# Patient Record
Sex: FEMALE | Race: WHITE | NOT HISPANIC OR LATINO | Employment: OTHER | URBAN - METROPOLITAN AREA
[De-identification: names, ages, dates, MRNs, and addresses within clinical notes are randomized per-mention and may not be internally consistent; named-entity substitution may affect disease eponyms.]

---

## 2017-06-22 ENCOUNTER — OFFICE VISIT (OUTPATIENT)
Dept: FAMILY MEDICINE | Facility: CLINIC | Age: 67
End: 2017-06-22
Payer: MEDICARE

## 2017-06-22 VITALS
OXYGEN SATURATION: 97 % | WEIGHT: 231.13 LBS | HEART RATE: 90 BPM | HEIGHT: 62 IN | BODY MASS INDEX: 42.53 KG/M2 | SYSTOLIC BLOOD PRESSURE: 124 MMHG | DIASTOLIC BLOOD PRESSURE: 60 MMHG | TEMPERATURE: 98 F

## 2017-06-22 DIAGNOSIS — F33.42 RECURRENT MAJOR DEPRESSIVE DISORDER, IN FULL REMISSION: ICD-10-CM

## 2017-06-22 DIAGNOSIS — G47.00 INSOMNIA, UNSPECIFIED TYPE: ICD-10-CM

## 2017-06-22 DIAGNOSIS — E66.01 MORBID OBESITY WITH BMI OF 40.0-44.9, ADULT: ICD-10-CM

## 2017-06-22 DIAGNOSIS — G47.31 PRIMARY CENTRAL SLEEP APNEA: ICD-10-CM

## 2017-06-22 DIAGNOSIS — I48.0 PAROXYSMAL A-FIB: ICD-10-CM

## 2017-06-22 DIAGNOSIS — Z79.4 TYPE 2 DIABETES MELLITUS WITHOUT COMPLICATION, WITH LONG-TERM CURRENT USE OF INSULIN: ICD-10-CM

## 2017-06-22 DIAGNOSIS — K21.9 GASTROESOPHAGEAL REFLUX DISEASE WITHOUT ESOPHAGITIS: ICD-10-CM

## 2017-06-22 DIAGNOSIS — E11.9 TYPE 2 DIABETES MELLITUS WITHOUT COMPLICATION, WITH LONG-TERM CURRENT USE OF INSULIN: ICD-10-CM

## 2017-06-22 DIAGNOSIS — M1A.0720 CHRONIC GOUT OF LEFT ANKLE, UNSPECIFIED CAUSE: Primary | ICD-10-CM

## 2017-06-22 PROBLEM — F33.9 RECURRENT MAJOR DEPRESSIVE DISORDER: Status: ACTIVE | Noted: 2017-06-22

## 2017-06-22 PROCEDURE — 4010F ACE/ARB THERAPY RXD/TAKEN: CPT | Mod: S$GLB,,, | Performed by: FAMILY MEDICINE

## 2017-06-22 PROCEDURE — 99499 UNLISTED E&M SERVICE: CPT | Mod: S$GLB,,, | Performed by: FAMILY MEDICINE

## 2017-06-22 PROCEDURE — 99999 PR PBB SHADOW E&M-EST. PATIENT-LVL IV: CPT | Mod: PBBFAC,,, | Performed by: FAMILY MEDICINE

## 2017-06-22 PROCEDURE — 99204 OFFICE O/P NEW MOD 45 MIN: CPT | Mod: S$GLB,,, | Performed by: FAMILY MEDICINE

## 2017-06-22 RX ORDER — OMEPRAZOLE 20 MG/1
20 CAPSULE, DELAYED RELEASE ORAL DAILY
COMMUNITY
End: 2018-01-25 | Stop reason: SDUPTHER

## 2017-06-22 RX ORDER — RAMELTEON 8 MG/1
8 TABLET ORAL NIGHTLY
COMMUNITY
End: 2017-07-12

## 2017-06-22 RX ORDER — GLUCOSAMINE/CHONDRO SU A 500-400 MG
1 TABLET ORAL DAILY
COMMUNITY
End: 2019-08-20

## 2017-06-22 RX ORDER — LANOLIN ALCOHOL/MO/W.PET/CERES
1 CREAM (GRAM) TOPICAL DAILY
COMMUNITY
End: 2023-09-28

## 2017-06-22 RX ORDER — GABAPENTIN 100 MG/1
100 CAPSULE ORAL 3 TIMES DAILY
Qty: 90 CAPSULE | Refills: 11 | Status: SHIPPED | OUTPATIENT
Start: 2017-06-22 | End: 2017-09-11 | Stop reason: SDUPTHER

## 2017-06-22 RX ORDER — TEMAZEPAM 30 MG/1
30 CAPSULE ORAL NIGHTLY PRN
Qty: 30 CAPSULE | Refills: 2 | Status: SHIPPED | OUTPATIENT
Start: 2017-06-22 | End: 2017-07-12

## 2017-06-22 RX ORDER — HYDRALAZINE HYDROCHLORIDE 100 MG/1
100 TABLET, FILM COATED ORAL 2 TIMES DAILY
COMMUNITY
End: 2017-09-18 | Stop reason: SDUPTHER

## 2017-06-22 RX ORDER — LORAZEPAM 0.5 MG/1
0.5 TABLET ORAL
COMMUNITY
End: 2018-09-13

## 2017-06-22 RX ORDER — CARVEDILOL 6.25 MG/1
6.25 TABLET ORAL 2 TIMES DAILY WITH MEALS
COMMUNITY
End: 2018-10-01 | Stop reason: SDUPTHER

## 2017-06-22 RX ORDER — INSULIN GLARGINE 100 [IU]/ML
58 INJECTION, SOLUTION SUBCUTANEOUS NIGHTLY
COMMUNITY
End: 2017-09-20

## 2017-06-22 RX ORDER — CYCLOSPORINE 0.5 MG/ML
1 EMULSION OPHTHALMIC 2 TIMES DAILY
COMMUNITY
End: 2017-06-27 | Stop reason: SDUPTHER

## 2017-06-22 RX ORDER — FLUTICASONE PROPIONATE 50 MCG
1 SPRAY, SUSPENSION (ML) NASAL DAILY
COMMUNITY
End: 2017-07-31 | Stop reason: SDUPTHER

## 2017-06-22 RX ORDER — ATORVASTATIN CALCIUM 40 MG/1
40 TABLET, FILM COATED ORAL DAILY
COMMUNITY
End: 2017-09-11 | Stop reason: SDUPTHER

## 2017-06-22 RX ORDER — DULOXETIN HYDROCHLORIDE 60 MG/1
60 CAPSULE, DELAYED RELEASE ORAL DAILY
COMMUNITY
End: 2017-09-11 | Stop reason: SDUPTHER

## 2017-06-22 RX ORDER — BENAZEPRIL HYDROCHLORIDE 40 MG/1
40 TABLET ORAL DAILY
COMMUNITY
End: 2018-08-11 | Stop reason: SDUPTHER

## 2017-06-22 NOTE — PROGRESS NOTES
Subjective:       Patient ID: Christine Jo is a 67 y.o. female.    Chief Complaint: Establish Care    67 y old female with Type 2 DM ,Insomnia , HTN , MO, Sleep Apnea, A fib , Depression, GERD here to get established . Not  On aspirin : allergic , + hep B vaccine : she might have series when she used to do  HH , exercises: not much , Opthalmology : sees Dr Osman , due for poretr next m  ,had 2 pneumonia vaccines (pneumovax 3 y ago)  , +TDAP 3 y ago . She also had   Zoster vaccine  .  Fastin bs are  Between 120 189 .Sees Dr Reynaga  For a fib and HTN. She was started on Eliquis  Also however  She has not fill it . Could  not rozina accustomed   to CPAP  . Depression is  fine   :  Still Overeats and deals with Some anhedonia also . She was also on celexa  Which did not help ,  She also  tried Wellbutrin which she could not tolerate either .  This  started once she found out son had Cancer .  She was getting group therapy  At [facility]  And will like to resume psychotherapy . Not sleeping well with Rozerem , 4 hrs at he most . Started on this med 1-2 m ago since Temazepam was not covred. Previously doing well on it and had no SE such as : hypersomnolence, dizziness , falls etc       Review of Systems   Constitutional: Negative.    HENT: Negative.    Respiratory: Negative.    Cardiovascular: Negative.    Gastrointestinal: Negative.    Genitourinary: Negative.    Musculoskeletal: Positive for arthralgias.   Neurological: Negative.    Psychiatric/Behavioral: Positive for sleep disturbance.       Objective:      Physical Exam   Constitutional: She is oriented to person, place, and time. She appears well-developed and well-nourished. No distress.   HENT:   Head: Normocephalic and atraumatic.   Right Ear: External ear normal.   Left Ear: External ear normal.   Mouth/Throat: No oropharyngeal exudate.   Eyes: Conjunctivae and EOM are normal. Pupils are equal, round, and reactive to light. Right eye exhibits no discharge. Left eye  exhibits no discharge. No scleral icterus.   Neck: Normal range of motion. Neck supple. No JVD present. No tracheal deviation present. No thyromegaly present.   Cardiovascular: Normal rate, regular rhythm and normal heart sounds.  Exam reveals no gallop and no friction rub.    No murmur heard.  Pulses:       Dorsalis pedis pulses are 2+ on the right side, and 2+ on the left side.        Posterior tibial pulses are 2+ on the right side, and 2+ on the left side.   Pulmonary/Chest: Effort normal and breath sounds normal. No stridor. No respiratory distress. She has no wheezes. She has no rales. She exhibits no tenderness.   Abdominal: Soft. Bowel sounds are normal. She exhibits no distension. There is no tenderness. There is no rebound and no guarding.   Musculoskeletal: Normal range of motion.        Right foot: There is normal range of motion and no deformity.        Left foot: There is normal range of motion and no deformity.   Feet:   Right Foot:   Protective Sensation: 4 sites tested. 4 sites sensed.   Skin Integrity: Negative for ulcer, blister, skin breakdown, erythema, warmth, callus or dry skin.   Left Foot:   Protective Sensation: 5 sites tested. 5 sites sensed.   Skin Integrity: Negative for ulcer, blister, skin breakdown, warmth, callus or dry skin.   Lymphadenopathy:     She has no cervical adenopathy.   Neurological: She is alert and oriented to person, place, and time.   Skin: Skin is warm and dry. She is not diaphoretic.   Psychiatric: She has a normal mood and affect. Her behavior is normal. Judgment and thought content normal.       Assessment:       1. Type 2 diabetes mellitus without complication, with long-term current use of insulin    2. Morbid obesity with BMI of 40.0-44.9, adult    3. Paroxysmal a-fib    4. Insomnia, unspecified type    5. Primary central sleep apnea    6. Gastroesophageal reflux disease without esophagitis    7. Recurrent major depressive disorder, in full remission         Plan:     Christine was seen today for establish care.    Diagnoses and all orders for this visit:    Type 2 diabetes mellitus without complication, with long-term current use of insulin    Morbid obesity with BMI of 40.0-44.9, adult    Paroxysmal a-fib    Insomnia, unspecified type    Primary central sleep apnea    Gastroesophageal reflux disease without esophagitis    Recurrent major depressive disorder, in full remission     . Pt. encouraged to follow a strict diabetic type diet.   Encouraged daily physical activity/exercise for at least 30mins if tolerated.   Weight control recommenced as always.   Discussed how lifestyle changes make biggest impact on diabetes control.   Continue home glucose monitoring once daily and keep log.   Counseling provided today about hypoglycemia as well as about how diabetes increases risk of cardiovascular, cerebrovascular ,peripheral vascular disease and other serious health complications. He has been instructed to call if developing any new symptoms or hypoglycemia related symptoms.   See encounter for associated orders/medications.   - Lipid panel; Future  The patient is asked to make an attempt to improve diet and exercise patterns to aid in medical management of this problem.  Get eliquis. F.u with Dr Reynaga   Will start Temazepam  And will look into PA if needed   Be compliant with CPAP   Will refer for psychotherapy

## 2017-06-23 ENCOUNTER — TELEPHONE (OUTPATIENT)
Dept: FAMILY MEDICINE | Facility: CLINIC | Age: 67
End: 2017-06-23

## 2017-06-23 NOTE — TELEPHONE ENCOUNTER
Received paper stating PA needs to be done on betamethasone cream. Called insurance. Insurance sending over forms to be filled out for PA

## 2017-06-26 ENCOUNTER — LAB VISIT (OUTPATIENT)
Dept: LAB | Facility: HOSPITAL | Age: 67
End: 2017-06-26
Attending: FAMILY MEDICINE
Payer: MEDICARE

## 2017-06-26 ENCOUNTER — TELEPHONE (OUTPATIENT)
Dept: FAMILY MEDICINE | Facility: CLINIC | Age: 67
End: 2017-06-26

## 2017-06-26 DIAGNOSIS — Z79.4 TYPE 2 DIABETES MELLITUS WITHOUT COMPLICATION, WITH LONG-TERM CURRENT USE OF INSULIN: ICD-10-CM

## 2017-06-26 DIAGNOSIS — M1A.0720 CHRONIC GOUT OF LEFT ANKLE, UNSPECIFIED CAUSE: ICD-10-CM

## 2017-06-26 DIAGNOSIS — E11.9 TYPE 2 DIABETES MELLITUS WITHOUT COMPLICATION, WITH LONG-TERM CURRENT USE OF INSULIN: ICD-10-CM

## 2017-06-26 LAB
ALBUMIN SERPL BCP-MCNC: 3.8 G/DL
ALP SERPL-CCNC: 62 U/L
ALT SERPL W/O P-5'-P-CCNC: 22 U/L
ANION GAP SERPL CALC-SCNC: 10 MMOL/L
AST SERPL-CCNC: 17 U/L
BILIRUB SERPL-MCNC: 0.9 MG/DL
BUN SERPL-MCNC: 16 MG/DL
CALCIUM SERPL-MCNC: 9.6 MG/DL
CHLORIDE SERPL-SCNC: 106 MMOL/L
CHOLEST/HDLC SERPL: 3.1 {RATIO}
CO2 SERPL-SCNC: 27 MMOL/L
CREAT SERPL-MCNC: 0.8 MG/DL
EST. GFR  (AFRICAN AMERICAN): >60 ML/MIN/1.73 M^2
EST. GFR  (NON AFRICAN AMERICAN): >60 ML/MIN/1.73 M^2
GLUCOSE SERPL-MCNC: 151 MG/DL
HDL/CHOLESTEROL RATIO: 32.2 %
HDLC SERPL-MCNC: 149 MG/DL
HDLC SERPL-MCNC: 48 MG/DL
LDLC SERPL CALC-MCNC: 70 MG/DL
NONHDLC SERPL-MCNC: 101 MG/DL
POTASSIUM SERPL-SCNC: 4.4 MMOL/L
PROT SERPL-MCNC: 7.1 G/DL
SODIUM SERPL-SCNC: 143 MMOL/L
TRIGL SERPL-MCNC: 155 MG/DL
URATE SERPL-MCNC: 5.8 MG/DL

## 2017-06-26 PROCEDURE — 80053 COMPREHEN METABOLIC PANEL: CPT

## 2017-06-26 PROCEDURE — 85025 COMPLETE CBC W/AUTO DIFF WBC: CPT

## 2017-06-26 PROCEDURE — 80061 LIPID PANEL: CPT

## 2017-06-26 PROCEDURE — 84550 ASSAY OF BLOOD/URIC ACID: CPT

## 2017-06-26 PROCEDURE — 36415 COLL VENOUS BLD VENIPUNCTURE: CPT | Mod: PO

## 2017-06-26 PROCEDURE — 83036 HEMOGLOBIN GLYCOSYLATED A1C: CPT

## 2017-06-26 PROCEDURE — 86706 HEP B SURFACE ANTIBODY: CPT

## 2017-06-26 RX ORDER — RAMELTEON 8 MG/1
8 TABLET ORAL NIGHTLY
Qty: 30 TABLET | Refills: 0 | Status: SHIPPED | OUTPATIENT
Start: 2017-06-26 | End: 2017-07-12 | Stop reason: SDUPTHER

## 2017-06-26 NOTE — TELEPHONE ENCOUNTER
----- Message from Sunshine Malloy sent at 6/26/2017  9:19 AM CDT -----  Contact: pt   Pt states that she need a pre authorization on her med.   ..[phone] (home)

## 2017-06-26 NOTE — TELEPHONE ENCOUNTER
Returned patients call. Patient states that the temazepam needs a PA so if the doctor doesn't mind can she just call in the old one she use to take because she is completely out and doesn't want to have to wait on them to make a decision on a PA. Informed would forward information to the doctor and get back with patient when response Is received. Patient verbalized understanding.

## 2017-06-27 LAB
BASOPHILS # BLD AUTO: 0.03 K/UL
BASOPHILS NFR BLD: 0.4 %
DIFFERENTIAL METHOD: ABNORMAL
EOSINOPHIL # BLD AUTO: 0.2 K/UL
EOSINOPHIL NFR BLD: 2.2 %
ERYTHROCYTE [DISTWIDTH] IN BLOOD BY AUTOMATED COUNT: 13.3 %
ESTIMATED AVG GLUCOSE: 157 MG/DL
HBA1C MFR BLD HPLC: 7.1 %
HBV SURFACE AB SER-ACNC: NEGATIVE M[IU]/ML
HCT VFR BLD AUTO: 39.7 %
HGB BLD-MCNC: 12.6 G/DL
LYMPHOCYTES # BLD AUTO: 1.6 K/UL
LYMPHOCYTES NFR BLD: 23.6 %
MCH RBC QN AUTO: 29.4 PG
MCHC RBC AUTO-ENTMCNC: 31.7 %
MCV RBC AUTO: 93 FL
MONOCYTES # BLD AUTO: 0.5 K/UL
MONOCYTES NFR BLD: 7.9 %
NEUTROPHILS # BLD AUTO: 4.4 K/UL
NEUTROPHILS NFR BLD: 65.9 %
PLATELET # BLD AUTO: 211 K/UL
PMV BLD AUTO: 13.1 FL
RBC # BLD AUTO: 4.29 M/UL
WBC # BLD AUTO: 6.74 K/UL

## 2017-06-28 ENCOUNTER — OFFICE VISIT (OUTPATIENT)
Dept: FAMILY MEDICINE | Facility: CLINIC | Age: 67
End: 2017-06-28
Payer: MEDICARE

## 2017-06-28 VITALS
DIASTOLIC BLOOD PRESSURE: 60 MMHG | BODY MASS INDEX: 42.82 KG/M2 | SYSTOLIC BLOOD PRESSURE: 104 MMHG | TEMPERATURE: 99 F | OXYGEN SATURATION: 96 % | HEART RATE: 97 BPM | WEIGHT: 232.69 LBS | HEIGHT: 62 IN

## 2017-06-28 DIAGNOSIS — Z01.419 ROUTINE GYNECOLOGICAL EXAMINATION: Primary | ICD-10-CM

## 2017-06-28 DIAGNOSIS — E11.69 DM TYPE 2 WITH DIABETIC DYSLIPIDEMIA: ICD-10-CM

## 2017-06-28 DIAGNOSIS — E78.5 DM TYPE 2 WITH DIABETIC DYSLIPIDEMIA: ICD-10-CM

## 2017-06-28 LAB
CREAT UR-MCNC: 65 MG/DL
MICROALBUMIN UR DL<=1MG/L-MCNC: 35 UG/ML
MICROALBUMIN/CREATININE RATIO: 53.8 UG/MG

## 2017-06-28 PROCEDURE — 3045F PR MOST RECENT HEMOGLOBIN A1C LEVEL 7.0-9.0%: CPT | Mod: S$GLB,,, | Performed by: FAMILY MEDICINE

## 2017-06-28 PROCEDURE — 99999 PR PBB SHADOW E&M-EST. PATIENT-LVL III: CPT | Mod: PBBFAC,,, | Performed by: FAMILY MEDICINE

## 2017-06-28 PROCEDURE — 90746 HEPB VACCINE 3 DOSE ADULT IM: CPT | Mod: S$GLB,,, | Performed by: FAMILY MEDICINE

## 2017-06-28 PROCEDURE — 99213 OFFICE O/P EST LOW 20 MIN: CPT | Mod: S$GLB,,, | Performed by: FAMILY MEDICINE

## 2017-06-28 PROCEDURE — 82570 ASSAY OF URINE CREATININE: CPT

## 2017-06-28 PROCEDURE — G0010 ADMIN HEPATITIS B VACCINE: HCPCS | Mod: S$GLB,,, | Performed by: FAMILY MEDICINE

## 2017-06-28 PROCEDURE — 99499 UNLISTED E&M SERVICE: CPT | Mod: S$GLB,,, | Performed by: FAMILY MEDICINE

## 2017-06-28 PROCEDURE — 4010F ACE/ARB THERAPY RXD/TAKEN: CPT | Mod: S$GLB,,, | Performed by: FAMILY MEDICINE

## 2017-06-28 RX ORDER — CYCLOSPORINE 0.5 MG/ML
EMULSION OPHTHALMIC
Qty: 360 VIAL | Refills: 0 | Status: SHIPPED | OUTPATIENT
Start: 2017-06-28 | End: 2017-09-20

## 2017-06-28 NOTE — PROGRESS NOTES
Subjective:       Patient ID: Christine Jo is a 67 y.o. female.    Chief Complaint: Medicare AWV    67 y old female with uncontrolled diabetes here to discuss recommendations .uses anywhere form 55-58 UNits of  Lantus aguayo based on  Fastin BS  readings . Not on any diet . Not exercising           Review of Systems   Constitutional: Negative.    HENT: Negative.    Respiratory: Negative.    Cardiovascular: Negative.    Gastrointestinal: Negative.    Genitourinary: Negative.        Objective:      Physical Exam   Constitutional: She is oriented to person, place, and time. She appears well-developed and well-nourished. No distress.   HENT:   Head: Normocephalic and atraumatic.   Right Ear: External ear normal.   Left Ear: External ear normal.   Mouth/Throat: No oropharyngeal exudate.   Eyes: Conjunctivae and EOM are normal. Pupils are equal, round, and reactive to light. Right eye exhibits no discharge. Left eye exhibits no discharge. No scleral icterus.   Neck: Normal range of motion. Neck supple. No JVD present. No tracheal deviation present. No thyromegaly present.   Cardiovascular: Normal rate, regular rhythm and normal heart sounds.  Exam reveals no gallop and no friction rub.    No murmur heard.  Pulmonary/Chest: Effort normal and breath sounds normal. No stridor. No respiratory distress. She has no wheezes. She has no rales. She exhibits no tenderness.   Abdominal: Soft. Bowel sounds are normal. She exhibits no distension. There is no tenderness. There is no rebound and no guarding.   Musculoskeletal: Normal range of motion.   Lymphadenopathy:     She has no cervical adenopathy.   Neurological: She is alert and oriented to person, place, and time.   Skin: Skin is warm and dry. She is not diaphoretic.   Psychiatric: She has a normal mood and affect. Her behavior is normal. Judgment and thought content normal.       Assessment:      Christine was seen today for medicare awv.    Diagnoses and all orders for this  visit:    Routine gynecological examination  -     Ambulatory consult to Gynecology    DM type 2 with diabetic dyslipidemia    Other orders  -     Hepatitis B Vaccine (Adult) (IM)      Plan:   Uncontrolled   Lab Results   Component Value Date    HGBA1C 7.1 (H) 06/26/2017     Be consistent with Lantus 58 u qhs  . Diet and ex. F.u in 3 m

## 2017-06-28 NOTE — TELEPHONE ENCOUNTER
----- Message from [place] sent at 6/28/2017 10:00 AM CDT -----  Pt at [phone]//states she is returning your call//please call again//thanks//delano

## 2017-06-29 ENCOUNTER — PATIENT OUTREACH (OUTPATIENT)
Dept: ADMINISTRATIVE | Facility: HOSPITAL | Age: 67
End: 2017-06-29

## 2017-06-29 NOTE — LETTER
June 29, 2017    Dr Terry Osman             [facility]  [address]  [facility] [address]  Phone: [phone] June 29, 2017     Patient: Christine Jo    YOB: 1950   Date of Visit: 6/29/2017     To whom it may concern     We are seeing Christine Jo, HILARIA.O.B is 1950, at [facility]. HANANE KHAN MD is their primary care physician. To help with our dahiana maintenance records could you please send the following:     Most recent copy of Diabetic Eye Exam that states   Positive or Negative Retinopathy.      Please send fax to [phone], Attention Caryn CAMPBELL LPN Care Coordination.    Thank-you in advance for your assistance. If you have any questions or concerns, please don't hesitate to contact me at [phone].     Caryn MURDOCK LPN  Care Coordination Department  [facility]

## 2017-06-30 ENCOUNTER — TELEPHONE (OUTPATIENT)
Dept: FAMILY MEDICINE | Facility: CLINIC | Age: 67
End: 2017-06-30

## 2017-06-30 NOTE — PROGRESS NOTES
DM Eye Exam report received per Dr Terry Osman's office last exam was 4/27/17. Report sent to media.

## 2017-06-30 NOTE — TELEPHONE ENCOUNTER
----- Message from Cheryle Mccarthy sent at 6/30/2017  2:52 PM CDT -----  Contact: Elena with [facility]  Re Prior Auth for Temazepam - they are faxing over paperwork they need completed and returned ASAP

## 2017-07-03 ENCOUNTER — TELEPHONE (OUTPATIENT)
Dept: FAMILY MEDICINE | Facility: CLINIC | Age: 67
End: 2017-07-03

## 2017-07-03 NOTE — TELEPHONE ENCOUNTER
----- Message from Jaki Segura sent at 7/3/2017 10:46 AM CDT -----  Contact: [facility]  Check on status of the PA for the Temazepam.  Please call pharmacy at [phone].

## 2017-07-03 NOTE — TELEPHONE ENCOUNTER
Returned call. Spoke with pharm and informed PA for prescription denied. Pharm verbalized understanding.     Called patient to inform PA for temazepam denied, patient verbalized understanding and stated that she would talk to doctor about it at her appt.

## 2017-07-07 ENCOUNTER — HOSPITAL ENCOUNTER (OUTPATIENT)
Dept: RADIOLOGY | Facility: HOSPITAL | Age: 67
Discharge: HOME | End: 2017-07-07
Attending: NURSE PRACTITIONER
Payer: MEDICARE

## 2017-07-07 ENCOUNTER — OFFICE VISIT (OUTPATIENT)
Dept: OBSTETRICS AND GYNECOLOGY | Facility: CLINIC | Age: 67
End: 2017-07-07
Payer: MEDICARE

## 2017-07-07 VITALS
WEIGHT: 237 LBS | HEIGHT: 62 IN | SYSTOLIC BLOOD PRESSURE: 128 MMHG | BODY MASS INDEX: 43.61 KG/M2 | DIASTOLIC BLOOD PRESSURE: 66 MMHG

## 2017-07-07 DIAGNOSIS — Z00.00 PREVENTATIVE HEALTH CARE: Primary | ICD-10-CM

## 2017-07-07 DIAGNOSIS — B37.31 CANDIDAL VAGINITIS: ICD-10-CM

## 2017-07-07 DIAGNOSIS — Z00.00 PREVENTATIVE HEALTH CARE: ICD-10-CM

## 2017-07-07 DIAGNOSIS — Z01.419 ENCOUNTER FOR GYNECOLOGICAL EXAMINATION WITHOUT ABNORMAL FINDING: ICD-10-CM

## 2017-07-07 PROCEDURE — 99999 PR PBB SHADOW E&M-EST. PATIENT-LVL IV: CPT | Mod: PBBFAC,,, | Performed by: NURSE PRACTITIONER

## 2017-07-07 PROCEDURE — 99203 OFFICE O/P NEW LOW 30 MIN: CPT | Mod: S$GLB,,, | Performed by: NURSE PRACTITIONER

## 2017-07-07 RX ORDER — NYSTATIN AND TRIAMCINOLONE ACETONIDE 100000; 1 [USP'U]/G; MG/G
CREAM TOPICAL
Qty: 30 G | Refills: 1 | Status: SHIPPED | OUTPATIENT
Start: 2017-07-07 | End: 2018-03-23 | Stop reason: SDUPTHER

## 2017-07-07 NOTE — PROGRESS NOTES
"CC: Well woman exam    Christine Jo is a 67 y.o. female  presents for well woman exam.  No LMP recorded. Patient is postmenopausal..  No issues, problems, or complaints.No AUB. Not sexually active.Patient reports DM ( Hga1c 7.23 % ) and states that she has recurrent yeast infections.  Last pap exam and mammogram was normal, .       Past Medical History:   Diagnosis Date    Diabetes mellitus     Insomnia     Vaginal yeast infection      Past Surgical History:   Procedure Laterality Date    abdominal laparoscopy       EYE SURGERY      TONSILLECTOMY      TUBAL LIGATION       Social History     Social History    Marital status:      Spouse name: N/A    Number of children: N/A    Years of education: N/A     Occupational History    Not on file.     Social History Main Topics    Smoking status: Former Smoker     Packs/day: 1.00     Years: 35.00     Types: Cigarettes     Quit date: 2003    Smokeless tobacco: Never Used    Alcohol use No    Drug use: No    Sexual activity: No     Other Topics Concern    Not on file     Social History Narrative    No narrative on file     Family History   Problem Relation Age of Onset    Heart disease Mother      CAD     Cancer Son      testicular     Cancer Maternal Aunt      Lung ca    Heart disease Maternal Grandfather      Pacemaker     Diabetes Sister     Heart disease Sister      CAD    Diabetes Sister     Diabetes Sister      OB History      Para Term  AB Living    3 3       3    SAB TAB Ectopic Multiple Live Births            3          /66 (BP Location: Right arm, Patient Position: Sitting, BP Method: Manual)   Ht 5' 2" (1.575 m)   Wt 107.5 kg (236 lb 15.9 oz)   BMI 43.35 kg/m²       ROS:  GENERAL: Denies weight gain or weight loss. Feeling well overall.   SKIN: Denies rash or lesions.   HEAD: Denies head injury or headache.   NODES: Denies enlarged lymph nodes.   CHEST: Denies chest pain or shortness of breath. "   CARDIOVASCULAR: Denies palpitations or left sided chest pain.   ABDOMEN: No abdominal pain, constipation, diarrhea, nausea, vomiting or rectal bleeding.   URINARY: No frequency, dysuria, hematuria, or burning on urination.  REPRODUCTIVE: See HPI.   BREASTS: The patient performs breast self-examination and denies pain, lumps, or nipple discharge.   HEMATOLOGIC: No easy bruisability or excessive bleeding.   MUSCULOSKELETAL: Denies joint pain or swelling.   NEUROLOGIC: Denies syncope or weakness.   PSYCHIATRIC: Denies depression, anxiety or mood swings.    PHYSICAL EXAM:  APPEARANCE: Obese Female,in no acute distress.  AFFECT: WNL, alert and oriented x 3  SKIN: No acne or hirsutism  NECK: Neck symmetric without masses or thyromegaly  NODES: No inguinal, cervical, axillary, or femoral lymph node enlargement  CHEST: Good respiratory effect  ABDOMEN: Soft.  No tenderness or masses.  No hepatosplenomegaly.  No hernias.  BREASTS: Symmetrical, no skin changes or visible lesions.  No palpable masses, nipple discharge bilaterally.  PELVIC: External genitalia, erythema consistent with yeast.  Normal hair distribution.  Adequate perineal body, normal urethral meatus.  Vagina atrophy. Cervix pink, without lesions, discharge or tenderness.  No significant cystocele or rectocele.  Bimanual exam shows uterus to be normal size, regular, mobile and nontender.  Adnexa without masses or tenderness.    EXTREMITIES: No edema.    1. Preventative health care  Mammo Digital Screening Bilat with CAD   2. Encounter for gynecological examination without abnormal finding  Mammo Digital Screening Bilat with CAD   3. Candidal vaginitis  nystatin-triamcinolone (MYCOLOG II) cream    PLAN:  Mycolog cream rx  Mammogram       Patient was counseled today on A.C.S. Pap guidelines and recommendations for yearly pelvic exams, mammograms and monthly self breast exams; to see her PCP for other health maintenance.

## 2017-07-07 NOTE — LETTER
July 7, 2017      Karen Nielsen MD  [address] [address] [address]           [facility]  [address] [facility] [address]  Phone: [phone]  Fax: [phone]          Patient: Christine Jo   MR Number: [id number]   YOB: 1950   Date of Visit: 7/7/2017       Dear Dr. Karen Nielsen:    Thank you for referring Christine Jo to me for evaluation. Attached you will find relevant portions of my assessment and plan of care.    If you have questions, please do not hesitate to call me. I look forward to following Christine Jo along with you.    Sincerely,    Zonia Bejarano NP    Enclosure  CC:  No Recipients    If you would like to receive this communication electronically, please contact [email or web address] or [phone] to request more information on [employer] Link access.    For providers and/or their staff who would like to refer a patient to [facility], please contact us through our one-stop-shop provider referral line, [facility] , at [phone].    If you feel you have received this communication in error or would no longer like to receive these types of communications, please e-mail [email or web address]

## 2017-07-07 NOTE — PATIENT INSTRUCTIONS
Vaginal Infection: Yeast (Candidiasis)  Yeast infection occurs when yeast in the vagina increase and start attacking the vaginal tissues. Yeast is a type of fungus. These infections are often caused by a type of yeast called Candida albicans. Other species of yeast can also cause infections. Factors that may make infection more likely include recent antibiotic use, douching, or increased sex. Yeast infections are more common in women who have diabetes, or are obese or pregnant, or have a weak immune system.  Symptoms of yeast infection  · Clumpy or thin, white discharge, which may look like cottage cheese  · No odor or minimal odor  · Severe vaginal itching or burning  · Burning with urination  · Swelling, redness of vulva  · Pain during sex  Treating yeast infection  Yeast infection is treated with a vaginal antifungal cream. In some cases, antifungal pills are prescribed instead. During treatment:  · Finish all of your medicine, even if your symptoms go away.  · Apply the cream before going to bed. Lie flat after applying so that it doesn't drip out.  · Do not douche or use tampons.  · Don't rely on a diaphragm or condoms, since the cream may weaken them.  · Avoid intercourse if advised by your healthcare provider.     Should I treat a yeast infection myself?  Discuss with your healthcare provider whether you should use over-the-counter medicines to treat a yeast infection. Self-treatment may depend on whether:  · You've had a yeast infection in the past.  · You're at risk for STDs.  Call your healthcare provider if symptoms do not go away or come back after treatment.   Date Last Reviewed: 5/12/2015  © 9915-6445 [employer]. [address] [place] [address], [place] [address]. All rights reserved. This information is not intended as a substitute for professional medical care. Always follow your healthcare professional's instructions.

## 2017-07-10 RX ORDER — INSULIN GLARGINE 100 [IU]/ML
INJECTION, SOLUTION SUBCUTANEOUS
Qty: 15 ML | Refills: 0 | Status: SHIPPED | OUTPATIENT
Start: 2017-07-10 | End: 2017-08-30 | Stop reason: SDUPTHER

## 2017-07-12 ENCOUNTER — LAB VISIT (OUTPATIENT)
Dept: LAB | Facility: HOSPITAL | Age: 67
End: 2017-07-12
Attending: FAMILY MEDICINE
Payer: MEDICARE

## 2017-07-12 ENCOUNTER — OFFICE VISIT (OUTPATIENT)
Dept: FAMILY MEDICINE | Facility: CLINIC | Age: 67
End: 2017-07-12
Payer: MEDICARE

## 2017-07-12 VITALS
BODY MASS INDEX: 45.6 KG/M2 | OXYGEN SATURATION: 98 % | DIASTOLIC BLOOD PRESSURE: 84 MMHG | HEART RATE: 96 BPM | HEIGHT: 60 IN | WEIGHT: 232.25 LBS | TEMPERATURE: 98 F | SYSTOLIC BLOOD PRESSURE: 144 MMHG

## 2017-07-12 DIAGNOSIS — Z13.820 OSTEOPOROSIS SCREENING: ICD-10-CM

## 2017-07-12 DIAGNOSIS — Z12.31 ENCOUNTER FOR SCREENING MAMMOGRAM FOR MALIGNANT NEOPLASM OF BREAST: Primary | ICD-10-CM

## 2017-07-12 DIAGNOSIS — Z11.59 NEED FOR HEPATITIS C SCREENING TEST: ICD-10-CM

## 2017-07-12 PROCEDURE — 99397 PER PM REEVAL EST PAT 65+ YR: CPT | Mod: S$GLB,,, | Performed by: FAMILY MEDICINE

## 2017-07-12 PROCEDURE — 86803 HEPATITIS C AB TEST: CPT

## 2017-07-12 PROCEDURE — 36415 COLL VENOUS BLD VENIPUNCTURE: CPT | Mod: PO

## 2017-07-12 PROCEDURE — 99999 PR PBB SHADOW E&M-EST. PATIENT-LVL IV: CPT | Mod: PBBFAC,,, | Performed by: FAMILY MEDICINE

## 2017-07-12 PROCEDURE — 99499 UNLISTED E&M SERVICE: CPT | Mod: S$GLB,,, | Performed by: FAMILY MEDICINE

## 2017-07-12 RX ORDER — RAMELTEON 8 MG/1
8 TABLET ORAL NIGHTLY
Qty: 30 TABLET | Refills: 0 | Status: SHIPPED | OUTPATIENT
Start: 2017-07-12 | End: 2017-09-20

## 2017-07-12 NOTE — PROGRESS NOTES
Subjective:       Patient ID: Christine Jo is a 67 y.o. female.    Chief Complaint: wellness exam    67 y old female with Type 2 dm , a  a fib , MO  And insomnia here for f.wellness exam . Lab Results       Component                Value               Date                       HGBA1C                   7.1 (H)             06/26/2017   , sees Dr Reynaga for a fib , On eliquis , No sob , CP , palpitations , Sleeps well with Rozerem .      She had colo on 4/17 . Must rep in 5 y ( On Bahena Rodad?) .Immunization are UTD with the exception for Pneumovax booster .  Due for  DEXA(smoker) . No  Depression.Last Mammogram: has one schedule . On  Vit D and Calcium, exercise : will be joining the [employer] . Occupation : Bank  Teller( retired ).will like to be Hep C tested([id number]),  Advances directives : MAVERICK Trascher  III(POA , Son)       Review of Systems   Constitutional: Negative.    HENT: Negative.    Respiratory: Negative.    Cardiovascular: Negative.    Gastrointestinal: Negative.    Genitourinary: Negative.    Musculoskeletal: Negative.        Objective:      Physical Exam   Constitutional: She is oriented to person, place, and time. She appears well-developed and well-nourished. No distress.   HENT:   Head: Normocephalic and atraumatic.   Right Ear: External ear normal.   Left Ear: External ear normal.   Mouth/Throat: No oropharyngeal exudate.   Eyes: Conjunctivae and EOM are normal. Pupils are equal, round, and reactive to light. Right eye exhibits no discharge. Left eye exhibits no discharge. No scleral icterus.   Neck: Normal range of motion. Neck supple. No JVD present. No tracheal deviation present. No thyromegaly present.   Cardiovascular: Normal rate, regular rhythm and normal heart sounds.  Exam reveals no gallop and no friction rub.    No murmur heard.  Pulmonary/Chest: Effort normal and breath sounds normal. No stridor. No respiratory distress. She has no wheezes. She has no rales. She exhibits no tenderness.    Abdominal: Soft. Bowel sounds are normal. She exhibits no distension. There is no tenderness. There is no rebound and no guarding.   Musculoskeletal: Normal range of motion.   Lymphadenopathy:     She has no cervical adenopathy.   Neurological: She is alert and oriented to person, place, and time.   Skin: Skin is warm and dry. She is not diaphoretic.   Psychiatric: She has a normal mood and affect. Her behavior is normal. Judgment and thought content normal.       Assessment:     Christine was seen today for wellness exam.    Diagnoses and all orders for this visit:    Encounter for screening mammogram for malignant neoplasm of breast    Need for hepatitis C screening test  -     Hepatitis C antibody; Future    Osteoporosis screening  -     DXA Bone Density Spine And Hip; Future    Other orders  -     Cancel: Mammo Digital Screening Bilateral With CAD; Future  -     Cancel: Hepatitis C antibody; Future  -     ramelteon (ROZEREM) 8 mg tablet; Take 1 tablet (8 mg total) by mouth every evening.      1. Encounter for screening mammogram for malignant neoplasm of breast    2. Need for hepatitis C screening test        Plan:     Christine was seen today for wellness exam.    Diagnoses and all orders for this visit:    Encounter for screening mammogram for malignant neoplasm of breast    Need for hepatitis C screening test    Other orders  -     Cancel: Mammo Digital Screening Bilateral With CAD; Future  -     Cancel: Hepatitis C antibody; Future  -     ramelteon (ROZEREM) 8 mg tablet; Take 1 tablet (8 mg total) by mouth every evening.       Discussed and recommended healthy eating habits and lifestyle changes including daily exercise of 30 mins, low salt diet, low fat diet and weight loss/maintenance to a normal body mass index 25 or below. I recommend routine use of seatbelts. Discouraged illicit drug use including tobacco use. I recommend no alcohol use but if you decide to drink, limit to 1 drink per day for females and 2  drinks per day for males. Keep up with your yearly physical visit with age appropriate screenings, vaccinations, and labs.

## 2017-07-13 ENCOUNTER — TELEPHONE (OUTPATIENT)
Dept: FAMILY MEDICINE | Facility: CLINIC | Age: 67
End: 2017-07-13

## 2017-07-13 DIAGNOSIS — Z87.891 FORMER HEAVY TOBACCO SMOKER: Primary | ICD-10-CM

## 2017-07-13 LAB — HCV AB SERPL QL IA: NEGATIVE

## 2017-07-13 NOTE — TELEPHONE ENCOUNTER
Spoke with patient and she stated that she contact her insurance an they will pay for the CT SCan of her lung. Please advise.

## 2017-07-21 ENCOUNTER — TELEPHONE (OUTPATIENT)
Dept: FAMILY MEDICINE | Facility: CLINIC | Age: 67
End: 2017-07-21

## 2017-07-21 NOTE — TELEPHONE ENCOUNTER
----- Message from Kacie Aguilera sent at 7/20/2017 10:17 AM CDT -----  Contact: Pt  Pt called and stated she needed to speak to the nurse. She stated that she missed her appt for her bone density test and needs an order to schedule it again. She can be reached at [phone].    Thanks,  TF

## 2017-07-21 NOTE — TELEPHONE ENCOUNTER
----- Message from Justine Ley sent at 7/21/2017 11:09 AM CDT -----  Contact: Pt   Pt is returning a missed call.  Please advise [phone] (home)

## 2017-07-25 ENCOUNTER — TELEPHONE (OUTPATIENT)
Dept: FAMILY MEDICINE | Facility: CLINIC | Age: 67
End: 2017-07-25

## 2017-07-25 NOTE — TELEPHONE ENCOUNTER
----- Message from Lorraine Peace sent at 7/25/2017  1:10 PM CDT -----  Contact: [facility]  needs callback  [employer]..[phone]

## 2017-07-25 NOTE — TELEPHONE ENCOUNTER
Returned call. Spoke with patient. Ct appt scheduled and patient wants to let doctor know that right now blood sugars are staying between 184 - 205 and wants to know if there is anything she needs to do. Informed would forward info to doctor and get back with patient when response is received.

## 2017-07-25 NOTE — TELEPHONE ENCOUNTER
Spoke with Marzena and provided the answers top the questions asked about did Dr Nielsen discuss the risk of the medicine and how often is the patient seen in clinic

## 2017-07-25 NOTE — TELEPHONE ENCOUNTER
----- Message from Warren Green sent at 7/25/2017  7:46 AM CDT -----  Contact: pt  She's calling in regards to a appt for her CT scan, pt states her blood sugar is running high, please advise, [phone] (home)

## 2017-07-31 RX ORDER — FLUTICASONE PROPIONATE 50 MCG
1 SPRAY, SUSPENSION (ML) NASAL DAILY
Qty: 16 G | Refills: 2 | Status: SHIPPED | OUTPATIENT
Start: 2017-07-31 | End: 2017-09-11 | Stop reason: SDUPTHER

## 2017-07-31 NOTE — TELEPHONE ENCOUNTER
----- Message from Kacie Aguilera sent at 7/31/2017  9:14 AM CDT -----  Contact: Pt  Pt called and stated she needed to speak to the nurse. She stated she needs a new prescription:    1. What is the name of the medication you are requesting? Flonase Nasal Spray  2. What is the dose? Pt stated she does not know   3. How do you take the medication? Orally, topically, etc?  4. How often do you take this medication?   5. Do you need a 30 day or 90 day supply? 30 day   6. How many refills are you requesting?   7. What is your preferred pharmacy and location of the pharmacy?   [facility] - [address] [address]  Phone: [phone] Fax: [phone]    Pt also stated that her temazepam was approved by her insurance. She can be reached at [phone].    Thanks,  TF

## 2017-08-15 ENCOUNTER — HOSPITAL ENCOUNTER (OUTPATIENT)
Dept: RADIOLOGY | Facility: HOSPITAL | Age: 67
Discharge: HOME | End: 2017-08-15
Attending: NURSE PRACTITIONER
Payer: MEDICARE

## 2017-08-15 DIAGNOSIS — Z01.419 ENCOUNTER FOR GYNECOLOGICAL EXAMINATION WITHOUT ABNORMAL FINDING: ICD-10-CM

## 2017-08-15 DIAGNOSIS — Z00.00 PREVENTATIVE HEALTH CARE: ICD-10-CM

## 2017-08-15 PROCEDURE — 77067 SCR MAMMO BI INCL CAD: CPT | Mod: TC

## 2017-08-15 PROCEDURE — 77067 SCR MAMMO BI INCL CAD: CPT | Mod: 26,,, | Performed by: RADIOLOGY

## 2017-08-15 PROCEDURE — 77063 BREAST TOMOSYNTHESIS BI: CPT | Mod: 26,,, | Performed by: RADIOLOGY

## 2017-08-16 ENCOUNTER — APPOINTMENT (OUTPATIENT)
Dept: RADIOLOGY | Facility: CLINIC | Age: 67
End: 2017-08-16
Attending: FAMILY MEDICINE
Payer: MEDICARE

## 2017-08-16 DIAGNOSIS — Z13.820 OSTEOPOROSIS SCREENING: ICD-10-CM

## 2017-08-16 PROCEDURE — 77080 DXA BONE DENSITY AXIAL: CPT | Mod: 26,,, | Performed by: INTERNAL MEDICINE

## 2017-08-16 PROCEDURE — 77080 DXA BONE DENSITY AXIAL: CPT | Mod: TC

## 2017-08-17 ENCOUNTER — TELEPHONE (OUTPATIENT)
Dept: OBSTETRICS AND GYNECOLOGY | Facility: CLINIC | Age: 67
End: 2017-08-17

## 2017-08-17 NOTE — TELEPHONE ENCOUNTER
----- Message from Keren Goel sent at 8/17/2017  9:33 AM CDT -----  Patient states that she was returning your call.  Call her at [phone].                                     ag

## 2017-08-17 NOTE — TELEPHONE ENCOUNTER
----- Message from Zonia Bejarano NP sent at 8/17/2017  8:24 AM CDT -----  Please call patient and inform  Her mammogram was normal.

## 2017-08-21 ENCOUNTER — TELEPHONE (OUTPATIENT)
Dept: RADIOLOGY | Facility: HOSPITAL | Age: 67
End: 2017-08-21

## 2017-08-28 ENCOUNTER — TELEPHONE (OUTPATIENT)
Dept: FAMILY MEDICINE | Facility: CLINIC | Age: 67
End: 2017-08-28

## 2017-08-28 NOTE — TELEPHONE ENCOUNTER
----- Message from [place] sent at 8/28/2017  3:04 PM CDT -----  Contact: Pt  Pt was returning the nurse call. Please give pt a call at ..[phone] (home)

## 2017-08-30 ENCOUNTER — TELEPHONE (OUTPATIENT)
Dept: FAMILY MEDICINE | Facility: CLINIC | Age: 67
End: 2017-08-30

## 2017-08-30 RX ORDER — INSULIN GLARGINE 100 [IU]/ML
INJECTION, SOLUTION SUBCUTANEOUS
Qty: 15 ML | Refills: 0 | Status: SHIPPED | OUTPATIENT
Start: 2017-08-30 | End: 2017-09-26 | Stop reason: SDUPTHER

## 2017-08-30 RX ORDER — INSULIN GLARGINE 100 [IU]/ML
INJECTION, SOLUTION SUBCUTANEOUS
Qty: 15 ML | Refills: 0 | Status: CANCELLED | OUTPATIENT
Start: 2017-08-30

## 2017-08-30 NOTE — TELEPHONE ENCOUNTER
----- Message from Pamela Singh sent at 8/30/2017  9:27 AM CDT -----  Contact: pt   Pt said pharmacy asked to get  A 3 month prescription for her lantis,,,,pt only has one pin left ,,, [facility] pharmacy,,please call pt back at [phone]

## 2017-08-30 NOTE — TELEPHONE ENCOUNTER
----- Message from Pamela Singh sent at 8/30/2017  9:27 AM CDT -----  Contact: pt   Pt said pharmacy asked to get  A 3 month prescription for her lantis,,,,pt only has one pin left ,,, [facility] pharmacy,,please call pt back at [phone]

## 2017-09-11 RX ORDER — FLUTICASONE PROPIONATE 50 MCG
1 SPRAY, SUSPENSION (ML) NASAL DAILY
Qty: 16 G | Refills: 2 | Status: SHIPPED | OUTPATIENT
Start: 2017-09-11 | End: 2017-11-20 | Stop reason: SDUPTHER

## 2017-09-11 RX ORDER — DULOXETIN HYDROCHLORIDE 60 MG/1
60 CAPSULE, DELAYED RELEASE ORAL DAILY
Qty: 90 CAPSULE | Refills: 3 | Status: SHIPPED | OUTPATIENT
Start: 2017-09-11 | End: 2018-10-01 | Stop reason: SDUPTHER

## 2017-09-11 RX ORDER — ATORVASTATIN CALCIUM 40 MG/1
40 TABLET, FILM COATED ORAL DAILY
Qty: 90 TABLET | Refills: 3 | Status: SHIPPED | OUTPATIENT
Start: 2017-09-11 | End: 2018-10-01 | Stop reason: SDUPTHER

## 2017-09-11 RX ORDER — GABAPENTIN 100 MG/1
100 CAPSULE ORAL 3 TIMES DAILY
Qty: 270 CAPSULE | Refills: 3 | Status: SHIPPED | OUTPATIENT
Start: 2017-09-11 | End: 2018-10-01 | Stop reason: SDUPTHER

## 2017-09-11 NOTE — TELEPHONE ENCOUNTER
----- Message from Roxane Gupta sent at 9/11/2017  8:36 AM CDT -----  Contact: PT  States her insurance states she has some prescription that can be 90 days juneuva, gabapentin capsules, fluticasone, atorvastatin and duloxetine. States she needs the atorvastatin and duloxetine today. Pt uses     [facility] - [address] [address]  Phone: [phone] Fax: [phone]    Please call pt at [phone]. Thank you

## 2017-09-18 RX ORDER — HYDRALAZINE HYDROCHLORIDE 100 MG/1
TABLET, FILM COATED ORAL
Qty: 60 TABLET | Refills: 0 | Status: SHIPPED | OUTPATIENT
Start: 2017-09-18 | End: 2017-10-26 | Stop reason: SDUPTHER

## 2017-09-20 ENCOUNTER — LAB VISIT (OUTPATIENT)
Dept: LAB | Facility: HOSPITAL | Age: 67
End: 2017-09-20
Attending: FAMILY MEDICINE
Payer: MEDICARE

## 2017-09-20 ENCOUNTER — OFFICE VISIT (OUTPATIENT)
Dept: FAMILY MEDICINE | Facility: CLINIC | Age: 67
End: 2017-09-20
Payer: MEDICARE

## 2017-09-20 VITALS
HEART RATE: 89 BPM | HEIGHT: 60 IN | WEIGHT: 235.56 LBS | SYSTOLIC BLOOD PRESSURE: 114 MMHG | OXYGEN SATURATION: 98 % | TEMPERATURE: 98 F | BODY MASS INDEX: 46.25 KG/M2 | DIASTOLIC BLOOD PRESSURE: 62 MMHG

## 2017-09-20 DIAGNOSIS — E78.5 DM TYPE 2 WITH DIABETIC DYSLIPIDEMIA: ICD-10-CM

## 2017-09-20 DIAGNOSIS — E11.69 DM TYPE 2 WITH DIABETIC DYSLIPIDEMIA: ICD-10-CM

## 2017-09-20 DIAGNOSIS — M81.0 OSTEOPOROSIS, UNSPECIFIED OSTEOPOROSIS TYPE, UNSPECIFIED PATHOLOGICAL FRACTURE PRESENCE: ICD-10-CM

## 2017-09-20 DIAGNOSIS — M81.0 OSTEOPOROSIS, UNSPECIFIED OSTEOPOROSIS TYPE, UNSPECIFIED PATHOLOGICAL FRACTURE PRESENCE: Primary | ICD-10-CM

## 2017-09-20 DIAGNOSIS — I10 ESSENTIAL HYPERTENSION: ICD-10-CM

## 2017-09-20 LAB
25(OH)D3+25(OH)D2 SERPL-MCNC: 42 NG/ML
ALBUMIN SERPL BCP-MCNC: 4 G/DL
ALP SERPL-CCNC: 66 U/L
ALT SERPL W/O P-5'-P-CCNC: 24 U/L
ANION GAP SERPL CALC-SCNC: 8 MMOL/L
AST SERPL-CCNC: 19 U/L
BASOPHILS # BLD AUTO: 0.04 K/UL
BASOPHILS NFR BLD: 0.5 %
BILIRUB SERPL-MCNC: 1 MG/DL
BUN SERPL-MCNC: 25 MG/DL
CALCIUM SERPL-MCNC: 10.1 MG/DL
CHLORIDE SERPL-SCNC: 103 MMOL/L
CO2 SERPL-SCNC: 27 MMOL/L
CREAT SERPL-MCNC: 0.8 MG/DL
CREAT UR-MCNC: 39 MG/DL
DIFFERENTIAL METHOD: NORMAL
EOSINOPHIL # BLD AUTO: 0.1 K/UL
EOSINOPHIL NFR BLD: 1.6 %
ERYTHROCYTE [DISTWIDTH] IN BLOOD BY AUTOMATED COUNT: 13.3 %
EST. GFR  (AFRICAN AMERICAN): >60 ML/MIN/1.73 M^2
EST. GFR  (NON AFRICAN AMERICAN): >60 ML/MIN/1.73 M^2
GLUCOSE SERPL-MCNC: 161 MG/DL
HCT VFR BLD AUTO: 39.4 %
HGB BLD-MCNC: 12.9 G/DL
LYMPHOCYTES # BLD AUTO: 1.8 K/UL
LYMPHOCYTES NFR BLD: 21.7 %
MCH RBC QN AUTO: 29.5 PG
MCHC RBC AUTO-ENTMCNC: 32.7 G/DL
MCV RBC AUTO: 90 FL
MICROALBUMIN UR DL<=1MG/L-MCNC: 16 UG/ML
MICROALBUMIN/CREATININE RATIO: 41 UG/MG
MONOCYTES # BLD AUTO: 0.8 K/UL
MONOCYTES NFR BLD: 9.7 %
NEUTROPHILS # BLD AUTO: 5.4 K/UL
NEUTROPHILS NFR BLD: 66.4 %
PLATELET # BLD AUTO: 256 K/UL
PMV BLD AUTO: 12.8 FL
POTASSIUM SERPL-SCNC: 4.5 MMOL/L
PROT SERPL-MCNC: 7.3 G/DL
RBC # BLD AUTO: 4.37 M/UL
SODIUM SERPL-SCNC: 138 MMOL/L
WBC # BLD AUTO: 8.12 K/UL

## 2017-09-20 PROCEDURE — G0008 ADMIN INFLUENZA VIRUS VAC: HCPCS | Mod: S$GLB,,, | Performed by: FAMILY MEDICINE

## 2017-09-20 PROCEDURE — 99999 PR PBB SHADOW E&M-EST. PATIENT-LVL III: CPT | Mod: PBBFAC,,, | Performed by: FAMILY MEDICINE

## 2017-09-20 PROCEDURE — 3078F DIAST BP <80 MM HG: CPT | Mod: S$GLB,,, | Performed by: FAMILY MEDICINE

## 2017-09-20 PROCEDURE — 3045F PR MOST RECENT HEMOGLOBIN A1C LEVEL 7.0-9.0%: CPT | Mod: S$GLB,,, | Performed by: FAMILY MEDICINE

## 2017-09-20 PROCEDURE — 90744 HEPB VACC 3 DOSE PED/ADOL IM: CPT | Mod: S$GLB,,, | Performed by: FAMILY MEDICINE

## 2017-09-20 PROCEDURE — 99214 OFFICE O/P EST MOD 30 MIN: CPT | Mod: 25,S$GLB,, | Performed by: FAMILY MEDICINE

## 2017-09-20 PROCEDURE — 82570 ASSAY OF URINE CREATININE: CPT

## 2017-09-20 PROCEDURE — 3074F SYST BP LT 130 MM HG: CPT | Mod: S$GLB,,, | Performed by: FAMILY MEDICINE

## 2017-09-20 PROCEDURE — 85025 COMPLETE CBC W/AUTO DIFF WBC: CPT

## 2017-09-20 PROCEDURE — 80053 COMPREHEN METABOLIC PANEL: CPT

## 2017-09-20 PROCEDURE — 4010F ACE/ARB THERAPY RXD/TAKEN: CPT | Mod: S$GLB,,, | Performed by: FAMILY MEDICINE

## 2017-09-20 PROCEDURE — 83036 HEMOGLOBIN GLYCOSYLATED A1C: CPT

## 2017-09-20 PROCEDURE — 82306 VITAMIN D 25 HYDROXY: CPT

## 2017-09-20 PROCEDURE — 36415 COLL VENOUS BLD VENIPUNCTURE: CPT | Mod: PO

## 2017-09-20 PROCEDURE — 90662 IIV NO PRSV INCREASED AG IM: CPT | Mod: S$GLB,,, | Performed by: FAMILY MEDICINE

## 2017-09-20 PROCEDURE — G0010 ADMIN HEPATITIS B VACCINE: HCPCS | Mod: S$GLB,,, | Performed by: FAMILY MEDICINE

## 2017-09-20 PROCEDURE — 3008F BODY MASS INDEX DOCD: CPT | Mod: S$GLB,,, | Performed by: FAMILY MEDICINE

## 2017-09-20 PROCEDURE — 99499 UNLISTED E&M SERVICE: CPT | Mod: S$GLB,,, | Performed by: FAMILY MEDICINE

## 2017-09-20 RX ORDER — TEMAZEPAM 30 MG/1
30 CAPSULE ORAL NIGHTLY PRN
COMMUNITY
End: 2017-10-03 | Stop reason: SDUPTHER

## 2017-09-20 NOTE — PROGRESS NOTES
Subjective:       Patient ID: Christine Jo is a 67 y.o. female.    Chief Complaint: Follow-up (bmd) and Dizziness    67 y old here for f/u on DM, HTN and DLP . On eliquis(a fib)  .Needs 2nd hep b  vacc and influenza  , exercises: Walks  3 times daily  For 20 min .  Opthalmology  : sees girish Patiño for porter . Diet is not good , snacks in th middle of the night  , Fastin bs are 160-170 . Would like to get off invokana due to recurrent yeats infections and malaise. She also had DEXA done last m whci showed o porosis .She was started on bisphosphonate earlier  this y due to evidence of o penia seen on X ray . It cause GI SE. No falls . Not on vit d       Review of Systems   Constitutional: Negative.    HENT: Negative.    Respiratory: Negative.    Cardiovascular: Negative.    Gastrointestinal: Negative.    Genitourinary: Negative.    Neurological: Positive for dizziness.       Objective:      Physical Exam   Constitutional: She is oriented to person, place, and time. She appears well-developed and well-nourished. No distress.   HENT:   Head: Normocephalic and atraumatic.   Right Ear: External ear normal.   Left Ear: External ear normal.   Mouth/Throat: No oropharyngeal exudate.   Eyes: Conjunctivae and EOM are normal. Pupils are equal, round, and reactive to light. Right eye exhibits no discharge. Left eye exhibits no discharge. No scleral icterus.   Neck: Normal range of motion. Neck supple. No JVD present. No tracheal deviation present. No thyromegaly present.   Cardiovascular: Normal rate, regular rhythm and normal heart sounds.  Exam reveals no gallop and no friction rub.    No murmur heard.  Pulmonary/Chest: Effort normal and breath sounds normal. No stridor. No respiratory distress. She has no wheezes. She has no rales. She exhibits no tenderness.   Abdominal: Soft. Bowel sounds are normal. She exhibits no distension. There is no tenderness. There is no rebound and no guarding.   Musculoskeletal: Normal  range of motion.   Lymphadenopathy:     She has no cervical adenopathy.   Neurological: She is alert and oriented to person, place, and time.   Skin: Skin is warm and dry. She is not diaphoretic.   Psychiatric: She has a normal mood and affect. Her behavior is normal. Judgment and thought content normal.       Assessment:      Christine was seen today for follow-up and dizziness.    Diagnoses and all orders for this visit:    Osteoporosis, unspecified osteoporosis type, unspecified pathological fracture presence  -     Vitamin D; Future  -     Ambulatory consult to Rheumatology    DM type 2 with diabetic dyslipidemia  -     CBC auto differential; Future  -     Comprehensive metabolic panel; Future  -     Hemoglobin A1c; Future  -     Microalbumin/creatinine urine ratio    Essential hypertension    Other orders  -     apixaban 5 mg Tab; Take 1 tablet (5 mg total) by mouth 2 (two) times daily.  -     (In Office Administered) Hepatitis B Vaccine (Pediatric/Adolescent) (3-Dose) (IM)  -     (In Office Administered) Hepatitis B Vaccine (Pediatric/Adolescent) (3-Dose) (IM)      Plan:     See RHEUM . Start VIT D     Pt. encouraged to follow a strict diabetic type diet.   Encouraged daily physical activity/exercise for at least 30mins if tolerated.   Weight control recommenced as always.   Discussed how lifestyle changes make biggest impact on diabetes control.   Continue home glucose monitoring once daily and keep log.   Counseling provided today about hypoglycemia as well as about how diabetes increases risk of cardiovascular, cerebrovascular ,peripheral vascular disease and other serious health complications. He has been instructed to call if developing any new symptoms or hypoglycemia related symptoms.   See encounter for associated orders/medications.      controlled . Cont med

## 2017-09-21 LAB
ESTIMATED AVG GLUCOSE: 177 MG/DL
HBA1C MFR BLD HPLC: 7.8 %

## 2017-09-26 ENCOUNTER — OFFICE VISIT (OUTPATIENT)
Dept: FAMILY MEDICINE | Facility: CLINIC | Age: 67
End: 2017-09-26
Payer: MEDICARE

## 2017-09-26 VITALS
OXYGEN SATURATION: 98 % | HEIGHT: 60 IN | TEMPERATURE: 98 F | DIASTOLIC BLOOD PRESSURE: 64 MMHG | BODY MASS INDEX: 46.03 KG/M2 | HEART RATE: 94 BPM | WEIGHT: 234.44 LBS | SYSTOLIC BLOOD PRESSURE: 118 MMHG

## 2017-09-26 DIAGNOSIS — I10 HYPERTENSION, UNSPECIFIED TYPE: ICD-10-CM

## 2017-09-26 DIAGNOSIS — E66.01 MORBID OBESITY WITH BMI OF 45.0-49.9, ADULT: ICD-10-CM

## 2017-09-26 DIAGNOSIS — Z79.4 TYPE 2 DIABETES MELLITUS WITH HYPERGLYCEMIA, WITH LONG-TERM CURRENT USE OF INSULIN: Primary | ICD-10-CM

## 2017-09-26 DIAGNOSIS — E11.65 TYPE 2 DIABETES MELLITUS WITH HYPERGLYCEMIA, WITH LONG-TERM CURRENT USE OF INSULIN: Primary | ICD-10-CM

## 2017-09-26 PROCEDURE — 99499 UNLISTED E&M SERVICE: CPT | Mod: S$GLB,,, | Performed by: FAMILY MEDICINE

## 2017-09-26 PROCEDURE — 99213 OFFICE O/P EST LOW 20 MIN: CPT | Mod: S$GLB,,, | Performed by: FAMILY MEDICINE

## 2017-09-26 PROCEDURE — 3008F BODY MASS INDEX DOCD: CPT | Mod: S$GLB,,, | Performed by: FAMILY MEDICINE

## 2017-09-26 PROCEDURE — 3074F SYST BP LT 130 MM HG: CPT | Mod: S$GLB,,, | Performed by: FAMILY MEDICINE

## 2017-09-26 PROCEDURE — 99999 PR PBB SHADOW E&M-EST. PATIENT-LVL III: CPT | Mod: PBBFAC,,, | Performed by: FAMILY MEDICINE

## 2017-09-26 PROCEDURE — 3078F DIAST BP <80 MM HG: CPT | Mod: S$GLB,,, | Performed by: FAMILY MEDICINE

## 2017-09-26 RX ORDER — HYDROCHLOROTHIAZIDE 25 MG/1
TABLET ORAL
COMMUNITY
Start: 2017-07-28 | End: 2017-12-05

## 2017-09-26 RX ORDER — BEPOTASTINE BESILATE 15 MG/ML
SOLUTION/ DROPS OPHTHALMIC
COMMUNITY
Start: 2017-09-18 | End: 2018-03-23 | Stop reason: SDUPTHER

## 2017-09-26 RX ORDER — INSULIN GLARGINE 100 [IU]/ML
INJECTION, SOLUTION SUBCUTANEOUS
Qty: 15 ML | Refills: 0
Start: 2017-09-26 | End: 2017-10-03 | Stop reason: SDUPTHER

## 2017-09-26 RX ORDER — LIFITEGRAST 50 MG/ML
SOLUTION/ DROPS OPHTHALMIC
COMMUNITY
Start: 2017-08-28 | End: 2018-01-23

## 2017-09-26 NOTE — PROGRESS NOTES
Subjective:       Patient ID: Christine Jo is a 67 y.o. female.    Chief Complaint: discuss results    67 y old female with uncontrolled  Type 2 dm, HTN and MO  here to go over her most recent hba1c. Lab Results       Component                Value               Date                       HGBA1C                   7.8 (H)             09/20/2017          . She is a Stress eater, she ha been having  issue with middle daughter , she  snacks on grapes and cookies. FBS: over  200 .  Would like to stop Invokana given frequent  yeats  infections and UTI . On 60 units of lantus  daily , compliant with Januvia       Review of Systems   Constitutional: Negative.    HENT: Negative.    Respiratory: Negative.    Cardiovascular: Negative.    Musculoskeletal: Negative.    Psychiatric/Behavioral: Positive for agitation.       Objective:      Physical Exam   Constitutional: She is oriented to person, place, and time. She appears well-developed and well-nourished. No distress.   HENT:   Head: Normocephalic and atraumatic.   Right Ear: External ear normal.   Left Ear: External ear normal.   Mouth/Throat: No oropharyngeal exudate.   Eyes: Conjunctivae and EOM are normal. Pupils are equal, round, and reactive to light. Right eye exhibits no discharge. Left eye exhibits no discharge. No scleral icterus.   Neck: Normal range of motion. Neck supple. No JVD present. No tracheal deviation present. No thyromegaly present.   Cardiovascular: Normal rate, regular rhythm and normal heart sounds.  Exam reveals no gallop and no friction rub.    No murmur heard.  Pulmonary/Chest: Effort normal and breath sounds normal. No stridor. No respiratory distress. She has no wheezes. She has no rales. She exhibits no tenderness.   Abdominal: Soft. Bowel sounds are normal. She exhibits no distension. There is no tenderness. There is no rebound and no guarding.   Musculoskeletal: Normal range of motion.   Lymphadenopathy:     She has no cervical adenopathy.    Neurological: She is alert and oriented to person, place, and time.   Skin: Skin is warm and dry. She is not diaphoretic.   Psychiatric: She has a normal mood and affect. Her behavior is normal. Judgment and thought content normal.       Assessment:     Christine was seen today for discuss results.    Diagnoses and all orders for this visit:    Type 2 diabetes mellitus with hyperglycemia, with long-term current use of insulin    Hypertension, unspecified type    Morbid obesity with BMI of 45.0-49.9, adult    Other orders  -     liraglutide 0.6 mg/0.1 mL, 18 mg/3 mL, subq PNIJ 0.6 mg/0.1 mL (18 mg/3 mL) PnIj; Inject 0.6 mg into the skin once daily.  -     Discontinue: insulin glargine (LANTUS SOLOSTAR) 100 unit/mL (3 mL) InPn pen; INJECT 64  UNITS UNDER THE SKIN DAILY.      Plan:   Start Victoza,increase lantus  SE discussed  Keep BSL . F.u in 10   Resume walking   Cut back on carbs   Controled bp . Cont med   .see No 1   Type 2 diabetes mellitus with hyperglycemia, with long-term current use of insulin    Other orders  -     liraglutide 0.6 mg/0.1 mL, 18 mg/3 mL, subq PNIJ 0.6 mg/0.1 mL (18 mg/3 mL) PnIj; Inject 0.6 mg into the skin once daily.  Dispense: 3 mL; Refill: 11  -     insulin glargine (LANTUS SOLOSTAR) 100 unit/mL (3 mL) InPn pen; INJECT 64  UNITS UNDER THE SKIN DAILY.  Dispense: 15 mL; Refill: 0

## 2017-10-02 ENCOUNTER — TELEPHONE (OUTPATIENT)
Dept: FAMILY MEDICINE | Facility: CLINIC | Age: 67
End: 2017-10-02

## 2017-10-02 ENCOUNTER — PATIENT MESSAGE (OUTPATIENT)
Dept: FAMILY MEDICINE | Facility: CLINIC | Age: 67
End: 2017-10-02

## 2017-10-02 NOTE — TELEPHONE ENCOUNTER
----- Message from Shelly Ley sent at 10/2/2017  4:36 PM CDT -----  Contact: pt  The pt states she needs a refill on insulin , pt can be reached at [phone]///thxMW

## 2017-10-03 ENCOUNTER — TELEPHONE (OUTPATIENT)
Dept: FAMILY MEDICINE | Facility: CLINIC | Age: 67
End: 2017-10-03

## 2017-10-03 RX ORDER — INSULIN GLARGINE 100 [IU]/ML
INJECTION, SOLUTION SUBCUTANEOUS
Qty: 15 ML | Refills: 0
Start: 2017-10-03 | End: 2017-10-03 | Stop reason: SDUPTHER

## 2017-10-03 RX ORDER — TEMAZEPAM 30 MG/1
30 CAPSULE ORAL NIGHTLY PRN
Qty: 30 CAPSULE | Refills: 2 | Status: SHIPPED | OUTPATIENT
Start: 2017-10-03 | End: 2017-10-03 | Stop reason: SDUPTHER

## 2017-10-03 RX ORDER — TEMAZEPAM 30 MG/1
30 CAPSULE ORAL NIGHTLY PRN
Qty: 30 CAPSULE | Refills: 2 | Status: SHIPPED | OUTPATIENT
Start: 2017-10-03 | End: 2018-01-08 | Stop reason: SDUPTHER

## 2017-10-03 RX ORDER — INSULIN GLARGINE 100 [IU]/ML
INJECTION, SOLUTION SUBCUTANEOUS
Qty: 15 ML | Refills: 2
Start: 2017-10-03 | End: 2017-12-06 | Stop reason: SDUPTHER

## 2017-10-03 NOTE — TELEPHONE ENCOUNTER
----- Message from Kenzie Carrasco sent at 10/3/2017 11:30 AM CDT -----  Call pt at [phone]//regarding renew rx for,lantus and temazepam 30mg call to [facility] [address]/pt is out on insulin//thks ht

## 2017-10-03 NOTE — TELEPHONE ENCOUNTER
Returned call thony rubio with patient and informed med sent to pharm for insulin. Patient asked about temazepam. Informed would forward request to the doctor and get back with her when response is received.

## 2017-10-03 NOTE — TELEPHONE ENCOUNTER
----- Message from Yanet Hidalgo sent at 10/3/2017  7:36 AM CDT -----  Contact: self [phone]  Pt is requesting a call back regarding prescription refill for Lantus. Please call back at [phone]. Paulie Beltran      Pt uses.  [facility] - [place]  [address] AT SEC of [facility] [place] [address]  [place] [address]  Phone: [phone] Fax: [phone]

## 2017-10-04 ENCOUNTER — PATIENT MESSAGE (OUTPATIENT)
Dept: OBSTETRICS AND GYNECOLOGY | Facility: CLINIC | Age: 67
End: 2017-10-04

## 2017-10-04 ENCOUNTER — TELEPHONE (OUTPATIENT)
Dept: FAMILY MEDICINE | Facility: CLINIC | Age: 67
End: 2017-10-04

## 2017-10-04 NOTE — TELEPHONE ENCOUNTER
----- Message from Justine Ley sent at 10/4/2017  8:14 AM CDT -----  Contact: P  Pt is requesting a refill on her temazepam and Lantus. Pt can be reached at [phone]    [facility] [address] [place] [address] [place], [place] - [address] AT [address] [facility]  [facility] [address]  [place] [address]  Phone: [phone] Fax: [phone]

## 2017-10-04 NOTE — TELEPHONE ENCOUNTER
Returned call and spoke with pharmacist. Gave med instructions and ok to refill on both lantus and restoril because escribe was not received. Pharmacist verbalized understanding.

## 2017-10-04 NOTE — TELEPHONE ENCOUNTER
----- Message from Doris Restrepo sent at 10/4/2017 10:53 AM CDT -----  Contact: klever  Would like to speak to nurse about rx for pt. Please call back at [phone]. thanks

## 2017-10-05 ENCOUNTER — OFFICE VISIT (OUTPATIENT)
Dept: FAMILY MEDICINE | Facility: CLINIC | Age: 67
End: 2017-10-05
Payer: MEDICARE

## 2017-10-05 VITALS
DIASTOLIC BLOOD PRESSURE: 70 MMHG | OXYGEN SATURATION: 98 % | WEIGHT: 237.31 LBS | HEIGHT: 60 IN | BODY MASS INDEX: 46.59 KG/M2 | HEART RATE: 96 BPM | SYSTOLIC BLOOD PRESSURE: 114 MMHG | TEMPERATURE: 98 F

## 2017-10-05 DIAGNOSIS — E78.5 DM TYPE 2 WITH DIABETIC DYSLIPIDEMIA: Primary | ICD-10-CM

## 2017-10-05 DIAGNOSIS — E66.01 MORBID OBESITY WITH BMI OF 45.0-49.9, ADULT: ICD-10-CM

## 2017-10-05 DIAGNOSIS — E11.69 DM TYPE 2 WITH DIABETIC DYSLIPIDEMIA: Primary | ICD-10-CM

## 2017-10-05 DIAGNOSIS — G57.93 NEUROPATHIC PAIN OF BOTH FEET: ICD-10-CM

## 2017-10-05 PROBLEM — M81.0 AGE-RELATED OSTEOPOROSIS WITHOUT CURRENT PATHOLOGICAL FRACTURE: Status: ACTIVE | Noted: 2017-10-05

## 2017-10-05 PROBLEM — I10 ESSENTIAL HYPERTENSION: Status: ACTIVE | Noted: 2017-10-05

## 2017-10-05 PROCEDURE — 99999 PR PBB SHADOW E&M-EST. PATIENT-LVL III: CPT | Mod: PBBFAC,,, | Performed by: FAMILY MEDICINE

## 2017-10-05 PROCEDURE — 99499 UNLISTED E&M SERVICE: CPT | Mod: S$GLB,,, | Performed by: FAMILY MEDICINE

## 2017-10-05 PROCEDURE — 99214 OFFICE O/P EST MOD 30 MIN: CPT | Mod: S$GLB,,, | Performed by: FAMILY MEDICINE

## 2017-10-09 NOTE — TELEPHONE ENCOUNTER
Called patient and left message asking which medication she needs refills on. There was no name of med in message and pt is on more than one thing for DM     conducted a detailed discussion... I had a detailed discussion with the patient and/or guardian regarding the historical points, exam findings, and any diagnostic results supporting the discharge/admit diagnosis.

## 2017-10-10 ENCOUNTER — TELEPHONE (OUTPATIENT)
Dept: RHEUMATOLOGY | Facility: CLINIC | Age: 67
End: 2017-10-10

## 2017-10-11 ENCOUNTER — TELEPHONE (OUTPATIENT)
Dept: FAMILY MEDICINE | Facility: CLINIC | Age: 67
End: 2017-10-11

## 2017-10-11 NOTE — TELEPHONE ENCOUNTER
----- Message from Doris Ley sent at 10/11/2017  1:03 PM CDT -----  Patient returning nurse call. Please adv/call [phone].//thanks. cw

## 2017-10-12 ENCOUNTER — TELEPHONE (OUTPATIENT)
Dept: RHEUMATOLOGY | Facility: CLINIC | Age: 67
End: 2017-10-12

## 2017-10-12 NOTE — TELEPHONE ENCOUNTER
Spoke with pt and she scheduled for Naina Crisostomo PA-C for 10.13.17 at 3 pm. Pt verbalized understanding.

## 2017-10-12 NOTE — TELEPHONE ENCOUNTER
----- Message from Lashae Olivia LPN sent at 10/11/2017  3:56 PM CDT -----  Patient returning nurse call. Please adv/call [phone].//thanks.

## 2017-10-13 ENCOUNTER — OFFICE VISIT (OUTPATIENT)
Dept: RHEUMATOLOGY | Facility: CLINIC | Age: 67
End: 2017-10-13
Payer: MEDICARE

## 2017-10-13 ENCOUNTER — LAB VISIT (OUTPATIENT)
Dept: LAB | Facility: HOSPITAL | Age: 67
End: 2017-10-13
Attending: PHYSICIAN ASSISTANT
Payer: MEDICARE

## 2017-10-13 VITALS
SYSTOLIC BLOOD PRESSURE: 130 MMHG | DIASTOLIC BLOOD PRESSURE: 66 MMHG | BODY MASS INDEX: 46.03 KG/M2 | HEART RATE: 84 BPM | WEIGHT: 235.69 LBS

## 2017-10-13 DIAGNOSIS — M15.9 PRIMARY OSTEOARTHRITIS INVOLVING MULTIPLE JOINTS: ICD-10-CM

## 2017-10-13 DIAGNOSIS — M81.0 AGE-RELATED OSTEOPOROSIS WITHOUT CURRENT PATHOLOGICAL FRACTURE: ICD-10-CM

## 2017-10-13 DIAGNOSIS — M81.0 AGE-RELATED OSTEOPOROSIS WITHOUT CURRENT PATHOLOGICAL FRACTURE: Primary | ICD-10-CM

## 2017-10-13 DIAGNOSIS — K21.9 GASTROESOPHAGEAL REFLUX DISEASE WITHOUT ESOPHAGITIS: ICD-10-CM

## 2017-10-13 PROBLEM — M15.0 PRIMARY OSTEOARTHRITIS INVOLVING MULTIPLE JOINTS: Status: ACTIVE | Noted: 2017-10-13

## 2017-10-13 LAB
ANION GAP SERPL CALC-SCNC: 7 MMOL/L
BUN SERPL-MCNC: 17 MG/DL
CALCIUM SERPL-MCNC: 9.9 MG/DL
CHLORIDE SERPL-SCNC: 103 MMOL/L
CO2 SERPL-SCNC: 29 MMOL/L
CREAT SERPL-MCNC: 0.8 MG/DL
EST. GFR  (AFRICAN AMERICAN): >60 ML/MIN/1.73 M^2
EST. GFR  (NON AFRICAN AMERICAN): >60 ML/MIN/1.73 M^2
GLUCOSE SERPL-MCNC: 143 MG/DL
MAGNESIUM SERPL-MCNC: 1.7 MG/DL
PHOSPHATE SERPL-MCNC: 3.1 MG/DL
POTASSIUM SERPL-SCNC: 3.9 MMOL/L
PTH-INTACT SERPL-MCNC: 43 PG/ML
SODIUM SERPL-SCNC: 139 MMOL/L

## 2017-10-13 PROCEDURE — 99999 PR PBB SHADOW E&M-EST. PATIENT-LVL V: CPT | Mod: PBBFAC,,, | Performed by: PHYSICIAN ASSISTANT

## 2017-10-13 PROCEDURE — 99204 OFFICE O/P NEW MOD 45 MIN: CPT | Mod: S$GLB,,, | Performed by: PHYSICIAN ASSISTANT

## 2017-10-13 PROCEDURE — 83735 ASSAY OF MAGNESIUM: CPT | Mod: PO

## 2017-10-13 PROCEDURE — 36415 COLL VENOUS BLD VENIPUNCTURE: CPT | Mod: PO

## 2017-10-13 PROCEDURE — 99499 UNLISTED E&M SERVICE: CPT | Mod: S$GLB,,, | Performed by: PHYSICIAN ASSISTANT

## 2017-10-13 PROCEDURE — 83970 ASSAY OF PARATHORMONE: CPT

## 2017-10-13 PROCEDURE — 84100 ASSAY OF PHOSPHORUS: CPT | Mod: PO

## 2017-10-13 PROCEDURE — 80048 BASIC METABOLIC PNL TOTAL CA: CPT | Mod: PO

## 2017-10-13 RX ORDER — SODIUM CHLORIDE 0.9 % (FLUSH) 0.9 %
10 SYRINGE (ML) INJECTION
Status: CANCELLED | OUTPATIENT
Start: 2017-10-13

## 2017-10-13 RX ORDER — IBANDRONATE SODIUM 3 MG/3 ML
3 SYRINGE (ML) INTRAVENOUS
Status: CANCELLED | OUTPATIENT
Start: 2017-10-13

## 2017-10-13 NOTE — LETTER
October 13, 2017      Karen Nielsen MD  [address] [facility] [place] [address]           [facility] - Rheumatology  [address] [facility]  [place] [address]  Phone: [phone]  Fax: [phone]          Patient: Christine Jo   MR Number: [id number]   YOB: 1950   Date of Visit: 10/13/2017       Dear Dr. Karen Nielsen:    Thank you for referring Christine Jo to me for evaluation. Attached you will find relevant portions of my assessment and plan of care.    If you have questions, please do not hesitate to call me. I look forward to following Christine Jo along with you.    Sincerely,    DAYTON Sampsonosure  CC:  No Recipients    If you would like to receive this communication electronically, please contact [email or web address] or [phone] to request more information on [employer] Link access.    For providers and/or their staff who would like to refer a patient to [facility], please contact us through our one-stop-shop provider referral line, [facility], at [phone].    If you feel you have received this communication in error or would no longer like to receive these types of communications, please e-mail [email or web address]

## 2017-10-13 NOTE — PROGRESS NOTES
Subjective:       Patient ID: Christine Jo is a 67 y.o. female.    Chief Complaint: Osteoporosis    Christine is a 68 y/o female referred by Karen Nielsen MD for evaluation of osteoporosis seen on recent dexa. She was started on fosamax but had severe GI irritation due to the drug, she has severe gerd on prilosec.  H/o smoking but quit 14 years ago. Has never had any fractures.  Denies falls.  She takes a multivitamin with vit D in it daily. She was told in the past not to take any hormones, she has a history of breast cancer in her family, but none personally. She does have a wisdom tooth she thinks needs to be pulled but this isn't planned any time soon. She has chronic neck pain and occasional joint pains from OA.  Avoids nsaids due to Gi upset.       Past Medical History:   Diagnosis Date    Diabetes mellitus     Insomnia     Vaginal yeast infection      Past Surgical History:   Procedure Laterality Date    abdominal laparoscopy       BREAST BIOPSY      EYE SURGERY      TONSILLECTOMY      TUBAL LIGATION       Family History   Problem Relation Age of Onset    Heart disease Mother      CAD     Cancer Son      testicular     Cancer Maternal Aunt      Lung ca    Heart disease Maternal Grandfather      Pacemaker     Diabetes Sister     Heart disease Sister      CAD    Diabetes Sister     Diabetes Sister      Social History     Social History    Marital status:      Spouse name: N/A    Number of children: N/A    Years of education: N/A     Occupational History    Not on file.     Social History Main Topics    Smoking status: Former Smoker     Packs/day: 1.00     Years: 35.00     Types: Cigarettes     Quit date: 6/22/2003    Smokeless tobacco: Never Used    Alcohol use No    Drug use: No    Sexual activity: No     Other Topics Concern    Not on file     Social History Narrative    No narrative on file     Review of patient's allergies indicates:   Allergen Reactions     Aspirin Palpitations       Review of Systems   Constitutional: Negative for chills, fatigue and fever.   HENT: Negative for mouth sores, rhinorrhea and sore throat.    Eyes: Negative for pain and redness.   Respiratory: Negative for cough and shortness of breath.    Cardiovascular: Negative for chest pain.   Gastrointestinal: Negative for abdominal pain, constipation, diarrhea, nausea and vomiting.   Genitourinary: Negative for dysuria and hematuria.   Musculoskeletal: Positive for arthralgias, back pain and neck pain. Negative for joint swelling and myalgias.   Skin: Negative for rash.   Neurological: Negative for weakness, numbness and headaches.   Psychiatric/Behavioral: The patient is not nervous/anxious.          Objective:   /66   Pulse 84   Wt 106.9 kg (235 lb 10.8 oz)   BMI 46.03 kg/m²      Physical Exam   Constitutional: She is oriented to person, place, and time and well-developed, well-nourished, and in no distress.   HENT:   Head: Normocephalic and atraumatic.   Eyes: Pupils are equal, round, and reactive to light. Right eye exhibits no discharge.   Neck: Normal range of motion.   Cardiovascular: Normal rate, regular rhythm and normal heart sounds.  Exam reveals no friction rub.    Pulmonary/Chest: Effort normal and breath sounds normal. No respiratory distress.   Abdominal: Soft. She exhibits no distension. There is no tenderness.   Lymphadenopathy:     She has no cervical adenopathy.   Neurological: She is alert and oriented to person, place, and time.   Skin: No rash noted. No erythema.     Psychiatric: Mood normal.   Musculoskeletal: Normal range of motion. She exhibits no edema or deformity.   holley wrists, mcps, pips no synvoitis, no tenderness, no warmth, good rom  holley elbows shoulders no swelling or tenderness,full rom  holley knees no effusion, no warmth, no tenderness, full rom    Strength 5/5 upper and lower ext  Gait steady         Results for JUSTIN GOLD (MRN [id number]) as of 10/13/2017  07:59   Ref. Range 9/20/2017 10:10   Sodium Latest Ref Range: 136 - 145 mmol/L 138   Potassium Latest Ref Range: 3.5 - 5.1 mmol/L 4.5   Chloride Latest Ref Range: 95 - 110 mmol/L 103   CO2 Latest Ref Range: 23 - 29 mmol/L 27   Anion Gap Latest Ref Range: 8 - 16 mmol/L 8   BUN, Bld Latest Ref Range: 8 - 23 mg/dL 25 (H)   Creatinine Latest Ref Range: 0.5 - 1.4 mg/dL 0.8   eGFR if non African American Latest Ref Range: >60 mL/min/1.73 m^2 >60.0   eGFR if African American Latest Ref Range: >60 mL/min/1.73 m^2 >60.0   Glucose Latest Ref Range: 70 - 110 mg/dL 161 (H)   Calcium Latest Ref Range: 8.7 - 10.5 mg/dL 10.1   Alkaline Phosphatase Latest Ref Range: 55 - 135 U/L 66   Total Protein Latest Ref Range: 6.0 - 8.4 g/dL 7.3   Albumin Latest Ref Range: 3.5 - 5.2 g/dL 4.0   Total Bilirubin Latest Ref Range: 0.1 - 1.0 mg/dL 1.0   AST Latest Ref Range: 10 - 40 U/L 19   ALT Latest Ref Range: 10 - 44 U/L 24   Vit D, 25-Hydroxy Latest Ref Range: 30 - 96 ng/mL 42     Dexa 8.16.17: NM -1.4, TM -1.0, spine -2.5  Assessment:       1. Age-related osteoporosis without current pathological fracture    2. Gastroesophageal reflux disease without esophagitis    3. Primary osteoarthritis involving multiple joints        Impression:  Osteoporosis: Dexa 8/2017 with spine -2.5, cannot tolerate oral bisphosphonate due to severe gerd (fosamax), no falls/fxs, taking vit d daily in MV  GERD: on prilosec, unable to tolerate oral bisphosphonates  Generalized OA: avoids nsaids due to GI upset, prev celebrex worked well    Plan:       Told to avoid hormone therapy of any kind, will not be a candidate for evista  Unable to avoid oral bisph due to gerd so IV would be best option, discussed her need to get her wisdom tooth out in the future and the risk of ONJ with these medications. She decided to move forward with IV boniva every 3 months.  No plans now to pull her tooth but will let us know in the future, can hold her infusion at that time.    Send  therapy plan to infusion to get approval then start q 3 mos  Continue vit D daily in [facility]  Lab today, pth, mag, phos, bmp  All questions were answered, drug info printed and given to patient    Infusion q 3 months and provider visit q 6 months with bmp q 3 months    Thank you for the consult!

## 2017-10-13 NOTE — PATIENT INSTRUCTIONS
Start Boniva infusions every 3 months for now  Let us know about any dental work that needs to be done  Daily vitamin D is good   Bone density in 2019    Our infusion nurses will call you to set up first infusion    Tumeric 1000mg/day for inflammation      Ibandronate injection  What is this medicine?  IBANDRONATE (i BAN toshia hali) slows calcium loss from bones. It is used to treat osteoporosis in women past the age of menopause.  How should I use this medicine?  This medicine is for injection into a vein. It is given by a health care professional in a hospital or clinic setting.  Talk to your pediatrician regarding the use of this medicine in children. Special care may be needed.  What side effects may I notice from receiving this medicine?  Side effects that you should report to your doctor or health care professional as soon as possible:  · allergic reactions such as skin rash or itching, hives, swelling of the face, lips, throat, or tongue  · changes in vision  · chest pain  · fever, flu-like symptoms  · heartburn or stomach pain  · jaw pain, especially after dental work  Side effects that usually do not require medical attention (report to your doctor or health care professional if they continue or are bothersome):  · bone, muscle or joint pain  · diarrhea or constipation  · eye pain or itching  · headache  · irritation at site where injected  · nausea  What may interact with this medicine?  · teriparatide  What if I miss a dose?  It is important not to miss your dose. Call your doctor or health care professional if you are unable to keep an appointment.  Where should I keep my medicine?  This drug is given in a hospital or clinic and will not be stored at home.  What should I tell my health care provider before I take this medicine?  They need to know if you have any of these conditions:  · dental disease  · kidney disease  · low levels of calcium in the blood  · low levels of vitamin D in the blood  · an  unusual or allergic reaction to ibandronate, other medicines, foods, dyes, or preservatives  · pregnant or trying to get pregnant  · breast-feeding  What should I watch for while using this medicine?  Visit your doctor or health care professional for regular check ups. It may be some time before you see the benefit from this medicine. Do not stop taking your medicine except on your doctor's advice. Your doctor or health care professional may order blood tests and other tests to see how you are doing.  You should make sure you get enough calcium and vitamin D while you are taking this medicine, unless your doctor tells you not to. Discuss the foods you eat and the vitamins you take with your health care professional.  Some people who take this medicine have severe bone, joint, and/or muscle pain. This medicine may also increase your risk for a broken thigh bone. Tell your doctor right away if you have pain in your upper leg or groin. Tell your doctor if you have any pain that does not go away or that gets worse.  NOTE:This sheet is a summary. It may not cover all possible information. If you have questions about this medicine, talk to your doctor, pharmacist, or health care provider. Copyright© 2017 [employer]

## 2017-10-16 ENCOUNTER — TELEPHONE (OUTPATIENT)
Dept: FAMILY MEDICINE | Facility: CLINIC | Age: 67
End: 2017-10-16

## 2017-10-16 ENCOUNTER — TELEPHONE (OUTPATIENT)
Dept: URGENT CARE | Facility: CLINIC | Age: 67
End: 2017-10-16

## 2017-10-16 ENCOUNTER — TELEPHONE (OUTPATIENT)
Dept: RHEUMATOLOGY | Facility: HOSPITAL | Age: 67
End: 2017-10-16

## 2017-10-18 ENCOUNTER — TELEPHONE (OUTPATIENT)
Dept: RHEUMATOLOGY | Facility: HOSPITAL | Age: 67
End: 2017-10-18

## 2017-10-18 NOTE — TELEPHONE ENCOUNTER
Follow-up call placed. Spoke with pt to schedule Boniva. States that she does want to start the Boniva but she has to have wisdom tooth extracted and wants to wait until after that is done. Patient asked about time frame for after wisdom tooth surgery on when she should start Boniva. Instructed pt to have surgeon give her an operative report on procedure to give to us and have surgeon clear her and give recommended time frame on when she would be able to start Boniva. Pt verbalizes understanding.

## 2017-10-26 ENCOUNTER — PATIENT MESSAGE (OUTPATIENT)
Dept: FAMILY MEDICINE | Facility: CLINIC | Age: 67
End: 2017-10-26

## 2017-10-26 ENCOUNTER — TELEPHONE (OUTPATIENT)
Dept: FAMILY MEDICINE | Facility: CLINIC | Age: 67
End: 2017-10-26

## 2017-10-26 RX ORDER — HYDRALAZINE HYDROCHLORIDE 100 MG/1
TABLET, FILM COATED ORAL
Qty: 60 TABLET | Refills: 0 | Status: SHIPPED | OUTPATIENT
Start: 2017-10-26 | End: 2017-11-20 | Stop reason: SDUPTHER

## 2017-10-26 NOTE — TELEPHONE ENCOUNTER
----- Message from Sunshine Gama sent at 10/26/2017  8:40 AM CDT -----  Contact: pt   1. What is the name of the medication you are requesting? High blood pressure   2. What is the dose?   3. How do you take the medication? Orally, topically, etc?orally   4. How often do you take this medication? Daily   5. Do you need a 30 day or 90 day supply? 30  6. How many refills are you requesting? 1  7. What is your preferred pharmacy and location of the pharmacy?   [facility] [employer] [place] [address] AT [facility] & [address] [place] [facility] [address]  Phone: [phone] Fax: [phone]  8. Who can we contact with further questions?the patient       1. What is the name of the medication you are requesting? Needles    2. What is the dose?   3. How do you take the medication? Orally, topically, etc?   4. How often do you take this medication?   5. Do you need a 30 day or 90 day supply? 30  6. How many refills are you requesting? 1  7. What is your preferred pharmacy and location of the pharmacy?   [facility] [employer] [place] [address] AT [facility] & [place] [address]  [facility] [facility] [address]  Phone: [phone] Fax: [phone]  8. Who can we contact with further questions? the patient    Pt states that she is out of her blood pressure med and need to take it this morning.

## 2017-10-26 NOTE — TELEPHONE ENCOUNTER
----- Message from Sunshine Gama sent at 10/26/2017  8:40 AM CDT -----  Contact: pt   1. What is the name of the medication you are requesting? High blood pressure   2. What is the dose?   3. How do you take the medication? Orally, topically, etc?orally   4. How often do you take this medication? Daily   5. Do you need a 30 day or 90 day supply? 30  6. How many refills are you requesting? 1  7. What is your preferred pharmacy and location of the pharmacy?   [facility] [place] [address] AT [facility] & [address] [place] [facility] [address]  Phone: [phone] Fax: [phone]  8. Who can we contact with further questions?the patient       1. What is the name of the medication you are requesting? Needles    2. What is the dose?   3. How do you take the medication? Orally, topically, etc?   4. How often do you take this medication?   5. Do you need a 30 day or 90 day supply? 30  6. How many refills are you requesting? 1  7. What is your preferred pharmacy and location of the pharmacy?   [facility] [place] [address] AT [facility] & [place] [address]  [facility] [address]  Phone: [phone] Fax: [phone]  8. Who can we contact with further questions? the patient    Pt states that she is out of her blood pressure med and need to take it this morning.

## 2017-10-26 NOTE — TELEPHONE ENCOUNTER
Christine Nielsen MD 1 hour ago (8:49 AM)         Good morning, I am in need of refill of Hydralazine and the pen needles for lantus and victoza sent ASAP to [facility] [address]. I am out of Hydralazine needed for my morning medication. Please help. Christine Reyes.             Last office visit 10/5/17

## 2017-10-27 ENCOUNTER — TELEPHONE (OUTPATIENT)
Dept: FAMILY MEDICINE | Facility: CLINIC | Age: 67
End: 2017-10-27

## 2017-10-27 NOTE — TELEPHONE ENCOUNTER
Spoke with pharmacist approving refill of patients pen needles for her Lantus and Victozia. Refill of both medicine was sent in on 10/26/17 and without the pen needles.

## 2017-10-27 NOTE — TELEPHONE ENCOUNTER
----- Message from Doris Ley sent at 10/27/2017  8:14 AM CDT -----  Patient requesting a Rx for pin needles.    Pt use..    [id number] [place]  [address] AT [facility] [place] [facility] [place]  [address]  [place] [address]  Phone: [phone] Fax: [phone]    Please adv/call [phone].//thanks. cw

## 2017-10-30 ENCOUNTER — PATIENT MESSAGE (OUTPATIENT)
Dept: FAMILY MEDICINE | Facility: CLINIC | Age: 67
End: 2017-10-30

## 2017-10-31 ENCOUNTER — TELEPHONE (OUTPATIENT)
Dept: FAMILY MEDICINE | Facility: CLINIC | Age: 67
End: 2017-10-31

## 2017-10-31 RX ORDER — PEN NEEDLE, DIABETIC 31 GX5/16"
NEEDLE, DISPOSABLE MISCELLANEOUS
Refills: 2 | COMMUNITY
Start: 2017-10-27 | End: 2018-11-12 | Stop reason: SDUPTHER

## 2017-10-31 NOTE — TELEPHONE ENCOUNTER
----- Message from Ines Fitzgerald sent at 10/31/2017 10:58 AM CDT -----  Contact: PT   Pt needs her new script sent to pharmacy, she states she already requested script, but it's not at the pharmacy. Pt is at pharmacy now.   [facility] [address] [place] [address] [address] AT SEC of [facility] & [place]  [address]  [place] [address]  Phone: [phone] Fax: [phone]

## 2017-10-31 NOTE — TELEPHONE ENCOUNTER
Called and spoke with pharmacist. Verified prescription and refills. Pharm verbalized understanding.

## 2017-10-31 NOTE — TELEPHONE ENCOUNTER
Christine Nielsen MD 14 hours ago (7:46 PM)         Hi Anthony Yoon needs a new prescription for the Victoza injections, since you increased the dosage to 1.2 , I have enough for tomorrow (tues) but please send asap to [facility] [address]. Thank you, Christine Jo

## 2017-10-31 NOTE — TELEPHONE ENCOUNTER
----- Message from Kacie Aguilera sent at 10/31/2017 12:11 PM CDT -----  Contact: Pt  Pt called and stated she was returning a call to the nurse. She also stated to call her pharmacy.   [facility] [address] AT [facility] [place]  [address]  [place] [address]  Phone: [phone] Fax: [phone]    She can be reached at [phone].    Thanks,  Tf

## 2017-11-01 ENCOUNTER — TELEPHONE (OUTPATIENT)
Dept: FAMILY MEDICINE | Facility: CLINIC | Age: 67
End: 2017-11-01

## 2017-11-01 NOTE — TELEPHONE ENCOUNTER
----- Message from Laura Caba sent at 11/1/2017  9:57 AM CDT -----  Contact: Patient  Patient states he got the Victoza but the pharmacy needs a PA for Temazepam at pharmacy. Please call patient back if needed at [phone]. Thank you

## 2017-11-01 NOTE — TELEPHONE ENCOUNTER
Informed pt that PA for temazepam has been received, but it can take up to 4 days to be approved by the insurance. Pt verbalized understanding.

## 2017-11-06 ENCOUNTER — PATIENT MESSAGE (OUTPATIENT)
Dept: FAMILY MEDICINE | Facility: CLINIC | Age: 67
End: 2017-11-06

## 2017-11-06 RX ORDER — TEMAZEPAM 30 MG/1
30 CAPSULE ORAL NIGHTLY PRN
Qty: 30 CAPSULE | Refills: 2 | Status: CANCELLED | OUTPATIENT
Start: 2017-11-06

## 2017-11-06 NOTE — TELEPHONE ENCOUNTER
Patient requesting refills of januvia and restoril via my [facility] request to be sent to [facility]    Last office visit 10/5/17    Last refill of januvia 9/11/17 with 3 refills will have patient call and transfer scripts over manually due to existing refills.      Restoril printed on 10/3/17 30 day supply with 2 refills. Will inform patient that she needs to transfer script for restoril since it is a printed prescription.

## 2017-11-07 ENCOUNTER — PATIENT MESSAGE (OUTPATIENT)
Dept: FAMILY MEDICINE | Facility: CLINIC | Age: 67
End: 2017-11-07

## 2017-11-07 NOTE — TELEPHONE ENCOUNTER
Patient needs prescriptions resent to [facility] pharmacy for 90 day supplies please. And waiting on PA results for patients temazepam also

## 2017-11-08 ENCOUNTER — TELEPHONE (OUTPATIENT)
Dept: FAMILY MEDICINE | Facility: CLINIC | Age: 67
End: 2017-11-08

## 2017-11-08 NOTE — TELEPHONE ENCOUNTER
Spoke with the patient and informed waiting of PA authorization from insurance. Patient verbalized understanding.

## 2017-11-08 NOTE — TELEPHONE ENCOUNTER
----- Message from Yenny Painter sent at 11/8/2017  9:11 AM CST -----  Contact: self [phone]  States that a PA needs to be sent for the rx for temazepam 30mg. Please call back at [phone]//thank you acc

## 2017-11-09 ENCOUNTER — TELEPHONE (OUTPATIENT)
Dept: FAMILY MEDICINE | Facility: CLINIC | Age: 67
End: 2017-11-09

## 2017-11-09 NOTE — TELEPHONE ENCOUNTER
----- Message from Ludivina Harley sent at 11/9/2017  9:49 AM CST -----  Contact: [employer]/Efrain  Please call Efrain @ [phone], Ext [phone] regarding prior authorization for script/Temazepam, please fax to [phone], states decision will be made on med tomorrow at 11am.

## 2017-11-09 NOTE — TELEPHONE ENCOUNTER
[facility] and answered all questions for patients PA still waiting on response.

## 2017-11-09 NOTE — TELEPHONE ENCOUNTER
----- Message from Ludivina Harley sent at 11/9/2017  2:12 PM CST -----  Contact: [facility]/Doris  Please call Doris @ [phone] regarding script Temazepam.

## 2017-11-09 NOTE — TELEPHONE ENCOUNTER
----- Message from Shelly Ley sent at 11/9/2017 11:23 AM CST -----  Contact: [facility] additional info is needed on Temazepam, can be reached at [phone]///thxMW

## 2017-11-09 NOTE — TELEPHONE ENCOUNTER
Office notes faxed over with other PA information requested by insurance, still waiting on response of PA

## 2017-11-19 ENCOUNTER — PATIENT MESSAGE (OUTPATIENT)
Dept: FAMILY MEDICINE | Facility: CLINIC | Age: 67
End: 2017-11-19

## 2017-11-20 RX ORDER — HYDRALAZINE HYDROCHLORIDE 100 MG/1
100 TABLET, FILM COATED ORAL 2 TIMES DAILY
Qty: 60 TABLET | Refills: 0 | Status: SHIPPED | OUTPATIENT
Start: 2017-11-20 | End: 2018-01-11 | Stop reason: SDUPTHER

## 2017-11-20 RX ORDER — HYDRALAZINE HYDROCHLORIDE 100 MG/1
TABLET, FILM COATED ORAL
Qty: 60 TABLET | Refills: 0 | Status: SHIPPED | OUTPATIENT
Start: 2017-11-20 | End: 2017-11-20 | Stop reason: SDUPTHER

## 2017-11-20 RX ORDER — FLUTICASONE PROPIONATE 50 MCG
1 SPRAY, SUSPENSION (ML) NASAL DAILY
Qty: 16 G | Refills: 2 | Status: SHIPPED | OUTPATIENT
Start: 2017-11-20 | End: 2017-11-27 | Stop reason: SDUPTHER

## 2017-11-20 NOTE — TELEPHONE ENCOUNTER
----- Message from Roxane Gupta sent at 11/20/2017  8:40 AM CST -----  Contact: pt  States she is out of her hydralazine 100 mg, twice a day. States she needs it before Thanksgiving. Pt uses     [email or web address] - [place] - [address] AT [facility] [place]  [facility] [address]  [place] [address]  Phone: [phone] Fax: [phone]    Please call pt at [phone]. Thank you

## 2017-11-20 NOTE — TELEPHONE ENCOUNTER
Patient requesting refill of flonase to be sent to [facility] pharm.    Last office visit 10/5/17

## 2017-11-27 RX ORDER — FLUTICASONE PROPIONATE 50 MCG
1 SPRAY, SUSPENSION (ML) NASAL DAILY
Qty: 16 G | Refills: 2 | Status: SHIPPED | OUTPATIENT
Start: 2017-11-27 | End: 2018-12-26 | Stop reason: SDUPTHER

## 2017-12-05 ENCOUNTER — LAB VISIT (OUTPATIENT)
Dept: LAB | Facility: HOSPITAL | Age: 67
End: 2017-12-05
Attending: FAMILY MEDICINE
Payer: MEDICARE

## 2017-12-05 ENCOUNTER — OFFICE VISIT (OUTPATIENT)
Dept: FAMILY MEDICINE | Facility: CLINIC | Age: 67
End: 2017-12-05
Payer: MEDICARE

## 2017-12-05 VITALS
BODY MASS INDEX: 46.05 KG/M2 | DIASTOLIC BLOOD PRESSURE: 62 MMHG | WEIGHT: 234.56 LBS | SYSTOLIC BLOOD PRESSURE: 124 MMHG | TEMPERATURE: 99 F | HEIGHT: 60 IN | HEART RATE: 88 BPM | OXYGEN SATURATION: 95 %

## 2017-12-05 DIAGNOSIS — I10 HYPERTENSION, UNSPECIFIED TYPE: ICD-10-CM

## 2017-12-05 DIAGNOSIS — E11.69 DM TYPE 2 WITH DIABETIC DYSLIPIDEMIA: Primary | ICD-10-CM

## 2017-12-05 DIAGNOSIS — E78.5 DM TYPE 2 WITH DIABETIC DYSLIPIDEMIA: ICD-10-CM

## 2017-12-05 DIAGNOSIS — E11.69 DM TYPE 2 WITH DIABETIC DYSLIPIDEMIA: ICD-10-CM

## 2017-12-05 DIAGNOSIS — E78.5 DM TYPE 2 WITH DIABETIC DYSLIPIDEMIA: Primary | ICD-10-CM

## 2017-12-05 DIAGNOSIS — E66.01 MORBID OBESITY WITH BMI OF 45.0-49.9, ADULT: ICD-10-CM

## 2017-12-05 LAB
ALBUMIN SERPL BCP-MCNC: 3.7 G/DL
ALP SERPL-CCNC: 60 U/L
ALT SERPL W/O P-5'-P-CCNC: 20 U/L
ANION GAP SERPL CALC-SCNC: 10 MMOL/L
AST SERPL-CCNC: 19 U/L
BASOPHILS # BLD AUTO: 0.06 K/UL
BASOPHILS NFR BLD: 0.7 %
BILIRUB SERPL-MCNC: 0.8 MG/DL
BUN SERPL-MCNC: 11 MG/DL
CALCIUM SERPL-MCNC: 9.3 MG/DL
CHLORIDE SERPL-SCNC: 105 MMOL/L
CO2 SERPL-SCNC: 26 MMOL/L
CREAT SERPL-MCNC: 0.7 MG/DL
CREAT UR-MCNC: 68 MG/DL
DIFFERENTIAL METHOD: ABNORMAL
EOSINOPHIL # BLD AUTO: 0.2 K/UL
EOSINOPHIL NFR BLD: 1.9 %
ERYTHROCYTE [DISTWIDTH] IN BLOOD BY AUTOMATED COUNT: 13.3 %
EST. GFR  (AFRICAN AMERICAN): >60 ML/MIN/1.73 M^2
EST. GFR  (NON AFRICAN AMERICAN): >60 ML/MIN/1.73 M^2
GLUCOSE SERPL-MCNC: 108 MG/DL
HCT VFR BLD AUTO: 34.4 %
HGB BLD-MCNC: 11.1 G/DL
IMM GRANULOCYTES # BLD AUTO: 0.02 K/UL
IMM GRANULOCYTES NFR BLD AUTO: 0.2 %
LYMPHOCYTES # BLD AUTO: 2 K/UL
LYMPHOCYTES NFR BLD: 24 %
MCH RBC QN AUTO: 29.4 PG
MCHC RBC AUTO-ENTMCNC: 32.3 G/DL
MCV RBC AUTO: 91 FL
MICROALBUMIN UR DL<=1MG/L-MCNC: 50 UG/ML
MICROALBUMIN/CREATININE RATIO: 73.5 UG/MG
MONOCYTES # BLD AUTO: 0.7 K/UL
MONOCYTES NFR BLD: 7.9 %
NEUTROPHILS # BLD AUTO: 5.5 K/UL
NEUTROPHILS NFR BLD: 65.3 %
NRBC BLD-RTO: 0 /100 WBC
PLATELET # BLD AUTO: 248 K/UL
PMV BLD AUTO: 12.8 FL
POTASSIUM SERPL-SCNC: 3.9 MMOL/L
PROT SERPL-MCNC: 7.3 G/DL
RBC # BLD AUTO: 3.77 M/UL
SODIUM SERPL-SCNC: 141 MMOL/L
WBC # BLD AUTO: 8.43 K/UL

## 2017-12-05 PROCEDURE — 80053 COMPREHEN METABOLIC PANEL: CPT

## 2017-12-05 PROCEDURE — 82570 ASSAY OF URINE CREATININE: CPT

## 2017-12-05 PROCEDURE — 83036 HEMOGLOBIN GLYCOSYLATED A1C: CPT

## 2017-12-05 PROCEDURE — 99214 OFFICE O/P EST MOD 30 MIN: CPT | Mod: S$GLB,,, | Performed by: FAMILY MEDICINE

## 2017-12-05 PROCEDURE — 85025 COMPLETE CBC W/AUTO DIFF WBC: CPT

## 2017-12-05 PROCEDURE — 36415 COLL VENOUS BLD VENIPUNCTURE: CPT | Mod: PO

## 2017-12-05 PROCEDURE — 99999 PR PBB SHADOW E&M-EST. PATIENT-LVL III: CPT | Mod: PBBFAC,,, | Performed by: FAMILY MEDICINE

## 2017-12-05 PROCEDURE — 99499 UNLISTED E&M SERVICE: CPT | Mod: S$GLB,,, | Performed by: FAMILY MEDICINE

## 2017-12-05 NOTE — PROGRESS NOTES
Subjective:       Patient ID: Christine Jo is a 67 y.o. female.    Chief Complaint: Follow-up and Diabetes    67 y old female   here for f/u on DM, MO , htn and dlp .  on eliquis , sees Dr Reynaga for a fib ,  Will Need 1 hep B vacc  in 2 m , exercises: walking   3 times weekly .  Opthalmology : seen At [facility] last spring , will like to switch to O   , Fastin bs are : 122. 130       Diabetes   She has type 2 diabetes mellitus. No MedicAlert identification noted. The initial diagnosis of diabetes was made 5 years ago. Hypoglycemia symptoms include hunger and nervousness/anxiousness. Pertinent negatives for hypoglycemia include no confusion, dizziness, headaches, mood changes, pallor, seizures, sleepiness, speech difficulty, sweats or tremors. Associated symptoms include blurred vision, foot paresthesias, polyphagia and weight loss. Pertinent negatives for diabetes include no chest pain, no foot ulcerations, no polyuria and no weakness. Pertinent negatives for hypoglycemia complications include no blackouts, no hospitalization, no nocturnal hypoglycemia, no required assistance and no required glucagon injection. Symptoms are stable. Diabetic complications include autonomic neuropathy, heart disease, peripheral neuropathy and PVD. Pertinent negatives for diabetic complications include no CVA, impotence, nephropathy or retinopathy. Risk factors for coronary artery disease include dyslipidemia, family history, hypertension, post-menopausal, stress and diabetes mellitus. Current diabetic treatment includes diet, insulin injections and oral agent (monotherapy). She is compliant with treatment most of the time. She is currently taking insulin pre-breakfast and at bedtime. Insulin injections are given by patient. Rotation sites for injection include the abdominal wall. Her weight is stable. She is following a generally healthy diet. Meal planning includes avoidance of concentrated sweets. She has not had a  previous visit with a dietitian. She participates in exercise three times a week. She monitors blood glucose at home 1-2 x per day. She monitors urine at home <1 x per month. Blood glucose monitoring compliance is fair. Her home blood glucose trend is decreasing steadily. She does not see a podiatrist.Eye exam is current.     Review of Systems   Constitutional: Positive for weight loss.   HENT: Negative.    Eyes: Positive for blurred vision.   Respiratory: Negative.    Cardiovascular: Negative.  Negative for chest pain.   Gastrointestinal: Negative.    Endocrine: Positive for polyphagia. Negative for polyuria.   Genitourinary: Negative for impotence.   Musculoskeletal: Negative.    Skin: Negative for pallor.   Neurological: Negative for dizziness, tremors, seizures, speech difficulty, weakness and headaches.   Psychiatric/Behavioral: Negative for confusion. The patient is nervous/anxious.        Objective:      Physical Exam   Constitutional: She is oriented to person, place, and time. No distress.   HENT:   Head: Normocephalic and atraumatic.   Right Ear: External ear normal.   Left Ear: External ear normal.   Mouth/Throat: No oropharyngeal exudate.   Eyes: Conjunctivae and EOM are normal. Pupils are equal, round, and reactive to light. Right eye exhibits no discharge. Left eye exhibits no discharge. No scleral icterus.   Neck: Normal range of motion. Neck supple. No JVD present. No tracheal deviation present. No thyromegaly present.   Cardiovascular: Normal rate, regular rhythm and normal heart sounds.  Exam reveals no gallop and no friction rub.    No murmur heard.  Pulmonary/Chest: Effort normal and breath sounds normal. No stridor. No respiratory distress. She has no wheezes. She has no rales. She exhibits no tenderness.   Abdominal: Soft. Bowel sounds are normal. She exhibits no distension. There is no tenderness. There is no rebound and no guarding.   Musculoskeletal: Normal range of motion.    Lymphadenopathy:     She has no cervical adenopathy.   Neurological: She is alert and oriented to person, place, and time.   Skin: Skin is warm and dry. She is not diaphoretic.   Psychiatric: She has a normal mood and affect. Her behavior is normal. Judgment and thought content normal.       Assessment:     Christine was seen today for follow-up and diabetes.    Diagnoses and all orders for this visit:    DM type 2 with diabetic dyslipidemia  -     CBC auto differential; Future  -     Comprehensive metabolic panel; Future  -     Hemoglobin A1c; Future  -     Microalbumin/creatinine urine ratio    Hypertension, unspecified type    Morbid obesity with BMI of 45.0-49.9, adult      Plan:   . Type II diabetes mellitus  Stable and well controlled currently. Continue Current medications as prescribed. No medication changes made today.   Pt. encouraged to follow a strict diabetic type diet.   Encouraged daily physical activity/exercise for at least 30mins if tolerated.   Weight control recommenced as always.   Discussed how lifestyle changes make biggest impact on diabetes control.   Continue home glucose monitoring once daily and keep log.   Counseling provided today about hypoglycemia as well as about how diabetes increases risk of cardiovascular, cerebrovascular ,peripheral vascular disease and other serious health complications. He has been instructed to call if developing any new symptoms or hypoglycemia related symptoms.   See encounter for associated orders/medications.   - Lipid panel; Future  The patient is asked to make an attempt to improve diet and exercise patterns to aid in medical management of this problem.    The patient is asked to make an attempt to improve diet and exercise patterns to aid in medical management of this problem.

## 2017-12-06 ENCOUNTER — PATIENT MESSAGE (OUTPATIENT)
Dept: FAMILY MEDICINE | Facility: CLINIC | Age: 67
End: 2017-12-06

## 2017-12-06 LAB
ESTIMATED AVG GLUCOSE: 134 MG/DL
HBA1C MFR BLD HPLC: 6.3 %

## 2017-12-06 RX ORDER — INSULIN GLARGINE 100 [IU]/ML
INJECTION, SOLUTION SUBCUTANEOUS
Qty: 15 ML | Refills: 2 | Status: SHIPPED | OUTPATIENT
Start: 2017-12-06 | End: 2018-01-19

## 2017-12-07 ENCOUNTER — TELEPHONE (OUTPATIENT)
Dept: FAMILY MEDICINE | Facility: CLINIC | Age: 67
End: 2017-12-07

## 2017-12-07 DIAGNOSIS — D64.9 ANEMIA, UNSPECIFIED TYPE: Primary | ICD-10-CM

## 2017-12-07 NOTE — TELEPHONE ENCOUNTER
----- Message from Shelly Ley sent at 12/7/2017  3:42 PM CST -----  Contact: pt  The pt request a call concerning a up coming appt, pt can be reached at [phone]///thxMW

## 2017-12-14 ENCOUNTER — PATIENT MESSAGE (OUTPATIENT)
Dept: OBSTETRICS AND GYNECOLOGY | Facility: CLINIC | Age: 67
End: 2017-12-14

## 2017-12-17 ENCOUNTER — PATIENT MESSAGE (OUTPATIENT)
Dept: FAMILY MEDICINE | Facility: CLINIC | Age: 67
End: 2017-12-17

## 2017-12-19 ENCOUNTER — LAB VISIT (OUTPATIENT)
Dept: LAB | Facility: HOSPITAL | Age: 67
End: 2017-12-19
Attending: INTERNAL MEDICINE
Payer: MEDICARE

## 2017-12-19 ENCOUNTER — OFFICE VISIT (OUTPATIENT)
Dept: HEMATOLOGY/ONCOLOGY | Facility: CLINIC | Age: 67
End: 2017-12-19
Payer: MEDICARE

## 2017-12-19 VITALS
WEIGHT: 235.44 LBS | DIASTOLIC BLOOD PRESSURE: 72 MMHG | HEIGHT: 60 IN | BODY MASS INDEX: 46.22 KG/M2 | TEMPERATURE: 98 F | SYSTOLIC BLOOD PRESSURE: 149 MMHG | HEART RATE: 84 BPM | OXYGEN SATURATION: 97 %

## 2017-12-19 DIAGNOSIS — D50.0 IRON DEFICIENCY ANEMIA DUE TO CHRONIC BLOOD LOSS: ICD-10-CM

## 2017-12-19 DIAGNOSIS — E66.01 MORBID OBESITY WITH BMI OF 40.0-44.9, ADULT: Primary | ICD-10-CM

## 2017-12-19 LAB
BASOPHILS # BLD AUTO: 0.04 K/UL
BASOPHILS NFR BLD: 0.5 %
DIFFERENTIAL METHOD: ABNORMAL
EOSINOPHIL # BLD AUTO: 0.1 K/UL
EOSINOPHIL NFR BLD: 1.5 %
ERYTHROCYTE [DISTWIDTH] IN BLOOD BY AUTOMATED COUNT: 13.8 %
FERRITIN SERPL-MCNC: 50 NG/ML
HCT VFR BLD AUTO: 34.9 %
HGB BLD-MCNC: 11.4 G/DL
IRON SERPL-MCNC: 61 UG/DL
LDH SERPL L TO P-CCNC: 162 U/L
LYMPHOCYTES # BLD AUTO: 2 K/UL
LYMPHOCYTES NFR BLD: 25.4 %
MCH RBC QN AUTO: 30 PG
MCHC RBC AUTO-ENTMCNC: 32.7 G/DL
MCV RBC AUTO: 92 FL
MONOCYTES # BLD AUTO: 0.5 K/UL
MONOCYTES NFR BLD: 6.5 %
NEUTROPHILS # BLD AUTO: 5.2 K/UL
NEUTROPHILS NFR BLD: 66.1 %
PLATELET # BLD AUTO: 214 K/UL
PMV BLD AUTO: 12 FL
RBC # BLD AUTO: 3.8 M/UL
RETICS/RBC NFR AUTO: 1.6 %
SATURATED IRON: 18 %
TOTAL IRON BINDING CAPACITY: 342 UG/DL
TRANSFERRIN SERPL-MCNC: 231 MG/DL
TSH SERPL DL<=0.005 MIU/L-ACNC: 0.95 UIU/ML
VIT B12 SERPL-MCNC: 421 PG/ML
WBC # BLD AUTO: 7.88 K/UL

## 2017-12-19 PROCEDURE — 84443 ASSAY THYROID STIM HORMONE: CPT

## 2017-12-19 PROCEDURE — 99999 PR PBB SHADOW E&M-EST. PATIENT-LVL III: CPT | Mod: PBBFAC,,, | Performed by: INTERNAL MEDICINE

## 2017-12-19 PROCEDURE — 84165 PROTEIN E-PHORESIS SERUM: CPT

## 2017-12-19 PROCEDURE — 85025 COMPLETE CBC W/AUTO DIFF WBC: CPT

## 2017-12-19 PROCEDURE — 99499 UNLISTED E&M SERVICE: CPT | Mod: S$GLB,,, | Performed by: INTERNAL MEDICINE

## 2017-12-19 PROCEDURE — 83520 IMMUNOASSAY QUANT NOS NONAB: CPT

## 2017-12-19 PROCEDURE — 82728 ASSAY OF FERRITIN: CPT

## 2017-12-19 PROCEDURE — 85045 AUTOMATED RETICULOCYTE COUNT: CPT

## 2017-12-19 PROCEDURE — 99205 OFFICE O/P NEW HI 60 MIN: CPT | Mod: S$GLB,,, | Performed by: INTERNAL MEDICINE

## 2017-12-19 PROCEDURE — 83540 ASSAY OF IRON: CPT

## 2017-12-19 PROCEDURE — 84165 PROTEIN E-PHORESIS SERUM: CPT | Mod: 26,,, | Performed by: PATHOLOGY

## 2017-12-19 PROCEDURE — 36415 COLL VENOUS BLD VENIPUNCTURE: CPT

## 2017-12-19 PROCEDURE — 82607 VITAMIN B-12: CPT

## 2017-12-19 PROCEDURE — 83615 LACTATE (LD) (LDH) ENZYME: CPT

## 2017-12-19 NOTE — LETTER
December 19, 2017      Karen Nielsen MD  [address] [facility] [address]           [facility]  [address] [place] [address]  Phone: [phone]  Fax: [phone]          Patient: Christine Jo   MR Number: [id number]   YOB: 1950   Date of Visit: 12/19/2017       Dear Dr. Karen Nielsen:    Thank you for referring Christine Jo to me for evaluation. Attached you will find relevant portions of my assessment and plan of care.    If you have questions, please do not hesitate to call me. I look forward to following Christine Jo along with you.    Sincerely,    Bowen Matt MD    Enclosure  CC:  No Recipients    If you would like to receive this communication electronically, please contact [email or web address] or [phone] to request more information on [facility] access.    For providers and/or their staff who would like to refer a patient to [facility], please contact us through our one-stop-shop provider referral line, [facility] , at [phone].    If you feel you have received this communication in error or would no longer like to receive these types of communications, please e-mail [email or web address]

## 2017-12-19 NOTE — PROGRESS NOTES
Subjective:       Patient ID: Christine Jo is a 67 y.o. female.    Chief Complaint: Anemia and Results    HPI 67-year-old female referred for evaluation of new onset anemia patient reports increasing fatigue and weakness    Past Medical History:   Diagnosis Date    Diabetes mellitus     Insomnia     Vaginal yeast infection      Family History   Problem Relation Age of Onset    Heart disease Mother      CAD     Cancer Son      testicular     Cancer Maternal Aunt      Lung ca    Heart disease Maternal Grandfather      Pacemaker     Diabetes Sister     Heart disease Sister      CAD    Diabetes Sister     Diabetes Sister      Social History     Social History    Marital status:      Spouse name: N/A    Number of children: N/A    Years of education: N/A     Occupational History    Not on file.     Social History Main Topics    Smoking status: Former Smoker     Packs/day: 1.00     Years: 35.00     Types: Cigarettes     Quit date: 6/22/2003    Smokeless tobacco: Never Used    Alcohol use No    Drug use: No    Sexual activity: No     Other Topics Concern    Not on file     Social History Narrative    No narrative on file     Past Surgical History:   Procedure Laterality Date    abdominal laparoscopy       BREAST BIOPSY      EYE SURGERY      TONSILLECTOMY      TUBAL LIGATION         Labs:  Lab Results   Component Value Date    WBC 7.88 12/19/2017    HGB 11.4 (L) 12/19/2017    HCT 34.9 (L) 12/19/2017    MCV 92 12/19/2017     12/19/2017     BMP  Lab Results   Component Value Date     12/05/2017    K 3.9 12/05/2017     12/05/2017    CO2 26 12/05/2017    BUN 11 12/05/2017    CREATININE 0.7 12/05/2017    CALCIUM 9.3 12/05/2017    ANIONGAP 10 12/05/2017    ESTGFRAFRICA >60.0 12/05/2017    EGFRNONAA >60.0 12/05/2017     Lab Results   Component Value Date    ALT 20 12/05/2017    AST 19 12/05/2017    ALKPHOS 60 12/05/2017    BILITOT 0.8 12/05/2017       Lab Results   Component  Value Date    IRON 67 10/08/2015    TIBC 332 10/08/2015     Lab Results   Component Value Date    UXUHLXTP36 467 10/08/2015     Lab Results   Component Value Date    FOLATE 35.9 (H) 10/08/2015     No results found for: TSH      Review of Systems   Constitutional: Positive for fatigue. Negative for activity change, appetite change, chills, diaphoresis, fever and unexpected weight change.   HENT: Negative for congestion, dental problem, drooling, ear discharge, ear pain, facial swelling, hearing loss, mouth sores, nosebleeds, postnasal drip, rhinorrhea, sinus pressure, sneezing, sore throat, tinnitus, trouble swallowing and voice change.    Eyes: Negative for photophobia, pain, discharge, redness, itching and visual disturbance.   Respiratory: Negative for cough, choking, chest tightness, shortness of breath, wheezing and stridor.    Cardiovascular: Negative for chest pain, palpitations and leg swelling.   Gastrointestinal: Negative for abdominal distention, abdominal pain, anal bleeding, blood in stool, constipation, diarrhea, nausea, rectal pain and vomiting.   Endocrine: Negative for cold intolerance, heat intolerance, polydipsia, polyphagia and polyuria.   Genitourinary: Negative for decreased urine volume, difficulty urinating, dyspareunia, dysuria, enuresis, flank pain, frequency, genital sores, hematuria, menstrual problem, pelvic pain, urgency, vaginal bleeding, vaginal discharge and vaginal pain.   Musculoskeletal: Negative for arthralgias, back pain, gait problem, joint swelling, myalgias, neck pain and neck stiffness.   Skin: Negative for color change, pallor and rash.   Allergic/Immunologic: Negative for environmental allergies, food allergies and immunocompromised state.   Neurological: Positive for weakness. Negative for dizziness, tremors, seizures, syncope, facial asymmetry, speech difficulty, light-headedness, numbness and headaches.   Hematological: Negative for adenopathy. Does not bruise/bleed  easily.   Psychiatric/Behavioral: Negative for agitation, behavioral problems, confusion, decreased concentration, dysphoric mood, hallucinations, self-injury, sleep disturbance and suicidal ideas. The patient is not nervous/anxious and is not hyperactive.        Objective:      Physical Exam   Constitutional: She is oriented to person, place, and time. She appears well-developed and well-nourished. No distress.   HENT:   Head: Normocephalic and atraumatic.   Right Ear: Tympanic membrane and external ear normal.   Left Ear: Tympanic membrane and external ear normal.   Nose: Nose normal. Right sinus exhibits no maxillary sinus tenderness and no frontal sinus tenderness. Left sinus exhibits no maxillary sinus tenderness and no frontal sinus tenderness.   Mouth/Throat: Oropharynx is clear and moist. No oropharyngeal exudate.   Eyes: Conjunctivae, EOM and lids are normal. Pupils are equal, round, and reactive to light. Right eye exhibits no discharge. Left eye exhibits no discharge. Right conjunctiva is not injected. Right conjunctiva has no hemorrhage. Left conjunctiva is not injected. Left conjunctiva has no hemorrhage. No scleral icterus.   Neck: Normal range of motion. Neck supple. No JVD present. No tracheal deviation present. No thyromegaly present.   Cardiovascular: Normal rate, regular rhythm, normal heart sounds and intact distal pulses.    Pulmonary/Chest: Effort normal and breath sounds normal. No stridor. No respiratory distress. She exhibits no tenderness.   Abdominal: Soft. Bowel sounds are normal. She exhibits no distension and no mass. There is no splenomegaly or hepatomegaly. There is no tenderness. There is no rebound.   Musculoskeletal: Normal range of motion. She exhibits no edema or tenderness.   Lymphadenopathy:     She has no cervical adenopathy.     She has no axillary adenopathy.        Right: No supraclavicular adenopathy present.        Left: No supraclavicular adenopathy present.    Neurological: She is alert and oriented to person, place, and time. No cranial nerve deficit. Coordination normal.   Skin: Skin is dry. No rash noted. She is not diaphoretic. No erythema.   Psychiatric: She has a normal mood and affect. Her behavior is normal. Judgment and thought content normal.   Vitals reviewed.          Assessment:      1. Morbid obesity with BMI of 40.0-44.9, adult    2. Iron deficiency anemia due to chronic blood loss           Plan:   Review of laboratory studies of the last several years demonstrates persistent low-grade anemia etiology undetermined review laboratory studies previously drawn no specific etiology will proceed with laboratory evaluation see back in the next 7-10 business days for review communicate results through patient portal

## 2017-12-20 ENCOUNTER — OFFICE VISIT (OUTPATIENT)
Dept: FAMILY MEDICINE | Facility: CLINIC | Age: 67
End: 2017-12-20
Payer: MEDICARE

## 2017-12-20 VITALS
OXYGEN SATURATION: 98 % | HEART RATE: 99 BPM | HEIGHT: 60 IN | WEIGHT: 235.56 LBS | BODY MASS INDEX: 46.25 KG/M2 | TEMPERATURE: 98 F | DIASTOLIC BLOOD PRESSURE: 62 MMHG | SYSTOLIC BLOOD PRESSURE: 120 MMHG

## 2017-12-20 DIAGNOSIS — B37.2 CANDIDAL INTERTRIGO: Primary | ICD-10-CM

## 2017-12-20 LAB
ALBUMIN SERPL ELPH-MCNC: 4.06 G/DL
ALPHA1 GLOB SERPL ELPH-MCNC: 0.33 G/DL
ALPHA2 GLOB SERPL ELPH-MCNC: 0.88 G/DL
B-GLOBULIN SERPL ELPH-MCNC: 0.86 G/DL
GAMMA GLOB SERPL ELPH-MCNC: 0.77 G/DL
KAPPA LC SER QL IA: 2.06 MG/DL
KAPPA LC/LAMBDA SER IA: 1.13
LAMBDA LC SER QL IA: 1.82 MG/DL
PATHOLOGIST INTERPRETATION SPE: NORMAL
PROT SERPL-MCNC: 6.9 G/DL

## 2017-12-20 PROCEDURE — 99999 PR PBB SHADOW E&M-EST. PATIENT-LVL III: CPT | Mod: PBBFAC,,, | Performed by: FAMILY MEDICINE

## 2017-12-20 PROCEDURE — 99213 OFFICE O/P EST LOW 20 MIN: CPT | Mod: S$GLB,,, | Performed by: FAMILY MEDICINE

## 2017-12-20 RX ORDER — TRIAMCINOLONE ACETONIDE 1 MG/G
CREAM TOPICAL 2 TIMES DAILY
Qty: 80 G | Refills: 0 | Status: SHIPPED | OUTPATIENT
Start: 2017-12-20 | End: 2018-03-29

## 2017-12-20 RX ORDER — FLUCONAZOLE 150 MG/1
TABLET ORAL
Qty: 4 TABLET | Refills: 0 | Status: SHIPPED | OUTPATIENT
Start: 2017-12-20 | End: 2018-06-07

## 2017-12-20 NOTE — PROGRESS NOTES
Subjective:       Patient ID: Christine Jo is a 67 y.o. female.    Chief Complaint: Rash    67 y old female with beefy  ,painful rash under breasts  and inguinal  Area  for 2 w. Using topical antifungal creams with no relief . She was using drying powder  On a regular  basis up to 2 m ago      Rash   This is a recurrent problem. The current episode started 1 to 4 weeks ago. The problem has been gradually worsening since onset. The affected locations include thechest, torso and groin. The rash is characterized by burning, redness and itchiness. It is unknown if there was an exposure to a precipitant. Associated symptoms include coughing. Past treatments include anti-itch cream. Her past medical history is significant for allergies.     Review of Systems   Constitutional: Negative.    HENT: Negative.    Respiratory: Positive for cough.    Cardiovascular: Negative.    Gastrointestinal: Negative.    Musculoskeletal: Negative.    Skin: Positive for rash.       Objective:      Physical Exam   Constitutional: She is oriented to person, place, and time. She appears well-developed and well-nourished. No distress.   HENT:   Head: Normocephalic and atraumatic.   Right Ear: External ear normal.   Left Ear: External ear normal.   Mouth/Throat: No oropharyngeal exudate.   Eyes: Conjunctivae and EOM are normal. Pupils are equal, round, and reactive to light. Right eye exhibits no discharge. Left eye exhibits no discharge. No scleral icterus.   Neck: Normal range of motion. Neck supple. No JVD present. No tracheal deviation present. No thyromegaly present.   Cardiovascular: Normal rate, regular rhythm and normal heart sounds.  Exam reveals no gallop and no friction rub.    No murmur heard.  Pulmonary/Chest: Effort normal and breath sounds normal. No stridor. No respiratory distress. She has no wheezes. She has no rales. She exhibits no tenderness.   Abdominal: Soft. Bowel sounds are normal. She exhibits no distension. There is no  tenderness. There is no rebound and no guarding.   Musculoskeletal: Normal range of motion.   Lymphadenopathy:     She has no cervical adenopathy.   Neurological: She is alert and oriented to person, place, and time.   Skin: Skin is warm and dry. She is not diaphoretic.        beefy erythematous plaques bilaterally    Psychiatric: She has a normal mood and affect. Her behavior is normal. Judgment and thought content normal.       Assessment:       1. Candidal intertrigo        Plan:     Christine was seen today for rash.    Diagnoses and all orders for this visit:    Candidal intertrigo    Other orders  -     fluconazole (DIFLUCAN) 150 MG Tab; 1 tab po weekly for 4 weeks  -     triamcinolone acetonide 0.1% (KENALOG) 0.1 % cream; Apply topically 2 (two) times daily.     Resume use of  drying powder .   Call or return to clinic prn if these symptoms worsen or fail to improve as anticipated.

## 2017-12-29 ENCOUNTER — OFFICE VISIT (OUTPATIENT)
Dept: HEMATOLOGY/ONCOLOGY | Facility: CLINIC | Age: 67
End: 2017-12-29
Payer: MEDICARE

## 2017-12-29 VITALS
TEMPERATURE: 98 F | DIASTOLIC BLOOD PRESSURE: 74 MMHG | HEART RATE: 89 BPM | SYSTOLIC BLOOD PRESSURE: 134 MMHG | BODY MASS INDEX: 45.71 KG/M2 | HEIGHT: 60 IN | OXYGEN SATURATION: 97 % | WEIGHT: 232.81 LBS

## 2017-12-29 DIAGNOSIS — D50.0 IRON DEFICIENCY ANEMIA DUE TO CHRONIC BLOOD LOSS: Primary | ICD-10-CM

## 2017-12-29 PROCEDURE — 99999 PR PBB SHADOW E&M-EST. PATIENT-LVL III: CPT | Mod: PBBFAC,,, | Performed by: INTERNAL MEDICINE

## 2017-12-29 PROCEDURE — 99214 OFFICE O/P EST MOD 30 MIN: CPT | Mod: S$GLB,,, | Performed by: INTERNAL MEDICINE

## 2017-12-29 RX ORDER — SODIUM CHLORIDE 0.9 % (FLUSH) 0.9 %
10 SYRINGE (ML) INJECTION
Status: CANCELLED | OUTPATIENT
Start: 2018-01-09

## 2017-12-29 RX ORDER — SODIUM CHLORIDE 0.9 % (FLUSH) 0.9 %
10 SYRINGE (ML) INJECTION
Status: CANCELLED | OUTPATIENT
Start: 2017-12-29

## 2017-12-29 RX ORDER — HEPARIN 100 UNIT/ML
500 SYRINGE INTRAVENOUS
Status: CANCELLED | OUTPATIENT
Start: 2018-01-09

## 2017-12-29 RX ORDER — HEPARIN 100 UNIT/ML
500 SYRINGE INTRAVENOUS
Status: CANCELLED | OUTPATIENT
Start: 2017-12-29

## 2017-12-29 NOTE — PROGRESS NOTES
Subjective:       Patient ID: Christine Jo is a 67 y.o. female.    Chief Complaint: Results and Anemia    HPI 67-year-old female with recently diagnosed iron deficiency anemia patient is intolerant oral iron patient reportedly with inlet she had upper lower endoscopies [facility] we'll obtain results    Past Medical History:   Diagnosis Date    Diabetes mellitus     Insomnia     Vaginal yeast infection      Family History   Problem Relation Age of Onset    Heart disease Mother      CAD     Cancer Son      testicular     Cancer Maternal Aunt      Lung ca    Heart disease Maternal Grandfather      Pacemaker     Diabetes Sister     Heart disease Sister      CAD    Diabetes Sister     Diabetes Sister      Social History     Social History    Marital status:      Spouse name: N/A    Number of children: N/A    Years of education: N/A     Occupational History    Not on file.     Social History Main Topics    Smoking status: Former Smoker     Packs/day: 1.00     Years: 35.00     Types: Cigarettes     Quit date: 6/22/2003    Smokeless tobacco: Never Used    Alcohol use No    Drug use: No    Sexual activity: No     Other Topics Concern    Not on file     Social History Narrative    No narrative on file     Past Surgical History:   Procedure Laterality Date    abdominal laparoscopy       BREAST BIOPSY      EYE SURGERY      TONSILLECTOMY      TUBAL LIGATION         Labs:  Lab Results   Component Value Date    WBC 7.88 12/19/2017    HGB 11.4 (L) 12/19/2017    HCT 34.9 (L) 12/19/2017    MCV 92 12/19/2017     12/19/2017     BMP  Lab Results   Component Value Date     12/05/2017    K 3.9 12/05/2017     12/05/2017    CO2 26 12/05/2017    BUN 11 12/05/2017    CREATININE 0.7 12/05/2017    CALCIUM 9.3 12/05/2017    ANIONGAP 10 12/05/2017    ESTGFRAFRICA >60.0 12/05/2017    EGFRNONAA >60.0 12/05/2017     Lab Results   Component Value Date    ALT 20 12/05/2017    AST  19 12/05/2017    ALKPHOS 60 12/05/2017    BILITOT 0.8 12/05/2017       Lab Results   Component Value Date    IRON 61 12/19/2017    TIBC 342 12/19/2017    FERRITIN 50 12/19/2017     Lab Results   Component Value Date    FBBDNRNO48 421 12/19/2017     Lab Results   Component Value Date    FOLATE 35.9 (H) 10/08/2015     Lab Results   Component Value Date    TSH 0.948 12/19/2017         Review of Systems   Constitutional: Positive for fatigue. Negative for activity change, appetite change, chills, diaphoresis, fever and unexpected weight change.   HENT: Negative for congestion, dental problem, drooling, ear discharge, ear pain, facial swelling, hearing loss, mouth sores, nosebleeds, postnasal drip, rhinorrhea, sinus pressure, sneezing, sore throat, tinnitus, trouble swallowing and voice change.    Eyes: Negative for photophobia, pain, discharge, redness, itching and visual disturbance.   Respiratory: Negative for cough, choking, chest tightness, shortness of breath, wheezing and stridor.    Cardiovascular: Negative for chest pain, palpitations and leg swelling.   Gastrointestinal: Negative for abdominal distention, abdominal pain, anal bleeding, blood in stool, constipation, diarrhea, nausea, rectal pain and vomiting.   Endocrine: Negative for cold intolerance, heat intolerance, polydipsia, polyphagia and polyuria.   Genitourinary: Negative for decreased urine volume, difficulty urinating, dyspareunia, dysuria, enuresis, flank pain, frequency, genital sores, hematuria, menstrual problem, pelvic pain, urgency, vaginal bleeding, vaginal discharge and vaginal pain.   Musculoskeletal: Negative for arthralgias, back pain, gait problem, joint swelling, myalgias, neck pain and neck stiffness.   Skin: Negative for color change, pallor and rash.   Allergic/Immunologic: Negative for environmental allergies, food allergies and immunocompromised state.   Neurological: Positive for weakness. Negative for dizziness, tremors, seizures,  syncope, facial asymmetry, speech difficulty, light-headedness, numbness and headaches.   Hematological: Negative for adenopathy. Does not bruise/bleed easily.   Psychiatric/Behavioral: Positive for dysphoric mood. Negative for agitation, behavioral problems, confusion, decreased concentration, hallucinations, self-injury, sleep disturbance and suicidal ideas. The patient is nervous/anxious. The patient is not hyperactive.        Objective:      Physical Exam   Constitutional: She is oriented to person, place, and time. She appears well-developed and well-nourished. She appears distressed.   HENT:   Head: Normocephalic and atraumatic.   Right Ear: External ear normal.   Left Ear: External ear normal.   Nose: Nose normal. Right sinus exhibits no maxillary sinus tenderness and no frontal sinus tenderness. Left sinus exhibits no maxillary sinus tenderness and no frontal sinus tenderness.   Mouth/Throat: Oropharynx is clear and moist. No oropharyngeal exudate.   Eyes: Conjunctivae, EOM and lids are normal. Pupils are equal, round, and reactive to light. Right eye exhibits no discharge. Left eye exhibits no discharge. Right conjunctiva is not injected. Right conjunctiva has no hemorrhage. Left conjunctiva is not injected. Left conjunctiva has no hemorrhage. No scleral icterus.   Neck: Normal range of motion. Neck supple. No JVD present. No tracheal deviation present. No thyromegaly present.   Cardiovascular: Normal rate and regular rhythm.    Pulmonary/Chest: Effort normal. No stridor. No respiratory distress. She exhibits no tenderness.   Abdominal: Soft. She exhibits no distension and no mass. There is no splenomegaly or hepatomegaly. There is no tenderness. There is no rebound.   Musculoskeletal: Normal range of motion. She exhibits no edema or tenderness.   Lymphadenopathy:     She has no cervical adenopathy.     She has no axillary adenopathy.        Right: No supraclavicular adenopathy present.        Left: No  supraclavicular adenopathy present.   Neurological: She is alert and oriented to person, place, and time. No cranial nerve deficit. Coordination normal.   Skin: Skin is dry. No rash noted. She is not diaphoretic. No erythema.   Psychiatric: She has a normal mood and affect. Her behavior is normal. Judgment and thought content normal.   Vitals reviewed.          Assessment:      1. Iron deficiency anemia due to chronic blood loss           Plan:   Document iron deficiency anemia patient intolerant oral iron at this point will proceed with intravenous iron [facility] to make sure of her lower endoscopies were done return in 3 months with repeat CBC iron status prior

## 2018-01-08 ENCOUNTER — INFUSION (OUTPATIENT)
Dept: INFUSION THERAPY | Facility: HOSPITAL | Age: 68
End: 2018-01-08
Attending: INTERNAL MEDICINE
Payer: MEDICARE

## 2018-01-08 ENCOUNTER — PATIENT MESSAGE (OUTPATIENT)
Dept: FAMILY MEDICINE | Facility: CLINIC | Age: 68
End: 2018-01-08

## 2018-01-08 VITALS — SYSTOLIC BLOOD PRESSURE: 108 MMHG | HEART RATE: 72 BPM | DIASTOLIC BLOOD PRESSURE: 62 MMHG

## 2018-01-08 DIAGNOSIS — D50.0 IRON DEFICIENCY ANEMIA DUE TO CHRONIC BLOOD LOSS: Primary | ICD-10-CM

## 2018-01-08 PROCEDURE — 96365 THER/PROPH/DIAG IV INF INIT: CPT

## 2018-01-08 PROCEDURE — 63600175 PHARM REV CODE 636 W HCPCS: Mod: JG | Performed by: INTERNAL MEDICINE

## 2018-01-08 PROCEDURE — 25000003 PHARM REV CODE 250: Performed by: INTERNAL MEDICINE

## 2018-01-08 RX ORDER — TEMAZEPAM 30 MG/1
30 CAPSULE ORAL NIGHTLY PRN
Qty: 30 CAPSULE | Refills: 2 | Status: SHIPPED | OUTPATIENT
Start: 2018-01-08 | End: 2018-04-09 | Stop reason: SDUPTHER

## 2018-01-08 RX ORDER — TEMAZEPAM 30 MG/1
30 CAPSULE ORAL NIGHTLY PRN
Qty: 30 CAPSULE | Refills: 2 | OUTPATIENT
Start: 2018-01-08

## 2018-01-08 RX ADMIN — FERUMOXYTOL 510 MG: 510 INJECTION INTRAVENOUS at 10:01

## 2018-01-08 NOTE — TELEPHONE ENCOUNTER
Christine Nielsen MD 2 hours ago (8:55 AM)         My prescription Temazepam will be up for renewal in a few days and has no more refills. Thank you for your help dr Hima Clark. Christine mejia         Last office visit 12/20/17    Last labs 12/5/17

## 2018-01-08 NOTE — PLAN OF CARE
Problem: Patient Care Overview  Goal: Plan of Care Review  Outcome: Ongoing (interventions implemented as appropriate)  Pt stated, I have been very tired

## 2018-01-08 NOTE — PATIENT INSTRUCTIONS
[facility]  [address]  [address] [address]  [phone] phone     [phone] fax  Hours of Operation: Monday- Friday 8:00am- 5:00pm  After hours phone  [phone]  Hematology / Oncology Physicians on call      Dr. Shaka Manuel                        Please call with any concerns regarding your appointment today.      Ferumoxytol injection  What is this medicine?  FERUMOXYTOL is an iron complex. Iron is used to make healthy red blood cells, which carry oxygen and nutrients throughout the body. This medicine is used to treat iron deficiency anemia in people with chronic kidney disease.  How should I use this medicine?  This medicine is for injection into a vein. It is given by a health care professional in a hospital or clinic setting.  Talk to your pediatrician regarding the use of this medicine in children. Special care may be needed.  What side effects may I notice from receiving this medicine?  Side effects that you should report to your doctor or health care professional as soon as possible:  · allergic reactions like skin rash, itching or hives, swelling of the face, lips, or tongue  · breathing problems  · changes in blood pressure  · feeling faint or lightheaded, falls  · fever or chills  · flushing, sweating, or hot feelings  · swelling of the ankles or feet  Side effects that usually do not require medical attention (Report these to your doctor or health care professional if they continue or are bothersome.):  · diarrhea  · headache  · nausea, vomiting  · stomach pain  What may interact with this medicine?  This medicine may interact with the following medications:  · other iron products  What if I miss a dose?  It is important not to miss your dose. Call your doctor or health care professional if you are unable to keep an appointment.  Where should I keep my medicine?  This drug is given in a hospital or clinic and will not  be stored at home.  What should I tell my health care provider before I take this medicine?  They need to know if you have any of these conditions:  · anemia not caused by low iron levels  · high levels of iron in the blood  · magnetic resonance imaging (MRI) test scheduled  · an unusual or allergic reaction to iron, other medicines, foods, dyes, or preservatives  · pregnant or trying to get pregnant  · breast-feeding  What should I watch for while using this medicine?  Visit your doctor or healthcare professional regularly. Tell your doctor or healthcare professional if your symptoms do not start to get better or if they get worse. You may need blood work done while you are taking this medicine.  You may need to follow a special diet. Talk to your doctor. Foods that contain iron include: whole grains/cereals, dried fruits, beans, or peas, leafy green vegetables, and organ meats (liver, kidney).  NOTE:This sheet is a summary. It may not cover all possible information. If you have questions about this medicine, talk to your doctor, pharmacist, or health care provider. Copyright© 2017 [employer]

## 2018-01-09 ENCOUNTER — TELEPHONE (OUTPATIENT)
Dept: FAMILY MEDICINE | Facility: CLINIC | Age: 68
End: 2018-01-09

## 2018-01-09 DIAGNOSIS — F32.1 CURRENT MODERATE EPISODE OF MAJOR DEPRESSIVE DISORDER WITHOUT PRIOR EPISODE: Primary | ICD-10-CM

## 2018-01-09 NOTE — TELEPHONE ENCOUNTER
----- Message from Sunshine Malloy sent at 1/9/2018 10:59 AM CST -----  Contact: pt   Pt need a referral to see a Psychiatric.   [phone] (home) [phone] (work)

## 2018-01-09 NOTE — TELEPHONE ENCOUNTER
----- Message from Pamela Singh sent at 1/9/2018  2:00 PM CST -----  Contact: pt   Pt would like for nurse to call her back ,,, please call pt back at [phone] (X)

## 2018-01-09 NOTE — TELEPHONE ENCOUNTER
Received progress note from [facility], patient had 3 teeth extraction.  MD approves IV bisphosphonates as soon as 1/18/18.
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
## 2018-01-10 ENCOUNTER — TELEPHONE (OUTPATIENT)
Dept: FAMILY MEDICINE | Facility: CLINIC | Age: 68
End: 2018-01-10

## 2018-01-10 ENCOUNTER — TELEPHONE (OUTPATIENT)
Dept: PSYCHIATRY | Facility: CLINIC | Age: 68
End: 2018-01-10

## 2018-01-10 NOTE — TELEPHONE ENCOUNTER
----- Message from Krysta Carrasco sent at 1/9/2018  4:43 PM CST -----  Contact: pt  She's returning a missed call, please advise [phone]

## 2018-01-11 RX ORDER — HYDRALAZINE HYDROCHLORIDE 100 MG/1
TABLET, FILM COATED ORAL
Qty: 60 TABLET | Refills: 0 | Status: SHIPPED | OUTPATIENT
Start: 2018-01-11 | End: 2018-02-12 | Stop reason: SDUPTHER

## 2018-01-15 ENCOUNTER — INFUSION (OUTPATIENT)
Dept: INFUSION THERAPY | Facility: HOSPITAL | Age: 68
End: 2018-01-15
Attending: INTERNAL MEDICINE
Payer: MEDICARE

## 2018-01-15 ENCOUNTER — OFFICE VISIT (OUTPATIENT)
Dept: OPHTHALMOLOGY | Facility: CLINIC | Age: 68
End: 2018-01-15
Payer: MEDICARE

## 2018-01-15 ENCOUNTER — SOCIAL WORK (OUTPATIENT)
Dept: HEMATOLOGY/ONCOLOGY | Facility: CLINIC | Age: 68
End: 2018-01-15

## 2018-01-15 VITALS
BODY MASS INDEX: 45.71 KG/M2 | DIASTOLIC BLOOD PRESSURE: 62 MMHG | HEART RATE: 87 BPM | HEIGHT: 60 IN | OXYGEN SATURATION: 97 % | TEMPERATURE: 97 F | WEIGHT: 232.81 LBS | RESPIRATION RATE: 16 BRPM | SYSTOLIC BLOOD PRESSURE: 113 MMHG

## 2018-01-15 DIAGNOSIS — H04.123 DRY EYES, BILATERAL: ICD-10-CM

## 2018-01-15 DIAGNOSIS — F33.42 RECURRENT MAJOR DEPRESSIVE DISORDER, IN FULL REMISSION: Primary | ICD-10-CM

## 2018-01-15 DIAGNOSIS — H10.13 ALLERGIC CONJUNCTIVITIS, BILATERAL: ICD-10-CM

## 2018-01-15 DIAGNOSIS — D50.0 IRON DEFICIENCY ANEMIA DUE TO CHRONIC BLOOD LOSS: Primary | ICD-10-CM

## 2018-01-15 DIAGNOSIS — E11.9 DIABETES MELLITUS TYPE 2 WITHOUT RETINOPATHY: Primary | ICD-10-CM

## 2018-01-15 DIAGNOSIS — Z96.1 PSEUDOPHAKIA OF BOTH EYES: ICD-10-CM

## 2018-01-15 DIAGNOSIS — H52.4 BILATERAL PRESBYOPIA: ICD-10-CM

## 2018-01-15 PROCEDURE — 99999 PR PBB SHADOW E&M-EST. PATIENT-LVL I: CPT | Mod: PBBFAC,,, | Performed by: OPTOMETRIST

## 2018-01-15 PROCEDURE — 25000003 PHARM REV CODE 250: Performed by: INTERNAL MEDICINE

## 2018-01-15 PROCEDURE — 63600175 PHARM REV CODE 636 W HCPCS: Mod: JG | Performed by: INTERNAL MEDICINE

## 2018-01-15 PROCEDURE — 96365 THER/PROPH/DIAG IV INF INIT: CPT

## 2018-01-15 PROCEDURE — 92004 COMPRE OPH EXAM NEW PT 1/>: CPT | Mod: S$GLB,,, | Performed by: OPTOMETRIST

## 2018-01-15 PROCEDURE — 92015 DETERMINE REFRACTIVE STATE: CPT | Mod: S$GLB,,, | Performed by: OPTOMETRIST

## 2018-01-15 RX ADMIN — FERUMOXYTOL 510 MG: 510 INJECTION INTRAVENOUS at 01:01

## 2018-01-15 NOTE — PATIENT INSTRUCTIONS
[facility]  [address]  [address]  [phone] phone     [phone] fax  Hours of Operation: Monday- Friday 8:00am- 5:00pm  After hours phone  [phone]  Hematology / Oncology Physicians on call      Dr. Shaka Manuel                        Please call with any concerns regarding your appointment today.      Iron-Deficiency Anemia (Adult)  Red blood cells carry oxygen to the tissues of your body. Anemia is a condition in which you have too few red blood cells. You need iron to make red cells. Anemia makes you feel tired and run down. When anemia becomes severe, your skin becomes pale. You may feel short of breath after physical activity. Other symptoms include:  · Headaches  · Dizziness  · Leg cramps with physical activity  · Drowsiness  · Restless legs  Your anemia is caused by not having enough iron in your body. This may be because of:  · Loss of blood. This can be caused by heavy menstrual periods. It can also be caused by bleeding from the stomach or intestines.  · Poor diet. You may not be eating enough foods that contain iron.  · Inability to absorb iron from the foods you eat  · Pregnancy  If your blood count is low enough, your healthcare provider may prescribe an iron supplement. It usually takes about 2 to 3 months of treatment with iron supplements to correct anemia. Severe cases of anemia need a blood transfusion to quickly ease symptoms and deliver more oxygen to the cells.  Home care  Follow these guidelines when caring for yourself at home:  · Eat foods high in iron. This will boost the amount of iron stored in your body. It is a natural way to build up the number of blood cells. Good sources of iron include beef, liver, spinach and other dark green leafy vegetables, whole grains, beans, and nuts.  · Do not overexert yourself.  · Talk with your healthcare provider before traveling by air or traveling to high  altitudes.  Follow-up care  Follow up with your healthcare provider in 2 months, or as advised. This is to have another red blood cell count to be sure your anemia has been fixed.  When to seek medical advice  Call your healthcare provider right away if any of these occur:  · Shortness of breath or chest pain  · Dizziness or fainting  · Vomiting blood or passing red or black-colored stool   Date Last Reviewed: 2/25/2016  © 4191-0500 [employer]. [address] [place] [address], [place] [address]. All rights reserved. This information is not intended as a substitute for professional medical care. Always follow your healthcare professional's instructions.

## 2018-01-15 NOTE — PLAN OF CARE
Problem: Patient Care Overview  Goal: Plan of Care Review  Outcome: Ongoing (interventions implemented as appropriate)  Pt states that she continues to have fatigue due to the iron deficiency.

## 2018-01-15 NOTE — PROGRESS NOTES
HPI     Diabetic Eye Exam    Additional comments: B/S 109 this a.m           Comments   IDDM exam. Pt states She has Visual Changes since Last Exam Last year at   [facility] [place]. She has night Blindness. Since Her Cataract Surgery   she cant see anything. She Does Have dry Eye Syndrome. Gtts she uses 2x a   day Xidra and Bepreve. New patient to [facility].        Last edited by Elida Ley on 1/15/2018  3:18 PM. (History)            Assessment /Plan     For exam results, see Encounter Report.    Diabetes mellitus type 2 without retinopathy    Pseudophakia of both eyes    Dry eyes, bilateral    Allergic conjunctivitis, bilateral    Bilateral presbyopia      No Background Diabetic Retinopathy    Stable IOL OU    Minimal macular changes OU    Continue Bepreve for allergy symptoms.    Continue Xidra for dry eye symptoms.    Dispense Final Rx for glasses. Or OTC  RTC 1 year

## 2018-01-15 NOTE — PROGRESS NOTES
Pt referred to SW by infusion nurse. Reports depression, some difficult things going on with her children.     Listened, provided support. Normalized and validated feelings. Pt is open to counseling. Unfortunately Dr. Landry will not be available for telepsych until probably/possibly April, and first available with psychiatry social workers is March. Pt would prefer a female and asked if she could meet with this SW. Discussed that unfortunately I am unable to provide long-term counseling, and provided her with a list of in-network providers based on obtaining her insurance information. Discussed that if she cannot find another provider, SW is happy to set up an appointment with her, until we can set up better arrangements. Provided her with SW contact info. Will f/u further as needed.

## 2018-01-16 ENCOUNTER — PATIENT MESSAGE (OUTPATIENT)
Dept: FAMILY MEDICINE | Facility: CLINIC | Age: 68
End: 2018-01-16

## 2018-01-19 ENCOUNTER — TELEPHONE (OUTPATIENT)
Dept: FAMILY MEDICINE | Facility: CLINIC | Age: 68
End: 2018-01-19

## 2018-01-19 NOTE — TELEPHONE ENCOUNTER
Called patient to inform levemir sent to pharm and how to take blood sugars and appt in 2 weeks, no answer. Left message to return call.

## 2018-01-19 NOTE — TELEPHONE ENCOUNTER
Reg my o msg : levemir sent .monitor  bs as follow  : fastin , pre lunch and 2 hr after dinner  . Anup in 2 w

## 2018-01-22 ENCOUNTER — PATIENT MESSAGE (OUTPATIENT)
Dept: OPHTHALMOLOGY | Facility: CLINIC | Age: 68
End: 2018-01-22

## 2018-01-22 ENCOUNTER — TELEPHONE (OUTPATIENT)
Dept: FAMILY MEDICINE | Facility: CLINIC | Age: 68
End: 2018-01-22

## 2018-01-22 ENCOUNTER — PATIENT MESSAGE (OUTPATIENT)
Dept: FAMILY MEDICINE | Facility: CLINIC | Age: 68
End: 2018-01-22

## 2018-01-23 DIAGNOSIS — M35.01 KERATITIS SICCA, BILATERAL: Primary | ICD-10-CM

## 2018-01-23 RX ORDER — CYCLOSPORINE 0.5 MG/ML
1 EMULSION OPHTHALMIC 2 TIMES DAILY
Qty: 60 VIAL | Refills: 12 | Status: SHIPPED | OUTPATIENT
Start: 2018-01-23 | End: 2018-12-17 | Stop reason: SDUPTHER

## 2018-01-25 RX ORDER — OMEPRAZOLE 20 MG/1
20 CAPSULE, DELAYED RELEASE ORAL DAILY
Qty: 90 CAPSULE | Refills: 1 | Status: SHIPPED | OUTPATIENT
Start: 2018-01-25 | End: 2018-07-24 | Stop reason: SDUPTHER

## 2018-01-29 ENCOUNTER — PATIENT MESSAGE (OUTPATIENT)
Dept: RHEUMATOLOGY | Facility: CLINIC | Age: 68
End: 2018-01-29

## 2018-01-30 ENCOUNTER — TELEPHONE (OUTPATIENT)
Dept: RHEUMATOLOGY | Facility: HOSPITAL | Age: 68
End: 2018-01-30

## 2018-02-05 ENCOUNTER — TELEPHONE (OUTPATIENT)
Dept: FAMILY MEDICINE | Facility: CLINIC | Age: 68
End: 2018-02-05

## 2018-02-05 NOTE — TELEPHONE ENCOUNTER
Spoke with patient and she states taking 64 unit of levemir and she has a terrible headache and nausea when she wakes up in the morning. Pt wants to know can she decrease the units. Her b/s has been running in the 120's fasting. She also is starting to get a rash in her groin and underneath her abdomen. Please advise

## 2018-02-05 NOTE — TELEPHONE ENCOUNTER
Spoke with patient and informed her that if she is getting 120's fasting with 64 units, she is having good DM Control. Scheduled appt to discuss headaches, and also rash in groin and abdomen it is likely yeast which is common in diabetics, schedule appt to evaluate. Pt verbalized understanding.

## 2018-02-05 NOTE — TELEPHONE ENCOUNTER
----- Message from Laura Velasquez sent at 2/5/2018 11:13 AM CST -----  Contact: Patient  Patient is returning a call, please call them back at [phone]. Thank you

## 2018-02-05 NOTE — TELEPHONE ENCOUNTER
----- Message from Kacie Aguilera sent at 2/5/2018  8:08 AM CST -----  Contact: Pt  Pt called and stated she needed to speak to the nurse. She stated that the insulin medication is giving her a headache and nausea in the mornings. She can be reached at [phone].    Thanks,  TF

## 2018-02-05 NOTE — TELEPHONE ENCOUNTER
If she is 120's fasting with 64 units, she is having good DM control.  Schedule appt to discuss headache. Also if she has a rash in groin and abd it is likely yeast which is common in diabetics, schedule appt to evaluate.

## 2018-02-06 ENCOUNTER — OFFICE VISIT (OUTPATIENT)
Dept: FAMILY MEDICINE | Facility: CLINIC | Age: 68
End: 2018-02-06
Payer: MEDICARE

## 2018-02-06 ENCOUNTER — HOSPITAL ENCOUNTER (OUTPATIENT)
Dept: RADIOLOGY | Facility: HOSPITAL | Age: 68
Discharge: HOME | End: 2018-02-06
Attending: FAMILY MEDICINE
Payer: MEDICARE

## 2018-02-06 VITALS
WEIGHT: 235.31 LBS | HEIGHT: 60 IN | BODY MASS INDEX: 46.2 KG/M2 | SYSTOLIC BLOOD PRESSURE: 138 MMHG | HEART RATE: 88 BPM | OXYGEN SATURATION: 97 % | DIASTOLIC BLOOD PRESSURE: 64 MMHG | TEMPERATURE: 98 F

## 2018-02-06 DIAGNOSIS — E66.01 MORBID OBESITY WITH BODY MASS INDEX OF 45.0-49.9 IN ADULT: ICD-10-CM

## 2018-02-06 DIAGNOSIS — R06.02 SOB (SHORTNESS OF BREATH): ICD-10-CM

## 2018-02-06 DIAGNOSIS — R06.02 SOB (SHORTNESS OF BREATH): Primary | ICD-10-CM

## 2018-02-06 DIAGNOSIS — I48.0 PAROXYSMAL ATRIAL FIBRILLATION: ICD-10-CM

## 2018-02-06 DIAGNOSIS — F33.41 RECURRENT MAJOR DEPRESSIVE DISORDER, IN PARTIAL REMISSION: ICD-10-CM

## 2018-02-06 PROCEDURE — 99999 PR PBB SHADOW E&M-EST. PATIENT-LVL III: CPT | Mod: PBBFAC,,, | Performed by: FAMILY MEDICINE

## 2018-02-06 PROCEDURE — 3008F BODY MASS INDEX DOCD: CPT | Mod: S$GLB,,, | Performed by: FAMILY MEDICINE

## 2018-02-06 PROCEDURE — 99214 OFFICE O/P EST MOD 30 MIN: CPT | Mod: S$GLB,,, | Performed by: FAMILY MEDICINE

## 2018-02-06 PROCEDURE — 71046 X-RAY EXAM CHEST 2 VIEWS: CPT | Mod: TC,PO

## 2018-02-06 PROCEDURE — 71046 X-RAY EXAM CHEST 2 VIEWS: CPT | Mod: 26,,, | Performed by: RADIOLOGY

## 2018-02-06 PROCEDURE — 93010 ELECTROCARDIOGRAM REPORT: CPT | Mod: S$GLB,,, | Performed by: INTERNAL MEDICINE

## 2018-02-06 PROCEDURE — 93005 ELECTROCARDIOGRAM TRACING: CPT | Mod: S$GLB,,, | Performed by: FAMILY MEDICINE

## 2018-02-06 RX ORDER — MUPIROCIN 20 MG/G
OINTMENT TOPICAL 2 TIMES DAILY
Qty: 22 G | Refills: 0 | Status: SHIPPED | OUTPATIENT
Start: 2018-02-06 | End: 2018-03-14 | Stop reason: SDUPTHER

## 2018-02-06 NOTE — PROGRESS NOTES
Subjective:       Patient ID: Christine Jo is a 67 y.o. female.    Chief Complaint: Follow-up (dm) and Rash    67 y old female with A fib( under Dr Carrizales), MO , depression  now with sob since she was switched to Levemir from  Lantus . FBS : 120 , 2 hr pp 160 . Stressed  out due to daughters mental disability . + cough , no dizziness, CP . She feels that it might be stress or new insulin . She had nuclear stress test 2 y ago which was normal . Last few seconds       Rash   Associated symptoms include coughing and shortness of breath.     Review of Systems   Constitutional: Negative.    HENT: Negative.    Respiratory: Positive for cough and shortness of breath.    Cardiovascular: Negative.    Gastrointestinal: Negative.    Genitourinary: Negative.    Musculoskeletal: Negative.    Skin: Positive for rash.   Neurological: Negative.    Hematological: Negative.    Psychiatric/Behavioral: Negative.        Objective:      Physical Exam   Constitutional: She is oriented to person, place, and time. She appears well-developed. No distress.   HENT:   Head: Normocephalic and atraumatic.   Right Ear: External ear normal.   Left Ear: External ear normal.   Mouth/Throat: No oropharyngeal exudate.   Eyes: Conjunctivae and EOM are normal. Pupils are equal, round, and reactive to light. Right eye exhibits no discharge. Left eye exhibits no discharge. No scleral icterus.   Neck: Normal range of motion. Neck supple. No JVD present. No tracheal deviation present. No thyromegaly present.   Cardiovascular: Normal rate, regular rhythm and normal heart sounds.  Exam reveals no gallop and no friction rub.    No murmur heard.  Pulmonary/Chest: Effort normal and breath sounds normal. No stridor. No respiratory distress. She has no wheezes. She has no rales. She exhibits no tenderness.   Abdominal: Soft. Bowel sounds are normal. She exhibits no distension. There is no tenderness. There is no rebound and no guarding.   Musculoskeletal: Normal  range of motion.   Lymphadenopathy:     She has no cervical adenopathy.   Neurological: She is alert and oriented to person, place, and time.   Skin: Skin is warm and dry. She is not diaphoretic.   Psychiatric: She has a normal mood and affect. Her behavior is normal. Judgment and thought content normal.       Assessment:     Christine was seen today for follow-up and rash.    Diagnoses and all orders for this visit:    SOB (shortness of breath)  -     X-Ray Chest PA And Lateral; Future  -     IN OFFICE EKG 12-LEAD (to Muse)    Paroxysmal atrial fibrillation    Recurrent major depressive disorder, in partial remission    Morbid obesity with body mass index of 45.0-49.9 in adult    Other orders  -     mupirocin (BACTROBAN) 2 % ointment; Apply topically 2 (two) times daily.          Plan:     Christine was seen today for follow-up and rash.    Diagnoses and all orders for this visit:    SOB (shortness of breath)  -     X-Ray Chest PA And Lateral; Future  -     IN OFFICE EKG 12-LEAD (to Muse)    Paroxysmal atrial fibrillation    Recurrent major depressive disorder, in partial remission    Morbid obesity with body mass index of 45.0-49.9 in adult    Other orders  -     mupirocin (BACTROBAN) 2 % ointment; Apply topically 2 (two) times daily.     WS discussed  Will obtain Card rec

## 2018-02-07 RX ORDER — SITAGLIPTIN 100 MG/1
TABLET, FILM COATED ORAL
Qty: 90 TABLET | Refills: 0 | Status: SHIPPED | OUTPATIENT
Start: 2018-02-07 | End: 2018-05-09 | Stop reason: SDUPTHER

## 2018-02-08 ENCOUNTER — PATIENT MESSAGE (OUTPATIENT)
Dept: FAMILY MEDICINE | Facility: CLINIC | Age: 68
End: 2018-02-08

## 2018-02-08 ENCOUNTER — TELEPHONE (OUTPATIENT)
Dept: FAMILY MEDICINE | Facility: CLINIC | Age: 68
End: 2018-02-08

## 2018-02-08 NOTE — TELEPHONE ENCOUNTER
----- Message from Ludivina Harley sent at 2/8/2018  8:39 AM CST -----  Contact: pt  Patient is calling for Test results. Please call patient at [phone]. Thanks!

## 2018-02-09 ENCOUNTER — PATIENT MESSAGE (OUTPATIENT)
Dept: FAMILY MEDICINE | Facility: CLINIC | Age: 68
End: 2018-02-09

## 2018-02-12 ENCOUNTER — INFUSION (OUTPATIENT)
Dept: RHEUMATOLOGY | Facility: HOSPITAL | Age: 68
End: 2018-02-12
Attending: PHYSICIAN ASSISTANT
Payer: MEDICARE

## 2018-02-12 VITALS
TEMPERATURE: 97 F | BODY MASS INDEX: 46.07 KG/M2 | WEIGHT: 235.88 LBS | DIASTOLIC BLOOD PRESSURE: 75 MMHG | SYSTOLIC BLOOD PRESSURE: 152 MMHG | HEART RATE: 77 BPM | RESPIRATION RATE: 18 BRPM

## 2018-02-12 DIAGNOSIS — M81.0 AGE-RELATED OSTEOPOROSIS WITHOUT CURRENT PATHOLOGICAL FRACTURE: Primary | ICD-10-CM

## 2018-02-12 PROCEDURE — 25000003 PHARM REV CODE 250: Mod: PO | Performed by: PHYSICIAN ASSISTANT

## 2018-02-12 PROCEDURE — 96374 THER/PROPH/DIAG INJ IV PUSH: CPT | Mod: PO

## 2018-02-12 PROCEDURE — A4216 STERILE WATER/SALINE, 10 ML: HCPCS | Mod: PO | Performed by: PHYSICIAN ASSISTANT

## 2018-02-12 PROCEDURE — 63600175 PHARM REV CODE 636 W HCPCS: Mod: JG,PO | Performed by: PHYSICIAN ASSISTANT

## 2018-02-12 RX ORDER — LIFITEGRAST 50 MG/ML
SOLUTION/ DROPS OPHTHALMIC
COMMUNITY
Start: 2018-02-08 | End: 2018-03-13

## 2018-02-12 RX ORDER — SODIUM CHLORIDE 0.9 % (FLUSH) 0.9 %
10 SYRINGE (ML) INJECTION
Status: DISCONTINUED | OUTPATIENT
Start: 2018-02-12 | End: 2018-02-12 | Stop reason: HOSPADM

## 2018-02-12 RX ORDER — IBANDRONATE SODIUM 3 MG/3 ML
3 SYRINGE (ML) INTRAVENOUS
Status: CANCELLED | OUTPATIENT
Start: 2018-02-12

## 2018-02-12 RX ORDER — HYDRALAZINE HYDROCHLORIDE 100 MG/1
TABLET, FILM COATED ORAL
Qty: 60 TABLET | Refills: 6 | Status: SHIPPED | OUTPATIENT
Start: 2018-02-12 | End: 2019-05-07 | Stop reason: SDUPTHER

## 2018-02-12 RX ORDER — SODIUM CHLORIDE 0.9 % (FLUSH) 0.9 %
10 SYRINGE (ML) INJECTION
Status: CANCELLED | OUTPATIENT
Start: 2018-02-12

## 2018-02-12 RX ORDER — IBANDRONATE SODIUM 3 MG/3 ML
3 SYRINGE (ML) INTRAVENOUS
Status: COMPLETED | OUTPATIENT
Start: 2018-02-12 | End: 2018-02-12

## 2018-02-12 RX ADMIN — IBANDRONATE SODIUM 3 MG: 3 INJECTION, SOLUTION INTRAVENOUS at 11:02

## 2018-02-12 RX ADMIN — SODIUM CHLORIDE, PRESERVATIVE FREE 10 ML: 5 INJECTION INTRAVENOUS at 11:02

## 2018-02-12 NOTE — PROGRESS NOTES
Infusion # 1  Recent Labs? yes  Recent Invasive or planned dental procedures? denied  Boniva 3 mg administered slow IVP over 2 min via 24 gauge adm. Set to left a.c.   Pt tolerated well without adverse events. Pressure dressing applied.  See Vitals and MAR for further details.  Pt discharged ambulatory from clinic.

## 2018-02-12 NOTE — PATIENT INSTRUCTIONS
Ibandronate injection  What is this medicine?  IBANDRONATE (i BAN toshia lal) slows calcium loss from bones. It is used to treat osteoporosis in women past the age of menopause.  How should I use this medicine?  This medicine is for injection into a vein. It is given by a health care professional in a hospital or clinic setting.  Talk to your pediatrician regarding the use of this medicine in children. Special care may be needed.  What side effects may I notice from receiving this medicine?  Side effects that you should report to your doctor or health care professional as soon as possible:  · allergic reactions such as skin rash or itching, hives, swelling of the face, lips, throat, or tongue  · changes in vision  · chest pain  · fever, flu-like symptoms  · heartburn or stomach pain  · jaw pain, especially after dental work  Side effects that usually do not require medical attention (report to your doctor or health care professional if they continue or are bothersome):  · bone, muscle or joint pain  · diarrhea or constipation  · eye pain or itching  · headache  · irritation at site where injected  · nausea  What may interact with this medicine?  · teriparatide  What if I miss a dose?  It is important not to miss your dose. Call your doctor or health care professional if you are unable to keep an appointment.  Where should I keep my medicine?  This drug is given in a hospital or clinic and will not be stored at home.  What should I tell my health care provider before I take this medicine?  They need to know if you have any of these conditions:  · dental disease  · kidney disease  · low levels of calcium in the blood  · low levels of vitamin D in the blood  · an unusual or allergic reaction to ibandronate, other medicines, foods, dyes, or preservatives  · pregnant or trying to get pregnant  · breast-feeding  What should I watch for while using this medicine?  Visit your doctor or health care professional for regular  check ups. It may be some time before you see the benefit from this medicine. Do not stop taking your medicine except on your doctor's advice. Your doctor or health care professional may order blood tests and other tests to see how you are doing.  You should make sure you get enough calcium and vitamin D while you are taking this medicine, unless your doctor tells you not to. Discuss the foods you eat and the vitamins you take with your health care professional.  Some people who take this medicine have severe bone, joint, and/or muscle pain. This medicine may also increase your risk for a broken thigh bone. Tell your doctor right away if you have pain in your upper leg or groin. Tell your doctor if you have any pain that does not go away or that gets worse.  NOTE:This sheet is a summary. It may not cover all possible information. If you have questions about this medicine, talk to your doctor, pharmacist, or health care provider. Copyright© 2017 [employer]

## 2018-02-19 NOTE — TELEPHONE ENCOUNTER
Patient requesting refill of lariglutide per my [facility] message.     Last office visit 2/6/17 (10/31/17 w/ 3 refills)    Last labs 12/19/17 (Dr. Seymour)

## 2018-02-20 ENCOUNTER — PATIENT MESSAGE (OUTPATIENT)
Dept: FAMILY MEDICINE | Facility: CLINIC | Age: 68
End: 2018-02-20

## 2018-02-21 RX ORDER — LIRAGLUTIDE 6 MG/ML
INJECTION SUBCUTANEOUS
Qty: 6 ML | Refills: 0 | Status: SHIPPED | OUTPATIENT
Start: 2018-02-21 | End: 2018-03-23 | Stop reason: SDUPTHER

## 2018-02-21 NOTE — TELEPHONE ENCOUNTER
----- Message from Yenny Painter sent at 2/21/2018  3:23 PM CST -----  Contact: self [phone]  1. What is the name of the medication you are requesting? victoca  2. What is the dose? 12.5mg  3. How do you take the medication? Orally, topically, etc? orally  4. How often do you take this medication? daily  5. Do you need a 30 day or 90 day supply? 90 day  6. How many refills are you requesting? 3  7. What is your preferred pharmacy and location of the pharmacy?     [facility] [address] [place] [address] [address] AT SEC of [facility] [place]  [address]  [facility] [address]  Phone: [phone] Fax: [phone]    8. Who can we contact with further questions? Pt [phone]

## 2018-03-06 NOTE — TELEPHONE ENCOUNTER
Patient requesting refill of diabetic pen needles.    Last office visit 02/06/18    Last rx dated 10/27/17 100 needles, 2 refills      patient would like rx sent to [facility].

## 2018-03-07 ENCOUNTER — OFFICE VISIT (OUTPATIENT)
Dept: PSYCHIATRY | Facility: CLINIC | Age: 68
End: 2018-03-07
Payer: MEDICARE

## 2018-03-07 DIAGNOSIS — F41.9 CHRONIC ANXIETY: ICD-10-CM

## 2018-03-07 DIAGNOSIS — F43.21 UNRESOLVED GRIEF: ICD-10-CM

## 2018-03-07 DIAGNOSIS — F33.0 MAJOR DEPRESSIVE DISORDER, RECURRENT EPISODE, MILD: Primary | ICD-10-CM

## 2018-03-07 PROCEDURE — 90791 PSYCH DIAGNOSTIC EVALUATION: CPT | Mod: S$GLB,,, | Performed by: SOCIAL WORKER

## 2018-03-07 PROCEDURE — 99499 UNLISTED E&M SERVICE: CPT | Mod: S$GLB,,, | Performed by: SOCIAL WORKER

## 2018-03-13 ENCOUNTER — LAB VISIT (OUTPATIENT)
Dept: LAB | Facility: HOSPITAL | Age: 68
End: 2018-03-13
Attending: INTERNAL MEDICINE
Payer: MEDICARE

## 2018-03-13 ENCOUNTER — OFFICE VISIT (OUTPATIENT)
Dept: HEMATOLOGY/ONCOLOGY | Facility: CLINIC | Age: 68
End: 2018-03-13
Payer: MEDICARE

## 2018-03-13 VITALS
HEIGHT: 61 IN | DIASTOLIC BLOOD PRESSURE: 84 MMHG | TEMPERATURE: 98 F | SYSTOLIC BLOOD PRESSURE: 160 MMHG | WEIGHT: 236.31 LBS | OXYGEN SATURATION: 97 % | BODY MASS INDEX: 44.62 KG/M2 | HEART RATE: 78 BPM

## 2018-03-13 DIAGNOSIS — D50.0 IRON DEFICIENCY ANEMIA DUE TO CHRONIC BLOOD LOSS: Primary | ICD-10-CM

## 2018-03-13 DIAGNOSIS — E66.01 MORBID OBESITY WITH BMI OF 40.0-44.9, ADULT: ICD-10-CM

## 2018-03-13 DIAGNOSIS — D50.0 IRON DEFICIENCY ANEMIA DUE TO CHRONIC BLOOD LOSS: ICD-10-CM

## 2018-03-13 LAB
BASOPHILS # BLD AUTO: 0.04 K/UL
BASOPHILS NFR BLD: 0.6 %
DIFFERENTIAL METHOD: ABNORMAL
EOSINOPHIL # BLD AUTO: 0.1 K/UL
EOSINOPHIL NFR BLD: 2 %
ERYTHROCYTE [DISTWIDTH] IN BLOOD BY AUTOMATED COUNT: 13.3 %
FERRITIN SERPL-MCNC: 197 NG/ML
HCT VFR BLD AUTO: 38 %
HGB BLD-MCNC: 12.7 G/DL
IRON SERPL-MCNC: 88 UG/DL
LYMPHOCYTES # BLD AUTO: 1.5 K/UL
LYMPHOCYTES NFR BLD: 21.6 %
MCH RBC QN AUTO: 31.7 PG
MCHC RBC AUTO-ENTMCNC: 33.4 G/DL
MCV RBC AUTO: 95 FL
MONOCYTES # BLD AUTO: 0.5 K/UL
MONOCYTES NFR BLD: 7.1 %
NEUTROPHILS # BLD AUTO: 4.8 K/UL
NEUTROPHILS NFR BLD: 68.7 %
PLATELET # BLD AUTO: 201 K/UL
PMV BLD AUTO: 11 FL
RBC # BLD AUTO: 4.01 M/UL
SATURATED IRON: 30 %
TOTAL IRON BINDING CAPACITY: 293 UG/DL
TRANSFERRIN SERPL-MCNC: 198 MG/DL
WBC # BLD AUTO: 6.94 K/UL

## 2018-03-13 PROCEDURE — 36415 COLL VENOUS BLD VENIPUNCTURE: CPT

## 2018-03-13 PROCEDURE — 85025 COMPLETE CBC W/AUTO DIFF WBC: CPT

## 2018-03-13 PROCEDURE — 82728 ASSAY OF FERRITIN: CPT

## 2018-03-13 PROCEDURE — 99213 OFFICE O/P EST LOW 20 MIN: CPT | Mod: S$GLB,,, | Performed by: INTERNAL MEDICINE

## 2018-03-13 PROCEDURE — 3079F DIAST BP 80-89 MM HG: CPT | Mod: CPTII,S$GLB,, | Performed by: INTERNAL MEDICINE

## 2018-03-13 PROCEDURE — 99999 PR PBB SHADOW E&M-EST. PATIENT-LVL III: CPT | Mod: PBBFAC,,, | Performed by: INTERNAL MEDICINE

## 2018-03-13 PROCEDURE — 83540 ASSAY OF IRON: CPT

## 2018-03-13 PROCEDURE — 3077F SYST BP >= 140 MM HG: CPT | Mod: CPTII,S$GLB,, | Performed by: INTERNAL MEDICINE

## 2018-03-13 NOTE — PROGRESS NOTES
Subjective:       Patient ID: Christine Jo is a 67 y.o. female.    Chief Complaint: Anemia and Results    HPI 67-year-old history of recurrent iron deficiency anemia recently treated with intravenous iron feels much better results of evaluation from [facility]    Past Medical History:   Diagnosis Date    Arthritis     Cataract     Diabetes mellitus 2008     am 01/15/2018 Insulin x 1 year    Glaucoma     Hypertension     Insomnia     Macular degeneration     Vaginal yeast infection      Family History   Problem Relation Age of Onset    Heart disease Mother      CAD     Cataracts Mother     Macular degeneration Mother     Glaucoma Mother     Cancer Son      testicular     Cancer Maternal Aunt      Lung ca    Heart disease Maternal Grandfather      Pacemaker     Diabetes Sister     Heart disease Sister      CAD    Cataracts Sister     Diabetes Sister     Diabetes Sister      Social History     Social History    Marital status:      Spouse name: N/A    Number of children: N/A    Years of education: N/A     Occupational History    Not on file.     Social History Main Topics    Smoking status: Former Smoker     Packs/day: 1.00     Years: 35.00     Types: Cigarettes     Quit date: 6/22/2003    Smokeless tobacco: Never Used    Alcohol use No    Drug use: No    Sexual activity: No     Other Topics Concern    Not on file     Social History Narrative    No narrative on file     Past Surgical History:   Procedure Laterality Date    abdominal laparoscopy       BREAST BIOPSY      CATARACT EXTRACTION Bilateral [id number]    [facility] in [place]    EYE SURGERY      TONSILLECTOMY      TUBAL LIGATION         Labs:  Lab Results   Component Value Date    WBC 6.94 03/13/2018    HGB 12.7 03/13/2018    HCT 38.0 03/13/2018    MCV 95 03/13/2018     03/13/2018     BMP  Lab Results   Component Value Date     02/12/2018    K 3.9 02/12/2018     02/12/2018    CO2  27 02/12/2018    BUN 12 02/12/2018    CREATININE 0.8 02/12/2018    CALCIUM 9.7 02/12/2018    ANIONGAP 9 02/12/2018    ESTGFRAFRICA >60 02/12/2018    EGFRNONAA >60 02/12/2018     Lab Results   Component Value Date    ALT 20 12/05/2017    AST 19 12/05/2017    ALKPHOS 60 12/05/2017    BILITOT 0.8 12/05/2017       Lab Results   Component Value Date    IRON 61 12/19/2017    TIBC 342 12/19/2017    FERRITIN 50 12/19/2017     Lab Results   Component Value Date    ICMPUMEM34 421 12/19/2017     Lab Results   Component Value Date    FOLATE 35.9 (H) 10/08/2015     Lab Results   Component Value Date    TSH 0.948 12/19/2017         Review of Systems   Constitutional: Negative for activity change, appetite change, chills, diaphoresis, fatigue, fever and unexpected weight change.   HENT: Negative for congestion, dental problem, drooling, ear discharge, ear pain, facial swelling, hearing loss, mouth sores, nosebleeds, postnasal drip, rhinorrhea, sinus pressure, sneezing, sore throat, tinnitus, trouble swallowing and voice change.    Eyes: Negative for photophobia, pain, discharge, redness, itching and visual disturbance.   Respiratory: Negative for cough, choking, chest tightness, shortness of breath, wheezing and stridor.    Cardiovascular: Negative for chest pain, palpitations and leg swelling.   Gastrointestinal: Negative for abdominal distention, abdominal pain, anal bleeding, blood in stool, constipation, diarrhea, nausea, rectal pain and vomiting.   Endocrine: Negative for cold intolerance, heat intolerance, polydipsia, polyphagia and polyuria.   Genitourinary: Negative for decreased urine volume, difficulty urinating, dyspareunia, dysuria, enuresis, flank pain, frequency, genital sores, hematuria, menstrual problem, pelvic pain, urgency, vaginal bleeding, vaginal discharge and vaginal pain.   Musculoskeletal: Negative for arthralgias, back pain, gait problem, joint swelling, myalgias, neck pain and neck stiffness.   Skin:  Negative for color change, pallor and rash.   Allergic/Immunologic: Negative for environmental allergies, food allergies and immunocompromised state.   Neurological: Negative for dizziness, tremors, seizures, syncope, facial asymmetry, speech difficulty, weakness, light-headedness, numbness and headaches.   Hematological: Negative for adenopathy. Does not bruise/bleed easily.   Psychiatric/Behavioral: Negative for agitation, behavioral problems, confusion, decreased concentration, dysphoric mood, hallucinations, self-injury, sleep disturbance and suicidal ideas. The patient is not nervous/anxious and is not hyperactive.        Objective:      Physical Exam   Constitutional: She is oriented to person, place, and time. She appears well-developed and well-nourished. No distress.   HENT:   Head: Normocephalic and atraumatic.   Right Ear: External ear normal.   Left Ear: External ear normal.   Nose: Nose normal. Right sinus exhibits no maxillary sinus tenderness and no frontal sinus tenderness. Left sinus exhibits no maxillary sinus tenderness and no frontal sinus tenderness.   Mouth/Throat: Oropharynx is clear and moist. No oropharyngeal exudate.   Eyes: Conjunctivae, EOM and lids are normal. Pupils are equal, round, and reactive to light. Right eye exhibits no discharge. Left eye exhibits no discharge. Right conjunctiva is not injected. Right conjunctiva has no hemorrhage. Left conjunctiva is not injected. Left conjunctiva has no hemorrhage. No scleral icterus.   Neck: Normal range of motion. Neck supple. No JVD present. No tracheal deviation present. No thyromegaly present.   Cardiovascular: Normal rate and regular rhythm.    Pulmonary/Chest: Effort normal. No stridor. No respiratory distress. She exhibits no tenderness.   Abdominal: Soft. She exhibits no distension and no mass. There is no splenomegaly or hepatomegaly. There is no tenderness. There is no rebound.   Musculoskeletal: Normal range of motion. She  exhibits no edema or tenderness.   Lymphadenopathy:     She has no cervical adenopathy.     She has no axillary adenopathy.        Right: No supraclavicular adenopathy present.        Left: No supraclavicular adenopathy present.   Neurological: She is alert and oriented to person, place, and time. No cranial nerve deficit. Coordination normal.   Skin: Skin is dry. No rash noted. She is not diaphoretic. No erythema.   Psychiatric: She has a normal mood and affect. Her behavior is normal. Judgment and thought content normal.   Vitals reviewed.          Assessment:      1. Iron deficiency anemia due to chronic blood loss    2. Morbid obesity with BMI of 40.0-44.9, adult           Plan:   Marked improvement iron status pending return in 3-4 months try to obtain records from [facility] for review

## 2018-03-14 RX ORDER — MUPIROCIN 20 MG/G
OINTMENT TOPICAL 2 TIMES DAILY
Qty: 22 G | Refills: 0 | Status: SHIPPED | OUTPATIENT
Start: 2018-03-14 | End: 2018-05-21 | Stop reason: SDUPTHER

## 2018-03-15 ENCOUNTER — PATIENT MESSAGE (OUTPATIENT)
Dept: FAMILY MEDICINE | Facility: CLINIC | Age: 68
End: 2018-03-15

## 2018-03-22 ENCOUNTER — PATIENT MESSAGE (OUTPATIENT)
Dept: OPHTHALMOLOGY | Facility: CLINIC | Age: 68
End: 2018-03-22

## 2018-03-22 ENCOUNTER — PATIENT MESSAGE (OUTPATIENT)
Dept: OBSTETRICS AND GYNECOLOGY | Facility: CLINIC | Age: 68
End: 2018-03-22

## 2018-03-22 NOTE — PROGRESS NOTES
Psychiatry Initial Visit (PhD/LCSW)  Diagnostic Interview - CPT 34482    Date: 3/7/2018    Site: [place]    Referral source:  Primary care physician Karen Conde MD    Clinical status of patient: Outpatient    Christine Jo, a 67 y.o. female, for initial evaluation visit.  Met with patient.    Chief complaint/reason for encounter: depression, anxiety, somatic and grief and loss issues    History of present illness:   67 year old female patient presented for initial evaluation, referred by Dr. Bailey.  Patient with chief complaint of 5 years history of major depression cycles, insomnia, occasional panic episodes, and some unresolved grief.  Patient said she experienced her first panic attacks in 2000, when her then-nineteen year old son was diagnosed with testicular cancer.  She was  in 2001, her second marriage.  She has experienced progression of health problems to where she moved out of her own house last fall and in with her son, due to fear of being alone with her diabetes complications.  She said she feels stressed, however, by the sense that she is intruding on her child's life.  She reported further, she has lost two very close friends to death, one in 2015 and the other last fall, 2017.  Patient reporting symptoms include pervasive sadness, loss of energy, loss of motivation at times, poor focus the past two years, poor sleep the past 10 years, frequent tearfulness the past 3 months, and reported she was inpatient at [facility] in 2015 secondary to a suicidal ideation episode.  Denied any current suicidal ideation, but endorsed some hopelessness at times.  Patient noted significant health issues that are a major stressor for her.  The include insulin-dependent diabetes, diabetic pain in both feet, anemia, osteo-perosis, and obstructive sleep apnea the past 3 years.   Also reporting stress of her daughter's mental disability and of feeling she is a burden to her son by  residing with him, as she feels unsafe to life by herself at present.  Patient deneid any substance abuse, saying she drinks no alcohol, uses no recreational or illicit drugs, and does not smoke.  Patient denied si/hi, psychosis, cognitive deficit, anger management problems, or mood swings.  Identified therapeutic goals include processing her grief and depression and improving coping skills for life transition challenges.      Pain: constant, moderate to moderate-severe foot pain; see above.  Not quantified numerically.     Symptoms:   · Mood: depressed mood, diminished interest, insomnia, fatigue, worthlessness/guilt, poor concentration, tearfulness and social isolation  · Anxiety: excessive anxiety/worry  · Substance abuse: denied  · Cognitive functioning: denied  · Health behaviors: noncontributory    Psychiatric history:  Prior psychiatric hospitalization; psychotropic medication management by primary care physician    Medical history: significant, related primarily to diabetes but also others; see history above.     Family history of psychiatric illness:   2 maternal aunts and a half-sister with depression; middle child with schizophrenia    Social history (marriage, employment, etc.):  Patient born in [place], raised in [place], [place] for 5 early years, then back to [place].  Oldest of 5 siblings; 3 half-sisters and 1 half-brother.  Raised by mother and step-father; education--some college, unfinished.  Mother of 3;  twice; ,  from second  when he .  Children are now 43, 40, and 36, the youngest a son.  She has 5 grandchildren, ranging from age 22 to 4.  Each of her children has at least one child, the oldest having 3.      Substance use:   Alcohol: none   Drugs: none   Tobacco: none   Caffeine: modest amount, reporting one to one and a half cups coffee most days.    Current medications and drug reactions (include OTC, herbal): see medication list      Strengths and  liabilities: Strength: Patient accepts guidance/feedback, Strength: Patient is motivated for change., Strength: Patient has positive support network., Liability: Patient is dependent., Liability: Patient has poor health.    Current Evaluation:     Mental Status Exam:  General Appearance:  unremarkable, age appropriate, casually dressed   Speech: normal tone, normal rate, normal pitch, normal volume      Level of Cooperation: cooperative      Thought Processes: goal-directed   Mood: anxious, depressed      Thought Content: normal, no suicidality, no homicidality, delusions, or paranoia   Affect: congruent and appropriate   Orientation: Oriented x3   Memory: recent and remote memory intact   Attention Span & Concentration: intact   Fund of General Knowledge: intact and appropriate to age and level of education   Abstract Reasoning: not formally assessed   Judgment & Insight: fair     Language  intact     Diagnostic Impression - Plan:       ICD-10-CM ICD-9-CM   1. Major depressive disorder, recurrent episode, mild F33.0 296.31   2. Unresolved grief F43.21 309.1   3. Chronic anxiety F41.9 300.00       Plan:individual psychotherapy and medication management by physician    Return to Clinic: as scheduled, 4/9/18    Length of Service (minutes): 45

## 2018-03-23 DIAGNOSIS — B37.31 CANDIDAL VAGINITIS: ICD-10-CM

## 2018-03-23 RX ORDER — BEPOTASTINE BESILATE 15 MG/ML
1 SOLUTION/ DROPS OPHTHALMIC 2 TIMES DAILY
Qty: 10 ML | Refills: 12 | Status: SHIPPED | OUTPATIENT
Start: 2018-03-23 | End: 2019-06-10 | Stop reason: SDUPTHER

## 2018-03-23 RX ORDER — NYSTATIN AND TRIAMCINOLONE ACETONIDE 100000; 1 [USP'U]/G; MG/G
CREAM TOPICAL
Qty: 30 G | Refills: 0 | Status: SHIPPED | OUTPATIENT
Start: 2018-03-23 | End: 2018-06-25

## 2018-03-23 NOTE — TELEPHONE ENCOUNTER
Pt requesting refill of levemir    Last office visit 2/6/18    Last a1c 12/5/17    Last rx dated 1/19/18 with 0 refills.

## 2018-03-29 ENCOUNTER — PATIENT MESSAGE (OUTPATIENT)
Dept: HEMATOLOGY/ONCOLOGY | Facility: CLINIC | Age: 68
End: 2018-03-29

## 2018-03-29 RX ORDER — TRIAMCINOLONE ACETONIDE 1 MG/G
CREAM TOPICAL 2 TIMES DAILY
Qty: 15 G | Refills: 0 | Status: SHIPPED | OUTPATIENT
Start: 2018-03-29 | End: 2018-06-25 | Stop reason: SDUPTHER

## 2018-04-09 ENCOUNTER — TELEPHONE (OUTPATIENT)
Dept: HEMATOLOGY/ONCOLOGY | Facility: CLINIC | Age: 68
End: 2018-04-09

## 2018-04-09 ENCOUNTER — PATIENT MESSAGE (OUTPATIENT)
Dept: HEMATOLOGY/ONCOLOGY | Facility: CLINIC | Age: 68
End: 2018-04-09

## 2018-04-09 ENCOUNTER — OFFICE VISIT (OUTPATIENT)
Dept: PSYCHIATRY | Facility: CLINIC | Age: 68
End: 2018-04-09
Payer: COMMERCIAL

## 2018-04-09 DIAGNOSIS — F33.0 MAJOR DEPRESSIVE DISORDER, RECURRENT EPISODE, MILD: Primary | ICD-10-CM

## 2018-04-09 DIAGNOSIS — G47.00 INSOMNIA, UNSPECIFIED TYPE: ICD-10-CM

## 2018-04-09 PROCEDURE — 90834 PSYTX W PT 45 MINUTES: CPT | Mod: S$GLB,,, | Performed by: SOCIAL WORKER

## 2018-04-09 PROCEDURE — 99499 UNLISTED E&M SERVICE: CPT | Mod: S$GLB,,, | Performed by: SOCIAL WORKER

## 2018-04-09 RX ORDER — TEMAZEPAM 30 MG/1
30 CAPSULE ORAL NIGHTLY PRN
Qty: 30 CAPSULE | Refills: 2 | Status: SHIPPED | OUTPATIENT
Start: 2018-04-09 | End: 2018-04-12 | Stop reason: SDUPTHER

## 2018-04-09 NOTE — PROGRESS NOTES
"Individual Psychotherapy (PhD/LCSW)    4/9/2018    Site:  [facility]         Therapeutic Intervention: Met with patient.  Outpatient - Insight oriented psychotherapy 45 min - CPT code 85677 and Outpatient - Supportive psychotherapy 45 min - CPT Code 07998    Chief complaint/reason for encounter: depression, sleep and low self-esteem     Interval history and content of current session:   68 year old female patient returned for follow psychotherapy to address depression, insomnia, and poor self-esteem/poor confidence.  Patient presented alert and oriented, very polite, sweet disposition but frequently apologizing for speaking.  She reported physically feeling fairly well recently and currently.  Said sleep, however, remains elusive, and that last night she only slept 1 hour, and that this short length has been typical for her recently.  Sometimes able to nap briefly during the day, but not always.  Described her experience of insomnia not as ruminating anxious thoughts; rather, as more hormonal patterns that just don't let her drift off to sleep.  Reported her 84 year old mother was recently in town, and that the patient took her turn looking after her mother, who is no longer fully independent; said mother takes turns staying with various of the patient's siblings.  She talked about what has been on her mind and bothering her; apologized again for it and questioned whether it was important enough to talk about, as she she said her daughter did not want to hear it.   Patient was the eldest of 5 siblings, the other 4 all 7 years and more younger than herself and all of them the biological children of her step-father.  Patient described a childhood that felt like she was regarded as "hired help," was not nurtured and affirmed by her mother and step-father, as there were always younger siblings getting the positive attention.  She recalled many critical, negative comments by her step-father about her struggles with " being overweight.  She described, as an adult, finding some solace in motherhood to her three children, but now as they are independent, with families of their own, feeling again somewhat unneeded and unloved.  Described her children thus:  One daughter--superficially pleasant and happy; refuses to entertain any discussions about difficulty feelings or sadness that patient may be feeling.  Other daughter--expresses some supportiveness at times but struggles with her own instability of schizoaffective disorder.  Son--supportive, thoughtful, caring, but busy with his own small daughter he is raising.  Patient feels she is intruding on his life, and he lets her know sometimes boundaries to his time and availability.  Discussed with patient her experience of growing up without parental affirmation, her craving for approval and love, and sense of not being good enough, and her challenge now of empty nest syndrome of feeling unneeded.  Discussed adult self-nurturance; discussed ideas of hobbies and volunteering options, about which patient expressed some interest.  Said she is considering volunteering for a NICU unit.  Patient denied any si/hi, psychosis, or mood swings or substance abuse.  Supportive therapy provided.  Plan is to follow up in clinic as scheduled.      Treatment plan:  · Target symptoms: depression, anxiety , adjustment, low self-esteem.   · Why chosen therapy is appropriate versus another modality: relevant to diagnosis  · Outcome monitoring methods: self-report, observation  · Therapeutic intervention type: insight oriented psychotherapy, supportive psychotherapy    Risk parameters:  Patient reports no suicidal ideation  Patient reports no homicidal ideation  Patient reports no self-injurious behavior  Patient reports no violent behavior    Verbal deficits: None    Patient's response to intervention:  The patient's response to intervention is accepting.    Progress toward goals and other mental status  changes:  The patient's progress toward goals is fair .    Diagnosis:     ICD-10-CM ICD-9-CM   1. Major depressive disorder, recurrent episode, mild F33.0 296.31   2.      Insomnia             G47.00      Plan:  individual psychotherapy and medication management by physician    Return to clinic: as scheduled    Length of Service (minutes): 45

## 2018-04-09 NOTE — TELEPHONE ENCOUNTER
----- Message from Marianna Singh sent at 4/9/2018  8:47 AM CDT -----  Contact: pt  Calling about appointment schedule [phone] (home) [phone] (work)

## 2018-04-12 RX ORDER — TEMAZEPAM 30 MG/1
CAPSULE ORAL
Qty: 30 CAPSULE | Refills: 0 | Status: SHIPPED | OUTPATIENT
Start: 2018-04-12 | End: 2018-05-28

## 2018-04-17 ENCOUNTER — PATIENT MESSAGE (OUTPATIENT)
Dept: PSYCHIATRY | Facility: CLINIC | Age: 68
End: 2018-04-17

## 2018-04-23 ENCOUNTER — PATIENT OUTREACH (OUTPATIENT)
Dept: ADMINISTRATIVE | Facility: HOSPITAL | Age: 68
End: 2018-04-23

## 2018-04-23 RX ORDER — LIRAGLUTIDE 6 MG/ML
INJECTION SUBCUTANEOUS
Qty: 6 ML | Refills: 0 | Status: SHIPPED | OUTPATIENT
Start: 2018-04-23 | End: 2018-05-18 | Stop reason: SDUPTHER

## 2018-05-09 RX ORDER — SITAGLIPTIN 100 MG/1
TABLET, FILM COATED ORAL
Qty: 90 TABLET | Refills: 0 | Status: SHIPPED | OUTPATIENT
Start: 2018-05-09 | End: 2018-08-11 | Stop reason: SDUPTHER

## 2018-05-18 RX ORDER — LIRAGLUTIDE 6 MG/ML
INJECTION SUBCUTANEOUS
Qty: 6 ML | Refills: 0 | Status: SHIPPED | OUTPATIENT
Start: 2018-05-18 | End: 2018-05-21 | Stop reason: SDUPTHER

## 2018-05-21 ENCOUNTER — LAB VISIT (OUTPATIENT)
Dept: LAB | Facility: HOSPITAL | Age: 68
End: 2018-05-21
Attending: PHYSICIAN ASSISTANT
Payer: MEDICARE

## 2018-05-21 DIAGNOSIS — M81.0 AGE-RELATED OSTEOPOROSIS WITHOUT CURRENT PATHOLOGICAL FRACTURE: ICD-10-CM

## 2018-05-21 LAB
ANION GAP SERPL CALC-SCNC: 10 MMOL/L
BUN SERPL-MCNC: 17 MG/DL
CALCIUM SERPL-MCNC: 9.9 MG/DL
CHLORIDE SERPL-SCNC: 103 MMOL/L
CO2 SERPL-SCNC: 26 MMOL/L
CREAT SERPL-MCNC: 0.8 MG/DL
EST. GFR  (AFRICAN AMERICAN): >60 ML/MIN/1.73 M^2
EST. GFR  (NON AFRICAN AMERICAN): >60 ML/MIN/1.73 M^2
GLUCOSE SERPL-MCNC: 212 MG/DL
POTASSIUM SERPL-SCNC: 4.3 MMOL/L
SODIUM SERPL-SCNC: 139 MMOL/L

## 2018-05-21 PROCEDURE — 80048 BASIC METABOLIC PNL TOTAL CA: CPT | Mod: PO

## 2018-05-21 PROCEDURE — 36415 COLL VENOUS BLD VENIPUNCTURE: CPT | Mod: PO

## 2018-05-21 RX ORDER — MUPIROCIN 20 MG/G
OINTMENT TOPICAL 2 TIMES DAILY
Qty: 22 G | Refills: 0 | Status: SHIPPED | OUTPATIENT
Start: 2018-05-21 | End: 2018-12-04 | Stop reason: SDUPTHER

## 2018-05-21 RX ORDER — MUPIROCIN 20 MG/G
OINTMENT TOPICAL
Qty: 22 G | Refills: 0 | Status: SHIPPED | OUTPATIENT
Start: 2018-05-21 | End: 2018-05-21 | Stop reason: SDUPTHER

## 2018-05-21 NOTE — PROGRESS NOTES
"Subjective:       Patient ID: Christine Jo is a 68 y.o. female.    Chief Complaint: Osteoporosis    Christine is a very pleasant 67 y/o female who established care with us in oct 2017 for osteoporosis. She was started on fosamax but had severe GI irritation due to the drug, she has severe gerd on prilosec. We started boniva infusions which she has tolerated her first infusion well, started in 2/2018.   Has never had any fractures.   She takes a multivitamin with vit D in it daily. She was told in the past not to take any hormones, she has a history of breast cancer in her family, but none personally.She has chronic neck pain and occasional joint pains from OA.  Avoids nsaids due to Gi upset.  She reports two falls since last seen, she tripped on both occasions.  No broken bones.  Her last fall was a few weeks ago and she has some residual neck pain on the right side papo at night.           Review of Systems   Constitutional: Negative for chills, fatigue and fever.   HENT: Negative for mouth sores, rhinorrhea and sore throat.    Eyes: Negative for pain and redness.   Respiratory: Negative for cough and shortness of breath.    Cardiovascular: Negative for chest pain.   Gastrointestinal: Negative for abdominal pain, constipation, diarrhea, nausea and vomiting.   Genitourinary: Negative for dysuria and hematuria.   Musculoskeletal: Positive for arthralgias, back pain and neck pain. Negative for joint swelling and myalgias.   Skin: Negative for rash.   Neurological: Negative for weakness, numbness and headaches.   Psychiatric/Behavioral: The patient is not nervous/anxious.          Objective:   BP (!) 146/65   Pulse 78   Ht 5' 1" (1.549 m)   Wt 106.8 kg (235 lb 7.2 oz)   BMI 44.49 kg/m²      Physical Exam   Constitutional: She is oriented to person, place, and time and well-developed, well-nourished, and in no distress.   HENT:   Head: Normocephalic and atraumatic.   Eyes: Pupils are equal, round, and reactive to " light. Right eye exhibits no discharge.   Neck: Normal range of motion.   Cardiovascular: Normal rate, regular rhythm and normal heart sounds.  Exam reveals no friction rub.    Pulmonary/Chest: Effort normal and breath sounds normal. No respiratory distress.   Abdominal: Soft. She exhibits no distension. There is no tenderness.   Lymphadenopathy:     She has no cervical adenopathy.   Neurological: She is alert and oriented to person, place, and time.   Skin: No rash noted. No erythema.     Psychiatric: Mood normal.   Musculoskeletal: Normal range of motion. She exhibits no edema or deformity.   holley wrists, mcps, pips no synvoitis, no tenderness, no warmth, good rom  holley elbows shoulders no swelling or tenderness,full rom  holley knees no effusion, no warmth, no tenderness, full rom    Right cervical paraspinal tenderness    Strength 5/5 upper and lower ext  Gait steady           Recent Results (from the past 504 hour(s))   Basic metabolic panel    Collection Time: 05/21/18  9:38 AM   Result Value Ref Range    Sodium 139 136 - 145 mmol/L    Potassium 4.3 3.5 - 5.1 mmol/L    Chloride 103 95 - 110 mmol/L    CO2 26 23 - 29 mmol/L    Glucose 212 (H) 70 - 110 mg/dL    BUN, Bld 17 8 - 23 mg/dL    Creatinine 0.8 0.5 - 1.4 mg/dL    Calcium 9.9 8.7 - 10.5 mg/dL    Anion Gap 10 8 - 16 mmol/L    eGFR if African American >60 >60 mL/min/1.73 m^2    eGFR if non African American >60 >60 mL/min/1.73 m^2       Dexa 8.16.17: NM -1.4, TM -1.0, spine -2.5  Assessment:       1. Age-related osteoporosis without current pathological fracture    2. Primary osteoarthritis involving multiple joints    3. Medication monitoring encounter        Impression:  Osteoporosis: Dexa 8/2017 with spine -2.5, cannot tolerate oral bisphosphonate due to severe gerd (fosamax), no falls/fxs, taking vit d daily in [facility]- started iv boniva q 3 mos x 1    GERD: on prilosec, unable to tolerate oral bisphosphonate or nsaid    Generalized OA: papo c spine, avoids nsaids  due to GI upset, prev celebrex worked well    Muscle spasm: right cervical paraspianl    Medication monitoring: gfr normal, ok for boniva    Plan:       Ok for boniva infusion today and cont q 6 mos  Continue vit D daily in MV  Infusion q 3 months and provider visit q 6 months with bmp q 3 months  Repeat dexa 8/2019  rec biofreeze for muscle spasm, moist heat, can send in muscle relaxer if needed, she wants to wait    Call for questions/concerns

## 2018-05-22 ENCOUNTER — OFFICE VISIT (OUTPATIENT)
Dept: RHEUMATOLOGY | Facility: CLINIC | Age: 68
End: 2018-05-22
Payer: MEDICARE

## 2018-05-22 ENCOUNTER — INFUSION (OUTPATIENT)
Dept: RHEUMATOLOGY | Facility: HOSPITAL | Age: 68
End: 2018-05-22
Attending: PHYSICIAN ASSISTANT
Payer: MEDICARE

## 2018-05-22 VITALS
HEART RATE: 78 BPM | HEIGHT: 61 IN | WEIGHT: 235.44 LBS | BODY MASS INDEX: 44.45 KG/M2 | DIASTOLIC BLOOD PRESSURE: 65 MMHG | SYSTOLIC BLOOD PRESSURE: 146 MMHG | WEIGHT: 235.44 LBS | BODY MASS INDEX: 44.49 KG/M2

## 2018-05-22 DIAGNOSIS — M81.0 AGE-RELATED OSTEOPOROSIS WITHOUT CURRENT PATHOLOGICAL FRACTURE: Primary | ICD-10-CM

## 2018-05-22 DIAGNOSIS — M62.838 MUSCLE SPASM: ICD-10-CM

## 2018-05-22 DIAGNOSIS — M15.9 PRIMARY OSTEOARTHRITIS INVOLVING MULTIPLE JOINTS: ICD-10-CM

## 2018-05-22 DIAGNOSIS — Z51.81 MEDICATION MONITORING ENCOUNTER: ICD-10-CM

## 2018-05-22 PROCEDURE — 63600175 PHARM REV CODE 636 W HCPCS: Mod: JG,PO | Performed by: PHYSICIAN ASSISTANT

## 2018-05-22 PROCEDURE — 25000003 PHARM REV CODE 250: Mod: PO | Performed by: PHYSICIAN ASSISTANT

## 2018-05-22 PROCEDURE — 3077F SYST BP >= 140 MM HG: CPT | Mod: CPTII,S$GLB,, | Performed by: PHYSICIAN ASSISTANT

## 2018-05-22 PROCEDURE — A4216 STERILE WATER/SALINE, 10 ML: HCPCS | Mod: PO | Performed by: PHYSICIAN ASSISTANT

## 2018-05-22 PROCEDURE — 99214 OFFICE O/P EST MOD 30 MIN: CPT | Mod: S$GLB,,, | Performed by: PHYSICIAN ASSISTANT

## 2018-05-22 PROCEDURE — 3078F DIAST BP <80 MM HG: CPT | Mod: CPTII,S$GLB,, | Performed by: PHYSICIAN ASSISTANT

## 2018-05-22 PROCEDURE — 99999 PR PBB SHADOW E&M-EST. PATIENT-LVL III: CPT | Mod: PBBFAC,,, | Performed by: PHYSICIAN ASSISTANT

## 2018-05-22 PROCEDURE — 96374 THER/PROPH/DIAG INJ IV PUSH: CPT | Mod: PO

## 2018-05-22 RX ORDER — IBANDRONATE SODIUM 3 MG/3 ML
3 SYRINGE (ML) INTRAVENOUS
Status: CANCELLED | OUTPATIENT
Start: 2018-05-22

## 2018-05-22 RX ORDER — SODIUM CHLORIDE 0.9 % (FLUSH) 0.9 %
10 SYRINGE (ML) INJECTION
Status: CANCELLED | OUTPATIENT
Start: 2018-05-22

## 2018-05-22 RX ORDER — IBANDRONATE SODIUM 3 MG/3 ML
3 SYRINGE (ML) INTRAVENOUS
Status: COMPLETED | OUTPATIENT
Start: 2018-05-22 | End: 2018-05-22

## 2018-05-22 RX ORDER — SODIUM CHLORIDE 0.9 % (FLUSH) 0.9 %
10 SYRINGE (ML) INJECTION
Status: DISCONTINUED | OUTPATIENT
Start: 2018-05-22 | End: 2018-05-22 | Stop reason: HOSPADM

## 2018-05-22 RX ORDER — INSULIN GLARGINE 100 [IU]/ML
INJECTION, SOLUTION SUBCUTANEOUS
Refills: 0 | COMMUNITY
Start: 2018-05-04 | End: 2018-05-28 | Stop reason: SDUPTHER

## 2018-05-22 RX ADMIN — SODIUM CHLORIDE, PRESERVATIVE FREE 10 ML: 5 INJECTION INTRAVENOUS at 09:05

## 2018-05-22 RX ADMIN — IBANDRONATE SODIUM 3 MG: 3 INJECTION INTRAVENOUS at 09:05

## 2018-05-22 NOTE — PATIENT INSTRUCTIONS
Continue boniva infusions   Will repeat bone density scan in 8/2019  Continue daily vitamin D    Recommend biofreeze or aspercream topical for your neck/muscle   Can do muscle relaxers if you want

## 2018-05-22 NOTE — PROGRESS NOTES
Infusion # 2 - Boniva 3 mg q 3 month    Any invasive dental procedures in past 3 months or upcoming 3 months: denies    Recent labs? 5/21/18  Last Rheumatology provider visit- Seen by DAVIS Chew on 5/22/18     Premeds? none     Boniva 3 mg administered IVP at a 3 minute rate per orders; see MAR and vitals for more  details. Tolerated well without adverse events, discharged and ambulatory out of clinic.

## 2018-05-28 ENCOUNTER — OFFICE VISIT (OUTPATIENT)
Dept: INTERNAL MEDICINE | Facility: CLINIC | Age: 68
End: 2018-05-28
Payer: MEDICARE

## 2018-05-28 VITALS
SYSTOLIC BLOOD PRESSURE: 111 MMHG | HEIGHT: 61 IN | BODY MASS INDEX: 44.8 KG/M2 | DIASTOLIC BLOOD PRESSURE: 52 MMHG | WEIGHT: 237.31 LBS | HEART RATE: 77 BPM | OXYGEN SATURATION: 97 % | TEMPERATURE: 96 F

## 2018-05-28 DIAGNOSIS — I48.0 PAROXYSMAL A-FIB: ICD-10-CM

## 2018-05-28 DIAGNOSIS — Z79.4 CONTROLLED TYPE 2 DIABETES MELLITUS WITH MICROALBUMINURIA, WITH LONG-TERM CURRENT USE OF INSULIN: Primary | ICD-10-CM

## 2018-05-28 DIAGNOSIS — E66.01 MORBID OBESITY WITH BMI OF 40.0-44.9, ADULT: ICD-10-CM

## 2018-05-28 DIAGNOSIS — G47.30 SLEEP APNEA, UNSPECIFIED TYPE: ICD-10-CM

## 2018-05-28 DIAGNOSIS — E11.42 DIABETIC POLYNEUROPATHY ASSOCIATED WITH TYPE 2 DIABETES MELLITUS: ICD-10-CM

## 2018-05-28 DIAGNOSIS — R80.9 CONTROLLED TYPE 2 DIABETES MELLITUS WITH MICROALBUMINURIA, WITH LONG-TERM CURRENT USE OF INSULIN: Primary | ICD-10-CM

## 2018-05-28 DIAGNOSIS — D50.0 IRON DEFICIENCY ANEMIA DUE TO CHRONIC BLOOD LOSS: ICD-10-CM

## 2018-05-28 DIAGNOSIS — G47.00 INSOMNIA, UNSPECIFIED TYPE: ICD-10-CM

## 2018-05-28 DIAGNOSIS — M15.9 PRIMARY OSTEOARTHRITIS INVOLVING MULTIPLE JOINTS: ICD-10-CM

## 2018-05-28 DIAGNOSIS — F33.42 RECURRENT MAJOR DEPRESSIVE DISORDER, IN FULL REMISSION: ICD-10-CM

## 2018-05-28 DIAGNOSIS — I10 ESSENTIAL HYPERTENSION: ICD-10-CM

## 2018-05-28 DIAGNOSIS — E11.29 CONTROLLED TYPE 2 DIABETES MELLITUS WITH MICROALBUMINURIA, WITH LONG-TERM CURRENT USE OF INSULIN: Primary | ICD-10-CM

## 2018-05-28 DIAGNOSIS — K21.9 GASTROESOPHAGEAL REFLUX DISEASE WITHOUT ESOPHAGITIS: ICD-10-CM

## 2018-05-28 DIAGNOSIS — R14.0 ABDOMINAL BLOATING: ICD-10-CM

## 2018-05-28 PROCEDURE — 3074F SYST BP LT 130 MM HG: CPT | Mod: CPTII,S$GLB,, | Performed by: FAMILY MEDICINE

## 2018-05-28 PROCEDURE — 3044F HG A1C LEVEL LT 7.0%: CPT | Mod: CPTII,S$GLB,, | Performed by: FAMILY MEDICINE

## 2018-05-28 PROCEDURE — 99215 OFFICE O/P EST HI 40 MIN: CPT | Mod: S$GLB,,, | Performed by: FAMILY MEDICINE

## 2018-05-28 PROCEDURE — 99499 UNLISTED E&M SERVICE: CPT | Mod: S$GLB,,, | Performed by: FAMILY MEDICINE

## 2018-05-28 PROCEDURE — 99999 PR PBB SHADOW E&M-EST. PATIENT-LVL III: CPT | Mod: PBBFAC,,, | Performed by: FAMILY MEDICINE

## 2018-05-28 PROCEDURE — 3078F DIAST BP <80 MM HG: CPT | Mod: CPTII,S$GLB,, | Performed by: FAMILY MEDICINE

## 2018-05-28 RX ORDER — INSULIN GLARGINE 100 [IU]/ML
INJECTION, SOLUTION SUBCUTANEOUS
Qty: 15 ML | Refills: 5 | Status: SHIPPED | OUTPATIENT
Start: 2018-05-28 | End: 2018-07-16 | Stop reason: SDUPTHER

## 2018-05-28 RX ORDER — TRAZODONE HYDROCHLORIDE 50 MG/1
50 TABLET ORAL NIGHTLY
Qty: 30 TABLET | Refills: 2 | Status: SHIPPED | OUTPATIENT
Start: 2018-05-28 | End: 2018-07-16

## 2018-05-28 NOTE — PROGRESS NOTES
Subjective:       Patient ID: Christine Jo is a 68 y.o. female.    Chief Complaint: Establish Care    Establish care.  Medical history includes diabetes with peripheral neuropathy causing numbness of her toes, paroxysmal atrial fibrillation, sleep apnea, major depression, osteoporosis, chronic blood loss with iron deficiency anemia, elevated BMI, insomnia, hypertension, osteoarthritis, esophageal reflux.  She is also followed by rheumatology, psychiatry, hematology.  She reports increased acid reflux and abdominal bloating.  Medications include Victoza.  She is receiving infusions for osteoporosis by rheumatology.  Cardiology is following paroxysmal atrial fibrillation.  Medications include blood thinners.  She reports depression is improved with medication.  Restoril.  Longer help with insomnia.  She has sleep apnea but unable to use CPAP.              Review of Systems   Constitutional: Negative for activity change, appetite change, fatigue and unexpected weight change.   HENT: Negative for congestion, dental problem, ear pain, hearing loss, sneezing, sore throat, tinnitus and trouble swallowing.    Eyes: Negative for pain, redness, itching and visual disturbance.   Respiratory: Positive for apnea and shortness of breath. Negative for cough, chest tightness and wheezing.         Sleep apnea occasional shortness of breath at rest   Cardiovascular: Negative for chest pain, palpitations and leg swelling.        Denies lightheadedness or syncope   Gastrointestinal: Positive for abdominal distention. Negative for abdominal pain, blood in stool, constipation, diarrhea, nausea and vomiting.        Several months of increased acid reflux and bloating   Genitourinary: Negative for difficulty urinating, dysuria, frequency, hematuria and urgency.   Musculoskeletal: Positive for arthralgias. Negative for back pain, joint swelling, neck pain and neck stiffness.   Skin: Negative for rash and wound.   Neurological: Negative  for dizziness, tremors, syncope, weakness, light-headedness, numbness and headaches.   Hematological: Negative for adenopathy. Does not bruise/bleed easily.   Psychiatric/Behavioral: Positive for dysphoric mood and sleep disturbance. Negative for agitation. The patient is not nervous/anxious.         Improved with medication       Objective:      Physical Exam   Constitutional: She is oriented to person, place, and time. She appears well-developed and well-nourished. No distress.   HENT:   Right Ear: External ear normal.   Left Ear: External ear normal.   Nose: Nose normal.   Mouth/Throat: Oropharynx is clear and moist.   Eyes: Conjunctivae and EOM are normal. Pupils are equal, round, and reactive to light. Right eye exhibits no discharge. Left eye exhibits no discharge.   Neck: Neck supple. No JVD present. No thyromegaly present.   Cardiovascular: Normal rate, regular rhythm and normal heart sounds.    No murmur heard.  Pulses:       Dorsalis pedis pulses are 2+ on the right side, and 2+ on the left side.   Regular sinus rhythm   Pulmonary/Chest: Effort normal and breath sounds normal. No respiratory distress. She has no wheezes.   Abdominal: Soft. Bowel sounds are normal. She exhibits no mass. There is no tenderness.   Musculoskeletal: Normal range of motion.        Left foot: There is no deformity.   Feet:   Right Foot:   Protective Sensation: 10 sites tested. 10 sites sensed.   Skin Integrity: Negative for ulcer, blister, skin breakdown, erythema, warmth, callus or dry skin.   Left Foot:   Protective Sensation: 10 sites tested. 10 sites sensed.   Skin Integrity: Negative for ulcer, blister, skin breakdown, erythema, warmth, callus or dry skin.   Lymphadenopathy:     She has no cervical adenopathy.   Neurological: She is alert and oriented to person, place, and time. No cranial nerve deficit. She exhibits normal muscle tone.   Skin: Skin is warm and dry. She is not diaphoretic.   Psychiatric: She has a normal  mood and affect. Her behavior is normal. Judgment and thought content normal.       Lab Visit on 05/21/2018   Component Date Value Ref Range Status    Sodium 05/21/2018 139  136 - 145 mmol/L Final    Potassium 05/21/2018 4.3  3.5 - 5.1 mmol/L Final    Chloride 05/21/2018 103  95 - 110 mmol/L Final    CO2 05/21/2018 26  23 - 29 mmol/L Final    Glucose 05/21/2018 212* 70 - 110 mg/dL Final    BUN, Bld 05/21/2018 17  8 - 23 mg/dL Final    Creatinine 05/21/2018 0.8  0.5 - 1.4 mg/dL Final    Calcium 05/21/2018 9.9  8.7 - 10.5 mg/dL Final    Anion Gap 05/21/2018 10  8 - 16 mmol/L Final    eGFR if African American 05/21/2018 >60  >60 mL/min/1.73 m^2 Final    eGFR if non African American 05/21/2018 >60  >60 mL/min/1.73 m^2 Final     Assessment:       1. Controlled type 2 diabetes mellitus with microalbuminuria, with long-term current use of insulin    2. Insomnia, unspecified type        Plan:   1. Controlled type 2 diabetes mellitus with microalbuminuria, with long-term current use of insulin  Medications reviewed.  Continue basagar 64 units in Januvia 100 mg a day.  Lab was ordered.  - Comprehensive metabolic panel; Future  - Lipid panel; Future  - Hemoglobin A1c; Future  - Microalbumin/creatinine urine ratio; Future    2. Insomnia, unspecified type  ..  Temazepam and try trazodone.  - traZODone (DESYREL) 50 MG tablet; Take 1 tablet (50 mg total) by mouth every evening.  Dispense: 30 tablet; Refill: 2    3. Recurrent major depressive disorder, in full remission  Follow by psychiatry and improved with medication    4. Essential hypertension  Blood pressure controlled 111/52 continue current medications    5. Paroxysmal A-fib  Follow-up by cardiology.  Medications reviewed.  Exercise with him today    6. Iron deficiency anemia due to chronic blood loss  Follow by hematology.  She reports all evaluation tested negative    7. Morbid obesity with BMI of 40.0-44.9, adult  Discussed diet exercise.    8.  Gastroesophageal reflux disease without esophagitis  We'll discontinue Victoza for one month    9. Primary osteoarthritis involving multiple joints  Follow by rheumatology    10. Diabetic polyneuropathy associated with type 2 diabetes mellitus  She is using gabapentin with some relief    11. Abdominal bloating  .  Victoza for one month.        Controlled type 2 diabetes mellitus with microalbuminuria, with long-term current use of insulin  -     Comprehensive metabolic panel; Future; Expected date: 05/28/2018  -     Lipid panel; Future; Expected date: 05/28/2018  -     Hemoglobin A1c; Future; Expected date: 05/28/2018  -     Microalbumin/creatinine urine ratio; Future; Expected date: 05/28/2018    Insomnia, unspecified type  -     traZODone (DESYREL) 50 MG tablet; Take 1 tablet (50 mg total) by mouth every evening.  Dispense: 30 tablet; Refill: 2    Other orders  -     BASAGLAR KWIKPEN U-100 INSULIN 100 unit/mL (3 mL) InPn pen; ADM 64 UNI SC QD  Dispense: 15 mL; Refill: 5

## 2018-05-29 ENCOUNTER — CLINICAL SUPPORT (OUTPATIENT)
Dept: INTERNAL MEDICINE | Facility: CLINIC | Age: 68
End: 2018-05-29
Payer: MEDICARE

## 2018-05-29 DIAGNOSIS — R80.9 CONTROLLED TYPE 2 DIABETES MELLITUS WITH MICROALBUMINURIA, WITH LONG-TERM CURRENT USE OF INSULIN: ICD-10-CM

## 2018-05-29 DIAGNOSIS — Z79.4 CONTROLLED TYPE 2 DIABETES MELLITUS WITH MICROALBUMINURIA, WITH LONG-TERM CURRENT USE OF INSULIN: ICD-10-CM

## 2018-05-29 DIAGNOSIS — E11.29 CONTROLLED TYPE 2 DIABETES MELLITUS WITH MICROALBUMINURIA, WITH LONG-TERM CURRENT USE OF INSULIN: ICD-10-CM

## 2018-05-29 LAB
ALBUMIN SERPL BCP-MCNC: 4.1 G/DL
ALP SERPL-CCNC: 57 U/L
ALT SERPL W/O P-5'-P-CCNC: 24 U/L
ANION GAP SERPL CALC-SCNC: 7 MMOL/L
AST SERPL-CCNC: 18 U/L
BILIRUB SERPL-MCNC: 0.9 MG/DL
BUN SERPL-MCNC: 17 MG/DL
CALCIUM SERPL-MCNC: 9.8 MG/DL
CHLORIDE SERPL-SCNC: 103 MMOL/L
CHOLEST SERPL-MCNC: 167 MG/DL
CHOLEST/HDLC SERPL: 3.3 {RATIO}
CO2 SERPL-SCNC: 29 MMOL/L
CREAT SERPL-MCNC: 0.8 MG/DL
CREAT UR-MCNC: 104 MG/DL
EST. GFR  (AFRICAN AMERICAN): >60 ML/MIN/1.73 M^2
EST. GFR  (NON AFRICAN AMERICAN): >60 ML/MIN/1.73 M^2
ESTIMATED AVG GLUCOSE: 146 MG/DL
GLUCOSE SERPL-MCNC: 151 MG/DL
HBA1C MFR BLD HPLC: 6.7 %
HDLC SERPL-MCNC: 50 MG/DL
HDLC SERPL: 29.9 %
LDLC SERPL CALC-MCNC: 75.2 MG/DL
MICROALBUMIN UR DL<=1MG/L-MCNC: 71 UG/ML
MICROALBUMIN/CREATININE RATIO: 68.3 UG/MG
NONHDLC SERPL-MCNC: 117 MG/DL
POTASSIUM SERPL-SCNC: 4.7 MMOL/L
PROT SERPL-MCNC: 7.3 G/DL
SODIUM SERPL-SCNC: 139 MMOL/L
TRIGL SERPL-MCNC: 209 MG/DL

## 2018-05-29 PROCEDURE — 83036 HEMOGLOBIN GLYCOSYLATED A1C: CPT

## 2018-05-29 PROCEDURE — 80061 LIPID PANEL: CPT

## 2018-05-29 PROCEDURE — 80053 COMPREHEN METABOLIC PANEL: CPT

## 2018-05-29 PROCEDURE — 99999 PR PBB SHADOW E&M-EST. PATIENT-LVL I: CPT | Mod: PBBFAC,,,

## 2018-05-29 PROCEDURE — 82043 UR ALBUMIN QUANTITATIVE: CPT

## 2018-05-30 ENCOUNTER — TELEPHONE (OUTPATIENT)
Dept: INTERNAL MEDICINE | Facility: CLINIC | Age: 68
End: 2018-05-30

## 2018-05-30 NOTE — TELEPHONE ENCOUNTER
----- Message from Lashawn Galo sent at 5/30/2018 12:55 PM CDT -----  Contact: pt  Pt stated she was returning a call and can be reached at [phone] thanks

## 2018-06-01 ENCOUNTER — TELEPHONE (OUTPATIENT)
Dept: INTERNAL MEDICINE | Facility: CLINIC | Age: 68
End: 2018-06-01

## 2018-06-01 NOTE — TELEPHONE ENCOUNTER
----- Message from Ines Fitzgerald sent at 6/1/2018  8:39 AM CDT -----  Contact: PT   Pt request a call back to discuss her stopping her medication. States since stopping her medication her blood sugar has increase little by little.     The medication to help her sleep is not working, request options. ..[phone] (home)

## 2018-06-01 NOTE — TELEPHONE ENCOUNTER
Spoke with pt, states she was taking off of Victoza because of pains, states it has been running high and raising a little each day. Also states that the trazodone is not working either, wants to know if she should get back on Temazepam. Informed pt to try them for the weekend and see if it gets higher or if the Trazodone is not working to call back Monday. Verbalized understanding.

## 2018-06-04 ENCOUNTER — LAB VISIT (OUTPATIENT)
Dept: LAB | Facility: HOSPITAL | Age: 68
End: 2018-06-04
Attending: INTERNAL MEDICINE
Payer: MEDICARE

## 2018-06-04 ENCOUNTER — OFFICE VISIT (OUTPATIENT)
Dept: HEMATOLOGY/ONCOLOGY | Facility: CLINIC | Age: 68
End: 2018-06-04
Payer: MEDICARE

## 2018-06-04 VITALS
DIASTOLIC BLOOD PRESSURE: 59 MMHG | HEART RATE: 82 BPM | SYSTOLIC BLOOD PRESSURE: 117 MMHG | OXYGEN SATURATION: 99 % | WEIGHT: 240.75 LBS | TEMPERATURE: 99 F | BODY MASS INDEX: 45.49 KG/M2

## 2018-06-04 DIAGNOSIS — E66.01 MORBID OBESITY WITH BMI OF 40.0-44.9, ADULT: ICD-10-CM

## 2018-06-04 DIAGNOSIS — D50.0 IRON DEFICIENCY ANEMIA DUE TO CHRONIC BLOOD LOSS: ICD-10-CM

## 2018-06-04 DIAGNOSIS — D50.0 IRON DEFICIENCY ANEMIA DUE TO CHRONIC BLOOD LOSS: Primary | ICD-10-CM

## 2018-06-04 LAB
BASOPHILS # BLD AUTO: 0.03 K/UL
BASOPHILS NFR BLD: 0.5 %
DIFFERENTIAL METHOD: ABNORMAL
EOSINOPHIL # BLD AUTO: 0.1 K/UL
EOSINOPHIL NFR BLD: 1.9 %
ERYTHROCYTE [DISTWIDTH] IN BLOOD BY AUTOMATED COUNT: 12.6 %
HCT VFR BLD AUTO: 34.9 %
HGB BLD-MCNC: 11.7 G/DL
LYMPHOCYTES # BLD AUTO: 1.4 K/UL
LYMPHOCYTES NFR BLD: 21.8 %
MCH RBC QN AUTO: 31.8 PG
MCHC RBC AUTO-ENTMCNC: 33.5 G/DL
MCV RBC AUTO: 95 FL
MONOCYTES # BLD AUTO: 0.4 K/UL
MONOCYTES NFR BLD: 5.9 %
NEUTROPHILS # BLD AUTO: 4.5 K/UL
NEUTROPHILS NFR BLD: 69.9 %
PLATELET # BLD AUTO: 209 K/UL
PMV BLD AUTO: 11.4 FL
RBC # BLD AUTO: 3.68 M/UL
WBC # BLD AUTO: 6.41 K/UL

## 2018-06-04 PROCEDURE — 99999 PR PBB SHADOW E&M-EST. PATIENT-LVL III: CPT | Mod: PBBFAC,,, | Performed by: INTERNAL MEDICINE

## 2018-06-04 PROCEDURE — 83540 ASSAY OF IRON: CPT

## 2018-06-04 PROCEDURE — 36415 COLL VENOUS BLD VENIPUNCTURE: CPT

## 2018-06-04 PROCEDURE — 3074F SYST BP LT 130 MM HG: CPT | Mod: CPTII,S$GLB,, | Performed by: INTERNAL MEDICINE

## 2018-06-04 PROCEDURE — 99213 OFFICE O/P EST LOW 20 MIN: CPT | Mod: S$GLB,,, | Performed by: INTERNAL MEDICINE

## 2018-06-04 PROCEDURE — 3078F DIAST BP <80 MM HG: CPT | Mod: CPTII,S$GLB,, | Performed by: INTERNAL MEDICINE

## 2018-06-04 PROCEDURE — 85025 COMPLETE CBC W/AUTO DIFF WBC: CPT

## 2018-06-04 PROCEDURE — 82728 ASSAY OF FERRITIN: CPT

## 2018-06-04 RX ORDER — HEPARIN 100 UNIT/ML
500 SYRINGE INTRAVENOUS
OUTPATIENT
Start: 2018-06-11

## 2018-06-04 RX ORDER — SODIUM CHLORIDE 0.9 % (FLUSH) 0.9 %
10 SYRINGE (ML) INJECTION
OUTPATIENT
Start: 2018-06-11

## 2018-06-04 RX ORDER — HEPARIN 100 UNIT/ML
500 SYRINGE INTRAVENOUS
OUTPATIENT
Start: 2018-06-04

## 2018-06-04 RX ORDER — SODIUM CHLORIDE 0.9 % (FLUSH) 0.9 %
10 SYRINGE (ML) INJECTION
OUTPATIENT
Start: 2018-06-04

## 2018-06-04 NOTE — PROGRESS NOTES
Subjective:       Patient ID: Christine Jo is a 68 y.o. female.    Chief Complaint: Anemia    HPI 68-year-old female returns history of recurring iron deficiency anemia GI workup in 2017 upper and [facility]ies [facility]    Past Medical History:   Diagnosis Date    Arthritis     Cataract     Diabetes mellitus 2008     am 01/15/2018 Insulin x 1 year    Glaucoma     Hypertension     Insomnia     Macular degeneration     Vaginal yeast infection      Family History   Problem Relation Age of Onset    Heart disease Mother         CAD     Cataracts Mother     Macular degeneration Mother     Glaucoma Mother     Cancer Son         testicular     Cancer Maternal Aunt         Lung ca    Heart disease Maternal Grandfather         Pacemaker     Diabetes Sister     Heart disease Sister         CAD    Cataracts Sister     Diabetes Sister     Diabetes Sister      Social History     Social History    Marital status:      Spouse name: N/A    Number of children: N/A    Years of education: N/A     Occupational History    Not on file.     Social History Main Topics    Smoking status: Former Smoker     Packs/day: 1.00     Years: 35.00     Types: Cigarettes     Quit date: 6/22/2003    Smokeless tobacco: Never Used    Alcohol use No    Drug use: No    Sexual activity: No     Other Topics Concern    Not on file     Social History Narrative    No narrative on file     Past Surgical History:   Procedure Laterality Date    abdominal laparoscopy       BREAST BIOPSY      CATARACT EXTRACTION Bilateral [id number]    [facility] in [place]    EYE SURGERY      TONSILLECTOMY      TUBAL LIGATION         Labs:  Lab Results   Component Value Date    WBC 6.41 06/04/2018    HGB 11.7 (L) 06/04/2018    HCT 34.9 (L) 06/04/2018    MCV 95 06/04/2018     06/04/2018     BMP  Lab Results   Component Value Date     05/29/2018    K 4.7 05/29/2018     05/29/2018    CO2 29 05/29/2018     BUN 17 05/29/2018    CREATININE 0.8 05/29/2018    CALCIUM 9.8 05/29/2018    ANIONGAP 7 (L) 05/29/2018    ESTGFRAFRICA >60.0 05/29/2018    EGFRNONAA >60.0 05/29/2018     Lab Results   Component Value Date    ALT 24 05/29/2018    AST 18 05/29/2018    ALKPHOS 57 05/29/2018    BILITOT 0.9 05/29/2018       Lab Results   Component Value Date    IRON 88 03/13/2018    TIBC 293 03/13/2018    FERRITIN 197 03/13/2018     Lab Results   Component Value Date    SOFOMYFH16 421 12/19/2017     Lab Results   Component Value Date    FOLATE 35.9 (H) 10/08/2015     Lab Results   Component Value Date    TSH 0.948 12/19/2017         Review of Systems   Constitutional: Positive for activity change and fatigue. Negative for appetite change, chills, diaphoresis, fever and unexpected weight change.   HENT: Negative for congestion, dental problem, drooling, ear discharge, ear pain, facial swelling, hearing loss, mouth sores, nosebleeds, postnasal drip, rhinorrhea, sinus pressure, sneezing, sore throat, tinnitus, trouble swallowing and voice change.    Eyes: Negative for photophobia, pain, discharge, redness, itching and visual disturbance.   Respiratory: Negative for cough, choking, chest tightness, shortness of breath, wheezing and stridor.    Cardiovascular: Negative for chest pain, palpitations and leg swelling.   Gastrointestinal: Negative for abdominal distention, abdominal pain, anal bleeding, blood in stool, constipation, diarrhea, nausea, rectal pain and vomiting.   Endocrine: Negative for cold intolerance, heat intolerance, polydipsia, polyphagia and polyuria.   Genitourinary: Negative for decreased urine volume, difficulty urinating, dyspareunia, dysuria, enuresis, flank pain, frequency, genital sores, hematuria, menstrual problem, pelvic pain, urgency, vaginal bleeding, vaginal discharge and vaginal pain.   Musculoskeletal: Negative for arthralgias, back pain, gait problem, joint swelling, myalgias, neck pain and neck stiffness.    Skin: Negative for color change, pallor and rash.   Allergic/Immunologic: Negative for environmental allergies, food allergies and immunocompromised state.   Neurological: Positive for weakness. Negative for dizziness, tremors, seizures, syncope, facial asymmetry, speech difficulty, light-headedness, numbness and headaches.   Hematological: Negative for adenopathy. Does not bruise/bleed easily.   Psychiatric/Behavioral: Negative for agitation, behavioral problems, confusion, decreased concentration, dysphoric mood, hallucinations, self-injury, sleep disturbance and suicidal ideas. The patient is not nervous/anxious and is not hyperactive.        Objective:      Physical Exam   Constitutional: She is oriented to person, place, and time. She appears well-developed and well-nourished. She appears distressed.   HENT:   Head: Normocephalic and atraumatic.   Right Ear: External ear normal.   Left Ear: External ear normal.   Nose: Nose normal. Right sinus exhibits no maxillary sinus tenderness and no frontal sinus tenderness. Left sinus exhibits no maxillary sinus tenderness and no frontal sinus tenderness.   Mouth/Throat: Oropharynx is clear and moist. No oropharyngeal exudate.   Eyes: Conjunctivae, EOM and lids are normal. Pupils are equal, round, and reactive to light. Right eye exhibits no discharge. Left eye exhibits no discharge. Right conjunctiva is not injected. Right conjunctiva has no hemorrhage. Left conjunctiva is not injected. Left conjunctiva has no hemorrhage. No scleral icterus.   Neck: Normal range of motion. Neck supple. No JVD present. No tracheal deviation present. No thyromegaly present.   Cardiovascular: Normal rate and regular rhythm.    Pulmonary/Chest: Effort normal. No stridor. No respiratory distress. She exhibits no tenderness.   Abdominal: Soft. She exhibits no distension and no mass. There is no splenomegaly or hepatomegaly. There is no tenderness. There is no rebound.   Musculoskeletal:  Normal range of motion. She exhibits no edema or tenderness.   Lymphadenopathy:     She has no cervical adenopathy.     She has no axillary adenopathy.        Right: No supraclavicular adenopathy present.        Left: No supraclavicular adenopathy present.   Neurological: She is alert and oriented to person, place, and time. No cranial nerve deficit. Coordination normal.   Skin: Skin is dry. No rash noted. She is not diaphoretic. No erythema.   Psychiatric: She has a normal mood and affect. Her behavior is normal. Judgment and thought content normal.   Vitals reviewed.          Assessment:      1. Iron deficiency anemia due to chronic blood loss           Plan:   Slight fall in hemoglobin 11 7 hold Feraheme infusion will recheck in 2-3 months patient currently on Eliquis most likely cause

## 2018-06-05 ENCOUNTER — PATIENT MESSAGE (OUTPATIENT)
Dept: HEMATOLOGY/ONCOLOGY | Facility: CLINIC | Age: 68
End: 2018-06-05

## 2018-06-05 LAB
FERRITIN SERPL-MCNC: 202 NG/ML
IRON SERPL-MCNC: 106 UG/DL
SATURATED IRON: 37 %
TOTAL IRON BINDING CAPACITY: 290 UG/DL
TRANSFERRIN SERPL-MCNC: 196 MG/DL

## 2018-06-06 ENCOUNTER — TELEPHONE (OUTPATIENT)
Dept: INTERNAL MEDICINE | Facility: CLINIC | Age: 68
End: 2018-06-06

## 2018-06-06 DIAGNOSIS — R30.0 DYSURIA: Primary | ICD-10-CM

## 2018-06-06 RX ORDER — DOXYCYCLINE 100 MG/1
100 CAPSULE ORAL EVERY 12 HOURS
Qty: 20 CAPSULE | Refills: 0 | Status: SHIPPED | OUTPATIENT
Start: 2018-06-06 | End: 2018-10-10 | Stop reason: ALTCHOICE

## 2018-06-06 NOTE — TELEPHONE ENCOUNTER
Called patient to advise.  Patient verbally understood.  Patient then stated that she has been having some burning when she urinates and wants to know if something could be called in to the pharmacy for her.  Please advise.

## 2018-06-06 NOTE — TELEPHONE ENCOUNTER
Patient called to give blood sugars for last week and to discuss medication.  Please advise.    5/   6/1-203   6/2-189   6/3-162   6/4-188 6/5-187 6/6- 188

## 2018-06-06 NOTE — TELEPHONE ENCOUNTER
----- Message from Krysta Carrasco sent at 6/6/2018 10:15 AM CDT -----  Contact: pt  She's calling to give blood sugar levels for the last week; 5/, 6/1-203, 6/2-189, 6/3-162, 6/4-188, 6/5-187 and today 188. She wants to discuss medication, [phone] (home)

## 2018-06-07 ENCOUNTER — TELEPHONE (OUTPATIENT)
Dept: INTERNAL MEDICINE | Facility: CLINIC | Age: 68
End: 2018-06-07

## 2018-06-07 DIAGNOSIS — B37.31 YEAST VAGINITIS: Primary | ICD-10-CM

## 2018-06-07 DIAGNOSIS — R30.0 DYSURIA: Primary | ICD-10-CM

## 2018-06-07 RX ORDER — FLUCONAZOLE 100 MG/1
100 TABLET ORAL DAILY
Qty: 5 TABLET | Refills: 0 | Status: SHIPPED | OUTPATIENT
Start: 2018-06-07 | End: 2018-07-07

## 2018-06-07 NOTE — TELEPHONE ENCOUNTER
Called and talked to the patient, she stated that she take one during the night and it made her sick to her stomach

## 2018-06-07 NOTE — TELEPHONE ENCOUNTER
Talked a little more to the patient, she is having an irration or rash in the genital area.  She has a history of taking diflucan  ( irration is around to the back side )

## 2018-06-07 NOTE — TELEPHONE ENCOUNTER
----- Message from Ondina Ty sent at 6/7/2018  8:52 AM CDT -----  Contact: Patient  Patient called to speak with the nurse; she was given an antibiotic yesterday and she has taken 2 doses and it's making her sick. She wonders if it's too strong for her. She would like something else.    [facility] [address] [place] [address] [address] AT [facility] [place] [address]  [place] [facility] [place] [address]  Phone: [phone] Fax: [phone]    She can be contacted at [phone].    Thanks,  Ondina

## 2018-06-11 ENCOUNTER — TELEPHONE (OUTPATIENT)
Dept: INTERNAL MEDICINE | Facility: CLINIC | Age: 68
End: 2018-06-11

## 2018-06-11 NOTE — TELEPHONE ENCOUNTER
Patient called stating that Rhea was suppose to call her on Friday to give her the lab results.  Advised patient that Dr. Szymanski has not looked at the labs, but will call her when he does.  Patient verbally understood.  Please result labs..

## 2018-06-11 NOTE — TELEPHONE ENCOUNTER
----- Message from Yenny Painter sent at 6/11/2018  8:04 AM CDT -----  Contact: self [phone]  States that she is calling for lab results. Please call back at [phone]//thank you acc

## 2018-06-11 NOTE — TELEPHONE ENCOUNTER
Called patient to advise.  Patient was unaware that a rx was sent to the pharmacy on 06/07/2018.  Patient then stated that she will go and pick rx up and start taking.

## 2018-06-18 ENCOUNTER — PATIENT MESSAGE (OUTPATIENT)
Dept: INTERNAL MEDICINE | Facility: CLINIC | Age: 68
End: 2018-06-18

## 2018-06-22 ENCOUNTER — PATIENT MESSAGE (OUTPATIENT)
Dept: OBSTETRICS AND GYNECOLOGY | Facility: CLINIC | Age: 68
End: 2018-06-22

## 2018-06-25 RX ORDER — TRIAMCINOLONE ACETONIDE 1 MG/G
CREAM TOPICAL 2 TIMES DAILY
Qty: 30 G | Refills: 0 | Status: SHIPPED | OUTPATIENT
Start: 2018-06-25 | End: 2019-10-11 | Stop reason: SDUPTHER

## 2018-06-25 RX ORDER — TRIAMCINOLONE ACETONIDE 1 MG/G
CREAM TOPICAL 2 TIMES DAILY
Qty: 15 G | Refills: 0 | Status: SHIPPED | OUTPATIENT
Start: 2018-06-25 | End: 2018-06-26 | Stop reason: SDUPTHER

## 2018-06-25 RX ORDER — NYSTATIN AND TRIAMCINOLONE ACETONIDE 100000; 1 [USP'U]/G; MG/G
CREAM TOPICAL
Qty: 30 G | Refills: 1 | Status: SHIPPED | OUTPATIENT
Start: 2018-06-25 | End: 2019-01-02 | Stop reason: SDUPTHER

## 2018-06-26 RX ORDER — TRIAMCINOLONE ACETONIDE 1 MG/G
CREAM TOPICAL 2 TIMES DAILY
Qty: 45 G | Refills: 0 | Status: SHIPPED | OUTPATIENT
Start: 2018-06-26 | End: 2018-07-10

## 2018-06-26 RX ORDER — NYSTATIN 100000 U/G
CREAM TOPICAL 2 TIMES DAILY
Qty: 30 G | Refills: 0 | Status: SHIPPED | OUTPATIENT
Start: 2018-06-26 | End: 2020-05-11 | Stop reason: SDUPTHER

## 2018-06-26 RX ORDER — TRIAMCINOLONE ACETONIDE 1 MG/G
CREAM TOPICAL 2 TIMES DAILY
Qty: 15 G | Refills: 0 | Status: SHIPPED | OUTPATIENT
Start: 2018-06-26 | End: 2018-07-10

## 2018-07-02 ENCOUNTER — PATIENT OUTREACH (OUTPATIENT)
Dept: ADMINISTRATIVE | Facility: HOSPITAL | Age: 68
End: 2018-07-02

## 2018-07-16 ENCOUNTER — OFFICE VISIT (OUTPATIENT)
Dept: INTERNAL MEDICINE | Facility: CLINIC | Age: 68
End: 2018-07-16
Payer: MEDICARE

## 2018-07-16 VITALS
OXYGEN SATURATION: 98 % | SYSTOLIC BLOOD PRESSURE: 142 MMHG | TEMPERATURE: 97 F | BODY MASS INDEX: 43.86 KG/M2 | DIASTOLIC BLOOD PRESSURE: 70 MMHG | HEART RATE: 79 BPM | WEIGHT: 232.13 LBS

## 2018-07-16 DIAGNOSIS — R42 LIGHTHEADEDNESS: ICD-10-CM

## 2018-07-16 DIAGNOSIS — I10 ESSENTIAL HYPERTENSION: ICD-10-CM

## 2018-07-16 DIAGNOSIS — Z79.4 CONTROLLED TYPE 2 DIABETES MELLITUS WITH MICROALBUMINURIA, WITH LONG-TERM CURRENT USE OF INSULIN: Primary | ICD-10-CM

## 2018-07-16 DIAGNOSIS — E11.29 CONTROLLED TYPE 2 DIABETES MELLITUS WITH MICROALBUMINURIA, WITH LONG-TERM CURRENT USE OF INSULIN: Primary | ICD-10-CM

## 2018-07-16 DIAGNOSIS — R80.9 CONTROLLED TYPE 2 DIABETES MELLITUS WITH MICROALBUMINURIA, WITH LONG-TERM CURRENT USE OF INSULIN: Primary | ICD-10-CM

## 2018-07-16 LAB — GLUCOSE SERPL-MCNC: 199 MG/DL (ref 70–110)

## 2018-07-16 PROCEDURE — 3078F DIAST BP <80 MM HG: CPT | Mod: CPTII,S$GLB,, | Performed by: FAMILY MEDICINE

## 2018-07-16 PROCEDURE — 3077F SYST BP >= 140 MM HG: CPT | Mod: CPTII,S$GLB,, | Performed by: FAMILY MEDICINE

## 2018-07-16 PROCEDURE — 99213 OFFICE O/P EST LOW 20 MIN: CPT | Mod: S$GLB,,, | Performed by: FAMILY MEDICINE

## 2018-07-16 PROCEDURE — 83036 HEMOGLOBIN GLYCOSYLATED A1C: CPT

## 2018-07-16 PROCEDURE — 82948 REAGENT STRIP/BLOOD GLUCOSE: CPT | Mod: S$GLB,,, | Performed by: FAMILY MEDICINE

## 2018-07-16 PROCEDURE — 3044F HG A1C LEVEL LT 7.0%: CPT | Mod: CPTII,S$GLB,, | Performed by: FAMILY MEDICINE

## 2018-07-16 PROCEDURE — 99999 PR PBB SHADOW E&M-EST. PATIENT-LVL V: CPT | Mod: PBBFAC,,, | Performed by: FAMILY MEDICINE

## 2018-07-16 RX ORDER — TEMAZEPAM 30 MG/1
CAPSULE ORAL
Refills: 2 | COMMUNITY
Start: 2018-06-20 | End: 2018-07-19 | Stop reason: SDUPTHER

## 2018-07-16 RX ORDER — INSULIN GLARGINE 100 [IU]/ML
INJECTION, SOLUTION SUBCUTANEOUS
Qty: 15 ML | Refills: 5
Start: 2018-07-16 | End: 2018-09-10

## 2018-07-16 NOTE — PROGRESS NOTES
Subjective:       Patient ID: Christine Jo is a 68 y.o. female.    Chief Complaint: 3mth f/u (diabetes)    Follow-up diabetes and hypertension.  She had a recent cardiology evaluation.  Carotid ultrasound is being repeated due to some degree of carotid artery stenosis.  She has also had episode of vertigo and has been referred to Ear Nose Throat evaluation.  Also she is reporting repeated episodes of lightheadedness sweaty feeling like his sugar might be low.  She denies any palpitations or syncope      Review of Systems   Constitutional: Positive for diaphoresis. Negative for appetite change, fatigue and unexpected weight change.   Respiratory: Negative for cough, chest tightness, shortness of breath and wheezing.    Cardiovascular: Negative for chest pain, palpitations and leg swelling.        Denies lightheadedness or syncope   Gastrointestinal: Negative for abdominal pain.   Endocrine: Negative for polydipsia and polyuria.   Genitourinary: Negative for difficulty urinating.   Neurological: Positive for light-headedness. Negative for seizures, syncope, facial asymmetry, speech difficulty and headaches.       Objective:      Physical Exam   Constitutional: She is oriented to person, place, and time. She appears well-developed and well-nourished. No distress.   Neck: Neck supple. No thyromegaly present.   Cardiovascular: Normal rate, regular rhythm and normal heart sounds.    No murmur heard.  Pulmonary/Chest: Effort normal and breath sounds normal. No respiratory distress. She has no wheezes.   Abdominal: Soft. Bowel sounds are normal. She exhibits no mass. There is no tenderness.   Lymphadenopathy:     She has no cervical adenopathy.   Neurological: She is alert and oriented to person, place, and time. No cranial nerve deficit. She exhibits normal muscle tone. Coordination normal.   Skin: Skin is warm and dry. She is not diaphoretic.   Psychiatric: She has a normal mood and affect. Her behavior is normal.  Judgment and thought content normal.       Appointment on 06/07/2018   Component Date Value Ref Range Status    Color, UA 06/07/2018 yellow   Final    Spec Grav UA 06/07/2018 1.010   Final    pH, UA 06/07/2018 5   Final    WBC, UA 06/07/2018 neg   Final    Nitrite, UA 06/07/2018 neg   Final    Protein 06/07/2018 neg   Final    Glucose, UA 06/07/2018 50   Final    Ketones, UA 06/07/2018 neg   Final    Urobilinogen, UA 06/07/2018 neg   Final    Bilirubin 06/07/2018 neg   Final    Blood, UA 06/07/2018 neg   Final     Assessment:       1. Controlled type 2 diabetes mellitus with microalbuminuria, with long-term current use of insulin        Plan:    glucose  199 after eating chips  Home glucose 100 or so recently fasting  Will reduce basaglar 64 to 62 units   check glucose fasting each morning and check glucose when she feels lightheaded.  Follow up in 1 week.  Blood pressures noted continue current medications and recheck in 1 week  Controlled type 2 diabetes mellitus with microalbuminuria, with long-term current use of insulin  -     POCT Glucose    Other orders  -     Cancel: Mammo Digital Screening Bilateral With CAD; Future; Expected date: 07/02/2018

## 2018-07-17 ENCOUNTER — TELEPHONE (OUTPATIENT)
Dept: INTERNAL MEDICINE | Facility: CLINIC | Age: 68
End: 2018-07-17

## 2018-07-17 LAB
ESTIMATED AVG GLUCOSE: 154 MG/DL
HBA1C MFR BLD HPLC: 7 %

## 2018-07-17 NOTE — TELEPHONE ENCOUNTER
----- Message from Peter Humphrey sent at 7/17/2018  9:37 AM CDT -----  Contact: Pt.   Pt is calling regarding script refill on Victoza Pt stated that the Insurance company needs authorization  From Dr. Szymanski. Pt stated that she is completely out of this medication. .[phone] (home) [phone] (work)      .  [facility] [address] [place] [address] [address] AT [facility] & [address] [place]  [facility] [address]  Phone: [phone] Fax: [phone]

## 2018-07-17 NOTE — TELEPHONE ENCOUNTER
"----- Message from Laura Velasquez sent at 7/17/2018  7:44 AM CDT -----  Contact: Patient  Patient is having difficulty getting into her " My Chart", and would ishan to talke to someone regarding her test results, please call her back at [phone]. Thank you  "

## 2018-07-23 RX ORDER — TEMAZEPAM 30 MG/1
CAPSULE ORAL
Qty: 30 CAPSULE | Refills: 2 | Status: SHIPPED | OUTPATIENT
Start: 2018-07-23 | End: 2018-10-29 | Stop reason: SDUPTHER

## 2018-07-24 RX ORDER — OMEPRAZOLE 20 MG/1
CAPSULE, DELAYED RELEASE ORAL
Qty: 90 CAPSULE | Refills: 0 | Status: SHIPPED | OUTPATIENT
Start: 2018-07-24 | End: 2018-10-23 | Stop reason: SDUPTHER

## 2018-07-24 RX ORDER — OMEPRAZOLE 20 MG/1
CAPSULE, DELAYED RELEASE ORAL
Qty: 90 CAPSULE | Refills: 0 | OUTPATIENT
Start: 2018-07-24

## 2018-07-26 ENCOUNTER — OFFICE VISIT (OUTPATIENT)
Dept: INTERNAL MEDICINE | Facility: CLINIC | Age: 68
End: 2018-07-26
Payer: MEDICARE

## 2018-07-26 VITALS
TEMPERATURE: 98 F | OXYGEN SATURATION: 97 % | SYSTOLIC BLOOD PRESSURE: 152 MMHG | HEIGHT: 61 IN | BODY MASS INDEX: 44.8 KG/M2 | WEIGHT: 237.31 LBS | DIASTOLIC BLOOD PRESSURE: 82 MMHG | HEART RATE: 78 BPM

## 2018-07-26 DIAGNOSIS — R42 LIGHTHEADEDNESS: ICD-10-CM

## 2018-07-26 DIAGNOSIS — B37.31 YEAST VAGINITIS: ICD-10-CM

## 2018-07-26 PROCEDURE — 99999 PR PBB SHADOW E&M-EST. PATIENT-LVL III: CPT | Mod: PBBFAC,,, | Performed by: FAMILY MEDICINE

## 2018-07-26 PROCEDURE — 99213 OFFICE O/P EST LOW 20 MIN: CPT | Mod: S$GLB,,, | Performed by: FAMILY MEDICINE

## 2018-07-26 PROCEDURE — 3077F SYST BP >= 140 MM HG: CPT | Mod: CPTII,S$GLB,, | Performed by: FAMILY MEDICINE

## 2018-07-26 PROCEDURE — 3079F DIAST BP 80-89 MM HG: CPT | Mod: CPTII,S$GLB,, | Performed by: FAMILY MEDICINE

## 2018-07-26 PROCEDURE — 3045F PR MOST RECENT HEMOGLOBIN A1C LEVEL 7.0-9.0%: CPT | Mod: CPTII,S$GLB,, | Performed by: FAMILY MEDICINE

## 2018-07-26 RX ORDER — FLUCONAZOLE 150 MG/1
150 TABLET ORAL DAILY
Qty: 1 TABLET | Refills: 0 | Status: SHIPPED | OUTPATIENT
Start: 2018-07-26 | End: 2018-07-27

## 2018-07-26 NOTE — PROGRESS NOTES
Subjective:       Patient ID: Christine Jo is a 68 y.o. female.    Chief Complaint: Follow-up and Vaginitis    Follow-up uncontrolled diabetes hypertension.  She reports she feels lightheaded when she skips meals.  Fasting home glucoses have been 149 98 118 147 and 136.  Reason A1c was 7.0.  Medications include Victoza Januvia basaglar  64 units.  She denies headache chest pain palpitations shortness of breath or edema she denies polyuria polydipsia.  She reports not falling  her diabetic diet      Review of Systems   Constitutional: Negative for activity change, appetite change, diaphoresis, fatigue and unexpected weight change.   Respiratory: Negative for cough and shortness of breath.    Cardiovascular: Negative for chest pain, palpitations and leg swelling.   Endocrine: Negative for polydipsia and polyuria.   Neurological: Positive for light-headedness. Negative for headaches.       Objective:      Physical Exam   Constitutional: She appears well-developed and well-nourished. No distress.   Cardiovascular: Normal rate and regular rhythm.    No murmur heard.  Pulmonary/Chest: Effort normal and breath sounds normal.       Office Visit on 07/16/2018   Component Date Value Ref Range Status    POC Glucose 07/16/2018 199* 70 - 110 MG/DL Final    Hemoglobin A1C 07/16/2018 7.0* 4.0 - 5.6 % Final    Estimated Avg Glucose 07/16/2018 154* 68 - 131 mg/dL Final     Assessment:       1. Yeast vaginitis        Plan:   Blood pressure mildly elevated today but has been normal in the past.  Will continue current medications for now.  Discussed continuing current diabetic medications and improve in diet increase walking and increased water intake.  She reports a mild yeast vaginitis requests Diflucan.  Follow-up in 6 months repeat lab on return.      Yeast vaginitis  -     fluconazole (DIFLUCAN) 150 MG Tab; Take 1 tablet (150 mg total) by mouth once daily. for 1 day  Dispense: 1 tablet; Refill: 0

## 2018-08-12 RX ORDER — BENAZEPRIL HYDROCHLORIDE 40 MG/1
TABLET ORAL
Qty: 90 TABLET | Refills: 0 | Status: SHIPPED | OUTPATIENT
Start: 2018-08-12 | End: 2018-11-05 | Stop reason: SDUPTHER

## 2018-08-12 RX ORDER — SITAGLIPTIN 100 MG/1
TABLET, FILM COATED ORAL
Qty: 90 TABLET | Refills: 0 | Status: SHIPPED | OUTPATIENT
Start: 2018-08-12 | End: 2018-11-05 | Stop reason: SDUPTHER

## 2018-08-20 ENCOUNTER — LAB VISIT (OUTPATIENT)
Dept: LAB | Facility: HOSPITAL | Age: 68
End: 2018-08-20
Attending: INTERNAL MEDICINE
Payer: MEDICARE

## 2018-08-20 DIAGNOSIS — D50.0 IRON DEFICIENCY ANEMIA DUE TO CHRONIC BLOOD LOSS: ICD-10-CM

## 2018-08-20 LAB
BASOPHILS # BLD AUTO: 0.04 K/UL
BASOPHILS NFR BLD: 0.5 %
DIFFERENTIAL METHOD: ABNORMAL
EOSINOPHIL # BLD AUTO: 0.1 K/UL
EOSINOPHIL NFR BLD: 1.6 %
ERYTHROCYTE [DISTWIDTH] IN BLOOD BY AUTOMATED COUNT: 12.5 %
FERRITIN SERPL-MCNC: 159 NG/ML
HCT VFR BLD AUTO: 36.4 %
HGB BLD-MCNC: 11.5 G/DL
IRON SERPL-MCNC: 87 UG/DL
LYMPHOCYTES # BLD AUTO: 1.6 K/UL
LYMPHOCYTES NFR BLD: 20.7 %
MCH RBC QN AUTO: 30.7 PG
MCHC RBC AUTO-ENTMCNC: 31.6 G/DL
MCV RBC AUTO: 97 FL
MONOCYTES # BLD AUTO: 0.5 K/UL
MONOCYTES NFR BLD: 7.2 %
NEUTROPHILS # BLD AUTO: 5.2 K/UL
NEUTROPHILS NFR BLD: 70 %
PLATELET # BLD AUTO: 223 K/UL
PMV BLD AUTO: 11.3 FL
RBC # BLD AUTO: 3.74 M/UL
SATURATED IRON: 29 %
TOTAL IRON BINDING CAPACITY: 305 UG/DL
TRANSFERRIN SERPL-MCNC: 206 MG/DL
WBC # BLD AUTO: 7.49 K/UL

## 2018-08-20 PROCEDURE — 83540 ASSAY OF IRON: CPT

## 2018-08-20 PROCEDURE — 36415 COLL VENOUS BLD VENIPUNCTURE: CPT

## 2018-08-20 PROCEDURE — 85025 COMPLETE CBC W/AUTO DIFF WBC: CPT

## 2018-08-20 PROCEDURE — 82728 ASSAY OF FERRITIN: CPT

## 2018-08-22 ENCOUNTER — OFFICE VISIT (OUTPATIENT)
Dept: HEMATOLOGY/ONCOLOGY | Facility: CLINIC | Age: 68
End: 2018-08-22
Payer: MEDICARE

## 2018-08-22 VITALS
DIASTOLIC BLOOD PRESSURE: 63 MMHG | HEART RATE: 84 BPM | TEMPERATURE: 98 F | WEIGHT: 234.38 LBS | BODY MASS INDEX: 44.25 KG/M2 | SYSTOLIC BLOOD PRESSURE: 133 MMHG | HEIGHT: 61 IN | OXYGEN SATURATION: 94 %

## 2018-08-22 DIAGNOSIS — D50.0 IRON DEFICIENCY ANEMIA DUE TO CHRONIC BLOOD LOSS: Primary | ICD-10-CM

## 2018-08-22 PROCEDURE — 3078F DIAST BP <80 MM HG: CPT | Mod: CPTII,S$GLB,, | Performed by: INTERNAL MEDICINE

## 2018-08-22 PROCEDURE — 99999 PR PBB SHADOW E&M-EST. PATIENT-LVL III: CPT | Mod: PBBFAC,,, | Performed by: INTERNAL MEDICINE

## 2018-08-22 PROCEDURE — 99213 OFFICE O/P EST LOW 20 MIN: CPT | Mod: S$GLB,,, | Performed by: INTERNAL MEDICINE

## 2018-08-22 PROCEDURE — 3075F SYST BP GE 130 - 139MM HG: CPT | Mod: CPTII,S$GLB,, | Performed by: INTERNAL MEDICINE

## 2018-08-22 NOTE — PROGRESS NOTES
Subjective:       Patient ID: Christine Jo is a 68 y.o. female.    Chief Complaint: Anemia and Results    HPI 68-year-old with iron deficiency patient has been repleted returns [facility] in [facility]    Past Medical History:   Diagnosis Date    Arthritis     Cataract     Diabetes mellitus 2008     am 01/15/2018 Insulin x 1 year    Glaucoma     Hypertension     Insomnia     Macular degeneration     Vaginal yeast infection      Family History   Problem Relation Age of Onset    Heart disease Mother         CAD     Cataracts Mother     Macular degeneration Mother     Glaucoma Mother     Cancer Son         testicular     Cancer Maternal Aunt         Lung ca    Heart disease Maternal Grandfather         Pacemaker     Diabetes Sister     Heart disease Sister         CAD    Cataracts Sister     Diabetes Sister     Diabetes Sister      Social History     Socioeconomic History    Marital status:      Spouse name: Not on file    Number of children: Not on file    Years of education: Not on file    Highest education level: Not on file   Social Needs    Financial resource strain: Not on file    Food insecurity - worry: Not on file    Food insecurity - inability: Not on file    Transportation needs - medical: Not on file    Transportation needs - non-medical: Not on file   Occupational History    Not on file   Tobacco Use    Smoking status: Former Smoker     Packs/day: 1.00     Years: 35.00     Pack years: 35.00     Types: Cigarettes     Last attempt to quit: 6/22/2003     Years since quitting: 15.1    Smokeless tobacco: Never Used   Substance and Sexual Activity    Alcohol use: No    Drug use: No    Sexual activity: No   Other Topics Concern    Not on file   Social History Narrative    Not on file     Past Surgical History:   Procedure Laterality Date    abdominal laparoscopy       BREAST BIOPSY      CATARACT EXTRACTION Bilateral [id number]     [place] in [place]    EYE SURGERY      TONSILLECTOMY      TUBAL LIGATION         Labs:  Lab Results   Component Value Date    WBC 7.49 08/20/2018    HGB 11.5 (L) 08/20/2018    HCT 36.4 (L) 08/20/2018    MCV 97 08/20/2018     08/20/2018     BMP  Lab Results   Component Value Date     05/29/2018    K 4.7 05/29/2018     05/29/2018    CO2 29 05/29/2018    BUN 17 05/29/2018    CREATININE 0.8 05/29/2018    CALCIUM 9.8 05/29/2018    ANIONGAP 7 (L) 05/29/2018    ESTGFRAFRICA >60.0 05/29/2018    EGFRNONAA >60.0 05/29/2018     Lab Results   Component Value Date    ALT 24 05/29/2018    AST 18 05/29/2018    ALKPHOS 57 05/29/2018    BILITOT 0.9 05/29/2018       Lab Results   Component Value Date    IRON 87 08/20/2018    TIBC 305 08/20/2018    FERRITIN 159 08/20/2018     Lab Results   Component Value Date    JXNOPKNJ76 421 12/19/2017     Lab Results   Component Value Date    FOLATE 35.9 (H) 10/08/2015     Lab Results   Component Value Date    TSH 0.948 12/19/2017         Review of Systems   Constitutional: Negative for activity change, appetite change, chills, diaphoresis, fatigue, fever and unexpected weight change.   HENT: Negative for congestion, dental problem, drooling, ear discharge, ear pain, facial swelling, hearing loss, mouth sores, nosebleeds, postnasal drip, rhinorrhea, sinus pressure, sneezing, sore throat, tinnitus, trouble swallowing and voice change.    Eyes: Negative for photophobia, pain, discharge, redness, itching and visual disturbance.   Respiratory: Negative for cough, choking, chest tightness, shortness of breath, wheezing and stridor.    Cardiovascular: Negative for chest pain, palpitations and leg swelling.   Gastrointestinal: Negative for abdominal distention, abdominal pain, anal bleeding, blood in stool, constipation, diarrhea, nausea, rectal pain and vomiting.   Endocrine: Negative for cold intolerance, heat intolerance, polydipsia, polyphagia and polyuria.    Genitourinary: Negative for decreased urine volume, difficulty urinating, dyspareunia, dysuria, enuresis, flank pain, frequency, genital sores, hematuria, menstrual problem, pelvic pain, urgency, vaginal bleeding, vaginal discharge and vaginal pain.   Musculoskeletal: Negative for arthralgias, back pain, gait problem, joint swelling, myalgias, neck pain and neck stiffness.   Skin: Negative for color change, pallor and rash.   Allergic/Immunologic: Negative for environmental allergies, food allergies and immunocompromised state.   Neurological: Negative for dizziness, tremors, seizures, syncope, facial asymmetry, speech difficulty, weakness, light-headedness, numbness and headaches.   Hematological: Negative for adenopathy. Does not bruise/bleed easily.   Psychiatric/Behavioral: Negative for agitation, behavioral problems, confusion, decreased concentration, dysphoric mood, hallucinations, self-injury, sleep disturbance and suicidal ideas. The patient is not nervous/anxious and is not hyperactive.        Objective:      Physical Exam   Constitutional: She is oriented to person, place, and time. She appears well-developed and well-nourished. No distress.   HENT:   Head: Normocephalic and atraumatic.   Right Ear: External ear normal.   Left Ear: External ear normal.   Nose: Nose normal. Right sinus exhibits no maxillary sinus tenderness and no frontal sinus tenderness. Left sinus exhibits no maxillary sinus tenderness and no frontal sinus tenderness.   Mouth/Throat: Oropharynx is clear and moist. No oropharyngeal exudate.   Eyes: Conjunctivae, EOM and lids are normal. Pupils are equal, round, and reactive to light. Right eye exhibits no discharge. Left eye exhibits no discharge. Right conjunctiva is not injected. Right conjunctiva has no hemorrhage. Left conjunctiva is not injected. Left conjunctiva has no hemorrhage. No scleral icterus.   Neck: Normal range of motion. Neck supple. No JVD present. No tracheal  deviation present. No thyromegaly present.   Cardiovascular: Normal rate and regular rhythm.   Pulmonary/Chest: Effort normal. No stridor. No respiratory distress. She exhibits no tenderness.   Abdominal: Soft. She exhibits no distension and no mass. There is no splenomegaly or hepatomegaly. There is no tenderness. There is no rebound.   Musculoskeletal: Normal range of motion. She exhibits no edema or tenderness.   Lymphadenopathy:     She has no cervical adenopathy.     She has no axillary adenopathy.        Right: No supraclavicular adenopathy present.        Left: No supraclavicular adenopathy present.   Neurological: She is alert and oriented to person, place, and time. No cranial nerve deficit. Coordination normal.   Skin: Skin is dry. No rash noted. She is not diaphoretic. No erythema.   Psychiatric: She has a normal mood and affect. Her behavior is normal. Judgment and thought content normal.   Vitals reviewed.          Assessment:      1. Iron deficiency anemia due to chronic blood loss           Plan:     Iron repleted at present return in 6 months with nurse practitioner CBC iron status GI evaluation through [facility] in media section        Bowen Matt Jr, MD FACP

## 2018-08-27 ENCOUNTER — PATIENT OUTREACH (OUTPATIENT)
Dept: ADMINISTRATIVE | Facility: HOSPITAL | Age: 68
End: 2018-08-27

## 2018-08-28 ENCOUNTER — INFUSION (OUTPATIENT)
Dept: RHEUMATOLOGY | Facility: HOSPITAL | Age: 68
End: 2018-08-28
Attending: PHYSICIAN ASSISTANT
Payer: MEDICARE

## 2018-08-28 ENCOUNTER — LAB VISIT (OUTPATIENT)
Dept: LAB | Facility: HOSPITAL | Age: 68
End: 2018-08-28
Attending: PHYSICIAN ASSISTANT
Payer: MEDICARE

## 2018-08-28 VITALS
RESPIRATION RATE: 20 BRPM | DIASTOLIC BLOOD PRESSURE: 60 MMHG | SYSTOLIC BLOOD PRESSURE: 118 MMHG | WEIGHT: 236.13 LBS | BODY MASS INDEX: 44.61 KG/M2 | HEART RATE: 79 BPM

## 2018-08-28 DIAGNOSIS — M81.0 AGE-RELATED OSTEOPOROSIS WITHOUT CURRENT PATHOLOGICAL FRACTURE: Primary | ICD-10-CM

## 2018-08-28 DIAGNOSIS — M81.0 AGE-RELATED OSTEOPOROSIS WITHOUT CURRENT PATHOLOGICAL FRACTURE: ICD-10-CM

## 2018-08-28 LAB
ANION GAP SERPL CALC-SCNC: 9 MMOL/L
BUN SERPL-MCNC: 18 MG/DL
CALCIUM SERPL-MCNC: 9.4 MG/DL
CHLORIDE SERPL-SCNC: 104 MMOL/L
CO2 SERPL-SCNC: 27 MMOL/L
CREAT SERPL-MCNC: 0.8 MG/DL
EST. GFR  (AFRICAN AMERICAN): >60 ML/MIN/1.73 M^2
EST. GFR  (NON AFRICAN AMERICAN): >60 ML/MIN/1.73 M^2
GLUCOSE SERPL-MCNC: 218 MG/DL
POTASSIUM SERPL-SCNC: 4.2 MMOL/L
SODIUM SERPL-SCNC: 140 MMOL/L

## 2018-08-28 PROCEDURE — 25000003 PHARM REV CODE 250: Mod: PO | Performed by: PHYSICIAN ASSISTANT

## 2018-08-28 PROCEDURE — 80048 BASIC METABOLIC PNL TOTAL CA: CPT | Mod: PO

## 2018-08-28 PROCEDURE — 36415 COLL VENOUS BLD VENIPUNCTURE: CPT | Mod: PO

## 2018-08-28 PROCEDURE — 63600175 PHARM REV CODE 636 W HCPCS: Mod: JG,PO | Performed by: PHYSICIAN ASSISTANT

## 2018-08-28 PROCEDURE — A4216 STERILE WATER/SALINE, 10 ML: HCPCS | Mod: PO | Performed by: PHYSICIAN ASSISTANT

## 2018-08-28 PROCEDURE — 96374 THER/PROPH/DIAG INJ IV PUSH: CPT | Mod: PO

## 2018-08-28 RX ORDER — IBANDRONATE SODIUM 3 MG/3 ML
3 SYRINGE (ML) INTRAVENOUS
Status: COMPLETED | OUTPATIENT
Start: 2018-08-28 | End: 2018-08-28

## 2018-08-28 RX ORDER — SODIUM CHLORIDE 0.9 % (FLUSH) 0.9 %
10 SYRINGE (ML) INJECTION
Status: DISCONTINUED | OUTPATIENT
Start: 2018-08-28 | End: 2018-08-28 | Stop reason: HOSPADM

## 2018-08-28 RX ORDER — IBANDRONATE SODIUM 3 MG/3 ML
3 SYRINGE (ML) INTRAVENOUS
Status: CANCELLED | OUTPATIENT
Start: 2018-08-28

## 2018-08-28 RX ORDER — SODIUM CHLORIDE 0.9 % (FLUSH) 0.9 %
10 SYRINGE (ML) INJECTION
Status: CANCELLED | OUTPATIENT
Start: 2018-08-28

## 2018-08-28 RX ORDER — TOPIRAMATE 25 MG/1
25 TABLET ORAL
COMMUNITY
End: 2019-02-08

## 2018-08-28 RX ORDER — TRAZODONE HYDROCHLORIDE 100 MG/1
100 TABLET ORAL
COMMUNITY
End: 2018-09-10

## 2018-08-28 RX ADMIN — SODIUM CHLORIDE, PRESERVATIVE FREE 10 ML: 5 INJECTION INTRAVENOUS at 10:08

## 2018-08-28 RX ADMIN — IBANDRONATE SODIUM 3 MG: 3 INJECTION INTRAVENOUS at 10:08

## 2018-08-28 NOTE — PROGRESS NOTES
Recent Labs? yes  Recent Invasive or planned dental procedures? denied  Boniva 3 mg administered slow IVP over 2 min via 24 gauge adm. Set to left a.c.   Pt tolerated well without adverse events. Pressure dressing applied.  See Vitals and MAR for further details.  Pt discharged ambulatory from clinic.

## 2018-09-04 ENCOUNTER — PATIENT MESSAGE (OUTPATIENT)
Dept: HEMATOLOGY/ONCOLOGY | Facility: CLINIC | Age: 68
End: 2018-09-04

## 2018-09-10 ENCOUNTER — OFFICE VISIT (OUTPATIENT)
Dept: INTERNAL MEDICINE | Facility: CLINIC | Age: 68
End: 2018-09-10
Payer: MEDICARE

## 2018-09-10 VITALS
SYSTOLIC BLOOD PRESSURE: 136 MMHG | WEIGHT: 233.38 LBS | BODY MASS INDEX: 44.06 KG/M2 | OXYGEN SATURATION: 98 % | HEIGHT: 61 IN | HEART RATE: 87 BPM | DIASTOLIC BLOOD PRESSURE: 67 MMHG | TEMPERATURE: 98 F

## 2018-09-10 DIAGNOSIS — M54.2 NECK PAIN: ICD-10-CM

## 2018-09-10 DIAGNOSIS — I48.0 PAROXYSMAL A-FIB: ICD-10-CM

## 2018-09-10 DIAGNOSIS — G54.2 CERVICAL NEUROPATHY: ICD-10-CM

## 2018-09-10 DIAGNOSIS — F33.42 RECURRENT MAJOR DEPRESSIVE DISORDER, IN FULL REMISSION: ICD-10-CM

## 2018-09-10 DIAGNOSIS — Z12.39 BREAST SCREENING: ICD-10-CM

## 2018-09-10 DIAGNOSIS — E11.42 DIABETIC POLYNEUROPATHY ASSOCIATED WITH TYPE 2 DIABETES MELLITUS: Primary | ICD-10-CM

## 2018-09-10 DIAGNOSIS — I10 ESSENTIAL HYPERTENSION: ICD-10-CM

## 2018-09-10 PROBLEM — R14.0 ABDOMINAL BLOATING: Status: RESOLVED | Noted: 2018-05-28 | Resolved: 2018-09-10

## 2018-09-10 PROBLEM — R42 LIGHTHEADEDNESS: Status: RESOLVED | Noted: 2018-07-16 | Resolved: 2018-09-10

## 2018-09-10 PROBLEM — M15.9 PRIMARY OSTEOARTHRITIS INVOLVING MULTIPLE JOINTS: Status: RESOLVED | Noted: 2017-10-13 | Resolved: 2018-09-10

## 2018-09-10 PROBLEM — B37.31 YEAST VAGINITIS: Status: RESOLVED | Noted: 2018-07-26 | Resolved: 2018-09-10

## 2018-09-10 PROBLEM — G47.31 PRIMARY CENTRAL SLEEP APNEA: Status: RESOLVED | Noted: 2017-06-22 | Resolved: 2018-09-10

## 2018-09-10 PROBLEM — M15.0 PRIMARY OSTEOARTHRITIS INVOLVING MULTIPLE JOINTS: Status: RESOLVED | Noted: 2017-10-13 | Resolved: 2018-09-10

## 2018-09-10 PROCEDURE — 99214 OFFICE O/P EST MOD 30 MIN: CPT | Mod: S$PBB,,, | Performed by: FAMILY MEDICINE

## 2018-09-10 PROCEDURE — 99213 OFFICE O/P EST LOW 20 MIN: CPT | Mod: PBBFAC,PO | Performed by: FAMILY MEDICINE

## 2018-09-10 PROCEDURE — 3045F PR MOST RECENT HEMOGLOBIN A1C LEVEL 7.0-9.0%: CPT | Mod: CPTII,,, | Performed by: FAMILY MEDICINE

## 2018-09-10 PROCEDURE — 3078F DIAST BP <80 MM HG: CPT | Mod: CPTII,,, | Performed by: FAMILY MEDICINE

## 2018-09-10 PROCEDURE — 3075F SYST BP GE 130 - 139MM HG: CPT | Mod: CPTII,,, | Performed by: FAMILY MEDICINE

## 2018-09-10 PROCEDURE — 99999 PR PBB SHADOW E&M-EST. PATIENT-LVL III: CPT | Mod: PBBFAC,,, | Performed by: FAMILY MEDICINE

## 2018-09-10 PROCEDURE — 82043 UR ALBUMIN QUANTITATIVE: CPT

## 2018-09-10 PROCEDURE — 1101F PT FALLS ASSESS-DOCD LE1/YR: CPT | Mod: CPTII,,, | Performed by: FAMILY MEDICINE

## 2018-09-10 PROCEDURE — 83036 HEMOGLOBIN GLYCOSYLATED A1C: CPT

## 2018-09-10 RX ORDER — INSULIN GLARGINE 100 [IU]/ML
INJECTION, SOLUTION SUBCUTANEOUS
Qty: 15 ML | Refills: 5
Start: 2018-09-10 | End: 2018-12-24 | Stop reason: SDUPTHER

## 2018-09-10 RX ORDER — DULOXETIN HYDROCHLORIDE 30 MG/1
30 CAPSULE, DELAYED RELEASE ORAL DAILY
Qty: 30 CAPSULE | Refills: 11 | Status: SHIPPED | OUTPATIENT
Start: 2018-09-10 | End: 2019-09-04 | Stop reason: SDUPTHER

## 2018-09-10 NOTE — PROGRESS NOTES
Subjective:       Patient ID: Christine Jo is a 68 y.o. female.    Chief Complaint: Follow-up; Knee Pain; and Shoulder Pain    She reports increased depression since her mother  recently.  She currently is on Cymbalta 60 mg a day.  She is unable take Wellbutrin due to side effects.  She also has chronic insomnia treated with Restoril.  trazodone did not help insomnia.  She has diabetes mellitus with home fasting blood sugar recently 127.  She denies polyuria polydipsia hypoglycemic symptoms.  She has paroxysmal atrial fibrillation with occasional palpitations.  She has hypertension with no chest pain shortness of breath or edema. Anemia is followed by Hematology.  She is 5 months duration of right neck pain with radiation to her right thumb.  She reports not having had an MRI scan in the past.  She was seen by Neurology years ago.      Diabetes   She has type 2 diabetes mellitus. No MedicAlert identification noted. The initial diagnosis of diabetes was made 10 years ago. Hypoglycemia symptoms include hunger, mood changes, nervousness/anxiousness and sweats. Pertinent negatives for hypoglycemia include no confusion or headaches. Associated symptoms include foot paresthesias. Pertinent negatives for diabetes include no chest pain, no fatigue, no polydipsia and no polyuria. Pertinent negatives for hypoglycemia complications include no blackouts, no hospitalization, no required assistance and no required glucagon injection. Symptoms are stable. Diabetic complications include autonomic neuropathy and heart disease. Pertinent negatives for diabetic complications include no CVA, nephropathy, peripheral neuropathy or retinopathy. Risk factors for coronary artery disease include dyslipidemia, family history, hypertension, obesity, post-menopausal, sedentary lifestyle and stress. Current diabetic treatment includes insulin injections and oral agent (monotherapy). She is compliant with treatment most of the time. She is  currently taking insulin pre-breakfast and at bedtime. Insulin injections are given by patient. Rotation sites for injection include the abdominal wall. Her weight is stable. She is following a generally unhealthy diet. Meal planning includes avoidance of concentrated sweets. She has not had a previous visit with a dietitian. She participates in exercise three times a week. She monitors blood glucose at home 1-2 x per day. She monitors urine at home <1 x per month. Blood glucose monitoring compliance is fair. Her home blood glucose trend is fluctuating minimally. She does not see a podiatrist.Eye exam is current.     Review of Systems   Constitutional: Negative for appetite change, diaphoresis, fatigue and unexpected weight change.   Respiratory: Negative for cough, chest tightness, shortness of breath and wheezing.    Cardiovascular: Positive for palpitations. Negative for chest pain and leg swelling.        Occasional palpitation with atrial intermittent fibrillation.  This is stable.  She denies lightheadedness syncope   Gastrointestinal: Negative for abdominal pain.   Endocrine: Negative for polydipsia and polyuria.   Genitourinary: Negative for difficulty urinating.   Musculoskeletal: Positive for neck pain and neck stiffness.        She reports neck pain with radiation to the right thumb.  She denies any recent neck trauma.   Neurological: Negative for headaches.   Psychiatric/Behavioral: Positive for dysphoric mood and sleep disturbance. Negative for confusion and suicidal ideas. The patient is nervous/anxious.        Objective:      Physical Exam   Constitutional: She is oriented to person, place, and time. She appears well-developed and well-nourished. No distress.   Neck: No JVD present. No tracheal deviation present. No thyromegaly present.   Cardiovascular: Normal rate, regular rhythm and normal heart sounds.   No murmur heard.  Pulmonary/Chest: Effort normal and breath sounds normal. No respiratory  distress. She has no wheezes.   Abdominal: Soft. Bowel sounds are normal. She exhibits no mass. There is no tenderness.   Musculoskeletal:   Tenderness of the right lateral neck   Lymphadenopathy:     She has no cervical adenopathy.   Neurological: She is alert and oriented to person, place, and time.   Skin: She is diaphoretic.       Lab Visit on 08/28/2018   Component Date Value Ref Range Status    Sodium 08/28/2018 140  136 - 145 mmol/L Final    Potassium 08/28/2018 4.2  3.5 - 5.1 mmol/L Final    Chloride 08/28/2018 104  95 - 110 mmol/L Final    CO2 08/28/2018 27  23 - 29 mmol/L Final    Glucose 08/28/2018 218* 70 - 110 mg/dL Final    BUN, Bld 08/28/2018 18  8 - 23 mg/dL Final    Creatinine 08/28/2018 0.8  0.5 - 1.4 mg/dL Final    Calcium 08/28/2018 9.4  8.7 - 10.5 mg/dL Final    Anion Gap 08/28/2018 9  8 - 16 mmol/L Final    eGFR if  08/28/2018 >60  >60 mL/min/1.73 m^2 Final    eGFR if non African American 08/28/2018 >60  >60 mL/min/1.73 m^2 Final     Assessment:       1. Breast screening    2. Neck pain    3. Diabetic polyneuropathy associated with type 2 diabetes mellitus        Plan:         Breast screening  -     Mammo Digital Screening Bilateral With CAD; Future; Expected date: 09/10/2018    Neck pain  -     MRI Cervical Spine Without Contrast; Future; Expected date: 09/10/2018    Diabetic polyneuropathy associated with type 2 diabetes mellitus  -     Hemoglobin A1c  -     Microalbumin/creatinine urine ratio    Other orders  -     DULoxetine (CYMBALTA) 30 MG capsule; Take 1 capsule (30 mg total) by mouth once daily.  Dispense: 30 capsule; Refill: 11  -     BASAGLAR KWIKPEN U-100 INSULIN 100 unit/mL (3 mL) InPn pen; ADM 64 UNI SC QD  Dispense: 15 mL; Refill: 5

## 2018-09-10 NOTE — PROGRESS NOTES
Subjective:       Patient ID: Christine Jo is a 68 y.o. female.    Chief Complaint: Follow-up; Knee Pain; and Shoulder Pain    Diabetes   She has type 2 diabetes mellitus. No MedicAlert identification noted. The initial diagnosis of diabetes was made 10 years ago. Hypoglycemia symptoms include confusion, dizziness, hunger, mood changes, speech difficulty and sweats. Pertinent negatives for hypoglycemia include no pallor or seizures. Associated symptoms include foot paresthesias. Pertinent negatives for hypoglycemia complications include no blackouts, no hospitalization, no required assistance and no required glucagon injection. Symptoms are stable. Diabetic complications include autonomic neuropathy and heart disease. Pertinent negatives for diabetic complications include no CVA, nephropathy, peripheral neuropathy or retinopathy. Risk factors for coronary artery disease include dyslipidemia, family history, hypertension, obesity, post-menopausal, sedentary lifestyle and stress. Current diabetic treatment includes insulin injections and oral agent (monotherapy). She is compliant with treatment most of the time. She is currently taking insulin pre-breakfast and at bedtime. Insulin injections are given by patient. Rotation sites for injection include the abdominal wall. Her weight is stable. She is following a generally unhealthy diet. Meal planning includes avoidance of concentrated sweets. She has not had a previous visit with a dietitian. She participates in exercise three times a week. She monitors blood glucose at home 1-2 x per day. She monitors urine at home <1 x per month. Blood glucose monitoring compliance is fair. Her home blood glucose trend is fluctuating minimally. She does not see a podiatrist.Eye exam is current.     Review of Systems   Skin: Negative for pallor.   Neurological: Positive for dizziness and speech difficulty. Negative for seizures.   Psychiatric/Behavioral: Positive for confusion.        Objective:      Physical Exam    Lab Visit on 08/28/2018   Component Date Value Ref Range Status    Sodium 08/28/2018 140  136 - 145 mmol/L Final    Potassium 08/28/2018 4.2  3.5 - 5.1 mmol/L Final    Chloride 08/28/2018 104  95 - 110 mmol/L Final    CO2 08/28/2018 27  23 - 29 mmol/L Final    Glucose 08/28/2018 218* 70 - 110 mg/dL Final    BUN, Bld 08/28/2018 18  8 - 23 mg/dL Final    Creatinine 08/28/2018 0.8  0.5 - 1.4 mg/dL Final    Calcium 08/28/2018 9.4  8.7 - 10.5 mg/dL Final    Anion Gap 08/28/2018 9  8 - 16 mmol/L Final    eGFR if  08/28/2018 >60  >60 mL/min/1.73 m^2 Final    eGFR if non African American 08/28/2018 >60  >60 mL/min/1.73 m^2 Final     Assessment:       1. Breast screening    2. Neck pain    3. Diabetic polyneuropathy associated with type 2 diabetes mellitus        Plan:     Health maintenance reviewed.  Routine mammogram was ordered.  Blood pressure is controlled 136/67 continue current medication.  Paroxysmal atrial fibrillation stable.  She has chronic neck pain with cervical radiculopathy symptoms.  MRI of the C-spine was ordered.  Insomnia is improved with Restoril.  Home glucose is 127.  A1c microalbumin creatinine ratio been ordered.  Increased depression with mild was recent death.  Increase Cymbalta 60 mg and start 90 mg a day.  Follow-up in 1 month.    Breast screening  -     Mammo Digital Screening Bilateral With CAD; Future; Expected date: 09/10/2018    Neck pain  -     MRI Cervical Spine Without Contrast; Future; Expected date: 09/10/2018    Diabetic polyneuropathy associated with type 2 diabetes mellitus  -     Hemoglobin A1c  -     Microalbumin/creatinine urine ratio    Other orders  -     DULoxetine (CYMBALTA) 30 MG capsule; Take 1 capsule (30 mg total) by mouth once daily.  Dispense: 30 capsule; Refill: 11  -     BASAGLAR KWIKPEN U-100 INSULIN 100 unit/mL (3 mL) InPn pen; ADM 64 UNI SC QD  Dispense: 15 mL; Refill: 5

## 2018-09-11 LAB
ALBUMIN/CREAT UR: 148.9 UG/MG
CREAT UR-MCNC: 45 MG/DL
ESTIMATED AVG GLUCOSE: 143 MG/DL
HBA1C MFR BLD HPLC: 6.6 %
MICROALBUMIN UR DL<=1MG/L-MCNC: 67 UG/ML

## 2018-09-13 ENCOUNTER — TELEPHONE (OUTPATIENT)
Dept: INTERNAL MEDICINE | Facility: CLINIC | Age: 68
End: 2018-09-13

## 2018-09-13 RX ORDER — LORAZEPAM 1 MG/1
1 TABLET ORAL EVERY 6 HOURS PRN
Qty: 10 TABLET | Refills: 0 | Status: SHIPPED | OUTPATIENT
Start: 2018-09-13 | End: 2018-12-31 | Stop reason: SDUPTHER

## 2018-09-13 NOTE — TELEPHONE ENCOUNTER
----- Message from Lorraine Peace sent at 9/13/2018  1:35 PM CDT -----  needs meds to help her relax for mri, having 9/24...[phone]      [id number] [place] - [address] OF [facility] [address]  [address]  [place] [address]  Phone: [phone] Fax: [phone]

## 2018-09-21 ENCOUNTER — TELEPHONE (OUTPATIENT)
Dept: RADIOLOGY | Facility: HOSPITAL | Age: 68
End: 2018-09-21

## 2018-09-26 ENCOUNTER — PATIENT OUTREACH (OUTPATIENT)
Dept: ADMINISTRATIVE | Facility: HOSPITAL | Age: 68
End: 2018-09-26

## 2018-09-26 ENCOUNTER — PATIENT MESSAGE (OUTPATIENT)
Dept: INTERNAL MEDICINE | Facility: CLINIC | Age: 68
End: 2018-09-26

## 2018-09-27 ENCOUNTER — PATIENT MESSAGE (OUTPATIENT)
Dept: ADMINISTRATIVE | Facility: HOSPITAL | Age: 68
End: 2018-09-27

## 2018-09-27 ENCOUNTER — OFFICE VISIT (OUTPATIENT)
Dept: INTERNAL MEDICINE | Facility: CLINIC | Age: 68
End: 2018-09-27
Payer: MEDICARE

## 2018-09-27 VITALS
WEIGHT: 234.81 LBS | HEIGHT: 61 IN | BODY MASS INDEX: 44.33 KG/M2 | HEART RATE: 89 BPM | DIASTOLIC BLOOD PRESSURE: 63 MMHG | TEMPERATURE: 98 F | SYSTOLIC BLOOD PRESSURE: 134 MMHG

## 2018-09-27 DIAGNOSIS — E11.29 CONTROLLED TYPE 2 DIABETES MELLITUS WITH MICROALBUMINURIA, WITH LONG-TERM CURRENT USE OF INSULIN: ICD-10-CM

## 2018-09-27 DIAGNOSIS — R80.9 CONTROLLED TYPE 2 DIABETES MELLITUS WITH MICROALBUMINURIA, WITH LONG-TERM CURRENT USE OF INSULIN: ICD-10-CM

## 2018-09-27 DIAGNOSIS — I10 ESSENTIAL HYPERTENSION: ICD-10-CM

## 2018-09-27 DIAGNOSIS — Z79.4 CONTROLLED TYPE 2 DIABETES MELLITUS WITH MICROALBUMINURIA, WITH LONG-TERM CURRENT USE OF INSULIN: ICD-10-CM

## 2018-09-27 DIAGNOSIS — R42 LIGHTHEADEDNESS: Primary | ICD-10-CM

## 2018-09-27 PROCEDURE — G0009 ADMIN PNEUMOCOCCAL VACCINE: HCPCS | Mod: PBBFAC,PO

## 2018-09-27 PROCEDURE — 99499 UNLISTED E&M SERVICE: CPT | Mod: S$GLB,,, | Performed by: FAMILY MEDICINE

## 2018-09-27 PROCEDURE — 3078F DIAST BP <80 MM HG: CPT | Mod: CPTII,,, | Performed by: FAMILY MEDICINE

## 2018-09-27 PROCEDURE — 3075F SYST BP GE 130 - 139MM HG: CPT | Mod: CPTII,,, | Performed by: FAMILY MEDICINE

## 2018-09-27 PROCEDURE — 99214 OFFICE O/P EST MOD 30 MIN: CPT | Mod: PBBFAC,PO,25 | Performed by: FAMILY MEDICINE

## 2018-09-27 PROCEDURE — 99213 OFFICE O/P EST LOW 20 MIN: CPT | Mod: S$PBB,,, | Performed by: FAMILY MEDICINE

## 2018-09-27 PROCEDURE — 1101F PT FALLS ASSESS-DOCD LE1/YR: CPT | Mod: CPTII,,, | Performed by: FAMILY MEDICINE

## 2018-09-27 PROCEDURE — 99999 PR PBB SHADOW E&M-EST. PATIENT-LVL IV: CPT | Mod: PBBFAC,,, | Performed by: FAMILY MEDICINE

## 2018-09-27 PROCEDURE — 3044F HG A1C LEVEL LT 7.0%: CPT | Mod: CPTII,,, | Performed by: FAMILY MEDICINE

## 2018-09-27 PROCEDURE — 90662 IIV NO PRSV INCREASED AG IM: CPT | Mod: PBBFAC,PO

## 2018-09-27 NOTE — PROGRESS NOTES
Subjective:       Patient ID: Christine Jo is a 68 y.o. female.    Chief Complaint: Follow-up    Since her mother  wants month ago she has have episodes of PI lightheaded bitemporal headaches and chest pain. She is on Victoza and basagar at the same dosages for the past year more.  She reports she is eating a bit less since her mother .  Her home fasting glucoses have been in the 150-140 range.  Recent A1c 6.6.  Her episodes usually occur about 10 o'clock the morning.  She also recent increase Cymbalta for depression now taking 90 mg a day.      Review of Systems   Constitutional: Negative for appetite change, fatigue and unexpected weight change.   Respiratory: Negative for cough, shortness of breath and wheezing.    Cardiovascular: Positive for chest pain. Negative for palpitations and leg swelling.   Gastrointestinal: Negative for abdominal pain.   Genitourinary: Negative for difficulty urinating.   Neurological: Positive for light-headedness and headaches.   Psychiatric/Behavioral: Positive for dysphoric mood.       Objective:      Physical Exam   Constitutional: She is oriented to person, place, and time. She appears well-developed and well-nourished. No distress.   Neck: Neck supple. No thyromegaly present.   Cardiovascular: Normal rate, regular rhythm and normal heart sounds.   No murmur heard.  Pulmonary/Chest: Effort normal and breath sounds normal. No respiratory distress. She has no wheezes.   Abdominal: Soft. Bowel sounds are normal. She exhibits no mass. There is no tenderness.   Lymphadenopathy:     She has no cervical adenopathy.   Neurological: She is alert and oriented to person, place, and time.       Office Visit on 09/10/2018   Component Date Value Ref Range Status    Hemoglobin A1C 09/10/2018 6.6* 4.0 - 5.6 % Final    Estimated Avg Glucose 09/10/2018 143* 68 - 131 mg/dL Final    Microalbum.,U,Random 09/10/2018 67.0  ug/mL Final    Creatinine, Random Ur 09/10/2018 45.0  15.0 - 325.0  mg/dL Final    Microalb Creat Ratio 09/10/2018 148.9* 0.0 - 30.0 ug/mg Final     Assessment:       No diagnosis found.    Plan:   Continue diabetic medications as is.  Check glucose at the time that she feels lightheaded.  Will change Cymbalta and start taking at supper time instead of in the morning.  Follow-up in 2 weeks is planned.  Symptoms less likely hypoglycemic less likely Cymbalta side effect more likely stress related.  Blood pressure remains controlled at 134/63 health maintenance reviewed.  Flu fax pneumococcal 23 given today      There are no diagnoses linked to this encounter.

## 2018-10-01 ENCOUNTER — PATIENT MESSAGE (OUTPATIENT)
Dept: INTERNAL MEDICINE | Facility: CLINIC | Age: 68
End: 2018-10-01

## 2018-10-01 RX ORDER — ATORVASTATIN CALCIUM 40 MG/1
40 TABLET, FILM COATED ORAL DAILY
Qty: 90 TABLET | Refills: 3 | Status: SHIPPED | OUTPATIENT
Start: 2018-10-01 | End: 2019-08-10 | Stop reason: SDUPTHER

## 2018-10-01 RX ORDER — DULOXETIN HYDROCHLORIDE 30 MG/1
30 CAPSULE, DELAYED RELEASE ORAL DAILY
Qty: 30 CAPSULE | Refills: 11 | Status: CANCELLED | OUTPATIENT
Start: 2018-10-01 | End: 2019-10-01

## 2018-10-01 RX ORDER — DULOXETIN HYDROCHLORIDE 60 MG/1
60 CAPSULE, DELAYED RELEASE ORAL DAILY
Qty: 90 CAPSULE | Refills: 3 | Status: SHIPPED | OUTPATIENT
Start: 2018-10-01 | End: 2019-10-05 | Stop reason: SDUPTHER

## 2018-10-01 RX ORDER — GABAPENTIN 100 MG/1
100 CAPSULE ORAL 3 TIMES DAILY
Qty: 270 CAPSULE | Refills: 3 | Status: SHIPPED | OUTPATIENT
Start: 2018-10-01 | End: 2019-10-23 | Stop reason: SDUPTHER

## 2018-10-01 RX ORDER — CARVEDILOL 6.25 MG/1
6.25 TABLET ORAL 2 TIMES DAILY WITH MEALS
Qty: 180 TABLET | Refills: 3 | Status: SHIPPED | OUTPATIENT
Start: 2018-10-01 | End: 2019-10-05 | Stop reason: SDUPTHER

## 2018-10-01 NOTE — TELEPHONE ENCOUNTER
Spoke to patient and advised that medication refills has been sent to the pharmacy.  Patient verbally understood.

## 2018-10-02 ENCOUNTER — TELEPHONE (OUTPATIENT)
Dept: RADIOLOGY | Facility: HOSPITAL | Age: 68
End: 2018-10-02

## 2018-10-03 ENCOUNTER — HOSPITAL ENCOUNTER (OUTPATIENT)
Dept: RADIOLOGY | Facility: HOSPITAL | Age: 68
Discharge: HOME | End: 2018-10-03
Attending: FAMILY MEDICINE
Payer: MEDICARE

## 2018-10-03 DIAGNOSIS — M54.2 NECK PAIN: ICD-10-CM

## 2018-10-03 PROCEDURE — 72141 MRI NECK SPINE W/O DYE: CPT | Mod: 26,,, | Performed by: RADIOLOGY

## 2018-10-03 PROCEDURE — 72141 MRI NECK SPINE W/O DYE: CPT | Mod: TC,PO

## 2018-10-04 ENCOUNTER — PATIENT MESSAGE (OUTPATIENT)
Dept: INTERNAL MEDICINE | Facility: CLINIC | Age: 68
End: 2018-10-04

## 2018-10-10 ENCOUNTER — OFFICE VISIT (OUTPATIENT)
Dept: INTERNAL MEDICINE | Facility: CLINIC | Age: 68
End: 2018-10-10
Payer: MEDICARE

## 2018-10-10 ENCOUNTER — TELEPHONE (OUTPATIENT)
Dept: HEMATOLOGY/ONCOLOGY | Facility: CLINIC | Age: 68
End: 2018-10-10

## 2018-10-10 VITALS
WEIGHT: 235.25 LBS | TEMPERATURE: 98 F | SYSTOLIC BLOOD PRESSURE: 120 MMHG | OXYGEN SATURATION: 97 % | HEIGHT: 61 IN | HEART RATE: 77 BPM | BODY MASS INDEX: 44.42 KG/M2 | DIASTOLIC BLOOD PRESSURE: 60 MMHG

## 2018-10-10 DIAGNOSIS — E11.42 CONTROLLED TYPE 2 DIABETES MELLITUS WITH DIABETIC POLYNEUROPATHY, WITH LONG-TERM CURRENT USE OF INSULIN: ICD-10-CM

## 2018-10-10 DIAGNOSIS — I10 ESSENTIAL HYPERTENSION: ICD-10-CM

## 2018-10-10 DIAGNOSIS — M54.41 CHRONIC BILATERAL LOW BACK PAIN WITH BILATERAL SCIATICA: Primary | ICD-10-CM

## 2018-10-10 DIAGNOSIS — G89.29 CHRONIC BILATERAL LOW BACK PAIN WITH BILATERAL SCIATICA: Primary | ICD-10-CM

## 2018-10-10 DIAGNOSIS — M54.42 CHRONIC BILATERAL LOW BACK PAIN WITH BILATERAL SCIATICA: Primary | ICD-10-CM

## 2018-10-10 DIAGNOSIS — Z79.4 CONTROLLED TYPE 2 DIABETES MELLITUS WITH DIABETIC POLYNEUROPATHY, WITH LONG-TERM CURRENT USE OF INSULIN: ICD-10-CM

## 2018-10-10 DIAGNOSIS — F33.42 RECURRENT MAJOR DEPRESSIVE DISORDER, IN FULL REMISSION: ICD-10-CM

## 2018-10-10 PROCEDURE — 99999 PR PBB SHADOW E&M-EST. PATIENT-LVL IV: CPT | Mod: PBBFAC,,, | Performed by: FAMILY MEDICINE

## 2018-10-10 PROCEDURE — 99214 OFFICE O/P EST MOD 30 MIN: CPT | Mod: PBBFAC,PO | Performed by: FAMILY MEDICINE

## 2018-10-10 PROCEDURE — 99214 OFFICE O/P EST MOD 30 MIN: CPT | Mod: S$PBB,,, | Performed by: FAMILY MEDICINE

## 2018-10-10 PROCEDURE — 3074F SYST BP LT 130 MM HG: CPT | Mod: CPTII,,, | Performed by: FAMILY MEDICINE

## 2018-10-10 PROCEDURE — 3078F DIAST BP <80 MM HG: CPT | Mod: CPTII,,, | Performed by: FAMILY MEDICINE

## 2018-10-10 PROCEDURE — 1101F PT FALLS ASSESS-DOCD LE1/YR: CPT | Mod: CPTII,,, | Performed by: FAMILY MEDICINE

## 2018-10-10 PROCEDURE — 3044F HG A1C LEVEL LT 7.0%: CPT | Mod: CPTII,,, | Performed by: FAMILY MEDICINE

## 2018-10-10 NOTE — PROGRESS NOTES
Subjective:       Patient ID: Christine Jo is a 68 y.o. female.    Chief Complaint: Follow-up    Follow-up back pain.  MRI was reviewed.  She is requesting pain management referral.  She is also interested in bariatric medicine evaluation.  BMI of 44 with diabetes and polyneuropathy.  Recent A1c was in the 6 range.  Cymbalta is helping depression.      Review of Systems   Constitutional: Negative for appetite change, diaphoresis, fatigue and unexpected weight change.   Respiratory: Negative for cough, chest tightness, shortness of breath and wheezing.    Cardiovascular: Negative for chest pain, palpitations and leg swelling.        Denies lightheadedness syncope   Gastrointestinal: Negative for abdominal pain.   Endocrine: Negative for polydipsia and polyuria.   Genitourinary: Negative for difficulty urinating.   Musculoskeletal: Positive for back pain.   Neurological: Positive for numbness. Negative for dizziness, syncope, speech difficulty, weakness, light-headedness and headaches.   Psychiatric/Behavioral: Positive for dysphoric mood.       Objective:      Physical Exam   Constitutional: She is oriented to person, place, and time. She appears well-developed and well-nourished. No distress.   Neck: Neck supple. No thyromegaly present.   Cardiovascular: Normal rate, regular rhythm and normal heart sounds.   No murmur heard.  Pulmonary/Chest: Effort normal and breath sounds normal. No respiratory distress. She has no wheezes.   Abdominal: Soft. Bowel sounds are normal. She exhibits no mass. There is no tenderness.   Lymphadenopathy:     She has no cervical adenopathy.   Neurological: She is alert and oriented to person, place, and time.   Skin: She is not diaphoretic.       Office Visit on 09/10/2018   Component Date Value Ref Range Status    Hemoglobin A1C 09/10/2018 6.6* 4.0 - 5.6 % Final    Estimated Avg Glucose 09/10/2018 143* 68 - 131 mg/dL Final    Microalbum.,U,Random 09/10/2018 67.0  ug/mL Final     Creatinine, Random Ur 09/10/2018 45.0  15.0 - 325.0 mg/dL Final    Microalb Creat Ratio 09/10/2018 148.9* 0.0 - 30.0 ug/mg Final     Assessment:       1. Chronic bilateral low back pain with bilateral sciatica    2. BMI 45.0-49.9, adult    3. Controlled type 2 diabetes mellitus with diabetic polyneuropathy, with long-term current use of insulin        Plan:     Blood pressure diabetes a control.  Pain management referral and.  Gastric medicine referral.  Follow-up in 3 months she is scheduled for mammogram this Friday  Chronic bilateral low back pain with bilateral sciatica  -     Ambulatory referral to Pain Clinic    BMI 45.0-49.9, adult  -     Ambulatory consult to Bariatric Medicine    Controlled type 2 diabetes mellitus with diabetic polyneuropathy, with long-term current use of insulin  -     Ambulatory consult to Bariatric Medicine

## 2018-10-10 NOTE — TELEPHONE ENCOUNTER
SW returned pt's call today. She explained she had met with SW earlier this year and she was hoping we could help her find a female counselor in her network. Pt explained she has contacted [facility] and they are sending her a list in the mail of in-network providers. SW explained this is pt's best option, but SW encouraged her to call us if she cannot find a female provider on the list sent to her. Pt verbalized understanding. SW will f/u as requested.

## 2018-10-12 ENCOUNTER — HOSPITAL ENCOUNTER (OUTPATIENT)
Dept: RADIOLOGY | Facility: HOSPITAL | Age: 68
Discharge: HOME | End: 2018-10-12
Attending: FAMILY MEDICINE
Payer: MEDICARE

## 2018-10-12 VITALS — BODY MASS INDEX: 44.37 KG/M2 | WEIGHT: 235 LBS | HEIGHT: 61 IN

## 2018-10-12 DIAGNOSIS — Z12.39 BREAST SCREENING: ICD-10-CM

## 2018-10-12 PROCEDURE — 77067 SCR MAMMO BI INCL CAD: CPT | Mod: TC,PO

## 2018-10-12 PROCEDURE — 77063 BREAST TOMOSYNTHESIS BI: CPT | Mod: 26,,, | Performed by: RADIOLOGY

## 2018-10-12 PROCEDURE — 77063 BREAST TOMOSYNTHESIS BI: CPT | Mod: TC,PO

## 2018-10-12 PROCEDURE — 77067 SCR MAMMO BI INCL CAD: CPT | Mod: 26,,, | Performed by: RADIOLOGY

## 2018-10-15 ENCOUNTER — PATIENT MESSAGE (OUTPATIENT)
Dept: INTERNAL MEDICINE | Facility: CLINIC | Age: 68
End: 2018-10-15

## 2018-10-18 ENCOUNTER — OFFICE VISIT (OUTPATIENT)
Dept: PAIN MEDICINE | Facility: CLINIC | Age: 68
End: 2018-10-18
Payer: MEDICARE

## 2018-10-18 ENCOUNTER — HOSPITAL ENCOUNTER (OUTPATIENT)
Dept: RADIOLOGY | Facility: HOSPITAL | Age: 68
Discharge: HOME | End: 2018-10-18
Attending: PAIN MEDICINE
Payer: MEDICARE

## 2018-10-18 VITALS
SYSTOLIC BLOOD PRESSURE: 126 MMHG | TEMPERATURE: 98 F | BODY MASS INDEX: 44.4 KG/M2 | DIASTOLIC BLOOD PRESSURE: 69 MMHG | HEART RATE: 82 BPM | OXYGEN SATURATION: 97 % | WEIGHT: 235 LBS

## 2018-10-18 DIAGNOSIS — M43.12 SPONDYLOLISTHESIS OF CERVICAL REGION: ICD-10-CM

## 2018-10-18 DIAGNOSIS — M54.12 CERVICAL RADICULOPATHY: ICD-10-CM

## 2018-10-18 DIAGNOSIS — M47.812 SPONDYLOSIS OF CERVICAL REGION WITHOUT MYELOPATHY OR RADICULOPATHY: Primary | ICD-10-CM

## 2018-10-18 PROCEDURE — 1101F PT FALLS ASSESS-DOCD LE1/YR: CPT | Mod: CPTII,,, | Performed by: PAIN MEDICINE

## 2018-10-18 PROCEDURE — 99214 OFFICE O/P EST MOD 30 MIN: CPT | Mod: PBBFAC,25 | Performed by: PAIN MEDICINE

## 2018-10-18 PROCEDURE — 3074F SYST BP LT 130 MM HG: CPT | Mod: CPTII,,, | Performed by: PAIN MEDICINE

## 2018-10-18 PROCEDURE — 99999 PR PBB SHADOW E&M-EST. PATIENT-LVL IV: CPT | Mod: PBBFAC,,, | Performed by: PAIN MEDICINE

## 2018-10-18 PROCEDURE — 72052 X-RAY EXAM NECK SPINE 6/>VWS: CPT | Mod: TC

## 2018-10-18 PROCEDURE — 3078F DIAST BP <80 MM HG: CPT | Mod: CPTII,,, | Performed by: PAIN MEDICINE

## 2018-10-18 PROCEDURE — 99204 OFFICE O/P NEW MOD 45 MIN: CPT | Mod: S$PBB,,, | Performed by: PAIN MEDICINE

## 2018-10-18 PROCEDURE — 72052 X-RAY EXAM NECK SPINE 6/>VWS: CPT | Mod: 26,,, | Performed by: RADIOLOGY

## 2018-10-18 NOTE — PATIENT INSTRUCTIONS
- recommend alpha lipoic acid 300mg twice daily  - provide a referral for physical therapy  -will obtain cervical spine flexion-extension x-ray today  -will schedule for right C4, C5, C6 medial branch blocks  -follow up in clinic in 6 weeks

## 2018-10-18 NOTE — LETTER
October 18, 2018      Jose Szymanski MD  [address] [place] [address] Dr Michelle [address] [id number]           [facility]  [id number] [place] [address]  [place] [address]  Phone: [phone]  Fax: [phone]          Patient: Christine Jo   MR Number: [id number]   YOB: 1950   Date of Visit: 10/18/2018       Dear Dr. Jose Szymanski:    Thank you for referring Christine Jo to me for evaluation. Attached you will find relevant portions of my assessment and plan of care.    If you have questions, please do not hesitate to call me. I look forward to following Christine Jo along with you.    Sincerely,    Phuc Christiansen MD    Enclosure  CC:  No Recipients    If you would like to receive this communication electronically, please contact [email or web address] or [phone] to request more information on [facility] access.    For providers and/or their staff who would like to refer a patient to [facility], please contact us through our one-stop-shop provider referral line, [facility], at [phone].    If you feel you have received this communication in error or would no longer like to receive these types of communications, please e-mail [email or web address]

## 2018-10-18 NOTE — PROGRESS NOTES
Chief Pain Complaint:  Neck Pain      History of Present Illness:   This patient is a 68 y.o. female who presents today complaining of the above noted pain/s. The patient describes the pain as follows.  Ms. Jo as a history of major depressive disorder, AFib, hypertension, diabetes type 2, GERD presents to clinic with complaints of cervical pain with radicular symptoms in the right arm.  She has been having symptoms for several years which has progressively gotten worse.  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  Her symptoms are worse on the right but occasionally she does have symptoms on the left side and she finds it to be worse when she rotates her head to the right such as when she is driving.  No increase in symptoms when she turns to the left or flexes or extends.  She has found that hot water in the shower helps with her symptoms.  She does have headaches occasionally proximally to this week.  She describes the pain as 50% cervical spine a 50% radiating down the arm.  She tries to avoid NSAIDs that she has had stomach irritation previously with chronic use.  In the past she has taking gabapentin, Tylenol, Cymbalta.  She denies numbness and weakness in her left upper extremity while she endorses having numbness and weakness in the right upper extremity specifically in the C6 dermatome.  She denies having bowel bladder difficulty. She has not participated in physical therapy.    Previous Therapy:  Medications:  Gabapentin, Tylenol, Cymbalta  Injections:  None  Surgeries:  None  Physical Therapy:  None    Past Surgical History:   Procedure Laterality Date    abdominal laparoscopy       BREAST BIOPSY      CATARACT EXTRACTION Bilateral [id number]    [facility] in [place]    EYE SURGERY      TONSILLECTOMY      TUBAL LIGATION           Imaging / Labs / Studies (reviewed on 10/18/2018):  Results for orders placed during  the hospital encounter of 10/03/18   MRI Cervical Spine Without Contrast    Narrative EXAMINATION:  MRI CERVICAL SPINE WITHOUT CONTRAST    CLINICAL HISTORY:  Neck pain, first study; Cervicalgia    TECHNIQUE:  Multiplanar, multisequence MR images of the cervical spine were performed without the administration of contrast.    COMPARISON:  None.    FINDINGS:  Vertebral body heights are maintained.  There is straightening and mild reversal of cervical lordosis with mild retrolisthesis of C6 on C7.  Mild anterior spondylolisthesis of C4 on C5.  Multilevel disc desiccation present with height loss most prevalent at C5-6 and C6-7.  Mild Modic 1 endplate changes noted at C5-6 and C6-7.    Posterior fossa is unremarkable.  No concerning spinal cord signal abnormality.  Neck soft tissues are unremarkable.    C2-C3: No spinal canal stenosis or neural foraminal narrowing.    C3-C4: No significant posterior disc bulge or spinal canal stenosis.  Right greater than left facet and uncovertebral hypertrophy present with moderate right neural foraminal narrowing.  Minimal right-sided facet edema present.    C4-C5: No significant posterior disc bulge or spinal canal stenosis.  Right greater than left facet and uncovertebral hypertrophy present with mild neural foraminal narrowing.  Right-sided facet/perifacet mild edema changes.    C5-C6: Posterior disc osteophyte complex present effacing the anterior thecal sac causing mild spinal canal stenosis.  Facet and uncovertebral hypertrophy noted with mild to moderate bilateral neural foraminal narrowing.    C6-C7: Posterior disc osteophyte complex and retrolisthesis cause moderate spinal canal stenosis.  Facet and uncovertebral hypertrophy present causing mild-to-moderate right greater than left neural foraminal narrowing.    C7-T1: No spinal canal stenosis or neural foraminal narrowing.      Impression Degenerative changes as above most prevalent at C5-6 and C6-7 with spinal canal stenosis  and neural foraminal narrowing.     Review of Systems:  CONSTITUTIONAL: patient denies any fever, chills, or weight loss  SKIN: patient denies any rash or itching  RESPIRATORY: patient denies having any shortness of breath  GASTROINTESTINAL: patient denies having any diarrhea, constipation, or bowel incontinence  GENITOURINARY: patient denies having any abnormal bladder function    MUSCULOSKELETAL:  - patient complains of the above noted pain/s (see chief pain complaint)    NEUROLOGICAL:   - pain as above  - strength in upper extremities is intact, BILATERALLY  - sensation in Upper extremities is intact, BILATERALLY  - patient denies any loss of bowel or bladder control      PSYCHIATRIC: patient denies any change in mood    Other:  All other systems reviewed and are negative      Physical Exam:  /69 (BP Location: Right arm, Patient Position: Sitting)   Pulse 82   Temp 98.1 °F (36.7 °C)   Wt 106.6 kg (235 lb 0.2 oz)   SpO2 97%   BMI 44.40 kg/m²  (reviewed on 10/18/2018)\  General:  Alert and oriented, No acute distress.    HEENT:       EOMI.  Normocephalic, atraumatic.   Cardiovascular:  Regular rate.    Gastrointestinal:  Soft.    Respiratory:  Respirations are non-labored.    Cervical Spine:  No masses or atrophy,    Range of motion - full Flexion, Extension,  Right Rotation, Left Rotation, Right Lat Bending,  Left Lat Bending         Increased pain with right lateral rotation         Palpation - tender to palpation over right cervical facets        Spurling's - negative   Thoracic Spine:  Palpation normal.    Motor Exam:    Strength:  Rate on 1-5 scale Right Left   C5-Elbow flexion, Deltoid 5 5   C6-Wrist extension 5 5   C7- Elbow / finger extension 5 5   C8- Finger flexion  5 5   T1- Intrinsics hand 5 5     Sensory Exam:  Full and equal sensation to light touch throughout.    Reflexes   Left DTR's   Biceps.   2  Triceps.   2   Brachioradialis 2.     Right DTR's   Biceps.   2  Triceps.    2  Brachioradialis. 2     Neurologic:  Cranial Nerves II-XII are grossly intact.    Psychiatric:  Cooperative.    Gait: Normal    Assessment  Cervical Spondylosis  Cervical Radiculopathy    1. 68 y.o. year old patient with PMH of   Past Medical History:   Diagnosis Date    Arthritis     Cataract     Diabetes mellitus 2008     am 01/15/2018 Insulin x 1 year    Glaucoma     Hypertension     Insomnia     Macular degeneration     Vaginal yeast infection       presenting with pain located cervical spine, right upper extremity  2. Pain Generators / Etiology :  Cervical spondylosis, cervical radiculopathy  3. Failed Meds (E- Effective, NE- Not Effective):  Tylenol-minimally effective, Gabapentin-minimally effective, Cymbalta-not effective  4. Physical Therapy - none  5. Psychological comorbidities -  none  6. Anticoagulants / Antiplatelets:  Apixaban 5 mg     PLAN:  1. Medications :  Recommend alpha lipoic acid 300 mg twice daily  2. PT - provider referral for physical therapy  3. Psychological - none  4. Labs - obtain  none; reviewed labs from 08/28/2018, creatinine 0.8  5. Imaging - obtain cervical flexion-extension x-ray today; reviewed cervical MRI with the patient today in clinic  6. Interventions - schedule right C4, C5, C6 cervical medial branch blocks; consider right C6 transforaminal epidural steroid injection in the future  7. Referrals - none  8. Records - none  9. Follow up visit - follow up in clinic in 6 weeks  10. Patient Questions - answered all patient's questions regarding diagnosis, therapy, treatment    OLIVIA Christiansen MD  Interventional Pain  [facility] [place]

## 2018-10-19 ENCOUNTER — TELEPHONE (OUTPATIENT)
Dept: PAIN MEDICINE | Facility: CLINIC | Age: 68
End: 2018-10-19

## 2018-10-19 NOTE — TELEPHONE ENCOUNTER
Contacted patient; patient stated that she was returning call. Unsure who reached out to patient. Informed patient that I will find out if Dr. Christiansen contacted her and if so have him reach out to her next week. Pt verbalized understanding.     ----- Message from Sean Cedeño sent at 10/19/2018  2:53 PM CDT -----  Contact: self [phone]  Returning call, please call back at [phone].  Md Paulie

## 2018-10-23 ENCOUNTER — TELEPHONE (OUTPATIENT)
Dept: PAIN MEDICINE | Facility: CLINIC | Age: 68
End: 2018-10-23

## 2018-10-23 RX ORDER — OMEPRAZOLE 20 MG/1
CAPSULE, DELAYED RELEASE ORAL
Qty: 90 CAPSULE | Refills: 0 | Status: SHIPPED | OUTPATIENT
Start: 2018-10-23 | End: 2019-01-17 | Stop reason: SDUPTHER

## 2018-10-23 NOTE — TELEPHONE ENCOUNTER
Returned Ms. Jo's phone call today and left a voicemail regarding her C spine xray is grossly unchanged from previous MRI.    OLIVIA Christiansen MD  Interventional Pain Medicine  [facility] [place]

## 2018-10-25 ENCOUNTER — PATIENT MESSAGE (OUTPATIENT)
Dept: PAIN MEDICINE | Facility: CLINIC | Age: 68
End: 2018-10-25

## 2018-10-26 ENCOUNTER — TELEPHONE (OUTPATIENT)
Dept: PAIN MEDICINE | Facility: CLINIC | Age: 68
End: 2018-10-26

## 2018-10-29 ENCOUNTER — PATIENT MESSAGE (OUTPATIENT)
Dept: INTERNAL MEDICINE | Facility: CLINIC | Age: 68
End: 2018-10-29

## 2018-10-29 RX ORDER — FLUCONAZOLE 150 MG/1
150 TABLET ORAL DAILY
Qty: 1 TABLET | Refills: 0 | Status: SHIPPED | OUTPATIENT
Start: 2018-10-29 | End: 2018-10-30

## 2018-10-29 RX ORDER — TEMAZEPAM 30 MG/1
CAPSULE ORAL
Qty: 30 CAPSULE | Refills: 2 | Status: SHIPPED | OUTPATIENT
Start: 2018-10-29 | End: 2019-02-08 | Stop reason: SDUPTHER

## 2018-10-30 ENCOUNTER — PATIENT MESSAGE (OUTPATIENT)
Dept: INTERNAL MEDICINE | Facility: CLINIC | Age: 68
End: 2018-10-30

## 2018-10-30 ENCOUNTER — TELEPHONE (OUTPATIENT)
Dept: PSYCHIATRY | Facility: CLINIC | Age: 68
End: 2018-10-30

## 2018-10-30 NOTE — TELEPHONE ENCOUNTER
----- Message from Lorraine Peace sent at 10/30/2018 10:39 AM CDT -----  needs to [facility]..[phone] (fslm)

## 2018-10-30 NOTE — TELEPHONE ENCOUNTER
Spoke with patient and she expressed interestin seeing the new female psychiatrist  Was previously seeing MARIAH Oliveros    Scheduled pt with Dr. Hammond 11/8/2018 @ [facility]

## 2018-11-01 ENCOUNTER — TELEPHONE (OUTPATIENT)
Dept: PAIN MEDICINE | Facility: CLINIC | Age: 68
End: 2018-11-01

## 2018-11-01 NOTE — TELEPHONE ENCOUNTER
No answer. Left message to return call.     ----- Message from Rere Singh sent at 11/1/2018  9:12 AM CDT -----  Contact: Patient  Patient needs to schedule an appointment for her procedure.  Call Back#[phone]  Thanks

## 2018-11-02 ENCOUNTER — TELEPHONE (OUTPATIENT)
Dept: PAIN MEDICINE | Facility: CLINIC | Age: 68
End: 2018-11-02

## 2018-11-02 NOTE — TELEPHONE ENCOUNTER
Contacted patient; injection and follow up appointment scheduled. Instructions given verbally and will also mail a copy to listed address. Pt verbalized understanding.

## 2018-11-05 RX ORDER — BENAZEPRIL HYDROCHLORIDE 40 MG/1
TABLET ORAL
Qty: 90 TABLET | Refills: 0 | Status: SHIPPED | OUTPATIENT
Start: 2018-11-05 | End: 2019-01-27 | Stop reason: SDUPTHER

## 2018-11-05 RX ORDER — SITAGLIPTIN 100 MG/1
TABLET, FILM COATED ORAL
Qty: 90 TABLET | Refills: 0 | Status: SHIPPED | OUTPATIENT
Start: 2018-11-05 | End: 2019-01-27 | Stop reason: SDUPTHER

## 2018-11-12 RX ORDER — PEN NEEDLE, DIABETIC 31 GX5/16"
NEEDLE, DISPOSABLE MISCELLANEOUS
Qty: 100 EACH | Refills: 0 | Status: SHIPPED | OUTPATIENT
Start: 2018-11-12 | End: 2018-12-31 | Stop reason: SDUPTHER

## 2018-11-16 ENCOUNTER — PATIENT MESSAGE (OUTPATIENT)
Dept: CARDIOLOGY | Facility: HOSPITAL | Age: 68
End: 2018-11-16

## 2018-11-23 ENCOUNTER — HOSPITAL ENCOUNTER (OUTPATIENT)
Facility: HOSPITAL | Age: 68
Discharge: HOME | End: 2018-11-23
Attending: PAIN MEDICINE | Admitting: PAIN MEDICINE
Payer: MEDICARE

## 2018-11-23 VITALS
OXYGEN SATURATION: 100 % | HEART RATE: 74 BPM | RESPIRATION RATE: 16 BRPM | TEMPERATURE: 98 F | SYSTOLIC BLOOD PRESSURE: 140 MMHG | DIASTOLIC BLOOD PRESSURE: 70 MMHG

## 2018-11-23 DIAGNOSIS — M47.812 SPONDYLOSIS OF CERVICAL REGION WITHOUT MYELOPATHY OR RADICULOPATHY: ICD-10-CM

## 2018-11-23 PROCEDURE — 25000003 PHARM REV CODE 250: Performed by: PAIN MEDICINE

## 2018-11-23 PROCEDURE — 25000003 PHARM REV CODE 250

## 2018-11-23 PROCEDURE — S0020 INJECTION, BUPIVICAINE HYDRO: HCPCS

## 2018-11-23 PROCEDURE — 63600175 PHARM REV CODE 636 W HCPCS

## 2018-11-23 PROCEDURE — 63600175 PHARM REV CODE 636 W HCPCS: Performed by: PAIN MEDICINE

## 2018-11-23 PROCEDURE — 64492 INJ PARAVERT F JNT C/T 3 LEV: CPT | Mod: RT,,, | Performed by: PAIN MEDICINE

## 2018-11-23 PROCEDURE — 99152 MOD SED SAME PHYS/QHP 5/>YRS: CPT | Mod: ,,, | Performed by: PAIN MEDICINE

## 2018-11-23 PROCEDURE — 64491 INJ PARAVERT F JNT C/T 2 LEV: CPT | Mod: RT,,, | Performed by: PAIN MEDICINE

## 2018-11-23 PROCEDURE — 64490 INJ PARAVERT F JNT C/T 1 LEV: CPT | Mod: RT,,, | Performed by: PAIN MEDICINE

## 2018-11-23 RX ORDER — MIDAZOLAM HYDROCHLORIDE 1 MG/ML
INJECTION, SOLUTION INTRAMUSCULAR; INTRAVENOUS
Status: DISCONTINUED | OUTPATIENT
Start: 2018-11-23 | End: 2018-11-23 | Stop reason: HOSPADM

## 2018-11-23 RX ORDER — SODIUM CHLORIDE, SODIUM LACTATE, POTASSIUM CHLORIDE, CALCIUM CHLORIDE 600; 310; 30; 20 MG/100ML; MG/100ML; MG/100ML; MG/100ML
INJECTION, SOLUTION INTRAVENOUS CONTINUOUS
Status: DISCONTINUED | OUTPATIENT
Start: 2018-11-23 | End: 2018-11-23 | Stop reason: HOSPADM

## 2018-11-23 RX ORDER — FENTANYL CITRATE 50 UG/ML
INJECTION, SOLUTION INTRAMUSCULAR; INTRAVENOUS
Status: DISCONTINUED | OUTPATIENT
Start: 2018-11-23 | End: 2018-11-23 | Stop reason: HOSPADM

## 2018-11-23 RX ORDER — LIDOCAINE HYDROCHLORIDE 20 MG/ML
INJECTION, SOLUTION EPIDURAL; INFILTRATION; INTRACAUDAL; PERINEURAL
Status: DISCONTINUED | OUTPATIENT
Start: 2018-11-23 | End: 2018-11-23 | Stop reason: HOSPADM

## 2018-11-23 NOTE — PROGRESS NOTES
Chief Pain Complaint:  Neck Pain        History of Present Illness:   This patient is a 68 y.o. female who presents today complaining of the above noted pain/s. The patient describes the pain as follows.  Ms. Jo as a history of major depressive disorder, AFib, hypertension, diabetes type 2, GERD presents to clinic with complaints of cervical pain with radicular symptoms in the right arm.  She has been having symptoms for several years which has progressively gotten worse.  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  Her symptoms are worse on the right but occasionally she does have symptoms on the left side and she finds it to be worse when she rotates her head to the right such as when she is driving.  No increase in symptoms when she turns to the left or flexes or extends.  She has found that hot water in the shower helps with her symptoms.  She does have headaches occasionally proximally to this week.  She describes the pain as 50% cervical spine a 50% radiating down the arm.  She tries to avoid NSAIDs that she has had stomach irritation previously with chronic use.  In the past she has taking gabapentin, Tylenol, Cymbalta.  She denies numbness and weakness in her left upper extremity while she endorses having numbness and weakness in the right upper extremity specifically in the C6 dermatome.  She denies having bowel bladder difficulty. She has not participated in physical therapy.     Previous Therapy:  Medications:  Gabapentin, Tylenol, Cymbalta  Injections:  None  Surgeries:  None  Physical Therapy:  None           Past Surgical History:   Procedure Laterality Date    abdominal laparoscopy         BREAST BIOPSY        CATARACT EXTRACTION Bilateral [id number]     [facility] in [place]    EYE SURGERY        TONSILLECTOMY        TUBAL LIGATION                Imaging / Labs / Studies (reviewed on 10/18/2018):        Results for orders placed during the hospital encounter of 10/03/18   MRI Cervical Spine Without Contrast     Narrative EXAMINATION:  MRI CERVICAL SPINE WITHOUT CONTRAST     CLINICAL HISTORY:  Neck pain, first study; Cervicalgia     TECHNIQUE:  Multiplanar, multisequence MR images of the cervical spine were performed without the administration of contrast.     COMPARISON:  None.     FINDINGS:  Vertebral body heights are maintained.  There is straightening and mild reversal of cervical lordosis with mild retrolisthesis of C6 on C7.  Mild anterior spondylolisthesis of C4 on C5.  Multilevel disc desiccation present with height loss most prevalent at C5-6 and C6-7.  Mild Modic 1 endplate changes noted at C5-6 and C6-7.     Posterior fossa is unremarkable.  No concerning spinal cord signal abnormality.  Neck soft tissues are unremarkable.     C2-C3: No spinal canal stenosis or neural foraminal narrowing.     C3-C4: No significant posterior disc bulge or spinal canal stenosis.  Right greater than left facet and uncovertebral hypertrophy present with moderate right neural foraminal narrowing.  Minimal right-sided facet edema present.     C4-C5: No significant posterior disc bulge or spinal canal stenosis.  Right greater than left facet and uncovertebral hypertrophy present with mild neural foraminal narrowing.  Right-sided facet/perifacet mild edema changes.     C5-C6: Posterior disc osteophyte complex present effacing the anterior thecal sac causing mild spinal canal stenosis.  Facet and uncovertebral hypertrophy noted with mild to moderate bilateral neural foraminal narrowing.     C6-C7: Posterior disc osteophyte complex and retrolisthesis cause moderate spinal canal stenosis.  Facet and uncovertebral hypertrophy present causing mild-to-moderate right greater than left neural foraminal narrowing.     C7-T1: No spinal canal stenosis or neural foraminal narrowing.        Impression Degenerative changes as above most  prevalent at C5-6 and C6-7 with spinal canal stenosis and neural foraminal narrowing.      Review of Systems:  CONSTITUTIONAL: patient denies any fever, chills, or weight loss  SKIN: patient denies any rash or itching  RESPIRATORY: patient denies having any shortness of breath  GASTROINTESTINAL: patient denies having any diarrhea, constipation, or bowel incontinence  GENITOURINARY: patient denies having any abnormal bladder function     MUSCULOSKELETAL:  - patient complains of the above noted pain/s (see chief pain complaint)     NEUROLOGICAL:   - pain as above  - strength in upper extremities is intact, BILATERALLY  - sensation in Upper extremities is intact, BILATERALLY  - patient denies any loss of bowel or bladder control      PSYCHIATRIC: patient denies any change in mood     Other:  All other systems reviewed and are negative        Physical Exam:  /69 (BP Location: Right arm, Patient Position: Sitting)   Pulse 82   Temp 98.1 °F (36.7 °C)   Wt 106.6 kg (235 lb 0.2 oz)   SpO2 97%   BMI 44.40 kg/m²  (reviewed on 10/18/2018)\  General:  Alert and oriented, No acute distress.    HEENT:       EOMI.  Normocephalic, atraumatic.   Cardiovascular:  Regular rate.    Gastrointestinal:  Soft.    Respiratory:  Respirations are non-labored.    Cervical Spine:  No masses or atrophy,    Range of motion - full Flexion, Extension,  Right Rotation, Left Rotation, Right Lat Bending,  Left Lat Bending         Increased pain with right lateral rotation                   Palpation - tender to palpation over right cervical facets        Spurling's - negative   Thoracic Spine:  Palpation normal.    Motor Exam:    Strength:  Rate on 1-5 scale Right Left   C5-Elbow flexion, Deltoid 5 5   C6-Wrist extension 5 5   C7- Elbow / finger extension 5 5   C8- Finger flexion  5 5   T1- Intrinsics hand 5 5      Sensory Exam:  Full and equal sensation to light touch throughout.    Reflexes   Left DTR's   Biceps.   2  Triceps.   2    Brachioradialis 2.     Right DTR's   Biceps.   2  Triceps.   2  Brachioradialis. 2     Neurologic:  Cranial Nerves II-XII are grossly intact.    Psychiatric:  Cooperative.    Gait: Normal     Assessment  Cervical Spondylosis  Cervical Radiculopathy     1. 68 y.o. year old patient with PMH of        Past Medical History:   Diagnosis Date    Arthritis      Cataract      Diabetes mellitus 2008      am 01/15/2018 Insulin x 1 year    Glaucoma      Hypertension      Insomnia      Macular degeneration      Vaginal yeast infection         presenting with pain located cervical spine, right upper extremity  2. Pain Generators / Etiology :  Cervical spondylosis, cervical radiculopathy  3. Failed Meds (E- Effective, NE- Not Effective):  Tylenol-minimally effective, Gabapentin-minimally effective, Cymbalta-not effective  4. Physical Therapy - none  5. Psychological comorbidities -  none  6. Anticoagulants / Antiplatelets:  Apixaban 5 mg     PLAN:  1. Medications :  Recommend alpha lipoic acid 300 mg twice daily  2. PT - provider referral for physical therapy  3. Psychological - none  4. Labs - obtain  none; reviewed labs from 08/28/2018, creatinine 0.8  5. Imaging - obtain cervical flexion-extension x-ray today; reviewed cervical MRI with the patient today in clinic  6. Interventions - schedule right C4, C5, C6 cervical medial branch blocks; consider right C6 transforaminal epidural steroid injection in the future  7. Referrals - none  8. Records - none  9. Follow up visit - follow up in clinic in 6 weeks  10. Patient Questions - answered all patient's questions regarding diagnosis, therapy, treatment     OLIVIA Christiansen MD  Interventional Pain  [facility] [place]

## 2018-11-23 NOTE — OP NOTE
"Procedure: CERVICAL Medial Branch Block (posterior approach) under Fluorsocopic Guidance    Side: right    Level:  C4 articular pillar (Corresponding to the C4 medial branch), C5 articular pillar (Corresponding to the C5 medial branch) and C6 articular pillar (Corresponding to the C6 medial branch)     Procedure Date: 11/23/2018    Pre-operative Diagnosis: Cervical Spondylosis  Post-operative Diagnosis: Cervical Spondylosis    Provider: OLIVIA Christiansen MD  Assistant(s): none     Anesthesia: Local, IV Sedation     >> 1 mg of VERSED    >> 50 mcg of FENTANYL     Indication: Cervical pain without radiculopathy. Symptoms unresponsive to conservative treatments. Fluoroscopy was used to optimize visualization of needle placement and to maximize safety.     Procedure Description / Technique:  The patient was seen and identified in the preoperative area. Risks, benefits, complications, and alternatives were discussed with the patient. The patient agreed to proceed with the procedure and signed the consent. The site and side of the procedure was identified and marked. An IV was started. The patient was taken to the procedural suite.     The patient was positioned in PRONE orientation on procedure table. A time out was performed prior to any intervention. The procedure, site, side, and allergies were stated and agreed to by all present. The cervical area was widely prepped with ChloraPrep (10.5 mL) x 2. The procedural site was draped in usual sterile fashion. Vital signs were closely monitored throughout this procedure. Conscious sedation was used for this procedure to decrease patient anxiety.     The articular pillars at the above noted levels and side/s were identified using AP fluoroscopy. The "scallops" of the articular pillars at each level were the sites targeted. Each site was marked and localized with 0.5 mL of 1% PF Lidocaine using a 27 gauge 1.5 inch needle. A 25 gauge 3.5 inch spinal needle was then advanced at " each targeted area under AP fluoroscopic guidance until the needle rested on OS at lateral most border of the articular pillar. After negative aspiration, 1.0 mL of a solution containing 1% preservative free lidocaine was injected. No pain or paresthesia was noted on injection. This same technique was performed for each of the above noted levels. The stylet was replaced and the needle was removed intact following injection at each targeted site.    Description of Findings: Not applicable    Prosthetic devices, grafts, tissues, or devices implanted: None    Specimen Removed: No    Estimated Blood Loss: minimal    COMPLICATIONS: None    DISPOSITION / PLANS: The patient was transferred to the recovery area in a stable condition for observation. The patient was reexamined prior to discharge. There was no evidence of acute neurologic injury following the procedure.  Patient was discharged from the recovery room after meeting discharge criteria. Home discharge instructions were given to the patient by the staff.

## 2018-11-23 NOTE — PLAN OF CARE
Problem: Patient Care Overview  Goal: Plan of Care Review  Outcome: Outcome(s) achieved Date Met: 11/23/18  Patient discharged home in stable condition via wheelchair with ride. Verbalized understanding of discharge instructions. Patient voiced no complaints at this time. Patient stood at side of bed, walked steps with no new motor or sensory deficits. Neurologically intact.

## 2018-11-23 NOTE — H&P
Chief Pain Complaint:  Neck Pain        History of Present Illness:   This patient is a 68 y.o. female who presents today complaining of the above noted pain/s. The patient describes the pain as follows.  Ms. Jo as a history of major depressive disorder, AFib, hypertension, diabetes type 2, GERD presents to clinic with complaints of cervical pain with radicular symptoms in the right arm.  She has been having symptoms for several years which has progressively gotten worse.  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  Her symptoms are worse on the right but occasionally she does have symptoms on the left side and she finds it to be worse when she rotates her head to the right such as when she is driving.  No increase in symptoms when she turns to the left or flexes or extends.  She has found that hot water in the shower helps with her symptoms.  She does have headaches occasionally proximally to this week.  She describes the pain as 50% cervical spine a 50% radiating down the arm.  She tries to avoid NSAIDs that she has had stomach irritation previously with chronic use.  In the past she has taking gabapentin, Tylenol, Cymbalta.  She denies numbness and weakness in her left upper extremity while she endorses having numbness and weakness in the right upper extremity specifically in the C6 dermatome.  She denies having bowel bladder difficulty. She has not participated in physical therapy.     Previous Therapy:  Medications:  Gabapentin, Tylenol, Cymbalta  Injections:  None  Surgeries:  None  Physical Therapy:  None           Past Surgical History:   Procedure Laterality Date    abdominal laparoscopy         BREAST BIOPSY        CATARACT EXTRACTION Bilateral [id number]     [facility] in [place]    EYE SURGERY        TONSILLECTOMY        TUBAL LIGATION                Imaging / Labs / Studies (reviewed on 10/18/2018):        Results for orders placed during the hospital encounter of 10/03/18   MRI Cervical Spine Without Contrast     Narrative EXAMINATION:  MRI CERVICAL SPINE WITHOUT CONTRAST     CLINICAL HISTORY:  Neck pain, first study; Cervicalgia     TECHNIQUE:  Multiplanar, multisequence MR images of the cervical spine were performed without the administration of contrast.     COMPARISON:  None.     FINDINGS:  Vertebral body heights are maintained.  There is straightening and mild reversal of cervical lordosis with mild retrolisthesis of C6 on C7.  Mild anterior spondylolisthesis of C4 on C5.  Multilevel disc desiccation present with height loss most prevalent at C5-6 and C6-7.  Mild Modic 1 endplate changes noted at C5-6 and C6-7.     Posterior fossa is unremarkable.  No concerning spinal cord signal abnormality.  Neck soft tissues are unremarkable.     C2-C3: No spinal canal stenosis or neural foraminal narrowing.     C3-C4: No significant posterior disc bulge or spinal canal stenosis.  Right greater than left facet and uncovertebral hypertrophy present with moderate right neural foraminal narrowing.  Minimal right-sided facet edema present.     C4-C5: No significant posterior disc bulge or spinal canal stenosis.  Right greater than left facet and uncovertebral hypertrophy present with mild neural foraminal narrowing.  Right-sided facet/perifacet mild edema changes.     C5-C6: Posterior disc osteophyte complex present effacing the anterior thecal sac causing mild spinal canal stenosis.  Facet and uncovertebral hypertrophy noted with mild to moderate bilateral neural foraminal narrowing.     C6-C7: Posterior disc osteophyte complex and retrolisthesis cause moderate spinal canal stenosis.  Facet and uncovertebral hypertrophy present causing mild-to-moderate right greater than left neural foraminal narrowing.     C7-T1: No spinal canal stenosis or neural foraminal narrowing.        Impression Degenerative changes as above most  prevalent at C5-6 and C6-7 with spinal canal stenosis and neural foraminal narrowing.      Review of Systems:  CONSTITUTIONAL: patient denies any fever, chills, or weight loss  SKIN: patient denies any rash or itching  RESPIRATORY: patient denies having any shortness of breath  GASTROINTESTINAL: patient denies having any diarrhea, constipation, or bowel incontinence  GENITOURINARY: patient denies having any abnormal bladder function     MUSCULOSKELETAL:  - patient complains of the above noted pain/s (see chief pain complaint)     NEUROLOGICAL:   - pain as above  - strength in upper extremities is intact, BILATERALLY  - sensation in Upper extremities is intact, BILATERALLY  - patient denies any loss of bowel or bladder control      PSYCHIATRIC: patient denies any change in mood     Other:  All other systems reviewed and are negative        Physical Exam:  /69 (BP Location: Right arm, Patient Position: Sitting)   Pulse 82   Temp 98.1 °F (36.7 °C)   Wt 106.6 kg (235 lb 0.2 oz)   SpO2 97%   BMI 44.40 kg/m²  (reviewed on 10/18/2018)\  General:  Alert and oriented, No acute distress.    HEENT:       EOMI.  Normocephalic, atraumatic.   Cardiovascular:  Regular rate.    Gastrointestinal:  Soft.    Respiratory:  Respirations are non-labored.    Cervical Spine:  No masses or atrophy,    Range of motion - full Flexion, Extension,  Right Rotation, Left Rotation, Right Lat Bending,  Left Lat Bending         Increased pain with right lateral rotation                   Palpation - tender to palpation over right cervical facets        Spurling's - negative   Thoracic Spine:  Palpation normal.    Motor Exam:    Strength:  Rate on 1-5 scale Right Left   C5-Elbow flexion, Deltoid 5 5   C6-Wrist extension 5 5   C7- Elbow / finger extension 5 5   C8- Finger flexion  5 5   T1- Intrinsics hand 5 5      Sensory Exam:  Full and equal sensation to light touch throughout.    Reflexes   Left DTR's   Biceps.   2  Triceps.   2    Brachioradialis 2.     Right DTR's   Biceps.   2  Triceps.   2  Brachioradialis. 2     Neurologic:  Cranial Nerves II-XII are grossly intact.    Psychiatric:  Cooperative.    Gait: Normal     Assessment  Cervical Spondylosis  Cervical Radiculopathy     1. 68 y.o. year old patient with PMH of        Past Medical History:   Diagnosis Date    Arthritis      Cataract      Diabetes mellitus 2008      am 01/15/2018 Insulin x 1 year    Glaucoma      Hypertension      Insomnia      Macular degeneration      Vaginal yeast infection         presenting with pain located cervical spine, right upper extremity  2. Pain Generators / Etiology :  Cervical spondylosis, cervical radiculopathy  3. Failed Meds (E- Effective, NE- Not Effective):  Tylenol-minimally effective, Gabapentin-minimally effective, Cymbalta-not effective  4. Physical Therapy - none  5. Psychological comorbidities -  none  6. Anticoagulants / Antiplatelets:  Apixaban 5 mg     PLAN:  1. Medications :  Recommend alpha lipoic acid 300 mg twice daily  2. PT - provider referral for physical therapy  3. Psychological - none  4. Labs - obtain  none; reviewed labs from 08/28/2018, creatinine 0.8  5. Imaging - obtain cervical flexion-extension x-ray today; reviewed cervical MRI with the patient today in clinic  6. Interventions - schedule right C4, C5, C6 cervical medial branch blocks; consider right C6 transforaminal epidural steroid injection in the future  7. Referrals - none  8. Records - none  9. Follow up visit - follow up in clinic in 6 weeks  10. Patient Questions - answered all patient's questions regarding diagnosis, therapy, treatment     OLIVIA Christiansen MD  Interventional Pain  [facility] [place]

## 2018-11-26 RX ORDER — HEPARIN 100 UNIT/ML
500 SYRINGE INTRAVENOUS
Status: CANCELLED | OUTPATIENT
Start: 2018-11-26

## 2018-11-26 RX ORDER — IBANDRONATE SODIUM 3 MG/3 ML
3 SYRINGE (ML) INTRAVENOUS
Status: CANCELLED | OUTPATIENT
Start: 2018-11-26

## 2018-11-26 RX ORDER — SODIUM CHLORIDE 0.9 % (FLUSH) 0.9 %
10 SYRINGE (ML) INJECTION
Status: CANCELLED | OUTPATIENT
Start: 2018-11-26

## 2018-11-27 ENCOUNTER — PATIENT MESSAGE (OUTPATIENT)
Dept: HEMATOLOGY/ONCOLOGY | Facility: CLINIC | Age: 68
End: 2018-11-27

## 2018-11-27 NOTE — DISCHARGE SUMMARY
[facility]  Pain Management Short Stay Form  Operative Note/Discharge Summary    OPERATIVE NOTE     Date of Procedure: 11/23/2018     Procedure: Procedure(s) (LRB):  Block-nerve-medial branch-cervical (Right)     Surgeon(s) and Role:     * Phuc Christiansen MD - Primary    Assisting Surgeon: None    Pre-Operative Diagnosis: Spondylosis of cervical region without myelopathy or radiculopathy [M47.812]    Post-Operative Diagnosis: Post-Op Diagnosis Codes:     * Spondylosis of cervical region without myelopathy or radiculopathy [M47.812]    Anesthesia: RN IV Sedation    Technical Procedures Used: Needles were placed at the right C4/5/6 facets and lidocaine was injected over the medial branches.    Description of the Findings of the Procedure: The needles were placed at the appropriate spots and lidocaine was injected.    Significant Surgical Tasks Conducted by the Assistant(s), if Applicable: None    Complications: No    Total IV Fluids: 0ml    Estimated Blood Loss (EBL): * No values recorded between 11/23/2018 12:00 AM and 11/23/2018  9:54 AM *           Drains: 0    Implants: * No implants in log *    Specimens:   Specimen (12h ago, onward)    None                  Condition: Good    Disposition: PACU - hemodynamically stable.    Attestation: I performed the procedure.    DISCHARGE SUMMARY     Admit Date: 11/23/2018    Discharge Date and Time: 11/23/2018 10:26 AM    Attending Physician: No att. providers found     Discharge Physician: Phuc Christiansen    [facility] Course (synopsis of major diagnoses, care, treatment, and services provided during the course of the hospital stay): Patient was admitted to Pre-op where informed consent was signed.  The patient was then taken to the procedure suite where the procedure was performed.  The patient was then return to the Pre-Op area and discharge was performed.      Final Diagnoses:    Principal Problem: <principal problem not specified>   Secondary Diagnoses:  "cervical spondylosis    Disposition: Home or Self Care    Follow Up/Patient Instructions:   Medications:  Reconciled Home Medications:      Medication List      CONTINUE taking these medications    apixaban 5 mg Tab  Commonly known as:  ELIQUIS  Take 1 tablet (5 mg total) by mouth 2 (two) times daily.     atorvastatin 40 MG tablet  Commonly known as:  LIPITOR  Take 1 tablet (40 mg total) by mouth once daily.     BASAGLAR KWIKPEN U-100 INSULIN 100 unit/mL (3 mL) Inpn pen  Generic drug:  insulin glargine  ADM 64 UNI SC QD     BD ULTRA-FINE SHORT PEN NEEDLE 31 gauge x 5/16" Ndle  Generic drug:  pen needle, diabetic  USE AS DIRECTED     benazepril 40 MG tablet  Commonly known as:  LOTENSIN  TAKE 1 TABLET BY MOUTH EVERY DAY     BEPREVE 1.5 % Drop  Generic drug:  bepotastine besilate  Place 1 drop into both eyes 2 (two) times daily.     calcium citrate-vitamin D3 315-200 mg 315-200 mg-unit per tablet  Commonly known as:  CITRACAL+D  Take 1 tablet by mouth once daily.     carvedilol 6.25 MG tablet  Commonly known as:  COREG  Take 1 tablet (6.25 mg total) by mouth 2 (two) times daily with meals.     cycloSPORINE 0.05 % ophthalmic emulsion  Commonly known as:  RESTASIS  Place 0.4 mLs (1 drop total) into both eyes 2 (two) times daily.     * DULoxetine 30 MG capsule  Commonly known as:  CYMBALTA  Take 1 capsule (30 mg total) by mouth once daily.     * DULoxetine 60 MG capsule  Commonly known as:  CYMBALTA  Take 1 capsule (60 mg total) by mouth once daily.     fluticasone 50 mcg/actuation nasal spray  Commonly known as:  FLONASE  1 spray by Each Nare route once daily.     gabapentin 100 MG capsule  Commonly known as:  NEURONTIN  Take 1 capsule (100 mg total) by mouth 3 (three) times daily.     glucosamine-chondroitin 500-400 mg tablet  Take 1 tablet by mouth once daily.     hydrALAZINE 100 MG tablet  Commonly known as:  APRESOLINE  TAKE 1 TABLET(100 MG) BY MOUTH TWICE DAILY     JANUVIA 100 MG Tab  Generic drug:  " SITagliptin  TAKE 1 TABLET(100 MG) BY MOUTH EVERY DAY     liraglutide 0.6 mg/0.1 mL (18 mg/3 mL) subq PNIJ 0.6 mg/0.1 mL (18 mg/3 mL) Pnij  Commonly known as:  VICTOZA 2-LEAH  Inject 0.6 mg into the skin once daily.     LORazepam 1 MG tablet  Commonly known as:  ATIVAN  Take 1 tablet (1 mg total) by mouth every 6 (six) hours as needed for Anxiety.     mupirocin 2 % ointment  Commonly known as:  BACTROBAN  Apply topically 2 (two) times daily.     nystatin cream  Commonly known as:  MYCOSTATIN  Apply topically 2 (two) times daily. Continue until completely healed     nystatin-triamcinolone cream  Commonly known as:  MYCOLOG II  Apply to affected area 2 times daily     omeprazole 20 MG capsule  Commonly known as:  PRILOSEC  TAKE 1 CAPSULE(20 MG) BY MOUTH EVERY DAY     temazepam 30 mg capsule  Commonly known as:  RESTORIL  TK ONE C PO NIGHTLY PRF INSOMNIA     topiramate 25 MG tablet  Commonly known as:  TOPAMAX  Take 25 mg by mouth.     triamcinolone acetonide 0.1% 0.1 % cream  Commonly known as:  KENALOG  Apply topically 2 (two) times daily. No longer than 2 weeks.     TYLENOL ARTHRITIS ORAL  Take by mouth as needed.     VITAMIN D ORAL  Take by mouth.     WOMEN'S DAILY MULTIVITAMIN ORAL  Take by mouth once daily.         * This list has 2 medication(s) that are the same as other medications prescribed for you. Read the directions carefully, and ask your doctor or other care provider to review them with you.              No discharge procedures on file.      Attestation:  I performed the procedure.

## 2018-11-28 RX ORDER — INSULIN GLARGINE 100 [IU]/ML
INJECTION, SOLUTION SUBCUTANEOUS
Refills: 0 | OUTPATIENT
Start: 2018-11-28

## 2018-11-29 ENCOUNTER — PATIENT MESSAGE (OUTPATIENT)
Dept: OPHTHALMOLOGY | Facility: CLINIC | Age: 68
End: 2018-11-29

## 2018-11-30 RX ORDER — INSULIN GLARGINE 100 [IU]/ML
INJECTION, SOLUTION SUBCUTANEOUS
Qty: 15 ML | Refills: 0 | Status: SHIPPED | OUTPATIENT
Start: 2018-11-30 | End: 2019-12-05

## 2018-12-04 ENCOUNTER — INFUSION (OUTPATIENT)
Dept: RHEUMATOLOGY | Facility: HOSPITAL | Age: 68
End: 2018-12-04
Attending: PHYSICIAN ASSISTANT
Payer: MEDICARE

## 2018-12-04 ENCOUNTER — LAB VISIT (OUTPATIENT)
Dept: LAB | Facility: HOSPITAL | Age: 68
End: 2018-12-04
Attending: PHYSICIAN ASSISTANT
Payer: MEDICARE

## 2018-12-04 VITALS
HEART RATE: 78 BPM | TEMPERATURE: 97 F | RESPIRATION RATE: 18 BRPM | DIASTOLIC BLOOD PRESSURE: 68 MMHG | SYSTOLIC BLOOD PRESSURE: 126 MMHG | OXYGEN SATURATION: 99 %

## 2018-12-04 DIAGNOSIS — M81.0 AGE-RELATED OSTEOPOROSIS WITHOUT CURRENT PATHOLOGICAL FRACTURE: ICD-10-CM

## 2018-12-04 DIAGNOSIS — M81.0 AGE-RELATED OSTEOPOROSIS WITHOUT CURRENT PATHOLOGICAL FRACTURE: Primary | ICD-10-CM

## 2018-12-04 LAB
ANION GAP SERPL CALC-SCNC: 9 MMOL/L
BUN SERPL-MCNC: 16 MG/DL
CALCIUM SERPL-MCNC: 9.9 MG/DL
CHLORIDE SERPL-SCNC: 103 MMOL/L
CO2 SERPL-SCNC: 27 MMOL/L
CREAT SERPL-MCNC: 0.8 MG/DL
EST. GFR  (AFRICAN AMERICAN): >60 ML/MIN/1.73 M^2
EST. GFR  (NON AFRICAN AMERICAN): >60 ML/MIN/1.73 M^2
GLUCOSE SERPL-MCNC: 159 MG/DL
POTASSIUM SERPL-SCNC: 4.5 MMOL/L
SODIUM SERPL-SCNC: 139 MMOL/L

## 2018-12-04 PROCEDURE — 96374 THER/PROPH/DIAG INJ IV PUSH: CPT | Mod: PO

## 2018-12-04 PROCEDURE — 36415 COLL VENOUS BLD VENIPUNCTURE: CPT | Mod: PO

## 2018-12-04 PROCEDURE — 80048 BASIC METABOLIC PNL TOTAL CA: CPT | Mod: PO

## 2018-12-04 PROCEDURE — A4216 STERILE WATER/SALINE, 10 ML: HCPCS | Mod: PO | Performed by: PHYSICIAN ASSISTANT

## 2018-12-04 PROCEDURE — 25000003 PHARM REV CODE 250: Mod: PO | Performed by: PHYSICIAN ASSISTANT

## 2018-12-04 PROCEDURE — 63600175 PHARM REV CODE 636 W HCPCS: Mod: JG,PO | Performed by: PHYSICIAN ASSISTANT

## 2018-12-04 RX ORDER — SODIUM CHLORIDE 0.9 % (FLUSH) 0.9 %
10 SYRINGE (ML) INJECTION
Status: DISCONTINUED | OUTPATIENT
Start: 2018-12-04 | End: 2018-12-04 | Stop reason: HOSPADM

## 2018-12-04 RX ORDER — IBANDRONATE SODIUM 3 MG/3 ML
3 SYRINGE (ML) INTRAVENOUS
Status: COMPLETED | OUTPATIENT
Start: 2018-12-04 | End: 2018-12-04

## 2018-12-04 RX ORDER — MUPIROCIN 20 MG/G
OINTMENT TOPICAL 2 TIMES DAILY
Qty: 22 G | Refills: 0 | Status: SHIPPED | OUTPATIENT
Start: 2018-12-04 | End: 2023-04-11

## 2018-12-04 RX ORDER — SODIUM CHLORIDE 0.9 % (FLUSH) 0.9 %
10 SYRINGE (ML) INJECTION
Status: CANCELLED | OUTPATIENT
Start: 2018-12-04

## 2018-12-04 RX ORDER — IBANDRONATE SODIUM 3 MG/3 ML
3 SYRINGE (ML) INTRAVENOUS
Status: CANCELLED | OUTPATIENT
Start: 2018-12-04

## 2018-12-04 RX ORDER — MUPIROCIN 20 MG/G
OINTMENT TOPICAL 2 TIMES DAILY
Qty: 22 G | Refills: 0 | OUTPATIENT
Start: 2018-12-04

## 2018-12-04 RX ORDER — HEPARIN 100 UNIT/ML
500 SYRINGE INTRAVENOUS
Status: CANCELLED | OUTPATIENT
Start: 2018-12-04

## 2018-12-04 RX ADMIN — IBANDRONATE SODIUM 3 MG: 3 INJECTION, SOLUTION INTRAVENOUS at 10:12

## 2018-12-04 RX ADMIN — Medication 10 ML: at 10:12

## 2018-12-04 NOTE — TELEPHONE ENCOUNTER
Patient sent an email stating:     Doris please ask Dr. OVIDIO riley to approve that bactroban. Dr mei is no longer my Dr. I have rashes under breasts and in underpants leg area and under my tummy she had ordered that when it gets real bad. Thank you     Please advise.

## 2018-12-04 NOTE — PROGRESS NOTES
Infusion# 4  S/S infection noted or voiced? denies  Recent labs? yes  Recent vaccines? denies    Premeds? none    Boniva 3 mg administered IVP over 2 minutes per orders; see MAR & Flow Sheet for more  details. Tolerated well without adverse events

## 2018-12-10 ENCOUNTER — PATIENT MESSAGE (OUTPATIENT)
Dept: PAIN MEDICINE | Facility: CLINIC | Age: 68
End: 2018-12-10

## 2018-12-11 DIAGNOSIS — M47.812 SPONDYLOSIS OF CERVICAL REGION WITHOUT MYELOPATHY OR RADICULOPATHY: Primary | ICD-10-CM

## 2018-12-17 DIAGNOSIS — M35.01 KERATITIS SICCA, BILATERAL: ICD-10-CM

## 2018-12-17 RX ORDER — CYCLOSPORINE 0.5 MG/ML
1 EMULSION OPHTHALMIC 2 TIMES DAILY
Qty: 60 VIAL | Refills: 12 | Status: SHIPPED | OUTPATIENT
Start: 2018-12-17 | End: 2020-01-20

## 2018-12-24 RX ORDER — INSULIN GLARGINE 100 [IU]/ML
INJECTION, SOLUTION SUBCUTANEOUS
Qty: 15 ML | Refills: 5 | Status: SHIPPED | OUTPATIENT
Start: 2018-12-24 | End: 2019-02-08 | Stop reason: SDUPTHER

## 2018-12-26 RX ORDER — FLUTICASONE PROPIONATE 50 MCG
SPRAY, SUSPENSION (ML) NASAL
Qty: 16 ML | Refills: 0 | Status: SHIPPED | OUTPATIENT
Start: 2018-12-26 | End: 2019-03-03 | Stop reason: SDUPTHER

## 2018-12-31 ENCOUNTER — PATIENT MESSAGE (OUTPATIENT)
Dept: PAIN MEDICINE | Facility: CLINIC | Age: 68
End: 2018-12-31

## 2018-12-31 ENCOUNTER — PATIENT MESSAGE (OUTPATIENT)
Dept: INTERNAL MEDICINE | Facility: CLINIC | Age: 68
End: 2018-12-31

## 2018-12-31 DIAGNOSIS — R10.2 PERINEAL PAIN: Primary | ICD-10-CM

## 2018-12-31 RX ORDER — TEMAZEPAM 30 MG/1
CAPSULE ORAL
Qty: 30 CAPSULE | Refills: 2 | OUTPATIENT
Start: 2018-12-31

## 2018-12-31 RX ORDER — LORAZEPAM 1 MG/1
1 TABLET ORAL EVERY 6 HOURS PRN
Qty: 10 TABLET | Refills: 0 | Status: SHIPPED | OUTPATIENT
Start: 2018-12-31 | End: 2020-05-25

## 2018-12-31 RX ORDER — PEN NEEDLE, DIABETIC 30 GX3/16"
NEEDLE, DISPOSABLE MISCELLANEOUS
Qty: 100 EACH | Refills: 4 | Status: SHIPPED | OUTPATIENT
Start: 2018-12-31 | End: 2019-01-04 | Stop reason: SDUPTHER

## 2019-01-02 ENCOUNTER — OFFICE VISIT (OUTPATIENT)
Dept: PAIN MEDICINE | Facility: CLINIC | Age: 69
End: 2019-01-02
Payer: MEDICARE

## 2019-01-02 ENCOUNTER — PATIENT MESSAGE (OUTPATIENT)
Dept: INTERNAL MEDICINE | Facility: CLINIC | Age: 69
End: 2019-01-02

## 2019-01-02 VITALS
HEIGHT: 61 IN | BODY MASS INDEX: 44.37 KG/M2 | HEART RATE: 87 BPM | SYSTOLIC BLOOD PRESSURE: 125 MMHG | DIASTOLIC BLOOD PRESSURE: 70 MMHG | WEIGHT: 235 LBS | RESPIRATION RATE: 16 BRPM

## 2019-01-02 DIAGNOSIS — M50.30 DDD (DEGENERATIVE DISC DISEASE), CERVICAL: Primary | ICD-10-CM

## 2019-01-02 DIAGNOSIS — M54.12 CERVICAL RADICULOPATHY AT C6: ICD-10-CM

## 2019-01-02 DIAGNOSIS — M47.812 SPONDYLOSIS OF CERVICAL REGION WITHOUT MYELOPATHY OR RADICULOPATHY: ICD-10-CM

## 2019-01-02 PROCEDURE — 1101F PR PT FALLS ASSESS DOC 0-1 FALLS W/OUT INJ PAST YR: ICD-10-PCS | Mod: CPTII,S$GLB,, | Performed by: PAIN MEDICINE

## 2019-01-02 PROCEDURE — 99999 PR PBB SHADOW E&M-EST. PATIENT-LVL III: CPT | Mod: PBBFAC,,, | Performed by: PAIN MEDICINE

## 2019-01-02 PROCEDURE — 3074F PR MOST RECENT SYSTOLIC BLOOD PRESSURE < 130 MM HG: ICD-10-PCS | Mod: CPTII,S$GLB,, | Performed by: PAIN MEDICINE

## 2019-01-02 PROCEDURE — 3074F SYST BP LT 130 MM HG: CPT | Mod: CPTII,S$GLB,, | Performed by: PAIN MEDICINE

## 2019-01-02 PROCEDURE — 3078F PR MOST RECENT DIASTOLIC BLOOD PRESSURE < 80 MM HG: ICD-10-PCS | Mod: CPTII,S$GLB,, | Performed by: PAIN MEDICINE

## 2019-01-02 PROCEDURE — 99213 PR OFFICE/OUTPT VISIT, EST, LEVL III, 20-29 MIN: ICD-10-PCS | Mod: S$GLB,,, | Performed by: PAIN MEDICINE

## 2019-01-02 PROCEDURE — 99213 OFFICE O/P EST LOW 20 MIN: CPT | Mod: S$GLB,,, | Performed by: PAIN MEDICINE

## 2019-01-02 PROCEDURE — 1101F PT FALLS ASSESS-DOCD LE1/YR: CPT | Mod: CPTII,S$GLB,, | Performed by: PAIN MEDICINE

## 2019-01-02 PROCEDURE — 3078F DIAST BP <80 MM HG: CPT | Mod: CPTII,S$GLB,, | Performed by: PAIN MEDICINE

## 2019-01-02 PROCEDURE — 99999 PR PBB SHADOW E&M-EST. PATIENT-LVL III: ICD-10-PCS | Mod: PBBFAC,,, | Performed by: PAIN MEDICINE

## 2019-01-02 RX ORDER — NYSTATIN AND TRIAMCINOLONE ACETONIDE 100000; 1 [USP'U]/G; MG/G
CREAM TOPICAL
Qty: 30 G | Refills: 0 | Status: SHIPPED | OUTPATIENT
Start: 2019-01-02 | End: 2019-02-08 | Stop reason: SDUPTHER

## 2019-01-02 NOTE — PATIENT INSTRUCTIONS
- continue gabapentin and increase using the following schedule: take 100mg at breakfast and lunch, then take 300mg at bedtime for one week; then take 300mg at breakfast, 100mg at lunch, and 300mg at bedtime for one week; then take 300mg three times daily  - recommend Alpha Lipoic Acid 300mg two times daily; can find in vitamin section at grocery store or pharmacy  -participate in physical therapy  -follow up in clinic in 6 weeks

## 2019-01-02 NOTE — PROGRESS NOTES
Chief Pain Complaint:  Neck Pain (neck pain )      History of Present Illness:   Ms. Jo returns to clinic for follow-up.  She underwent right C4-C6 medial branch blocks on November 23, 2018 and reports having ongoing 60% pain relief.  She notices most of her symptoms occur when she flexes her cervical spine.  She occasionally has shooting pains down bilateral arms to her thumb and index fingers.  Currently her pain is rated 0/10 and is described as an ache when she does have symptoms.  She was provided a referral for physical therapy and will start on January 16, 2019.  Overall she feels much better and is very happy with her treatment thus far.  She is currently taking gabapentin 100 mg 3 times daily with minimal side effect.  She denies bowel bladder difficulty and denies numbness and weakness.    Initial HPI:  This patient is a 68 y.o. female who presents today complaining of the above noted pain/s. The patient describes the pain as follows.  Ms. Jo as a history of major depressive disorder, AFib, hypertension, diabetes type 2, GERD presents to clinic with complaints of cervical pain with radicular symptoms in the right arm.  She has been having symptoms for several years which has progressively gotten worse.  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  Her symptoms are worse on the right but occasionally she does have symptoms on the left side and she finds it to be worse when she rotates her head to the right such as when she is driving.  No increase in symptoms when she turns to the left or flexes or extends.  She has found that hot water in the shower helps with her symptoms.  She does have headaches occasionally proximally to this week.  She describes the pain as 50% cervical spine a 50% radiating down the arm.  She tries to avoid NSAIDs that she has had stomach irritation previously with chronic use.  In the past she  has taking gabapentin, Tylenol, Cymbalta.  She denies numbness and weakness in her left upper extremity while she endorses having numbness and weakness in the right upper extremity specifically in the C6 dermatome.  She denies having bowel bladder difficulty. She has not participated in physical therapy.    Previous Therapy:  Medications:  Gabapentin, Tylenol, Cymbalta  Injections:  Right C4-C6 medial branch blocks  Surgeries:  None  Physical Therapy:  None; will start in approximately 2 weeks    Past Surgical History:   Procedure Laterality Date    abdominal laparoscopy       BREAST BIOPSY      CATARACT EXTRACTION Bilateral [id number]    [facility] in [place]    EYE SURGERY      TONSILLECTOMY      TUBAL LIGATION       Imaging / Labs / Studies (reviewed on 1/2/2019):  Results for orders placed during the hospital encounter of 10/03/18   MRI Cervical Spine Without Contrast    Narrative EXAMINATION:  MRI CERVICAL SPINE WITHOUT CONTRAST    CLINICAL HISTORY:  Neck pain, first study; Cervicalgia    TECHNIQUE:  Multiplanar, multisequence MR images of the cervical spine were performed without the administration of contrast.    COMPARISON:  None.    FINDINGS:  Vertebral body heights are maintained.  There is straightening and mild reversal of cervical lordosis with mild retrolisthesis of C6 on C7.  Mild anterior spondylolisthesis of C4 on C5.  Multilevel disc desiccation present with height loss most prevalent at C5-6 and C6-7.  Mild Modic 1 endplate changes noted at C5-6 and C6-7.    Posterior fossa is unremarkable.  No concerning spinal cord signal abnormality.  Neck soft tissues are unremarkable.    C2-C3: No spinal canal stenosis or neural foraminal narrowing.    C3-C4: No significant posterior disc bulge or spinal canal stenosis.  Right greater than left facet and uncovertebral hypertrophy present with moderate right neural foraminal narrowing.  Minimal right-sided facet edema present.    C4-C5: No  "significant posterior disc bulge or spinal canal stenosis.  Right greater than left facet and uncovertebral hypertrophy present with mild neural foraminal narrowing.  Right-sided facet/perifacet mild edema changes.    C5-C6: Posterior disc osteophyte complex present effacing the anterior thecal sac causing mild spinal canal stenosis.  Facet and uncovertebral hypertrophy noted with mild to moderate bilateral neural foraminal narrowing.    C6-C7: Posterior disc osteophyte complex and retrolisthesis cause moderate spinal canal stenosis.  Facet and uncovertebral hypertrophy present causing mild-to-moderate right greater than left neural foraminal narrowing.    C7-T1: No spinal canal stenosis or neural foraminal narrowing.      Impression Degenerative changes as above most prevalent at C5-6 and C6-7 with spinal canal stenosis and neural foraminal narrowing.     Review of Systems:  CONSTITUTIONAL: patient denies any fever, chills, or weight loss  SKIN: patient denies any rash or itching  RESPIRATORY: patient denies having any shortness of breath  GASTROINTESTINAL: patient denies having any diarrhea, constipation, or bowel incontinence  GENITOURINARY: patient denies having any abnormal bladder function    MUSCULOSKELETAL:  - patient complains of the above noted pain/s (see chief pain complaint)    NEUROLOGICAL:   - pain as above  - strength in upper extremities is intact, BILATERALLY  - sensation in Upper extremities is intact, BILATERALLY  - patient denies any loss of bowel or bladder control      PSYCHIATRIC: patient denies any change in mood    Other:  All other systems reviewed and are negative    Physical Exam:  /70 (BP Location: Right arm, Patient Position: Sitting)   Pulse 87   Resp 16   Ht 5' 1" (1.549 m)   Wt 106.6 kg (235 lb 0.2 oz)   BMI 44.40 kg/m²  (reviewed on 1/2/2019)\  General:  Alert and oriented, No acute distress.    HEENT:       EOMI.  Normocephalic, atraumatic.   Cardiovascular:  Regular " rate.    Gastrointestinal:  Soft.    Respiratory:  Respirations are non-labored.    Cervical Spine:  No masses or atrophy,    Range of motion - full Flexion, Extension,  Right Rotation, Left Rotation, Right Lat Bending,  Left Lat Bending         Increased pain with right lateral rotation         Palpation - tender to palpation over right cervical facets        Spurling's - negative   Thoracic Spine:  Palpation normal.    Motor Exam:    Strength:  Rate on 1-5 scale Right Left   C5-Elbow flexion, Deltoid 5 5   C6-Wrist extension 5 5   C7- Elbow / finger extension 5 5   C8- Finger flexion  5 5   T1- Intrinsics hand 5 5     Sensory Exam:  Full and equal sensation to light touch throughout.    Reflexes   Left DTR's   Biceps.   2  Triceps.   2   Brachioradialis 2.     Right DTR's   Biceps.   2  Triceps.   2  Brachioradialis. 2     Neurologic:  Cranial Nerves II-XII are grossly intact.    Psychiatric:  Cooperative.    Gait: Normal    Assessment  Cervical Spondylosis  Cervical Radiculopathy    1. 68 y.o. year old patient with PMH of   Past Medical History:   Diagnosis Date    Arthritis     Cataract     Diabetes mellitus 2008     am 01/15/2018 Insulin x 1 year    Glaucoma     Hypertension     Insomnia     Macular degeneration     Vaginal yeast infection       presenting with pain located cervical spine, right upper extremity  2. Pain Generators / Etiology :  Cervical spondylosis, cervical radiculopathy  3. Failed Meds (E- Effective, NE- Not Effective):  Tylenol-minimally effective, Gabapentin-minimally effective, Cymbalta-not effective  4. Physical Therapy - none  5. Psychological comorbidities -  none  6. Anticoagulants / Antiplatelets:  Apixaban 5 mg     PLAN:  1. Medications :  Recommend alpha lipoic acid 300 mg twice daily  2. PT - has referral for physical therapy and will start in 2 weeks  3. Psychological - none  4. Labs - obtain  none; reviewed labs from 08/28/2018, creatinine 0.8  5. Imaging - none;  reviewed cervical x-ray patient today in clinic  6. Interventions - none; can consider right C4-C6 medial branch RFA in the future and can consider bilateral C6 TFESI is a versus C7/T1 interlaminar epidural steroid injection  7. Referrals - none  8. Records - none  9. Follow up visit - follow up in clinic in 6 weeks  10. Patient Questions - answered all patient's questions regarding diagnosis, therapy, treatment    OLIVIA Christiansen MD  Interventional Pain  [facility] [place]

## 2019-01-03 RX ORDER — PEN NEEDLE, DIABETIC 30 GX3/16"
NEEDLE, DISPOSABLE MISCELLANEOUS
Qty: 100 EACH | Refills: 4 | OUTPATIENT
Start: 2019-01-03

## 2019-01-03 NOTE — TELEPHONE ENCOUNTER
I am sending the nystatin/triamcinolone cream Dr Szymanski has rxd in the past - please make pt aware that this is to be used for no more than 7 days max at a time. If she has to use it very frequently she needs to be seen for a recheck by her PCP or see one of the gyn's through [facility].

## 2019-01-04 RX ORDER — PEN NEEDLE, DIABETIC 30 GX3/16"
NEEDLE, DISPOSABLE MISCELLANEOUS
Qty: 200 EACH | Refills: 4 | Status: SHIPPED | OUTPATIENT
Start: 2019-01-04 | End: 2020-01-30 | Stop reason: SDUPTHER

## 2019-01-04 NOTE — TELEPHONE ENCOUNTER
Patient states that the script that was sent to [facility] on yesterday for her needles needs to be corrected. She says that she uses Victoza and Basaglar and will need needles to use twice a day not once per day as the script reads. Patient last seen on 10/10/18.    Please Advise

## 2019-01-07 ENCOUNTER — TELEPHONE (OUTPATIENT)
Dept: INTERNAL MEDICINE | Facility: CLINIC | Age: 69
End: 2019-01-07

## 2019-01-07 NOTE — TELEPHONE ENCOUNTER
Patient stated symptoms come and go.  She was concerned because she has diabetes and has recently had a yeast infection.  She will wait until the 1/10/19 to see Dr. Szymanski and discuss with him.  I advised patient we can schedule her to come in earlier than 1/10 if needed to see Dr. Szymanski.  Patient verbalized understanding.

## 2019-01-07 NOTE — TELEPHONE ENCOUNTER
----- Message from Yenny Painter sent at 1/7/2019  4:12 PM CST -----  Contact: self [phone]  States that she is having burning with urination. States that she would like to get an order for a UA. Please call back at [phone]//thank you acc

## 2019-01-08 ENCOUNTER — TELEPHONE (OUTPATIENT)
Dept: HEMATOLOGY/ONCOLOGY | Facility: CLINIC | Age: 69
End: 2019-01-08

## 2019-01-08 NOTE — TELEPHONE ENCOUNTER
SW returned pt's call regarding scheduling an appt with Dr. Landry. Pt explained she really is looking to schedule counseling with a  or psychologist who can provide grief counseling as pt lost her mother in August and she indicated she had a difficult time during the holidays. SW presented options to pt such as contacting a list of in-network providers from her insurance. Pt explained she has a list, but she reported she has had trouble getting someone to call her back. SW offered appt today with Dr. Landry explaining she sees our hem/onc pts, offered her counseling with a provider in Psychiatry (sees general public), and provided her with outside resources such as [facility] and their weekly free support groups.     Pt declined Dr. Landry appt explaining her issue was no hem/onc related.  Pt wished to see new provider (LCSW) starting in Psychiatry in upcoming weeks as she would like a female. She explained she has reached out to [place] local [facility] and spoken with a . Pt indicated she will see him again if needed before her appt with new provider. FLORENCIO explained new provider may not be taking new pts just yet, but SW will contact psychiatry dept to have her added to wait list. SW provided her contact as well as psychiatry dept contact info. SW encouraged pt to reach back out if she has not received an appt letter in a few weeks.

## 2019-01-10 ENCOUNTER — OFFICE VISIT (OUTPATIENT)
Dept: INTERNAL MEDICINE | Facility: CLINIC | Age: 69
End: 2019-01-10
Payer: MEDICARE

## 2019-01-10 VITALS
HEIGHT: 61 IN | BODY MASS INDEX: 45.54 KG/M2 | OXYGEN SATURATION: 98 % | WEIGHT: 241.19 LBS | TEMPERATURE: 99 F | DIASTOLIC BLOOD PRESSURE: 70 MMHG | SYSTOLIC BLOOD PRESSURE: 124 MMHG | HEART RATE: 88 BPM

## 2019-01-10 DIAGNOSIS — F33.42 RECURRENT MAJOR DEPRESSIVE DISORDER, IN FULL REMISSION: ICD-10-CM

## 2019-01-10 DIAGNOSIS — I48.0 PAROXYSMAL A-FIB: ICD-10-CM

## 2019-01-10 DIAGNOSIS — I10 ESSENTIAL HYPERTENSION: Primary | ICD-10-CM

## 2019-01-10 DIAGNOSIS — E11.29 CONTROLLED TYPE 2 DIABETES MELLITUS WITH MICROALBUMINURIA, WITH LONG-TERM CURRENT USE OF INSULIN: ICD-10-CM

## 2019-01-10 DIAGNOSIS — R80.9 CONTROLLED TYPE 2 DIABETES MELLITUS WITH MICROALBUMINURIA, WITH LONG-TERM CURRENT USE OF INSULIN: ICD-10-CM

## 2019-01-10 DIAGNOSIS — Z79.4 CONTROLLED TYPE 2 DIABETES MELLITUS WITH MICROALBUMINURIA, WITH LONG-TERM CURRENT USE OF INSULIN: ICD-10-CM

## 2019-01-10 LAB
ALBUMIN/CREAT UR: 197 UG/MG
CREAT UR-MCNC: 99 MG/DL
MICROALBUMIN UR DL<=1MG/L-MCNC: 195 UG/ML

## 2019-01-10 PROCEDURE — 1101F PR PT FALLS ASSESS DOC 0-1 FALLS W/OUT INJ PAST YR: ICD-10-PCS | Mod: CPTII,S$GLB,, | Performed by: FAMILY MEDICINE

## 2019-01-10 PROCEDURE — 99499 UNLISTED E&M SERVICE: CPT | Mod: S$GLB,,, | Performed by: FAMILY MEDICINE

## 2019-01-10 PROCEDURE — 82043 UR ALBUMIN QUANTITATIVE: CPT

## 2019-01-10 PROCEDURE — 3078F DIAST BP <80 MM HG: CPT | Mod: CPTII,S$GLB,, | Performed by: FAMILY MEDICINE

## 2019-01-10 PROCEDURE — 3074F PR MOST RECENT SYSTOLIC BLOOD PRESSURE < 130 MM HG: ICD-10-PCS | Mod: CPTII,S$GLB,, | Performed by: FAMILY MEDICINE

## 2019-01-10 PROCEDURE — 99999 PR PBB SHADOW E&M-EST. PATIENT-LVL III: ICD-10-PCS | Mod: PBBFAC,,, | Performed by: FAMILY MEDICINE

## 2019-01-10 PROCEDURE — 83036 HEMOGLOBIN GLYCOSYLATED A1C: CPT

## 2019-01-10 PROCEDURE — 3074F SYST BP LT 130 MM HG: CPT | Mod: CPTII,S$GLB,, | Performed by: FAMILY MEDICINE

## 2019-01-10 PROCEDURE — 3078F PR MOST RECENT DIASTOLIC BLOOD PRESSURE < 80 MM HG: ICD-10-PCS | Mod: CPTII,S$GLB,, | Performed by: FAMILY MEDICINE

## 2019-01-10 PROCEDURE — 3044F PR MOST RECENT HEMOGLOBIN A1C LEVEL <7.0%: ICD-10-PCS | Mod: CPTII,S$GLB,, | Performed by: FAMILY MEDICINE

## 2019-01-10 PROCEDURE — 99499 RISK ADDL DX/OHS AUDIT: ICD-10-PCS | Mod: S$GLB,,, | Performed by: FAMILY MEDICINE

## 2019-01-10 PROCEDURE — 99999 PR PBB SHADOW E&M-EST. PATIENT-LVL III: CPT | Mod: PBBFAC,,, | Performed by: FAMILY MEDICINE

## 2019-01-10 PROCEDURE — 99214 PR OFFICE/OUTPT VISIT, EST, LEVL IV, 30-39 MIN: ICD-10-PCS | Mod: S$GLB,,, | Performed by: FAMILY MEDICINE

## 2019-01-10 PROCEDURE — 3044F HG A1C LEVEL LT 7.0%: CPT | Mod: CPTII,S$GLB,, | Performed by: FAMILY MEDICINE

## 2019-01-10 PROCEDURE — 99214 OFFICE O/P EST MOD 30 MIN: CPT | Mod: S$GLB,,, | Performed by: FAMILY MEDICINE

## 2019-01-10 PROCEDURE — 1101F PT FALLS ASSESS-DOCD LE1/YR: CPT | Mod: CPTII,S$GLB,, | Performed by: FAMILY MEDICINE

## 2019-01-10 NOTE — PROGRESS NOTES
Subjective:       Patient ID: Christine Jo is a 68 y.o. female.    Chief Complaint: Follow-up (3 month )    Follow-up diabetes hypertension micro albuminuria paroxysmal atrial fibrillation elevated BMI depression.  She denies polyuria polydipsia hypoglycemic symptoms.  She reports some weight gain over the holidays.  She is also followed by Cardiology and is being monitored on Xarelto.  Depression has increased since her mother  this past August.  She is on Cymbalta 90 mg a day and will be scheduling to see a therapist as well.  Her depression seems to be less intense than it was over the holidays.      Review of Systems   Constitutional: Positive for unexpected weight change. Negative for appetite change, diaphoresis and fatigue.   Respiratory: Negative for cough, chest tightness, shortness of breath and wheezing.    Cardiovascular: Negative for chest pain, palpitations and leg swelling.        Denies lightheadedness   Gastrointestinal: Negative for abdominal pain.   Genitourinary: Negative for difficulty urinating.   Neurological: Negative for dizziness, weakness, light-headedness and headaches.   Psychiatric/Behavioral: Positive for dysphoric mood. Negative for agitation and self-injury. The patient is not nervous/anxious.        Objective:      Physical Exam   Constitutional: She is oriented to person, place, and time. She appears well-developed and well-nourished. No distress.   Neck: Neck supple. No thyromegaly present.   Cardiovascular: Normal rate, regular rhythm and normal heart sounds.   No murmur heard.  Pulmonary/Chest: Effort normal and breath sounds normal. No respiratory distress. She has no wheezes.   Abdominal: Soft. Bowel sounds are normal. She exhibits no mass. There is no tenderness.   Lymphadenopathy:     She has no cervical adenopathy.   Neurological: She is alert and oriented to person, place, and time.   Skin: She is not diaphoretic.       Lab Visit on 2018   Component Date  Value Ref Range Status    Sodium 12/04/2018 139  136 - 145 mmol/L Final    Potassium 12/04/2018 4.5  3.5 - 5.1 mmol/L Final    Chloride 12/04/2018 103  95 - 110 mmol/L Final    CO2 12/04/2018 27  23 - 29 mmol/L Final    Glucose 12/04/2018 159* 70 - 110 mg/dL Final    BUN, Bld 12/04/2018 16  8 - 23 mg/dL Final    Creatinine 12/04/2018 0.8  0.5 - 1.4 mg/dL Final    Calcium 12/04/2018 9.9  8.7 - 10.5 mg/dL Final    Anion Gap 12/04/2018 9  8 - 16 mmol/L Final    eGFR if African American 12/04/2018 >60  >60 mL/min/1.73 m^2 Final    eGFR if non African American 12/04/2018 >60  >60 mL/min/1.73 m^2 Final     Assessment:       1. Essential hypertension    2. Controlled type 2 diabetes mellitus with microalbuminuria, with long-term current use of insulin        Plan:     Blood pressure is controlled 124/70.  Medications reviewed.  Recheck A1c microalbumin level.  Diabetic eye exam is scheduled next week.  Discussed diet and exercise regards weight gain.  Continue Cymbalta and follow up with therapist as planned    Essential hypertension    Controlled type 2 diabetes mellitus with microalbuminuria, with long-term current use of insulin  -     Hemoglobin A1c  -     Microalbumin/creatinine urine ratio

## 2019-01-11 ENCOUNTER — CLINICAL SUPPORT (OUTPATIENT)
Dept: REHABILITATION | Facility: HOSPITAL | Age: 69
End: 2019-01-11
Attending: PAIN MEDICINE
Payer: MEDICARE

## 2019-01-11 ENCOUNTER — PATIENT MESSAGE (OUTPATIENT)
Dept: INTERNAL MEDICINE | Facility: CLINIC | Age: 69
End: 2019-01-11

## 2019-01-11 DIAGNOSIS — G54.2 CERVICAL NEUROPATHY: ICD-10-CM

## 2019-01-11 DIAGNOSIS — M47.812 SPONDYLOSIS OF CERVICAL REGION WITHOUT MYELOPATHY OR RADICULOPATHY: Primary | ICD-10-CM

## 2019-01-11 LAB
ESTIMATED AVG GLUCOSE: 151 MG/DL
HBA1C MFR BLD HPLC: 6.9 %

## 2019-01-11 PROCEDURE — G8982 BODY POS GOAL STATUS: HCPCS | Mod: CI | Performed by: PHYSICAL THERAPIST

## 2019-01-11 PROCEDURE — 97535 SELF CARE MNGMENT TRAINING: CPT | Performed by: PHYSICAL THERAPIST

## 2019-01-11 PROCEDURE — 97161 PT EVAL LOW COMPLEX 20 MIN: CPT | Performed by: PHYSICAL THERAPIST

## 2019-01-11 PROCEDURE — G8981 BODY POS CURRENT STATUS: HCPCS | Mod: CK | Performed by: PHYSICAL THERAPIST

## 2019-01-11 PROCEDURE — 97140 MANUAL THERAPY 1/> REGIONS: CPT | Performed by: PHYSICAL THERAPIST

## 2019-01-11 NOTE — PROGRESS NOTES
PHYSICAL THERAPY INITIAL OUTPATIENT EVALUATION    Referring Provider:  Dr. Phuc Christiansen    Diagnosis:       ICD-10-CM ICD-9-CM    1. Spondylosis of cervical region without myelopathy or radiculopathy M47.812 721.0    2. Cervical neuropathy G54.2 353.2             Orders:  Evaluate and Treat    Date of Initial Evaluation: 1/11/19      Visit # 1 of    SUBJECTIVE Pt reports long history of neck pain that began 20 years ago after a trauma to her neck. She reports that a box fell on her neck. X rays revealed no fractures but she continued to have pain that she has managed over 20 years with pain meds, heat and massage.  She reports this year, her pain progressively worsened and began radiating into her RUE. Recently in December, she had an injection in her facets on the right sided of her neck- she reports elimination of RUE pain and decreased neck pain, however, pain has continue in neck and patient has moderate pain in the morning, when looking down while reading, and turning neck to the right and left when driving. Pt also complains of daily headaches and wakes with headaches    Worst pain 5/10  Best pain 2/10  Current pain 3/10      Occupation: retired      Goal for PT: reduce pain and return to turning neck and looking down while reading and driving    OBJECTIVE      Posture: moderate forward head posture, rounded shoulders        C/sp AROM   % Pain ? Comments- deviations   FB 90     RSB 50 pain    LSB 25     RR 50 Pain    LR 25 pulling    BB 75       Palpation( condition, position, mobility): moderate to severe mm tenderness to R upper trap, scalenes and suboccipitals    Strength: NT  Strength/Myotome     C2 neck Flexor      /5   C3 Lateral flex      /5   C4 Upper trap      /5   C5 bicep      /5   C6 Wrist flex      /5   C7 Wrist ext       UE Strength:   Neuro/Sensation:   Reflexes intact. Sensation intact    Function: Patient reports 42% disability based on score  on the neck disability index.    Special test:  "positive c/sp compression and distraction, ULTT, deep neck flexors    NECK PAIN DISABILITY INDEX QUESTIONNAIRE - The following scores are based on patient reported assessment, with "0" being least limited and "5" being most limited.    Section 1- Pain Intensity  1/5     Section 2 Personal Care  2/5  Section 3 Lifting  2/5     Section 4 Reading  3/5  Section 5 Headaches   3/5     Section 6 Concentration 1/5  Section 7 Work   2/5     Section 8 Driving  2/5  Section 9 Sleeping  3/5     Section 10 Recreation  2/5      Functional Limitations and Goal:  This patient's primary Physical Therapy goal is to return to their prior level of function (Self Care G-8987) without limitations.  The patient's current level of impairment is 40-60  percent impaired (CK) based on their score of 42 percent impaired on the NECK PAIN DISABILITY INDEX QUESTIONNAIRE.  The patient is expected to achieve a score of 1 to 19 percent impaired ( G-8988  CI ) within 10 treatment days.      ASSESSMENT:  The patient is a 68 y.o. year old female who presents to physical therapy with complaints of neck pain.  Patient's impairments include decreased c/sp ROM, tenderness to c/sp mm, decreased deep neck flexors, moderate pain nad headaches.  These impairments are limiting patient's ability to perform driving, lifting, reading, sleeping without pain and disturbances.  Patient's prognosis is fair to good.  Patient will benefit from skilled physical therapy intervention to improve mobility, pain nad function. Co-morbidities which may impact the plan of care and potentially impede the patient's progress in therapy include: OA, DM    Patients CLINICAL PRESENTATION is STABLE.     Short Term Goals:  1-4 weeks  1.I with HEP  2. Pt demo improve c/sp ROM 25-50%  3. Pt report 25-50% reduction in symptoms and headaches intensity and frequency  Long Term Goals: 5-8  weeks  1. Pt demo full c/sp ROM without pain to return to driving, and reading  2. PT demo good neck " strength to reduce strain nad stress on neck for reduction in symptoms with ADL's and IADL's  3. Pt deny neck pain and headaches 5/7 days per week    TREATMENT PROVIDED:  -Manual Therapy:  STM to R c/sp parapsinals and suboccipitals 8 min f/c dry needling to B upper traps, suboccipitals- NC for needling  -Therapeutic Exercise:  Chin tucks  -Evaluation  -Education on posture, HEP and prognosis 8 min    PLAN:  Patient will benefit from physical therapy (2) x/week for (6-8) weeks including manual therapy, therapeutic exercise, functional activities, modalities, and patient education.    Thank you for this referral.    These services are reasonable and necessary for the conditions set forth above while under my care.

## 2019-01-14 ENCOUNTER — PATIENT MESSAGE (OUTPATIENT)
Dept: PAIN MEDICINE | Facility: CLINIC | Age: 69
End: 2019-01-14

## 2019-01-14 RX ORDER — FLUCONAZOLE 150 MG/1
150 TABLET ORAL DAILY
Qty: 1 TABLET | Refills: 0 | Status: SHIPPED | OUTPATIENT
Start: 2019-01-14 | End: 2019-01-23 | Stop reason: SDUPTHER

## 2019-01-16 DIAGNOSIS — R30.0 DYSURIA: Primary | ICD-10-CM

## 2019-01-17 ENCOUNTER — CLINICAL SUPPORT (OUTPATIENT)
Dept: INTERNAL MEDICINE | Facility: CLINIC | Age: 69
End: 2019-01-17
Payer: MEDICARE

## 2019-01-17 DIAGNOSIS — R30.0 DYSURIA: ICD-10-CM

## 2019-01-17 LAB
BILIRUB UR QL STRIP: NEGATIVE
CLARITY UR REFRACT.AUTO: CLEAR
COLOR UR AUTO: YELLOW
GLUCOSE UR QL STRIP: NEGATIVE
HGB UR QL STRIP: NEGATIVE
KETONES UR QL STRIP: NEGATIVE
LEUKOCYTE ESTERASE UR QL STRIP: NEGATIVE
NITRITE UR QL STRIP: NEGATIVE
PH UR STRIP: 6 [PH] (ref 5–8)
PROT UR QL STRIP: NEGATIVE
SP GR UR STRIP: 1.01 (ref 1–1.03)
URN SPEC COLLECT METH UR: NORMAL

## 2019-01-17 PROCEDURE — 81003 URINALYSIS AUTO W/O SCOPE: CPT

## 2019-01-17 RX ORDER — OMEPRAZOLE 20 MG/1
CAPSULE, DELAYED RELEASE ORAL
Qty: 90 CAPSULE | Refills: 0 | Status: SHIPPED | OUTPATIENT
Start: 2019-01-17 | End: 2019-04-10

## 2019-01-21 ENCOUNTER — TELEPHONE (OUTPATIENT)
Dept: PHYSICAL MEDICINE AND REHAB | Facility: CLINIC | Age: 69
End: 2019-01-21

## 2019-01-21 ENCOUNTER — PATIENT MESSAGE (OUTPATIENT)
Dept: INTERNAL MEDICINE | Facility: CLINIC | Age: 69
End: 2019-01-21

## 2019-01-21 ENCOUNTER — TELEPHONE (OUTPATIENT)
Dept: OBSTETRICS AND GYNECOLOGY | Facility: CLINIC | Age: 69
End: 2019-01-21

## 2019-01-21 ENCOUNTER — TELEPHONE (OUTPATIENT)
Dept: PSYCHIATRY | Facility: CLINIC | Age: 69
End: 2019-01-21

## 2019-01-21 ENCOUNTER — TELEPHONE (OUTPATIENT)
Dept: REHABILITATION | Facility: HOSPITAL | Age: 69
End: 2019-01-21

## 2019-01-21 NOTE — TELEPHONE ENCOUNTER
----- Message from Kacie Caldwellite sent at 1/21/2019  4:42 PM CST -----  Contact: Pt   Pt called and stated she is having problems in the vaginal area.   Pt did not give further info. She can be reached at [phone].    Thanks,  TF

## 2019-01-21 NOTE — TELEPHONE ENCOUNTER
----- Message from Kaylee Fisher sent at 1/21/2019  3:17 PM CST -----  Contact: self   Requesting a call back regarding a referral for physical therapy. She was told she could not be seen because they did not have the referral anymore.Please call back at [phone].      Thanks,  Kaylee Fisher

## 2019-01-21 NOTE — TELEPHONE ENCOUNTER
----- Message from Kaylee Fisher sent at 1/21/2019  3:19 PM CST -----  Contact: self   Requesting a call back regarding being on the list for the new .This is the second message being sent.please reach back out to her. Please call back at [phone].      Thanks,  Kaylee Fisher

## 2019-01-21 NOTE — TELEPHONE ENCOUNTER
Spoke to patient and she was referred by Dr. Jose Szymanski to see a OBGYN physician. Scheduled patient for 1/22/19 at 10:45am to see Dr. MIRIAN Orantes at the [facility] location. Patient verbalized understanding.

## 2019-01-22 DIAGNOSIS — M50.30 DDD (DEGENERATIVE DISC DISEASE), CERVICAL: Primary | ICD-10-CM

## 2019-01-22 DIAGNOSIS — M54.12 CERVICAL RADICULOPATHY: ICD-10-CM

## 2019-01-22 DIAGNOSIS — M47.812 CERVICAL SPONDYLOSIS WITHOUT MYELOPATHY: ICD-10-CM

## 2019-01-22 NOTE — TELEPHONE ENCOUNTER
Contacted patient; patient is requesting another internal referral for PT. Informed patient that I will have orders sent. Pt verbalized understanding.

## 2019-01-23 RX ORDER — FLUCONAZOLE 150 MG/1
TABLET ORAL
Qty: 1 TABLET | Refills: 0 | Status: SHIPPED | OUTPATIENT
Start: 2019-01-23 | End: 2019-02-25 | Stop reason: SDUPTHER

## 2019-01-24 RX ORDER — FLUCONAZOLE 150 MG/1
TABLET ORAL
Qty: 1 TABLET | Refills: 0 | OUTPATIENT
Start: 2019-01-24

## 2019-01-28 ENCOUNTER — PATIENT MESSAGE (OUTPATIENT)
Dept: INTERNAL MEDICINE | Facility: CLINIC | Age: 69
End: 2019-01-28

## 2019-01-28 DIAGNOSIS — F32.A DEPRESSION, UNSPECIFIED DEPRESSION TYPE: Primary | ICD-10-CM

## 2019-01-28 RX ORDER — SITAGLIPTIN 100 MG/1
TABLET, FILM COATED ORAL
Qty: 90 TABLET | Refills: 0 | Status: SHIPPED | OUTPATIENT
Start: 2019-01-28 | End: 2019-01-30 | Stop reason: SDUPTHER

## 2019-01-28 RX ORDER — BENAZEPRIL HYDROCHLORIDE 40 MG/1
TABLET ORAL
Qty: 90 TABLET | Refills: 0 | Status: SHIPPED | OUTPATIENT
Start: 2019-01-28 | End: 2020-06-16 | Stop reason: SDUPTHER

## 2019-02-05 ENCOUNTER — LAB VISIT (OUTPATIENT)
Dept: LAB | Facility: HOSPITAL | Age: 69
End: 2019-02-05
Attending: INTERNAL MEDICINE
Payer: MEDICARE

## 2019-02-05 DIAGNOSIS — D50.0 IRON DEFICIENCY ANEMIA DUE TO CHRONIC BLOOD LOSS: ICD-10-CM

## 2019-02-05 LAB
ANION GAP SERPL CALC-SCNC: 10 MMOL/L
BASOPHILS # BLD AUTO: 0.04 K/UL
BASOPHILS NFR BLD: 0.5 %
BUN SERPL-MCNC: 17 MG/DL
CALCIUM SERPL-MCNC: 9.4 MG/DL
CHLORIDE SERPL-SCNC: 103 MMOL/L
CO2 SERPL-SCNC: 25 MMOL/L
CREAT SERPL-MCNC: 0.8 MG/DL
DIFFERENTIAL METHOD: ABNORMAL
EOSINOPHIL # BLD AUTO: 0.2 K/UL
EOSINOPHIL NFR BLD: 2 %
ERYTHROCYTE [DISTWIDTH] IN BLOOD BY AUTOMATED COUNT: 13.1 %
EST. GFR  (AFRICAN AMERICAN): >60 ML/MIN/1.73 M^2
EST. GFR  (NON AFRICAN AMERICAN): >60 ML/MIN/1.73 M^2
FERRITIN SERPL-MCNC: 140 NG/ML
GLUCOSE SERPL-MCNC: 200 MG/DL
HCT VFR BLD AUTO: 36.4 %
HGB BLD-MCNC: 12 G/DL
IRON SERPL-MCNC: 90 UG/DL
LYMPHOCYTES # BLD AUTO: 1.9 K/UL
LYMPHOCYTES NFR BLD: 24.2 %
MCH RBC QN AUTO: 31.3 PG
MCHC RBC AUTO-ENTMCNC: 33 G/DL
MCV RBC AUTO: 95 FL
MONOCYTES # BLD AUTO: 0.7 K/UL
MONOCYTES NFR BLD: 8.4 %
NEUTROPHILS # BLD AUTO: 5.1 K/UL
NEUTROPHILS NFR BLD: 64.9 %
PLATELET # BLD AUTO: 247 K/UL
PMV BLD AUTO: 11.6 FL
POTASSIUM SERPL-SCNC: 3.9 MMOL/L
RBC # BLD AUTO: 3.83 M/UL
SATURATED IRON: 30 %
SODIUM SERPL-SCNC: 138 MMOL/L
TOTAL IRON BINDING CAPACITY: 303 UG/DL
TRANSFERRIN SERPL-MCNC: 205 MG/DL
WBC # BLD AUTO: 7.89 K/UL

## 2019-02-05 PROCEDURE — 83540 ASSAY OF IRON: CPT

## 2019-02-05 PROCEDURE — 82728 ASSAY OF FERRITIN: CPT

## 2019-02-05 PROCEDURE — 85025 COMPLETE CBC W/AUTO DIFF WBC: CPT

## 2019-02-05 PROCEDURE — 36415 COLL VENOUS BLD VENIPUNCTURE: CPT

## 2019-02-05 PROCEDURE — 80048 BASIC METABOLIC PNL TOTAL CA: CPT

## 2019-02-07 RX ORDER — TEMAZEPAM 30 MG/1
CAPSULE ORAL
Qty: 30 CAPSULE | Refills: 2 | Status: CANCELLED | OUTPATIENT
Start: 2019-02-07

## 2019-02-07 RX ORDER — TEMAZEPAM 30 MG/1
CAPSULE ORAL
Qty: 30 CAPSULE | Refills: 0 | Status: CANCELLED | OUTPATIENT
Start: 2019-02-07

## 2019-02-08 ENCOUNTER — PATIENT MESSAGE (OUTPATIENT)
Dept: INTERNAL MEDICINE | Facility: CLINIC | Age: 69
End: 2019-02-08

## 2019-02-08 ENCOUNTER — LAB VISIT (OUTPATIENT)
Dept: LAB | Facility: HOSPITAL | Age: 69
End: 2019-02-08
Attending: NURSE PRACTITIONER
Payer: MEDICARE

## 2019-02-08 ENCOUNTER — OFFICE VISIT (OUTPATIENT)
Dept: OPHTHALMOLOGY | Facility: CLINIC | Age: 69
End: 2019-02-08
Payer: MEDICARE

## 2019-02-08 ENCOUNTER — OFFICE VISIT (OUTPATIENT)
Dept: HEMATOLOGY/ONCOLOGY | Facility: CLINIC | Age: 69
End: 2019-02-08
Payer: MEDICARE

## 2019-02-08 VITALS
OXYGEN SATURATION: 95 % | BODY MASS INDEX: 45.87 KG/M2 | SYSTOLIC BLOOD PRESSURE: 148 MMHG | TEMPERATURE: 98 F | DIASTOLIC BLOOD PRESSURE: 77 MMHG | WEIGHT: 242.94 LBS | HEART RATE: 85 BPM | HEIGHT: 61 IN

## 2019-02-08 DIAGNOSIS — R80.9 CONTROLLED TYPE 2 DIABETES MELLITUS WITH MICROALBUMINURIA, WITH LONG-TERM CURRENT USE OF INSULIN: ICD-10-CM

## 2019-02-08 DIAGNOSIS — I48.0 PAROXYSMAL A-FIB: ICD-10-CM

## 2019-02-08 DIAGNOSIS — H52.4 BILATERAL PRESBYOPIA: ICD-10-CM

## 2019-02-08 DIAGNOSIS — D50.0 IRON DEFICIENCY ANEMIA DUE TO CHRONIC BLOOD LOSS: Primary | ICD-10-CM

## 2019-02-08 DIAGNOSIS — R53.82 CHRONIC FATIGUE: ICD-10-CM

## 2019-02-08 DIAGNOSIS — Z79.4 CONTROLLED TYPE 2 DIABETES MELLITUS WITH MICROALBUMINURIA, WITH LONG-TERM CURRENT USE OF INSULIN: ICD-10-CM

## 2019-02-08 DIAGNOSIS — E11.29 CONTROLLED TYPE 2 DIABETES MELLITUS WITH MICROALBUMINURIA, WITH LONG-TERM CURRENT USE OF INSULIN: ICD-10-CM

## 2019-02-08 DIAGNOSIS — E11.9 DIABETES MELLITUS TYPE 2 WITHOUT RETINOPATHY: Primary | ICD-10-CM

## 2019-02-08 LAB — TSH SERPL DL<=0.005 MIU/L-ACNC: 0.74 UIU/ML

## 2019-02-08 PROCEDURE — 99999 PR PBB SHADOW E&M-EST. PATIENT-LVL III: CPT | Mod: PBBFAC,,, | Performed by: NURSE PRACTITIONER

## 2019-02-08 PROCEDURE — 92015 PR REFRACTION: ICD-10-PCS | Mod: S$GLB,,, | Performed by: OPTOMETRIST

## 2019-02-08 PROCEDURE — 92014 PR EYE EXAM, EST PATIENT,COMPREHESV: ICD-10-PCS | Mod: S$GLB,,, | Performed by: OPTOMETRIST

## 2019-02-08 PROCEDURE — 1101F PT FALLS ASSESS-DOCD LE1/YR: CPT | Mod: CPTII,S$GLB,, | Performed by: NURSE PRACTITIONER

## 2019-02-08 PROCEDURE — 92014 COMPRE OPH EXAM EST PT 1/>: CPT | Mod: S$GLB,,, | Performed by: OPTOMETRIST

## 2019-02-08 PROCEDURE — 99999 PR PBB SHADOW E&M-EST. PATIENT-LVL III: ICD-10-PCS | Mod: PBBFAC,,, | Performed by: NURSE PRACTITIONER

## 2019-02-08 PROCEDURE — 99999 PR PBB SHADOW E&M-EST. PATIENT-LVL I: ICD-10-PCS | Mod: PBBFAC,,, | Performed by: OPTOMETRIST

## 2019-02-08 PROCEDURE — 1101F PR PT FALLS ASSESS DOC 0-1 FALLS W/OUT INJ PAST YR: ICD-10-PCS | Mod: CPTII,S$GLB,, | Performed by: NURSE PRACTITIONER

## 2019-02-08 PROCEDURE — 99214 PR OFFICE/OUTPT VISIT, EST, LEVL IV, 30-39 MIN: ICD-10-PCS | Mod: S$GLB,,, | Performed by: NURSE PRACTITIONER

## 2019-02-08 PROCEDURE — 99999 PR PBB SHADOW E&M-EST. PATIENT-LVL I: CPT | Mod: PBBFAC,,, | Performed by: OPTOMETRIST

## 2019-02-08 PROCEDURE — 99214 OFFICE O/P EST MOD 30 MIN: CPT | Mod: S$GLB,,, | Performed by: NURSE PRACTITIONER

## 2019-02-08 PROCEDURE — 92015 DETERMINE REFRACTIVE STATE: CPT | Mod: S$GLB,,, | Performed by: OPTOMETRIST

## 2019-02-08 PROCEDURE — 3078F DIAST BP <80 MM HG: CPT | Mod: CPTII,S$GLB,, | Performed by: NURSE PRACTITIONER

## 2019-02-08 PROCEDURE — 3077F PR MOST RECENT SYSTOLIC BLOOD PRESSURE >= 140 MM HG: ICD-10-PCS | Mod: CPTII,S$GLB,, | Performed by: NURSE PRACTITIONER

## 2019-02-08 PROCEDURE — 3077F SYST BP >= 140 MM HG: CPT | Mod: CPTII,S$GLB,, | Performed by: NURSE PRACTITIONER

## 2019-02-08 PROCEDURE — 84443 ASSAY THYROID STIM HORMONE: CPT

## 2019-02-08 PROCEDURE — 3078F PR MOST RECENT DIASTOLIC BLOOD PRESSURE < 80 MM HG: ICD-10-PCS | Mod: CPTII,S$GLB,, | Performed by: NURSE PRACTITIONER

## 2019-02-08 PROCEDURE — 3044F HG A1C LEVEL LT 7.0%: CPT | Mod: CPTII,S$GLB,, | Performed by: NURSE PRACTITIONER

## 2019-02-08 PROCEDURE — 3044F PR MOST RECENT HEMOGLOBIN A1C LEVEL <7.0%: ICD-10-PCS | Mod: CPTII,S$GLB,, | Performed by: NURSE PRACTITIONER

## 2019-02-08 PROCEDURE — 36415 COLL VENOUS BLD VENIPUNCTURE: CPT

## 2019-02-08 NOTE — PROGRESS NOTES
Subjective:      Patient ID: Christine Jo is a 68 y.o. female.    Chief Complaint:  Lab Discussion    HPI:  Patient is a 68 year old  female who present today for follow up for her diagnosed iron deficiency anemia.  She was given IV iron on 1/3/19 and 1/8/19.  Currently she is not anemic with a hemoglobin of 12.0 and iron is repleated.  She [facility] which showed = EGD- gastritis and colonoscopy - negative for contributory causes.  Results are in media section.  She is being follow by Dr. Arthur Reynaga at [facility] for A. Fib and state that she sees him every 6 months.  She has DMII with an elevated a1c that is followed by primary care.  She is also insulin dependent.      She complains of fatigue that is improved since iron infusions and think that her depression is a contributory factor to her fatigue.  Will assess her TSH.       Social History     Socioeconomic History    Marital status:      Spouse name: Not on file    Number of children: Not on file    Years of education: Not on file    Highest education level: Not on file   Social Needs    Financial resource strain: Not on file    Food insecurity - worry: Not on file    Food insecurity - inability: Not on file    Transportation needs - medical: Not on file    Transportation needs - non-medical: Not on file   Occupational History    Not on file   Tobacco Use    Smoking status: Former Smoker     Packs/day: 1.00     Years: 35.00     Pack years: 35.00     Types: Cigarettes     Last attempt to quit: 6/22/2003     Years since quitting: 15.6    Smokeless tobacco: Never Used   Substance and Sexual Activity    Alcohol use: No    Drug use: No    Sexual activity: No   Other Topics Concern    Not on file   Social History Narrative    Not on file       Family History   Problem Relation Age of Onset    Heart disease Mother         CAD     Cataracts  Mother     Macular degeneration Mother     Glaucoma Mother     Cancer Son         testicular     Cancer Maternal Aunt         Lung ca    Heart disease Maternal Grandfather         Pacemaker     Diabetes Sister     Heart disease Sister         CAD    Cataracts Sister     Diabetes Sister     Diabetes Sister        Past Surgical History:   Procedure Laterality Date    abdominal laparoscopy       BREAST BIOPSY      CATARACT EXTRACTION Bilateral [id number]    Jacqui in [place]    EYE SURGERY      TONSILLECTOMY      TUBAL LIGATION      yag  Bilateral        Past Medical History:   Diagnosis Date    Arthritis     Cataract     Diabetes mellitus 2008     am 01/15/2018 Insulin x 1 year    Glaucoma     Hypertension     Insomnia     Macular degeneration     Vaginal yeast infection        Review of Systems   Constitutional: Positive for fatigue (improved). Negative for activity change, appetite change, chills, diaphoresis, fever and unexpected weight change.   HENT: Negative for congestion, dental problem, drooling, ear discharge, ear pain, facial swelling, hearing loss, mouth sores, nosebleeds, postnasal drip, rhinorrhea, sinus pressure, sneezing, sore throat, tinnitus, trouble swallowing and voice change.    Eyes: Negative for photophobia, pain, discharge, redness, itching and visual disturbance.   Respiratory: Negative for cough, choking, chest tightness, shortness of breath, wheezing and stridor.    Cardiovascular: Negative for chest pain, palpitations and leg swelling.   Gastrointestinal: Positive for constipation. Negative for abdominal distention, abdominal pain, anal bleeding, blood in stool, diarrhea, nausea, rectal pain and vomiting.   Endocrine: Negative for cold intolerance, heat intolerance, polydipsia, polyphagia and polyuria.   Genitourinary: Negative for decreased urine volume, difficulty urinating, dyspareunia, dysuria, enuresis, flank pain, frequency, genital sores,  hematuria, menstrual problem, pelvic pain, urgency, vaginal bleeding, vaginal discharge and vaginal pain.   Musculoskeletal: Negative for arthralgias, back pain, gait problem, joint swelling, myalgias, neck pain and neck stiffness.   Skin: Negative for color change, pallor and rash.   Allergic/Immunologic: Negative for environmental allergies, food allergies and immunocompromised state.   Neurological: Negative for dizziness, tremors, seizures, syncope, facial asymmetry, speech difficulty, weakness, light-headedness, numbness and headaches.   Hematological: Negative for adenopathy. Does not bruise/bleed easily.   Psychiatric/Behavioral: Positive for dysphoric mood. Negative for agitation, behavioral problems, confusion, decreased concentration, hallucinations, self-injury, sleep disturbance and suicidal ideas. The patient is not nervous/anxious and is not hyperactive.           Medication List with Changes/Refills   Current Medications    ACETAMINOPHEN (TYLENOL ARTHRITIS ORAL)    Take by mouth as needed.    APIXABAN 5 MG TAB    Take 1 tablet (5 mg total) by mouth 2 (two) times daily.    ATORVASTATIN (LIPITOR) 40 MG TABLET    Take 1 tablet (40 mg total) by mouth once daily.    BASAGLAR KWIKPEN U-100 INSULIN 100 UNIT/ML (3 ML) INPN PEN    ADMINISTER 64 UNITS UNDER THE SKIN EVERY DAY    BENAZEPRIL (LOTENSIN) 40 MG TABLET    TAKE 1 TABLET BY MOUTH EVERY DAY    BEPREVE 1.5 % DROP    Place 1 drop into both eyes 2 (two) times daily.    CALCIUM CITRATE-VITAMIN D3 315-200 MG (CITRACAL+D) 315-200 MG-UNIT PER TABLET    Take 1 tablet by mouth once daily.    CARVEDILOL (COREG) 6.25 MG TABLET    Take 1 tablet (6.25 mg total) by mouth 2 (two) times daily with meals.    CRANBERRY FRUIT EXTRACT (CRANBERRY ORAL)    Take by mouth.    CYCLOSPORINE (RESTASIS) 0.05 % OPHTHALMIC EMULSION    Place 0.4 mLs (1 drop total) into both eyes 2 (two) times daily.    DULOXETINE (CYMBALTA) 30 MG CAPSULE    Take 1 capsule (30 mg total) by mouth once  "daily.    DULOXETINE (CYMBALTA) 60 MG CAPSULE    Take 1 capsule (60 mg total) by mouth once daily.    ERGOCALCIFEROL, VITAMIN D2, (VITAMIN D ORAL)    Take by mouth.    FLUCONAZOLE (DIFLUCAN) 150 MG TAB    TAKE 1 TABLET BY MOUTH(150MG) FOR 1 DAY    FLUTICASONE (FLONASE) 50 MCG/ACTUATION NASAL SPRAY    SHAKE LIQUID AND USE 1 SPRAY IN EACH NOSTRIL EVERY DAY    GABAPENTIN (NEURONTIN) 100 MG CAPSULE    Take 1 capsule (100 mg total) by mouth 3 (three) times daily.    GLUCOSAMINE-CHONDROITIN 500-400 MG TABLET    Take 1 tablet by mouth once daily.     HYDRALAZINE (APRESOLINE) 100 MG TABLET    TAKE 1 TABLET(100 MG) BY MOUTH TWICE DAILY    LIRAGLUTIDE 0.6 MG/0.1 ML, 18 MG/3 ML, SUBQ PNIJ (VICTOZA 2-LEAH) 0.6 MG/0.1 ML (18 MG/3 ML) PNIJ    Inject 0.6 mg into the skin once daily.    LORAZEPAM (ATIVAN) 1 MG TABLET    Take 1 tablet (1 mg total) by mouth every 6 (six) hours as needed for Anxiety.    MULTIVIT WITH CALCIUM,IRON,MIN (WOMEN'S DAILY MULTIVITAMIN ORAL)    Take by mouth once daily.     MUPIROCIN (BACTROBAN) 2 % OINTMENT    Apply topically 2 (two) times daily.    NYSTATIN (MYCOSTATIN) CREAM    Apply topically 2 (two) times daily. Continue until completely healed    OMEPRAZOLE (PRILOSEC) 20 MG CAPSULE    TAKE 1 CAPSULE(20 MG) BY MOUTH EVERY DAY    PEN NEEDLE, DIABETIC (BD ULTRA-FINE SHORT PEN NEEDLE) 31 GAUGE X 5/16" NDLE    Use two daily as directd    SITAGLIPTIN (JANUVIA) 100 MG TAB    Take 1 tablet (100 mg total) by mouth once daily.    TEMAZEPAM (RESTORIL) 30 MG CAPSULE    TK ONE C PO NIGHTLY PRF INSOMNIA    TRIAMCINOLONE ACETONIDE 0.1% (KENALOG) 0.1 % CREAM    Apply topically 2 (two) times daily. No longer than 2 weeks.        Objective:     Vitals:    02/08/19 1125   BP: (!) 148/77   Pulse: 85   Temp: 98.4 °F (36.9 °C)       Physical Exam   Constitutional: She is oriented to person, place, and time. She appears well-developed and well-nourished. No distress.   HENT:   Head: Normocephalic and atraumatic.   Right Ear: " External ear normal.   Left Ear: External ear normal.   Nose: Nose normal.   Eyes: Conjunctivae and EOM are normal.   Neck: Normal range of motion.   Cardiovascular: Normal rate and regular rhythm. Exam reveals no friction rub.   No murmur heard.  Pulmonary/Chest: Effort normal. No respiratory distress.   Abdominal: Soft. She exhibits no distension.   Musculoskeletal: Normal range of motion.   Neurological: She is alert and oriented to person, place, and time.   Skin: Skin is warm and dry. Capillary refill takes less than 2 seconds. No rash noted. She is not diaphoretic. No pallor.   Psychiatric: She has a normal mood and affect. Her behavior is normal. Judgment and thought content normal.   Vitals reviewed.      Assessment:     Problem List Items Addressed This Visit        Oncology    Iron deficiency anemia due to chronic blood loss - Primary    Relevant Orders    CBC auto differential    Comprehensive metabolic panel    Ferritin    Iron and TIBC      Other Visit Diagnoses     Chronic fatigue        Relevant Orders    TSH          Plan:   Iron deficiency anemia due to chronic blood loss  -     CBC auto differential; Future; Expected date: 02/08/2019  -     Comprehensive metabolic panel; Future; Expected date: 02/08/2019  -     Ferritin; Future; Expected date: 02/08/2019  -     Iron and TIBC; Future; Expected date: 02/08/2019    Chronic fatigue  -     TSH; Future; Expected date: 02/08/2019    Will check TSH today.  She will be provided with a list of iron rich foods.  She states that she has issues with constipation so oral iron will be avoided.  She will follow up in 3 months with CBC, CMP, ferritin, TIBC and iron.        I will review assessment/plan with collaborating physician.    Thank You,  GORDO Bethea

## 2019-02-08 NOTE — PROGRESS NOTES
HPI     Yearly Diabetic Eye exam  Floaters OU x several months  OU itch, tear and burn Restasis relieves momentarily  Wants glasses for distance and near    TF patient  DM 2004    Last edited by Lucy Sun on 2/8/2019 10:29 AM. (History)            Assessment /Plan     For exam results, see Encounter Report.    Diabetes mellitus type 2 without retinopathy    Bilateral presbyopia      No Background Diabetic Retinopathy    Stable IOL OU.    Dispense Final Rx for glasses or may use OTC glasses.  RTC 1 year  Discussed above and answered questions.

## 2019-02-11 ENCOUNTER — PATIENT MESSAGE (OUTPATIENT)
Dept: INTERNAL MEDICINE | Facility: CLINIC | Age: 69
End: 2019-02-11

## 2019-02-11 RX ORDER — TEMAZEPAM 30 MG/1
CAPSULE ORAL
Qty: 30 CAPSULE | Refills: 2 | Status: SHIPPED | OUTPATIENT
Start: 2019-02-11 | End: 2019-05-10 | Stop reason: SDUPTHER

## 2019-02-13 ENCOUNTER — OFFICE VISIT (OUTPATIENT)
Dept: PAIN MEDICINE | Facility: CLINIC | Age: 69
End: 2019-02-13
Payer: MEDICARE

## 2019-02-13 VITALS
DIASTOLIC BLOOD PRESSURE: 69 MMHG | HEIGHT: 61 IN | BODY MASS INDEX: 45.69 KG/M2 | WEIGHT: 242 LBS | SYSTOLIC BLOOD PRESSURE: 146 MMHG | RESPIRATION RATE: 14 BRPM | HEART RATE: 80 BPM

## 2019-02-13 DIAGNOSIS — M47.812 SPONDYLOSIS OF CERVICAL REGION WITHOUT MYELOPATHY OR RADICULOPATHY: Primary | ICD-10-CM

## 2019-02-13 DIAGNOSIS — M54.12 CERVICAL RADICULOPATHY: ICD-10-CM

## 2019-02-13 PROCEDURE — 3078F PR MOST RECENT DIASTOLIC BLOOD PRESSURE < 80 MM HG: ICD-10-PCS | Mod: CPTII,S$GLB,, | Performed by: PAIN MEDICINE

## 2019-02-13 PROCEDURE — 99999 PR PBB SHADOW E&M-EST. PATIENT-LVL III: CPT | Mod: PBBFAC,,, | Performed by: PAIN MEDICINE

## 2019-02-13 PROCEDURE — 3078F DIAST BP <80 MM HG: CPT | Mod: CPTII,S$GLB,, | Performed by: PAIN MEDICINE

## 2019-02-13 PROCEDURE — 3077F SYST BP >= 140 MM HG: CPT | Mod: CPTII,S$GLB,, | Performed by: PAIN MEDICINE

## 2019-02-13 PROCEDURE — 99213 OFFICE O/P EST LOW 20 MIN: CPT | Mod: S$GLB,,, | Performed by: PAIN MEDICINE

## 2019-02-13 PROCEDURE — 99999 PR PBB SHADOW E&M-EST. PATIENT-LVL III: ICD-10-PCS | Mod: PBBFAC,,, | Performed by: PAIN MEDICINE

## 2019-02-13 PROCEDURE — 1101F PT FALLS ASSESS-DOCD LE1/YR: CPT | Mod: CPTII,S$GLB,, | Performed by: PAIN MEDICINE

## 2019-02-13 PROCEDURE — 99213 PR OFFICE/OUTPT VISIT, EST, LEVL III, 20-29 MIN: ICD-10-PCS | Mod: S$GLB,,, | Performed by: PAIN MEDICINE

## 2019-02-13 PROCEDURE — 3077F PR MOST RECENT SYSTOLIC BLOOD PRESSURE >= 140 MM HG: ICD-10-PCS | Mod: CPTII,S$GLB,, | Performed by: PAIN MEDICINE

## 2019-02-13 PROCEDURE — 1101F PR PT FALLS ASSESS DOC 0-1 FALLS W/OUT INJ PAST YR: ICD-10-PCS | Mod: CPTII,S$GLB,, | Performed by: PAIN MEDICINE

## 2019-02-13 RX ORDER — TRAZODONE HYDROCHLORIDE 50 MG/1
50 TABLET ORAL NIGHTLY
Qty: 30 TABLET | Refills: 2 | Status: SHIPPED | OUTPATIENT
Start: 2019-02-13 | End: 2019-03-21

## 2019-02-13 RX ORDER — GABAPENTIN 300 MG/1
300 CAPSULE ORAL 3 TIMES DAILY
Qty: 90 CAPSULE | Refills: 3 | Status: SHIPPED | OUTPATIENT
Start: 2019-02-13 | End: 2019-03-15

## 2019-02-13 NOTE — PATIENT INSTRUCTIONS
-increase gabapentin to 300 mg at breakfast, 300 at lunch, 600 at bedtime  -continue to work with physical therapy  -follow up in clinic in 6 weeks

## 2019-02-13 NOTE — PROGRESS NOTES
Chief Pain Complaint:  Chief Complaint   Patient presents with    Neck Pain     to the arms     History of Present Illness:   Ms. Jo returns to clinic for follow-up.  She has pain located in her cervical spine which radiates into her upper extremities bilateral with left being worse than right.  Her symptoms radiate to her elbow but usually not below.  Currently she rates her pain as a 3/10 and describes a shooting pain occasionally to the elbow.  She finds her symptoms are worse when she rotates her head and looks up and down an are improved with rest.  She participated in 1 session physical therapy however they use dry needling on her cervical spine and she fell very uncomfortable with this and has not returned physical therapy.  She is taking gabapentin 300 mg 3 times daily which does provide some benefit.  She denies having numbness and weakness in her upper extremities and denies having bowel bladder difficulty.    Initial HPI:  This patient is a 68 y.o. female who presents today complaining of the above noted pain/s. The patient describes the pain as follows.  Ms. Jo as a history of major depressive disorder, AFib, hypertension, diabetes type 2, GERD presents to clinic with complaints of cervical pain with radicular symptoms in the right arm.  She has been having symptoms for several years which has progressively gotten worse.  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  Her symptoms are worse on the right but occasionally she does have symptoms on the left side and she finds it to be worse when she rotates her head to the right such as when she is driving.  No increase in symptoms when she turns to the left or flexes or extends.  She has found that hot water in the shower helps with her symptoms.  She does have headaches occasionally proximally to this week.  She describes the pain as 50% cervical spine a 50% radiating  down the arm.  She tries to avoid NSAIDs that she has had stomach irritation previously with chronic use.  In the past she has taking gabapentin, Tylenol, Cymbalta.  She denies numbness and weakness in her left upper extremity while she endorses having numbness and weakness in the right upper extremity specifically in the C6 dermatome.  She denies having bowel bladder difficulty. She has not participated in physical therapy.    Previous Therapy:  Medications:  Gabapentin, Tylenol, Cymbalta  Injections:  Right C4-C6 medial branch blocks  Surgeries:  None  Physical Therapy:  Has been to 1 session    Past Surgical History:   Procedure Laterality Date    abdominal laparoscopy       BREAST BIOPSY      CATARACT EXTRACTION Bilateral [id number]    [facility] in [place]    EYE SURGERY      TONSILLECTOMY      TUBAL LIGATION      yag  Bilateral      Imaging / Labs / Studies (reviewed on 2/13/2019):  Results for orders placed during the hospital encounter of 10/03/18   MRI Cervical Spine Without Contrast    Narrative EXAMINATION:  MRI CERVICAL SPINE WITHOUT CONTRAST    CLINICAL HISTORY:  Neck pain, first study; Cervicalgia    TECHNIQUE:  Multiplanar, multisequence MR images of the cervical spine were performed without the administration of contrast.    COMPARISON:  None.    FINDINGS:  Vertebral body heights are maintained.  There is straightening and mild reversal of cervical lordosis with mild retrolisthesis of C6 on C7.  Mild anterior spondylolisthesis of C4 on C5.  Multilevel disc desiccation present with height loss most prevalent at C5-6 and C6-7.  Mild Modic 1 endplate changes noted at C5-6 and C6-7.    Posterior fossa is unremarkable.  No concerning spinal cord signal abnormality.  Neck soft tissues are unremarkable.    C2-C3: No spinal canal stenosis or neural foraminal narrowing.    C3-C4: No significant posterior disc bulge or spinal canal stenosis.  Right greater than left facet and uncovertebral  hypertrophy present with moderate right neural foraminal narrowing.  Minimal right-sided facet edema present.    C4-C5: No significant posterior disc bulge or spinal canal stenosis.  Right greater than left facet and uncovertebral hypertrophy present with mild neural foraminal narrowing.  Right-sided facet/perifacet mild edema changes.    C5-C6: Posterior disc osteophyte complex present effacing the anterior thecal sac causing mild spinal canal stenosis.  Facet and uncovertebral hypertrophy noted with mild to moderate bilateral neural foraminal narrowing.    C6-C7: Posterior disc osteophyte complex and retrolisthesis cause moderate spinal canal stenosis.  Facet and uncovertebral hypertrophy present causing mild-to-moderate right greater than left neural foraminal narrowing.    C7-T1: No spinal canal stenosis or neural foraminal narrowing.      Impression Degenerative changes as above most prevalent at C5-6 and C6-7 with spinal canal stenosis and neural foraminal narrowing.     Review of Systems:  CONSTITUTIONAL: patient denies any fever, chills, or weight loss  SKIN: patient denies any rash or itching  RESPIRATORY: patient denies having any shortness of breath  GASTROINTESTINAL: patient denies having any diarrhea, constipation, or bowel incontinence  GENITOURINARY: patient denies having any abnormal bladder function    MUSCULOSKELETAL:  - patient complains of the above noted pain/s (see chief pain complaint)    NEUROLOGICAL:   - pain as above  - strength in upper extremities is intact, BILATERALLY  - sensation in Upper extremities is intact, BILATERALLY  - patient denies any loss of bowel or bladder control      PSYCHIATRIC: patient denies any change in mood    Other:  All other systems reviewed and are negative    Physical Exam:  There were no vitals taken for this visit. (reviewed on 2/13/2019)\  General:  Alert and oriented, No acute distress.    HEENT:       EOMI.  Normocephalic, atraumatic.   Cardiovascular:   Regular rate.    Gastrointestinal:  Soft.    Respiratory:  Respirations are non-labored.    Cervical Spine:  No masses or atrophy,    Range of motion - full Flexion, Extension,  Right Rotation, Left Rotation, Right Lat Bending,  Left Lat Bending         Increased pain with right lateral rotation         Palpation - tender to palpation over right cervical facets        Spurling's - negative   Thoracic Spine:  Palpation normal.    Motor Exam:    Strength:  Rate on 1-5 scale Right Left   C5-Elbow flexion, Deltoid 5 5   C6-Wrist extension 5 5   C7- Elbow / finger extension 5 5   C8- Finger flexion  5 5   T1- [employer] hand 5 5     Sensory Exam:  Full and equal sensation to light touch throughout.    Reflexes   Left DTR's   Biceps.   2  Triceps.   2   Brachioradialis 2.     Right DTR's   Biceps.   2  Triceps.   2  Brachioradialis. 2     Neurologic:  Cranial Nerves II-XII are grossly intact.    Psychiatric:  Cooperative.    Gait: Normal    Assessment  Cervical Spondylosis  Cervical Radiculopathy    1. 68 y.o. year old patient with PMH of   Past Medical History:   Diagnosis Date    Arthritis     Cataract     Diabetes mellitus 2008     am 01/15/2018 Insulin x 1 year    Glaucoma     Hypertension     Insomnia     Macular degeneration     Vaginal yeast infection       presenting with pain located cervical spine, right and left upper extremity  2. Pain Generators / Etiology :  Cervical spondylosis, cervical radiculopathy  3. Failed Meds (E- Effective, NE- Not Effective):  Tylenol-minimally effective, Gabapentin-minimally effective, Cymbalta-not effective  4. Physical Therapy - participated in 1 session  5. Psychological comorbidities -  none  6. Anticoagulants / Antiplatelets:  Apixaban 5 mg     PLAN:  1. Medications :  Recommend alpha lipoic acid 300 mg twice daily; will increase bedtime gabapentin dosing so she will take 300 mg at breakfast, and lunch and 600 mg at bedtime  2. PT - instructed patient to return  physical therapy  3. Psychological - none  4. Labs - obtain  none; reviewed labs from 08/28/2018, creatinine 0.8  5. Imaging - none; reviewed cervical x-ray patient today in clinic and cervical MRI  6. Interventions - none; consider C7/T1 epidural steroid injection in the future  7. Referrals - none  8. Records - none  9. Follow up visit - follow up in clinic in 6 weeks  10. Patient Questions - answered all patient's questions regarding diagnosis, therapy, treatment    OLIVIA Christiansen MD  Interventional Pain  [facility] [place]

## 2019-02-14 ENCOUNTER — PATIENT MESSAGE (OUTPATIENT)
Dept: PAIN MEDICINE | Facility: CLINIC | Age: 69
End: 2019-02-14

## 2019-02-25 RX ORDER — FLUCONAZOLE 150 MG/1
150 TABLET ORAL ONCE
Qty: 1 TABLET | Refills: 0 | Status: SHIPPED | OUTPATIENT
Start: 2019-02-25 | End: 2019-05-27 | Stop reason: SDUPTHER

## 2019-03-04 RX ORDER — FLUTICASONE PROPIONATE 50 MCG
SPRAY, SUSPENSION (ML) NASAL
Qty: 16 ML | Refills: 0 | Status: SHIPPED | OUTPATIENT
Start: 2019-03-04 | End: 2019-07-12 | Stop reason: SDUPTHER

## 2019-03-05 ENCOUNTER — PATIENT MESSAGE (OUTPATIENT)
Dept: PAIN MEDICINE | Facility: CLINIC | Age: 69
End: 2019-03-05

## 2019-03-06 ENCOUNTER — TELEPHONE (OUTPATIENT)
Dept: PAIN MEDICINE | Facility: CLINIC | Age: 69
End: 2019-03-06

## 2019-03-06 ENCOUNTER — PATIENT MESSAGE (OUTPATIENT)
Dept: RHEUMATOLOGY | Facility: CLINIC | Age: 69
End: 2019-03-06

## 2019-03-06 NOTE — TELEPHONE ENCOUNTER
Contacted patient; patient is stating that the pain in her neck and shoulders has intensified since recent move into apartment. Patient states that she has tried tylenol and also heat with little to no relief. Will notify Dr. Christiansen and reach back out to patient after I receive a response. Pt verbalized understanding.     ----- Message from Stephanie Drew sent at 3/6/2019  7:15 AM CST -----  ..Type:  Same Day Appointment Request    Caller is requesting a same day appointment.  Caller declined first available appointment listed below.    Name of Caller: Pt   When is the first available appointment? March 7  Symptoms:Pt states she has pain in her neck and shoulder.  Best Call Back Number: [phone]  Additional Information: Pt states she would like to know what she is able to do go to ER or come in for an appt.

## 2019-03-07 DIAGNOSIS — M47.812 SPONDYLOSIS OF CERVICAL REGION WITHOUT MYELOPATHY OR RADICULOPATHY: Primary | ICD-10-CM

## 2019-03-08 ENCOUNTER — PATIENT MESSAGE (OUTPATIENT)
Dept: INTERNAL MEDICINE | Facility: CLINIC | Age: 69
End: 2019-03-08

## 2019-03-18 ENCOUNTER — PATIENT MESSAGE (OUTPATIENT)
Dept: INTERNAL MEDICINE | Facility: CLINIC | Age: 69
End: 2019-03-18

## 2019-03-19 ENCOUNTER — TELEPHONE (OUTPATIENT)
Dept: INTERNAL MEDICINE | Facility: CLINIC | Age: 69
End: 2019-03-19

## 2019-03-19 NOTE — TELEPHONE ENCOUNTER
Called and informed pt that her mobility impairment form was ready for  at the . She would have to take it to the [employer]. Verbalized understanding.

## 2019-03-21 ENCOUNTER — OFFICE VISIT (OUTPATIENT)
Dept: PAIN MEDICINE | Facility: CLINIC | Age: 69
End: 2019-03-21
Payer: MEDICARE

## 2019-03-21 ENCOUNTER — OFFICE VISIT (OUTPATIENT)
Dept: INTERNAL MEDICINE | Facility: CLINIC | Age: 69
End: 2019-03-21
Payer: MEDICARE

## 2019-03-21 ENCOUNTER — LAB VISIT (OUTPATIENT)
Dept: LAB | Facility: HOSPITAL | Age: 69
End: 2019-03-21
Attending: PHYSICIAN ASSISTANT
Payer: MEDICARE

## 2019-03-21 VITALS
HEIGHT: 61 IN | WEIGHT: 233.56 LBS | HEART RATE: 75 BPM | DIASTOLIC BLOOD PRESSURE: 69 MMHG | OXYGEN SATURATION: 98 % | TEMPERATURE: 99 F | BODY MASS INDEX: 44.1 KG/M2 | SYSTOLIC BLOOD PRESSURE: 122 MMHG

## 2019-03-21 VITALS
BODY MASS INDEX: 45.7 KG/M2 | SYSTOLIC BLOOD PRESSURE: 117 MMHG | RESPIRATION RATE: 16 BRPM | DIASTOLIC BLOOD PRESSURE: 70 MMHG | WEIGHT: 242.06 LBS | HEIGHT: 61 IN | HEART RATE: 80 BPM

## 2019-03-21 DIAGNOSIS — M47.22 OSTEOARTHRITIS OF SPINE WITH RADICULOPATHY, CERVICAL REGION: Primary | ICD-10-CM

## 2019-03-21 DIAGNOSIS — R80.9 CONTROLLED TYPE 2 DIABETES MELLITUS WITH MICROALBUMINURIA, WITH LONG-TERM CURRENT USE OF INSULIN: ICD-10-CM

## 2019-03-21 DIAGNOSIS — I10 ESSENTIAL HYPERTENSION: ICD-10-CM

## 2019-03-21 DIAGNOSIS — I48.0 PAROXYSMAL A-FIB: ICD-10-CM

## 2019-03-21 DIAGNOSIS — J06.9 UPPER RESPIRATORY INFECTION WITH COUGH AND CONGESTION: Primary | ICD-10-CM

## 2019-03-21 DIAGNOSIS — M81.0 AGE-RELATED OSTEOPOROSIS WITHOUT CURRENT PATHOLOGICAL FRACTURE: ICD-10-CM

## 2019-03-21 DIAGNOSIS — K21.9 GASTROESOPHAGEAL REFLUX DISEASE WITHOUT ESOPHAGITIS: ICD-10-CM

## 2019-03-21 DIAGNOSIS — E11.29 CONTROLLED TYPE 2 DIABETES MELLITUS WITH MICROALBUMINURIA, WITH LONG-TERM CURRENT USE OF INSULIN: ICD-10-CM

## 2019-03-21 DIAGNOSIS — M54.2 NECK PAIN: ICD-10-CM

## 2019-03-21 DIAGNOSIS — Z79.4 CONTROLLED TYPE 2 DIABETES MELLITUS WITH MICROALBUMINURIA, WITH LONG-TERM CURRENT USE OF INSULIN: ICD-10-CM

## 2019-03-21 LAB
ANION GAP SERPL CALC-SCNC: 11 MMOL/L
BUN SERPL-MCNC: 20 MG/DL
CALCIUM SERPL-MCNC: 9.6 MG/DL
CHLORIDE SERPL-SCNC: 102 MMOL/L
CO2 SERPL-SCNC: 25 MMOL/L
CREAT SERPL-MCNC: 0.8 MG/DL
EST. GFR  (AFRICAN AMERICAN): >60 ML/MIN/1.73 M^2
EST. GFR  (NON AFRICAN AMERICAN): >60 ML/MIN/1.73 M^2
GLUCOSE SERPL-MCNC: 230 MG/DL
POTASSIUM SERPL-SCNC: 4.9 MMOL/L
SODIUM SERPL-SCNC: 138 MMOL/L

## 2019-03-21 PROCEDURE — 1101F PR PT FALLS ASSESS DOC 0-1 FALLS W/OUT INJ PAST YR: ICD-10-PCS | Mod: CPTII,S$GLB,, | Performed by: PAIN MEDICINE

## 2019-03-21 PROCEDURE — 3044F PR MOST RECENT HEMOGLOBIN A1C LEVEL <7.0%: ICD-10-PCS | Mod: CPTII,S$GLB,, | Performed by: FAMILY MEDICINE

## 2019-03-21 PROCEDURE — 3078F PR MOST RECENT DIASTOLIC BLOOD PRESSURE < 80 MM HG: ICD-10-PCS | Mod: CPTII,S$GLB,, | Performed by: FAMILY MEDICINE

## 2019-03-21 PROCEDURE — 99999 PR PBB SHADOW E&M-EST. PATIENT-LVL III: ICD-10-PCS | Mod: PBBFAC,,, | Performed by: FAMILY MEDICINE

## 2019-03-21 PROCEDURE — 3078F DIAST BP <80 MM HG: CPT | Mod: CPTII,S$GLB,, | Performed by: FAMILY MEDICINE

## 2019-03-21 PROCEDURE — 99999 PR PBB SHADOW E&M-EST. PATIENT-LVL III: CPT | Mod: PBBFAC,,, | Performed by: PAIN MEDICINE

## 2019-03-21 PROCEDURE — 3078F DIAST BP <80 MM HG: CPT | Mod: CPTII,S$GLB,, | Performed by: PAIN MEDICINE

## 2019-03-21 PROCEDURE — 99213 PR OFFICE/OUTPT VISIT, EST, LEVL III, 20-29 MIN: ICD-10-PCS | Mod: S$GLB,,, | Performed by: FAMILY MEDICINE

## 2019-03-21 PROCEDURE — 3044F HG A1C LEVEL LT 7.0%: CPT | Mod: CPTII,S$GLB,, | Performed by: FAMILY MEDICINE

## 2019-03-21 PROCEDURE — 99214 PR OFFICE/OUTPT VISIT, EST, LEVL IV, 30-39 MIN: ICD-10-PCS | Mod: S$GLB,,, | Performed by: PAIN MEDICINE

## 2019-03-21 PROCEDURE — 99999 PR PBB SHADOW E&M-EST. PATIENT-LVL III: ICD-10-PCS | Mod: PBBFAC,,, | Performed by: PAIN MEDICINE

## 2019-03-21 PROCEDURE — 3078F PR MOST RECENT DIASTOLIC BLOOD PRESSURE < 80 MM HG: ICD-10-PCS | Mod: CPTII,S$GLB,, | Performed by: PAIN MEDICINE

## 2019-03-21 PROCEDURE — 1101F PT FALLS ASSESS-DOCD LE1/YR: CPT | Mod: CPTII,S$GLB,, | Performed by: FAMILY MEDICINE

## 2019-03-21 PROCEDURE — 80048 BASIC METABOLIC PNL TOTAL CA: CPT

## 2019-03-21 PROCEDURE — 99213 OFFICE O/P EST LOW 20 MIN: CPT | Mod: S$GLB,,, | Performed by: FAMILY MEDICINE

## 2019-03-21 PROCEDURE — 3074F PR MOST RECENT SYSTOLIC BLOOD PRESSURE < 130 MM HG: ICD-10-PCS | Mod: CPTII,S$GLB,, | Performed by: FAMILY MEDICINE

## 2019-03-21 PROCEDURE — 3074F SYST BP LT 130 MM HG: CPT | Mod: CPTII,S$GLB,, | Performed by: PAIN MEDICINE

## 2019-03-21 PROCEDURE — 3074F PR MOST RECENT SYSTOLIC BLOOD PRESSURE < 130 MM HG: ICD-10-PCS | Mod: CPTII,S$GLB,, | Performed by: PAIN MEDICINE

## 2019-03-21 PROCEDURE — 1101F PT FALLS ASSESS-DOCD LE1/YR: CPT | Mod: CPTII,S$GLB,, | Performed by: PAIN MEDICINE

## 2019-03-21 PROCEDURE — 1101F PR PT FALLS ASSESS DOC 0-1 FALLS W/OUT INJ PAST YR: ICD-10-PCS | Mod: CPTII,S$GLB,, | Performed by: FAMILY MEDICINE

## 2019-03-21 PROCEDURE — 3074F SYST BP LT 130 MM HG: CPT | Mod: CPTII,S$GLB,, | Performed by: FAMILY MEDICINE

## 2019-03-21 PROCEDURE — 36415 COLL VENOUS BLD VENIPUNCTURE: CPT

## 2019-03-21 PROCEDURE — 99999 PR PBB SHADOW E&M-EST. PATIENT-LVL III: CPT | Mod: PBBFAC,,, | Performed by: FAMILY MEDICINE

## 2019-03-21 PROCEDURE — 99214 OFFICE O/P EST MOD 30 MIN: CPT | Mod: S$GLB,,, | Performed by: PAIN MEDICINE

## 2019-03-21 RX ORDER — CYCLOBENZAPRINE HCL 10 MG
TABLET ORAL
Refills: 0 | COMMUNITY
Start: 2019-03-06 | End: 2019-12-05

## 2019-03-21 RX ORDER — ALPHA LIPOIC ACID 300 MG
CAPSULE ORAL DAILY
COMMUNITY
End: 2019-06-20 | Stop reason: SDUPTHER

## 2019-03-21 RX ORDER — HYDROCODONE BITARTRATE AND ACETAMINOPHEN 5; 325 MG/1; MG/1
TABLET ORAL
Refills: 0 | COMMUNITY
Start: 2019-03-06 | End: 2019-08-20

## 2019-03-21 NOTE — PATIENT INSTRUCTIONS
-take gabapentin 100 mg breakfast, 100 mg launch, 600 mg at bedtime  -take Flexeril as needed  -continue physical therapy exercises at home as tolerated  -follow up in clinic in 3 months

## 2019-03-21 NOTE — PROGRESS NOTES
Chief Pain Complaint:  Chief Complaint   Patient presents with    Neck Pain     f/u neck pain      History of Present Illness:   Ms. Jo returns to clinic for follow-up.  She had an acute exacerbation of her increased cervical spine pain and right trapezius pain approximately 2 3 weeks ago when she was moving into a new condo.  She reports that she was lifting bags to carry into the condo when her symptoms started.  She was seen in a urgent care facility where she received a Toradol injection, Flexeril, and Norco which she reports she took the Flexeril for approximately 1 week and she has taken 1 tablet of the Norco.  We had also started her on alpha lipoic acid 300 mg twice daily which she has been taking for 10 days and reports that this is almost resolved her symptoms.  Today she rates her pain as a 2/10 which is located in the lower cervical spine and the right trapezius muscle.  Occasionally she had radiating symptoms down into the biceps and the forearm however these symptoms have resolved.  She denies having numbness and weakness in her upper extremities.  She reports that activity has causes symptoms worsen while rest has allowed her to improve.  She is also taking gabapentin 100 mg at breakfast, 300 mg at lunch and 300 mg at bedtime however she reports having some dizziness in the late afternoon.    Initial HPI:  This patient is a 68 y.o. female who presents today complaining of the above noted pain/s. The patient describes the pain as follows.  Ms. Jo as a history of major depressive disorder, AFib, hypertension, diabetes type 2, GERD presents to clinic with complaints of cervical pain with radicular symptoms in the right arm.  She has been having symptoms for several years which has progressively gotten worse.  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  Her symptoms are worse on the right but occasionally she does  have symptoms on the left side and she finds it to be worse when she rotates her head to the right such as when she is driving.  No increase in symptoms when she turns to the left or flexes or extends.  She has found that hot water in the shower helps with her symptoms.  She does have headaches occasionally proximally to this week.  She describes the pain as 50% cervical spine a 50% radiating down the arm.  She tries to avoid NSAIDs that she has had stomach irritation previously with chronic use.  In the past she has taking gabapentin, Tylenol, Cymbalta.  She denies numbness and weakness in her left upper extremity while she endorses having numbness and weakness in the right upper extremity specifically in the C6 dermatome.  She denies having bowel bladder difficulty. She has not participated in physical therapy.    Previous Therapy:  Medications:  Gabapentin, Tylenol, Cymbalta, Flexeril, alpha lipoic acid, Norco   Injections:  Right C4-C6 medial branch blocks  Surgeries:  None  Physical Therapy:  Has completed physical therapy in the past    Past Surgical History:   Procedure Laterality Date    abdominal laparoscopy       BREAST BIOPSY      CATARACT EXTRACTION Bilateral [id number]    [facility] in [place]    EYE SURGERY      TONSILLECTOMY      TUBAL LIGATION      yag  Bilateral      Imaging / Labs / Studies (reviewed on 3/21/2019):  Results for orders placed during the hospital encounter of 10/03/18   MRI Cervical Spine Without Contrast    Narrative EXAMINATION:  MRI CERVICAL SPINE WITHOUT CONTRAST    CLINICAL HISTORY:  Neck pain, first study; Cervicalgia    TECHNIQUE:  Multiplanar, multisequence MR images of the cervical spine were performed without the administration of contrast.    COMPARISON:  None.    FINDINGS:  Vertebral body heights are maintained.  There is straightening and mild reversal of cervical lordosis with mild retrolisthesis of C6 on C7.  Mild anterior spondylolisthesis of C4 on C5.   Multilevel disc desiccation present with height loss most prevalent at C5-6 and C6-7.  Mild Modic 1 endplate changes noted at C5-6 and C6-7.    Posterior fossa is unremarkable.  No concerning spinal cord signal abnormality.  Neck soft tissues are unremarkable.    C2-C3: No spinal canal stenosis or neural foraminal narrowing.    C3-C4: No significant posterior disc bulge or spinal canal stenosis.  Right greater than left facet and uncovertebral hypertrophy present with moderate right neural foraminal narrowing.  Minimal right-sided facet edema present.    C4-C5: No significant posterior disc bulge or spinal canal stenosis.  Right greater than left facet and uncovertebral hypertrophy present with mild neural foraminal narrowing.  Right-sided facet/perifacet mild edema changes.    C5-C6: Posterior disc osteophyte complex present effacing the anterior thecal sac causing mild spinal canal stenosis.  Facet and uncovertebral hypertrophy noted with mild to moderate bilateral neural foraminal narrowing.    C6-C7: Posterior disc osteophyte complex and retrolisthesis cause moderate spinal canal stenosis.  Facet and uncovertebral hypertrophy present causing mild-to-moderate right greater than left neural foraminal narrowing.    C7-T1: No spinal canal stenosis or neural foraminal narrowing.      Impression Degenerative changes as above most prevalent at C5-6 and C6-7 with spinal canal stenosis and neural foraminal narrowing.     Review of Systems:  CONSTITUTIONAL: patient denies any fever, chills, or weight loss  SKIN: patient denies any rash or itching  RESPIRATORY: patient denies having any shortness of breath  GASTROINTESTINAL: patient denies having any diarrhea, constipation, or bowel incontinence  GENITOURINARY: patient denies having any abnormal bladder function    MUSCULOSKELETAL:  - patient complains of the above noted pain/s (see chief pain complaint)    NEUROLOGICAL:   - pain as above  - strength in upper extremities  "is intact, BILATERALLY  - sensation in Upper extremities is intact, BILATERALLY  - patient denies any loss of bowel or bladder control      PSYCHIATRIC: patient denies any change in mood    Other:  All other systems reviewed and are negative    Physical Exam:  /70 (BP Location: Left arm, Patient Position: Sitting)   Pulse 80   Resp 16   Ht 5' 1" (1.549 m)   Wt 109.8 kg (242 lb 1 oz)   BMI 45.74 kg/m²  (reviewed on 3/21/2019)\  General:  Alert and oriented, No acute distress.    HEENT:       EOMI.  Normocephalic, atraumatic.   Cardiovascular:  Regular rate.    Gastrointestinal:  Soft.    Respiratory:  Respirations are non-labored.    Cervical Spine:  No masses or atrophy,    Range of motion - full Flexion, Extension,  Right Rotation, Left Rotation, Right Lat Bending,  Left Lat Bending         Increased pain with right lateral rotation         Palpation - tender to palpation over right cervical facets        Spurling's - negative   Thoracic Spine:  Palpation normal.    Motor Exam:    Strength:  Rate on 1-5 scale Right Left   C5-Elbow flexion, Deltoid 5 5   C6-Wrist extension 5 5   C7- Elbow / finger extension 5 5   C8- Finger flexion  5 5   T1- [employer] hand 5 5     Sensory Exam:  Full and equal sensation to light touch throughout.    Reflexes   Left DTR's   Biceps.   2  Triceps.   2   Brachioradialis 2.     Right DTR's   Biceps.   2  Triceps.   2  Brachioradialis. 2     Neurologic:  Cranial Nerves II-XII are grossly intact.    Psychiatric:  Cooperative.    Gait: Normal    Assessment  Cervical Spondylosis  Cervical Radiculopathy    1. 68 y.o. year old patient with PMH of   Past Medical History:   Diagnosis Date    Arthritis     Cataract     Diabetes mellitus 2008     am 01/15/2018 Insulin x 1 year    Glaucoma     Hypertension     Insomnia     Macular degeneration     Vaginal yeast infection       presenting with pain located cervical spine, right and left upper extremity  2. Pain Generators " / Etiology :  Cervical spondylosis, cervical radiculopathy  3. Failed Meds (E- Effective, NE- Not Effective):  Tylenol-minimally effective, Gabapentin-minimally effective, Cymbalta-not effective  4. Physical Therapy - participated in 1 session  5. Psychological comorbidities -  none  6. Anticoagulants / Antiplatelets:  Apixaban 5 mg     PLAN:  1. Medications :  Continue alpha lipoic acid 300 mg 2 times daily; will change the gabapentin dosing to 100 mg at breakfast, 100 mg at lunch, and 600 mg at bedtime  2. PT - continue physical therapy exercises at home  3. Psychological - none  4. Labs - obtain  none; reviewed labs from 08/28/2018, creatinine 0.8  5. Imaging - none; reviewed cervical x-ray patient today in clinic  6. Interventions - none; consider C7/T1 epidural steroid injection in the future  7. Referrals - none  8. Records - none  9. Follow up visit - follow up in clinic in 3 months  10. Patient Questions - answered all patient's questions regarding diagnosis, therapy, treatment    OLIVIA Christiansen MD  Interventional Pain  [facility] [place]

## 2019-03-21 NOTE — ASSESSMENT & PLAN NOTE
Initial encounter.  Reports compliance to meds.  No adverse effects to meds.  Continue meds and monitor.

## 2019-03-21 NOTE — PROGRESS NOTES
"Subjective:       Patient ID: Christine Jo is a 68 y.o. female.    Chief Complaint: Cough    HPI  Sx as above  Onset 3 weeks  Subjective fever  Tmax 100.3  OTC meds tried coricedin  Sx relieved cough  Also having diarrheal sx, yesterday    Denies sick contacts but recently moved to [facility]  Did receive influenza vaccination  Decreased PO intake but drinking sprite zero    Reports getting out of car and noted to have acid reflux sx in chest    Sister recently hospitalized with similar sx    Went to urgent care about 3 weeks ago for neck pain and received toradol, muscle relaxer and pain meds. Unable to tolerate pain meds. Muscle relaxer helped  Review of Systems   Constitutional: Positive for activity change, appetite change and fever. Negative for unexpected weight change.   HENT: Positive for congestion.    Respiratory: Positive for cough and shortness of breath.    Gastrointestinal: Positive for diarrhea. Negative for nausea and vomiting.   Musculoskeletal: Positive for neck pain.        Objective:   /69 (BP Location: Left arm, Patient Position: Sitting, BP Method: Large (Automatic))   Pulse 75   Temp 98.9 °F (37.2 °C) (Tympanic)   Ht 5' 1" (1.549 m)   Wt 106 kg (233 lb 9.2 oz)   SpO2 98%   BMI 44.13 kg/m²     Physical Exam   Constitutional: She appears well-developed and well-nourished.  Non-toxic appearance. She does not have a sickly appearance. She does not appear ill. No distress.   HENT:   Head: Normocephalic and atraumatic.   Right Ear: A middle ear effusion is present.   Left Ear: Tympanic membrane normal.   Nose: Mucosal edema and rhinorrhea present.   Mouth/Throat: No oropharyngeal exudate, posterior oropharyngeal edema or posterior oropharyngeal erythema.   Eyes: Conjunctivae and EOM are normal.   Neck: Normal range of motion. Neck supple.   Cardiovascular: Normal rate. A regularly irregular rhythm present.   Pulmonary/Chest: Effort normal and breath sounds normal. She has no decreased " breath sounds. She has no wheezes. She has no rales.   No egophony    Abdominal: Soft. Bowel sounds are normal.   Vitals reviewed.    Assessment:     1. Upper respiratory infection with cough and congestion    2. Controlled type 2 diabetes mellitus with microalbuminuria, with long-term current use of insulin    3. Neck pain    4. Gastroesophageal reflux disease without esophagitis    5. Essential hypertension    6. Paroxysmal A-fib      Plan:     Problem List Items Addressed This Visit        Cardiac/Vascular    Paroxysmal A-fib    Current Assessment & Plan     On apixiban and carvedilol. Pulse normal. Noted irregular rhythm on exam         Essential hypertension    Current Assessment & Plan     Initial encounter.  Reports compliance to meds.  No adverse effects to meds.  Continue meds and monitor.                Endocrine    Controlled type 2 diabetes mellitus with microalbuminuria, with long-term current use of insulin    Current Assessment & Plan     Reviewed hba1c <7. Initial encounter.  Reports compliance to meds.  No adverse effects to meds.  Continue meds and monitor.              GI    Gastroesophageal reflux disease without esophagitis    Current Assessment & Plan     On PPI. May need medication management for control of sx if persists            Orthopedic    Neck pain    Current Assessment & Plan     Went to urgent care and received muscle relaxer, norco, and toradol injection. States not taking norco           Other Visit Diagnoses     Upper respiratory infection with cough and congestion    -  Primary        DDx Viral vs. Seasonal URI symptoms. Unlikely bacterial based on H&P, no fever, duration. No antibiotics indicated at this time. Symptomatic treatment advised with fluids, throat lozenges, nasal spray, tylenol/NSAIDs for analgesia if not contraindicated. Advised of proper technique for nasal spray. RTC/ED/Urgent care if symptoms worsen or fever develops.   Follow-up if symptoms worsen or fail to  improve.

## 2019-03-21 NOTE — ASSESSMENT & PLAN NOTE
Went to urgent care and received muscle relaxer, norco, and toradol injection. States not taking norco

## 2019-03-21 NOTE — ASSESSMENT & PLAN NOTE
Reviewed hba1c <7. Initial encounter.  Reports compliance to meds.  No adverse effects to meds.  Continue meds and monitor.

## 2019-03-21 NOTE — PATIENT INSTRUCTIONS
Technique for using nasal spray:  1. Blow nose  2. Lean forward  3. Angle spray outward, using opposite hand to opposite nostril  4. Hold breath and spray  5. Massage nasal fold to retain medicine in nose  6. Avoid blowing nose after administration of medicine    Wrong techniques include:  1. Sitting upright  2. Inhaling quickly when administering medicine  3. Blowing nose after administering medicine  4. Feeling medicine trickle down throat    Fluticasone nasal spray  What is this medicine?  FLUTICASONE (floo TIK a sone) is a corticosteroid. This medicine is used to treat the symptoms of allergies like sneezing, itchy red eyes, and itchy, runny, or stuffy nose.  How should I use this medicine?  This medicine is for use in the nose. Follow the directions on your product or prescription label. This medicine works best if used at regular intervals. Do not use more often than directed. Make sure that you are using your nasal spray correctly. After 6 months of daily use without a prescription, talk to your doctor or health care professional before using it for a longer time. Ask your doctor or health care professional if you have any questions.  Talk to your pediatrician regarding the use of this medicine in children. Special care may be needed. This medicine has been used in children as young as 2 years. After two months of daily use without a prescription in a child, talk to your pediatrician before using it for a longer time.  What side effects may I notice from receiving this medicine?  Side effects that you should report to your doctor or health care professional as soon as possible:  · allergic reactions like skin rash, itching or hives, swelling of the face, lips, or tongue  · changes in vision  · flu-like symptoms  · white patches or sores in the mouth or nose  Side effects that usually do not require medical attention (report to your doctor or health care professional if they continue or are  bothersome):  · burning or irritation inside the nose or throat  · cough  · headache  · nosebleed  · unusual taste or smell  What may interact with this medicine?  · ketoconazole  · metyrapone  · some medicines for HIV  · vaccines  What if I miss a dose?  If you miss a dose, use it as soon as you remember. If it is almost time for your next dose, use only that dose and continue with your regular schedule. Do not use double or extra doses.  Where should I keep my medicine?  Keep out of the reach of children.  Store at room temperature between 15 and 30 degrees C (59 and 86 degrees F). Throw away any unused medicine after the expiration date.  What should I tell my health care provider before I take this medicine?  They need to know if you have any of these conditions:  · infection, like tuberculosis, herpes, or fungal infection  · recent surgery on nose or sinuses  · taking corticosteroid by mouth  · an unusual or allergic reaction to fluticasone, steroids, other medicines, foods, dyes, or preservatives  · pregnant or trying to get pregnant  · breast-feeding  What should I watch for while using this medicine?  Visit your doctor or health care professional for regular checks on your progress. Some symptoms may improve within 12 hours after starting use. Check with your doctor or health care professional if there is no improvement in your condition after 3 weeks of use.  Do not come in contact with people who have chickenpox or the measles while you are taking this medicine. If you do, call your doctor right away.  NOTE:This sheet is a summary. It may not cover all possible information. If you have questions about this medicine, talk to your doctor, pharmacist, or health care provider. Copyright© 2017 [employer]

## 2019-03-25 NOTE — PROGRESS NOTES
"Subjective:       Patient ID: Christine Jo is a 68 y.o. female.    Chief Complaint: Osteoporosis    Christine is a very pleasant 69 y/o female who established care with us in oct 2017 for osteoporosis. She was started on fosamax but had severe GI irritation due to the drug, she has severe gerd on prilosec. We started boniva infusions which she tolerates well, started in 2/2018.   Has never had any fractures.   She takes a multivitamin with vit D in it daily. She has chronic neck and back pain and occasional joint pains from OA.  Avoids nsaids due to Gi upset but does take seldomly.  Sees dr. Wells now for her neck/back pain.  No falls since last visit.  No actue issues, doing well, due for infusion today         Review of Systems   Constitutional: Negative for chills, fatigue and fever.   HENT: Negative for mouth sores, rhinorrhea and sore throat.    Eyes: Negative for pain and redness.   Respiratory: Negative for cough and shortness of breath.    Cardiovascular: Negative for chest pain.   Gastrointestinal: Negative for abdominal pain, constipation, diarrhea, nausea and vomiting.   Genitourinary: Negative for dysuria and hematuria.   Musculoskeletal: Positive for arthralgias, back pain and neck pain. Negative for joint swelling and myalgias.   Skin: Negative for rash.   Neurological: Negative for weakness, numbness and headaches.   Psychiatric/Behavioral: The patient is not nervous/anxious.          Objective:   /60   Pulse 84   Ht 5' 1" (1.549 m)   Wt 107.5 kg (236 lb 15.9 oz)   BMI 44.78 kg/m²      Physical Exam   Constitutional: She is oriented to person, place, and time and well-developed, well-nourished, and in no distress.   HENT:   Head: Normocephalic and atraumatic.   Eyes: Pupils are equal, round, and reactive to light. Right eye exhibits no discharge.   Neck: Normal range of motion.   Cardiovascular: Normal rate, regular rhythm and normal heart sounds.  Exam reveals no friction rub.  "   Pulmonary/Chest: Effort normal and breath sounds normal. No respiratory distress.   Abdominal: Soft. She exhibits no distension. There is no tenderness.   Lymphadenopathy:     She has no cervical adenopathy.   Neurological: She is alert and oriented to person, place, and time.   Skin: No rash noted. No erythema.     Psychiatric: Mood normal.   Musculoskeletal: Normal range of motion. She exhibits no edema or deformity.   holley wrists, mcps, pips no synvoitis, no tenderness, no warmth, good rom  holley elbows shoulders no swelling or tenderness,full rom  holley knees no effusion, no warmth, no tenderness, full rom    Right cervical paraspinal tenderness    Strength 5/5 upper and lower ext  Gait steady           Recent Results (from the past 504 hour(s))   Basic metabolic panel    Collection Time: 03/21/19  9:20 AM   Result Value Ref Range    Sodium 138 136 - 145 mmol/L    Potassium 4.9 3.5 - 5.1 mmol/L    Chloride 102 95 - 110 mmol/L    CO2 25 23 - 29 mmol/L    Glucose 230 (H) 70 - 110 mg/dL    BUN, Bld 20 8 - 23 mg/dL    Creatinine 0.8 0.5 - 1.4 mg/dL    Calcium 9.6 8.7 - 10.5 mg/dL    Anion Gap 11 8 - 16 mmol/L    eGFR if African American >60 >60 mL/min/1.73 m^2    eGFR if non African American >60 >60 mL/min/1.73 m^2       Dexa 8.16.17: NM -1.4, TM -1.0, spine -2.5  Assessment:       1. Age-related osteoporosis without current pathological fracture    2. Primary osteoarthritis involving multiple joints    3. Medication monitoring encounter    4. Gastroesophageal reflux disease without esophagitis        Impression:  Osteoporosis: Dexa 8/2017 with spine -2.5, failed oral bisphosphonate due to severe gerd (fosamax), no falls/fxs, taking vit d daily in MV- now on boniva q 3 mos started 2/2018    GERD: on prilosec, unable to tolerate oral bisphosphonate or nsaid    Generalized OA: papo c spine, avoids nsaids due to GI upset,     Medication monitoring: gfr normal, ok for boniva    Plan:       Ok for boniva infusion today and  cont q 3 mos  Continue vit D daily in MV  Repeat dexa 8/2019  rec tumeric 1000mg/day for OA  Call for questions/concerns    rtc q 3 mos for infusions with cmp and 6 mos with dexa, me, vimal, boniva

## 2019-03-26 ENCOUNTER — INFUSION (OUTPATIENT)
Dept: RHEUMATOLOGY | Facility: HOSPITAL | Age: 69
End: 2019-03-26
Attending: PHYSICIAN ASSISTANT
Payer: MEDICARE

## 2019-03-26 ENCOUNTER — OFFICE VISIT (OUTPATIENT)
Dept: RHEUMATOLOGY | Facility: CLINIC | Age: 69
End: 2019-03-26
Payer: MEDICARE

## 2019-03-26 VITALS
DIASTOLIC BLOOD PRESSURE: 60 MMHG | HEART RATE: 84 BPM | WEIGHT: 237 LBS | BODY MASS INDEX: 44.75 KG/M2 | SYSTOLIC BLOOD PRESSURE: 136 MMHG | HEIGHT: 61 IN

## 2019-03-26 DIAGNOSIS — K21.9 GASTROESOPHAGEAL REFLUX DISEASE WITHOUT ESOPHAGITIS: ICD-10-CM

## 2019-03-26 DIAGNOSIS — M15.9 PRIMARY OSTEOARTHRITIS INVOLVING MULTIPLE JOINTS: ICD-10-CM

## 2019-03-26 DIAGNOSIS — M81.0 AGE-RELATED OSTEOPOROSIS WITHOUT CURRENT PATHOLOGICAL FRACTURE: Primary | ICD-10-CM

## 2019-03-26 DIAGNOSIS — Z51.81 MEDICATION MONITORING ENCOUNTER: ICD-10-CM

## 2019-03-26 PROCEDURE — 3078F DIAST BP <80 MM HG: CPT | Mod: CPTII,S$GLB,, | Performed by: PHYSICIAN ASSISTANT

## 2019-03-26 PROCEDURE — 3078F PR MOST RECENT DIASTOLIC BLOOD PRESSURE < 80 MM HG: ICD-10-PCS | Mod: CPTII,S$GLB,, | Performed by: PHYSICIAN ASSISTANT

## 2019-03-26 PROCEDURE — 63600175 PHARM REV CODE 636 W HCPCS: Performed by: PHYSICIAN ASSISTANT

## 2019-03-26 PROCEDURE — 99999 PR PBB SHADOW E&M-EST. PATIENT-LVL V: CPT | Mod: PBBFAC,,, | Performed by: PHYSICIAN ASSISTANT

## 2019-03-26 PROCEDURE — 99499 UNLISTED E&M SERVICE: CPT | Mod: S$GLB,,, | Performed by: PHYSICIAN ASSISTANT

## 2019-03-26 PROCEDURE — 1101F PR PT FALLS ASSESS DOC 0-1 FALLS W/OUT INJ PAST YR: ICD-10-PCS | Mod: CPTII,S$GLB,, | Performed by: PHYSICIAN ASSISTANT

## 2019-03-26 PROCEDURE — 99999 PR PBB SHADOW E&M-EST. PATIENT-LVL V: ICD-10-PCS | Mod: PBBFAC,,, | Performed by: PHYSICIAN ASSISTANT

## 2019-03-26 PROCEDURE — 3075F SYST BP GE 130 - 139MM HG: CPT | Mod: CPTII,S$GLB,, | Performed by: PHYSICIAN ASSISTANT

## 2019-03-26 PROCEDURE — 99499 RISK ADDL DX/OHS AUDIT: ICD-10-PCS | Mod: S$GLB,,, | Performed by: PHYSICIAN ASSISTANT

## 2019-03-26 PROCEDURE — 99214 PR OFFICE/OUTPT VISIT, EST, LEVL IV, 30-39 MIN: ICD-10-PCS | Mod: S$GLB,,, | Performed by: PHYSICIAN ASSISTANT

## 2019-03-26 PROCEDURE — 1101F PT FALLS ASSESS-DOCD LE1/YR: CPT | Mod: CPTII,S$GLB,, | Performed by: PHYSICIAN ASSISTANT

## 2019-03-26 PROCEDURE — 96372 THER/PROPH/DIAG INJ SC/IM: CPT

## 2019-03-26 PROCEDURE — 3075F PR MOST RECENT SYSTOLIC BLOOD PRESS GE 130-139MM HG: ICD-10-PCS | Mod: CPTII,S$GLB,, | Performed by: PHYSICIAN ASSISTANT

## 2019-03-26 PROCEDURE — 99214 OFFICE O/P EST MOD 30 MIN: CPT | Mod: S$GLB,,, | Performed by: PHYSICIAN ASSISTANT

## 2019-03-26 RX ORDER — IBANDRONATE SODIUM 3 MG/3 ML
3 SYRINGE (ML) INTRAVENOUS
Status: COMPLETED | OUTPATIENT
Start: 2019-03-26 | End: 2019-03-26

## 2019-03-26 RX ORDER — SODIUM CHLORIDE 0.9 % (FLUSH) 0.9 %
10 SYRINGE (ML) INJECTION
Status: CANCELLED | OUTPATIENT
Start: 2019-03-26

## 2019-03-26 RX ORDER — IBANDRONATE SODIUM 3 MG/3 ML
3 SYRINGE (ML) INTRAVENOUS
Status: CANCELLED | OUTPATIENT
Start: 2019-03-26

## 2019-03-26 RX ORDER — HEPARIN 100 UNIT/ML
500 SYRINGE INTRAVENOUS
Status: CANCELLED | OUTPATIENT
Start: 2019-03-26

## 2019-03-26 RX ADMIN — IBANDRONATE SODIUM 3 MG: 3 INJECTION, SOLUTION INTRAVENOUS at 11:03

## 2019-03-26 NOTE — NURSING
Infusion# 5  Taking Ca/Vitamin D: yes  Recent labs? yes       Premeds? none     Boniva 3 mg administered IVP over 2 minutes per orders to L AC without difficulty; see MAR & Flow Sheet for more  details. Tolerated well without adverse events

## 2019-03-28 ENCOUNTER — PATIENT MESSAGE (OUTPATIENT)
Dept: INTERNAL MEDICINE | Facility: CLINIC | Age: 69
End: 2019-03-28

## 2019-04-10 ENCOUNTER — OFFICE VISIT (OUTPATIENT)
Dept: INTERNAL MEDICINE | Facility: CLINIC | Age: 69
End: 2019-04-10
Payer: MEDICARE

## 2019-04-10 VITALS
WEIGHT: 236.69 LBS | SYSTOLIC BLOOD PRESSURE: 120 MMHG | HEIGHT: 61 IN | TEMPERATURE: 98 F | DIASTOLIC BLOOD PRESSURE: 50 MMHG | HEART RATE: 73 BPM | OXYGEN SATURATION: 95 % | BODY MASS INDEX: 44.69 KG/M2

## 2019-04-10 DIAGNOSIS — Z79.4 CONTROLLED TYPE 2 DIABETES MELLITUS WITH DIABETIC POLYNEUROPATHY, WITH LONG-TERM CURRENT USE OF INSULIN: Primary | ICD-10-CM

## 2019-04-10 DIAGNOSIS — F33.42 RECURRENT MAJOR DEPRESSIVE DISORDER, IN FULL REMISSION: ICD-10-CM

## 2019-04-10 DIAGNOSIS — E11.42 CONTROLLED TYPE 2 DIABETES MELLITUS WITH DIABETIC POLYNEUROPATHY, WITH LONG-TERM CURRENT USE OF INSULIN: Primary | ICD-10-CM

## 2019-04-10 DIAGNOSIS — K21.9 GASTROESOPHAGEAL REFLUX DISEASE WITHOUT ESOPHAGITIS: ICD-10-CM

## 2019-04-10 DIAGNOSIS — I10 ESSENTIAL HYPERTENSION: ICD-10-CM

## 2019-04-10 DIAGNOSIS — I48.0 PAROXYSMAL A-FIB: ICD-10-CM

## 2019-04-10 DIAGNOSIS — E78.1 HYPERTRIGLYCERIDEMIA: ICD-10-CM

## 2019-04-10 LAB
ALBUMIN/CREAT UR: 202.7 UG/MG (ref 0–30)
CHOLEST SERPL-MCNC: 136 MG/DL (ref 120–199)
CHOLEST/HDLC SERPL: 3 {RATIO} (ref 2–5)
CREAT UR-MCNC: 37 MG/DL (ref 15–325)
ESTIMATED AVG GLUCOSE: 146 MG/DL (ref 68–131)
GLUCOSE SERPL-MCNC: 121 MG/DL (ref 70–110)
HBA1C MFR BLD HPLC: 6.7 % (ref 4–5.6)
HDLC SERPL-MCNC: 46 MG/DL (ref 40–75)
HDLC SERPL: 33.8 % (ref 20–50)
LDLC SERPL CALC-MCNC: 63.2 MG/DL (ref 63–159)
MICROALBUMIN UR DL<=1MG/L-MCNC: 75 UG/ML
NONHDLC SERPL-MCNC: 90 MG/DL
TRIGL SERPL-MCNC: 134 MG/DL (ref 30–150)

## 2019-04-10 PROCEDURE — 3074F PR MOST RECENT SYSTOLIC BLOOD PRESSURE < 130 MM HG: ICD-10-PCS | Mod: CPTII,S$GLB,, | Performed by: FAMILY MEDICINE

## 2019-04-10 PROCEDURE — 99499 RISK ADDL DX/OHS AUDIT: ICD-10-PCS | Mod: S$GLB,,, | Performed by: FAMILY MEDICINE

## 2019-04-10 PROCEDURE — 36415 PR COLLECTION VENOUS BLOOD,VENIPUNCTURE: ICD-10-PCS | Mod: S$GLB,,, | Performed by: FAMILY MEDICINE

## 2019-04-10 PROCEDURE — 82043 UR ALBUMIN QUANTITATIVE: CPT

## 2019-04-10 PROCEDURE — 3044F PR MOST RECENT HEMOGLOBIN A1C LEVEL <7.0%: ICD-10-PCS | Mod: CPTII,S$GLB,, | Performed by: FAMILY MEDICINE

## 2019-04-10 PROCEDURE — 82947 ASSAY GLUCOSE BLOOD QUANT: CPT

## 2019-04-10 PROCEDURE — 1101F PT FALLS ASSESS-DOCD LE1/YR: CPT | Mod: CPTII,S$GLB,, | Performed by: FAMILY MEDICINE

## 2019-04-10 PROCEDURE — 36415 COLL VENOUS BLD VENIPUNCTURE: CPT | Mod: S$GLB,,, | Performed by: FAMILY MEDICINE

## 2019-04-10 PROCEDURE — 83036 HEMOGLOBIN GLYCOSYLATED A1C: CPT

## 2019-04-10 PROCEDURE — 99999 PR PBB SHADOW E&M-EST. PATIENT-LVL III: CPT | Mod: PBBFAC,,, | Performed by: FAMILY MEDICINE

## 2019-04-10 PROCEDURE — 99214 PR OFFICE/OUTPT VISIT, EST, LEVL IV, 30-39 MIN: ICD-10-PCS | Mod: S$GLB,,, | Performed by: FAMILY MEDICINE

## 2019-04-10 PROCEDURE — 1101F PR PT FALLS ASSESS DOC 0-1 FALLS W/OUT INJ PAST YR: ICD-10-PCS | Mod: CPTII,S$GLB,, | Performed by: FAMILY MEDICINE

## 2019-04-10 PROCEDURE — 99499 UNLISTED E&M SERVICE: CPT | Mod: S$GLB,,, | Performed by: FAMILY MEDICINE

## 2019-04-10 PROCEDURE — 80061 LIPID PANEL: CPT

## 2019-04-10 PROCEDURE — 3074F SYST BP LT 130 MM HG: CPT | Mod: CPTII,S$GLB,, | Performed by: FAMILY MEDICINE

## 2019-04-10 PROCEDURE — 3078F DIAST BP <80 MM HG: CPT | Mod: CPTII,S$GLB,, | Performed by: FAMILY MEDICINE

## 2019-04-10 PROCEDURE — 99214 OFFICE O/P EST MOD 30 MIN: CPT | Mod: S$GLB,,, | Performed by: FAMILY MEDICINE

## 2019-04-10 PROCEDURE — 3044F HG A1C LEVEL LT 7.0%: CPT | Mod: CPTII,S$GLB,, | Performed by: FAMILY MEDICINE

## 2019-04-10 PROCEDURE — 3078F PR MOST RECENT DIASTOLIC BLOOD PRESSURE < 80 MM HG: ICD-10-PCS | Mod: CPTII,S$GLB,, | Performed by: FAMILY MEDICINE

## 2019-04-10 PROCEDURE — 99999 PR PBB SHADOW E&M-EST. PATIENT-LVL III: ICD-10-PCS | Mod: PBBFAC,,, | Performed by: FAMILY MEDICINE

## 2019-04-10 RX ORDER — PANTOPRAZOLE SODIUM 40 MG/1
40 TABLET, DELAYED RELEASE ORAL DAILY
Qty: 90 TABLET | Refills: 3 | Status: SHIPPED | OUTPATIENT
Start: 2019-04-10 | End: 2019-12-05

## 2019-04-10 NOTE — PROGRESS NOTES
Subjective:       Patient ID: Christine Jo is a 69 y.o. female.    Chief Complaint: Follow-up    Follow-up diabetes hypertension hyperlipidemia esophageal reflux micro albuminuria.  She denies headache chest pain palpitations shortness of breath or edema. She is on Prilosec 20 mg a day and have an occasional reflux and occasional dysphagia.  She denies polyuria polydipsia hypoglycemic symptoms.  She is currently seeing a therapist regards depression.  She would like podiatry referral for possible diabetic shoes.    Review of Systems   Constitutional: Negative for activity change, appetite change, diaphoresis, fatigue and unexpected weight change.   Respiratory: Negative for cough, chest tightness, shortness of breath and wheezing.    Cardiovascular: Negative for chest pain, palpitations and leg swelling.        Denies lightheadedness   Gastrointestinal: Negative for abdominal distention, abdominal pain, nausea and vomiting.        Occasional dysphagia with reflux   Endocrine: Negative for polydipsia and polyuria.   Genitourinary: Negative for difficulty urinating.   Neurological: Positive for numbness. Negative for dizziness, light-headedness and headaches.   Psychiatric/Behavioral: Positive for dysphoric mood.       Objective:      Physical Exam   Constitutional: She is oriented to person, place, and time. She appears well-developed and well-nourished. No distress.   Neck: Neck supple. No JVD present. No thyromegaly present.   Cardiovascular: Normal rate, regular rhythm and normal heart sounds.   No murmur heard.  Pulmonary/Chest: Effort normal and breath sounds normal. No respiratory distress. She has no wheezes.   Abdominal: Soft. Bowel sounds are normal. She exhibits no mass. There is no tenderness.   Lymphadenopathy:     She has no cervical adenopathy.   Neurological: She is alert and oriented to person, place, and time.   Skin: She is not diaphoretic.   Psychiatric: She has a normal mood and affect. Her  behavior is normal.       Lab Visit on 03/21/2019   Component Date Value Ref Range Status    Sodium 03/21/2019 138  136 - 145 mmol/L Final    Potassium 03/21/2019 4.9  3.5 - 5.1 mmol/L Final    Chloride 03/21/2019 102  95 - 110 mmol/L Final    CO2 03/21/2019 25  23 - 29 mmol/L Final    Glucose 03/21/2019 230* 70 - 110 mg/dL Final    BUN, Bld 03/21/2019 20  8 - 23 mg/dL Final    Creatinine 03/21/2019 0.8  0.5 - 1.4 mg/dL Final    Calcium 03/21/2019 9.6  8.7 - 10.5 mg/dL Final    Anion Gap 03/21/2019 11  8 - 16 mmol/L Final    eGFR if African American 03/21/2019 >60  >60 mL/min/1.73 m^2 Final    eGFR if non African American 03/21/2019 >60  >60 mL/min/1.73 m^2 Final     Assessment:       1. Controlled type 2 diabetes mellitus with diabetic polyneuropathy, with long-term current use of insulin        Plan:   Blood pressure is controlled 120/50.  Lab was ordered.  Podiatry referral for diabetic foot exam and consideration diabetic shoes.  Medications reviewed.  Discontinue Prilosec and start Protonix 40 mg each morning.  Follow-up in 3 months.      Controlled type 2 diabetes mellitus with diabetic polyneuropathy, with long-term current use of insulin  -     Glucose, fasting  -     Hemoglobin A1c  -     Lipid panel  -     Microalbumin/creatinine urine ratio  -     Ambulatory referral to Podiatry    Other orders  -     pantoprazole (PROTONIX) 40 MG tablet; Take 1 tablet (40 mg total) by mouth once daily.  Dispense: 90 tablet; Refill: 3

## 2019-04-29 ENCOUNTER — OFFICE VISIT (OUTPATIENT)
Dept: PODIATRY | Facility: CLINIC | Age: 69
End: 2019-04-29
Payer: MEDICARE

## 2019-04-29 VITALS
DIASTOLIC BLOOD PRESSURE: 78 MMHG | WEIGHT: 235.88 LBS | HEIGHT: 61 IN | SYSTOLIC BLOOD PRESSURE: 134 MMHG | BODY MASS INDEX: 44.53 KG/M2 | HEART RATE: 78 BPM

## 2019-04-29 DIAGNOSIS — L85.3 XEROSIS CUTIS: ICD-10-CM

## 2019-04-29 DIAGNOSIS — M20.42 HAMMER TOES OF BOTH FEET: ICD-10-CM

## 2019-04-29 DIAGNOSIS — M77.41 METATARSALGIA, RIGHT FOOT: ICD-10-CM

## 2019-04-29 DIAGNOSIS — E11.42 CONTROLLED TYPE 2 DIABETES MELLITUS WITH DIABETIC POLYNEUROPATHY, WITH LONG-TERM CURRENT USE OF INSULIN: Primary | ICD-10-CM

## 2019-04-29 DIAGNOSIS — M24.571 CONTRACTURE, RIGHT ANKLE: ICD-10-CM

## 2019-04-29 DIAGNOSIS — M20.41 HAMMER TOES OF BOTH FEET: ICD-10-CM

## 2019-04-29 DIAGNOSIS — Z79.4 CONTROLLED TYPE 2 DIABETES MELLITUS WITH DIABETIC POLYNEUROPATHY, WITH LONG-TERM CURRENT USE OF INSULIN: Primary | ICD-10-CM

## 2019-04-29 DIAGNOSIS — M24.572 CONTRACTURE, LEFT ANKLE: ICD-10-CM

## 2019-04-29 DIAGNOSIS — M77.42 METATARSALGIA OF LEFT FOOT: ICD-10-CM

## 2019-04-29 DIAGNOSIS — E66.01 MORBID OBESITY: ICD-10-CM

## 2019-04-29 PROCEDURE — 3078F DIAST BP <80 MM HG: CPT | Mod: CPTII,S$GLB,, | Performed by: PODIATRIST

## 2019-04-29 PROCEDURE — 3075F PR MOST RECENT SYSTOLIC BLOOD PRESS GE 130-139MM HG: ICD-10-PCS | Mod: CPTII,S$GLB,, | Performed by: PODIATRIST

## 2019-04-29 PROCEDURE — 99999 PR PBB SHADOW E&M-EST. PATIENT-LVL III: ICD-10-PCS | Mod: PBBFAC,,, | Performed by: PODIATRIST

## 2019-04-29 PROCEDURE — 99999 PR PBB SHADOW E&M-EST. PATIENT-LVL III: CPT | Mod: PBBFAC,,, | Performed by: PODIATRIST

## 2019-04-29 PROCEDURE — 99499 RISK ADDL DX/OHS AUDIT: ICD-10-PCS | Mod: S$GLB,,, | Performed by: PODIATRIST

## 2019-04-29 PROCEDURE — 99499 UNLISTED E&M SERVICE: CPT | Mod: S$GLB,,, | Performed by: PODIATRIST

## 2019-04-29 PROCEDURE — 1101F PT FALLS ASSESS-DOCD LE1/YR: CPT | Mod: CPTII,S$GLB,, | Performed by: PODIATRIST

## 2019-04-29 PROCEDURE — 99203 PR OFFICE/OUTPT VISIT, NEW, LEVL III, 30-44 MIN: ICD-10-PCS | Mod: S$GLB,,, | Performed by: PODIATRIST

## 2019-04-29 PROCEDURE — 99203 OFFICE O/P NEW LOW 30 MIN: CPT | Mod: S$GLB,,, | Performed by: PODIATRIST

## 2019-04-29 PROCEDURE — 3078F PR MOST RECENT DIASTOLIC BLOOD PRESSURE < 80 MM HG: ICD-10-PCS | Mod: CPTII,S$GLB,, | Performed by: PODIATRIST

## 2019-04-29 PROCEDURE — 1101F PR PT FALLS ASSESS DOC 0-1 FALLS W/OUT INJ PAST YR: ICD-10-PCS | Mod: CPTII,S$GLB,, | Performed by: PODIATRIST

## 2019-04-29 PROCEDURE — 3044F PR MOST RECENT HEMOGLOBIN A1C LEVEL <7.0%: ICD-10-PCS | Mod: CPTII,S$GLB,, | Performed by: PODIATRIST

## 2019-04-29 PROCEDURE — 3075F SYST BP GE 130 - 139MM HG: CPT | Mod: CPTII,S$GLB,, | Performed by: PODIATRIST

## 2019-04-29 PROCEDURE — 3044F HG A1C LEVEL LT 7.0%: CPT | Mod: CPTII,S$GLB,, | Performed by: PODIATRIST

## 2019-04-29 NOTE — PROGRESS NOTES
Subjective:       Patient ID: Christine Jo is a 69 y.o. female.    Chief Complaint: Diabetes Mellitus (PCP: Dr. Szymanski last seen on 4/10/19; pt reports bilateral foot pain at 6/10.)      HPI: Christine Jo presents to the office today, under referral by their Primary Care Provider, Jose Szymanski MD, for her annual diabetic foot assessment and risk evalution Patient is a DMII. Patient states neuropathy. This patient last saw his/her primary care provider on 4/10/19.     Hemoglobin A1C   Date Value Ref Range Status   04/10/2019 6.7 (H) 4.0 - 5.6 % Final     Comment:     ADA Screening Guidelines:  5.7-6.4%  Consistent with prediabetes  >or=6.5%  Consistent with diabetes  High levels of fetal hemoglobin interfere with the HbA1C  assay. Heterozygous hemoglobin variants (HbS, HgC, etc)do  not significantly interfere with this assay.   However, presence of multiple variants may affect accuracy.     01/10/2019 6.9 (H) 4.0 - 5.6 % Final     Comment:     ADA Screening Guidelines:  5.7-6.4%  Consistent with prediabetes  >or=6.5%  Consistent with diabetes  High levels of fetal hemoglobin interfere with the HbA1C  assay. Heterozygous hemoglobin variants (HbS, HgC, etc)do  not significantly interfere with this assay.   However, presence of multiple variants may affect accuracy.     09/10/2018 6.6 (H) 4.0 - 5.6 % Final     Comment:     ADA Screening Guidelines:  5.7-6.4%  Consistent with prediabetes  >or=6.5%  Consistent with diabetes  High levels of fetal hemoglobin interfere with the HbA1C  assay. Heterozygous hemoglobin variants (HbS, HgC, etc)do  not significantly interfere with this assay.   However, presence of multiple variants may affect accuracy.     .    Review of patient's allergies indicates:   Allergen Reactions    Aspirin Palpitations       Past Medical History:   Diagnosis Date    Arthritis     Cataract     Diabetes mellitus 2008     am 01/15/2018 Insulin x 1 year    Glaucoma     Hypertension      Insomnia     Macular degeneration     Vaginal yeast infection        Family History   Problem Relation Age of Onset    Heart disease Mother         CAD     Cataracts Mother     Macular degeneration Mother     Glaucoma Mother     Cancer Son         testicular     Cancer Maternal Aunt         Lung ca    Heart disease Maternal Grandfather         Pacemaker     Diabetes Sister     Heart disease Sister         CAD    Cataracts Sister     Diabetes Sister     Diabetes Sister        Social History     Socioeconomic History    Marital status:      Spouse name: Not on file    Number of children: Not on file    Years of education: Not on file    Highest education level: Not on file   Occupational History    Not on file   Social Needs    Financial resource strain: Not on file    Food insecurity:     Worry: Not on file     Inability: Not on file    Transportation needs:     Medical: Not on file     Non-medical: Not on file   Tobacco Use    Smoking status: Former Smoker     Packs/day: 1.00     Years: 35.00     Pack years: 35.00     Types: Cigarettes     Last attempt to quit: 6/22/2003     Years since quitting: 15.8    Smokeless tobacco: Never Used   Substance and Sexual Activity    Alcohol use: No    Drug use: No    Sexual activity: Never   Lifestyle    Physical activity:     Days per week: Not on file     Minutes per session: Not on file    Stress: Not on file   Relationships    Social connections:     Talks on phone: Not on file     Gets together: Not on file     Attends Taoism service: Not on file     Active member of club or organization: Not on file     Attends meetings of clubs or organizations: Not on file     Relationship status: Not on file   Other Topics Concern    Not on file   Social History Narrative    Not on file       Past Surgical History:   Procedure Laterality Date    abdominal laparoscopy       BREAST BIOPSY      CATARACT EXTRACTION Bilateral [id number]     "Jacqui in [place]    EYE SURGERY      TONSILLECTOMY      TUBAL LIGATION      yag  Bilateral        Review of Systems   Constitutional: Negative for chills, fatigue and fever.   HENT: Negative for hearing loss.    Eyes: Negative for photophobia and visual disturbance.   Respiratory: Negative for cough, chest tightness, shortness of breath and wheezing.    Cardiovascular: Negative for chest pain and palpitations.   Gastrointestinal: Negative for constipation, diarrhea, nausea and vomiting.   Endocrine: Negative for cold intolerance and heat intolerance.   Genitourinary: Negative for flank pain.   Musculoskeletal: Negative for gait problem, neck pain and neck stiffness.   Skin: Negative for wound.   Neurological: Positive for numbness. Negative for light-headedness and headaches.   Psychiatric/Behavioral: Negative for sleep disturbance.         Objective:   /78 (BP Location: Right arm, Patient Position: Sitting)   Pulse 78   Ht 5' 1" (1.549 m)   Wt 107 kg (235 lb 14.3 oz)   BMI 44.57 kg/m²     LOWER EXTREMITY PHYSICAL EXAMINATION  ORTHOPEDIC: Manual Muscle Testing is 5/5 in all planes on the left and right, without pains, with and without resistance. No pains to palpation of the medial or lateral ankle ligaments. No discomfort to palpation of the posterior tibial tendon, peroneal tendon, Achilles tendon or the anterior ankle tendons.  Rectus foot type is noted. No pain upon range of motion of the midfoot or hindfoot joints. No crepitus upon range of motion and midfoot or hindfoot joints. No ankle pathology is noted. Equinus contractures noted.  Semi rigid hammertoe contractures are noted. Slight metatarsalgia is noted. Gait pattern is non-antalgic.    VASCULAR: Hair growth is sparse on the left and right dorsal foot and at the digits. The left dorsalis pedis pulse is 1/4 and on the right is 1/4, and the left posterior tibial pulse is 1/4 and is 1/4 on the right. Varicosities are noted. Spider " veins are noted to the bilateral lower extremity. Warm to warm, proximal to distal. Capillary refill time is within normal limits and less  than 3 seconds.     NEUROLOGY: Sensation to light touch is intact. Proprioception is intact, bilateral. Sensation to pin prick is reduced to absent. Vibratory sensation is diminished to the left and right lower extremity. Examination with 5.07 [place] Jessika monofilament reveals that protective sensation is not intact to the left and right plantar surfaces of the foot and digits, as the patient has no sensation/detection at greater than 4 distinct points of contact.     DERMATOLOGY: Skin is supple, moist, dry, intact and xerotic.       Assessment:     1. Controlled type 2 diabetes mellitus with diabetic polyneuropathy, with long-term current use of insulin    2. Contracture, left ankle    3. Contracture, right ankle    4. Hammer toes of both feet    5. Metatarsalgia of left foot    6. Metatarsalgia, right foot    7. Xerosis cutis    8. Morbid obesity        Plan:     Controlled type 2 diabetes mellitus with diabetic polyneuropathy, with long-term current use of insulin  -     DIABETIC SHOES FOR HOME USE    I counseled the patient on his/her Diabetic Mellitus regarding today's clinical examination and objection findings. We did also discuss recent medication changes, pertinent labs and imaging evaluations and other medical consultation notes and progress notes. Greater than 50% of this visit was spent on counseling and coordination of care. Greater than 20 minutes of this appt. was spent on education about the diabetic foot, in relation to PVD and/or neuropathy, and the prevention of limb loss.     Shoe gear is inspected and wear and proper fit/type. Patient is reminded of the importance of good nutrition and blood sugar control to help prevent podiatric complications of diabetes. Patient instructed on proper foot hygeine. We discussed wearing proper shoe gear, daily foot  inspections, never walking without protective shoe gear, never putting sharp instruments to feet.  Patient  will continue to monitor the areas daily, inspect feet, wear protective shoe gear when ambulatory, moisturizer to maintain skin integrity.     Patient's DMI/DMII is managed by Primary Care Provider and/or Endocrinology Advanced Practice Provider. As per the most recent glycohemoglobin level, this patient is at goal.    (Diabetic) Foot counseling and education is provided at this visit. Patient is advised to wear socks and shoes at all times.  Do not walk barefoot, or with just socks, even when indoors.  Be sure to check and inspect the inside of the shoe before putting them on her feet.  Protect your feet at all times.  Walking shoes and/or athletic shoes are the best types of shoe gear. Do not wear vinyl or plastic type shoe gear, as they do not stretch and/or breathe.  Protect your feet from hot and/or cold. Elevate the extremities when sitting.  Do not wear excessively tight socks and/or shoe gear. Wiggle your toes for a few minutes throughout the day. Move your ankles up and down, in and out, to help blood flow in your lower extremity.     Contracture, left ankle  Contracture, right ankle  Hammer toes of both feet  Metatarsalgia of left foot  Metatarsalgia, right foot  -     DIABETIC SHOES FOR HOME USE    This patient does have hammertoe (digital) contractures. I did advise the patient to ambulate with shoe gear that is high in the tox box to allow for extra room and depth in the sagittal plane, in order to alleviate and lessen the potential for dorsal digital break down at the IPJs. I do also recommended shoe gear that is soft and supple in the foot bed as to lessen the potential for plantar distal digital break down at the contracted digits. If the patient does not feel the aforementioned is necessary, he or she may also purchase OTC padding devices to be worn across the MTPJ, at the distal aspects of  the digits, and/or at the dorsal aspects of the IPJs. The patient does acknowledge understanding and is said to be amenable to compliance.     Xerosis cutis  Patient will purchase OTC Urea 40% and use BID or TID.    Morbid obesity  Patient is counseled and reminded concerning the importance of good nutrition and healthy eating habits, which may include blood sugar control, to prevent and/or help podiatric foot and ankle complications.       Protective Sensation (w/ 10 gram monofilament):  Right: Absent  Left: Absent    Visual Inspection:  Dry Skin -  Bilateral    Pedal Pulses:   Right: Present  Left: Present    Posterior tibialis:   Right:Present  Left: Present            Future Appointments   Date Time Provider Department Center   5/9/2019 10:20 AM LABORATORY, [facility]   5/15/2019  1:00 PM Tiffany Castillo NP [facility] HEM ONC [facility]   6/20/2019  9:30 AM LABORATORY, [facility]   6/20/2019 10:00 AM Phuc Christiansen MD [facility] IN PN  [facility]   6/20/2019 11:00 AM CHAIR 14 [facility] R[facility]UM INFUSION [facility] INF [facility]   8/15/2019 10:00 AM Jose Szymanski MD [facility] PRIMARY [facility]   9/24/2019 10:00 AM [facility] BMD1 [facility] DEXABMD  [facility]   9/24/2019 10:30 AM LABORATORY, [facility]   2/14/2020 10:15 AM Klaus Shelton OD [facility] OPHTHAL  Medical [facility]

## 2019-04-29 NOTE — LETTER
April 29, 2019      Jose Szymanski MD  [address] [place] [address] Dr [place] [address]           [facility]  [place] [address]  Phone: [phone]  Fax: [phone]          Patient: Christine Jo   MR Number: [id number]   YOB: 1950   Date of Visit: 4/29/2019       Dear Dr. Jose Szymanski:    Thank you for referring Christine Jo to me for evaluation. Attached you will find relevant portions of my assessment and plan of care.    If you have questions, please do not hesitate to call me. I look forward to following Christine Jo along with you.    Sincerely,    Otto Shepherd, MARICRUZ    Enclosure  CC:  No Recipients    If you would like to receive this communication electronically, please contact [email or web address] or [phone] to request more information on [employer] Link access.    For providers and/or their staff who would like to refer a patient to [facility], please contact us through our one-stop-shop provider referral line, [facility], at [phone].    If you feel you have received this communication in error or would no longer like to receive these types of communications, please e-mail [email or web address]

## 2019-05-07 RX ORDER — HYDRALAZINE HYDROCHLORIDE 100 MG/1
TABLET, FILM COATED ORAL
Qty: 180 TABLET | Refills: 0 | Status: SHIPPED | OUTPATIENT
Start: 2019-05-07 | End: 2019-05-09 | Stop reason: SDUPTHER

## 2019-05-09 RX ORDER — HYDRALAZINE HYDROCHLORIDE 100 MG/1
TABLET, FILM COATED ORAL
Qty: 180 TABLET | Refills: 0 | Status: SHIPPED | OUTPATIENT
Start: 2019-05-09 | End: 2020-03-23 | Stop reason: SDUPTHER

## 2019-05-10 ENCOUNTER — LAB VISIT (OUTPATIENT)
Dept: LAB | Facility: HOSPITAL | Age: 69
End: 2019-05-10
Payer: MEDICARE

## 2019-05-10 DIAGNOSIS — D50.0 IRON DEFICIENCY ANEMIA DUE TO CHRONIC BLOOD LOSS: ICD-10-CM

## 2019-05-10 LAB
ALBUMIN SERPL BCP-MCNC: 3.8 G/DL (ref 3.5–5.2)
ALP SERPL-CCNC: 50 U/L (ref 55–135)
ALT SERPL W/O P-5'-P-CCNC: 19 U/L (ref 10–44)
ANION GAP SERPL CALC-SCNC: 10 MMOL/L (ref 8–16)
AST SERPL-CCNC: 17 U/L (ref 10–40)
BASOPHILS # BLD AUTO: 0.03 K/UL (ref 0–0.2)
BASOPHILS NFR BLD: 0.4 % (ref 0–1.9)
BILIRUB SERPL-MCNC: 0.8 MG/DL (ref 0.1–1)
BUN SERPL-MCNC: 15 MG/DL (ref 8–23)
CALCIUM SERPL-MCNC: 9.3 MG/DL (ref 8.7–10.5)
CHLORIDE SERPL-SCNC: 104 MMOL/L (ref 95–110)
CO2 SERPL-SCNC: 25 MMOL/L (ref 23–29)
CREAT SERPL-MCNC: 0.8 MG/DL (ref 0.5–1.4)
DIFFERENTIAL METHOD: ABNORMAL
EOSINOPHIL # BLD AUTO: 0.1 K/UL (ref 0–0.5)
EOSINOPHIL NFR BLD: 2 % (ref 0–8)
ERYTHROCYTE [DISTWIDTH] IN BLOOD BY AUTOMATED COUNT: 13.1 % (ref 11.5–14.5)
EST. GFR  (AFRICAN AMERICAN): >60 ML/MIN/1.73 M^2
EST. GFR  (NON AFRICAN AMERICAN): >60 ML/MIN/1.73 M^2
FERRITIN SERPL-MCNC: 118 NG/ML (ref 20–300)
GLUCOSE SERPL-MCNC: 260 MG/DL (ref 70–110)
HCT VFR BLD AUTO: 37.5 % (ref 37–48.5)
HGB BLD-MCNC: 11.4 G/DL (ref 12–16)
IRON SERPL-MCNC: 102 UG/DL (ref 30–160)
LYMPHOCYTES # BLD AUTO: 1.7 K/UL (ref 1–4.8)
LYMPHOCYTES NFR BLD: 24.7 % (ref 18–48)
MCH RBC QN AUTO: 29.2 PG (ref 27–31)
MCHC RBC AUTO-ENTMCNC: 30.4 G/DL (ref 32–36)
MCV RBC AUTO: 96 FL (ref 82–98)
MONOCYTES # BLD AUTO: 0.6 K/UL (ref 0.3–1)
MONOCYTES NFR BLD: 9.1 % (ref 4–15)
NEUTROPHILS # BLD AUTO: 4.5 K/UL (ref 1.8–7.7)
NEUTROPHILS NFR BLD: 63.8 % (ref 38–73)
PLATELET # BLD AUTO: 245 K/UL (ref 150–350)
PMV BLD AUTO: 11.3 FL (ref 9.2–12.9)
POTASSIUM SERPL-SCNC: 4.5 MMOL/L (ref 3.5–5.1)
PROT SERPL-MCNC: 6.9 G/DL (ref 6–8.4)
RBC # BLD AUTO: 3.91 M/UL (ref 4–5.4)
SATURATED IRON: 33 % (ref 20–50)
SODIUM SERPL-SCNC: 139 MMOL/L (ref 136–145)
TOTAL IRON BINDING CAPACITY: 312 UG/DL (ref 250–450)
TRANSFERRIN SERPL-MCNC: 211 MG/DL (ref 200–375)
WBC # BLD AUTO: 7.05 K/UL (ref 3.9–12.7)

## 2019-05-10 PROCEDURE — 36415 COLL VENOUS BLD VENIPUNCTURE: CPT

## 2019-05-10 PROCEDURE — 82728 ASSAY OF FERRITIN: CPT

## 2019-05-10 PROCEDURE — 85025 COMPLETE CBC W/AUTO DIFF WBC: CPT

## 2019-05-10 PROCEDURE — 80053 COMPREHEN METABOLIC PANEL: CPT

## 2019-05-10 PROCEDURE — 83540 ASSAY OF IRON: CPT

## 2019-05-10 RX ORDER — TEMAZEPAM 30 MG/1
CAPSULE ORAL
Qty: 30 CAPSULE | Refills: 0 | Status: SHIPPED | OUTPATIENT
Start: 2019-05-10 | End: 2019-06-10 | Stop reason: SDUPTHER

## 2019-05-10 NOTE — TELEPHONE ENCOUNTER
----- Message from Guanakito Gordon sent at 5/10/2019 10:05 AM CDT -----  Contact: pt   Type:  Needs Medical Advice    Who Called: JUSTIN GOLD   Symptoms (please be specific):   How long has patient had these symptoms:   Pharmacy name and phone #:       [facility] [place] [place] - [address] [facility]  [address]  [facility] [address]  Phone: [phone] Fax: [phone]    Would the patient rather a call back or a response via [facility]? Call   Best Call Back Number: [phone]  Additional Information: pt is requesting a call back from the nurse in regards to the pt pharmacy needing a PA for the med temazepam

## 2019-05-10 NOTE — TELEPHONE ENCOUNTER
PA will need to wait until Monday.  I am sending a 30 day prescription as it appears the patient will be out this weekend.

## 2019-05-14 ENCOUNTER — TELEPHONE (OUTPATIENT)
Dept: PODIATRY | Facility: CLINIC | Age: 69
End: 2019-05-14

## 2019-05-14 NOTE — TELEPHONE ENCOUNTER
Spoke with pt about diabetic shoe prescription.  Informed pt if [employer] doesn't have the prescription to call back and I would fax it over to them for her.  Pt understood and call ended pleasantly.

## 2019-05-15 ENCOUNTER — LAB VISIT (OUTPATIENT)
Dept: LAB | Facility: HOSPITAL | Age: 69
End: 2019-05-15
Attending: NURSE PRACTITIONER
Payer: MEDICARE

## 2019-05-15 ENCOUNTER — OFFICE VISIT (OUTPATIENT)
Dept: HEMATOLOGY/ONCOLOGY | Facility: CLINIC | Age: 69
End: 2019-05-15
Payer: MEDICARE

## 2019-05-15 VITALS
HEART RATE: 88 BPM | HEIGHT: 61 IN | OXYGEN SATURATION: 96 % | RESPIRATION RATE: 20 BRPM | BODY MASS INDEX: 45.08 KG/M2 | WEIGHT: 238.75 LBS | SYSTOLIC BLOOD PRESSURE: 142 MMHG | TEMPERATURE: 98 F | DIASTOLIC BLOOD PRESSURE: 70 MMHG

## 2019-05-15 DIAGNOSIS — I48.0 PAROXYSMAL A-FIB: ICD-10-CM

## 2019-05-15 DIAGNOSIS — D64.9 NORMOCYTIC ANEMIA: ICD-10-CM

## 2019-05-15 DIAGNOSIS — M25.50 ARTHRALGIA, UNSPECIFIED JOINT: ICD-10-CM

## 2019-05-15 DIAGNOSIS — R53.83 FATIGUE, UNSPECIFIED TYPE: ICD-10-CM

## 2019-05-15 DIAGNOSIS — E11.42 CONTROLLED TYPE 2 DIABETES MELLITUS WITH DIABETIC POLYNEUROPATHY, WITH LONG-TERM CURRENT USE OF INSULIN: ICD-10-CM

## 2019-05-15 DIAGNOSIS — M54.2 NECK PAIN: ICD-10-CM

## 2019-05-15 DIAGNOSIS — M47.812 SPONDYLOSIS OF CERVICAL REGION WITHOUT MYELOPATHY OR RADICULOPATHY: ICD-10-CM

## 2019-05-15 DIAGNOSIS — D50.0 IRON DEFICIENCY ANEMIA DUE TO CHRONIC BLOOD LOSS: Primary | ICD-10-CM

## 2019-05-15 DIAGNOSIS — Z79.4 CONTROLLED TYPE 2 DIABETES MELLITUS WITH DIABETIC POLYNEUROPATHY, WITH LONG-TERM CURRENT USE OF INSULIN: ICD-10-CM

## 2019-05-15 LAB — CRP SERPL-MCNC: 1.3 MG/L (ref 0–8.2)

## 2019-05-15 PROCEDURE — 99214 PR OFFICE/OUTPT VISIT, EST, LEVL IV, 30-39 MIN: ICD-10-PCS | Mod: S$GLB,,, | Performed by: NURSE PRACTITIONER

## 2019-05-15 PROCEDURE — 3077F SYST BP >= 140 MM HG: CPT | Mod: CPTII,S$GLB,, | Performed by: NURSE PRACTITIONER

## 2019-05-15 PROCEDURE — 3044F PR MOST RECENT HEMOGLOBIN A1C LEVEL <7.0%: ICD-10-PCS | Mod: CPTII,S$GLB,, | Performed by: NURSE PRACTITIONER

## 2019-05-15 PROCEDURE — 3078F PR MOST RECENT DIASTOLIC BLOOD PRESSURE < 80 MM HG: ICD-10-PCS | Mod: CPTII,S$GLB,, | Performed by: NURSE PRACTITIONER

## 2019-05-15 PROCEDURE — 99214 OFFICE O/P EST MOD 30 MIN: CPT | Mod: S$GLB,,, | Performed by: NURSE PRACTITIONER

## 2019-05-15 PROCEDURE — 83520 IMMUNOASSAY QUANT NOS NONAB: CPT | Mod: 59

## 2019-05-15 PROCEDURE — 1101F PR PT FALLS ASSESS DOC 0-1 FALLS W/OUT INJ PAST YR: ICD-10-PCS | Mod: CPTII,S$GLB,, | Performed by: NURSE PRACTITIONER

## 2019-05-15 PROCEDURE — 86038 ANTINUCLEAR ANTIBODIES: CPT

## 2019-05-15 PROCEDURE — 36415 COLL VENOUS BLD VENIPUNCTURE: CPT

## 2019-05-15 PROCEDURE — 1101F PT FALLS ASSESS-DOCD LE1/YR: CPT | Mod: CPTII,S$GLB,, | Performed by: NURSE PRACTITIONER

## 2019-05-15 PROCEDURE — 99499 UNLISTED E&M SERVICE: CPT | Mod: S$GLB,,, | Performed by: NURSE PRACTITIONER

## 2019-05-15 PROCEDURE — 99499 RISK ADDL DX/OHS AUDIT: ICD-10-PCS | Mod: S$GLB,,, | Performed by: NURSE PRACTITIONER

## 2019-05-15 PROCEDURE — 3077F PR MOST RECENT SYSTOLIC BLOOD PRESSURE >= 140 MM HG: ICD-10-PCS | Mod: CPTII,S$GLB,, | Performed by: NURSE PRACTITIONER

## 2019-05-15 PROCEDURE — 3044F HG A1C LEVEL LT 7.0%: CPT | Mod: CPTII,S$GLB,, | Performed by: NURSE PRACTITIONER

## 2019-05-15 PROCEDURE — 3078F DIAST BP <80 MM HG: CPT | Mod: CPTII,S$GLB,, | Performed by: NURSE PRACTITIONER

## 2019-05-15 PROCEDURE — 99999 PR PBB SHADOW E&M-EST. PATIENT-LVL III: CPT | Mod: PBBFAC,,, | Performed by: NURSE PRACTITIONER

## 2019-05-15 PROCEDURE — 99999 PR PBB SHADOW E&M-EST. PATIENT-LVL III: ICD-10-PCS | Mod: PBBFAC,,, | Performed by: NURSE PRACTITIONER

## 2019-05-15 PROCEDURE — 86140 C-REACTIVE PROTEIN: CPT

## 2019-05-15 RX ORDER — INSULIN GLARGINE 100 [IU]/ML
INJECTION, SOLUTION SUBCUTANEOUS
Qty: 1 BOX | Refills: 2 | Status: SHIPPED | OUTPATIENT
Start: 2019-05-15 | End: 2019-07-24 | Stop reason: SDUPTHER

## 2019-05-16 LAB
ANA SER QL IF: NORMAL
KAPPA LC SER QL IA: 2.2 MG/DL (ref 0.33–1.94)
KAPPA LC/LAMBDA SER IA: 1.08 (ref 0.26–1.65)
LAMBDA LC SER QL IA: 2.03 MG/DL (ref 0.57–2.63)

## 2019-05-26 ENCOUNTER — PATIENT MESSAGE (OUTPATIENT)
Dept: INTERNAL MEDICINE | Facility: CLINIC | Age: 69
End: 2019-05-26

## 2019-05-27 ENCOUNTER — TELEPHONE (OUTPATIENT)
Dept: INTERNAL MEDICINE | Facility: CLINIC | Age: 69
End: 2019-05-27

## 2019-05-27 RX ORDER — FLUCONAZOLE 150 MG/1
150 TABLET ORAL ONCE
Qty: 1 TABLET | Refills: 0 | Status: SHIPPED | OUTPATIENT
Start: 2019-05-27 | End: 2019-05-27

## 2019-05-27 NOTE — TELEPHONE ENCOUNTER
Patient requesting a refill on her fluconazole. Patient states that she has chronic yeast infections due to her diabetes. Patient last seen on 04/10/19.    Please Advise

## 2019-05-27 NOTE — TELEPHONE ENCOUNTER
Patient informed of her Rx being sent to the pharmacy and verbally understood the information given.

## 2019-06-03 ENCOUNTER — TELEPHONE (OUTPATIENT)
Dept: PODIATRY | Facility: CLINIC | Age: 69
End: 2019-06-03

## 2019-06-03 NOTE — TELEPHONE ENCOUNTER
Pt requests insert prescription be resent to [employer]. I informed the pt we would resend the prescription. Pt understood and call ended pleasantly.      ----- Message from Wendy Vasquez sent at 6/3/2019 11:38 AM CDT -----  Contact: pt  Pt would like to be called back regarding her [employer] - need rx for inserts    Pt can be reached at [phone]

## 2019-06-10 RX ORDER — TEMAZEPAM 30 MG/1
CAPSULE ORAL
Qty: 30 CAPSULE | Refills: 0 | Status: SHIPPED | OUTPATIENT
Start: 2019-06-10 | End: 2019-07-15 | Stop reason: SDUPTHER

## 2019-06-10 RX ORDER — TEMAZEPAM 30 MG/1
CAPSULE ORAL
Qty: 30 CAPSULE | Refills: 0 | OUTPATIENT
Start: 2019-06-10

## 2019-06-10 RX ORDER — BEPOTASTINE BESILATE 15 MG/ML
1 SOLUTION/ DROPS OPHTHALMIC 2 TIMES DAILY
Qty: 10 ML | Refills: 12 | Status: SHIPPED | OUTPATIENT
Start: 2019-06-10 | End: 2020-09-08 | Stop reason: SDUPTHER

## 2019-06-11 RX ORDER — BEPOTASTINE BESILATE 15 MG/ML
1 SOLUTION/ DROPS OPHTHALMIC 2 TIMES DAILY
Qty: 10 ML | Refills: 12 | Status: SHIPPED | OUTPATIENT
Start: 2019-06-11 | End: 2020-05-25 | Stop reason: SDUPTHER

## 2019-06-17 RX ORDER — LIRAGLUTIDE 6 MG/ML
INJECTION SUBCUTANEOUS
Qty: 3 ML | Refills: 0 | OUTPATIENT
Start: 2019-06-17

## 2019-06-17 RX ORDER — LIRAGLUTIDE 6 MG/ML
INJECTION SUBCUTANEOUS
Qty: 6 ML | Refills: 0 | OUTPATIENT
Start: 2019-06-17

## 2019-06-20 ENCOUNTER — INFUSION (OUTPATIENT)
Dept: RHEUMATOLOGY | Facility: HOSPITAL | Age: 69
End: 2019-06-20
Attending: PHYSICIAN ASSISTANT
Payer: MEDICARE

## 2019-06-20 ENCOUNTER — OFFICE VISIT (OUTPATIENT)
Dept: PAIN MEDICINE | Facility: CLINIC | Age: 69
End: 2019-06-20
Payer: MEDICARE

## 2019-06-20 VITALS
SYSTOLIC BLOOD PRESSURE: 116 MMHG | OXYGEN SATURATION: 98 % | DIASTOLIC BLOOD PRESSURE: 57 MMHG | TEMPERATURE: 97 F | RESPIRATION RATE: 18 BRPM | HEART RATE: 77 BPM

## 2019-06-20 VITALS
BODY MASS INDEX: 45.11 KG/M2 | HEART RATE: 81 BPM | HEIGHT: 61 IN | DIASTOLIC BLOOD PRESSURE: 63 MMHG | SYSTOLIC BLOOD PRESSURE: 115 MMHG

## 2019-06-20 DIAGNOSIS — M81.0 AGE-RELATED OSTEOPOROSIS WITHOUT CURRENT PATHOLOGICAL FRACTURE: Primary | ICD-10-CM

## 2019-06-20 DIAGNOSIS — M47.22 OSTEOARTHRITIS OF SPINE WITH RADICULOPATHY, CERVICAL REGION: Primary | ICD-10-CM

## 2019-06-20 PROCEDURE — 3078F DIAST BP <80 MM HG: CPT | Mod: CPTII,S$GLB,, | Performed by: PAIN MEDICINE

## 2019-06-20 PROCEDURE — 3078F PR MOST RECENT DIASTOLIC BLOOD PRESSURE < 80 MM HG: ICD-10-PCS | Mod: CPTII,S$GLB,, | Performed by: PAIN MEDICINE

## 2019-06-20 PROCEDURE — 96374 THER/PROPH/DIAG INJ IV PUSH: CPT

## 2019-06-20 PROCEDURE — 1101F PR PT FALLS ASSESS DOC 0-1 FALLS W/OUT INJ PAST YR: ICD-10-PCS | Mod: CPTII,S$GLB,, | Performed by: PAIN MEDICINE

## 2019-06-20 PROCEDURE — 3074F SYST BP LT 130 MM HG: CPT | Mod: CPTII,S$GLB,, | Performed by: PAIN MEDICINE

## 2019-06-20 PROCEDURE — 99999 PR PBB SHADOW E&M-EST. PATIENT-LVL III: CPT | Mod: PBBFAC,,, | Performed by: PAIN MEDICINE

## 2019-06-20 PROCEDURE — 99214 OFFICE O/P EST MOD 30 MIN: CPT | Mod: S$GLB,,, | Performed by: PAIN MEDICINE

## 2019-06-20 PROCEDURE — 99214 PR OFFICE/OUTPT VISIT, EST, LEVL IV, 30-39 MIN: ICD-10-PCS | Mod: S$GLB,,, | Performed by: PAIN MEDICINE

## 2019-06-20 PROCEDURE — 3074F PR MOST RECENT SYSTOLIC BLOOD PRESSURE < 130 MM HG: ICD-10-PCS | Mod: CPTII,S$GLB,, | Performed by: PAIN MEDICINE

## 2019-06-20 PROCEDURE — 63600175 PHARM REV CODE 636 W HCPCS: Performed by: PHYSICIAN ASSISTANT

## 2019-06-20 PROCEDURE — 99999 PR PBB SHADOW E&M-EST. PATIENT-LVL III: ICD-10-PCS | Mod: PBBFAC,,, | Performed by: PAIN MEDICINE

## 2019-06-20 PROCEDURE — 1101F PT FALLS ASSESS-DOCD LE1/YR: CPT | Mod: CPTII,S$GLB,, | Performed by: PAIN MEDICINE

## 2019-06-20 RX ORDER — ALPHA LIPOIC ACID 300 MG
300 CAPSULE ORAL 2 TIMES DAILY
Qty: 60 EACH | Refills: 5 | Status: SHIPPED | OUTPATIENT
Start: 2019-06-20 | End: 2020-05-28

## 2019-06-20 RX ORDER — IBANDRONATE SODIUM 3 MG/3 ML
3 SYRINGE (ML) INTRAVENOUS
Status: COMPLETED | OUTPATIENT
Start: 2019-06-20 | End: 2019-06-20

## 2019-06-20 RX ORDER — HEPARIN 100 UNIT/ML
500 SYRINGE INTRAVENOUS
Status: CANCELLED | OUTPATIENT
Start: 2019-07-01

## 2019-06-20 RX ORDER — SODIUM CHLORIDE 0.9 % (FLUSH) 0.9 %
10 SYRINGE (ML) INJECTION
Status: CANCELLED | OUTPATIENT
Start: 2019-07-01

## 2019-06-20 RX ORDER — IBANDRONATE SODIUM 3 MG/3 ML
3 SYRINGE (ML) INTRAVENOUS
Status: CANCELLED | OUTPATIENT
Start: 2019-07-01

## 2019-06-20 RX ADMIN — IBANDRONATE SODIUM 3 MG: 3 INJECTION, SOLUTION INTRAVENOUS at 11:06

## 2019-06-20 NOTE — PATIENT INSTRUCTIONS
-provide a referral for physical therapy  -continue alpha lipoic acid 300 mg twice daily  -continue gabapentin and Cymbalta as prescribed  -follow up in clinic in 8 weeks

## 2019-06-20 NOTE — PROGRESS NOTES
Chief Pain Complaint:  Chief Complaint   Patient presents with    Follow-up     3 month follow up      History of Present Illness:   Ms. Jo returns to clinic for follow-up.  She was last seen March of 2019 when she had an acute flare of her cervical pain secondary to moving boxes.  Today she has complaints of some symptoms in her right upper extremity which radiates down into her arm where she occasionally has weakness in hand in the wrist.  She has been physical therapy approximately 1 time however the use dry needling and having the needles placed in skin scared her so she did not return to physical therapy.  Today she rates her pain as a 0/10 and describes wore an uncomfortable sensation which starts in the right cervical spine radiates into the right shoulder and down into the right upper extremity to her fingers distally.  She continues the alpha lipoic acid 300 mg twice daily which she has found to be very helpful in addition to the gabapentin and Cymbalta.  She denies having bowel bladder difficulties.  Her symptoms are somewhat improved with rest and worse with activity.    Initial HPI:  This patient is a 69 y.o. female who presents today complaining of the above noted pain/s. The patient describes the pain as follows.  Ms. Jo as a history of major depressive disorder, AFib, hypertension, diabetes type 2, GERD presents to clinic with complaints of cervical pain with radicular symptoms in the right arm.  She has been having symptoms for several years which has progressively gotten worse.  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  Her symptoms are worse on the right but occasionally she does have symptoms on the left side and she finds it to be worse when she rotates her head to the right such as when she is driving.  No increase in symptoms when she turns to the left or flexes or extends.  She has found that hot water  in the shower helps with her symptoms.  She does have headaches occasionally proximally to this week.  She describes the pain as 50% cervical spine a 50% radiating down the arm.  She tries to avoid NSAIDs that she has had stomach irritation previously with chronic use.  In the past she has taking gabapentin, Tylenol, Cymbalta.  She denies numbness and weakness in her left upper extremity while she endorses having numbness and weakness in the right upper extremity specifically in the C6 dermatome.  She denies having bowel bladder difficulty. She has not participated in physical therapy.    Previous Therapy:  Medications:  Gabapentin, Tylenol, Cymbalta, Flexeril, alpha lipoic acid, Norco   Injections:  Right C4-C6 medial branch blocks  Surgeries:  None  Physical Therapy:  Has completed physical therapy in the past    Past Surgical History:   Procedure Laterality Date    abdominal laparoscopy       BREAST BIOPSY      CATARACT EXTRACTION Bilateral [id number]    [place] in [place]    EYE SURGERY      TONSILLECTOMY      TUBAL LIGATION      yag  Bilateral      Imaging / Labs / Studies (reviewed on 6/20/2019):  Results for orders placed during the hospital encounter of 10/03/18   MRI Cervical Spine Without Contrast    Narrative EXAMINATION:  MRI CERVICAL SPINE WITHOUT CONTRAST    CLINICAL HISTORY:  Neck pain, first study; Cervicalgia    TECHNIQUE:  Multiplanar, multisequence MR images of the cervical spine were performed without the administration of contrast.    COMPARISON:  None.    FINDINGS:  Vertebral body heights are maintained.  There is straightening and mild reversal of cervical lordosis with mild retrolisthesis of C6 on C7.  Mild anterior spondylolisthesis of C4 on C5.  Multilevel disc desiccation present with height loss most prevalent at C5-6 and C6-7.  Mild Modic 1 endplate changes noted at C5-6 and C6-7.    Posterior fossa is unremarkable.  No concerning spinal cord signal abnormality.  Neck  soft tissues are unremarkable.    C2-C3: No spinal canal stenosis or neural foraminal narrowing.    C3-C4: No significant posterior disc bulge or spinal canal stenosis.  Right greater than left facet and uncovertebral hypertrophy present with moderate right neural foraminal narrowing.  Minimal right-sided facet edema present.    C4-C5: No significant posterior disc bulge or spinal canal stenosis.  Right greater than left facet and uncovertebral hypertrophy present with mild neural foraminal narrowing.  Right-sided facet/perifacet mild edema changes.    C5-C6: Posterior disc osteophyte complex present effacing the anterior thecal sac causing mild spinal canal stenosis.  Facet and uncovertebral hypertrophy noted with mild to moderate bilateral neural foraminal narrowing.    C6-C7: Posterior disc osteophyte complex and retrolisthesis cause moderate spinal canal stenosis.  Facet and uncovertebral hypertrophy present causing mild-to-moderate right greater than left neural foraminal narrowing.    C7-T1: No spinal canal stenosis or neural foraminal narrowing.      Impression Degenerative changes as above most prevalent at C5-6 and C6-7 with spinal canal stenosis and neural foraminal narrowing.     Review of Systems:  CONSTITUTIONAL: patient denies any fever, chills, or weight loss  SKIN: patient denies any rash or itching  RESPIRATORY: patient denies having any shortness of breath  GASTROINTESTINAL: patient denies having any diarrhea, constipation, or bowel incontinence  GENITOURINARY: patient denies having any abnormal bladder function    MUSCULOSKELETAL:  - patient complains of the above noted pain/s (see chief pain complaint)    NEUROLOGICAL:   - pain as above  - strength in upper extremities is intact, BILATERALLY  - sensation in Upper extremities is intact, BILATERALLY  - patient denies any loss of bowel or bladder control      PSYCHIATRIC: patient denies any change in mood    Other:  All other systems reviewed and  "are negative    Physical Exam:  /63 (BP Location: Left arm, Patient Position: Sitting, BP Method: Large (Automatic))   Pulse 81   Ht 5' 1" (1.549 m)   BMI 45.11 kg/m²  (reviewed on 6/20/2019)\  General:  Alert and oriented, No acute distress.    HEENT:       EOMI.  Normocephalic, atraumatic.   Cardiovascular:  Regular rate.    Gastrointestinal:  Soft.    Respiratory:  Respirations are non-labored.    Cervical Spine:  No masses or atrophy,    Range of motion - full Flexion, Extension,  Right Rotation, Left Rotation, Right Lat Bending,  Left Lat Bending         Increased pain with right lateral rotation         Palpation - tender to palpation over right cervical facets        Spurling's - negative   Thoracic Spine:  Palpation normal.    Motor Exam:    Strength:  Rate on 1-5 scale Right Left   C5-Elbow flexion, Deltoid 5 5   C6-Wrist extension 5 5   C7- Elbow / finger extension 5 5   C8- Finger flexion  5 5   T1- Intrinsics hand 5 5     Sensory Exam:  Full and equal sensation to light touch throughout.    Reflexes   Left DTR's   Biceps.   2  Triceps.   2   Brachioradialis 2.     Right DTR's   Biceps.   2  Triceps.   2  Brachioradialis. 2     Neurologic:  Cranial Nerves II-XII are grossly intact.    Psychiatric:  Cooperative.    Gait: Normal    Assessment  Cervical Spondylosis  Cervical Radiculopathy    1. 69 y.o. year old patient with PMH of   Past Medical History:   Diagnosis Date    Arthritis     Cataract     Diabetes mellitus 2008     am 01/15/2018 Insulin x 1 year    Glaucoma     Hypertension     Insomnia     Macular degeneration     Vaginal yeast infection       presenting with pain located cervical spine, right and left upper extremity  2. Pain Generators / Etiology :  Cervical spondylosis, cervical radiculopathy  3. Failed Meds (E- Effective, NE- Not Effective):  Tylenol-minimally effective, Gabapentin-minimally effective, Cymbalta-not effective  4. Physical Therapy - participated in 1 " session  5. Psychological comorbidities -  none  6. Anticoagulants / Antiplatelets:  Apixaban 5 mg     PLAN:  1. Medications :  Continue alpha lipoic acid 300 mg 2 times daily; continue gabapentin and Cymbalta as prescribed  2. PT - provided referral for physical therapy  3. Psychological - none  4. Labs - obtain  none; reviewed labs from 08/28/2018, creatinine 0.8  5. Imaging - none; reviewed cervical x-ray patient today in clinic  6. Interventions - none; consider C7/T1 epidural steroid injection in the future  7. Referrals - none  8. Records - none  9. Follow up visit - follow up in clinic in 3 months  10. Patient Questions - answered all patient's questions regarding diagnosis, therapy, treatment    OLIVIA Christiansen MD  Interventional Pain  [facility] [place]

## 2019-06-20 NOTE — NURSING
Boniva 3 mg IVP over 2 minutes; pt tolerated well; ambulatory out of clinic.    Ca and Cr WNL    No recent or upcoming dental work.

## 2019-07-12 RX ORDER — FLUTICASONE PROPIONATE 50 MCG
SPRAY, SUSPENSION (ML) NASAL
Qty: 16 ML | Refills: 0 | Status: SHIPPED | OUTPATIENT
Start: 2019-07-12 | End: 2019-07-12 | Stop reason: SDUPTHER

## 2019-07-12 RX ORDER — FLUTICASONE PROPIONATE 50 MCG
SPRAY, SUSPENSION (ML) NASAL
Qty: 48 ML | Refills: 0 | Status: SHIPPED | OUTPATIENT
Start: 2019-07-12 | End: 2019-07-15 | Stop reason: SDUPTHER

## 2019-07-15 RX ORDER — TEMAZEPAM 30 MG/1
CAPSULE ORAL
Qty: 30 CAPSULE | Refills: 0 | OUTPATIENT
Start: 2019-07-15

## 2019-07-15 RX ORDER — FLUTICASONE PROPIONATE 50 MCG
2 SPRAY, SUSPENSION (ML) NASAL DAILY
Qty: 48 ML | Refills: 0 | Status: SHIPPED | OUTPATIENT
Start: 2019-07-15 | End: 2020-03-23 | Stop reason: SDUPTHER

## 2019-07-15 RX ORDER — TEMAZEPAM 30 MG/1
CAPSULE ORAL
Qty: 30 CAPSULE | Refills: 0 | Status: SHIPPED | OUTPATIENT
Start: 2019-07-15 | End: 2019-08-10 | Stop reason: SDUPTHER

## 2019-07-15 NOTE — TELEPHONE ENCOUNTER
----- Message from Roxane Gupta sent at 7/15/2019 12:46 PM CDT -----  Contact: pt  Type:  RX Refill Request    Who Called: pt  Refill or New Rx:refill  RX Name and Strength:temaxpam 30 mg  How is the patient currently taking it? (ex. 1XDay):1X at night  Is this a 30 day or 90 day RX:30  Preferred Pharmacy with phone number:.  [facility] [address] [place] [address] [address] AT [facility] & [address] ANDREA LIGHT  [facility] [address]  Phone: [phone] Fax: [phone]  Local or Mail Order:local  Ordering Provider:Dr Szymanski  Would the patient rather a call back or a response via [facility]? Call back  Best Call Back Number:[phone]  Additional Information: she is at the pharmacy now.    Thank you

## 2019-07-15 NOTE — TELEPHONE ENCOUNTER
Spoke with pt and informed that refill has been received and forwarded to physician for approval. Pt verbalized understanding of information.

## 2019-07-15 NOTE — TELEPHONE ENCOUNTER
----- Message from Alicia Francis sent at 7/15/2019  2:03 PM CDT -----  Contact: Self  Patient is requesting a call back regarding prescription for Restoril. She is currently at the pharmacy. Please call patient back at [phone]

## 2019-07-15 NOTE — TELEPHONE ENCOUNTER
----- Message from Lashawn Chicago sent at 7/15/2019 10:29 AM CDT -----  Contact: pt   Type:  RX Refill Request    Who Called:  Pt   Refill or New Rx:refill  RX Name and Strength:temazepam (RESTORIL) 30 mg capsule  How is the patient currently taking it? (ex. 1XDay): 1 a day  Is this a 30 day or 90 day RX: 30  Preferred Pharmacy with phone number: listed below   Local or Mail Order: local   Ordering Provider:Dr Szymanski  Would the patient rather a call back or a response via [facility]? Call back  Best Call Back Number:[phone]   Additional Information: pt stated she see the doctor every three months      [facility] [address] [place] [address] [address] AT [facility]  [address]  [place] [facility] [place] [address]  Phone: [phone] Fax: [phone]

## 2019-07-15 NOTE — TELEPHONE ENCOUNTER
----- Message from Alicia Francis sent at 7/15/2019  2:03 PM CDT -----  Contact: Self  Patient is requesting a call back regarding prescription for Restoril. She is currently at the pharmacy. Please call patient back at [phone]

## 2019-07-26 ENCOUNTER — CLINICAL SUPPORT (OUTPATIENT)
Dept: REHABILITATION | Facility: HOSPITAL | Age: 69
End: 2019-07-26
Attending: PAIN MEDICINE
Payer: MEDICARE

## 2019-07-26 DIAGNOSIS — M47.22 OSTEOARTHRITIS OF SPINE WITH RADICULOPATHY, CERVICAL REGION: Primary | ICD-10-CM

## 2019-07-26 PROCEDURE — 97161 PT EVAL LOW COMPLEX 20 MIN: CPT

## 2019-07-26 PROCEDURE — 97014 ELECTRIC STIMULATION THERAPY: CPT

## 2019-07-26 PROCEDURE — 97110 THERAPEUTIC EXERCISES: CPT

## 2019-07-26 NOTE — PLAN OF CARE
[facility]  Physical Therapy Initial Evaluation    Name: Christine Jo  Clinic Number: [id number]    Therapy Diagnosis:   Encounter Diagnosis   Name Primary?    Osteoarthritis of spine with radiculopathy, cervical region Yes     Physician: Phuc Christiansen MD    Physician Orders: PT Eval and Treat   Medical Diagnosis from Referral: Osteoarthritis of spine with radiculopathy, cervical region  Evaluation Date: 7/26/2019  Authorization Period Expiration: 9/15/2019  Plan of Care Expiration: 8/26/2019  Visit # / Visits authorized: 1/12    Time In: 11:10  Time Out: 12:10  Total Billable Time: 60 minutes    Precautions: Standard    Subjective   Date of onset: 6/20/2019  History of current condition - Christine reports: neck pain for a year and without relief and increased pain.  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    Medications:   Christine has a current medication list which includes the following prescription(s): acetaminophen, alpha lipoic acid, apixaban, atorvastatin, basaglar kwikpen u-100 insulin, basaglar kwikpen u-100 insulin, benazepril, bepreve, bepreve, calcium citrate-vitamin d3 315-200 mg, carvedilol, cranberry fruit extract, cyclobenzaprine, cyclosporine, duloxetine, duloxetine, ergocalciferol (vitamin d2), fluticasone propionate, gabapentin, glucosamine-chondroitin, hydralazine, hydrocodone-acetaminophen,  liraglutide 0.6 mg/0.1 ml (18 mg/3 ml) subq pnij, lorazepam, multivit with calcium,iron,min, mupirocin, nystatin, pantoprazole, pen needle, diabetic, sitagliptin, temazepam, and triamcinolone acetonide 0.1%.    Allergies:   Review of patient's allergies indicates:   Allergen Reactions    Aspirin Palpitations        Imaging: Straightening of normal cervical lordosis.  Multilevel discogenic degenerative changes of the cervical spine are again seen with findings most pronounced at C5-C6 and C6-C7 and unchanged since the recent MRI.  Bones are demineralized.  No fracture or listhesis.  Prevertebral soft tissues are maintained.  At C6-C7, there is bilateral neural foraminal encroachment seen.  Similar findings although to a lesser extent are seen at C5-C6.  Odontoid is intact.  Lateral masses are symmetric.  Prior Therapy: Jan 2019 (stopped after evaluation)  Social History:  lives alone  Occupation: Retired  Prior Level of Function: independent  Current Level of Function: Difficulty with pain during ADLs and head turning movements    Pain:  Current 4/10, worst 9/10, best 3/10   Location: right neck   Description: Burning, Sharp and Shooting  Aggravating Factors: Walking, Morning and Flexing  Easing Factors: relaxation and pain medication    Pts goals: to reduce pain    Objective     Sensation:  Sensation is intact to light touch    ROM   %    Cervical Flexion 90    Cervical Extension 90     Right % Left%   Cervical Sidebending 60 60   Cervical Rotation 50 50    Right (degrees) Left (degrees)   Shoulder Flexion  145 145   Shoulder Abduction 135 130   Shoulder Extension 20 20   Shoulder Internal Rotation 80 80   Shoulder External Rotation 45 80       Strength   Right    Left   Shoulder Flexion 4/5 4/5   Shoulder Abduction 4/5 4/5   Shoulder Extension 4+/5 4+/5   Shoulder Internal Rotation 4/5 4/5   Shoulder External Rotation 4/5 4/5                             Strength   Right    Left   Shoulder Flexion 4/5 5/5    Shoulder Abduction 4/5 5/5   Shoulder Extension  4+/5 5/5   Elbow Flexion 4+/5 5/5   Elbow Extension  4+/5 5/5       Other: Pt presents with postural abnormalities which include: forward head and rounded shoulders          TREATMENT   Treatment Time In: 11:45  Treatment Time Out: 12:15  Total Treatment time separate from Evaluation: 15 minutes    Christine received therapeutic exercises to develop strength, ROM and flexibility for 10 minutes including:  Levator stretch  UT stretch  Chin tucks  Shrugs  Scapular retraction    Christine received the following supervised modalities after being cleared for contradictions: TENS:  Christine received TENS electrical stimulation for pain to the right trap and neck. Pt received modulated mode for 15 minutes with moist heat. Christine tolerated treatment well without any adverse effects.     Home Exercises and Patient Education Provided    Education provided:   - Home exercise program    Written Home Exercises Provided: Patient instructed to cont prior HEP.  Exercises were reviewed and Christine was able to demonstrate them prior to the end of the session.  Christine demonstrated good  understanding of the education provided.     See EMR under Patient Instructions for exercises provided 7/26/2019.    Assessment   Christine is a 69 y.o. female referred to outpatient Physical Therapy with a medical diagnosis of Osteoarthritis of spine with radiculopathy, cervical region. The patient presents with impairments which include decreased ROM, decreased strength and decreased joint mobility.  These impairments are limiting patient's ability to function with ADLs and has pain with driving and activities with the shoulders. Pt prognosis is Excellent due to personal factors and co-morbidities listed below. Pt will benefit from skilled outpatient Physical Therapy to address the deficits stated above and in the chart below, provide pt/family education, and to maximize pt's level of independence.     Plan of  care discussed with patient: Yes  Pt's spiritual, cultural and educational needs considered and patient is agreeable to the plan of care and goals as stated below:     Anticipated Barriers for therapy: none    Medical Necessity is demonstrated by the following  History  Co-morbidities and personal factors that may impact the plan of care Co-morbidities:   depression and diabetes    Personal Factors:   no deficits     low   Examination  Body Structures and Functions, activity limitations and participation restrictions that may impact the plan of care Body Regions:   neck  upper extremities    Body Systems:    ROM  strength    Participation Restrictions:   none    Activity limitations:   Learning and applying knowledge  no deficits    General Tasks and Commands  no deficits    Communication  no deficits    Mobility  lifting and carrying objects  walking    Self care  washing oneself (bathing, drying, washing hands)  dressing    Domestic Life  shopping  cooking  doing house work (cleaning house, washing dishes, laundry)    Interactions/Relationships  no deficits    Life Areas  no deficits    Community and Social Life  community life  recreation and leisure         low   Clinical Presentation stable and uncomplicated low   Decision Making/ Complexity Score: low     Goals:  Short Term Goals: In 3 weeks   1.I with HEP  2.Patient to have pain less than 3/10 at all times.    Long Term Goals: In 6 weeks  1. Patient to demo increase in UE strength to 4+/5 right UE  2. Patient to have decreased pain to 1/10 at all times.  3. Patient to perform daily activities including ADLs and community activity without limitation.      Plan   Plan of care Certification: 7/26/2019 to 8/26/2019.    Outpatient Physical Therapy 2 times weekly for 6 weeks to include the following interventions: Electrical Stimulation IFC/TENS, Manual Therapy, Moist Heat/ Ice, Patient Education, Therapeutic Activites and Therapeutic Exercise.     Malik Lee,  PT

## 2019-07-29 ENCOUNTER — CLINICAL SUPPORT (OUTPATIENT)
Dept: REHABILITATION | Facility: HOSPITAL | Age: 69
End: 2019-07-29
Attending: PAIN MEDICINE
Payer: MEDICARE

## 2019-07-29 DIAGNOSIS — M47.812 SPONDYLOSIS OF CERVICAL REGION WITHOUT MYELOPATHY OR RADICULOPATHY: ICD-10-CM

## 2019-07-29 DIAGNOSIS — M47.22 OSTEOARTHRITIS OF SPINE WITH RADICULOPATHY, CERVICAL REGION: Primary | ICD-10-CM

## 2019-07-29 PROCEDURE — 97110 THERAPEUTIC EXERCISES: CPT

## 2019-07-29 PROCEDURE — 97014 ELECTRIC STIMULATION THERAPY: CPT

## 2019-07-29 RX ORDER — INSULIN GLARGINE 100 [IU]/ML
INJECTION, SOLUTION SUBCUTANEOUS
Qty: 1 BOX | Refills: 2 | Status: SHIPPED | OUTPATIENT
Start: 2019-07-29 | End: 2019-08-20 | Stop reason: SDUPTHER

## 2019-07-29 NOTE — PROGRESS NOTES
Physical Therapy Daily Treatment Note     Name: Christine Jo  Clinic Number: [id number]    Therapy Diagnosis:   Encounter Diagnoses   Name Primary?    Osteoarthritis of spine with radiculopathy, cervical region Yes    Spondylosis of cervical region without myelopathy or radiculopathy      Physician: Phuc Christiansen MD    Visit Date: 7/29/2019    Physician Orders: PT Eval and Treat   Medical Diagnosis from Referral: Osteoarthritis of spine with radiculopathy, cervical region  Evaluation Date: 7/26/2019  Authorization Period Expiration: 9/15/2019  Plan of Care Expiration: 8/26/2019  Visit # / Visits authorized: 2/12    Time In: 10:00  Time Out: 10:55  Total Billable Time: 55 minutes    Precautions: Standard    Subjective     Pt reports: She did a few of the exercises.  She was compliant with home exercise program.  Response to previous treatment: N/A  Functional change: N/A    Pain: 4/10  Location: right neck      Objective     Christine received therapeutic exercises to develop strength, ROM and flexibility for 40 minutes including:  UBE 6  For scapular motion and endurance  Seated Rows 20# 2x10  Seated flyes 2# 2x10  Shrugs 2x10  Levator stretches  UT stretches  IR iso with ball  ER iso with ball  Punches into ball iso      Christine received the following supervised modalities after being cleared for contradictions: TENS:  Christine received TENS electrical stimulation for pain to the bilateral neck. Pt received modulated mode for 15 minutes. Christine tolerated treatment well without any adverse effects.       Home Exercises Provided and Patient Education Provided     Education provided:   - Home exs    Written Home Exercises Provided: Patient instructed to cont prior HEP.  Exercises were reviewed and Christine was able to demonstrate them prior to the end of the session.  Christine demonstrated good  understanding of the education provided.     See EMR under Patient Instructions for exercises provided prior  visit.    Assessment     Christine is a 69 y.o. female referred to outpatient Physical Therapy with a medical diagnosis of Osteoarthritis of spine with radiculopathy, cervical region. The patient presents with impairments which include decreased ROM, decreased strength and decreased joint mobility.  These impairments are limiting patient's ability to function with ADLs and has pain with driving and activities with the shoulders. Pt prognosis is Excellent due to personal factors and co-morbidities listed below. Pt will benefit from skilled outpatient Physical Therapy to address the deficits stated above and in the chart below, provide pt/family education, and to maximize pt's level of independence.  Christine is progressing well towards her goals.   Pt prognosis is Excellent.     Pt will continue to benefit from skilled outpatient physical therapy to address the deficits listed in the problem list box on initial evaluation, provide pt/family education and to maximize pt's level of independence in the home and community environment.     Pt's spiritual, cultural and educational needs considered and pt agreeable to plan of care and goals.     Anticipated barriers to physical therapy: none    Goals:     Short Term Goals: In 3 weeks   1.I with HEP  2.Patient to have pain less than 3/10 at all times.     Long Term Goals: In 6 weeks  1. Patient to demo increase in UE strength to 4+/5 right UE  2. Patient to have decreased pain to 1/10 at all times.  3. Patient to perform daily activities including ADLs and community activity without limitation.  Plan     Plan of care Certification: 7/26/2019 to 8/26/2019.     Outpatient Physical Therapy 2 times weekly for 6 weeks to include the following interventions: Electrical Stimulation IFC/TENS, Manual Therapy, Moist Heat/ Ice, Patient Education, Therapeutic Activites and Therapeutic Exercise.     Malik Lee, PT

## 2019-08-03 RX ORDER — SITAGLIPTIN 100 MG/1
TABLET, FILM COATED ORAL
Qty: 90 TABLET | Refills: 0 | Status: SHIPPED | OUTPATIENT
Start: 2019-08-03 | End: 2020-01-30

## 2019-08-11 RX ORDER — ATORVASTATIN CALCIUM 40 MG/1
TABLET, FILM COATED ORAL
Qty: 90 TABLET | Refills: 0 | Status: SHIPPED | OUTPATIENT
Start: 2019-08-11 | End: 2019-12-13 | Stop reason: SDUPTHER

## 2019-08-13 ENCOUNTER — TELEPHONE (OUTPATIENT)
Dept: INTERNAL MEDICINE | Facility: CLINIC | Age: 69
End: 2019-08-13

## 2019-08-13 RX ORDER — TEMAZEPAM 30 MG/1
CAPSULE ORAL
Qty: 30 CAPSULE | Refills: 0 | Status: SHIPPED | OUTPATIENT
Start: 2019-08-13 | End: 2019-09-10 | Stop reason: SDUPTHER

## 2019-08-13 NOTE — TELEPHONE ENCOUNTER
----- Message from [place] sent at 8/13/2019  2:48 PM CDT -----  Type:  RX Refill Request    Who Called:  Pt  Christine  Refill or New Rx:    Refill   RX Name and Strength:  Temazepam 30mg  How is the patient currently taking it? (ex. 1XDay):  Takes one at night  Is this a 30 day or 90 day RX:  30 day  Preferred Pharmacy with phone number:   [facility]  Local or Mail Order:  Local  Ordering Provider:  Dr Szymanski  Would the patient rather a call back or a response via [facility]?   Call back  Best Call Back Number:  [phone]  Additional Information: Please call when sent in//shine/delano

## 2019-08-13 NOTE — TELEPHONE ENCOUNTER
Spoke to patient and advised that prescription was written and faxed over today.  Patient verbally understood and stated ok.

## 2019-08-14 ENCOUNTER — TELEPHONE (OUTPATIENT)
Dept: INTERNAL MEDICINE | Facility: CLINIC | Age: 69
End: 2019-08-14

## 2019-08-14 ENCOUNTER — PATIENT MESSAGE (OUTPATIENT)
Dept: INTERNAL MEDICINE | Facility: CLINIC | Age: 69
End: 2019-08-14

## 2019-08-14 ENCOUNTER — CLINICAL SUPPORT (OUTPATIENT)
Dept: REHABILITATION | Facility: HOSPITAL | Age: 69
End: 2019-08-14
Attending: PAIN MEDICINE
Payer: MEDICARE

## 2019-08-14 DIAGNOSIS — M47.812 SPONDYLOSIS OF CERVICAL REGION WITHOUT MYELOPATHY OR RADICULOPATHY: ICD-10-CM

## 2019-08-14 DIAGNOSIS — M47.22 OSTEOARTHRITIS OF SPINE WITH RADICULOPATHY, CERVICAL REGION: Primary | ICD-10-CM

## 2019-08-14 PROCEDURE — 97014 ELECTRIC STIMULATION THERAPY: CPT

## 2019-08-14 PROCEDURE — 97110 THERAPEUTIC EXERCISES: CPT

## 2019-08-14 NOTE — TELEPHONE ENCOUNTER
----- Message from Stephanie Drew sent at 8/14/2019  1:27 PM CDT -----  ..Type:  Patient Returning Call    Who Called:pt  Who Left Message for Patient:  Does the patient know what this is regarding?:meds  Would the patient rather a call back or a response via [facility]? Call back   Best Call Back Number: [phone]  Additional Information: Pt is requesting a call from nurse to f/u on where medication was sent.  .  [id number] - [place] - [address] [facility]  [address]  [place] [address]  Phone: [phone] Fax: [phone]

## 2019-08-15 ENCOUNTER — PATIENT MESSAGE (OUTPATIENT)
Dept: INTERNAL MEDICINE | Facility: CLINIC | Age: 69
End: 2019-08-15

## 2019-08-15 ENCOUNTER — TELEPHONE (OUTPATIENT)
Dept: INTERNAL MEDICINE | Facility: CLINIC | Age: 69
End: 2019-08-15

## 2019-08-15 NOTE — TELEPHONE ENCOUNTER
----- Message from Ludivina Harley sent at 8/15/2019  7:15 AM CDT -----  Contact: pt  Pt canceling appt today, pt reschedule for Tuesday 8/20, pt have to go online to cancel.

## 2019-08-15 NOTE — TELEPHONE ENCOUNTER
Spoke with pt, states she isn't feeling well. She is going to rescheduled for next week. Apt canceled.

## 2019-08-16 ENCOUNTER — CLINICAL SUPPORT (OUTPATIENT)
Dept: REHABILITATION | Facility: HOSPITAL | Age: 69
End: 2019-08-16
Attending: PAIN MEDICINE
Payer: MEDICARE

## 2019-08-16 DIAGNOSIS — M47.812 SPONDYLOSIS OF CERVICAL REGION WITHOUT MYELOPATHY OR RADICULOPATHY: ICD-10-CM

## 2019-08-16 DIAGNOSIS — M47.22 OSTEOARTHRITIS OF SPINE WITH RADICULOPATHY, CERVICAL REGION: Primary | ICD-10-CM

## 2019-08-16 PROCEDURE — 97110 THERAPEUTIC EXERCISES: CPT

## 2019-08-16 PROCEDURE — 97014 ELECTRIC STIMULATION THERAPY: CPT

## 2019-08-16 NOTE — PROGRESS NOTES
Physical Therapy Daily Treatment Note     Name: Christine Jo  Clinic Number: [id number]    Therapy Diagnosis:   No diagnosis found.  Physician: Phuc Christiansen MD    Visit Date: 8/14/2019    Physician Orders: PT Eval and Treat   Medical Diagnosis from Referral: Osteoarthritis of spine with radiculopathy, cervical region  Evaluation Date: 7/26/2019  Authorization Period Expiration: 9/15/2019  Plan of Care Expiration: 8/26/2019  Visit # / Visits authorized: 2/12    Time In: 10:00  Time Out: 11:00  Total Billable Time: 60 minutes    Precautions: Standard    Subjective     Pt reports: She is doing better  She was compliant with home exercise program.  Response to previous treatment: N/A  Functional change: N/A    Pain: 3/10  Location: right neck      Objective     Christine received therapeutic exercises to develop strength, ROM and flexibility for 45 minutes including:  UBE 6  For scapular motion and endurance  Seated Rows 20# 2x10  Seated flyes 2# 2x10  Shrugs 2x10  Levator stretches  UT stretches  IR iso with ball  ER iso with ball  Punches into ball iso      Christine received the following supervised modalities after being cleared for contradictions: TENS:  Christine received TENS electrical stimulation for pain to the bilateral neck. Pt received modulated mode for 15 minutes. Christine tolerated treatment well without any adverse effects.       Home Exercises Provided and Patient Education Provided     Education provided:   - Home exs    Written Home Exercises Provided: Patient instructed to cont prior HEP.  Exercises were reviewed and Christine was able to demonstrate them prior to the end of the session.  Christine demonstrated good  understanding of the education provided.     See EMR under Patient Instructions for exercises provided prior visit.    Assessment     Christine is a 69 y.o. female referred to outpatient Physical Therapy with a medical diagnosis of Osteoarthritis of spine with radiculopathy, cervical region. The  patient presents with impairments which include decreased ROM, decreased strength and decreased joint mobility.  These impairments are limiting patient's ability to function with ADLs and has pain with driving and activities with the shoulders. Pt prognosis is Excellent due to personal factors and co-morbidities listed below. Pt will benefit from skilled outpatient Physical Therapy to address the deficits stated above and in the chart below, provide pt/family education, and to maximize pt's level of independence.  Christine is progressing well towards her goals.   Pt prognosis is Excellent.     Pt will continue to benefit from skilled outpatient physical therapy to address the deficits listed in the problem list box on initial evaluation, provide pt/family education and to maximize pt's level of independence in the home and community environment.     Pt's spiritual, cultural and educational needs considered and pt agreeable to plan of care and goals.     Anticipated barriers to physical therapy: none    Goals:     Short Term Goals: In 3 weeks   1.I with HEP  2.Patient to have pain less than 3/10 at all times.     Long Term Goals: In 6 weeks  1. Patient to demo increase in UE strength to 4+/5 right UE  2. Patient to have decreased pain to 1/10 at all times.  3. Patient to perform daily activities including ADLs and community activity without limitation.  Plan     Plan of care Certification: 7/26/2019 to 8/26/2019.     Outpatient Physical Therapy 2 times weekly for 6 weeks to include the following interventions: Electrical Stimulation IFC/TENS, Manual Therapy, Moist Heat/ Ice, Patient Education, Therapeutic Activites and Therapeutic Exercise.     Malik Lee, PT

## 2019-08-18 NOTE — PROGRESS NOTES
Physical Therapy Daily Treatment Note     Name: Christine Jo  Clinic Number: [id number]    Therapy Diagnosis:   No diagnosis found.  Physician: Phuc Christiansen MD    Visit Date: 8/16/2019    Physician Orders: PT Eval and Treat   Medical Diagnosis from Referral: Osteoarthritis of spine with radiculopathy, cervical region  Evaluation Date: 7/26/2019  Authorization Period Expiration: 9/15/2019  Plan of Care Expiration: 8/26/2019  Visit # / Visits authorized: 2/12    Time In: 10:00  Time Out: 11:00  Total Billable Time: 60 minutes    Precautions: Standard    Subjective     Pt reports: She is good  She was compliant with home exercise program.  Response to previous treatment: N/A  Functional change: N/A    Pain: 2/10  Location: right neck      Objective     Christine received therapeutic exercises to develop strength, ROM and flexibility for 45 minutes including:  UBE 6  For scapular motion and endurance  Seated Rows 20# 2x10  Seated flyes 2# 2x10  Shrugs 2x10  Levator stretches  UT stretches  IR iso with ball  ER iso with ball  Punches into ball iso      Christine received the following supervised modalities after being cleared for contradictions: TENS:  Christine received TENS electrical stimulation for pain to the bilateral neck. Pt received modulated mode for 15 minutes. Christine tolerated treatment well without any adverse effects.       Home Exercises Provided and Patient Education Provided     Education provided:   - Home exs    Written Home Exercises Provided: Patient instructed to cont prior HEP.  Exercises were reviewed and Christine was able to demonstrate them prior to the end of the session.  Christine demonstrated good  understanding of the education provided.     See EMR under Patient Instructions for exercises provided prior visit.    Assessment     Christine is a 69 y.o. female referred to outpatient Physical Therapy with a medical diagnosis of Osteoarthritis of spine with radiculopathy, cervical region. The patient  presents with impairments which include decreased ROM, decreased strength and decreased joint mobility.  These impairments are limiting patient's ability to function with ADLs and has pain with driving and activities with the shoulders. Pt prognosis is Excellent due to personal factors and co-morbidities listed below. Pt will benefit from skilled outpatient Physical Therapy to address the deficits stated above and in the chart below, provide pt/family education, and to maximize pt's level of independence.  Christine is progressing well towards her goals.   Pt prognosis is Excellent.     Pt will continue to benefit from skilled outpatient physical therapy to address the deficits listed in the problem list box on initial evaluation, provide pt/family education and to maximize pt's level of independence in the home and community environment.     Pt's spiritual, cultural and educational needs considered and pt agreeable to plan of care and goals.     Anticipated barriers to physical therapy: none    Goals:     Short Term Goals: In 3 weeks   1.I with HEP  2.Patient to have pain less than 3/10 at all times.     Long Term Goals: In 6 weeks  1. Patient to demo increase in UE strength to 4+/5 right UE  2. Patient to have decreased pain to 1/10 at all times.  3. Patient to perform daily activities including ADLs and community activity without limitation.  Plan     Plan of care Certification: 7/26/2019 to 8/26/2019.     Outpatient Physical Therapy 2 times weekly for 6 weeks to include the following interventions: Electrical Stimulation IFC/TENS, Manual Therapy, Moist Heat/ Ice, Patient Education, Therapeutic Activites and Therapeutic Exercise.     Malik Lee, PT

## 2019-08-20 ENCOUNTER — OFFICE VISIT (OUTPATIENT)
Dept: INTERNAL MEDICINE | Facility: CLINIC | Age: 69
End: 2019-08-20
Payer: MEDICARE

## 2019-08-20 VITALS
SYSTOLIC BLOOD PRESSURE: 130 MMHG | TEMPERATURE: 98 F | DIASTOLIC BLOOD PRESSURE: 70 MMHG | HEIGHT: 61 IN | WEIGHT: 240.44 LBS | OXYGEN SATURATION: 96 % | BODY MASS INDEX: 45.39 KG/M2 | HEART RATE: 79 BPM

## 2019-08-20 DIAGNOSIS — R13.10 DYSPHAGIA, UNSPECIFIED TYPE: ICD-10-CM

## 2019-08-20 DIAGNOSIS — Z79.4 CONTROLLED TYPE 2 DIABETES MELLITUS WITH DIABETIC POLYNEUROPATHY, WITH LONG-TERM CURRENT USE OF INSULIN: Primary | ICD-10-CM

## 2019-08-20 DIAGNOSIS — E11.42 CONTROLLED TYPE 2 DIABETES MELLITUS WITH DIABETIC POLYNEUROPATHY, WITH LONG-TERM CURRENT USE OF INSULIN: Primary | ICD-10-CM

## 2019-08-20 DIAGNOSIS — I48.0 PAROXYSMAL A-FIB: ICD-10-CM

## 2019-08-20 DIAGNOSIS — I10 ESSENTIAL HYPERTENSION: ICD-10-CM

## 2019-08-20 DIAGNOSIS — K21.9 GASTROESOPHAGEAL REFLUX DISEASE WITHOUT ESOPHAGITIS: ICD-10-CM

## 2019-08-20 PROCEDURE — 99214 PR OFFICE/OUTPT VISIT, EST, LEVL IV, 30-39 MIN: ICD-10-PCS | Mod: S$GLB,,, | Performed by: FAMILY MEDICINE

## 2019-08-20 PROCEDURE — 1101F PR PT FALLS ASSESS DOC 0-1 FALLS W/OUT INJ PAST YR: ICD-10-PCS | Mod: CPTII,S$GLB,, | Performed by: FAMILY MEDICINE

## 2019-08-20 PROCEDURE — 3075F SYST BP GE 130 - 139MM HG: CPT | Mod: CPTII,S$GLB,, | Performed by: FAMILY MEDICINE

## 2019-08-20 PROCEDURE — 83036 HEMOGLOBIN GLYCOSYLATED A1C: CPT

## 2019-08-20 PROCEDURE — 99999 PR PBB SHADOW E&M-EST. PATIENT-LVL III: ICD-10-PCS | Mod: PBBFAC,,, | Performed by: FAMILY MEDICINE

## 2019-08-20 PROCEDURE — 99214 OFFICE O/P EST MOD 30 MIN: CPT | Mod: S$GLB,,, | Performed by: FAMILY MEDICINE

## 2019-08-20 PROCEDURE — 3075F PR MOST RECENT SYSTOLIC BLOOD PRESS GE 130-139MM HG: ICD-10-PCS | Mod: CPTII,S$GLB,, | Performed by: FAMILY MEDICINE

## 2019-08-20 PROCEDURE — 3044F HG A1C LEVEL LT 7.0%: CPT | Mod: CPTII,S$GLB,, | Performed by: FAMILY MEDICINE

## 2019-08-20 PROCEDURE — 3078F PR MOST RECENT DIASTOLIC BLOOD PRESSURE < 80 MM HG: ICD-10-PCS | Mod: CPTII,S$GLB,, | Performed by: FAMILY MEDICINE

## 2019-08-20 PROCEDURE — 3078F DIAST BP <80 MM HG: CPT | Mod: CPTII,S$GLB,, | Performed by: FAMILY MEDICINE

## 2019-08-20 PROCEDURE — 1101F PT FALLS ASSESS-DOCD LE1/YR: CPT | Mod: CPTII,S$GLB,, | Performed by: FAMILY MEDICINE

## 2019-08-20 PROCEDURE — 99999 PR PBB SHADOW E&M-EST. PATIENT-LVL III: CPT | Mod: PBBFAC,,, | Performed by: FAMILY MEDICINE

## 2019-08-20 PROCEDURE — 3044F PR MOST RECENT HEMOGLOBIN A1C LEVEL <7.0%: ICD-10-PCS | Mod: CPTII,S$GLB,, | Performed by: FAMILY MEDICINE

## 2019-08-20 NOTE — PROGRESS NOTES
Subjective:       Patient ID: Christine Jo is a 69 y.o. female.    Chief Complaint: Follow-up    Follow-up diabetes elevated BMI paroxysmal atrial fibrillation esophageal reflux dysphagia hypertension.  She had a recent cardiology evaluation was told her test were all negative.  She denies polyuria polydipsia hypoglycemic symptoms.  She denies chest pain palpitations or edema. She has ongoing dysphagia midesophageal region.  She reports recent lab done with Cardiology and results are pending    Review of Systems   Constitutional: Negative for activity change, appetite change, fatigue and unexpected weight change.   Respiratory: Negative for cough, chest tightness, shortness of breath and wheezing.    Cardiovascular: Negative for chest pain, palpitations and leg swelling.        Denies lightheadedness   Gastrointestinal: Negative for abdominal distention, abdominal pain, nausea and vomiting.        Min esophageal dysphagia   Endocrine: Negative for polydipsia and polyuria.   Genitourinary: Negative for difficulty urinating.   Neurological: Negative for dizziness, weakness, light-headedness and headaches.       Objective:      Physical Exam   Constitutional: She is oriented to person, place, and time. She appears well-developed and well-nourished. No distress.   Neck: Neck supple. No JVD present. No thyromegaly present.   Cardiovascular: Normal rate, regular rhythm and normal heart sounds.   No murmur heard.  Pulmonary/Chest: Effort normal and breath sounds normal. No respiratory distress. She has no wheezes.   Abdominal: Soft. Bowel sounds are normal. She exhibits no mass. There is no tenderness.   Lymphadenopathy:     She has no cervical adenopathy.   Neurological: She is alert and oriented to person, place, and time.   Skin: She is not diaphoretic.       Lab Visit on 06/20/2019   Component Date Value Ref Range Status    Sodium 06/20/2019 136  136 - 145 mmol/L Final    Potassium 06/20/2019 4.4  3.5 - 5.1  mmol/L Final    Chloride 06/20/2019 101  95 - 110 mmol/L Final    CO2 06/20/2019 25  23 - 29 mmol/L Final    Glucose 06/20/2019 186* 70 - 110 mg/dL Final    BUN, Bld 06/20/2019 18  8 - 23 mg/dL Final    Creatinine 06/20/2019 0.8  0.5 - 1.4 mg/dL Final    Calcium 06/20/2019 9.6  8.7 - 10.5 mg/dL Final    Total Protein 06/20/2019 7.1  6.0 - 8.4 g/dL Final    Albumin 06/20/2019 3.9  3.5 - 5.2 g/dL Final    Total Bilirubin 06/20/2019 0.8  0.1 - 1.0 mg/dL Final    Alkaline Phosphatase 06/20/2019 46* 55 - 135 U/L Final    AST 06/20/2019 19  10 - 40 U/L Final    ALT 06/20/2019 22  10 - 44 U/L Final    Anion Gap 06/20/2019 10  8 - 16 mmol/L Final    eGFR if African American 06/20/2019 >60  >60 mL/min/1.73 m^2 Final    eGFR if non African American 06/20/2019 >60  >60 mL/min/1.73 m^2 Final     Assessment:       1. Dysphagia, unspecified type    2. Controlled type 2 diabetes mellitus with diabetic polyneuropathy, with long-term current use of insulin    3. BMI 40.0-44.9, adult        Plan:     Blood pressure is controlled 01/30/2070.  A1c ordered.  GI referral for dysphagia.  Bariatric surgery referral for diabetes hypertension and elevated BMI.  Follow-up in 4 months.    Dysphagia, unspecified type  -     Ambulatory referral to Gastroenterology    Controlled type 2 diabetes mellitus with diabetic polyneuropathy, with long-term current use of insulin  -     Hemoglobin A1c  -     Ambulatory consult to Bariatric Surgery    BMI 40.0-44.9, adult  -     Ambulatory consult to Bariatric Surgery

## 2019-08-21 ENCOUNTER — CLINICAL SUPPORT (OUTPATIENT)
Dept: REHABILITATION | Facility: HOSPITAL | Age: 69
End: 2019-08-21
Attending: PAIN MEDICINE
Payer: MEDICARE

## 2019-08-21 DIAGNOSIS — M47.22 OSTEOARTHRITIS OF SPINE WITH RADICULOPATHY, CERVICAL REGION: Primary | ICD-10-CM

## 2019-08-21 DIAGNOSIS — M47.812 SPONDYLOSIS OF CERVICAL REGION WITHOUT MYELOPATHY OR RADICULOPATHY: ICD-10-CM

## 2019-08-21 LAB
ESTIMATED AVG GLUCOSE: 151 MG/DL (ref 68–131)
HBA1C MFR BLD HPLC: 6.9 % (ref 4–5.6)

## 2019-08-21 PROCEDURE — 97110 THERAPEUTIC EXERCISES: CPT

## 2019-08-21 PROCEDURE — 97014 ELECTRIC STIMULATION THERAPY: CPT

## 2019-08-23 ENCOUNTER — PATIENT MESSAGE (OUTPATIENT)
Dept: INTERNAL MEDICINE | Facility: CLINIC | Age: 69
End: 2019-08-23

## 2019-08-25 NOTE — PROGRESS NOTES
Physical Therapy Daily Treatment Note     Name: Christine Jo  Clinic Number: [id number]    Therapy Diagnosis:   No diagnosis found.  Physician: Phuc Christiansen MD    Visit Date: 8/21/2019    Physician Orders: PT Eval and Treat   Medical Diagnosis from Referral: Osteoarthritis of spine with radiculopathy, cervical region  Evaluation Date: 7/26/2019  Authorization Period Expiration: 9/15/2019  Plan of Care Expiration: 8/26/2019  Visit # / Visits authorized: 5/12    Time In: 10:05  Time Out: 11:10  Total Billable Time: 65 minutes    Precautions: Standard    Subjective     Pt reports: She is good  She was compliant with home exercise program.  Response to previous treatment: N/A  Functional change: N/A    Pain: 2/10  Location: right neck      Objective     Christine received therapeutic exercises to develop strength, ROM and flexibility for 45 minutes including:  UBE 6  For scapular motion and endurance  Seated Rows 20# 2x10  Seated flyes 2# 2x10  Shrugs 2x10  Levator stretches  UT stretches  Cervical AROM supine  Cervical isometric extension    Manual cervical traction 10 min      Christine received the following supervised modalities after being cleared for contradictions: TENS:  Christine received TENS electrical stimulation for pain to the bilateral neck. Pt received modulated mode for 15 minutes. Christine tolerated treatment well without any adverse effects.       Home Exercises Provided and Patient Education Provided     Education provided:   - Home exs    Written Home Exercises Provided: Patient instructed to cont prior HEP.  Exercises were reviewed and Christine was able to demonstrate them prior to the end of the session.  Christine demonstrated good  understanding of the education provided.     See EMR under Patient Instructions for exercises provided prior visit.    Assessment     Christine is a 69 y.o. female referred to outpatient Physical Therapy with a medical diagnosis of Osteoarthritis of spine with radiculopathy,  cervical region. The patient presents with impairments which include decreased ROM, decreased strength and decreased joint mobility.  These impairments are limiting patient's ability to function with ADLs and has pain with driving and activities with the shoulders. Pt prognosis is Excellent due to personal factors and co-morbidities listed below. Pt will benefit from skilled outpatient Physical Therapy to address the deficits stated above and in the chart below, provide pt/family education, and to maximize pt's level of independence.  Christine is progressing well towards her goals.   Pt prognosis is Excellent.     Pt will continue to benefit from skilled outpatient physical therapy to address the deficits listed in the problem list box on initial evaluation, provide pt/family education and to maximize pt's level of independence in the home and community environment.     Pt's spiritual, cultural and educational needs considered and pt agreeable to plan of care and goals.     Anticipated barriers to physical therapy: none    Goals:     Short Term Goals: In 3 weeks   1.I with HEP  (goal met)  2.Patient to have pain less than 3/10 at all times.  (goal met)     Long Term Goals: In 6 weeks  1. Patient to demo increase in UE strength to 4+/5 right UE  2. Patient to have decreased pain to 1/10 at all times.  3. Patient to perform daily activities including ADLs and community activity without limitation.  Plan     Plan of care Certification: 7/26/2019 to 8/26/2019.     Outpatient Physical Therapy 2 times weekly for 6 weeks to include the following interventions: Electrical Stimulation IFC/TENS, Manual Therapy, Moist Heat/ Ice, Patient Education, Therapeutic Activites and Therapeutic Exercise.     Malik Lee, PT

## 2019-08-27 ENCOUNTER — TELEPHONE (OUTPATIENT)
Dept: GASTROENTEROLOGY | Facility: CLINIC | Age: 69
End: 2019-08-27

## 2019-08-27 NOTE — TELEPHONE ENCOUNTER
Called to explain to patient that she does not need to fast prior to upcoming office appt and she will be able to drive no answer, left message to contact office    ----- Message from Tamar Garcia sent at 8/27/2019  9:19 AM CDT -----  Contact: PT   Type:  Needs Medical Advice    Who Called: PT   Symptoms (please be specific): n/a   How long has patient had these symptoms:  n/a  Pharmacy name and phone #:  n/a  Would the patient rather a call back or a response via [facility]? Call back   Best Call Back Number: [phone]  Additional Information: The patient is calling in regards to whether or not she should fast prior to her upcoming appointment and if she will be able to drive home after the appointment,please advise.

## 2019-08-28 ENCOUNTER — CLINICAL SUPPORT (OUTPATIENT)
Dept: REHABILITATION | Facility: HOSPITAL | Age: 69
End: 2019-08-28
Attending: PAIN MEDICINE
Payer: MEDICARE

## 2019-08-28 DIAGNOSIS — M47.22 OSTEOARTHRITIS OF SPINE WITH RADICULOPATHY, CERVICAL REGION: Primary | ICD-10-CM

## 2019-08-28 PROCEDURE — 97110 THERAPEUTIC EXERCISES: CPT

## 2019-08-28 PROCEDURE — 97140 MANUAL THERAPY 1/> REGIONS: CPT

## 2019-08-28 PROCEDURE — 97014 ELECTRIC STIMULATION THERAPY: CPT

## 2019-08-30 ENCOUNTER — PATIENT MESSAGE (OUTPATIENT)
Dept: GASTROENTEROLOGY | Facility: CLINIC | Age: 69
End: 2019-08-30

## 2019-08-30 ENCOUNTER — TELEPHONE (OUTPATIENT)
Dept: GASTROENTEROLOGY | Facility: CLINIC | Age: 69
End: 2019-08-30

## 2019-08-30 NOTE — PROGRESS NOTES
Physical Therapy Daily Treatment Note     Name: Christine Jo  Clinic Number: [id number]    Therapy Diagnosis:   Encounter Diagnosis   Name Primary?    Osteoarthritis of spine with radiculopathy, cervical region Yes     Physician: Phuc Christiansen MD    Visit Date: 8/28/2019    Physician Orders: PT Eval and Treat   Medical Diagnosis from Referral: Osteoarthritis of spine with radiculopathy, cervical region  Evaluation Date: 7/26/2019  Authorization Period Expiration: 9/15/2019  Plan of Care Expiration: 8/26/2019  Visit # / Visits authorized: 5/12    Time In: 10:00  Time Out: 11:00  Total Billable Time: 45 minutes    Precautions: Standard    Subjective     Pt reports: She is good  She was compliant with home exercise program.  Response to previous treatment: N/A  Functional change: N/A    Pain: 1/10  Location: right neck      Objective     Christine received therapeutic exercises to develop strength, ROM and flexibility for 40 minutes including:  UBE 6  For scapular motion and endurance  Seated Rows 20# 2x10  Seated flyes 2# 2x10  Shrugs 2x10  Levator stretches  UT stretches  Cervical AROM supine  Cervical isometric extension    Manual cervical traction and glides 10 min      Christine received the following supervised modalities after being cleared for contradictions: TENS:  Christine received TENS electrical stimulation for pain to the bilateral neck. Pt received modulated mode for 15 minutes. Christine tolerated treatment well without any adverse effects.       Home Exercises Provided and Patient Education Provided     Education provided:   - Home exs    Written Home Exercises Provided: Patient instructed to cont prior HEP.  Exercises were reviewed and Christine was able to demonstrate them prior to the end of the session.  Christine demonstrated good  understanding of the education provided.     See EMR under Patient Instructions for exercises provided prior visit.    Assessment     Christine is a 69 y.o. female referred to  outpatient Physical Therapy with a medical diagnosis of Osteoarthritis of spine with radiculopathy, cervical region. The patient has been working with PT to improve pain free ROM and to decrease overall pain  Christine is progressing well towards her goals.   Pt prognosis is Excellent.     Pt will continue to benefit from skilled outpatient physical therapy to address the deficits listed in the problem list box on initial evaluation, provide pt/family education and to maximize pt's level of independence in the home and community environment.     Pt's spiritual, cultural and educational needs considered and pt agreeable to plan of care and goals.     Anticipated barriers to physical therapy: none    Goals:     Short Term Goals: In 3 weeks   1.I with HEP  (goal met)  2.Patient to have pain less than 3/10 at all times.  (goal met)     Long Term Goals: In 6 weeks  1. Patient to demo increase in UE strength to 4+/5 right UE  (goal met)  2. Patient to have decreased pain to 1/10 at all times.  3. Patient to perform daily activities including ADLs and community activity without limitation.  Plan     Plan of care Certification: 7/26/2019 to 8/26/2019.     Outpatient Physical Therapy 2 times weekly for 6 weeks to include the following interventions: Electrical Stimulation IFC/TENS, Manual Therapy, Moist Heat/ Ice, Patient Education, Therapeutic Activites and Therapeutic Exercise.     Malik Lee, PT

## 2019-08-30 NOTE — PLAN OF CARE
Outpatient Therapy Updated Plan of Care     Visit Date: 8/28/2019  Name: Christine Jo  Clinic Number: [id number]    Therapy Diagnosis:   Encounter Diagnosis   Name Primary?    Osteoarthritis of spine with radiculopathy, cervical region Yes     Physician: Phuc Christiansen MD    Physician Orders: PT Eval and Treat   Medical Diagnosis from Referral: Osteoarthritis of spine with radiculopathy, cervical region  Evaluation Date: 7/26/2019    Total Visits Received:6  Cancelled Visits: 2  No Show Visits: 0    Current Certification Period:  7/26/2019 to 8/26/2019.  Precautions: Standard  Visits from Evaluation Date:  6  Functional Level Prior to Evaluation: Difficulty with pain during ADLs and head turning movements    Subjective   The patient reports reduced pain with activities at home    Objective     Sensation:  Sensation is intact to light touch     ROM    % ----------    Cervical Flexion 90 ----------    Cervical Extension 90  ----------     Right % Left%   Cervical Sidebending 60 60   Cervical Rotation 50 50     Right (degrees) Left (degrees)   Shoulder Flexion  150 150   Shoulder Abduction 140 130   Shoulder Extension 20 20   Shoulder Internal Rotation 80 80   Shoulder External Rotation 60 80         Strength    Right     Left   Shoulder Flexion 4/5 4/5   Shoulder Abduction 4/5 4/5   Shoulder Extension 4+/5 4+/5   Shoulder Internal Rotation 4/5 4/5   Shoulder External Rotation 4/5 4/5    Elbow Flexion 4+/5  5/5   Elbow Extension   4+/5 5/5          Other: Pt presents with postural abnormalities which include: forward head and rounded shoulders                                             Assessment     The patient initially presented with pain in neck and shoulders with decreased AROM and strength.  She has forward head posture with rounded shoulders.  Her program addresses her posture and strength with exercises and manual therapy for soft tissue and joint mobilization with modalities.  She has responded with  increased strength and reduced pain with exercises and motion    Short Term Goals: In 3 weeks   1.I with HEP  (goal met)  2.Patient to have pain less than 3/10 at all times.  (goal met)     Long Term Goals: In 6 weeks  1. Patient to demo increase in UE strength to 4+/5 right UE  2. Patient to have decreased pain to 1/10 at all times.    3. Patient to perform daily activities including ADLs and community activity without limitation.    Previous Short Term Goals Status:  2 of 2 met  New Short Term Goals Status:  none  Long Term Goal Status:   continue per initial plan of care.  Reasons for Recertification of Therapy:   The patient although apprehensive of physical therapy has progressed well with reducing pain and increasing motion.  She has not reached full potential and will continue to improve    Plan     Updated Certification Period: 8/28/2019 to 9/27/2019  Recommended Treatment Plan: 2 times per week for 6 weeks: Electrical Stimulation IFC/TENS, Manual Therapy, Moist Heat/ Ice, Patient Education, Therapeutic Activites and Therapeutic Exercise  Other Recommendations: none      Malik Lee, PT  8/28/2019      I CERTIFY THE NEED FOR THESE SERVICES FURNISHED UNDER THIS PLAN OF TREATMENT AND WHILE UNDER MY CARE    Physician's comments:        Physician's Signature: ___________________________________________________

## 2019-09-03 ENCOUNTER — PATIENT MESSAGE (OUTPATIENT)
Dept: GASTROENTEROLOGY | Facility: CLINIC | Age: 69
End: 2019-09-03

## 2019-09-03 ENCOUNTER — TELEPHONE (OUTPATIENT)
Dept: INTERNAL MEDICINE | Facility: CLINIC | Age: 69
End: 2019-09-03

## 2019-09-03 RX ORDER — FLUCONAZOLE 150 MG/1
150 TABLET ORAL DAILY
Qty: 1 TABLET | Refills: 0 | Status: SHIPPED | OUTPATIENT
Start: 2019-09-03 | End: 2019-09-04

## 2019-09-03 NOTE — TELEPHONE ENCOUNTER
----- Message from Ifeoma Humphrey sent at 9/3/2019  9:01 AM CDT -----  Contact: Pt   Pt is calling .Type:  Needs Medical Advice    Who Called: Pt   Symptoms (please be specific):  Pt states that she has a yeast infection and it requesting medication   How long has patient had these symptoms:  Over the weekend   Pharmacy name and phone #:   ..  [id number] - [place] - [address] [facility] [place] [facility]  [address]  [place] [address]  Phone: [phone] Fax: [phone]  Would the patient rather a call back or a response via [facility]? Call Back   Best Call Back Number:  [phone]         .Thank You  Ifeoma Humphrey

## 2019-09-04 ENCOUNTER — CLINICAL SUPPORT (OUTPATIENT)
Dept: REHABILITATION | Facility: HOSPITAL | Age: 69
End: 2019-09-04
Attending: PAIN MEDICINE
Payer: MEDICARE

## 2019-09-04 DIAGNOSIS — M47.22 OSTEOARTHRITIS OF SPINE WITH RADICULOPATHY, CERVICAL REGION: Primary | ICD-10-CM

## 2019-09-04 PROCEDURE — 97140 MANUAL THERAPY 1/> REGIONS: CPT

## 2019-09-04 PROCEDURE — 97014 ELECTRIC STIMULATION THERAPY: CPT

## 2019-09-04 PROCEDURE — 97110 THERAPEUTIC EXERCISES: CPT

## 2019-09-04 RX ORDER — DULOXETIN HYDROCHLORIDE 30 MG/1
CAPSULE, DELAYED RELEASE ORAL
Qty: 30 CAPSULE | Refills: 5 | Status: SHIPPED | OUTPATIENT
Start: 2019-09-04 | End: 2019-12-05

## 2019-09-08 ENCOUNTER — PATIENT MESSAGE (OUTPATIENT)
Dept: INTERNAL MEDICINE | Facility: CLINIC | Age: 69
End: 2019-09-08

## 2019-09-08 NOTE — PROGRESS NOTES
Physical Therapy Daily Treatment Note     Name: Christine Jo  Clinic Number: [id number]    Therapy Diagnosis:   No diagnosis found.  Physician: Phuc Christiansen MD    Visit Date: 9/4/2019    Physician Orders: PT Eval and Treat   Medical Diagnosis from Referral: Osteoarthritis of spine with radiculopathy, cervical region  Evaluation Date: 7/26/2019  Authorization Period Expiration: 9/15/2019  Plan of Care Expiration: 8/26/2019  Visit # / Visits authorized: 7/12    Time In: 10:00  Time Out: 10:55  Total Billable Time: 45 minutes    Precautions: Standard    Subjective     Pt reports: she has less pain and is doing good  She was compliant with home exercise program.  Response to previous treatment: N/A  Functional change: N/A    Pain: 1/10  Location: right neck      Objective     Christine received therapeutic exercises to develop strength, ROM and flexibility for 35 minutes including:  UBE 6  For scapular motion and endurance  Seated Rows 20# 2x10  Seated flyes 2# 2x10  Shrugs 2x10  Levator stretches  Cervical AROM seated against wall with ball  Cervical isometric extension    Manual cervical traction and glides 10 min      Christine received the following supervised modalities after being cleared for contradictions: TENS:  Christine received TENS electrical stimulation for pain to the bilateral neck. Pt received modulated mode for 15 minutes. Christine tolerated treatment well without any adverse effects.       Home Exercises Provided and Patient Education Provided     Education provided:   - Home exs    Written Home Exercises Provided: Patient instructed to cont prior HEP.  Exercises were reviewed and Christine was able to demonstrate them prior to the end of the session.  Christine demonstrated good  understanding of the education provided.     See EMR under Patient Instructions for exercises provided prior visit.    Assessment     Christine is a 69 y.o. female referred to outpatient Physical Therapy with a medical diagnosis of  Osteoarthritis of spine with radiculopathy, cervical region. She was initially cautious about therapy and has improved with reduced pain and increased cervical painfree motion.  Christine is progressing well towards her goals.   Pt prognosis is Excellent.     Pt will continue to benefit from skilled outpatient physical therapy to address the deficits listed in the problem list box on initial evaluation, provide pt/family education and to maximize pt's level of independence in the home and community environment.     Pt's spiritual, cultural and educational needs considered and pt agreeable to plan of care and goals.     Anticipated barriers to physical therapy: none    Goals:     Short Term Goals: In 3 weeks   1.I with HEP  (goal met)  2.Patient to have pain less than 3/10 at all times.  (goal met)     Long Term Goals: In 6 weeks  1. Patient to demo increase in UE strength to 4+/5 right UE  (goal met)  2. Patient to have decreased pain to 1/10 at all times.  3. Patient to perform daily activities including ADLs and community activity without limitation.  Plan     Plan of care Certification: 7/26/2019 to 8/26/2019.     Outpatient Physical Therapy 2 times weekly for 6 weeks to include the following interventions: Electrical Stimulation IFC/TENS, Manual Therapy, Moist Heat/ Ice, Patient Education, Therapeutic Activites and Therapeutic Exercise.     Malik Lee, PT

## 2019-09-09 ENCOUNTER — CLINICAL SUPPORT (OUTPATIENT)
Dept: REHABILITATION | Facility: HOSPITAL | Age: 69
End: 2019-09-09
Attending: PAIN MEDICINE
Payer: MEDICARE

## 2019-09-09 DIAGNOSIS — M47.22 OSTEOARTHRITIS OF SPINE WITH RADICULOPATHY, CERVICAL REGION: Primary | ICD-10-CM

## 2019-09-09 DIAGNOSIS — M47.812 SPONDYLOSIS OF CERVICAL REGION WITHOUT MYELOPATHY OR RADICULOPATHY: ICD-10-CM

## 2019-09-09 DIAGNOSIS — Z12.39 BREAST SCREENING: Primary | ICD-10-CM

## 2019-09-09 PROCEDURE — 97014 ELECTRIC STIMULATION THERAPY: CPT

## 2019-09-09 PROCEDURE — 97110 THERAPEUTIC EXERCISES: CPT

## 2019-09-09 NOTE — PROGRESS NOTES
Physical Therapy Daily Treatment Note     Name: Christine Jo  Clinic Number: [id number]    Therapy Diagnosis:   Encounter Diagnoses   Name Primary?    Osteoarthritis of spine with radiculopathy, cervical region Yes    Spondylosis of cervical region without myelopathy or radiculopathy      Physician: Phuc Christiansen MD    Visit Date: 9/9/2019    Physician Orders: PT Eval and Treat   Medical Diagnosis from Referral: Osteoarthritis of spine with radiculopathy, cervical region  Evaluation Date: 7/26/2019  Authorization Period Expiration: 9/15/2019  Plan of Care Expiration: 8/26/2019  Visit # / Visits authorized: 7/12    Time In: 9:33  Time Out: 10:36  Total Billable Time: 63 minutes    Precautions: Standard    Subjective     Pt reports: discomfort with rotational exercises  She was compliant with home exercise program.  Response to previous treatment: N/A  Functional change: N/A    Pain: 1/10  Location: right neck      Objective     Christine received therapeutic exercises to develop strength, ROM and flexibility for 44 minutes including:  UBE 6  For scapular motion and endurance  Seated Rows 20# 2x10  Seated flyes 2# 2x10  Shrugs 2x10  Levator stretches  Cervical AROM seated against wall with ball  Cervical isometric extension    Manual cervical traction and glides 10 min      Christine received the following supervised modalities after being cleared for contradictions: TENS:  Christine received TENS electrical stimulation for pain to the bilateral neck. Pt received modulated mode for 15 minutes. Christine tolerated treatment well without any adverse effects.       Home Exercises Provided and Patient Education Provided     Education provided:   - Home exs    Written Home Exercises Provided: Patient instructed to cont prior HEP.  Exercises were reviewed and Christine was able to demonstrate them prior to the end of the session.  Christine demonstrated good  understanding of the education provided.     See EMR under Patient  Instructions for exercises provided prior visit.    Assessment     Christine is a 69 y.o. female referred to outpatient Physical Therapy with a medical diagnosis of Osteoarthritis of spine with radiculopathy, cervical region. She was initially cautious about therapy and has improved with reduced pain and increased cervical painfree motion.  Christine is progressing well towards her goals.   Pt prognosis is Excellent.     Pt will continue to benefit from skilled outpatient physical therapy to address the deficits listed in the problem list box on initial evaluation, provide pt/family education and to maximize pt's level of independence in the home and community environment.     Pt's spiritual, cultural and educational needs considered and pt agreeable to plan of care and goals.     Anticipated barriers to physical therapy: none    Goals:     Short Term Goals: In 3 weeks   1.I with HEP  (goal met)  2.Patient to have pain less than 3/10 at all times.  (goal met)     Long Term Goals: In 6 weeks  1. Patient to demo increase in UE strength to 4+/5 right UE  (goal met)  2. Patient to have decreased pain to 1/10 at all times.  3. Patient to perform daily activities including ADLs and community activity without limitation.  Plan     Plan of care Certification: 7/26/2019 to 8/26/2019.     Outpatient Physical Therapy 2 times weekly for 6 weeks to include the following interventions: Electrical Stimulation IFC/TENS, Manual Therapy, Moist Heat/ Ice, Patient Education, Therapeutic Activites and Therapeutic Exercise.     Malik Lee, PT

## 2019-09-10 RX ORDER — TEMAZEPAM 30 MG/1
CAPSULE ORAL
Qty: 30 CAPSULE | Refills: 0 | Status: SHIPPED | OUTPATIENT
Start: 2019-09-10 | End: 2019-10-11 | Stop reason: SDUPTHER

## 2019-09-11 ENCOUNTER — PATIENT MESSAGE (OUTPATIENT)
Dept: OPHTHALMOLOGY | Facility: CLINIC | Age: 69
End: 2019-09-11

## 2019-09-12 ENCOUNTER — INFUSION (OUTPATIENT)
Dept: RHEUMATOLOGY | Facility: HOSPITAL | Age: 69
End: 2019-09-12
Attending: PHYSICIAN ASSISTANT
Payer: MEDICARE

## 2019-09-12 ENCOUNTER — APPOINTMENT (OUTPATIENT)
Dept: RADIOLOGY | Facility: HOSPITAL | Age: 69
End: 2019-09-12
Attending: PHYSICIAN ASSISTANT
Payer: MEDICARE

## 2019-09-12 ENCOUNTER — OFFICE VISIT (OUTPATIENT)
Dept: PAIN MEDICINE | Facility: CLINIC | Age: 69
End: 2019-09-12
Payer: MEDICARE

## 2019-09-12 VITALS
RESPIRATION RATE: 18 BRPM | OXYGEN SATURATION: 95 % | TEMPERATURE: 98 F | DIASTOLIC BLOOD PRESSURE: 59 MMHG | SYSTOLIC BLOOD PRESSURE: 134 MMHG | HEART RATE: 84 BPM

## 2019-09-12 VITALS
WEIGHT: 240.5 LBS | RESPIRATION RATE: 17 BRPM | BODY MASS INDEX: 45.41 KG/M2 | SYSTOLIC BLOOD PRESSURE: 119 MMHG | HEIGHT: 61 IN | HEART RATE: 89 BPM | DIASTOLIC BLOOD PRESSURE: 69 MMHG

## 2019-09-12 DIAGNOSIS — M47.812 SPONDYLOSIS OF CERVICAL REGION WITHOUT MYELOPATHY OR RADICULOPATHY: Primary | ICD-10-CM

## 2019-09-12 DIAGNOSIS — M81.0 AGE-RELATED OSTEOPOROSIS WITHOUT CURRENT PATHOLOGICAL FRACTURE: Primary | ICD-10-CM

## 2019-09-12 DIAGNOSIS — M81.0 AGE-RELATED OSTEOPOROSIS WITHOUT CURRENT PATHOLOGICAL FRACTURE: ICD-10-CM

## 2019-09-12 PROCEDURE — 3078F DIAST BP <80 MM HG: CPT | Mod: CPTII,S$GLB,, | Performed by: PAIN MEDICINE

## 2019-09-12 PROCEDURE — 77080 DXA BONE DENSITY AXIAL: CPT | Mod: 26,,, | Performed by: RADIOLOGY

## 2019-09-12 PROCEDURE — 3074F SYST BP LT 130 MM HG: CPT | Mod: CPTII,S$GLB,, | Performed by: PAIN MEDICINE

## 2019-09-12 PROCEDURE — 63600175 PHARM REV CODE 636 W HCPCS: Performed by: PHYSICIAN ASSISTANT

## 2019-09-12 PROCEDURE — 1101F PT FALLS ASSESS-DOCD LE1/YR: CPT | Mod: CPTII,S$GLB,, | Performed by: PAIN MEDICINE

## 2019-09-12 PROCEDURE — 3074F PR MOST RECENT SYSTOLIC BLOOD PRESSURE < 130 MM HG: ICD-10-PCS | Mod: CPTII,S$GLB,, | Performed by: PAIN MEDICINE

## 2019-09-12 PROCEDURE — 77080 DXA BONE DENSITY AXIAL: CPT | Mod: TC

## 2019-09-12 PROCEDURE — 99999 PR PBB SHADOW E&M-EST. PATIENT-LVL III: CPT | Mod: PBBFAC,,, | Performed by: PAIN MEDICINE

## 2019-09-12 PROCEDURE — 25000003 PHARM REV CODE 250: Performed by: PHYSICIAN ASSISTANT

## 2019-09-12 PROCEDURE — 96374 THER/PROPH/DIAG INJ IV PUSH: CPT

## 2019-09-12 PROCEDURE — 99213 OFFICE O/P EST LOW 20 MIN: CPT | Mod: S$GLB,,, | Performed by: PAIN MEDICINE

## 2019-09-12 PROCEDURE — 1101F PR PT FALLS ASSESS DOC 0-1 FALLS W/OUT INJ PAST YR: ICD-10-PCS | Mod: CPTII,S$GLB,, | Performed by: PAIN MEDICINE

## 2019-09-12 PROCEDURE — 99213 PR OFFICE/OUTPT VISIT, EST, LEVL III, 20-29 MIN: ICD-10-PCS | Mod: S$GLB,,, | Performed by: PAIN MEDICINE

## 2019-09-12 PROCEDURE — A4216 STERILE WATER/SALINE, 10 ML: HCPCS | Performed by: PHYSICIAN ASSISTANT

## 2019-09-12 PROCEDURE — 99999 PR PBB SHADOW E&M-EST. PATIENT-LVL III: ICD-10-PCS | Mod: PBBFAC,,, | Performed by: PAIN MEDICINE

## 2019-09-12 PROCEDURE — 77080 DEXA BONE DENSITY SPINE HIP: ICD-10-PCS | Mod: 26,,, | Performed by: RADIOLOGY

## 2019-09-12 PROCEDURE — 3078F PR MOST RECENT DIASTOLIC BLOOD PRESSURE < 80 MM HG: ICD-10-PCS | Mod: CPTII,S$GLB,, | Performed by: PAIN MEDICINE

## 2019-09-12 RX ORDER — SODIUM CHLORIDE 0.9 % (FLUSH) 0.9 %
10 SYRINGE (ML) INJECTION
Status: DISCONTINUED | OUTPATIENT
Start: 2019-09-12 | End: 2019-09-12 | Stop reason: HOSPADM

## 2019-09-12 RX ORDER — HEPARIN 100 UNIT/ML
500 SYRINGE INTRAVENOUS
Status: CANCELLED | OUTPATIENT
Start: 2019-10-01

## 2019-09-12 RX ORDER — SODIUM CHLORIDE 0.9 % (FLUSH) 0.9 %
10 SYRINGE (ML) INJECTION
Status: CANCELLED | OUTPATIENT
Start: 2019-10-01

## 2019-09-12 RX ORDER — IBANDRONATE SODIUM 3 MG/3 ML
3 SYRINGE (ML) INTRAVENOUS
Status: COMPLETED | OUTPATIENT
Start: 2019-09-12 | End: 2019-09-12

## 2019-09-12 RX ORDER — IBANDRONATE SODIUM 3 MG/3 ML
3 SYRINGE (ML) INTRAVENOUS
Status: CANCELLED | OUTPATIENT
Start: 2019-10-01

## 2019-09-12 RX ADMIN — Medication 10 ML: at 10:09

## 2019-09-12 RX ADMIN — IBANDRONATE SODIUM 3 MG: 3 INJECTION INTRAVENOUS at 10:09

## 2019-09-12 NOTE — PATIENT INSTRUCTIONS
-continue all physical therapy exercises at home as tolerated  -provide a prescription for TENS unit  -follow up in clinic as needed

## 2019-09-12 NOTE — NURSING
Recent Labs? 9/12/19  Recent Invasive or planned dental procedures? Denied  Boniva 3 mg administered slow IVP via 24 gauge adm.   Pt tolerated well without adverse events. Pressure dressing applied.  See Vitals and MAR for further details.  Pt discharged ambulatory.

## 2019-09-12 NOTE — PROGRESS NOTES
Chief Pain Complaint:  Chief Complaint   Patient presents with    Follow-up     f/u neck pain      History of Present Illness:   Ms. Jo returns to clinic for follow-up.  She was last seen on June 20, 2019 which time we continued medications and provide a referral for physical therapy.  Since then she reports that her pain is greatly improved and she is currently rating her pain as a 2/10.  She is very realistic and understands that she will never be 100% pain free and finds that her symptoms are much more tolerable at this point.  She has been working physical therapy several times over the last couple of weeks and reports that her symptoms are mostly located in her right trapezius muscle.  She does that she has some functional limitations secondary to the pain however she denies having symptoms that radiate down the arm.  She continues to take alpha lipoic acid and Cymbalta which did provide significant relief.  She denies having any numbness in the arms with does find some time she occasionally has little bit of weakness when she tries to raise her arm above her shoulder.    Initial HPI:  This patient is a 69 y.o. female who presents today complaining of the above noted pain/s. The patient describes the pain as follows.  Ms. Jo as a history of major depressive disorder, AFib, hypertension, diabetes type 2, GERD presents to clinic with complaints of cervical pain with radicular symptoms in the right arm.  She has been having symptoms for several years which has progressively gotten worse.  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  Her symptoms are worse on the right but occasionally she does have symptoms on the left side and she finds it to be worse when she rotates her head to the right such as when she is driving.  No increase in symptoms when she turns to the left or flexes or extends.  She has found that hot water in  the shower helps with her symptoms.  She does have headaches occasionally proximally to this week.  She describes the pain as 50% cervical spine a 50% radiating down the arm.  She tries to avoid NSAIDs that she has had stomach irritation previously with chronic use.  In the past she has taking gabapentin, Tylenol, Cymbalta.  She denies numbness and weakness in her left upper extremity while she endorses having numbness and weakness in the right upper extremity specifically in the C6 dermatome.  She denies having bowel bladder difficulty. She has not participated in physical therapy.    Previous Therapy:  Medications:  Gabapentin, Tylenol, Cymbalta, Flexeril, alpha lipoic acid, Norco   Injections:  Right C4-C6 medial branch blocks  Surgeries:  None  Physical Therapy:  Has completed physical therapy in the past, currently participating    Past Surgical History:   Procedure Laterality Date    abdominal laparoscopy       BREAST BIOPSY      CATARACT EXTRACTION Bilateral [id number]    Jacqui in [place]    EYE SURGERY      TONSILLECTOMY      TUBAL LIGATION      yag  Bilateral      Imaging / Labs / Studies (reviewed on 9/12/2019):  Results for orders placed during the hospital encounter of 10/03/18   MRI Cervical Spine Without Contrast    Narrative EXAMINATION:  MRI CERVICAL SPINE WITHOUT CONTRAST    CLINICAL HISTORY:  Neck pain, first study; Cervicalgia    TECHNIQUE:  Multiplanar, multisequence MR images of the cervical spine were performed without the administration of contrast.    COMPARISON:  None.    FINDINGS:  Vertebral body heights are maintained.  There is straightening and mild reversal of cervical lordosis with mild retrolisthesis of C6 on C7.  Mild anterior spondylolisthesis of C4 on C5.  Multilevel disc desiccation present with height loss most prevalent at C5-6 and C6-7.  Mild Modic 1 endplate changes noted at C5-6 and C6-7.    Posterior fossa is unremarkable.  No concerning spinal cord signal  abnormality.  Neck soft tissues are unremarkable.    C2-C3: No spinal canal stenosis or neural foraminal narrowing.    C3-C4: No significant posterior disc bulge or spinal canal stenosis.  Right greater than left facet and uncovertebral hypertrophy present with moderate right neural foraminal narrowing.  Minimal right-sided facet edema present.    C4-C5: No significant posterior disc bulge or spinal canal stenosis.  Right greater than left facet and uncovertebral hypertrophy present with mild neural foraminal narrowing.  Right-sided facet/perifacet mild edema changes.    C5-C6: Posterior disc osteophyte complex present effacing the anterior thecal sac causing mild spinal canal stenosis.  Facet and uncovertebral hypertrophy noted with mild to moderate bilateral neural foraminal narrowing.    C6-C7: Posterior disc osteophyte complex and retrolisthesis cause moderate spinal canal stenosis.  Facet and uncovertebral hypertrophy present causing mild-to-moderate right greater than left neural foraminal narrowing.    C7-T1: No spinal canal stenosis or neural foraminal narrowing.      Impression Degenerative changes as above most prevalent at C5-6 and C6-7 with spinal canal stenosis and neural foraminal narrowing.     Review of Systems:  CONSTITUTIONAL: patient denies any fever, chills, or weight loss  SKIN: patient denies any rash or itching  RESPIRATORY: patient denies having any shortness of breath  GASTROINTESTINAL: patient denies having any diarrhea, constipation, or bowel incontinence  GENITOURINARY: patient denies having any abnormal bladder function    MUSCULOSKELETAL:  - patient complains of the above noted pain/s (see chief pain complaint)    NEUROLOGICAL:   - pain as above  - strength in upper extremities is intact, BILATERALLY  - sensation in Upper extremities is intact, BILATERALLY  - patient denies any loss of bowel or bladder control      PSYCHIATRIC: patient denies any change in mood    Other:  All other  "systems reviewed and are negative    Physical Exam:  /69 (BP Location: Left arm, Patient Position: Sitting)   Pulse 89   Resp 17   Ht 5' 1" (1.549 m)   Wt 109.1 kg (240 lb 8.4 oz)   BMI 45.45 kg/m²  (reviewed on 9/12/2019)\  General:  Alert and oriented, No acute distress.    HEENT:       EOMI.  Normocephalic, atraumatic.   Cardiovascular:  Regular rate.    Gastrointestinal:  Soft.    Respiratory:  Respirations are non-labored.    Cervical Spine:  No masses or atrophy,    Neurologic:  Cranial Nerves II-XII are grossly intact.    Psychiatric:  Cooperative.    Gait: Normal    Assessment  Cervical Spondylosis  Cervical Radiculopathy    1. 69 y.o. year old patient with PMH of   Past Medical History:   Diagnosis Date    Arthritis     Cataract     Diabetes mellitus 2008     am 01/15/2018 Insulin x 1 year    Glaucoma     Hypertension     Insomnia     Macular degeneration     Vaginal yeast infection       presenting with pain located cervical spine, right and left upper extremity  2. Pain Generators / Etiology :  Cervical spondylosis, cervical radiculopathy  3. Failed Meds (E- Effective, NE- Not Effective):  Tylenol-minimally effective, Gabapentin-minimally effective, Cymbalta-not effective  4. Physical Therapy - participated in 1 session  5. Psychological comorbidities -  none  6. Anticoagulants / Antiplatelets:  Apixaban 5 mg     PLAN:  1. Medications :  Continue all medications as prescribed, prescribed a TENS unit  2. PT - continue working physical therapy and performing physical therapy exercises at home  3. Psychological - none  4. Labs - obtain  none; reviewed labs from 08/28/2018, creatinine 0.8  5. Imaging - none; reviewed cervical x-ray patient today in clinic  6. Interventions - none; consider C7/T1 epidural steroid injection in the future  7. Referrals - none  8. Records - none  9. Follow up visit - follow up in clinic as needed  10. Patient Questions - answered all patient's questions " regarding diagnosis, therapy, treatment    OLIVIA Christiansen MD  Interventional Pain  [facility] [place]

## 2019-09-13 ENCOUNTER — IMMUNIZATION (OUTPATIENT)
Dept: INTERNAL MEDICINE | Facility: CLINIC | Age: 69
End: 2019-09-13
Payer: MEDICARE

## 2019-09-13 PROCEDURE — G0008 FLU VACCINE - HIGH DOSE (65+) PRESERVATIVE FREE IM: ICD-10-PCS | Mod: S$GLB,,, | Performed by: FAMILY MEDICINE

## 2019-09-13 PROCEDURE — 90662 IIV NO PRSV INCREASED AG IM: CPT | Mod: S$GLB,,, | Performed by: FAMILY MEDICINE

## 2019-09-13 PROCEDURE — 90662 FLU VACCINE - HIGH DOSE (65+) PRESERVATIVE FREE IM: ICD-10-PCS | Mod: S$GLB,,, | Performed by: FAMILY MEDICINE

## 2019-09-13 PROCEDURE — G0008 ADMIN INFLUENZA VIRUS VAC: HCPCS | Mod: S$GLB,,, | Performed by: FAMILY MEDICINE

## 2019-09-13 PROCEDURE — 99999 PR PBB SHADOW E&M-EST. PATIENT-LVL III: ICD-10-PCS | Mod: PBBFAC,,,

## 2019-09-13 PROCEDURE — 99999 PR PBB SHADOW E&M-EST. PATIENT-LVL III: CPT | Mod: PBBFAC,,,

## 2019-09-16 ENCOUNTER — OFFICE VISIT (OUTPATIENT)
Dept: GASTROENTEROLOGY | Facility: CLINIC | Age: 69
End: 2019-09-16
Payer: MEDICARE

## 2019-09-16 VITALS
HEIGHT: 61 IN | WEIGHT: 240.31 LBS | BODY MASS INDEX: 45.37 KG/M2 | DIASTOLIC BLOOD PRESSURE: 60 MMHG | HEART RATE: 82 BPM | SYSTOLIC BLOOD PRESSURE: 128 MMHG

## 2019-09-16 DIAGNOSIS — K59.04 CHRONIC IDIOPATHIC CONSTIPATION: ICD-10-CM

## 2019-09-16 DIAGNOSIS — K21.9 GASTROESOPHAGEAL REFLUX DISEASE, ESOPHAGITIS PRESENCE NOT SPECIFIED: ICD-10-CM

## 2019-09-16 DIAGNOSIS — R13.19 ESOPHAGEAL DYSPHAGIA: Primary | ICD-10-CM

## 2019-09-16 PROCEDURE — 99204 OFFICE O/P NEW MOD 45 MIN: CPT | Mod: S$GLB,,, | Performed by: NURSE PRACTITIONER

## 2019-09-16 PROCEDURE — 3074F PR MOST RECENT SYSTOLIC BLOOD PRESSURE < 130 MM HG: ICD-10-PCS | Mod: CPTII,S$GLB,, | Performed by: NURSE PRACTITIONER

## 2019-09-16 PROCEDURE — 99499 RISK ADDL DX/OHS AUDIT: ICD-10-PCS | Mod: S$GLB,,, | Performed by: NURSE PRACTITIONER

## 2019-09-16 PROCEDURE — 99499 UNLISTED E&M SERVICE: CPT | Mod: S$GLB,,, | Performed by: NURSE PRACTITIONER

## 2019-09-16 PROCEDURE — 3074F SYST BP LT 130 MM HG: CPT | Mod: CPTII,S$GLB,, | Performed by: NURSE PRACTITIONER

## 2019-09-16 PROCEDURE — 3078F DIAST BP <80 MM HG: CPT | Mod: CPTII,S$GLB,, | Performed by: NURSE PRACTITIONER

## 2019-09-16 PROCEDURE — 99999 PR PBB SHADOW E&M-EST. PATIENT-LVL III: CPT | Mod: PBBFAC,,, | Performed by: NURSE PRACTITIONER

## 2019-09-16 PROCEDURE — 3078F PR MOST RECENT DIASTOLIC BLOOD PRESSURE < 80 MM HG: ICD-10-PCS | Mod: CPTII,S$GLB,, | Performed by: NURSE PRACTITIONER

## 2019-09-16 PROCEDURE — 1101F PR PT FALLS ASSESS DOC 0-1 FALLS W/OUT INJ PAST YR: ICD-10-PCS | Mod: CPTII,S$GLB,, | Performed by: NURSE PRACTITIONER

## 2019-09-16 PROCEDURE — 1101F PT FALLS ASSESS-DOCD LE1/YR: CPT | Mod: CPTII,S$GLB,, | Performed by: NURSE PRACTITIONER

## 2019-09-16 PROCEDURE — 99999 PR PBB SHADOW E&M-EST. PATIENT-LVL III: ICD-10-PCS | Mod: PBBFAC,,, | Performed by: NURSE PRACTITIONER

## 2019-09-16 PROCEDURE — 99204 PR OFFICE/OUTPT VISIT, NEW, LEVL IV, 45-59 MIN: ICD-10-PCS | Mod: S$GLB,,, | Performed by: NURSE PRACTITIONER

## 2019-09-16 NOTE — LETTER
September 16, 2019      Jose Szymanski MD  [address] [place] [address] Dr [place] [id number]           [facility]  [place] [address]  Phone: [phone]  Fax: [phone]          Patient: Christine Jo   MR Number: [id number]   YOB: 1950   Date of Visit: 9/16/2019       Dear Dr. Jose Szymanski:    Thank you for referring Christine Jo to me for evaluation. Attached you will find relevant portions of my assessment and plan of care.    If you have questions, please do not hesitate to call me. I look forward to following Christine Jo along with you.    Sincerely,    Marli Miller, NP    Enclosure  CC:  No Recipients    If you would like to receive this communication electronically, please contact [email or web address] or [phone] to request more information on [facility] [employer] access.    For providers and/or their staff who would like to refer a patient to [facility], please contact us through our one-stop-shop provider referral line, [facility] , at [phone].    If you feel you have received this communication in error or would no longer like to receive these types of communications, please e-mail [email or web address]

## 2019-09-16 NOTE — PROGRESS NOTES
Clinic Consult:  [facility] Consultation Note    Reason for Consult:  The primary encounter diagnosis was Esophageal dysphagia. Diagnoses of Gastroesophageal reflux disease, esophagitis presence not specified and Chronic idiopathic constipation were also pertinent to this visit.    PCP: Jose ISIDRO DR / [place] [id number]    HPI:  This is a 69 y.o. female here for evaluation of the above. She presents to clinic with complaints of dysphagia. Onset of symptoms began 6 months ago. She reports the feeling that solids getting stuck in upper chest. She denies any issues with liquids. She has chronic GERD and was previously taking omeprazole. She was switched to Protonix per PCP. She continues with increased symptoms of dyspepsia. She had an EGD done with Dr. Zeng in 2017 but only the pathology report is available to review. I am unable to review procedure report. She also has chronic constipation. No hematochezia or melena. She takes daily probiotics. She takes a dulcolax laxative if she goes 3 days without a BM.     Review of Systems   Constitutional: Negative for fever, malaise/fatigue and weight loss.   HENT: Negative for sore throat.    Respiratory: Negative for cough and wheezing.    Cardiovascular: Negative for chest pain and palpitations.   Gastrointestinal: Positive for heartburn. Negative for abdominal pain, blood in stool, constipation, diarrhea, melena, nausea and vomiting.        Dysphagia    Genitourinary: Negative for dysuria and frequency.   Musculoskeletal: Negative for back pain, joint pain, myalgias and neck pain.   Skin: Negative for itching and rash.   Neurological: Negative for dizziness, speech change, seizures, loss of consciousness and headaches.   Psychiatric/Behavioral: Negative for depression and substance abuse. The patient is not nervous/anxious.        Medical History:  has a past medical history of Arthritis, Cataract, Diabetes mellitus (2008), Glaucoma,  Hypertension, Insomnia, Macular degeneration, and Vaginal yeast infection.    Surgical History:  has a past surgical history that includes Eye surgery; Tubal ligation; abdominal laparoscopy ; Tonsillectomy; Breast biopsy; Cataract extraction (Bilateral, 3273-0196); and yag  (Bilateral).    Family History: family history includes Cancer in her maternal aunt and son; Cataracts in her mother and sister; Diabetes in her sister, sister, and sister; Glaucoma in her mother; Heart disease in her maternal grandfather, mother, and sister; Macular degeneration in her mother..     Social History:  reports that she quit smoking about 16 years ago. Her smoking use included cigarettes. She has a 35.00 pack-year smoking history. She has never used smokeless tobacco. She reports that she does not drink alcohol or use drugs.    Allergies: Reviewed    Home Medications:   Current Outpatient Medications on File Prior to Visit   Medication Sig Dispense Refill    ACETAMINOPHEN (TYLENOL ARTHRITIS ORAL) Take by mouth as needed.      alpha lipoic acid 300 mg Cap Take 300 mg by mouth 2 (two) times daily. 60 each 5    apixaban (ELIQUIS) 5 mg Tab Take 1 tablet (5 mg total) by mouth 2 (two) times daily. 60 tablet 2    atorvastatin (LIPITOR) 40 MG tablet TAKE 1 TABLET(40 MG) BY MOUTH EVERY DAY (Patient taking differently: TAKE 1 TABLET(40 MG) BY MOUTH EVERY OTHER DAY) 90 tablet 0    BASAGLAR KWIKPEN U-100 INSULIN 100 unit/mL (3 mL) InPn pen ADMINISTER 64 UNITS UNDER THE SKIN EVERY DAY 15 mL 0    benazepril (LOTENSIN) 40 MG tablet TAKE 1 TABLET BY MOUTH EVERY DAY (Patient taking differently: TAKE 1 TABLET BY MOUTH twice a day EVERY DAY) 90 tablet 0    BEPREVE 1.5 % Drop Place 1 drop into both eyes 2 (two) times daily. 10 mL 12    calcium citrate-vitamin D3 315-200 mg (CITRACAL+D) 315-200 mg-unit per tablet Take 1 tablet by mouth once daily.      carvedilol (COREG) 6.25 MG tablet Take 1 tablet (6.25 mg total) by mouth 2 (two) times daily  "with meals. 180 tablet 3    cycloSPORINE (RESTASIS) 0.05 % ophthalmic emulsion Place 0.4 mLs (1 drop total) into both eyes 2 (two) times daily. 60 vial 12    DULoxetine (CYMBALTA) 30 MG capsule TAKE 1 CAPSULE(30 MG) BY MOUTH EVERY DAY 30 capsule 5    DULoxetine (CYMBALTA) 60 MG capsule Take 1 capsule (60 mg total) by mouth once daily. 90 capsule 3    ERGOCALCIFEROL, VITAMIN D2, (VITAMIN D ORAL) Take by mouth once daily.       fluticasone propionate (FLONASE) 50 mcg/actuation nasal spray 2 sprays (100 mcg total) by Each Nare route once daily. 48 mL 0    gabapentin (NEURONTIN) 100 MG capsule Take 1 capsule (100 mg total) by mouth 3 (three) times daily. (Patient taking differently: Take 300 mg by mouth 2 (two) times daily. ) 270 capsule 3    hydrALAZINE (APRESOLINE) 100 MG tablet TAKE 1 TABLET(100 MG) BY MOUTH TWICE DAILY 180 tablet 0    JANUVIA 100 mg Tab TAKE 1 TABLET(100 MG) BY MOUTH EVERY DAY 90 tablet 0    liraglutide 0.6 mg/0.1 mL, 18 mg/3 mL, subq PNIJ (VICTOZA 2-LEAH) 0.6 mg/0.1 mL (18 mg/3 mL) PnIj Inject 0.6 mg into the skin once daily. 3 mL 11    MULTIVIT WITH CALCIUM,IRON,MIN (WOMEN'S DAILY MULTIVITAMIN ORAL) Take by mouth once daily.       mupirocin (BACTROBAN) 2 % ointment Apply topically 2 (two) times daily. 22 g 0    nystatin (MYCOSTATIN) cream Apply topically 2 (two) times daily. Continue until completely healed 30 g 0    pantoprazole (PROTONIX) 40 MG tablet Take 1 tablet (40 mg total) by mouth once daily. 90 tablet 3    pen needle, diabetic (BD ULTRA-FINE SHORT PEN NEEDLE) 31 gauge x 5/16" Ndle Use two daily as directd 200 each 4    temazepam (RESTORIL) 30 mg capsule TAKE 1 CAPSULE BY MOUTH AT BEDTIME AS NEEDED FOR INSOMNIA 30 capsule 0    triamcinolone acetonide 0.1% (KENALOG) 0.1 % cream Apply topically 2 (two) times daily. No longer than 2 weeks. 30 g 0    BEPREVE 1.5 % Drop Place 1 drop into both eyes 2 (two) times daily. 10 mL 12    cyclobenzaprine (FLEXERIL) 10 MG tablet TK 1/2 " "TO 1 T PO TID PRF MUSCLE SPASM  0    LORazepam (ATIVAN) 1 MG tablet Take 1 tablet (1 mg total) by mouth every 6 (six) hours as needed for Anxiety. 10 tablet 0    [DISCONTINUED] cranberry fruit extract (CRANBERRY ORAL) Take by mouth once daily.        No current facility-administered medications on file prior to visit.        Physical Exam:  /60   Pulse 82   Ht 5' 1" (1.549 m)   Wt 109 kg (240 lb 4.8 oz)   BMI 45.40 kg/m²   Body mass index is 45.4 kg/m².  Physical Exam   Constitutional: She is oriented to person, place, and time and well-developed, well-nourished, and in no distress. No distress.   HENT:   Head: Normocephalic.   Eyes: Pupils are equal, round, and reactive to light. Conjunctivae are normal.   Cardiovascular: Normal rate, regular rhythm and normal heart sounds.   Pulmonary/Chest: Effort normal and breath sounds normal. No respiratory distress.   Abdominal: Soft. Bowel sounds are normal. She exhibits no distension. There is no tenderness.   Neurological: She is alert and oriented to person, place, and time. No cranial nerve deficit.   Skin: Skin is warm and dry. No rash noted.   Psychiatric: Mood and affect normal.       Labs: Pertinent labs reviewed.    CRC Screenin years ago. Up to date    Assessment:  1. Esophageal dysphagia    2. Gastroesophageal reflux disease, esophagitis presence not specified    3. Chronic idiopathic constipation         Recommendations:  - plan for  EGD for further evaluation   - continue daily PPI  - on Eliquis. Needs clearance from outside Cardiologist, Dr. Reynaga.   - Miralax daily for constipation   -     Case request GI: ESOPHAGOGASTRODUODENOSCOPY (EGD)    Follow up to be determined after procedure.    Thank you so much for allowing me to participate in the care of GORDO Gutierrez  "

## 2019-09-23 ENCOUNTER — TELEPHONE (OUTPATIENT)
Dept: SURGERY | Facility: CLINIC | Age: 69
End: 2019-09-23

## 2019-09-23 NOTE — TELEPHONE ENCOUNTER
----- Message from Sandrita Lopez sent at 9/23/2019  2:53 PM CDT -----  Contact: pt   She's calling in regards to schedule an porter pls call pt back at       [phone]

## 2019-09-23 NOTE — TELEPHONE ENCOUNTER
Returned call during conversation appt scheduled for 10/4/19 with Dr Cotton/JULIETA per patient's request. Pt voiced an understanding

## 2019-09-30 ENCOUNTER — TELEPHONE (OUTPATIENT)
Dept: INTERNAL MEDICINE | Facility: CLINIC | Age: 69
End: 2019-09-30

## 2019-09-30 NOTE — TELEPHONE ENCOUNTER
----- Message from Lorraine Peace sent at 9/30/2019 12:01 PM CDT -----  Contact: self  needs call back to discuss going into independent living..[phone]

## 2019-09-30 NOTE — TELEPHONE ENCOUNTER
Spoke with pt, will be moving out of son's apartment soon and wanted to look into an assisted living home. Informed her to look into her insurance on who they are covered with. Informed her to see what she can afford after insurance pays. When she knows and gets the information from that facility to let us know and we can write out an order if it is needed. Verbalized understanding.

## 2019-10-04 RX ORDER — INSULIN GLARGINE 100 [IU]/ML
INJECTION, SOLUTION SUBCUTANEOUS
Qty: 15 ML | Refills: 0 | OUTPATIENT
Start: 2019-10-04

## 2019-10-04 NOTE — PROGRESS NOTES
"Subjective:       Patient ID: Christine Jo is a 69 y.o. female.    Chief Complaint: Disease Management and Pain    Christine is a very pleasant 68y/o female who established care with us in oct 2017 for osteoporosis. She was started on fosamax but had severe GI irritation due to the drug, she has severe gerd on prilosec. We started boniva infusions which she tolerates well, started in 2/2018.   Has never had any fractures.   She takes a multivitamin with vit D in it daily. She has chronic neck and back pain and occasional joint pains from OA.  Avoids nsaids due to Gi upset but does take seldomly.  Sees dr. Wells now for her neck/back pain.  No falls since last visit.  dexa done shows sig improvement of her bmd, scores now osteopenia.      SHe does c/o increased holley knee pain but worse on the left, and pain in her left thump IP joint, cracks at times and painful         Review of Systems   Constitutional: Negative for chills, fatigue and fever.   HENT: Negative for mouth sores, rhinorrhea and sore throat.    Eyes: Negative for pain and redness.   Respiratory: Negative for cough and shortness of breath.    Cardiovascular: Negative for chest pain.   Gastrointestinal: Negative for abdominal pain, constipation, diarrhea, nausea and vomiting.   Genitourinary: Negative for dysuria and hematuria.   Musculoskeletal: Positive for arthralgias, back pain and neck pain. Negative for joint swelling and myalgias.   Skin: Negative for rash.   Neurological: Negative for weakness, numbness and headaches.   Psychiatric/Behavioral: The patient is not nervous/anxious.          Objective:   /60   Pulse 76   Ht 5' 1" (1.549 m)   Wt 109.8 kg (242 lb 1 oz)   BMI 45.74 kg/m²      Physical Exam   Constitutional: She is oriented to person, place, and time and well-developed, well-nourished, and in no distress.   HENT:   Head: Normocephalic and atraumatic.   Eyes: Pupils are equal, round, and reactive to light. Right eye exhibits no " discharge.   Neck: Normal range of motion.   Cardiovascular: Normal rate, regular rhythm and normal heart sounds.  Exam reveals no friction rub.    Pulmonary/Chest: Effort normal and breath sounds normal. No respiratory distress.   Abdominal: Soft. She exhibits no distension. There is no tenderness.   Lymphadenopathy:     She has no cervical adenopathy.   Neurological: She is alert and oriented to person, place, and time.   Skin: No rash noted. No erythema.     Psychiatric: Mood normal.   Musculoskeletal: Normal range of motion. She exhibits no edema or deformity.   holley wrists, mcps, pips no synvoitis, no tenderness, no warmth, good rom  holley elbows shoulders no swelling or tenderness,full rom  holley knees no effusion, no warmth, no tenderness, full rom +crepitus    Right cervical paraspinal tenderness    Strength 5/5 upper and lower ext  Gait steady           No results found for this or any previous visit (from the past 504 hour(s)).    Dexa 8.16.17: NM -1.4, TM -1.0, spine -2.5  Dexa 9.12.19: LN-1.5, spine -1.8, improved frax major 7.8, hip 0.8%  Assessment:       1. Age-related osteoporosis without current pathological fracture    2. Medication monitoring encounter    3. Primary osteoarthritis involving multiple joints    4. Gastroesophageal reflux disease without esophagitis        Impression:  Osteoporosis: Dexa improved at spine sig;  failed oral bisphosphonate due to severe gerd (fosamax), no falls/fxs, taking vit d daily in MV- now on boniva q 3 mos started 2/2018- due again 12.17.2019    GERD: on prilosec, unable to tolerate oral bisphosphonate or nsaid    Generalized OA: papo c spine, holley knees (left more than right), hands avoids nsaids due to GI upset,     Medication monitoring: gfr normal, ok for boniva    holley knee pain: left more than right     Plan:       Inject left knee with steroid, consider viscous injections synvsic one vs euflexxa   Given her sig improvement in her bmd I would rec continuing boniva  for 2 more years then give holiday   Ok for boniva infusion 12.17.19 and cont q 3 mos  Continue vit D daily in MV  Repeat dexa 9/2021  rec tumeric 1000mg/day for OA  Try voltaren gel for thumb/hand pain  Call for questions/concerns  Xray knees today     rtc in dec for infusion then with me in march for infusion, cmp  She will let me know about Viscous injections   See back 3.17.20    Procedure note: After verbal consent was obtained.  The left knee was prepared with sterile prep.  The skin was anesthetized with 1% ethyl chloride.  The knee joint was injected with 3 cc of 1% lidocaine to anesthetize the knee.  The joint was then injected with 40mg kenalog and 1ml of 1% lidocaine.  Hemostasis was obtained.  The patient tolerated procedure well with no complications.  discharge and icing instructions given to patient

## 2019-10-06 RX ORDER — INSULIN GLARGINE 100 [IU]/ML
INJECTION, SOLUTION SUBCUTANEOUS
Qty: 15 ML | Refills: 1 | Status: SHIPPED | OUTPATIENT
Start: 2019-10-06 | End: 2019-11-20 | Stop reason: SDUPTHER

## 2019-10-06 RX ORDER — DULOXETIN HYDROCHLORIDE 60 MG/1
CAPSULE, DELAYED RELEASE ORAL
Qty: 90 CAPSULE | Refills: 0 | Status: SHIPPED | OUTPATIENT
Start: 2019-10-06 | End: 2020-01-06 | Stop reason: SDUPTHER

## 2019-10-06 RX ORDER — CARVEDILOL 6.25 MG/1
TABLET ORAL
Qty: 180 TABLET | Refills: 0 | Status: SHIPPED | OUTPATIENT
Start: 2019-10-06 | End: 2019-12-13 | Stop reason: SDUPTHER

## 2019-10-08 ENCOUNTER — OFFICE VISIT (OUTPATIENT)
Dept: RHEUMATOLOGY | Facility: CLINIC | Age: 69
End: 2019-10-08
Payer: MEDICARE

## 2019-10-08 ENCOUNTER — HOSPITAL ENCOUNTER (OUTPATIENT)
Dept: RADIOLOGY | Facility: HOSPITAL | Age: 69
Discharge: HOME | End: 2019-10-08
Attending: PHYSICIAN ASSISTANT
Payer: MEDICARE

## 2019-10-08 VITALS
SYSTOLIC BLOOD PRESSURE: 130 MMHG | HEART RATE: 76 BPM | BODY MASS INDEX: 45.7 KG/M2 | DIASTOLIC BLOOD PRESSURE: 60 MMHG | HEIGHT: 61 IN | WEIGHT: 242.06 LBS

## 2019-10-08 DIAGNOSIS — M25.562 CHRONIC PAIN OF BOTH KNEES: ICD-10-CM

## 2019-10-08 DIAGNOSIS — M15.9 PRIMARY OSTEOARTHRITIS INVOLVING MULTIPLE JOINTS: ICD-10-CM

## 2019-10-08 DIAGNOSIS — G89.29 CHRONIC PAIN OF BOTH KNEES: ICD-10-CM

## 2019-10-08 DIAGNOSIS — M25.561 CHRONIC PAIN OF BOTH KNEES: ICD-10-CM

## 2019-10-08 DIAGNOSIS — K21.9 GASTROESOPHAGEAL REFLUX DISEASE WITHOUT ESOPHAGITIS: ICD-10-CM

## 2019-10-08 DIAGNOSIS — M81.0 AGE-RELATED OSTEOPOROSIS WITHOUT CURRENT PATHOLOGICAL FRACTURE: Primary | ICD-10-CM

## 2019-10-08 DIAGNOSIS — Z51.81 MEDICATION MONITORING ENCOUNTER: ICD-10-CM

## 2019-10-08 PROCEDURE — 99999 PR PBB SHADOW E&M-EST. PATIENT-LVL III: CPT | Mod: PBBFAC,,, | Performed by: PHYSICIAN ASSISTANT

## 2019-10-08 PROCEDURE — 3075F PR MOST RECENT SYSTOLIC BLOOD PRESS GE 130-139MM HG: ICD-10-PCS | Mod: CPTII,S$GLB,, | Performed by: PHYSICIAN ASSISTANT

## 2019-10-08 PROCEDURE — 20610 PR DRAIN/INJECT LARGE JOINT/BURSA: ICD-10-PCS | Mod: LT,S$GLB,, | Performed by: PHYSICIAN ASSISTANT

## 2019-10-08 PROCEDURE — 99214 PR OFFICE/OUTPT VISIT, EST, LEVL IV, 30-39 MIN: ICD-10-PCS | Mod: 25,S$GLB,, | Performed by: PHYSICIAN ASSISTANT

## 2019-10-08 PROCEDURE — 73562 X-RAY EXAM OF KNEE 3: CPT | Mod: 26,50,, | Performed by: RADIOLOGY

## 2019-10-08 PROCEDURE — 1101F PR PT FALLS ASSESS DOC 0-1 FALLS W/OUT INJ PAST YR: ICD-10-PCS | Mod: CPTII,S$GLB,, | Performed by: PHYSICIAN ASSISTANT

## 2019-10-08 PROCEDURE — 1101F PT FALLS ASSESS-DOCD LE1/YR: CPT | Mod: CPTII,S$GLB,, | Performed by: PHYSICIAN ASSISTANT

## 2019-10-08 PROCEDURE — 99499 RISK ADDL DX/OHS AUDIT: ICD-10-PCS | Mod: S$GLB,,, | Performed by: PHYSICIAN ASSISTANT

## 2019-10-08 PROCEDURE — 73562 X-RAY EXAM OF KNEE 3: CPT | Mod: TC,50

## 2019-10-08 PROCEDURE — 3075F SYST BP GE 130 - 139MM HG: CPT | Mod: CPTII,S$GLB,, | Performed by: PHYSICIAN ASSISTANT

## 2019-10-08 PROCEDURE — 99214 OFFICE O/P EST MOD 30 MIN: CPT | Mod: 25,S$GLB,, | Performed by: PHYSICIAN ASSISTANT

## 2019-10-08 PROCEDURE — 99499 UNLISTED E&M SERVICE: CPT | Mod: S$GLB,,, | Performed by: PHYSICIAN ASSISTANT

## 2019-10-08 PROCEDURE — 99999 PR PBB SHADOW E&M-EST. PATIENT-LVL III: ICD-10-PCS | Mod: PBBFAC,,, | Performed by: PHYSICIAN ASSISTANT

## 2019-10-08 PROCEDURE — 20610 DRAIN/INJ JOINT/BURSA W/O US: CPT | Mod: LT,S$GLB,, | Performed by: PHYSICIAN ASSISTANT

## 2019-10-08 PROCEDURE — 3078F PR MOST RECENT DIASTOLIC BLOOD PRESSURE < 80 MM HG: ICD-10-PCS | Mod: CPTII,S$GLB,, | Performed by: PHYSICIAN ASSISTANT

## 2019-10-08 PROCEDURE — 73562 XR KNEE ORTHO BILAT: ICD-10-PCS | Mod: 26,50,, | Performed by: RADIOLOGY

## 2019-10-08 PROCEDURE — 3078F DIAST BP <80 MM HG: CPT | Mod: CPTII,S$GLB,, | Performed by: PHYSICIAN ASSISTANT

## 2019-10-08 RX ORDER — TRIAMCINOLONE ACETONIDE 40 MG/ML
40 INJECTION, SUSPENSION INTRA-ARTICULAR; INTRAMUSCULAR
Status: COMPLETED | OUTPATIENT
Start: 2019-10-08 | End: 2019-10-08

## 2019-10-08 RX ORDER — DICLOFENAC SODIUM 10 MG/G
2 GEL TOPICAL 4 TIMES DAILY
Qty: 100 G | Refills: 3 | Status: SHIPPED | OUTPATIENT
Start: 2019-10-08 | End: 2020-05-11 | Stop reason: SDUPTHER

## 2019-10-08 RX ADMIN — TRIAMCINOLONE ACETONIDE 40 MG: 40 INJECTION, SUSPENSION INTRA-ARTICULAR; INTRAMUSCULAR at 09:10

## 2019-10-08 NOTE — PATIENT INSTRUCTIONS
Inject knee today with steroid, ice 15mins x 3 times   euflexxa gel injection series of 3 or synvisc one (1 time injection) Let me know if you want to start that     Cont boniva infusions, bones much better   Cont vit D daily     rec Tumeric otc 1000mg/day     Voltaren/diclofenac gel, topical nsaid

## 2019-10-09 ENCOUNTER — PATIENT MESSAGE (OUTPATIENT)
Dept: INTERNAL MEDICINE | Facility: CLINIC | Age: 69
End: 2019-10-09

## 2019-10-09 ENCOUNTER — PATIENT MESSAGE (OUTPATIENT)
Dept: RHEUMATOLOGY | Facility: CLINIC | Age: 69
End: 2019-10-09

## 2019-10-09 ENCOUNTER — TELEPHONE (OUTPATIENT)
Dept: INTERNAL MEDICINE | Facility: CLINIC | Age: 69
End: 2019-10-09

## 2019-10-09 DIAGNOSIS — M17.12 PRIMARY OSTEOARTHRITIS OF LEFT KNEE: Primary | ICD-10-CM

## 2019-10-09 NOTE — TELEPHONE ENCOUNTER
----- Message from Lynn Rodriguez sent at 10/9/2019  8:02 AM CDT -----  Contact: self  Type:  Needs Medical Advice    Who Called: pt  Symptoms (please be specific): high sugar level   How long has patient had these symptoms: 24 hours  Pharmacy name and phone #:  n/a  Would the patient rather a call back or a response via [facility]? Call back  Best Call Back Number: [phone]  Additional Information: pt states sugar was 346 this morning and she had a steroid shot on yesterday and is not sure if that's where it came from.     Thanks,  Lynn Rodriguez

## 2019-10-09 NOTE — TELEPHONE ENCOUNTER
Spoke with the patient stated her sugar go up after having a steroid shot and continue taking her regular medication. Advised patient to keep taking her regular med and give us a call back if sugar level keep elevated.

## 2019-10-11 ENCOUNTER — PATIENT MESSAGE (OUTPATIENT)
Dept: OBSTETRICS AND GYNECOLOGY | Facility: CLINIC | Age: 69
End: 2019-10-11

## 2019-10-12 RX ORDER — TRIAMCINOLONE ACETONIDE 1 MG/G
CREAM TOPICAL 2 TIMES DAILY
Qty: 30 G | Refills: 0 | Status: SHIPPED | OUTPATIENT
Start: 2019-10-12 | End: 2020-03-16 | Stop reason: SDUPTHER

## 2019-10-14 ENCOUNTER — TELEPHONE (OUTPATIENT)
Dept: ENDOSCOPY | Facility: HOSPITAL | Age: 69
End: 2019-10-14

## 2019-10-14 ENCOUNTER — TELEPHONE (OUTPATIENT)
Dept: INTERNAL MEDICINE | Facility: CLINIC | Age: 69
End: 2019-10-14

## 2019-10-14 ENCOUNTER — DOCUMENTATION ONLY (OUTPATIENT)
Dept: RHEUMATOLOGY | Facility: CLINIC | Age: 69
End: 2019-10-14

## 2019-10-14 RX ORDER — TEMAZEPAM 30 MG/1
CAPSULE ORAL
Qty: 30 CAPSULE | Refills: 0 | Status: SHIPPED | OUTPATIENT
Start: 2019-10-14 | End: 2019-11-07 | Stop reason: SDUPTHER

## 2019-10-14 NOTE — TELEPHONE ENCOUNTER
Spoke with patient regarding rescheduling procedure.  Rescheduled procedure from 11- to 11- with dr hawthorne.  Dr willis prescribed eliquis per patient and new instructions sent via mail/portal and patient verbalized understanding.

## 2019-10-14 NOTE — TELEPHONE ENCOUNTER
Notified pt her medication was refilled. Pt requested for her appt on Thursday to be sooner. Assisted pt with scheduling appt. Pt verbalized understanding.

## 2019-10-14 NOTE — TELEPHONE ENCOUNTER
Spoke with pt, was informed that the scheduling staff was not supposed to send this message to us. It needed to go to Endo department, she states that she will call back and get them to send a message to the correct department.

## 2019-10-14 NOTE — TELEPHONE ENCOUNTER
----- Message from Shelly Ley sent at 10/14/2019  8:42 AM CDT -----  Contact: pt  The pt request a call to schedule a Endo appt, no additional info given and can be reached at [phone]///[email or web address]

## 2019-10-14 NOTE — TELEPHONE ENCOUNTER
Pt left message on [facility]. Retrieved message and returned pts call. She requested to reschedule her appt. I have rescheduled her. New instructions sent through pt portal. Pt verbalized an understanding.

## 2019-10-15 ENCOUNTER — CLINICAL SUPPORT (OUTPATIENT)
Dept: INTERNAL MEDICINE | Facility: CLINIC | Age: 69
End: 2019-10-15
Payer: MEDICARE

## 2019-10-15 ENCOUNTER — PATIENT MESSAGE (OUTPATIENT)
Dept: INTERNAL MEDICINE | Facility: CLINIC | Age: 69
End: 2019-10-15

## 2019-10-15 DIAGNOSIS — R30.0 DYSURIA: Primary | ICD-10-CM

## 2019-10-15 DIAGNOSIS — R30.0 DYSURIA: ICD-10-CM

## 2019-10-15 LAB
BACTERIA #/AREA URNS AUTO: ABNORMAL /HPF
BILIRUB UR QL STRIP: NEGATIVE
CLARITY UR REFRACT.AUTO: ABNORMAL
COLOR UR AUTO: ABNORMAL
GLUCOSE UR QL STRIP: NEGATIVE
HGB UR QL STRIP: NEGATIVE
HYALINE CASTS UR QL AUTO: 26 /LPF
KETONES UR QL STRIP: NEGATIVE
LEUKOCYTE ESTERASE UR QL STRIP: ABNORMAL
MICROSCOPIC COMMENT: ABNORMAL
NITRITE UR QL STRIP: NEGATIVE
PH UR STRIP: 5 [PH] (ref 5–8)
PROT UR QL STRIP: ABNORMAL
RBC #/AREA URNS AUTO: 15 /HPF (ref 0–4)
SP GR UR STRIP: 1.02 (ref 1–1.03)
SQUAMOUS #/AREA URNS AUTO: 14 /HPF
URN SPEC COLLECT METH UR: ABNORMAL
WBC #/AREA URNS AUTO: 23 /HPF (ref 0–5)

## 2019-10-15 PROCEDURE — 81001 URINALYSIS AUTO W/SCOPE: CPT

## 2019-10-15 NOTE — TELEPHONE ENCOUNTER
Patient sent a message through the portal stating that she thinks that she may have a uti.  Patient stated that it burns when she urinates.  Patient also stated that since the steriod injection on last week her blood sugars has been running very high.  Please advise.

## 2019-10-16 ENCOUNTER — TELEPHONE (OUTPATIENT)
Dept: RHEUMATOLOGY | Facility: CLINIC | Age: 69
End: 2019-10-16

## 2019-10-16 ENCOUNTER — DOCUMENTATION ONLY (OUTPATIENT)
Dept: RHEUMATOLOGY | Facility: CLINIC | Age: 69
End: 2019-10-16

## 2019-10-16 RX ORDER — DOXYCYCLINE HYCLATE 100 MG
100 TABLET ORAL 2 TIMES DAILY
Qty: 20 TABLET | Refills: 0 | Status: SHIPPED | OUTPATIENT
Start: 2019-10-16 | End: 2019-12-05

## 2019-10-16 NOTE — TELEPHONE ENCOUNTER
----- Message from Lorraine Peace sent at 10/16/2019 11:32 AM CDT -----  Contact: luz maria  states that diclofenac gel is interacting with eloquist (increase bleeding risk)...[phone]

## 2019-10-16 NOTE — TELEPHONE ENCOUNTER
Spoke with Evgeny the Pharmacist at [facility] informed him that per Naina Crisostomo PA-C systemic absorption is minimal, she can proceed with Diclofenac

## 2019-10-23 RX ORDER — GABAPENTIN 100 MG/1
100 CAPSULE ORAL 2 TIMES DAILY
Qty: 60 CAPSULE | Refills: 0 | Status: SHIPPED | OUTPATIENT
Start: 2019-10-23 | End: 2019-11-08 | Stop reason: CLARIF

## 2019-10-25 ENCOUNTER — LAB VISIT (OUTPATIENT)
Dept: LAB | Facility: HOSPITAL | Age: 69
End: 2019-10-25
Payer: MEDICARE

## 2019-10-25 DIAGNOSIS — D64.9 NORMOCYTIC ANEMIA: ICD-10-CM

## 2019-10-25 DIAGNOSIS — D50.0 IRON DEFICIENCY ANEMIA DUE TO CHRONIC BLOOD LOSS: ICD-10-CM

## 2019-10-25 LAB
ALBUMIN SERPL BCP-MCNC: 3.8 G/DL (ref 3.5–5.2)
ALP SERPL-CCNC: 48 U/L (ref 55–135)
ALT SERPL W/O P-5'-P-CCNC: 20 U/L (ref 10–44)
ANION GAP SERPL CALC-SCNC: 14 MMOL/L (ref 8–16)
AST SERPL-CCNC: 16 U/L (ref 10–40)
BASOPHILS # BLD AUTO: 0.05 K/UL (ref 0–0.2)
BASOPHILS NFR BLD: 0.6 % (ref 0–1.9)
BILIRUB SERPL-MCNC: 0.6 MG/DL (ref 0.1–1)
BUN SERPL-MCNC: 16 MG/DL (ref 8–23)
CALCIUM SERPL-MCNC: 10.2 MG/DL (ref 8.7–10.5)
CHLORIDE SERPL-SCNC: 102 MMOL/L (ref 95–110)
CO2 SERPL-SCNC: 24 MMOL/L (ref 23–29)
CREAT SERPL-MCNC: 0.8 MG/DL (ref 0.5–1.4)
DIFFERENTIAL METHOD: ABNORMAL
EOSINOPHIL # BLD AUTO: 0.2 K/UL (ref 0–0.5)
EOSINOPHIL NFR BLD: 2.1 % (ref 0–8)
ERYTHROCYTE [DISTWIDTH] IN BLOOD BY AUTOMATED COUNT: 12.3 % (ref 11.5–14.5)
EST. GFR  (AFRICAN AMERICAN): >60 ML/MIN/1.73 M^2
EST. GFR  (NON AFRICAN AMERICAN): >60 ML/MIN/1.73 M^2
FERRITIN SERPL-MCNC: 109 NG/ML (ref 20–300)
GLUCOSE SERPL-MCNC: 151 MG/DL (ref 70–110)
HCT VFR BLD AUTO: 35.4 % (ref 37–48.5)
HGB BLD-MCNC: 11.4 G/DL (ref 12–16)
IMM GRANULOCYTES # BLD AUTO: 0.03 K/UL (ref 0–0.04)
IMM GRANULOCYTES NFR BLD AUTO: 0.3 % (ref 0–0.5)
IRON SERPL-MCNC: 58 UG/DL (ref 30–160)
LYMPHOCYTES # BLD AUTO: 1.9 K/UL (ref 1–4.8)
LYMPHOCYTES NFR BLD: 21.6 % (ref 18–48)
MCH RBC QN AUTO: 30.7 PG (ref 27–31)
MCHC RBC AUTO-ENTMCNC: 32.2 G/DL (ref 32–36)
MCV RBC AUTO: 95 FL (ref 82–98)
MONOCYTES # BLD AUTO: 0.8 K/UL (ref 0.3–1)
MONOCYTES NFR BLD: 9.4 % (ref 4–15)
NEUTROPHILS # BLD AUTO: 5.9 K/UL (ref 1.8–7.7)
NEUTROPHILS NFR BLD: 66 % (ref 38–73)
NRBC BLD-RTO: 0 /100 WBC
PLATELET # BLD AUTO: 219 K/UL (ref 150–350)
PMV BLD AUTO: 11.2 FL (ref 9.2–12.9)
POTASSIUM SERPL-SCNC: 4.3 MMOL/L (ref 3.5–5.1)
PROT SERPL-MCNC: 6.8 G/DL (ref 6–8.4)
RBC # BLD AUTO: 3.71 M/UL (ref 4–5.4)
SATURATED IRON: 19 % (ref 20–50)
SODIUM SERPL-SCNC: 140 MMOL/L (ref 136–145)
TOTAL IRON BINDING CAPACITY: 303 UG/DL (ref 250–450)
TRANSFERRIN SERPL-MCNC: 205 MG/DL (ref 200–375)
WBC # BLD AUTO: 8.89 K/UL (ref 3.9–12.7)

## 2019-10-25 PROCEDURE — 85025 COMPLETE CBC W/AUTO DIFF WBC: CPT

## 2019-10-25 PROCEDURE — 82728 ASSAY OF FERRITIN: CPT

## 2019-10-25 PROCEDURE — 36415 COLL VENOUS BLD VENIPUNCTURE: CPT

## 2019-10-25 PROCEDURE — 83540 ASSAY OF IRON: CPT

## 2019-10-25 PROCEDURE — 80053 COMPREHEN METABOLIC PANEL: CPT

## 2019-10-28 ENCOUNTER — PATIENT MESSAGE (OUTPATIENT)
Dept: HEMATOLOGY/ONCOLOGY | Facility: CLINIC | Age: 69
End: 2019-10-28

## 2019-10-28 ENCOUNTER — OFFICE VISIT (OUTPATIENT)
Dept: HEMATOLOGY/ONCOLOGY | Facility: CLINIC | Age: 69
End: 2019-10-28
Payer: MEDICARE

## 2019-10-28 VITALS
BODY MASS INDEX: 45.37 KG/M2 | HEART RATE: 81 BPM | TEMPERATURE: 97 F | OXYGEN SATURATION: 98 % | DIASTOLIC BLOOD PRESSURE: 78 MMHG | WEIGHT: 240.31 LBS | RESPIRATION RATE: 18 BRPM | HEIGHT: 61 IN | SYSTOLIC BLOOD PRESSURE: 142 MMHG

## 2019-10-28 DIAGNOSIS — D64.9 ANEMIA, UNSPECIFIED TYPE: ICD-10-CM

## 2019-10-28 DIAGNOSIS — D50.9 IRON DEFICIENCY ANEMIA, UNSPECIFIED IRON DEFICIENCY ANEMIA TYPE: Primary | ICD-10-CM

## 2019-10-28 DIAGNOSIS — R63.4 UNINTENTIONAL WEIGHT LOSS: ICD-10-CM

## 2019-10-28 PROCEDURE — 3078F DIAST BP <80 MM HG: CPT | Mod: CPTII,S$GLB,, | Performed by: NURSE PRACTITIONER

## 2019-10-28 PROCEDURE — 99214 OFFICE O/P EST MOD 30 MIN: CPT | Mod: S$GLB,,, | Performed by: NURSE PRACTITIONER

## 2019-10-28 PROCEDURE — 3077F PR MOST RECENT SYSTOLIC BLOOD PRESSURE >= 140 MM HG: ICD-10-PCS | Mod: CPTII,S$GLB,, | Performed by: NURSE PRACTITIONER

## 2019-10-28 PROCEDURE — 3077F SYST BP >= 140 MM HG: CPT | Mod: CPTII,S$GLB,, | Performed by: NURSE PRACTITIONER

## 2019-10-28 PROCEDURE — 99214 PR OFFICE/OUTPT VISIT, EST, LEVL IV, 30-39 MIN: ICD-10-PCS | Mod: S$GLB,,, | Performed by: NURSE PRACTITIONER

## 2019-10-28 PROCEDURE — 3078F PR MOST RECENT DIASTOLIC BLOOD PRESSURE < 80 MM HG: ICD-10-PCS | Mod: CPTII,S$GLB,, | Performed by: NURSE PRACTITIONER

## 2019-10-28 PROCEDURE — 99999 PR PBB SHADOW E&M-EST. PATIENT-LVL III: ICD-10-PCS | Mod: PBBFAC,,, | Performed by: NURSE PRACTITIONER

## 2019-10-28 PROCEDURE — 99999 PR PBB SHADOW E&M-EST. PATIENT-LVL III: CPT | Mod: PBBFAC,,, | Performed by: NURSE PRACTITIONER

## 2019-10-28 PROCEDURE — 1101F PR PT FALLS ASSESS DOC 0-1 FALLS W/OUT INJ PAST YR: ICD-10-PCS | Mod: CPTII,S$GLB,, | Performed by: NURSE PRACTITIONER

## 2019-10-28 PROCEDURE — 1101F PT FALLS ASSESS-DOCD LE1/YR: CPT | Mod: CPTII,S$GLB,, | Performed by: NURSE PRACTITIONER

## 2019-10-28 NOTE — PROGRESS NOTES
Subjective:      Patient ID: Christine Jo is a 69 y.o. female.    Chief Complaint: Lab Discussion; fatigue    HPI:  Patient is a 69 year old  female who present today for follow up for her diagnosed iron deficiency anemia.  She was given IV iron on 1/3/19 and 1/8/19.  Currently she is slightly anemic with a hemoglobin of 11.4  and iron is repleated.  She had [facility] with [facility]. Patel which was - negative for contributory causes.  She had EGD many years ago which showed gastritis.  Could not find results of this media section.    She states recent switched from prilosec to protonix due to complaints of gastric reflux which is being followed by PCP Dr. Szymanski.  She reports discussion potential EGD if symptoms persists.  She is scheduled for EGD after Thanksgiving due to continued reflux with CORAL.    She is being follow by Dr. Arthur Reynaga at [facility] [place] [facility] the [facility] for [facility] and state that she sees him every 6 months.  She has DMII with controlled a1c that is followed by primary care.  She is also insulin dependent.       She complains of fatigue and chronic joint pain to knees and back/neck.  She verbalized continued family issues and episodes of depression due to high stress family life.  Her TSH was normal.       She denies hematuria, melena, hematochezia, epistaxis, bleeding gums, or unusual bruising.  She denies fever, chills, and night sweats.  She denies unintentional weight loss.  She continues to  complains of early satiety and now states within the last month has continued lower abdominal discomfort and occasional left upper quadrant discomfort that has been ongoing for some time.           Social History     Socioeconomic History    Marital status:      Spouse name: Not on file    Number of children: Not on file    Years of education: Not on file    Highest education level: Not on file   Occupational History    Not on file    Social Needs    Financial resource strain: Not on file    Food insecurity:     Worry: Not on file     Inability: Not on file    Transportation needs:     Medical: Not on file     Non-medical: Not on file   Tobacco Use    Smoking status: Former Smoker     Packs/day: 1.00     Years: 35.00     Pack years: 35.00     Types: Cigarettes     Last attempt to quit: 2003     Years since quittin.3    Smokeless tobacco: Never Used   Substance and Sexual Activity    Alcohol use: No    Drug use: No    Sexual activity: Never   Lifestyle    Physical activity:     Days per week: Not on file     Minutes per session: Not on file    Stress: Not on file   Relationships    Social connections:     Talks on phone: Not on file     Gets together: Not on file     Attends Gnosticism service: Not on file     Active member of club or organization: Not on file     Attends meetings of clubs or organizations: Not on file     Relationship status: Not on file   Other Topics Concern    Not on file   Social History Narrative    Not on file       Family History   Problem Relation Age of Onset    Heart disease Mother         CAD     Cataracts Mother     Macular degeneration Mother     Glaucoma Mother     Cancer Son         testicular     Cancer Maternal Aunt         Lung ca    Heart disease Maternal Grandfather         Pacemaker     Diabetes Sister     Heart disease Sister         CAD    Cataracts Sister     Diabetes Sister     Diabetes Sister        Past Surgical History:   Procedure Laterality Date    abdominal laparoscopy       BREAST BIOPSY      CATARACT EXTRACTION Bilateral [id number]    [facility] in [place]    EYE SURGERY      TONSILLECTOMY      TUBAL LIGATION      yag  Bilateral        Past Medical History:   Diagnosis Date    Arthritis     Cataract     Diabetes mellitus      am 01/15/2018 Insulin x 1 year    Glaucoma     Hypertension     Insomnia     Macular degeneration      Vaginal yeast infection        Review of Systems   Constitutional: Positive for appetite change (early satiety) and fatigue. Negative for activity change, chills, diaphoresis, fever and unexpected weight change.   HENT: Negative for congestion, dental problem, drooling, ear discharge, ear pain, facial swelling, hearing loss, mouth sores, nosebleeds, postnasal drip, rhinorrhea, sinus pressure, sneezing, sore throat, tinnitus, trouble swallowing and voice change.    Eyes: Negative for photophobia, pain, discharge, redness, itching and visual disturbance.   Respiratory: Positive for shortness of breath (occasional). Negative for cough, choking, chest tightness, wheezing and stridor.    Cardiovascular: Positive for palpitations (occasional). Negative for chest pain and leg swelling.   Gastrointestinal: Positive for abdominal pain (lower abdominal discomfort x 1 month) and constipation. Negative for abdominal distention, anal bleeding, blood in stool, diarrhea, nausea, rectal pain and vomiting.   Endocrine: Negative for cold intolerance, heat intolerance, polydipsia, polyphagia and polyuria.   Genitourinary: Negative for decreased urine volume, difficulty urinating, dyspareunia, dysuria, enuresis, flank pain, frequency, genital sores, hematuria, menstrual problem, pelvic pain, urgency, vaginal bleeding, vaginal discharge and vaginal pain.   Musculoskeletal: Positive for arthralgias and back pain. Negative for gait problem, joint swelling, myalgias, neck pain and neck stiffness.   Skin: Negative for color change, pallor and rash.   Allergic/Immunologic: Negative for environmental allergies, food allergies and immunocompromised state.   Neurological: Negative for dizziness, tremors, seizures, syncope, facial asymmetry, speech difficulty, weakness, light-headedness, numbness and headaches.   Hematological: Negative for adenopathy. Does not bruise/bleed easily.   Psychiatric/Behavioral: Positive for dysphoric mood. Negative  for agitation, behavioral problems, confusion, decreased concentration, hallucinations, self-injury, sleep disturbance and suicidal ideas. The patient is nervous/anxious. The patient is not hyperactive.           Medication List with Changes/Refills   Current Medications    ACETAMINOPHEN (TYLENOL ARTHRITIS ORAL)    Take by mouth as needed.    ALPHA LIPOIC ACID 300 MG CAP    Take 300 mg by mouth 2 (two) times daily.    APIXABAN (ELIQUIS) 5 MG TAB    Take 1 tablet (5 mg total) by mouth 2 (two) times daily.    ATORVASTATIN (LIPITOR) 40 MG TABLET    TAKE 1 TABLET(40 MG) BY MOUTH EVERY DAY    BASAGLAR KWIKPEN U-100 INSULIN 100 UNIT/ML (3 ML) INPN PEN    ADMINISTER 64 UNITS UNDER THE SKIN EVERY DAY    BASAGLAR KWIKPEN U-100 INSULIN GLARGINE 100 UNITS/ML (3ML) SUBQ PEN    INJECT 64 UNITS UNDER THE SKIN EVERY DAY    BENAZEPRIL (LOTENSIN) 40 MG TABLET    TAKE 1 TABLET BY MOUTH EVERY DAY    BEPREVE 1.5 % DROP    Place 1 drop into both eyes 2 (two) times daily.    BEPREVE 1.5 % DROP    Place 1 drop into both eyes 2 (two) times daily.    CALCIUM CITRATE-VITAMIN D3 315-200 MG (CITRACAL+D) 315-200 MG-UNIT PER TABLET    Take 1 tablet by mouth once daily.    CARVEDILOL (COREG) 6.25 MG TABLET    TAKE 1 TABLET(6.25 MG) BY MOUTH TWICE DAILY WITH MEALS    CYCLOBENZAPRINE (FLEXERIL) 10 MG TABLET    TK 1/2 TO 1 T PO TID PRF MUSCLE SPASM    CYCLOSPORINE (RESTASIS) 0.05 % OPHTHALMIC EMULSION    Place 0.4 mLs (1 drop total) into both eyes 2 (two) times daily.    DICLOFENAC SODIUM (VOLTAREN) 1 % GEL    Apply 2 g topically 4 (four) times daily. To affected joints as needed for pain    DOXYCYCLINE (VIBRA-TABS) 100 MG TABLET    Take 1 tablet (100 mg total) by mouth 2 (two) times daily.    DULOXETINE (CYMBALTA) 30 MG CAPSULE    TAKE 1 CAPSULE(30 MG) BY MOUTH EVERY DAY    DULOXETINE (CYMBALTA) 60 MG CAPSULE    TAKE 1 CAPSULE(60 MG) BY MOUTH EVERY DAY    ERGOCALCIFEROL, VITAMIN D2, (VITAMIN D ORAL)    Take by mouth once daily.     FLUTICASONE  "PROPIONATE (FLONASE) 50 MCG/ACTUATION NASAL SPRAY    2 sprays (100 mcg total) by Each Nare route once daily.    GABAPENTIN (NEURONTIN) 100 MG CAPSULE    Take 1 capsule (100 mg total) by mouth 2 (two) times daily.    HYDRALAZINE (APRESOLINE) 100 MG TABLET    TAKE 1 TABLET(100 MG) BY MOUTH TWICE DAILY    JANUVIA 100 MG TAB    TAKE 1 TABLET(100 MG) BY MOUTH EVERY DAY    LIRAGLUTIDE 0.6 MG/0.1 ML, 18 MG/3 ML, SUBQ PNIJ (VICTOZA 2-LEAH) 0.6 MG/0.1 ML (18 MG/3 ML) PNIJ    Inject 0.6 mg into the skin once daily.    LORAZEPAM (ATIVAN) 1 MG TABLET    Take 1 tablet (1 mg total) by mouth every 6 (six) hours as needed for Anxiety.    MULTIVIT WITH CALCIUM,IRON,MIN (WOMEN'S DAILY MULTIVITAMIN ORAL)    Take by mouth once daily.     MUPIROCIN (BACTROBAN) 2 % OINTMENT    Apply topically 2 (two) times daily.    NYSTATIN (MYCOSTATIN) CREAM    Apply topically 2 (two) times daily. Continue until completely healed    PANTOPRAZOLE (PROTONIX) 40 MG TABLET    Take 1 tablet (40 mg total) by mouth once daily.    PEN NEEDLE, DIABETIC (BD ULTRA-FINE SHORT PEN NEEDLE) 31 GAUGE X 5/16" NDLE    Use two daily as directd    TEMAZEPAM (RESTORIL) 30 MG CAPSULE    TAKE 1 CAPSULE BY MOUTH AT BEDTIME AS NEEDED FOR INSOMNIA    TRIAMCINOLONE ACETONIDE 0.1% (KENALOG) 0.1 % CREAM    Apply topically 2 (two) times daily. No longer than 2 weeks.        Objective:     Vitals:    10/28/19 1531   BP: (!) 142/78   Pulse: 81   Resp: 18   Temp: 97.2 °F (36.2 °C)       Physical Exam   Constitutional: She is oriented to person, place, and time. She appears well-developed and well-nourished. No distress.   HENT:   Head: Normocephalic and atraumatic.   Right Ear: External ear normal.   Left Ear: External ear normal.   Nose: Nose normal.   Eyes: Conjunctivae and EOM are normal.   Neck: Normal range of motion.   Cardiovascular: Normal rate and regular rhythm. Exam reveals no friction rub.   No murmur heard.  Pulmonary/Chest: Effort normal. No stridor. No respiratory " distress. She has no wheezes. She has no rales.   Abdominal: Soft. She exhibits no distension.   Musculoskeletal: Normal range of motion.   Neurological: She is alert and oriented to person, place, and time.   Skin: Skin is warm and dry. Capillary refill takes less than 2 seconds. No rash noted. She is not diaphoretic. No pallor.   Psychiatric: Her speech is normal and behavior is normal. Judgment and thought content normal. Her mood appears anxious. She is not actively hallucinating. She exhibits a depressed mood. She is attentive.   Vitals reviewed.      Assessment:     Problem List Items Addressed This Visit     None      Visit Diagnoses     Iron deficiency anemia, unspecified iron deficiency anemia type    -  Primary    Relevant Orders    CBC auto differential    Comprehensive metabolic panel    Ferritin    Iron and TIBC    Anemia, unspecified type        Relevant Orders    CT Abdomen Pelvis W Wo Contrast    CBC auto differential    Comprehensive metabolic panel    Ferritin    Iron and TIBC    Unintentional weight loss        Relevant Orders    CT Abdomen Pelvis W Wo Contrast        Lab Results   Component Value Date    WBC 8.89 10/25/2019    HGB 11.4 (L) 10/25/2019    HCT 35.4 (L) 10/25/2019    MCV 95 10/25/2019     10/25/2019       BMP  Lab Results   Component Value Date     10/25/2019    K 4.3 10/25/2019     10/25/2019    CO2 24 10/25/2019    BUN 16 10/25/2019    CREATININE 0.8 10/25/2019    CALCIUM 10.2 10/25/2019    ANIONGAP 14 10/25/2019    ESTGFRAFRICA >60 10/25/2019    EGFRNONAA >60 10/25/2019     Lab Results   Component Value Date    ALT 20 10/25/2019    AST 16 10/25/2019    ALKPHOS 48 (L) 10/25/2019    BILITOT 0.6 10/25/2019     Lab Results   Component Value Date    IRON 58 10/25/2019    TIBC 303 10/25/2019    FERRITIN 109 10/25/2019       Plan:   Iron deficiency anemia, unspecified iron deficiency anemia type  -     CBC auto differential; Future; Expected date: 10/28/2019  -      Comprehensive metabolic panel; Future; Expected date: 10/28/2019  -     Ferritin; Future; Expected date: 10/28/2019  -     Iron and TIBC; Future; Expected date: 10/28/2019    Anemia, unspecified type  -     CT Abdomen Pelvis W Wo Contrast; Future; Expected date: 10/28/2019  -     CBC auto differential; Future; Expected date: 10/28/2019  -     Comprehensive metabolic panel; Future; Expected date: 10/28/2019  -     Ferritin; Future; Expected date: 10/28/2019  -     Iron and TIBC; Future; Expected date: 10/28/2019    Unintentional weight loss  -     CT Abdomen Pelvis W Wo Contrast; Future; Expected date: 10/28/2019    Due to complaints of generalized lower abdominal discomfort x 1 months and early satiety x 3-4 months with assess with CT of her abdomen with contrast.  She also states random pain to left upper quadrant, but is unable to provide a timeline for these pain.  She continues to have slight anemia with a slightly low saturated iron of 19%.  She is scheduled for EGD after Thanksgiving to evaluate dyspepsia and continued anemia with slight iron deficiency.  She will follow up in 3 months with labs prior.  Will communicate results of CT by phone or through portal and have her follow up sooner if needed based on results.         I will review assessment/plan with collaborating physician, Dr. Lesia Galeas.    Thank You,  GORDO Bethea

## 2019-11-05 ENCOUNTER — PROCEDURE VISIT (OUTPATIENT)
Dept: RHEUMATOLOGY | Facility: CLINIC | Age: 69
End: 2019-11-05
Payer: MEDICARE

## 2019-11-05 DIAGNOSIS — M17.12 PRIMARY OSTEOARTHRITIS OF LEFT KNEE: Primary | ICD-10-CM

## 2019-11-05 PROCEDURE — 20610 LARGE JOINT ASPIRATION/INJECTION: L KNEE: ICD-10-PCS | Mod: LT,S$GLB,, | Performed by: PHYSICIAN ASSISTANT

## 2019-11-05 PROCEDURE — 20610 DRAIN/INJ JOINT/BURSA W/O US: CPT | Mod: LT,S$GLB,, | Performed by: PHYSICIAN ASSISTANT

## 2019-11-05 NOTE — PROCEDURES
Large Joint Aspiration/Injection: L knee  Date/Time: 11/5/2019 8:00 AM  Performed by: Naina Crisostomo PA-C  Authorized by: Naina Crisostomo PA-C     Consent Done?:  Yes (Verbal)  Procedure site marked: Yes    Timeout: Prior to procedure the correct patient, procedure, and site was verified    Anesthesia    Anesthesia: local infiltration  Anesthetic: lidocaine 1% without epinephrine    Location:  Knee  Site:  L knee  Medications:  48 mg hylan g-f 20 48 mg/6 mL  Patient tolerance:  Patient tolerated the procedure well with no immediate complications    Additional Comments: Procedure note: After verbal consent was obtained.  The left  knee was prepared with sterile prep.  The skin was anesthetized with 1% ethyl chloride.  The knee joint was injected with 2.5 cc of 1% lidocaine to anesthetize the knee.  The joint was then injected with 6 mL of Synvisc one in a prefilled syringe.  Hemostasis was obtained.  The patient tolerated procedure well with no complications.  discharge and  icing instructions given to bridget

## 2019-11-06 ENCOUNTER — TELEPHONE (OUTPATIENT)
Dept: ENDOSCOPY | Facility: HOSPITAL | Age: 69
End: 2019-11-06

## 2019-11-06 NOTE — TELEPHONE ENCOUNTER
Spoke with patient regarding blood thinner response per dr fernando willis.  Patient verbalized understanding to hold eliquis 2 days before scheduled procedure on 11-.

## 2019-11-07 ENCOUNTER — PATIENT MESSAGE (OUTPATIENT)
Dept: PAIN MEDICINE | Facility: CLINIC | Age: 69
End: 2019-11-07

## 2019-11-07 ENCOUNTER — PATIENT MESSAGE (OUTPATIENT)
Dept: INTERNAL MEDICINE | Facility: CLINIC | Age: 69
End: 2019-11-07

## 2019-11-08 RX ORDER — GABAPENTIN 300 MG/1
300 CAPSULE ORAL 3 TIMES DAILY
Qty: 90 CAPSULE | Refills: 1 | Status: SHIPPED | OUTPATIENT
Start: 2019-11-08 | End: 2020-01-13

## 2019-11-11 RX ORDER — TEMAZEPAM 30 MG/1
CAPSULE ORAL
Qty: 30 CAPSULE | Refills: 0 | Status: SHIPPED | OUTPATIENT
Start: 2019-11-11 | End: 2019-11-11 | Stop reason: SDUPTHER

## 2019-11-11 RX ORDER — SITAGLIPTIN 100 MG/1
TABLET, FILM COATED ORAL
Qty: 90 TABLET | Refills: 0 | Status: SHIPPED | OUTPATIENT
Start: 2019-11-11 | End: 2019-12-05

## 2019-11-12 RX ORDER — TEMAZEPAM 30 MG/1
CAPSULE ORAL
Qty: 30 CAPSULE | Refills: 0 | Status: SHIPPED | OUTPATIENT
Start: 2019-11-12 | End: 2019-12-09 | Stop reason: SDUPTHER

## 2019-11-20 ENCOUNTER — PATIENT MESSAGE (OUTPATIENT)
Dept: INTERNAL MEDICINE | Facility: CLINIC | Age: 69
End: 2019-11-20

## 2019-11-20 RX ORDER — INSULIN GLARGINE 100 [IU]/ML
INJECTION, SOLUTION SUBCUTANEOUS
Qty: 15 ML | Refills: 0 | Status: CANCELLED | OUTPATIENT
Start: 2019-11-20

## 2019-11-20 RX ORDER — INSULIN GLARGINE 100 [IU]/ML
INJECTION, SOLUTION SUBCUTANEOUS
Qty: 3 BOX | Refills: 0 | Status: SHIPPED | OUTPATIENT
Start: 2019-11-20 | End: 2019-12-05

## 2019-11-21 RX ORDER — FLUCONAZOLE 150 MG/1
150 TABLET ORAL DAILY
Qty: 1 TABLET | Refills: 0 | Status: SHIPPED | OUTPATIENT
Start: 2019-11-21 | End: 2019-11-22

## 2019-11-29 ENCOUNTER — HOSPITAL ENCOUNTER (OUTPATIENT)
Facility: HOSPITAL | Age: 69
Discharge: HOME | End: 2019-11-29
Attending: INTERNAL MEDICINE | Admitting: INTERNAL MEDICINE
Payer: MEDICARE

## 2019-11-29 ENCOUNTER — ANESTHESIA EVENT (OUTPATIENT)
Dept: ENDOSCOPY | Facility: HOSPITAL | Age: 69
End: 2019-11-29
Payer: MEDICARE

## 2019-11-29 ENCOUNTER — ANESTHESIA (OUTPATIENT)
Dept: ENDOSCOPY | Facility: HOSPITAL | Age: 69
End: 2019-11-29
Payer: MEDICARE

## 2019-11-29 VITALS
HEART RATE: 70 BPM | TEMPERATURE: 98 F | DIASTOLIC BLOOD PRESSURE: 80 MMHG | OXYGEN SATURATION: 98 % | BODY MASS INDEX: 45.7 KG/M2 | SYSTOLIC BLOOD PRESSURE: 112 MMHG | HEIGHT: 61 IN | WEIGHT: 242.06 LBS | RESPIRATION RATE: 18 BRPM

## 2019-11-29 DIAGNOSIS — K21.9 GASTROESOPHAGEAL REFLUX DISEASE WITHOUT ESOPHAGITIS: Primary | ICD-10-CM

## 2019-11-29 DIAGNOSIS — R13.19 ESOPHAGEAL DYSPHAGIA: ICD-10-CM

## 2019-11-29 DIAGNOSIS — R13.10 DYSPHAGIA: ICD-10-CM

## 2019-11-29 DIAGNOSIS — K29.30 SUPERFICIAL GASTRITIS WITHOUT HEMORRHAGE, UNSPECIFIED CHRONICITY: ICD-10-CM

## 2019-11-29 LAB — POCT GLUCOSE: 190 MG/DL (ref 70–110)

## 2019-11-29 PROCEDURE — 88305 TISSUE EXAM BY PATHOLOGIST: CPT | Performed by: PATHOLOGY

## 2019-11-29 PROCEDURE — 88305 TISSUE EXAM BY PATHOLOGIST: CPT | Mod: 26,,, | Performed by: PATHOLOGY

## 2019-11-29 PROCEDURE — 37000008 HC ANESTHESIA 1ST 15 MINUTES: Performed by: INTERNAL MEDICINE

## 2019-11-29 PROCEDURE — 43239 EGD BIOPSY SINGLE/MULTIPLE: CPT | Performed by: INTERNAL MEDICINE

## 2019-11-29 PROCEDURE — 82962 GLUCOSE BLOOD TEST: CPT | Performed by: INTERNAL MEDICINE

## 2019-11-29 PROCEDURE — 63600175 PHARM REV CODE 636 W HCPCS: Performed by: NURSE ANESTHETIST, CERTIFIED REGISTERED

## 2019-11-29 PROCEDURE — 25000003 PHARM REV CODE 250: Performed by: NURSE ANESTHETIST, CERTIFIED REGISTERED

## 2019-11-29 PROCEDURE — 37000009 HC ANESTHESIA EA ADD 15 MINS: Performed by: INTERNAL MEDICINE

## 2019-11-29 PROCEDURE — 88305 TISSUE EXAM BY PATHOLOGIST: ICD-10-PCS | Mod: 26,,, | Performed by: PATHOLOGY

## 2019-11-29 PROCEDURE — 63600175 PHARM REV CODE 636 W HCPCS: Performed by: INTERNAL MEDICINE

## 2019-11-29 PROCEDURE — 27201012 HC FORCEPS, HOT/COLD, DISP: Performed by: INTERNAL MEDICINE

## 2019-11-29 PROCEDURE — 43239 PR EGD, FLEX, W/BIOPSY, SGL/MULTI: ICD-10-PCS | Mod: ,,, | Performed by: INTERNAL MEDICINE

## 2019-11-29 PROCEDURE — 43239 EGD BIOPSY SINGLE/MULTIPLE: CPT | Mod: ,,, | Performed by: INTERNAL MEDICINE

## 2019-11-29 RX ORDER — LIDOCAINE HYDROCHLORIDE 10 MG/ML
INJECTION, SOLUTION EPIDURAL; INFILTRATION; INTRACAUDAL; PERINEURAL
Status: DISCONTINUED | OUTPATIENT
Start: 2019-11-29 | End: 2019-11-29

## 2019-11-29 RX ORDER — PROPOFOL 10 MG/ML
VIAL (ML) INTRAVENOUS
Status: DISCONTINUED | OUTPATIENT
Start: 2019-11-29 | End: 2019-11-29

## 2019-11-29 RX ORDER — EZETIMIBE 10 MG/1
10 TABLET ORAL DAILY
COMMUNITY
End: 2021-03-26

## 2019-11-29 RX ORDER — SODIUM CHLORIDE, SODIUM LACTATE, POTASSIUM CHLORIDE, CALCIUM CHLORIDE 600; 310; 30; 20 MG/100ML; MG/100ML; MG/100ML; MG/100ML
INJECTION, SOLUTION INTRAVENOUS CONTINUOUS
Status: DISCONTINUED | OUTPATIENT
Start: 2019-11-29 | End: 2019-11-29 | Stop reason: HOSPADM

## 2019-11-29 RX ORDER — SODIUM CHLORIDE 0.9 % (FLUSH) 0.9 %
10 SYRINGE (ML) INJECTION
Status: DISCONTINUED | OUTPATIENT
Start: 2019-11-29 | End: 2019-11-29 | Stop reason: HOSPADM

## 2019-11-29 RX ADMIN — LIDOCAINE HYDROCHLORIDE 50 MG: 10 INJECTION, SOLUTION EPIDURAL; INFILTRATION; INTRACAUDAL; PERINEURAL at 09:11

## 2019-11-29 RX ADMIN — PROPOFOL 30 MG: 10 INJECTION, EMULSION INTRAVENOUS at 09:11

## 2019-11-29 RX ADMIN — SODIUM CHLORIDE, SODIUM LACTATE, POTASSIUM CHLORIDE, AND CALCIUM CHLORIDE: 600; 310; 30; 20 INJECTION, SOLUTION INTRAVENOUS at 09:11

## 2019-11-29 RX ADMIN — PROPOFOL 80 MG: 10 INJECTION, EMULSION INTRAVENOUS at 09:11

## 2019-11-29 NOTE — ANESTHESIA PREPROCEDURE EVALUATION
11/29/2019  Christine Jo is a 69 y.o., female.    Anesthesia Evaluation    I have reviewed the Patient Summary Reports.     I have reviewed the Medications.     Review of Systems  Anesthesia Hx:  No problems with previous Anesthesia  History of prior surgery of interest to airway management or planning: Denies Family Hx of Anesthesia complications.   Denies Personal Hx of Anesthesia complications.   Social:  Former Smoker    Hematology/Oncology:     Oncology Normal    -- Anemia:   Cardiovascular:   Hypertension Dysrhythmias atrial fibrillation hyperlipidemia    Pulmonary:   Sleep Apnea    Renal/:  Renal/ Normal     Hepatic/GI:   GERD Dysphagia   Musculoskeletal:   Arthritis   Spine Disorders: cervical Chronic Pain    Neurological:   Neuromuscular Disease,    Endocrine:   Diabetes    Psych:   Psychiatric History depression          Physical Exam  General:  Morbid Obesity    Airway/Jaw/Neck:  Airway Findings: Mouth Opening: Normal Tongue: Normal  General Airway Assessment: Adult  Mallampati: III  TM Distance: Normal, at least 6 cm       Chest/Lungs:  Chest/Lungs Findings: Normal Respiratory Rate     Heart/Vascular:  Heart Findings: Rate: Normal             Anesthesia Plan  Type of Anesthesia, risks & benefits discussed:  Anesthesia Type:  MAC  Patient's Preference:   Intra-op Monitoring Plan: standard ASA monitors  Intra-op Monitoring Plan Comments:   Post Op Pain Control Plan:   Post Op Pain Control Plan Comments:   Induction:   IV  Beta Blocker:  Patient is on a Beta-Blocker and has received one dose within the past 24 hours (No further documentation required).       Informed Consent: Patient understands risks and agrees with Anesthesia plan.  Questions answered. Anesthesia consent signed with patient.  ASA Score: 3     Day of Surgery Review of History & Physical:    H&P update referred to the  surgeon.         Ready For Surgery From Anesthesia Perspective.

## 2019-11-29 NOTE — ANESTHESIA POSTPROCEDURE EVALUATION
Anesthesia Post Evaluation    Patient: Christine Jo    Procedure(s) Performed: Procedure(s) (LRB):  ESOPHAGOGASTRODUODENOSCOPY (EGD) (N/A)    Final Anesthesia Type: MAC    Patient location during evaluation: GI PACU  Patient participation: Yes- Able to Participate  Level of consciousness: awake and alert  Post-procedure vital signs: reviewed and stable  Pain management: adequate  Airway patency: patent    PONV status at discharge: No PONV  Anesthetic complications: no      Cardiovascular status: blood pressure returned to baseline  Respiratory status: unassisted  Hydration status: euvolemic  Follow-up not needed.          Vitals Value Taken Time   /66 11/29/2019  9:05 AM   Temp 36.7 °C (98.1 °F) 11/29/2019  9:05 AM   Pulse 75 11/29/2019  9:05 AM   Resp 20 11/29/2019  9:05 AM   SpO2 99 11/29/2019 10:18 AM         No case tracking events are documented in the log.      Pain/Jose Armando Score: Jose Armando Score: 9 (11/29/2019 10:08 AM)

## 2019-11-29 NOTE — H&P
Short Stay Endoscopy History and Physical    PCP - Jose Szymanski MD    Procedure - EGD  ASA - 3  Mallampati - per anesthesia  History of Anesthesia problems - no  Family history Anesthesia problems -  no     HPI:  This is a 69 y.o.female here for evaluation of : GERD; Dysphagia    Reflux - yes  Dysphagia - yes  Abdominal pain - no  Diarrhea - no  Anemia - no  GI bleeding - no  Nausea and vomiting-no  Early satiety-no  Aversion to sight or smell of food-no  Constipation-yes    ROS:  Constitutional: No fevers, chills, No weight loss  ENT: No allergies  CV: No chest pain  Pulm: No cough, No shortness of breath  Ophtho: No vision changes  GI: see HPI  Derm: No rash  Heme: No lymphadenopathy, No bruising  MSK: No arthritis  : No dysuria, No hematuria  Endo: No hot or cold intolerance  Neuro: No syncope, No seizure  Psych: No anxiety, No depression    Medical History:  Past Medical History:   Diagnosis Date    Arthritis     Cataract     Diabetes mellitus 2008     am 01/15/2018 Insulin x 1 year    Glaucoma     Hypertension     Insomnia     Macular degeneration     Vaginal yeast infection        Surgical History:  Past Surgical History:   Procedure Laterality Date    abdominal laparoscopy       BREAST BIOPSY      CATARACT EXTRACTION Bilateral [id number]    [place] in [place]    EYE SURGERY      TONSILLECTOMY      TUBAL LIGATION      yag  Bilateral        Family History:  Family History   Problem Relation Age of Onset    Heart disease Mother         CAD     Cataracts Mother     Macular degeneration Mother     Glaucoma Mother     Cancer Son         testicular     Cancer Maternal Aunt         Lung ca    Heart disease Maternal Grandfather         Pacemaker     Diabetes Sister     Heart disease Sister         CAD    Cataracts Sister     Diabetes Sister     Diabetes Sister        Social History:  Social History     Socioeconomic History    Marital status:      Spouse name:  Not on file    Number of children: Not on file    Years of education: Not on file    Highest education level: Not on file   Occupational History    Not on file   Social Needs    Financial resource strain: Not on file    Food insecurity:     Worry: Not on file     Inability: Not on file    Transportation needs:     Medical: Not on file     Non-medical: Not on file   Tobacco Use    Smoking status: Former Smoker     Packs/day: 1.00     Years: 35.00     Pack years: 35.00     Types: Cigarettes     Last attempt to quit: 2003     Years since quittin.4    Smokeless tobacco: Never Used   Substance and Sexual Activity    Alcohol use: No    Drug use: No    Sexual activity: Never   Lifestyle    Physical activity:     Days per week: Not on file     Minutes per session: Not on file    Stress: Not on file   Relationships    Social connections:     Talks on phone: Not on file     Gets together: Not on file     Attends Confucianism service: Not on file     Active member of club or organization: Not on file     Attends meetings of clubs or organizations: Not on file     Relationship status: Not on file   Other Topics Concern    Not on file   Social History Narrative    Not on file       Allergies:   Review of patient's allergies indicates:   Allergen Reactions    Aspirin Palpitations       Medications:   No current facility-administered medications on file prior to encounter.      Current Outpatient Medications on File Prior to Encounter   Medication Sig Dispense Refill    ACETAMINOPHEN (TYLENOL ARTHRITIS ORAL) Take by mouth as needed.      alpha lipoic acid 300 mg Cap Take 300 mg by mouth 2 (two) times daily. 60 each 5    apixaban (ELIQUIS) 5 mg Tab Take 1 tablet (5 mg total) by mouth 2 (two) times daily. 60 tablet 2    atorvastatin (LIPITOR) 40 MG tablet TAKE 1 TABLET(40 MG) BY MOUTH EVERY DAY (Patient taking differently: TAKE 1 TABLET(40 MG) BY MOUTH EVERY OTHER DAY) 90 tablet 0    BASAGLAR KWIKPEN  U-100 INSULIN 100 unit/mL (3 mL) InPn pen ADMINISTER 64 UNITS UNDER THE SKIN EVERY DAY 15 mL 0    benazepril (LOTENSIN) 40 MG tablet TAKE 1 TABLET BY MOUTH EVERY DAY (Patient taking differently: TAKE 1 TABLET BY MOUTH twice a day EVERY DAY) 90 tablet 0    BEPREVE 1.5 % Drop Place 1 drop into both eyes 2 (two) times daily. 10 mL 12    BEPREVE 1.5 % Drop Place 1 drop into both eyes 2 (two) times daily. 10 mL 12    calcium citrate-vitamin D3 315-200 mg (CITRACAL+D) 315-200 mg-unit per tablet Take 1 tablet by mouth once daily.      cyclobenzaprine (FLEXERIL) 10 MG tablet TK 1/2 TO 1 T PO TID PRF MUSCLE SPASM  0    cycloSPORINE (RESTASIS) 0.05 % ophthalmic emulsion Place 0.4 mLs (1 drop total) into both eyes 2 (two) times daily. 60 vial 12    DULoxetine (CYMBALTA) 30 MG capsule TAKE 1 CAPSULE(30 MG) BY MOUTH EVERY DAY (Patient not taking: Reported on 10/28/2019) 30 capsule 5    ERGOCALCIFEROL, VITAMIN D2, (VITAMIN D ORAL) Take by mouth once daily.       fluticasone propionate (FLONASE) 50 mcg/actuation nasal spray 2 sprays (100 mcg total) by Each Nare route once daily. 48 mL 0    hydrALAZINE (APRESOLINE) 100 MG tablet TAKE 1 TABLET(100 MG) BY MOUTH TWICE DAILY 180 tablet 0    JANUVIA 100 mg Tab TAKE 1 TABLET(100 MG) BY MOUTH EVERY DAY 90 tablet 0    liraglutide 0.6 mg/0.1 mL, 18 mg/3 mL, subq PNIJ (VICTOZA 2-LEAH) 0.6 mg/0.1 mL (18 mg/3 mL) PnIj Inject 0.6 mg into the skin once daily. 3 mL 11    LORazepam (ATIVAN) 1 MG tablet Take 1 tablet (1 mg total) by mouth every 6 (six) hours as needed for Anxiety. 10 tablet 0    MULTIVIT WITH CALCIUM,IRON,MIN (WOMEN'S DAILY MULTIVITAMIN ORAL) Take by mouth once daily.       mupirocin (BACTROBAN) 2 % ointment Apply topically 2 (two) times daily. 22 g 0    nystatin (MYCOSTATIN) cream Apply topically 2 (two) times daily. Continue until completely healed 30 g 0    pantoprazole (PROTONIX) 40 MG tablet Take 1 tablet (40 mg total) by mouth once daily. 90 tablet 3    pen  "needle, diabetic (BD ULTRA-FINE SHORT PEN NEEDLE) 31 gauge x 5/16" Ndle Use two daily as directd 200 each 4       Objective Findings:    Vital Signs:There were no vitals filed for this visit.        Physical Exam:  General Appearance: Well appearing in no acute distress  Eyes:    No scleral icterus  ENT: Neck supple, Lips, mucosa, and tongue normal; teeth and gums normal  Lungs: CTA bilaterally in anterior and posterior fields, no wheezes, no crackles.  Heart:  Regular rate, S1, S2 normal, no murmurs heard.  Abdomen: Soft, non tender, non distended with normal bowel sounds. No hepatosplenomegaly, ascites, or mass.  Extremities: No clubbing, cyanosis or edema  Skin: No rash    Labs:  Reviewed    Plan: EGD  I have explained the risks and benefits of endoscopy procedures to the patient including but not limited to bleeding, perforation, infection, and death. The patient wishes to proceed.           "

## 2019-11-29 NOTE — DISCHARGE SUMMARY
[facility]  Brief Operative Note     SUMMARY     Surgery Date: 11/29/2019     Surgeon(s) and Role:     * Shawn Rogers III, MD - Primary    Assisting Surgeon: None    Pre-op Diagnosis:  Esophageal dysphagia [R13.10]  Gastroesophageal reflux disease, esophagitis presence not specified [K21.9]    Post-op Diagnosis:  Post-Op Diagnosis Codes:     * Esophageal dysphagia [R13.10]     * Gastroesophageal reflux disease, esophagitis presence not specified [K21.9]      - Gastritis  Procedure(s) (LRB):  ESOPHAGOGASTRODUODENOSCOPY (EGD) (N/A)    Anesthesia: Monitor Anesthesia Care    Description of the findings of the procedure: Procedures completed. See Procedure note for full details.    Findings/Key Components: Procedures completed. See Procedure note for full details.    Prosthetics/Devices: None    Estimated Blood Loss: * No values recorded between 11/29/2019 12:00 AM and 11/29/2019 10:03 AM *         Specimens:   Specimen (12h ago, onward)    None          Discharge Note    SUMMARY     Admit Date: 11/29/2019    Discharge Date and Time: 11/29/2019    Hospital Course (synopsis of major diagnoses, care, treatment, and services provided during the course of the hospital stay):  Procedures completed. See Procedure note for full details. Discharge patient when discharge criteria met.    Final Diagnosis: Post-Op Diagnosis Codes:     * Esophageal dysphagia [R13.10]     * Gastroesophageal reflux disease, esophagitis presence not specified [K21.9]      -Gastritis  Disposition: Discharge patient when discharge criteria met.    Follow Up/Patient Instructions:       Medications:  Reconciled Home Medications:   Current Discharge Medication List      CONTINUE these medications which have NOT CHANGED    Details   ACETAMINOPHEN (TYLENOL ARTHRITIS ORAL) Take by mouth as needed.      alpha lipoic acid 300 mg Cap Take 300 mg by mouth 2 (two) times daily.  Qty: 60 each, Refills: 5      atorvastatin (LIPITOR) 40 MG tablet  TAKE 1 TABLET(40 MG) BY MOUTH EVERY DAY  Qty: 90 tablet, Refills: 0      !! BASAGLAR KWIKPEN U-100 INSULIN 100 unit/mL (3 mL) InPn pen ADMINISTER 64 UNITS UNDER THE SKIN EVERY DAY  Qty: 15 mL, Refills: 0      !! BASAGLAR KWIKPEN U-100 INSULIN glargine 100 units/mL (3mL) SubQ pen INJECT 64 UNITS UNDER SKIN EVERY DAY  Qty: 3 Box, Refills: 0      benazepril (LOTENSIN) 40 MG tablet TAKE 1 TABLET BY MOUTH EVERY DAY  Qty: 90 tablet, Refills: 0      !! BEPREVE 1.5 % Drop Place 1 drop into both eyes 2 (two) times daily.  Qty: 10 mL, Refills: 12      !! BEPREVE 1.5 % Drop Place 1 drop into both eyes 2 (two) times daily.  Qty: 10 mL, Refills: 12      calcium citrate-vitamin D3 315-200 mg (CITRACAL+D) 315-200 mg-unit per tablet Take 1 tablet by mouth once daily.      carvedilol (COREG) 6.25 MG tablet TAKE 1 TABLET(6.25 MG) BY MOUTH TWICE DAILY WITH MEALS  Qty: 180 tablet, Refills: 0      cycloSPORINE (RESTASIS) 0.05 % ophthalmic emulsion Place 0.4 mLs (1 drop total) into both eyes 2 (two) times daily.  Qty: 60 vial, Refills: 12    Associated Diagnoses: Keratitis sicca, bilateral      diclofenac sodium (VOLTAREN) 1 % Gel Apply 2 g topically 4 (four) times daily. To affected joints as needed for pain  Qty: 100 g, Refills: 3      !! DULoxetine (CYMBALTA) 30 MG capsule TAKE 1 CAPSULE(30 MG) BY MOUTH EVERY DAY  Qty: 30 capsule, Refills: 5      !! DULoxetine (CYMBALTA) 60 MG capsule TAKE 1 CAPSULE(60 MG) BY MOUTH EVERY DAY  Qty: 90 capsule, Refills: 0      ERGOCALCIFEROL, VITAMIN D2, (VITAMIN D ORAL) Take by mouth once daily.       ezetimibe (ZETIA) 10 mg tablet Take 10 mg by mouth once daily.      fluticasone propionate (FLONASE) 50 mcg/actuation nasal spray 2 sprays (100 mcg total) by Each Nare route once daily.  Qty: 48 mL, Refills: 0    Comments: **Patient requests 90 days supply**      gabapentin (NEURONTIN) 300 MG capsule Take 1 capsule (300 mg total) by mouth 3 (three) times daily.  Qty: 90 capsule, Refills: 1     "  hydrALAZINE (APRESOLINE) 100 MG tablet TAKE 1 TABLET(100 MG) BY MOUTH TWICE DAILY  Qty: 180 tablet, Refills: 0      !! JANUVIA 100 mg Tab TAKE 1 TABLET(100 MG) BY MOUTH EVERY DAY  Qty: 90 tablet, Refills: 0      liraglutide 0.6 mg/0.1 mL, 18 mg/3 mL, subq PNIJ (VICTOZA 2-LEAH) 0.6 mg/0.1 mL (18 mg/3 mL) PnIj Inject 0.6 mg into the skin once daily.  Qty: 3 mL, Refills: 11      MULTIVIT WITH CALCIUM,IRON,MIN (WOMEN'S DAILY MULTIVITAMIN ORAL) Take by mouth once daily.       pantoprazole (PROTONIX) 40 MG tablet Take 1 tablet (40 mg total) by mouth once daily.  Qty: 90 tablet, Refills: 3      pen needle, diabetic (BD ULTRA-FINE SHORT PEN NEEDLE) 31 gauge x 5/16" Ndle Use two daily as directd  Qty: 200 each, Refills: 4      temazepam (RESTORIL) 30 mg capsule TAKE 1 CAPSULE BY MOUTH EVERY DAY AT BEDTIME AS NEEDED FOR INSOMNIA  Qty: 30 capsule, Refills: 0      apixaban (ELIQUIS) 5 mg Tab Take 1 tablet (5 mg total) by mouth 2 (two) times daily.  Qty: 60 tablet, Refills: 2      cyclobenzaprine (FLEXERIL) 10 MG tablet TK 1/2 TO 1 T PO TID PRF MUSCLE SPASM  Refills: 0      doxycycline (VIBRA-TABS) 100 MG tablet Take 1 tablet (100 mg total) by mouth 2 (two) times daily.  Qty: 20 tablet, Refills: 0      !! JANUVIA 100 mg Tab TAKE 1 TABLET(100 MG) BY MOUTH EVERY DAY  Qty: 90 tablet, Refills: 0      LORazepam (ATIVAN) 1 MG tablet Take 1 tablet (1 mg total) by mouth every 6 (six) hours as needed for Anxiety.  Qty: 10 tablet, Refills: 0      mupirocin (BACTROBAN) 2 % ointment Apply topically 2 (two) times daily.  Qty: 22 g, Refills: 0      nystatin (MYCOSTATIN) cream Apply topically 2 (two) times daily. Continue until completely healed  Qty: 30 g, Refills: 0      triamcinolone acetonide 0.1% (KENALOG) 0.1 % cream Apply topically 2 (two) times daily. No longer than 2 weeks.  Qty: 30 g, Refills: 0       !! - Potential duplicate medications found. Please discuss with provider.         Discharge Procedure Orders   Diet general "     Activity as tolerated

## 2019-11-29 NOTE — PLAN OF CARE
Dr Rogers came to bedside and discussed findings. NO N/V,  no abdominal pain, no GI bleeding, and vitals stable.  Pt discharged from unit.

## 2019-11-29 NOTE — TRANSFER OF CARE
"Anesthesia Transfer of Care Note    Patient: Christine Jo    Procedure(s) Performed: Procedure(s) (LRB):  ESOPHAGOGASTRODUODENOSCOPY (EGD) (N/A)    Patient location: GI    Anesthesia Type: MAC    Transport from OR: Transported from OR on room air with adequate spontaneous ventilation    Post pain: adequate analgesia    Post assessment: no apparent anesthetic complications    Post vital signs: stable    Level of consciousness: awake    Nausea/Vomiting: no nausea/vomiting    Complications: none    Transfer of care protocol was followed      Last vitals:   Visit Vitals  BP (!) 153/66   Pulse 75   Temp 36.7 °C (98.1 °F) (Skin)   Resp 20   Ht 5' 1" (1.549 m)   Wt 109.8 kg (242 lb 1 oz)   Breastfeeding? No   BMI 45.74 kg/m²     "

## 2019-11-29 NOTE — [employer] PATIENT INSTRUCTIONS
Discharge Summary/Instructions after an Endoscopic Procedure  Patient Name: Christine Jo  Patient MRN: [id number]  Patient YOB: 1950  Friday, November 29, 2019 Shawn Rogers III, MD  RESTRICTIONS:  During your procedure today, you received medications for sedation.  These   medications may affect your judgment, balance and coordination.  Therefore,   for 24 hours, you have the following restrictions:   - DO NOT drive a car, operate machinery, make legal/financial decisions,   sign important papers or drink alcohol.    ACTIVITY:  Today: no heavy lifting, straining or running due to procedural   sedation/anesthesia.  The following day: return to full activity including work.  DIET:  Eat and drink normally unless instructed otherwise.     TREATMENT FOR COMMON SIDE EFFECTS:  - Mild abdominal pain, nausea, belching, bloating or excessive gas:  rest,   eat lightly and use a heating pad.  - Sore Throat: treat with throat lozenges and/or gargle with warm salt   water.  - Because air was used during the procedure, expelling large amounts of air   from your rectum or belching is normal.  - If a bowel prep was taken, you may not have a bowel movement for 1-3 days.    This is normal.  SYMPTOMS TO WATCH FOR AND REPORT TO YOUR PHYSICIAN:  1. Abdominal pain or bloating, other than gas cramps.  2. Chest pain.  3. Back pain.  4. Signs of infection such as: chills or fever occurring within 24 hours   after the procedure.  5. Rectal bleeding, which would show as bright red, maroon, or black stools.   (A tablespoon of blood from the rectum is not serious, especially if   hemorrhoids are present.)  6. Vomiting.  7. Weakness or dizziness.  GO DIRECTLY TO THE NEAREST EMERGENCY ROOM IF YOU HAVE ANY OF THE FOLLOWING:      Difficulty breathing              Chills and/or fever over 101 F   Persistent vomiting and/or vomiting blood   Severe abdominal pain   Severe chest pain   Black, tarry stools   Bleeding- more than one  tablespoon   Any other symptom or condition that you feel may need urgent attention  Your doctor recommends these additional instructions:  If any biopsies were taken, your doctors clinic will contact you in 1 to 2   weeks with any results.  - Discharge patient to home (via wheelchair).   - Resume previous diet.   - Continue present medications.   - Await pathology results.   - Return to referring physician as previously scheduled.   - Return to GI clinic as previously scheduled.  For questions, problems or results please call your physician Shawn Rogers III, MD at Work:  [phone]  If you have any questions about the above instructions, call the GI   department at [phone] or call the endoscopy unit at [phone]   from 7am until 3 pm.  [facility], EMERGENCY ROOM PHONE NUMBER:   [phone]  IF A COMPLICATION OR EMERGENCY SITUATION ARISES AND YOU ARE UNABLE TO REACH   YOUR PHYSICIAN - GO DIRECTLY TO THE EMERGENCY ROOM.  I have read or have had read to me these discharge instructions for my   procedure and have received a written copy.  I understand these   instructions and will follow-up with my physician if I have any questions.     __________________________________       _____________________________________  Nurse Signature                                          Patient/Designated   Responsible Party Signature  Shawn Rogers III, MD  11/29/2019 10:17:20 AM  This report has been verified and signed electronically.  [employer]

## 2019-11-29 NOTE — ANESTHESIA RELEASE NOTE
"Anesthesia Release from PACU Note    Patient: Christine Jo    Procedure(s) Performed: Procedure(s) (LRB):  ESOPHAGOGASTRODUODENOSCOPY (EGD) (N/A)    Anesthesia type: MAC    Post pain: Adequate analgesia    Post assessment: no apparent anesthetic complications    Last Vitals:   Visit Vitals  BP (!) 153/66   Pulse 75   Temp 36.7 °C (98.1 °F) (Skin)   Resp 20   Ht 5' 1" (1.549 m)   Wt 109.8 kg (242 lb 1 oz)   Breastfeeding? No   BMI 45.74 kg/m²       Post vital signs: stable    Level of consciousness: awake    Nausea/Vomiting: no nausea/no vomiting    Complications: none    Airway Patency: patent    Respiratory: unassisted    Cardiovascular: stable and blood pressure at baseline    Hydration: euvolemic  "

## 2019-11-29 NOTE — INTERVAL H&P NOTE
The patient has been examined and the H&P has been reviewed:I have reviewed this note and I agree with this assessment. The patient remains stable for endoscopy at the time of this present evaluation.         Anesthesia/Surgery risks, benefits and alternative options discussed and understood by patient/family.          Active Hospital Problems    Diagnosis  POA    Dysphagia [R13.10]  Yes      Resolved Hospital Problems   No resolved problems to display.

## 2019-12-05 ENCOUNTER — OFFICE VISIT (OUTPATIENT)
Dept: INTERNAL MEDICINE | Facility: CLINIC | Age: 69
End: 2019-12-05
Payer: MEDICARE

## 2019-12-05 VITALS
SYSTOLIC BLOOD PRESSURE: 136 MMHG | HEIGHT: 61 IN | TEMPERATURE: 98 F | DIASTOLIC BLOOD PRESSURE: 78 MMHG | BODY MASS INDEX: 45.37 KG/M2 | HEART RATE: 81 BPM | OXYGEN SATURATION: 98 % | WEIGHT: 240.31 LBS

## 2019-12-05 DIAGNOSIS — I10 ESSENTIAL HYPERTENSION: ICD-10-CM

## 2019-12-05 DIAGNOSIS — Z28.39 IMMUNIZATION DEFICIENCY: ICD-10-CM

## 2019-12-05 DIAGNOSIS — D50.0 IRON DEFICIENCY ANEMIA DUE TO CHRONIC BLOOD LOSS: ICD-10-CM

## 2019-12-05 DIAGNOSIS — R13.10 DYSPHAGIA, UNSPECIFIED TYPE: ICD-10-CM

## 2019-12-05 DIAGNOSIS — E11.9 CONTROLLED TYPE 2 DIABETES MELLITUS WITHOUT COMPLICATION, WITH LONG-TERM CURRENT USE OF INSULIN: Primary | ICD-10-CM

## 2019-12-05 DIAGNOSIS — I48.0 PAROXYSMAL A-FIB: ICD-10-CM

## 2019-12-05 DIAGNOSIS — K21.9 GASTROESOPHAGEAL REFLUX DISEASE WITHOUT ESOPHAGITIS: ICD-10-CM

## 2019-12-05 DIAGNOSIS — Z79.4 CONTROLLED TYPE 2 DIABETES MELLITUS WITHOUT COMPLICATION, WITH LONG-TERM CURRENT USE OF INSULIN: Primary | ICD-10-CM

## 2019-12-05 PROCEDURE — 3078F PR MOST RECENT DIASTOLIC BLOOD PRESSURE < 80 MM HG: ICD-10-PCS | Mod: CPTII,S$GLB,, | Performed by: FAMILY MEDICINE

## 2019-12-05 PROCEDURE — 99214 PR OFFICE/OUTPT VISIT, EST, LEVL IV, 30-39 MIN: ICD-10-PCS | Mod: S$GLB,,, | Performed by: FAMILY MEDICINE

## 2019-12-05 PROCEDURE — 99214 OFFICE O/P EST MOD 30 MIN: CPT | Mod: S$GLB,,, | Performed by: FAMILY MEDICINE

## 2019-12-05 PROCEDURE — 1126F PR PAIN SEVERITY QUANTIFIED, NO PAIN PRESENT: ICD-10-PCS | Mod: S$GLB,,, | Performed by: FAMILY MEDICINE

## 2019-12-05 PROCEDURE — 99999 PR PBB SHADOW E&M-EST. PATIENT-LVL IV: ICD-10-PCS | Mod: PBBFAC,,, | Performed by: FAMILY MEDICINE

## 2019-12-05 PROCEDURE — 1159F PR MEDICATION LIST DOCUMENTED IN MEDICAL RECORD: ICD-10-PCS | Mod: S$GLB,,, | Performed by: FAMILY MEDICINE

## 2019-12-05 PROCEDURE — 83036 HEMOGLOBIN GLYCOSYLATED A1C: CPT

## 2019-12-05 PROCEDURE — 3075F SYST BP GE 130 - 139MM HG: CPT | Mod: CPTII,S$GLB,, | Performed by: FAMILY MEDICINE

## 2019-12-05 PROCEDURE — 3044F HG A1C LEVEL LT 7.0%: CPT | Mod: CPTII,S$GLB,, | Performed by: FAMILY MEDICINE

## 2019-12-05 PROCEDURE — 1126F AMNT PAIN NOTED NONE PRSNT: CPT | Mod: S$GLB,,, | Performed by: FAMILY MEDICINE

## 2019-12-05 PROCEDURE — 99999 PR PBB SHADOW E&M-EST. PATIENT-LVL IV: CPT | Mod: PBBFAC,,, | Performed by: FAMILY MEDICINE

## 2019-12-05 PROCEDURE — 3078F DIAST BP <80 MM HG: CPT | Mod: CPTII,S$GLB,, | Performed by: FAMILY MEDICINE

## 2019-12-05 PROCEDURE — 1101F PR PT FALLS ASSESS DOC 0-1 FALLS W/OUT INJ PAST YR: ICD-10-PCS | Mod: CPTII,S$GLB,, | Performed by: FAMILY MEDICINE

## 2019-12-05 PROCEDURE — 3044F PR MOST RECENT HEMOGLOBIN A1C LEVEL <7.0%: ICD-10-PCS | Mod: CPTII,S$GLB,, | Performed by: FAMILY MEDICINE

## 2019-12-05 PROCEDURE — 1159F MED LIST DOCD IN RCRD: CPT | Mod: S$GLB,,, | Performed by: FAMILY MEDICINE

## 2019-12-05 PROCEDURE — 3075F PR MOST RECENT SYSTOLIC BLOOD PRESS GE 130-139MM HG: ICD-10-PCS | Mod: CPTII,S$GLB,, | Performed by: FAMILY MEDICINE

## 2019-12-05 PROCEDURE — 1101F PT FALLS ASSESS-DOCD LE1/YR: CPT | Mod: CPTII,S$GLB,, | Performed by: FAMILY MEDICINE

## 2019-12-05 RX ORDER — PANTOPRAZOLE SODIUM 40 MG/1
40 TABLET, DELAYED RELEASE ORAL 2 TIMES DAILY
Qty: 180 TABLET | Refills: 3
Start: 2019-12-05 | End: 2020-06-16 | Stop reason: SDUPTHER

## 2019-12-05 RX ORDER — NITROGLYCERIN 0.4 MG/1
TABLET SUBLINGUAL
Refills: 0 | COMMUNITY
Start: 2019-11-26 | End: 2022-04-27

## 2019-12-05 RX ORDER — INSULIN GLARGINE 100 [IU]/ML
64 INJECTION, SOLUTION SUBCUTANEOUS NIGHTLY
Qty: 3 BOX | Refills: 3 | Status: SHIPPED | OUTPATIENT
Start: 2019-12-05 | End: 2020-06-16 | Stop reason: SDUPTHER

## 2019-12-05 NOTE — PROGRESS NOTES
Subjective:       Patient ID: Christine Jo is a 69 y.o. female.    Chief Complaint: Follow-up    Follow-up diabetes hypertension esophageal reflux paroxysmal atrial fibrillation depression.  She denies polyuria polydipsia hypoglycemic symptoms.  A1c is being repeated today.  Last A1c was in a good range.  She denies headache chest pain palpitations shortness of breath or edema.  EGD was done and reviewed.  She still has some esophageal symptoms.  Increased Protonix start 40 mg twice a day.  She reports depression has improved and she reduce Cymbalta 90 mg and now taking 60 mg a day insurance required her to stop basaglar and start Lantus    Review of Systems   Constitutional: Negative for activity change, appetite change, diaphoresis and unexpected weight change.   Respiratory: Negative for cough, chest tightness, shortness of breath and wheezing.    Cardiovascular: Negative for chest pain, palpitations and leg swelling.        Denies lightheadedness   Gastrointestinal: Negative for abdominal distention, abdominal pain, constipation, diarrhea, nausea and vomiting.        Dysphagia   Endocrine: Negative for polydipsia and polyuria.   Genitourinary: Negative for difficulty urinating.   Neurological: Negative for dizziness, weakness, light-headedness and headaches.       Objective:      Physical Exam   Constitutional: She is oriented to person, place, and time. She appears well-developed and well-nourished. No distress.   Neck: Neck supple. No JVD present. No tracheal deviation present. No thyromegaly present.   Cardiovascular: Normal rate, regular rhythm and normal heart sounds. Exam reveals no gallop.   No murmur heard.  Pulmonary/Chest: Effort normal and breath sounds normal. No respiratory distress. She has no wheezes. She has no rales.   Abdominal: Soft. Bowel sounds are normal. She exhibits no distension and no mass. There is no tenderness. There is no guarding.   Lymphadenopathy:     She has no cervical  adenopathy.   Neurological: She is alert and oriented to person, place, and time.   Skin: Skin is warm and dry. She is not diaphoretic.       Admission on 11/29/2019, Discharged on 11/29/2019   Component Date Value Ref Range Status    POCT Glucose 11/29/2019 190* 70 - 110 mg/dL Final     Assessment:       1. Controlled type 2 diabetes mellitus without complication, with long-term current use of insulin        Plan:     Blood pressure controlled 136/78.  A1c ordered.  Medications reviewed.  Changed to Lantus 64 units a day.  Will increase Protonix 40 mg twice a day.  Esophageal biopsy not yet reported.  Follow-up in 4 months.  Discussed need for shingles immunization at the pharmacy.  Hematology is following iron deficiency anemia    Controlled type 2 diabetes mellitus without complication, with long-term current use of insulin  -     Hemoglobin A1c    Other orders  -     pantoprazole (PROTONIX) 40 MG tablet; Take 1 tablet (40 mg total) by mouth 2 (two) times daily.  Dispense: 180 tablet; Refill: 3  -     insulin (LANTUS SOLOSTAR U-100 INSULIN) glargine 100 units/mL (3mL) SubQ pen; Inject 64 Units into the skin every evening.  Dispense: 3 Box; Refill: 3

## 2019-12-06 LAB
ESTIMATED AVG GLUCOSE: 169 MG/DL (ref 68–131)
HBA1C MFR BLD HPLC: 7.5 % (ref 4–5.6)

## 2019-12-07 ENCOUNTER — PATIENT MESSAGE (OUTPATIENT)
Dept: INTERNAL MEDICINE | Facility: CLINIC | Age: 69
End: 2019-12-07

## 2019-12-09 LAB
FINAL PATHOLOGIC DIAGNOSIS: NORMAL
GROSS: NORMAL

## 2019-12-09 RX ORDER — TEMAZEPAM 30 MG/1
CAPSULE ORAL
Qty: 30 CAPSULE | Refills: 0 | Status: SHIPPED | OUTPATIENT
Start: 2019-12-09 | End: 2020-01-06 | Stop reason: SDUPTHER

## 2019-12-09 RX ORDER — TEMAZEPAM 30 MG/1
CAPSULE ORAL
Qty: 30 CAPSULE | Refills: 0 | OUTPATIENT
Start: 2019-12-09

## 2019-12-10 ENCOUNTER — TELEPHONE (OUTPATIENT)
Dept: GASTROENTEROLOGY | Facility: CLINIC | Age: 69
End: 2019-12-10

## 2019-12-10 NOTE — TELEPHONE ENCOUNTER
Spoke with patient and gave results of pathology report from 11/29/2019. Patient verbalized understanding to all with no questions or concerns.

## 2019-12-10 NOTE — TELEPHONE ENCOUNTER
----- Message from Guanakito Gordon sent at 12/10/2019 10:54 AM CST -----  Type:  Test Results    Who Called: JUSTIN GOLD   Name of Test (Lab/Mammo/Etc): Endoscopy  Date of Test:  11/29/2019  Ordering Provider:  Dr hawthorne  Where the test was performed:  Hospital   Would the patient rather a call back or a response via [facility]?  Call   Best Call Back Number: [phone] ([place])    Additional Information:

## 2019-12-15 RX ORDER — CARVEDILOL 6.25 MG/1
TABLET ORAL
Qty: 180 TABLET | Refills: 0 | Status: SHIPPED | OUTPATIENT
Start: 2019-12-15 | End: 2019-12-23

## 2019-12-15 RX ORDER — TEMAZEPAM 30 MG/1
CAPSULE ORAL
Qty: 30 CAPSULE | Refills: 0 | OUTPATIENT
Start: 2019-12-15

## 2019-12-15 RX ORDER — ATORVASTATIN CALCIUM 40 MG/1
TABLET, FILM COATED ORAL
Qty: 90 TABLET | Refills: 0 | Status: SHIPPED | OUTPATIENT
Start: 2019-12-15 | End: 2020-03-03 | Stop reason: SDUPTHER

## 2019-12-16 ENCOUNTER — PATIENT MESSAGE (OUTPATIENT)
Dept: GASTROENTEROLOGY | Facility: CLINIC | Age: 69
End: 2019-12-16

## 2019-12-17 ENCOUNTER — INFUSION (OUTPATIENT)
Dept: INFUSION THERAPY | Facility: HOSPITAL | Age: 69
End: 2019-12-17
Attending: INTERNAL MEDICINE
Payer: MEDICARE

## 2019-12-17 VITALS
OXYGEN SATURATION: 98 % | RESPIRATION RATE: 18 BRPM | DIASTOLIC BLOOD PRESSURE: 67 MMHG | TEMPERATURE: 98 F | HEART RATE: 76 BPM | SYSTOLIC BLOOD PRESSURE: 138 MMHG

## 2019-12-17 DIAGNOSIS — M81.0 AGE-RELATED OSTEOPOROSIS WITHOUT CURRENT PATHOLOGICAL FRACTURE: Primary | ICD-10-CM

## 2019-12-17 PROCEDURE — 63600175 PHARM REV CODE 636 W HCPCS: Mod: JG | Performed by: PHYSICIAN ASSISTANT

## 2019-12-17 PROCEDURE — 96374 THER/PROPH/DIAG INJ IV PUSH: CPT

## 2019-12-17 RX ORDER — HEPARIN 100 UNIT/ML
500 SYRINGE INTRAVENOUS
Status: CANCELLED | OUTPATIENT
Start: 2020-01-01

## 2019-12-17 RX ORDER — SODIUM CHLORIDE 0.9 % (FLUSH) 0.9 %
10 SYRINGE (ML) INJECTION
Status: CANCELLED | OUTPATIENT
Start: 2020-01-01

## 2019-12-17 RX ORDER — IBANDRONATE SODIUM 3 MG/3 ML
3 SYRINGE (ML) INTRAVENOUS
Status: CANCELLED | OUTPATIENT
Start: 2020-01-01

## 2019-12-17 RX ORDER — EZETIMIBE 10 MG/1
10 TABLET ORAL DAILY
COMMUNITY
End: 2020-05-25 | Stop reason: SDUPTHER

## 2019-12-17 RX ORDER — IBANDRONATE SODIUM 3 MG/3 ML
3 SYRINGE (ML) INTRAVENOUS
Status: COMPLETED | OUTPATIENT
Start: 2019-12-17 | End: 2019-12-17

## 2019-12-17 RX ADMIN — IBANDRONATE SODIUM 3 MG: 3 INJECTION INTRAVENOUS at 02:12

## 2019-12-17 NOTE — NURSING
Recent Labs? 12/17/19  Recent Invasive or planned dental procedures? Denied  Boniva 3 mg administered slow IVP via butterfly to RAC  Pt tolerated well without adverse events. Pressure dressing applied.  See Vitals and MAR for further details.  Pt discharged ambulatory.

## 2019-12-17 NOTE — DISCHARGE INSTRUCTIONS
[facility]  [address] [address]  [address]  [phone] phone     [phone] fax  Hours of Operation: Monday- Friday 8:00am- 5:00pm  After hours phone  [phone]  Hematology / Oncology Physicians on call      EDG[address] Dr[address] Dr., Dr., Dr., NP Sydney Prescott, NP Tyesha Taylor, NP    Please call with any concerns regarding your appointment today.

## 2019-12-18 ENCOUNTER — PATIENT MESSAGE (OUTPATIENT)
Dept: RHEUMATOLOGY | Facility: CLINIC | Age: 69
End: 2019-12-18

## 2019-12-19 ENCOUNTER — TELEPHONE (OUTPATIENT)
Dept: SPORTS MEDICINE | Facility: CLINIC | Age: 69
End: 2019-12-19

## 2019-12-19 DIAGNOSIS — M25.562 CHRONIC PAIN OF BOTH KNEES: ICD-10-CM

## 2019-12-19 DIAGNOSIS — M25.561 CHRONIC PAIN OF BOTH KNEES: ICD-10-CM

## 2019-12-19 DIAGNOSIS — G89.29 CHRONIC PAIN OF BOTH KNEES: ICD-10-CM

## 2019-12-19 DIAGNOSIS — M17.12 PRIMARY OSTEOARTHRITIS OF LEFT KNEE: Primary | ICD-10-CM

## 2019-12-19 NOTE — TELEPHONE ENCOUNTER
Called pt in regards to ortho referral and scheduled apt. Pt was informed to arrive 30 min early for x-ray. Pt requested an apt after the holidays and at the [place] location.

## 2019-12-23 RX ORDER — CARVEDILOL 6.25 MG/1
TABLET ORAL
Qty: 180 TABLET | Refills: 0 | Status: SHIPPED | OUTPATIENT
Start: 2019-12-23 | End: 2020-06-16 | Stop reason: SDUPTHER

## 2019-12-30 ENCOUNTER — LAB VISIT (OUTPATIENT)
Dept: LAB | Facility: HOSPITAL | Age: 69
End: 2019-12-30
Payer: MEDICARE

## 2019-12-30 DIAGNOSIS — D64.9 ANEMIA, UNSPECIFIED TYPE: ICD-10-CM

## 2019-12-30 DIAGNOSIS — D50.9 IRON DEFICIENCY ANEMIA, UNSPECIFIED IRON DEFICIENCY ANEMIA TYPE: ICD-10-CM

## 2019-12-30 LAB
ALBUMIN SERPL BCP-MCNC: 4 G/DL (ref 3.5–5.2)
ALP SERPL-CCNC: 48 U/L (ref 55–135)
ALT SERPL W/O P-5'-P-CCNC: 21 U/L (ref 10–44)
ANION GAP SERPL CALC-SCNC: 14 MMOL/L (ref 8–16)
AST SERPL-CCNC: 20 U/L (ref 10–40)
BASOPHILS # BLD AUTO: 0.06 K/UL (ref 0–0.2)
BASOPHILS NFR BLD: 0.6 % (ref 0–1.9)
BILIRUB SERPL-MCNC: 0.7 MG/DL (ref 0.1–1)
BUN SERPL-MCNC: 18 MG/DL (ref 8–23)
CALCIUM SERPL-MCNC: 9.9 MG/DL (ref 8.7–10.5)
CHLORIDE SERPL-SCNC: 100 MMOL/L (ref 95–110)
CO2 SERPL-SCNC: 23 MMOL/L (ref 23–29)
CREAT SERPL-MCNC: 1 MG/DL (ref 0.5–1.4)
DIFFERENTIAL METHOD: ABNORMAL
EOSINOPHIL # BLD AUTO: 0.1 K/UL (ref 0–0.5)
EOSINOPHIL NFR BLD: 1.4 % (ref 0–8)
ERYTHROCYTE [DISTWIDTH] IN BLOOD BY AUTOMATED COUNT: 12 % (ref 11.5–14.5)
EST. GFR  (AFRICAN AMERICAN): >60 ML/MIN/1.73 M^2
EST. GFR  (NON AFRICAN AMERICAN): 58 ML/MIN/1.73 M^2
FERRITIN SERPL-MCNC: 125 NG/ML (ref 20–300)
GLUCOSE SERPL-MCNC: 292 MG/DL (ref 70–110)
HCT VFR BLD AUTO: 36.9 % (ref 37–48.5)
HGB BLD-MCNC: 12 G/DL (ref 12–16)
IMM GRANULOCYTES # BLD AUTO: 0.03 K/UL (ref 0–0.04)
IMM GRANULOCYTES NFR BLD AUTO: 0.3 % (ref 0–0.5)
IRON SERPL-MCNC: 71 UG/DL (ref 30–160)
LYMPHOCYTES # BLD AUTO: 1.5 K/UL (ref 1–4.8)
LYMPHOCYTES NFR BLD: 15.5 % (ref 18–48)
MCH RBC QN AUTO: 30.5 PG (ref 27–31)
MCHC RBC AUTO-ENTMCNC: 32.5 G/DL (ref 32–36)
MCV RBC AUTO: 94 FL (ref 82–98)
MONOCYTES # BLD AUTO: 0.7 K/UL (ref 0.3–1)
MONOCYTES NFR BLD: 7.3 % (ref 4–15)
NEUTROPHILS # BLD AUTO: 7.4 K/UL (ref 1.8–7.7)
NEUTROPHILS NFR BLD: 74.9 % (ref 38–73)
NRBC BLD-RTO: 0 /100 WBC
PLATELET # BLD AUTO: 261 K/UL (ref 150–350)
PMV BLD AUTO: 11 FL (ref 9.2–12.9)
POTASSIUM SERPL-SCNC: 4.6 MMOL/L (ref 3.5–5.1)
PROT SERPL-MCNC: 7.3 G/DL (ref 6–8.4)
RBC # BLD AUTO: 3.93 M/UL (ref 4–5.4)
SATURATED IRON: 21 % (ref 20–50)
SODIUM SERPL-SCNC: 137 MMOL/L (ref 136–145)
TOTAL IRON BINDING CAPACITY: 342 UG/DL (ref 250–450)
TRANSFERRIN SERPL-MCNC: 231 MG/DL (ref 200–375)
WBC # BLD AUTO: 9.89 K/UL (ref 3.9–12.7)

## 2019-12-30 PROCEDURE — 82728 ASSAY OF FERRITIN: CPT

## 2019-12-30 PROCEDURE — 85025 COMPLETE CBC W/AUTO DIFF WBC: CPT

## 2019-12-30 PROCEDURE — 80053 COMPREHEN METABOLIC PANEL: CPT

## 2019-12-30 PROCEDURE — 36415 COLL VENOUS BLD VENIPUNCTURE: CPT

## 2019-12-30 PROCEDURE — 83540 ASSAY OF IRON: CPT

## 2020-01-03 ENCOUNTER — PATIENT MESSAGE (OUTPATIENT)
Dept: PAIN MEDICINE | Facility: CLINIC | Age: 70
End: 2020-01-03

## 2020-01-03 ENCOUNTER — OFFICE VISIT (OUTPATIENT)
Dept: HEMATOLOGY/ONCOLOGY | Facility: CLINIC | Age: 70
End: 2020-01-03
Payer: MEDICARE

## 2020-01-03 ENCOUNTER — HOSPITAL ENCOUNTER (OUTPATIENT)
Dept: RADIOLOGY | Facility: HOSPITAL | Age: 70
Discharge: HOME | End: 2020-01-03
Attending: FAMILY MEDICINE
Payer: MEDICARE

## 2020-01-03 VITALS
WEIGHT: 239.63 LBS | BODY MASS INDEX: 45.24 KG/M2 | SYSTOLIC BLOOD PRESSURE: 135 MMHG | HEART RATE: 81 BPM | TEMPERATURE: 97 F | RESPIRATION RATE: 17 BRPM | OXYGEN SATURATION: 98 % | DIASTOLIC BLOOD PRESSURE: 69 MMHG | HEIGHT: 61 IN

## 2020-01-03 DIAGNOSIS — Z12.39 BREAST SCREENING: ICD-10-CM

## 2020-01-03 DIAGNOSIS — D64.9 ANEMIA, UNSPECIFIED TYPE: ICD-10-CM

## 2020-01-03 DIAGNOSIS — D52.0 DIETARY FOLATE DEFICIENCY ANEMIA: ICD-10-CM

## 2020-01-03 DIAGNOSIS — J06.9 UPPER RESPIRATORY TRACT INFECTION, UNSPECIFIED TYPE: Primary | ICD-10-CM

## 2020-01-03 DIAGNOSIS — Z86.2 HISTORY OF IRON DEFICIENCY ANEMIA: ICD-10-CM

## 2020-01-03 DIAGNOSIS — E66.01 MORBID OBESITY: ICD-10-CM

## 2020-01-03 DIAGNOSIS — Z12.31 VISIT FOR SCREENING MAMMOGRAM: ICD-10-CM

## 2020-01-03 PROCEDURE — 77063 BREAST TOMOSYNTHESIS BI: CPT | Mod: 26,,, | Performed by: RADIOLOGY

## 2020-01-03 PROCEDURE — 3075F SYST BP GE 130 - 139MM HG: CPT | Mod: CPTII,S$GLB,, | Performed by: NURSE PRACTITIONER

## 2020-01-03 PROCEDURE — 99999 PR PBB SHADOW E&M-EST. PATIENT-LVL III: ICD-10-PCS | Mod: PBBFAC,,, | Performed by: NURSE PRACTITIONER

## 2020-01-03 PROCEDURE — 77067 SCR MAMMO BI INCL CAD: CPT | Mod: 26,,, | Performed by: RADIOLOGY

## 2020-01-03 PROCEDURE — 77067 MAMMO DIGITAL SCREENING BILAT WITH TOMOSYNTHESIS_CAD: ICD-10-PCS | Mod: 26,,, | Performed by: RADIOLOGY

## 2020-01-03 PROCEDURE — 77067 SCR MAMMO BI INCL CAD: CPT | Mod: TC

## 2020-01-03 PROCEDURE — 99214 OFFICE O/P EST MOD 30 MIN: CPT | Mod: S$GLB,,, | Performed by: NURSE PRACTITIONER

## 2020-01-03 PROCEDURE — 1159F MED LIST DOCD IN RCRD: CPT | Mod: S$GLB,,, | Performed by: NURSE PRACTITIONER

## 2020-01-03 PROCEDURE — 99499 UNLISTED E&M SERVICE: CPT | Mod: S$GLB,,, | Performed by: NURSE PRACTITIONER

## 2020-01-03 PROCEDURE — 1126F AMNT PAIN NOTED NONE PRSNT: CPT | Mod: S$GLB,,, | Performed by: NURSE PRACTITIONER

## 2020-01-03 PROCEDURE — 1159F PR MEDICATION LIST DOCUMENTED IN MEDICAL RECORD: ICD-10-PCS | Mod: S$GLB,,, | Performed by: NURSE PRACTITIONER

## 2020-01-03 PROCEDURE — 1101F PT FALLS ASSESS-DOCD LE1/YR: CPT | Mod: CPTII,S$GLB,, | Performed by: NURSE PRACTITIONER

## 2020-01-03 PROCEDURE — 99999 PR PBB SHADOW E&M-EST. PATIENT-LVL III: CPT | Mod: PBBFAC,,, | Performed by: NURSE PRACTITIONER

## 2020-01-03 PROCEDURE — 77063 MAMMO DIGITAL SCREENING BILAT WITH TOMOSYNTHESIS_CAD: ICD-10-PCS | Mod: 26,,, | Performed by: RADIOLOGY

## 2020-01-03 PROCEDURE — 3075F PR MOST RECENT SYSTOLIC BLOOD PRESS GE 130-139MM HG: ICD-10-PCS | Mod: CPTII,S$GLB,, | Performed by: NURSE PRACTITIONER

## 2020-01-03 PROCEDURE — 3078F DIAST BP <80 MM HG: CPT | Mod: CPTII,S$GLB,, | Performed by: NURSE PRACTITIONER

## 2020-01-03 PROCEDURE — 1101F PR PT FALLS ASSESS DOC 0-1 FALLS W/OUT INJ PAST YR: ICD-10-PCS | Mod: CPTII,S$GLB,, | Performed by: NURSE PRACTITIONER

## 2020-01-03 PROCEDURE — 3078F PR MOST RECENT DIASTOLIC BLOOD PRESSURE < 80 MM HG: ICD-10-PCS | Mod: CPTII,S$GLB,, | Performed by: NURSE PRACTITIONER

## 2020-01-03 PROCEDURE — 99499 RISK ADDL DX/OHS AUDIT: ICD-10-PCS | Mod: S$GLB,,, | Performed by: NURSE PRACTITIONER

## 2020-01-03 PROCEDURE — 99214 PR OFFICE/OUTPT VISIT, EST, LEVL IV, 30-39 MIN: ICD-10-PCS | Mod: S$GLB,,, | Performed by: NURSE PRACTITIONER

## 2020-01-03 PROCEDURE — 1126F PR PAIN SEVERITY QUANTIFIED, NO PAIN PRESENT: ICD-10-PCS | Mod: S$GLB,,, | Performed by: NURSE PRACTITIONER

## 2020-01-03 RX ORDER — GUAIFENESIN 600 MG/1
1200 TABLET, EXTENDED RELEASE ORAL 2 TIMES DAILY
Qty: 40 TABLET | Refills: 0 | Status: SHIPPED | OUTPATIENT
Start: 2020-01-03 | End: 2020-12-14

## 2020-01-03 RX ORDER — GABAPENTIN 100 MG/1
CAPSULE ORAL
Qty: 60 CAPSULE | Refills: 0 | Status: SHIPPED | OUTPATIENT
Start: 2020-01-03 | End: 2020-01-13

## 2020-01-03 RX ORDER — AMOXICILLIN 875 MG/1
875 TABLET, FILM COATED ORAL 2 TIMES DAILY
Qty: 20 TABLET | Refills: 0 | Status: SHIPPED | OUTPATIENT
Start: 2020-01-03 | End: 2020-01-13

## 2020-01-03 RX ORDER — GABAPENTIN 100 MG/1
200 CAPSULE ORAL EVERY MORNING
Qty: 60 CAPSULE | Refills: 4 | Status: SHIPPED | OUTPATIENT
Start: 2020-01-03 | End: 2020-02-02

## 2020-01-03 NOTE — PROGRESS NOTES
Subjective:      Patient ID: Christine Jo is a 69 y.o. female.    Chief Complaint: Lab Discussion; fatigue    HPI:  Patient is a 69 year old  female who present today for follow up for her diagnosed iron deficiency anemia.  She was given IV iron on 1/3/19 and 1/8/19.  Currently not anemic with hemoglobin 12 and iron is repleated.  She [facility] Patel which was - negative for contributory causes.  She had EGD many years ago which showed gastritis.  Could not find results of this media section.    She states recent switched from prilosec to protonix due to complaints of gastric reflux which is being followed by PCP Dr. Szymanski.  Had EGD 11/29/19 showed reactive gastritis.      She is being follow by Dr. Arthur Reynaga at [facility] [place] [facility] for A. Fib and state that she sees him every 6 months.  She has DMII with currently uncontrolled with elevated A1c that is followed by primary care.  She is also insulin dependent.       She complains of fatigue and chronic joint pain to knees and back/neck.  She verbalized that he depression is better.  Her TSH was normal.       She denies hematuria, melena, hematochezia, epistaxis, bleeding gums, or unusual bruising.  She denies fever, chills, and night sweats.  She denies unintentional weight loss.  She complains of cough, congestion, green phlegm, subjective fevers.  She states symptoms started 2 weeks ago.             Social History     Socioeconomic History    Marital status:      Spouse name: Not on file    Number of children: Not on file    Years of education: Not on file    Highest education level: Not on file   Occupational History    Not on file   Social Needs    Financial resource strain: Not on file    Food insecurity:     Worry: Not on file     Inability: Not on file    Transportation needs:     Medical: Not on file     Non-medical: Not on file   Tobacco Use     Smoking status: Former Smoker     Packs/day: 1.00     Years: 35.00     Pack years: 35.00     Types: Cigarettes     Last attempt to quit: 2003     Years since quittin.5    Smokeless tobacco: Never Used   Substance and Sexual Activity    Alcohol use: No    Drug use: No    Sexual activity: Never   Lifestyle    Physical activity:     Days per week: Not on file     Minutes per session: Not on file    Stress: Not on file   Relationships    Social connections:     Talks on phone: Not on file     Gets together: Not on file     Attends Jain service: Not on file     Active member of club or organization: Not on file     Attends meetings of clubs or organizations: Not on file     Relationship status: Not on file   Other Topics Concern    Not on file   Social History Narrative    Not on file       Family History   Problem Relation Age of Onset    Heart disease Mother         CAD     Cataracts Mother     Macular degeneration Mother     Glaucoma Mother     Cancer Son         testicular     Cancer Maternal Aunt         Lung ca    Heart disease Maternal Grandfather         Pacemaker     Diabetes Sister     Heart disease Sister         CAD    Cataracts Sister     Diabetes Sister     Diabetes Sister        Past Surgical History:   Procedure Laterality Date    abdominal laparoscopy       BREAST BIOPSY      CATARACT EXTRACTION Bilateral [id number]    [facility] [place]    ESOPHAGOGASTRODUODENOSCOPY N/A 2019    Procedure: ESOPHAGOGASTRODUODENOSCOPY (EGD);  Surgeon: Shawn Rogers III, MD;  [facility];  Service: Endoscopy;  Laterality: N/A;    EYE SURGERY      TONSILLECTOMY      TUBAL LIGATION      yag  Bilateral        Past Medical History:   Diagnosis Date    Arthritis     Cataract     Diabetes mellitus      am 01/15/2018 Insulin x 1 year    Glaucoma     Hypertension     Insomnia     Macular degeneration     Vaginal yeast infection        Review of  Systems   Constitutional: Positive for fatigue. Negative for activity change, appetite change, chills, diaphoresis, fever and unexpected weight change.   HENT: Positive for congestion. Negative for dental problem, drooling, ear discharge, ear pain, facial swelling, hearing loss, mouth sores, nosebleeds, postnasal drip, rhinorrhea, sinus pressure, sneezing, sore throat, tinnitus, trouble swallowing and voice change.    Eyes: Negative for photophobia, pain, discharge, redness, itching and visual disturbance.   Respiratory: Positive for cough and shortness of breath (occasional). Negative for choking, chest tightness, wheezing and stridor.    Cardiovascular: Negative for chest pain, palpitations and leg swelling.   Gastrointestinal: Positive for constipation. Negative for abdominal distention, abdominal pain, anal bleeding, blood in stool, diarrhea, nausea, rectal pain and vomiting.   Endocrine: Negative for cold intolerance, heat intolerance, polydipsia, polyphagia and polyuria.   Genitourinary: Negative for decreased urine volume, difficulty urinating, dyspareunia, dysuria, enuresis, flank pain, frequency, genital sores, hematuria, menstrual problem, pelvic pain, urgency, vaginal bleeding, vaginal discharge and vaginal pain.   Musculoskeletal: Positive for arthralgias. Negative for back pain, gait problem, joint swelling, myalgias, neck pain and neck stiffness.   Skin: Negative for color change, pallor and rash.   Allergic/Immunologic: Negative for environmental allergies, food allergies and immunocompromised state.   Neurological: Negative for dizziness, tremors, seizures, syncope, facial asymmetry, speech difficulty, weakness, light-headedness, numbness and headaches.   Hematological: Negative for adenopathy. Does not bruise/bleed easily.   Psychiatric/Behavioral: Positive for dysphoric mood. Negative for agitation, behavioral problems, confusion, decreased concentration, hallucinations, self-injury, sleep  disturbance and suicidal ideas. The patient is nervous/anxious. The patient is not hyperactive.           Medication List with Changes/Refills   New Medications    AMOXICILLIN (AMOXIL) 875 MG TABLET    Take 1 tablet (875 mg total) by mouth 2 (two) times daily. for 10 days    GUAIFENESIN (MUCINEX) 600 MG 12 HR TABLET    Take 2 tablets (1,200 mg total) by mouth 2 (two) times daily.   Current Medications    ACETAMINOPHEN (TYLENOL ARTHRITIS ORAL)    Take by mouth as needed.    ALPHA LIPOIC ACID 300 MG CAP    Take 300 mg by mouth 2 (two) times daily.    APIXABAN (ELIQUIS) 5 MG TAB    Take 1 tablet (5 mg total) by mouth 2 (two) times daily.    ATORVASTATIN (LIPITOR) 40 MG TABLET    TAKE 1 TABLET(40 MG) BY MOUTH EVERY DAY    BENAZEPRIL (LOTENSIN) 40 MG TABLET    TAKE 1 TABLET BY MOUTH EVERY DAY    BEPREVE 1.5 % DROP    Place 1 drop into both eyes 2 (two) times daily.    BEPREVE 1.5 % DROP    Place 1 drop into both eyes 2 (two) times daily.    CALCIUM CITRATE-VITAMIN D3 315-200 MG (CITRACAL+D) 315-200 MG-UNIT PER TABLET    Take 1 tablet by mouth once daily.    CARVEDILOL (COREG) 6.25 MG TABLET    TAKE 1 TABLET(6.25 MG) BY MOUTH TWICE DAILY WITH MEALS    CYCLOSPORINE (RESTASIS) 0.05 % OPHTHALMIC EMULSION    Place 0.4 mLs (1 drop total) into both eyes 2 (two) times daily.    DICLOFENAC SODIUM (VOLTAREN) 1 % GEL    Apply 2 g topically 4 (four) times daily. To affected joints as needed for pain    DULOXETINE (CYMBALTA) 60 MG CAPSULE    TAKE 1 CAPSULE(60 MG) BY MOUTH EVERY DAY    ERGOCALCIFEROL, VITAMIN D2, (VITAMIN D ORAL)    Take by mouth once daily.     EZETIMIBE (ZETIA) 10 MG TABLET    Take 10 mg by mouth once daily.    EZETIMIBE (ZETIA) 10 MG TABLET    Take 10 mg by mouth once daily.    FLUTICASONE PROPIONATE (FLONASE) 50 MCG/ACTUATION NASAL SPRAY    2 sprays (100 mcg total) by Each Nare route once daily.    GABAPENTIN (NEURONTIN) 300 MG CAPSULE    Take 1 capsule (300 mg total) by mouth 3 (three) times daily.    HYDRALAZINE  "(APRESOLINE) 100 MG TABLET    TAKE 1 TABLET(100 MG) BY MOUTH TWICE DAILY    INSULIN (LANTUS SOLOSTAR U-100 INSULIN) GLARGINE 100 UNITS/ML (3ML) SUBQ PEN    Inject 64 Units into the skin every evening.    JANUVIA 100 MG TAB    TAKE 1 TABLET(100 MG) BY MOUTH EVERY DAY    LIRAGLUTIDE 0.6 MG/0.1 ML, 18 MG/3 ML, SUBQ PNIJ (VICTOZA 2-LEAH) 0.6 MG/0.1 ML (18 MG/3 ML) PNIJ    Inject 0.6 mg into the skin once daily.    LORAZEPAM (ATIVAN) 1 MG TABLET    Take 1 tablet (1 mg total) by mouth every 6 (six) hours as needed for Anxiety.    MULTIVIT WITH CALCIUM,IRON,MIN (WOMEN'S DAILY MULTIVITAMIN ORAL)    Take by mouth once daily.     MUPIROCIN (BACTROBAN) 2 % OINTMENT    Apply topically 2 (two) times daily.    NITROGLYCERIN (NITROSTAT) 0.4 MG SL TABLET    SOHA    NYSTATIN (MYCOSTATIN) CREAM    Apply topically 2 (two) times daily. Continue until completely healed    PANTOPRAZOLE (PROTONIX) 40 MG TABLET    Take 1 tablet (40 mg total) by mouth 2 (two) times daily.    PEN NEEDLE, DIABETIC (BD ULTRA-FINE SHORT PEN NEEDLE) 31 GAUGE X 5/16" NDLE    Use two daily as directd    TEMAZEPAM (RESTORIL) 30 MG CAPSULE    Take 1 at night as needed    TRIAMCINOLONE ACETONIDE 0.1% (KENALOG) 0.1 % CREAM    Apply topically 2 (two) times daily. No longer than 2 weeks.        Objective:     Vitals:    01/03/20 0925   BP: 135/69   Pulse: 81   Resp: 17   Temp: 97.3 °F (36.3 °C)       Physical Exam   Constitutional: She is oriented to person, place, and time. Vital signs are normal. She appears well-developed and well-nourished. She appears ill. No distress.   HENT:   Head: Normocephalic and atraumatic.   Right Ear: External ear normal.   Left Ear: External ear normal.   Nose: Nose normal.   Mouth/Throat: Oropharynx is clear and moist.   Eyes: Conjunctivae and EOM are normal.   Neck: Normal range of motion.   Cardiovascular: Normal rate and regular rhythm. Exam reveals no friction rub.   No murmur heard.  Pulmonary/Chest: Effort normal. No accessory muscle " usage or stridor. No apnea, no tachypnea and no bradypnea. No respiratory distress. She has no wheezes. She has no rales.   Abdominal: Soft. Normal appearance. She exhibits no distension.   Musculoskeletal: Normal range of motion.   Neurological: She is alert and oriented to person, place, and time.   Skin: Skin is warm and dry. Capillary refill takes less than 2 seconds. No rash noted. She is not diaphoretic. No pallor.   Psychiatric: Her speech is normal and behavior is normal. Judgment and thought content normal. Her mood appears anxious. She is not actively hallucinating. She does not exhibit a depressed mood. She is attentive.   Vitals reviewed.      Assessment:     Problem List Items Addressed This Visit        Endocrine    Morbid obesity      Other Visit Diagnoses     Upper respiratory tract infection, unspecified type    -  Primary    Relevant Medications    amoxicillin (AMOXIL) 875 MG tablet    guaiFENesin (MUCINEX) 600 mg 12 hr tablet    History of iron deficiency anemia        Relevant Orders    CBC auto differential    Comprehensive metabolic panel    Ferritin    Iron and TIBC    Uncontrolled diabetes mellitus due to underlying condition with diabetic neuropathic arthropathy, with long-term current use of insulin        Relevant Orders    CBC auto differential    Comprehensive metabolic panel    Anemia, unspecified type        Relevant Orders    Vitamin B12    Dietary folate deficiency anemia         Relevant Orders    Vitamin B12        Lab Results   Component Value Date    WBC 9.89 12/30/2019    HGB 12.0 12/30/2019    HCT 36.9 (L) 12/30/2019    MCV 94 12/30/2019     12/30/2019       BMP  Lab Results   Component Value Date     12/30/2019    K 4.6 12/30/2019     12/30/2019    CO2 23 12/30/2019    BUN 18 12/30/2019    CREATININE 1.0 12/30/2019    CALCIUM 9.9 12/30/2019    ANIONGAP 14 12/30/2019    ESTGFRAFRICA >60 12/30/2019    EGFRNONAA 58 (A) 12/30/2019     Lab Results   Component  Value Date    ALT 21 12/30/2019    AST 20 12/30/2019    ALKPHOS 48 (L) 12/30/2019    BILITOT 0.7 12/30/2019     Lab Results   Component Value Date    IRON 71 12/30/2019    TIBC 342 12/30/2019    FERRITIN 125 12/30/2019     Lab Results   Component Value Date    OVYDLUIB09 421 12/19/2017       Plan:   Upper respiratory tract infection, unspecified type  -     amoxicillin (AMOXIL) 875 MG tablet; Take 1 tablet (875 mg total) by mouth 2 (two) times daily. for 10 days  Dispense: 20 tablet; Refill: 0  -     guaiFENesin (MUCINEX) 600 mg 12 hr tablet; Take 2 tablets (1,200 mg total) by mouth 2 (two) times daily.  Dispense: 40 tablet; Refill: 0    History of iron deficiency anemia  -     CBC auto differential; Future; Expected date: 01/03/2020  -     Comprehensive metabolic panel; Future; Expected date: 01/03/2020  -     Ferritin; Future; Expected date: 01/03/2020  -     Iron and TIBC; Future; Expected date: 01/03/2020    Morbid obesity    Uncontrolled diabetes mellitus due to underlying condition with diabetic neuropathic arthropathy, with long-term current use of insulin  -     CBC auto differential; Future; Expected date: 01/03/2020  -     Comprehensive metabolic panel; Future; Expected date: 01/03/2020    Anemia, unspecified type  -     Vitamin B12; Future; Expected date: 01/03/2020    Dietary folate deficiency anemia   -     Vitamin B12; Future; Expected date: 01/03/2020    She will start Amoxicillin 875 mg PO BID and mucinex 1200 mg PO BID.  She will follow up in 4 months with labs for continued monitoring.  Will recheck iron studies and B12 levels.  Suspect decreased absorption due to chronic gastropathy.      Thank You,  GORDO Bethea

## 2020-01-06 ENCOUNTER — PATIENT MESSAGE (OUTPATIENT)
Dept: INTERNAL MEDICINE | Facility: CLINIC | Age: 70
End: 2020-01-06

## 2020-01-06 RX ORDER — DULOXETIN HYDROCHLORIDE 60 MG/1
CAPSULE, DELAYED RELEASE ORAL
Qty: 90 CAPSULE | Refills: 0 | Status: SHIPPED | OUTPATIENT
Start: 2020-01-06 | End: 2020-05-11 | Stop reason: SDUPTHER

## 2020-01-06 RX ORDER — TEMAZEPAM 30 MG/1
CAPSULE ORAL
Qty: 30 CAPSULE | Refills: 0 | Status: SHIPPED | OUTPATIENT
Start: 2020-01-06 | End: 2020-02-06 | Stop reason: SDUPTHER

## 2020-01-13 RX ORDER — GABAPENTIN 100 MG/1
CAPSULE ORAL
Qty: 60 CAPSULE | Refills: 0 | Status: SHIPPED | OUTPATIENT
Start: 2020-01-13 | End: 2020-03-03 | Stop reason: SDUPTHER

## 2020-01-13 RX ORDER — GABAPENTIN 300 MG/1
CAPSULE ORAL
Qty: 90 CAPSULE | Refills: 1 | Status: SHIPPED | OUTPATIENT
Start: 2020-01-13 | End: 2020-05-25 | Stop reason: SDUPTHER

## 2020-01-14 ENCOUNTER — TELEPHONE (OUTPATIENT)
Dept: ORTHOPEDICS | Facility: CLINIC | Age: 70
End: 2020-01-14

## 2020-01-14 NOTE — TELEPHONE ENCOUNTER
Spoke with patient and informed them that Cheri Tatum PA-C has moved to our [facility] location and their appointment location has been changed from [facility] to [facility]. Patient verbalized understanding.

## 2020-01-17 DIAGNOSIS — M35.01 KERATITIS SICCA, BILATERAL: ICD-10-CM

## 2020-01-18 ENCOUNTER — PATIENT MESSAGE (OUTPATIENT)
Dept: OPHTHALMOLOGY | Facility: CLINIC | Age: 70
End: 2020-01-18

## 2020-01-20 ENCOUNTER — OFFICE VISIT (OUTPATIENT)
Dept: ORTHOPEDICS | Facility: CLINIC | Age: 70
End: 2020-01-20
Payer: MEDICARE

## 2020-01-20 VITALS
DIASTOLIC BLOOD PRESSURE: 67 MMHG | SYSTOLIC BLOOD PRESSURE: 135 MMHG | BODY MASS INDEX: 45.24 KG/M2 | HEART RATE: 74 BPM | HEIGHT: 61 IN | WEIGHT: 239.63 LBS

## 2020-01-20 DIAGNOSIS — Z79.4 CONTROLLED TYPE 2 DIABETES MELLITUS WITHOUT COMPLICATION, WITH LONG-TERM CURRENT USE OF INSULIN: Primary | ICD-10-CM

## 2020-01-20 DIAGNOSIS — M25.561 CHRONIC PAIN OF BOTH KNEES: ICD-10-CM

## 2020-01-20 DIAGNOSIS — Z79.01 CURRENT USE OF LONG TERM ANTICOAGULATION: ICD-10-CM

## 2020-01-20 DIAGNOSIS — M17.11 PRIMARY OSTEOARTHRITIS OF RIGHT KNEE: ICD-10-CM

## 2020-01-20 DIAGNOSIS — M70.51 PES ANSERINUS BURSITIS OF BOTH KNEES: ICD-10-CM

## 2020-01-20 DIAGNOSIS — G89.29 CHRONIC PAIN OF BOTH KNEES: ICD-10-CM

## 2020-01-20 DIAGNOSIS — E11.9 CONTROLLED TYPE 2 DIABETES MELLITUS WITHOUT COMPLICATION, WITH LONG-TERM CURRENT USE OF INSULIN: Primary | ICD-10-CM

## 2020-01-20 DIAGNOSIS — M25.562 CHRONIC PAIN OF BOTH KNEES: ICD-10-CM

## 2020-01-20 DIAGNOSIS — I48.0 PAROXYSMAL A-FIB: ICD-10-CM

## 2020-01-20 DIAGNOSIS — M70.52 PES ANSERINUS BURSITIS OF BOTH KNEES: ICD-10-CM

## 2020-01-20 DIAGNOSIS — M17.12 PRIMARY OSTEOARTHRITIS OF LEFT KNEE: ICD-10-CM

## 2020-01-20 PROCEDURE — 1101F PT FALLS ASSESS-DOCD LE1/YR: CPT | Mod: CPTII,S$GLB,, | Performed by: PHYSICIAN ASSISTANT

## 2020-01-20 PROCEDURE — 1159F PR MEDICATION LIST DOCUMENTED IN MEDICAL RECORD: ICD-10-PCS | Mod: S$GLB,,, | Performed by: PHYSICIAN ASSISTANT

## 2020-01-20 PROCEDURE — 99999 PR PBB SHADOW E&M-EST. PATIENT-LVL V: CPT | Mod: PBBFAC,,, | Performed by: PHYSICIAN ASSISTANT

## 2020-01-20 PROCEDURE — 99999 PR PBB SHADOW E&M-EST. PATIENT-LVL V: ICD-10-PCS | Mod: PBBFAC,,, | Performed by: PHYSICIAN ASSISTANT

## 2020-01-20 PROCEDURE — 1159F MED LIST DOCD IN RCRD: CPT | Mod: S$GLB,,, | Performed by: PHYSICIAN ASSISTANT

## 2020-01-20 PROCEDURE — 1125F PR PAIN SEVERITY QUANTIFIED, PAIN PRESENT: ICD-10-PCS | Mod: S$GLB,,, | Performed by: PHYSICIAN ASSISTANT

## 2020-01-20 PROCEDURE — 1101F PR PT FALLS ASSESS DOC 0-1 FALLS W/OUT INJ PAST YR: ICD-10-PCS | Mod: CPTII,S$GLB,, | Performed by: PHYSICIAN ASSISTANT

## 2020-01-20 PROCEDURE — 99203 PR OFFICE/OUTPT VISIT, NEW, LEVL III, 30-44 MIN: ICD-10-PCS | Mod: 25,S$GLB,, | Performed by: PHYSICIAN ASSISTANT

## 2020-01-20 PROCEDURE — 3078F DIAST BP <80 MM HG: CPT | Mod: CPTII,S$GLB,, | Performed by: PHYSICIAN ASSISTANT

## 2020-01-20 PROCEDURE — 3078F PR MOST RECENT DIASTOLIC BLOOD PRESSURE < 80 MM HG: ICD-10-PCS | Mod: CPTII,S$GLB,, | Performed by: PHYSICIAN ASSISTANT

## 2020-01-20 PROCEDURE — 3075F PR MOST RECENT SYSTOLIC BLOOD PRESS GE 130-139MM HG: ICD-10-PCS | Mod: CPTII,S$GLB,, | Performed by: PHYSICIAN ASSISTANT

## 2020-01-20 PROCEDURE — 3075F SYST BP GE 130 - 139MM HG: CPT | Mod: CPTII,S$GLB,, | Performed by: PHYSICIAN ASSISTANT

## 2020-01-20 PROCEDURE — 99203 OFFICE O/P NEW LOW 30 MIN: CPT | Mod: 25,S$GLB,, | Performed by: PHYSICIAN ASSISTANT

## 2020-01-20 PROCEDURE — 1125F AMNT PAIN NOTED PAIN PRSNT: CPT | Mod: S$GLB,,, | Performed by: PHYSICIAN ASSISTANT

## 2020-01-20 RX ORDER — CYCLOSPORINE 0.5 MG/ML
EMULSION OPHTHALMIC
Qty: 60 VIAL | Refills: 12 | Status: SHIPPED | OUTPATIENT
Start: 2020-01-20 | End: 2021-05-03

## 2020-01-20 NOTE — PROGRESS NOTES
Subjective:      Patient ID: Christine Jo is a 69 y.o. female.    Chief Complaint: Pain of the Left Knee and Pain of the Right Knee      HPI: Christine Jo  is a 69 y.o. female who c/o Pain of the Left Knee and Pain of the Right Knee   for duration of years.  She has been a patient in the Rheumatology Department.  She underwent Synvisc-One injection on 11/05/2019.  Approximately 1 month prior to that she underwent corticosteroid injection for the left knee.  The steroi injection did little to relieve her symptoms.  Synvisc-One injection started working approximately 6-7 weeks post injection.  Pain is actually now improved than it was at [place] time. She rates her pain at 5/10 in severity.  Quality is sharp and intermittent.  She also has aching and dull pain.  Worsened after getting up from prolonged inactivity as well as with prolonged weight-bearing.  Improved with heat, Tylenol Arthritis, Synvisc-One.  The corticosteroid injection shot her blood sugars up into the 300s.  She is not a candidate for oral NSAIDs due to GI side effects, diverticulitis, and long-term anticoagulation on Eliquis.    Past Medical History:   Diagnosis Date    Arthritis     Cataract     Diabetes mellitus 2008     am 01/15/2018 Insulin x 1 year    Glaucoma     Hypertension     Insomnia     Macular degeneration     Vaginal yeast infection      Past Surgical History:   Procedure Laterality Date    abdominal laparoscopy       BREAST BIOPSY      CATARACT EXTRACTION Bilateral [id number]    [facility] [place]    ESOPHAGOGASTRODUODENOSCOPY N/A 11/29/2019    Procedure: ESOPHAGOGASTRODUODENOSCOPY (EGD);  Surgeon: Shawn Rogers III, MD;  Location: [facility];  Service: Endoscopy;  Laterality: N/A;    EYE SURGERY      TONSILLECTOMY      TUBAL LIGATION      yag  Bilateral      Family History   Problem Relation Age of Onset    Heart disease Mother         CAD     Cataracts Mother     Macular degeneration  Mother     Glaucoma Mother     Cancer Son         testicular     Cancer Maternal Aunt         Lung ca    Heart disease Maternal Grandfather         Pacemaker     Diabetes Sister     Heart disease Sister         CAD    Cataracts Sister     Diabetes Sister     Diabetes Sister      Social History     Socioeconomic History    Marital status:      Spouse name: Not on file    Number of children: Not on file    Years of education: Not on file    Highest education level: Not on file   Occupational History    Not on file   Social Needs    Financial resource strain: Not on file    Food insecurity:     Worry: Not on file     Inability: Not on file    Transportation needs:     Medical: Not on file     Non-medical: Not on file   Tobacco Use    Smoking status: Former Smoker     Packs/day: 1.00     Years: 35.00     Pack years: 35.00     Types: Cigarettes     Last attempt to quit: 2003     Years since quittin.5    Smokeless tobacco: Never Used   Substance and Sexual Activity    Alcohol use: No    Drug use: No    Sexual activity: Never   Lifestyle    Physical activity:     Days per week: Not on file     Minutes per session: Not on file    Stress: Not on file   Relationships    Social connections:     Talks on phone: Not on file     Gets together: Not on file     Attends Protestant service: Not on file     Active member of club or organization: Not on file     Attends meetings of clubs or organizations: Not on file     Relationship status: Not on file   Other Topics Concern    Not on file   Social History Narrative    Not on file     Medication List with Changes/Refills   Current Medications    ACETAMINOPHEN (TYLENOL ARTHRITIS ORAL)    Take by mouth as needed.    ALPHA LIPOIC ACID 300 MG CAP    Take 300 mg by mouth 2 (two) times daily.    APIXABAN (ELIQUIS) 5 MG TAB    Take 1 tablet (5 mg total) by mouth 2 (two) times daily.    ATORVASTATIN (LIPITOR) 40 MG TABLET    TAKE 1 TABLET(40 MG) BY  MOUTH EVERY DAY    BENAZEPRIL (LOTENSIN) 40 MG TABLET    TAKE 1 TABLET BY MOUTH EVERY DAY    BEPREVE 1.5 % DROP    Place 1 drop into both eyes 2 (two) times daily.    BEPREVE 1.5 % DROP    Place 1 drop into both eyes 2 (two) times daily.    CALCIUM CITRATE-VITAMIN D3 315-200 MG (CITRACAL+D) 315-200 MG-UNIT PER TABLET    Take 1 tablet by mouth once daily.    CARVEDILOL (COREG) 6.25 MG TABLET    TAKE 1 TABLET(6.25 MG) BY MOUTH TWICE DAILY WITH MEALS    DICLOFENAC SODIUM (VOLTAREN) 1 % GEL    Apply 2 g topically 4 (four) times daily. To affected joints as needed for pain    DULOXETINE (CYMBALTA) 60 MG CAPSULE    TAKE 1 CAPSULE(60 MG) BY MOUTH EVERY DAY    ERGOCALCIFEROL, VITAMIN D2, (VITAMIN D ORAL)    Take by mouth once daily.     EZETIMIBE (ZETIA) 10 MG TABLET    Take 10 mg by mouth once daily.    EZETIMIBE (ZETIA) 10 MG TABLET    Take 10 mg by mouth once daily.    FLUTICASONE PROPIONATE (FLONASE) 50 MCG/ACTUATION NASAL SPRAY    2 sprays (100 mcg total) by Each Nare route once daily.    GABAPENTIN (NEURONTIN) 100 MG CAPSULE    Take 2 capsules (200 mg total) by mouth every morning. Take 45 min to 1 hour before bed as may cause drowsiness    GABAPENTIN (NEURONTIN) 100 MG CAPSULE    TAKE 1 CAPSULE(100 MG) BY MOUTH TWICE DAILY    GABAPENTIN (NEURONTIN) 300 MG CAPSULE    TAKE 1 CAPSULE(300 MG) BY MOUTH THREE TIMES DAILY    GUAIFENESIN (MUCINEX) 600 MG 12 HR TABLET    Take 2 tablets (1,200 mg total) by mouth 2 (two) times daily.    HYDRALAZINE (APRESOLINE) 100 MG TABLET    TAKE 1 TABLET(100 MG) BY MOUTH TWICE DAILY    INSULIN (LANTUS SOLOSTAR U-100 INSULIN) GLARGINE 100 UNITS/ML (3ML) SUBQ PEN    Inject 64 Units into the skin every evening.    JANUVIA 100 MG TAB    TAKE 1 TABLET(100 MG) BY MOUTH EVERY DAY    LIRAGLUTIDE 0.6 MG/0.1 ML, 18 MG/3 ML, SUBQ PNIJ (VICTOZA 2-LEAH) 0.6 MG/0.1 ML (18 MG/3 ML) PNIJ    Inject 0.6 mg into the skin once daily.    LORAZEPAM (ATIVAN) 1 MG TABLET    Take 1 tablet (1 mg total) by mouth every  "6 (six) hours as needed for Anxiety.    MULTIVIT WITH CALCIUM,IRON,MIN (WOMEN'S DAILY MULTIVITAMIN ORAL)    Take by mouth once daily.     MUPIROCIN (BACTROBAN) 2 % OINTMENT    Apply topically 2 (two) times daily.    NITROGLYCERIN (NITROSTAT) 0.4 MG SL TABLET    SOHA    NYSTATIN (MYCOSTATIN) CREAM    Apply topically 2 (two) times daily. Continue until completely healed    PANTOPRAZOLE (PROTONIX) 40 MG TABLET    Take 1 tablet (40 mg total) by mouth 2 (two) times daily.    PEN NEEDLE, DIABETIC (BD ULTRA-FINE SHORT PEN NEEDLE) 31 GAUGE X 5/16" NDLE    Use two daily as directd    RESTASIS 0.05 % OPHTHALMIC EMULSION    PLACE 1 DROP INTO BOTH EYES TWICE DAILY    TEMAZEPAM (RESTORIL) 30 MG CAPSULE    Take 1 at night as needed    TRIAMCINOLONE ACETONIDE 0.1% (KENALOG) 0.1 % CREAM    Apply topically 2 (two) times daily. No longer than 2 weeks.     Review of patient's allergies indicates:   Allergen Reactions    Aspirin Palpitations       Review of Systems   Constitution: Negative for fever.   Cardiovascular: Negative for chest pain.   Respiratory: Negative for cough and shortness of breath.    Skin: Negative for rash.   Musculoskeletal: Positive for joint pain, joint swelling and stiffness.   Gastrointestinal: Negative for heartburn.   Neurological: Negative for headaches and numbness.         Objective:        General    Nursing note and vitals reviewed.  Constitutional: She is oriented to person, place, and time. She appears well-developed and well-nourished.   HENT:   Head: Normocephalic and atraumatic.   Eyes: EOM are normal.   Cardiovascular: Normal rate and regular rhythm.    Pulmonary/Chest: Effort normal.   Abdominal: Soft.   Neurological: She is alert and oriented to person, place, and time.   Psychiatric: She has a normal mood and affect. Her behavior is normal.           Right Knee Exam     Inspection   Erythema: absent  Swelling: absent  Effusion: absent  Deformity: present (varus)  Bruising: absent    Tenderness "   The patient is tender to palpation of the medial joint line, condyle and pes anserinus.    Crepitus   The patient has crepitus of the patella.    Range of Motion   Extension: normal   Flexion:  110 abnormal     Tests   Meniscus   Jordi:  Medial - negative Lateral - negative  Ligament Examination Lachman: normal (-1 to 2mm) PCL-Posterior Drawer: normal (0 to 2mm)     MCL - Valgus: abnormal - grade I  LCL - Varus: normal  Patella   Patellar apprehension: negative  Passive Patellar Tilt: neutral  Patellar Grind: negative    Other   Meniscal Cyst: absent  Popliteal (Baker's) Cyst: absent  Sensation: normal    Left Knee Exam     Inspection   Erythema: absent  Swelling: absent  Effusion: absent  Deformity: present (varus)  Bruising: absent    Tenderness   The patient tender to palpation of the medial joint line, condyle and pes anserinus.    Crepitus   The patient has crepitus of the patella.    Range of Motion   Extension: normal   Flexion:  110 abnormal     Tests   Meniscus   Jordi:  Medial - negative Lateral - negative  Stability Lachman: normal (-1 to 2mm) PCL-Posterior Drawer: normal (0 to 2mm)  MCL - Valgus: abnormal - grade I  LCL - Varus: normal (0 to 2mm)  Patella   Patellar apprehension: negative  Passive Patellar Tilt: neutral  Patellar Grind: negative    Other   Meniscal Cyst: absent  Popliteal (Baker's) Cyst: absent  Sensation: normal    Right Hip Exam     Tests   Shekhar: negative  Left Hip Exam     Tests   Shekhar: negative          Muscle Strength   Right Lower Extremity   Quadriceps:  4/5   Hamstrin/5   Left Lower Extremity   Quadriceps:  4/5   Hamstrin/5     Vascular Exam       Edema  Right Lower Leg: absent  Left Lower Leg: absent              Xray images and report were reviewed today.  I agree with the radiologist's interpretation.    X-ray Knee Ortho Bilateral   Order: [id number]   Status:  Final result   Visible to patient:  Yes (Patient Portal)   Next appt:  2020 at 10:15 AM in  Ophthalmology (Klaus Shelton, OD)   Dx:  Chronic pain of both knees   Details     Reading Physician Reading Date Result Priority   Acosta Terrell MD 10/8/2019 Routine      Narrative     EXAMINATION:  XR KNEE ORTHO BILAT    CLINICAL HISTORY:  Pain in right knee    TECHNIQUE:  AP standing, lateral and Merchant views of both knees were performed.    COMPARISON:  None    FINDINGS:  Tricompartmental degenerative changes present bilaterally most advanced within the medial compartments with near complete joint space loss.  No large joint effusions.  No acute osseous abnormality.      Impression       As above      Electronically signed by: Acosta Terrell MD  Date: 10/08/2019  Time: 10:42                  Assessment:       Encounter Diagnoses   Name Primary?    Primary osteoarthritis of left knee     Chronic pain of both knees     Controlled type 2 diabetes mellitus without complication, with long-term current use of insulin Yes    Paroxysmal A-fib     Current use of long term anticoagulation           Plan:       Christine was seen today for pain and pain.    Diagnoses and all orders for this visit:    Controlled type 2 diabetes mellitus without complication, with long-term current use of insulin    Primary osteoarthritis of left knee  -     Ambulatory referral/consult to Orthopedics    Chronic pain of both knees  -     Ambulatory referral/consult to Orthopedics    Paroxysmal A-fib    Current use of long term anticoagulation        Christine ADRIAN Mcmullenkobi is a new pt who comes in today for the above problems.  We had a long discussion today regarding degenerative arthritis in the knees. The patient understands that arthritis is chronic and will worsen over time.  The patient also understands that arthritis may cause episodic flare-ups in pain. Management or if arthritis is achieved through a multi-modal approach including weight loss in obese individuals, activity modification, NSAIDs (topical vs oral) where  appropriate, periodic intra-articular steroid injections, viscosupplementation, physical therapy, knee bracing, ambulatory aids, as well as geniculate nerve blocks.      After discussion of risks and benefits of all of the above were discussed, the decision was made to move forward with physical therapy for soft tissue mobilization dry needling for the pes bursa.  They will also work on gentle range of motion strengthening for bilateral knees, avoiding exercises that exacerbate arthritic knee pain.  House plan to see her back in the office in 3 months to re-evaluate her progress.  She has problems before then, be happy to see her sooner.  She may continue using Voltaren gel as prescribed previously.  She verbalizes understanding and agrees.      Follow up in about 3 months (around 4/20/2020) for no xray needed til 10/2020 unless there is a new injury.          The patient understands, chooses and consents to this plan and accepts all   the risks which include but are not limited to the risks mentioned above.     Disclaimer: This note was prepared using a voice recognition system and is likely to have sound alike errors within the text.

## 2020-01-20 NOTE — LETTER
January 20, 2020      Naina Crisostomo PA-C  [address]  [place] [address]           [facility]  [address] [place] [address]  Phone: [phone]  Fax: [phone]          Patient: Christine Jo   MR Number: [id number]   YOB: 1950   Date of Visit: 1/20/2020       Dear Naina Crisostomo:    Thank you for referring Christine Jo to me for evaluation. Attached you will find relevant portions of my assessment and plan of care.    If you have questions, please do not hesitate to call me. I look forward to following Christine Jo along with you.    Sincerely,    Cheri Tatum PA-C    Enclosure  CC:  No Recipients    If you would like to receive this communication electronically, please contact [email or web address] or [phone] to request more information on [employer] access.    For providers and/or their staff who would like to refer a patient to [facility], please contact us through our one-stop-shop provider referral line, [facility] [place], at [phone].    If you feel you have received this communication in error or would no longer like to receive these types of communications, please e-mail [email or web address]

## 2020-01-30 RX ORDER — PEN NEEDLE, DIABETIC 30 GX3/16"
NEEDLE, DISPOSABLE MISCELLANEOUS
Qty: 200 EACH | Refills: 4 | Status: SHIPPED | OUTPATIENT
Start: 2020-01-30 | End: 2020-03-09 | Stop reason: SDUPTHER

## 2020-01-30 RX ORDER — SITAGLIPTIN 100 MG/1
TABLET, FILM COATED ORAL
Qty: 90 TABLET | Refills: 0 | Status: SHIPPED | OUTPATIENT
Start: 2020-01-30 | End: 2020-05-07

## 2020-02-06 ENCOUNTER — PATIENT MESSAGE (OUTPATIENT)
Dept: INTERNAL MEDICINE | Facility: CLINIC | Age: 70
End: 2020-02-06

## 2020-02-06 RX ORDER — TEMAZEPAM 30 MG/1
CAPSULE ORAL
Qty: 30 CAPSULE | Refills: 0 | Status: SHIPPED | OUTPATIENT
Start: 2020-02-06 | End: 2020-03-09 | Stop reason: SDUPTHER

## 2020-02-10 ENCOUNTER — PATIENT MESSAGE (OUTPATIENT)
Dept: INTERNAL MEDICINE | Facility: CLINIC | Age: 70
End: 2020-02-10

## 2020-02-11 ENCOUNTER — TELEPHONE (OUTPATIENT)
Dept: INTERNAL MEDICINE | Facility: CLINIC | Age: 70
End: 2020-02-11

## 2020-02-11 DIAGNOSIS — N39.0 URINARY TRACT INFECTION WITHOUT HEMATURIA, SITE UNSPECIFIED: Primary | ICD-10-CM

## 2020-02-11 NOTE — TELEPHONE ENCOUNTER
----- Message from Ludivina Harley sent at 2/11/2020  8:54 AM CST -----  Contact: pt  Type:  Needs Medical Advice    Who Called: Patient  Symptoms (please be specific): UTI and lump on shoulder   How long has patient had these symptoms:  1wk  Pharmacy name and phone #:  [facility]  Would the patient rather a call back or a response via [facility]? Call back  Best Call Back Number: [phone]  Additional Information: na

## 2020-02-11 NOTE — TELEPHONE ENCOUNTER
----- Message from Dilma Malloy sent at 2/11/2020  3:31 PM CST -----  Contact: Patient   Type:  Patient Returning Call    Who Called: Patient   Who Left Message for Patient: Airam  Does the patient know what this is regarding?: Returning patient call  Would the patient rather a call back or a response via [facility]? Call back   Best Call Back Number: Please call her at [phone]  Additional Information: n/a

## 2020-02-12 ENCOUNTER — LAB VISIT (OUTPATIENT)
Dept: LAB | Facility: HOSPITAL | Age: 70
End: 2020-02-12
Payer: MEDICARE

## 2020-02-12 ENCOUNTER — PATIENT MESSAGE (OUTPATIENT)
Dept: INTERNAL MEDICINE | Facility: CLINIC | Age: 70
End: 2020-02-12

## 2020-02-12 DIAGNOSIS — N39.0 URINARY TRACT INFECTION WITHOUT HEMATURIA, SITE UNSPECIFIED: ICD-10-CM

## 2020-02-12 PROCEDURE — 81001 URINALYSIS AUTO W/SCOPE: CPT

## 2020-02-13 ENCOUNTER — TELEPHONE (OUTPATIENT)
Dept: INTERNAL MEDICINE | Facility: CLINIC | Age: 70
End: 2020-02-13

## 2020-02-13 LAB
BACTERIA #/AREA URNS AUTO: ABNORMAL /HPF
BILIRUB UR QL STRIP: NEGATIVE
CLARITY UR REFRACT.AUTO: ABNORMAL
COLOR UR AUTO: YELLOW
GLUCOSE UR QL STRIP: NEGATIVE
HGB UR QL STRIP: NEGATIVE
KETONES UR QL STRIP: NEGATIVE
LEUKOCYTE ESTERASE UR QL STRIP: ABNORMAL
MICROSCOPIC COMMENT: ABNORMAL
NITRITE UR QL STRIP: NEGATIVE
PH UR STRIP: 5 [PH] (ref 5–8)
PROT UR QL STRIP: NEGATIVE
RBC #/AREA URNS AUTO: 1 /HPF (ref 0–4)
SP GR UR STRIP: 1.02 (ref 1–1.03)
SQUAMOUS #/AREA URNS AUTO: 1 /HPF
URN SPEC COLLECT METH UR: ABNORMAL
WBC #/AREA URNS AUTO: 13 /HPF (ref 0–5)

## 2020-02-13 NOTE — TELEPHONE ENCOUNTER
----- Message from Jose Szymanski MD sent at 2/13/2020  7:13 AM CST -----  Urine with few white blood cells but no definite infection.  Increase water intake.  Office follow-up if symptoms not resolved

## 2020-02-14 ENCOUNTER — OFFICE VISIT (OUTPATIENT)
Dept: OPHTHALMOLOGY | Facility: CLINIC | Age: 70
End: 2020-02-14
Payer: MEDICARE

## 2020-02-14 DIAGNOSIS — H52.4 BILATERAL PRESBYOPIA: ICD-10-CM

## 2020-02-14 DIAGNOSIS — E11.9 DIABETES MELLITUS TYPE 2 WITHOUT RETINOPATHY: Primary | ICD-10-CM

## 2020-02-14 PROCEDURE — 92014 PR EYE EXAM, EST PATIENT,COMPREHESV: ICD-10-PCS | Mod: S$GLB,,, | Performed by: OPTOMETRIST

## 2020-02-14 PROCEDURE — 92014 COMPRE OPH EXAM EST PT 1/>: CPT | Mod: S$GLB,,, | Performed by: OPTOMETRIST

## 2020-02-14 PROCEDURE — 92015 PR REFRACTION: ICD-10-PCS | Mod: S$GLB,,, | Performed by: OPTOMETRIST

## 2020-02-14 PROCEDURE — 99999 PR PBB SHADOW E&M-EST. PATIENT-LVL I: ICD-10-PCS | Mod: PBBFAC,,, | Performed by: OPTOMETRIST

## 2020-02-14 PROCEDURE — 92015 DETERMINE REFRACTIVE STATE: CPT | Mod: S$GLB,,, | Performed by: OPTOMETRIST

## 2020-02-14 PROCEDURE — 99999 PR PBB SHADOW E&M-EST. PATIENT-LVL I: CPT | Mod: PBBFAC,,, | Performed by: OPTOMETRIST

## 2020-02-14 NOTE — PROGRESS NOTES
HPI     NIDDM exam.  Patient seeing flashes of light and floaters in left eye x 3 months.  Decreased distance visual acuity hard to television screen.  Last eye exam 02/08/2019 TRF.  Patient wears OTC readers.  Update glasses RX.  Lab Results       Component                Value               Date                       HGBA1C                   7.5 (H)             12/05/2019              Last edited by Klaus Shelton, OD on 2/14/2020 11:02 AM. (History)            Assessment /Plan     For exam results, see Encounter Report.    Diabetes mellitus type 2 without retinopathy    Bilateral presbyopia      No Background Diabetic Retinopathy    Dispense Final Rx for glasses or may use OTC glasses.  RTC 1 year  Discussed above and answered questions.

## 2020-02-17 ENCOUNTER — PATIENT MESSAGE (OUTPATIENT)
Dept: PODIATRY | Facility: CLINIC | Age: 70
End: 2020-02-17

## 2020-03-04 RX ORDER — GABAPENTIN 100 MG/1
100 CAPSULE ORAL 2 TIMES DAILY
Qty: 180 CAPSULE | Refills: 0 | Status: SHIPPED | OUTPATIENT
Start: 2020-03-04 | End: 2020-06-16 | Stop reason: SDUPTHER

## 2020-03-04 RX ORDER — ATORVASTATIN CALCIUM 40 MG/1
40 TABLET, FILM COATED ORAL DAILY
Qty: 90 TABLET | Refills: 0 | Status: SHIPPED | OUTPATIENT
Start: 2020-03-04 | End: 2020-06-12

## 2020-03-09 RX ORDER — TEMAZEPAM 30 MG/1
CAPSULE ORAL
Qty: 30 CAPSULE | Refills: 0 | Status: SHIPPED | OUTPATIENT
Start: 2020-03-09 | End: 2020-04-06 | Stop reason: SDUPTHER

## 2020-03-09 RX ORDER — PEN NEEDLE, DIABETIC 30 GX3/16"
NEEDLE, DISPOSABLE MISCELLANEOUS
Qty: 200 EACH | Refills: 4 | Status: SHIPPED | OUTPATIENT
Start: 2020-03-09 | End: 2020-06-16 | Stop reason: SDUPTHER

## 2020-03-11 ENCOUNTER — PATIENT MESSAGE (OUTPATIENT)
Dept: INTERNAL MEDICINE | Facility: CLINIC | Age: 70
End: 2020-03-11

## 2020-03-12 RX ORDER — TRIAMCINOLONE ACETONIDE 1 MG/G
CREAM TOPICAL 2 TIMES DAILY
Qty: 30 G | Refills: 0 | OUTPATIENT
Start: 2020-03-12

## 2020-03-15 ENCOUNTER — PATIENT MESSAGE (OUTPATIENT)
Dept: INTERNAL MEDICINE | Facility: CLINIC | Age: 70
End: 2020-03-15

## 2020-03-15 DIAGNOSIS — Z76.89 REFERRAL OF PATIENT: Primary | ICD-10-CM

## 2020-03-16 RX ORDER — TRIAMCINOLONE ACETONIDE 1 MG/G
CREAM TOPICAL 2 TIMES DAILY
Qty: 30 G | Refills: 0 | Status: SHIPPED | OUTPATIENT
Start: 2020-03-16 | End: 2020-11-03 | Stop reason: SDUPTHER

## 2020-03-17 ENCOUNTER — PATIENT MESSAGE (OUTPATIENT)
Dept: RHEUMATOLOGY | Facility: CLINIC | Age: 70
End: 2020-03-17

## 2020-03-20 ENCOUNTER — SSC ENCOUNTER (OUTPATIENT)
Dept: ADMINISTRATIVE | Facility: OTHER | Age: 70
End: 2020-03-20

## 2020-03-20 ENCOUNTER — TELEPHONE (OUTPATIENT)
Dept: INTERNAL MEDICINE | Facility: CLINIC | Age: 70
End: 2020-03-20

## 2020-03-20 NOTE — PROGRESS NOTES
Please note the following patient's information was forwarded to [facility] (N) for case management and/or  on 3/20/2020.    Please contact Ext. [phone] with any questions.    Thank you,    Flora Koroma, [facility]  [place] [facility]nt  [phone]

## 2020-03-20 NOTE — TELEPHONE ENCOUNTER
Spoke with pt, states that she has a cough and a low grade fever.  States that she was told some of the symptoms for the virus.  Informed her that she does not  Have all of them and she would not qualify for testing. Verbalized understanding.

## 2020-03-20 NOTE — TELEPHONE ENCOUNTER
----- Message from Skyler Carrasco sent at 3/20/2020  8:37 AM CDT -----  Contact: Pt  Pt called and would like a nurse to call her back    Pt has a fever and small cough with production    Pt can be reached at [phone]

## 2020-03-23 RX ORDER — FLUTICASONE PROPIONATE 50 MCG
2 SPRAY, SUSPENSION (ML) NASAL DAILY
Qty: 48 ML | Refills: 0 | Status: SHIPPED | OUTPATIENT
Start: 2020-03-23 | End: 2020-11-21 | Stop reason: SDUPTHER

## 2020-03-23 RX ORDER — HYDRALAZINE HYDROCHLORIDE 100 MG/1
100 TABLET, FILM COATED ORAL 2 TIMES DAILY
Qty: 180 TABLET | Refills: 0 | Status: SHIPPED | OUTPATIENT
Start: 2020-03-23 | End: 2020-06-16 | Stop reason: SDUPTHER

## 2020-03-24 ENCOUNTER — DOCUMENTATION ONLY (OUTPATIENT)
Dept: REHABILITATION | Facility: HOSPITAL | Age: 70
End: 2020-03-24

## 2020-03-24 NOTE — PROGRESS NOTES
Outpatient Therapy Discharge Summary     Name: Christine Jo  [facility] Number: [id number]    Therapy Diagnosis:        Encounter Diagnosis   Name Primary?    Osteoarthritis of spine with radiculopathy, cervical region Yes      Physician: Phuc Christiansen MD     Physician Orders: PT Eval and Treat   Medical Diagnosis from Referral: Osteoarthritis of spine with radiculopathy, cervical region  Evaluation Date: 7/26/2019    Date of Last visit: 09/09/2019  Total Visits Received: 8  Cancelled Visits: 0  No Show Visits: 0    Assessment    Goals: Short Term Goals: In 3 weeks   1.I with HEP  (goal met)  2.Patient to have pain less than 3/10 at all times.  (goal met)     Long Term Goals: In 6 weeks  1. Patient to demo increase in UE strength to 4+/5 right UE  (goal met)  2. Patient to have decreased pain to 1/10 at all times.  3. Patient to perform daily activities including ADLs and community activity without limitation.    Discharge reason: Patient has not attended therapy since 09/09/2019    Plan   This patient is discharged from Physical Therapy

## 2020-03-31 ENCOUNTER — TELEPHONE (OUTPATIENT)
Dept: INTERNAL MEDICINE | Facility: CLINIC | Age: 70
End: 2020-03-31

## 2020-03-31 NOTE — TELEPHONE ENCOUNTER
Left message for patient that we scheduled an appointment for her on 04/13 with gynecologist and that staff would call her with any questions or to let her know if she would be able to keep her appointment.

## 2020-03-31 NOTE — TELEPHONE ENCOUNTER
----- Message from Tamar Phillips sent at 3/31/2020  9:56 AM CDT -----  Contact: PT   Type:  Patient Requesting Referral    Who Called: PT   Does the patient already have the specialty appointment scheduled?: no   If yes, what is the date of that appointment?: no   Referral to What Specialty: OB/GYN  Reason for Referral: strong urine odor & bladder pressure/ pelvic pain/low grade fever   Does the patient want the referral with a specific physician?: does not want to see Zonia Bejarano   [facility] the [facility]   Patient Requesting a Response?: Yes   Would the patient rather a call back or a response via [facility]? Call back   Best Call Back Number: [phone] ([place]) or [phone]  Additional Information: The patient is calling in regards to getting a new OB/GYN due to not caring for the bed manner for Zonia Bejarano, please advise as soon as possible.

## 2020-04-01 ENCOUNTER — TELEPHONE (OUTPATIENT)
Dept: ORTHOPEDICS | Facility: CLINIC | Age: 70
End: 2020-04-01

## 2020-04-01 NOTE — TELEPHONE ENCOUNTER
Called patient to see if she would like to do a telehealth virtual visit. Patient asked what the virtual visit is. I explained to her that the virtual visits are like a facetime visit with the provider that goes through her patient portal and as long as she has a phone or tablet or something that has video capabilities then she would be able to do the virtual visit. Patient stated that she would prefer to hold off her appointment until this calm down and she can come in for a face to face appointment. I let the patient know that I was adding her to my list of patients that will be called once things calm down to get rescheduled or if she would like to do a virtual visit to let us know and we can get her set up with one. Patient stated understanding and was thankful for call and to be added to list.

## 2020-04-06 RX ORDER — TEMAZEPAM 30 MG/1
CAPSULE ORAL
Qty: 30 CAPSULE | Refills: 0 | Status: SHIPPED | OUTPATIENT
Start: 2020-04-06 | End: 2020-05-07

## 2020-04-11 ENCOUNTER — PATIENT MESSAGE (OUTPATIENT)
Dept: OBSTETRICS AND GYNECOLOGY | Facility: CLINIC | Age: 70
End: 2020-04-11

## 2020-04-13 ENCOUNTER — PATIENT MESSAGE (OUTPATIENT)
Dept: OBSTETRICS AND GYNECOLOGY | Facility: CLINIC | Age: 70
End: 2020-04-13

## 2020-04-14 ENCOUNTER — PATIENT MESSAGE (OUTPATIENT)
Dept: OBSTETRICS AND GYNECOLOGY | Facility: CLINIC | Age: 70
End: 2020-04-14

## 2020-04-14 ENCOUNTER — TELEPHONE (OUTPATIENT)
Dept: OBSTETRICS AND GYNECOLOGY | Facility: CLINIC | Age: 70
End: 2020-04-14

## 2020-04-14 NOTE — TELEPHONE ENCOUNTER
----- Message from Yamini Mcclure sent at 4/14/2020  4:06 PM CDT -----  Contact: melvi bishop/ Dr. Armstrong office  Caller is calling in regards to getting a release form faxed over to fax [phone].

## 2020-04-14 NOTE — TELEPHONE ENCOUNTER
Attempted to contact patient in regards to release form, no answer. Number provided is to a fax machine not to Dr. Thomas's office so unable to know exactly what they need.

## 2020-04-15 ENCOUNTER — PATIENT MESSAGE (OUTPATIENT)
Dept: INTERNAL MEDICINE | Facility: CLINIC | Age: 70
End: 2020-04-15

## 2020-04-16 RX ORDER — FLUCONAZOLE 150 MG/1
150 TABLET ORAL DAILY
Qty: 1 TABLET | Refills: 0 | Status: SHIPPED | OUTPATIENT
Start: 2020-04-16 | End: 2020-05-07

## 2020-04-19 ENCOUNTER — PATIENT MESSAGE (OUTPATIENT)
Dept: ORTHOPEDICS | Facility: CLINIC | Age: 70
End: 2020-04-19

## 2020-04-20 ENCOUNTER — PATIENT MESSAGE (OUTPATIENT)
Dept: ORTHOPEDICS | Facility: CLINIC | Age: 70
End: 2020-04-20

## 2020-04-20 ENCOUNTER — TELEPHONE (OUTPATIENT)
Dept: ORTHOPEDICS | Facility: CLINIC | Age: 70
End: 2020-04-20

## 2020-04-20 NOTE — TELEPHONE ENCOUNTER
----- Message from Cheri Tatum PA-C sent at 4/20/2020 12:21 PM CDT -----  Please add her to the call list to schedule an appt when we resume clinic.  New problem right shoulder and recheck knee.  She will need a right shoulder xray for that appt.

## 2020-05-07 ENCOUNTER — PATIENT MESSAGE (OUTPATIENT)
Dept: INTERNAL MEDICINE | Facility: CLINIC | Age: 70
End: 2020-05-07

## 2020-05-07 RX ORDER — SITAGLIPTIN 100 MG/1
TABLET, FILM COATED ORAL
Qty: 90 TABLET | Refills: 0 | Status: SHIPPED | OUTPATIENT
Start: 2020-05-07 | End: 2020-08-02 | Stop reason: SDUPTHER

## 2020-05-07 RX ORDER — FLUCONAZOLE 150 MG/1
TABLET ORAL
Qty: 1 TABLET | Refills: 0 | Status: SHIPPED | OUTPATIENT
Start: 2020-05-07 | End: 2020-05-25

## 2020-05-07 RX ORDER — DULOXETIN HYDROCHLORIDE 30 MG/1
CAPSULE, DELAYED RELEASE ORAL
Qty: 30 CAPSULE | Refills: 5 | Status: SHIPPED | OUTPATIENT
Start: 2020-05-07 | End: 2020-05-25

## 2020-05-07 RX ORDER — TEMAZEPAM 30 MG/1
CAPSULE ORAL
Qty: 30 CAPSULE | Refills: 0 | Status: SHIPPED | OUTPATIENT
Start: 2020-05-07 | End: 2020-06-04 | Stop reason: SDUPTHER

## 2020-05-11 ENCOUNTER — PATIENT MESSAGE (OUTPATIENT)
Dept: OBSTETRICS AND GYNECOLOGY | Facility: CLINIC | Age: 70
End: 2020-05-11

## 2020-05-11 RX ORDER — DULOXETIN HYDROCHLORIDE 60 MG/1
CAPSULE, DELAYED RELEASE ORAL
Qty: 90 CAPSULE | Refills: 0 | Status: SHIPPED | OUTPATIENT
Start: 2020-05-11 | End: 2020-11-11 | Stop reason: SDUPTHER

## 2020-05-11 RX ORDER — DULOXETIN HYDROCHLORIDE 60 MG/1
CAPSULE, DELAYED RELEASE ORAL
Qty: 90 CAPSULE | Refills: 0 | Status: SHIPPED | OUTPATIENT
Start: 2020-05-11 | End: 2020-05-25 | Stop reason: SDUPTHER

## 2020-05-19 ENCOUNTER — TELEPHONE (OUTPATIENT)
Dept: INTERNAL MEDICINE | Facility: CLINIC | Age: 70
End: 2020-05-19

## 2020-05-19 ENCOUNTER — PATIENT MESSAGE (OUTPATIENT)
Dept: INTERNAL MEDICINE | Facility: CLINIC | Age: 70
End: 2020-05-19

## 2020-05-19 ENCOUNTER — PATIENT MESSAGE (OUTPATIENT)
Dept: RHEUMATOLOGY | Facility: CLINIC | Age: 70
End: 2020-05-19

## 2020-05-19 ENCOUNTER — TELEPHONE (OUTPATIENT)
Dept: RHEUMATOLOGY | Facility: CLINIC | Age: 70
End: 2020-05-19

## 2020-05-19 NOTE — TELEPHONE ENCOUNTER
Naina Crisostomo PA-C  You 2 hours ago (1:59 PM)      Regarding the euflexxa vs synvisc.  I usually prefer euflexxa injections because it's less gel at one time BUT it is three injections a week apart.  If that is not feasible then synvisc one is a fine option, it's just one time injection BUT its more gel so a little more uncomfortable.  We need time to get approved so she can let me know which one and we can try to link to her upcoming visit but doubt it will be approved that quickly.     Routing comment

## 2020-05-19 NOTE — TELEPHONE ENCOUNTER
Spoke with patient and informed her that I sent a message to Dr. Szymanski and when I hear back from him as far as an appt, I will let her know

## 2020-05-19 NOTE — TELEPHONE ENCOUNTER
----- Message from Ary Bergman sent at 5/19/2020  8:24 AM CDT -----  Contact: pt  Pt hasn't been in since December and is diabetic. Pt requesting labwork and a xray for a bump on her shoulder.

## 2020-05-19 NOTE — TELEPHONE ENCOUNTER
3-24-20 cancelled appts rescheduled at [facility] as follows; early lab 5-22-20, appointment with Naina 5-25-20 at 11am, pt would like to know if synvisc or euflexxa is better for her, stated last injection of kenalog on 10-8-19 increased her blood sugars.    Pt would also like to reschedule [facility] for 5-25-20 after Naina. Please schedule at [facility].

## 2020-05-19 NOTE — TELEPHONE ENCOUNTER
Left message to call clinic back regarding info below, need to move injections to 2 weeks out; auth will need to be put in.    Also need to move appt with massiel, labs, & boniva to same day as injections.

## 2020-05-20 ENCOUNTER — PATIENT MESSAGE (OUTPATIENT)
Dept: OBSTETRICS AND GYNECOLOGY | Facility: CLINIC | Age: 70
End: 2020-05-20

## 2020-05-20 ENCOUNTER — PATIENT MESSAGE (OUTPATIENT)
Dept: RHEUMATOLOGY | Facility: CLINIC | Age: 70
End: 2020-05-20

## 2020-05-20 NOTE — TELEPHONE ENCOUNTER
DAY[facility] Sampson Staff   Caller: Unspecified (Yesterday,  1:14 PM)             Patient can keep her labs and boniva infusion on the 25th if she wants to do that.  I think we are pushing her visit out with me so we can get a gel injection approved.  I need to know if she wants euflexxa or synvisc one so I can put the auth in.  If synvisc one I will do the order and we need to schedule for [facility] in 2 weeks and link auth     If euflexxa need to schedule all 3 visits (mondays) at [facility] at least 2 weeks away I will put auth in for that.

## 2020-05-21 ENCOUNTER — PATIENT MESSAGE (OUTPATIENT)
Dept: RHEUMATOLOGY | Facility: CLINIC | Age: 70
End: 2020-05-21

## 2020-05-21 ENCOUNTER — TELEPHONE (OUTPATIENT)
Dept: RHEUMATOLOGY | Facility: CLINIC | Age: 70
End: 2020-05-21

## 2020-05-21 DIAGNOSIS — M17.0 PRIMARY OSTEOARTHRITIS OF BOTH KNEES: Primary | ICD-10-CM

## 2020-05-21 NOTE — TELEPHONE ENCOUNTER
Called pt to schedule her sooner than current appt per Dr. Schultz. Pt stated that she would prefer to wait and see Dr. Schultz and keep scheduled appt.

## 2020-05-21 NOTE — TELEPHONE ENCOUNTER
----- Message from Naina Crisostomo PA-C sent at 5/21/2020  7:28 AM CDT -----  Regarding: injections  Move her EP visit two weeks out,monday at [facility], add bilateral synvisc one to the appt note and add auth.

## 2020-05-22 ENCOUNTER — LAB VISIT (OUTPATIENT)
Dept: LAB | Facility: HOSPITAL | Age: 70
End: 2020-05-22
Attending: FAMILY MEDICINE
Payer: MEDICARE

## 2020-05-22 ENCOUNTER — TELEPHONE (OUTPATIENT)
Dept: PHYSICAL MEDICINE AND REHAB | Facility: CLINIC | Age: 70
End: 2020-05-22

## 2020-05-22 DIAGNOSIS — Z51.81 MEDICATION MONITORING ENCOUNTER: ICD-10-CM

## 2020-05-22 DIAGNOSIS — M81.0 AGE-RELATED OSTEOPOROSIS WITHOUT CURRENT PATHOLOGICAL FRACTURE: ICD-10-CM

## 2020-05-22 LAB
ALBUMIN SERPL BCP-MCNC: 3.9 G/DL (ref 3.5–5.2)
ALP SERPL-CCNC: 45 U/L (ref 55–135)
ALT SERPL W/O P-5'-P-CCNC: 21 U/L (ref 10–44)
ANION GAP SERPL CALC-SCNC: 8 MMOL/L (ref 8–16)
AST SERPL-CCNC: 18 U/L (ref 10–40)
BILIRUB SERPL-MCNC: 0.8 MG/DL (ref 0.1–1)
BUN SERPL-MCNC: 15 MG/DL (ref 8–23)
CALCIUM SERPL-MCNC: 9.5 MG/DL (ref 8.7–10.5)
CHLORIDE SERPL-SCNC: 104 MMOL/L (ref 95–110)
CO2 SERPL-SCNC: 27 MMOL/L (ref 23–29)
CREAT SERPL-MCNC: 0.8 MG/DL (ref 0.5–1.4)
EST. GFR  (AFRICAN AMERICAN): >60 ML/MIN/1.73 M^2
EST. GFR  (NON AFRICAN AMERICAN): >60 ML/MIN/1.73 M^2
GLUCOSE SERPL-MCNC: 198 MG/DL (ref 70–110)
POTASSIUM SERPL-SCNC: 4.4 MMOL/L (ref 3.5–5.1)
PROT SERPL-MCNC: 6.8 G/DL (ref 6–8.4)
SODIUM SERPL-SCNC: 139 MMOL/L (ref 136–145)

## 2020-05-22 PROCEDURE — 36415 COLL VENOUS BLD VENIPUNCTURE: CPT

## 2020-05-22 PROCEDURE — 80053 COMPREHEN METABOLIC PANEL: CPT

## 2020-05-22 NOTE — TELEPHONE ENCOUNTER
----- Message from Joy Mann PT sent at 5/22/2020 12:54 PM CDT -----  Contact: self       ----- Message -----  From: Kaylee Fisher  Sent: 1/15/2019   8:49 AM CDT  To: [facility] Clinical Staff    Requesting to know if she can be put on the list to see new psychiatrist coming in february. Please call back at [phone].      Thanks,  Kaylee Fisher

## 2020-05-25 ENCOUNTER — TELEPHONE (OUTPATIENT)
Dept: OBSTETRICS AND GYNECOLOGY | Facility: CLINIC | Age: 70
End: 2020-05-25

## 2020-05-25 ENCOUNTER — HOSPITAL ENCOUNTER (OUTPATIENT)
Dept: RADIOLOGY | Facility: HOSPITAL | Age: 70
Discharge: HOME | End: 2020-05-25
Attending: FAMILY MEDICINE
Payer: MEDICARE

## 2020-05-25 ENCOUNTER — OFFICE VISIT (OUTPATIENT)
Dept: INTERNAL MEDICINE | Facility: CLINIC | Age: 70
End: 2020-05-25
Payer: MEDICARE

## 2020-05-25 ENCOUNTER — INFUSION (OUTPATIENT)
Dept: INFUSION THERAPY | Facility: HOSPITAL | Age: 70
End: 2020-05-25
Attending: PHYSICIAN ASSISTANT
Payer: MEDICARE

## 2020-05-25 ENCOUNTER — PATIENT MESSAGE (OUTPATIENT)
Dept: INTERNAL MEDICINE | Facility: CLINIC | Age: 70
End: 2020-05-25

## 2020-05-25 VITALS
TEMPERATURE: 97 F | RESPIRATION RATE: 18 BRPM | BODY MASS INDEX: 45.49 KG/M2 | WEIGHT: 240.94 LBS | HEART RATE: 88 BPM | OXYGEN SATURATION: 97 % | DIASTOLIC BLOOD PRESSURE: 64 MMHG | SYSTOLIC BLOOD PRESSURE: 132 MMHG | HEIGHT: 61 IN

## 2020-05-25 VITALS
SYSTOLIC BLOOD PRESSURE: 98 MMHG | RESPIRATION RATE: 18 BRPM | HEART RATE: 81 BPM | OXYGEN SATURATION: 98 % | DIASTOLIC BLOOD PRESSURE: 68 MMHG | TEMPERATURE: 97 F

## 2020-05-25 DIAGNOSIS — N39.0 URINARY TRACT INFECTION WITHOUT HEMATURIA, SITE UNSPECIFIED: ICD-10-CM

## 2020-05-25 DIAGNOSIS — E11.65 UNCONTROLLED TYPE 2 DIABETES MELLITUS WITH HYPERGLYCEMIA: ICD-10-CM

## 2020-05-25 DIAGNOSIS — M81.0 AGE-RELATED OSTEOPOROSIS WITHOUT CURRENT PATHOLOGICAL FRACTURE: Primary | ICD-10-CM

## 2020-05-25 DIAGNOSIS — M85.619: ICD-10-CM

## 2020-05-25 DIAGNOSIS — I48.0 PAROXYSMAL A-FIB: ICD-10-CM

## 2020-05-25 DIAGNOSIS — I10 ESSENTIAL HYPERTENSION: ICD-10-CM

## 2020-05-25 DIAGNOSIS — M85.619: Primary | ICD-10-CM

## 2020-05-25 PROBLEM — R42 LIGHTHEADEDNESS: Status: RESOLVED | Noted: 2018-07-16 | Resolved: 2020-05-25

## 2020-05-25 PROCEDURE — 1101F PR PT FALLS ASSESS DOC 0-1 FALLS W/OUT INJ PAST YR: ICD-10-PCS | Mod: CPTII,S$GLB,, | Performed by: FAMILY MEDICINE

## 2020-05-25 PROCEDURE — 99999 PR PBB SHADOW E&M-EST. PATIENT-LVL V: ICD-10-PCS | Mod: PBBFAC,,, | Performed by: FAMILY MEDICINE

## 2020-05-25 PROCEDURE — 99999 PR PBB SHADOW E&M-EST. PATIENT-LVL V: CPT | Mod: PBBFAC,,, | Performed by: FAMILY MEDICINE

## 2020-05-25 PROCEDURE — 3078F DIAST BP <80 MM HG: CPT | Mod: CPTII,S$GLB,, | Performed by: FAMILY MEDICINE

## 2020-05-25 PROCEDURE — 1125F AMNT PAIN NOTED PAIN PRSNT: CPT | Mod: S$GLB,,, | Performed by: FAMILY MEDICINE

## 2020-05-25 PROCEDURE — 1125F PR PAIN SEVERITY QUANTIFIED, PAIN PRESENT: ICD-10-PCS | Mod: S$GLB,,, | Performed by: FAMILY MEDICINE

## 2020-05-25 PROCEDURE — 3078F PR MOST RECENT DIASTOLIC BLOOD PRESSURE < 80 MM HG: ICD-10-PCS | Mod: CPTII,S$GLB,, | Performed by: FAMILY MEDICINE

## 2020-05-25 PROCEDURE — 99499 RISK ADDL DX/OHS AUDIT: ICD-10-PCS | Mod: S$GLB,,, | Performed by: FAMILY MEDICINE

## 2020-05-25 PROCEDURE — 3075F PR MOST RECENT SYSTOLIC BLOOD PRESS GE 130-139MM HG: ICD-10-PCS | Mod: CPTII,S$GLB,, | Performed by: FAMILY MEDICINE

## 2020-05-25 PROCEDURE — 99214 PR OFFICE/OUTPT VISIT, EST, LEVL IV, 30-39 MIN: ICD-10-PCS | Mod: S$GLB,,, | Performed by: FAMILY MEDICINE

## 2020-05-25 PROCEDURE — 63600175 PHARM REV CODE 636 W HCPCS: Mod: JG | Performed by: PHYSICIAN ASSISTANT

## 2020-05-25 PROCEDURE — 1159F MED LIST DOCD IN RCRD: CPT | Mod: S$GLB,,, | Performed by: FAMILY MEDICINE

## 2020-05-25 PROCEDURE — 1159F PR MEDICATION LIST DOCUMENTED IN MEDICAL RECORD: ICD-10-PCS | Mod: S$GLB,,, | Performed by: FAMILY MEDICINE

## 2020-05-25 PROCEDURE — 96374 THER/PROPH/DIAG INJ IV PUSH: CPT

## 2020-05-25 PROCEDURE — 3051F PR MOST RECENT HEMOGLOBIN A1C LEVEL 7.0 - < 8.0%: ICD-10-PCS | Mod: CPTII,S$GLB,, | Performed by: FAMILY MEDICINE

## 2020-05-25 PROCEDURE — 73000 X-RAY EXAM OF COLLAR BONE: CPT | Mod: TC,RT

## 2020-05-25 PROCEDURE — 3051F HG A1C>EQUAL 7.0%<8.0%: CPT | Mod: CPTII,S$GLB,, | Performed by: FAMILY MEDICINE

## 2020-05-25 PROCEDURE — 99214 OFFICE O/P EST MOD 30 MIN: CPT | Mod: S$GLB,,, | Performed by: FAMILY MEDICINE

## 2020-05-25 PROCEDURE — 99499 UNLISTED E&M SERVICE: CPT | Mod: S$GLB,,, | Performed by: FAMILY MEDICINE

## 2020-05-25 PROCEDURE — 1101F PT FALLS ASSESS-DOCD LE1/YR: CPT | Mod: CPTII,S$GLB,, | Performed by: FAMILY MEDICINE

## 2020-05-25 PROCEDURE — 3075F SYST BP GE 130 - 139MM HG: CPT | Mod: CPTII,S$GLB,, | Performed by: FAMILY MEDICINE

## 2020-05-25 RX ORDER — SODIUM CHLORIDE 0.9 % (FLUSH) 0.9 %
10 SYRINGE (ML) INJECTION
Status: CANCELLED | OUTPATIENT
Start: 2020-07-01

## 2020-05-25 RX ORDER — SODIUM CHLORIDE 0.9 % (FLUSH) 0.9 %
10 SYRINGE (ML) INJECTION
Status: CANCELLED | OUTPATIENT
Start: 2020-05-25

## 2020-05-25 RX ORDER — IBANDRONATE SODIUM 3 MG/3 ML
3 SYRINGE (ML) INTRAVENOUS
Status: CANCELLED | OUTPATIENT
Start: 2020-07-01

## 2020-05-25 RX ORDER — SODIUM CHLORIDE 0.9 % (FLUSH) 0.9 %
10 SYRINGE (ML) INJECTION
OUTPATIENT
Start: 2020-05-25

## 2020-05-25 RX ORDER — IBANDRONATE SODIUM 3 MG/3 ML
3 SYRINGE (ML) INTRAVENOUS
Status: COMPLETED | OUTPATIENT
Start: 2020-05-25 | End: 2020-05-25

## 2020-05-25 RX ORDER — HEPARIN 100 UNIT/ML
500 SYRINGE INTRAVENOUS
Status: CANCELLED | OUTPATIENT
Start: 2020-07-01

## 2020-05-25 RX ORDER — HEPARIN 100 UNIT/ML
500 SYRINGE INTRAVENOUS
Status: CANCELLED | OUTPATIENT
Start: 2020-05-25

## 2020-05-25 RX ORDER — HEPARIN 100 UNIT/ML
500 SYRINGE INTRAVENOUS
OUTPATIENT
Start: 2020-05-25

## 2020-05-25 RX ORDER — IBANDRONATE SODIUM 3 MG/3 ML
3 SYRINGE (ML) INTRAVENOUS
Status: CANCELLED | OUTPATIENT
Start: 2020-05-25

## 2020-05-25 RX ADMIN — IBANDRONATE SODIUM 3 MG: 3 INJECTION, SOLUTION INTRAVENOUS at 11:05

## 2020-05-25 NOTE — TELEPHONE ENCOUNTER
----- Message from Tamar Phillips sent at 5/25/2020  8:12 AM CDT -----  Contact: PT   Type:  Sooner Appointment Request    Caller is requesting a sooner appointment.  Caller declined first available appointment listed below.  Caller will not accept being placed on the waitlist and is requesting a message be sent to doctor.  Name of Caller: Pt   When is the first available appointment? 07/06/2020 with Dr. Schultz   Symptoms: frequent UTI Infections   Would the patient rather a call back or a response via [facility]? Call back   Best Call Back Number: .[phone] (home)   Additional Information: n/a

## 2020-05-25 NOTE — TELEPHONE ENCOUNTER
Spoke with patient  States she would like to be see  Sooner than 7/6 with Antoine d/t UTI. Scheduled pt on 5/28/20 10:15 AM ONLC with Dyana. 7/6 appt canceled per pt request. Patient verbalized understanding

## 2020-05-25 NOTE — DISCHARGE INSTRUCTIONS
[facility]  [address] [address]  [address] [address]  [phone] phone     [phone] fax  Hours of Operation: Monday- Friday 8:00am- 5:00pm  After hours phone  [phone]  Hematology / Oncology Physicians on call      EDGAR [address] Dr[address] Dr., Dr., Dr., NP Sydney Prescott, NP Tyesha Taylor, NP    Please call with any concerns regarding your appointment today.

## 2020-05-25 NOTE — PROGRESS NOTES
Subjective:       Patient ID: Christine Jo is a 70 y.o. female.    Chief Complaint: Shoulder Pain (bump on R shoulder, pt states she first realized it in Jan., area has grown since then)    Mass right clavicle hypertension uncontrolled diabetes paroxysmal atrial fibrillation.  The right clavicle masses has been present for several months and increased in size.  She reports she is having some discomfort when sleeping.  She denies chest pain palpitations shortness of breath or edema.  She denies polyuria polydipsia hypoglycemic symptoms she is currently using Lantus at nighttime and Victoza 0.6 daily.    Review of Systems   Constitutional: Negative for appetite change, chills, diaphoresis, fatigue, fever and unexpected weight change.   Respiratory: Negative for cough, chest tightness, shortness of breath and wheezing.    Cardiovascular: Negative for chest pain, palpitations and leg swelling.        Denies lightheadedness   Gastrointestinal: Negative for abdominal pain, diarrhea, nausea and vomiting.   Endocrine: Negative for polydipsia and polyuria.   Genitourinary: Positive for dysuria. Negative for difficulty urinating, hematuria and urgency.   Neurological: Negative for dizziness, weakness, light-headedness and headaches.       Objective:      Physical Exam   Constitutional: She is oriented to person, place, and time. She appears well-developed and well-nourished.   Eyes: Conjunctivae are normal.   Neck: Neck supple. No JVD present. No tracheal deviation present. No thyromegaly present.   Cardiovascular: Normal rate, regular rhythm and normal heart sounds. Exam reveals no gallop.   No murmur heard.  Pulmonary/Chest: Effort normal and breath sounds normal. No respiratory distress. She has no wheezes. She has no rales.   Abdominal: Soft. Bowel sounds are normal. She exhibits no distension and no mass. There is no tenderness.   Lymphadenopathy:     She has no cervical adenopathy.   Neurological: She is alert and  oriented to person, place, and time.   Skin: Skin is warm and dry. She is not diaphoretic.   About 4 cm round firm movable mass about the right clavicle       Lab Visit on 05/22/2020   Component Date Value Ref Range Status    Sodium 05/22/2020 139  136 - 145 mmol/L Final    Potassium 05/22/2020 4.4  3.5 - 5.1 mmol/L Final    Chloride 05/22/2020 104  95 - 110 mmol/L Final    CO2 05/22/2020 27  23 - 29 mmol/L Final    Glucose 05/22/2020 198* 70 - 110 mg/dL Final    BUN, Bld 05/22/2020 15  8 - 23 mg/dL Final    Creatinine 05/22/2020 0.8  0.5 - 1.4 mg/dL Final    Calcium 05/22/2020 9.5  8.7 - 10.5 mg/dL Final    Total Protein 05/22/2020 6.8  6.0 - 8.4 g/dL Final    Albumin 05/22/2020 3.9  3.5 - 5.2 g/dL Final    Total Bilirubin 05/22/2020 0.8  0.1 - 1.0 mg/dL Final    Alkaline Phosphatase 05/22/2020 45* 55 - 135 U/L Final    AST 05/22/2020 18  10 - 40 U/L Final    ALT 05/22/2020 21  10 - 44 U/L Final    Anion Gap 05/22/2020 8  8 - 16 mmol/L Final    eGFR if African American 05/22/2020 >60  >60 mL/min/1.73 m^2 Final    eGFR if non African American 05/22/2020 >60  >60 mL/min/1.73 m^2 Final     Assessment:       1. Clavicular cyst, unspecified laterality    2. Uncontrolled type 2 diabetes mellitus with hyperglycemia        Plan:     Blood pressure controlled 132/64.  X-ray clavicle.  Refer to surgery for probable cyst removal.  A1c urinalysis ordered.  Consider increasing Victoza pending results.  Follow-up in 1 month.    Clavicular cyst, unspecified laterality  -     X-Ray Clavicle Right; Future; Expected date: 05/25/2020  -     Ambulatory referral/consult to General Surgery; Future; Expected date: 06/01/2020    Uncontrolled type 2 diabetes mellitus with hyperglycemia  -     Hemoglobin A1C; Future; Expected date: 05/25/2020  -     Urinalysis; Future; Expected date: 05/25/2020

## 2020-05-25 NOTE — NURSING
Recent labs? 5/22  Taking Ca/Vit D? Yes  Recent or upcoming dental procedures? no        boniva 3 mg administered IVP over 2 min  ; see MAR & Flow Sheet for more  details. Tolerated well without adverse events

## 2020-05-26 ENCOUNTER — TELEPHONE (OUTPATIENT)
Dept: INTERNAL MEDICINE | Facility: CLINIC | Age: 70
End: 2020-05-26

## 2020-05-26 NOTE — TELEPHONE ENCOUNTER
Spoke with pt informed pt A1c has improved,clavicle x-ray essentially normal, to continue current medications and proceed with surgery evaluation. Pt gave a verbal understanding.

## 2020-05-27 ENCOUNTER — PATIENT MESSAGE (OUTPATIENT)
Dept: INTERNAL MEDICINE | Facility: CLINIC | Age: 70
End: 2020-05-27

## 2020-05-27 ENCOUNTER — TELEPHONE (OUTPATIENT)
Dept: ORTHOPEDICS | Facility: CLINIC | Age: 70
End: 2020-05-27

## 2020-05-27 ENCOUNTER — SSC ENCOUNTER (OUTPATIENT)
Dept: ADMINISTRATIVE | Facility: OTHER | Age: 70
End: 2020-05-27

## 2020-05-27 ENCOUNTER — TELEPHONE (OUTPATIENT)
Dept: INTERNAL MEDICINE | Facility: CLINIC | Age: 70
End: 2020-05-27

## 2020-05-27 DIAGNOSIS — Z59.9 FINANCIAL DIFFICULTIES: Primary | ICD-10-CM

## 2020-05-27 SDOH — SOCIAL DETERMINANTS OF HEALTH (SDOH): PROBLEM RELATED TO HOUSING AND ECONOMIC CIRCUMSTANCES, UNSPECIFIED: Z59.9

## 2020-05-27 NOTE — TELEPHONE ENCOUNTER
----- Message from Miranda Amado sent at 5/27/2020  2:12 PM CDT -----  Contact: pt  Pt request call back , she stated a  Counselor or  called her  Pt stated she previously spoke to Doris in office .. Call back:  [phone]

## 2020-05-27 NOTE — PROGRESS NOTES
Please note the following patient's information was forwarded to [facility] (N) for case management and/or  on 5/27/2020.    Please contact Ext. [phone] with any questions.    Thank you,    Flora Koroma, [facility]  Outpatient Case Mgmnt  [phone]

## 2020-05-28 ENCOUNTER — OFFICE VISIT (OUTPATIENT)
Dept: SURGERY | Facility: CLINIC | Age: 70
End: 2020-05-28
Payer: MEDICARE

## 2020-05-28 ENCOUNTER — LAB VISIT (OUTPATIENT)
Dept: LAB | Facility: HOSPITAL | Age: 70
End: 2020-05-28
Attending: PHYSICIAN ASSISTANT
Payer: MEDICARE

## 2020-05-28 ENCOUNTER — OFFICE VISIT (OUTPATIENT)
Dept: OBSTETRICS AND GYNECOLOGY | Facility: CLINIC | Age: 70
End: 2020-05-28
Payer: MEDICARE

## 2020-05-28 ENCOUNTER — PATIENT MESSAGE (OUTPATIENT)
Dept: OBSTETRICS AND GYNECOLOGY | Facility: CLINIC | Age: 70
End: 2020-05-28

## 2020-05-28 VITALS
TEMPERATURE: 97 F | HEART RATE: 84 BPM | WEIGHT: 240.06 LBS | DIASTOLIC BLOOD PRESSURE: 64 MMHG | SYSTOLIC BLOOD PRESSURE: 141 MMHG | BODY MASS INDEX: 45.36 KG/M2

## 2020-05-28 DIAGNOSIS — I48.91 ATRIAL FIBRILLATION, UNSPECIFIED TYPE: ICD-10-CM

## 2020-05-28 DIAGNOSIS — E08.00 DIABETES MELLITUS DUE TO UNDERLYING CONDITION WITH HYPEROSMOLARITY WITHOUT COMA, WITH LONG-TERM CURRENT USE OF INSULIN: ICD-10-CM

## 2020-05-28 DIAGNOSIS — D64.9 ANEMIA, UNSPECIFIED TYPE: ICD-10-CM

## 2020-05-28 DIAGNOSIS — R22.31 MASS OF SKIN OF SHOULDER, RIGHT: Primary | ICD-10-CM

## 2020-05-28 DIAGNOSIS — Z86.2 HISTORY OF IRON DEFICIENCY ANEMIA: ICD-10-CM

## 2020-05-28 DIAGNOSIS — Z79.4 DIABETES MELLITUS DUE TO UNDERLYING CONDITION WITH HYPEROSMOLARITY WITHOUT COMA, WITH LONG-TERM CURRENT USE OF INSULIN: ICD-10-CM

## 2020-05-28 DIAGNOSIS — D52.0 DIETARY FOLATE DEFICIENCY ANEMIA: ICD-10-CM

## 2020-05-28 DIAGNOSIS — N30.00 ACUTE CYSTITIS WITHOUT HEMATURIA: Primary | ICD-10-CM

## 2020-05-28 LAB
ALBUMIN SERPL BCP-MCNC: 4.1 G/DL (ref 3.5–5.2)
ALP SERPL-CCNC: 44 U/L (ref 55–135)
ALT SERPL W/O P-5'-P-CCNC: 22 U/L (ref 10–44)
ANION GAP SERPL CALC-SCNC: 12 MMOL/L (ref 8–16)
AST SERPL-CCNC: 18 U/L (ref 10–40)
BASOPHILS # BLD AUTO: 0.05 K/UL (ref 0–0.2)
BASOPHILS NFR BLD: 0.5 % (ref 0–1.9)
BILIRUB SERPL-MCNC: 0.6 MG/DL (ref 0.1–1)
BUN SERPL-MCNC: 16 MG/DL (ref 8–23)
CALCIUM SERPL-MCNC: 9.3 MG/DL (ref 8.7–10.5)
CHLORIDE SERPL-SCNC: 104 MMOL/L (ref 95–110)
CO2 SERPL-SCNC: 25 MMOL/L (ref 23–29)
CREAT SERPL-MCNC: 0.8 MG/DL (ref 0.5–1.4)
DIFFERENTIAL METHOD: ABNORMAL
EOSINOPHIL # BLD AUTO: 0.2 K/UL (ref 0–0.5)
EOSINOPHIL NFR BLD: 1.7 % (ref 0–8)
ERYTHROCYTE [DISTWIDTH] IN BLOOD BY AUTOMATED COUNT: 12.6 % (ref 11.5–14.5)
EST. GFR  (AFRICAN AMERICAN): >60 ML/MIN/1.73 M^2
EST. GFR  (NON AFRICAN AMERICAN): >60 ML/MIN/1.73 M^2
FERRITIN SERPL-MCNC: 102 NG/ML (ref 20–300)
GLUCOSE SERPL-MCNC: 158 MG/DL (ref 70–110)
HCT VFR BLD AUTO: 34.7 % (ref 37–48.5)
HGB BLD-MCNC: 11.5 G/DL (ref 12–16)
IMM GRANULOCYTES # BLD AUTO: 0.03 K/UL (ref 0–0.04)
IMM GRANULOCYTES NFR BLD AUTO: 0.3 % (ref 0–0.5)
IRON SERPL-MCNC: 71 UG/DL (ref 30–160)
LYMPHOCYTES # BLD AUTO: 1.7 K/UL (ref 1–4.8)
LYMPHOCYTES NFR BLD: 18.6 % (ref 18–48)
MCH RBC QN AUTO: 30.8 PG (ref 27–31)
MCHC RBC AUTO-ENTMCNC: 33.1 G/DL (ref 32–36)
MCV RBC AUTO: 93 FL (ref 82–98)
MONOCYTES # BLD AUTO: 0.9 K/UL (ref 0.3–1)
MONOCYTES NFR BLD: 9.2 % (ref 4–15)
NEUTROPHILS # BLD AUTO: 6.5 K/UL (ref 1.8–7.7)
NEUTROPHILS NFR BLD: 69.7 % (ref 38–73)
NRBC BLD-RTO: 0 /100 WBC
PLATELET # BLD AUTO: 248 K/UL (ref 150–350)
PMV BLD AUTO: 11.2 FL (ref 9.2–12.9)
POTASSIUM SERPL-SCNC: 3.9 MMOL/L (ref 3.5–5.1)
PROT SERPL-MCNC: 7.1 G/DL (ref 6–8.4)
RBC # BLD AUTO: 3.73 M/UL (ref 4–5.4)
SATURATED IRON: 21 % (ref 20–50)
SODIUM SERPL-SCNC: 141 MMOL/L (ref 136–145)
TOTAL IRON BINDING CAPACITY: 337 UG/DL (ref 250–450)
TRANSFERRIN SERPL-MCNC: 228 MG/DL (ref 200–375)
VIT B12 SERPL-MCNC: 350 PG/ML (ref 210–950)
WBC # BLD AUTO: 9.35 K/UL (ref 3.9–12.7)

## 2020-05-28 PROCEDURE — 82607 VITAMIN B-12: CPT

## 2020-05-28 PROCEDURE — 99204 OFFICE O/P NEW MOD 45 MIN: CPT | Mod: S$GLB,,, | Performed by: SURGERY

## 2020-05-28 PROCEDURE — 80053 COMPREHEN METABOLIC PANEL: CPT

## 2020-05-28 PROCEDURE — 99499 RISK ADDL DX/OHS AUDIT: ICD-10-PCS | Mod: S$GLB,,, | Performed by: SURGERY

## 2020-05-28 PROCEDURE — 3074F PR MOST RECENT SYSTOLIC BLOOD PRESSURE < 130 MM HG: ICD-10-PCS | Mod: CPTII,S$GLB,, | Performed by: NURSE PRACTITIONER

## 2020-05-28 PROCEDURE — 1126F AMNT PAIN NOTED NONE PRSNT: CPT | Mod: S$GLB,,, | Performed by: SURGERY

## 2020-05-28 PROCEDURE — 83540 ASSAY OF IRON: CPT

## 2020-05-28 PROCEDURE — 3078F DIAST BP <80 MM HG: CPT | Mod: CPTII,S$GLB,, | Performed by: SURGERY

## 2020-05-28 PROCEDURE — 87086 URINE CULTURE/COLONY COUNT: CPT

## 2020-05-28 PROCEDURE — 36415 COLL VENOUS BLD VENIPUNCTURE: CPT

## 2020-05-28 PROCEDURE — 1159F PR MEDICATION LIST DOCUMENTED IN MEDICAL RECORD: ICD-10-PCS | Mod: S$GLB,,, | Performed by: NURSE PRACTITIONER

## 2020-05-28 PROCEDURE — 3074F SYST BP LT 130 MM HG: CPT | Mod: CPTII,S$GLB,, | Performed by: NURSE PRACTITIONER

## 2020-05-28 PROCEDURE — 99499 UNLISTED E&M SERVICE: CPT | Mod: S$GLB,,, | Performed by: SURGERY

## 2020-05-28 PROCEDURE — 3078F PR MOST RECENT DIASTOLIC BLOOD PRESSURE < 80 MM HG: ICD-10-PCS | Mod: CPTII,S$GLB,, | Performed by: SURGERY

## 2020-05-28 PROCEDURE — 85025 COMPLETE CBC W/AUTO DIFF WBC: CPT

## 2020-05-28 PROCEDURE — 3078F DIAST BP <80 MM HG: CPT | Mod: CPTII,S$GLB,, | Performed by: NURSE PRACTITIONER

## 2020-05-28 PROCEDURE — 99214 PR OFFICE/OUTPT VISIT, EST, LEVL IV, 30-39 MIN: ICD-10-PCS | Mod: S$GLB,,, | Performed by: NURSE PRACTITIONER

## 2020-05-28 PROCEDURE — 99214 OFFICE O/P EST MOD 30 MIN: CPT | Mod: S$GLB,,, | Performed by: NURSE PRACTITIONER

## 2020-05-28 PROCEDURE — 1101F PR PT FALLS ASSESS DOC 0-1 FALLS W/OUT INJ PAST YR: ICD-10-PCS | Mod: CPTII,S$GLB,, | Performed by: NURSE PRACTITIONER

## 2020-05-28 PROCEDURE — 99999 PR PBB SHADOW E&M-EST. PATIENT-LVL V: ICD-10-PCS | Mod: PBBFAC,,, | Performed by: SURGERY

## 2020-05-28 PROCEDURE — 87077 CULTURE AEROBIC IDENTIFY: CPT

## 2020-05-28 PROCEDURE — 1126F PR PAIN SEVERITY QUANTIFIED, NO PAIN PRESENT: ICD-10-PCS | Mod: S$GLB,,, | Performed by: SURGERY

## 2020-05-28 PROCEDURE — 3077F SYST BP >= 140 MM HG: CPT | Mod: CPTII,S$GLB,, | Performed by: SURGERY

## 2020-05-28 PROCEDURE — 99204 PR OFFICE/OUTPT VISIT, NEW, LEVL IV, 45-59 MIN: ICD-10-PCS | Mod: S$GLB,,, | Performed by: SURGERY

## 2020-05-28 PROCEDURE — 3077F PR MOST RECENT SYSTOLIC BLOOD PRESSURE >= 140 MM HG: ICD-10-PCS | Mod: CPTII,S$GLB,, | Performed by: SURGERY

## 2020-05-28 PROCEDURE — 99999 PR PBB SHADOW E&M-EST. PATIENT-LVL II: ICD-10-PCS | Mod: PBBFAC,,, | Performed by: NURSE PRACTITIONER

## 2020-05-28 PROCEDURE — 1101F PT FALLS ASSESS-DOCD LE1/YR: CPT | Mod: CPTII,S$GLB,, | Performed by: NURSE PRACTITIONER

## 2020-05-28 PROCEDURE — 87088 URINE BACTERIA CULTURE: CPT

## 2020-05-28 PROCEDURE — 82728 ASSAY OF FERRITIN: CPT

## 2020-05-28 PROCEDURE — 99999 PR PBB SHADOW E&M-EST. PATIENT-LVL II: CPT | Mod: PBBFAC,,, | Performed by: NURSE PRACTITIONER

## 2020-05-28 PROCEDURE — 87186 SC STD MICRODIL/AGAR DIL: CPT

## 2020-05-28 PROCEDURE — 1101F PT FALLS ASSESS-DOCD LE1/YR: CPT | Mod: CPTII,S$GLB,, | Performed by: SURGERY

## 2020-05-28 PROCEDURE — 1159F MED LIST DOCD IN RCRD: CPT | Mod: S$GLB,,, | Performed by: SURGERY

## 2020-05-28 PROCEDURE — 1159F MED LIST DOCD IN RCRD: CPT | Mod: S$GLB,,, | Performed by: NURSE PRACTITIONER

## 2020-05-28 PROCEDURE — 3078F PR MOST RECENT DIASTOLIC BLOOD PRESSURE < 80 MM HG: ICD-10-PCS | Mod: CPTII,S$GLB,, | Performed by: NURSE PRACTITIONER

## 2020-05-28 PROCEDURE — 99999 PR PBB SHADOW E&M-EST. PATIENT-LVL V: CPT | Mod: PBBFAC,,, | Performed by: SURGERY

## 2020-05-28 PROCEDURE — 1159F PR MEDICATION LIST DOCUMENTED IN MEDICAL RECORD: ICD-10-PCS | Mod: S$GLB,,, | Performed by: SURGERY

## 2020-05-28 PROCEDURE — 1101F PR PT FALLS ASSESS DOC 0-1 FALLS W/OUT INJ PAST YR: ICD-10-PCS | Mod: CPTII,S$GLB,, | Performed by: SURGERY

## 2020-05-28 RX ORDER — FLUCONAZOLE 150 MG/1
150 TABLET ORAL DAILY
Qty: 2 TABLET | Refills: 0 | Status: SHIPPED | OUTPATIENT
Start: 2020-05-28 | End: 2020-05-30

## 2020-05-28 RX ORDER — NITROFURANTOIN 25; 75 MG/1; MG/1
100 CAPSULE ORAL 2 TIMES DAILY
Qty: 14 CAPSULE | Refills: 0 | Status: SHIPPED | OUTPATIENT
Start: 2020-05-28 | End: 2020-06-04

## 2020-05-28 NOTE — LETTER
May 28, 2020      Jose Szymanski MD  [address] [address] [address]            [facility] - [facility]  [address] [address] [address]  Phone: [phone]  Fax: [phone]          Patient: Christine Jo   MR Number: [id number]   YOB: 1950   Date of Visit: 5/28/2020       Dear Dr. Jose Szymanski:    Thank you for referring Christine Jo to me for evaluation. Attached you will find relevant portions of my assessment and plan of care.    If you have questions, please do not hesitate to call me. I look forward to following Christine Jo along with you.    Sincerely,    Megan Carrillo MD    Enclosure  CC:  No Recipients    If you would like to receive this communication electronically, please contact [email or web address] or [phone] to request more information on [employer] Link access.    For providers and/or their staff who would like to refer a patient to [facility], please contact us through our one-stop-shop provider referral line, [facility], at [phone].    If you feel you have received this communication in error or would no longer like to receive these types of communications, please e-mail [email or web address]

## 2020-05-28 NOTE — PROGRESS NOTES
History & Physical  General Surgery      SUBJECTIVE:     Chief Complaint/Reason for Admission: Cyst (right shoulder)      History of Present Illness:  Patient is a 70 y.o. female presents with Cyst (right shoulder)    70-year-old female referred by Dr. Szymanski for evaluation of right shoulder mass.  She states that the mass occurred this January and has gradually increased size.  She states that she had a causes occasional discomfort.  She denies any restricted range of motion her right upper extremity.  She is currently anticoagulated on Eliquis for AFib.  An x-ray revealed that this is not a primary bony abnormality.  She was referred to surgery for evaluation.      Current Outpatient Medications:     ACETAMINOPHEN (TYLENOL ARTHRITIS ORAL), Take by mouth as needed., Disp: , Rfl:     alpha lipoic acid 300 mg Cap, Take 300 mg by mouth 2 (two) times daily., Disp: 60 each, Rfl: 5    apixaban (ELIQUIS) 5 mg Tab, Take 1 tablet (5 mg total) by mouth 2 (two) times daily., Disp: 60 tablet, Rfl: 2    atorvastatin (LIPITOR) 40 MG tablet, Take 1 tablet (40 mg total) by mouth once daily., Disp: 90 tablet, Rfl: 0    benazepril (LOTENSIN) 40 MG tablet, TAKE 1 TABLET BY MOUTH EVERY DAY (Patient taking differently: TAKE 1 TABLET BY MOUTH twice a day EVERY DAY), Disp: 90 tablet, Rfl: 0    BEPREVE 1.5 % Drop, Place 1 drop into both eyes 2 (two) times daily., Disp: 10 mL, Rfl: 12    calcium citrate-vitamin D3 315-200 mg (CITRACAL+D) 315-200 mg-unit per tablet, Take 1 tablet by mouth once daily., Disp: , Rfl:     carvedilol (COREG) 6.25 MG tablet, TAKE 1 TABLET(6.25 MG) BY MOUTH TWICE DAILY WITH MEALS, Disp: 180 tablet, Rfl: 0    diclofenac sodium (VOLTAREN) 1 % Gel, Apply 2 g topically 4 (four) times daily. To affected joints as needed for pain, Disp: 100 g, Rfl: 3    DULoxetine (CYMBALTA) 60 MG capsule, TAKE 1 CAPSULE(60 MG) BY MOUTH EVERY DAY, Disp: 90 capsule, Rfl: 0    ezetimibe (ZETIA) 10 mg tablet, Take 10 mg by mouth  "once daily., Disp: , Rfl:     fluticasone propionate (FLONASE) 50 mcg/actuation nasal spray, 2 sprays (100 mcg total) by Each Nostril route once daily., Disp: 48 mL, Rfl: 0    gabapentin (NEURONTIN) 100 MG capsule, Take 1 capsule (100 mg total) by mouth 2 (two) times daily., Disp: 180 capsule, Rfl: 0    guaiFENesin (MUCINEX) 600 mg 12 hr tablet, Take 2 tablets (1,200 mg total) by mouth 2 (two) times daily., Disp: 40 tablet, Rfl: 0    hydrALAZINE (APRESOLINE) 100 MG tablet, Take 1 tablet (100 mg total) by mouth 2 (two) times daily., Disp: 180 tablet, Rfl: 0    insulin (LANTUS SOLOSTAR U-100 INSULIN) glargine 100 units/mL (3mL) SubQ pen, Inject 64 Units into the skin every evening., Disp: 3 Box, Rfl: 3    JANUVIA 100 mg Tab, TAKE 1 TABLET(100 MG) BY MOUTH EVERY DAY, Disp: 90 tablet, Rfl: 0    liraglutide 0.6 mg/0.1 mL, 18 mg/3 mL, subq PNIJ (VICTOZA 2-LEAH) 0.6 mg/0.1 mL (18 mg/3 mL) PnIj, Inject 0.6 mg into the skin once daily., Disp: 3 mL, Rfl: 11    MULTIVIT WITH CALCIUM,IRON,MIN (WOMEN'S DAILY MULTIVITAMIN ORAL), Take by mouth once daily. , Disp: , Rfl:     mupirocin (BACTROBAN) 2 % ointment, Apply topically 2 (two) times daily., Disp: 22 g, Rfl: 0    nitroGLYCERIN (NITROSTAT) 0.4 MG SL tablet, SOHA, Disp: , Rfl: 0    nystatin (MYCOSTATIN) cream, Apply topically 2 (two) times daily. Continue until completely healed, Disp: 30 g, Rfl: 0    pantoprazole (PROTONIX) 40 MG tablet, Take 1 tablet (40 mg total) by mouth 2 (two) times daily., Disp: 180 tablet, Rfl: 3    pen needle, diabetic (BD ULTRA-FINE SHORT PEN NEEDLE) 31 gauge x 5/16" Ndle, Use two daily as directd, Disp: 200 each, Rfl: 4    RESTASIS 0.05 % ophthalmic emulsion, PLACE 1 DROP INTO BOTH EYES TWICE DAILY, Disp: 60 vial, Rfl: 12    temazepam (RESTORIL) 30 mg capsule, TAKE 1 CAPSULE BY MOUTH AT NIGHT AS NEEDED, Disp: 30 capsule, Rfl: 0    triamcinolone acetonide 0.1% (KENALOG) 0.1 % cream, Apply topically 2 (two) times daily. No longer than 2 " weeks., Disp: 30 g, Rfl: 0    Review of patient's allergies indicates:   Allergen Reactions    Aspirin Palpitations       Past Medical History:   Diagnosis Date    Arthritis     Cataract     Diabetes mellitus      am 01/15/2018 Insulin x 1 year    DM (diabetes mellitus)      am 2020 Insulin x 4 years    Glaucoma     Hypertension     Insomnia     Macular degeneration     Vaginal yeast infection      Past Surgical History:   Procedure Laterality Date    abdominal laparoscopy       BREAST BIOPSY      CATARACT EXTRACTION Bilateral [id number]    [facility] in [place]    ESOPHAGOGASTRODUODENOSCOPY N/A 2019    Procedure: ESOPHAGOGASTRODUODENOSCOPY (EGD);  Surgeon: Shawn Rogers III, MD;  [facility];  Service: Endoscopy;  Laterality: N/A;    EYE SURGERY      TONSILLECTOMY      TUBAL LIGATION      yag  Bilateral      Family History   Problem Relation Age of Onset    Heart disease Mother         CAD     Cataracts Mother     Macular degeneration Mother     Glaucoma Mother     Cancer Son         testicular     Cancer Maternal Aunt         Lung ca    Heart disease Maternal Grandfather         Pacemaker     Diabetes Sister     Heart disease Sister         CAD    Cataracts Sister     Diabetes Sister     Diabetes Sister      Social History     Tobacco Use    Smoking status: Former Smoker     Packs/day: 1.00     Years: 35.00     Pack years: 35.00     Types: Cigarettes     Last attempt to quit: 2003     Years since quittin.9    Smokeless tobacco: Never Used   Substance Use Topics    Alcohol use: No    Drug use: No        Review of Systems:  Review of Systems   Constitutional: Negative for unexpected weight change.   HENT: Negative for congestion.    Eyes: Negative for visual disturbance.   Respiratory: Negative for shortness of breath.    Cardiovascular: Negative for chest pain.   Gastrointestinal: Negative for abdominal pain.    Genitourinary: Negative for difficulty urinating.   Musculoskeletal: Positive for arthralgias. Negative for joint swelling.   Skin: Negative for rash.   Allergic/Immunologic: Negative for immunocompromised state.   Neurological: Negative for weakness.   Psychiatric/Behavioral: The patient is not nervous/anxious.        OBJECTIVE:     Vital Signs (Most Recent)  BP (!) 141/64   Pulse 84   Temp 97.1 °F (36.2 °C) (Oral)   Wt 108.9 kg (240 lb 1.3 oz)   BMI 45.36 kg/m²     Physical Exam:   Physical Exam   Constitutional: She is oriented to person, place, and time. She appears well-developed and well-nourished. No distress.   HENT:   Head: Normocephalic and atraumatic.   Eyes: EOM are normal.   Neck: Neck supple.   Cardiovascular: Normal rate and regular rhythm.   Pulmonary/Chest: Effort normal.   Abdominal: Soft. She exhibits no distension. There is no tenderness.   Musculoskeletal: Normal range of motion.   Neurological: She is alert and oriented to person, place, and time.   Skin: Skin is warm.        Vitals reviewed.    X-ray personally reviewed    ASSESSMENT/PLAN:     Mass of skin of shoulder, right  -     US Soft Tissue Upper Extremity; Future; Expected date: 05/28/2020    Atrial fibrillation, unspecified type  -     Ambulatory referral/consult to Cardiology; Future; Expected date: 06/04/2020      Will proceed with a further evaluation with a ultrasound of the area.  My concern is that there is involvement in the joint space.  If there is no evidence of this, will proceed with Cardiology evaluation for holding anticoagulation and surgical excision of the growth.  If the joint space does appear to be involved on ultrasound, will refer to orthopedics for further evaluation.    The patient has been a long-time patient of Dr. Meneses, however she is looking to transition all of her care to [facility]. Ambulatory referral for Cardiology placed.    Megan Carrillo

## 2020-05-28 NOTE — PROGRESS NOTES
"CC: Dysuria    Christine Jo is a 70 y.o. female  presents for c/o dysuria. Patient reports pain with voiding for several days. No fever, flank pain, hematuria or renal stone disease. Patient has DM and reports " that she is not following her ADA diet and this has increased vaginal yeast". Urine in clinic indicated Nitrates.Patient repots an increase in stress incontinence over the past 5-6 months.     Past Medical History:   Diagnosis Date    Arthritis     Cataract     Diabetes mellitus      am 01/15/2018 Insulin x 1 year    DM (diabetes mellitus)      am 2020 Insulin x 4 years    Glaucoma     Hypertension     Insomnia     Macular degeneration     Vaginal yeast infection      Past Surgical History:   Procedure Laterality Date    abdominal laparoscopy       BREAST BIOPSY      CATARACT EXTRACTION Bilateral [id number]    [facility] in [place]    ESOPHAGOGASTRODUODENOSCOPY N/A 2019    Procedure: ESOPHAGOGASTRODUODENOSCOPY (EGD);  Surgeon: Shawn Rogers III, MD;  [facility];  Service: Endoscopy;  Laterality: N/A;    EYE SURGERY      TONSILLECTOMY      TUBAL LIGATION      yag  Bilateral      Social History     Socioeconomic History    Marital status:      Spouse name: Not on file    Number of children: Not on file    Years of education: Not on file    Highest education level: Not on file   Occupational History    Not on file   Social Needs    Financial resource strain: Not on file    Food insecurity:     Worry: Not on file     Inability: Not on file    Transportation needs:     Medical: Not on file     Non-medical: Not on file   Tobacco Use    Smoking status: Former Smoker     Packs/day: 1.00     Years: 35.00     Pack years: 35.00     Types: Cigarettes     Last attempt to quit: 2003     Years since quittin.9    Smokeless tobacco: Never Used   Substance and Sexual Activity    Alcohol use: No    Drug use: No    Sexual " activity: Never   Lifestyle    Physical activity:     Days per week: Not on file     Minutes per session: Not on file    Stress: Not on file   Relationships    Social connections:     Talks on phone: Not on file     Gets together: Not on file     Attends Muslim service: Not on file     Active member of club or organization: Not on file     Attends meetings of clubs or organizations: Not on file     Relationship status: Not on file   Other Topics Concern    Not on file   Social History Narrative    Not on file     Family History   Problem Relation Age of Onset    Heart disease Mother         CAD     Cataracts Mother     Macular degeneration Mother     Glaucoma Mother     Cancer Son         testicular     Cancer Maternal Aunt         Lung ca    Heart disease Maternal Grandfather         Pacemaker     Diabetes Sister     Heart disease Sister         CAD    Cataracts Sister     Diabetes Sister     Diabetes Sister      OB History        6    Para   6    Term   3            AB        Living   3       SAB        TAB        Ectopic        Multiple        Live Births   3                 240# 138/82 5'1 Pulse 78    ROS:  GENERAL:  Feeling well overall.   SKIN: Denies rash or lesions.   CARDIOVASCULAR: Denies palpitations or left sided chest pain.   ABDOMEN: No abdominal pain, diarrhea, nausea, vomiting or rectal bleeding. + constipation.  URINARY: HPI  REPRODUCTIVE: See HPI.       PHYSICAL EXAM:  APPEARANCE: Obese female, in no acute distress.  AFFECT: WNL, alert and oriented x 3  ABDOMEN: Soft.  No tenderness or masses.  No hepatosplenomegaly.  No hernias.No CVA tenderness.  PELVIC: Normal external genitalia without lesions.  Vagina  without lesions or discharge.  No significant cystocele or rectocele.     1. Acute cystitis without hematuria  Urine culture   2. Diabetes mellitus due to underlying condition with hyperosmolarity without coma, with long-term current use of insulin  Ambulatory  referral/consult to Endocrinology   PLAN:  Urine cx  Macrobid rx  Referral for Endocrinology

## 2020-05-29 ENCOUNTER — PATIENT MESSAGE (OUTPATIENT)
Dept: INTERNAL MEDICINE | Facility: CLINIC | Age: 70
End: 2020-05-29

## 2020-05-29 DIAGNOSIS — N30.00 ACUTE CYSTITIS WITHOUT HEMATURIA: Primary | ICD-10-CM

## 2020-05-30 LAB — BACTERIA UR CULT: ABNORMAL

## 2020-06-01 ENCOUNTER — OFFICE VISIT (OUTPATIENT)
Dept: HEMATOLOGY/ONCOLOGY | Facility: CLINIC | Age: 70
End: 2020-06-01
Payer: MEDICARE

## 2020-06-01 ENCOUNTER — TELEPHONE (OUTPATIENT)
Dept: ENDOCRINOLOGY | Facility: CLINIC | Age: 70
End: 2020-06-01

## 2020-06-01 VITALS
HEIGHT: 61 IN | TEMPERATURE: 97 F | HEART RATE: 86 BPM | OXYGEN SATURATION: 96 % | DIASTOLIC BLOOD PRESSURE: 77 MMHG | SYSTOLIC BLOOD PRESSURE: 160 MMHG | BODY MASS INDEX: 45.57 KG/M2 | WEIGHT: 241.38 LBS

## 2020-06-01 DIAGNOSIS — D64.9 NORMOCYTIC ANEMIA: Primary | ICD-10-CM

## 2020-06-01 DIAGNOSIS — D52.0 DIETARY FOLATE DEFICIENCY ANEMIA: ICD-10-CM

## 2020-06-01 DIAGNOSIS — Z86.2 HISTORY OF IRON DEFICIENCY ANEMIA: ICD-10-CM

## 2020-06-01 PROCEDURE — 1126F AMNT PAIN NOTED NONE PRSNT: CPT | Mod: S$GLB,,, | Performed by: NURSE PRACTITIONER

## 2020-06-01 PROCEDURE — 3077F PR MOST RECENT SYSTOLIC BLOOD PRESSURE >= 140 MM HG: ICD-10-PCS | Mod: CPTII,S$GLB,, | Performed by: NURSE PRACTITIONER

## 2020-06-01 PROCEDURE — 3077F SYST BP >= 140 MM HG: CPT | Mod: CPTII,S$GLB,, | Performed by: NURSE PRACTITIONER

## 2020-06-01 PROCEDURE — 1101F PT FALLS ASSESS-DOCD LE1/YR: CPT | Mod: CPTII,S$GLB,, | Performed by: NURSE PRACTITIONER

## 2020-06-01 PROCEDURE — 1126F PR PAIN SEVERITY QUANTIFIED, NO PAIN PRESENT: ICD-10-PCS | Mod: S$GLB,,, | Performed by: NURSE PRACTITIONER

## 2020-06-01 PROCEDURE — 3078F PR MOST RECENT DIASTOLIC BLOOD PRESSURE < 80 MM HG: ICD-10-PCS | Mod: CPTII,S$GLB,, | Performed by: NURSE PRACTITIONER

## 2020-06-01 PROCEDURE — 3078F DIAST BP <80 MM HG: CPT | Mod: CPTII,S$GLB,, | Performed by: NURSE PRACTITIONER

## 2020-06-01 PROCEDURE — 1159F PR MEDICATION LIST DOCUMENTED IN MEDICAL RECORD: ICD-10-PCS | Mod: S$GLB,,, | Performed by: NURSE PRACTITIONER

## 2020-06-01 PROCEDURE — 1159F MED LIST DOCD IN RCRD: CPT | Mod: S$GLB,,, | Performed by: NURSE PRACTITIONER

## 2020-06-01 PROCEDURE — 1101F PR PT FALLS ASSESS DOC 0-1 FALLS W/OUT INJ PAST YR: ICD-10-PCS | Mod: CPTII,S$GLB,, | Performed by: NURSE PRACTITIONER

## 2020-06-01 PROCEDURE — 99999 PR PBB SHADOW E&M-EST. PATIENT-LVL III: CPT | Mod: PBBFAC,,, | Performed by: NURSE PRACTITIONER

## 2020-06-01 PROCEDURE — 99214 PR OFFICE/OUTPT VISIT, EST, LEVL IV, 30-39 MIN: ICD-10-PCS | Mod: S$GLB,,, | Performed by: NURSE PRACTITIONER

## 2020-06-01 PROCEDURE — 99999 PR PBB SHADOW E&M-EST. PATIENT-LVL III: ICD-10-PCS | Mod: PBBFAC,,, | Performed by: NURSE PRACTITIONER

## 2020-06-01 PROCEDURE — 99214 OFFICE O/P EST MOD 30 MIN: CPT | Mod: S$GLB,,, | Performed by: NURSE PRACTITIONER

## 2020-06-01 NOTE — TELEPHONE ENCOUNTER
calling to let pt know that referral was approved for visit       ----- Message from Danielle Fitzgerald sent at 6/1/2020  9:25 AM CDT -----  Contact: Pt  Patient is scheduled in July and needs to know if [facility] is covering the insurance. Please call back at [phone]

## 2020-06-01 NOTE — PROGRESS NOTES
Subjective:      Patient ID: Christine Jo is a 70 y.o. female.    Chief Complaint: Lab Discussion; fatigue    HPI:  Patient is a 70 year old  female who present today for follow up for her diagnosed iron deficiency anemia.  She was given IV iron on 1/3/19 and 1/8/19.   She [facility]. Patel which was - negative for contributory causes.  She had EGD many years ago which showed gastritis.  Could not find results of this media section.    She states recent switched from prilosec to protonix due to complaints of gastric reflux which is being followed by PCP Dr. Szymanski.  Had EGD 11/29/19 showed reactive gastritis.      She is being follow by Dr. Arthur Reynaga at [facility] [place] [facility] for A. Fib and state that she sees him every 6 months.  She has DMII with currently uncontrolled with elevated A1c that is followed by primary care.  She is also insulin dependent.       She complains of fatigue and chronic joint pain to knees and back/neck.  She verbalized that he depression is better.  Her TSH was normal.       She denies hematuria, melena, hematochezia, epistaxis, bleeding gums, or unusual bruising.  She denies fever, chills, and night sweats.  She denies unintentional weight loss.  She states recently diagnoses with E. Coli UTI by gyn and is currently taking Macrobid.  States that she will seeing endocrinology for her continued elevated blood sugars.  Currently slightly anemic with hemoglobin 11.5. Iron is repleated.         Social History     Socioeconomic History    Marital status:      Spouse name: Not on file    Number of children: Not on file    Years of education: Not on file    Highest education level: Not on file   Occupational History    Not on file   Social Needs    Financial resource strain: Not on file    Food insecurity:     Worry: Not on file     Inability: Not on file    Transportation needs:     Medical:  Not on file     Non-medical: Not on file   Tobacco Use    Smoking status: Former Smoker     Packs/day: 1.00     Years: 35.00     Pack years: 35.00     Types: Cigarettes     Last attempt to quit: 2003     Years since quittin.9    Smokeless tobacco: Never Used   Substance and Sexual Activity    Alcohol use: No    Drug use: No    Sexual activity: Never   Lifestyle    Physical activity:     Days per week: Not on file     Minutes per session: Not on file    Stress: Not on file   Relationships    Social connections:     Talks on phone: Not on file     Gets together: Not on file     Attends Jainism service: Not on file     Active member of club or organization: Not on file     Attends meetings of clubs or organizations: Not on file     Relationship status: Not on file   Other Topics Concern    Not on file   Social History Narrative    Not on file       Family History   Problem Relation Age of Onset    Heart disease Mother         CAD     Cataracts Mother     Macular degeneration Mother     Glaucoma Mother     Cancer Son         testicular     Cancer Maternal Aunt         Lung ca    Heart disease Maternal Grandfather         Pacemaker     Diabetes Sister     Heart disease Sister         CAD    Cataracts Sister     Diabetes Sister     Diabetes Sister        Past Surgical History:   Procedure Laterality Date    abdominal laparoscopy       BREAST BIOPSY      CATARACT EXTRACTION Bilateral [id number]    [facility] [place]    ESOPHAGOGASTRODUODENOSCOPY N/A 2019    Procedure: ESOPHAGOGASTRODUODENOSCOPY (EGD);  Surgeon: Shawn Rogers III, MD;  Location: [facility];  Service: Endoscopy;  Laterality: N/A;    EYE SURGERY      TONSILLECTOMY      TUBAL LIGATION      yag  Bilateral        Past Medical History:   Diagnosis Date    Arthritis     Cataract     Diabetes mellitus      am 01/15/2018 Insulin x 1 year    DM (diabetes mellitus)      am 2020  Insulin x 4 years    Glaucoma     Hypertension     Insomnia     Macular degeneration     Vaginal yeast infection        Review of Systems   Constitutional: Positive for fatigue. Negative for activity change, appetite change, chills, diaphoresis, fever and unexpected weight change.   HENT: Negative for congestion, dental problem, drooling, ear discharge, ear pain, facial swelling, hearing loss, mouth sores, nosebleeds, postnasal drip, rhinorrhea, sinus pressure, sneezing, sore throat, tinnitus, trouble swallowing and voice change.    Eyes: Negative for photophobia, pain, discharge, redness, itching and visual disturbance.   Respiratory: Positive for shortness of breath (occasional). Negative for cough, choking, chest tightness, wheezing and stridor.    Cardiovascular: Positive for leg swelling. Negative for chest pain and palpitations.   Gastrointestinal: Positive for constipation. Negative for abdominal distention, abdominal pain, anal bleeding, blood in stool, diarrhea, nausea, rectal pain and vomiting.   Endocrine: Negative for cold intolerance, heat intolerance, polydipsia, polyphagia and polyuria.   Genitourinary: Positive for dysuria. Negative for decreased urine volume, difficulty urinating, dyspareunia, enuresis, flank pain, frequency, genital sores, hematuria, menstrual problem, pelvic pain, urgency, vaginal bleeding, vaginal discharge and vaginal pain.   Musculoskeletal: Positive for arthralgias. Negative for back pain, gait problem, joint swelling, myalgias, neck pain and neck stiffness.   Skin: Negative for color change, pallor and rash.   Allergic/Immunologic: Negative for environmental allergies, food allergies and immunocompromised state.   Neurological: Negative for dizziness, tremors, seizures, syncope, facial asymmetry, speech difficulty, weakness, light-headedness, numbness and headaches.   Hematological: Negative for adenopathy. Does not bruise/bleed easily.   Psychiatric/Behavioral: Negative  for agitation, behavioral problems, confusion, decreased concentration, dysphoric mood, hallucinations, self-injury, sleep disturbance and suicidal ideas. The patient is not nervous/anxious and is not hyperactive.           Medication List with Changes/Refills   Current Medications    ACETAMINOPHEN (TYLENOL ARTHRITIS ORAL)    Take by mouth as needed.    APIXABAN (ELIQUIS) 5 MG TAB    Take 1 tablet (5 mg total) by mouth 2 (two) times daily.    ATORVASTATIN (LIPITOR) 40 MG TABLET    Take 1 tablet (40 mg total) by mouth once daily.    BENAZEPRIL (LOTENSIN) 40 MG TABLET    TAKE 1 TABLET BY MOUTH EVERY DAY    BEPREVE 1.5 % DROP    Place 1 drop into both eyes 2 (two) times daily.    CALCIUM CITRATE-VITAMIN D3 315-200 MG (CITRACAL+D) 315-200 MG-UNIT PER TABLET    Take 1 tablet by mouth once daily.    CARVEDILOL (COREG) 6.25 MG TABLET    TAKE 1 TABLET(6.25 MG) BY MOUTH TWICE DAILY WITH MEALS    DICLOFENAC SODIUM (VOLTAREN) 1 % GEL    Apply 2 g topically 4 (four) times daily. To affected joints as needed for pain    DULOXETINE (CYMBALTA) 60 MG CAPSULE    TAKE 1 CAPSULE(60 MG) BY MOUTH EVERY DAY    EZETIMIBE (ZETIA) 10 MG TABLET    Take 10 mg by mouth once daily.    FLUTICASONE PROPIONATE (FLONASE) 50 MCG/ACTUATION NASAL SPRAY    2 sprays (100 mcg total) by Each Nostril route once daily.    GABAPENTIN (NEURONTIN) 100 MG CAPSULE    Take 1 capsule (100 mg total) by mouth 2 (two) times daily.    GUAIFENESIN (MUCINEX) 600 MG 12 HR TABLET    Take 2 tablets (1,200 mg total) by mouth 2 (two) times daily.    HYDRALAZINE (APRESOLINE) 100 MG TABLET    Take 1 tablet (100 mg total) by mouth 2 (two) times daily.    INSULIN (LANTUS SOLOSTAR U-100 INSULIN) GLARGINE 100 UNITS/ML (3ML) SUBQ PEN    Inject 64 Units into the skin every evening.    JANUVIA 100 MG TAB    TAKE 1 TABLET(100 MG) BY MOUTH EVERY DAY    LIRAGLUTIDE 0.6 MG/0.1 ML, 18 MG/3 ML, SUBQ PNIJ (VICTOZA 2-LEAH) 0.6 MG/0.1 ML (18 MG/3 ML) PNIJ    Inject 0.6 mg into the skin once  "daily.    MULTIVIT WITH CALCIUM,IRON,MIN (WOMEN'S DAILY MULTIVITAMIN ORAL)    Take by mouth once daily.     MUPIROCIN (BACTROBAN) 2 % OINTMENT    Apply topically 2 (two) times daily.    NITROFURANTOIN, MACROCRYSTAL-MONOHYDRATE, (MACROBID) 100 MG CAPSULE    Take 1 capsule (100 mg total) by mouth 2 (two) times daily. for 7 days    NITROGLYCERIN (NITROSTAT) 0.4 MG SL TABLET    SOHA    NYSTATIN (MYCOSTATIN) CREAM    Apply topically 2 (two) times daily. Continue until completely healed    PANTOPRAZOLE (PROTONIX) 40 MG TABLET    Take 1 tablet (40 mg total) by mouth 2 (two) times daily.    PEN NEEDLE, DIABETIC (BD ULTRA-FINE SHORT PEN NEEDLE) 31 GAUGE X 5/16" NDLE    Use two daily as directd    RESTASIS 0.05 % OPHTHALMIC EMULSION    PLACE 1 DROP INTO BOTH EYES TWICE DAILY    TEMAZEPAM (RESTORIL) 30 MG CAPSULE    TAKE 1 CAPSULE BY MOUTH AT NIGHT AS NEEDED    TRIAMCINOLONE ACETONIDE 0.1% (KENALOG) 0.1 % CREAM    Apply topically 2 (two) times daily. No longer than 2 weeks.        Objective:     Vitals:    06/01/20 1030   BP: (!) 160/77   Pulse: 86   Temp: 97.3 °F (36.3 °C)       Physical Exam   Constitutional: She is oriented to person, place, and time. She appears well-developed and well-nourished. She does not appear ill. No distress.   HENT:   Head: Normocephalic and atraumatic.   Right Ear: External ear normal.   Left Ear: External ear normal.   Nose: Nose normal.   Eyes: Conjunctivae and EOM are normal.   Neck: Normal range of motion.   Cardiovascular: Normal rate and regular rhythm.   Pulmonary/Chest: Effort normal. No accessory muscle usage. No apnea, no tachypnea and no bradypnea. No respiratory distress.   Abdominal: Soft. Normal appearance. She exhibits no distension.   Musculoskeletal: Normal range of motion.   Neurological: She is alert and oriented to person, place, and time.   Skin: Skin is warm and dry. No rash noted. She is not diaphoretic. No pallor.   Psychiatric: She has a normal mood and affect. Her speech " is normal and behavior is normal. Judgment and thought content normal. She is not actively hallucinating. She is attentive.   Vitals reviewed.      Assessment:     Problem List Items Addressed This Visit     None      Visit Diagnoses     Normocytic anemia    -  Primary    Relevant Orders    CBC auto differential    Ferritin    Iron and TIBC    Vitamin B12    VITAMIN B1    History of iron deficiency anemia        Relevant Orders    CBC auto differential    Comprehensive metabolic panel    Ferritin    Iron and TIBC    Dietary folate deficiency anemia         Relevant Orders    Vitamin B12    Uncontrolled diabetes mellitus due to underlying condition with diabetic neuropathic arthropathy, with long-term current use of insulin            Lab Results   Component Value Date    WBC 9.35 05/28/2020    HGB 11.5 (L) 05/28/2020    HCT 34.7 (L) 05/28/2020    MCV 93 05/28/2020     05/28/2020         Lab Results   Component Value Date     05/28/2020    K 3.9 05/28/2020     05/28/2020    CO2 25 05/28/2020    BUN 16 05/28/2020    CREATININE 0.8 05/28/2020    CALCIUM 9.3 05/28/2020    ANIONGAP 12 05/28/2020    ESTGFRAFRICA >60 05/28/2020    EGFRNONAA >60 05/28/2020     Lab Results   Component Value Date    ALT 22 05/28/2020    AST 18 05/28/2020    ALKPHOS 44 (L) 05/28/2020    BILITOT 0.6 05/28/2020     Lab Results   Component Value Date    IRON 71 05/28/2020    TIBC 337 05/28/2020    FERRITIN 102 05/28/2020     Lab Results   Component Value Date    LMFLYRSD58 350 05/28/2020       Plan:   Normocytic anemia  -     CBC auto differential; Future; Expected date: 06/01/2020  -     Ferritin; Future; Expected date: 06/01/2020  -     Iron and TIBC; Future; Expected date: 06/01/2020  -     Vitamin B12; Future; Expected date: 06/01/2020  -     VITAMIN B1; Future; Expected date: 06/01/2020    History of iron deficiency anemia  -     CBC auto differential; Future; Expected date: 06/01/2020  -     Comprehensive metabolic panel;  Future; Expected date: 06/01/2020  -     Ferritin; Future; Expected date: 06/01/2020  -     Iron and TIBC; Future; Expected date: 06/01/2020    Dietary folate deficiency anemia   -     Vitamin B12; Future; Expected date: 06/01/2020    Uncontrolled diabetes mellitus due to underlying condition with diabetic neuropathic arthropathy, with long-term current use of insulin    Labs are stable with slight anemia.  Will follow up in 6 months with repeat labs as above prior.  She knows to follow up sooner if develops symptoms associated with worsening anemia.      Thank You,  Viviane Castillo, LUCILLE-C

## 2020-06-02 ENCOUNTER — TELEPHONE (OUTPATIENT)
Dept: SURGERY | Facility: CLINIC | Age: 70
End: 2020-06-02

## 2020-06-02 ENCOUNTER — TELEPHONE (OUTPATIENT)
Dept: ORTHOPEDICS | Facility: CLINIC | Age: 70
End: 2020-06-02

## 2020-06-02 ENCOUNTER — HOSPITAL ENCOUNTER (OUTPATIENT)
Dept: RADIOLOGY | Facility: HOSPITAL | Age: 70
Discharge: HOME | End: 2020-06-02
Attending: SURGERY
Payer: MEDICARE

## 2020-06-02 DIAGNOSIS — M71.319 SYNOVIAL CYST OF SHOULDER: Primary | ICD-10-CM

## 2020-06-02 DIAGNOSIS — R22.31 MASS OF SKIN OF SHOULDER, RIGHT: ICD-10-CM

## 2020-06-02 PROCEDURE — 76882 US LMTD JT/FCL EVL NVASC XTR: CPT | Mod: TC

## 2020-06-02 NOTE — TELEPHONE ENCOUNTER
Spoke to patient she is aware of her test results, and also aware of her ortho appointment date and time, patient voiced understanding.

## 2020-06-03 ENCOUNTER — PATIENT MESSAGE (OUTPATIENT)
Dept: INTERNAL MEDICINE | Facility: CLINIC | Age: 70
End: 2020-06-03

## 2020-06-04 RX ORDER — TEMAZEPAM 30 MG/1
CAPSULE ORAL
Qty: 30 CAPSULE | Refills: 0 | Status: SHIPPED | OUTPATIENT
Start: 2020-06-04 | End: 2020-07-06 | Stop reason: SDUPTHER

## 2020-06-05 ENCOUNTER — TELEPHONE (OUTPATIENT)
Dept: RHEUMATOLOGY | Facility: CLINIC | Age: 70
End: 2020-06-05

## 2020-06-05 NOTE — TELEPHONE ENCOUNTER
R/s 6-8-20 appt to 6-15-20 with massiel maddox for bilat synvisc 1 per pt requst. Pt Verbalized understanding.

## 2020-06-08 ENCOUNTER — TELEPHONE (OUTPATIENT)
Dept: RHEUMATOLOGY | Facility: CLINIC | Age: 70
End: 2020-06-08

## 2020-06-08 NOTE — TELEPHONE ENCOUNTER
Returned pt call she will keep her appt that is scheduled for 6/15/2020.  Pt verbalized understanding for upcoming appt as well as any concerns she will reach out to the clinic again.

## 2020-06-08 NOTE — TELEPHONE ENCOUNTER
Etta Doll MA  You 33 minutes ago (11:22 AM)      Just fyi    Routing comment       Etta Doll MA 33 minutes ago (11:22 AM)         Returned pt call she will keep her appt that is scheduled for 6/15/2020.  Pt verbalized understanding for upcoming appt as well as any concerns she will reach out to the clinic again.         Documentation

## 2020-06-08 NOTE — TELEPHONE ENCOUNTER
----- Message from [place] sent at 6/8/2020 10:29 AM CDT -----  Type:  Patient Returning Call    Who Called: Bradley King  Who Left Message for Patient:  Ms [place] office  Does the patient know what this is regarding?:  Her appt  Would the patient rather a call back or a response via [facility]?   Call back  Best Call Back Number:  [phone]  Additional Information:  Pt states she is returning your call//please call again//thanks/delano

## 2020-06-08 NOTE — TELEPHONE ENCOUNTER
Advised pt clinic is open today, offered appt for 2pm for bilat synvisc, pt declined, stated she will keep 6-15-20 appt, Verbalized understanding.

## 2020-06-09 DIAGNOSIS — M25.511 RIGHT SHOULDER PAIN, UNSPECIFIED CHRONICITY: Primary | ICD-10-CM

## 2020-06-12 ENCOUNTER — OFFICE VISIT (OUTPATIENT)
Dept: ORTHOPEDICS | Facility: CLINIC | Age: 70
End: 2020-06-12
Payer: MEDICARE

## 2020-06-12 ENCOUNTER — HOSPITAL ENCOUNTER (OUTPATIENT)
Dept: RADIOLOGY | Facility: HOSPITAL | Age: 70
Discharge: HOME | End: 2020-06-12
Attending: PHYSICIAN ASSISTANT
Payer: MEDICARE

## 2020-06-12 ENCOUNTER — TELEPHONE (OUTPATIENT)
Dept: RHEUMATOLOGY | Facility: CLINIC | Age: 70
End: 2020-06-12

## 2020-06-12 VITALS
WEIGHT: 241 LBS | HEART RATE: 88 BPM | DIASTOLIC BLOOD PRESSURE: 70 MMHG | SYSTOLIC BLOOD PRESSURE: 137 MMHG | BODY MASS INDEX: 45.5 KG/M2 | HEIGHT: 61 IN

## 2020-06-12 DIAGNOSIS — M67.40 GANGLION CYST: Primary | ICD-10-CM

## 2020-06-12 DIAGNOSIS — M25.511 RIGHT SHOULDER PAIN, UNSPECIFIED CHRONICITY: ICD-10-CM

## 2020-06-12 DIAGNOSIS — M19.012 DJD OF LEFT AC (ACROMIOCLAVICULAR) JOINT: ICD-10-CM

## 2020-06-12 PROCEDURE — 3075F SYST BP GE 130 - 139MM HG: CPT | Mod: CPTII,S$GLB,, | Performed by: PHYSICIAN ASSISTANT

## 2020-06-12 PROCEDURE — 3078F PR MOST RECENT DIASTOLIC BLOOD PRESSURE < 80 MM HG: ICD-10-PCS | Mod: CPTII,S$GLB,, | Performed by: PHYSICIAN ASSISTANT

## 2020-06-12 PROCEDURE — 99214 OFFICE O/P EST MOD 30 MIN: CPT | Mod: 25,S$GLB,, | Performed by: PHYSICIAN ASSISTANT

## 2020-06-12 PROCEDURE — 73030 X-RAY EXAM OF SHOULDER: CPT | Mod: TC,RT

## 2020-06-12 PROCEDURE — 20612: ICD-10-PCS | Mod: S$GLB,,, | Performed by: PHYSICIAN ASSISTANT

## 2020-06-12 PROCEDURE — 99999 PR PBB SHADOW E&M-EST. PATIENT-LVL V: ICD-10-PCS | Mod: PBBFAC,,, | Performed by: PHYSICIAN ASSISTANT

## 2020-06-12 PROCEDURE — 3075F PR MOST RECENT SYSTOLIC BLOOD PRESS GE 130-139MM HG: ICD-10-PCS | Mod: CPTII,S$GLB,, | Performed by: PHYSICIAN ASSISTANT

## 2020-06-12 PROCEDURE — 1159F PR MEDICATION LIST DOCUMENTED IN MEDICAL RECORD: ICD-10-PCS | Mod: S$GLB,,, | Performed by: PHYSICIAN ASSISTANT

## 2020-06-12 PROCEDURE — 1159F MED LIST DOCD IN RCRD: CPT | Mod: S$GLB,,, | Performed by: PHYSICIAN ASSISTANT

## 2020-06-12 PROCEDURE — 1125F AMNT PAIN NOTED PAIN PRSNT: CPT | Mod: S$GLB,,, | Performed by: PHYSICIAN ASSISTANT

## 2020-06-12 PROCEDURE — 99999 PR PBB SHADOW E&M-EST. PATIENT-LVL V: CPT | Mod: PBBFAC,,, | Performed by: PHYSICIAN ASSISTANT

## 2020-06-12 PROCEDURE — 1101F PR PT FALLS ASSESS DOC 0-1 FALLS W/OUT INJ PAST YR: ICD-10-PCS | Mod: CPTII,S$GLB,, | Performed by: PHYSICIAN ASSISTANT

## 2020-06-12 PROCEDURE — 73030 X-RAY EXAM OF SHOULDER: CPT | Mod: 26,RT,, | Performed by: RADIOLOGY

## 2020-06-12 PROCEDURE — 73030 XR SHOULDER COMPLETE 2 OR MORE VIEWS RIGHT: ICD-10-PCS | Mod: 26,RT,, | Performed by: RADIOLOGY

## 2020-06-12 PROCEDURE — 20612 ASPIRATE/INJ GANGLION CYST: CPT | Mod: S$GLB,,, | Performed by: PHYSICIAN ASSISTANT

## 2020-06-12 PROCEDURE — 99214 PR OFFICE/OUTPT VISIT, EST, LEVL IV, 30-39 MIN: ICD-10-PCS | Mod: 25,S$GLB,, | Performed by: PHYSICIAN ASSISTANT

## 2020-06-12 PROCEDURE — 1101F PT FALLS ASSESS-DOCD LE1/YR: CPT | Mod: CPTII,S$GLB,, | Performed by: PHYSICIAN ASSISTANT

## 2020-06-12 PROCEDURE — 3078F DIAST BP <80 MM HG: CPT | Mod: CPTII,S$GLB,, | Performed by: PHYSICIAN ASSISTANT

## 2020-06-12 PROCEDURE — 1125F PR PAIN SEVERITY QUANTIFIED, PAIN PRESENT: ICD-10-PCS | Mod: S$GLB,,, | Performed by: PHYSICIAN ASSISTANT

## 2020-06-12 NOTE — LETTER
June 12, 2020      Megan Carrillo MD  [address]  [place] [address]           '[facility]  [address] [place] [id number]  Phone: [phone]  Fax: [phone]          Patient: Christine Jo   MR Number: [id number]   YOB: 1950   Date of Visit: 6/12/2020       Dear Dr. Megan Carrillo:    Thank you for referring Christine Jo to me for evaluation. Attached you will find relevant portions of my assessment and plan of care.    If you have questions, please do not hesitate to call me. I look forward to following Christine Jo along with you.    Sincerely,    DAYTON Alansiosure  CC:  No Recipients    If you would like to receive this communication electronically, please contact [email or web address] or [phone] to request more information on [facility] access.    For providers and/or their staff who would like to refer a patient to [facility], please contact us through our one-stop-shop provider referral line, [facility], at [phone].    If you feel you have received this communication in error or would no longer like to receive these types of communications, please e-mail [email or web address]

## 2020-06-12 NOTE — PROCEDURES
Ganglion Cyst  Date/Time: 6/12/2020 10:20 AM  Performed by: Cheri Tatum PA-C  Authorized by: Cheri Tatum PA-C

## 2020-06-12 NOTE — PROGRESS NOTES
Patient ID: Christine Jo is a 70 y.o. female.    Chief Complaint: Pain of the Right Shoulder      HPI: Christine Jo  is a 70 y.o. female who c/o Pain of the Right Shoulder   for duration of 5 months.  She has noticed a mass on the right shoulder that keeps getting larger.  Pain level 4/10.  Alleviating factors include nothing.  Quality is intermittent aching pain.  She feels it pulling when she does range of motion over shoulder level.  Aggravating factors include direct contact as well as sleeping on it at night.    Past Medical History:   Diagnosis Date    Arthritis     Cataract     Diabetes mellitus 2008     am 01/15/2018 Insulin x 1 year    DM (diabetes mellitus) 2008     am 02/14/2020 Insulin x 4 years    Glaucoma     Hypertension     Insomnia     Macular degeneration     Vaginal yeast infection      Past Surgical History:   Procedure Laterality Date    abdominal laparoscopy       BREAST BIOPSY      CATARACT EXTRACTION Bilateral [id number]    [facility] [place]    ESOPHAGOGASTRODUODENOSCOPY N/A 11/29/2019    Procedure: ESOPHAGOGASTRODUODENOSCOPY (EGD);  Surgeon: Shawn Rogers III, MD;  Location[facility];  Service: Endoscopy;  Laterality: N/A;    EYE SURGERY      TONSILLECTOMY      TUBAL LIGATION      yag  Bilateral      Family History   Problem Relation Age of Onset    Heart disease Mother         CAD     Cataracts Mother     Macular degeneration Mother     Glaucoma Mother     Cancer Son         testicular     Cancer Maternal Aunt         Lung ca    Heart disease Maternal Grandfather         Pacemaker     Diabetes Sister     Heart disease Sister         CAD    Cataracts Sister     Diabetes Sister     Diabetes Sister      Social History     Socioeconomic History    Marital status:      Spouse name: Not on file    Number of children: Not on file    Years of education: Not on file    Highest education level: Not on file   Occupational  History    Not on file   Social Needs    Financial resource strain: Not on file    Food insecurity:     Worry: Not on file     Inability: Not on file    Transportation needs:     Medical: Not on file     Non-medical: Not on file   Tobacco Use    Smoking status: Former Smoker     Packs/day: 1.00     Years: 35.00     Pack years: 35.00     Types: Cigarettes     Last attempt to quit: 2003     Years since quittin.9    Smokeless tobacco: Never Used   Substance and Sexual Activity    Alcohol use: No    Drug use: No    Sexual activity: Never   Lifestyle    Physical activity:     Days per week: Not on file     Minutes per session: Not on file    Stress: Not on file   Relationships    Social connections:     Talks on phone: Not on file     Gets together: Not on file     Attends [employer] service: Not on file     Active member of club or organization: Not on file     Attends meetings of clubs or organizations: Not on file     Relationship status: Not on file   Other Topics Concern    Not on file   Social History Narrative    Not on file     Medication List with Changes/Refills   Current Medications    ACETAMINOPHEN (TYLENOL ARTHRITIS ORAL)    Take by mouth as needed.    APIXABAN (ELIQUIS) 5 MG TAB    Take 1 tablet (5 mg total) by mouth 2 (two) times daily.    APIXABAN (ELIQUIS) 5 MG TAB    Take 1 tablet (5 mg total) by mouth 2 (two) times daily.    ATORVASTATIN (LIPITOR) 40 MG TABLET    Take 1 tablet (40 mg total) by mouth once daily.    BENAZEPRIL (LOTENSIN) 40 MG TABLET    TAKE 1 TABLET BY MOUTH EVERY DAY    BEPREVE 1.5 % DROP    Place 1 drop into both eyes 2 (two) times daily.    CALCIUM CITRATE-VITAMIN D3 315-200 MG (CITRACAL+D) 315-200 MG-UNIT PER TABLET    Take 1 tablet by mouth once daily.    CARVEDILOL (COREG) 6.25 MG TABLET    TAKE 1 TABLET(6.25 MG) BY MOUTH TWICE DAILY WITH MEALS    DICLOFENAC SODIUM (VOLTAREN) 1 % GEL    Apply 2 g topically 4 (four) times daily. To affected joints as needed  "for pain    DULOXETINE (CYMBALTA) 60 MG CAPSULE    TAKE 1 CAPSULE(60 MG) BY MOUTH EVERY DAY    EZETIMIBE (ZETIA) 10 MG TABLET    Take 10 mg by mouth once daily.    FLUTICASONE PROPIONATE (FLONASE) 50 MCG/ACTUATION NASAL SPRAY    2 sprays (100 mcg total) by Each Nostril route once daily.    GABAPENTIN (NEURONTIN) 100 MG CAPSULE    Take 1 capsule (100 mg total) by mouth 2 (two) times daily.    GUAIFENESIN (MUCINEX) 600 MG 12 HR TABLET    Take 2 tablets (1,200 mg total) by mouth 2 (two) times daily.    HYDRALAZINE (APRESOLINE) 100 MG TABLET    Take 1 tablet (100 mg total) by mouth 2 (two) times daily.    INSULIN (LANTUS SOLOSTAR U-100 INSULIN) GLARGINE 100 UNITS/ML (3ML) SUBQ PEN    Inject 64 Units into the skin every evening.    JANUVIA 100 MG TAB    TAKE 1 TABLET(100 MG) BY MOUTH EVERY DAY    LIRAGLUTIDE 0.6 MG/0.1 ML, 18 MG/3 ML, SUBQ PNIJ (VICTOZA 2-LEAH) 0.6 MG/0.1 ML (18 MG/3 ML) PNIJ    Inject 0.6 mg into the skin once daily.    MULTIVIT WITH CALCIUM,IRON,MIN (WOMEN'S DAILY MULTIVITAMIN ORAL)    Take by mouth once daily.     MUPIROCIN (BACTROBAN) 2 % OINTMENT    Apply topically 2 (two) times daily.    NITROGLYCERIN (NITROSTAT) 0.4 MG SL TABLET    SOHA    NYSTATIN (MYCOSTATIN) CREAM    Apply topically 2 (two) times daily. Continue until completely healed    PANTOPRAZOLE (PROTONIX) 40 MG TABLET    Take 1 tablet (40 mg total) by mouth 2 (two) times daily.    PEN NEEDLE, DIABETIC (BD ULTRA-FINE SHORT PEN NEEDLE) 31 GAUGE X 5/16" NDLE    Use two daily as directd    RESTASIS 0.05 % OPHTHALMIC EMULSION    PLACE 1 DROP INTO BOTH EYES TWICE DAILY    TEMAZEPAM (RESTORIL) 30 MG CAPSULE    TAKE 1 CAPSULE BY MOUTH AT NIGHT AS NEEDED    TRIAMCINOLONE ACETONIDE 0.1% (KENALOG) 0.1 % CREAM    Apply topically 2 (two) times daily. No longer than 2 weeks.     Review of patient's allergies indicates:   Allergen Reactions    Aspirin Palpitations       Objective:        General    Nursing note and vitals reviewed.  Constitutional: She " is oriented to person, place, and time. She appears well-developed and well-nourished.   HENT:   Head: Normocephalic and atraumatic.   Eyes: EOM are normal.   Cardiovascular: Normal rate and regular rhythm.    Pulmonary/Chest: Effort normal.   Abdominal: Soft.   Neurological: She is alert and oriented to person, place, and time.   Psychiatric: She has a normal mood and affect. Her behavior is normal.         Right Shoulder Exam     Inspection/Observation   Swelling: present (mass adjacent to ac joint - No SOI)  Bruising: absent  Scars: absent  Deformity: absent  Scapular Dyskinesia: negative    Other   Sensation: normal    Comments:  Good ROM  Mild weakness RC  Fluid filled mass adjacent to ac jt  3 cm x 1.5 cm - no erythema, no SOI  Non mobile  Non pulsatile      Vascular Exam     Right Pulses      Radial:                    2+          Xray Images and report were reviewed today.  I agree with the radiologist's interpretation.    X-ray Shoulder 2 or More Views Right  Narrative: EXAMINATION:  XR SHOULDER COMPLETE 2 OR MORE VIEWS RIGHT    CLINICAL HISTORY:  Pain in right shoulder    TECHNIQUE:  Two or three views of the right shoulder were performed.    COMPARISON:  None    FINDINGS:  AC joint arthrosis with minimal degenerative changes at the glenohumeral joint with preservation of the joint space.  No fracture or dislocation.  Soft tissues appear normal.  Visualized right lung is clear.  Impression: As above    Electronically signed by: Evan Holloway MD  Date:    06/12/2020  Time:    10:13    Ultrasound 6/2/20  US Soft Tissue Upper Extremity   Order: [id number]   Status:  Final result   Visible to patient:  Yes (Patient Portal)   Next appt:  06/15/2020 at 08:20 AM in Lab ([id number])   Dx:  Mass of skin of shoulder, right   Details     Reading Physician Reading Date Result Priority   Levi Llanos MD 6/2/2020 Routine      Narrative     EXAMINATION:  US SOFT TISSUE, UPPER EXTREMITY    CLINICAL  HISTORY:  right shoulder mass;  Localized swelling, mass and lump, right upper limb    TECHNIQUE:  Limited ultrasound right shoulder    COMPARISON:  05/25/2020    FINDINGS:  Ultrasound demonstrates 2 x 1 x 2.6 cm cyst with small internal septation which is closely approximated to the right AC joint.  Findings can be seen in the setting of chronic rotator cuff or acromioclavicular ligament injury.  Consider follow-up is MRI as clinically warranted.      Impression       Six see above.      Electronically signed by: Levi Llanos MD  Date: 06/02/2020  Time: 10:50               Assessment:       Encounter Diagnoses   Name Primary?    Ganglion cyst Yes    DJD of left AC (acromioclavicular) joint           Plan:       Christine was seen today for pain.    Diagnoses and all orders for this visit:    Ganglion cyst  -     Ganglion Cyst Right AC joint    DJD of left AC (acromioclavicular) joint    Other orders  -     Cancel: Small Joint Aspiration/Injection        Christine Jo is an established pt who comes in today for new problem as above.  She has a ganglion cyst at the right AC joint.  We have discussed risks and benefits of aspirating the cyst.  She wishes to proceed.  We have discussed potential corticosteroid injection into the area.  She has declined that today stating last steroid injection she did shot her blood sugars into the 300s.  She understands cyst may recur.  If it does or gets larger, we may consider further diagnostic workup.  She verbalizes understanding and agrees.    Follow up if symptoms worsen or fail to improve.    The patient understands, chooses and consents to this plan and accepts all   the risks which include but are not limited to the risks mentioned above.     Disclaimer: This note was prepared using a voice recognition system and is likely to have sound alike errors within the text.

## 2020-06-12 NOTE — TELEPHONE ENCOUNTER
Left message to call clinic back, need to confirm appt on 6.15.20 with massiel maddox at [facility]

## 2020-06-12 NOTE — PROCEDURES
Ganglion Cyst Right AC joint  Date/Time: 6/12/2020 10:20 AM  Performed by: Cheri Tatum PA-C  Authorized by: Cheri Tatum PA-C     Local anesthesia used?: Yes    Local anesthetic:  Lidocaine 1% without epinephrine  Anesthetic total (ml):  0.5    Location: Right AC jt.  Ultrasonic Guidance for Needle Placement?: No    Needle size:  18 G  Approach:  Superior  Aspirate amount (ml):  3  Aspirate:  Clear (gelatinous - consistent with ganglion cyst))  Patient tolerance:  Patient tolerated the procedure well with no immediate complications

## 2020-06-15 ENCOUNTER — PROCEDURE VISIT (OUTPATIENT)
Dept: RHEUMATOLOGY | Facility: CLINIC | Age: 70
End: 2020-06-15
Payer: MEDICARE

## 2020-06-15 DIAGNOSIS — M17.0 PRIMARY OSTEOARTHRITIS OF BOTH KNEES: Primary | ICD-10-CM

## 2020-06-15 PROCEDURE — 20610 LARGE JOINT ASPIRATION/INJECTION: BILATERAL KNEE: ICD-10-PCS | Mod: 50,S$GLB,, | Performed by: PHYSICIAN ASSISTANT

## 2020-06-15 PROCEDURE — 20610 DRAIN/INJ JOINT/BURSA W/O US: CPT | Mod: 50,S$GLB,, | Performed by: PHYSICIAN ASSISTANT

## 2020-06-15 NOTE — PROCEDURES
Large Joint Aspiration/Injection: bilateral knee    Date/Time: 6/15/2020 11:30 AM  Performed by: Naina Crisostomo PA-C  Authorized by: Naina Crisostomo PA-C     Site marked: the procedure site was marked    Timeout: prior to procedure the correct patient, procedure, and site was verified    Anesthesia:  Local infiltration  Local anesthetic:  Lidocaine 1% without epinephrine  Location:  Knee  Laterality:  Bilateral  Site:  Bilateral knee  Medications (Right):  48 mg hylan g-f 20 48 mg/6 mL  Medications (Left):  48 mg hylan g-f 20 48 mg/6 mL  Patient tolerance:  Patient tolerated the procedure well with no immediate complications     Procedure note: After verbal consent was obtained.  The right knee was prepared with sterile prep.  The skin was anesthetized with 1% ethyl chloride.  The knee joint was injected with 2.5 cc of 1% lidocaine to anesthetize the knee.  The joint was then injected with 6 mL of Synvisc one in a prefilled syringe.  Hemostasis was obtained.  The patient tolerated procedure well with no complications.  discharge and  icing instructions given to [facility]    Procedure note: After verbal consent was obtained.  The left knee was prepared with sterile prep.  The skin was anesthetized with 1% ethyl chloride.  The knee joint was injected with 2.5 cc of 1% lidocaine to anesthetize the knee.  The joint was then injected with 6 mL of Synvisc one in a prefilled syringe.  Hemostasis was obtained.  The patient tolerated procedure well with no complications.  discharge and  icing instructions given to [facility]

## 2020-06-16 RX ORDER — INSULIN GLARGINE 100 [IU]/ML
64 INJECTION, SOLUTION SUBCUTANEOUS NIGHTLY
Qty: 3 BOX | Refills: 3 | Status: SHIPPED | OUTPATIENT
Start: 2020-06-16 | End: 2020-11-03 | Stop reason: SDUPTHER

## 2020-06-16 RX ORDER — ATORVASTATIN CALCIUM 40 MG/1
40 TABLET, FILM COATED ORAL DAILY
Qty: 90 TABLET | Refills: 0 | Status: SHIPPED | OUTPATIENT
Start: 2020-06-16 | End: 2020-09-23 | Stop reason: SDUPTHER

## 2020-06-16 RX ORDER — HYDRALAZINE HYDROCHLORIDE 100 MG/1
100 TABLET, FILM COATED ORAL 2 TIMES DAILY
Qty: 180 TABLET | Refills: 0 | Status: SHIPPED | OUTPATIENT
Start: 2020-06-16 | End: 2022-02-01 | Stop reason: SDUPTHER

## 2020-06-16 RX ORDER — GABAPENTIN 100 MG/1
100 CAPSULE ORAL 2 TIMES DAILY
Qty: 180 CAPSULE | Refills: 0 | Status: SHIPPED | OUTPATIENT
Start: 2020-06-16 | End: 2020-12-17 | Stop reason: SDUPTHER

## 2020-06-16 RX ORDER — CARVEDILOL 6.25 MG/1
6.25 TABLET ORAL 2 TIMES DAILY WITH MEALS
Qty: 180 TABLET | Refills: 0 | Status: SHIPPED | OUTPATIENT
Start: 2020-06-16 | End: 2020-10-13 | Stop reason: DRUGHIGH

## 2020-06-16 RX ORDER — PANTOPRAZOLE SODIUM 40 MG/1
40 TABLET, DELAYED RELEASE ORAL 2 TIMES DAILY
Qty: 180 TABLET | Refills: 3
Start: 2020-06-16 | End: 2020-06-22 | Stop reason: SDUPTHER

## 2020-06-16 RX ORDER — BENAZEPRIL HYDROCHLORIDE 40 MG/1
40 TABLET ORAL DAILY
Qty: 90 TABLET | Refills: 0 | Status: SHIPPED | OUTPATIENT
Start: 2020-06-16 | End: 2020-08-02 | Stop reason: SDUPTHER

## 2020-06-16 RX ORDER — PEN NEEDLE, DIABETIC 30 GX3/16"
NEEDLE, DISPOSABLE MISCELLANEOUS
Qty: 200 EACH | Refills: 4 | Status: SHIPPED | OUTPATIENT
Start: 2020-06-16 | End: 2021-06-01

## 2020-06-17 ENCOUNTER — TELEPHONE (OUTPATIENT)
Dept: OBSTETRICS AND GYNECOLOGY | Facility: CLINIC | Age: 70
End: 2020-06-17

## 2020-06-17 RX ORDER — GABAPENTIN 300 MG/1
300 CAPSULE ORAL 3 TIMES DAILY
Qty: 90 CAPSULE | Refills: 1 | Status: SHIPPED | OUTPATIENT
Start: 2020-06-17 | End: 2020-12-07 | Stop reason: SDUPTHER

## 2020-06-17 NOTE — TELEPHONE ENCOUNTER
Spoke to the pt and advised her that her test results were Negative urine cx. Pt acknowledged understanding. rabia Parmar

## 2020-06-22 RX ORDER — PANTOPRAZOLE SODIUM 40 MG/1
40 TABLET, DELAYED RELEASE ORAL 2 TIMES DAILY
Qty: 180 TABLET | Refills: 3 | Status: SHIPPED | OUTPATIENT
Start: 2020-06-22 | End: 2021-03-17 | Stop reason: DRUGHIGH

## 2020-07-03 ENCOUNTER — NURSE TRIAGE (OUTPATIENT)
Dept: ADMINISTRATIVE | Facility: CLINIC | Age: 70
End: 2020-07-03

## 2020-07-04 NOTE — TELEPHONE ENCOUNTER
Reason for Disposition   [1] COVID-19 EXPOSURE (Close Contact) within last 14 days AND [2] NO cough, fever, or breathing difficulty    Additional Information   Negative: Severe difficulty breathing (e.g., struggling for each breath, speaks in single words)   Negative: Bluish (or gray) lips or face now   Negative: Sounds like a life-threatening emergency to the triager   Negative: [1] Adult has symptoms of COVID-19 (fever, cough, or SOB) AND [2] lab test positive   Negative: [1] Adult has symptoms of COVID-19 (fever, cough or SOB) AND [2] major community spread where patient lives AND [3] testing not being done for mild symptoms   Negative: [1] Difficulty breathing (shortness of breath) occurs AND [2] onset > 14 days after COVID-19 EXPOSURE (Close Contact) AND [3] no major community spread   Negative: [1] Dry cough occurs AND [2] onset > 14 days after COVID-19 EXPOSURE AND [3] no major community spread   Negative: [1] Wet cough (i.e., white-yellow, yellow, green, or avinash colored sputum) AND [2] onset > 14 days after COVID-19 EXPOSURE AND [3] no major community spread   Negative: [1] Common cold symptoms AND [2] onset > 14 days after COVID-19 EXPOSURE AND [3] no major community spread   Negative: [1] Difficulty breathing occurs AND [2] within 14 days of COVID-19 EXPOSURE (Close Contact)   Negative: Patient sounds very sick or weak to the triager   Negative: [1] Fever (or feeling feverish) OR cough AND [2] within 14 Days of COVID-19 EXPOSURE (Close Contact)   Negative: [1] Fever (or feeling feverish) OR cough occurs AND [2] within 14 days of travel from another country (international travel)   Negative: [1] Fever (or feeling feverish) OR cough occurs AND [2] within 14 days of travel from a city or area with major community spread   Negative: [1] Fever (or feeling feverish) OR cough occurs AND [2] living in area with major community spread AND [3] testing being done in the community for symptoms    Negative: [1] Mild body aches, chills, diarrhea, headache, runny nose, or sore throat AND [2] within 14 days of COVID-19 EXPOSURE   Negative: [1] COVID-19 EXPOSURE within last 14 days AND [2] NO cough, fever, or breathing difficulty AND [3] exposed person is a healthcare worker who was NOT using all recommended personal protective equipment (i.e., a respirator-N95 mask, eye protection, gloves, and gown)    Protocols used: CORONAVIRUS (COVID-19) - EXPOSURE-A-  Pt called re 5 yo granddaughter over for a couple night.grand daughter's friend came over to play for a short while. Caller notified today that the mother of girlfriend is positive for covid 19. Caller has DM/HBP. Protocol advice given. ED warnings given. Call back with questions

## 2020-07-07 DIAGNOSIS — E11.65 UNCONTROLLED TYPE 2 DIABETES MELLITUS WITH HYPERGLYCEMIA: Primary | ICD-10-CM

## 2020-07-07 RX ORDER — TEMAZEPAM 30 MG/1
CAPSULE ORAL
Qty: 30 CAPSULE | Refills: 0 | Status: CANCELLED | OUTPATIENT
Start: 2020-07-07

## 2020-07-07 NOTE — TELEPHONE ENCOUNTER
Called pt and notified her of Dr. Szymanski recommendations below:        Call jennifer mejia  I refilled sleep medication.  If she develops any additional symptoms beyond cough such as chest tightness shortness breath fever chills diarrhea loss of smell loss of taste or weakness we will need to do a COVID-19 test to call back.  Otherwise 14 days of quarantine    Pt verbalized understanding

## 2020-07-15 ENCOUNTER — TELEPHONE (OUTPATIENT)
Dept: INTERNAL MEDICINE | Facility: CLINIC | Age: 70
End: 2020-07-15

## 2020-07-15 ENCOUNTER — OFFICE VISIT (OUTPATIENT)
Dept: INTERNAL MEDICINE | Facility: CLINIC | Age: 70
End: 2020-07-15
Payer: MEDICARE

## 2020-07-15 VITALS — DIASTOLIC BLOOD PRESSURE: 70 MMHG | SYSTOLIC BLOOD PRESSURE: 137 MMHG

## 2020-07-15 DIAGNOSIS — J30.9 CHRONIC ALLERGIC RHINITIS: Primary | ICD-10-CM

## 2020-07-15 DIAGNOSIS — I10 ESSENTIAL HYPERTENSION: ICD-10-CM

## 2020-07-15 DIAGNOSIS — R05.9 COUGH: ICD-10-CM

## 2020-07-15 DIAGNOSIS — R49.0 HOARSENESS: ICD-10-CM

## 2020-07-15 PROCEDURE — 3075F PR MOST RECENT SYSTOLIC BLOOD PRESS GE 130-139MM HG: ICD-10-PCS | Mod: CPTII,95,, | Performed by: FAMILY MEDICINE

## 2020-07-15 PROCEDURE — 1101F PT FALLS ASSESS-DOCD LE1/YR: CPT | Mod: CPTII,95,, | Performed by: FAMILY MEDICINE

## 2020-07-15 PROCEDURE — 99442 PR PHYSICIAN TELEPHONE EVALUATION 11-20 MIN: CPT | Mod: 95,,, | Performed by: FAMILY MEDICINE

## 2020-07-15 PROCEDURE — 1159F MED LIST DOCD IN RCRD: CPT | Mod: 95,,, | Performed by: FAMILY MEDICINE

## 2020-07-15 PROCEDURE — 3078F DIAST BP <80 MM HG: CPT | Mod: CPTII,95,, | Performed by: FAMILY MEDICINE

## 2020-07-15 PROCEDURE — 99442 PR PHYSICIAN TELEPHONE EVALUATION 11-20 MIN: ICD-10-PCS | Mod: 95,,, | Performed by: FAMILY MEDICINE

## 2020-07-15 PROCEDURE — 3078F PR MOST RECENT DIASTOLIC BLOOD PRESSURE < 80 MM HG: ICD-10-PCS | Mod: CPTII,95,, | Performed by: FAMILY MEDICINE

## 2020-07-15 PROCEDURE — 1159F PR MEDICATION LIST DOCUMENTED IN MEDICAL RECORD: ICD-10-PCS | Mod: 95,,, | Performed by: FAMILY MEDICINE

## 2020-07-15 PROCEDURE — 3075F SYST BP GE 130 - 139MM HG: CPT | Mod: CPTII,95,, | Performed by: FAMILY MEDICINE

## 2020-07-15 PROCEDURE — 1101F PR PT FALLS ASSESS DOC 0-1 FALLS W/OUT INJ PAST YR: ICD-10-PCS | Mod: CPTII,95,, | Performed by: FAMILY MEDICINE

## 2020-07-15 RX ORDER — AZELASTINE 1 MG/ML
2 SPRAY, METERED NASAL 2 TIMES DAILY
Qty: 30 ML | Refills: 1 | Status: SHIPPED | OUTPATIENT
Start: 2020-07-15 | End: 2022-04-10 | Stop reason: SDUPTHER

## 2020-07-15 NOTE — PROGRESS NOTES
Subjective:       Patient ID: Christine Jo is a 70 y.o. female.    Chief Complaint: No chief complaint on file.    HPI  Review of Systems   Constitutional: Negative for chills and fever.   HENT: Positive for congestion, postnasal drip, sinus pressure, sneezing, sore throat and voice change.    Eyes: Negative for itching.   Respiratory: Positive for cough. Negative for chest tightness, shortness of breath and wheezing.    Cardiovascular: Negative for chest pain and palpitations.   Gastrointestinal: Negative for diarrhea and nausea.   Musculoskeletal: Negative for myalgias.   Neurological: Negative for dizziness, weakness, light-headedness and headaches.       Objective:      Physical Exam  HENT:      Nose:      Comments: Nasal congestion     Mouth/Throat:      Comments: Hoarse voice  Pulmonary:      Comments: No cough during telephone conference  Neurological:      Mental Status: She is alert.   Psychiatric:         Mood and Affect: Mood normal.         Behavior: Behavior normal.         Thought Content: Thought content normal.         Judgment: Judgment normal.         Lab Visit on 06/15/2020   Component Date Value Ref Range Status    Urine Culture, Routine 06/15/2020 Multiple organisms isolated. None in predominance.  Repeat if   Final    Urine Culture, Routine 06/15/2020 clinically necessary.   Final     Assessment:       No diagnosis found.    Plan:         There are no diagnoses linked to this encounter.    Established Patient - Audio Only Telehealth Visit     The patient location is:  Home  The chief complaint leading to consultation is:  Nasal congestion hoarseness  Visit type: Virtual visit with audio only (telephone)  Total time spent with patient:  12 min       The reason for the audio only service rather than synchronous audio and video virtual visit was related to technical difficulties or patient preference/necessity.     Each patient to whom I provide medical services by telemedicine is:  (1)  informed of the relationship between the physician and patient and the respective role of any other health care provider with respect to management of the patient; and (2) notified that they may decline to receive medical services by telemedicine and may withdraw from such care at any time. Patient verbally consented to receive this service via voice-only telephone call.       HPI:  She has had 1 week duration of nasal congestion sneezing ear fullness sinus pressure with no fever chills.  She has had occasional cough.  Last several days she has also had some hoarseness.  She denies loss of smell loss of taste chest tightness shortness of breath diarrhea myalgia.     Assessment and plan:  She is concerned about COVID-19 will order test.  Continue Flonase and start Astelin nasal spray.  Fluids rest and call back if needed                        This service was not originating from a related E/M service provided within the previous 7 days nor will  to an E/M service or procedure within the next 24 hours or my soonest available appointment.  Prevailing standard of care was able to be met in this audio-only visit.

## 2020-07-15 NOTE — TELEPHONE ENCOUNTER
----- Message from Ary Bergman sent at 7/15/2020  9:21 AM CDT -----  Regarding: future appt  Contact: pt  Pt woke with a scratchy throat. She believe it's probably sinuses.  Should she come to her appt on Friday? [phone]

## 2020-07-16 ENCOUNTER — LAB VISIT (OUTPATIENT)
Dept: INTERNAL MEDICINE | Facility: CLINIC | Age: 70
End: 2020-07-16
Payer: MEDICARE

## 2020-07-16 DIAGNOSIS — Z20.822 EXPOSURE TO COVID-19 VIRUS: ICD-10-CM

## 2020-07-16 PROCEDURE — U0003 INFECTIOUS AGENT DETECTION BY NUCLEIC ACID (DNA OR RNA); SEVERE ACUTE RESPIRATORY SYNDROME CORONAVIRUS 2 (SARS-COV-2) (CORONAVIRUS DISEASE [COVID-19]), AMPLIFIED PROBE TECHNIQUE, MAKING USE OF HIGH THROUGHPUT TECHNOLOGIES AS DESCRIBED BY CMS-2020-01-R: HCPCS

## 2020-07-19 LAB — SARS-COV-2 RNA RESP QL NAA+PROBE: NOT DETECTED

## 2020-07-22 DIAGNOSIS — N39.0 URINARY TRACT INFECTION WITHOUT HEMATURIA, SITE UNSPECIFIED: Primary | ICD-10-CM

## 2020-07-22 RX ORDER — FLUCONAZOLE 150 MG/1
150 TABLET ORAL DAILY
Qty: 1 TABLET | Refills: 0 | Status: SHIPPED | OUTPATIENT
Start: 2020-07-22 | End: 2020-07-23

## 2020-07-23 ENCOUNTER — LAB VISIT (OUTPATIENT)
Dept: LAB | Facility: HOSPITAL | Age: 70
End: 2020-07-23
Attending: FAMILY MEDICINE
Payer: MEDICARE

## 2020-07-23 DIAGNOSIS — N39.0 URINARY TRACT INFECTION WITHOUT HEMATURIA, SITE UNSPECIFIED: ICD-10-CM

## 2020-07-23 LAB
BACTERIA #/AREA URNS HPF: ABNORMAL /HPF
BILIRUB UR QL STRIP: NEGATIVE
CLARITY UR: ABNORMAL
COLOR UR: YELLOW
GLUCOSE UR QL STRIP: NEGATIVE
HGB UR QL STRIP: NEGATIVE
HYALINE CASTS #/AREA URNS LPF: 2 /LPF
KETONES UR QL STRIP: NEGATIVE
LEUKOCYTE ESTERASE UR QL STRIP: NEGATIVE
MICROSCOPIC COMMENT: ABNORMAL
NITRITE UR QL STRIP: NEGATIVE
NON-SQ EPI CELLS #/AREA URNS HPF: 1 /HPF
PH UR STRIP: 6 [PH] (ref 5–8)
PROT UR QL STRIP: ABNORMAL
RBC #/AREA URNS HPF: 0 /HPF (ref 0–4)
SP GR UR STRIP: 1.02 (ref 1–1.03)
SQUAMOUS #/AREA URNS HPF: 8 /HPF
URN SPEC COLLECT METH UR: ABNORMAL
WBC #/AREA URNS HPF: 1 /HPF (ref 0–5)

## 2020-07-23 PROCEDURE — 81000 URINALYSIS NONAUTO W/SCOPE: CPT

## 2020-08-02 RX ORDER — TEMAZEPAM 30 MG/1
CAPSULE ORAL
Qty: 30 CAPSULE | Refills: 0 | Status: CANCELLED | OUTPATIENT
Start: 2020-08-02

## 2020-08-18 ENCOUNTER — LAB VISIT (OUTPATIENT)
Dept: LAB | Facility: HOSPITAL | Age: 70
End: 2020-08-18
Attending: NURSE PRACTITIONER
Payer: MEDICARE

## 2020-08-18 DIAGNOSIS — D64.9 NORMOCYTIC ANEMIA: ICD-10-CM

## 2020-08-18 DIAGNOSIS — Z86.2 HISTORY OF IRON DEFICIENCY ANEMIA: ICD-10-CM

## 2020-08-18 DIAGNOSIS — D52.0 DIETARY FOLATE DEFICIENCY ANEMIA: ICD-10-CM

## 2020-08-18 LAB
ALBUMIN SERPL BCP-MCNC: 3.9 G/DL (ref 3.5–5.2)
ALP SERPL-CCNC: 44 U/L (ref 55–135)
ALT SERPL W/O P-5'-P-CCNC: 17 U/L (ref 10–44)
ANION GAP SERPL CALC-SCNC: 10 MMOL/L (ref 8–16)
AST SERPL-CCNC: 18 U/L (ref 10–40)
BASOPHILS # BLD AUTO: 0.07 K/UL (ref 0–0.2)
BASOPHILS NFR BLD: 1.1 % (ref 0–1.9)
BILIRUB SERPL-MCNC: 0.8 MG/DL (ref 0.1–1)
BUN SERPL-MCNC: 17 MG/DL (ref 8–23)
CALCIUM SERPL-MCNC: 9.4 MG/DL (ref 8.7–10.5)
CHLORIDE SERPL-SCNC: 104 MMOL/L (ref 95–110)
CO2 SERPL-SCNC: 25 MMOL/L (ref 23–29)
CREAT SERPL-MCNC: 1 MG/DL (ref 0.5–1.4)
DIFFERENTIAL METHOD: ABNORMAL
EOSINOPHIL # BLD AUTO: 0.1 K/UL (ref 0–0.5)
EOSINOPHIL NFR BLD: 1.8 % (ref 0–8)
ERYTHROCYTE [DISTWIDTH] IN BLOOD BY AUTOMATED COUNT: 12.4 % (ref 11.5–14.5)
EST. GFR  (AFRICAN AMERICAN): >60 ML/MIN/1.73 M^2
EST. GFR  (NON AFRICAN AMERICAN): 58 ML/MIN/1.73 M^2
GLUCOSE SERPL-MCNC: 195 MG/DL (ref 70–110)
HCT VFR BLD AUTO: 35.8 % (ref 37–48.5)
HGB BLD-MCNC: 11.3 G/DL (ref 12–16)
IMM GRANULOCYTES # BLD AUTO: 0.02 K/UL (ref 0–0.04)
IMM GRANULOCYTES NFR BLD AUTO: 0.3 % (ref 0–0.5)
LYMPHOCYTES # BLD AUTO: 1.6 K/UL (ref 1–4.8)
LYMPHOCYTES NFR BLD: 24.9 % (ref 18–48)
MCH RBC QN AUTO: 30.5 PG (ref 27–31)
MCHC RBC AUTO-ENTMCNC: 31.6 G/DL (ref 32–36)
MCV RBC AUTO: 97 FL (ref 82–98)
MONOCYTES # BLD AUTO: 0.5 K/UL (ref 0.3–1)
MONOCYTES NFR BLD: 7.5 % (ref 4–15)
NEUTROPHILS # BLD AUTO: 4.2 K/UL (ref 1.8–7.7)
NEUTROPHILS NFR BLD: 64.4 % (ref 38–73)
NRBC BLD-RTO: 0 /100 WBC
PLATELET # BLD AUTO: 256 K/UL (ref 150–350)
PMV BLD AUTO: 11.4 FL (ref 9.2–12.9)
POTASSIUM SERPL-SCNC: 4.4 MMOL/L (ref 3.5–5.1)
PROT SERPL-MCNC: 7 G/DL (ref 6–8.4)
RBC # BLD AUTO: 3.71 M/UL (ref 4–5.4)
SODIUM SERPL-SCNC: 139 MMOL/L (ref 136–145)
WBC # BLD AUTO: 6.5 K/UL (ref 3.9–12.7)

## 2020-08-18 PROCEDURE — 85025 COMPLETE CBC W/AUTO DIFF WBC: CPT

## 2020-08-18 PROCEDURE — 80053 COMPREHEN METABOLIC PANEL: CPT

## 2020-08-18 PROCEDURE — 82607 VITAMIN B-12: CPT

## 2020-08-18 PROCEDURE — 82728 ASSAY OF FERRITIN: CPT

## 2020-08-18 PROCEDURE — 83540 ASSAY OF IRON: CPT

## 2020-08-18 PROCEDURE — 36415 COLL VENOUS BLD VENIPUNCTURE: CPT

## 2020-08-19 LAB
FERRITIN SERPL-MCNC: 99 NG/ML (ref 20–300)
IRON SERPL-MCNC: 91 UG/DL (ref 30–160)
SATURATED IRON: 28 % (ref 20–50)
TOTAL IRON BINDING CAPACITY: 330 UG/DL (ref 250–450)
TRANSFERRIN SERPL-MCNC: 223 MG/DL (ref 200–375)
VIT B12 SERPL-MCNC: 340 PG/ML (ref 210–950)

## 2020-08-19 RX ORDER — CLOTRIMAZOLE AND BETAMETHASONE DIPROPIONATE 10; .5 MG/ML; MG/ML
LOTION TOPICAL 2 TIMES DAILY
Qty: 30 ML | Refills: 2 | Status: SHIPPED | OUTPATIENT
Start: 2020-08-19 | End: 2022-04-25 | Stop reason: SDUPTHER

## 2020-08-20 ENCOUNTER — OFFICE VISIT (OUTPATIENT)
Dept: HEMATOLOGY/ONCOLOGY | Facility: CLINIC | Age: 70
End: 2020-08-20
Payer: MEDICARE

## 2020-08-20 VITALS
BODY MASS INDEX: 45.7 KG/M2 | HEIGHT: 61 IN | DIASTOLIC BLOOD PRESSURE: 63 MMHG | HEART RATE: 82 BPM | TEMPERATURE: 97 F | WEIGHT: 242.06 LBS | OXYGEN SATURATION: 97 % | SYSTOLIC BLOOD PRESSURE: 116 MMHG

## 2020-08-20 DIAGNOSIS — B37.89 CANDIDIASIS OF BREAST: Primary | ICD-10-CM

## 2020-08-20 DIAGNOSIS — D63.8 ANEMIA OF CHRONIC DISEASE: ICD-10-CM

## 2020-08-20 DIAGNOSIS — R73.09 ELEVATED HEMOGLOBIN A1C: ICD-10-CM

## 2020-08-20 DIAGNOSIS — D64.9 NORMOCYTIC ANEMIA: ICD-10-CM

## 2020-08-20 PROCEDURE — 1101F PR PT FALLS ASSESS DOC 0-1 FALLS W/OUT INJ PAST YR: ICD-10-PCS | Mod: CPTII,S$GLB,, | Performed by: NURSE PRACTITIONER

## 2020-08-20 PROCEDURE — 99999 PR PBB SHADOW E&M-EST. PATIENT-LVL IV: CPT | Mod: PBBFAC,,, | Performed by: NURSE PRACTITIONER

## 2020-08-20 PROCEDURE — 3074F SYST BP LT 130 MM HG: CPT | Mod: CPTII,S$GLB,, | Performed by: NURSE PRACTITIONER

## 2020-08-20 PROCEDURE — 1159F MED LIST DOCD IN RCRD: CPT | Mod: S$GLB,,, | Performed by: NURSE PRACTITIONER

## 2020-08-20 PROCEDURE — 1159F PR MEDICATION LIST DOCUMENTED IN MEDICAL RECORD: ICD-10-PCS | Mod: S$GLB,,, | Performed by: NURSE PRACTITIONER

## 2020-08-20 PROCEDURE — 3008F PR BODY MASS INDEX (BMI) DOCUMENTED: ICD-10-PCS | Mod: CPTII,S$GLB,, | Performed by: NURSE PRACTITIONER

## 2020-08-20 PROCEDURE — 99214 OFFICE O/P EST MOD 30 MIN: CPT | Mod: S$GLB,,, | Performed by: NURSE PRACTITIONER

## 2020-08-20 PROCEDURE — 3008F BODY MASS INDEX DOCD: CPT | Mod: CPTII,S$GLB,, | Performed by: NURSE PRACTITIONER

## 2020-08-20 PROCEDURE — 1101F PT FALLS ASSESS-DOCD LE1/YR: CPT | Mod: CPTII,S$GLB,, | Performed by: NURSE PRACTITIONER

## 2020-08-20 PROCEDURE — 99214 PR OFFICE/OUTPT VISIT, EST, LEVL IV, 30-39 MIN: ICD-10-PCS | Mod: S$GLB,,, | Performed by: NURSE PRACTITIONER

## 2020-08-20 PROCEDURE — 3078F DIAST BP <80 MM HG: CPT | Mod: CPTII,S$GLB,, | Performed by: NURSE PRACTITIONER

## 2020-08-20 PROCEDURE — 3074F PR MOST RECENT SYSTOLIC BLOOD PRESSURE < 130 MM HG: ICD-10-PCS | Mod: CPTII,S$GLB,, | Performed by: NURSE PRACTITIONER

## 2020-08-20 PROCEDURE — 99999 PR PBB SHADOW E&M-EST. PATIENT-LVL IV: ICD-10-PCS | Mod: PBBFAC,,, | Performed by: NURSE PRACTITIONER

## 2020-08-20 PROCEDURE — 3078F PR MOST RECENT DIASTOLIC BLOOD PRESSURE < 80 MM HG: ICD-10-PCS | Mod: CPTII,S$GLB,, | Performed by: NURSE PRACTITIONER

## 2020-08-20 RX ORDER — FLUCONAZOLE 150 MG/1
150 TABLET ORAL ONCE
Qty: 1 TABLET | Refills: 0 | Status: SHIPPED | OUTPATIENT
Start: 2020-08-20 | End: 2020-08-20

## 2020-08-20 NOTE — PROGRESS NOTES
Subjective:      Patient ID: Christine Jo is a 70 y.o. female.    Chief Complaint: Lab Discussion; fatigue    HPI:  Patient is a 70 year old  female who present today for follow up for her diagnosed iron deficiency anemia.  She was given IV iron on 1/3/18 and 1/8/18.   She [facility] with [facility]. Patel which was - negative for contributory causes.  She had EGD many years ago which showed gastritis.  Could not find results of this media section.    She states recent switched from prilosec to protonix due to complaints of gastric reflux which is being followed by PCP Dr. Szymanski.  Had EGD 11/29/19 showed reactive gastritis.      She is being follow by Dr. Arthur Reynaga at [facility] [place] [facility] for A. Fib and state that she sees him every 6 months.  She has DMII with currently uncontrolled with elevated A1c that is followed by primary care.  She is also insulin dependent with history of recurrent UTI and yeast infections.  States that she will seeing endocrinology for her continued elevated blood sugars.     She complains of fatigue and chronic joint pain to knees and back/neck.  She verbalized that he depression is better.  Her TSH was normal.       She denies hematuria, melena, hematochezia, epistaxis, bleeding gums, or unusual bruising.  She denies fever, chills, and night sweats.  She denies unintentional weight loss. Currently slightly anemic with hemoglobin 11.3 g/dL. Iron is repleated.         Social History     Socioeconomic History    Marital status:      Spouse name: Not on file    Number of children: Not on file    Years of education: Not on file    Highest education level: Not on file   Occupational History    Not on file   Social Needs    Financial resource strain: Not on file    Food insecurity     Worry: Not on file     Inability: Not on file    Transportation needs     Medical: Not on file     Non-medical: Not on  file   Tobacco Use    Smoking status: Former Smoker     Packs/day: 1.00     Years: 35.00     Pack years: 35.00     Types: Cigarettes     Quit date: 2003     Years since quittin.1    Smokeless tobacco: Never Used   Substance and Sexual Activity    Alcohol use: No    Drug use: No    Sexual activity: Never   Lifestyle    Physical activity     Days per week: Not on file     Minutes per session: Not on file    Stress: Not on file   Relationships    Social connections     Talks on phone: Not on file     Gets together: Not on file     Attends [employer] service: Not on file     Active member of club or organization: Not on file     Attends meetings of clubs or organizations: Not on file     Relationship status: Not on file   Other Topics Concern    Not on file   Social History Narrative    Not on file       Family History   Problem Relation Age of Onset    Heart disease Mother         CAD     Cataracts Mother     Macular degeneration Mother     Glaucoma Mother     Cancer Son         testicular     Cancer Maternal Aunt         Lung ca    Heart disease Maternal Grandfather         Pacemaker     Diabetes Sister     Heart disease Sister         CAD    Cataracts Sister     Diabetes Sister     Diabetes Sister        Past Surgical History:   Procedure Laterality Date    abdominal laparoscopy       BREAST BIOPSY      CATARACT EXTRACTION Bilateral [id number]    [facility] [place]    ESOPHAGOGASTRODUODENOSCOPY N/A 2019    Procedure: ESOPHAGOGASTRODUODENOSCOPY (EGD);  Surgeon: Shawn Rogers III, MD;  Location: [facility];  Service: Endoscopy;  Laterality: N/A;    EYE SURGERY      TONSILLECTOMY      TUBAL LIGATION      yag  Bilateral        Past Medical History:   Diagnosis Date    Arthritis     Cataract     Diabetes mellitus      am 01/15/2018 Insulin x 1 year    DM (diabetes mellitus)      am 2020 Insulin x 4 years    Glaucoma     Hypertension      Insomnia     Macular degeneration     Vaginal yeast infection        Review of Systems   Constitutional: Negative.    HENT: Negative.    Eyes: Negative.    Respiratory: Negative.    Cardiovascular: Negative.    Gastrointestinal: Negative.    Endocrine: Negative.    Genitourinary:        Tenderness to urethral area   Musculoskeletal: Negative.    Skin: Positive for rash (to skin folds under each breast). Negative for color change.   Allergic/Immunologic: Negative.    Neurological: Negative.    Hematological: Negative.    Psychiatric/Behavioral: Negative.           Medication List with Changes/Refills   New Medications    FLUCONAZOLE (DIFLUCAN) 150 MG TAB    Take 1 tablet (150 mg total) by mouth once. for 1 dose   Current Medications    ACETAMINOPHEN (TYLENOL ARTHRITIS ORAL)    Take by mouth as needed.    APIXABAN (ELIQUIS) 5 MG TAB    Take 1 tablet (5 mg total) by mouth 2 (two) times daily.    APIXABAN (ELIQUIS) 5 MG TAB    Take 1 tablet (5 mg total) by mouth 2 (two) times daily.    ATORVASTATIN (LIPITOR) 40 MG TABLET    Take 1 tablet (40 mg total) by mouth once daily.    AZELASTINE (ASTELIN) 137 MCG (0.1 %) NASAL SPRAY    2 sprays (274 mcg total) by Nasal route 2 (two) times daily.    BENAZEPRIL (LOTENSIN) 40 MG TABLET    Take 1 tablet (40 mg total) by mouth once daily.    BEPREVE 1.5 % DROP    Place 1 drop into both eyes 2 (two) times daily.    CALCIUM CITRATE-VITAMIN D3 315-200 MG (CITRACAL+D) 315-200 MG-UNIT PER TABLET    Take 1 tablet by mouth once daily.    CARVEDILOL (COREG) 6.25 MG TABLET    Take 1 tablet (6.25 mg total) by mouth 2 (two) times daily with meals.    CLOTRIMAZOLE-BETAMETHASONE (LOTRISONE) LOTION    Apply topically 2 (two) times daily.    DICLOFENAC SODIUM (VOLTAREN) 1 % GEL    Apply 2 g topically 4 (four) times daily. To affected joints as needed for pain    DULOXETINE (CYMBALTA) 60 MG CAPSULE    TAKE 1 CAPSULE(60 MG) BY MOUTH EVERY DAY    EZETIMIBE (ZETIA) 10 MG TABLET    Take 10 mg by  "mouth once daily.    FLUTICASONE PROPIONATE (FLONASE) 50 MCG/ACTUATION NASAL SPRAY    2 sprays (100 mcg total) by Each Nostril route once daily.    GABAPENTIN (NEURONTIN) 100 MG CAPSULE    Take 1 capsule (100 mg total) by mouth 2 (two) times daily.    GABAPENTIN (NEURONTIN) 300 MG CAPSULE    Take 1 capsule (300 mg total) by mouth 3 (three) times daily.    GUAIFENESIN (MUCINEX) 600 MG 12 HR TABLET    Take 2 tablets (1,200 mg total) by mouth 2 (two) times daily.    HYDRALAZINE (APRESOLINE) 100 MG TABLET    Take 1 tablet (100 mg total) by mouth 2 (two) times daily.    INSULIN (LANTUS SOLOSTAR U-100 INSULIN) GLARGINE 100 UNITS/ML (3ML) SUBQ PEN    Inject 64 Units into the skin every evening.    MULTIVIT WITH CALCIUM,IRON,MIN (WOMEN'S DAILY MULTIVITAMIN ORAL)    Take by mouth once daily.     MUPIROCIN (BACTROBAN) 2 % OINTMENT    Apply topically 2 (two) times daily.    NITROGLYCERIN (NITROSTAT) 0.4 MG SL TABLET    SOHA    NYSTATIN (MYCOSTATIN) CREAM    Apply topically 2 (two) times daily. Continue until completely healed    PANTOPRAZOLE (PROTONIX) 40 MG TABLET    Take 1 tablet (40 mg total) by mouth 2 (two) times daily.    PEN NEEDLE, DIABETIC (BD ULTRA-FINE SHORT PEN NEEDLE) 31 GAUGE X 5/16" NDLE    Use two daily as directd    RESTASIS 0.05 % OPHTHALMIC EMULSION    PLACE 1 DROP INTO BOTH EYES TWICE DAILY    SITAGLIPTIN (JANUVIA) 100 MG TAB    Take 1 tablet (100 mg total) by mouth once daily.    TEMAZEPAM (RESTORIL) 30 MG CAPSULE    TAKE 1 CAPSULE BY MOUTH AT NIGHT AS NEEDED    TRIAMCINOLONE ACETONIDE 0.1% (KENALOG) 0.1 % CREAM    Apply topically 2 (two) times daily. No longer than 2 weeks.    VICTOZA 2-LEAH 0.6 MG/0.1 ML (18 MG/3 ML) PNIJ PEN    ADMINISTER 0.6 MG UNDER THE SKIN EVERY DAY        Objective:     Vitals:    08/20/20 1127   BP: 116/63   Pulse: 82   Temp: 97 °F (36.1 °C)       Physical Exam  Vitals signs reviewed.   Constitutional:       General: She is not in acute distress.     Appearance: Normal appearance. " She is well-developed. She is not ill-appearing or diaphoretic.   HENT:      Head: Normocephalic and atraumatic.      Right Ear: External ear normal.      Left Ear: External ear normal.      Nose: Nose normal.   Eyes:      Conjunctiva/sclera: Conjunctivae normal.   Neck:      Musculoskeletal: Normal range of motion.   Cardiovascular:      Rate and Rhythm: Normal rate and regular rhythm.   Pulmonary:      Effort: Pulmonary effort is normal. No tachypnea, bradypnea, accessory muscle usage or respiratory distress.   Abdominal:      General: There is no distension.      Palpations: Abdomen is soft.   Musculoskeletal: Normal range of motion.   Skin:     General: Skin is warm and dry.      Coloration: Skin is not pale.      Findings: Rash present.      Comments: Papular rash noted to bilateral breast skin folds   Neurological:      Mental Status: She is alert and oriented to person, place, and time.   Psychiatric:         Attention and Perception: Attention and perception normal.         Mood and Affect: Mood normal.         Speech: Speech normal.         Behavior: Behavior normal.         Thought Content: Thought content normal.         Cognition and Memory: Cognition and memory normal.         Judgment: Judgment normal.         Assessment:     Problem List Items Addressed This Visit     None      Visit Diagnoses     Candidiasis of breast    -  Primary    Relevant Medications    fluconazole (DIFLUCAN) 150 MG Tab    Other Relevant Orders    CBC auto differential    Comprehensive metabolic panel    Normocytic anemia        Relevant Orders    CBC auto differential    Comprehensive metabolic panel    Ferritin    Iron and TIBC    TSH    Elevated hemoglobin A1c        Relevant Orders    CBC auto differential    Comprehensive metabolic panel    Anemia of chronic disease            Lab Results   Component Value Date    WBC 9.35 05/28/2020    HGB 11.5 (L) 05/28/2020    HCT 34.7 (L) 05/28/2020    MCV 93 05/28/2020      05/28/2020         Lab Results   Component Value Date     08/18/2020    K 4.4 08/18/2020     08/18/2020    CO2 25 08/18/2020    BUN 17 08/18/2020    CREATININE 1.0 08/18/2020    CALCIUM 9.4 08/18/2020    ANIONGAP 10 08/18/2020    ESTGFRAFRICA >60 08/18/2020    EGFRNONAA 58 (A) 08/18/2020     Lab Results   Component Value Date    ALT 17 08/18/2020    AST 18 08/18/2020    ALKPHOS 44 (L) 08/18/2020    BILITOT 0.8 08/18/2020     Lab Results   Component Value Date    IRON 91 08/18/2020    TIBC 330 08/18/2020    FERRITIN 99 08/18/2020     Lab Results   Component Value Date    APBWTTFF43 340 08/18/2020       Plan:   Candidiasis of breast  -     fluconazole (DIFLUCAN) 150 MG Tab; Take 1 tablet (150 mg total) by mouth once. for 1 dose  Dispense: 1 tablet; Refill: 0  -     CBC auto differential; Future; Expected date: 08/20/2020  -     Comprehensive metabolic panel; Future; Expected date: 08/20/2020    Normocytic anemia  -     CBC auto differential; Future; Expected date: 08/20/2020  -     Comprehensive metabolic panel; Future; Expected date: 08/20/2020  -     Ferritin; Future; Expected date: 08/20/2020  -     Iron and TIBC; Future; Expected date: 08/20/2020  -     TSH; Future; Expected date: 08/20/2020    Elevated hemoglobin A1c  -     CBC auto differential; Future; Expected date: 08/20/2020  -     Comprehensive metabolic panel; Future; Expected date: 08/20/2020    Anemia of chronic disease    Labs are stable with slight normocytic anemia - anemia of chronic disease.  Will start Zeasorb AF to bilateral breast skin folds 3 times daily; then 2 x's daily for maintenance once irritation controlled.  Will also take diflucan 150 mg PO once.  She will avoid refined sugars and foods high in carbohydrates.  Will follow up in 5 months with repeat labs as above prior.  She knows to follow up sooner if develops symptoms associated with worsening anemia.      Collaborating Provider:  Dr. Pete Flowers    Thank You,  Tiffany' S.  Jonathan, FNP-C

## 2020-08-21 DIAGNOSIS — E11.9 TYPE 2 DIABETES MELLITUS WITHOUT COMPLICATION: ICD-10-CM

## 2020-08-25 ENCOUNTER — OFFICE VISIT (OUTPATIENT)
Dept: PODIATRY | Facility: CLINIC | Age: 70
End: 2020-08-25
Payer: MEDICARE

## 2020-08-25 ENCOUNTER — INFUSION (OUTPATIENT)
Dept: INFUSION THERAPY | Facility: HOSPITAL | Age: 70
End: 2020-08-25
Attending: FAMILY MEDICINE
Payer: MEDICARE

## 2020-08-25 VITALS
BODY MASS INDEX: 45.59 KG/M2 | WEIGHT: 241.31 LBS | HEART RATE: 83 BPM | DIASTOLIC BLOOD PRESSURE: 77 MMHG | SYSTOLIC BLOOD PRESSURE: 186 MMHG

## 2020-08-25 VITALS
TEMPERATURE: 98 F | DIASTOLIC BLOOD PRESSURE: 73 MMHG | HEART RATE: 78 BPM | OXYGEN SATURATION: 98 % | SYSTOLIC BLOOD PRESSURE: 142 MMHG | RESPIRATION RATE: 16 BRPM

## 2020-08-25 DIAGNOSIS — M81.0 AGE-RELATED OSTEOPOROSIS WITHOUT CURRENT PATHOLOGICAL FRACTURE: Primary | ICD-10-CM

## 2020-08-25 DIAGNOSIS — Z79.4 CONTROLLED TYPE 2 DIABETES MELLITUS WITH DIABETIC POLYNEUROPATHY, WITH LONG-TERM CURRENT USE OF INSULIN: ICD-10-CM

## 2020-08-25 DIAGNOSIS — E11.42 CONTROLLED TYPE 2 DIABETES MELLITUS WITH DIABETIC POLYNEUROPATHY, WITH LONG-TERM CURRENT USE OF INSULIN: ICD-10-CM

## 2020-08-25 DIAGNOSIS — Z79.01 ON CONTINUOUS ORAL ANTICOAGULATION: ICD-10-CM

## 2020-08-25 DIAGNOSIS — E11.65 UNCONTROLLED TYPE 2 DIABETES MELLITUS WITH HYPERGLYCEMIA: ICD-10-CM

## 2020-08-25 DIAGNOSIS — E11.9 COMPREHENSIVE DIABETIC FOOT EXAMINATION, TYPE 2 DM, ENCOUNTER FOR: Primary | ICD-10-CM

## 2020-08-25 DIAGNOSIS — E66.01 MORBID OBESITY: ICD-10-CM

## 2020-08-25 DIAGNOSIS — I48.0 PAROXYSMAL A-FIB: ICD-10-CM

## 2020-08-25 PROCEDURE — 99213 OFFICE O/P EST LOW 20 MIN: CPT | Mod: S$GLB,,, | Performed by: PODIATRIST

## 2020-08-25 PROCEDURE — 63600175 PHARM REV CODE 636 W HCPCS: Mod: JG | Performed by: PHYSICIAN ASSISTANT

## 2020-08-25 PROCEDURE — 3077F PR MOST RECENT SYSTOLIC BLOOD PRESSURE >= 140 MM HG: ICD-10-PCS | Mod: CPTII,S$GLB,, | Performed by: PODIATRIST

## 2020-08-25 PROCEDURE — 1100F PR PT FALLS ASSESS DOC 2+ FALLS/FALL W/INJURY/YR: ICD-10-PCS | Mod: CPTII,S$GLB,, | Performed by: PODIATRIST

## 2020-08-25 PROCEDURE — 3078F PR MOST RECENT DIASTOLIC BLOOD PRESSURE < 80 MM HG: ICD-10-PCS | Mod: CPTII,S$GLB,, | Performed by: PODIATRIST

## 2020-08-25 PROCEDURE — 3051F HG A1C>EQUAL 7.0%<8.0%: CPT | Mod: CPTII,S$GLB,, | Performed by: PODIATRIST

## 2020-08-25 PROCEDURE — 1126F PR PAIN SEVERITY QUANTIFIED, NO PAIN PRESENT: ICD-10-PCS | Mod: S$GLB,,, | Performed by: PODIATRIST

## 2020-08-25 PROCEDURE — 1126F AMNT PAIN NOTED NONE PRSNT: CPT | Mod: S$GLB,,, | Performed by: PODIATRIST

## 2020-08-25 PROCEDURE — 3288F PR FALLS RISK ASSESSMENT DOCUMENTED: ICD-10-PCS | Mod: CPTII,S$GLB,, | Performed by: PODIATRIST

## 2020-08-25 PROCEDURE — 3077F SYST BP >= 140 MM HG: CPT | Mod: CPTII,S$GLB,, | Performed by: PODIATRIST

## 2020-08-25 PROCEDURE — 1159F PR MEDICATION LIST DOCUMENTED IN MEDICAL RECORD: ICD-10-PCS | Mod: S$GLB,,, | Performed by: PODIATRIST

## 2020-08-25 PROCEDURE — 3008F PR BODY MASS INDEX (BMI) DOCUMENTED: ICD-10-PCS | Mod: CPTII,S$GLB,, | Performed by: PODIATRIST

## 2020-08-25 PROCEDURE — 1159F MED LIST DOCD IN RCRD: CPT | Mod: S$GLB,,, | Performed by: PODIATRIST

## 2020-08-25 PROCEDURE — 3008F BODY MASS INDEX DOCD: CPT | Mod: CPTII,S$GLB,, | Performed by: PODIATRIST

## 2020-08-25 PROCEDURE — 99999 PR PBB SHADOW E&M-EST. PATIENT-LVL V: ICD-10-PCS | Mod: PBBFAC,,, | Performed by: PODIATRIST

## 2020-08-25 PROCEDURE — 3051F PR MOST RECENT HEMOGLOBIN A1C LEVEL 7.0 - < 8.0%: ICD-10-PCS | Mod: CPTII,S$GLB,, | Performed by: PODIATRIST

## 2020-08-25 PROCEDURE — 1100F PTFALLS ASSESS-DOCD GE2>/YR: CPT | Mod: CPTII,S$GLB,, | Performed by: PODIATRIST

## 2020-08-25 PROCEDURE — 96374 THER/PROPH/DIAG INJ IV PUSH: CPT

## 2020-08-25 PROCEDURE — 3078F DIAST BP <80 MM HG: CPT | Mod: CPTII,S$GLB,, | Performed by: PODIATRIST

## 2020-08-25 PROCEDURE — 99213 PR OFFICE/OUTPT VISIT, EST, LEVL III, 20-29 MIN: ICD-10-PCS | Mod: S$GLB,,, | Performed by: PODIATRIST

## 2020-08-25 PROCEDURE — 3288F FALL RISK ASSESSMENT DOCD: CPT | Mod: CPTII,S$GLB,, | Performed by: PODIATRIST

## 2020-08-25 PROCEDURE — 99999 PR PBB SHADOW E&M-EST. PATIENT-LVL V: CPT | Mod: PBBFAC,,, | Performed by: PODIATRIST

## 2020-08-25 RX ORDER — SODIUM CHLORIDE 0.9 % (FLUSH) 0.9 %
10 SYRINGE (ML) INJECTION
Status: CANCELLED | OUTPATIENT
Start: 2020-10-01

## 2020-08-25 RX ORDER — HEPARIN 100 UNIT/ML
500 SYRINGE INTRAVENOUS
Status: CANCELLED | OUTPATIENT
Start: 2020-10-01

## 2020-08-25 RX ORDER — IBANDRONATE SODIUM 3 MG/3 ML
3 SYRINGE (ML) INTRAVENOUS
Status: COMPLETED | OUTPATIENT
Start: 2020-08-25 | End: 2020-08-25

## 2020-08-25 RX ORDER — IBANDRONATE SODIUM 3 MG/3 ML
3 SYRINGE (ML) INTRAVENOUS
Status: CANCELLED | OUTPATIENT
Start: 2020-10-01

## 2020-08-25 RX ADMIN — Medication 3 MG: at 11:08

## 2020-08-25 NOTE — DISCHARGE INSTRUCTIONS
[facility]  [address] [facility]  [address]  [phone] phone     [phone] fax  Hours of Operation: Monday- Friday 8:00am- 5:00pm  After hours phone  [phone]  Hematology / Oncology Physicians on call      ED[address] Dr., Dr., Dr., NP Sydney Prescott, NP Tyesha Taylor, NP    Please call with any concerns regarding your appointment today.

## 2020-08-25 NOTE — PROGRESS NOTES
Subjective:       Patient ID: Christine Jo is a 70 y.o. female.    Chief Complaint: Feet examine (both, diabeticm pcp , pain 0)      HPI: Christine Jo presents to the office today, under referral by their Primary Care Provider, Jose Szymanski MD, for her annual diabetic foot assessment and risk evalution Patient is a DMII. Patient states neuropathy. This patient last saw his/her primary care provider on 7/15.     Hemoglobin A1C   Date Value Ref Range Status   05/25/2020 7.0 (H) 4.0 - 5.6 % Final     Comment:     ADA Screening Guidelines:  5.7-6.4%  Consistent with prediabetes  >or=6.5%  Consistent with diabetes  High levels of fetal hemoglobin interfere with the HbA1C  assay. Heterozygous hemoglobin variants (HbS, HgC, etc)do  not significantly interfere with this assay.   However, presence of multiple variants may affect accuracy.     12/05/2019 7.5 (H) 4.0 - 5.6 % Final     Comment:     ADA Screening Guidelines:  5.7-6.4%  Consistent with prediabetes  >or=6.5%  Consistent with diabetes  High levels of fetal hemoglobin interfere with the HbA1C  assay. Heterozygous hemoglobin variants (HbS, HgC, etc)do  not significantly interfere with this assay.   However, presence of multiple variants may affect accuracy.     08/20/2019 6.9 (H) 4.0 - 5.6 % Final     Comment:     ADA Screening Guidelines:  5.7-6.4%  Consistent with prediabetes  >or=6.5%  Consistent with diabetes  High levels of fetal hemoglobin interfere with the HbA1C  assay. Heterozygous hemoglobin variants (HbS, HgC, etc)do  not significantly interfere with this assay.   However, presence of multiple variants may affect accuracy.     .    Review of patient's allergies indicates:   Allergen Reactions    Aspirin Palpitations       Past Medical History:   Diagnosis Date    Arthritis     Cataract     Diabetes mellitus 2008     am 01/15/2018 Insulin x 1 year    DM (diabetes mellitus) 2008     am 02/14/2020 Insulin x 4 years     Glaucoma     Hypertension     Insomnia     Macular degeneration     Vaginal yeast infection        Family History   Problem Relation Age of Onset    Heart disease Mother         CAD     Cataracts Mother     Macular degeneration Mother     Glaucoma Mother     Cancer Son         testicular     Cancer Maternal Aunt         Lung ca    Heart disease Maternal Grandfather         Pacemaker     Diabetes Sister     Heart disease Sister         CAD    Cataracts Sister     Diabetes Sister     Diabetes Sister        Social History     Socioeconomic History    Marital status:      Spouse name: Not on file    Number of children: Not on file    Years of education: Not on file    Highest education level: Not on file   Occupational History    Not on file   Social Needs    Financial resource strain: Not on file    Food insecurity     Worry: Not on file     Inability: Not on file    Transportation needs     Medical: Not on file     Non-medical: Not on file   Tobacco Use    Smoking status: Former Smoker     Packs/day: 1.00     Years: 35.00     Pack years: 35.00     Types: Cigarettes     Quit date: 2003     Years since quittin.1    Smokeless tobacco: Never Used   Substance and Sexual Activity    Alcohol use: No    Drug use: No    Sexual activity: Never   Lifestyle    Physical activity     Days per week: Not on file     Minutes per session: Not on file    Stress: Not on file   Relationships    Social connections     Talks on phone: Not on file     Gets together: Not on file     Attends Yazdanism service: Not on file     Active member of club or organization: Not on file     Attends meetings of clubs or organizations: Not on file     Relationship status: Not on file   Other Topics Concern    Not on file   Social History Narrative    Not on file       Past Surgical History:   Procedure Laterality Date    abdominal laparoscopy       BREAST BIOPSY      CATARACT EXTRACTION Bilateral  [id number]    [place] in [place]    ESOPHAGOGASTRODUODENOSCOPY N/A 11/29/2019    Procedure: ESOPHAGOGASTRODUODENOSCOPY (EGD);  Surgeon: Shawn Rogers III, MD;  Location[facility];  Service: Endoscopy;  Laterality: N/A;    EYE SURGERY      TONSILLECTOMY      TUBAL LIGATION      yag  Bilateral        Review of Systems   Constitutional: Negative for chills, fatigue and fever.   HENT: Negative for hearing loss.    Eyes: Negative for photophobia and visual disturbance.   Respiratory: Negative for cough, chest tightness, shortness of breath and wheezing.    Cardiovascular: Positive for leg swelling. Negative for chest pain and palpitations.   Gastrointestinal: Negative for constipation, diarrhea, nausea and vomiting.   Endocrine: Negative for cold intolerance and heat intolerance.   Genitourinary: Negative for flank pain.   Musculoskeletal: Negative for neck pain and neck stiffness.   Skin: Negative for wound.   Neurological: Positive for numbness. Negative for light-headedness and headaches.   Psychiatric/Behavioral: Negative for sleep disturbance.         Objective:   BP (!) 186/77   Pulse 83   Wt 109.5 kg (241 lb 4.7 oz)   BMI 45.59 kg/m²     Physical Exam  LOWER EXTREMITY PHYSICAL EXAMINATION    ORTHOPEDIC: Manual Muscle Testing is 5/5 in all planes on the left and right, without pains, with and without resistance. No pains to palpation of the medial or lateral ankle ligaments. No discomfort to palpation of the posterior tibial tendon, peroneal tendon, Achilles tendon or the anterior ankle tendons.  Rectus foot type is noted. No pain upon range of motion of the midfoot or hindfoot joints. No crepitus upon range of motion and midfoot or hindfoot joints. No ankle pathology is noted. Gait pattern is non-antalgic.    VASCULAR: Warm to warm, proximal to distal. Capillary refill time is within normal limits and less  than 3 seconds. Hair growth is sparse on the left and right dorsal foot and at the  digits. The left dorsalis pedis pulse is 2/4 and on the right is 2/4, and the left posterior tibial pulse is 1/4 and is 1/4 on the right.  Edema is noted, bilateral lower extremity, moderate.    NEUROLOGY: Sensation to light touch is intact. Proprioception is intact, bilateral. Sensation to pin prick is reduced to absent. Vibratory sensation is diminished to the left and right lower extremity. Examination with 5.07 [place] Jessika monofilament reveals that protective sensation is not intact to the left and right plantar surfaces of the foot and digits, as the patient has no sensation/detection at greater than 4 distinct points of contact.     DERMATOLOGY: Skin is supple, moist, dry and intact.     Assessment:     1. Comprehensive diabetic foot examination, type 2 DM, encounter for    2. Controlled type 2 diabetes mellitus with diabetic polyneuropathy, with long-term current use of insulin    3. Uncontrolled type 2 diabetes mellitus with hyperglycemia    4. Morbid obesity    5. On continuous oral anticoagulation    6. Paroxysmal A-fib        Plan:     Comprehensive diabetic foot examination, type 2 DM, encounter for  Controlled type 2 diabetes mellitus with diabetic polyneuropathy, with long-term current use of insulin  I counseled the patient on his/her Diabetic Mellitus regarding today's clinical examination and objection findings. We did also discuss recent medication changes, pertinent labs and imaging evaluations and other medical consultation notes and progress notes. Greater than 50% of this visit was spent on counseling and coordination of care. Greater than 20 minutes of this appt. was spent on education about the diabetic foot, in relation to PVD and/or neuropathy, and the prevention of limb loss.     Shoe gear is inspected and wear and proper fit/type. Patient is reminded of the importance of good nutrition and blood sugar control to help prevent podiatric complications of diabetes. Patient instructed on  proper foot hygeine. We discussed wearing proper shoe gear, daily foot inspections, never walking without protective shoe gear, never putting sharp instruments to feet.  Patient  will continue to monitor the areas daily, inspect feet, wear protective shoe gear when ambulatory, moisturizer to maintain skin integrity.     Patient's DMI/DMII is managed by Primary Care Provider and/or Endocrinology Advanced Practice Provider. As per the most recent glycohemoglobin level, this patient is at goal.    Morbid obesity  Patient is counseled and reminded concerning the importance of good nutrition and healthy eating habits, which may include blood sugar control, to prevent and/or help podiatric foot and ankle complications.    On continuous oral anticoagulation  Paroxysmal A-fib  Patient advised to follow up with Primary Care Provider and/or Cardiologist for management of comorbid cardiac states.          Protective Sensation (w/ 10 gram monofilament):  Right: Absent  Left: Absent    Visual Inspection:  Normal -  Bilateral    Pedal Pulses:   Right: Present  Left: Present    Posterior tibialis:   Right:Diminshed  Left: Diminshed            Future Appointments   Date Time Provider Department Center   2/19/2021  1:30 PM Klaus Shelton OD [facility]

## 2020-09-01 RX ORDER — TRAZODONE HYDROCHLORIDE 50 MG/1
50 TABLET ORAL NIGHTLY
Qty: 30 TABLET | Refills: 2 | Status: SHIPPED | OUTPATIENT
Start: 2020-09-01 | End: 2020-09-08

## 2020-09-08 RX ORDER — BEPOTASTINE BESILATE 15 MG/ML
1 SOLUTION/ DROPS OPHTHALMIC 2 TIMES DAILY
Qty: 10 ML | Refills: 12 | Status: SHIPPED | OUTPATIENT
Start: 2020-09-08 | End: 2021-03-24

## 2020-09-09 RX ORDER — TEMAZEPAM 30 MG/1
CAPSULE ORAL
Qty: 30 CAPSULE | Refills: 0 | Status: SHIPPED | OUTPATIENT
Start: 2020-09-09 | End: 2020-10-02 | Stop reason: SDUPTHER

## 2020-09-09 RX ORDER — HYDROCHLOROTHIAZIDE 25 MG/1
25 TABLET ORAL DAILY
Qty: 30 TABLET | Refills: 11 | Status: SHIPPED | OUTPATIENT
Start: 2020-09-09 | End: 2020-10-13 | Stop reason: DRUGHIGH

## 2020-09-23 RX ORDER — ATORVASTATIN CALCIUM 40 MG/1
40 TABLET, FILM COATED ORAL DAILY
Qty: 90 TABLET | Refills: 0 | Status: SHIPPED | OUTPATIENT
Start: 2020-09-23 | End: 2021-02-08 | Stop reason: SDUPTHER

## 2020-09-24 ENCOUNTER — PATIENT MESSAGE (OUTPATIENT)
Dept: INTERNAL MEDICINE | Facility: CLINIC | Age: 70
End: 2020-09-24

## 2020-09-28 ENCOUNTER — PATIENT MESSAGE (OUTPATIENT)
Dept: INTERNAL MEDICINE | Facility: CLINIC | Age: 70
End: 2020-09-28

## 2020-09-28 RX ORDER — BUPROPION HYDROCHLORIDE 75 MG/1
75 TABLET ORAL 2 TIMES DAILY
Qty: 60 TABLET | Refills: 2 | Status: SHIPPED | OUTPATIENT
Start: 2020-09-28 | End: 2020-10-13 | Stop reason: DRUGHIGH

## 2020-09-30 ENCOUNTER — PATIENT MESSAGE (OUTPATIENT)
Dept: INTERNAL MEDICINE | Facility: CLINIC | Age: 70
End: 2020-09-30

## 2020-10-03 ENCOUNTER — IMMUNIZATION (OUTPATIENT)
Dept: INTERNAL MEDICINE | Facility: CLINIC | Age: 70
End: 2020-10-03
Payer: MEDICARE

## 2020-10-03 PROCEDURE — 90694 FLU VACCINE - QUADRIVALENT - ADJUVANTED: ICD-10-PCS | Mod: S$GLB,,, | Performed by: FAMILY MEDICINE

## 2020-10-03 PROCEDURE — G0008 ADMIN INFLUENZA VIRUS VAC: HCPCS | Mod: S$GLB,,, | Performed by: FAMILY MEDICINE

## 2020-10-03 PROCEDURE — 90694 VACC AIIV4 NO PRSRV 0.5ML IM: CPT | Mod: S$GLB,,, | Performed by: FAMILY MEDICINE

## 2020-10-03 PROCEDURE — G0008 FLU VACCINE - QUADRIVALENT - ADJUVANTED: ICD-10-PCS | Mod: S$GLB,,, | Performed by: FAMILY MEDICINE

## 2020-10-04 RX ORDER — TEMAZEPAM 30 MG/1
CAPSULE ORAL
Qty: 30 CAPSULE | Refills: 0 | Status: SHIPPED | OUTPATIENT
Start: 2020-10-04 | End: 2020-10-05 | Stop reason: SDUPTHER

## 2020-10-06 ENCOUNTER — PATIENT MESSAGE (OUTPATIENT)
Dept: ADMINISTRATIVE | Facility: HOSPITAL | Age: 70
End: 2020-10-06

## 2020-10-13 ENCOUNTER — OFFICE VISIT (OUTPATIENT)
Dept: INTERNAL MEDICINE | Facility: CLINIC | Age: 70
End: 2020-10-13
Payer: MEDICARE

## 2020-10-13 VITALS
BODY MASS INDEX: 45.62 KG/M2 | TEMPERATURE: 100 F | WEIGHT: 241.63 LBS | HEIGHT: 61 IN | SYSTOLIC BLOOD PRESSURE: 120 MMHG | HEART RATE: 86 BPM | OXYGEN SATURATION: 97 % | DIASTOLIC BLOOD PRESSURE: 60 MMHG

## 2020-10-13 DIAGNOSIS — E11.65 UNCONTROLLED TYPE 2 DIABETES MELLITUS WITH HYPERGLYCEMIA: Primary | ICD-10-CM

## 2020-10-13 DIAGNOSIS — H91.90 HEARING LOSS, UNSPECIFIED HEARING LOSS TYPE, UNSPECIFIED LATERALITY: ICD-10-CM

## 2020-10-13 PROCEDURE — 99999 PR PBB SHADOW E&M-EST. PATIENT-LVL V: CPT | Mod: PBBFAC,,, | Performed by: FAMILY MEDICINE

## 2020-10-13 PROCEDURE — 1126F PR PAIN SEVERITY QUANTIFIED, NO PAIN PRESENT: ICD-10-PCS | Mod: S$GLB,,, | Performed by: FAMILY MEDICINE

## 2020-10-13 PROCEDURE — 3078F DIAST BP <80 MM HG: CPT | Mod: CPTII,S$GLB,, | Performed by: FAMILY MEDICINE

## 2020-10-13 PROCEDURE — 3074F PR MOST RECENT SYSTOLIC BLOOD PRESSURE < 130 MM HG: ICD-10-PCS | Mod: CPTII,S$GLB,, | Performed by: FAMILY MEDICINE

## 2020-10-13 PROCEDURE — 3074F SYST BP LT 130 MM HG: CPT | Mod: CPTII,S$GLB,, | Performed by: FAMILY MEDICINE

## 2020-10-13 PROCEDURE — 3051F HG A1C>EQUAL 7.0%<8.0%: CPT | Mod: CPTII,S$GLB,, | Performed by: FAMILY MEDICINE

## 2020-10-13 PROCEDURE — 99213 PR OFFICE/OUTPT VISIT, EST, LEVL III, 20-29 MIN: ICD-10-PCS | Mod: S$GLB,,, | Performed by: FAMILY MEDICINE

## 2020-10-13 PROCEDURE — 1101F PR PT FALLS ASSESS DOC 0-1 FALLS W/OUT INJ PAST YR: ICD-10-PCS | Mod: CPTII,S$GLB,, | Performed by: FAMILY MEDICINE

## 2020-10-13 PROCEDURE — 1159F PR MEDICATION LIST DOCUMENTED IN MEDICAL RECORD: ICD-10-PCS | Mod: S$GLB,,, | Performed by: FAMILY MEDICINE

## 2020-10-13 PROCEDURE — 3008F PR BODY MASS INDEX (BMI) DOCUMENTED: ICD-10-PCS | Mod: CPTII,S$GLB,, | Performed by: FAMILY MEDICINE

## 2020-10-13 PROCEDURE — 3078F PR MOST RECENT DIASTOLIC BLOOD PRESSURE < 80 MM HG: ICD-10-PCS | Mod: CPTII,S$GLB,, | Performed by: FAMILY MEDICINE

## 2020-10-13 PROCEDURE — 99213 OFFICE O/P EST LOW 20 MIN: CPT | Mod: S$GLB,,, | Performed by: FAMILY MEDICINE

## 2020-10-13 PROCEDURE — 3008F BODY MASS INDEX DOCD: CPT | Mod: CPTII,S$GLB,, | Performed by: FAMILY MEDICINE

## 2020-10-13 PROCEDURE — 1126F AMNT PAIN NOTED NONE PRSNT: CPT | Mod: S$GLB,,, | Performed by: FAMILY MEDICINE

## 2020-10-13 PROCEDURE — 1101F PT FALLS ASSESS-DOCD LE1/YR: CPT | Mod: CPTII,S$GLB,, | Performed by: FAMILY MEDICINE

## 2020-10-13 PROCEDURE — 99999 PR PBB SHADOW E&M-EST. PATIENT-LVL V: ICD-10-PCS | Mod: PBBFAC,,, | Performed by: FAMILY MEDICINE

## 2020-10-13 PROCEDURE — 1159F MED LIST DOCD IN RCRD: CPT | Mod: S$GLB,,, | Performed by: FAMILY MEDICINE

## 2020-10-13 PROCEDURE — 3051F PR MOST RECENT HEMOGLOBIN A1C LEVEL 7.0 - < 8.0%: ICD-10-PCS | Mod: CPTII,S$GLB,, | Performed by: FAMILY MEDICINE

## 2020-10-13 RX ORDER — FUROSEMIDE 40 MG/1
40 TABLET ORAL DAILY PRN
COMMUNITY
Start: 2020-09-09 | End: 2022-05-10

## 2020-10-13 RX ORDER — CARVEDILOL 12.5 MG/1
TABLET ORAL
COMMUNITY
Start: 2020-08-25 | End: 2021-01-14 | Stop reason: SDUPTHER

## 2020-10-13 NOTE — PROGRESS NOTES
Subjective:       Patient ID: Christine Jo is a 70 y.o. female.    Chief Complaint: Diabetes and Hearing Problem    Follow-up uncontrolled diabetes.  She currently is on Victoza 0.6 daily Lantus 64 units at bedtime Januvia 100 mg daily.  She is also followed by Cardiology.  Blood pressure medicines were recently increased.  She reports she does not follow her diet as well as she she needs to.  She has been limited in walking recently.    Review of Systems   Constitutional: Negative for activity change, appetite change, fatigue and unexpected weight change.        Her weight is stable at 241   HENT: Positive for hearing loss.    Respiratory: Negative for cough, chest tightness and wheezing.    Cardiovascular: Negative for chest pain, palpitations and leg swelling.   Gastrointestinal: Negative for constipation and nausea.   Neurological: Negative for dizziness, weakness, light-headedness and headaches.       Objective:      Physical Exam  Constitutional:       General: She is not in acute distress.     Appearance: She is not diaphoretic.   HENT:      Right Ear: Tympanic membrane and ear canal normal.      Left Ear: Tympanic membrane and ear canal normal.   Cardiovascular:      Rate and Rhythm: Normal rate and regular rhythm.      Heart sounds: No murmur. No gallop.    Pulmonary:      Effort: No respiratory distress.      Breath sounds: No wheezing, rhonchi or rales.   Skin:     General: Skin is warm and dry.      Coloration: Skin is not pale.      Findings: No erythema.   Neurological:      Mental Status: She is alert.   Psychiatric:         Thought Content: Thought content normal.         Lab Visit on 08/25/2020   Component Date Value Ref Range Status    Sodium 08/25/2020 138  136 - 145 mmol/L Final    Potassium 08/25/2020 5.1  3.5 - 5.1 mmol/L Final    Chloride 08/25/2020 101  95 - 110 mmol/L Final    CO2 08/25/2020 24  23 - 29 mmol/L Final    Glucose 08/25/2020 209* 70 - 110 mg/dL Final    BUN, Bld  08/25/2020 16  8 - 23 mg/dL Final    Creatinine 08/25/2020 0.9  0.5 - 1.4 mg/dL Final    Calcium 08/25/2020 9.9  8.7 - 10.5 mg/dL Final    Total Protein 08/25/2020 7.2  6.0 - 8.4 g/dL Final    Albumin 08/25/2020 4.0  3.5 - 5.2 g/dL Final    Total Bilirubin 08/25/2020 0.8  0.1 - 1.0 mg/dL Final    Alkaline Phosphatase 08/25/2020 45* 55 - 135 U/L Final    AST 08/25/2020 22  10 - 40 U/L Final    ALT 08/25/2020 23  10 - 44 U/L Final    Anion Gap 08/25/2020 13  8 - 16 mmol/L Final    eGFR if African American 08/25/2020 >60  >60 mL/min/1.73 m^2 Final    eGFR if non African American 08/25/2020 >60  >60 mL/min/1.73 m^2 Final     Assessment:       1. Uncontrolled type 2 diabetes mellitus with hyperglycemia        Plan:     CMP A1c microalbumin creatinine ratio ordered.  Diet was discussed.  Increased walking increase water intake.  Follow-up 1 month.  Ear nose throat referral for hearing loss    Uncontrolled type 2 diabetes mellitus with hyperglycemia  -     Comprehensive Metabolic Panel; Future; Expected date: 10/13/2020  -     Hemoglobin A1C; Future; Expected date: 10/13/2020  -     Microalbumin/Creatinine Ratio, Urine; Future; Expected date: 10/13/2020

## 2020-10-15 ENCOUNTER — PATIENT MESSAGE (OUTPATIENT)
Dept: INTERNAL MEDICINE | Facility: CLINIC | Age: 70
End: 2020-10-15

## 2020-10-15 DIAGNOSIS — N39.0 URINARY TRACT INFECTION WITHOUT HEMATURIA, SITE UNSPECIFIED: Primary | ICD-10-CM

## 2020-10-19 ENCOUNTER — PATIENT MESSAGE (OUTPATIENT)
Dept: INTERNAL MEDICINE | Facility: CLINIC | Age: 70
End: 2020-10-19

## 2020-10-20 ENCOUNTER — OFFICE VISIT (OUTPATIENT)
Dept: OTOLARYNGOLOGY | Facility: CLINIC | Age: 70
End: 2020-10-20
Payer: MEDICARE

## 2020-10-20 ENCOUNTER — CLINICAL SUPPORT (OUTPATIENT)
Dept: AUDIOLOGY | Facility: CLINIC | Age: 70
End: 2020-10-20
Payer: MEDICARE

## 2020-10-20 VITALS
TEMPERATURE: 98 F | WEIGHT: 241.63 LBS | DIASTOLIC BLOOD PRESSURE: 80 MMHG | HEART RATE: 76 BPM | SYSTOLIC BLOOD PRESSURE: 184 MMHG | BODY MASS INDEX: 45.65 KG/M2

## 2020-10-20 DIAGNOSIS — H91.90 HEARING LOSS, UNSPECIFIED HEARING LOSS TYPE, UNSPECIFIED LATERALITY: ICD-10-CM

## 2020-10-20 DIAGNOSIS — H90.A22 SENSORINEURAL HEARING LOSS (SNHL) OF LEFT EAR WITH RESTRICTED HEARING OF RIGHT EAR: ICD-10-CM

## 2020-10-20 DIAGNOSIS — H93.12 TINNITUS, LEFT: ICD-10-CM

## 2020-10-20 PROCEDURE — 3079F DIAST BP 80-89 MM HG: CPT | Mod: CPTII,S$GLB,, | Performed by: OTOLARYNGOLOGY

## 2020-10-20 PROCEDURE — 1101F PR PT FALLS ASSESS DOC 0-1 FALLS W/OUT INJ PAST YR: ICD-10-PCS | Mod: CPTII,S$GLB,, | Performed by: OTOLARYNGOLOGY

## 2020-10-20 PROCEDURE — 3079F PR MOST RECENT DIASTOLIC BLOOD PRESSURE 80-89 MM HG: ICD-10-PCS | Mod: CPTII,S$GLB,, | Performed by: OTOLARYNGOLOGY

## 2020-10-20 PROCEDURE — 92557 PR COMPREHENSIVE HEARING TEST: ICD-10-PCS | Mod: S$GLB,,, | Performed by: AUDIOLOGIST-HEARING AID FITTER

## 2020-10-20 PROCEDURE — 1101F PT FALLS ASSESS-DOCD LE1/YR: CPT | Mod: CPTII,S$GLB,, | Performed by: OTOLARYNGOLOGY

## 2020-10-20 PROCEDURE — 3077F SYST BP >= 140 MM HG: CPT | Mod: CPTII,S$GLB,, | Performed by: OTOLARYNGOLOGY

## 2020-10-20 PROCEDURE — 99999 PR PBB SHADOW E&M-EST. PATIENT-LVL V: CPT | Mod: PBBFAC,,, | Performed by: OTOLARYNGOLOGY

## 2020-10-20 PROCEDURE — 99203 OFFICE O/P NEW LOW 30 MIN: CPT | Mod: S$GLB,,, | Performed by: OTOLARYNGOLOGY

## 2020-10-20 PROCEDURE — 99999 PR PBB SHADOW E&M-EST. PATIENT-LVL V: ICD-10-PCS | Mod: PBBFAC,,, | Performed by: OTOLARYNGOLOGY

## 2020-10-20 PROCEDURE — 92557 COMPREHENSIVE HEARING TEST: CPT | Mod: S$GLB,,, | Performed by: AUDIOLOGIST-HEARING AID FITTER

## 2020-10-20 PROCEDURE — 1159F PR MEDICATION LIST DOCUMENTED IN MEDICAL RECORD: ICD-10-PCS | Mod: S$GLB,,, | Performed by: OTOLARYNGOLOGY

## 2020-10-20 PROCEDURE — 99999 PR PBB SHADOW E&M-EST. PATIENT-LVL II: CPT | Mod: PBBFAC,,, | Performed by: AUDIOLOGIST-HEARING AID FITTER

## 2020-10-20 PROCEDURE — 92567 PR TYMPA2METRY: ICD-10-PCS | Mod: S$GLB,,, | Performed by: AUDIOLOGIST-HEARING AID FITTER

## 2020-10-20 PROCEDURE — 99203 PR OFFICE/OUTPT VISIT, NEW, LEVL III, 30-44 MIN: ICD-10-PCS | Mod: S$GLB,,, | Performed by: OTOLARYNGOLOGY

## 2020-10-20 PROCEDURE — 3008F BODY MASS INDEX DOCD: CPT | Mod: CPTII,S$GLB,, | Performed by: OTOLARYNGOLOGY

## 2020-10-20 PROCEDURE — 1159F MED LIST DOCD IN RCRD: CPT | Mod: S$GLB,,, | Performed by: OTOLARYNGOLOGY

## 2020-10-20 PROCEDURE — 3077F PR MOST RECENT SYSTOLIC BLOOD PRESSURE >= 140 MM HG: ICD-10-PCS | Mod: CPTII,S$GLB,, | Performed by: OTOLARYNGOLOGY

## 2020-10-20 PROCEDURE — 99999 PR PBB SHADOW E&M-EST. PATIENT-LVL II: ICD-10-PCS | Mod: PBBFAC,,, | Performed by: AUDIOLOGIST-HEARING AID FITTER

## 2020-10-20 PROCEDURE — 3008F PR BODY MASS INDEX (BMI) DOCUMENTED: ICD-10-PCS | Mod: CPTII,S$GLB,, | Performed by: OTOLARYNGOLOGY

## 2020-10-20 PROCEDURE — 92567 TYMPANOMETRY: CPT | Mod: S$GLB,,, | Performed by: AUDIOLOGIST-HEARING AID FITTER

## 2020-10-20 NOTE — PROGRESS NOTES
Referring Provider:    Jose Szymanski Md  [address] [place] [address]  Subjective:   Patient: Christine Jo [id number], :1950   Visit date:10/20/2020 9:29 AM    Chief Complaint:  Patient in with complaint of buzzing in her left ear (states it last for at least 45 minutes and it's muffled to where she can't hear.) and Patient states it feels as if something is in both of her ea (states she has a family history of hearing loss)    HPI:  Christine is a 70 y.o. female who I was asked to see in consultation for evaluation of the following issue(s):     Progressive hearing loss bilaterally worse in the left ear.  History of occupational noise exposure working in a [employer].  No history of head trauma.  She does report some discomfort in the left ear.  No significant vertigo.  She does not note any fluctuation in the hearing or change with heavy salt intake although she tries to watch her salt intake.    Review of Systems:  -     Allergic/Immunologic: is allergic to aspirin..  -     Constitutional: Current temp: 97.7 °F (36.5 °C)    Pertinent PMH:  Bell's palsy    Objective:     Physical Exam:  Vitals:  BP (!) 184/80 (BP Location: Right arm)   Pulse 76   Temp 97.7 °F (36.5 °C)   Wt 109.6 kg (241 lb 10 oz)   BMI 45.65 kg/m²   General appearance:  Well developed, well nourished    Eyes:  Extraocular motions intact, PERRL    Communication:  no hoarseness, no dysphonia    Ears:  Otoscopy of external auditory canals and tympanic membranes was normal, clinical speech reception thresholds grossly intact, no mass/lesion of auricle.  Nose:  No masses/lesions of external nose, nasal mucosa, septum, and turbinates were within normal limits.  Mouth:  No mass/lesion of lips, teeth, gums, hard/soft palate, tongue, tonsils, or oropharynx.    Cardiovascular:  No pedal edema; Radial Pulses +2     Neck & Lymphatics:  No cervical lymphadenopathy, no neck mass/crepitus/ asymmetry, trachea is midline, no thyroid  enlargement/tenderness/mass.    Psych: Oriented x3,  Alert with normal mood and affect.     Respiration/Chest:  Symmetric expansion during respiration, normal respiratory effort.    Skin:  Warm and intact. No ulcerations of face, scalp, neck.      Assessment & Plan:   Asymmetrical hearing loss, left    Sensorineural hearing loss (SNHL) of left ear with restricted hearing of right ear  -     MRI IAC/Temporal Bones W W/O Contrast; Future; Expected date: 10/20/2020     Bilateral sensorineural hearing loss with significant asymmetry on the left side.  Recommend MRI with gadolinium to evaluate for any retrocochlear pathology.  Will call with results.    We discussed her medical conditions, treatments and plan.  Christine should return to clinic if any issues arise (symptoms worsen or persist), otherwise we will see her back in the clinic only as needed.      Thank you for allowing me to participate in the care of Christine.       Malik Maharaj MD, FACS  [facility]   [facility]  [address]  [place] [id number]  P: [phone]  F: [phone]

## 2020-10-20 NOTE — PROGRESS NOTES
Referring provider: Dr. Nicko Jo was seen 10/20/2020 for an audiological evaluation.  Patient complains of bilateral progressive hearing loss, left poorer than right, for over forty years.  She complains of buzzing in her left ear that started about two months ago.  No history of head trauma. She has dizziness on occasion, reporting sensation that she is being pulled forward. She does not note any fluctuation in the hearing or change with heavy salt intake although she tries to watch her salt intake. Patient has history of occupational noise exposure working in a [employer] for three years. She says a hearing screen at that time indicated left poorer than right hearing. She reports history of left [place] Palsy during her twenties.     Results reveal a mild-to-moderate sensorineural hearing loss 250-8000 Hz for the right ear, and a moderate rising to mild sloping to moderate sensorineural hearing loss 250-8000 Hz for the left ear.   Speech Reception Thresholds were 30 dBHL for the right ear and 45 dBHL for the left ear.   Word recognition scores were excellent for the right ear and good for the left ear.   Tympanograms were Type A for the right ear and Type A for the left ear.    Patient was counseled on the above findings.    Recommendations:  1. ENT  2. Hearing aids, pending medical clearance. Patient was provided a copy of her audiogram.

## 2020-10-21 ENCOUNTER — PATIENT MESSAGE (OUTPATIENT)
Dept: INTERNAL MEDICINE | Facility: CLINIC | Age: 70
End: 2020-10-21

## 2020-10-29 ENCOUNTER — TELEPHONE (OUTPATIENT)
Dept: PODIATRY | Facility: CLINIC | Age: 70
End: 2020-10-29

## 2020-10-29 ENCOUNTER — PATIENT MESSAGE (OUTPATIENT)
Dept: PODIATRY | Facility: CLINIC | Age: 70
End: 2020-10-29

## 2020-10-29 ENCOUNTER — HOSPITAL ENCOUNTER (OUTPATIENT)
Dept: RADIOLOGY | Facility: HOSPITAL | Age: 70
Discharge: HOME | End: 2020-10-29
Attending: OTOLARYNGOLOGY
Payer: MEDICARE

## 2020-10-29 ENCOUNTER — OFFICE VISIT (OUTPATIENT)
Dept: PODIATRY | Facility: CLINIC | Age: 70
End: 2020-10-29
Payer: MEDICARE

## 2020-10-29 VITALS
BODY MASS INDEX: 45.51 KG/M2 | SYSTOLIC BLOOD PRESSURE: 131 MMHG | WEIGHT: 241.06 LBS | HEIGHT: 61 IN | HEART RATE: 87 BPM | DIASTOLIC BLOOD PRESSURE: 79 MMHG

## 2020-10-29 DIAGNOSIS — M79.674 PAIN OF RIGHT GREAT TOE: ICD-10-CM

## 2020-10-29 DIAGNOSIS — M20.11 HALLUX VALGUS (ACQUIRED), RIGHT FOOT: Primary | ICD-10-CM

## 2020-10-29 DIAGNOSIS — H90.A22 SENSORINEURAL HEARING LOSS (SNHL) OF LEFT EAR WITH RESTRICTED HEARING OF RIGHT EAR: ICD-10-CM

## 2020-10-29 DIAGNOSIS — E11.8 TYPE 2 DIABETES MELLITUS WITH COMPLICATION: ICD-10-CM

## 2020-10-29 PROCEDURE — 1125F PR PAIN SEVERITY QUANTIFIED, PAIN PRESENT: ICD-10-PCS | Mod: S$GLB,,, | Performed by: PODIATRIST

## 2020-10-29 PROCEDURE — 3008F BODY MASS INDEX DOCD: CPT | Mod: CPTII,S$GLB,, | Performed by: PODIATRIST

## 2020-10-29 PROCEDURE — 99214 PR OFFICE/OUTPT VISIT, EST, LEVL IV, 30-39 MIN: ICD-10-PCS | Mod: S$GLB,,, | Performed by: PODIATRIST

## 2020-10-29 PROCEDURE — 1159F PR MEDICATION LIST DOCUMENTED IN MEDICAL RECORD: ICD-10-PCS | Mod: S$GLB,,, | Performed by: PODIATRIST

## 2020-10-29 PROCEDURE — 1125F AMNT PAIN NOTED PAIN PRSNT: CPT | Mod: S$GLB,,, | Performed by: PODIATRIST

## 2020-10-29 PROCEDURE — 70553 MRI BRAIN STEM W/O & W/DYE: CPT | Mod: TC

## 2020-10-29 PROCEDURE — 25500020 PHARM REV CODE 255: Performed by: OTOLARYNGOLOGY

## 2020-10-29 PROCEDURE — A9585 GADOBUTROL INJECTION: HCPCS | Performed by: OTOLARYNGOLOGY

## 2020-10-29 PROCEDURE — 3051F HG A1C>EQUAL 7.0%<8.0%: CPT | Mod: CPTII,S$GLB,, | Performed by: PODIATRIST

## 2020-10-29 PROCEDURE — 99999 PR PBB SHADOW E&M-EST. PATIENT-LVL IV: ICD-10-PCS | Mod: PBBFAC,,, | Performed by: PODIATRIST

## 2020-10-29 PROCEDURE — 1101F PT FALLS ASSESS-DOCD LE1/YR: CPT | Mod: CPTII,S$GLB,, | Performed by: PODIATRIST

## 2020-10-29 PROCEDURE — 3051F PR MOST RECENT HEMOGLOBIN A1C LEVEL 7.0 - < 8.0%: ICD-10-PCS | Mod: CPTII,S$GLB,, | Performed by: PODIATRIST

## 2020-10-29 PROCEDURE — 1101F PR PT FALLS ASSESS DOC 0-1 FALLS W/OUT INJ PAST YR: ICD-10-PCS | Mod: CPTII,S$GLB,, | Performed by: PODIATRIST

## 2020-10-29 PROCEDURE — 99214 OFFICE O/P EST MOD 30 MIN: CPT | Mod: S$GLB,,, | Performed by: PODIATRIST

## 2020-10-29 PROCEDURE — 3008F PR BODY MASS INDEX (BMI) DOCUMENTED: ICD-10-PCS | Mod: CPTII,S$GLB,, | Performed by: PODIATRIST

## 2020-10-29 PROCEDURE — 3075F SYST BP GE 130 - 139MM HG: CPT | Mod: CPTII,S$GLB,, | Performed by: PODIATRIST

## 2020-10-29 PROCEDURE — 99999 PR PBB SHADOW E&M-EST. PATIENT-LVL IV: CPT | Mod: PBBFAC,,, | Performed by: PODIATRIST

## 2020-10-29 PROCEDURE — 1159F MED LIST DOCD IN RCRD: CPT | Mod: S$GLB,,, | Performed by: PODIATRIST

## 2020-10-29 PROCEDURE — 3075F PR MOST RECENT SYSTOLIC BLOOD PRESS GE 130-139MM HG: ICD-10-PCS | Mod: CPTII,S$GLB,, | Performed by: PODIATRIST

## 2020-10-29 PROCEDURE — 3078F DIAST BP <80 MM HG: CPT | Mod: CPTII,S$GLB,, | Performed by: PODIATRIST

## 2020-10-29 PROCEDURE — 3078F PR MOST RECENT DIASTOLIC BLOOD PRESSURE < 80 MM HG: ICD-10-PCS | Mod: CPTII,S$GLB,, | Performed by: PODIATRIST

## 2020-10-29 RX ORDER — GADOBUTROL 604.72 MG/ML
10 INJECTION INTRAVENOUS
Status: COMPLETED | OUTPATIENT
Start: 2020-10-29 | End: 2020-10-29

## 2020-10-29 RX ADMIN — GADOBUTROL 10 ML: 604.72 INJECTION INTRAVENOUS at 02:10

## 2020-10-29 NOTE — TELEPHONE ENCOUNTER
Spoke with the pt she wanted to inform the nurse that she spoke with the Dr. And he told her to come in today for 3:30 pm . I informed the pt that was fine and we will see her at 3:30pm today.            Lissy Sanders MA  Podiatry Surgical Department  [facility]                  ----- Message from Sean Cedeño sent at 10/29/2020 11:26 AM CDT -----  .Type:  Patient Returning Call    Who Called:Christine Jo  Who Left Message for Patient:Lissy  Does the patient know what this is regarding?:no  Would the patient rather a call back or a response via [facility]? Call back  Best Call Back Number:[phone]  Additional Information:       :

## 2020-10-29 NOTE — PROGRESS NOTES
Subjective:       Patient ID: Christine Jo is a 70 y.o. female.    Chief Complaint: Toe Pain (r. hallux 4/10 slip on diabetic pt pcp Dr. Szymanski.)    HPI: Christine Jo presents to the office today with complaints of Mild to moderate pains to the right foot at the great toe due to arthritis and/or bunion deformity. Patient states pains are recalcitrant to oral NSAID therapy and wider width shoe gear and high toe box shoe gear. Patient has had no injection therapy to the 1st MTPJ on the affected limb prior. Patient has had discomfort to the great toe for the past several months. Patient's Primary Care Provider is Jose Szymanski MD. She is a DM with adequate control.    Hemoglobin A1C   Date Value Ref Range Status   10/13/2020 7.5 (H) 4.0 - 5.6 % Final     Comment:     ADA Screening Guidelines:  5.7-6.4%  Consistent with prediabetes  >or=6.5%  Consistent with diabetes  High levels of fetal hemoglobin interfere with the HbA1C  assay. Heterozygous hemoglobin variants (HbS, HgC, etc)do  not significantly interfere with this assay.   However, presence of multiple variants may affect accuracy.     05/25/2020 7.0 (H) 4.0 - 5.6 % Final     Comment:     ADA Screening Guidelines:  5.7-6.4%  Consistent with prediabetes  >or=6.5%  Consistent with diabetes  High levels of fetal hemoglobin interfere with the HbA1C  assay. Heterozygous hemoglobin variants (HbS, HgC, etc)do  not significantly interfere with this assay.   However, presence of multiple variants may affect accuracy.     12/05/2019 7.5 (H) 4.0 - 5.6 % Final     Comment:     ADA Screening Guidelines:  5.7-6.4%  Consistent with prediabetes  >or=6.5%  Consistent with diabetes  High levels of fetal hemoglobin interfere with the HbA1C  assay. Heterozygous hemoglobin variants (HbS, HgC, etc)do  not significantly interfere with this assay.   However, presence of multiple variants may affect accuracy.         Review of patient's allergies indicates:   Allergen Reactions     Aspirin Palpitations       Past Medical History:   Diagnosis Date    Arthritis     Cataract     Diabetes mellitus      am 01/15/2018 Insulin x 1 year    DM (diabetes mellitus)      am 2020 Insulin x 4 years    Glaucoma     Hypertension     Insomnia     Macular degeneration     Vaginal yeast infection        Family History   Problem Relation Age of Onset    Heart disease Mother         CAD     Cataracts Mother     Macular degeneration Mother     Glaucoma Mother     Cancer Son         testicular     Cancer Maternal Aunt         Lung ca    Heart disease Maternal Grandfather         Pacemaker     Diabetes Sister     Heart disease Sister         CAD    Cataracts Sister     Diabetes Sister     Diabetes Sister        Social History     Socioeconomic History    Marital status:      Spouse name: Not on file    Number of children: Not on file    Years of education: Not on file    Highest education level: Not on file   Occupational History    Not on file   Social Needs    Financial resource strain: Not on file    Food insecurity     Worry: Not on file     Inability: Not on file    Transportation needs     Medical: Not on file     Non-medical: Not on file   Tobacco Use    Smoking status: Former Smoker     Packs/day: 1.00     Years: 35.00     Pack years: 35.00     Types: Cigarettes     Quit date: 2003     Years since quittin.3    Smokeless tobacco: Never Used   Substance and Sexual Activity    Alcohol use: No    Drug use: No    Sexual activity: Never   Lifestyle    Physical activity     Days per week: Not on file     Minutes per session: Not on file    Stress: Not on file   Relationships    Social connections     Talks on phone: Not on file     Gets together: Not on file     Attends Jehovah's witness service: Not on file     Active member of club or organization: Not on file     Attends meetings of clubs or organizations: Not on file     Relationship  "status: Not on file   Other Topics Concern    Not on file   Social History Narrative    Not on file       Past Surgical History:   Procedure Laterality Date    abdominal laparoscopy       BREAST BIOPSY      CATARACT EXTRACTION Bilateral [id number]    [facility] [place]    ESOPHAGOGASTRODUODENOSCOPY N/A 11/29/2019    Procedure: ESOPHAGOGASTRODUODENOSCOPY (EGD);  Surgeon: Shawn Rogers III, MD;  Location[facility];  Service: Endoscopy;  Laterality: N/A;    EYE SURGERY      TONSILLECTOMY      TUBAL LIGATION      yag  Bilateral        Review of Systems   Constitutional: Negative for chills, fatigue and fever.   HENT: Negative for hearing loss.    Eyes: Negative for photophobia and visual disturbance.   Respiratory: Negative for cough, chest tightness, shortness of breath and wheezing.    Cardiovascular: Negative for chest pain and palpitations.   Gastrointestinal: Negative for constipation, diarrhea, nausea and vomiting.   Endocrine: Negative for cold intolerance and heat intolerance.   Genitourinary: Negative for flank pain.   Musculoskeletal: Positive for arthralgias and gait problem. Negative for neck pain and neck stiffness.   Skin: Negative for wound.   Neurological: Negative for light-headedness and headaches.   Psychiatric/Behavioral: Negative for sleep disturbance.         Objective:   /79   Pulse 87   Ht 5' 1" (1.549 m)   Wt 109.4 kg (241 lb 1.2 oz)   BMI 45.55 kg/m²         Physical Exam  LOWER EXTREMITY PHYSICAL EXAMINATION    NEUROLOGY: Sensation to light touch is intact. Proprioception is intact. Sensation to pin prick is intact.     VASCULAR: On the right foot, the dorsalis pedis pulse is 2/4 and the posterior tibial pulse is 1/4. Capillary refill time is less than 3 seconds. Hair growth is sparse on the dorsum of the foot and at the digits. Proximal to distal temperature is warm to warm    ORTHOPEDIC: Mild to moderate bunion deformity is noted to the right foot. " Degenerative arthropathy; hallux limitus is noted. Upon ROM in the sagittal plan, there is mild pains to the end ROM, dorsally. There are slight pains to the plantar aspect of the 1st MTPJ as well. No pains to palpation of the tibial or the fibular sesamoid. There is a small medial eminence appreciated. Mild to moderate dorsal spurring noted. Palpation of the dorsal-lateral aspect of the 1st MTPJ is moderate to severely painful. There is joint crepitus noted. The joint is full and somewhat edematous.      DERMATOLOGY: Skin is supple, dry and intact. No hypertrophic skin formation is noted. No overt breaks in the skin is noted.       Assessment:     1. Hallux valgus (acquired), right foot    2. Pain of right great toe    3. Type 2 diabetes mellitus with complication    4. BMI 40.0-44.9, adult          Plan:     Hallux valgus (acquired), right foot  Pain of right great toe  Thorough discussion is had with the patient today, concerning the diagnosis, its etiology, and the treatment algorithm at present.     No xrays are taken this afternoon.  I do recommend intra-articular cortisone injection, but patient denies due to diabetes mellitus. Patient cannot take NSAIDs due to anticoagulation.    Did discuss proper and supportive shoe gear in detail and at length with the patient.  These are shoes with firm and robust arch support; medial counter.  Shoes which only bend at the metatarsophalangeal joint and which are rigid in the midfoot and hindfoot. Patient urged to purchase running type or cross training type shoes gear which are designed for pronation control.    Rec. shoe gear with soft and accommodative foot bed and with a high toe box for alleviation of symptoms. Possible EE or EEE in width as well for alleviation of symptoms.     Type 2 diabetes mellitus with complication  Patient advised to follow up with Primary Care Physician for management of comorbid states.    BMI 40.0-44.9, adult  Patient is counseled and  reminded concerning the importance of good nutrition and healthy eating habits, which may include blood sugar control, to prevent and/or help podiatric foot and ankle complications.            Future Appointments   Date Time Provider Department Center   11/12/2020  9:30 AM Jose Szymanski MD [facility]   11/24/2020 10:00 AM ANNA HAQ [facility]   11/24/2020 10:45 AM CHAIR 09 [facility]   2/19/2021  1:30 PM Klaus Shelton OD [facility]

## 2020-10-30 ENCOUNTER — PATIENT MESSAGE (OUTPATIENT)
Dept: OTOLARYNGOLOGY | Facility: CLINIC | Age: 70
End: 2020-10-30

## 2020-11-04 RX ORDER — TRIAMCINOLONE ACETONIDE 1 MG/G
CREAM TOPICAL 2 TIMES DAILY
Qty: 30 G | Refills: 0 | Status: SHIPPED | OUTPATIENT
Start: 2020-11-04 | End: 2021-06-18 | Stop reason: SDUPTHER

## 2020-11-04 RX ORDER — TEMAZEPAM 30 MG/1
CAPSULE ORAL
Qty: 30 CAPSULE | Refills: 0 | Status: SHIPPED | OUTPATIENT
Start: 2020-11-04 | End: 2020-12-04 | Stop reason: SDUPTHER

## 2020-11-04 RX ORDER — INSULIN GLARGINE 100 [IU]/ML
64 INJECTION, SOLUTION SUBCUTANEOUS NIGHTLY
Qty: 3 BOX | Refills: 3 | Status: SHIPPED | OUTPATIENT
Start: 2020-11-04 | End: 2021-07-27 | Stop reason: SDUPTHER

## 2020-11-09 ENCOUNTER — PATIENT MESSAGE (OUTPATIENT)
Dept: OTOLARYNGOLOGY | Facility: CLINIC | Age: 70
End: 2020-11-09

## 2020-11-11 ENCOUNTER — OFFICE VISIT (OUTPATIENT)
Dept: INTERNAL MEDICINE | Facility: CLINIC | Age: 70
End: 2020-11-11
Payer: MEDICARE

## 2020-11-11 VITALS
OXYGEN SATURATION: 98 % | WEIGHT: 239.63 LBS | BODY MASS INDEX: 45.24 KG/M2 | DIASTOLIC BLOOD PRESSURE: 70 MMHG | HEART RATE: 88 BPM | TEMPERATURE: 99 F | HEIGHT: 61 IN | SYSTOLIC BLOOD PRESSURE: 160 MMHG

## 2020-11-11 DIAGNOSIS — I10 ESSENTIAL HYPERTENSION: ICD-10-CM

## 2020-11-11 DIAGNOSIS — I48.0 PAROXYSMAL A-FIB: ICD-10-CM

## 2020-11-11 DIAGNOSIS — F32.A DEPRESSION, UNSPECIFIED DEPRESSION TYPE: ICD-10-CM

## 2020-11-11 DIAGNOSIS — E11.65 UNCONTROLLED TYPE 2 DIABETES MELLITUS WITH HYPERGLYCEMIA: Primary | ICD-10-CM

## 2020-11-11 PROCEDURE — 1101F PT FALLS ASSESS-DOCD LE1/YR: CPT | Mod: CPTII,S$GLB,, | Performed by: FAMILY MEDICINE

## 2020-11-11 PROCEDURE — 3051F HG A1C>EQUAL 7.0%<8.0%: CPT | Mod: CPTII,S$GLB,, | Performed by: FAMILY MEDICINE

## 2020-11-11 PROCEDURE — 1159F PR MEDICATION LIST DOCUMENTED IN MEDICAL RECORD: ICD-10-PCS | Mod: S$GLB,,, | Performed by: FAMILY MEDICINE

## 2020-11-11 PROCEDURE — 3008F BODY MASS INDEX DOCD: CPT | Mod: CPTII,S$GLB,, | Performed by: FAMILY MEDICINE

## 2020-11-11 PROCEDURE — 99214 OFFICE O/P EST MOD 30 MIN: CPT | Mod: S$GLB,,, | Performed by: FAMILY MEDICINE

## 2020-11-11 PROCEDURE — 99214 PR OFFICE/OUTPT VISIT, EST, LEVL IV, 30-39 MIN: ICD-10-PCS | Mod: S$GLB,,, | Performed by: FAMILY MEDICINE

## 2020-11-11 PROCEDURE — 3008F PR BODY MASS INDEX (BMI) DOCUMENTED: ICD-10-PCS | Mod: CPTII,S$GLB,, | Performed by: FAMILY MEDICINE

## 2020-11-11 PROCEDURE — 3051F PR MOST RECENT HEMOGLOBIN A1C LEVEL 7.0 - < 8.0%: ICD-10-PCS | Mod: CPTII,S$GLB,, | Performed by: FAMILY MEDICINE

## 2020-11-11 PROCEDURE — 3077F PR MOST RECENT SYSTOLIC BLOOD PRESSURE >= 140 MM HG: ICD-10-PCS | Mod: CPTII,S$GLB,, | Performed by: FAMILY MEDICINE

## 2020-11-11 PROCEDURE — 3078F PR MOST RECENT DIASTOLIC BLOOD PRESSURE < 80 MM HG: ICD-10-PCS | Mod: CPTII,S$GLB,, | Performed by: FAMILY MEDICINE

## 2020-11-11 PROCEDURE — 3077F SYST BP >= 140 MM HG: CPT | Mod: CPTII,S$GLB,, | Performed by: FAMILY MEDICINE

## 2020-11-11 PROCEDURE — 1159F MED LIST DOCD IN RCRD: CPT | Mod: S$GLB,,, | Performed by: FAMILY MEDICINE

## 2020-11-11 PROCEDURE — 3078F DIAST BP <80 MM HG: CPT | Mod: CPTII,S$GLB,, | Performed by: FAMILY MEDICINE

## 2020-11-11 PROCEDURE — 1101F PR PT FALLS ASSESS DOC 0-1 FALLS W/OUT INJ PAST YR: ICD-10-PCS | Mod: CPTII,S$GLB,, | Performed by: FAMILY MEDICINE

## 2020-11-11 PROCEDURE — 99999 PR PBB SHADOW E&M-EST. PATIENT-LVL III: CPT | Mod: PBBFAC,,, | Performed by: FAMILY MEDICINE

## 2020-11-11 PROCEDURE — 1126F PR PAIN SEVERITY QUANTIFIED, NO PAIN PRESENT: ICD-10-PCS | Mod: S$GLB,,, | Performed by: FAMILY MEDICINE

## 2020-11-11 PROCEDURE — 1126F AMNT PAIN NOTED NONE PRSNT: CPT | Mod: S$GLB,,, | Performed by: FAMILY MEDICINE

## 2020-11-11 PROCEDURE — 99999 PR PBB SHADOW E&M-EST. PATIENT-LVL III: ICD-10-PCS | Mod: PBBFAC,,, | Performed by: FAMILY MEDICINE

## 2020-11-11 RX ORDER — BUPROPION HYDROCHLORIDE 75 MG/1
75 TABLET ORAL 2 TIMES DAILY
Qty: 60 TABLET | Refills: 11 | Status: SHIPPED | OUTPATIENT
Start: 2020-11-11 | End: 2021-01-27

## 2020-11-11 RX ORDER — DULOXETIN HYDROCHLORIDE 30 MG/1
CAPSULE, DELAYED RELEASE ORAL
Qty: 30 CAPSULE | Refills: 5 | Status: SHIPPED | OUTPATIENT
Start: 2020-11-11 | End: 2021-04-01 | Stop reason: SDUPTHER

## 2020-11-11 NOTE — PROGRESS NOTES
Subjective:       Patient ID: Christine Jo is a 70 y.o. female.    Chief Complaint: Follow-up    Follow-up paroxysmal atrial fibrillation hypertension hyperlipidemia depression.  She is followed by cardiology.  Her blood pressure medications are being adjusted.  Atorvastatin was recently stopped due to leg pain.  Cardiology is monitoring this.  She is mostly concerned that she feels tired depressed no allergy no motivation.  She is currently on Cymbalta 60 mg a day.  Wellbutrin in the past cause nausea so she is not currently taking that.  She denies headache chest pain palpitations shortness of breath or edema.  She is on Lantus 68 units a day and Victoza 0.6 daily for diabetic control.  She reports follow diet most of the time.    Review of Systems   Constitutional: Positive for fatigue. Negative for activity change, appetite change, chills and fever.   Respiratory: Negative for cough, chest tightness, shortness of breath and wheezing.    Cardiovascular: Negative for chest pain, palpitations and leg swelling.        Denies lightheadedness   Gastrointestinal: Negative for abdominal distention, abdominal pain, diarrhea and nausea.   Endocrine: Negative for polydipsia and polyuria.   Genitourinary: Negative for difficulty urinating, dysuria, frequency, hematuria and urgency.   Neurological: Positive for dizziness. Negative for syncope, weakness and light-headedness.   Psychiatric/Behavioral: Positive for dysphoric mood.       Objective:      Physical Exam  Constitutional:       General: She is not in acute distress.     Appearance: Normal appearance. She is not ill-appearing or diaphoretic.   Cardiovascular:      Rate and Rhythm: Normal rate and regular rhythm.      Heart sounds: No murmur. No gallop.    Pulmonary:      Effort: Pulmonary effort is normal.      Breath sounds: Normal breath sounds. No stridor. No wheezing, rhonchi or rales.   Abdominal:      General: Abdomen is flat. There is no distension.       Palpations: There is no mass.      Tenderness: There is no abdominal tenderness.   Skin:     General: Skin is warm and dry.      Coloration: Skin is not pale.      Findings: No erythema.   Neurological:      Mental Status: She is alert.   Psychiatric:         Mood and Affect: Mood normal.         Behavior: Behavior normal.         Thought Content: Thought content normal.         Judgment: Judgment normal.         Lab Visit on 10/20/2020   Component Date Value Ref Range Status    Specimen UA 10/20/2020 Urine, Clean Catch   Final    Color, UA 10/20/2020 Yellow  Yellow, Straw,  Final    Appearance, UA 10/20/2020 Clear  Clear Final    pH, UA 10/20/2020 7.0  5.0 - 8.0 Final    Specific [place], UA 10/20/2020 1.010  1.005 - 1.030 Final    Protein, UA 10/20/2020 Negative  Negative Final    Glucose, UA 10/20/2020 Negative  Negative Final    Ketones, UA 10/20/2020 Negative  Negative Final    Bilirubin (UA) 10/20/2020 Negative  Negative Final    Occult Blood UA 10/20/2020 Negative  Negative Final    Nitrite, UA 10/20/2020 Negative  Negative Final    Leukocytes, UA 10/20/2020 Negative  Negative Final     Assessment:       1. Uncontrolled type 2 diabetes mellitus with hyperglycemia    2. Essential hypertension    3. Paroxysmal A-fib    4. Depression, unspecified depression type        Plan:     Blood pressure elevated but being followed by cardiology.  Lab was ordered.  Will increase Cymbalta 60 mg start 90 mg a day.  Follow-up in 1 month.    Uncontrolled type 2 diabetes mellitus with hyperglycemia  -     Hemoglobin A1C; Future; Expected date: 11/11/2020    Essential hypertension  -     CBC Auto Differential; Future; Expected date: 11/11/2020  -     Comprehensive Metabolic Panel; Future; Expected date: 11/11/2020  -     Lipid Panel; Future; Expected date: 11/11/2020  -     Urinalysis; Future; Expected date: 11/11/2020    Paroxysmal A-fib    Depression, unspecified depression type    Other orders  -      buPROPion (WELLBUTRIN) 75 MG tablet; Take 1 tablet (75 mg total) by mouth 2 (two) times daily.  Dispense: 60 tablet; Refill: 11

## 2020-11-12 ENCOUNTER — PATIENT MESSAGE (OUTPATIENT)
Dept: INTERNAL MEDICINE | Facility: CLINIC | Age: 70
End: 2020-11-12

## 2020-11-23 RX ORDER — FLUTICASONE PROPIONATE 50 MCG
2 SPRAY, SUSPENSION (ML) NASAL DAILY
Qty: 48 ML | Refills: 0 | Status: SHIPPED | OUTPATIENT
Start: 2020-11-23 | End: 2021-03-21 | Stop reason: SDUPTHER

## 2020-11-24 ENCOUNTER — TELEPHONE (OUTPATIENT)
Dept: INFUSION THERAPY | Facility: HOSPITAL | Age: 70
End: 2020-11-24

## 2020-11-24 NOTE — TELEPHONE ENCOUNTER
----- Message from Kelly Hair LPN sent at 11/24/2020  8:29 AM CST -----  Roberto Harding this pt canceled her Bon[facility] and labs for CC because she thought she had a balance to pay. Apparently she does not. Will you please call the pt and reschedule.     Thanks so much,  Kelly

## 2020-12-04 ENCOUNTER — LAB VISIT (OUTPATIENT)
Dept: PRIMARY CARE CLINIC | Facility: OTHER | Age: 70
End: 2020-12-04
Attending: INTERNAL MEDICINE
Payer: MEDICARE

## 2020-12-04 DIAGNOSIS — Z03.818 ENCOUNTER FOR OBSERVATION FOR SUSPECTED EXPOSURE TO OTHER BIOLOGICAL AGENTS RULED OUT: ICD-10-CM

## 2020-12-04 PROCEDURE — U0003 INFECTIOUS AGENT DETECTION BY NUCLEIC ACID (DNA OR RNA); SEVERE ACUTE RESPIRATORY SYNDROME CORONAVIRUS 2 (SARS-COV-2) (CORONAVIRUS DISEASE [COVID-19]), AMPLIFIED PROBE TECHNIQUE, MAKING USE OF HIGH THROUGHPUT TECHNOLOGIES AS DESCRIBED BY CMS-2020-01-R: HCPCS

## 2020-12-05 ENCOUNTER — PATIENT MESSAGE (OUTPATIENT)
Dept: ADMINISTRATIVE | Facility: OTHER | Age: 70
End: 2020-12-05

## 2020-12-07 LAB — SARS-COV-2 RNA RESP QL NAA+PROBE: NOT DETECTED

## 2020-12-07 RX ORDER — GABAPENTIN 300 MG/1
300 CAPSULE ORAL 3 TIMES DAILY
Qty: 90 CAPSULE | Refills: 1 | Status: CANCELLED | OUTPATIENT
Start: 2020-12-07 | End: 2021-12-07

## 2020-12-07 RX ORDER — TEMAZEPAM 30 MG/1
CAPSULE ORAL
Qty: 30 CAPSULE | Refills: 0 | Status: SHIPPED | OUTPATIENT
Start: 2020-12-07 | End: 2021-01-05

## 2020-12-07 RX ORDER — GABAPENTIN 300 MG/1
300 CAPSULE ORAL 3 TIMES DAILY
Qty: 90 CAPSULE | Refills: 1 | Status: SHIPPED | OUTPATIENT
Start: 2020-12-07 | End: 2021-06-10

## 2020-12-11 ENCOUNTER — PATIENT MESSAGE (OUTPATIENT)
Dept: GASTROENTEROLOGY | Facility: CLINIC | Age: 70
End: 2020-12-11

## 2020-12-14 ENCOUNTER — OFFICE VISIT (OUTPATIENT)
Dept: INTERNAL MEDICINE | Facility: CLINIC | Age: 70
End: 2020-12-14
Payer: MEDICARE

## 2020-12-14 ENCOUNTER — LAB VISIT (OUTPATIENT)
Dept: LAB | Facility: HOSPITAL | Age: 70
End: 2020-12-14
Attending: FAMILY MEDICINE
Payer: MEDICARE

## 2020-12-14 VITALS
OXYGEN SATURATION: 98 % | SYSTOLIC BLOOD PRESSURE: 160 MMHG | TEMPERATURE: 98 F | HEIGHT: 61 IN | BODY MASS INDEX: 45.83 KG/M2 | WEIGHT: 242.75 LBS | HEART RATE: 80 BPM | DIASTOLIC BLOOD PRESSURE: 76 MMHG

## 2020-12-14 DIAGNOSIS — F32.A DEPRESSION, UNSPECIFIED DEPRESSION TYPE: ICD-10-CM

## 2020-12-14 DIAGNOSIS — R42 VERTIGO: Primary | ICD-10-CM

## 2020-12-14 DIAGNOSIS — Z12.31 ENCOUNTER FOR SCREENING MAMMOGRAM FOR MALIGNANT NEOPLASM OF BREAST: ICD-10-CM

## 2020-12-14 DIAGNOSIS — H91.90 HEARING LOSS, UNSPECIFIED HEARING LOSS TYPE, UNSPECIFIED LATERALITY: ICD-10-CM

## 2020-12-14 DIAGNOSIS — Z12.39 BREAST SCREENING: ICD-10-CM

## 2020-12-14 DIAGNOSIS — E11.65 UNCONTROLLED TYPE 2 DIABETES MELLITUS WITH HYPERGLYCEMIA: ICD-10-CM

## 2020-12-14 DIAGNOSIS — D64.9 ANEMIA, UNSPECIFIED TYPE: ICD-10-CM

## 2020-12-14 DIAGNOSIS — I48.0 PAROXYSMAL A-FIB: ICD-10-CM

## 2020-12-14 LAB
BASOPHILS # BLD AUTO: 0.07 K/UL (ref 0–0.2)
BASOPHILS NFR BLD: 1 % (ref 0–1.9)
DIFFERENTIAL METHOD: ABNORMAL
EOSINOPHIL # BLD AUTO: 0.1 K/UL (ref 0–0.5)
EOSINOPHIL NFR BLD: 1.8 % (ref 0–8)
ERYTHROCYTE [DISTWIDTH] IN BLOOD BY AUTOMATED COUNT: 12.7 % (ref 11.5–14.5)
ESTIMATED AVG GLUCOSE: 163 MG/DL (ref 68–131)
HBA1C MFR BLD HPLC: 7.3 % (ref 4–5.6)
HCT VFR BLD AUTO: 37.6 % (ref 37–48.5)
HGB BLD-MCNC: 11.5 G/DL (ref 12–16)
IMM GRANULOCYTES # BLD AUTO: 0.03 K/UL (ref 0–0.04)
IMM GRANULOCYTES NFR BLD AUTO: 0.4 % (ref 0–0.5)
LYMPHOCYTES # BLD AUTO: 1.6 K/UL (ref 1–4.8)
LYMPHOCYTES NFR BLD: 24.2 % (ref 18–48)
MCH RBC QN AUTO: 30.1 PG (ref 27–31)
MCHC RBC AUTO-ENTMCNC: 30.6 G/DL (ref 32–36)
MCV RBC AUTO: 98 FL (ref 82–98)
MONOCYTES # BLD AUTO: 0.5 K/UL (ref 0.3–1)
MONOCYTES NFR BLD: 8 % (ref 4–15)
NEUTROPHILS # BLD AUTO: 4.4 K/UL (ref 1.8–7.7)
NEUTROPHILS NFR BLD: 64.6 % (ref 38–73)
NRBC BLD-RTO: 0 /100 WBC
PLATELET # BLD AUTO: 224 K/UL (ref 150–350)
PMV BLD AUTO: 12.6 FL (ref 9.2–12.9)
RBC # BLD AUTO: 3.82 M/UL (ref 4–5.4)
WBC # BLD AUTO: 6.78 K/UL (ref 3.9–12.7)

## 2020-12-14 PROCEDURE — 3008F PR BODY MASS INDEX (BMI) DOCUMENTED: ICD-10-PCS | Mod: CPTII,S$GLB,, | Performed by: FAMILY MEDICINE

## 2020-12-14 PROCEDURE — 1159F PR MEDICATION LIST DOCUMENTED IN MEDICAL RECORD: ICD-10-PCS | Mod: S$GLB,,, | Performed by: FAMILY MEDICINE

## 2020-12-14 PROCEDURE — 99999 PR PBB SHADOW E&M-EST. PATIENT-LVL V: CPT | Mod: PBBFAC,,, | Performed by: FAMILY MEDICINE

## 2020-12-14 PROCEDURE — 36415 COLL VENOUS BLD VENIPUNCTURE: CPT

## 2020-12-14 PROCEDURE — 3288F FALL RISK ASSESSMENT DOCD: CPT | Mod: CPTII,S$GLB,, | Performed by: FAMILY MEDICINE

## 2020-12-14 PROCEDURE — 3072F LOW RISK FOR RETINOPATHY: CPT | Mod: S$GLB,,, | Performed by: FAMILY MEDICINE

## 2020-12-14 PROCEDURE — 1101F PR PT FALLS ASSESS DOC 0-1 FALLS W/OUT INJ PAST YR: ICD-10-PCS | Mod: CPTII,S$GLB,, | Performed by: FAMILY MEDICINE

## 2020-12-14 PROCEDURE — 99214 PR OFFICE/OUTPT VISIT, EST, LEVL IV, 30-39 MIN: ICD-10-PCS | Mod: S$GLB,,, | Performed by: FAMILY MEDICINE

## 2020-12-14 PROCEDURE — 3051F PR MOST RECENT HEMOGLOBIN A1C LEVEL 7.0 - < 8.0%: ICD-10-PCS | Mod: CPTII,S$GLB,, | Performed by: FAMILY MEDICINE

## 2020-12-14 PROCEDURE — 1126F PR PAIN SEVERITY QUANTIFIED, NO PAIN PRESENT: ICD-10-PCS | Mod: S$GLB,,, | Performed by: FAMILY MEDICINE

## 2020-12-14 PROCEDURE — 83036 HEMOGLOBIN GLYCOSYLATED A1C: CPT

## 2020-12-14 PROCEDURE — 3078F PR MOST RECENT DIASTOLIC BLOOD PRESSURE < 80 MM HG: ICD-10-PCS | Mod: CPTII,S$GLB,, | Performed by: FAMILY MEDICINE

## 2020-12-14 PROCEDURE — 3077F PR MOST RECENT SYSTOLIC BLOOD PRESSURE >= 140 MM HG: ICD-10-PCS | Mod: CPTII,S$GLB,, | Performed by: FAMILY MEDICINE

## 2020-12-14 PROCEDURE — 3078F DIAST BP <80 MM HG: CPT | Mod: CPTII,S$GLB,, | Performed by: FAMILY MEDICINE

## 2020-12-14 PROCEDURE — 3077F SYST BP >= 140 MM HG: CPT | Mod: CPTII,S$GLB,, | Performed by: FAMILY MEDICINE

## 2020-12-14 PROCEDURE — 3072F PR LOW RISK FOR RETINOPATHY: ICD-10-PCS | Mod: S$GLB,,, | Performed by: FAMILY MEDICINE

## 2020-12-14 PROCEDURE — 99999 PR PBB SHADOW E&M-EST. PATIENT-LVL V: ICD-10-PCS | Mod: PBBFAC,,, | Performed by: FAMILY MEDICINE

## 2020-12-14 PROCEDURE — 3051F HG A1C>EQUAL 7.0%<8.0%: CPT | Mod: CPTII,S$GLB,, | Performed by: FAMILY MEDICINE

## 2020-12-14 PROCEDURE — 3288F PR FALLS RISK ASSESSMENT DOCUMENTED: ICD-10-PCS | Mod: CPTII,S$GLB,, | Performed by: FAMILY MEDICINE

## 2020-12-14 PROCEDURE — 85025 COMPLETE CBC W/AUTO DIFF WBC: CPT

## 2020-12-14 PROCEDURE — 1101F PT FALLS ASSESS-DOCD LE1/YR: CPT | Mod: CPTII,S$GLB,, | Performed by: FAMILY MEDICINE

## 2020-12-14 PROCEDURE — 99214 OFFICE O/P EST MOD 30 MIN: CPT | Mod: S$GLB,,, | Performed by: FAMILY MEDICINE

## 2020-12-14 PROCEDURE — 1159F MED LIST DOCD IN RCRD: CPT | Mod: S$GLB,,, | Performed by: FAMILY MEDICINE

## 2020-12-14 PROCEDURE — 1126F AMNT PAIN NOTED NONE PRSNT: CPT | Mod: S$GLB,,, | Performed by: FAMILY MEDICINE

## 2020-12-14 PROCEDURE — 3008F BODY MASS INDEX DOCD: CPT | Mod: CPTII,S$GLB,, | Performed by: FAMILY MEDICINE

## 2020-12-14 RX ORDER — MECLIZINE HYDROCHLORIDE 25 MG/1
25 TABLET ORAL 3 TIMES DAILY PRN
Qty: 30 TABLET | Refills: 0 | Status: SHIPPED | OUTPATIENT
Start: 2020-12-14 | End: 2021-10-13

## 2020-12-14 NOTE — PROGRESS NOTES
Subjective:       Patient ID: Christine Jo is a 70 y.o. female.    Chief Complaint: Follow-up (1 month)    Follow-up depression.  She reports her symptoms have improved with medication adjustment.  She is also requesting Psychiatry referral.  Follow-up diabetes.  She has not follow her diet well she has had.  Glucose this morning was 140.  Last A1c was 7.2.  Follow-up atrial fibrillation paroxysmal.  She denies chest pain palpitations or edema.  She is also followed by Cardiology.  Cardiology is monitoring blood pressure lipids.  She has had to 3 weeks of ringing in her ears.  She had onset of vertigo yesterday that is low bit better today.  She has some nasal congestion and ear popping.  ENT evaluation done 2 months ago hearing loss.  She is referred for hearing aid evaluation.  She currently is on Flonase.    Review of Systems   Constitutional: Positive for appetite change and fatigue. Negative for chills and fever.   HENT: Positive for congestion, hearing loss, postnasal drip and sinus pressure.    Respiratory: Negative for cough, chest tightness, shortness of breath and wheezing.    Cardiovascular: Negative for chest pain, palpitations and leg swelling.        Denies syncope   Endocrine: Negative for polydipsia and polyuria.   Neurological: Positive for dizziness. Negative for weakness, light-headedness and headaches.   Psychiatric/Behavioral: Positive for dysphoric mood.       Objective:      Physical Exam  Constitutional:       General: She is not in acute distress.     Appearance: She is not ill-appearing or diaphoretic.   HENT:      Right Ear: Tympanic membrane and ear canal normal.      Left Ear: Tympanic membrane and ear canal normal.      Nose: Nose normal.   Eyes:      General:         Right eye: No discharge.         Left eye: No discharge.      Conjunctiva/sclera: Conjunctivae normal.   Neck:      Musculoskeletal: No muscular tenderness.      Comments: Normal thyroid 2+ carotids  Cardiovascular:       Rate and Rhythm: Normal rate.      Heart sounds: No murmur. No gallop.       Comments: Irregular rhythm pulse rate of 80  Pulmonary:      Effort: Pulmonary effort is normal. No respiratory distress.      Breath sounds: No wheezing, rhonchi or rales.   Lymphadenopathy:      Cervical: No cervical adenopathy.   Skin:     General: Skin is warm and dry.      Coloration: Skin is not pale.      Findings: No erythema.   Neurological:      Mental Status: She is alert.   Psychiatric:         Mood and Affect: Mood normal.         Behavior: Behavior normal.         Thought Content: Thought content normal.         Judgment: Judgment normal.         Lab Visit on 12/04/2020   Component Date Value Ref Range Status    SARS-CoV2 (COVID-19) Qualitative P* 12/04/2020 Not Detected  Not Detected Final     Assessment:       1. Vertigo    2. Anemia, unspecified type    3. Uncontrolled type 2 diabetes mellitus with hyperglycemia    4. Depression, unspecified depression type    5. Breast screening    6. Encounter for screening mammogram for malignant neoplasm of breast         Plan:     Antivert vertigo.  Discussed diabetic diet.  A1c was ordered.  Psych referral requested.  She has soon mammogram and is being ordered.  Follow-up in 1 month.    Vertigo    Anemia, unspecified type  -     CBC Auto Differential; Future; Expected date: 12/14/2020    Uncontrolled type 2 diabetes mellitus with hyperglycemia  -     Hemoglobin A1C; Future; Expected date: 12/14/2020    Depression, unspecified depression type  -     Ambulatory referral/consult to Psychiatry; Future; Expected date: 12/21/2020    Breast screening  -     Mammo Digital Screening Bilat w/ Clifford; Future; Expected date: 12/14/2020    Encounter for screening mammogram for malignant neoplasm of breast   -     Mammo Digital Screening Bilat w/ Clifford; Future; Expected date: 12/14/2020    Other orders  -     meclizine (ANTIVERT) 25 mg tablet; Take 1 tablet (25 mg total) by mouth 3 (three) times  daily as needed.  Dispense: 30 tablet; Refill: 0

## 2020-12-15 ENCOUNTER — PATIENT MESSAGE (OUTPATIENT)
Dept: INTERNAL MEDICINE | Facility: CLINIC | Age: 70
End: 2020-12-15

## 2020-12-17 RX ORDER — DULOXETIN HYDROCHLORIDE 30 MG/1
CAPSULE, DELAYED RELEASE ORAL
Qty: 30 CAPSULE | Refills: 5 | Status: CANCELLED | OUTPATIENT
Start: 2020-12-17

## 2020-12-18 ENCOUNTER — LAB VISIT (OUTPATIENT)
Dept: LAB | Facility: HOSPITAL | Age: 70
End: 2020-12-18
Attending: INTERNAL MEDICINE
Payer: MEDICARE

## 2020-12-18 ENCOUNTER — TELEPHONE (OUTPATIENT)
Dept: RHEUMATOLOGY | Facility: CLINIC | Age: 70
End: 2020-12-18

## 2020-12-18 DIAGNOSIS — Z51.81 MEDICATION MONITORING ENCOUNTER: ICD-10-CM

## 2020-12-18 DIAGNOSIS — M81.0 AGE-RELATED OSTEOPOROSIS WITHOUT CURRENT PATHOLOGICAL FRACTURE: ICD-10-CM

## 2020-12-18 LAB
ALBUMIN SERPL BCP-MCNC: 4.1 G/DL (ref 3.5–5.2)
ALP SERPL-CCNC: 43 U/L (ref 55–135)
ALT SERPL W/O P-5'-P-CCNC: 19 U/L (ref 10–44)
ANION GAP SERPL CALC-SCNC: 6 MMOL/L (ref 8–16)
AST SERPL-CCNC: 16 U/L (ref 10–40)
BILIRUB SERPL-MCNC: 0.9 MG/DL (ref 0.1–1)
BUN SERPL-MCNC: 16 MG/DL (ref 8–23)
CALCIUM SERPL-MCNC: 9.6 MG/DL (ref 8.7–10.5)
CHLORIDE SERPL-SCNC: 103 MMOL/L (ref 95–110)
CO2 SERPL-SCNC: 29 MMOL/L (ref 23–29)
CREAT SERPL-MCNC: 0.8 MG/DL (ref 0.5–1.4)
EST. GFR  (AFRICAN AMERICAN): >60 ML/MIN/1.73 M^2
EST. GFR  (NON AFRICAN AMERICAN): >60 ML/MIN/1.73 M^2
GLUCOSE SERPL-MCNC: 159 MG/DL (ref 70–110)
POTASSIUM SERPL-SCNC: 4.8 MMOL/L (ref 3.5–5.1)
PROT SERPL-MCNC: 6.8 G/DL (ref 6–8.4)
SODIUM SERPL-SCNC: 138 MMOL/L (ref 136–145)

## 2020-12-18 PROCEDURE — 80053 COMPREHEN METABOLIC PANEL: CPT

## 2020-12-18 PROCEDURE — 36415 COLL VENOUS BLD VENIPUNCTURE: CPT

## 2020-12-21 ENCOUNTER — PATIENT MESSAGE (OUTPATIENT)
Dept: INTERNAL MEDICINE | Facility: CLINIC | Age: 70
End: 2020-12-21

## 2020-12-21 ENCOUNTER — INFUSION (OUTPATIENT)
Dept: INFUSION THERAPY | Facility: HOSPITAL | Age: 70
End: 2020-12-21
Attending: PHYSICIAN ASSISTANT
Payer: MEDICARE

## 2020-12-21 ENCOUNTER — OFFICE VISIT (OUTPATIENT)
Dept: RHEUMATOLOGY | Facility: CLINIC | Age: 70
End: 2020-12-21
Payer: MEDICARE

## 2020-12-21 VITALS
BODY MASS INDEX: 45.93 KG/M2 | RESPIRATION RATE: 18 BRPM | HEART RATE: 77 BPM | DIASTOLIC BLOOD PRESSURE: 67 MMHG | TEMPERATURE: 98 F | WEIGHT: 243.25 LBS | OXYGEN SATURATION: 96 % | HEIGHT: 61 IN | SYSTOLIC BLOOD PRESSURE: 120 MMHG

## 2020-12-21 VITALS
WEIGHT: 243.25 LBS | DIASTOLIC BLOOD PRESSURE: 71 MMHG | HEIGHT: 61 IN | HEART RATE: 84 BPM | BODY MASS INDEX: 45.93 KG/M2 | SYSTOLIC BLOOD PRESSURE: 123 MMHG

## 2020-12-21 DIAGNOSIS — M15.9 PRIMARY OSTEOARTHRITIS INVOLVING MULTIPLE JOINTS: ICD-10-CM

## 2020-12-21 DIAGNOSIS — M25.561 CHRONIC PAIN OF BOTH KNEES: ICD-10-CM

## 2020-12-21 DIAGNOSIS — M81.0 AGE-RELATED OSTEOPOROSIS WITHOUT CURRENT PATHOLOGICAL FRACTURE: Primary | ICD-10-CM

## 2020-12-21 DIAGNOSIS — G89.29 CHRONIC PAIN OF BOTH KNEES: ICD-10-CM

## 2020-12-21 DIAGNOSIS — M25.562 CHRONIC PAIN OF BOTH KNEES: ICD-10-CM

## 2020-12-21 DIAGNOSIS — K21.9 GASTROESOPHAGEAL REFLUX DISEASE WITHOUT ESOPHAGITIS: ICD-10-CM

## 2020-12-21 DIAGNOSIS — M17.0 PRIMARY OSTEOARTHRITIS OF BOTH KNEES: ICD-10-CM

## 2020-12-21 DIAGNOSIS — Z51.81 MEDICATION MONITORING ENCOUNTER: ICD-10-CM

## 2020-12-21 DIAGNOSIS — M17.12 PRIMARY OSTEOARTHRITIS OF LEFT KNEE: ICD-10-CM

## 2020-12-21 PROCEDURE — 3072F PR LOW RISK FOR RETINOPATHY: ICD-10-PCS | Mod: S$GLB,,, | Performed by: PHYSICIAN ASSISTANT

## 2020-12-21 PROCEDURE — 3008F BODY MASS INDEX DOCD: CPT | Mod: CPTII,S$GLB,, | Performed by: PHYSICIAN ASSISTANT

## 2020-12-21 PROCEDURE — 1125F PR PAIN SEVERITY QUANTIFIED, PAIN PRESENT: ICD-10-PCS | Mod: S$GLB,,, | Performed by: PHYSICIAN ASSISTANT

## 2020-12-21 PROCEDURE — 3288F FALL RISK ASSESSMENT DOCD: CPT | Mod: CPTII,S$GLB,, | Performed by: PHYSICIAN ASSISTANT

## 2020-12-21 PROCEDURE — 3074F SYST BP LT 130 MM HG: CPT | Mod: CPTII,S$GLB,, | Performed by: PHYSICIAN ASSISTANT

## 2020-12-21 PROCEDURE — 3072F LOW RISK FOR RETINOPATHY: CPT | Mod: S$GLB,,, | Performed by: PHYSICIAN ASSISTANT

## 2020-12-21 PROCEDURE — 99214 PR OFFICE/OUTPT VISIT, EST, LEVL IV, 30-39 MIN: ICD-10-PCS | Mod: S$GLB,,, | Performed by: PHYSICIAN ASSISTANT

## 2020-12-21 PROCEDURE — 3078F DIAST BP <80 MM HG: CPT | Mod: CPTII,S$GLB,, | Performed by: PHYSICIAN ASSISTANT

## 2020-12-21 PROCEDURE — 96374 THER/PROPH/DIAG INJ IV PUSH: CPT

## 2020-12-21 PROCEDURE — 99999 PR PBB SHADOW E&M-EST. PATIENT-LVL V: ICD-10-PCS | Mod: PBBFAC,,, | Performed by: PHYSICIAN ASSISTANT

## 2020-12-21 PROCEDURE — 1125F AMNT PAIN NOTED PAIN PRSNT: CPT | Mod: S$GLB,,, | Performed by: PHYSICIAN ASSISTANT

## 2020-12-21 PROCEDURE — 99999 PR PBB SHADOW E&M-EST. PATIENT-LVL V: CPT | Mod: PBBFAC,,, | Performed by: PHYSICIAN ASSISTANT

## 2020-12-21 PROCEDURE — 3008F PR BODY MASS INDEX (BMI) DOCUMENTED: ICD-10-PCS | Mod: CPTII,S$GLB,, | Performed by: PHYSICIAN ASSISTANT

## 2020-12-21 PROCEDURE — 3078F PR MOST RECENT DIASTOLIC BLOOD PRESSURE < 80 MM HG: ICD-10-PCS | Mod: CPTII,S$GLB,, | Performed by: PHYSICIAN ASSISTANT

## 2020-12-21 PROCEDURE — 1101F PT FALLS ASSESS-DOCD LE1/YR: CPT | Mod: CPTII,S$GLB,, | Performed by: PHYSICIAN ASSISTANT

## 2020-12-21 PROCEDURE — 3074F PR MOST RECENT SYSTOLIC BLOOD PRESSURE < 130 MM HG: ICD-10-PCS | Mod: CPTII,S$GLB,, | Performed by: PHYSICIAN ASSISTANT

## 2020-12-21 PROCEDURE — 99214 OFFICE O/P EST MOD 30 MIN: CPT | Mod: S$GLB,,, | Performed by: PHYSICIAN ASSISTANT

## 2020-12-21 PROCEDURE — 1159F PR MEDICATION LIST DOCUMENTED IN MEDICAL RECORD: ICD-10-PCS | Mod: S$GLB,,, | Performed by: PHYSICIAN ASSISTANT

## 2020-12-21 PROCEDURE — 1101F PR PT FALLS ASSESS DOC 0-1 FALLS W/OUT INJ PAST YR: ICD-10-PCS | Mod: CPTII,S$GLB,, | Performed by: PHYSICIAN ASSISTANT

## 2020-12-21 PROCEDURE — 1159F MED LIST DOCD IN RCRD: CPT | Mod: S$GLB,,, | Performed by: PHYSICIAN ASSISTANT

## 2020-12-21 PROCEDURE — 63600175 PHARM REV CODE 636 W HCPCS: Mod: JG | Performed by: PHYSICIAN ASSISTANT

## 2020-12-21 PROCEDURE — 3288F PR FALLS RISK ASSESSMENT DOCUMENTED: ICD-10-PCS | Mod: CPTII,S$GLB,, | Performed by: PHYSICIAN ASSISTANT

## 2020-12-21 RX ORDER — HEPARIN 100 UNIT/ML
500 SYRINGE INTRAVENOUS
Status: CANCELLED | OUTPATIENT
Start: 2021-01-01

## 2020-12-21 RX ORDER — GABAPENTIN 100 MG/1
100 CAPSULE ORAL 2 TIMES DAILY
Qty: 180 CAPSULE | Refills: 0 | Status: SHIPPED | OUTPATIENT
Start: 2020-12-21 | End: 2021-08-09

## 2020-12-21 RX ORDER — SODIUM CHLORIDE 0.9 % (FLUSH) 0.9 %
10 SYRINGE (ML) INJECTION
Status: CANCELLED | OUTPATIENT
Start: 2021-01-01

## 2020-12-21 RX ORDER — IBANDRONATE SODIUM 3 MG/3 ML
3 SYRINGE (ML) INTRAVENOUS
Status: COMPLETED | OUTPATIENT
Start: 2020-12-21 | End: 2020-12-21

## 2020-12-21 RX ORDER — SODIUM CHLORIDE 0.9 % (FLUSH) 0.9 %
10 SYRINGE (ML) INJECTION
Status: DISCONTINUED | OUTPATIENT
Start: 2020-12-21 | End: 2020-12-21 | Stop reason: HOSPADM

## 2020-12-21 RX ORDER — IBANDRONATE SODIUM 3 MG/3 ML
3 SYRINGE (ML) INTRAVENOUS
Status: CANCELLED | OUTPATIENT
Start: 2021-01-01

## 2020-12-21 RX ADMIN — IBANDRONATE SODIUM 3 MG: 3 INJECTION, SOLUTION INTRAVENOUS at 11:12

## 2020-12-21 NOTE — PATIENT INSTRUCTIONS
Will bring back for synvisc one both knees once approved     Ok for boniva today   Labs look great     Repeat dexa scan in September and likely stop boniva

## 2020-12-21 NOTE — DISCHARGE INSTRUCTIONS
[facility]  [address] [facility]  [address]  [phone] phone     [phone] fax  Hours of Operation: Monday- Friday 8:00am- 5:00pm  After hours phone  [phone]  Hematology / Oncology Physicians on call      Dr. Shaka Castillo, EDGAR Palomo NP Tyesha Taylor, NP    Please call with any concerns regarding your appointment today.      FALL PREVENTION   Falls often occur due to slipping, tripping or losing your balance. Here are ways to reduce your risk of falling again.    Was there anything that caused your fall that can be fixed, removed or replaced?    Make your home safe by keeping walkways clear of objects you may trip over.    Use non-slip pads under rugs.    Do not walk in poorly lit areas.    Do not stand on chairs or wobbly ladders.    Use caution when reaching overhead or looking upward. This position can cause a loss of balance.    Be sure your shoes fit properly, have non-slip bottoms and are in good condition.    Be cautious when going up and down stairs, curbs, and when walking on uneven sidewalks.    If your balance is poor, consider using a cane or walker.    If your fall was related to alcohol use, stop or limit alcohol intake.    If your fall was related to use of sleeping medicines, talk to your doctor about this. You may need to reduce your dosage at bedtime if you awaken during the night to go to the bathroom.    To reduce the need for nighttime bathroom trips:    Avoid drinking fluids for several hours before going to bed    Empty your bladder before going to bed    Men can keep a urinal at the bedside   © 2522-9829 [employer] [facility], [address] [place] [address], [place] [address]. All rights reserved. This information is not intended as a substitute for professional medical care. Always follow your healthcare professional's instructions.

## 2020-12-21 NOTE — PROGRESS NOTES
"Subjective:       Patient ID: Christine Jo is a 70 y.o. female.    Chief Complaint: Follow-up    Christine is a very pleasant 69y/o female who established care with us in oct 2017 for osteoporosis. She was started on fosamax but had severe GI irritation due to the drug, she has severe gerd on prilosec. We started boniva infusions which she tolerates well, started in 2/2018.   Has never had any fractures.   She takes a multivitamin with vit D in it daily. She has chronic neck and back pain and occasional joint pains from OA.  Avoids nsaids due to Gi upset but does take seldomly.  Sees dr. Wells now for her neck/back pain.  No falls since last visit.  dexa shows sig improvement of her bmd    She has L and R knee djd, we have done steroid and viscous injections, synvisc one last done June and did help.  Her R knee pain is increasing lately, left is better but starting to hurt as well.  She does have voltaren gel  Uses sometimes.              Review of Systems   Constitutional: Negative for chills, fatigue and fever.   HENT: Negative for mouth sores, rhinorrhea and sore throat.    Eyes: Negative for pain and redness.   Respiratory: Negative for cough and shortness of breath.    Cardiovascular: Negative for chest pain.        A fib   Gastrointestinal: Negative for abdominal pain, constipation, diarrhea, nausea and vomiting.   Genitourinary: Negative for dysuria and hematuria.   Musculoskeletal: Positive for arthralgias, back pain and neck pain. Negative for joint swelling and myalgias.   Skin: Negative for rash.   Neurological: Positive for dizziness. Negative for weakness, numbness and headaches.   Psychiatric/Behavioral: The patient is not nervous/anxious.          Objective:   /71   Pulse 84   Ht 5' 1" (1.549 m)   Wt 110.3 kg (243 lb 4.4 oz)   BMI 45.97 kg/m²      Physical Exam   Constitutional: She is oriented to person, place, and time and well-developed, well-nourished, and in no distress.   HENT: "   Head: Normocephalic and atraumatic.   Eyes: Pupils are equal, round, and reactive to light. Right eye exhibits no discharge.   Neck: Normal range of motion.   Cardiovascular: Normal rate, regular rhythm and normal heart sounds.  Exam reveals no friction rub.    Pulmonary/Chest: Effort normal and breath sounds normal. No respiratory distress.   Abdominal: Soft. She exhibits no distension. There is no abdominal tenderness.   Lymphadenopathy:     She has no cervical adenopathy.   Neurological: She is alert and oriented to person, place, and time.   Skin: No rash noted. No erythema.     Psychiatric: Mood normal.   Musculoskeletal: Normal range of motion. No deformity or edema.      Comments: holley wrists, mcps, pips no synvoitis, no tenderness, no warmth, good rom  holley elbows shoulders no swelling or tenderness,full rom  holley knees no effusion, no warmth, no tenderness, full rom +crepitus    Right cervical paraspinal tenderness    Strength 5/5 upper and lower ext  Gait steady           Recent Results (from the past 504 hour(s))   COVID-19 Routine Screening Employer Testing    Collection Time: 12/04/20  9:33 AM   Result Value Ref Range    SARS-CoV2 (COVID-19) Qualitative PCR Not Detected Not Detected   CBC Auto Differential    Collection Time: 12/14/20  9:40 AM   Result Value Ref Range    WBC 6.78 3.90 - 12.70 K/uL    RBC 3.82 (L) 4.00 - 5.40 M/uL    Hemoglobin 11.5 (L) 12.0 - 16.0 g/dL    Hematocrit 37.6 37.0 - 48.5 %    MCV 98 82 - 98 fL    MCH 30.1 27.0 - 31.0 pg    MCHC 30.6 (L) 32.0 - 36.0 g/dL    RDW 12.7 11.5 - 14.5 %    Platelets 224 150 - 350 K/uL    MPV 12.6 9.2 - 12.9 fL    Immature Granulocytes 0.4 0.0 - 0.5 %    Gran # (ANC) 4.4 1.8 - 7.7 K/uL    Immature Grans (Abs) 0.03 0.00 - 0.04 K/uL    Lymph # 1.6 1.0 - 4.8 K/uL    Mono # 0.5 0.3 - 1.0 K/uL    Eos # 0.1 0.0 - 0.5 K/uL    Baso # 0.07 0.00 - 0.20 K/uL    nRBC 0 0 /100 WBC    Gran % 64.6 38.0 - 73.0 %    Lymph % 24.2 18.0 - 48.0 %    Mono % 8.0 4.0 - 15.0  %    Eosinophil % 1.8 0.0 - 8.0 %    Basophil % 1.0 0.0 - 1.9 %    Differential Method Automated    Hemoglobin A1C    Collection Time: 12/14/20  9:40 AM   Result Value Ref Range    Hemoglobin A1C 7.3 (H) 4.0 - 5.6 %    Estimated Avg Glucose 163 (H) 68 - 131 mg/dL   Comprehensive metabolic panel    Collection Time: 12/18/20  8:02 AM   Result Value Ref Range    Sodium 138 136 - 145 mmol/L    Potassium 4.8 3.5 - 5.1 mmol/L    Chloride 103 95 - 110 mmol/L    CO2 29 23 - 29 mmol/L    Glucose 159 (H) 70 - 110 mg/dL    BUN 16 8 - 23 mg/dL    Creatinine 0.8 0.5 - 1.4 mg/dL    Calcium 9.6 8.7 - 10.5 mg/dL    Total Protein 6.8 6.0 - 8.4 g/dL    Albumin 4.1 3.5 - 5.2 g/dL    Total Bilirubin 0.9 0.1 - 1.0 mg/dL    Alkaline Phosphatase 43 (L) 55 - 135 U/L    AST 16 10 - 40 U/L    ALT 19 10 - 44 U/L    Anion Gap 6 (L) 8 - 16 mmol/L    eGFR if African American >60 >60 mL/min/1.73 m^2    eGFR if non African American >60 >60 mL/min/1.73 m^2       Dexa 8.16.17: NM -1.4, TM -1.0, spine -2.5  Dexa 9.12.19: LN-1.5, spine -1.8, improved frax major 7.8, hip 0.8%  Assessment:       1. Age-related osteoporosis without current pathological fracture    2. Gastroesophageal reflux disease without esophagitis    3. Primary osteoarthritis of left knee    4. Chronic pain of both knees    5. Primary osteoarthritis involving multiple joints    6. Medication monitoring encounter        Impression:  Osteoporosis: Dexa improved at spine sig;  failed oral bisphosphonate due to severe gerd (fosamax), no falls/fxs, taking vit d daily in MV- now on boniva q 3 mos started 2/2018- last dose 8.2020    GERD: on prilosec, unable to tolerate oral bisphosphonate or nsaid    Generalized OA: papo c spine, holely knees (left more than right), hands avoids nsaids due to GI upset, uses topical voltaren     Medication monitoring: gfr normal, ok for boniva today, labs reviewed  Ca normal gfr normal    holley knee djd:  synvisc one last done 6.15.20, Kellgren Elton score 3,  increasing pain worse on right knee today       Plan:       Ok for boniva today and cont q 3 mos  Given her sig improvement in her bmd I would rec continuing boniva until next dexa then holiday   Continue vit D daily take 2K daily   Repeat dexa 9/2021  Set up for holley knee synvisc one when approved    tumeric 1000mg/day for OA  Call for questions/concerns    See back for OV in sept with vit d, cmp and dexa, likely drug holiday if stable

## 2020-12-22 RX ORDER — DULOXETIN HYDROCHLORIDE 60 MG/1
60 CAPSULE, DELAYED RELEASE ORAL DAILY
Qty: 30 CAPSULE | Refills: 5 | Status: SHIPPED | OUTPATIENT
Start: 2020-12-22 | End: 2021-04-01 | Stop reason: SDUPTHER

## 2021-01-03 ENCOUNTER — PATIENT MESSAGE (OUTPATIENT)
Dept: INTERNAL MEDICINE | Facility: CLINIC | Age: 71
End: 2021-01-03

## 2021-01-08 ENCOUNTER — TELEPHONE (OUTPATIENT)
Dept: RHEUMATOLOGY | Facility: CLINIC | Age: 71
End: 2021-01-08

## 2021-01-11 ENCOUNTER — PROCEDURE VISIT (OUTPATIENT)
Dept: RHEUMATOLOGY | Facility: CLINIC | Age: 71
End: 2021-01-11
Payer: MEDICARE

## 2021-01-11 DIAGNOSIS — M81.0 AGE-RELATED OSTEOPOROSIS WITHOUT CURRENT PATHOLOGICAL FRACTURE: Primary | ICD-10-CM

## 2021-01-11 PROCEDURE — 20610 LARGE JOINT ASPIRATION/INJECTION: BILATERAL KNEE: ICD-10-PCS | Mod: 50,S$GLB,, | Performed by: PHYSICIAN ASSISTANT

## 2021-01-11 PROCEDURE — 20610 DRAIN/INJ JOINT/BURSA W/O US: CPT | Mod: 50,S$GLB,, | Performed by: PHYSICIAN ASSISTANT

## 2021-01-14 ENCOUNTER — LAB VISIT (OUTPATIENT)
Dept: LAB | Facility: HOSPITAL | Age: 71
End: 2021-01-14
Attending: FAMILY MEDICINE
Payer: MEDICARE

## 2021-01-14 ENCOUNTER — OFFICE VISIT (OUTPATIENT)
Dept: INTERNAL MEDICINE | Facility: CLINIC | Age: 71
End: 2021-01-14
Payer: MEDICARE

## 2021-01-14 VITALS
DIASTOLIC BLOOD PRESSURE: 74 MMHG | TEMPERATURE: 98 F | OXYGEN SATURATION: 98 % | SYSTOLIC BLOOD PRESSURE: 156 MMHG | HEART RATE: 78 BPM | WEIGHT: 244.25 LBS | HEIGHT: 61 IN | BODY MASS INDEX: 46.11 KG/M2

## 2021-01-14 DIAGNOSIS — E11.65 UNCONTROLLED TYPE 2 DIABETES MELLITUS WITH HYPERGLYCEMIA: ICD-10-CM

## 2021-01-14 DIAGNOSIS — R03.0 ELEVATED BLOOD PRESSURE READING: ICD-10-CM

## 2021-01-14 DIAGNOSIS — F32.A DEPRESSION, UNSPECIFIED DEPRESSION TYPE: ICD-10-CM

## 2021-01-14 DIAGNOSIS — I48.0 PAROXYSMAL A-FIB: ICD-10-CM

## 2021-01-14 DIAGNOSIS — E11.65 UNCONTROLLED TYPE 2 DIABETES MELLITUS WITH HYPERGLYCEMIA: Primary | ICD-10-CM

## 2021-01-14 DIAGNOSIS — I10 ESSENTIAL HYPERTENSION: ICD-10-CM

## 2021-01-14 DIAGNOSIS — H91.90 HEARING LOSS, UNSPECIFIED HEARING LOSS TYPE, UNSPECIFIED LATERALITY: ICD-10-CM

## 2021-01-14 PROCEDURE — 3077F PR MOST RECENT SYSTOLIC BLOOD PRESSURE >= 140 MM HG: ICD-10-PCS | Mod: CPTII,S$GLB,, | Performed by: FAMILY MEDICINE

## 2021-01-14 PROCEDURE — 1101F PR PT FALLS ASSESS DOC 0-1 FALLS W/OUT INJ PAST YR: ICD-10-PCS | Mod: CPTII,S$GLB,, | Performed by: FAMILY MEDICINE

## 2021-01-14 PROCEDURE — 36415 COLL VENOUS BLD VENIPUNCTURE: CPT

## 2021-01-14 PROCEDURE — 3288F FALL RISK ASSESSMENT DOCD: CPT | Mod: CPTII,S$GLB,, | Performed by: FAMILY MEDICINE

## 2021-01-14 PROCEDURE — 3051F HG A1C>EQUAL 7.0%<8.0%: CPT | Mod: CPTII,S$GLB,, | Performed by: FAMILY MEDICINE

## 2021-01-14 PROCEDURE — 3078F PR MOST RECENT DIASTOLIC BLOOD PRESSURE < 80 MM HG: ICD-10-PCS | Mod: CPTII,S$GLB,, | Performed by: FAMILY MEDICINE

## 2021-01-14 PROCEDURE — 99214 OFFICE O/P EST MOD 30 MIN: CPT | Mod: S$GLB,,, | Performed by: FAMILY MEDICINE

## 2021-01-14 PROCEDURE — 83036 HEMOGLOBIN GLYCOSYLATED A1C: CPT

## 2021-01-14 PROCEDURE — 99999 PR PBB SHADOW E&M-EST. PATIENT-LVL V: CPT | Mod: PBBFAC,,, | Performed by: FAMILY MEDICINE

## 2021-01-14 PROCEDURE — 1126F PR PAIN SEVERITY QUANTIFIED, NO PAIN PRESENT: ICD-10-PCS | Mod: S$GLB,,, | Performed by: FAMILY MEDICINE

## 2021-01-14 PROCEDURE — 3051F PR MOST RECENT HEMOGLOBIN A1C LEVEL 7.0 - < 8.0%: ICD-10-PCS | Mod: CPTII,S$GLB,, | Performed by: FAMILY MEDICINE

## 2021-01-14 PROCEDURE — 3008F PR BODY MASS INDEX (BMI) DOCUMENTED: ICD-10-PCS | Mod: CPTII,S$GLB,, | Performed by: FAMILY MEDICINE

## 2021-01-14 PROCEDURE — 1159F MED LIST DOCD IN RCRD: CPT | Mod: S$GLB,,, | Performed by: FAMILY MEDICINE

## 2021-01-14 PROCEDURE — 1126F AMNT PAIN NOTED NONE PRSNT: CPT | Mod: S$GLB,,, | Performed by: FAMILY MEDICINE

## 2021-01-14 PROCEDURE — 3078F DIAST BP <80 MM HG: CPT | Mod: CPTII,S$GLB,, | Performed by: FAMILY MEDICINE

## 2021-01-14 PROCEDURE — 3077F SYST BP >= 140 MM HG: CPT | Mod: CPTII,S$GLB,, | Performed by: FAMILY MEDICINE

## 2021-01-14 PROCEDURE — 1101F PT FALLS ASSESS-DOCD LE1/YR: CPT | Mod: CPTII,S$GLB,, | Performed by: FAMILY MEDICINE

## 2021-01-14 PROCEDURE — 3008F BODY MASS INDEX DOCD: CPT | Mod: CPTII,S$GLB,, | Performed by: FAMILY MEDICINE

## 2021-01-14 PROCEDURE — 99214 PR OFFICE/OUTPT VISIT, EST, LEVL IV, 30-39 MIN: ICD-10-PCS | Mod: S$GLB,,, | Performed by: FAMILY MEDICINE

## 2021-01-14 PROCEDURE — 3288F PR FALLS RISK ASSESSMENT DOCUMENTED: ICD-10-PCS | Mod: CPTII,S$GLB,, | Performed by: FAMILY MEDICINE

## 2021-01-14 PROCEDURE — 99999 PR PBB SHADOW E&M-EST. PATIENT-LVL V: ICD-10-PCS | Mod: PBBFAC,,, | Performed by: FAMILY MEDICINE

## 2021-01-14 PROCEDURE — 1159F PR MEDICATION LIST DOCUMENTED IN MEDICAL RECORD: ICD-10-PCS | Mod: S$GLB,,, | Performed by: FAMILY MEDICINE

## 2021-01-14 RX ORDER — CARVEDILOL 25 MG/1
25 TABLET ORAL 2 TIMES DAILY
Qty: 180 TABLET | Refills: 3 | Status: SHIPPED | OUTPATIENT
Start: 2021-01-14 | End: 2021-12-14 | Stop reason: SDUPTHER

## 2021-01-15 LAB
ESTIMATED AVG GLUCOSE: 148 MG/DL (ref 68–131)
HBA1C MFR BLD HPLC: 6.8 % (ref 4–5.6)

## 2021-01-21 ENCOUNTER — PATIENT OUTREACH (OUTPATIENT)
Dept: ADMINISTRATIVE | Facility: HOSPITAL | Age: 71
End: 2021-01-21

## 2021-01-27 ENCOUNTER — OFFICE VISIT (OUTPATIENT)
Dept: PSYCHIATRY | Facility: CLINIC | Age: 71
End: 2021-01-27
Payer: COMMERCIAL

## 2021-01-27 VITALS — DIASTOLIC BLOOD PRESSURE: 70 MMHG | SYSTOLIC BLOOD PRESSURE: 147 MMHG

## 2021-01-27 DIAGNOSIS — F41.9 ANXIETY DISORDER, UNSPECIFIED TYPE: ICD-10-CM

## 2021-01-27 DIAGNOSIS — R42 VERTIGO: ICD-10-CM

## 2021-01-27 DIAGNOSIS — H91.92 HEARING LOSS OF LEFT EAR, UNSPECIFIED HEARING LOSS TYPE: ICD-10-CM

## 2021-01-27 DIAGNOSIS — Z79.4 CONTROLLED TYPE 2 DIABETES MELLITUS WITH DIABETIC POLYNEUROPATHY, WITH LONG-TERM CURRENT USE OF INSULIN: ICD-10-CM

## 2021-01-27 DIAGNOSIS — G54.2 CERVICAL NEUROPATHY: ICD-10-CM

## 2021-01-27 DIAGNOSIS — F33.1 DEPRESSION, MAJOR, RECURRENT, MODERATE: Primary | ICD-10-CM

## 2021-01-27 DIAGNOSIS — E11.42 CONTROLLED TYPE 2 DIABETES MELLITUS WITH DIABETIC POLYNEUROPATHY, WITH LONG-TERM CURRENT USE OF INSULIN: ICD-10-CM

## 2021-01-27 DIAGNOSIS — G47.00 INSOMNIA, UNSPECIFIED TYPE: ICD-10-CM

## 2021-01-27 PROCEDURE — 99999 PR PBB SHADOW E&M-EST. PATIENT-LVL IV: ICD-10-PCS | Mod: PBBFAC,,, | Performed by: PSYCHIATRY & NEUROLOGY

## 2021-01-27 PROCEDURE — 99499 RISK ADDL DX/OHS AUDIT: ICD-10-PCS | Mod: S$GLB,,, | Performed by: PSYCHIATRY & NEUROLOGY

## 2021-01-27 PROCEDURE — 99999 PR PBB SHADOW E&M-EST. PATIENT-LVL IV: CPT | Mod: PBBFAC,,, | Performed by: PSYCHIATRY & NEUROLOGY

## 2021-01-27 PROCEDURE — 90792 PR PSYCHIATRIC DIAGNOSTIC EVALUATION W/MEDICAL SERVICES: ICD-10-PCS | Mod: S$GLB,,, | Performed by: PSYCHIATRY & NEUROLOGY

## 2021-01-27 PROCEDURE — 90792 PSYCH DIAG EVAL W/MED SRVCS: CPT | Mod: S$GLB,,, | Performed by: PSYCHIATRY & NEUROLOGY

## 2021-01-27 PROCEDURE — 99499 UNLISTED E&M SERVICE: CPT | Mod: S$GLB,,, | Performed by: PSYCHIATRY & NEUROLOGY

## 2021-01-27 RX ORDER — LAMOTRIGINE 100 MG/1
TABLET ORAL
Qty: 30 TABLET | Refills: 1 | Status: SHIPPED | OUTPATIENT
Start: 2021-01-27 | End: 2021-02-23

## 2021-01-27 RX ORDER — LAMOTRIGINE 25 MG/1
TABLET ORAL
Qty: 49 TABLET | Refills: 0 | Status: SHIPPED | OUTPATIENT
Start: 2021-01-27 | End: 2021-02-23

## 2021-01-27 RX ORDER — TEMAZEPAM 30 MG/1
30 CAPSULE ORAL NIGHTLY PRN
Qty: 30 CAPSULE | Refills: 2 | Status: SHIPPED | OUTPATIENT
Start: 2021-01-27 | End: 2021-04-01 | Stop reason: SDUPTHER

## 2021-01-28 PROBLEM — F33.1 DEPRESSION, MAJOR, RECURRENT, MODERATE: Status: ACTIVE | Noted: 2021-01-28

## 2021-01-28 PROBLEM — F41.9 ANXIETY DISORDER: Status: ACTIVE | Noted: 2021-01-28

## 2021-02-03 ENCOUNTER — PATIENT MESSAGE (OUTPATIENT)
Dept: INTERNAL MEDICINE | Facility: CLINIC | Age: 71
End: 2021-02-03

## 2021-02-04 ENCOUNTER — TELEPHONE (OUTPATIENT)
Dept: PSYCHIATRY | Facility: CLINIC | Age: 71
End: 2021-02-04

## 2021-02-04 ENCOUNTER — PATIENT MESSAGE (OUTPATIENT)
Dept: OTOLARYNGOLOGY | Facility: CLINIC | Age: 71
End: 2021-02-04

## 2021-02-05 RX ORDER — ATORVASTATIN CALCIUM 40 MG/1
40 TABLET, FILM COATED ORAL DAILY
Qty: 90 TABLET | Refills: 0 | Status: CANCELLED | OUTPATIENT
Start: 2021-02-05

## 2021-02-08 RX ORDER — ATORVASTATIN CALCIUM 40 MG/1
40 TABLET, FILM COATED ORAL DAILY
Qty: 90 TABLET | Refills: 0 | Status: SHIPPED | OUTPATIENT
Start: 2021-02-08 | End: 2021-03-26

## 2021-02-10 ENCOUNTER — PATIENT OUTREACH (OUTPATIENT)
Dept: ADMINISTRATIVE | Facility: OTHER | Age: 71
End: 2021-02-10

## 2021-02-11 ENCOUNTER — TELEPHONE (OUTPATIENT)
Dept: OTOLARYNGOLOGY | Facility: CLINIC | Age: 71
End: 2021-02-11

## 2021-02-15 ENCOUNTER — PATIENT MESSAGE (OUTPATIENT)
Dept: ADMINISTRATIVE | Facility: CLINIC | Age: 71
End: 2021-02-15

## 2021-02-17 ENCOUNTER — IMMUNIZATION (OUTPATIENT)
Dept: INTERNAL MEDICINE | Facility: CLINIC | Age: 71
End: 2021-02-17
Payer: MEDICARE

## 2021-02-17 DIAGNOSIS — Z23 NEED FOR VACCINATION: Primary | ICD-10-CM

## 2021-02-17 PROCEDURE — 91300 COVID-19, MRNA, LNP-S, PF, 30 MCG/0.3 ML DOSE VACCINE: CPT | Mod: PBBFAC | Performed by: INTERNAL MEDICINE

## 2021-02-18 ENCOUNTER — CLINICAL SUPPORT (OUTPATIENT)
Dept: AUDIOLOGY | Facility: CLINIC | Age: 71
End: 2021-02-18
Payer: MEDICARE

## 2021-02-18 ENCOUNTER — OFFICE VISIT (OUTPATIENT)
Dept: OTOLARYNGOLOGY | Facility: CLINIC | Age: 71
End: 2021-02-18
Payer: MEDICARE

## 2021-02-18 VITALS
WEIGHT: 243.38 LBS | TEMPERATURE: 98 F | SYSTOLIC BLOOD PRESSURE: 140 MMHG | DIASTOLIC BLOOD PRESSURE: 68 MMHG | BODY MASS INDEX: 45.99 KG/M2

## 2021-02-18 DIAGNOSIS — H93.12 TINNITUS OF LEFT EAR: ICD-10-CM

## 2021-02-18 DIAGNOSIS — H93.12 TINNITUS, LEFT: Primary | ICD-10-CM

## 2021-02-18 DIAGNOSIS — R42 DIZZINESS: ICD-10-CM

## 2021-02-18 PROCEDURE — 99999 PR PBB SHADOW E&M-EST. PATIENT-LVL IV: CPT | Mod: PBBFAC,,, | Performed by: PHYSICIAN ASSISTANT

## 2021-02-18 PROCEDURE — 92553 AUDIOMETRY AIR & BONE: CPT | Mod: S$GLB,,, | Performed by: AUDIOLOGIST-HEARING AID FITTER

## 2021-02-18 PROCEDURE — 99999 PR PBB SHADOW E&M-EST. PATIENT-LVL IV: ICD-10-PCS | Mod: PBBFAC,,, | Performed by: PHYSICIAN ASSISTANT

## 2021-02-18 PROCEDURE — 1159F PR MEDICATION LIST DOCUMENTED IN MEDICAL RECORD: ICD-10-PCS | Mod: S$GLB,,, | Performed by: PHYSICIAN ASSISTANT

## 2021-02-18 PROCEDURE — 99213 PR OFFICE/OUTPT VISIT, EST, LEVL III, 20-29 MIN: ICD-10-PCS | Mod: S$GLB,,, | Performed by: PHYSICIAN ASSISTANT

## 2021-02-18 PROCEDURE — 3008F PR BODY MASS INDEX (BMI) DOCUMENTED: ICD-10-PCS | Mod: CPTII,S$GLB,, | Performed by: PHYSICIAN ASSISTANT

## 2021-02-18 PROCEDURE — 3078F PR MOST RECENT DIASTOLIC BLOOD PRESSURE < 80 MM HG: ICD-10-PCS | Mod: CPTII,S$GLB,, | Performed by: PHYSICIAN ASSISTANT

## 2021-02-18 PROCEDURE — 99213 OFFICE O/P EST LOW 20 MIN: CPT | Mod: S$GLB,,, | Performed by: PHYSICIAN ASSISTANT

## 2021-02-18 PROCEDURE — 3077F PR MOST RECENT SYSTOLIC BLOOD PRESSURE >= 140 MM HG: ICD-10-PCS | Mod: CPTII,S$GLB,, | Performed by: PHYSICIAN ASSISTANT

## 2021-02-18 PROCEDURE — 92553 PR AUDIOMETRY, AIR & BONE: ICD-10-PCS | Mod: S$GLB,,, | Performed by: AUDIOLOGIST-HEARING AID FITTER

## 2021-02-18 PROCEDURE — 92567 PR TYMPA2METRY: ICD-10-PCS | Mod: S$GLB,,, | Performed by: AUDIOLOGIST-HEARING AID FITTER

## 2021-02-18 PROCEDURE — 3077F SYST BP >= 140 MM HG: CPT | Mod: CPTII,S$GLB,, | Performed by: PHYSICIAN ASSISTANT

## 2021-02-18 PROCEDURE — 1126F PR PAIN SEVERITY QUANTIFIED, NO PAIN PRESENT: ICD-10-PCS | Mod: S$GLB,,, | Performed by: PHYSICIAN ASSISTANT

## 2021-02-18 PROCEDURE — 92567 TYMPANOMETRY: CPT | Mod: S$GLB,,, | Performed by: AUDIOLOGIST-HEARING AID FITTER

## 2021-02-18 PROCEDURE — 3078F DIAST BP <80 MM HG: CPT | Mod: CPTII,S$GLB,, | Performed by: PHYSICIAN ASSISTANT

## 2021-02-18 PROCEDURE — 1126F AMNT PAIN NOTED NONE PRSNT: CPT | Mod: S$GLB,,, | Performed by: PHYSICIAN ASSISTANT

## 2021-02-18 PROCEDURE — 3008F BODY MASS INDEX DOCD: CPT | Mod: CPTII,S$GLB,, | Performed by: PHYSICIAN ASSISTANT

## 2021-02-18 PROCEDURE — 1159F MED LIST DOCD IN RCRD: CPT | Mod: S$GLB,,, | Performed by: PHYSICIAN ASSISTANT

## 2021-02-19 ENCOUNTER — PATIENT MESSAGE (OUTPATIENT)
Dept: PSYCHIATRY | Facility: CLINIC | Age: 71
End: 2021-02-19

## 2021-02-19 ENCOUNTER — OFFICE VISIT (OUTPATIENT)
Dept: OPHTHALMOLOGY | Facility: CLINIC | Age: 71
End: 2021-02-19
Payer: MEDICARE

## 2021-02-19 ENCOUNTER — HOSPITAL ENCOUNTER (OUTPATIENT)
Dept: RADIOLOGY | Facility: HOSPITAL | Age: 71
Discharge: HOME | End: 2021-02-19
Attending: FAMILY MEDICINE
Payer: MEDICARE

## 2021-02-19 DIAGNOSIS — Z96.1 PSEUDOPHAKIA OF BOTH EYES: ICD-10-CM

## 2021-02-19 DIAGNOSIS — Z12.31 ENCOUNTER FOR SCREENING MAMMOGRAM FOR MALIGNANT NEOPLASM OF BREAST: ICD-10-CM

## 2021-02-19 DIAGNOSIS — E11.9 DIABETES MELLITUS TYPE 2 WITHOUT RETINOPATHY: Primary | ICD-10-CM

## 2021-02-19 DIAGNOSIS — H52.7 REFRACTIVE ERRORS: ICD-10-CM

## 2021-02-19 DIAGNOSIS — I10 ESSENTIAL HYPERTENSION: ICD-10-CM

## 2021-02-19 DIAGNOSIS — Z12.39 BREAST SCREENING: ICD-10-CM

## 2021-02-19 PROCEDURE — 99999 PR PBB SHADOW E&M-EST. PATIENT-LVL III: ICD-10-PCS | Mod: PBBFAC,,, | Performed by: OPTOMETRIST

## 2021-02-19 PROCEDURE — 99999 PR PBB SHADOW E&M-EST. PATIENT-LVL III: CPT | Mod: PBBFAC,,, | Performed by: OPTOMETRIST

## 2021-02-19 PROCEDURE — 92014 COMPRE OPH EXAM EST PT 1/>: CPT | Mod: S$GLB,,, | Performed by: OPTOMETRIST

## 2021-02-19 PROCEDURE — 2023F PR DILATED RETINAL EXAM W/O EVID OF RETINOPATHY: ICD-10-PCS | Mod: S$GLB,,, | Performed by: OPTOMETRIST

## 2021-02-19 PROCEDURE — 2023F DILAT RTA XM W/O RTNOPTHY: CPT | Mod: S$GLB,,, | Performed by: OPTOMETRIST

## 2021-02-19 PROCEDURE — 77067 SCR MAMMO BI INCL CAD: CPT | Mod: TC

## 2021-02-19 PROCEDURE — 92015 PR REFRACTION: ICD-10-PCS | Mod: S$GLB,,, | Performed by: OPTOMETRIST

## 2021-02-19 PROCEDURE — 77063 MAMMO DIGITAL SCREENING BILAT WITH TOMO: ICD-10-PCS | Mod: 26,,, | Performed by: RADIOLOGY

## 2021-02-19 PROCEDURE — 92015 DETERMINE REFRACTIVE STATE: CPT | Mod: S$GLB,,, | Performed by: OPTOMETRIST

## 2021-02-19 PROCEDURE — 77063 BREAST TOMOSYNTHESIS BI: CPT | Mod: 26,,, | Performed by: RADIOLOGY

## 2021-02-19 PROCEDURE — 77067 SCR MAMMO BI INCL CAD: CPT | Mod: 26,,, | Performed by: RADIOLOGY

## 2021-02-19 PROCEDURE — 77067 MAMMO DIGITAL SCREENING BILAT WITH TOMO: ICD-10-PCS | Mod: 26,,, | Performed by: RADIOLOGY

## 2021-02-19 PROCEDURE — 92014 PR EYE EXAM, EST PATIENT,COMPREHESV: ICD-10-PCS | Mod: S$GLB,,, | Performed by: OPTOMETRIST

## 2021-02-21 ENCOUNTER — PATIENT MESSAGE (OUTPATIENT)
Dept: GASTROENTEROLOGY | Facility: CLINIC | Age: 71
End: 2021-02-21

## 2021-02-23 ENCOUNTER — OFFICE VISIT (OUTPATIENT)
Dept: PSYCHIATRY | Facility: CLINIC | Age: 71
End: 2021-02-23
Payer: MEDICARE

## 2021-02-23 ENCOUNTER — OFFICE VISIT (OUTPATIENT)
Dept: INTERNAL MEDICINE | Facility: CLINIC | Age: 71
End: 2021-02-23
Payer: MEDICARE

## 2021-02-23 VITALS
WEIGHT: 245.56 LBS | SYSTOLIC BLOOD PRESSURE: 128 MMHG | DIASTOLIC BLOOD PRESSURE: 60 MMHG | HEART RATE: 82 BPM | BODY MASS INDEX: 46.36 KG/M2 | HEIGHT: 61 IN | TEMPERATURE: 98 F | RESPIRATION RATE: 18 BRPM | OXYGEN SATURATION: 98 %

## 2021-02-23 DIAGNOSIS — I48.0 PAROXYSMAL ATRIAL FIBRILLATION: ICD-10-CM

## 2021-02-23 DIAGNOSIS — F32.A DEPRESSION, UNSPECIFIED DEPRESSION TYPE: ICD-10-CM

## 2021-02-23 DIAGNOSIS — I10 ESSENTIAL HYPERTENSION: ICD-10-CM

## 2021-02-23 DIAGNOSIS — G47.00 INSOMNIA, UNSPECIFIED TYPE: ICD-10-CM

## 2021-02-23 DIAGNOSIS — E11.65 UNCONTROLLED TYPE 2 DIABETES MELLITUS WITH HYPERGLYCEMIA: Primary | ICD-10-CM

## 2021-02-23 DIAGNOSIS — F33.1 DEPRESSION, MAJOR, RECURRENT, MODERATE: Primary | ICD-10-CM

## 2021-02-23 PROCEDURE — 3288F PR FALLS RISK ASSESSMENT DOCUMENTED: ICD-10-PCS | Mod: CPTII,S$GLB,, | Performed by: FAMILY MEDICINE

## 2021-02-23 PROCEDURE — 3008F BODY MASS INDEX DOCD: CPT | Mod: CPTII,S$GLB,, | Performed by: FAMILY MEDICINE

## 2021-02-23 PROCEDURE — 1101F PT FALLS ASSESS-DOCD LE1/YR: CPT | Mod: CPTII,S$GLB,, | Performed by: FAMILY MEDICINE

## 2021-02-23 PROCEDURE — 99999 PR PBB SHADOW E&M-EST. PATIENT-LVL V: ICD-10-PCS | Mod: PBBFAC,,, | Performed by: FAMILY MEDICINE

## 2021-02-23 PROCEDURE — 3074F PR MOST RECENT SYSTOLIC BLOOD PRESSURE < 130 MM HG: ICD-10-PCS | Mod: CPTII,S$GLB,, | Performed by: FAMILY MEDICINE

## 2021-02-23 PROCEDURE — 3044F HG A1C LEVEL LT 7.0%: CPT | Mod: CPTII,S$GLB,, | Performed by: FAMILY MEDICINE

## 2021-02-23 PROCEDURE — 1159F PR MEDICATION LIST DOCUMENTED IN MEDICAL RECORD: ICD-10-PCS | Mod: S$GLB,,, | Performed by: FAMILY MEDICINE

## 2021-02-23 PROCEDURE — 99214 PR OFFICE/OUTPT VISIT, EST, LEVL IV, 30-39 MIN: ICD-10-PCS | Mod: S$GLB,,, | Performed by: FAMILY MEDICINE

## 2021-02-23 PROCEDURE — 1101F PR PT FALLS ASSESS DOC 0-1 FALLS W/OUT INJ PAST YR: ICD-10-PCS | Mod: CPTII,S$GLB,, | Performed by: FAMILY MEDICINE

## 2021-02-23 PROCEDURE — 99214 OFFICE O/P EST MOD 30 MIN: CPT | Mod: S$GLB,,, | Performed by: FAMILY MEDICINE

## 2021-02-23 PROCEDURE — 1126F AMNT PAIN NOTED NONE PRSNT: CPT | Mod: S$GLB,,, | Performed by: FAMILY MEDICINE

## 2021-02-23 PROCEDURE — 1159F MED LIST DOCD IN RCRD: CPT | Mod: S$GLB,,, | Performed by: FAMILY MEDICINE

## 2021-02-23 PROCEDURE — 3078F PR MOST RECENT DIASTOLIC BLOOD PRESSURE < 80 MM HG: ICD-10-PCS | Mod: CPTII,S$GLB,, | Performed by: FAMILY MEDICINE

## 2021-02-23 PROCEDURE — 3008F PR BODY MASS INDEX (BMI) DOCUMENTED: ICD-10-PCS | Mod: CPTII,S$GLB,, | Performed by: FAMILY MEDICINE

## 2021-02-23 PROCEDURE — 90837 PR PSYCHOTHERAPY W/PATIENT, 60 MIN: ICD-10-PCS | Mod: S$GLB,,, | Performed by: SOCIAL WORKER

## 2021-02-23 PROCEDURE — 99999 PR PBB SHADOW E&M-EST. PATIENT-LVL V: CPT | Mod: PBBFAC,,, | Performed by: FAMILY MEDICINE

## 2021-02-23 PROCEDURE — 3074F SYST BP LT 130 MM HG: CPT | Mod: CPTII,S$GLB,, | Performed by: FAMILY MEDICINE

## 2021-02-23 PROCEDURE — 1126F PR PAIN SEVERITY QUANTIFIED, NO PAIN PRESENT: ICD-10-PCS | Mod: S$GLB,,, | Performed by: FAMILY MEDICINE

## 2021-02-23 PROCEDURE — 90837 PSYTX W PT 60 MINUTES: CPT | Mod: S$GLB,,, | Performed by: SOCIAL WORKER

## 2021-02-23 PROCEDURE — 3288F FALL RISK ASSESSMENT DOCD: CPT | Mod: CPTII,S$GLB,, | Performed by: FAMILY MEDICINE

## 2021-02-23 PROCEDURE — 3044F PR MOST RECENT HEMOGLOBIN A1C LEVEL <7.0%: ICD-10-PCS | Mod: CPTII,S$GLB,, | Performed by: FAMILY MEDICINE

## 2021-02-23 PROCEDURE — 3078F DIAST BP <80 MM HG: CPT | Mod: CPTII,S$GLB,, | Performed by: FAMILY MEDICINE

## 2021-02-26 ENCOUNTER — OFFICE VISIT (OUTPATIENT)
Dept: GASTROENTEROLOGY | Facility: CLINIC | Age: 71
End: 2021-02-26
Payer: MEDICARE

## 2021-02-26 ENCOUNTER — TELEPHONE (OUTPATIENT)
Dept: GASTROENTEROLOGY | Facility: CLINIC | Age: 71
End: 2021-02-26

## 2021-02-26 ENCOUNTER — LAB VISIT (OUTPATIENT)
Dept: LAB | Facility: HOSPITAL | Age: 71
End: 2021-02-26
Attending: INTERNAL MEDICINE
Payer: MEDICARE

## 2021-02-26 VITALS
DIASTOLIC BLOOD PRESSURE: 60 MMHG | HEART RATE: 75 BPM | WEIGHT: 243.63 LBS | HEIGHT: 61 IN | OXYGEN SATURATION: 97 % | SYSTOLIC BLOOD PRESSURE: 128 MMHG | BODY MASS INDEX: 46 KG/M2

## 2021-02-26 DIAGNOSIS — R10.84 GENERALIZED ABDOMINAL PAIN: Primary | ICD-10-CM

## 2021-02-26 DIAGNOSIS — R10.84 GENERALIZED ABDOMINAL PAIN: ICD-10-CM

## 2021-02-26 DIAGNOSIS — R63.0 ANOREXIA: ICD-10-CM

## 2021-02-26 DIAGNOSIS — R11.0 NAUSEA: ICD-10-CM

## 2021-02-26 DIAGNOSIS — K62.5 RECTAL BLEEDING: Primary | ICD-10-CM

## 2021-02-26 DIAGNOSIS — K21.9 GASTROESOPHAGEAL REFLUX DISEASE, UNSPECIFIED WHETHER ESOPHAGITIS PRESENT: ICD-10-CM

## 2021-02-26 DIAGNOSIS — K62.5 RECTAL BLEEDING: ICD-10-CM

## 2021-02-26 LAB
BASOPHILS # BLD AUTO: 0.05 K/UL (ref 0–0.2)
BASOPHILS NFR BLD: 0.6 % (ref 0–1.9)
DIFFERENTIAL METHOD: ABNORMAL
EOSINOPHIL # BLD AUTO: 0.1 K/UL (ref 0–0.5)
EOSINOPHIL NFR BLD: 1.5 % (ref 0–8)
ERYTHROCYTE [DISTWIDTH] IN BLOOD BY AUTOMATED COUNT: 12.8 % (ref 11.5–14.5)
ERYTHROCYTE [SEDIMENTATION RATE] IN BLOOD BY WESTERGREN METHOD: 15 MM/HR (ref 0–20)
HCT VFR BLD AUTO: 34.8 % (ref 37–48.5)
HGB BLD-MCNC: 11 G/DL (ref 12–16)
IMM GRANULOCYTES # BLD AUTO: 0.03 K/UL (ref 0–0.04)
IMM GRANULOCYTES NFR BLD AUTO: 0.4 % (ref 0–0.5)
LYMPHOCYTES # BLD AUTO: 1.7 K/UL (ref 1–4.8)
LYMPHOCYTES NFR BLD: 20.6 % (ref 18–48)
MCH RBC QN AUTO: 30.8 PG (ref 27–31)
MCHC RBC AUTO-ENTMCNC: 31.6 G/DL (ref 32–36)
MCV RBC AUTO: 98 FL (ref 82–98)
MONOCYTES # BLD AUTO: 0.7 K/UL (ref 0.3–1)
MONOCYTES NFR BLD: 9 % (ref 4–15)
NEUTROPHILS # BLD AUTO: 5.6 K/UL (ref 1.8–7.7)
NEUTROPHILS NFR BLD: 67.9 % (ref 38–73)
NRBC BLD-RTO: 0 /100 WBC
PLATELET # BLD AUTO: 241 K/UL (ref 150–350)
PMV BLD AUTO: 12.1 FL (ref 9.2–12.9)
RBC # BLD AUTO: 3.57 M/UL (ref 4–5.4)
WBC # BLD AUTO: 8.19 K/UL (ref 3.9–12.7)

## 2021-02-26 PROCEDURE — 1159F PR MEDICATION LIST DOCUMENTED IN MEDICAL RECORD: ICD-10-PCS | Mod: S$GLB,,, | Performed by: INTERNAL MEDICINE

## 2021-02-26 PROCEDURE — 3288F FALL RISK ASSESSMENT DOCD: CPT | Mod: CPTII,S$GLB,, | Performed by: INTERNAL MEDICINE

## 2021-02-26 PROCEDURE — 1101F PT FALLS ASSESS-DOCD LE1/YR: CPT | Mod: CPTII,S$GLB,, | Performed by: INTERNAL MEDICINE

## 2021-02-26 PROCEDURE — 82728 ASSAY OF FERRITIN: CPT

## 2021-02-26 PROCEDURE — 80048 BASIC METABOLIC PNL TOTAL CA: CPT

## 2021-02-26 PROCEDURE — 3008F PR BODY MASS INDEX (BMI) DOCUMENTED: ICD-10-PCS | Mod: CPTII,S$GLB,, | Performed by: INTERNAL MEDICINE

## 2021-02-26 PROCEDURE — 99999 PR PBB SHADOW E&M-EST. PATIENT-LVL V: CPT | Mod: PBBFAC,,, | Performed by: INTERNAL MEDICINE

## 2021-02-26 PROCEDURE — 3074F PR MOST RECENT SYSTOLIC BLOOD PRESSURE < 130 MM HG: ICD-10-PCS | Mod: CPTII,S$GLB,, | Performed by: INTERNAL MEDICINE

## 2021-02-26 PROCEDURE — 99999 PR PBB SHADOW E&M-EST. PATIENT-LVL V: ICD-10-PCS | Mod: PBBFAC,,, | Performed by: INTERNAL MEDICINE

## 2021-02-26 PROCEDURE — 85651 RBC SED RATE NONAUTOMATED: CPT

## 2021-02-26 PROCEDURE — 3008F BODY MASS INDEX DOCD: CPT | Mod: CPTII,S$GLB,, | Performed by: INTERNAL MEDICINE

## 2021-02-26 PROCEDURE — 1125F AMNT PAIN NOTED PAIN PRSNT: CPT | Mod: S$GLB,,, | Performed by: INTERNAL MEDICINE

## 2021-02-26 PROCEDURE — 1101F PR PT FALLS ASSESS DOC 0-1 FALLS W/OUT INJ PAST YR: ICD-10-PCS | Mod: CPTII,S$GLB,, | Performed by: INTERNAL MEDICINE

## 2021-02-26 PROCEDURE — 86140 C-REACTIVE PROTEIN: CPT

## 2021-02-26 PROCEDURE — 1159F MED LIST DOCD IN RCRD: CPT | Mod: S$GLB,,, | Performed by: INTERNAL MEDICINE

## 2021-02-26 PROCEDURE — 3078F PR MOST RECENT DIASTOLIC BLOOD PRESSURE < 80 MM HG: ICD-10-PCS | Mod: CPTII,S$GLB,, | Performed by: INTERNAL MEDICINE

## 2021-02-26 PROCEDURE — 3074F SYST BP LT 130 MM HG: CPT | Mod: CPTII,S$GLB,, | Performed by: INTERNAL MEDICINE

## 2021-02-26 PROCEDURE — 36415 COLL VENOUS BLD VENIPUNCTURE: CPT

## 2021-02-26 PROCEDURE — 83690 ASSAY OF LIPASE: CPT

## 2021-02-26 PROCEDURE — 99214 OFFICE O/P EST MOD 30 MIN: CPT | Mod: S$GLB,,, | Performed by: INTERNAL MEDICINE

## 2021-02-26 PROCEDURE — 3078F DIAST BP <80 MM HG: CPT | Mod: CPTII,S$GLB,, | Performed by: INTERNAL MEDICINE

## 2021-02-26 PROCEDURE — 85025 COMPLETE CBC W/AUTO DIFF WBC: CPT

## 2021-02-26 PROCEDURE — 1125F PR PAIN SEVERITY QUANTIFIED, PAIN PRESENT: ICD-10-PCS | Mod: S$GLB,,, | Performed by: INTERNAL MEDICINE

## 2021-02-26 PROCEDURE — 80076 HEPATIC FUNCTION PANEL: CPT

## 2021-02-26 PROCEDURE — 83540 ASSAY OF IRON: CPT

## 2021-02-26 PROCEDURE — 3288F PR FALLS RISK ASSESSMENT DOCUMENTED: ICD-10-PCS | Mod: CPTII,S$GLB,, | Performed by: INTERNAL MEDICINE

## 2021-02-26 PROCEDURE — 99214 PR OFFICE/OUTPT VISIT, EST, LEVL IV, 30-39 MIN: ICD-10-PCS | Mod: S$GLB,,, | Performed by: INTERNAL MEDICINE

## 2021-02-26 RX ORDER — BENZONATATE 100 MG/1
CAPSULE ORAL
COMMUNITY
Start: 2020-07-04 | End: 2021-08-23

## 2021-02-26 RX ORDER — SODIUM, POTASSIUM,MAG SULFATES 17.5-3.13G
1 SOLUTION, RECONSTITUTED, ORAL ORAL DAILY
Qty: 1 KIT | Refills: 0 | Status: SHIPPED | OUTPATIENT
Start: 2021-02-26 | End: 2021-02-28

## 2021-02-27 LAB
ALBUMIN SERPL BCP-MCNC: 3.9 G/DL (ref 3.5–5.2)
ALP SERPL-CCNC: 43 U/L (ref 55–135)
ALT SERPL W/O P-5'-P-CCNC: 17 U/L (ref 10–44)
ANION GAP SERPL CALC-SCNC: 10 MMOL/L (ref 8–16)
AST SERPL-CCNC: 17 U/L (ref 10–40)
BILIRUB DIRECT SERPL-MCNC: 0.2 MG/DL (ref 0.1–0.3)
BILIRUB SERPL-MCNC: 0.6 MG/DL (ref 0.1–1)
BUN SERPL-MCNC: 15 MG/DL (ref 8–23)
CALCIUM SERPL-MCNC: 9.1 MG/DL (ref 8.7–10.5)
CHLORIDE SERPL-SCNC: 102 MMOL/L (ref 95–110)
CO2 SERPL-SCNC: 28 MMOL/L (ref 23–29)
CREAT SERPL-MCNC: 0.8 MG/DL (ref 0.5–1.4)
CRP SERPL-MCNC: 1.3 MG/L (ref 0–8.2)
EST. GFR  (AFRICAN AMERICAN): >60 ML/MIN/1.73 M^2
EST. GFR  (NON AFRICAN AMERICAN): >60 ML/MIN/1.73 M^2
FERRITIN SERPL-MCNC: 75 NG/ML (ref 20–300)
GLUCOSE SERPL-MCNC: 156 MG/DL (ref 70–110)
IRON SERPL-MCNC: 74 UG/DL (ref 30–160)
LIPASE SERPL-CCNC: 33 U/L (ref 4–60)
POTASSIUM SERPL-SCNC: 4.2 MMOL/L (ref 3.5–5.1)
PROT SERPL-MCNC: 6.7 G/DL (ref 6–8.4)
SATURATED IRON: 22 % (ref 20–50)
SODIUM SERPL-SCNC: 140 MMOL/L (ref 136–145)
TOTAL IRON BINDING CAPACITY: 330 UG/DL (ref 250–450)
TRANSFERRIN SERPL-MCNC: 223 MG/DL (ref 200–375)

## 2021-03-02 ENCOUNTER — PATIENT MESSAGE (OUTPATIENT)
Dept: ENDOSCOPY | Facility: HOSPITAL | Age: 71
End: 2021-03-02

## 2021-03-02 ENCOUNTER — PATIENT MESSAGE (OUTPATIENT)
Dept: INTERNAL MEDICINE | Facility: CLINIC | Age: 71
End: 2021-03-02

## 2021-03-04 DIAGNOSIS — G47.00 INSOMNIA, UNSPECIFIED TYPE: ICD-10-CM

## 2021-03-06 ENCOUNTER — PATIENT MESSAGE (OUTPATIENT)
Dept: PSYCHIATRY | Facility: CLINIC | Age: 71
End: 2021-03-06

## 2021-03-06 RX ORDER — TEMAZEPAM 30 MG/1
30 CAPSULE ORAL NIGHTLY PRN
Qty: 30 CAPSULE | Refills: 2 | OUTPATIENT
Start: 2021-03-06 | End: 2021-06-04

## 2021-03-08 ENCOUNTER — PATIENT MESSAGE (OUTPATIENT)
Dept: HEPATOLOGY | Facility: CLINIC | Age: 71
End: 2021-03-08

## 2021-03-08 ENCOUNTER — PATIENT MESSAGE (OUTPATIENT)
Dept: PSYCHIATRY | Facility: CLINIC | Age: 71
End: 2021-03-08

## 2021-03-09 ENCOUNTER — PATIENT MESSAGE (OUTPATIENT)
Dept: HEPATOLOGY | Facility: CLINIC | Age: 71
End: 2021-03-09

## 2021-03-10 ENCOUNTER — IMMUNIZATION (OUTPATIENT)
Dept: INTERNAL MEDICINE | Facility: CLINIC | Age: 71
End: 2021-03-10
Payer: MEDICARE

## 2021-03-10 DIAGNOSIS — Z23 NEED FOR VACCINATION: Primary | ICD-10-CM

## 2021-03-10 PROCEDURE — 0002A COVID-19, MRNA, LNP-S, PF, 30 MCG/0.3 ML DOSE VACCINE: CPT | Mod: PBBFAC | Performed by: FAMILY MEDICINE

## 2021-03-10 PROCEDURE — 91300 COVID-19, MRNA, LNP-S, PF, 30 MCG/0.3 ML DOSE VACCINE: CPT | Mod: PBBFAC | Performed by: FAMILY MEDICINE

## 2021-03-15 ENCOUNTER — INFUSION (OUTPATIENT)
Dept: INFUSION THERAPY | Facility: HOSPITAL | Age: 71
End: 2021-03-15
Attending: FAMILY MEDICINE
Payer: MEDICARE

## 2021-03-15 VITALS
OXYGEN SATURATION: 98 % | TEMPERATURE: 98 F | SYSTOLIC BLOOD PRESSURE: 136 MMHG | DIASTOLIC BLOOD PRESSURE: 69 MMHG | RESPIRATION RATE: 18 BRPM | HEART RATE: 79 BPM

## 2021-03-15 DIAGNOSIS — M81.0 AGE-RELATED OSTEOPOROSIS WITHOUT CURRENT PATHOLOGICAL FRACTURE: Primary | ICD-10-CM

## 2021-03-15 PROCEDURE — 63600175 PHARM REV CODE 636 W HCPCS: Mod: JG | Performed by: PHYSICIAN ASSISTANT

## 2021-03-15 PROCEDURE — 96374 THER/PROPH/DIAG INJ IV PUSH: CPT

## 2021-03-15 RX ORDER — SODIUM CHLORIDE 0.9 % (FLUSH) 0.9 %
10 SYRINGE (ML) INJECTION
Status: DISCONTINUED | OUTPATIENT
Start: 2021-03-15 | End: 2021-03-16 | Stop reason: HOSPADM

## 2021-03-15 RX ORDER — SODIUM CHLORIDE 0.9 % (FLUSH) 0.9 %
10 SYRINGE (ML) INJECTION
Status: CANCELLED | OUTPATIENT
Start: 2021-04-01

## 2021-03-15 RX ORDER — IBANDRONATE SODIUM 3 MG/3 ML
3 SYRINGE (ML) INTRAVENOUS
Status: CANCELLED | OUTPATIENT
Start: 2021-04-01

## 2021-03-15 RX ORDER — HEPARIN 100 UNIT/ML
500 SYRINGE INTRAVENOUS
Status: CANCELLED | OUTPATIENT
Start: 2021-04-01

## 2021-03-15 RX ORDER — IBANDRONATE SODIUM 3 MG/3 ML
3 SYRINGE (ML) INTRAVENOUS
Status: COMPLETED | OUTPATIENT
Start: 2021-03-15 | End: 2021-03-15

## 2021-03-15 RX ADMIN — IBANDRONATE SODIUM 3 MG: 3 INJECTION, SOLUTION INTRAVENOUS at 09:03

## 2021-03-16 ENCOUNTER — TELEPHONE (OUTPATIENT)
Dept: CARDIOLOGY | Facility: CLINIC | Age: 71
End: 2021-03-16

## 2021-03-16 DIAGNOSIS — I10 ESSENTIAL HYPERTENSION: Primary | ICD-10-CM

## 2021-03-17 ENCOUNTER — PATIENT MESSAGE (OUTPATIENT)
Dept: INTERNAL MEDICINE | Facility: CLINIC | Age: 71
End: 2021-03-17

## 2021-03-17 ENCOUNTER — PATIENT MESSAGE (OUTPATIENT)
Dept: ENDOSCOPY | Facility: HOSPITAL | Age: 71
End: 2021-03-17

## 2021-03-17 DIAGNOSIS — K21.9 GASTROESOPHAGEAL REFLUX DISEASE WITHOUT ESOPHAGITIS: Primary | ICD-10-CM

## 2021-03-18 ENCOUNTER — PATIENT MESSAGE (OUTPATIENT)
Dept: ENDOSCOPY | Facility: HOSPITAL | Age: 71
End: 2021-03-18

## 2021-03-18 ENCOUNTER — PATIENT MESSAGE (OUTPATIENT)
Dept: CARDIOLOGY | Facility: CLINIC | Age: 71
End: 2021-03-18

## 2021-03-18 DIAGNOSIS — R82.90 ABNORMAL URINE ODOR: Primary | ICD-10-CM

## 2021-03-24 RX ORDER — FLUTICASONE PROPIONATE 50 MCG
2 SPRAY, SUSPENSION (ML) NASAL DAILY
Qty: 48 ML | Refills: 3 | Status: SHIPPED | OUTPATIENT
Start: 2021-03-24 | End: 2021-11-12 | Stop reason: SDUPTHER

## 2021-03-25 PROCEDURE — 93010 ELECTROCARDIOGRAM REPORT: CPT | Mod: ,,, | Performed by: INTERNAL MEDICINE

## 2021-03-25 PROCEDURE — 93010 EKG 12-LEAD: ICD-10-PCS | Mod: ,,, | Performed by: INTERNAL MEDICINE

## 2021-03-26 ENCOUNTER — HOSPITAL ENCOUNTER (OUTPATIENT)
Dept: CARDIOLOGY | Facility: HOSPITAL | Age: 71
Discharge: HOME | End: 2021-03-26
Attending: INTERNAL MEDICINE
Payer: MEDICARE

## 2021-03-26 ENCOUNTER — OFFICE VISIT (OUTPATIENT)
Dept: CARDIOLOGY | Facility: CLINIC | Age: 71
End: 2021-03-26
Payer: MEDICARE

## 2021-03-26 VITALS
SYSTOLIC BLOOD PRESSURE: 130 MMHG | BODY MASS INDEX: 46.74 KG/M2 | WEIGHT: 247.56 LBS | OXYGEN SATURATION: 99 % | DIASTOLIC BLOOD PRESSURE: 66 MMHG | HEART RATE: 86 BPM | HEIGHT: 61 IN

## 2021-03-26 DIAGNOSIS — E11.42 CONTROLLED TYPE 2 DIABETES MELLITUS WITH DIABETIC POLYNEUROPATHY, WITH LONG-TERM CURRENT USE OF INSULIN: ICD-10-CM

## 2021-03-26 DIAGNOSIS — I71.40 ABDOMINAL AORTIC ANEURYSM (AAA) WITHOUT RUPTURE: ICD-10-CM

## 2021-03-26 DIAGNOSIS — I20.9 AP (ANGINA PECTORIS): ICD-10-CM

## 2021-03-26 DIAGNOSIS — R06.09 DOE (DYSPNEA ON EXERTION): ICD-10-CM

## 2021-03-26 DIAGNOSIS — I48.0 PAROXYSMAL ATRIAL FIBRILLATION: ICD-10-CM

## 2021-03-26 DIAGNOSIS — I25.118 CORONARY ARTERY DISEASE OF NATIVE ARTERY OF NATIVE HEART WITH STABLE ANGINA PECTORIS: Primary | ICD-10-CM

## 2021-03-26 DIAGNOSIS — I10 ESSENTIAL HYPERTENSION: ICD-10-CM

## 2021-03-26 DIAGNOSIS — E78.1 HYPERTRIGLYCERIDEMIA: ICD-10-CM

## 2021-03-26 DIAGNOSIS — R00.2 PALPITATIONS: ICD-10-CM

## 2021-03-26 DIAGNOSIS — R53.82 CHRONIC FATIGUE: ICD-10-CM

## 2021-03-26 DIAGNOSIS — Z79.4 CONTROLLED TYPE 2 DIABETES MELLITUS WITH DIABETIC POLYNEUROPATHY, WITH LONG-TERM CURRENT USE OF INSULIN: ICD-10-CM

## 2021-03-26 DIAGNOSIS — R94.31 ABNORMAL ECG: ICD-10-CM

## 2021-03-26 DIAGNOSIS — G47.30 SLEEP APNEA, UNSPECIFIED TYPE: ICD-10-CM

## 2021-03-26 PROBLEM — R03.0 ELEVATED BLOOD PRESSURE READING: Status: RESOLVED | Noted: 2021-01-14 | Resolved: 2021-03-26

## 2021-03-26 PROBLEM — E11.65 UNCONTROLLED TYPE 2 DIABETES MELLITUS WITH HYPERGLYCEMIA: Status: RESOLVED | Noted: 2020-05-25 | Resolved: 2021-03-26

## 2021-03-26 PROCEDURE — 3008F BODY MASS INDEX DOCD: CPT | Mod: CPTII,S$GLB,, | Performed by: INTERNAL MEDICINE

## 2021-03-26 PROCEDURE — 3044F PR MOST RECENT HEMOGLOBIN A1C LEVEL <7.0%: ICD-10-PCS | Mod: CPTII,S$GLB,, | Performed by: INTERNAL MEDICINE

## 2021-03-26 PROCEDURE — 1159F MED LIST DOCD IN RCRD: CPT | Mod: S$GLB,,, | Performed by: INTERNAL MEDICINE

## 2021-03-26 PROCEDURE — 99499 UNLISTED E&M SERVICE: CPT | Mod: S$GLB,,, | Performed by: INTERNAL MEDICINE

## 2021-03-26 PROCEDURE — 3078F PR MOST RECENT DIASTOLIC BLOOD PRESSURE < 80 MM HG: ICD-10-PCS | Mod: CPTII,S$GLB,, | Performed by: INTERNAL MEDICINE

## 2021-03-26 PROCEDURE — 99999 PR PBB SHADOW E&M-EST. PATIENT-LVL IV: ICD-10-PCS | Mod: PBBFAC,,, | Performed by: INTERNAL MEDICINE

## 2021-03-26 PROCEDURE — 99499 RISK ADDL DX/OHS AUDIT: ICD-10-PCS | Mod: S$GLB,,, | Performed by: INTERNAL MEDICINE

## 2021-03-26 PROCEDURE — 1126F PR PAIN SEVERITY QUANTIFIED, NO PAIN PRESENT: ICD-10-PCS | Mod: S$GLB,,, | Performed by: INTERNAL MEDICINE

## 2021-03-26 PROCEDURE — 99205 PR OFFICE/OUTPT VISIT, NEW, LEVL V, 60-74 MIN: ICD-10-PCS | Mod: S$GLB,,, | Performed by: INTERNAL MEDICINE

## 2021-03-26 PROCEDURE — 3075F SYST BP GE 130 - 139MM HG: CPT | Mod: CPTII,S$GLB,, | Performed by: INTERNAL MEDICINE

## 2021-03-26 PROCEDURE — 3078F DIAST BP <80 MM HG: CPT | Mod: CPTII,S$GLB,, | Performed by: INTERNAL MEDICINE

## 2021-03-26 PROCEDURE — 99205 OFFICE O/P NEW HI 60 MIN: CPT | Mod: S$GLB,,, | Performed by: INTERNAL MEDICINE

## 2021-03-26 PROCEDURE — 93005 ELECTROCARDIOGRAM TRACING: CPT

## 2021-03-26 PROCEDURE — 3075F PR MOST RECENT SYSTOLIC BLOOD PRESS GE 130-139MM HG: ICD-10-PCS | Mod: CPTII,S$GLB,, | Performed by: INTERNAL MEDICINE

## 2021-03-26 PROCEDURE — 1126F AMNT PAIN NOTED NONE PRSNT: CPT | Mod: S$GLB,,, | Performed by: INTERNAL MEDICINE

## 2021-03-26 PROCEDURE — 3044F HG A1C LEVEL LT 7.0%: CPT | Mod: CPTII,S$GLB,, | Performed by: INTERNAL MEDICINE

## 2021-03-26 PROCEDURE — 3008F PR BODY MASS INDEX (BMI) DOCUMENTED: ICD-10-PCS | Mod: CPTII,S$GLB,, | Performed by: INTERNAL MEDICINE

## 2021-03-26 PROCEDURE — 99999 PR PBB SHADOW E&M-EST. PATIENT-LVL IV: CPT | Mod: PBBFAC,,, | Performed by: INTERNAL MEDICINE

## 2021-03-26 PROCEDURE — 1159F PR MEDICATION LIST DOCUMENTED IN MEDICAL RECORD: ICD-10-PCS | Mod: S$GLB,,, | Performed by: INTERNAL MEDICINE

## 2021-03-26 RX ORDER — ATORVASTATIN CALCIUM 40 MG/1
20 TABLET, FILM COATED ORAL DAILY
Qty: 90 TABLET | Refills: 0
Start: 2021-03-26 | End: 2021-05-25

## 2021-03-26 RX ORDER — BENAZEPRIL HYDROCHLORIDE 40 MG/1
40 TABLET ORAL 2 TIMES DAILY
Qty: 90 TABLET | Refills: 0
Start: 2021-03-26 | End: 2022-02-01 | Stop reason: SDUPTHER

## 2021-03-30 ENCOUNTER — TELEPHONE (OUTPATIENT)
Dept: CARDIOLOGY | Facility: CLINIC | Age: 71
End: 2021-03-30

## 2021-04-01 ENCOUNTER — OFFICE VISIT (OUTPATIENT)
Dept: PSYCHIATRY | Facility: CLINIC | Age: 71
End: 2021-04-01
Payer: COMMERCIAL

## 2021-04-01 DIAGNOSIS — G54.2 CERVICAL NEUROPATHY: ICD-10-CM

## 2021-04-01 DIAGNOSIS — E11.42 CONTROLLED TYPE 2 DIABETES MELLITUS WITH DIABETIC POLYNEUROPATHY, WITH LONG-TERM CURRENT USE OF INSULIN: ICD-10-CM

## 2021-04-01 DIAGNOSIS — G47.00 INSOMNIA, UNSPECIFIED TYPE: ICD-10-CM

## 2021-04-01 DIAGNOSIS — Z79.4 CONTROLLED TYPE 2 DIABETES MELLITUS WITH DIABETIC POLYNEUROPATHY, WITH LONG-TERM CURRENT USE OF INSULIN: ICD-10-CM

## 2021-04-01 DIAGNOSIS — F41.9 ANXIETY DISORDER, UNSPECIFIED TYPE: ICD-10-CM

## 2021-04-01 DIAGNOSIS — R42 VERTIGO: ICD-10-CM

## 2021-04-01 DIAGNOSIS — F33.1 DEPRESSION, MAJOR, RECURRENT, MODERATE: Primary | ICD-10-CM

## 2021-04-01 DIAGNOSIS — H91.92 HEARING LOSS OF LEFT EAR, UNSPECIFIED HEARING LOSS TYPE: ICD-10-CM

## 2021-04-01 PROCEDURE — 3044F HG A1C LEVEL LT 7.0%: CPT | Mod: CPTII,S$GLB,, | Performed by: PSYCHIATRY & NEUROLOGY

## 2021-04-01 PROCEDURE — 99499 UNLISTED E&M SERVICE: CPT | Mod: S$GLB,,, | Performed by: PSYCHIATRY & NEUROLOGY

## 2021-04-01 PROCEDURE — 99214 OFFICE O/P EST MOD 30 MIN: CPT | Mod: S$GLB,,, | Performed by: PSYCHIATRY & NEUROLOGY

## 2021-04-01 PROCEDURE — 1159F PR MEDICATION LIST DOCUMENTED IN MEDICAL RECORD: ICD-10-PCS | Mod: S$GLB,,, | Performed by: PSYCHIATRY & NEUROLOGY

## 2021-04-01 PROCEDURE — 1159F MED LIST DOCD IN RCRD: CPT | Mod: S$GLB,,, | Performed by: PSYCHIATRY & NEUROLOGY

## 2021-04-01 PROCEDURE — 99214 PR OFFICE/OUTPT VISIT, EST, LEVL IV, 30-39 MIN: ICD-10-PCS | Mod: S$GLB,,, | Performed by: PSYCHIATRY & NEUROLOGY

## 2021-04-01 PROCEDURE — 99499 RISK ADDL DX/OHS AUDIT: ICD-10-PCS | Mod: S$GLB,,, | Performed by: PSYCHIATRY & NEUROLOGY

## 2021-04-01 PROCEDURE — 3044F PR MOST RECENT HEMOGLOBIN A1C LEVEL <7.0%: ICD-10-PCS | Mod: CPTII,S$GLB,, | Performed by: PSYCHIATRY & NEUROLOGY

## 2021-04-01 RX ORDER — DULOXETIN HYDROCHLORIDE 60 MG/1
60 CAPSULE, DELAYED RELEASE ORAL DAILY
Qty: 30 CAPSULE | Refills: 5 | Status: SHIPPED | OUTPATIENT
Start: 2021-04-01 | End: 2021-09-29 | Stop reason: SDUPTHER

## 2021-04-01 RX ORDER — DULOXETIN HYDROCHLORIDE 30 MG/1
CAPSULE, DELAYED RELEASE ORAL
Qty: 30 CAPSULE | Refills: 5 | Status: SHIPPED | OUTPATIENT
Start: 2021-04-01 | End: 2021-05-06 | Stop reason: SDUPTHER

## 2021-04-01 RX ORDER — LAMOTRIGINE 100 MG/1
TABLET ORAL
Qty: 30 TABLET | Refills: 1 | Status: SHIPPED | OUTPATIENT
Start: 2021-04-01 | End: 2021-09-07

## 2021-04-01 RX ORDER — TEMAZEPAM 30 MG/1
30 CAPSULE ORAL NIGHTLY PRN
Qty: 30 CAPSULE | Refills: 2 | Status: SHIPPED | OUTPATIENT
Start: 2021-04-05 | End: 2021-07-04

## 2021-04-01 RX ORDER — LAMOTRIGINE 25 MG/1
TABLET ORAL
Qty: 49 TABLET | Refills: 0 | Status: ON HOLD | OUTPATIENT
Start: 2021-04-01 | End: 2021-05-05 | Stop reason: HOSPADM

## 2021-04-06 ENCOUNTER — PATIENT MESSAGE (OUTPATIENT)
Dept: CARDIOLOGY | Facility: CLINIC | Age: 71
End: 2021-04-06

## 2021-04-06 ENCOUNTER — PATIENT MESSAGE (OUTPATIENT)
Dept: ENDOSCOPY | Facility: HOSPITAL | Age: 71
End: 2021-04-06

## 2021-04-09 ENCOUNTER — PATIENT MESSAGE (OUTPATIENT)
Dept: RHEUMATOLOGY | Facility: CLINIC | Age: 71
End: 2021-04-09

## 2021-04-13 ENCOUNTER — PATIENT MESSAGE (OUTPATIENT)
Dept: INTERNAL MEDICINE | Facility: CLINIC | Age: 71
End: 2021-04-13

## 2021-04-14 DIAGNOSIS — H93.19 TINNITUS, UNSPECIFIED LATERALITY: Primary | ICD-10-CM

## 2021-04-17 ENCOUNTER — PATIENT MESSAGE (OUTPATIENT)
Dept: INTERNAL MEDICINE | Facility: CLINIC | Age: 71
End: 2021-04-17

## 2021-04-19 ENCOUNTER — HOSPITAL ENCOUNTER (OUTPATIENT)
Dept: RADIOLOGY | Facility: HOSPITAL | Age: 71
Discharge: HOME | End: 2021-04-19
Attending: INTERNAL MEDICINE
Payer: MEDICARE

## 2021-04-19 ENCOUNTER — HOSPITAL ENCOUNTER (OUTPATIENT)
Dept: CARDIOLOGY | Facility: HOSPITAL | Age: 71
Discharge: HOME | End: 2021-04-19
Attending: INTERNAL MEDICINE
Payer: MEDICARE

## 2021-04-19 VITALS — HEIGHT: 61 IN | WEIGHT: 247 LBS | BODY MASS INDEX: 46.63 KG/M2

## 2021-04-19 DIAGNOSIS — I25.118 CORONARY ARTERY DISEASE OF NATIVE ARTERY OF NATIVE HEART WITH STABLE ANGINA PECTORIS: ICD-10-CM

## 2021-04-19 DIAGNOSIS — I48.0 PAROXYSMAL ATRIAL FIBRILLATION: ICD-10-CM

## 2021-04-19 DIAGNOSIS — G47.30 SLEEP APNEA, UNSPECIFIED TYPE: ICD-10-CM

## 2021-04-19 DIAGNOSIS — I10 ESSENTIAL HYPERTENSION: ICD-10-CM

## 2021-04-19 DIAGNOSIS — R00.2 PALPITATIONS: ICD-10-CM

## 2021-04-19 DIAGNOSIS — R94.31 ABNORMAL ECG: ICD-10-CM

## 2021-04-19 DIAGNOSIS — E11.42 CONTROLLED TYPE 2 DIABETES MELLITUS WITH DIABETIC POLYNEUROPATHY, WITH LONG-TERM CURRENT USE OF INSULIN: ICD-10-CM

## 2021-04-19 DIAGNOSIS — Z79.4 CONTROLLED TYPE 2 DIABETES MELLITUS WITH DIABETIC POLYNEUROPATHY, WITH LONG-TERM CURRENT USE OF INSULIN: ICD-10-CM

## 2021-04-19 DIAGNOSIS — R06.09 DOE (DYSPNEA ON EXERTION): ICD-10-CM

## 2021-04-19 LAB
AORTIC ROOT ANNULUS: 3.54 CM
AV INDEX (PROSTH): 0.65
AV MEAN GRADIENT: 5 MMHG
AV PEAK GRADIENT: 12 MMHG
AV VALVE AREA: 2.08 CM2
AV VELOCITY RATIO: 0.59
BSA FOR ECHO PROCEDURE: 2.2 M2
CV ECHO LV RWT: 0.55 CM
DOP CALC AO PEAK VEL: 1.73 M/S
DOP CALC AO VTI: 35.5 CM
DOP CALC LVOT AREA: 3.2 CM2
DOP CALC LVOT DIAMETER: 2.02 CM
DOP CALC LVOT PEAK VEL: 1.02 M/S
DOP CALC LVOT STROKE VOLUME: 73.93 CM3
DOP CALC RVOT PEAK VEL: 0.87 M/S
DOP CALC RVOT VTI: 16.41 CM
DOP CALCLVOT PEAK VEL VTI: 23.08 CM
E WAVE DECELERATION TIME: 243.28 MSEC
E/A RATIO: 1.04
E/E' RATIO: 9.67 M/S
ECHO LV POSTERIOR WALL: 1.27 CM (ref 0.6–1.1)
EJECTION FRACTION: 60 %
FRACTIONAL SHORTENING: 36 % (ref 28–44)
INTERVENTRICULAR SEPTUM: 1.47 CM (ref 0.6–1.1)
IVRT: 88.49 MSEC
LA MAJOR: 5.45 CM
LA MINOR: 4.99 CM
LA WIDTH: 3.57 CM
LEFT ATRIUM SIZE: 3.55 CM
LEFT ATRIUM VOLUME INDEX: 27.1 ML/M2
LEFT ATRIUM VOLUME: 56.12 CM3
LEFT INTERNAL DIMENSION IN SYSTOLE: 2.93 CM (ref 2.1–4)
LEFT VENTRICLE DIASTOLIC VOLUME INDEX: 46.52 ML/M2
LEFT VENTRICLE DIASTOLIC VOLUME: 96.29 ML
LEFT VENTRICLE MASS INDEX: 119 G/M2
LEFT VENTRICLE SYSTOLIC VOLUME INDEX: 16 ML/M2
LEFT VENTRICLE SYSTOLIC VOLUME: 33.03 ML
LEFT VENTRICULAR INTERNAL DIMENSION IN DIASTOLE: 4.58 CM (ref 3.5–6)
LEFT VENTRICULAR MASS: 247 G
LV LATERAL E/E' RATIO: 8.7 M/S
LV SEPTAL E/E' RATIO: 10.88 M/S
MV A" WAVE DURATION": 8.85 MSEC
MV PEAK A VEL: 0.84 M/S
MV PEAK E VEL: 0.87 M/S
PISA TR MAX VEL: 2.2 M/S
PULM VEIN S/D RATIO: 1.4
PV MEAN GRADIENT: 2 MMHG
PV PEAK D VEL: 0.5 M/S
PV PEAK S VEL: 0.7 M/S
PV PEAK VELOCITY: 1.28 CM/S
RA MAJOR: 4.56 CM
RA PRESSURE: 3 MMHG
RA WIDTH: 2.8 CM
RIGHT VENTRICULAR END-DIASTOLIC DIMENSION: 2.67 CM
SINUS: 2.76 CM
STJ: 2.55 CM
TDI LATERAL: 0.1 M/S
TDI SEPTAL: 0.08 M/S
TDI: 0.09 M/S
TR MAX PG: 19 MMHG
TRICUSPID ANNULAR PLANE SYSTOLIC EXCURSION: 2.63 CM
TV REST PULMONARY ARTERY PRESSURE: 22 MMHG

## 2021-04-19 PROCEDURE — 63600175 PHARM REV CODE 636 W HCPCS: Performed by: INTERNAL MEDICINE

## 2021-04-19 PROCEDURE — 93306 TTE W/DOPPLER COMPLETE: CPT

## 2021-04-19 PROCEDURE — 93017 CV STRESS TEST TRACING ONLY: CPT

## 2021-04-19 PROCEDURE — 93016 STRESS TEST WITH MYOCARDIAL PERFUSION (CUPID ONLY): ICD-10-PCS | Mod: ,,, | Performed by: INTERNAL MEDICINE

## 2021-04-19 PROCEDURE — 78452 STRESS TEST WITH MYOCARDIAL PERFUSION (CUPID ONLY): ICD-10-PCS | Mod: 26,,, | Performed by: INTERNAL MEDICINE

## 2021-04-19 PROCEDURE — 93306 TTE W/DOPPLER COMPLETE: CPT | Mod: 26,,, | Performed by: INTERNAL MEDICINE

## 2021-04-19 PROCEDURE — 93018 CV STRESS TEST I&R ONLY: CPT | Mod: ,,, | Performed by: INTERNAL MEDICINE

## 2021-04-19 PROCEDURE — 93306 ECHO (CUPID ONLY): ICD-10-PCS | Mod: 26,,, | Performed by: INTERNAL MEDICINE

## 2021-04-19 PROCEDURE — A9502 TC99M TETROFOSMIN: HCPCS

## 2021-04-19 PROCEDURE — 78452 HT MUSCLE IMAGE SPECT MULT: CPT | Mod: 26,,, | Performed by: INTERNAL MEDICINE

## 2021-04-19 PROCEDURE — 93018 STRESS TEST WITH MYOCARDIAL PERFUSION (CUPID ONLY): ICD-10-PCS | Mod: ,,, | Performed by: INTERNAL MEDICINE

## 2021-04-19 PROCEDURE — 78452 HT MUSCLE IMAGE SPECT MULT: CPT

## 2021-04-19 PROCEDURE — 93016 CV STRESS TEST SUPVJ ONLY: CPT | Mod: ,,, | Performed by: INTERNAL MEDICINE

## 2021-04-19 RX ORDER — REGADENOSON 0.08 MG/ML
0.4 INJECTION, SOLUTION INTRAVENOUS ONCE
Status: COMPLETED | OUTPATIENT
Start: 2021-04-19 | End: 2021-04-19

## 2021-04-19 RX ADMIN — REGADENOSON 0.4 MG: 0.08 INJECTION, SOLUTION INTRAVENOUS at 12:04

## 2021-04-20 LAB
CV STRESS BASE HR: 78 BPM
DIASTOLIC BLOOD PRESSURE: 73 MMHG
NUC REST EJECTION FRACTION: 69
NUC STRESS EJECTION FRACTION: 65 %
OHS CV CPX 85 PERCENT MAX PREDICTED HEART RATE MALE: 123
OHS CV CPX MAX PREDICTED HEART RATE: 145
OHS CV CPX PATIENT IS FEMALE: 1
OHS CV CPX PATIENT IS MALE: 0
OHS CV CPX PEAK DIASTOLIC BLOOD PRESSURE: 58 MMHG
OHS CV CPX PEAK HEAR RATE: 100 BPM
OHS CV CPX PEAK RATE PRESSURE PRODUCT: NORMAL
OHS CV CPX PEAK SYSTOLIC BLOOD PRESSURE: 159 MMHG
OHS CV CPX PERCENT MAX PREDICTED HEART RATE ACHIEVED: 69
OHS CV CPX RATE PRESSURE PRODUCT PRESENTING: NORMAL
STRESS ECHO POST EXERCISE DUR MIN: 1 MINUTES
STRESS ECHO POST EXERCISE DUR SEC: 29 SECONDS
SYSTOLIC BLOOD PRESSURE: 202 MMHG

## 2021-04-21 ENCOUNTER — PATIENT MESSAGE (OUTPATIENT)
Dept: OPHTHALMOLOGY | Facility: CLINIC | Age: 71
End: 2021-04-21

## 2021-04-21 ENCOUNTER — TELEPHONE (OUTPATIENT)
Dept: CARDIOLOGY | Facility: HOSPITAL | Age: 71
End: 2021-04-21

## 2021-04-21 DIAGNOSIS — H10.13 ALLERGIC CONJUNCTIVITIS, BILATERAL: Primary | ICD-10-CM

## 2021-04-21 RX ORDER — BEPOTASTINE BESILATE 15 MG/ML
1 SOLUTION/ DROPS OPHTHALMIC 2 TIMES DAILY
Qty: 10 ML | Refills: 4 | Status: SHIPPED | OUTPATIENT
Start: 2021-04-21 | End: 2021-08-12 | Stop reason: SDUPTHER

## 2021-04-23 ENCOUNTER — PATIENT MESSAGE (OUTPATIENT)
Dept: INTERNAL MEDICINE | Facility: CLINIC | Age: 71
End: 2021-04-23

## 2021-04-26 ENCOUNTER — PATIENT MESSAGE (OUTPATIENT)
Dept: INTERNAL MEDICINE | Facility: CLINIC | Age: 71
End: 2021-04-26

## 2021-04-26 DIAGNOSIS — N39.0 URINARY TRACT INFECTION WITHOUT HEMATURIA, SITE UNSPECIFIED: Primary | ICD-10-CM

## 2021-04-28 ENCOUNTER — TELEPHONE (OUTPATIENT)
Dept: GASTROENTEROLOGY | Facility: CLINIC | Age: 71
End: 2021-04-28

## 2021-04-28 ENCOUNTER — LAB VISIT (OUTPATIENT)
Dept: LAB | Facility: HOSPITAL | Age: 71
End: 2021-04-28
Attending: FAMILY MEDICINE
Payer: MEDICARE

## 2021-04-28 ENCOUNTER — PATIENT MESSAGE (OUTPATIENT)
Dept: ENDOSCOPY | Facility: HOSPITAL | Age: 71
End: 2021-04-28

## 2021-04-28 ENCOUNTER — PATIENT MESSAGE (OUTPATIENT)
Dept: GASTROENTEROLOGY | Facility: CLINIC | Age: 71
End: 2021-04-28

## 2021-04-28 DIAGNOSIS — N39.0 URINARY TRACT INFECTION WITHOUT HEMATURIA, SITE UNSPECIFIED: ICD-10-CM

## 2021-04-28 LAB
AMORPH CRY UR QL COMP ASSIST: ABNORMAL
BACTERIA #/AREA URNS AUTO: ABNORMAL /HPF
BILIRUB UR QL STRIP: NEGATIVE
CLARITY UR REFRACT.AUTO: ABNORMAL
COLOR UR AUTO: YELLOW
GLUCOSE UR QL STRIP: NEGATIVE
HGB UR QL STRIP: NEGATIVE
HYALINE CASTS UR QL AUTO: 0 /LPF
KETONES UR QL STRIP: NEGATIVE
LEUKOCYTE ESTERASE UR QL STRIP: NEGATIVE
MICROSCOPIC COMMENT: ABNORMAL
NITRITE UR QL STRIP: NEGATIVE
PH UR STRIP: 5 [PH] (ref 5–8)
PROT UR QL STRIP: ABNORMAL
RBC #/AREA URNS AUTO: 0 /HPF (ref 0–4)
SP GR UR STRIP: 1.02 (ref 1–1.03)
SQUAMOUS #/AREA URNS AUTO: 14 /HPF
URN SPEC COLLECT METH UR: ABNORMAL
WBC #/AREA URNS AUTO: 0 /HPF (ref 0–5)

## 2021-04-28 PROCEDURE — 81001 URINALYSIS AUTO W/SCOPE: CPT | Performed by: FAMILY MEDICINE

## 2021-04-29 ENCOUNTER — TELEPHONE (OUTPATIENT)
Dept: INTERNAL MEDICINE | Facility: CLINIC | Age: 71
End: 2021-04-29

## 2021-04-29 ENCOUNTER — PATIENT MESSAGE (OUTPATIENT)
Dept: INTERNAL MEDICINE | Facility: CLINIC | Age: 71
End: 2021-04-29

## 2021-05-02 ENCOUNTER — NURSE TRIAGE (OUTPATIENT)
Dept: ADMINISTRATIVE | Facility: CLINIC | Age: 71
End: 2021-05-02

## 2021-05-02 DIAGNOSIS — G47.00 INSOMNIA, UNSPECIFIED TYPE: ICD-10-CM

## 2021-05-03 ENCOUNTER — LAB VISIT (OUTPATIENT)
Dept: OTOLARYNGOLOGY | Facility: CLINIC | Age: 71
End: 2021-05-03
Payer: MEDICARE

## 2021-05-03 ENCOUNTER — PATIENT MESSAGE (OUTPATIENT)
Dept: INTERNAL MEDICINE | Facility: CLINIC | Age: 71
End: 2021-05-03

## 2021-05-03 DIAGNOSIS — F33.1 DEPRESSION, MAJOR, RECURRENT, MODERATE: ICD-10-CM

## 2021-05-03 DIAGNOSIS — R10.84 GENERALIZED ABDOMINAL PAIN: ICD-10-CM

## 2021-05-03 PROCEDURE — U0005 INFEC AGEN DETEC AMPLI PROBE: HCPCS | Performed by: INTERNAL MEDICINE

## 2021-05-03 PROCEDURE — U0003 INFECTIOUS AGENT DETECTION BY NUCLEIC ACID (DNA OR RNA); SEVERE ACUTE RESPIRATORY SYNDROME CORONAVIRUS 2 (SARS-COV-2) (CORONAVIRUS DISEASE [COVID-19]), AMPLIFIED PROBE TECHNIQUE, MAKING USE OF HIGH THROUGHPUT TECHNOLOGIES AS DESCRIBED BY CMS-2020-01-R: HCPCS | Performed by: INTERNAL MEDICINE

## 2021-05-04 LAB — SARS-COV-2 RNA RESP QL NAA+PROBE: NOT DETECTED

## 2021-05-05 ENCOUNTER — TELEPHONE (OUTPATIENT)
Dept: ENDOSCOPY | Facility: HOSPITAL | Age: 71
End: 2021-05-05

## 2021-05-05 ENCOUNTER — ANESTHESIA EVENT (OUTPATIENT)
Dept: ENDOSCOPY | Facility: HOSPITAL | Age: 71
End: 2021-05-05
Payer: MEDICARE

## 2021-05-05 ENCOUNTER — ANESTHESIA (OUTPATIENT)
Dept: ENDOSCOPY | Facility: HOSPITAL | Age: 71
End: 2021-05-05
Payer: MEDICARE

## 2021-05-05 ENCOUNTER — TELEPHONE (OUTPATIENT)
Dept: PULMONOLOGY | Facility: CLINIC | Age: 71
End: 2021-05-05

## 2021-05-05 ENCOUNTER — HOSPITAL ENCOUNTER (OUTPATIENT)
Facility: HOSPITAL | Age: 71
Discharge: HOME | End: 2021-05-05
Attending: INTERNAL MEDICINE | Admitting: INTERNAL MEDICINE
Payer: MEDICARE

## 2021-05-05 ENCOUNTER — TELEPHONE (OUTPATIENT)
Dept: FAMILY MEDICINE | Facility: CLINIC | Age: 71
End: 2021-05-05

## 2021-05-05 DIAGNOSIS — R10.84 GENERALIZED ABDOMINAL PAIN: ICD-10-CM

## 2021-05-05 DIAGNOSIS — K62.5 RECTAL BLEEDING: ICD-10-CM

## 2021-05-05 DIAGNOSIS — K63.5 POLYP OF ASCENDING COLON, UNSPECIFIED TYPE: ICD-10-CM

## 2021-05-05 DIAGNOSIS — K21.9 GASTROESOPHAGEAL REFLUX DISEASE WITHOUT ESOPHAGITIS: Primary | ICD-10-CM

## 2021-05-05 DIAGNOSIS — R11.0 NAUSEA: ICD-10-CM

## 2021-05-05 DIAGNOSIS — K29.30 CHRONIC SUPERFICIAL GASTRITIS WITHOUT BLEEDING: ICD-10-CM

## 2021-05-05 DIAGNOSIS — K63.5 POLYP OF ASCENDING COLON, UNSPECIFIED TYPE: Primary | ICD-10-CM

## 2021-05-05 DIAGNOSIS — R63.0 ANOREXIA: ICD-10-CM

## 2021-05-05 DIAGNOSIS — K57.30 DIVERTICULOSIS OF COLON: ICD-10-CM

## 2021-05-05 DIAGNOSIS — K31.7 GASTRIC POLYPS: ICD-10-CM

## 2021-05-05 LAB — POCT GLUCOSE: 194 MG/DL (ref 70–110)

## 2021-05-05 PROCEDURE — 43239 EGD BIOPSY SINGLE/MULTIPLE: CPT | Mod: 59,,, | Performed by: INTERNAL MEDICINE

## 2021-05-05 PROCEDURE — 45380 COLONOSCOPY AND BIOPSY: CPT | Performed by: INTERNAL MEDICINE

## 2021-05-05 PROCEDURE — 25000003 PHARM REV CODE 250: Performed by: NURSE ANESTHETIST, CERTIFIED REGISTERED

## 2021-05-05 PROCEDURE — 88342 IMHCHEM/IMCYTCHM 1ST ANTB: CPT | Mod: 26,,, | Performed by: PATHOLOGY

## 2021-05-05 PROCEDURE — 43251 EGD REMOVE LESION SNARE: CPT | Mod: ,,, | Performed by: INTERNAL MEDICINE

## 2021-05-05 PROCEDURE — 88342 CHG IMMUNOCYTOCHEMISTRY: ICD-10-PCS | Mod: 26,,, | Performed by: PATHOLOGY

## 2021-05-05 PROCEDURE — 37000009 HC ANESTHESIA EA ADD 15 MINS: Performed by: INTERNAL MEDICINE

## 2021-05-05 PROCEDURE — 45380 PR COLONOSCOPY,BIOPSY: ICD-10-PCS | Mod: ,,, | Performed by: INTERNAL MEDICINE

## 2021-05-05 PROCEDURE — 43239 PR EGD, FLEX, W/BIOPSY, SGL/MULTI: ICD-10-PCS | Mod: 59,,, | Performed by: INTERNAL MEDICINE

## 2021-05-05 PROCEDURE — 27201089 HC SNARE, DISP (ANY): Performed by: INTERNAL MEDICINE

## 2021-05-05 PROCEDURE — 88305 TISSUE EXAM BY PATHOLOGIST: CPT | Mod: 26,,, | Performed by: PATHOLOGY

## 2021-05-05 PROCEDURE — 43251 EGD REMOVE LESION SNARE: CPT | Performed by: INTERNAL MEDICINE

## 2021-05-05 PROCEDURE — 88305 TISSUE EXAM BY PATHOLOGIST: ICD-10-PCS | Mod: 26,,, | Performed by: PATHOLOGY

## 2021-05-05 PROCEDURE — 27201012 HC FORCEPS, HOT/COLD, DISP: Performed by: INTERNAL MEDICINE

## 2021-05-05 PROCEDURE — 88342 IMHCHEM/IMCYTCHM 1ST ANTB: CPT | Performed by: PATHOLOGY

## 2021-05-05 PROCEDURE — 37000008 HC ANESTHESIA 1ST 15 MINUTES: Performed by: INTERNAL MEDICINE

## 2021-05-05 PROCEDURE — 43251 PR EGD, FLEX, W/REMOVAL, TUMOR/POLYP/LESION(S), SNARE: ICD-10-PCS | Mod: ,,, | Performed by: INTERNAL MEDICINE

## 2021-05-05 PROCEDURE — 88305 TISSUE EXAM BY PATHOLOGIST: CPT | Mod: 59 | Performed by: PATHOLOGY

## 2021-05-05 PROCEDURE — 45380 COLONOSCOPY AND BIOPSY: CPT | Mod: ,,, | Performed by: INTERNAL MEDICINE

## 2021-05-05 PROCEDURE — 63600175 PHARM REV CODE 636 W HCPCS: Performed by: NURSE ANESTHETIST, CERTIFIED REGISTERED

## 2021-05-05 PROCEDURE — 43239 EGD BIOPSY SINGLE/MULTIPLE: CPT | Performed by: INTERNAL MEDICINE

## 2021-05-05 RX ORDER — SODIUM CHLORIDE 0.9 % (FLUSH) 0.9 %
10 SYRINGE (ML) INJECTION
Status: DISCONTINUED | OUTPATIENT
Start: 2021-05-05 | End: 2021-05-05 | Stop reason: HOSPADM

## 2021-05-05 RX ORDER — SODIUM CHLORIDE, SODIUM LACTATE, POTASSIUM CHLORIDE, CALCIUM CHLORIDE 600; 310; 30; 20 MG/100ML; MG/100ML; MG/100ML; MG/100ML
INJECTION, SOLUTION INTRAVENOUS CONTINUOUS PRN
Status: DISCONTINUED | OUTPATIENT
Start: 2021-05-05 | End: 2021-05-05

## 2021-05-05 RX ORDER — LIDOCAINE HYDROCHLORIDE 10 MG/ML
INJECTION, SOLUTION EPIDURAL; INFILTRATION; INTRACAUDAL; PERINEURAL
Status: DISCONTINUED | OUTPATIENT
Start: 2021-05-05 | End: 2021-05-05

## 2021-05-05 RX ORDER — SODIUM CHLORIDE, SODIUM LACTATE, POTASSIUM CHLORIDE, CALCIUM CHLORIDE 600; 310; 30; 20 MG/100ML; MG/100ML; MG/100ML; MG/100ML
INJECTION, SOLUTION INTRAVENOUS CONTINUOUS
Status: DISCONTINUED | OUTPATIENT
Start: 2021-05-05 | End: 2021-05-05 | Stop reason: HOSPADM

## 2021-05-05 RX ORDER — KETAMINE HYDROCHLORIDE 50 MG/ML
INJECTION, SOLUTION INTRAMUSCULAR; INTRAVENOUS
Status: DISCONTINUED | OUTPATIENT
Start: 2021-05-05 | End: 2021-05-05

## 2021-05-05 RX ORDER — PANTOPRAZOLE SODIUM 40 MG/1
40 TABLET, DELAYED RELEASE ORAL DAILY
COMMUNITY
End: 2021-08-02

## 2021-05-05 RX ORDER — SODIUM, POTASSIUM,MAG SULFATES 17.5-3.13G
1 SOLUTION, RECONSTITUTED, ORAL ORAL DAILY
Qty: 1 KIT | Refills: 0 | Status: SHIPPED | OUTPATIENT
Start: 2021-05-05 | End: 2021-05-07

## 2021-05-05 RX ORDER — PROPOFOL 10 MG/ML
VIAL (ML) INTRAVENOUS
Status: DISCONTINUED | OUTPATIENT
Start: 2021-05-05 | End: 2021-05-05

## 2021-05-05 RX ADMIN — PROPOFOL 30 MG: 10 INJECTION, EMULSION INTRAVENOUS at 09:05

## 2021-05-05 RX ADMIN — KETAMINE HYDROCHLORIDE 15 MG: 50 INJECTION INTRAMUSCULAR; INTRAVENOUS at 08:05

## 2021-05-05 RX ADMIN — PROPOFOL 20 MG: 10 INJECTION, EMULSION INTRAVENOUS at 08:05

## 2021-05-05 RX ADMIN — PROPOFOL 40 MG: 10 INJECTION, EMULSION INTRAVENOUS at 09:05

## 2021-05-05 RX ADMIN — GLYCOPYRROLATE 0.2 MG: 0.2 INJECTION, SOLUTION INTRAMUSCULAR; INTRAVITREAL at 08:05

## 2021-05-05 RX ADMIN — PROPOFOL 80 MG: 10 INJECTION, EMULSION INTRAVENOUS at 08:05

## 2021-05-05 RX ADMIN — SODIUM CHLORIDE, SODIUM LACTATE, POTASSIUM CHLORIDE, AND CALCIUM CHLORIDE: .6; .31; .03; .02 INJECTION, SOLUTION INTRAVENOUS at 08:05

## 2021-05-05 RX ADMIN — PROPOFOL 40 MG: 10 INJECTION, EMULSION INTRAVENOUS at 08:05

## 2021-05-05 RX ADMIN — LIDOCAINE HYDROCHLORIDE 100 MG: 10 INJECTION, SOLUTION EPIDURAL; INFILTRATION; INTRACAUDAL; PERINEURAL at 08:05

## 2021-05-06 ENCOUNTER — PATIENT MESSAGE (OUTPATIENT)
Dept: PSYCHIATRY | Facility: CLINIC | Age: 71
End: 2021-05-06

## 2021-05-06 VITALS
BODY MASS INDEX: 45.45 KG/M2 | TEMPERATURE: 98 F | WEIGHT: 240.75 LBS | HEART RATE: 68 BPM | HEIGHT: 61 IN | OXYGEN SATURATION: 99 % | SYSTOLIC BLOOD PRESSURE: 157 MMHG | DIASTOLIC BLOOD PRESSURE: 87 MMHG | RESPIRATION RATE: 18 BRPM

## 2021-05-06 RX ORDER — DULOXETIN HYDROCHLORIDE 30 MG/1
CAPSULE, DELAYED RELEASE ORAL
Qty: 90 CAPSULE | Refills: 1 | Status: SHIPPED | OUTPATIENT
Start: 2021-05-06 | End: 2021-10-13

## 2021-05-06 RX ORDER — TEMAZEPAM 30 MG/1
30 CAPSULE ORAL NIGHTLY PRN
Qty: 30 CAPSULE | Refills: 2 | OUTPATIENT
Start: 2021-05-06 | End: 2021-08-04

## 2021-05-07 DIAGNOSIS — R06.09 DOE (DYSPNEA ON EXERTION): Primary | ICD-10-CM

## 2021-05-07 LAB
FINAL PATHOLOGIC DIAGNOSIS: NORMAL
GROSS: NORMAL
Lab: NORMAL

## 2021-05-11 ENCOUNTER — PATIENT MESSAGE (OUTPATIENT)
Dept: GASTROENTEROLOGY | Facility: CLINIC | Age: 71
End: 2021-05-11

## 2021-05-14 ENCOUNTER — HOSPITAL ENCOUNTER (OUTPATIENT)
Dept: RADIOLOGY | Facility: HOSPITAL | Age: 71
Discharge: HOME | End: 2021-05-14
Attending: NURSE PRACTITIONER
Payer: MEDICARE

## 2021-05-14 ENCOUNTER — OFFICE VISIT (OUTPATIENT)
Dept: PULMONOLOGY | Facility: CLINIC | Age: 71
End: 2021-05-14
Payer: MEDICARE

## 2021-05-14 VITALS
HEART RATE: 83 BPM | DIASTOLIC BLOOD PRESSURE: 60 MMHG | BODY MASS INDEX: 46 KG/M2 | OXYGEN SATURATION: 97 % | HEIGHT: 61 IN | SYSTOLIC BLOOD PRESSURE: 146 MMHG | WEIGHT: 243.63 LBS | RESPIRATION RATE: 20 BRPM

## 2021-05-14 DIAGNOSIS — R53.83 FEELING TIRED: ICD-10-CM

## 2021-05-14 DIAGNOSIS — R06.09 DOE (DYSPNEA ON EXERTION): Primary | ICD-10-CM

## 2021-05-14 DIAGNOSIS — I48.0 PAROXYSMAL ATRIAL FIBRILLATION: ICD-10-CM

## 2021-05-14 DIAGNOSIS — G47.30 SLEEP-DISORDERED BREATHING: ICD-10-CM

## 2021-05-14 DIAGNOSIS — R06.83 SNORING: ICD-10-CM

## 2021-05-14 DIAGNOSIS — Z86.69 HISTORY OF OBSTRUCTIVE SLEEP APNEA: ICD-10-CM

## 2021-05-14 DIAGNOSIS — R06.09 DOE (DYSPNEA ON EXERTION): ICD-10-CM

## 2021-05-14 DIAGNOSIS — E66.01 CLASS 3 SEVERE OBESITY DUE TO EXCESS CALORIES WITH SERIOUS COMORBIDITY AND BODY MASS INDEX (BMI) OF 45.0 TO 49.9 IN ADULT: ICD-10-CM

## 2021-05-14 PROBLEM — E66.813 CLASS 3 SEVERE OBESITY DUE TO EXCESS CALORIES WITH SERIOUS COMORBIDITY AND BODY MASS INDEX (BMI) OF 45.0 TO 49.9 IN ADULT: Status: ACTIVE | Noted: 2017-06-22

## 2021-05-14 PROCEDURE — 71046 X-RAY EXAM CHEST 2 VIEWS: CPT | Mod: 26,,, | Performed by: RADIOLOGY

## 2021-05-14 PROCEDURE — 1159F MED LIST DOCD IN RCRD: CPT | Mod: S$GLB,,, | Performed by: NURSE PRACTITIONER

## 2021-05-14 PROCEDURE — 3008F BODY MASS INDEX DOCD: CPT | Mod: CPTII,S$GLB,, | Performed by: NURSE PRACTITIONER

## 2021-05-14 PROCEDURE — 3288F FALL RISK ASSESSMENT DOCD: CPT | Mod: CPTII,S$GLB,, | Performed by: NURSE PRACTITIONER

## 2021-05-14 PROCEDURE — 99999 PR PBB SHADOW E&M-EST. PATIENT-LVL V: CPT | Mod: PBBFAC,,, | Performed by: NURSE PRACTITIONER

## 2021-05-14 PROCEDURE — 71046 XR CHEST PA AND LATERAL: ICD-10-PCS | Mod: 26,,, | Performed by: RADIOLOGY

## 2021-05-14 PROCEDURE — 71046 X-RAY EXAM CHEST 2 VIEWS: CPT | Mod: TC

## 2021-05-14 PROCEDURE — 99204 OFFICE O/P NEW MOD 45 MIN: CPT | Mod: S$GLB,,, | Performed by: NURSE PRACTITIONER

## 2021-05-14 PROCEDURE — 1159F PR MEDICATION LIST DOCUMENTED IN MEDICAL RECORD: ICD-10-PCS | Mod: S$GLB,,, | Performed by: NURSE PRACTITIONER

## 2021-05-14 PROCEDURE — 1101F PT FALLS ASSESS-DOCD LE1/YR: CPT | Mod: CPTII,S$GLB,, | Performed by: NURSE PRACTITIONER

## 2021-05-14 PROCEDURE — 99204 PR OFFICE/OUTPT VISIT, NEW, LEVL IV, 45-59 MIN: ICD-10-PCS | Mod: S$GLB,,, | Performed by: NURSE PRACTITIONER

## 2021-05-14 PROCEDURE — 1101F PR PT FALLS ASSESS DOC 0-1 FALLS W/OUT INJ PAST YR: ICD-10-PCS | Mod: CPTII,S$GLB,, | Performed by: NURSE PRACTITIONER

## 2021-05-14 PROCEDURE — 3008F PR BODY MASS INDEX (BMI) DOCUMENTED: ICD-10-PCS | Mod: CPTII,S$GLB,, | Performed by: NURSE PRACTITIONER

## 2021-05-14 PROCEDURE — 99999 PR PBB SHADOW E&M-EST. PATIENT-LVL V: ICD-10-PCS | Mod: PBBFAC,,, | Performed by: NURSE PRACTITIONER

## 2021-05-14 PROCEDURE — 3288F PR FALLS RISK ASSESSMENT DOCUMENTED: ICD-10-PCS | Mod: CPTII,S$GLB,, | Performed by: NURSE PRACTITIONER

## 2021-05-14 RX ORDER — ALBUTEROL SULFATE 90 UG/1
2 AEROSOL, METERED RESPIRATORY (INHALATION) EVERY 4 HOURS PRN
Qty: 18 G | Refills: 11 | Status: SHIPPED | OUTPATIENT
Start: 2021-05-14 | End: 2023-04-11

## 2021-05-19 ENCOUNTER — PATIENT MESSAGE (OUTPATIENT)
Dept: GASTROENTEROLOGY | Facility: CLINIC | Age: 71
End: 2021-05-19

## 2021-05-19 ENCOUNTER — TELEPHONE (OUTPATIENT)
Dept: GASTROENTEROLOGY | Facility: CLINIC | Age: 71
End: 2021-05-19

## 2021-05-19 ENCOUNTER — PATIENT MESSAGE (OUTPATIENT)
Dept: INTERNAL MEDICINE | Facility: CLINIC | Age: 71
End: 2021-05-19

## 2021-05-19 RX ORDER — SODIUM, POTASSIUM,MAG SULFATES 17.5-3.13G
1 SOLUTION, RECONSTITUTED, ORAL ORAL DAILY
Qty: 1 KIT | Refills: 0 | Status: CANCELLED | OUTPATIENT
Start: 2021-05-19 | End: 2021-05-21

## 2021-05-20 DIAGNOSIS — K62.5 RECTAL BLEEDING: Primary | ICD-10-CM

## 2021-05-20 RX ORDER — SODIUM, POTASSIUM,MAG SULFATES 17.5-3.13G
1 SOLUTION, RECONSTITUTED, ORAL ORAL DAILY
Qty: 1 KIT | Refills: 0 | Status: SHIPPED | OUTPATIENT
Start: 2021-05-20 | End: 2021-05-22

## 2021-05-24 ENCOUNTER — LAB VISIT (OUTPATIENT)
Dept: LAB | Facility: HOSPITAL | Age: 71
End: 2021-05-24
Attending: FAMILY MEDICINE
Payer: MEDICARE

## 2021-05-24 ENCOUNTER — OFFICE VISIT (OUTPATIENT)
Dept: INTERNAL MEDICINE | Facility: CLINIC | Age: 71
End: 2021-05-24
Payer: MEDICARE

## 2021-05-24 VITALS
WEIGHT: 246.5 LBS | DIASTOLIC BLOOD PRESSURE: 62 MMHG | OXYGEN SATURATION: 97 % | BODY MASS INDEX: 46.54 KG/M2 | HEIGHT: 61 IN | SYSTOLIC BLOOD PRESSURE: 110 MMHG | HEART RATE: 85 BPM | TEMPERATURE: 98 F

## 2021-05-24 DIAGNOSIS — I10 ESSENTIAL HYPERTENSION: ICD-10-CM

## 2021-05-24 DIAGNOSIS — Z79.4 CONTROLLED TYPE 2 DIABETES MELLITUS WITH OTHER CIRCULATORY COMPLICATION, WITH LONG-TERM CURRENT USE OF INSULIN: ICD-10-CM

## 2021-05-24 DIAGNOSIS — E11.59 CONTROLLED TYPE 2 DIABETES MELLITUS WITH OTHER CIRCULATORY COMPLICATION, WITH LONG-TERM CURRENT USE OF INSULIN: Primary | ICD-10-CM

## 2021-05-24 DIAGNOSIS — E11.59 CONTROLLED TYPE 2 DIABETES MELLITUS WITH OTHER CIRCULATORY COMPLICATION, WITH LONG-TERM CURRENT USE OF INSULIN: ICD-10-CM

## 2021-05-24 DIAGNOSIS — Z79.4 CONTROLLED TYPE 2 DIABETES MELLITUS WITH OTHER CIRCULATORY COMPLICATION, WITH LONG-TERM CURRENT USE OF INSULIN: Primary | ICD-10-CM

## 2021-05-24 DIAGNOSIS — I48.0 PAROXYSMAL ATRIAL FIBRILLATION: ICD-10-CM

## 2021-05-24 DIAGNOSIS — F33.1 DEPRESSION, MAJOR, RECURRENT, MODERATE: ICD-10-CM

## 2021-05-24 DIAGNOSIS — Z79.4 CONTROLLED TYPE 2 DIABETES MELLITUS WITH DIABETIC POLYNEUROPATHY, WITH LONG-TERM CURRENT USE OF INSULIN: ICD-10-CM

## 2021-05-24 DIAGNOSIS — E11.42 CONTROLLED TYPE 2 DIABETES MELLITUS WITH DIABETIC POLYNEUROPATHY, WITH LONG-TERM CURRENT USE OF INSULIN: ICD-10-CM

## 2021-05-24 LAB
ALBUMIN SERPL BCP-MCNC: 3.8 G/DL (ref 3.5–5.2)
ALP SERPL-CCNC: 45 U/L (ref 55–135)
ALT SERPL W/O P-5'-P-CCNC: 19 U/L (ref 10–44)
ANION GAP SERPL CALC-SCNC: 9 MMOL/L (ref 8–16)
AST SERPL-CCNC: 16 U/L (ref 10–40)
BILIRUB SERPL-MCNC: 0.7 MG/DL (ref 0.1–1)
BUN SERPL-MCNC: 15 MG/DL (ref 8–23)
CALCIUM SERPL-MCNC: 9.9 MG/DL (ref 8.7–10.5)
CHLORIDE SERPL-SCNC: 103 MMOL/L (ref 95–110)
CHOLEST SERPL-MCNC: 168 MG/DL (ref 120–199)
CHOLEST/HDLC SERPL: 3.1 {RATIO} (ref 2–5)
CO2 SERPL-SCNC: 27 MMOL/L (ref 23–29)
CREAT SERPL-MCNC: 0.9 MG/DL (ref 0.5–1.4)
EST. GFR  (AFRICAN AMERICAN): >60 ML/MIN/1.73 M^2
EST. GFR  (NON AFRICAN AMERICAN): >60 ML/MIN/1.73 M^2
ESTIMATED AVG GLUCOSE: 163 MG/DL (ref 68–131)
GLUCOSE SERPL-MCNC: 162 MG/DL (ref 70–110)
HBA1C MFR BLD: 7.3 % (ref 4–5.6)
HDLC SERPL-MCNC: 55 MG/DL (ref 40–75)
HDLC SERPL: 32.7 % (ref 20–50)
LDLC SERPL CALC-MCNC: 80.2 MG/DL (ref 63–159)
NONHDLC SERPL-MCNC: 113 MG/DL
POTASSIUM SERPL-SCNC: 4.9 MMOL/L (ref 3.5–5.1)
PROT SERPL-MCNC: 6.8 G/DL (ref 6–8.4)
SODIUM SERPL-SCNC: 139 MMOL/L (ref 136–145)
TRIGL SERPL-MCNC: 164 MG/DL (ref 30–150)

## 2021-05-24 PROCEDURE — 3288F PR FALLS RISK ASSESSMENT DOCUMENTED: ICD-10-PCS | Mod: CPTII,S$GLB,, | Performed by: FAMILY MEDICINE

## 2021-05-24 PROCEDURE — 3008F PR BODY MASS INDEX (BMI) DOCUMENTED: ICD-10-PCS | Mod: CPTII,S$GLB,, | Performed by: FAMILY MEDICINE

## 2021-05-24 PROCEDURE — 1159F PR MEDICATION LIST DOCUMENTED IN MEDICAL RECORD: ICD-10-PCS | Mod: S$GLB,,, | Performed by: FAMILY MEDICINE

## 2021-05-24 PROCEDURE — 1101F PT FALLS ASSESS-DOCD LE1/YR: CPT | Mod: CPTII,S$GLB,, | Performed by: FAMILY MEDICINE

## 2021-05-24 PROCEDURE — 80053 COMPREHEN METABOLIC PANEL: CPT | Performed by: FAMILY MEDICINE

## 2021-05-24 PROCEDURE — 3078F DIAST BP <80 MM HG: CPT | Mod: CPTII,S$GLB,, | Performed by: FAMILY MEDICINE

## 2021-05-24 PROCEDURE — 99999 PR PBB SHADOW E&M-EST. PATIENT-LVL V: CPT | Mod: PBBFAC,,, | Performed by: FAMILY MEDICINE

## 2021-05-24 PROCEDURE — 99214 PR OFFICE/OUTPT VISIT, EST, LEVL IV, 30-39 MIN: ICD-10-PCS | Mod: S$GLB,,, | Performed by: FAMILY MEDICINE

## 2021-05-24 PROCEDURE — 80061 LIPID PANEL: CPT | Performed by: FAMILY MEDICINE

## 2021-05-24 PROCEDURE — 3008F BODY MASS INDEX DOCD: CPT | Mod: CPTII,S$GLB,, | Performed by: FAMILY MEDICINE

## 2021-05-24 PROCEDURE — 3074F SYST BP LT 130 MM HG: CPT | Mod: CPTII,S$GLB,, | Performed by: FAMILY MEDICINE

## 2021-05-24 PROCEDURE — 1159F MED LIST DOCD IN RCRD: CPT | Mod: S$GLB,,, | Performed by: FAMILY MEDICINE

## 2021-05-24 PROCEDURE — 3074F PR MOST RECENT SYSTOLIC BLOOD PRESSURE < 130 MM HG: ICD-10-PCS | Mod: CPTII,S$GLB,, | Performed by: FAMILY MEDICINE

## 2021-05-24 PROCEDURE — 99214 OFFICE O/P EST MOD 30 MIN: CPT | Mod: S$GLB,,, | Performed by: FAMILY MEDICINE

## 2021-05-24 PROCEDURE — 3288F FALL RISK ASSESSMENT DOCD: CPT | Mod: CPTII,S$GLB,, | Performed by: FAMILY MEDICINE

## 2021-05-24 PROCEDURE — 83036 HEMOGLOBIN GLYCOSYLATED A1C: CPT | Performed by: FAMILY MEDICINE

## 2021-05-24 PROCEDURE — 1126F PR PAIN SEVERITY QUANTIFIED, NO PAIN PRESENT: ICD-10-PCS | Mod: S$GLB,,, | Performed by: FAMILY MEDICINE

## 2021-05-24 PROCEDURE — 1101F PR PT FALLS ASSESS DOC 0-1 FALLS W/OUT INJ PAST YR: ICD-10-PCS | Mod: CPTII,S$GLB,, | Performed by: FAMILY MEDICINE

## 2021-05-24 PROCEDURE — 3078F PR MOST RECENT DIASTOLIC BLOOD PRESSURE < 80 MM HG: ICD-10-PCS | Mod: CPTII,S$GLB,, | Performed by: FAMILY MEDICINE

## 2021-05-24 PROCEDURE — 99999 PR PBB SHADOW E&M-EST. PATIENT-LVL V: ICD-10-PCS | Mod: PBBFAC,,, | Performed by: FAMILY MEDICINE

## 2021-05-24 PROCEDURE — 3044F PR MOST RECENT HEMOGLOBIN A1C LEVEL <7.0%: ICD-10-PCS | Mod: CPTII,S$GLB,, | Performed by: FAMILY MEDICINE

## 2021-05-24 PROCEDURE — 1126F AMNT PAIN NOTED NONE PRSNT: CPT | Mod: S$GLB,,, | Performed by: FAMILY MEDICINE

## 2021-05-24 PROCEDURE — 36415 COLL VENOUS BLD VENIPUNCTURE: CPT | Performed by: FAMILY MEDICINE

## 2021-05-24 PROCEDURE — 3044F HG A1C LEVEL LT 7.0%: CPT | Mod: CPTII,S$GLB,, | Performed by: FAMILY MEDICINE

## 2021-05-25 ENCOUNTER — PATIENT MESSAGE (OUTPATIENT)
Dept: GASTROENTEROLOGY | Facility: CLINIC | Age: 71
End: 2021-05-25

## 2021-05-25 RX ORDER — ATORVASTATIN CALCIUM 40 MG/1
TABLET, FILM COATED ORAL
Qty: 90 TABLET | Refills: 0 | Status: SHIPPED | OUTPATIENT
Start: 2021-05-25 | End: 2022-04-25 | Stop reason: SINTOL

## 2021-05-27 DIAGNOSIS — E11.42 CONTROLLED TYPE 2 DIABETES MELLITUS WITH DIABETIC POLYNEUROPATHY, WITH LONG-TERM CURRENT USE OF INSULIN: Primary | ICD-10-CM

## 2021-05-27 DIAGNOSIS — Z79.4 CONTROLLED TYPE 2 DIABETES MELLITUS WITH DIABETIC POLYNEUROPATHY, WITH LONG-TERM CURRENT USE OF INSULIN: Primary | ICD-10-CM

## 2021-06-01 RX ORDER — PEN NEEDLE, DIABETIC 30 GX3/16"
NEEDLE, DISPOSABLE MISCELLANEOUS
Qty: 200 EACH | Refills: 3 | Status: SHIPPED | OUTPATIENT
Start: 2021-06-01 | End: 2021-09-07 | Stop reason: SDUPTHER

## 2021-06-09 ENCOUNTER — TELEPHONE (OUTPATIENT)
Dept: RHEUMATOLOGY | Facility: CLINIC | Age: 71
End: 2021-06-09

## 2021-06-09 ENCOUNTER — PATIENT MESSAGE (OUTPATIENT)
Dept: GASTROENTEROLOGY | Facility: CLINIC | Age: 71
End: 2021-06-09

## 2021-06-10 RX ORDER — GABAPENTIN 300 MG/1
300 CAPSULE ORAL NIGHTLY
Qty: 90 CAPSULE | Refills: 1 | Status: SHIPPED | OUTPATIENT
Start: 2021-06-10 | End: 2021-09-19 | Stop reason: SDUPTHER

## 2021-06-11 ENCOUNTER — INFUSION (OUTPATIENT)
Dept: INFUSION THERAPY | Facility: HOSPITAL | Age: 71
End: 2021-06-11
Attending: INTERNAL MEDICINE
Payer: MEDICARE

## 2021-06-11 VITALS
HEIGHT: 61 IN | OXYGEN SATURATION: 98 % | DIASTOLIC BLOOD PRESSURE: 68 MMHG | WEIGHT: 244.06 LBS | RESPIRATION RATE: 16 BRPM | HEART RATE: 77 BPM | BODY MASS INDEX: 46.08 KG/M2 | TEMPERATURE: 98 F | SYSTOLIC BLOOD PRESSURE: 148 MMHG

## 2021-06-11 DIAGNOSIS — M81.0 AGE-RELATED OSTEOPOROSIS WITHOUT CURRENT PATHOLOGICAL FRACTURE: Primary | ICD-10-CM

## 2021-06-11 PROCEDURE — 96374 THER/PROPH/DIAG INJ IV PUSH: CPT

## 2021-06-11 PROCEDURE — 63600175 PHARM REV CODE 636 W HCPCS: Mod: JG | Performed by: PHYSICIAN ASSISTANT

## 2021-06-11 RX ORDER — SODIUM CHLORIDE 0.9 % (FLUSH) 0.9 %
10 SYRINGE (ML) INJECTION
Status: CANCELLED | OUTPATIENT
Start: 2021-07-01

## 2021-06-11 RX ORDER — IBANDRONATE SODIUM 3 MG/3 ML
3 SYRINGE (ML) INTRAVENOUS
Status: COMPLETED | OUTPATIENT
Start: 2021-06-11 | End: 2021-06-11

## 2021-06-11 RX ORDER — HEPARIN 100 UNIT/ML
500 SYRINGE INTRAVENOUS
Status: CANCELLED | OUTPATIENT
Start: 2021-07-01

## 2021-06-11 RX ORDER — IBANDRONATE SODIUM 3 MG/3 ML
3 SYRINGE (ML) INTRAVENOUS
Status: CANCELLED | OUTPATIENT
Start: 2021-07-01

## 2021-06-11 RX ADMIN — IBANDRONATE SODIUM 3 MG: 3 INJECTION, SOLUTION INTRAVENOUS at 08:06

## 2021-06-15 RX ORDER — FLUCONAZOLE 150 MG/1
150 TABLET ORAL DAILY
Qty: 1 TABLET | Refills: 0 | Status: SHIPPED | OUTPATIENT
Start: 2021-06-15 | End: 2021-06-16

## 2021-06-18 ENCOUNTER — PATIENT MESSAGE (OUTPATIENT)
Dept: UROLOGY | Facility: CLINIC | Age: 71
End: 2021-06-18

## 2021-06-21 ENCOUNTER — PATIENT MESSAGE (OUTPATIENT)
Dept: INTERNAL MEDICINE | Facility: CLINIC | Age: 71
End: 2021-06-21

## 2021-06-21 RX ORDER — NYSTATIN 100000 U/G
CREAM TOPICAL 2 TIMES DAILY
Qty: 30 G | Refills: 0 | Status: SHIPPED | OUTPATIENT
Start: 2021-06-21 | End: 2023-10-23 | Stop reason: SDUPTHER

## 2021-06-21 RX ORDER — TRIAMCINOLONE ACETONIDE 1 MG/G
CREAM TOPICAL 2 TIMES DAILY
Qty: 30 G | Refills: 0 | Status: SHIPPED | OUTPATIENT
Start: 2021-06-21 | End: 2022-05-16

## 2021-06-28 ENCOUNTER — LAB VISIT (OUTPATIENT)
Dept: LAB | Facility: HOSPITAL | Age: 71
End: 2021-06-28
Attending: FAMILY MEDICINE
Payer: MEDICARE

## 2021-06-28 ENCOUNTER — OFFICE VISIT (OUTPATIENT)
Dept: INTERNAL MEDICINE | Facility: CLINIC | Age: 71
End: 2021-06-28
Payer: MEDICARE

## 2021-06-28 VITALS
DIASTOLIC BLOOD PRESSURE: 62 MMHG | HEART RATE: 82 BPM | WEIGHT: 244.94 LBS | HEIGHT: 61 IN | BODY MASS INDEX: 46.24 KG/M2 | OXYGEN SATURATION: 97 % | TEMPERATURE: 99 F | SYSTOLIC BLOOD PRESSURE: 110 MMHG

## 2021-06-28 DIAGNOSIS — I10 ESSENTIAL HYPERTENSION: ICD-10-CM

## 2021-06-28 DIAGNOSIS — I48.0 PAROXYSMAL ATRIAL FIBRILLATION: ICD-10-CM

## 2021-06-28 DIAGNOSIS — E11.65 UNCONTROLLED TYPE 2 DIABETES MELLITUS WITH HYPERGLYCEMIA: ICD-10-CM

## 2021-06-28 DIAGNOSIS — E11.65 UNCONTROLLED TYPE 2 DIABETES MELLITUS WITH HYPERGLYCEMIA: Primary | ICD-10-CM

## 2021-06-28 LAB
ESTIMATED AVG GLUCOSE: 166 MG/DL (ref 68–131)
GLUCOSE SERPL-MCNC: 180 MG/DL (ref 70–110)
HBA1C MFR BLD: 7.4 % (ref 4–5.6)

## 2021-06-28 PROCEDURE — 1101F PR PT FALLS ASSESS DOC 0-1 FALLS W/OUT INJ PAST YR: ICD-10-PCS | Mod: CPTII,S$GLB,, | Performed by: FAMILY MEDICINE

## 2021-06-28 PROCEDURE — 36415 COLL VENOUS BLD VENIPUNCTURE: CPT | Performed by: FAMILY MEDICINE

## 2021-06-28 PROCEDURE — 99214 PR OFFICE/OUTPT VISIT, EST, LEVL IV, 30-39 MIN: ICD-10-PCS | Mod: S$GLB,,, | Performed by: FAMILY MEDICINE

## 2021-06-28 PROCEDURE — 3008F BODY MASS INDEX DOCD: CPT | Mod: CPTII,S$GLB,, | Performed by: FAMILY MEDICINE

## 2021-06-28 PROCEDURE — 99214 OFFICE O/P EST MOD 30 MIN: CPT | Mod: S$GLB,,, | Performed by: FAMILY MEDICINE

## 2021-06-28 PROCEDURE — 1159F PR MEDICATION LIST DOCUMENTED IN MEDICAL RECORD: ICD-10-PCS | Mod: S$GLB,,, | Performed by: FAMILY MEDICINE

## 2021-06-28 PROCEDURE — 3288F PR FALLS RISK ASSESSMENT DOCUMENTED: ICD-10-PCS | Mod: CPTII,S$GLB,, | Performed by: FAMILY MEDICINE

## 2021-06-28 PROCEDURE — 3008F PR BODY MASS INDEX (BMI) DOCUMENTED: ICD-10-PCS | Mod: CPTII,S$GLB,, | Performed by: FAMILY MEDICINE

## 2021-06-28 PROCEDURE — 99999 PR PBB SHADOW E&M-EST. PATIENT-LVL V: ICD-10-PCS | Mod: PBBFAC,,, | Performed by: FAMILY MEDICINE

## 2021-06-28 PROCEDURE — 3051F PR MOST RECENT HEMOGLOBIN A1C LEVEL 7.0 - < 8.0%: ICD-10-PCS | Mod: CPTII,S$GLB,, | Performed by: FAMILY MEDICINE

## 2021-06-28 PROCEDURE — 99999 PR PBB SHADOW E&M-EST. PATIENT-LVL V: CPT | Mod: PBBFAC,,, | Performed by: FAMILY MEDICINE

## 2021-06-28 PROCEDURE — 1101F PT FALLS ASSESS-DOCD LE1/YR: CPT | Mod: CPTII,S$GLB,, | Performed by: FAMILY MEDICINE

## 2021-06-28 PROCEDURE — 3051F HG A1C>EQUAL 7.0%<8.0%: CPT | Mod: CPTII,S$GLB,, | Performed by: FAMILY MEDICINE

## 2021-06-28 PROCEDURE — 82947 ASSAY GLUCOSE BLOOD QUANT: CPT | Performed by: FAMILY MEDICINE

## 2021-06-28 PROCEDURE — 1126F PR PAIN SEVERITY QUANTIFIED, NO PAIN PRESENT: ICD-10-PCS | Mod: S$GLB,,, | Performed by: FAMILY MEDICINE

## 2021-06-28 PROCEDURE — 1159F MED LIST DOCD IN RCRD: CPT | Mod: S$GLB,,, | Performed by: FAMILY MEDICINE

## 2021-06-28 PROCEDURE — 83036 HEMOGLOBIN GLYCOSYLATED A1C: CPT | Performed by: FAMILY MEDICINE

## 2021-06-28 PROCEDURE — 1126F AMNT PAIN NOTED NONE PRSNT: CPT | Mod: S$GLB,,, | Performed by: FAMILY MEDICINE

## 2021-06-28 PROCEDURE — 3288F FALL RISK ASSESSMENT DOCD: CPT | Mod: CPTII,S$GLB,, | Performed by: FAMILY MEDICINE

## 2021-06-30 ENCOUNTER — ANESTHESIA (OUTPATIENT)
Dept: ENDOSCOPY | Facility: HOSPITAL | Age: 71
End: 2021-06-30
Payer: MEDICARE

## 2021-06-30 ENCOUNTER — HOSPITAL ENCOUNTER (OUTPATIENT)
Facility: HOSPITAL | Age: 71
Discharge: HOME | End: 2021-06-30
Attending: INTERNAL MEDICINE | Admitting: INTERNAL MEDICINE
Payer: MEDICARE

## 2021-06-30 ENCOUNTER — ANESTHESIA EVENT (OUTPATIENT)
Dept: ENDOSCOPY | Facility: HOSPITAL | Age: 71
End: 2021-06-30
Payer: MEDICARE

## 2021-06-30 DIAGNOSIS — K62.5 RECTAL BLEEDING: Primary | ICD-10-CM

## 2021-06-30 LAB
GLUCOSE SERPL-MCNC: 217 MG/DL (ref 70–110)
POCT GLUCOSE: 217 MG/DL (ref 70–110)

## 2021-06-30 PROCEDURE — 88305 TISSUE EXAM BY PATHOLOGIST: CPT | Performed by: STUDENT IN AN ORGANIZED HEALTH CARE EDUCATION/TRAINING PROGRAM

## 2021-06-30 PROCEDURE — 25000003 PHARM REV CODE 250: Performed by: NURSE ANESTHETIST, CERTIFIED REGISTERED

## 2021-06-30 PROCEDURE — 45380 COLONOSCOPY AND BIOPSY: CPT | Performed by: INTERNAL MEDICINE

## 2021-06-30 PROCEDURE — 88305 TISSUE EXAM BY PATHOLOGIST: ICD-10-PCS | Mod: 26,,, | Performed by: STUDENT IN AN ORGANIZED HEALTH CARE EDUCATION/TRAINING PROGRAM

## 2021-06-30 PROCEDURE — 63600175 PHARM REV CODE 636 W HCPCS: Performed by: NURSE ANESTHETIST, CERTIFIED REGISTERED

## 2021-06-30 PROCEDURE — 88305 TISSUE EXAM BY PATHOLOGIST: CPT | Mod: 26,,, | Performed by: STUDENT IN AN ORGANIZED HEALTH CARE EDUCATION/TRAINING PROGRAM

## 2021-06-30 PROCEDURE — 63600175 PHARM REV CODE 636 W HCPCS: Performed by: INTERNAL MEDICINE

## 2021-06-30 PROCEDURE — 45380 PR COLONOSCOPY,BIOPSY: ICD-10-PCS | Mod: ,,, | Performed by: INTERNAL MEDICINE

## 2021-06-30 PROCEDURE — 45380 COLONOSCOPY AND BIOPSY: CPT | Mod: ,,, | Performed by: INTERNAL MEDICINE

## 2021-06-30 PROCEDURE — 37000009 HC ANESTHESIA EA ADD 15 MINS: Performed by: INTERNAL MEDICINE

## 2021-06-30 PROCEDURE — 27201012 HC FORCEPS, HOT/COLD, DISP: Performed by: INTERNAL MEDICINE

## 2021-06-30 PROCEDURE — 37000008 HC ANESTHESIA 1ST 15 MINUTES: Performed by: INTERNAL MEDICINE

## 2021-06-30 RX ORDER — PROPOFOL 10 MG/ML
VIAL (ML) INTRAVENOUS
Status: DISCONTINUED | OUTPATIENT
Start: 2021-06-30 | End: 2021-06-30

## 2021-06-30 RX ORDER — LIDOCAINE HYDROCHLORIDE 10 MG/ML
INJECTION, SOLUTION EPIDURAL; INFILTRATION; INTRACAUDAL; PERINEURAL
Status: DISCONTINUED | OUTPATIENT
Start: 2021-06-30 | End: 2021-06-30

## 2021-06-30 RX ORDER — SODIUM CHLORIDE, SODIUM LACTATE, POTASSIUM CHLORIDE, CALCIUM CHLORIDE 600; 310; 30; 20 MG/100ML; MG/100ML; MG/100ML; MG/100ML
INJECTION, SOLUTION INTRAVENOUS CONTINUOUS
Status: DISCONTINUED | OUTPATIENT
Start: 2021-06-30 | End: 2021-06-30 | Stop reason: HOSPADM

## 2021-06-30 RX ADMIN — PROPOFOL 40 MG: 10 INJECTION, EMULSION INTRAVENOUS at 10:06

## 2021-06-30 RX ADMIN — SODIUM CHLORIDE, SODIUM LACTATE, POTASSIUM CHLORIDE, AND CALCIUM CHLORIDE: 600; 310; 30; 20 INJECTION, SOLUTION INTRAVENOUS at 09:06

## 2021-06-30 RX ADMIN — PROPOFOL 80 MG: 10 INJECTION, EMULSION INTRAVENOUS at 10:06

## 2021-06-30 RX ADMIN — LIDOCAINE HYDROCHLORIDE 50 MG: 10 INJECTION, SOLUTION EPIDURAL; INFILTRATION; INTRACAUDAL; PERINEURAL at 10:06

## 2021-07-01 ENCOUNTER — PATIENT MESSAGE (OUTPATIENT)
Dept: PSYCHIATRY | Facility: CLINIC | Age: 71
End: 2021-07-01

## 2021-07-01 DIAGNOSIS — G47.00 INSOMNIA, UNSPECIFIED TYPE: ICD-10-CM

## 2021-07-01 DIAGNOSIS — F33.1 DEPRESSION, MAJOR, RECURRENT, MODERATE: ICD-10-CM

## 2021-07-01 RX ORDER — DULOXETIN HYDROCHLORIDE 60 MG/1
60 CAPSULE, DELAYED RELEASE ORAL DAILY
Qty: 30 CAPSULE | Refills: 5 | Status: CANCELLED | OUTPATIENT
Start: 2021-07-01 | End: 2022-07-01

## 2021-07-01 RX ORDER — TEMAZEPAM 30 MG/1
30 CAPSULE ORAL NIGHTLY PRN
Qty: 30 CAPSULE | Refills: 2 | Status: CANCELLED | OUTPATIENT
Start: 2021-07-01 | End: 2021-09-29

## 2021-07-02 DIAGNOSIS — G47.00 INSOMNIA, UNSPECIFIED TYPE: ICD-10-CM

## 2021-07-02 RX ORDER — TEMAZEPAM 30 MG/1
30 CAPSULE ORAL NIGHTLY PRN
Qty: 30 CAPSULE | Refills: 2 | Status: CANCELLED | OUTPATIENT
Start: 2021-07-02 | End: 2021-09-30

## 2021-07-06 VITALS
HEART RATE: 65 BPM | DIASTOLIC BLOOD PRESSURE: 92 MMHG | TEMPERATURE: 98 F | OXYGEN SATURATION: 99 % | BODY MASS INDEX: 45.49 KG/M2 | WEIGHT: 240.94 LBS | RESPIRATION RATE: 15 BRPM | SYSTOLIC BLOOD PRESSURE: 149 MMHG | HEIGHT: 61 IN

## 2021-07-06 DIAGNOSIS — E13.65 OTHER SPECIFIED DIABETES MELLITUS WITH HYPERGLYCEMIA, WITHOUT LONG-TERM CURRENT USE OF INSULIN: ICD-10-CM

## 2021-07-07 LAB
FINAL PATHOLOGIC DIAGNOSIS: NORMAL
GROSS: NORMAL
Lab: NORMAL

## 2021-07-09 ENCOUNTER — PATIENT MESSAGE (OUTPATIENT)
Dept: GASTROENTEROLOGY | Facility: CLINIC | Age: 71
End: 2021-07-09

## 2021-07-09 ENCOUNTER — PATIENT MESSAGE (OUTPATIENT)
Dept: PULMONOLOGY | Facility: CLINIC | Age: 71
End: 2021-07-09

## 2021-07-13 ENCOUNTER — PATIENT MESSAGE (OUTPATIENT)
Dept: INTERNAL MEDICINE | Facility: CLINIC | Age: 71
End: 2021-07-13

## 2021-07-14 DIAGNOSIS — R35.0 URINARY FREQUENCY: Primary | ICD-10-CM

## 2021-07-21 ENCOUNTER — TELEPHONE (OUTPATIENT)
Dept: PULMONOLOGY | Facility: CLINIC | Age: 71
End: 2021-07-21

## 2021-07-21 NOTE — TELEPHONE ENCOUNTER
Pt wanted to know if she missed a call from Pulmonary. I advised pt I didn't see a telephone encounter.

## 2021-07-21 NOTE — TELEPHONE ENCOUNTER
----- Message from Jodi Alonso sent at 7/21/2021  8:25 AM CDT -----  Isabel Landry was returning call from nurse. Please call back at [phone]. Thanks

## 2021-07-26 ENCOUNTER — PATIENT MESSAGE (OUTPATIENT)
Dept: INTERNAL MEDICINE | Facility: CLINIC | Age: 71
End: 2021-07-26

## 2021-07-28 RX ORDER — INSULIN GLARGINE 100 [IU]/ML
72 INJECTION, SOLUTION SUBCUTANEOUS NIGHTLY
Qty: 3 ML | Refills: 3 | Status: SHIPPED | OUTPATIENT
Start: 2021-07-28 | End: 2021-08-02 | Stop reason: SDUPTHER

## 2021-07-29 RX ORDER — INSULIN GLARGINE 100 [IU]/ML
INJECTION, SOLUTION SUBCUTANEOUS
Qty: 45 ML | OUTPATIENT
Start: 2021-07-29

## 2021-07-31 ENCOUNTER — PATIENT MESSAGE (OUTPATIENT)
Dept: INTERNAL MEDICINE | Facility: CLINIC | Age: 71
End: 2021-07-31

## 2021-08-02 RX ORDER — PANTOPRAZOLE SODIUM 40 MG/1
TABLET, DELAYED RELEASE ORAL
Qty: 180 TABLET | Refills: 3 | Status: SHIPPED | OUTPATIENT
Start: 2021-08-02 | End: 2022-03-11 | Stop reason: SDUPTHER

## 2021-08-02 RX ORDER — INSULIN GLARGINE 100 [IU]/ML
72 INJECTION, SOLUTION SUBCUTANEOUS NIGHTLY
Qty: 3 BOX | Refills: 3 | Status: SHIPPED | OUTPATIENT
Start: 2021-08-02 | End: 2021-09-29 | Stop reason: SDUPTHER

## 2021-08-04 RX ORDER — LIRAGLUTIDE 6 MG/ML
INJECTION SUBCUTANEOUS
Qty: 9 ML | Refills: 1 | Status: SHIPPED | OUTPATIENT
Start: 2021-08-04 | End: 2021-11-05 | Stop reason: SDUPTHER

## 2021-08-05 ENCOUNTER — PATIENT MESSAGE (OUTPATIENT)
Dept: INTERNAL MEDICINE | Facility: CLINIC | Age: 71
End: 2021-08-05

## 2021-08-06 RX ORDER — TEMAZEPAM 30 MG/1
30 CAPSULE ORAL NIGHTLY PRN
COMMUNITY
End: 2021-08-09

## 2021-08-08 ENCOUNTER — PATIENT MESSAGE (OUTPATIENT)
Dept: INTERNAL MEDICINE | Facility: CLINIC | Age: 71
End: 2021-08-08

## 2021-08-09 RX ORDER — TEMAZEPAM 30 MG/1
30 CAPSULE ORAL NIGHTLY PRN
OUTPATIENT
Start: 2021-08-09

## 2021-08-09 RX ORDER — SITAGLIPTIN 100 MG/1
TABLET, FILM COATED ORAL
Qty: 90 TABLET | Refills: 0 | Status: SHIPPED | OUTPATIENT
Start: 2021-08-09 | End: 2021-11-09 | Stop reason: SDUPTHER

## 2021-08-09 RX ORDER — TEMAZEPAM 30 MG/1
CAPSULE ORAL
Qty: 30 CAPSULE | Refills: 0 | Status: SHIPPED | OUTPATIENT
Start: 2021-08-09 | End: 2021-09-07 | Stop reason: SDUPTHER

## 2021-08-09 RX ORDER — GABAPENTIN 100 MG/1
CAPSULE ORAL
Qty: 180 CAPSULE | Refills: 0 | Status: SHIPPED | OUTPATIENT
Start: 2021-08-09 | End: 2021-12-14

## 2021-08-10 ENCOUNTER — TELEPHONE (OUTPATIENT)
Dept: CARDIOLOGY | Facility: CLINIC | Age: 71
End: 2021-08-10

## 2021-08-11 ENCOUNTER — OFFICE VISIT (OUTPATIENT)
Dept: CARDIOLOGY | Facility: CLINIC | Age: 71
End: 2021-08-11
Payer: MEDICARE

## 2021-08-11 DIAGNOSIS — Z79.4 CONTROLLED TYPE 2 DIABETES MELLITUS WITH DIABETIC POLYNEUROPATHY, WITH LONG-TERM CURRENT USE OF INSULIN: ICD-10-CM

## 2021-08-11 DIAGNOSIS — E66.01 CLASS 3 SEVERE OBESITY DUE TO EXCESS CALORIES WITH SERIOUS COMORBIDITY AND BODY MASS INDEX (BMI) OF 45.0 TO 49.9 IN ADULT: ICD-10-CM

## 2021-08-11 DIAGNOSIS — I10 ESSENTIAL HYPERTENSION: ICD-10-CM

## 2021-08-11 DIAGNOSIS — R06.09 DOE (DYSPNEA ON EXERTION): ICD-10-CM

## 2021-08-11 DIAGNOSIS — I25.118 CORONARY ARTERY DISEASE OF NATIVE ARTERY OF NATIVE HEART WITH STABLE ANGINA PECTORIS: ICD-10-CM

## 2021-08-11 DIAGNOSIS — E11.42 CONTROLLED TYPE 2 DIABETES MELLITUS WITH DIABETIC POLYNEUROPATHY, WITH LONG-TERM CURRENT USE OF INSULIN: ICD-10-CM

## 2021-08-11 DIAGNOSIS — E78.1 HYPERTRIGLYCERIDEMIA: ICD-10-CM

## 2021-08-11 DIAGNOSIS — R94.31 ABNORMAL ECG: ICD-10-CM

## 2021-08-11 DIAGNOSIS — R00.2 PALPITATIONS: ICD-10-CM

## 2021-08-11 DIAGNOSIS — I71.40 ABDOMINAL AORTIC ANEURYSM (AAA) WITHOUT RUPTURE: ICD-10-CM

## 2021-08-11 DIAGNOSIS — M79.605 PAIN IN BOTH LOWER EXTREMITIES: ICD-10-CM

## 2021-08-11 DIAGNOSIS — I48.0 PAROXYSMAL ATRIAL FIBRILLATION: Primary | ICD-10-CM

## 2021-08-11 DIAGNOSIS — M79.604 PAIN IN BOTH LOWER EXTREMITIES: ICD-10-CM

## 2021-08-11 DIAGNOSIS — Z86.69 HISTORY OF OBSTRUCTIVE SLEEP APNEA: ICD-10-CM

## 2021-08-11 PROCEDURE — 3051F PR MOST RECENT HEMOGLOBIN A1C LEVEL 7.0 - < 8.0%: ICD-10-PCS | Mod: CPTII,95,, | Performed by: INTERNAL MEDICINE

## 2021-08-11 PROCEDURE — 1160F PR REVIEW ALL MEDS BY PRESCRIBER/CLIN PHARMACIST DOCUMENTED: ICD-10-PCS | Mod: CPTII,95,, | Performed by: INTERNAL MEDICINE

## 2021-08-11 PROCEDURE — 1159F MED LIST DOCD IN RCRD: CPT | Mod: CPTII,95,, | Performed by: INTERNAL MEDICINE

## 2021-08-11 PROCEDURE — 1160F RVW MEDS BY RX/DR IN RCRD: CPT | Mod: CPTII,95,, | Performed by: INTERNAL MEDICINE

## 2021-08-11 PROCEDURE — 99214 OFFICE O/P EST MOD 30 MIN: CPT | Mod: 95,,, | Performed by: INTERNAL MEDICINE

## 2021-08-11 PROCEDURE — 99214 PR OFFICE/OUTPT VISIT, EST, LEVL IV, 30-39 MIN: ICD-10-PCS | Mod: 95,,, | Performed by: INTERNAL MEDICINE

## 2021-08-11 PROCEDURE — 99499 UNLISTED E&M SERVICE: CPT | Mod: 95,,, | Performed by: INTERNAL MEDICINE

## 2021-08-11 PROCEDURE — 3051F HG A1C>EQUAL 7.0%<8.0%: CPT | Mod: CPTII,95,, | Performed by: INTERNAL MEDICINE

## 2021-08-11 PROCEDURE — 99499 RISK ADDL DX/OHS AUDIT: ICD-10-PCS | Mod: 95,,, | Performed by: INTERNAL MEDICINE

## 2021-08-11 PROCEDURE — 1159F PR MEDICATION LIST DOCUMENTED IN MEDICAL RECORD: ICD-10-PCS | Mod: CPTII,95,, | Performed by: INTERNAL MEDICINE

## 2021-08-12 ENCOUNTER — PATIENT MESSAGE (OUTPATIENT)
Dept: OPHTHALMOLOGY | Facility: CLINIC | Age: 71
End: 2021-08-12

## 2021-08-17 ENCOUNTER — TELEPHONE (OUTPATIENT)
Dept: PODIATRY | Facility: CLINIC | Age: 71
End: 2021-08-17

## 2021-08-17 ENCOUNTER — PATIENT MESSAGE (OUTPATIENT)
Dept: INTERNAL MEDICINE | Facility: CLINIC | Age: 71
End: 2021-08-17

## 2021-08-17 DIAGNOSIS — E11.65 UNCONTROLLED TYPE 2 DIABETES MELLITUS WITH HYPERGLYCEMIA: Primary | ICD-10-CM

## 2021-08-23 ENCOUNTER — OFFICE VISIT (OUTPATIENT)
Dept: URGENT CARE | Facility: CLINIC | Age: 71
End: 2021-08-23
Payer: MEDICARE

## 2021-08-23 VITALS
OXYGEN SATURATION: 97 % | BODY MASS INDEX: 45.53 KG/M2 | DIASTOLIC BLOOD PRESSURE: 62 MMHG | WEIGHT: 240.94 LBS | SYSTOLIC BLOOD PRESSURE: 127 MMHG | TEMPERATURE: 98 F | HEART RATE: 86 BPM

## 2021-08-23 DIAGNOSIS — R53.83 OTHER FATIGUE: Primary | ICD-10-CM

## 2021-08-23 DIAGNOSIS — J01.00 SUBACUTE MAXILLARY SINUSITIS: ICD-10-CM

## 2021-08-23 LAB
CTP QC/QA: YES
SARS-COV-2 RDRP RESP QL NAA+PROBE: NEGATIVE

## 2021-08-23 PROCEDURE — 3008F BODY MASS INDEX DOCD: CPT | Mod: CPTII,S$GLB,, | Performed by: NURSE PRACTITIONER

## 2021-08-23 PROCEDURE — 1159F PR MEDICATION LIST DOCUMENTED IN MEDICAL RECORD: ICD-10-PCS | Mod: CPTII,S$GLB,, | Performed by: NURSE PRACTITIONER

## 2021-08-23 PROCEDURE — 3008F PR BODY MASS INDEX (BMI) DOCUMENTED: ICD-10-PCS | Mod: CPTII,S$GLB,, | Performed by: NURSE PRACTITIONER

## 2021-08-23 PROCEDURE — 1101F PR PT FALLS ASSESS DOC 0-1 FALLS W/OUT INJ PAST YR: ICD-10-PCS | Mod: CPTII,S$GLB,, | Performed by: NURSE PRACTITIONER

## 2021-08-23 PROCEDURE — 3288F PR FALLS RISK ASSESSMENT DOCUMENTED: ICD-10-PCS | Mod: CPTII,S$GLB,, | Performed by: NURSE PRACTITIONER

## 2021-08-23 PROCEDURE — 1159F MED LIST DOCD IN RCRD: CPT | Mod: CPTII,S$GLB,, | Performed by: NURSE PRACTITIONER

## 2021-08-23 PROCEDURE — U0002: ICD-10-PCS | Mod: QW,S$GLB,, | Performed by: NURSE PRACTITIONER

## 2021-08-23 PROCEDURE — 1126F AMNT PAIN NOTED NONE PRSNT: CPT | Mod: CPTII,S$GLB,, | Performed by: NURSE PRACTITIONER

## 2021-08-23 PROCEDURE — 3074F SYST BP LT 130 MM HG: CPT | Mod: CPTII,S$GLB,, | Performed by: NURSE PRACTITIONER

## 2021-08-23 PROCEDURE — 99203 OFFICE O/P NEW LOW 30 MIN: CPT | Mod: S$GLB,CS,, | Performed by: NURSE PRACTITIONER

## 2021-08-23 PROCEDURE — U0002 COVID-19 LAB TEST NON-CDC: HCPCS | Mod: QW,S$GLB,, | Performed by: NURSE PRACTITIONER

## 2021-08-23 PROCEDURE — 99999 PR PBB SHADOW E&M-EST. PATIENT-LVL V: CPT | Mod: PBBFAC,,, | Performed by: NURSE PRACTITIONER

## 2021-08-23 PROCEDURE — 1126F PR PAIN SEVERITY QUANTIFIED, NO PAIN PRESENT: ICD-10-PCS | Mod: CPTII,S$GLB,, | Performed by: NURSE PRACTITIONER

## 2021-08-23 PROCEDURE — 3074F PR MOST RECENT SYSTOLIC BLOOD PRESSURE < 130 MM HG: ICD-10-PCS | Mod: CPTII,S$GLB,, | Performed by: NURSE PRACTITIONER

## 2021-08-23 PROCEDURE — 3078F DIAST BP <80 MM HG: CPT | Mod: CPTII,S$GLB,, | Performed by: NURSE PRACTITIONER

## 2021-08-23 PROCEDURE — 3051F PR MOST RECENT HEMOGLOBIN A1C LEVEL 7.0 - < 8.0%: ICD-10-PCS | Mod: CPTII,S$GLB,, | Performed by: NURSE PRACTITIONER

## 2021-08-23 PROCEDURE — 3078F PR MOST RECENT DIASTOLIC BLOOD PRESSURE < 80 MM HG: ICD-10-PCS | Mod: CPTII,S$GLB,, | Performed by: NURSE PRACTITIONER

## 2021-08-23 PROCEDURE — 1101F PT FALLS ASSESS-DOCD LE1/YR: CPT | Mod: CPTII,S$GLB,, | Performed by: NURSE PRACTITIONER

## 2021-08-23 PROCEDURE — 99203 PR OFFICE/OUTPT VISIT, NEW, LEVL III, 30-44 MIN: ICD-10-PCS | Mod: S$GLB,CS,, | Performed by: NURSE PRACTITIONER

## 2021-08-23 PROCEDURE — 99999 PR PBB SHADOW E&M-EST. PATIENT-LVL V: ICD-10-PCS | Mod: PBBFAC,,, | Performed by: NURSE PRACTITIONER

## 2021-08-23 PROCEDURE — 3051F HG A1C>EQUAL 7.0%<8.0%: CPT | Mod: CPTII,S$GLB,, | Performed by: NURSE PRACTITIONER

## 2021-08-23 PROCEDURE — 3288F FALL RISK ASSESSMENT DOCD: CPT | Mod: CPTII,S$GLB,, | Performed by: NURSE PRACTITIONER

## 2021-08-23 RX ORDER — BENZONATATE 100 MG/1
100 CAPSULE ORAL EVERY 6 HOURS PRN
Qty: 30 CAPSULE | Refills: 1 | Status: SHIPPED | OUTPATIENT
Start: 2021-08-23 | End: 2021-10-13 | Stop reason: ALTCHOICE

## 2021-08-23 RX ORDER — AMOXICILLIN AND CLAVULANATE POTASSIUM 875; 125 MG/1; MG/1
1 TABLET, FILM COATED ORAL 2 TIMES DAILY
Qty: 20 TABLET | Refills: 0 | Status: SHIPPED | OUTPATIENT
Start: 2021-08-23 | End: 2021-09-02

## 2021-09-06 ENCOUNTER — PATIENT MESSAGE (OUTPATIENT)
Dept: INTERNAL MEDICINE | Facility: CLINIC | Age: 71
End: 2021-09-06

## 2021-09-07 DIAGNOSIS — Z79.4 CONTROLLED TYPE 2 DIABETES MELLITUS WITH DIABETIC POLYNEUROPATHY, WITH LONG-TERM CURRENT USE OF INSULIN: ICD-10-CM

## 2021-09-07 DIAGNOSIS — E11.42 CONTROLLED TYPE 2 DIABETES MELLITUS WITH DIABETIC POLYNEUROPATHY, WITH LONG-TERM CURRENT USE OF INSULIN: ICD-10-CM

## 2021-09-07 RX ORDER — GABAPENTIN 100 MG/1
100 CAPSULE ORAL 2 TIMES DAILY
Qty: 180 CAPSULE | Refills: 0 | OUTPATIENT
Start: 2021-09-07

## 2021-09-07 RX ORDER — ESTRADIOL 0.1 MG/G
CREAM VAGINAL
COMMUNITY
Start: 2021-08-19 | End: 2021-12-14 | Stop reason: SDUPTHER

## 2021-09-07 RX ORDER — TEMAZEPAM 30 MG/1
CAPSULE ORAL
Qty: 30 CAPSULE | Refills: 0 | Status: SHIPPED | OUTPATIENT
Start: 2021-09-07 | End: 2021-10-04 | Stop reason: SDUPTHER

## 2021-09-07 RX ORDER — PEN NEEDLE, DIABETIC 30 GX3/16"
NEEDLE, DISPOSABLE MISCELLANEOUS
Qty: 200 EACH | Refills: 3 | Status: SHIPPED | OUTPATIENT
Start: 2021-09-07 | End: 2022-09-19 | Stop reason: SDUPTHER

## 2021-09-16 ENCOUNTER — PATIENT MESSAGE (OUTPATIENT)
Dept: INTERNAL MEDICINE | Facility: CLINIC | Age: 71
End: 2021-09-16

## 2021-09-20 ENCOUNTER — LAB VISIT (OUTPATIENT)
Dept: LAB | Facility: HOSPITAL | Age: 71
End: 2021-09-20
Attending: PHYSICIAN ASSISTANT
Payer: MEDICARE

## 2021-09-20 DIAGNOSIS — E11.65 UNCONTROLLED TYPE 2 DIABETES MELLITUS WITH HYPERGLYCEMIA: ICD-10-CM

## 2021-09-20 DIAGNOSIS — Z51.81 MEDICATION MONITORING ENCOUNTER: ICD-10-CM

## 2021-09-20 DIAGNOSIS — M81.0 AGE-RELATED OSTEOPOROSIS WITHOUT CURRENT PATHOLOGICAL FRACTURE: ICD-10-CM

## 2021-09-20 LAB
ALBUMIN SERPL BCP-MCNC: 3.8 G/DL (ref 3.5–5.2)
ALBUMIN SERPL BCP-MCNC: 3.8 G/DL (ref 3.5–5.2)
ALP SERPL-CCNC: 48 U/L (ref 55–135)
ALP SERPL-CCNC: 48 U/L (ref 55–135)
ALT SERPL W/O P-5'-P-CCNC: 17 U/L (ref 10–44)
ALT SERPL W/O P-5'-P-CCNC: 17 U/L (ref 10–44)
ANION GAP SERPL CALC-SCNC: 12 MMOL/L (ref 8–16)
ANION GAP SERPL CALC-SCNC: 12 MMOL/L (ref 8–16)
AST SERPL-CCNC: 18 U/L (ref 10–40)
AST SERPL-CCNC: 18 U/L (ref 10–40)
BILIRUB SERPL-MCNC: 0.6 MG/DL (ref 0.1–1)
BILIRUB SERPL-MCNC: 0.6 MG/DL (ref 0.1–1)
BUN SERPL-MCNC: 17 MG/DL (ref 8–23)
BUN SERPL-MCNC: 17 MG/DL (ref 8–23)
CALCIUM SERPL-MCNC: 10.1 MG/DL (ref 8.7–10.5)
CALCIUM SERPL-MCNC: 10.1 MG/DL (ref 8.7–10.5)
CHLORIDE SERPL-SCNC: 101 MMOL/L (ref 95–110)
CHLORIDE SERPL-SCNC: 101 MMOL/L (ref 95–110)
CO2 SERPL-SCNC: 25 MMOL/L (ref 23–29)
CO2 SERPL-SCNC: 25 MMOL/L (ref 23–29)
CREAT SERPL-MCNC: 0.9 MG/DL (ref 0.5–1.4)
CREAT SERPL-MCNC: 0.9 MG/DL (ref 0.5–1.4)
EST. GFR  (AFRICAN AMERICAN): >60 ML/MIN/1.73 M^2
EST. GFR  (AFRICAN AMERICAN): >60 ML/MIN/1.73 M^2
EST. GFR  (NON AFRICAN AMERICAN): >60 ML/MIN/1.73 M^2
EST. GFR  (NON AFRICAN AMERICAN): >60 ML/MIN/1.73 M^2
GLUCOSE SERPL-MCNC: 170 MG/DL (ref 70–110)
GLUCOSE SERPL-MCNC: 170 MG/DL (ref 70–110)
POTASSIUM SERPL-SCNC: 4.5 MMOL/L (ref 3.5–5.1)
POTASSIUM SERPL-SCNC: 4.5 MMOL/L (ref 3.5–5.1)
PROT SERPL-MCNC: 7 G/DL (ref 6–8.4)
PROT SERPL-MCNC: 7 G/DL (ref 6–8.4)
SODIUM SERPL-SCNC: 138 MMOL/L (ref 136–145)
SODIUM SERPL-SCNC: 138 MMOL/L (ref 136–145)

## 2021-09-20 PROCEDURE — 83036 HEMOGLOBIN GLYCOSYLATED A1C: CPT | Performed by: FAMILY MEDICINE

## 2021-09-20 PROCEDURE — 36415 COLL VENOUS BLD VENIPUNCTURE: CPT | Performed by: PHYSICIAN ASSISTANT

## 2021-09-20 PROCEDURE — 80053 COMPREHEN METABOLIC PANEL: CPT | Performed by: PHYSICIAN ASSISTANT

## 2021-09-20 PROCEDURE — 82306 VITAMIN D 25 HYDROXY: CPT | Performed by: PHYSICIAN ASSISTANT

## 2021-09-20 RX ORDER — DICLOFENAC SODIUM 10 MG/G
2 GEL TOPICAL 4 TIMES DAILY
Qty: 100 G | Refills: 3 | Status: SHIPPED | OUTPATIENT
Start: 2021-09-20 | End: 2024-03-31

## 2021-09-20 RX ORDER — GABAPENTIN 300 MG/1
300 CAPSULE ORAL NIGHTLY
Qty: 90 CAPSULE | Refills: 1 | Status: SHIPPED | OUTPATIENT
Start: 2021-09-20 | End: 2021-12-14 | Stop reason: SDUPTHER

## 2021-09-21 LAB
25(OH)D3+25(OH)D2 SERPL-MCNC: 39 NG/ML (ref 30–96)
ESTIMATED AVG GLUCOSE: 163 MG/DL (ref 68–131)
HBA1C MFR BLD: 7.3 % (ref 4–5.6)

## 2021-09-27 ENCOUNTER — PATIENT MESSAGE (OUTPATIENT)
Dept: INTERNAL MEDICINE | Facility: CLINIC | Age: 71
End: 2021-09-27

## 2021-09-28 ENCOUNTER — PATIENT MESSAGE (OUTPATIENT)
Dept: INTERNAL MEDICINE | Facility: CLINIC | Age: 71
End: 2021-09-28

## 2021-09-28 ENCOUNTER — TELEPHONE (OUTPATIENT)
Dept: INTERNAL MEDICINE | Facility: CLINIC | Age: 71
End: 2021-09-28

## 2021-09-29 DIAGNOSIS — F33.1 DEPRESSION, MAJOR, RECURRENT, MODERATE: ICD-10-CM

## 2021-09-29 RX ORDER — INSULIN GLARGINE 100 [IU]/ML
72 INJECTION, SOLUTION SUBCUTANEOUS NIGHTLY
Qty: 45 ML | Refills: 3 | Status: SHIPPED | OUTPATIENT
Start: 2021-09-29 | End: 2022-06-05 | Stop reason: SDUPTHER

## 2021-09-29 RX ORDER — DULOXETIN HYDROCHLORIDE 60 MG/1
60 CAPSULE, DELAYED RELEASE ORAL DAILY
Qty: 30 CAPSULE | Refills: 5 | Status: SHIPPED | OUTPATIENT
Start: 2021-09-29 | End: 2022-04-25 | Stop reason: SDUPTHER

## 2021-09-30 ENCOUNTER — PATIENT MESSAGE (OUTPATIENT)
Dept: INTERNAL MEDICINE | Facility: CLINIC | Age: 71
End: 2021-09-30

## 2021-10-01 ENCOUNTER — TELEPHONE (OUTPATIENT)
Dept: RHEUMATOLOGY | Facility: CLINIC | Age: 71
End: 2021-10-01

## 2021-10-04 ENCOUNTER — PATIENT MESSAGE (OUTPATIENT)
Dept: CARDIOLOGY | Facility: CLINIC | Age: 71
End: 2021-10-04

## 2021-10-04 ENCOUNTER — PATIENT MESSAGE (OUTPATIENT)
Dept: RHEUMATOLOGY | Facility: CLINIC | Age: 71
End: 2021-10-04

## 2021-10-04 ENCOUNTER — HOSPITAL ENCOUNTER (EMERGENCY)
Facility: HOSPITAL | Age: 71
Discharge: HOME | End: 2021-10-04
Attending: EMERGENCY MEDICINE
Payer: MEDICARE

## 2021-10-04 VITALS
RESPIRATION RATE: 16 BRPM | TEMPERATURE: 98 F | OXYGEN SATURATION: 96 % | HEIGHT: 61 IN | SYSTOLIC BLOOD PRESSURE: 160 MMHG | BODY MASS INDEX: 47.45 KG/M2 | HEART RATE: 68 BPM | DIASTOLIC BLOOD PRESSURE: 68 MMHG | WEIGHT: 251.31 LBS

## 2021-10-04 DIAGNOSIS — R42 VERTIGO: ICD-10-CM

## 2021-10-04 DIAGNOSIS — R42 DIZZINESS: Primary | ICD-10-CM

## 2021-10-04 DIAGNOSIS — H65.02 NON-RECURRENT ACUTE SEROUS OTITIS MEDIA OF LEFT EAR: ICD-10-CM

## 2021-10-04 LAB
ALBUMIN SERPL BCP-MCNC: 3.7 G/DL (ref 3.5–5.2)
ALP SERPL-CCNC: 44 U/L (ref 55–135)
ALT SERPL W/O P-5'-P-CCNC: 19 U/L (ref 10–44)
ANION GAP SERPL CALC-SCNC: 14 MMOL/L (ref 8–16)
AST SERPL-CCNC: 20 U/L (ref 10–40)
BASOPHILS # BLD AUTO: 0.05 K/UL (ref 0–0.2)
BASOPHILS NFR BLD: 0.7 % (ref 0–1.9)
BILIRUB SERPL-MCNC: 0.8 MG/DL (ref 0.1–1)
BNP SERPL-MCNC: <10 PG/ML (ref 0–99)
BUN SERPL-MCNC: 19 MG/DL (ref 8–23)
CALCIUM SERPL-MCNC: 9.7 MG/DL (ref 8.7–10.5)
CHLORIDE SERPL-SCNC: 103 MMOL/L (ref 95–110)
CK SERPL-CCNC: 98 U/L (ref 20–180)
CO2 SERPL-SCNC: 21 MMOL/L (ref 23–29)
CREAT SERPL-MCNC: 0.9 MG/DL (ref 0.5–1.4)
CTP QC/QA: YES
DIFFERENTIAL METHOD: ABNORMAL
EOSINOPHIL # BLD AUTO: 0.1 K/UL (ref 0–0.5)
EOSINOPHIL NFR BLD: 1.5 % (ref 0–8)
ERYTHROCYTE [DISTWIDTH] IN BLOOD BY AUTOMATED COUNT: 12.6 % (ref 11.5–14.5)
EST. GFR  (AFRICAN AMERICAN): >60 ML/MIN/1.73 M^2
EST. GFR  (NON AFRICAN AMERICAN): >60 ML/MIN/1.73 M^2
GLUCOSE SERPL-MCNC: 189 MG/DL (ref 70–110)
HCT VFR BLD AUTO: 34.2 % (ref 37–48.5)
HGB BLD-MCNC: 11.1 G/DL (ref 12–16)
IMM GRANULOCYTES # BLD AUTO: 0.03 K/UL (ref 0–0.04)
IMM GRANULOCYTES NFR BLD AUTO: 0.4 % (ref 0–0.5)
LYMPHOCYTES # BLD AUTO: 1.5 K/UL (ref 1–4.8)
LYMPHOCYTES NFR BLD: 20 % (ref 18–48)
MCH RBC QN AUTO: 30.5 PG (ref 27–31)
MCHC RBC AUTO-ENTMCNC: 32.5 G/DL (ref 32–36)
MCV RBC AUTO: 94 FL (ref 82–98)
MONOCYTES # BLD AUTO: 0.6 K/UL (ref 0.3–1)
MONOCYTES NFR BLD: 8.5 % (ref 4–15)
NEUTROPHILS # BLD AUTO: 5.2 K/UL (ref 1.8–7.7)
NEUTROPHILS NFR BLD: 68.9 % (ref 38–73)
NRBC BLD-RTO: 0 /100 WBC
PLATELET # BLD AUTO: 234 K/UL (ref 150–450)
PMV BLD AUTO: 12.2 FL (ref 9.2–12.9)
POTASSIUM SERPL-SCNC: 4.5 MMOL/L (ref 3.5–5.1)
PROT SERPL-MCNC: 6.9 G/DL (ref 6–8.4)
RBC # BLD AUTO: 3.64 M/UL (ref 4–5.4)
SARS-COV-2 RDRP RESP QL NAA+PROBE: NEGATIVE
SODIUM SERPL-SCNC: 138 MMOL/L (ref 136–145)
TROPONIN I SERPL DL<=0.01 NG/ML-MCNC: <0.006 NG/ML (ref 0–0.03)
WBC # BLD AUTO: 7.55 K/UL (ref 3.9–12.7)

## 2021-10-04 PROCEDURE — 83880 ASSAY OF NATRIURETIC PEPTIDE: CPT | Performed by: EMERGENCY MEDICINE

## 2021-10-04 PROCEDURE — 85025 COMPLETE CBC W/AUTO DIFF WBC: CPT | Performed by: EMERGENCY MEDICINE

## 2021-10-04 PROCEDURE — 80053 COMPREHEN METABOLIC PANEL: CPT | Performed by: EMERGENCY MEDICINE

## 2021-10-04 PROCEDURE — 82550 ASSAY OF CK (CPK): CPT | Performed by: EMERGENCY MEDICINE

## 2021-10-04 PROCEDURE — 93005 ELECTROCARDIOGRAM TRACING: CPT

## 2021-10-04 PROCEDURE — 84484 ASSAY OF TROPONIN QUANT: CPT | Performed by: EMERGENCY MEDICINE

## 2021-10-04 PROCEDURE — 99285 EMERGENCY DEPT VISIT HI MDM: CPT | Mod: 25

## 2021-10-04 PROCEDURE — U0002 COVID-19 LAB TEST NON-CDC: HCPCS | Performed by: EMERGENCY MEDICINE

## 2021-10-04 PROCEDURE — 93010 EKG 12-LEAD: ICD-10-PCS | Mod: ,,, | Performed by: INTERNAL MEDICINE

## 2021-10-04 PROCEDURE — 93010 ELECTROCARDIOGRAM REPORT: CPT | Mod: ,,, | Performed by: INTERNAL MEDICINE

## 2021-10-04 RX ORDER — TEMAZEPAM 30 MG/1
CAPSULE ORAL
Qty: 30 CAPSULE | Refills: 0 | Status: SHIPPED | OUTPATIENT
Start: 2021-10-04 | End: 2021-11-05 | Stop reason: SDUPTHER

## 2021-10-04 RX ORDER — DIPHENHYDRAMINE HCL 25 MG
25 CAPSULE ORAL EVERY 6 HOURS PRN
Qty: 20 CAPSULE | Refills: 0 | Status: SHIPPED | OUTPATIENT
Start: 2021-10-04 | End: 2022-02-08 | Stop reason: ALTCHOICE

## 2021-10-05 ENCOUNTER — TELEPHONE (OUTPATIENT)
Dept: CARDIOLOGY | Facility: HOSPITAL | Age: 71
End: 2021-10-05

## 2021-10-05 DIAGNOSIS — I48.0 PAROXYSMAL ATRIAL FIBRILLATION: Primary | ICD-10-CM

## 2021-10-05 DIAGNOSIS — R42 DIZZINESS: ICD-10-CM

## 2021-10-06 ENCOUNTER — TELEPHONE (OUTPATIENT)
Dept: CARDIOLOGY | Facility: CLINIC | Age: 71
End: 2021-10-06

## 2021-10-12 ENCOUNTER — PATIENT MESSAGE (OUTPATIENT)
Dept: OPHTHALMOLOGY | Facility: CLINIC | Age: 71
End: 2021-10-12

## 2021-10-12 DIAGNOSIS — H10.13 ALLERGIC CONJUNCTIVITIS, BILATERAL: ICD-10-CM

## 2021-10-12 RX ORDER — BEPOTASTINE BESILATE 15 MG/ML
1 SOLUTION/ DROPS OPHTHALMIC 2 TIMES DAILY
Qty: 10 ML | Refills: 4 | Status: SHIPPED | OUTPATIENT
Start: 2021-10-12 | End: 2022-05-08 | Stop reason: SDUPTHER

## 2021-10-13 ENCOUNTER — OFFICE VISIT (OUTPATIENT)
Dept: INTERNAL MEDICINE | Facility: CLINIC | Age: 71
End: 2021-10-13
Payer: MEDICARE

## 2021-10-13 ENCOUNTER — IMMUNIZATION (OUTPATIENT)
Dept: PRIMARY CARE CLINIC | Facility: CLINIC | Age: 71
End: 2021-10-13
Payer: MEDICARE

## 2021-10-13 VITALS
SYSTOLIC BLOOD PRESSURE: 108 MMHG | TEMPERATURE: 99 F | HEART RATE: 81 BPM | OXYGEN SATURATION: 97 % | BODY MASS INDEX: 46.49 KG/M2 | HEIGHT: 61 IN | WEIGHT: 246.25 LBS | DIASTOLIC BLOOD PRESSURE: 58 MMHG

## 2021-10-13 DIAGNOSIS — H93.19 TINNITUS, UNSPECIFIED LATERALITY: ICD-10-CM

## 2021-10-13 DIAGNOSIS — R42 VERTIGO: Primary | ICD-10-CM

## 2021-10-13 DIAGNOSIS — Z23 NEED FOR VACCINATION: Primary | ICD-10-CM

## 2021-10-13 DIAGNOSIS — I10 ESSENTIAL HYPERTENSION: ICD-10-CM

## 2021-10-13 DIAGNOSIS — E11.65 UNCONTROLLED TYPE 2 DIABETES MELLITUS WITH HYPERGLYCEMIA: ICD-10-CM

## 2021-10-13 DIAGNOSIS — I48.0 PAROXYSMAL ATRIAL FIBRILLATION: ICD-10-CM

## 2021-10-13 PROCEDURE — 99499 UNLISTED E&M SERVICE: CPT | Mod: S$GLB,,, | Performed by: FAMILY MEDICINE

## 2021-10-13 PROCEDURE — 1126F PR PAIN SEVERITY QUANTIFIED, NO PAIN PRESENT: ICD-10-PCS | Mod: CPTII,S$GLB,, | Performed by: FAMILY MEDICINE

## 2021-10-13 PROCEDURE — 1159F PR MEDICATION LIST DOCUMENTED IN MEDICAL RECORD: ICD-10-PCS | Mod: CPTII,S$GLB,, | Performed by: FAMILY MEDICINE

## 2021-10-13 PROCEDURE — 3288F FALL RISK ASSESSMENT DOCD: CPT | Mod: CPTII,S$GLB,, | Performed by: FAMILY MEDICINE

## 2021-10-13 PROCEDURE — 4010F ACE/ARB THERAPY RXD/TAKEN: CPT | Mod: CPTII,S$GLB,, | Performed by: FAMILY MEDICINE

## 2021-10-13 PROCEDURE — 99214 PR OFFICE/OUTPT VISIT, EST, LEVL IV, 30-39 MIN: ICD-10-PCS | Mod: S$GLB,,, | Performed by: FAMILY MEDICINE

## 2021-10-13 PROCEDURE — 99499 RISK ADDL DX/OHS AUDIT: ICD-10-PCS | Mod: S$GLB,,, | Performed by: FAMILY MEDICINE

## 2021-10-13 PROCEDURE — 1101F PR PT FALLS ASSESS DOC 0-1 FALLS W/OUT INJ PAST YR: ICD-10-PCS | Mod: CPTII,S$GLB,, | Performed by: FAMILY MEDICINE

## 2021-10-13 PROCEDURE — 3051F PR MOST RECENT HEMOGLOBIN A1C LEVEL 7.0 - < 8.0%: ICD-10-PCS | Mod: CPTII,S$GLB,, | Performed by: FAMILY MEDICINE

## 2021-10-13 PROCEDURE — 1159F MED LIST DOCD IN RCRD: CPT | Mod: CPTII,S$GLB,, | Performed by: FAMILY MEDICINE

## 2021-10-13 PROCEDURE — 3074F SYST BP LT 130 MM HG: CPT | Mod: CPTII,S$GLB,, | Performed by: FAMILY MEDICINE

## 2021-10-13 PROCEDURE — 3078F DIAST BP <80 MM HG: CPT | Mod: CPTII,S$GLB,, | Performed by: FAMILY MEDICINE

## 2021-10-13 PROCEDURE — 3008F BODY MASS INDEX DOCD: CPT | Mod: CPTII,S$GLB,, | Performed by: FAMILY MEDICINE

## 2021-10-13 PROCEDURE — 1126F AMNT PAIN NOTED NONE PRSNT: CPT | Mod: CPTII,S$GLB,, | Performed by: FAMILY MEDICINE

## 2021-10-13 PROCEDURE — 1101F PT FALLS ASSESS-DOCD LE1/YR: CPT | Mod: CPTII,S$GLB,, | Performed by: FAMILY MEDICINE

## 2021-10-13 PROCEDURE — 3051F HG A1C>EQUAL 7.0%<8.0%: CPT | Mod: CPTII,S$GLB,, | Performed by: FAMILY MEDICINE

## 2021-10-13 PROCEDURE — 3078F PR MOST RECENT DIASTOLIC BLOOD PRESSURE < 80 MM HG: ICD-10-PCS | Mod: CPTII,S$GLB,, | Performed by: FAMILY MEDICINE

## 2021-10-13 PROCEDURE — 4010F PR ACE/ARB THEARPY RXD/TAKEN: ICD-10-PCS | Mod: CPTII,S$GLB,, | Performed by: FAMILY MEDICINE

## 2021-10-13 PROCEDURE — 3074F PR MOST RECENT SYSTOLIC BLOOD PRESSURE < 130 MM HG: ICD-10-PCS | Mod: CPTII,S$GLB,, | Performed by: FAMILY MEDICINE

## 2021-10-13 PROCEDURE — 99999 PR PBB SHADOW E&M-EST. PATIENT-LVL V: CPT | Mod: PBBFAC,,, | Performed by: FAMILY MEDICINE

## 2021-10-13 PROCEDURE — 3288F PR FALLS RISK ASSESSMENT DOCUMENTED: ICD-10-PCS | Mod: CPTII,S$GLB,, | Performed by: FAMILY MEDICINE

## 2021-10-13 PROCEDURE — 91300 COVID-19, MRNA, LNP-S, PF, 30 MCG/0.3 ML DOSE VACCINE: CPT | Mod: PBBFAC | Performed by: FAMILY MEDICINE

## 2021-10-13 PROCEDURE — 3008F PR BODY MASS INDEX (BMI) DOCUMENTED: ICD-10-PCS | Mod: CPTII,S$GLB,, | Performed by: FAMILY MEDICINE

## 2021-10-13 PROCEDURE — 0003A COVID-19, MRNA, LNP-S, PF, 30 MCG/0.3 ML DOSE VACCINE: CPT | Mod: CV19,PBBFAC | Performed by: FAMILY MEDICINE

## 2021-10-13 PROCEDURE — 99214 OFFICE O/P EST MOD 30 MIN: CPT | Mod: S$GLB,,, | Performed by: FAMILY MEDICINE

## 2021-10-13 PROCEDURE — 99999 PR PBB SHADOW E&M-EST. PATIENT-LVL V: ICD-10-PCS | Mod: PBBFAC,,, | Performed by: FAMILY MEDICINE

## 2021-10-18 ENCOUNTER — PATIENT MESSAGE (OUTPATIENT)
Dept: INTERNAL MEDICINE | Facility: CLINIC | Age: 71
End: 2021-10-18
Payer: MEDICARE

## 2021-10-18 ENCOUNTER — TELEPHONE (OUTPATIENT)
Dept: CARDIOLOGY | Facility: CLINIC | Age: 71
End: 2021-10-18

## 2021-10-19 RX ORDER — BLOOD-GLUCOSE SENSOR
1 EACH MISCELLANEOUS CONTINUOUS
Qty: 6 EACH | Refills: 3 | Status: SHIPPED | OUTPATIENT
Start: 2021-10-19 | End: 2021-12-14

## 2021-10-19 RX ORDER — BLOOD-GLUCOSE TRANSMITTER
1 EACH MISCELLANEOUS CONTINUOUS
Qty: 1 DEVICE | Refills: 0 | Status: SHIPPED | OUTPATIENT
Start: 2021-10-19 | End: 2021-12-14

## 2021-10-19 RX ORDER — BLOOD-GLUCOSE,RECEIVER,CONT
1 EACH MISCELLANEOUS CONTINUOUS
Qty: 1 EACH | Refills: 0 | Status: SHIPPED | OUTPATIENT
Start: 2021-10-19 | End: 2021-12-14

## 2021-10-22 ENCOUNTER — PATIENT MESSAGE (OUTPATIENT)
Dept: CARDIOLOGY | Facility: CLINIC | Age: 71
End: 2021-10-22
Payer: MEDICARE

## 2021-10-25 ENCOUNTER — PATIENT MESSAGE (OUTPATIENT)
Dept: OTOLARYNGOLOGY | Facility: CLINIC | Age: 71
End: 2021-10-25
Payer: MEDICARE

## 2021-10-28 ENCOUNTER — PATIENT MESSAGE (OUTPATIENT)
Dept: OTOLARYNGOLOGY | Facility: CLINIC | Age: 71
End: 2021-10-28
Payer: MEDICARE

## 2021-10-28 ENCOUNTER — TELEPHONE (OUTPATIENT)
Dept: RHEUMATOLOGY | Facility: CLINIC | Age: 71
End: 2021-10-28
Payer: MEDICARE

## 2021-10-29 ENCOUNTER — HOSPITAL ENCOUNTER (OUTPATIENT)
Dept: CARDIOLOGY | Facility: HOSPITAL | Age: 71
Discharge: HOME | End: 2021-10-29
Attending: INTERNAL MEDICINE
Payer: MEDICARE

## 2021-10-29 ENCOUNTER — OFFICE VISIT (OUTPATIENT)
Dept: RHEUMATOLOGY | Facility: CLINIC | Age: 71
End: 2021-10-29
Payer: MEDICARE

## 2021-10-29 VITALS
DIASTOLIC BLOOD PRESSURE: 67 MMHG | HEART RATE: 78 BPM | WEIGHT: 244.06 LBS | BODY MASS INDEX: 46.08 KG/M2 | SYSTOLIC BLOOD PRESSURE: 143 MMHG | HEIGHT: 61 IN

## 2021-10-29 DIAGNOSIS — R42 DIZZINESS: ICD-10-CM

## 2021-10-29 DIAGNOSIS — I48.0 PAROXYSMAL ATRIAL FIBRILLATION: ICD-10-CM

## 2021-10-29 DIAGNOSIS — M15.9 PRIMARY OSTEOARTHRITIS INVOLVING MULTIPLE JOINTS: ICD-10-CM

## 2021-10-29 DIAGNOSIS — M81.0 AGE-RELATED OSTEOPOROSIS WITHOUT CURRENT PATHOLOGICAL FRACTURE: Primary | ICD-10-CM

## 2021-10-29 DIAGNOSIS — Z71.89 COUNSELING ON HEALTH PROMOTION AND DISEASE PREVENTION: ICD-10-CM

## 2021-10-29 PROCEDURE — 3288F PR FALLS RISK ASSESSMENT DOCUMENTED: ICD-10-PCS | Mod: CPTII,S$GLB,, | Performed by: INTERNAL MEDICINE

## 2021-10-29 PROCEDURE — 1125F AMNT PAIN NOTED PAIN PRSNT: CPT | Mod: CPTII,S$GLB,, | Performed by: INTERNAL MEDICINE

## 2021-10-29 PROCEDURE — 93247 EXT ECG>7D<15D SCAN A/R: CPT

## 2021-10-29 PROCEDURE — 3066F PR DOCUMENTATION OF TREATMENT FOR NEPHROPATHY: ICD-10-PCS | Mod: CPTII,S$GLB,, | Performed by: INTERNAL MEDICINE

## 2021-10-29 PROCEDURE — 3078F DIAST BP <80 MM HG: CPT | Mod: CPTII,S$GLB,, | Performed by: INTERNAL MEDICINE

## 2021-10-29 PROCEDURE — 3051F PR MOST RECENT HEMOGLOBIN A1C LEVEL 7.0 - < 8.0%: ICD-10-PCS | Mod: CPTII,S$GLB,, | Performed by: INTERNAL MEDICINE

## 2021-10-29 PROCEDURE — 1159F PR MEDICATION LIST DOCUMENTED IN MEDICAL RECORD: ICD-10-PCS | Mod: CPTII,S$GLB,, | Performed by: INTERNAL MEDICINE

## 2021-10-29 PROCEDURE — 99999 PR PBB SHADOW E&M-EST. PATIENT-LVL V: ICD-10-PCS | Mod: PBBFAC,,, | Performed by: INTERNAL MEDICINE

## 2021-10-29 PROCEDURE — 3060F POS MICROALBUMINURIA REV: CPT | Mod: CPTII,S$GLB,, | Performed by: INTERNAL MEDICINE

## 2021-10-29 PROCEDURE — 3008F BODY MASS INDEX DOCD: CPT | Mod: CPTII,S$GLB,, | Performed by: INTERNAL MEDICINE

## 2021-10-29 PROCEDURE — 1101F PT FALLS ASSESS-DOCD LE1/YR: CPT | Mod: CPTII,S$GLB,, | Performed by: INTERNAL MEDICINE

## 2021-10-29 PROCEDURE — 3066F NEPHROPATHY DOC TX: CPT | Mod: CPTII,S$GLB,, | Performed by: INTERNAL MEDICINE

## 2021-10-29 PROCEDURE — 3051F HG A1C>EQUAL 7.0%<8.0%: CPT | Mod: CPTII,S$GLB,, | Performed by: INTERNAL MEDICINE

## 2021-10-29 PROCEDURE — 3060F PR POS MICROALBUMINURIA RESULT DOCUMENTED/REVIEW: ICD-10-PCS | Mod: CPTII,S$GLB,, | Performed by: INTERNAL MEDICINE

## 2021-10-29 PROCEDURE — 3008F PR BODY MASS INDEX (BMI) DOCUMENTED: ICD-10-PCS | Mod: CPTII,S$GLB,, | Performed by: INTERNAL MEDICINE

## 2021-10-29 PROCEDURE — 1125F PR PAIN SEVERITY QUANTIFIED, PAIN PRESENT: ICD-10-PCS | Mod: CPTII,S$GLB,, | Performed by: INTERNAL MEDICINE

## 2021-10-29 PROCEDURE — 99214 PR OFFICE/OUTPT VISIT, EST, LEVL IV, 30-39 MIN: ICD-10-PCS | Mod: S$GLB,,, | Performed by: INTERNAL MEDICINE

## 2021-10-29 PROCEDURE — 4010F ACE/ARB THERAPY RXD/TAKEN: CPT | Mod: CPTII,S$GLB,, | Performed by: INTERNAL MEDICINE

## 2021-10-29 PROCEDURE — 99999 PR PBB SHADOW E&M-EST. PATIENT-LVL V: CPT | Mod: PBBFAC,,, | Performed by: INTERNAL MEDICINE

## 2021-10-29 PROCEDURE — 3077F PR MOST RECENT SYSTOLIC BLOOD PRESSURE >= 140 MM HG: ICD-10-PCS | Mod: CPTII,S$GLB,, | Performed by: INTERNAL MEDICINE

## 2021-10-29 PROCEDURE — 1101F PR PT FALLS ASSESS DOC 0-1 FALLS W/OUT INJ PAST YR: ICD-10-PCS | Mod: CPTII,S$GLB,, | Performed by: INTERNAL MEDICINE

## 2021-10-29 PROCEDURE — 3288F FALL RISK ASSESSMENT DOCD: CPT | Mod: CPTII,S$GLB,, | Performed by: INTERNAL MEDICINE

## 2021-10-29 PROCEDURE — 3078F PR MOST RECENT DIASTOLIC BLOOD PRESSURE < 80 MM HG: ICD-10-PCS | Mod: CPTII,S$GLB,, | Performed by: INTERNAL MEDICINE

## 2021-10-29 PROCEDURE — 93248 CV CARDIAC MONITOR - 3-15 DAY ADULT (CUPID ONLY): ICD-10-PCS | Mod: ,,, | Performed by: INTERNAL MEDICINE

## 2021-10-29 PROCEDURE — 99214 OFFICE O/P EST MOD 30 MIN: CPT | Mod: S$GLB,,, | Performed by: INTERNAL MEDICINE

## 2021-10-29 PROCEDURE — 1159F MED LIST DOCD IN RCRD: CPT | Mod: CPTII,S$GLB,, | Performed by: INTERNAL MEDICINE

## 2021-10-29 PROCEDURE — 93248 EXT ECG>7D<15D REV&INTERPJ: CPT | Mod: ,,, | Performed by: INTERNAL MEDICINE

## 2021-10-29 PROCEDURE — 93246 EXT ECG>7D<15D RECORDING: CPT

## 2021-10-29 PROCEDURE — 3077F SYST BP >= 140 MM HG: CPT | Mod: CPTII,S$GLB,, | Performed by: INTERNAL MEDICINE

## 2021-10-29 PROCEDURE — 4010F PR ACE/ARB THEARPY RXD/TAKEN: ICD-10-PCS | Mod: CPTII,S$GLB,, | Performed by: INTERNAL MEDICINE

## 2021-11-01 ENCOUNTER — OFFICE VISIT (OUTPATIENT)
Dept: OTOLARYNGOLOGY | Facility: CLINIC | Age: 71
End: 2021-11-01
Payer: MEDICARE

## 2021-11-01 ENCOUNTER — CLINICAL SUPPORT (OUTPATIENT)
Dept: AUDIOLOGY | Facility: CLINIC | Age: 71
End: 2021-11-01
Payer: MEDICARE

## 2021-11-01 VITALS — BODY MASS INDEX: 46.11 KG/M2 | TEMPERATURE: 98 F | HEIGHT: 61 IN

## 2021-11-01 DIAGNOSIS — H81.92 PERIPHERAL VESTIBULOPATHY OF LEFT EAR: Primary | ICD-10-CM

## 2021-11-01 DIAGNOSIS — H90.3 ASYMMETRIC SNHL (SENSORINEURAL HEARING LOSS): ICD-10-CM

## 2021-11-01 DIAGNOSIS — H81.92 PERIPHERAL VESTIBULOPATHY, LEFT: Primary | ICD-10-CM

## 2021-11-01 PROCEDURE — 92540 BASIC VESTIBULAR EVALUATION: CPT | Mod: S$GLB,,, | Performed by: AUDIOLOGIST-HEARING AID FITTER

## 2021-11-01 PROCEDURE — 3051F PR MOST RECENT HEMOGLOBIN A1C LEVEL 7.0 - < 8.0%: ICD-10-PCS | Mod: CPTII,S$GLB,, | Performed by: OTOLARYNGOLOGY

## 2021-11-01 PROCEDURE — 92537 CALORIC VSTBLR TEST W/REC: CPT | Mod: S$GLB,,, | Performed by: AUDIOLOGIST-HEARING AID FITTER

## 2021-11-01 PROCEDURE — 1159F MED LIST DOCD IN RCRD: CPT | Mod: CPTII,S$GLB,, | Performed by: OTOLARYNGOLOGY

## 2021-11-01 PROCEDURE — 92540 PR VESTIBULAR EVAL NYSTAG FOVL&PERPH STIM OSCIL TRACKING: ICD-10-PCS | Mod: S$GLB,,, | Performed by: AUDIOLOGIST-HEARING AID FITTER

## 2021-11-01 PROCEDURE — 1126F PR PAIN SEVERITY QUANTIFIED, NO PAIN PRESENT: ICD-10-PCS | Mod: CPTII,S$GLB,, | Performed by: OTOLARYNGOLOGY

## 2021-11-01 PROCEDURE — 4010F PR ACE/ARB THEARPY RXD/TAKEN: ICD-10-PCS | Mod: CPTII,S$GLB,, | Performed by: OTOLARYNGOLOGY

## 2021-11-01 PROCEDURE — 92537 PR CALORIC VSTBLR TEST W/REC BITHERMAL: ICD-10-PCS | Mod: S$GLB,,, | Performed by: AUDIOLOGIST-HEARING AID FITTER

## 2021-11-01 PROCEDURE — 99999 PR PBB SHADOW E&M-EST. PATIENT-LVL IV: ICD-10-PCS | Mod: PBBFAC,,, | Performed by: OTOLARYNGOLOGY

## 2021-11-01 PROCEDURE — 3066F NEPHROPATHY DOC TX: CPT | Mod: CPTII,S$GLB,, | Performed by: OTOLARYNGOLOGY

## 2021-11-01 PROCEDURE — 3051F HG A1C>EQUAL 7.0%<8.0%: CPT | Mod: CPTII,S$GLB,, | Performed by: OTOLARYNGOLOGY

## 2021-11-01 PROCEDURE — 1159F PR MEDICATION LIST DOCUMENTED IN MEDICAL RECORD: ICD-10-PCS | Mod: CPTII,S$GLB,, | Performed by: OTOLARYNGOLOGY

## 2021-11-01 PROCEDURE — 3060F PR POS MICROALBUMINURIA RESULT DOCUMENTED/REVIEW: ICD-10-PCS | Mod: CPTII,S$GLB,, | Performed by: OTOLARYNGOLOGY

## 2021-11-01 PROCEDURE — 4010F ACE/ARB THERAPY RXD/TAKEN: CPT | Mod: CPTII,S$GLB,, | Performed by: OTOLARYNGOLOGY

## 2021-11-01 PROCEDURE — 3008F BODY MASS INDEX DOCD: CPT | Mod: CPTII,S$GLB,, | Performed by: OTOLARYNGOLOGY

## 2021-11-01 PROCEDURE — 3060F POS MICROALBUMINURIA REV: CPT | Mod: CPTII,S$GLB,, | Performed by: OTOLARYNGOLOGY

## 2021-11-01 PROCEDURE — 3066F PR DOCUMENTATION OF TREATMENT FOR NEPHROPATHY: ICD-10-PCS | Mod: CPTII,S$GLB,, | Performed by: OTOLARYNGOLOGY

## 2021-11-01 PROCEDURE — 99214 OFFICE O/P EST MOD 30 MIN: CPT | Mod: S$GLB,,, | Performed by: OTOLARYNGOLOGY

## 2021-11-01 PROCEDURE — 3008F PR BODY MASS INDEX (BMI) DOCUMENTED: ICD-10-PCS | Mod: CPTII,S$GLB,, | Performed by: OTOLARYNGOLOGY

## 2021-11-01 PROCEDURE — 99214 PR OFFICE/OUTPT VISIT, EST, LEVL IV, 30-39 MIN: ICD-10-PCS | Mod: S$GLB,,, | Performed by: OTOLARYNGOLOGY

## 2021-11-01 PROCEDURE — 1126F AMNT PAIN NOTED NONE PRSNT: CPT | Mod: CPTII,S$GLB,, | Performed by: OTOLARYNGOLOGY

## 2021-11-01 PROCEDURE — 99999 PR PBB SHADOW E&M-EST. PATIENT-LVL IV: CPT | Mod: PBBFAC,,, | Performed by: OTOLARYNGOLOGY

## 2021-11-02 ENCOUNTER — TELEPHONE (OUTPATIENT)
Dept: ADMINISTRATIVE | Facility: HOSPITAL | Age: 71
End: 2021-11-02
Payer: MEDICARE

## 2021-11-04 ENCOUNTER — PATIENT MESSAGE (OUTPATIENT)
Dept: INTERNAL MEDICINE | Facility: CLINIC | Age: 71
End: 2021-11-04
Payer: MEDICARE

## 2021-11-04 ENCOUNTER — PATIENT MESSAGE (OUTPATIENT)
Dept: GASTROENTEROLOGY | Facility: CLINIC | Age: 71
End: 2021-11-04
Payer: MEDICARE

## 2021-11-04 DIAGNOSIS — K92.1 HEMATOCHEZIA: Primary | ICD-10-CM

## 2021-11-05 ENCOUNTER — LAB VISIT (OUTPATIENT)
Dept: LAB | Facility: HOSPITAL | Age: 71
End: 2021-11-05
Attending: INTERNAL MEDICINE
Payer: MEDICARE

## 2021-11-05 DIAGNOSIS — K92.1 HEMATOCHEZIA: ICD-10-CM

## 2021-11-05 LAB
BASOPHILS # BLD AUTO: 0.06 K/UL (ref 0–0.2)
BASOPHILS NFR BLD: 0.8 % (ref 0–1.9)
DIFFERENTIAL METHOD: ABNORMAL
EOSINOPHIL # BLD AUTO: 0.1 K/UL (ref 0–0.5)
EOSINOPHIL NFR BLD: 1.9 % (ref 0–8)
ERYTHROCYTE [DISTWIDTH] IN BLOOD BY AUTOMATED COUNT: 12.5 % (ref 11.5–14.5)
FERRITIN SERPL-MCNC: 82 NG/ML (ref 20–300)
HCT VFR BLD AUTO: 34.9 % (ref 37–48.5)
HGB BLD-MCNC: 10.9 G/DL (ref 12–16)
IMM GRANULOCYTES # BLD AUTO: 0.03 K/UL (ref 0–0.04)
IMM GRANULOCYTES NFR BLD AUTO: 0.4 % (ref 0–0.5)
IRON SERPL-MCNC: 75 UG/DL (ref 30–160)
LYMPHOCYTES # BLD AUTO: 1.8 K/UL (ref 1–4.8)
LYMPHOCYTES NFR BLD: 23.2 % (ref 18–48)
MCH RBC QN AUTO: 30.8 PG (ref 27–31)
MCHC RBC AUTO-ENTMCNC: 31.2 G/DL (ref 32–36)
MCV RBC AUTO: 99 FL (ref 82–98)
MONOCYTES # BLD AUTO: 0.7 K/UL (ref 0.3–1)
MONOCYTES NFR BLD: 9.3 % (ref 4–15)
NEUTROPHILS # BLD AUTO: 4.9 K/UL (ref 1.8–7.7)
NEUTROPHILS NFR BLD: 64.4 % (ref 38–73)
NRBC BLD-RTO: 0 /100 WBC
PLATELET # BLD AUTO: 219 K/UL (ref 150–450)
PMV BLD AUTO: 12.3 FL (ref 9.2–12.9)
RBC # BLD AUTO: 3.54 M/UL (ref 4–5.4)
SATURATED IRON: 24 % (ref 20–50)
TOTAL IRON BINDING CAPACITY: 314 UG/DL (ref 250–450)
TRANSFERRIN SERPL-MCNC: 212 MG/DL (ref 200–375)
WBC # BLD AUTO: 7.54 K/UL (ref 3.9–12.7)

## 2021-11-05 PROCEDURE — 36415 COLL VENOUS BLD VENIPUNCTURE: CPT | Performed by: INTERNAL MEDICINE

## 2021-11-05 PROCEDURE — 82728 ASSAY OF FERRITIN: CPT | Performed by: INTERNAL MEDICINE

## 2021-11-05 PROCEDURE — 84466 ASSAY OF TRANSFERRIN: CPT | Performed by: INTERNAL MEDICINE

## 2021-11-05 PROCEDURE — 85025 COMPLETE CBC W/AUTO DIFF WBC: CPT | Performed by: INTERNAL MEDICINE

## 2021-11-08 RX ORDER — TEMAZEPAM 30 MG/1
CAPSULE ORAL
Qty: 30 CAPSULE | Refills: 0 | OUTPATIENT
Start: 2021-11-08

## 2021-11-08 RX ORDER — TEMAZEPAM 30 MG/1
CAPSULE ORAL
Qty: 30 CAPSULE | Refills: 0 | Status: SHIPPED | OUTPATIENT
Start: 2021-11-08 | End: 2021-12-07 | Stop reason: SDUPTHER

## 2021-11-08 RX ORDER — LIRAGLUTIDE 6 MG/ML
INJECTION SUBCUTANEOUS
Qty: 6 ML | Refills: 1 | Status: SHIPPED | OUTPATIENT
Start: 2021-11-08 | End: 2021-12-27 | Stop reason: SDUPTHER

## 2021-11-08 RX ORDER — LIRAGLUTIDE 6 MG/ML
INJECTION SUBCUTANEOUS
Qty: 9 ML | Refills: 1 | OUTPATIENT
Start: 2021-11-08

## 2021-11-09 ENCOUNTER — PATIENT MESSAGE (OUTPATIENT)
Dept: GASTROENTEROLOGY | Facility: CLINIC | Age: 71
End: 2021-11-09
Payer: MEDICARE

## 2021-11-09 ENCOUNTER — PATIENT MESSAGE (OUTPATIENT)
Dept: INTERNAL MEDICINE | Facility: CLINIC | Age: 71
End: 2021-11-09
Payer: MEDICARE

## 2021-11-11 ENCOUNTER — PATIENT MESSAGE (OUTPATIENT)
Dept: INTERNAL MEDICINE | Facility: CLINIC | Age: 71
End: 2021-11-11
Payer: MEDICARE

## 2021-11-14 RX ORDER — FLUTICASONE PROPIONATE 50 MCG
2 SPRAY, SUSPENSION (ML) NASAL DAILY
Qty: 48 G | Refills: 3 | Status: SHIPPED | OUTPATIENT
Start: 2021-11-14 | End: 2023-04-30 | Stop reason: SDUPTHER

## 2021-11-16 ENCOUNTER — PATIENT MESSAGE (OUTPATIENT)
Dept: CARDIOLOGY | Facility: CLINIC | Age: 71
End: 2021-11-16
Payer: MEDICARE

## 2021-11-17 ENCOUNTER — TELEPHONE (OUTPATIENT)
Dept: INTERNAL MEDICINE | Facility: CLINIC | Age: 71
End: 2021-11-17
Payer: MEDICARE

## 2021-11-18 ENCOUNTER — OFFICE VISIT (OUTPATIENT)
Dept: INTERNAL MEDICINE | Facility: CLINIC | Age: 71
End: 2021-11-18
Payer: MEDICARE

## 2021-11-18 ENCOUNTER — PATIENT MESSAGE (OUTPATIENT)
Dept: INTERNAL MEDICINE | Facility: CLINIC | Age: 71
End: 2021-11-18

## 2021-11-18 VITALS
HEIGHT: 61 IN | BODY MASS INDEX: 46.08 KG/M2 | OXYGEN SATURATION: 95 % | SYSTOLIC BLOOD PRESSURE: 142 MMHG | DIASTOLIC BLOOD PRESSURE: 72 MMHG | WEIGHT: 244.06 LBS | HEART RATE: 68 BPM

## 2021-11-18 DIAGNOSIS — Z74.09 OTHER REDUCED MOBILITY: ICD-10-CM

## 2021-11-18 DIAGNOSIS — R63.0 LOSS OF APPETITE: ICD-10-CM

## 2021-11-18 DIAGNOSIS — E66.01 OBESITY, CLASS III, BMI 40-49.9 (MORBID OBESITY): ICD-10-CM

## 2021-11-18 DIAGNOSIS — E78.1 HYPERTRIGLYCERIDEMIA: ICD-10-CM

## 2021-11-18 DIAGNOSIS — R05.3 CHRONIC COUGH: ICD-10-CM

## 2021-11-18 DIAGNOSIS — I25.118 CORONARY ARTERY DISEASE WITH STABLE ANGINA PECTORIS, UNSPECIFIED VESSEL OR LESION TYPE, UNSPECIFIED WHETHER NATIVE OR TRANSPLANTED HEART: ICD-10-CM

## 2021-11-18 DIAGNOSIS — D64.9 ANEMIA, UNSPECIFIED TYPE: ICD-10-CM

## 2021-11-18 DIAGNOSIS — R43.2 ALTERED TASTE: ICD-10-CM

## 2021-11-18 DIAGNOSIS — E11.29 DIABETES MELLITUS WITH MICROALBUMINURIA: ICD-10-CM

## 2021-11-18 DIAGNOSIS — R80.9 DIABETES MELLITUS WITH MICROALBUMINURIA: ICD-10-CM

## 2021-11-18 DIAGNOSIS — F41.9 ANXIETY: ICD-10-CM

## 2021-11-18 DIAGNOSIS — I48.0 PAF (PAROXYSMAL ATRIAL FIBRILLATION): ICD-10-CM

## 2021-11-18 DIAGNOSIS — E11.49 TYPE II DIABETES MELLITUS WITH NEUROLOGICAL MANIFESTATIONS: ICD-10-CM

## 2021-11-18 DIAGNOSIS — F32.1 CURRENT MODERATE EPISODE OF MAJOR DEPRESSIVE DISORDER, UNSPECIFIED WHETHER RECURRENT: ICD-10-CM

## 2021-11-18 DIAGNOSIS — Z00.00 ENCOUNTER FOR PREVENTIVE HEALTH EXAMINATION: Primary | ICD-10-CM

## 2021-11-18 DIAGNOSIS — R26.9 ABNORMALITY OF GAIT AND MOBILITY: ICD-10-CM

## 2021-11-18 DIAGNOSIS — I71.40 ABDOMINAL AORTIC ANEURYSM (AAA) WITHOUT RUPTURE: ICD-10-CM

## 2021-11-18 DIAGNOSIS — M85.80 OSTEOPENIA, UNSPECIFIED LOCATION: ICD-10-CM

## 2021-11-18 DIAGNOSIS — I10 HYPERTENSION, UNSPECIFIED TYPE: ICD-10-CM

## 2021-11-18 DIAGNOSIS — R41.3 MEMORY DIFFICULTIES: ICD-10-CM

## 2021-11-18 PROCEDURE — 99499 UNLISTED E&M SERVICE: CPT | Mod: S$GLB,,, | Performed by: NURSE PRACTITIONER

## 2021-11-18 PROCEDURE — 99499 RISK ADDL DX/OHS AUDIT: ICD-10-PCS | Mod: S$GLB,,, | Performed by: NURSE PRACTITIONER

## 2021-11-18 PROCEDURE — 3066F NEPHROPATHY DOC TX: CPT | Mod: CPTII,S$GLB,, | Performed by: NURSE PRACTITIONER

## 2021-11-18 PROCEDURE — 3288F FALL RISK ASSESSMENT DOCD: CPT | Mod: CPTII,S$GLB,, | Performed by: NURSE PRACTITIONER

## 2021-11-18 PROCEDURE — 3077F SYST BP >= 140 MM HG: CPT | Mod: CPTII,S$GLB,, | Performed by: NURSE PRACTITIONER

## 2021-11-18 PROCEDURE — 3008F BODY MASS INDEX DOCD: CPT | Mod: CPTII,S$GLB,, | Performed by: NURSE PRACTITIONER

## 2021-11-18 PROCEDURE — 99999 PR PBB SHADOW E&M-EST. PATIENT-LVL III: ICD-10-PCS | Mod: PBBFAC,,, | Performed by: NURSE PRACTITIONER

## 2021-11-18 PROCEDURE — 3078F PR MOST RECENT DIASTOLIC BLOOD PRESSURE < 80 MM HG: ICD-10-PCS | Mod: CPTII,S$GLB,, | Performed by: NURSE PRACTITIONER

## 2021-11-18 PROCEDURE — 1126F AMNT PAIN NOTED NONE PRSNT: CPT | Mod: CPTII,S$GLB,, | Performed by: NURSE PRACTITIONER

## 2021-11-18 PROCEDURE — 4010F PR ACE/ARB THEARPY RXD/TAKEN: ICD-10-PCS | Mod: CPTII,S$GLB,, | Performed by: NURSE PRACTITIONER

## 2021-11-18 PROCEDURE — 1126F PR PAIN SEVERITY QUANTIFIED, NO PAIN PRESENT: ICD-10-PCS | Mod: CPTII,S$GLB,, | Performed by: NURSE PRACTITIONER

## 2021-11-18 PROCEDURE — 3060F PR POS MICROALBUMINURIA RESULT DOCUMENTED/REVIEW: ICD-10-PCS | Mod: CPTII,S$GLB,, | Performed by: NURSE PRACTITIONER

## 2021-11-18 PROCEDURE — 3060F POS MICROALBUMINURIA REV: CPT | Mod: CPTII,S$GLB,, | Performed by: NURSE PRACTITIONER

## 2021-11-18 PROCEDURE — 3051F PR MOST RECENT HEMOGLOBIN A1C LEVEL 7.0 - < 8.0%: ICD-10-PCS | Mod: CPTII,S$GLB,, | Performed by: NURSE PRACTITIONER

## 2021-11-18 PROCEDURE — 99999 PR PBB SHADOW E&M-EST. PATIENT-LVL III: CPT | Mod: PBBFAC,,, | Performed by: NURSE PRACTITIONER

## 2021-11-18 PROCEDURE — 3008F PR BODY MASS INDEX (BMI) DOCUMENTED: ICD-10-PCS | Mod: CPTII,S$GLB,, | Performed by: NURSE PRACTITIONER

## 2021-11-18 PROCEDURE — 1170F PR FUNCTIONAL STATUS ASSESSED: ICD-10-PCS | Mod: CPTII,S$GLB,, | Performed by: NURSE PRACTITIONER

## 2021-11-18 PROCEDURE — 3051F HG A1C>EQUAL 7.0%<8.0%: CPT | Mod: CPTII,S$GLB,, | Performed by: NURSE PRACTITIONER

## 2021-11-18 PROCEDURE — 3077F PR MOST RECENT SYSTOLIC BLOOD PRESSURE >= 140 MM HG: ICD-10-PCS | Mod: CPTII,S$GLB,, | Performed by: NURSE PRACTITIONER

## 2021-11-18 PROCEDURE — 4010F ACE/ARB THERAPY RXD/TAKEN: CPT | Mod: CPTII,S$GLB,, | Performed by: NURSE PRACTITIONER

## 2021-11-18 PROCEDURE — 3066F PR DOCUMENTATION OF TREATMENT FOR NEPHROPATHY: ICD-10-PCS | Mod: CPTII,S$GLB,, | Performed by: NURSE PRACTITIONER

## 2021-11-18 PROCEDURE — 3288F PR FALLS RISK ASSESSMENT DOCUMENTED: ICD-10-PCS | Mod: CPTII,S$GLB,, | Performed by: NURSE PRACTITIONER

## 2021-11-18 PROCEDURE — 1101F PT FALLS ASSESS-DOCD LE1/YR: CPT | Mod: CPTII,S$GLB,, | Performed by: NURSE PRACTITIONER

## 2021-11-18 PROCEDURE — G0439 PR MEDICARE ANNUAL WELLNESS SUBSEQUENT VISIT: ICD-10-PCS | Mod: S$GLB,,, | Performed by: NURSE PRACTITIONER

## 2021-11-18 PROCEDURE — 1170F FXNL STATUS ASSESSED: CPT | Mod: CPTII,S$GLB,, | Performed by: NURSE PRACTITIONER

## 2021-11-18 PROCEDURE — 1101F PR PT FALLS ASSESS DOC 0-1 FALLS W/OUT INJ PAST YR: ICD-10-PCS | Mod: CPTII,S$GLB,, | Performed by: NURSE PRACTITIONER

## 2021-11-18 PROCEDURE — G0439 PPPS, SUBSEQ VISIT: HCPCS | Mod: S$GLB,,, | Performed by: NURSE PRACTITIONER

## 2021-11-18 PROCEDURE — 3078F DIAST BP <80 MM HG: CPT | Mod: CPTII,S$GLB,, | Performed by: NURSE PRACTITIONER

## 2021-11-30 ENCOUNTER — PATIENT MESSAGE (OUTPATIENT)
Dept: CARDIOLOGY | Facility: CLINIC | Age: 71
End: 2021-11-30
Payer: MEDICARE

## 2021-11-30 ENCOUNTER — TELEPHONE (OUTPATIENT)
Dept: INTERNAL MEDICINE | Facility: CLINIC | Age: 71
End: 2021-11-30
Payer: MEDICARE

## 2021-11-30 ENCOUNTER — PATIENT OUTREACH (OUTPATIENT)
Dept: ADMINISTRATIVE | Facility: HOSPITAL | Age: 71
End: 2021-11-30
Payer: MEDICARE

## 2021-12-04 ENCOUNTER — PATIENT MESSAGE (OUTPATIENT)
Dept: RHEUMATOLOGY | Facility: CLINIC | Age: 71
End: 2021-12-04
Payer: MEDICARE

## 2021-12-07 ENCOUNTER — PATIENT MESSAGE (OUTPATIENT)
Dept: PODIATRY | Facility: CLINIC | Age: 71
End: 2021-12-07
Payer: MEDICARE

## 2021-12-07 RX ORDER — TEMAZEPAM 30 MG/1
CAPSULE ORAL
Qty: 30 CAPSULE | Refills: 0 | Status: SHIPPED | OUTPATIENT
Start: 2021-12-07 | End: 2022-01-04 | Stop reason: SDUPTHER

## 2021-12-09 ENCOUNTER — TELEPHONE (OUTPATIENT)
Dept: RHEUMATOLOGY | Facility: CLINIC | Age: 71
End: 2021-12-09
Payer: MEDICARE

## 2021-12-09 DIAGNOSIS — M17.12 PRIMARY OSTEOARTHRITIS OF LEFT KNEE: ICD-10-CM

## 2021-12-09 DIAGNOSIS — M17.0 BILATERAL PRIMARY OSTEOARTHRITIS OF KNEE: Primary | ICD-10-CM

## 2021-12-14 ENCOUNTER — PATIENT MESSAGE (OUTPATIENT)
Dept: CARDIOLOGY | Facility: CLINIC | Age: 71
End: 2021-12-14
Payer: MEDICARE

## 2021-12-14 ENCOUNTER — OFFICE VISIT (OUTPATIENT)
Dept: INTERNAL MEDICINE | Facility: CLINIC | Age: 71
End: 2021-12-14
Payer: MEDICARE

## 2021-12-14 ENCOUNTER — LAB VISIT (OUTPATIENT)
Dept: LAB | Facility: HOSPITAL | Age: 71
End: 2021-12-14
Attending: FAMILY MEDICINE
Payer: MEDICARE

## 2021-12-14 VITALS
TEMPERATURE: 100 F | SYSTOLIC BLOOD PRESSURE: 124 MMHG | BODY MASS INDEX: 45.45 KG/M2 | DIASTOLIC BLOOD PRESSURE: 56 MMHG | WEIGHT: 240.75 LBS | OXYGEN SATURATION: 96 % | HEIGHT: 61 IN | HEART RATE: 78 BPM

## 2021-12-14 DIAGNOSIS — I10 ESSENTIAL HYPERTENSION: Primary | ICD-10-CM

## 2021-12-14 DIAGNOSIS — Z79.4 CONTROLLED TYPE 2 DIABETES MELLITUS WITH DIABETIC POLYNEUROPATHY, WITH LONG-TERM CURRENT USE OF INSULIN: ICD-10-CM

## 2021-12-14 DIAGNOSIS — E11.42 CONTROLLED TYPE 2 DIABETES MELLITUS WITH DIABETIC POLYNEUROPATHY, WITH LONG-TERM CURRENT USE OF INSULIN: ICD-10-CM

## 2021-12-14 DIAGNOSIS — I48.0 PAROXYSMAL ATRIAL FIBRILLATION: ICD-10-CM

## 2021-12-14 DIAGNOSIS — D50.0 IRON DEFICIENCY ANEMIA DUE TO CHRONIC BLOOD LOSS: ICD-10-CM

## 2021-12-14 LAB
BASOPHILS # BLD AUTO: 0.06 K/UL (ref 0–0.2)
BASOPHILS NFR BLD: 0.8 % (ref 0–1.9)
DIFFERENTIAL METHOD: ABNORMAL
EOSINOPHIL # BLD AUTO: 0.1 K/UL (ref 0–0.5)
EOSINOPHIL NFR BLD: 1.3 % (ref 0–8)
ERYTHROCYTE [DISTWIDTH] IN BLOOD BY AUTOMATED COUNT: 12.5 % (ref 11.5–14.5)
ESTIMATED AVG GLUCOSE: 163 MG/DL (ref 68–131)
HBA1C MFR BLD: 7.3 % (ref 4–5.6)
HCT VFR BLD AUTO: 34.9 % (ref 37–48.5)
HGB BLD-MCNC: 11 G/DL (ref 12–16)
IMM GRANULOCYTES # BLD AUTO: 0.02 K/UL (ref 0–0.04)
IMM GRANULOCYTES NFR BLD AUTO: 0.3 % (ref 0–0.5)
LYMPHOCYTES # BLD AUTO: 1.7 K/UL (ref 1–4.8)
LYMPHOCYTES NFR BLD: 22 % (ref 18–48)
MCH RBC QN AUTO: 30.6 PG (ref 27–31)
MCHC RBC AUTO-ENTMCNC: 31.5 G/DL (ref 32–36)
MCV RBC AUTO: 97 FL (ref 82–98)
MONOCYTES # BLD AUTO: 0.7 K/UL (ref 0.3–1)
MONOCYTES NFR BLD: 9.5 % (ref 4–15)
NEUTROPHILS # BLD AUTO: 5 K/UL (ref 1.8–7.7)
NEUTROPHILS NFR BLD: 66.1 % (ref 38–73)
NRBC BLD-RTO: 0 /100 WBC
PLATELET # BLD AUTO: 228 K/UL (ref 150–450)
PMV BLD AUTO: 12.5 FL (ref 9.2–12.9)
RBC # BLD AUTO: 3.59 M/UL (ref 4–5.4)
T4 FREE SERPL-MCNC: 0.95 NG/DL (ref 0.71–1.51)
TSH SERPL DL<=0.005 MIU/L-ACNC: 0.92 UIU/ML (ref 0.4–4)
WBC # BLD AUTO: 7.54 K/UL (ref 3.9–12.7)

## 2021-12-14 PROCEDURE — 36415 COLL VENOUS BLD VENIPUNCTURE: CPT | Performed by: FAMILY MEDICINE

## 2021-12-14 PROCEDURE — 3066F PR DOCUMENTATION OF TREATMENT FOR NEPHROPATHY: ICD-10-PCS | Mod: CPTII,S$GLB,, | Performed by: FAMILY MEDICINE

## 2021-12-14 PROCEDURE — 99999 PR PBB SHADOW E&M-EST. PATIENT-LVL V: CPT | Mod: PBBFAC,,, | Performed by: FAMILY MEDICINE

## 2021-12-14 PROCEDURE — 99214 PR OFFICE/OUTPT VISIT, EST, LEVL IV, 30-39 MIN: ICD-10-PCS | Mod: S$GLB,,, | Performed by: FAMILY MEDICINE

## 2021-12-14 PROCEDURE — 84439 ASSAY OF FREE THYROXINE: CPT | Performed by: FAMILY MEDICINE

## 2021-12-14 PROCEDURE — 99999 PR PBB SHADOW E&M-EST. PATIENT-LVL V: ICD-10-PCS | Mod: PBBFAC,,, | Performed by: FAMILY MEDICINE

## 2021-12-14 PROCEDURE — 83036 HEMOGLOBIN GLYCOSYLATED A1C: CPT | Performed by: FAMILY MEDICINE

## 2021-12-14 PROCEDURE — 3060F PR POS MICROALBUMINURIA RESULT DOCUMENTED/REVIEW: ICD-10-PCS | Mod: CPTII,S$GLB,, | Performed by: FAMILY MEDICINE

## 2021-12-14 PROCEDURE — 3060F POS MICROALBUMINURIA REV: CPT | Mod: CPTII,S$GLB,, | Performed by: FAMILY MEDICINE

## 2021-12-14 PROCEDURE — 3066F NEPHROPATHY DOC TX: CPT | Mod: CPTII,S$GLB,, | Performed by: FAMILY MEDICINE

## 2021-12-14 PROCEDURE — 85025 COMPLETE CBC W/AUTO DIFF WBC: CPT | Performed by: FAMILY MEDICINE

## 2021-12-14 PROCEDURE — 99214 OFFICE O/P EST MOD 30 MIN: CPT | Mod: S$GLB,,, | Performed by: FAMILY MEDICINE

## 2021-12-14 PROCEDURE — 4010F PR ACE/ARB THEARPY RXD/TAKEN: ICD-10-PCS | Mod: CPTII,S$GLB,, | Performed by: FAMILY MEDICINE

## 2021-12-14 PROCEDURE — 4010F ACE/ARB THERAPY RXD/TAKEN: CPT | Mod: CPTII,S$GLB,, | Performed by: FAMILY MEDICINE

## 2021-12-14 PROCEDURE — 84443 ASSAY THYROID STIM HORMONE: CPT | Performed by: FAMILY MEDICINE

## 2021-12-14 RX ORDER — GABAPENTIN 300 MG/1
300 CAPSULE ORAL 2 TIMES DAILY
Qty: 180 CAPSULE | Refills: 1 | Status: SHIPPED | OUTPATIENT
Start: 2021-12-14 | End: 2022-05-16

## 2021-12-17 ENCOUNTER — PATIENT MESSAGE (OUTPATIENT)
Dept: PSYCHIATRY | Facility: CLINIC | Age: 71
End: 2021-12-17
Payer: MEDICARE

## 2021-12-25 ENCOUNTER — PATIENT MESSAGE (OUTPATIENT)
Dept: PSYCHIATRY | Facility: CLINIC | Age: 71
End: 2021-12-25
Payer: MEDICARE

## 2021-12-27 RX ORDER — LIRAGLUTIDE 6 MG/ML
INJECTION SUBCUTANEOUS
Qty: 6 ML | Refills: 1 | Status: SHIPPED | OUTPATIENT
Start: 2021-12-27 | End: 2022-06-13 | Stop reason: SDUPTHER

## 2021-12-29 ENCOUNTER — HOSPITAL ENCOUNTER (OUTPATIENT)
Dept: CARDIOLOGY | Facility: HOSPITAL | Age: 71
Discharge: HOME | End: 2021-12-29
Attending: INTERNAL MEDICINE
Payer: MEDICARE

## 2021-12-29 ENCOUNTER — HOSPITAL ENCOUNTER (OUTPATIENT)
Dept: RADIOLOGY | Facility: HOSPITAL | Age: 71
Discharge: HOME | End: 2021-12-29
Attending: INTERNAL MEDICINE
Payer: MEDICARE

## 2021-12-29 VITALS
HEIGHT: 61 IN | SYSTOLIC BLOOD PRESSURE: 107 MMHG | BODY MASS INDEX: 45.31 KG/M2 | WEIGHT: 240 LBS | DIASTOLIC BLOOD PRESSURE: 52 MMHG

## 2021-12-29 DIAGNOSIS — I71.40 ABDOMINAL AORTIC ANEURYSM (AAA) WITHOUT RUPTURE: ICD-10-CM

## 2021-12-29 DIAGNOSIS — M79.605 PAIN IN BOTH LOWER EXTREMITIES: ICD-10-CM

## 2021-12-29 DIAGNOSIS — M79.604 PAIN IN BOTH LOWER EXTREMITIES: ICD-10-CM

## 2021-12-29 LAB
LEFT ABI: 0.97
LEFT ARM BP: 99 MMHG
LEFT DORSALIS PEDIS: 102 MMHG
LEFT POSTERIOR TIBIAL: 104 MMHG
LEFT TBI: 0.63
LEFT TOE PRESSURE: 67 MMHG
RIGHT ABI: 1.03
RIGHT ARM BP: 107 MMHG
RIGHT DORSALIS PEDIS: 90 MMHG
RIGHT POSTERIOR TIBIAL: 110 MMHG
RIGHT TBI: 0.5
RIGHT TOE PRESSURE: 54 MMHG

## 2021-12-29 PROCEDURE — 93922 ANKLE BRACHIAL INDICES (ABI): ICD-10-PCS | Mod: 26,,, | Performed by: INTERNAL MEDICINE

## 2021-12-29 PROCEDURE — 76775 US ABDOMINAL AORTA: ICD-10-PCS | Mod: 26,,, | Performed by: RADIOLOGY

## 2021-12-29 PROCEDURE — 76775 US EXAM ABDO BACK WALL LIM: CPT | Mod: 26,,, | Performed by: RADIOLOGY

## 2021-12-29 PROCEDURE — 93922 UPR/L XTREMITY ART 2 LEVELS: CPT | Mod: 26,,, | Performed by: INTERNAL MEDICINE

## 2021-12-29 PROCEDURE — 93922 UPR/L XTREMITY ART 2 LEVELS: CPT

## 2021-12-29 PROCEDURE — 76775 US EXAM ABDO BACK WALL LIM: CPT | Mod: TC

## 2021-12-30 ENCOUNTER — SPECIALTY PHARMACY (OUTPATIENT)
Dept: PHARMACY | Facility: CLINIC | Age: 71
End: 2021-12-30
Payer: MEDICARE

## 2021-12-30 ENCOUNTER — TELEPHONE (OUTPATIENT)
Dept: RHEUMATOLOGY | Facility: CLINIC | Age: 71
End: 2021-12-30
Payer: MEDICARE

## 2021-12-30 DIAGNOSIS — M17.12 PRIMARY OSTEOARTHRITIS OF LEFT KNEE: Primary | ICD-10-CM

## 2021-12-30 DIAGNOSIS — M17.0 BILATERAL PRIMARY OSTEOARTHRITIS OF KNEE: Primary | ICD-10-CM

## 2021-12-30 NOTE — TELEPHONE ENCOUNTER
----- Message from Joselyn Simon PharmD sent at 12/30/2021  9:50 AM CST -----  Regarding: Orthovisc  Good morning,    Orthovisc is $102 through Ms. Jo's part D insurance.  She stated this is unaffordable.  The Orthovisc Rx has been discontinued in Epic as she will not be able to get it through the pharmacy.  Please re-enter the order in [facility] as a medication order. Please use the Buy and Bill procedure for billing of the drug and administration through the patient's medical benefits (part B) as this will likely be more affordable for patient.  Of note, it appears that patient previously got Synvisc injection under part B insurance.      Thank You  Joselyn Simon, PharmD  [facility]  [phone]

## 2022-01-02 ENCOUNTER — PATIENT MESSAGE (OUTPATIENT)
Dept: AUDIOLOGY | Facility: CLINIC | Age: 72
End: 2022-01-02
Payer: MEDICARE

## 2022-01-05 ENCOUNTER — PATIENT MESSAGE (OUTPATIENT)
Dept: OTOLARYNGOLOGY | Facility: CLINIC | Age: 72
End: 2022-01-05
Payer: MEDICARE

## 2022-01-05 ENCOUNTER — TELEPHONE (OUTPATIENT)
Dept: PSYCHIATRY | Facility: CLINIC | Age: 72
End: 2022-01-05
Payer: MEDICARE

## 2022-01-05 ENCOUNTER — PATIENT MESSAGE (OUTPATIENT)
Dept: PSYCHIATRY | Facility: CLINIC | Age: 72
End: 2022-01-05
Payer: MEDICARE

## 2022-01-05 NOTE — TELEPHONE ENCOUNTER
No new care gaps identified.  Powered by [employer] by [employer]. Reference number: [id number].   1/04/2022 6:21:07 PM CST

## 2022-01-06 RX ORDER — TEMAZEPAM 30 MG/1
CAPSULE ORAL
Qty: 30 CAPSULE | Refills: 0 | Status: SHIPPED | OUTPATIENT
Start: 2022-01-06 | End: 2022-02-03 | Stop reason: SDUPTHER

## 2022-01-06 NOTE — TELEPHONE ENCOUNTER
Good morning. I got this patient on your schedule but she sent this message this morning. Please advise. Thanks!

## 2022-01-07 ENCOUNTER — PATIENT MESSAGE (OUTPATIENT)
Dept: OTOLARYNGOLOGY | Facility: CLINIC | Age: 72
End: 2022-01-07
Payer: MEDICARE

## 2022-01-07 ENCOUNTER — PATIENT MESSAGE (OUTPATIENT)
Dept: RHEUMATOLOGY | Facility: CLINIC | Age: 72
End: 2022-01-07
Payer: MEDICARE

## 2022-01-10 RX ORDER — TEMAZEPAM 30 MG/1
CAPSULE ORAL
Qty: 30 CAPSULE | Refills: 0 | OUTPATIENT
Start: 2022-01-10

## 2022-01-10 NOTE — TELEPHONE ENCOUNTER
No new care gaps identified.  Powered by [employer] by [employer]. Reference number: [id number].   1/10/2022 6:10:27 AM CST

## 2022-01-12 ENCOUNTER — PROCEDURE VISIT (OUTPATIENT)
Dept: RHEUMATOLOGY | Facility: CLINIC | Age: 72
End: 2022-01-12
Payer: MEDICARE

## 2022-01-12 ENCOUNTER — HOSPITAL ENCOUNTER (OUTPATIENT)
Dept: RADIOLOGY | Facility: HOSPITAL | Age: 72
Discharge: HOME | End: 2022-01-12
Attending: PHYSICIAN ASSISTANT
Payer: MEDICARE

## 2022-01-12 DIAGNOSIS — M17.0 BILATERAL PRIMARY OSTEOARTHRITIS OF KNEE: Primary | ICD-10-CM

## 2022-01-12 DIAGNOSIS — M17.0 BILATERAL PRIMARY OSTEOARTHRITIS OF KNEE: ICD-10-CM

## 2022-01-12 PROCEDURE — 73564 X-RAY EXAM KNEE 4 OR MORE: CPT | Mod: TC,50

## 2022-01-12 PROCEDURE — 73564 XR KNEE ORTHO BILAT WITH FLEXION: ICD-10-PCS | Mod: 26,50,, | Performed by: RADIOLOGY

## 2022-01-12 PROCEDURE — 99499 UNLISTED E&M SERVICE: CPT | Mod: S$GLB,,, | Performed by: PHYSICIAN ASSISTANT

## 2022-01-12 PROCEDURE — 99499 NO LOS: ICD-10-PCS | Mod: S$GLB,,, | Performed by: PHYSICIAN ASSISTANT

## 2022-01-12 PROCEDURE — 20610 LARGE JOINT ASPIRATION/INJECTION: BILATERAL KNEE: ICD-10-PCS | Mod: 50,S$GLB,, | Performed by: PHYSICIAN ASSISTANT

## 2022-01-12 PROCEDURE — 20610 DRAIN/INJ JOINT/BURSA W/O US: CPT | Mod: 50,S$GLB,, | Performed by: PHYSICIAN ASSISTANT

## 2022-01-12 PROCEDURE — 73564 X-RAY EXAM KNEE 4 OR MORE: CPT | Mod: 26,50,, | Performed by: RADIOLOGY

## 2022-01-12 NOTE — PROCEDURES
Large Joint Aspiration/Injection: bilateral knee    Date/Time: 1/12/2022 4:00 PM  Performed by: Floresita Jensen PA-C  Authorized by: Floresita Jensen PA-C     Consent Done?:  Yes (Verbal)  Indications:  Pain  Site marked: the procedure site was marked    Timeout: prior to procedure the correct patient, procedure, and site was verified    Prep: patient was prepped and draped in usual sterile fashion      Local anesthesia used?: Yes    Anesthesia:  Local infiltration  Local anesthetic:  Lidocaine 2% without epinephrine  Anesthetic total (ml):  2      Details:  Needle Size:  21 G and 18 G  Ultrasonic Guidance for needle placement?: No    Approach:  Anterolateral  Location:  Knee  Laterality:  Bilateral  Site:  Bilateral knee  Medications (Right):  48 mg hylan g-f 20 48 mg/6 mL  Medications (Left):  48 mg hylan g-f 20 48 mg/6 mL  Patient tolerance:  Patient tolerated the procedure well with no immediate complications

## 2022-01-16 ENCOUNTER — PATIENT MESSAGE (OUTPATIENT)
Dept: INTERNAL MEDICINE | Facility: CLINIC | Age: 72
End: 2022-01-16
Payer: MEDICARE

## 2022-01-18 ENCOUNTER — PATIENT MESSAGE (OUTPATIENT)
Dept: INTERNAL MEDICINE | Facility: CLINIC | Age: 72
End: 2022-01-18
Payer: MEDICARE

## 2022-01-18 DIAGNOSIS — Z12.39 BREAST SCREENING: ICD-10-CM

## 2022-01-18 DIAGNOSIS — Z79.4 CONTROLLED TYPE 2 DIABETES MELLITUS WITH DIABETIC POLYNEUROPATHY, WITH LONG-TERM CURRENT USE OF INSULIN: Primary | ICD-10-CM

## 2022-01-18 DIAGNOSIS — E11.42 CONTROLLED TYPE 2 DIABETES MELLITUS WITH DIABETIC POLYNEUROPATHY, WITH LONG-TERM CURRENT USE OF INSULIN: Primary | ICD-10-CM

## 2022-01-18 DIAGNOSIS — Z12.31 ENCOUNTER FOR SCREENING MAMMOGRAM FOR MALIGNANT NEOPLASM OF BREAST: ICD-10-CM

## 2022-01-18 NOTE — TELEPHONE ENCOUNTER
Insurance is not covering dexcom because they stated she needs to receive more then one shot. Pt states she takes two shots.  LOV;12/14/2021

## 2022-01-18 NOTE — TELEPHONE ENCOUNTER
Pt is requesting an diabetic eye exam. Nothing was available until 2023. She is requesting Dr. Cantrell or .

## 2022-01-19 ENCOUNTER — TELEPHONE (OUTPATIENT)
Dept: PRIMARY CARE CLINIC | Facility: CLINIC | Age: 72
End: 2022-01-19
Payer: MEDICARE

## 2022-01-20 ENCOUNTER — PATIENT MESSAGE (OUTPATIENT)
Dept: RHEUMATOLOGY | Facility: CLINIC | Age: 72
End: 2022-01-20
Payer: MEDICARE

## 2022-01-21 ENCOUNTER — PATIENT MESSAGE (OUTPATIENT)
Dept: ADMINISTRATIVE | Facility: OTHER | Age: 72
End: 2022-01-21
Payer: MEDICARE

## 2022-01-23 ENCOUNTER — PATIENT MESSAGE (OUTPATIENT)
Dept: PSYCHIATRY | Facility: CLINIC | Age: 72
End: 2022-01-23
Payer: MEDICARE

## 2022-01-25 ENCOUNTER — TELEPHONE (OUTPATIENT)
Dept: CARDIOLOGY | Facility: CLINIC | Age: 72
End: 2022-01-25
Payer: MEDICARE

## 2022-01-25 ENCOUNTER — PATIENT MESSAGE (OUTPATIENT)
Dept: CARDIOLOGY | Facility: CLINIC | Age: 72
End: 2022-01-25
Payer: MEDICARE

## 2022-01-25 NOTE — TELEPHONE ENCOUNTER
Spoke with patient and reviewed her results with her from 12/29. Informed her that on the results page it showed that       · Rt shanthi normal  · Left shanthi suggest minimal pvd.    She sees Dr. Singh tomorrow so she has no questions at this time and will ask questions tomorrow.     She stated understanding.

## 2022-01-25 NOTE — TELEPHONE ENCOUNTER
Called and left a message for patient to call back to discuss scheduling. Also to find out what results she would like to know about.

## 2022-01-26 ENCOUNTER — TELEPHONE (OUTPATIENT)
Dept: CARDIOLOGY | Facility: CLINIC | Age: 72
End: 2022-01-26
Payer: MEDICARE

## 2022-01-31 ENCOUNTER — TELEPHONE (OUTPATIENT)
Dept: INTERNAL MEDICINE | Facility: CLINIC | Age: 72
End: 2022-01-31
Payer: MEDICARE

## 2022-01-31 NOTE — TELEPHONE ENCOUNTER
Patient stated she keep getting notifications to sign up for the digital medicine but patient does not want to do the digital medicine instead patient would like an order be sent to [employer] [facility] for a dexcom. Can you please advise.

## 2022-01-31 NOTE — TELEPHONE ENCOUNTER
----- Message from Dilma Malloy sent at 1/31/2022  9:55 AM CST -----  Contact: Christine  Type:  Patient Returning Call    Who Called: Christine  Who Left Message for Patient: ROSAURALAURA  Does the patient know what this is regarding?: Getting information sent to [facility] regards to her Dexcom  Would the patient rather a call back or a response via [facility]? Call back   Best Call Back Number: Please call her at [phone]  Additional Information:

## 2022-02-01 DIAGNOSIS — E11.59 CONTROLLED TYPE 2 DIABETES MELLITUS WITH OTHER CIRCULATORY COMPLICATION, WITH LONG-TERM CURRENT USE OF INSULIN: Primary | ICD-10-CM

## 2022-02-01 DIAGNOSIS — Z79.4 CONTROLLED TYPE 2 DIABETES MELLITUS WITH OTHER CIRCULATORY COMPLICATION, WITH LONG-TERM CURRENT USE OF INSULIN: Primary | ICD-10-CM

## 2022-02-01 RX ORDER — LANCETS
EACH MISCELLANEOUS
Qty: 100 EACH | Refills: 3 | Status: SHIPPED | OUTPATIENT
Start: 2022-02-01 | End: 2023-04-11 | Stop reason: SDUPTHER

## 2022-02-01 RX ORDER — INSULIN PUMP SYRINGE, 3 ML
EACH MISCELLANEOUS
Qty: 100 EACH | Refills: 3 | Status: SHIPPED | OUTPATIENT
Start: 2022-02-01 | End: 2023-02-01

## 2022-02-01 RX ORDER — CARVEDILOL 25 MG/1
TABLET ORAL
Qty: 180 TABLET | Refills: 1 | Status: SHIPPED | OUTPATIENT
Start: 2022-02-01 | End: 2022-07-16 | Stop reason: SDUPTHER

## 2022-02-01 RX ORDER — HYDRALAZINE HYDROCHLORIDE 100 MG/1
100 TABLET, FILM COATED ORAL 2 TIMES DAILY
Qty: 180 TABLET | Refills: 0 | Status: SHIPPED | OUTPATIENT
Start: 2022-02-01 | End: 2022-05-27 | Stop reason: SDUPTHER

## 2022-02-01 RX ORDER — BENAZEPRIL HYDROCHLORIDE 40 MG/1
40 TABLET ORAL 2 TIMES DAILY
Qty: 90 TABLET | Refills: 4
Start: 2022-02-01 | End: 2022-02-03 | Stop reason: SDUPTHER

## 2022-02-01 NOTE — TELEPHONE ENCOUNTER
No new care gaps identified.  Powered by [employer] by [employer]. Reference number: [id number].   2/01/2022 11:06:45 AM CST

## 2022-02-01 NOTE — TELEPHONE ENCOUNTER
Pt will ask her insurance.     She is also needing a new signed Rx with diagnosis code for diabetic supplies.

## 2022-02-03 RX ORDER — BENAZEPRIL HYDROCHLORIDE 40 MG/1
40 TABLET ORAL 2 TIMES DAILY
Qty: 90 TABLET | Refills: 4
Start: 2022-02-03 | End: 2022-02-04 | Stop reason: SDUPTHER

## 2022-02-03 NOTE — TELEPHONE ENCOUNTER
No new care gaps identified.  Powered by [employer] by [employer]. Reference number: [id number].   2/03/2022 2:34:22 PM CST

## 2022-02-04 ENCOUNTER — DOCUMENTATION ONLY (OUTPATIENT)
Dept: RHEUMATOLOGY | Facility: CLINIC | Age: 72
End: 2022-02-04
Payer: MEDICARE

## 2022-02-04 DIAGNOSIS — E78.1 HYPERTRIGLYCERIDEMIA: ICD-10-CM

## 2022-02-04 RX ORDER — BENAZEPRIL HYDROCHLORIDE 40 MG/1
40 TABLET ORAL 2 TIMES DAILY
Qty: 90 TABLET | Refills: 4 | Status: CANCELLED
Start: 2022-02-04

## 2022-02-06 DIAGNOSIS — E78.1 HYPERTRIGLYCERIDEMIA: ICD-10-CM

## 2022-02-06 RX ORDER — BENAZEPRIL HYDROCHLORIDE 40 MG/1
40 TABLET ORAL 2 TIMES DAILY
Qty: 90 TABLET | Refills: 4 | Status: CANCELLED
Start: 2022-02-06

## 2022-02-07 ENCOUNTER — NURSE TRIAGE (OUTPATIENT)
Dept: ADMINISTRATIVE | Facility: CLINIC | Age: 72
End: 2022-02-07
Payer: MEDICARE

## 2022-02-07 ENCOUNTER — TELEPHONE (OUTPATIENT)
Dept: INTERNAL MEDICINE | Facility: CLINIC | Age: 72
End: 2022-02-07
Payer: MEDICARE

## 2022-02-07 RX ORDER — TEMAZEPAM 30 MG/1
CAPSULE ORAL
Qty: 30 CAPSULE | Refills: 0 | Status: SHIPPED | OUTPATIENT
Start: 2022-02-07 | End: 2022-03-06 | Stop reason: SDUPTHER

## 2022-02-07 NOTE — TELEPHONE ENCOUNTER
Pt notified. Pt states she has been very depressed, having issues with not being able to drive. Her daughter fixes her food daily but she is afraid she needs to go into a nursing home. Please advise. Pt states she has appt 03/16/22, should she be seen sooner? She mentioned ordering home health would be good for her.

## 2022-02-07 NOTE — TELEPHONE ENCOUNTER
Spoke with pt and she stated Saturday she had a really bad vertigo episode, her heart was beating fast and got very dizzy. She took her meclizine and it helped. Pt would like to let Dr. Szymanski know Dr. Maharaj sent her to a new ENT, Dr. Jones and she sees him on 02/17/22 and she wanted to know if that was okay with Dr. Szymanski before she went. Pt has appt with Dr. Szymanski 03/16/22.

## 2022-02-07 NOTE — TELEPHONE ENCOUNTER
Requested a refill through the pharmacy. Pt states that Blood pressure medication Benazapril has been denied by the cardiologist. Noted in Epic refill was done 2/4/22. Blood pressure 199/88 and had not had any medication at that time. Just took her her hydralazine. Has been having headaches for a few days but not present now. Not sleeping well.    Reason for Disposition   Systolic BP  >= 180 OR Diastolic >= 110    Additional Information   Negative: Difficult to awaken or acting confused (e.g., disoriented, slurred speech)   Negative: SEVERE difficulty breathing (e.g., struggling for each breath, speaks in single words)   Negative: [1] Weakness of the face, arm or leg on one side of the body AND [2] new-onset   Negative: [1] Numbness (i.e., loss of sensation) of the face, arm or leg on one side of the body AND [2] new-onset   Negative: [1] Chest pain lasts > 5 minutes AND [2] history of heart disease  (i.e., heart attack, bypass surgery, angina, angioplasty, CHF)   Negative: [1] Chest pain AND [2] took nitrogylcerin AND [3] pain was not relieved   Negative: Sounds like a life-threatening emergency to the triager   Negative: Symptom is main concern  (e.g., headache, chest pain)   Negative: Low blood pressure is main concern   Negative: [1] Systolic BP  >= 160 OR Diastolic >= 100 AND [2] cardiac or neurologic symptoms (e.g., chest pain, difficulty breathing, unsteady gait, blurred vision)   Negative: [1] Pregnant 20 or more weeks (or postpartum < 6 weeks) AND [2] new hand or face swelling   Negative: [1] Pregnant 20 or more weeks AND [2] Systolic BP  >= 140 OR Diastolic >= 90   Negative: [1] Systolic BP  >= 200 OR Diastolic >= 120  AND [2] having NO cardiac or neurologic symptoms   Negative: [1] Postpartum < 6 weeks AND [2] Systolic BP  >= 140 OR Diastolic >= 90   Negative: [1] Systolic BP  >= 180 OR Diastolic >= 110 AND [2] missed most recent dose of blood pressure medication    Protocols used: BLOOD  PRESSURE - HIGH-A-AH

## 2022-02-08 ENCOUNTER — OFFICE VISIT (OUTPATIENT)
Dept: INTERNAL MEDICINE | Facility: CLINIC | Age: 72
End: 2022-02-08
Payer: MEDICARE

## 2022-02-08 DIAGNOSIS — Z79.4 CONTROLLED TYPE 2 DIABETES MELLITUS WITH OTHER CIRCULATORY COMPLICATION, WITH LONG-TERM CURRENT USE OF INSULIN: ICD-10-CM

## 2022-02-08 DIAGNOSIS — I10 ESSENTIAL HYPERTENSION: ICD-10-CM

## 2022-02-08 DIAGNOSIS — I48.0 PAROXYSMAL ATRIAL FIBRILLATION: ICD-10-CM

## 2022-02-08 DIAGNOSIS — E11.59 CONTROLLED TYPE 2 DIABETES MELLITUS WITH OTHER CIRCULATORY COMPLICATION, WITH LONG-TERM CURRENT USE OF INSULIN: ICD-10-CM

## 2022-02-08 DIAGNOSIS — F33.1 DEPRESSION, MAJOR, RECURRENT, MODERATE: ICD-10-CM

## 2022-02-08 DIAGNOSIS — R41.82 ALTERED MENTAL STATUS, UNSPECIFIED ALTERED MENTAL STATUS TYPE: ICD-10-CM

## 2022-02-08 DIAGNOSIS — R26.81 UNSTEADINESS ON FEET: ICD-10-CM

## 2022-02-08 DIAGNOSIS — I71.40 ABDOMINAL AORTIC ANEURYSM (AAA) WITHOUT RUPTURE: ICD-10-CM

## 2022-02-08 DIAGNOSIS — I25.118 CORONARY ARTERY DISEASE OF NATIVE ARTERY OF NATIVE HEART WITH STABLE ANGINA PECTORIS: ICD-10-CM

## 2022-02-08 DIAGNOSIS — R41.0 CONFUSION: Primary | ICD-10-CM

## 2022-02-08 PROCEDURE — 99214 PR OFFICE/OUTPT VISIT, EST, LEVL IV, 30-39 MIN: ICD-10-PCS | Mod: 95,,, | Performed by: FAMILY MEDICINE

## 2022-02-08 PROCEDURE — 99214 OFFICE O/P EST MOD 30 MIN: CPT | Mod: 95,,, | Performed by: FAMILY MEDICINE

## 2022-02-08 PROCEDURE — 3072F PR LOW RISK FOR RETINOPATHY: ICD-10-PCS | Mod: CPTII,95,, | Performed by: FAMILY MEDICINE

## 2022-02-08 PROCEDURE — 4010F ACE/ARB THERAPY RXD/TAKEN: CPT | Mod: CPTII,95,, | Performed by: FAMILY MEDICINE

## 2022-02-08 PROCEDURE — 4010F PR ACE/ARB THEARPY RXD/TAKEN: ICD-10-PCS | Mod: CPTII,95,, | Performed by: FAMILY MEDICINE

## 2022-02-08 PROCEDURE — 3072F LOW RISK FOR RETINOPATHY: CPT | Mod: CPTII,95,, | Performed by: FAMILY MEDICINE

## 2022-02-08 RX ORDER — MECLIZINE HYDROCHLORIDE 25 MG/1
25 TABLET ORAL 3 TIMES DAILY PRN
Qty: 30 TABLET | Refills: 2 | Status: SHIPPED | OUTPATIENT
Start: 2022-02-08 | End: 2022-02-08 | Stop reason: SDUPTHER

## 2022-02-08 NOTE — PROGRESS NOTES
Subjective:       Patient ID: Christine Jo is a 71 y.o. female.    Chief Complaint: No chief complaint on file.    The patient location is:  Home  The chief complaint leading to consultation is:  Vertigo nausea vomiting forgetfulness.    Visit type: audiovisual    Face to Face time with patient: 18 minutes of total time spent on the encounter, which includes face to face time and non-face to face time preparing to see the patient (eg, review of tests), Obtaining and/or reviewing separately obtained history, Documenting clinical information in the electronic or other health record, Independently interpreting results (not separately reported) and communicating results to the patient/family/caregiver, or Care coordination (not separately reported).         Each patient to whom he or she provides medical services by telemedicine is:  (1) informed of the relationship between the physician and patient and the respective role of any other health care provider with respect to management of the patient; and (2) notified that he or she may decline to receive medical services by telemedicine and may withdraw from such care at any time.    Notes:       Review of Systems   Constitutional: Positive for activity change. Negative for chills, fever and unexpected weight change.   HENT: Positive for congestion, hearing loss, rhinorrhea and sinus pressure. Negative for trouble swallowing.    Eyes: Positive for visual disturbance. Negative for discharge.   Respiratory: Positive for chest tightness. Negative for cough, shortness of breath and wheezing.    Cardiovascular: Positive for palpitations. Negative for chest pain and leg swelling.   Gastrointestinal: Positive for constipation, nausea and vomiting. Negative for blood in stool and diarrhea.   Endocrine: Negative for polydipsia and polyuria.   Genitourinary: Positive for dysuria. Negative for difficulty urinating, hematuria and menstrual problem.   Musculoskeletal: Positive for  arthralgias, joint swelling and neck pain.   Neurological: Positive for dizziness, weakness and headaches.   Psychiatric/Behavioral: Positive for confusion and dysphoric mood.       Objective:      Physical Exam  Constitutional:       General: She is not in acute distress.     Appearance: She is not ill-appearing or diaphoretic.   HENT:      Nose:      Comments: Nasal congestion  Pulmonary:      Effort: Pulmonary effort is normal. No respiratory distress.      Breath sounds: No wheezing.   Skin:     Coloration: Skin is not pale.      Findings: No erythema.   Neurological:      Mental Status: She is alert and oriented to person, place, and time.   Psychiatric:         Mood and Affect: Mood normal.         Behavior: Behavior normal.         Thought Content: Thought content normal.         Judgment: Judgment normal.         Hospital Outpatient Visit on 12/29/2021   Component Date Value Ref Range Status    Left arm BP 12/29/2021 99  mmHg Final    Right arm BP 12/29/2021 107  mmHg Final    Left posterior tibial 12/29/2021 104  mmHg Final    Right posterior tibial 12/29/2021 110  mmHg Final    Left dorsalis pedis 12/29/2021 102  mmHg Final    Right dorsalis pedis 12/29/2021 90  mmHg Final    Left MIKE 12/29/2021 0.97   Final    Right MIKE 12/29/2021 1.03   Final    Left toe pressure 12/29/2021 67  mmHg Final    Right toe pressure 12/29/2021 54  mmHg Final    Left TBI 12/29/2021 0.63   Final    Right TBI 12/29/2021 0.50   Final     Assessment:       1. Confusion    2. Altered mental status, unspecified altered mental status type     3. Unsteadiness on feet     4. Controlled type 2 diabetes mellitus with other circulatory complication, with long-term current use of insulin    5. Paroxysmal atrial fibrillation    6. Coronary artery disease of native artery of native heart with stable angina pectoris    7. Depression, major, recurrent, moderate    8. Essential hypertension    9. Abdominal aortic aneurysm (AAA)  without rupture    10. BMI 45.0-49.9, adult        Plan:     She reports several months of increased forgetfulness.  She is becoming confused about medications.  She saw neurology recently and they recommended urine culture.  She also has ongoing vertigo with nausea vomiting and balance issue.  ENT evaluation was done and follow-up ENT evaluation is pending meclizine helps and she would like a refill.  Will refill meclizine.  Will order labs including urine culture.  She also needs home health regards labs and medication organization.  She currently is being follow-up elevated BMI diabetes abdominal aortic aneurysm hypertension coronary artery disease depression paroxysmal atrial fibrillation of which is stable    Confusion  -     Ambulatory referral/consult to Home Health; Future; Expected date: 02/09/2022  -     CBC Auto Differential; Future; Expected date: 02/08/2022  -     Comprehensive Metabolic Panel; Future; Expected date: 02/08/2022  -     TSH; Future; Expected date: 02/08/2022  -     Vitamin B12; Future; Expected date: 02/08/2022  -     Folate; Future; Expected date: 02/08/2022  -     Urine culture    Altered mental status, unspecified altered mental status type   -     Vitamin B12; Future; Expected date: 02/08/2022    Unsteadiness on feet   -     Folate; Future; Expected date: 02/08/2022    Controlled type 2 diabetes mellitus with other circulatory complication, with long-term current use of insulin    Paroxysmal atrial fibrillation    Coronary artery disease of native artery of native heart with stable angina pectoris    Depression, major, recurrent, moderate    Essential hypertension    Abdominal aortic aneurysm (AAA) without rupture    BMI 45.0-49.9, adult    Other orders  -     meclizine (ANTIVERT) 25 mg tablet; Take 1 tablet (25 mg total) by mouth 3 (three) times daily as needed.  Dispense: 30 tablet; Refill: 2

## 2022-02-14 ENCOUNTER — PATIENT MESSAGE (OUTPATIENT)
Dept: INTERNAL MEDICINE | Facility: CLINIC | Age: 72
End: 2022-02-14
Payer: MEDICARE

## 2022-02-15 ENCOUNTER — TELEPHONE (OUTPATIENT)
Dept: INTERNAL MEDICINE | Facility: CLINIC | Age: 72
End: 2022-02-15
Payer: MEDICARE

## 2022-02-15 ENCOUNTER — PATIENT MESSAGE (OUTPATIENT)
Dept: OTOLARYNGOLOGY | Facility: CLINIC | Age: 72
End: 2022-02-15
Payer: MEDICARE

## 2022-02-15 DIAGNOSIS — N39.0 URINARY TRACT INFECTION WITHOUT HEMATURIA, SITE UNSPECIFIED: Primary | ICD-10-CM

## 2022-02-15 NOTE — TELEPHONE ENCOUNTER
----- Message from Tyesha Drew sent at 2/15/2022 11:18 AM CST -----  Contact: [facility](dougie)[phone]  Calling regarding patient home health, states patient is refusing home health . Please call back at [phone]. Sree/cameron

## 2022-02-15 NOTE — TELEPHONE ENCOUNTER
----- Message from Rubi Plasencia sent at 2/15/2022  3:33 PM CST -----  Contact: [facility] [phone]  Patient is returning a phone call.  Who left a message for the patient: Kelli  Does patient know what this is regarding:  Telephone consult    She wants to speak with you about the patient's medications.    Thank you

## 2022-02-17 ENCOUNTER — OFFICE VISIT (OUTPATIENT)
Dept: OTOLARYNGOLOGY | Facility: CLINIC | Age: 72
End: 2022-02-17
Payer: MEDICARE

## 2022-02-17 ENCOUNTER — LAB VISIT (OUTPATIENT)
Dept: LAB | Facility: HOSPITAL | Age: 72
End: 2022-02-17
Attending: FAMILY MEDICINE
Payer: MEDICARE

## 2022-02-17 VITALS
BODY MASS INDEX: 45.57 KG/M2 | DIASTOLIC BLOOD PRESSURE: 61 MMHG | TEMPERATURE: 98 F | HEIGHT: 61 IN | WEIGHT: 241.38 LBS | SYSTOLIC BLOOD PRESSURE: 126 MMHG

## 2022-02-17 DIAGNOSIS — R42 DIZZINESS: ICD-10-CM

## 2022-02-17 DIAGNOSIS — R41.82 ALTERED MENTAL STATUS, UNSPECIFIED ALTERED MENTAL STATUS TYPE: ICD-10-CM

## 2022-02-17 DIAGNOSIS — H81.92 PERIPHERAL VESTIBULOPATHY, LEFT: Primary | ICD-10-CM

## 2022-02-17 DIAGNOSIS — R41.0 CONFUSION: ICD-10-CM

## 2022-02-17 DIAGNOSIS — R26.81 UNSTEADINESS ON FEET: ICD-10-CM

## 2022-02-17 DIAGNOSIS — H90.A22 SENSORINEURAL HEARING LOSS (SNHL) OF LEFT EAR WITH RESTRICTED HEARING OF RIGHT EAR: ICD-10-CM

## 2022-02-17 DIAGNOSIS — H93.12 TINNITUS OF LEFT EAR: ICD-10-CM

## 2022-02-17 LAB
ALBUMIN SERPL BCP-MCNC: 3.6 G/DL (ref 3.5–5.2)
ALP SERPL-CCNC: 47 U/L (ref 55–135)
ALT SERPL W/O P-5'-P-CCNC: 17 U/L (ref 10–44)
ANION GAP SERPL CALC-SCNC: 11 MMOL/L (ref 8–16)
AST SERPL-CCNC: 16 U/L (ref 10–40)
BASOPHILS # BLD AUTO: 0.08 K/UL (ref 0–0.2)
BASOPHILS NFR BLD: 0.9 % (ref 0–1.9)
BILIRUB SERPL-MCNC: 0.7 MG/DL (ref 0.1–1)
BUN SERPL-MCNC: 14 MG/DL (ref 8–23)
CALCIUM SERPL-MCNC: 9.4 MG/DL (ref 8.7–10.5)
CHLORIDE SERPL-SCNC: 102 MMOL/L (ref 95–110)
CO2 SERPL-SCNC: 26 MMOL/L (ref 23–29)
CREAT SERPL-MCNC: 0.8 MG/DL (ref 0.5–1.4)
DIFFERENTIAL METHOD: ABNORMAL
EOSINOPHIL # BLD AUTO: 0.1 K/UL (ref 0–0.5)
EOSINOPHIL NFR BLD: 1.5 % (ref 0–8)
ERYTHROCYTE [DISTWIDTH] IN BLOOD BY AUTOMATED COUNT: 12.6 % (ref 11.5–14.5)
EST. GFR  (AFRICAN AMERICAN): >60 ML/MIN/1.73 M^2
EST. GFR  (NON AFRICAN AMERICAN): >60 ML/MIN/1.73 M^2
FOLATE SERPL-MCNC: 17.6 NG/ML (ref 4–24)
GLUCOSE SERPL-MCNC: 166 MG/DL (ref 70–110)
HCT VFR BLD AUTO: 36 % (ref 37–48.5)
HGB BLD-MCNC: 11.3 G/DL (ref 12–16)
IMM GRANULOCYTES # BLD AUTO: 0.02 K/UL (ref 0–0.04)
IMM GRANULOCYTES NFR BLD AUTO: 0.2 % (ref 0–0.5)
LYMPHOCYTES # BLD AUTO: 1.7 K/UL (ref 1–4.8)
LYMPHOCYTES NFR BLD: 19.6 % (ref 18–48)
MCH RBC QN AUTO: 30.4 PG (ref 27–31)
MCHC RBC AUTO-ENTMCNC: 31.4 G/DL (ref 32–36)
MCV RBC AUTO: 97 FL (ref 82–98)
MONOCYTES # BLD AUTO: 0.7 K/UL (ref 0.3–1)
MONOCYTES NFR BLD: 8.2 % (ref 4–15)
NEUTROPHILS # BLD AUTO: 6 K/UL (ref 1.8–7.7)
NEUTROPHILS NFR BLD: 69.6 % (ref 38–73)
NRBC BLD-RTO: 0 /100 WBC
PLATELET # BLD AUTO: 245 K/UL (ref 150–450)
PMV BLD AUTO: 12.2 FL (ref 9.2–12.9)
POTASSIUM SERPL-SCNC: 4.3 MMOL/L (ref 3.5–5.1)
PROT SERPL-MCNC: 6.9 G/DL (ref 6–8.4)
RBC # BLD AUTO: 3.72 M/UL (ref 4–5.4)
SODIUM SERPL-SCNC: 139 MMOL/L (ref 136–145)
TSH SERPL DL<=0.005 MIU/L-ACNC: 0.94 UIU/ML (ref 0.4–4)
VIT B12 SERPL-MCNC: 363 PG/ML (ref 210–950)
WBC # BLD AUTO: 8.56 K/UL (ref 3.9–12.7)

## 2022-02-17 PROCEDURE — 4010F PR ACE/ARB THEARPY RXD/TAKEN: ICD-10-PCS | Mod: CPTII,S$GLB,, | Performed by: OTOLARYNGOLOGY

## 2022-02-17 PROCEDURE — 3074F SYST BP LT 130 MM HG: CPT | Mod: CPTII,S$GLB,, | Performed by: OTOLARYNGOLOGY

## 2022-02-17 PROCEDURE — 1126F PR PAIN SEVERITY QUANTIFIED, NO PAIN PRESENT: ICD-10-PCS | Mod: CPTII,S$GLB,, | Performed by: OTOLARYNGOLOGY

## 2022-02-17 PROCEDURE — 85025 COMPLETE CBC W/AUTO DIFF WBC: CPT | Performed by: FAMILY MEDICINE

## 2022-02-17 PROCEDURE — 4010F ACE/ARB THERAPY RXD/TAKEN: CPT | Mod: CPTII,S$GLB,, | Performed by: OTOLARYNGOLOGY

## 2022-02-17 PROCEDURE — 99999 PR PBB SHADOW E&M-EST. PATIENT-LVL V: ICD-10-PCS | Mod: PBBFAC,,, | Performed by: OTOLARYNGOLOGY

## 2022-02-17 PROCEDURE — 99999 PR PBB SHADOW E&M-EST. PATIENT-LVL V: CPT | Mod: PBBFAC,,, | Performed by: OTOLARYNGOLOGY

## 2022-02-17 PROCEDURE — 3008F BODY MASS INDEX DOCD: CPT | Mod: CPTII,S$GLB,, | Performed by: OTOLARYNGOLOGY

## 2022-02-17 PROCEDURE — 84443 ASSAY THYROID STIM HORMONE: CPT | Performed by: FAMILY MEDICINE

## 2022-02-17 PROCEDURE — 82746 ASSAY OF FOLIC ACID SERUM: CPT | Performed by: FAMILY MEDICINE

## 2022-02-17 PROCEDURE — 99214 OFFICE O/P EST MOD 30 MIN: CPT | Mod: S$GLB,,, | Performed by: OTOLARYNGOLOGY

## 2022-02-17 PROCEDURE — 3008F PR BODY MASS INDEX (BMI) DOCUMENTED: ICD-10-PCS | Mod: CPTII,S$GLB,, | Performed by: OTOLARYNGOLOGY

## 2022-02-17 PROCEDURE — 36415 COLL VENOUS BLD VENIPUNCTURE: CPT | Performed by: FAMILY MEDICINE

## 2022-02-17 PROCEDURE — 1126F AMNT PAIN NOTED NONE PRSNT: CPT | Mod: CPTII,S$GLB,, | Performed by: OTOLARYNGOLOGY

## 2022-02-17 PROCEDURE — 82607 VITAMIN B-12: CPT | Performed by: FAMILY MEDICINE

## 2022-02-17 PROCEDURE — 99214 PR OFFICE/OUTPT VISIT, EST, LEVL IV, 30-39 MIN: ICD-10-PCS | Mod: S$GLB,,, | Performed by: OTOLARYNGOLOGY

## 2022-02-17 PROCEDURE — 3072F LOW RISK FOR RETINOPATHY: CPT | Mod: CPTII,S$GLB,, | Performed by: OTOLARYNGOLOGY

## 2022-02-17 PROCEDURE — 80053 COMPREHEN METABOLIC PANEL: CPT | Performed by: FAMILY MEDICINE

## 2022-02-17 PROCEDURE — 3072F PR LOW RISK FOR RETINOPATHY: ICD-10-PCS | Mod: CPTII,S$GLB,, | Performed by: OTOLARYNGOLOGY

## 2022-02-17 PROCEDURE — 1159F MED LIST DOCD IN RCRD: CPT | Mod: CPTII,S$GLB,, | Performed by: OTOLARYNGOLOGY

## 2022-02-17 PROCEDURE — 3078F DIAST BP <80 MM HG: CPT | Mod: CPTII,S$GLB,, | Performed by: OTOLARYNGOLOGY

## 2022-02-17 PROCEDURE — 3078F PR MOST RECENT DIASTOLIC BLOOD PRESSURE < 80 MM HG: ICD-10-PCS | Mod: CPTII,S$GLB,, | Performed by: OTOLARYNGOLOGY

## 2022-02-17 PROCEDURE — 1159F PR MEDICATION LIST DOCUMENTED IN MEDICAL RECORD: ICD-10-PCS | Mod: CPTII,S$GLB,, | Performed by: OTOLARYNGOLOGY

## 2022-02-17 PROCEDURE — 1160F PR REVIEW ALL MEDS BY PRESCRIBER/CLIN PHARMACIST DOCUMENTED: ICD-10-PCS | Mod: CPTII,S$GLB,, | Performed by: OTOLARYNGOLOGY

## 2022-02-17 PROCEDURE — 1160F RVW MEDS BY RX/DR IN RCRD: CPT | Mod: CPTII,S$GLB,, | Performed by: OTOLARYNGOLOGY

## 2022-02-17 PROCEDURE — 3074F PR MOST RECENT SYSTOLIC BLOOD PRESSURE < 130 MM HG: ICD-10-PCS | Mod: CPTII,S$GLB,, | Performed by: OTOLARYNGOLOGY

## 2022-02-18 NOTE — PROGRESS NOTES
Answers for HPI/ROS submitted by the patient on 2/16/2022  None of these: Yes  hearing loss: Yes  facial swelling: Yes  sinus pressure : Yes  Sinus infection(s)?: Yes  postnasal drip: Yes  trouble swallowing: Yes  Voice Change?: Yes  Eye Drainage?: Yes  eye itching: Yes  Light sensitivity / Light hurts the eyes?: Yes  Snoring?: Yes  Sleep Apnea?: Yes  shortness of breath: Yes  cough: Yes  Irregular heartbeat?: Yes  abdominal pain: Yes  constipation: Yes  Vomiting?: Yes  Difficulty urinating?: Yes  Urinating too frequently?: Yes  Muscle aches / pain?: Yes  back pain: Yes  neck pain: Yes  None of these : Yes  Seasonal Allergies?: Yes  None of these : Yes  dizziness: Yes  headaches: Yes  Light-headedness: Yes  None of these: Yes  decreased concentration: Yes  Feeling depressed?: Yes  sleep disturbance: Yes

## 2022-02-18 NOTE — PROGRESS NOTES
"Subjective:       Patient ID: Christine Jo is a 71 y.o. female.    Chief Complaint: Dizziness and Hearing Loss (Left ear)    HPI     Christine Jo is a 71 y.o. female with decades long history of left ear hearing loss and tinnitus presents for dizziness. She describes her dizziness as a sense of motion with associated nausea and vomiting.  It is occurring almost daily.  It is worsened by quick head turns.  She denies a true room spinning sensation.  She reports that it is usually associated with a "sinus headache".  She reports phonophobia.  She reports sensitivity to visual stimuli.      Review of Systems   Constitutional: Negative.    HENT: Positive for facial swelling, hearing loss, postnasal drip, sinus pressure/congestion, trouble swallowing and voice change.    Eyes: Positive for photophobia and itching.   Respiratory: Positive for cough and shortness of breath.    Gastrointestinal: Positive for abdominal pain, constipation and vomiting.   Endocrine: Negative.    Genitourinary: Positive for difficulty urinating.   Musculoskeletal: Positive for back pain and neck pain.   Integumentary:  Negative.   Neurological: Positive for dizziness, light-headedness and headaches.   Hematological: Negative.    Psychiatric/Behavioral: Positive for decreased concentration and sleep disturbance.         Objective:      Physical Exam  Vitals and nursing note reviewed.   Constitutional:       Appearance: Normal appearance.   HENT:      Head: Normocephalic and atraumatic.      Right Ear: Tympanic membrane, ear canal and external ear normal. There is no impacted cerumen.      Left Ear: Tympanic membrane, ear canal and external ear normal. There is no impacted cerumen.   Eyes:      Extraocular Movements:      Right eye: Normal extraocular motion and no nystagmus.      Left eye: Normal extraocular motion and no nystagmus.      Comments: Feels dizziness with simple EOM testing   Neurological:      Mental Status: She is alert. "         Data Reviewed:    MRI IAC images  independently reviewed by me.  No evidence of IAC or inner ear anomaly, no evidence of acoustic neuroma.  There are chronic microvascular changes    VNG:Oculomotor function tests ( saccade and OPKs) were normal and symmetric.              Sinusoidal tracking: Slightly abnormal gain (likely task related). Normal symmetry.  Spontaneous nystagmus test revealed 4 d/s RB nystagmus that suppressed with fixation.  Gaze nystagmus was absent.  Head-shake test was absent for after head shake nystagmus.  [place] Left was negative for BPPV.  [place] Right was negative for BPPV. 6 d/s RB nystagmus that suppressed with fixation.  Static Positional nystagmus:              Supine:  3 d/s RB nystagmus that suppressed with fixation.              Head Right: 2 d/s RB + 3 d/s UB nystagmus that suppressed with fixation.              Head Left: 6 d/s RB nystagmus that suppressed with fixation.  Bi-thermal caloric irrigations revealed a 50% caloric weakness in the left ear, which is within normal limits, and 20% directional preponderance to the right, which is within normal limits.  Fixation suppression following caloric irrigations were normal.  RC:      30 d/s                                      RW:     50 d/s   d/s                                      LW:      13 d/s     Impressions: Abnormal VNG. Today's results are indicative of a left peripheral vestibulopathy, as evidenced by a 50% left Unilateral Weakness.     Audio:      Audiogram tracings independently reviewed and discussed with patient shows left sided mild-moderate SNHL however no obtainable WRS    Assessment:       Problem List Items Addressed This Visit        ENT    Hearing loss    Tinnitus      Other Visit Diagnoses     Peripheral vestibulopathy, left    -  Primary    Relevant Orders    Ambulatory referral/consult to Physical/Occupational Therapy    Dizziness              Plan:         In summary this is a  pleasant 71 y.o. with daily vestibular symptoms present for months.  She has a hx of asymmetric hearing loss and a noted 50% AS weakness on VNG. It is unclear if she has meniere's disease as her symptoms sound more typical of vestibular migraine and she has not had documented fluctuations in her hearing.     Regardless, I think her main symptoms main improve with vestibular rehab.      Recommend VRT evaluation.  If she does not improve may do trial of migraine prophylactic  Recommend neurology evaluation.      Answers for HPI/ROS submitted by the patient on 2/16/2022  Sinus infection(s)?: Yes  Eye Drainage?: Yes  Snoring?: Yes  Sleep Apnea?: Yes  Irregular heartbeat?: Yes  Urinating too frequently?: Yes  Muscle aches / pain?: Yes  Seasonal Allergies?: Yes  Feeling depressed?: Yes

## 2022-02-19 ENCOUNTER — PATIENT MESSAGE (OUTPATIENT)
Dept: INTERNAL MEDICINE | Facility: CLINIC | Age: 72
End: 2022-02-19
Payer: MEDICARE

## 2022-02-21 ENCOUNTER — PATIENT OUTREACH (OUTPATIENT)
Dept: ADMINISTRATIVE | Facility: OTHER | Age: 72
End: 2022-02-21
Payer: MEDICARE

## 2022-02-22 NOTE — PROGRESS NOTES
Health Maintenance Due   Topic Date Due    Mammogram  02/19/2022    Eye Exam  02/19/2022    COVID-19 Vaccine (4 - Booster for Pfizer series) 03/13/2022     Updates were requested from care everywhere.  Chart was reviewed for overdue Proactive [facility] Encounters ([place]) topics (CRS, Breast Cancer Screening, Eye exam)  Health Maintenance has been updated.  [place] immunization registry triggered.  Immunizations were reconciled.

## 2022-02-24 ENCOUNTER — PATIENT MESSAGE (OUTPATIENT)
Dept: INTERNAL MEDICINE | Facility: CLINIC | Age: 72
End: 2022-02-24
Payer: MEDICARE

## 2022-02-28 ENCOUNTER — PATIENT MESSAGE (OUTPATIENT)
Dept: OTOLARYNGOLOGY | Facility: CLINIC | Age: 72
End: 2022-02-28
Payer: MEDICARE

## 2022-03-02 ENCOUNTER — NURSE TRIAGE (OUTPATIENT)
Dept: ADMINISTRATIVE | Facility: CLINIC | Age: 72
End: 2022-03-02
Payer: MEDICARE

## 2022-03-02 DIAGNOSIS — R42 DIZZINESS: Primary | ICD-10-CM

## 2022-03-02 NOTE — TELEPHONE ENCOUNTER
Spoke with patient she states last night she felt dizzy and blood sugar was around 221. Patient is still not feeling well today.  Current symptoms are dizziness and difficulty breathing.  She cannot stand without holding on to something.  Current temp 99.2.  States when she tries to stand the room is spinning. Advised patient to call [id number].  Patient states she wants to speak with Stephon Hampton's office first.  Patient connected with Mary Kate Whitmore MA at office.  Patient was further advised to call [id number] and patient verbalized understanding.  No further assistance needed from nurse on call line.     Follow up call done patient states he is calling [id number] now.  Sates she wanted to put on clothes before she called them.     Reason for Disposition   Difficult to awaken or acting confused (e.g., disoriented, slurred speech)    Additional Information   Negative: SEVERE difficulty breathing (e.g., struggling for each breath, speaks in single words)   Negative: Shock suspected (e.g., cold/pale/clammy skin, too weak to stand, low BP, rapid pulse)    Protocols used: DIZZINESS-A-OH

## 2022-03-06 ENCOUNTER — PATIENT MESSAGE (OUTPATIENT)
Dept: CARDIOLOGY | Facility: CLINIC | Age: 72
End: 2022-03-06
Payer: MEDICARE

## 2022-03-07 ENCOUNTER — PATIENT MESSAGE (OUTPATIENT)
Dept: INTERNAL MEDICINE | Facility: CLINIC | Age: 72
End: 2022-03-07
Payer: MEDICARE

## 2022-03-07 ENCOUNTER — TELEPHONE (OUTPATIENT)
Dept: CARDIOLOGY | Facility: CLINIC | Age: 72
End: 2022-03-07
Payer: MEDICARE

## 2022-03-07 DIAGNOSIS — R42 DIZZY: Primary | ICD-10-CM

## 2022-03-07 DIAGNOSIS — G43.809 VESTIBULAR MIGRAINE: ICD-10-CM

## 2022-03-07 RX ORDER — TEMAZEPAM 30 MG/1
CAPSULE ORAL
Qty: 30 CAPSULE | Refills: 0 | Status: SHIPPED | OUTPATIENT
Start: 2022-03-07 | End: 2022-04-08 | Stop reason: SDUPTHER

## 2022-03-07 NOTE — TELEPHONE ENCOUNTER
Patient needs to be seen in clinic; this is not a virtual appropriate clinic visit.      Thanks    Dr Singh

## 2022-03-07 NOTE — TELEPHONE ENCOUNTER
"----- Message from Meenakshi Jc MA sent at 3/7/2022  9:38 AM CST -----  Please advise. Patient has a virtual appt tomorrow morning.           "I am having trouble with dizzy nausea breathing hard. 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 I called 911 last wed I felt I was having a stroke the back of my head felt as if it would blow off. I had anxiety afraid because I live alone with all these issues for my health. My eyes is 235 my pulse is 78 and bp163/83 before meds"    "

## 2022-03-07 NOTE — TELEPHONE ENCOUNTER
Care Due:                  Date            Visit Type   Department     Provider  --------------------------------------------------------------------------------                                ESTABLISHED                              PATIENT -    [facility]  Last Visit: 02-      [facility]      CHEMA Szymanski                               -                              PRIMARY      [facility]  Next Visit: 03-      CARE ([facility])   [facility]       Jose Szymanski                                                            Last  Test          Frequency    Reason                     Performed    Due Date  --------------------------------------------------------------------------------    Lipid Panel.  12 months..  atorvastatin.............  05- 05-    Powered by [employer]. Reference number: [id number].   3/06/2022 10:01:32 PM CST

## 2022-03-08 NOTE — TELEPHONE ENCOUNTER
Patient will be in clinic later this morning. She messaged in my chart to inform us her blood pressure 15 minutes after she woke up  184/72 pulse 80.

## 2022-03-09 ENCOUNTER — PATIENT MESSAGE (OUTPATIENT)
Dept: RHEUMATOLOGY | Facility: CLINIC | Age: 72
End: 2022-03-09
Payer: MEDICARE

## 2022-03-14 ENCOUNTER — HOSPITAL ENCOUNTER (OUTPATIENT)
Dept: CARDIOLOGY | Facility: HOSPITAL | Age: 72
Discharge: HOME | End: 2022-03-14
Attending: INTERNAL MEDICINE
Payer: MEDICARE

## 2022-03-14 ENCOUNTER — TELEPHONE (OUTPATIENT)
Dept: CARDIOLOGY | Facility: CLINIC | Age: 72
End: 2022-03-14
Payer: MEDICARE

## 2022-03-14 ENCOUNTER — OFFICE VISIT (OUTPATIENT)
Dept: CARDIOLOGY | Facility: CLINIC | Age: 72
End: 2022-03-14
Payer: MEDICARE

## 2022-03-14 VITALS
DIASTOLIC BLOOD PRESSURE: 60 MMHG | HEIGHT: 61 IN | SYSTOLIC BLOOD PRESSURE: 134 MMHG | RESPIRATION RATE: 16 BRPM | OXYGEN SATURATION: 99 % | HEART RATE: 75 BPM | BODY MASS INDEX: 45.54 KG/M2 | WEIGHT: 241.19 LBS

## 2022-03-14 DIAGNOSIS — R00.2 PALPITATIONS: ICD-10-CM

## 2022-03-14 DIAGNOSIS — E78.1 HYPERTRIGLYCERIDEMIA: ICD-10-CM

## 2022-03-14 DIAGNOSIS — I71.40 ABDOMINAL AORTIC ANEURYSM (AAA) WITHOUT RUPTURE: ICD-10-CM

## 2022-03-14 DIAGNOSIS — E11.65 UNCONTROLLED TYPE 2 DIABETES MELLITUS WITH HYPERGLYCEMIA: ICD-10-CM

## 2022-03-14 DIAGNOSIS — R09.89 OTHER SPECIFIED SYMPTOMS AND SIGNS INVOLVING THE CIRCULATORY AND RESPIRATORY SYSTEMS: ICD-10-CM

## 2022-03-14 DIAGNOSIS — R06.09 DOE (DYSPNEA ON EXERTION): ICD-10-CM

## 2022-03-14 DIAGNOSIS — R42 VERTIGO: ICD-10-CM

## 2022-03-14 DIAGNOSIS — R42 DISEQUILIBRIUM: ICD-10-CM

## 2022-03-14 DIAGNOSIS — I48.0 PAROXYSMAL ATRIAL FIBRILLATION: ICD-10-CM

## 2022-03-14 DIAGNOSIS — Z79.4 CONTROLLED TYPE 2 DIABETES MELLITUS WITH DIABETIC PERIPHERAL ANGIOPATHY WITHOUT GANGRENE, WITH LONG-TERM CURRENT USE OF INSULIN: ICD-10-CM

## 2022-03-14 DIAGNOSIS — I25.118 CORONARY ARTERY DISEASE OF NATIVE ARTERY OF NATIVE HEART WITH STABLE ANGINA PECTORIS: Primary | ICD-10-CM

## 2022-03-14 DIAGNOSIS — E11.51 CONTROLLED TYPE 2 DIABETES MELLITUS WITH DIABETIC PERIPHERAL ANGIOPATHY WITHOUT GANGRENE, WITH LONG-TERM CURRENT USE OF INSULIN: ICD-10-CM

## 2022-03-14 DIAGNOSIS — R94.31 ABNORMAL ECG: ICD-10-CM

## 2022-03-14 DIAGNOSIS — I10 ESSENTIAL HYPERTENSION: ICD-10-CM

## 2022-03-14 DIAGNOSIS — I10 ESSENTIAL HYPERTENSION: Primary | ICD-10-CM

## 2022-03-14 DIAGNOSIS — E66.01 CLASS 3 SEVERE OBESITY DUE TO EXCESS CALORIES WITH SERIOUS COMORBIDITY AND BODY MASS INDEX (BMI) OF 45.0 TO 49.9 IN ADULT: ICD-10-CM

## 2022-03-14 PROCEDURE — 93880 EXTRACRANIAL BILAT STUDY: CPT | Mod: 26,,, | Performed by: INTERNAL MEDICINE

## 2022-03-14 PROCEDURE — 93010 ELECTROCARDIOGRAM REPORT: CPT | Mod: ,,, | Performed by: INTERNAL MEDICINE

## 2022-03-14 PROCEDURE — 3075F PR MOST RECENT SYSTOLIC BLOOD PRESS GE 130-139MM HG: ICD-10-PCS | Mod: CPTII,S$GLB,, | Performed by: INTERNAL MEDICINE

## 2022-03-14 PROCEDURE — 4010F PR ACE/ARB THEARPY RXD/TAKEN: ICD-10-PCS | Mod: CPTII,S$GLB,, | Performed by: INTERNAL MEDICINE

## 2022-03-14 PROCEDURE — 93880 CV US DOPPLER CAROTID (CUPID ONLY): ICD-10-PCS | Mod: 26,,, | Performed by: INTERNAL MEDICINE

## 2022-03-14 PROCEDURE — 1126F AMNT PAIN NOTED NONE PRSNT: CPT | Mod: CPTII,S$GLB,, | Performed by: INTERNAL MEDICINE

## 2022-03-14 PROCEDURE — 3072F PR LOW RISK FOR RETINOPATHY: ICD-10-PCS | Mod: CPTII,S$GLB,, | Performed by: INTERNAL MEDICINE

## 2022-03-14 PROCEDURE — 3078F DIAST BP <80 MM HG: CPT | Mod: CPTII,S$GLB,, | Performed by: INTERNAL MEDICINE

## 2022-03-14 PROCEDURE — 99499 UNLISTED E&M SERVICE: CPT | Mod: S$GLB,,, | Performed by: INTERNAL MEDICINE

## 2022-03-14 PROCEDURE — 4010F ACE/ARB THERAPY RXD/TAKEN: CPT | Mod: CPTII,S$GLB,, | Performed by: INTERNAL MEDICINE

## 2022-03-14 PROCEDURE — 3072F LOW RISK FOR RETINOPATHY: CPT | Mod: CPTII,S$GLB,, | Performed by: INTERNAL MEDICINE

## 2022-03-14 PROCEDURE — 1160F PR REVIEW ALL MEDS BY PRESCRIBER/CLIN PHARMACIST DOCUMENTED: ICD-10-PCS | Mod: CPTII,S$GLB,, | Performed by: INTERNAL MEDICINE

## 2022-03-14 PROCEDURE — 93010 EKG 12-LEAD: ICD-10-PCS | Mod: ,,, | Performed by: INTERNAL MEDICINE

## 2022-03-14 PROCEDURE — 3078F PR MOST RECENT DIASTOLIC BLOOD PRESSURE < 80 MM HG: ICD-10-PCS | Mod: CPTII,S$GLB,, | Performed by: INTERNAL MEDICINE

## 2022-03-14 PROCEDURE — 3051F HG A1C>EQUAL 7.0%<8.0%: CPT | Mod: CPTII,S$GLB,, | Performed by: INTERNAL MEDICINE

## 2022-03-14 PROCEDURE — 3075F SYST BP GE 130 - 139MM HG: CPT | Mod: CPTII,S$GLB,, | Performed by: INTERNAL MEDICINE

## 2022-03-14 PROCEDURE — 99999 PR PBB SHADOW E&M-EST. PATIENT-LVL III: ICD-10-PCS | Mod: PBBFAC,,, | Performed by: INTERNAL MEDICINE

## 2022-03-14 PROCEDURE — 99499 RISK ADDL DX/OHS AUDIT: ICD-10-PCS | Mod: S$GLB,,, | Performed by: INTERNAL MEDICINE

## 2022-03-14 PROCEDURE — 1159F PR MEDICATION LIST DOCUMENTED IN MEDICAL RECORD: ICD-10-PCS | Mod: CPTII,S$GLB,, | Performed by: INTERNAL MEDICINE

## 2022-03-14 PROCEDURE — 99214 PR OFFICE/OUTPT VISIT, EST, LEVL IV, 30-39 MIN: ICD-10-PCS | Mod: S$GLB,,, | Performed by: INTERNAL MEDICINE

## 2022-03-14 PROCEDURE — 3051F PR MOST RECENT HEMOGLOBIN A1C LEVEL 7.0 - < 8.0%: ICD-10-PCS | Mod: CPTII,S$GLB,, | Performed by: INTERNAL MEDICINE

## 2022-03-14 PROCEDURE — 1160F RVW MEDS BY RX/DR IN RCRD: CPT | Mod: CPTII,S$GLB,, | Performed by: INTERNAL MEDICINE

## 2022-03-14 PROCEDURE — 3008F PR BODY MASS INDEX (BMI) DOCUMENTED: ICD-10-PCS | Mod: CPTII,S$GLB,, | Performed by: INTERNAL MEDICINE

## 2022-03-14 PROCEDURE — 99214 OFFICE O/P EST MOD 30 MIN: CPT | Mod: S$GLB,,, | Performed by: INTERNAL MEDICINE

## 2022-03-14 PROCEDURE — 99999 PR PBB SHADOW E&M-EST. PATIENT-LVL III: CPT | Mod: PBBFAC,,, | Performed by: INTERNAL MEDICINE

## 2022-03-14 PROCEDURE — 93880 EXTRACRANIAL BILAT STUDY: CPT

## 2022-03-14 PROCEDURE — 1159F MED LIST DOCD IN RCRD: CPT | Mod: CPTII,S$GLB,, | Performed by: INTERNAL MEDICINE

## 2022-03-14 PROCEDURE — 93005 ELECTROCARDIOGRAM TRACING: CPT

## 2022-03-14 PROCEDURE — 3008F BODY MASS INDEX DOCD: CPT | Mod: CPTII,S$GLB,, | Performed by: INTERNAL MEDICINE

## 2022-03-14 PROCEDURE — 1126F PR PAIN SEVERITY QUANTIFIED, NO PAIN PRESENT: ICD-10-PCS | Mod: CPTII,S$GLB,, | Performed by: INTERNAL MEDICINE

## 2022-03-14 NOTE — PROGRESS NOTES
"Subjective:    Patient ID:  Christine Jo is a 71 y.o. female who presents for evaluation of Atrial Fibrillation, Coronary Artery Disease, Hyperlipidemia, Hypertension, Peripheral Arterial Disease, Risk Factor Management For Atherosclerosis, and Shortness of Breath        HPI Pt presents for eval.  Her current med conditions include CAD, PAF, DM, obesity, HTN, AAA, hyperlipidemia, MATIAS.  Former smoker.  Past hx pertinent for following:  She used to see Dr. Arthur Carrizales, [facility].  Has h/o PAF.  States she has had 2 or 4 cardiac cath procedures.  Last [facility] 2017, nonobstructive CAD noted.  ecg 3/26/21 NSR, left axis, nonspecific septal Q waves V1-V2.  Stress MPI 4/21 reviewed: no ischemia, normal EF.  Echo 4/21 reviewed: normal LV function, LVH, mild TR.  Now here.  Pt seen 6 months ago.  Abdominal u/s 12/21: no AAA noted.  MIKE 12/21 1.0 R LE, 0.97 L LE.  Has been seeing ENT and PCP for vertigo.  Dx w vestibular disease w rehab tx planned.  Dizziness associated with nausea/vomiting at time per chart.  Has dyspnea -- she states "it is ok".  Denies cp.  A lot of stress in her life; depression.  Stays inside.  9 day [place] Holter 10/21: NSR, 6 runs nonsustained atrial tachycardia (longest 8 beats), 3 runs NSVT (longest 7 beats).  Did not tolerate CPAP in past.  ecg today 3/14/22 NSR, left axis, incomplete RBBB.  No acute changes.  Weight stable.  She thinks statin causes restless legs.  She cut dose in 1/2 without improvement.  DM HGAIC above goal.  Takes Eliquis.        Past Medical History:   Diagnosis Date    Arthritis     Cataract     Diabetes mellitus 2008     am 01/15/2018 Insulin x 1 year    DM (diabetes mellitus) 2008     am 02/14/2020 Insulin x 4 years    Encounter for blood transfusion     Glaucoma     Hypertension     Insomnia     Macular degeneration     Vaginal yeast infection          Current Outpatient Medications:     ACETAMINOPHEN (TYLENOL ARTHRITIS ORAL), Take by mouth as " needed., Disp: , Rfl:     albuterol (VENTOLIN HFA) 90 mcg/actuation inhaler, Inhale 2 puffs into the lungs every 4 (four) hours as needed for Wheezing or Shortness of Breath. (Patient not taking: Reported on 2/17/2022), Disp: 18 g, Rfl: 11    apixaban (ELIQUIS) 5 mg Tab, Take 1 tablet (5 mg total) by mouth 2 (two) times daily., Disp: 180 tablet, Rfl: 0    atorvastatin (LIPITOR) 40 MG tablet, TAKE 1 TABLET(40 MG) BY MOUTH EVERY DAY, Disp: 90 tablet, Rfl: 0    azelastine (ASTELIN) 137 mcg (0.1 %) nasal spray, 2 sprays (274 mcg total) by Nasal route 2 (two) times daily., Disp: 30 mL, Rfl: 1    benazepriL (LOTENSIN) 40 MG tablet, Take 1 tablet (40 mg total) by mouth 2 (two) times daily., Disp: 180 tablet, Rfl: 4    BEPREVE 1.5 % Drop, Place 1 drop into both eyes 2 (two) times daily., Disp: 10 mL, Rfl: 4    blood sugar diagnostic Strp, To check BG 1 times daily, to use with insurance preferred meter, Disp: 100 each, Rfl: 3    blood-glucose meter kit, To check BG 1 times daily, to use with insurance preferred meter, Disp: 100 each, Rfl: 3    calcium citrate-vitamin D3 315-200 mg (CITRACAL+D) 315-200 mg-unit per tablet, Take 1 tablet by mouth once daily., Disp: , Rfl:     carvediloL (COREG) 25 MG tablet, TAKE 1 TABLET BY MOUTH TWICE DAILY, Disp: 180 tablet, Rfl: 1    clotrimazole-betamethasone (LOTRISONE) lotion, Apply topically 2 (two) times daily. (Patient not taking: Reported on 2/17/2022), Disp: 30 mL, Rfl: 2    cycloSPORINE (RESTASIS) 0.05 % ophthalmic emulsion, Place 1 drop into both eyes 2 (two) times daily., Disp: 60 each, Rfl: 11    cycloSPORINE (RESTASIS) 0.05 % ophthalmic emulsion, Place 1 drop into both eyes 2 (two) times daily., Disp: 60 each, Rfl: 11    diclofenac sodium (VOLTAREN) 1 % Gel, Apply 2 grams topically 4 (four) times daily. To affected joints as needed for pain, Disp: 100 g, Rfl: 3    DULoxetine (CYMBALTA) 60 MG capsule, Take 1 capsule (60 mg total) by mouth once daily., Disp: 30  capsule, Rfl: 5    estradioL (ESTRACE) 0.01 % (0.1 mg/gram) vaginal cream, Place 1/2 gram vaginally every night for 1 month then three times a week, Disp: 42.5 g, Rfl: 11    fluticasone propionate (FLONASE) 50 mcg/actuation nasal spray, 2 sprays (100 mcg total) by Each Nostril route once daily., Disp: 48 g, Rfl: 3    furosemide (LASIX) 40 MG tablet, Take 40 mg by mouth daily as needed., Disp: , Rfl:     gabapentin (NEURONTIN) 300 MG capsule, Take 1 capsule (300 mg total) by mouth 2 (two) times daily., Disp: 180 capsule, Rfl: 1    hydrALAZINE (APRESOLINE) 100 MG tablet, Take 1 tablet (100 mg total) by mouth 2 (two) times daily., Disp: 180 tablet, Rfl: 0    insulin (LANTUS SOLOSTAR U-100 INSULIN) glargine 100 units/mL (3mL) SubQ pen, Inject 72 Units into the skin every evening., Disp: 45 mL, Rfl: 3    lancets Misc, To check BG 1 times daily, to use with insurance preferred meter, Disp: 100 each, Rfl: 3    liraglutide 0.6 mg/0.1 mL, 18 mg/3 mL, subq PNIJ (VICTOZA 2-LEAH) 0.6 mg/0.1 mL (18 mg/3 mL) PnIj pen, ADMINISTER 0.6 MG UNDER THE SKIN EVERY DAY, Disp: 6 mL, Rfl: 1    meclizine (ANTIVERT) 25 mg tablet, Take 1 tablet (25 mg total) by mouth 3 (three) times daily as needed., Disp: 30 tablet, Rfl: 2    MULTIVIT WITH CALCIUM,IRON,MIN (WOMEN'S DAILY MULTIVITAMIN ORAL), Take by mouth once daily. , Disp: , Rfl:     mupirocin (BACTROBAN) 2 % ointment, Apply topically 2 (two) times daily. (Patient not taking: Reported on 2/17/2022), Disp: 22 g, Rfl: 0    nitrofurantoin, macrocrystal-monohydrate, (MACROBID) 100 MG capsule, take 1 capsule by mouth daily, Disp: 30 capsule, Rfl: 2    nitroGLYCERIN (NITROSTAT) 0.4 MG SL tablet, SOHA, Disp: , Rfl: 0    nystatin (MYCOSTATIN) cream, Apply topically 2 (two) times daily. Continue until completely healed (Patient not taking: Reported on 2/17/2022), Disp: 30 g, Rfl: 0    pantoprazole (PROTONIX) 40 MG tablet, Take 1 tablet (40 mg total) by mouth 2 (two) times a day., Disp:  "180 tablet, Rfl: 3    pen needle, diabetic (BD ULTRA-FINE SHORT PEN NEEDLE) 31 gauge x 5/16" Ndle, AS DIRECTED TWICE DAILY, Disp: 200 each, Rfl: 3    SITagliptin (JANUVIA) 100 MG Tab, Take 1 tablet (100 mg total) by mouth once daily., Disp: 90 tablet, Rfl: 0    temazepam (RESTORIL) 30 mg capsule, Take 1 capsule by mouth nightly as needed, Disp: 30 capsule, Rfl: 0    triamcinolone acetonide 0.1% (KENALOG) 0.1 % cream, Apply topically 2 (two) times daily. No longer than 2 weeks. (Patient not taking: Reported on 2/17/2022), Disp: 30 g, Rfl: 0    Current Facility-Administered Medications:     sodium hyaluronate (orthovisc) 30 mg, 30 mg, Intra-articular, 1 time in Clinic/HOD, Jered Holbrook MD      Review of Systems   Constitutional: Positive for malaise/fatigue.   HENT: Positive for hearing loss and tinnitus.    Eyes: Negative.    Cardiovascular: Positive for dyspnea on exertion.   Respiratory: Positive for shortness of breath.    Endocrine: Negative.    Hematologic/Lymphatic: Negative.    Skin: Negative.    Musculoskeletal: Negative.    Gastrointestinal: Positive for nausea and vomiting.   Genitourinary: Negative.    Neurological: Positive for dizziness, loss of balance, vertigo and weakness.   Psychiatric/Behavioral: Negative.    Allergic/Immunologic: Negative.        /60 (BP Location: Left arm, Patient Position: Sitting, BP Method: Large (Manual))   Pulse 75   Resp 16   Ht 5' 1" (1.549 m)   Wt 109.4 kg (241 lb 2.9 oz)   SpO2 99%   BMI 45.57 kg/m²     Wt Readings from Last 3 Encounters:   03/14/22 109.4 kg (241 lb 2.9 oz)   02/17/22 109.5 kg (241 lb 6.5 oz)   12/29/21 108.9 kg (240 lb)     Temp Readings from Last 3 Encounters:   02/17/22 97.9 °F (36.6 °C) (Temporal)   12/14/21 99.5 °F (37.5 °C) (Tympanic)   11/01/21 98.1 °F (36.7 °C) (Temporal)     BP Readings from Last 3 Encounters:   03/14/22 134/60   02/17/22 126/61   12/29/21 (!) 107/52     Pulse Readings from Last 3 Encounters:   03/14/22 75 "   12/14/21 78   11/18/21 68          Objective:    Physical Exam  Vitals and nursing note reviewed.   Constitutional:       General: She is not in acute distress.     Appearance: Normal appearance. She is well-developed. She is obese. She is not ill-appearing or diaphoretic.   HENT:      Head: Normocephalic.   Neck:      Thyroid: No thyromegaly.      Vascular: Normal carotid pulses. No carotid bruit or JVD.   Cardiovascular:      Rate and Rhythm: Normal rate and regular rhythm.      Chest Wall: PMI is not displaced.      Pulses: Normal pulses.           Radial pulses are 2+ on the right side and 2+ on the left side.      Heart sounds: Normal heart sounds, S1 normal and S2 normal. No murmur heard.    No friction rub. No gallop.   Pulmonary:      Effort: Pulmonary effort is normal.      Breath sounds: Normal breath sounds. No wheezing or rales.   Abdominal:      General: Bowel sounds are normal. There is no abdominal bruit.      Palpations: Abdomen is soft.      Tenderness: There is no abdominal tenderness.   Musculoskeletal:      Cervical back: Neck supple.   Lymphadenopathy:      Cervical: No cervical adenopathy.   Skin:     General: Skin is warm.   Neurological:      Mental Status: She is alert and oriented to person, place, and time.   Psychiatric:         Behavior: Behavior normal. Behavior is cooperative.       I have reviewed all pertinent labs and cardiac studies.      Chemistry        Component Value Date/Time     02/17/2022 1012    K 4.3 02/17/2022 1012     02/17/2022 1012    CO2 26 02/17/2022 1012    BUN 14 02/17/2022 1012    CREATININE 0.8 02/17/2022 1012     (H) 02/17/2022 1012        Component Value Date/Time    CALCIUM 9.4 02/17/2022 1012    ALKPHOS 47 (L) 02/17/2022 1012    AST 16 02/17/2022 1012    ALT 17 02/17/2022 1012    BILITOT 0.7 02/17/2022 1012    ESTGFRAFRICA >60.0 02/17/2022 1012    EGFRNONAA >60.0 02/17/2022 1012        Lab Results   Component Value Date    WBC 8.56  02/17/2022    HGB 11.3 (L) 02/17/2022    HCT 36.0 (L) 02/17/2022    MCV 97 02/17/2022     02/17/2022       Lab Results   Component Value Date    HGBA1C 7.3 (H) 12/14/2021       Lab Results   Component Value Date    CHOL 168 05/24/2021    CHOL 151 11/11/2020    CHOL 136 04/10/2019     Lab Results   Component Value Date    HDL 55 05/24/2021    HDL 56 11/11/2020    HDL 46 04/10/2019     Lab Results   Component Value Date    LDLCALC 80.2 05/24/2021    LDLCALC 63.8 11/11/2020    LDLCALC 63.2 04/10/2019     Lab Results   Component Value Date    TRIG 164 (H) 05/24/2021    TRIG 156 (H) 11/11/2020    TRIG 134 04/10/2019     Lab Results   Component Value Date    CHOLHDL 32.7 05/24/2021    CHOLHDL 37.1 11/11/2020    CHOLHDL 33.8 04/10/2019         Results for orders placed during the hospital encounter of 04/19/21    Echo Color Flow Doppler? Yes    Interpretation Summary  · The left ventricle is normal in size with concentric hypertrophy and normal systolic function.  · The estimated ejection fraction is 60%.  · Normal left ventricular diastolic function.  · Normal right ventricular size with normal right ventricular systolic function.  · Mild tricuspid regurgitation.  · Normal central venous pressure (3 mmHg).  · The estimated PA systolic pressure is 22 mmHg.        Results for orders placed during the hospital encounter of 04/19/21    Nuclear Stress - Cardiology Interpreted    Interpretation Summary    Normal myocardial perfusion scan. There is no evidence of myocardial ischemia or infarction.    There is a mild intensity fixed defect in the apical wall of the left ventricle, secondary to soft tissue attenuation.    The gated perfusion images showed an ejection fraction of 69% at rest. The gated perfusion images showed an ejection fraction of 65% post stress.    The EKG portion of this study is negative for ischemia.    The patient reported no chest pain during the stress test.    There were no arrhythmias  during stress.        Assessment:       1. Coronary artery disease of native artery of native heart with stable angina pectoris    2. Abdominal aortic aneurysm (AAA) without rupture    3. Abnormal ECG    4. BMI 45.0-49.9, adult    5. Class 3 severe obesity due to excess calories with serious comorbidity and body mass index (BMI) of 45.0 to 49.9 in adult    6. Uncontrolled type 2 diabetes mellitus with hyperglycemia    7. Paroxysmal atrial fibrillation    8. Palpitations    9. Hypertriglyceridemia    10. Essential hypertension    11. GILL (dyspnea on exertion)    12. Controlled type 2 diabetes mellitus with diabetic peripheral angiopathy without gangrene, with long-term current use of insulin    13. Vertigo    14. Disequilibrium    15. Other specified symptoms and signs involving the circulatory and respiratory systems          Plan:             Carotid u/s.  Continue current med tx for CV conditions.  F/u w ENT/Neurology for vertigo/vestibular disease.  Cardiac diet.  Weight loss.  BP control discussed.  Continue current HTN meds and home BP monitoring.  DM control.  May take statin holiday x 3 weeks; see if sxs improve in legs.  If no improvement, restart statin; if significant improvement, call and let me know next step.  Phone review for carotid u/s results.    F/u 6 months.    I have reviewed all pertinent labs and cardiac studies independently. Plans and recommendations have been formulated under my direct supervision. All questions answered and patient voiced understanding.

## 2022-03-15 ENCOUNTER — TELEPHONE (OUTPATIENT)
Dept: CARDIOLOGY | Facility: HOSPITAL | Age: 72
End: 2022-03-15
Payer: MEDICARE

## 2022-03-15 LAB
LEFT ARM DIASTOLIC BLOOD PRESSURE: 72 MMHG
LEFT ARM SYSTOLIC BLOOD PRESSURE: 161 MMHG
LEFT CBA DIAS: 17 CM/S
LEFT CBA SYS: 72 CM/S
LEFT CCA DIST DIAS: 16 CM/S
LEFT CCA DIST SYS: 79 CM/S
LEFT CCA MID DIAS: 16 CM/S
LEFT CCA MID SYS: 79 CM/S
LEFT CCA PROX DIAS: 22 CM/S
LEFT CCA PROX SYS: 109 CM/S
LEFT ECA DIAS: 18 CM/S
LEFT ECA SYS: 91 CM/S
LEFT ICA DIST DIAS: 29 CM/S
LEFT ICA DIST SYS: 120 CM/S
LEFT ICA MID DIAS: 27 CM/S
LEFT ICA MID SYS: 109 CM/S
LEFT ICA PROX DIAS: 27 CM/S
LEFT ICA PROX SYS: 123 CM/S
LEFT VERTEBRAL DIAS: 11 CM/S
LEFT VERTEBRAL SYS: 55 CM/S
OHS CV CAROTID RIGHT ICA EDV HIGHEST: 25
OHS CV CAROTID ULTRASOUND LEFT ICA/CCA RATIO: 1.56
OHS CV CAROTID ULTRASOUND RIGHT ICA/CCA RATIO: 1.82
OHS CV PV CAROTID LEFT HIGHEST CCA: 109
OHS CV PV CAROTID LEFT HIGHEST ICA: 123
OHS CV PV CAROTID RIGHT HIGHEST CCA: 90
OHS CV PV CAROTID RIGHT HIGHEST ICA: 124
OHS CV US CAROTID LEFT HIGHEST EDV: 29
RIGHT ARM DIASTOLIC BLOOD PRESSURE: 74 MMHG
RIGHT ARM SYSTOLIC BLOOD PRESSURE: 173 MMHG
RIGHT CBA DIAS: 14 CM/S
RIGHT CBA SYS: 71 CM/S
RIGHT CCA DIST DIAS: 12 CM/S
RIGHT CCA DIST SYS: 68 CM/S
RIGHT CCA MID DIAS: 6 CM/S
RIGHT CCA MID SYS: 49 CM/S
RIGHT CCA PROX DIAS: 14 CM/S
RIGHT CCA PROX SYS: 90 CM/S
RIGHT ECA DIAS: 7 CM/S
RIGHT ECA SYS: 89 CM/S
RIGHT ICA DIST DIAS: 25 CM/S
RIGHT ICA DIST SYS: 124 CM/S
RIGHT ICA MID DIAS: 9 CM/S
RIGHT ICA MID SYS: 53 CM/S
RIGHT ICA PROX DIAS: 12 CM/S
RIGHT ICA PROX SYS: 53 CM/S
RIGHT VERTEBRAL DIAS: 8 CM/S
RIGHT VERTEBRAL SYS: 56 CM/S

## 2022-03-16 ENCOUNTER — TELEPHONE (OUTPATIENT)
Dept: CARDIOLOGY | Facility: CLINIC | Age: 72
End: 2022-03-16
Payer: MEDICARE

## 2022-03-16 ENCOUNTER — PATIENT MESSAGE (OUTPATIENT)
Dept: INTERNAL MEDICINE | Facility: CLINIC | Age: 72
End: 2022-03-16
Payer: MEDICARE

## 2022-03-16 NOTE — TELEPHONE ENCOUNTER
Called patient to advise per Dr. Singh     Please call pt.  Carotid u/s results reviewed: mild disease.  No significant blockages noted.  Will monitor in future.     F/u next appt.     Dr Singh    Verbalized understanding

## 2022-03-16 NOTE — TELEPHONE ENCOUNTER
Left message for patient in regards to test results.     Please call pt.  Carotid u/s results reviewed: mild disease.  No significant blockages noted.  Will monitor in future.     F/u next appt.     Dr Singh

## 2022-03-16 NOTE — TELEPHONE ENCOUNTER
Please call pt.  Carotid u/s results reviewed: mild disease.  No significant blockages noted.  Will monitor in future.    F/u next appt.    Dr Singh

## 2022-03-18 ENCOUNTER — TELEPHONE (OUTPATIENT)
Dept: NEUROLOGY | Facility: CLINIC | Age: 72
End: 2022-03-18
Payer: MEDICARE

## 2022-03-18 NOTE — TELEPHONE ENCOUNTER
----- Message from Shanice Boles sent at 3/18/2022 10:43 AM CDT -----  Contact: [phone]  Patient called in regards needing to find out if nurse will need records from 2017 with Kamryn tan at neurology center in [facility]. Please call and advise. Thank you

## 2022-03-18 NOTE — TELEPHONE ENCOUNTER
Call placed to patient. Informed patient that we would be welcoming to any records that could be provided prior. Patient states that she is coming in for several neurological conditions:    Headaches  Meniere's Disease  Dizziness  Memory issues   Balance issues

## 2022-03-22 ENCOUNTER — PATIENT MESSAGE (OUTPATIENT)
Dept: OPHTHALMOLOGY | Facility: CLINIC | Age: 72
End: 2022-03-22
Payer: MEDICARE

## 2022-04-06 ENCOUNTER — PES CALL (OUTPATIENT)
Dept: ADMINISTRATIVE | Facility: CLINIC | Age: 72
End: 2022-04-06
Payer: MEDICARE

## 2022-04-07 ENCOUNTER — PATIENT MESSAGE (OUTPATIENT)
Dept: INTERNAL MEDICINE | Facility: CLINIC | Age: 72
End: 2022-04-07
Payer: MEDICARE

## 2022-04-07 DIAGNOSIS — E11.59 CONTROLLED TYPE 2 DIABETES MELLITUS WITH OTHER CIRCULATORY COMPLICATION, WITH LONG-TERM CURRENT USE OF INSULIN: ICD-10-CM

## 2022-04-07 DIAGNOSIS — Z79.4 CONTROLLED TYPE 2 DIABETES MELLITUS WITH OTHER CIRCULATORY COMPLICATION, WITH LONG-TERM CURRENT USE OF INSULIN: ICD-10-CM

## 2022-04-08 ENCOUNTER — PATIENT MESSAGE (OUTPATIENT)
Dept: RHEUMATOLOGY | Facility: CLINIC | Age: 72
End: 2022-04-08
Payer: MEDICARE

## 2022-04-08 NOTE — TELEPHONE ENCOUNTER
Refill Routing Note   Medication(s) are not appropriate for processing by [facility] for the following reason(s):      - Outside of protocol  - Medication is a new start (<3 months)    [facility] action(s):  Defer       Medication Therapy Plan: Defer test strips because new start < 3 months and temazepam is OOS  Medication reconciliation completed: No     Appointments  past 12m or future 3m with PCP    Date Provider   Last Visit   2/8/2022 Jose Szymanski MD   Next Visit   4/28/2022 Jose Szymanski MD   ED visits in past 90 days: 0        Note composed:5:37 PM 04/08/2022

## 2022-04-08 NOTE — TELEPHONE ENCOUNTER
No new care gaps identified.  Powered by [employer] Reference number: [id number].   4/07/2022 9:17:41 PM CDT

## 2022-04-10 ENCOUNTER — PATIENT MESSAGE (OUTPATIENT)
Dept: CARDIOLOGY | Facility: CLINIC | Age: 72
End: 2022-04-10
Payer: MEDICARE

## 2022-04-10 RX ORDER — TEMAZEPAM 30 MG/1
CAPSULE ORAL
Qty: 30 CAPSULE | Refills: 0 | Status: SHIPPED | OUTPATIENT
Start: 2022-04-10 | End: 2022-05-09 | Stop reason: SDUPTHER

## 2022-04-11 RX ORDER — AZELASTINE 1 MG/ML
2 SPRAY, METERED NASAL 2 TIMES DAILY
Qty: 30 ML | Refills: 1 | Status: SHIPPED | OUTPATIENT
Start: 2022-04-11 | End: 2024-01-12

## 2022-04-11 NOTE — TELEPHONE ENCOUNTER
No new care gaps identified.  Powered by [employer]. Reference number: [id number].   4/10/2022 7:56:10 PM CDT

## 2022-04-25 ENCOUNTER — TELEPHONE (OUTPATIENT)
Dept: CARDIOLOGY | Facility: CLINIC | Age: 72
End: 2022-04-25
Payer: MEDICARE

## 2022-04-25 ENCOUNTER — PATIENT MESSAGE (OUTPATIENT)
Dept: CARDIOLOGY | Facility: CLINIC | Age: 72
End: 2022-04-25
Payer: MEDICARE

## 2022-04-25 ENCOUNTER — PATIENT MESSAGE (OUTPATIENT)
Dept: INTERNAL MEDICINE | Facility: CLINIC | Age: 72
End: 2022-04-25
Payer: MEDICARE

## 2022-04-25 ENCOUNTER — TELEPHONE (OUTPATIENT)
Dept: RHEUMATOLOGY | Facility: CLINIC | Age: 72
End: 2022-04-25
Payer: MEDICARE

## 2022-04-25 DIAGNOSIS — E78.2 MIXED HYPERLIPIDEMIA: Primary | ICD-10-CM

## 2022-04-25 DIAGNOSIS — F33.1 DEPRESSION, MAJOR, RECURRENT, MODERATE: ICD-10-CM

## 2022-04-25 RX ORDER — ROSUVASTATIN CALCIUM 10 MG/1
10 TABLET, COATED ORAL NIGHTLY
Qty: 90 TABLET | Refills: 3 | Status: SHIPPED | OUTPATIENT
Start: 2022-04-25 | End: 2022-04-26 | Stop reason: HOSPADM

## 2022-04-25 NOTE — TELEPHONE ENCOUNTER
No new care gaps identified.  Powered by [employer]. Reference number: [id number].   4/25/2022 1:37:33 PM CDT

## 2022-04-25 NOTE — TELEPHONE ENCOUNTER
Dr Singh please send a new medicine such as the atorvastatin . I stopped taking it since I talked to you. It makes my legs hurt from knee to ankle.     Spoke with patient regarding medication request. She would like to be put on something other than the atrovastatin. Her calves do not hurt as much since being off of the medication. Please advise.

## 2022-04-25 NOTE — TELEPHONE ENCOUNTER
Please call pt.  Start Rosuvastatin 10 mg qhs.  This will take place of Atorvastatin.    Thanks    Dr Singh

## 2022-04-26 ENCOUNTER — OFFICE VISIT (OUTPATIENT)
Dept: RHEUMATOLOGY | Facility: CLINIC | Age: 72
End: 2022-04-26
Payer: MEDICARE

## 2022-04-26 ENCOUNTER — HOSPITAL ENCOUNTER (OUTPATIENT)
Dept: RADIOLOGY | Facility: HOSPITAL | Age: 72
Discharge: HOME | End: 2022-04-26
Attending: PHYSICIAN ASSISTANT
Payer: MEDICARE

## 2022-04-26 ENCOUNTER — OFFICE VISIT (OUTPATIENT)
Dept: NEUROLOGY | Facility: CLINIC | Age: 72
End: 2022-04-26
Payer: MEDICARE

## 2022-04-26 VITALS
WEIGHT: 239.63 LBS | SYSTOLIC BLOOD PRESSURE: 101 MMHG | DIASTOLIC BLOOD PRESSURE: 67 MMHG | HEART RATE: 80 BPM | HEIGHT: 61 IN | BODY MASS INDEX: 45.24 KG/M2

## 2022-04-26 VITALS
HEIGHT: 61 IN | DIASTOLIC BLOOD PRESSURE: 72 MMHG | BODY MASS INDEX: 45.12 KG/M2 | HEART RATE: 78 BPM | SYSTOLIC BLOOD PRESSURE: 127 MMHG | RESPIRATION RATE: 16 BRPM | WEIGHT: 239 LBS

## 2022-04-26 DIAGNOSIS — M79.672 CHRONIC HEEL PAIN, LEFT: Primary | ICD-10-CM

## 2022-04-26 DIAGNOSIS — G43.809 VESTIBULAR MIGRAINE: ICD-10-CM

## 2022-04-26 DIAGNOSIS — M17.11 PRIMARY OSTEOARTHRITIS OF RIGHT KNEE: ICD-10-CM

## 2022-04-26 DIAGNOSIS — G89.29 CHRONIC HEEL PAIN, LEFT: Primary | ICD-10-CM

## 2022-04-26 DIAGNOSIS — G89.29 CHRONIC PAIN OF RIGHT KNEE: ICD-10-CM

## 2022-04-26 DIAGNOSIS — F41.9 ANXIETY: ICD-10-CM

## 2022-04-26 DIAGNOSIS — R41.3 MEMORY LOSS: Primary | ICD-10-CM

## 2022-04-26 DIAGNOSIS — M79.672 CHRONIC HEEL PAIN, LEFT: ICD-10-CM

## 2022-04-26 DIAGNOSIS — R42 DIZZY: ICD-10-CM

## 2022-04-26 DIAGNOSIS — G89.29 CHRONIC HEEL PAIN, LEFT: ICD-10-CM

## 2022-04-26 DIAGNOSIS — M25.561 CHRONIC PAIN OF RIGHT KNEE: ICD-10-CM

## 2022-04-26 PROCEDURE — 3288F FALL RISK ASSESSMENT DOCD: CPT | Mod: CPTII,S$GLB,, | Performed by: PSYCHIATRY & NEUROLOGY

## 2022-04-26 PROCEDURE — 3008F BODY MASS INDEX DOCD: CPT | Mod: CPTII,S$GLB,, | Performed by: PHYSICIAN ASSISTANT

## 2022-04-26 PROCEDURE — 3074F SYST BP LT 130 MM HG: CPT | Mod: CPTII,S$GLB,, | Performed by: PSYCHIATRY & NEUROLOGY

## 2022-04-26 PROCEDURE — 3072F LOW RISK FOR RETINOPATHY: CPT | Mod: CPTII,S$GLB,, | Performed by: PHYSICIAN ASSISTANT

## 2022-04-26 PROCEDURE — 3072F LOW RISK FOR RETINOPATHY: CPT | Mod: CPTII,S$GLB,, | Performed by: PSYCHIATRY & NEUROLOGY

## 2022-04-26 PROCEDURE — 73630 X-RAY EXAM OF FOOT: CPT | Mod: TC,LT

## 2022-04-26 PROCEDURE — 3008F PR BODY MASS INDEX (BMI) DOCUMENTED: ICD-10-PCS | Mod: CPTII,S$GLB,, | Performed by: PHYSICIAN ASSISTANT

## 2022-04-26 PROCEDURE — 1126F PR PAIN SEVERITY QUANTIFIED, NO PAIN PRESENT: ICD-10-PCS | Mod: CPTII,S$GLB,, | Performed by: PSYCHIATRY & NEUROLOGY

## 2022-04-26 PROCEDURE — 99214 OFFICE O/P EST MOD 30 MIN: CPT | Mod: S$GLB,,, | Performed by: PHYSICIAN ASSISTANT

## 2022-04-26 PROCEDURE — 1159F PR MEDICATION LIST DOCUMENTED IN MEDICAL RECORD: ICD-10-PCS | Mod: CPTII,S$GLB,, | Performed by: PSYCHIATRY & NEUROLOGY

## 2022-04-26 PROCEDURE — 99205 PR OFFICE/OUTPT VISIT, NEW, LEVL V, 60-74 MIN: ICD-10-PCS | Mod: S$GLB,,, | Performed by: PSYCHIATRY & NEUROLOGY

## 2022-04-26 PROCEDURE — 99214 PR OFFICE/OUTPT VISIT, EST, LEVL IV, 30-39 MIN: ICD-10-PCS | Mod: S$GLB,,, | Performed by: PHYSICIAN ASSISTANT

## 2022-04-26 PROCEDURE — 99999 PR PBB SHADOW E&M-EST. PATIENT-LVL V: CPT | Mod: PBBFAC,,, | Performed by: PSYCHIATRY & NEUROLOGY

## 2022-04-26 PROCEDURE — 3078F PR MOST RECENT DIASTOLIC BLOOD PRESSURE < 80 MM HG: ICD-10-PCS | Mod: CPTII,S$GLB,, | Performed by: PHYSICIAN ASSISTANT

## 2022-04-26 PROCEDURE — 99499 UNLISTED E&M SERVICE: CPT | Mod: S$GLB,,, | Performed by: PSYCHIATRY & NEUROLOGY

## 2022-04-26 PROCEDURE — 99999 PR PBB SHADOW E&M-EST. PATIENT-LVL III: CPT | Mod: PBBFAC,,, | Performed by: PHYSICIAN ASSISTANT

## 2022-04-26 PROCEDURE — 4010F ACE/ARB THERAPY RXD/TAKEN: CPT | Mod: CPTII,S$GLB,, | Performed by: PSYCHIATRY & NEUROLOGY

## 2022-04-26 PROCEDURE — 3008F PR BODY MASS INDEX (BMI) DOCUMENTED: ICD-10-PCS | Mod: CPTII,S$GLB,, | Performed by: PSYCHIATRY & NEUROLOGY

## 2022-04-26 PROCEDURE — 3074F PR MOST RECENT SYSTOLIC BLOOD PRESSURE < 130 MM HG: ICD-10-PCS | Mod: CPTII,S$GLB,, | Performed by: PSYCHIATRY & NEUROLOGY

## 2022-04-26 PROCEDURE — 3078F PR MOST RECENT DIASTOLIC BLOOD PRESSURE < 80 MM HG: ICD-10-PCS | Mod: CPTII,S$GLB,, | Performed by: PSYCHIATRY & NEUROLOGY

## 2022-04-26 PROCEDURE — 1125F PR PAIN SEVERITY QUANTIFIED, PAIN PRESENT: ICD-10-PCS | Mod: CPTII,S$GLB,, | Performed by: PHYSICIAN ASSISTANT

## 2022-04-26 PROCEDURE — 99999 PR PBB SHADOW E&M-EST. PATIENT-LVL V: ICD-10-PCS | Mod: PBBFAC,,, | Performed by: PSYCHIATRY & NEUROLOGY

## 2022-04-26 PROCEDURE — 1159F MED LIST DOCD IN RCRD: CPT | Mod: CPTII,S$GLB,, | Performed by: PSYCHIATRY & NEUROLOGY

## 2022-04-26 PROCEDURE — 99499 RISK ADDL DX/OHS AUDIT: ICD-10-PCS | Mod: S$GLB,,, | Performed by: PSYCHIATRY & NEUROLOGY

## 2022-04-26 PROCEDURE — 3074F SYST BP LT 130 MM HG: CPT | Mod: CPTII,S$GLB,, | Performed by: PHYSICIAN ASSISTANT

## 2022-04-26 PROCEDURE — 4010F PR ACE/ARB THEARPY RXD/TAKEN: ICD-10-PCS | Mod: CPTII,S$GLB,, | Performed by: PHYSICIAN ASSISTANT

## 2022-04-26 PROCEDURE — 4010F ACE/ARB THERAPY RXD/TAKEN: CPT | Mod: CPTII,S$GLB,, | Performed by: PHYSICIAN ASSISTANT

## 2022-04-26 PROCEDURE — 3078F DIAST BP <80 MM HG: CPT | Mod: CPTII,S$GLB,, | Performed by: PSYCHIATRY & NEUROLOGY

## 2022-04-26 PROCEDURE — 73630 XR FOOT COMPLETE 3 VIEW LEFT: ICD-10-PCS | Mod: 26,LT,, | Performed by: RADIOLOGY

## 2022-04-26 PROCEDURE — 99999 PR PBB SHADOW E&M-EST. PATIENT-LVL III: ICD-10-PCS | Mod: PBBFAC,,, | Performed by: PHYSICIAN ASSISTANT

## 2022-04-26 PROCEDURE — 1126F AMNT PAIN NOTED NONE PRSNT: CPT | Mod: CPTII,S$GLB,, | Performed by: PSYCHIATRY & NEUROLOGY

## 2022-04-26 PROCEDURE — 1101F PT FALLS ASSESS-DOCD LE1/YR: CPT | Mod: CPTII,S$GLB,, | Performed by: PSYCHIATRY & NEUROLOGY

## 2022-04-26 PROCEDURE — 3072F PR LOW RISK FOR RETINOPATHY: ICD-10-PCS | Mod: CPTII,S$GLB,, | Performed by: PHYSICIAN ASSISTANT

## 2022-04-26 PROCEDURE — 3072F PR LOW RISK FOR RETINOPATHY: ICD-10-PCS | Mod: CPTII,S$GLB,, | Performed by: PSYCHIATRY & NEUROLOGY

## 2022-04-26 PROCEDURE — 4010F PR ACE/ARB THEARPY RXD/TAKEN: ICD-10-PCS | Mod: CPTII,S$GLB,, | Performed by: PSYCHIATRY & NEUROLOGY

## 2022-04-26 PROCEDURE — 3288F PR FALLS RISK ASSESSMENT DOCUMENTED: ICD-10-PCS | Mod: CPTII,S$GLB,, | Performed by: PSYCHIATRY & NEUROLOGY

## 2022-04-26 PROCEDURE — 73630 X-RAY EXAM OF FOOT: CPT | Mod: 26,LT,, | Performed by: RADIOLOGY

## 2022-04-26 PROCEDURE — 3078F DIAST BP <80 MM HG: CPT | Mod: CPTII,S$GLB,, | Performed by: PHYSICIAN ASSISTANT

## 2022-04-26 PROCEDURE — 99205 OFFICE O/P NEW HI 60 MIN: CPT | Mod: S$GLB,,, | Performed by: PSYCHIATRY & NEUROLOGY

## 2022-04-26 PROCEDURE — 3008F BODY MASS INDEX DOCD: CPT | Mod: CPTII,S$GLB,, | Performed by: PSYCHIATRY & NEUROLOGY

## 2022-04-26 PROCEDURE — 1125F AMNT PAIN NOTED PAIN PRSNT: CPT | Mod: CPTII,S$GLB,, | Performed by: PHYSICIAN ASSISTANT

## 2022-04-26 PROCEDURE — 1101F PR PT FALLS ASSESS DOC 0-1 FALLS W/OUT INJ PAST YR: ICD-10-PCS | Mod: CPTII,S$GLB,, | Performed by: PSYCHIATRY & NEUROLOGY

## 2022-04-26 PROCEDURE — 3074F PR MOST RECENT SYSTOLIC BLOOD PRESSURE < 130 MM HG: ICD-10-PCS | Mod: CPTII,S$GLB,, | Performed by: PHYSICIAN ASSISTANT

## 2022-04-26 RX ORDER — TRAMADOL HYDROCHLORIDE 50 MG/1
50 TABLET ORAL EVERY 12 HOURS PRN
Qty: 14 TABLET | Refills: 0 | Status: SHIPPED | OUTPATIENT
Start: 2022-04-26 | End: 2023-04-11

## 2022-04-26 RX ORDER — DULOXETIN HYDROCHLORIDE 60 MG/1
60 CAPSULE, DELAYED RELEASE ORAL DAILY
Qty: 90 CAPSULE | Refills: 3 | Status: SHIPPED | OUTPATIENT
Start: 2022-04-26 | End: 2023-02-22 | Stop reason: SDUPTHER

## 2022-04-26 RX ORDER — ATORVASTATIN CALCIUM 10 MG/1
10 TABLET, FILM COATED ORAL NIGHTLY
Qty: 90 TABLET | Refills: 3 | Status: SHIPPED | OUTPATIENT
Start: 2022-04-26 | End: 2023-02-22

## 2022-04-26 RX ORDER — FREMANEZUMAB-VFRM 225 MG/1.5ML
225 INJECTION SUBCUTANEOUS ONCE
Qty: 1.5 ML | Refills: 12 | Status: SHIPPED | OUTPATIENT
Start: 2022-04-26 | End: 2022-04-26

## 2022-04-26 RX ORDER — DIAZEPAM 10 MG/1
10 TABLET ORAL ONCE
Qty: 1 TABLET | Refills: 0 | Status: SHIPPED | OUTPATIENT
Start: 2022-04-26 | End: 2022-08-31

## 2022-04-26 RX ORDER — LORAZEPAM 1 MG/1
TABLET ORAL
Qty: 2 TABLET | Refills: 0 | Status: SHIPPED | OUTPATIENT
Start: 2022-04-26 | End: 2022-05-16

## 2022-04-26 NOTE — PROGRESS NOTES
"     RHEUMATOLOGY OUTPATIENT CLINIC NOTE    4/26/2022    Attending Rheumatologist: Floresita Jensen PA-C  Primary Care Provider: Jose Szymanski MD   Chief Complaint/Reason For Consultation:  Osteoporosis      Subjective:        Christine Jo is a 72 y.o. female here today for follow up knee and foot pain. She has been having left heel pain that is constant for several months. Hx of spur in the same heel dx many years ago. No relief w/ icing or tylenol/NSAIDs. Right knee has continued to hurt, very painful medially. Not improved w/ NSAIDs, tylenol, topical pain relievers.    Seen routinely for osteoporosis on Boniva (2/2018-), recommend to continue therapy and repeat densitometry test to reassess fragility fracture risk. Currently without joint swelling, suspicious rashes, acute /GI complaints.  Denies falls or fractures since last encounter.  Rheumatologic systems otherwise negative.    Serologies  Lab Results   Component Value Date    HEPCAB Negative 07/12/2017       Current Rheum Medications:  · boniva  Previous Rheum Medications:   · fosamax    Past Medical History:   Diagnosis Date    Arthritis     Cataract     Diabetes mellitus 2008     am 01/15/2018 Insulin x 1 year    DM (diabetes mellitus) 2008     am 02/14/2020 Insulin x 4 years    Encounter for blood transfusion     Glaucoma     Hypertension     Insomnia     Macular degeneration     Vaginal yeast infection        Review of patient's allergies indicates:   Allergen Reactions    Aspirin Palpitations       Objective:   /67   Pulse 80   Ht 5' 1" (1.549 m)   Wt 108.7 kg (239 lb 10.2 oz)   BMI 45.28 kg/m²     Immunization History   Administered Date(s) Administered    COVID-19, MRNA, LN-S, PF (Pfizer) (Purple Cap) 02/17/2021, 03/10/2021, 10/13/2021    Hepatitis A, Adult 03/11/2020    Hepatitis B, Adult 06/28/2017    Hepatitis B, Pediatric/Adolescent 09/20/2017, 09/20/2017    Influenza 11/07/2016    Influenza " (FLUAD) - Quadrivalent - Adjuvanted - PF *Preferred* (65+) 10/03/2020, 09/20/2021    Influenza - High Dose - PF (65 years and older) 10/23/2015, 09/20/2017, 09/27/2018, 09/13/2019    Influenza Split 09/21/2013    Pneumococcal Conjugate - 13 Valent 11/07/2016    Pneumococcal Polysaccharide - 23 Valent 09/21/2013, 09/27/2018    Tdap 07/18/2014    Zoster 09/21/2013    Zoster Recombinant 12/07/2019, 02/13/2020       Current Outpatient Medications:     ACETAMINOPHEN (TYLENOL ARTHRITIS ORAL), Take by mouth as needed., Disp: , Rfl:     apixaban (ELIQUIS) 5 mg Tab, Take 1 tablet (5 mg total) by mouth 2 (two) times daily., Disp: 180 tablet, Rfl: 0    atorvastatin (LIPITOR) 10 MG tablet, Take 1 tablet (10 mg total) by mouth every evening., Disp: 90 tablet, Rfl: 3    azelastine (ASTELIN) 137 mcg (0.1 %) nasal spray, use 2 sprays (274 mcg total) by Nasal route 2 (two) times daily., Disp: 30 mL, Rfl: 1    benazepriL (LOTENSIN) 40 MG tablet, Take 1 tablet (40 mg total) by mouth 2 (two) times daily., Disp: 180 tablet, Rfl: 4    BEPREVE 1.5 % Drop, Place 1 drop into both eyes 2 (two) times daily., Disp: 10 mL, Rfl: 4    blood sugar diagnostic Strp, To check blood sugar 1 times daily, Disp: 100 each, Rfl: 3    blood-glucose meter kit, To check BG 1 times daily, to use with insurance preferred meter, Disp: 100 each, Rfl: 3    calcium citrate-vitamin D3 315-200 mg (CITRACAL+D) 315-200 mg-unit per tablet, Take 1 tablet by mouth once daily., Disp: , Rfl:     carvediloL (COREG) 25 MG tablet, TAKE 1 TABLET BY MOUTH TWICE DAILY, Disp: 180 tablet, Rfl: 1    clotrimazole-betamethasone (LOTRISONE) lotion, Apply topically 2 (two) times daily., Disp: 30 mL, Rfl: 2    cycloSPORINE (RESTASIS) 0.05 % ophthalmic emulsion, Place 1 drop into both eyes 2 (two) times daily., Disp: 60 each, Rfl: 11    diclofenac sodium (VOLTAREN) 1 % Gel, Apply 2 grams topically 4 (four) times daily. To affected joints as needed for pain, Disp: 100  "g, Rfl: 3    DULoxetine (CYMBALTA) 60 MG capsule, Take 1 capsule (60 mg total) by mouth once daily., Disp: 90 capsule, Rfl: 3    estradioL (ESTRACE) 0.01 % (0.1 mg/gram) vaginal cream, Place 1/2 gram vaginally every night for 1 month then three times a week, Disp: 42.5 g, Rfl: 11    fluticasone propionate (FLONASE) 50 mcg/actuation nasal spray, 2 sprays (100 mcg total) by Each Nostril route once daily., Disp: 48 g, Rfl: 3    gabapentin (NEURONTIN) 300 MG capsule, Take 1 capsule (300 mg total) by mouth 2 (two) times daily., Disp: 180 capsule, Rfl: 1    hydrALAZINE (APRESOLINE) 100 MG tablet, Take 1 tablet (100 mg total) by mouth 2 (two) times daily., Disp: 180 tablet, Rfl: 0    insulin (LANTUS SOLOSTAR U-100 INSULIN) glargine 100 units/mL (3mL) SubQ pen, Inject 72 Units into the skin every evening., Disp: 45 mL, Rfl: 3    lancets Misc, To check BG 1 times daily, to use with insurance preferred meter, Disp: 100 each, Rfl: 3    liraglutide 0.6 mg/0.1 mL, 18 mg/3 mL, subq PNIJ (VICTOZA 2-LEAH) 0.6 mg/0.1 mL (18 mg/3 mL) PnIj pen, ADMINISTER 0.6 MG UNDER THE SKIN EVERY DAY, Disp: 6 mL, Rfl: 1    meclizine (ANTIVERT) 25 mg tablet, Take 1 tablet (25 mg total) by mouth 3 (three) times daily as needed., Disp: 30 tablet, Rfl: 2    MULTIVIT WITH CALCIUM,IRON,MIN (WOMEN'S DAILY MULTIVITAMIN ORAL), Take by mouth once daily. , Disp: , Rfl:     nitrofurantoin, macrocrystal-monohydrate, (MACROBID) 100 MG capsule, Take 1 capsule by mouth daily, Disp: 30 capsule, Rfl: 2    nitrofurantoin, macrocrystal-monohydrate, (MACROBID) 100 MG capsule, Take 1 capsule (100 mg total) by mouth once daily., Disp: 30 capsule, Rfl: 2    pantoprazole (PROTONIX) 40 MG tablet, Take 1 tablet (40 mg total) by mouth 2 (two) times a day., Disp: 180 tablet, Rfl: 3    pen needle, diabetic (BD ULTRA-FINE SHORT PEN NEEDLE) 31 gauge x 5/16" Ndle, AS DIRECTED TWICE DAILY, Disp: 200 each, Rfl: 3    SITagliptin (JANUVIA) 100 MG Tab, Take 1 tablet (100 " mg total) by mouth once daily., Disp: 90 tablet, Rfl: 0    temazepam (RESTORIL) 30 mg capsule, Take 1 capsule by mouth nightly as needed, Disp: 30 capsule, Rfl: 0    albuterol (VENTOLIN HFA) 90 mcg/actuation inhaler, Inhale 2 puffs into the lungs every 4 (four) hours as needed for Wheezing or Shortness of Breath., Disp: 18 g, Rfl: 11    cycloSPORINE (RESTASIS) 0.05 % ophthalmic emulsion, Place 1 drop into both eyes 2 (two) times daily., Disp: 60 each, Rfl: 11    diazePAM (VALIUM) 10 MG Tab, Take 1 tablet (10 mg total) by mouth once. 30-60 min before MRI for 1 dose, Disp: 1 tablet, Rfl: 0    furosemide (LASIX) 40 MG tablet, Take 40 mg by mouth daily as needed., Disp: , Rfl:     LORazepam (ATIVAN) 1 MG tablet, Take 1 tablet 30 min prior to scan. May repeat if needed, Disp: 2 tablet, Rfl: 0    mupirocin (BACTROBAN) 2 % ointment, Apply topically 2 (two) times daily., Disp: 22 g, Rfl: 0    nitroGLYCERIN (NITROSTAT) 0.4 MG SL tablet, SOHA, Disp: , Rfl: 0    nystatin (MYCOSTATIN) cream, Apply topically 2 (two) times daily. Continue until completely healed, Disp: 30 g, Rfl: 0    traMADoL (ULTRAM) 50 mg tablet, Take 1 tablet (50 mg total) by mouth every 12 (twelve) hours as needed for Pain., Disp: 14 tablet, Rfl: 0    triamcinolone acetonide 0.1% (KENALOG) 0.1 % cream, Apply topically 2 (two) times daily. No longer than 2 weeks., Disp: 30 g, Rfl: 0    Current Facility-Administered Medications:     sodium hyaluronate (orthovisc) 30 mg, 30 mg, Intra-articular, 1 time in Clinic/HOD, Jered Holbrook MD    Physical Exam   Constitutional: She is oriented to person, place, and time. No distress.   HENT:   Head: Normocephalic and atraumatic.   Eyes: Pupils are equal, round, and reactive to light.   Pulmonary/Chest: Effort normal.   Abdominal: Normal appearance.   Musculoskeletal:         General: No swelling or tenderness. Normal range of motion.      Cervical back: Normal range of motion.   Neurological: She is alert and  oriented to person, place, and time.   Skin: Skin is warm and dry.   Psychiatric: Her behavior is normal. Mood normal.   Nursing note and vitals reviewed.      Right Side Rheumatological Exam     Knee Exam   Tenderness Location: medial joint line    Left Side Rheumatological Exam     Foot Exam   Left foot exam exhibits signs of plantar fasciitis  Left foot exam exhibits signs of no podagra, no inflamed dorsum and no tophus        Imaging:  All imaging reviewed and independently interpreted by me.    · XR FOOT COMPLETE 3 VIEW LEFT 4/26/22     FINDINGS:  There is no radiographic evidence of acute osseous, articular, or soft tissue abnormality.  There are mild degenerative findings present at the great toe MTP joint and scattered throughout the midfoot.  No erosive changes demonstrated. Enthesophytes noted along the plantar surface of the posterior calcaneus.    Assessment:     1. Chronic heel pain, left    2. Chronic pain of right knee    3. Anxiety    4. Primary osteoarthritis of right knee        Plan:       · Plan for MRI of the right knee and trial zilretta injection. Valium provided as pre-med for MRI  · Refer to podiatry for further eval of left heel pain  · Return to clinic: 2 weeks for zilretta injection only    The patient understands, chooses and consents to this plan and accepts all the risks which include but are not limited to the risks mentioned above.     Method of contact with patient concerns: [facility]    40 minutes of total time spent on the encounter, which includes face to face time and non-face to face time preparing to see the patient (eg, review of tests), Obtaining and/or reviewing separately obtained history, Documenting clinical information in the electronic or other health record, Independently interpreting results (not separately reported) and communicating results to the patient/family/caregiver, or Care coordination (not separately reported).          Disclaimer: This  note was prepared using a voice recognition system and is likely to have sound alike errors within the text.       Floresita Jensen PA-C  Rheumatology Department   [facility] [place]

## 2022-04-26 NOTE — TELEPHONE ENCOUNTER
Refill Authorization Note   Christine Jo  is requesting a refill authorization.  Brief Assessment and Rationale for Refill:  Approve    -Medication-Related Problems Identified: Therapeutic duplication  Medication Therapy Plan:       Medication Reconciliation Completed: No   Comments:     No Care Gaps recommended.     Note composed:1:59 PM 04/26/2022

## 2022-04-26 NOTE — PROGRESS NOTES
Subjective:       Patient ID: Christine Jo is a 72 y.o. female.    Chief Complaint: Migraine (Ménière's disease/)  Patient seen in consultation at the request of Dr. Jones for dizziness    HPI     PMH:     1. Coronary artery disease of native artery of native heart with stable angina pectoris    2. Abdominal aortic aneurysm (AAA) without rupture    3. Abnormal ECG    4. BMI 45.0-49.9, adult    5. Class 3 severe obesity due to excess calories with serious comorbidity and body mass index (BMI) of 45.0 to 49.9 in adult    6. Uncontrolled type 2 diabetes mellitus with hyperglycemia    7. Paroxysmal atrial fibrillation    8. Palpitations    9. Hypertriglyceridemia    10. Essential hypertension    11. GILL (dyspnea on exertion)    12. Controlled type 2 diabetes mellitus with diabetic peripheral angiopathy without gangrene, with long-term current use of insulin    13. Vertigo    14. Disequilibrium    15. Other specified symptoms and signs involving the circulatory and respiratory systems         ENT note:    In summary this is a pleasant 71 y.o. with daily vestibular symptoms present for months.  She has a hx of asymmetric hearing loss and a noted 50% AS weakness on VNG. It is unclear if she has meniere's disease as her symptoms sound more typical of vestibular migraine and she has not had documented fluctuations in her hearing.    Regardless, I think her main symptoms main improve with vestibular rehab.     Recommend VRT evaluation.  If she does not improve may do trial of migraine prophylactic  Recommend neurology evaluation.    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    HENT: Negative.    Eyes: Negative.    Respiratory: Negative.    Cardiovascular: Negative.    Gastrointestinal: Negative.    Endocrine: Negative.    Genitourinary: Negative.    Musculoskeletal: Negative.    Integumentary:  Negative.   Allergic/Immunologic: Negative.    Neurological: Negative.    Hematological: Negative.    Psychiatric/Behavioral: Negative.          Objective:      Physical Exam  Constitutional:       Appearance: She is obese.   HENT:      Head: Normocephalic and atraumatic.      Right Ear: External ear normal.      Left Ear: External ear normal.      Nose: Nose normal.      Mouth/Throat:      Mouth: Mucous membranes are moist.   Eyes:      Extraocular Movements: Extraocular movements intact.      Conjunctiva/sclera: Conjunctivae normal.      Pupils: Pupils are equal, round, and reactive to light.      Comments: No nystagmus, skew dev  nml saccades    Cardiovascular:      Rate and Rhythm: Normal rate and regular rhythm.      Pulses: Normal pulses.      Heart sounds: Normal heart sounds.   Pulmonary:      Effort: Pulmonary effort is normal.      Breath sounds: Normal breath sounds.   Musculoskeletal:         General: Normal range of motion.      Cervical back: Normal range of motion.   Skin:     General: Skin is warm and dry.   Neurological:      General: No focal deficit present.      Mental Status: She is alert and oriented to person, place, and time.      Cranial Nerves: No cranial nerve deficit.      Sensory: No sensory deficit.      Motor: No weakness.      Coordination: Coordination normal.      Gait: Gait normal.      Deep Tendon Reflexes: Reflexes normal.   Psychiatric:         Mood and Affect: Mood normal.         Behavior: Behavior normal.          Thought Content: Thought content normal.         Judgment: Judgment normal.         Assessment:       Problem List Items Addressed This Visit     Dizzy    Memory loss - Primary    Relevant Orders    Ambulatory referral/consult to Neuropsychology    Vestibular migraine    Relevant Medications    fremanezumab-vfrm (AJOVY AUTOINJECTOR) 225 mg/1.5 mL autoinjector    LORazepam (ATIVAN) 1 MG tablet    Other Relevant Orders    MRI Brain Epilepsy Without Contrast            MRI Impression:   1. No significant MRI abnormality of the internal auditory canals or cerebellopontine angles.  2. Minimal nonspecific white matter T2 alteration that likely relates to chronic microvascular ischemia.      Electronically signed by: Caesar Stafford Jr., MD  Date:                                            10/29/2020  Time:                                           15:24    FINDINGS:  Vertebral body heights are maintained.  There is straightening and mild reversal of cervical lordosis with mild retrolisthesis of C6 on C7.  Mild anterior spondylolisthesis of C4 on C5.  Multilevel disc desiccation present with height loss most prevalent at C5-6 and C6-7.  Mild Modic 1 endplate changes noted at C5-6 and C6-7.   Posterior fossa is unremarkable.  No concerning spinal cord signal abnormality.  Neck soft tissues are unremarkable.   C2-C3: No spinal canal stenosis or neural foraminal narrowing.   C3-C4: No significant posterior disc bulge or spinal canal stenosis.  Right greater than left facet and uncovertebral hypertrophy present with moderate right neural foraminal narrowing.  Minimal right-sided facet edema present.   C4-C5: No significant posterior disc bulge or spinal canal stenosis.  Right greater than left facet and uncovertebral hypertrophy present with mild neural foraminal narrowing.  Right-sided facet/perifacet mild edema changes.   C5-C6: Posterior disc osteophyte complex present effacing the anterior thecal sac causing mild spinal  canal stenosis.  Facet and uncovertebral hypertrophy noted with mild to moderate bilateral neural foraminal narrowing.   C6-C7: Posterior disc osteophyte complex and retrolisthesis cause moderate spinal canal stenosis.  Facet and uncovertebral hypertrophy present causing mild-to-moderate right greater than left neural foraminal narrowing.   C7-T1: No spinal canal stenosis or neural foraminal narrowing.   IMPRESSION: Degenerative changes as above most prevalent at C5-6 and C6-7 with spinal canal stenosis and neural foraminal narrowing.      Electronically signed by: Acosta Terrell MD  Date:                                            10/03/2018  Time:                                           11:31  Plan:   1, Refer to neuropsych for memory loss (reports having a NP test in 2017). Mother has mixed dementia   2, MR brain - stroke protocol  3,  Pt on cymbalta 60 mg AM, Gabapentin 300 mg QHS   4, trial Ajovy 225 mg monthly, discussed side effects   5, options - diamox, topamax, trial of Meniere treatment  6, Ativan 1 mg PRN for MRI   7, start PT   F/up with [facility]

## 2022-04-26 NOTE — TELEPHONE ENCOUNTER
Called patient to advise per Dr. Singh      Please call pt.  Start Rosuvastatin 10 mg qhs.  This will take place of Atorvastatin.     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 Wants to know can she take the lowest dose of atorvastatin

## 2022-04-27 ENCOUNTER — PATIENT MESSAGE (OUTPATIENT)
Dept: INTERNAL MEDICINE | Facility: CLINIC | Age: 72
End: 2022-04-27
Payer: MEDICARE

## 2022-04-27 ENCOUNTER — TELEPHONE (OUTPATIENT)
Dept: INTERNAL MEDICINE | Facility: CLINIC | Age: 72
End: 2022-04-27
Payer: MEDICARE

## 2022-04-27 ENCOUNTER — TELEPHONE (OUTPATIENT)
Dept: PHARMACY | Facility: CLINIC | Age: 72
End: 2022-04-27
Payer: MEDICARE

## 2022-04-27 ENCOUNTER — PATIENT MESSAGE (OUTPATIENT)
Dept: RHEUMATOLOGY | Facility: CLINIC | Age: 72
End: 2022-04-27
Payer: MEDICARE

## 2022-04-28 ENCOUNTER — PATIENT MESSAGE (OUTPATIENT)
Dept: INTERNAL MEDICINE | Facility: CLINIC | Age: 72
End: 2022-04-28
Payer: MEDICARE

## 2022-04-28 PROBLEM — I70.0 AORTO-ILIAC ATHEROSCLEROSIS: Status: ACTIVE | Noted: 2022-04-28

## 2022-04-28 PROBLEM — I65.29 CAROTID STENOSIS: Status: ACTIVE | Noted: 2022-04-28

## 2022-04-28 PROBLEM — I70.8 AORTO-ILIAC ATHEROSCLEROSIS: Status: ACTIVE | Noted: 2022-04-28

## 2022-05-02 ENCOUNTER — TELEPHONE (OUTPATIENT)
Dept: NEUROLOGY | Facility: CLINIC | Age: 72
End: 2022-05-02
Payer: MEDICARE

## 2022-05-02 ENCOUNTER — PATIENT OUTREACH (OUTPATIENT)
Dept: ADMINISTRATIVE | Facility: HOSPITAL | Age: 72
End: 2022-05-02
Payer: MEDICARE

## 2022-05-02 NOTE — TELEPHONE ENCOUNTER
Spoke with patient about a question regarding a Mri. I instructed the patient to contact Dr. Richards office since he ordered the MRI.-BR

## 2022-05-04 ENCOUNTER — PATIENT MESSAGE (OUTPATIENT)
Dept: INTERNAL MEDICINE | Facility: CLINIC | Age: 72
End: 2022-05-04
Payer: MEDICARE

## 2022-05-05 ENCOUNTER — PATIENT MESSAGE (OUTPATIENT)
Dept: NEUROLOGY | Facility: CLINIC | Age: 72
End: 2022-05-05
Payer: MEDICARE

## 2022-05-07 NOTE — TELEPHONE ENCOUNTER
----- Message from Fatuma Millard sent at 1/31/2022  9:28 AM CST -----  Pt is calling in regards to getting supplies for her Dexcom 6. Pt stated that she doesn't ant to do digital meds. Please call [phone] (home)       
Patient unable to complete

## 2022-05-08 ENCOUNTER — PATIENT MESSAGE (OUTPATIENT)
Dept: INTERNAL MEDICINE | Facility: CLINIC | Age: 72
End: 2022-05-08
Payer: MEDICARE

## 2022-05-08 DIAGNOSIS — H10.13 ALLERGIC CONJUNCTIVITIS, BILATERAL: ICD-10-CM

## 2022-05-09 ENCOUNTER — PATIENT MESSAGE (OUTPATIENT)
Dept: RHEUMATOLOGY | Facility: CLINIC | Age: 72
End: 2022-05-09
Payer: MEDICARE

## 2022-05-09 ENCOUNTER — PATIENT OUTREACH (OUTPATIENT)
Dept: ADMINISTRATIVE | Facility: OTHER | Age: 72
End: 2022-05-09
Payer: MEDICARE

## 2022-05-09 RX ORDER — TEMAZEPAM 30 MG/1
CAPSULE ORAL
Qty: 30 CAPSULE | Refills: 0 | Status: SHIPPED | OUTPATIENT
Start: 2022-05-09 | End: 2022-06-05 | Stop reason: SDUPTHER

## 2022-05-09 RX ORDER — BEPOTASTINE BESILATE 15 MG/ML
1 SOLUTION/ DROPS OPHTHALMIC 2 TIMES DAILY
Qty: 10 ML | Refills: 4 | Status: SHIPPED | OUTPATIENT
Start: 2022-05-09 | End: 2022-11-25 | Stop reason: SDUPTHER

## 2022-05-09 NOTE — TELEPHONE ENCOUNTER
No new care gaps identified.  [facility] Embedded Care Gaps. Reference number: [id number]. 5/09/2022   10:33:44 AM SWETHAT

## 2022-05-09 NOTE — TELEPHONE ENCOUNTER
Pt would like refill on temazepam   Topical Sulfur Applications Counseling: Topical Sulfur Counseling: Patient counseled that this medication may cause skin irritation or allergic reactions.  In the event of skin irritation, the patient was advised to reduce the amount of the drug applied or use it less frequently.   The patient verbalized understanding of the proper use and possible adverse effects of topical sulfur application.  All of the patient's questions and concerns were addressed. Tetracycline Counseling: Patient counseled regarding possible photosensitivity and increased risk for sunburn.  Patient instructed to avoid sunlight, if possible.  When exposed to sunlight, patients should wear protective clothing, sunglasses, and sunscreen.  The patient was instructed to call the office immediately if the following severe adverse effects occur:  hearing changes, easy bruising/bleeding, severe headache, or vision changes.  The patient verbalized understanding of the proper use and possible adverse effects of tetracycline.  All of the patient's questions and concerns were addressed. Patient understands to avoid pregnancy while on therapy due to potential birth defects. Siliq Pregnancy And Lactation Text: The risk during pregnancy and breastfeeding is uncertain with this medication. Xelserinaz Pregnancy And Lactation Text: This medication is Pregnancy Category D and is not considered safe during pregnancy.  The risk during breast feeding is also uncertain. Elidel Pregnancy And Lactation Text: This medication is Pregnancy Category C. It is unknown if this medication is excreted in breast milk. Oxybutynin Pregnancy And Lactation Text: This medication is Pregnancy Category B and is considered safe during pregnancy. It is unknown if it is excreted in breast milk. Isotretinoin Pregnancy And Lactation Text: This medication is Pregnancy Category X and is considered extremely dangerous during pregnancy. It is unknown if it is excreted in breast milk. Albendazole Counseling:  I discussed with the patient the risks of albendazole including but not limited to cytopenia, kidney damage, nausea/vomiting and severe allergy.  The patient understands that this medication is being used in an off-label manner. Sski Pregnancy And Lactation Text: This medication is Pregnancy Category D and isn't considered safe during pregnancy. It is excreted in breast milk. Bactrim Pregnancy And Lactation Text: This medication is Pregnancy Category D and is known to cause fetal risk.  It is also excreted in breast milk. Azithromycin Counseling:  I discussed with the patient the risks of azithromycin including but not limited to GI upset, allergic reaction, drug rash, diarrhea, and yeast infections. Bexarotene Counseling:  I discussed with the patient the risks of bexarotene including but not limited to hair loss, dry lips/skin/eyes, liver abnormalities, hyperlipidemia, pancreatitis, depression/suicidal ideation, photosensitivity, drug rash/allergic reactions, hypothyroidism, anemia, leukopenia, infection, cataracts, and teratogenicity.  Patient understands that they will need regular blood tests to check lipid profile, liver function tests, white blood cell count, thyroid function tests and pregnancy test if applicable. Carac Pregnancy And Lactation Text: This medication is Pregnancy Category X and contraindicated in pregnancy and in women who may become pregnant. It is unknown if this medication is excreted in breast milk. Minoxidil Counseling: Minoxidil is a topical medication which can increase blood flow where it is applied. It is uncertain how this medication increases hair growth. Side effects are uncommon and include stinging and allergic reactions. Cimzia Pregnancy And Lactation Text: This medication crosses the placenta but can be considered safe in certain situations. Cimzia may be excreted in breast milk. Quinolones Counseling:  I discussed with the patient the risks of fluoroquinolones including but not limited to GI upset, allergic reaction, drug rash, diarrhea, dizziness, photosensitivity, yeast infections, liver function test abnormalities, tendonitis/tendon rupture. Cephalexin Counseling: I counseled the patient regarding use of cephalexin as an antibiotic for prophylactic and/or therapeutic purposes. Cephalexin (commonly prescribed under brand name Keflex) is a cephalosporin antibiotic which is active against numerous classes of bacteria, including most skin bacteria. Side effects may include nausea, diarrhea, gastrointestinal upset, rash, hives, yeast infections, and in rare cases, hepatitis, kidney disease, seizures, fever, confusion, neurologic symptoms, and others. Patients with severe allergies to penicillin medications are cautioned that there is about a 10% incidence of cross-reactivity with cephalosporins. When possible, patients with penicillin allergies should use alternatives to cephalosporins for antibiotic therapy. Glycopyrrolate Pregnancy And Lactation Text: This medication is Pregnancy Category B and is considered safe during pregnancy. It is unknown if it is excreted breast milk. Dupixent Counseling: I discussed with the patient the risks of dupilumab including but not limited to eye infection and irritation, cold sores, injection site reactions, worsening of asthma, allergic reactions and increased risk of parasitic infection.  Live vaccines should be avoided while taking dupilumab. Dupilumab will also interact with certain medications such as warfarin and cyclosporine. The patient understands that monitoring is required and they must alert us or the primary physician if symptoms of infection or other concerning signs are noted. Methotrexate Pregnancy And Lactation Text: This medication is Pregnancy Category X and is known to cause fetal harm. This medication is excreted in breast milk. Benzoyl Peroxide Pregnancy And Lactation Text: This medication is Pregnancy Category C. It is unknown if benzoyl peroxide is excreted in breast milk. Tremfya Counseling: I discussed with the patient the risks of guselkumab including but not limited to immunosuppression, serious infections, and drug reactions.  The patient understands that monitoring is required including a PPD at baseline and must alert us or the primary physician if symptoms of infection or other concerning signs are noted. Hydroxyzine Pregnancy And Lactation Text: This medication is not safe during pregnancy and should not be taken. It is also excreted in breast milk and breast feeding isn't recommended. Metronidazole Counseling:  I discussed with the patient the risks of metronidazole including but not limited to seizures, nausea/vomiting, a metallic taste in the mouth, nausea/vomiting and severe allergy. Itraconazole Counseling:  I discussed with the patient the risks of itraconazole including but not limited to liver damage, nausea/vomiting, neuropathy, and severe allergy.  The patient understands that this medication is best absorbed when taken with acidic beverages such as non-diet cola or ginger ale.  The patient understands that monitoring is required including baseline LFTs and repeat LFTs at intervals.  The patient understands that they are to contact us or the primary physician if concerning signs are noted. Sarecycline Pregnancy And Lactation Text: This medication is Pregnancy Category D and not consider safe during pregnancy. It is also excreted in breast milk. Isotretinoin Counseling: Patient should get monthly blood tests, not donate blood, not drive at night if vision affected, not share medication, and not undergo elective surgery for 6 months after tx completed. Side effects reviewed, pt to contact office should one occur. SSKI Counseling:  I discussed with the patient the risks of SSKI including but not limited to thyroid abnormalities, metallic taste, GI upset, fever, headache, acne, arthralgias, paraesthesias, lymphadenopathy, easy bleeding, arrhythmias, and allergic reaction. Gabapentin Counseling: I discussed with the patient the risks of gabapentin including but not limited to dizziness, somnolence, fatigue and ataxia. Dapsone Pregnancy And Lactation Text: This medication is Pregnancy Category C and is not considered safe during pregnancy or breast feeding. Taltz Counseling: I discussed with the patient the risks of ixekizumab including but not limited to immunosuppression, serious infections, worsening of inflammatory bowel disease and drug reactions.  The patient understands that monitoring is required including a PPD at baseline and must alert us or the primary physician if symptoms of infection or other concerning signs are noted. Odomzo Pregnancy And Lactation Text: This medication is Pregnancy Category X and is absolutely contraindicated during pregnancy. It is unknown if it is excreted in breast milk. Griseofulvin Pregnancy And Lactation Text: This medication is Pregnancy Category X and is known to cause serious birth defects. It is unknown if this medication is excreted in breast milk but breast feeding should be avoided. Use Enhanced Medication Counseling?: No Stelara Counseling:  I discussed with the patient the risks of ustekinumab including but not limited to immunosuppression, malignancy, posterior leukoencephalopathy syndrome, and serious infections.  The patient understands that monitoring is required including a PPD at baseline and must alert us or the primary physician if symptoms of infection or other concerning signs are noted. Spironolactone Counseling: Patient advised regarding risks of diarrhea, abdominal pain, hyperkalemia, birth defects (for female patients), liver toxicity and renal toxicity. The patient may need blood work to monitor liver and kidney function and potassium levels while on therapy. The patient verbalized understanding of the proper use and possible adverse effects of spironolactone.  All of the patient's questions and concerns were addressed. Imiquimod Counseling:  I discussed with the patient the risks of imiquimod including but not limited to erythema, scaling, itching, weeping, crusting, and pain.  Patient understands that the inflammatory response to imiquimod is variable from person to person and was educated regarded proper titration schedule.  If flu-like symptoms develop, patient knows to discontinue the medication and contact us. Otezla Counseling: The side effects of Otezla were discussed with the patient, including but not limited to worsening or new depression, weight loss, diarrhea, nausea, upper respiratory tract infection, and headache. Patient instructed to call the office should any adverse effect occur.  The patient verbalized understanding of the proper use and possible adverse effects of Otezla.  All the patient's questions and concerns were addressed. Itraconazole Pregnancy And Lactation Text: This medication is Pregnancy Category C and it isn't know if it is safe during pregnancy. It is also excreted in breast milk. Enbrel Counseling:  I discussed with the patient the risks of etanercept including but not limited to myelosuppression, immunosuppression, autoimmune hepatitis, demyelinating diseases, lymphoma, and infections.  The patient understands that monitoring is required including a PPD at baseline and must alert us or the primary physician if symptoms of infection or other concerning signs are noted. Arava Counseling:  Patient counseled regarding adverse effects of Arava including but not limited to nausea, vomiting, abnormalities in liver function tests. Patients may develop mouth sores, rash, diarrhea, and abnormalities in blood counts. The patient understands that monitoring is required including LFTs and blood counts.  There is a rare possibility of scarring of the liver and lung problems that can occur when taking methotrexate. Persistent nausea, loss of appetite, pale stools, dark urine, cough, and shortness of breath should be reported immediately. Patient advised to discontinue Arava treatment and consult with a physician prior to attempting conception. The patient will have to undergo a treatment to eliminate Arava from the body prior to conception. Doxycycline Pregnancy And Lactation Text: This medication is Pregnancy Category D and not consider safe during pregnancy. It is also excreted in breast milk but is considered safe for shorter treatment courses. Clindamycin Pregnancy And Lactation Text: This medication can be used in pregnancy if certain situations. Clindamycin is also present in breast milk. Siliq Counseling:  I discussed with the patient the risks of Siliq including but not limited to new or worsening depression, suicidal thoughts and behavior, immunosuppression, malignancy, posterior leukoencephalopathy syndrome, and serious infections.  The patient understands that monitoring is required including a PPD at baseline and must alert us or the primary physician if symptoms of infection or other concerning signs are noted. There is also a special program designed to monitor depression which is required with Siliq. Cephalexin Pregnancy And Lactation Text: This medication is Pregnancy Category B and considered safe during pregnancy.  It is also excreted in breast milk but can be used safely for shorter doses. Colchicine Pregnancy And Lactation Text: This medication is Pregnancy Category C and isn't considered safe during pregnancy. It is excreted in breast milk. Topical Sulfur Applications Pregnancy And Lactation Text: This medication is Pregnancy Category C and has an unknown safety profile during pregnancy. It is unknown if this topical medication is excreted in breast milk. Detail Level: Detailed Prednisone Pregnancy And Lactation Text: This medication is Pregnancy Category C and it isn't know if it is safe during pregnancy. This medication is excreted in breast milk. Infliximab Counseling:  I discussed with the patient the risks of infliximab including but not limited to myelosuppression, immunosuppression, autoimmune hepatitis, demyelinating diseases, lymphoma, and serious infections.  The patient understands that monitoring is required including a PPD at baseline and must alert us or the primary physician if symptoms of infection or other concerning signs are noted. Rifampin Counseling: I discussed with the patient the risks of rifampin including but not limited to liver damage, kidney damage, red-orange body fluids, nausea/vomiting and severe allergy. Hydroquinone Counseling:  Patient advised that medication may result in skin irritation, lightening (hypopigmentation), dryness, and burning.  In the event of skin irritation, the patient was advised to reduce the amount of the drug applied or use it less frequently.  Rarely, spots that are treated with hydroquinone can become darker (pseudoochronosis).  Should this occur, patient instructed to stop medication and call the office. The patient verbalized understanding of the proper use and possible adverse effects of hydroquinone.  All of the patient's questions and concerns were addressed. Spironolactone Pregnancy And Lactation Text: This medication can cause feminization of the male fetus and should be avoided during pregnancy. The active metabolite is also found in breast milk. Terbinafine Counseling: Patient counseling regarding adverse effects of terbinafine including but not limited to headache, diarrhea, rash, upset stomach, liver function test abnormalities, itching, taste/smell disturbance, nausea, abdominal pain, and flatulence.  There is a rare possibility of liver failure that can occur when taking terbinafine.  The patient understands that a baseline LFT and kidney function test may be required. The patient verbalized understanding of the proper use and possible adverse effects of terbinafine.  All of the patient's questions and concerns were addressed. Erivedge Counseling- I discussed with the patient the risks of Erivedge including but not limited to nausea, vomiting, diarrhea, constipation, weight loss, changes in the sense of taste, decreased appetite, muscle spasms, and hair loss.  The patient verbalized understanding of the proper use and possible adverse effects of Erivedge.  All of the patient's questions and concerns were addressed. Humira Pregnancy And Lactation Text: This medication is Pregnancy Category B and is considered safe during pregnancy. It is unknown if this medication is excreted in breast milk. Birth Control Pills Pregnancy And Lactation Text: This medication should be avoided if pregnant and for the first 30 days post-partum. Cosentyx Counseling:  I discussed with the patient the risks of Cosentyx including but not limited to worsening of Crohn's disease, immunosuppression, allergic reactions and infections.  The patient understands that monitoring is required including a PPD at baseline and must alert us or the primary physician if symptoms of infection or other concerning signs are noted. Doxepin Counseling:  Patient advised that the medication is sedating and not to drive a car after taking this medication. Patient informed of potential adverse effects including but not limited to dry mouth, urinary retention, and blurry vision.  The patient verbalized understanding of the proper use and possible adverse effects of doxepin.  All of the patient's questions and concerns were addressed. Cellcept Counseling:  I discussed with the patient the risks of mycophenolate mofetil including but not limited to infection/immunosuppression, GI upset, hypokalemia, hypercholesterolemia, bone marrow suppression, lymphoproliferative disorders, malignancy, GI ulceration/bleed/perforation, colitis, interstitial lung disease, kidney failure, progressive multifocal leukoencephalopathy, and birth defects.  The patient understands that monitoring is required including a baseline creatinine and regular CBC testing. In addition, patient must alert us immediately if symptoms of infection or other concerning signs are noted. High Dose Vitamin A Pregnancy And Lactation Text: High dose vitamin A therapy is contraindicated during pregnancy and breast feeding. Minoxidil Pregnancy And Lactation Text: This medication has not been assigned a Pregnancy Risk Category but animal studies failed to show danger with the topical medication. It is unknown if the medication is excreted in breast milk. Rifampin Pregnancy And Lactation Text: This medication is Pregnancy Category C and it isn't know if it is safe during pregnancy. It is also excreted in breast milk and should not be used if you are breast feeding. Cimzia Counseling:  I discussed with the patient the risks of Cimzia including but not limited to immunosuppression, allergic reactions and infections.  The patient understands that monitoring is required including a PPD at baseline and must alert us or the primary physician if symptoms of infection or other concerning signs are noted. Birth Control Pills Counseling: Birth Control Pill Counseling: I discussed with the patient the potential side effects of OCPs including but not limited to increased risk of stroke, heart attack, thrombophlebitis, deep venous thrombosis, hepatic adenomas, breast changes, GI upset, headaches, and depression.  The patient verbalized understanding of the proper use and possible adverse effects of OCPs. All of the patient's questions and concerns were addressed. Griseofulvin Counseling:  I discussed with the patient the risks of griseofulvin including but not limited to photosensitivity, cytopenia, liver damage, nausea/vomiting and severe allergy.  The patient understands that this medication is best absorbed when taken with a fatty meal (e.g., ice cream or french fries). Ilumya Counseling: I discussed with the patient the risks of tildrakizumab including but not limited to immunosuppression, malignancy, posterior leukoencephalopathy syndrome, and serious infections.  The patient understands that monitoring is required including a PPD at baseline and must alert us or the primary physician if symptoms of infection or other concerning signs are noted. Protopic Pregnancy And Lactation Text: This medication is Pregnancy Category C. It is unknown if this medication is excreted in breast milk when applied topically. Thalidomide Counseling: I discussed with the patient the risks of thalidomide including but not limited to birth defects, anxiety, weakness, chest pain, dizziness, cough and severe allergy. Minocycline Counseling: Patient advised regarding possible photosensitivity and discoloration of the teeth, skin, lips, tongue and gums.  Patient instructed to avoid sunlight, if possible.  When exposed to sunlight, patients should wear protective clothing, sunglasses, and sunscreen.  The patient was instructed to call the office immediately if the following severe adverse effects occur:  hearing changes, easy bruising/bleeding, severe headache, or vision changes.  The patient verbalized understanding of the proper use and possible adverse effects of minocycline.  All of the patient's questions and concerns were addressed. Clofazimine Counseling:  I discussed with the patient the risks of clofazimine including but not limited to skin and eye pigmentation, liver damage, nausea/vomiting, gastrointestinal bleeding and allergy. Methotrexate Counseling:  Patient counseled regarding adverse effects of methotrexate including but not limited to nausea, vomiting, abnormalities in liver function tests. Patients may develop mouth sores, rash, diarrhea, and abnormalities in blood counts. The patient understands that monitoring is required including LFT's and blood counts.  There is a rare possibility of scarring of the liver and lung problems that can occur when taking methotrexate. Persistent nausea, loss of appetite, pale stools, dark urine, cough, and shortness of breath should be reported immediately. Patient advised to discontinue methotrexate treatment at least three months before attempting to become pregnant.  I discussed the need for folate supplements while taking methotrexate.  These supplements can decrease side effects during methotrexate treatment. The patient verbalized understanding of the proper use and possible adverse effects of methotrexate.  All of the patient's questions and concerns were addressed. Terbinafine Pregnancy And Lactation Text: This medication is Pregnancy Category B and is considered safe during pregnancy. It is also excreted in breast milk and breast feeding isn't recommended. Rituxan Counseling:  I discussed with the patient the risks of Rituxan infusions. Side effects can include infusion reactions, severe drug rashes including mucocutaneous reactions, reactivation of latent hepatitis and other infections and rarely progressive multifocal leukoencephalopathy.  All of the patient's questions and concerns were addressed. Simponi Counseling:  I discussed with the patient the risks of golimumab including but not limited to myelosuppression, immunosuppression, autoimmune hepatitis, demyelinating diseases, lymphoma, and serious infections.  The patient understands that monitoring is required including a PPD at baseline and must alert us or the primary physician if symptoms of infection or other concerning signs are noted. Doxycycline Counseling:  Patient counseled regarding possible photosensitivity and increased risk for sunburn.  Patient instructed to avoid sunlight, if possible.  When exposed to sunlight, patients should wear protective clothing, sunglasses, and sunscreen.  The patient was instructed to call the office immediately if the following severe adverse effects occur:  hearing changes, easy bruising/bleeding, severe headache, or vision changes.  The patient verbalized understanding of the proper use and possible adverse effects of doxycycline.  All of the patient's questions and concerns were addressed. Benzoyl Peroxide Counseling: Patient counseled that medicine may cause skin irritation and bleach clothing.  In the event of skin irritation, the patient was advised to reduce the amount of the drug applied or use it less frequently.   The patient verbalized understanding of the proper use and possible adverse effects of benzoyl peroxide.  All of the patient's questions and concerns were addressed. Valtrex Pregnancy And Lactation Text: this medication is Pregnancy Category B and is considered safe during pregnancy. This medication is not directly found in breast milk but it's metabolite acyclovir is present. Erythromycin Counseling:  I discussed with the patient the risks of erythromycin including but not limited to GI upset, allergic reaction, drug rash, diarrhea, increase in liver enzymes, and yeast infections. Azithromycin Pregnancy And Lactation Text: This medication is considered safe during pregnancy and is also secreted in breast milk. Dupixent Pregnancy And Lactation Text: This medication likely crosses the placenta but the risk for the fetus is uncertain. This medication is excreted in breast milk. 5-Fu Counseling: 5-Fluorouracil Counseling:  I discussed with the patient the risks of 5-fluorouracil including but not limited to erythema, scaling, itching, weeping, crusting, and pain. Hydroxychloroquine Counseling:  I discussed with the patient that a baseline ophthalmologic exam is needed at the start of therapy and every year thereafter while on therapy. A CBC may also be warranted for monitoring.  The side effects of this medication were discussed with the patient, including but not limited to agranulocytosis, aplastic anemia, seizures, rashes, retinopathy, and liver toxicity. Patient instructed to call the office should any adverse effect occur.  The patient verbalized understanding of the proper use and possible adverse effects of Plaquenil.  All the patient's questions and concerns were addressed. Skyrizi Counseling: I discussed with the patient the risks of risankizumab-rzaa including but not limited to immunosuppression, and serious infections.  The patient understands that monitoring is required including a PPD at baseline and must alert us or the primary physician if symptoms of infection or other concerning signs are noted. Bexarotene Pregnancy And Lactation Text: This medication is Pregnancy Category X and should not be given to women who are pregnant or may become pregnant. This medication should not be used if you are breast feeding. Xolair Pregnancy And Lactation Text: This medication is Pregnancy Category B and is considered safe during pregnancy. This medication is excreted in breast milk. Otezla Pregnancy And Lactation Text: This medication is Pregnancy Category C and it isn't known if it is safe during pregnancy. It is unknown if it is excreted in breast milk. Topical Clindamycin Counseling: Patient counseled that this medication may cause skin irritation or allergic reactions.  In the event of skin irritation, the patient was advised to reduce the amount of the drug applied or use it less frequently.   The patient verbalized understanding of the proper use and possible adverse effects of clindamycin.  All of the patient's questions and concerns were addressed. Topical Retinoid counseling:  Patient advised to apply a pea-sized amount only at bedtime and wait 30 minutes after washing their face before applying.  If too drying, patient may add a non-comedogenic moisturizer. The patient verbalized understanding of the proper use and possible adverse effects of retinoids.  All of the patient's questions and concerns were addressed. Nsaids Pregnancy And Lactation Text: These medications are considered safe up to 30 weeks gestation. It is excreted in breast milk. Bactrim Counseling:  I discussed with the patient the risks of sulfa antibiotics including but not limited to GI upset, allergic reaction, drug rash, diarrhea, dizziness, photosensitivity, and yeast infections.  Rarely, more serious reactions can occur including but not limited to aplastic anemia, agranulocytosis, methemoglobinemia, blood dyscrasias, liver or kidney failure, lung infiltrates or desquamative/blistering drug rashes. Zyclara Counseling:  I discussed with the patient the risks of imiquimod including but not limited to erythema, scaling, itching, weeping, crusting, and pain.  Patient understands that the inflammatory response to imiquimod is variable from person to person and was educated regarded proper titration schedule.  If flu-like symptoms develop, patient knows to discontinue the medication and contact us. Tazorac Counseling:  Patient advised that medication is irritating and drying.  Patient may need to apply sparingly and wash off after an hour before eventually leaving it on overnight.  The patient verbalized understanding of the proper use and possible adverse effects of tazorac.  All of the patient's questions and concerns were addressed. Cellcept Pregnancy And Lactation Text: This medication is Pregnancy Category D and isn't considered safe during pregnancy. It is unknown if this medication is excreted in breast milk. Ketoconazole Counseling:   Patient counseled regarding improving absorption with orange juice.  Adverse effects include but are not limited to breast enlargement, headache, diarrhea, nausea, upset stomach, liver function test abnormalities, taste disturbance, and stomach pain.  There is a rare possibility of liver failure that can occur when taking ketoconazole. The patient understands that monitoring of LFTs may be required, especially at baseline. The patient verbalized understanding of the proper use and possible adverse effects of ketoconazole.  All of the patient's questions and concerns were addressed. Doxepin Pregnancy And Lactation Text: This medication is Pregnancy Category C and it isn't known if it is safe during pregnancy. It is also excreted in breast milk and breast feeding isn't recommended. Rituxan Pregnancy And Lactation Text: This medication is Pregnancy Category C and it isn't know if it is safe during pregnancy. It is unknown if this medication is excreted in breast milk but similar antibodies are known to be excreted. Metronidazole Pregnancy And Lactation Text: This medication is Pregnancy Category B and considered safe during pregnancy.  It is also excreted in breast milk. Fluconazole Counseling:  Patient counseled regarding adverse effects of fluconazole including but not limited to headache, diarrhea, nausea, upset stomach, liver function test abnormalities, taste disturbance, and stomach pain.  There is a rare possibility of liver failure that can occur when taking fluconazole.  The patient understands that monitoring of LFTs and kidney function test may be required, especially at baseline. The patient verbalized understanding of the proper use and possible adverse effects of fluconazole.  All of the patient's questions and concerns were addressed. Azathioprine Counseling:  I discussed with the patient the risks of azathioprine including but not limited to myelosuppression, immunosuppression, hepatotoxicity, lymphoma, and infections.  The patient understands that monitoring is required including baseline LFTs, Creatinine, possible TPMP genotyping and weekly CBCs for the first month and then every 2 weeks thereafter.  The patient verbalized understanding of the proper use and possible adverse effects of azathioprine.  All of the patient's questions and concerns were addressed. Solaraze Counseling:  I discussed with the patient the risks of Solaraze including but not limited to erythema, scaling, itching, weeping, crusting, and pain. Odomzo Counseling- I discussed with the patient the risks of Odomzo including but not limited to nausea, vomiting, diarrhea, constipation, weight loss, changes in the sense of taste, decreased appetite, muscle spasms, and hair loss.  The patient verbalized understanding of the proper use and possible adverse effects of Odomzo.  All of the patient's questions and concerns were addressed. Ivermectin Pregnancy And Lactation Text: This medication is Pregnancy Category C and it isn't known if it is safe during pregnancy. It is also excreted in breast milk. Cyclophosphamide Pregnancy And Lactation Text: This medication is Pregnancy Category D and it isn't considered safe during pregnancy. This medication is excreted in breast milk. Xeljanz Counseling: I discussed with the patient the risks of Xeljanz therapy including increased risk of infection, liver issues, headache, diarrhea, or cold symptoms. Live vaccines should be avoided. They were instructed to call if they have any problems. Solaraze Pregnancy And Lactation Text: This medication is Pregnancy Category B and is considered safe. There is some data to suggest avoiding during the third trimester. It is unknown if this medication is excreted in breast milk. Xolair Counseling:  Patient informed of potential adverse effects including but not limited to fever, muscle aches, rash and allergic reactions.  The patient verbalized understanding of the proper use and possible adverse effects of Xolair.  All of the patient's questions and concerns were addressed. Acitretin Counseling:  I discussed with the patient the risks of acitretin including but not limited to hair loss, dry lips/skin/eyes, liver damage, hyperlipidemia, depression/suicidal ideation, photosensitivity.  Serious rare side effects can include but are not limited to pancreatitis, pseudotumor cerebri, bony changes, clot formation/stroke/heart attack.  Patient understands that alcohol is contraindicated since it can result in liver toxicity and significantly prolong the elimination of the drug by many years. Cimetidine Counseling:  I discussed with the patient the risks of Cimetidine including but not limited to gynecomastia, headache, diarrhea, nausea, drowsiness, arrhythmias, pancreatitis, skin rashes, psychosis, bone marrow suppression and kidney toxicity. Nsaids Counseling: NSAID Counseling: I discussed with the patient that NSAIDs should be taken with food. Prolonged use of NSAIDs can result in the development of stomach ulcers.  Patient advised to stop taking NSAIDs if abdominal pain occurs.  The patient verbalized understanding of the proper use and possible adverse effects of NSAIDs.  All of the patient's questions and concerns were addressed. Carac Counseling:  I discussed with the patient the risks of Carac including but not limited to erythema, scaling, itching, weeping, crusting, and pain. Hydroxychloroquine Pregnancy And Lactation Text: This medication has been shown to cause fetal harm but it isn't assigned a Pregnancy Risk Category. There are small amounts excreted in breast milk. Drysol Counseling:  I discussed with the patient the risks of drysol/aluminum chloride including but not limited to skin rash, itching, irritation, burning. Ivermectin Counseling:  Patient instructed to take medication on an empty stomach with a full glass of water.  Patient informed of potential adverse effects including but not limited to nausea, diarrhea, dizziness, itching, and swelling of the extremities or lymph nodes.  The patient verbalized understanding of the proper use and possible adverse effects of ivermectin.  All of the patient's questions and concerns were addressed. Picato Counseling:  I discussed with the patient the risks of Picato including but not limited to erythema, scaling, itching, weeping, crusting, and pain. Dapsone Counseling: I discussed with the patient the risks of dapsone including but not limited to hemolytic anemia, agranulocytosis, rashes, methemoglobinemia, kidney failure, peripheral neuropathy, headaches, GI upset, and liver toxicity.  Patients who start dapsone require monitoring including baseline LFTs and weekly CBCs for the first month, then every month thereafter.  The patient verbalized understanding of the proper use and possible adverse effects of dapsone.  All of the patient's questions and concerns were addressed. Cyclophosphamide Counseling:  I discussed with the patient the risks of cyclophosphamide including but not limited to hair loss, hormonal abnormalities, decreased fertility, abdominal pain, diarrhea, nausea and vomiting, bone marrow suppression and infection. The patient understands that monitoring is required while taking this medication. Hydroxyzine Counseling: Patient advised that the medication is sedating and not to drive a car after taking this medication.  Patient informed of potential adverse effects including but not limited to dry mouth, urinary retention, and blurry vision.  The patient verbalized understanding of the proper use and possible adverse effects of hydroxyzine.  All of the patient's questions and concerns were addressed. Prednisone Counseling:  I discussed with the patient the risks of prolonged use of prednisone including but not limited to weight gain, insomnia, osteoporosis, mood changes, diabetes, susceptibility to infection, glaucoma and high blood pressure.  In cases where prednisone use is prolonged, patients should be monitored with blood pressure checks, serum glucose levels and an eye exam.  Additionally, the patient may need to be placed on GI prophylaxis, PCP prophylaxis, and calcium and vitamin D supplementation and/or a bisphosphonate.  The patient verbalized understanding of the proper use and the possible adverse effects of prednisone.  All of the patient's questions and concerns were addressed. Colchicine Counseling:  Patient counseled regarding adverse effects including but not limited to stomach upset (nausea, vomiting, stomach pain, or diarrhea).  Patient instructed to limit alcohol consumption while taking this medication.  Colchicine may reduce blood counts especially with prolonged use.  The patient understands that monitoring of kidney function and blood counts may be required, especially at baseline. The patient verbalized understanding of the proper use and possible adverse effects of colchicine.  All of the patient's questions and concerns were addressed. Sarecycline Counseling: Patient advised regarding possible photosensitivity and discoloration of the teeth, skin, lips, tongue and gums.  Patient instructed to avoid sunlight, if possible.  When exposed to sunlight, patients should wear protective clothing, sunglasses, and sunscreen.  The patient was instructed to call the office immediately if the following severe adverse effects occur:  hearing changes, easy bruising/bleeding, severe headache, or vision changes.  The patient verbalized understanding of the proper use and possible adverse effects of sarecycline.  All of the patient's questions and concerns were addressed. Clindamycin Counseling: I counseled the patient regarding use of clindamycin as an antibiotic for prophylactic and/or therapeutic purposes. Clindamycin is active against numerous classes of bacteria, including skin bacteria. Side effects may include nausea, diarrhea, gastrointestinal upset, rash, hives, yeast infections, and in rare cases, colitis. Tazorac Pregnancy And Lactation Text: This medication is not safe during pregnancy. It is unknown if this medication is excreted in breast milk. Ketoconazole Pregnancy And Lactation Text: This medication is Pregnancy Category C and it isn't know if it is safe during pregnancy. It is also excreted in breast milk and breast feeding isn't recommended. Humira Counseling:  I discussed with the patient the risks of adalimumab including but not limited to myelosuppression, immunosuppression, autoimmune hepatitis, demyelinating diseases, lymphoma, and serious infections.  The patient understands that monitoring is required including a PPD at baseline and must alert us or the primary physician if symptoms of infection or other concerning signs are noted. Cyclosporine Counseling:  I discussed with the patient the risks of cyclosporine including but not limited to hypertension, gingival hyperplasia,myelosuppression, immunosuppression, liver damage, kidney damage, neurotoxicity, lymphoma, and serious infections. The patient understands that monitoring is required including baseline blood pressure, CBC, CMP, lipid panel and uric acid, and then 1-2 times monthly CMP and blood pressure. Elidel Counseling: Patient may experience a mild burning sensation during topical application. Elidel is not approved in children less than 2 years of age. There have been case reports of hematologic and skin malignancies in patients using topical calcineurin inhibitors although causality is questionable. Erythromycin Pregnancy And Lactation Text: This medication is Pregnancy Category B and is considered safe during pregnancy. It is also excreted in breast milk. High Dose Vitamin A Counseling: Side effects reviewed, pt to contact office should one occur. Protopic Counseling: Patient may experience a mild burning sensation during topical application. Protopic is not approved in children less than 2 years of age. There have been case reports of hematologic and skin malignancies in patients using topical calcineurin inhibitors although causality is questionable. Valtrex Counseling: I discussed with the patient the risks of valacyclovir including but not limited to kidney damage, nausea, vomiting and severe allergy.  The patient understands that if the infection seems to be worsening or is not improving, they are to call. Acitretin Pregnancy And Lactation Text: This medication is Pregnancy Category X and should not be given to women who are pregnant or may become pregnant in the future. This medication is excreted in breast milk. Eucrisa Counseling: Patient may experience a mild burning sensation during topical application. Eucrisa is not approved in children less than 2 years of age. Glycopyrrolate Counseling:  I discussed with the patient the risks of glycopyrrolate including but not limited to skin rash, drowsiness, dry mouth, difficulty urinating, and blurred vision. Oxybutynin Counseling:  I discussed with the patient the risks of oxybutynin including but not limited to skin rash, drowsiness, dry mouth, difficulty urinating, and blurred vision. Drysol Pregnancy And Lactation Text: This medication is considered safe during pregnancy and breast feeding.

## 2022-05-10 ENCOUNTER — OFFICE VISIT (OUTPATIENT)
Dept: OPHTHALMOLOGY | Facility: CLINIC | Age: 72
End: 2022-05-10
Payer: MEDICARE

## 2022-05-10 ENCOUNTER — OFFICE VISIT (OUTPATIENT)
Dept: PODIATRY | Facility: CLINIC | Age: 72
End: 2022-05-10
Payer: MEDICARE

## 2022-05-10 ENCOUNTER — PATIENT MESSAGE (OUTPATIENT)
Dept: RHEUMATOLOGY | Facility: CLINIC | Age: 72
End: 2022-05-10
Payer: MEDICARE

## 2022-05-10 VITALS — WEIGHT: 239 LBS | BODY MASS INDEX: 45.12 KG/M2 | HEIGHT: 61 IN

## 2022-05-10 DIAGNOSIS — M72.2 PLANTAR FASCIITIS: Primary | ICD-10-CM

## 2022-05-10 DIAGNOSIS — M24.572 CONTRACTURE, LEFT ANKLE: ICD-10-CM

## 2022-05-10 DIAGNOSIS — H04.129 DRY EYE: ICD-10-CM

## 2022-05-10 DIAGNOSIS — Z96.1 PSEUDOPHAKIA OF BOTH EYES: ICD-10-CM

## 2022-05-10 DIAGNOSIS — R51.9 NONINTRACTABLE HEADACHE, UNSPECIFIED CHRONICITY PATTERN, UNSPECIFIED HEADACHE TYPE: ICD-10-CM

## 2022-05-10 DIAGNOSIS — M79.672 PAIN IN LEFT FOOT: ICD-10-CM

## 2022-05-10 DIAGNOSIS — E11.9 DIABETES MELLITUS TYPE 2 WITHOUT RETINOPATHY: Primary | ICD-10-CM

## 2022-05-10 DIAGNOSIS — H35.3131 EARLY DRY STAGE NONEXUDATIVE AGE-RELATED MACULAR DEGENERATION OF BOTH EYES: ICD-10-CM

## 2022-05-10 PROCEDURE — 1160F RVW MEDS BY RX/DR IN RCRD: CPT | Mod: CPTII,S$GLB,, | Performed by: PODIATRIST

## 2022-05-10 PROCEDURE — 3072F LOW RISK FOR RETINOPATHY: CPT | Mod: CPTII,S$GLB,, | Performed by: PODIATRIST

## 2022-05-10 PROCEDURE — 99999 PR PBB SHADOW E&M-EST. PATIENT-LVL IV: CPT | Mod: PBBFAC,,, | Performed by: PODIATRIST

## 2022-05-10 PROCEDURE — 1159F MED LIST DOCD IN RCRD: CPT | Mod: CPTII,S$GLB,, | Performed by: OPHTHALMOLOGY

## 2022-05-10 PROCEDURE — 92004 COMPRE OPH EXAM NEW PT 1/>: CPT | Mod: S$GLB,,, | Performed by: OPHTHALMOLOGY

## 2022-05-10 PROCEDURE — 3288F PR FALLS RISK ASSESSMENT DOCUMENTED: ICD-10-PCS | Mod: CPTII,S$GLB,, | Performed by: PODIATRIST

## 2022-05-10 PROCEDURE — 1125F PR PAIN SEVERITY QUANTIFIED, PAIN PRESENT: ICD-10-PCS | Mod: CPTII,S$GLB,, | Performed by: PODIATRIST

## 2022-05-10 PROCEDURE — 99213 PR OFFICE/OUTPT VISIT, EST, LEVL III, 20-29 MIN: ICD-10-PCS | Mod: 25,S$GLB,, | Performed by: PODIATRIST

## 2022-05-10 PROCEDURE — 3072F PR LOW RISK FOR RETINOPATHY: ICD-10-PCS | Mod: CPTII,S$GLB,, | Performed by: PODIATRIST

## 2022-05-10 PROCEDURE — 2023F PR DILATED RETINAL EXAM W/O EVID OF RETINOPATHY: ICD-10-PCS | Mod: CPTII,S$GLB,, | Performed by: OPHTHALMOLOGY

## 2022-05-10 PROCEDURE — 1160F PR REVIEW ALL MEDS BY PRESCRIBER/CLIN PHARMACIST DOCUMENTED: ICD-10-PCS | Mod: CPTII,S$GLB,, | Performed by: OPHTHALMOLOGY

## 2022-05-10 PROCEDURE — 20550 PR INJECT TENDON SHEATH/LIGAMENT: ICD-10-PCS | Mod: LT,S$GLB,, | Performed by: PODIATRIST

## 2022-05-10 PROCEDURE — 4010F ACE/ARB THERAPY RXD/TAKEN: CPT | Mod: CPTII,S$GLB,, | Performed by: PODIATRIST

## 2022-05-10 PROCEDURE — 1159F MED LIST DOCD IN RCRD: CPT | Mod: CPTII,S$GLB,, | Performed by: PODIATRIST

## 2022-05-10 PROCEDURE — 99499 UNLISTED E&M SERVICE: CPT | Mod: S$GLB,,, | Performed by: OPHTHALMOLOGY

## 2022-05-10 PROCEDURE — 99999 PR PBB SHADOW E&M-EST. PATIENT-LVL III: ICD-10-PCS | Mod: PBBFAC,,, | Performed by: OPHTHALMOLOGY

## 2022-05-10 PROCEDURE — 1125F AMNT PAIN NOTED PAIN PRSNT: CPT | Mod: CPTII,S$GLB,, | Performed by: PODIATRIST

## 2022-05-10 PROCEDURE — 3008F BODY MASS INDEX DOCD: CPT | Mod: CPTII,S$GLB,, | Performed by: PODIATRIST

## 2022-05-10 PROCEDURE — 1159F PR MEDICATION LIST DOCUMENTED IN MEDICAL RECORD: ICD-10-PCS | Mod: CPTII,S$GLB,, | Performed by: OPHTHALMOLOGY

## 2022-05-10 PROCEDURE — 99999 PR PBB SHADOW E&M-EST. PATIENT-LVL III: CPT | Mod: PBBFAC,,, | Performed by: OPHTHALMOLOGY

## 2022-05-10 PROCEDURE — 92004 PR EYE EXAM, NEW PATIENT,COMPREHESV: ICD-10-PCS | Mod: S$GLB,,, | Performed by: OPHTHALMOLOGY

## 2022-05-10 PROCEDURE — 4010F PR ACE/ARB THEARPY RXD/TAKEN: ICD-10-PCS | Mod: CPTII,S$GLB,, | Performed by: PODIATRIST

## 2022-05-10 PROCEDURE — 1101F PT FALLS ASSESS-DOCD LE1/YR: CPT | Mod: CPTII,S$GLB,, | Performed by: PODIATRIST

## 2022-05-10 PROCEDURE — 3288F FALL RISK ASSESSMENT DOCD: CPT | Mod: CPTII,S$GLB,, | Performed by: PODIATRIST

## 2022-05-10 PROCEDURE — 1160F PR REVIEW ALL MEDS BY PRESCRIBER/CLIN PHARMACIST DOCUMENTED: ICD-10-PCS | Mod: CPTII,S$GLB,, | Performed by: PODIATRIST

## 2022-05-10 PROCEDURE — 99499 RISK ADDL DX/OHS AUDIT: ICD-10-PCS | Mod: S$GLB,,, | Performed by: OPHTHALMOLOGY

## 2022-05-10 PROCEDURE — 4010F PR ACE/ARB THEARPY RXD/TAKEN: ICD-10-PCS | Mod: CPTII,S$GLB,, | Performed by: OPHTHALMOLOGY

## 2022-05-10 PROCEDURE — 1160F RVW MEDS BY RX/DR IN RCRD: CPT | Mod: CPTII,S$GLB,, | Performed by: OPHTHALMOLOGY

## 2022-05-10 PROCEDURE — 99999 PR PBB SHADOW E&M-EST. PATIENT-LVL IV: ICD-10-PCS | Mod: PBBFAC,,, | Performed by: PODIATRIST

## 2022-05-10 PROCEDURE — 3008F PR BODY MASS INDEX (BMI) DOCUMENTED: ICD-10-PCS | Mod: CPTII,S$GLB,, | Performed by: PODIATRIST

## 2022-05-10 PROCEDURE — 4010F ACE/ARB THERAPY RXD/TAKEN: CPT | Mod: CPTII,S$GLB,, | Performed by: OPHTHALMOLOGY

## 2022-05-10 PROCEDURE — 1101F PR PT FALLS ASSESS DOC 0-1 FALLS W/OUT INJ PAST YR: ICD-10-PCS | Mod: CPTII,S$GLB,, | Performed by: PODIATRIST

## 2022-05-10 PROCEDURE — 20550 NJX 1 TENDON SHEATH/LIGAMENT: CPT | Mod: LT,S$GLB,, | Performed by: PODIATRIST

## 2022-05-10 PROCEDURE — 2023F DILAT RTA XM W/O RTNOPTHY: CPT | Mod: CPTII,S$GLB,, | Performed by: OPHTHALMOLOGY

## 2022-05-10 PROCEDURE — 1159F PR MEDICATION LIST DOCUMENTED IN MEDICAL RECORD: ICD-10-PCS | Mod: CPTII,S$GLB,, | Performed by: PODIATRIST

## 2022-05-10 PROCEDURE — 99213 OFFICE O/P EST LOW 20 MIN: CPT | Mod: 25,S$GLB,, | Performed by: PODIATRIST

## 2022-05-10 RX ORDER — DEXAMETHASONE SODIUM PHOSPHATE 4 MG/ML
4 INJECTION, SOLUTION INTRA-ARTICULAR; INTRALESIONAL; INTRAMUSCULAR; INTRAVENOUS; SOFT TISSUE ONCE
Status: COMPLETED | OUTPATIENT
Start: 2022-05-10 | End: 2022-05-10

## 2022-05-10 RX ORDER — TRIAMCINOLONE ACETONIDE 40 MG/ML
40 INJECTION, SUSPENSION INTRA-ARTICULAR; INTRAMUSCULAR ONCE
Status: COMPLETED | OUTPATIENT
Start: 2022-05-10 | End: 2022-05-10

## 2022-05-10 RX ADMIN — TRIAMCINOLONE ACETONIDE 40 MG: 40 INJECTION, SUSPENSION INTRA-ARTICULAR; INTRAMUSCULAR at 04:05

## 2022-05-10 RX ADMIN — DEXAMETHASONE SODIUM PHOSPHATE 4 MG: 4 INJECTION, SOLUTION INTRA-ARTICULAR; INTRALESIONAL; INTRAMUSCULAR; INTRAVENOUS; SOFT TISSUE at 04:05

## 2022-05-10 NOTE — PROGRESS NOTES
Subjective:       Patient ID: Christine Jo is a 72 y.o. female.    Chief Complaint: Heel Pain (Left heel pain with and without pressure applied to it , 4/10 pain at present, diabetic, pcp  last seen 02/08/2022)      HPI: Christine Jo complains of moderate pains to the left foot. States pains are sharp and stabbing-like in nature. Pains are to the plantar foot, mostly with walking and standing. Rates the pains at approx. 4/10. States post-static dyskinesia. Denies any recent identifiable trauma. Does limp with gait. No NSAID medications thus far due to AC usage. Pains have been present for the past several weeks to months and the pains have worsened over the past couple of weeks. She does not have a history of plantar fasciitis. States walking and standing causes and/or exacerbates the symptoms. Patient has had no corticosteroid injection(s) to the left foot, prior. Patient's Primary Care Provider is Jose Szymanski MD.     Hemoglobin A1C   Date Value Ref Range Status   12/14/2021 7.3 (H) 4.0 - 5.6 % Final     Comment:     ADA Screening Guidelines:  5.7-6.4%  Consistent with prediabetes  >or=6.5%  Consistent with diabetes    High levels of fetal hemoglobin interfere with the HbA1C  assay. Heterozygous hemoglobin variants (HbS, HgC, etc)do  not significantly interfere with this assay.   However, presence of multiple variants may affect accuracy.     09/20/2021 7.3 (H) 4.0 - 5.6 % Final     Comment:     ADA Screening Guidelines:  5.7-6.4%  Consistent with prediabetes  >or=6.5%  Consistent with diabetes    High levels of fetal hemoglobin interfere with the HbA1C  assay. Heterozygous hemoglobin variants (HbS, HgC, etc)do  not significantly interfere with this assay.   However, presence of multiple variants may affect accuracy.     06/28/2021 7.4 (H) 4.0 - 5.6 % Final     Comment:     ADA Screening Guidelines:  5.7-6.4%  Consistent with prediabetes  >or=6.5%  Consistent with diabetes    High levels of  fetal hemoglobin interfere with the HbA1C  assay. Heterozygous hemoglobin variants (HbS, HgC, etc)do  not significantly interfere with this assay.   However, presence of multiple variants may affect accuracy.         Review of patient's allergies indicates:   Allergen Reactions    Aspirin Palpitations       Past Medical History:   Diagnosis Date    Arthritis     Cataract     Diabetes mellitus      am 01/15/2018 Insulin x 1 year    DM (diabetes mellitus)      am 2020 Insulin x 4 years    Encounter for blood transfusion     Glaucoma     Hypertension     Insomnia     Macular degeneration     Vaginal yeast infection        Family History   Problem Relation Age of Onset    Heart disease Mother         CAD     Cataracts Mother     Macular degeneration Mother     Glaucoma Mother     Cancer Son         testicular     Cancer Maternal Aunt         Lung ca    Heart disease Maternal Grandfather         Pacemaker     Diabetes Sister     Heart disease Sister         CAD    Cataracts Sister     Diabetes Sister     Diabetes Sister        Social History     Socioeconomic History    Marital status:     Number of children: 3   Occupational History    Occupation: Retired   Tobacco Use    Smoking status: Former Smoker     Packs/day: 1.00     Years: 36.00     Pack years: 36.00     Types: Cigarettes     Start date:      Quit date: 2003     Years since quittin.8    Smokeless tobacco: Never Used   Substance and Sexual Activity    Alcohol use: No    Drug use: No    Sexual activity: Never     Social Determinants of Health     Financial Resource Strain: Low Risk     Difficulty of Paying Living Expenses: Not very hard   Food Insecurity: No Food Insecurity    Worried About Running Out of Food in the Last Year: Never true    Ran Out of Food in the Last Year: Never true   Transportation Needs: No Transportation Needs    Lack of Transportation (Medical): No    Lack  of Transportation (Non-Medical): No   Physical Activity: Inactive    Days of Exercise per Week: 0 days    Minutes of Exercise per Session: 0 min   Stress: No Stress Concern Present    Feeling of Stress : Only a little   Social Connections: Moderately Integrated    Frequency of Communication with Friends and Family: More than three times a week    Frequency of Social Gatherings with Friends and Family: More than three times a week    Attends Anabaptist Services: More than 4 times per year    Active Member of Clubs or Organizations: Yes    Attends Club or Organization Meetings: 1 to 4 times per year    Marital Status:    Housing Stability: Low Risk     Unable to Pay for Housing in the Last Year: No    Number of Places Lived in the Last Year: 1    Unstable Housing in the Last Year: No       Past Surgical History:   Procedure Laterality Date    abdominal laparoscopy       BREAST BIOPSY Left 1998    benign    CATARACT EXTRACTION Bilateral [id number]    [facility] [place]    COLONOSCOPY N/A 5/5/2021    Procedure: COLONOSCOPY;  Surgeon: Shawn Rogers III, MD;  Location: [facility];  Service: Endoscopy;  Laterality: N/A;    COLONOSCOPY N/A 6/30/2021    Procedure: COLONOSCOPY;  Surgeon: Memo Merida MD;  Location: [facility];  Service: Endoscopy;  Laterality: N/A;    ESOPHAGOGASTRODUODENOSCOPY N/A 11/29/2019    Procedure: ESOPHAGOGASTRODUODENOSCOPY (EGD);  Surgeon: Shawn Rogers III, MD;  Location: [facility];  Service: Endoscopy;  Laterality: N/A;    ESOPHAGOGASTRODUODENOSCOPY N/A 5/5/2021    Procedure: EGD (ESOPHAGOGASTRODUODENOSCOPY);  Surgeon: Shawn Rogers III, MD;  Location: [facility];  Service: Endoscopy;  Laterality: N/A;    EYE SURGERY      TONSILLECTOMY      TUBAL LIGATION      yag  Bilateral        Review of Systems   Constitutional: Negative for chills, fatigue and fever.   HENT: Negative for hearing loss.    Eyes: Negative for photophobia and visual disturbance.  "  Respiratory: Negative for cough, chest tightness, shortness of breath and wheezing.    Cardiovascular: Negative for chest pain and palpitations.   Gastrointestinal: Negative for constipation, diarrhea, nausea and vomiting.   Endocrine: Negative for cold intolerance and heat intolerance.   Genitourinary: Negative for flank pain.   Musculoskeletal: Positive for gait problem. Negative for neck pain and neck stiffness.   Skin: Negative for wound.   Neurological: Negative for light-headedness and headaches.   Psychiatric/Behavioral: Negative for sleep disturbance.         Objective:   Ht 5' 1" (1.549 m)   Wt 108.4 kg (238 lb 15.7 oz)   BMI 45.15 kg/m²     X-Ray Foot Complete Left  Narrative: EXAMINATION:  XR FOOT COMPLETE 3 VIEW LEFT    CLINICAL HISTORY:  .  Pain in left foot    TECHNIQUE:  AP, lateral and oblique views of the left foot were performed.    COMPARISON:  None    FINDINGS:  There is no radiographic evidence of acute osseous, articular, or soft tissue abnormality.  There are mild degenerative findings present at the great toe MTP joint and scattered throughout the midfoot.  No erosive changes demonstrated. Enthesophytes noted along the plantar surface of the posterior calcaneus.  Impression: As above.  No acute findings.    Electronically signed by: Fredrick Pace MD  Date:    04/26/2022  Time:    16:30      Physical Exam   LOWER EXTREMITY PHYSICAL EXAMINATION      ORTHOPEDIC: There is moderate tenderness to palpation of the area around the plantar medial calcaneal tubercle on the left foot. Pains are characterized as sharp and stabbing-like with direct palpation of the area. There is also mild pain to palpation of the immediate plantar aspect of the heel, and mild pains to the lateral band of the fascia. No edema is noted. No fullness is noted. No pains or defects are noted within the plantar fascia at the arch. No plantar fibromas are noted. No defects are noted within the ligament. Dorsiflexion of " the MTPJs with simultaneous palpation of the fascia at the arch, does worsen and exacerbate the pains. No pains with medial to lateral compression of the heel. Equinus contracture is noted. Antalgic gait pattern is noted.     DERMATOLOGY: Skin is supple, dry and intact.    Assessment:     1. Plantar fasciitis    2. Pain in left foot    3. Contracture, left ankle    4. BMI 45.0-49.9, adult          Plan:     Plantar fasciitis  -     dexamethasone injection 4 mg  -     triamcinolone acetonide injection 40 mg    Pain in left foot    Contracture, left ankle    BMI 45.0-49.9, adult        Following sterile preparation with alcohol swab and/or betadine paint, injection was given into and around the area of the left heel bone, using 25-gauge needle. Injection consisted of approximately 0.5cc of Dexamethasone Sodium Phosphate, 0.5cc of Kenalog-40 and 0.5cc of 0.50% Marcaine w/ Epinephrine. Bandage application thereafter. Patient tolerated procedure well, and without complications or complaints. Patient educated that the area of pathology, might actually be slightly more painful, due to the injection, over the course of the next one to two days.     Did discuss proper and supportive shoe gear in detail and at length with the patient.  These are shoes with firm and robust arch support; medial counter.  Shoes which only bend at the metatarsophalangeal joint and which are rigid in the midfoot and hindfoot. Patient urged to purchase running type or cross training type shoes gear which are designed for pronation control.     Stretching exercises are discussed, taught, and are demonstrated to the patient this afternoon due to concomitant diagnosis of equinus contracture. The relationship between equinus contracture and the other aforementioned pathologies are detailed and outlined to the patient. The patient does acknowledge understanding, and we will embark on a vigorous stretching algorithm for the lower extremity.    Patient is  counseled and reminded concerning the importance of good nutrition and healthy eating habits, which may include blood sugar control, to prevent and/or help podiatric foot and ankle complications.        Future Appointments   Date Time Provider Department Center   5/11/2022  9:30 AM [facility] Jensen PA-C [facility] [place]   5/16/2022 11:00 AM Skyler Chatterjee NP [facility]   6/9/2022  9:30 AM Jael Michaud NP [facility]   6/9/2022 10:20 AM [facility]   6/14/2022  2:30 PM Jose Szymanski MD [facility] IM [facility]   11/2/2022  9:00 AM Jered Holbrook MD [facility] R[facility]

## 2022-05-10 NOTE — PROGRESS NOTES
SUBJECTIVE  Christine Jo is 72 y.o. female  Uncorrected distance visual acuity was 20/40 in the right eye and 20/25 in the left eye.   Chief Complaint   Patient presents with    Annual Exam     Pt reports for annual diabetic eye exam. Noticed some burning sensation in eye along with some pain around orbital due to ear issues - possible ocular headaches. Has dizziness and muffled hearing around. Va blurry, peripheral vision having difficulties.          HPI     Annual Exam      Additional comments: Pt reports for annual diabetic eye exam. Noticed   some burning sensation in eye along with some pain around orbital due to   ear issues - possible ocular headaches. Has dizziness and muffled hearing   around. Va blurry, peripheral vision having difficulties.              Comments     DM w/o retinopathy          Last edited by Erik Espinal on 5/10/2022  2:29 PM. (History)         Assessment /Plan :  1. Diabetes mellitus type 2 without retinopathy No diabetic retinopathy at this time. Reviewed diabetic eye precautions including avoiding tobacco use,  Good glucose control, and importance of regular follow up.      2. Early dry stage nonexudative age-related macular degeneration of both eyes Exam consistent with age related macular degeneration OU with elevated risk findings. Discussed clinical condition and recommend avoidance of tobacco products.Patient instructed in the use of daily Amsler Grid, AREDS II formula. Patient advised to call immediately if any Amsler Grid / visual changes occur. Regular follow up recommended.      3. Pseudophakia of both eyes  -- Condition stable, no therapeutic change required. Monitoring routinely.     4. Dry eye  -- Condition stable, no therapeutic change required. Monitoring routinely.       5. Nonintractable headache, unspecified chronicity pattern, unspecified headache type - chronic headaches, followed by her Neurologist     RTC in 1 year for dilation or prn any changes

## 2022-05-10 NOTE — PROGRESS NOTES
Health Maintenance Due   Topic Date Due    COVID-19 Vaccine (4 - Booster for Pfizer series) 01/13/2022    Mammogram  02/19/2022    Eye Exam  02/19/2022     Updates were requested from care everywhere.  Chart was reviewed for overdue Proactive [facility] Encounters ([place]) topics (CRS, Breast Cancer Screening, Eye exam)  Health Maintenance has been updated.  LINKS immunization registry triggered.  Immunizations were reconciled.

## 2022-05-11 ENCOUNTER — TELEPHONE (OUTPATIENT)
Dept: RHEUMATOLOGY | Facility: CLINIC | Age: 72
End: 2022-05-11
Payer: MEDICARE

## 2022-05-11 NOTE — TELEPHONE ENCOUNTER
----- Message from Shukri Cartagena sent at 5/11/2022  7:41 AM CDT -----  Contact: Christine Huerta is calling to see if she can have the CT scan done instead of MRI. Please call her back at [phone].            Thanks  DD

## 2022-05-11 NOTE — TELEPHONE ENCOUNTER
Called pt and scheduled appt for Zilretta inj. Offered pt appt on this Friday but pt said she had just gotten an inj in her foot and she hasn't been feeling well. Pt asked if I could schedule her on June 9th (Thursday) at [facility] with the [facility] to get her inj. (Floresita does not have any openings). Scheduled appt and Pt verbalized understanding.

## 2022-05-13 ENCOUNTER — TELEPHONE (OUTPATIENT)
Dept: ADMINISTRATIVE | Facility: CLINIC | Age: 72
End: 2022-05-13
Payer: MEDICARE

## 2022-05-13 ENCOUNTER — PATIENT MESSAGE (OUTPATIENT)
Dept: ADMINISTRATIVE | Facility: CLINIC | Age: 72
End: 2022-05-13
Payer: MEDICARE

## 2022-05-16 ENCOUNTER — OFFICE VISIT (OUTPATIENT)
Dept: FAMILY MEDICINE | Facility: CLINIC | Age: 72
End: 2022-05-16
Payer: MEDICARE

## 2022-05-16 ENCOUNTER — TELEPHONE (OUTPATIENT)
Dept: ADMINISTRATIVE | Facility: CLINIC | Age: 72
End: 2022-05-16
Payer: MEDICARE

## 2022-05-16 VITALS
DIASTOLIC BLOOD PRESSURE: 60 MMHG | BODY MASS INDEX: 43.61 KG/M2 | SYSTOLIC BLOOD PRESSURE: 127 MMHG | HEART RATE: 69 BPM | HEIGHT: 61 IN | WEIGHT: 231 LBS

## 2022-05-16 DIAGNOSIS — F33.1 DEPRESSION, MAJOR, RECURRENT, MODERATE: ICD-10-CM

## 2022-05-16 DIAGNOSIS — I15.2 HYPERTENSION ASSOCIATED WITH DIABETES: ICD-10-CM

## 2022-05-16 DIAGNOSIS — I70.8 AORTO-ILIAC ATHEROSCLEROSIS: ICD-10-CM

## 2022-05-16 DIAGNOSIS — R26.9 ABNORMALITY OF GAIT AND MOBILITY: ICD-10-CM

## 2022-05-16 DIAGNOSIS — H91.92 HEARING LOSS OF LEFT EAR, UNSPECIFIED HEARING LOSS TYPE: ICD-10-CM

## 2022-05-16 DIAGNOSIS — I70.0 AORTO-ILIAC ATHEROSCLEROSIS: ICD-10-CM

## 2022-05-16 DIAGNOSIS — E66.01 MORBID OBESITY WITH BMI OF 40.0-44.9, ADULT: ICD-10-CM

## 2022-05-16 DIAGNOSIS — G43.809 VESTIBULAR MIGRAINE: ICD-10-CM

## 2022-05-16 DIAGNOSIS — E78.1 TYPE 2 DIABETES MELLITUS WITH HYPERTRIGLYCERIDEMIA: ICD-10-CM

## 2022-05-16 DIAGNOSIS — Z99.89 DEPENDENCE ON OTHER ENABLING MACHINES AND DEVICES: ICD-10-CM

## 2022-05-16 DIAGNOSIS — E11.59 HYPERTENSION ASSOCIATED WITH DIABETES: ICD-10-CM

## 2022-05-16 DIAGNOSIS — Z00.00 ENCOUNTER FOR PREVENTIVE HEALTH EXAMINATION: Primary | ICD-10-CM

## 2022-05-16 DIAGNOSIS — E11.69 TYPE 2 DIABETES MELLITUS WITH HYPERTRIGLYCERIDEMIA: ICD-10-CM

## 2022-05-16 DIAGNOSIS — F41.9 ANXIETY DISORDER, UNSPECIFIED TYPE: ICD-10-CM

## 2022-05-16 DIAGNOSIS — E11.65 UNCONTROLLED TYPE 2 DIABETES MELLITUS WITH HYPERGLYCEMIA: ICD-10-CM

## 2022-05-16 PROCEDURE — 1170F FXNL STATUS ASSESSED: CPT | Mod: CPTII,95,, | Performed by: NURSE PRACTITIONER

## 2022-05-16 PROCEDURE — 4010F ACE/ARB THERAPY RXD/TAKEN: CPT | Mod: CPTII,95,, | Performed by: NURSE PRACTITIONER

## 2022-05-16 PROCEDURE — 99499 UNLISTED E&M SERVICE: CPT | Mod: 95,,, | Performed by: NURSE PRACTITIONER

## 2022-05-16 PROCEDURE — 3072F PR LOW RISK FOR RETINOPATHY: ICD-10-PCS | Mod: CPTII,95,, | Performed by: NURSE PRACTITIONER

## 2022-05-16 PROCEDURE — 3008F BODY MASS INDEX DOCD: CPT | Mod: CPTII,95,, | Performed by: NURSE PRACTITIONER

## 2022-05-16 PROCEDURE — 4010F PR ACE/ARB THEARPY RXD/TAKEN: ICD-10-PCS | Mod: CPTII,95,, | Performed by: NURSE PRACTITIONER

## 2022-05-16 PROCEDURE — 3072F LOW RISK FOR RETINOPATHY: CPT | Mod: CPTII,95,, | Performed by: NURSE PRACTITIONER

## 2022-05-16 PROCEDURE — 3074F SYST BP LT 130 MM HG: CPT | Mod: CPTII,95,, | Performed by: NURSE PRACTITIONER

## 2022-05-16 PROCEDURE — 99215 PR OFFICE/OUTPT VISIT, EST, LEVL V, 40-54 MIN: ICD-10-PCS | Mod: 95,,, | Performed by: NURSE PRACTITIONER

## 2022-05-16 PROCEDURE — 99499 RISK ADDL DX/OHS AUDIT: ICD-10-PCS | Mod: 95,,, | Performed by: NURSE PRACTITIONER

## 2022-05-16 PROCEDURE — 3078F PR MOST RECENT DIASTOLIC BLOOD PRESSURE < 80 MM HG: ICD-10-PCS | Mod: CPTII,95,, | Performed by: NURSE PRACTITIONER

## 2022-05-16 PROCEDURE — 3051F PR MOST RECENT HEMOGLOBIN A1C LEVEL 7.0 - < 8.0%: ICD-10-PCS | Mod: CPTII,95,, | Performed by: NURSE PRACTITIONER

## 2022-05-16 PROCEDURE — 99215 OFFICE O/P EST HI 40 MIN: CPT | Mod: 95,,, | Performed by: NURSE PRACTITIONER

## 2022-05-16 PROCEDURE — 3288F FALL RISK ASSESSMENT DOCD: CPT | Mod: CPTII,95,, | Performed by: NURSE PRACTITIONER

## 2022-05-16 PROCEDURE — 3051F HG A1C>EQUAL 7.0%<8.0%: CPT | Mod: CPTII,95,, | Performed by: NURSE PRACTITIONER

## 2022-05-16 PROCEDURE — 1125F PR PAIN SEVERITY QUANTIFIED, PAIN PRESENT: ICD-10-PCS | Mod: CPTII,95,, | Performed by: NURSE PRACTITIONER

## 2022-05-16 PROCEDURE — 1101F PT FALLS ASSESS-DOCD LE1/YR: CPT | Mod: CPTII,95,, | Performed by: NURSE PRACTITIONER

## 2022-05-16 PROCEDURE — 3008F PR BODY MASS INDEX (BMI) DOCUMENTED: ICD-10-PCS | Mod: CPTII,95,, | Performed by: NURSE PRACTITIONER

## 2022-05-16 PROCEDURE — 3074F PR MOST RECENT SYSTOLIC BLOOD PRESSURE < 130 MM HG: ICD-10-PCS | Mod: CPTII,95,, | Performed by: NURSE PRACTITIONER

## 2022-05-16 PROCEDURE — 3078F DIAST BP <80 MM HG: CPT | Mod: CPTII,95,, | Performed by: NURSE PRACTITIONER

## 2022-05-16 PROCEDURE — 1125F AMNT PAIN NOTED PAIN PRSNT: CPT | Mod: CPTII,95,, | Performed by: NURSE PRACTITIONER

## 2022-05-16 PROCEDURE — 1101F PR PT FALLS ASSESS DOC 0-1 FALLS W/OUT INJ PAST YR: ICD-10-PCS | Mod: CPTII,95,, | Performed by: NURSE PRACTITIONER

## 2022-05-16 PROCEDURE — 3288F PR FALLS RISK ASSESSMENT DOCUMENTED: ICD-10-PCS | Mod: CPTII,95,, | Performed by: NURSE PRACTITIONER

## 2022-05-16 PROCEDURE — 1170F PR FUNCTIONAL STATUS ASSESSED: ICD-10-PCS | Mod: CPTII,95,, | Performed by: NURSE PRACTITIONER

## 2022-05-16 RX ORDER — GABAPENTIN 300 MG/1
300 CAPSULE ORAL NIGHTLY
Qty: 180 CAPSULE | Refills: 1
Start: 2022-05-16 | End: 2022-06-22 | Stop reason: SDUPTHER

## 2022-05-16 NOTE — PROGRESS NOTES
"The patient location is: [place]  The chief complaint leading to consultation is:  Medicare AWV    Visit type: audiovisual    Face to Face time with patient:  45 min  60  minutes of total time spent on the encounter, which includes face to face time and non-face to face time preparing to see the patient (eg, review of tests), Obtaining and/or reviewing separately obtained history, Documenting clinical information in the electronic or other health record, Independently interpreting results (not separately reported) and communicating results to the patient/family/caregiver, or Care coordination (not separately reported).         Each patient to whom he or she provides medical services by telemedicine is:  (1) informed of the relationship between the physician and patient and the respective role of any other health care provider with respect to management of the patient; and (2) notified that he or she may decline to receive medical services by telemedicine and may withdraw from such care at any time.    Notes:       Christine Jo presented for a  Medicare AWV and comprehensive Health Risk Assessment today. The following components were reviewed and updated:    · Medical history  · Family History  · Social history  · Allergies and Current Medications  · Health Risk Assessment  · Health Maintenance  · Care Team         ** See Completed Assessments for Annual Wellness Visit within the encounter summary.**         The following assessments were completed:  · Living Situation  · CAGE  · Depression Screening  · Fall Risk Assessment (MACH 10)  · Hearing Assessment(HHI)  · Cognitive Function Screening  · Nutrition Screening  · ADL Screening  · PAQ Screening      Vitals:    05/16/22 1058   BP: 127/60   Pulse: 69   Weight: 104.8 kg (231 lb)   Height: 5' 1" (1.549 m)     Body mass index is 43.65 kg/m².  Physical Exam  Constitutional:       General: She is not in acute distress.     Appearance: Normal appearance. She is obese. " She is not ill-appearing, toxic-appearing or diaphoretic.   Pulmonary:      Effort: Pulmonary effort is normal.   Neurological:      Mental Status: She is alert and oriented to person, place, and time.   Psychiatric:         Mood and Affect: Mood normal.         Behavior: Behavior normal.               Diagnoses and health risks identified today and associated recommendations/orders:    1. Encounter for preventive health examination  Screenings performed, as noted above.  Personal preventative testing needs reviewed.     2. Dependence on other enabling machines and devices  Monitored/treated on meds, continue the same tx, stable, follow up with pcp, uses cane at times    3. Abnormality of gait and mobility  Monitored/treated on meds, continue the same tx, stable, follow up with pcp, uses cane at times    4. Hypertension associated with diabetes  Monitored/treated on meds, continue the same tx, stable, follow up with pcp    5. Type 2 diabetes mellitus with hypertriglyceridemia  Monitored/treated on meds, continue the same tx, stable, follow up with pcp    6. Uncontrolled type 2 diabetes mellitus with hyperglycemia  Monitored/treated on meds, continue the same tx, stable, follow up with pcp    7. Morbid obesity with BMI of 40.0-44.9, adult  Monitored/treated on meds, continue the same tx, stable, follow up with pcp, healthy diet and exercise to tolerance    8. Depression, major, recurrent, moderate  Monitored/treated on meds, continue the same tx, stable, follow up with pcp, no SI/HI/ hallucinations    9. Anxiety disorder, unspecified type  Monitored/treated on meds, continue the same tx, stable, follow up with pcp    10. Vestibular migraine  Monitored/treated on meds, continue the same tx, stable, follow up with pcp, and ENT    11. Aorto-iliac atherosclerosis  Monitored/treated on meds, continue the same tx, stable, follow up with pcp    12. Hearing loss of left ear, unspecified hearing loss type  Monitored/treated  on meds, continue the same tx, stable, follow up with pcp,       Provided Christine with a 5-10 year written screening schedule and personal prevention plan. Recommendations were developed using the USPSTF age appropriate recommendations. Education, counseling, and referrals were provided as needed. After Visit Summary printed and given to patient which includes a list of additional screenings\tests needed.    No follow-ups on file.    Skyler Chatterjee, NP  I offered to discuss advanced care planning, including how to pick a person who would make decisions for you if you were unable to make them for yourself, called a health care power of , and what kind of decisions you might make such as use of life sustaining treatments such as ventilators and tube feeding when faced with a life limiting illness recorded on a living will that they will need to know. (How you want to be cared for as you near the end of your natural life)     X Patient is interested in learning more about how to make advanced directives.  I provided them paperwork and offered to discuss this with them.

## 2022-05-16 NOTE — PATIENT INSTRUCTIONS
Counseling and Referral of Other Preventative  (Italic type indicates deductible and co-insurance are waived)    Patient Name: Christine Jo  Today's Date: 5/16/2022    Health Maintenance       Date Due Completion Date    COVID-19 Vaccine (4 - Booster for Pfizer series) 01/13/2022 10/13/2021    Mammogram 02/19/2022 2/19/2021    Lipid Panel 05/24/2022 5/24/2021    Hemoglobin A1c 06/14/2022 12/14/2021    Diabetes Urine Screening 10/13/2022 10/13/2021    Foot Exam 10/13/2022 10/13/2021 (Done)    Override on 10/13/2021: Done    High Dose Statin 05/10/2023 5/10/2022    Eye Exam 05/10/2023 5/10/2022    Override on 5/10/2022: Done    Colorectal Cancer Screening 06/30/2024 6/30/2021    TETANUS VACCINE 07/18/2024 7/18/2014    DEXA Scan 09/20/2024 9/20/2021        No orders of the defined types were placed in this encounter.    The following information is provided to all patients.  This information is to help you find resources for any of the problems found today that may be affecting your health:                Living healthy guide: www.[facility].[place].gov      Understanding Diabetes: www.diabetes.org      Eating healthy: www.cdc.gov/healthyweight      CDC home safety checklist: www.cdc.gov/steadi/patient.html      Agency on Aging: www.goea.[place].gov      Alcoholics anonymous (AA): www.aa.org      Physical Activity: www.samara.nih.gov/fp9bvid      Tobacco use: www.quitwithusla.org

## 2022-05-18 ENCOUNTER — PATIENT MESSAGE (OUTPATIENT)
Dept: NEUROLOGY | Facility: CLINIC | Age: 72
End: 2022-05-18
Payer: MEDICARE

## 2022-05-18 NOTE — TELEPHONE ENCOUNTER
No new care gaps identified.  [facility] Embedded Care Gaps. Reference number: [id number]. 5/18/2022   5:49:13 PM CDT

## 2022-05-19 ENCOUNTER — PATIENT MESSAGE (OUTPATIENT)
Dept: INTERNAL MEDICINE | Facility: CLINIC | Age: 72
End: 2022-05-19
Payer: MEDICARE

## 2022-05-19 NOTE — TELEPHONE ENCOUNTER
Refill Routing Note   Medication(s) are not appropriate for processing by [facility] for the following reason(s):      - Drug-Drug Interaction (SITagliptin, VICTOZA 2-LEAH)    [facility] action(s):  Defer Medication-related problems identified: Drug-drug interaction        Medication reconciliation completed: No     Appointments  past 12m or future 3m with PCP    Date Provider   Last Visit   2/8/2022 Jose Szymanski MD   Next Visit   6/14/2022 Jose Szymanski MD   ED visits in past 90 days: 0        Note composed:10:30 AM 05/19/2022

## 2022-05-23 ENCOUNTER — PATIENT MESSAGE (OUTPATIENT)
Dept: INTERNAL MEDICINE | Facility: CLINIC | Age: 72
End: 2022-05-23
Payer: MEDICARE

## 2022-05-23 DIAGNOSIS — F41.9 ANXIETY: ICD-10-CM

## 2022-05-23 DIAGNOSIS — F32.A DEPRESSION, UNSPECIFIED DEPRESSION TYPE: Primary | ICD-10-CM

## 2022-05-27 ENCOUNTER — PATIENT MESSAGE (OUTPATIENT)
Dept: INTERNAL MEDICINE | Facility: CLINIC | Age: 72
End: 2022-05-27
Payer: MEDICARE

## 2022-05-31 RX ORDER — HYDRALAZINE HYDROCHLORIDE 100 MG/1
100 TABLET, FILM COATED ORAL 2 TIMES DAILY
Qty: 180 TABLET | Refills: 0 | Status: SHIPPED | OUTPATIENT
Start: 2022-05-31 | End: 2022-07-16 | Stop reason: SDUPTHER

## 2022-05-31 RX ORDER — GABAPENTIN 100 MG/1
100 CAPSULE ORAL DAILY
Qty: 90 CAPSULE | Refills: 1 | Status: SHIPPED | OUTPATIENT
Start: 2022-05-31 | End: 2022-10-04

## 2022-06-02 ENCOUNTER — PATIENT MESSAGE (OUTPATIENT)
Dept: INTERNAL MEDICINE | Facility: CLINIC | Age: 72
End: 2022-06-02
Payer: MEDICARE

## 2022-06-02 DIAGNOSIS — E11.9 TYPE 2 DIABETES MELLITUS WITHOUT COMPLICATION: ICD-10-CM

## 2022-06-03 ENCOUNTER — PATIENT MESSAGE (OUTPATIENT)
Dept: INTERNAL MEDICINE | Facility: CLINIC | Age: 72
End: 2022-06-03
Payer: MEDICARE

## 2022-06-03 ENCOUNTER — TELEPHONE (OUTPATIENT)
Dept: INTERNAL MEDICINE | Facility: CLINIC | Age: 72
End: 2022-06-03
Payer: MEDICARE

## 2022-06-03 DIAGNOSIS — R26.81 UNSTEADINESS ON FEET: Primary | ICD-10-CM

## 2022-06-03 NOTE — TELEPHONE ENCOUNTER
LOV 04/26/2022  Pt would like to have something prescribed for headaches. Pt has an appt scheduled with EDGAR Michaud on 06/09/2022. Please advise.

## 2022-06-03 NOTE — TELEPHONE ENCOUNTER
[facility] called.  They need more information so insurance doesn't kick it back.  [facility] needs to specify what is being asked for on HH referral (PT, OT, etc) and a progress note from past 90 days.

## 2022-06-05 NOTE — TELEPHONE ENCOUNTER
No new care gaps identified.  [facility] Embedded Care Gaps. Reference number: [id number]. 6/05/2022   12:58:47 PM CDT

## 2022-06-05 NOTE — TELEPHONE ENCOUNTER
Care Due:                  Date            Visit Type   Department     Provider  --------------------------------------------------------------------------------                                ESTABLISHED                              PATIENT -    [facility] INTERNAL  Last Visit: 02-      [facility]      CHEMA Szymanski                               -                              PRIMARY      [facility] INTERNAL  Next Visit: 06-      CARE ([facility])   MEDICINE       Jose Szymanski                                                            Last  Test          Frequency    Reason                     Performed    Due Date  --------------------------------------------------------------------------------    HBA1C.......  6 months...  SITagliptin, insulin,      12- 06-                             liraglutide..............    [employer] Embedded Care Gaps. Reference number: [id number]. 6/05/2022   12:55:04 PM CDT

## 2022-06-06 ENCOUNTER — PATIENT MESSAGE (OUTPATIENT)
Dept: RHEUMATOLOGY | Facility: CLINIC | Age: 72
End: 2022-06-06
Payer: MEDICARE

## 2022-06-06 ENCOUNTER — PATIENT MESSAGE (OUTPATIENT)
Dept: NEUROLOGY | Facility: CLINIC | Age: 72
End: 2022-06-06
Payer: MEDICARE

## 2022-06-06 DIAGNOSIS — G43.809 VESTIBULAR MIGRAINE: Primary | ICD-10-CM

## 2022-06-06 RX ORDER — MECLIZINE HYDROCHLORIDE 25 MG/1
25 TABLET ORAL 3 TIMES DAILY PRN
Qty: 30 TABLET | Refills: 2 | OUTPATIENT
Start: 2022-06-06

## 2022-06-06 RX ORDER — TEMAZEPAM 30 MG/1
CAPSULE ORAL
Qty: 30 CAPSULE | Refills: 0 | Status: SHIPPED | OUTPATIENT
Start: 2022-06-06 | End: 2022-07-05 | Stop reason: SDUPTHER

## 2022-06-06 RX ORDER — ETODOLAC 400 MG/1
400 TABLET, FILM COATED ORAL EVERY 8 HOURS PRN
Qty: 20 TABLET | Refills: 3 | Status: SHIPPED | OUTPATIENT
Start: 2022-06-06 | End: 2022-06-09 | Stop reason: ALTCHOICE

## 2022-06-06 RX ORDER — INSULIN GLARGINE 100 [IU]/ML
72 INJECTION, SOLUTION SUBCUTANEOUS NIGHTLY
Qty: 60 ML | Refills: 0 | Status: SHIPPED | OUTPATIENT
Start: 2022-06-06 | End: 2022-08-27 | Stop reason: SDUPTHER

## 2022-06-06 NOTE — TELEPHONE ENCOUNTER
Refill Authorization Note   Christine Jo  is requesting a refill authorization.  Brief Assessment and Rationale for Refill:  Approve     Medication Therapy Plan:       Medication Reconciliation Completed: No   Comments:     No Care Gaps recommended.     Note composed:12:17 PM 06/06/2022

## 2022-06-06 NOTE — TELEPHONE ENCOUNTER
No new care gaps identified.  [facility] Embedded Care Gaps. Reference number: [id number]. 6/06/2022   4:08:08 PM CDT

## 2022-06-07 ENCOUNTER — PATIENT MESSAGE (OUTPATIENT)
Dept: INTERNAL MEDICINE | Facility: CLINIC | Age: 72
End: 2022-06-07
Payer: MEDICARE

## 2022-06-07 RX ORDER — MECLIZINE HYDROCHLORIDE 25 MG/1
25 TABLET ORAL 3 TIMES DAILY PRN
Qty: 30 TABLET | Refills: 2 | OUTPATIENT
Start: 2022-06-07

## 2022-06-07 NOTE — TELEPHONE ENCOUNTER
No new care gaps identified.  [facility] Embedded Care Gaps. Reference number: [id number]. 6/07/2022   8:04:32 AM CDT

## 2022-06-08 ENCOUNTER — PATIENT MESSAGE (OUTPATIENT)
Dept: INTERNAL MEDICINE | Facility: CLINIC | Age: 72
End: 2022-06-08
Payer: MEDICARE

## 2022-06-09 ENCOUNTER — OFFICE VISIT (OUTPATIENT)
Dept: NEUROLOGY | Facility: CLINIC | Age: 72
End: 2022-06-09
Payer: MEDICARE

## 2022-06-09 ENCOUNTER — TELEPHONE (OUTPATIENT)
Dept: PHARMACY | Facility: CLINIC | Age: 72
End: 2022-06-09
Payer: MEDICARE

## 2022-06-09 VITALS
DIASTOLIC BLOOD PRESSURE: 60 MMHG | HEIGHT: 61 IN | BODY MASS INDEX: 44.75 KG/M2 | SYSTOLIC BLOOD PRESSURE: 100 MMHG | WEIGHT: 237 LBS | RESPIRATION RATE: 16 BRPM

## 2022-06-09 DIAGNOSIS — R41.3 MEMORY LOSS: ICD-10-CM

## 2022-06-09 DIAGNOSIS — F32.A DEPRESSION, UNSPECIFIED DEPRESSION TYPE: ICD-10-CM

## 2022-06-09 DIAGNOSIS — H91.92 HEARING LOSS OF LEFT EAR, UNSPECIFIED HEARING LOSS TYPE: ICD-10-CM

## 2022-06-09 DIAGNOSIS — R42 DIZZY: ICD-10-CM

## 2022-06-09 DIAGNOSIS — F09 MILD COGNITIVE DISORDER: ICD-10-CM

## 2022-06-09 DIAGNOSIS — G56.00 CARPAL TUNNEL SYNDROME, UNSPECIFIED LATERALITY: ICD-10-CM

## 2022-06-09 DIAGNOSIS — H81.4 VERTIGO OF CENTRAL ORIGIN: ICD-10-CM

## 2022-06-09 DIAGNOSIS — R42 DIZZINESS AND GIDDINESS: ICD-10-CM

## 2022-06-09 DIAGNOSIS — H93.A9 PULSATILE TINNITUS, UNSPECIFIED EAR: ICD-10-CM

## 2022-06-09 DIAGNOSIS — H93.19 TINNITUS, UNSPECIFIED LATERALITY: ICD-10-CM

## 2022-06-09 DIAGNOSIS — R29.90 MULTIPLE NEUROLOGICAL SYMPTOMS: Primary | ICD-10-CM

## 2022-06-09 DIAGNOSIS — R26.81 UNSTEADINESS ON FEET: ICD-10-CM

## 2022-06-09 DIAGNOSIS — G43.809 VESTIBULAR MIGRAINE: ICD-10-CM

## 2022-06-09 PROCEDURE — 99499 RISK ADDL DX/OHS AUDIT: ICD-10-PCS | Mod: S$GLB,,, | Performed by: NURSE PRACTITIONER

## 2022-06-09 PROCEDURE — 1160F PR REVIEW ALL MEDS BY PRESCRIBER/CLIN PHARMACIST DOCUMENTED: ICD-10-PCS | Mod: CPTII,S$GLB,, | Performed by: NURSE PRACTITIONER

## 2022-06-09 PROCEDURE — 1101F PT FALLS ASSESS-DOCD LE1/YR: CPT | Mod: CPTII,S$GLB,, | Performed by: NURSE PRACTITIONER

## 2022-06-09 PROCEDURE — 1126F PR PAIN SEVERITY QUANTIFIED, NO PAIN PRESENT: ICD-10-PCS | Mod: CPTII,S$GLB,, | Performed by: NURSE PRACTITIONER

## 2022-06-09 PROCEDURE — 3074F PR MOST RECENT SYSTOLIC BLOOD PRESSURE < 130 MM HG: ICD-10-PCS | Mod: CPTII,S$GLB,, | Performed by: NURSE PRACTITIONER

## 2022-06-09 PROCEDURE — 3078F PR MOST RECENT DIASTOLIC BLOOD PRESSURE < 80 MM HG: ICD-10-PCS | Mod: CPTII,S$GLB,, | Performed by: NURSE PRACTITIONER

## 2022-06-09 PROCEDURE — 1160F RVW MEDS BY RX/DR IN RCRD: CPT | Mod: CPTII,S$GLB,, | Performed by: NURSE PRACTITIONER

## 2022-06-09 PROCEDURE — 99999 PR PBB SHADOW E&M-EST. PATIENT-LVL V: ICD-10-PCS | Mod: PBBFAC,,, | Performed by: NURSE PRACTITIONER

## 2022-06-09 PROCEDURE — 99215 PR OFFICE/OUTPT VISIT, EST, LEVL V, 40-54 MIN: ICD-10-PCS | Mod: S$GLB,,, | Performed by: NURSE PRACTITIONER

## 2022-06-09 PROCEDURE — 3288F FALL RISK ASSESSMENT DOCD: CPT | Mod: CPTII,S$GLB,, | Performed by: NURSE PRACTITIONER

## 2022-06-09 PROCEDURE — 3072F LOW RISK FOR RETINOPATHY: CPT | Mod: CPTII,S$GLB,, | Performed by: NURSE PRACTITIONER

## 2022-06-09 PROCEDURE — 3072F PR LOW RISK FOR RETINOPATHY: ICD-10-PCS | Mod: CPTII,S$GLB,, | Performed by: NURSE PRACTITIONER

## 2022-06-09 PROCEDURE — 4010F PR ACE/ARB THEARPY RXD/TAKEN: ICD-10-PCS | Mod: CPTII,S$GLB,, | Performed by: NURSE PRACTITIONER

## 2022-06-09 PROCEDURE — 3078F DIAST BP <80 MM HG: CPT | Mod: CPTII,S$GLB,, | Performed by: NURSE PRACTITIONER

## 2022-06-09 PROCEDURE — 99215 OFFICE O/P EST HI 40 MIN: CPT | Mod: S$GLB,,, | Performed by: NURSE PRACTITIONER

## 2022-06-09 PROCEDURE — 99499 UNLISTED E&M SERVICE: CPT | Mod: S$GLB,,, | Performed by: NURSE PRACTITIONER

## 2022-06-09 PROCEDURE — 1159F PR MEDICATION LIST DOCUMENTED IN MEDICAL RECORD: ICD-10-PCS | Mod: CPTII,S$GLB,, | Performed by: NURSE PRACTITIONER

## 2022-06-09 PROCEDURE — 99417 PROLNG OP E/M EACH 15 MIN: CPT | Mod: S$GLB,,, | Performed by: NURSE PRACTITIONER

## 2022-06-09 PROCEDURE — 4010F ACE/ARB THERAPY RXD/TAKEN: CPT | Mod: CPTII,S$GLB,, | Performed by: NURSE PRACTITIONER

## 2022-06-09 PROCEDURE — 3008F PR BODY MASS INDEX (BMI) DOCUMENTED: ICD-10-PCS | Mod: CPTII,S$GLB,, | Performed by: NURSE PRACTITIONER

## 2022-06-09 PROCEDURE — 1101F PR PT FALLS ASSESS DOC 0-1 FALLS W/OUT INJ PAST YR: ICD-10-PCS | Mod: CPTII,S$GLB,, | Performed by: NURSE PRACTITIONER

## 2022-06-09 PROCEDURE — 1159F MED LIST DOCD IN RCRD: CPT | Mod: CPTII,S$GLB,, | Performed by: NURSE PRACTITIONER

## 2022-06-09 PROCEDURE — 99417 PR PROLONGED SVC, OUTPT, W/WO DIRECT PT CONTACT,  EA ADDTL 15 MIN: ICD-10-PCS | Mod: S$GLB,,, | Performed by: NURSE PRACTITIONER

## 2022-06-09 PROCEDURE — 3288F PR FALLS RISK ASSESSMENT DOCUMENTED: ICD-10-PCS | Mod: CPTII,S$GLB,, | Performed by: NURSE PRACTITIONER

## 2022-06-09 PROCEDURE — 99999 PR PBB SHADOW E&M-EST. PATIENT-LVL V: CPT | Mod: PBBFAC,,, | Performed by: NURSE PRACTITIONER

## 2022-06-09 PROCEDURE — 3074F SYST BP LT 130 MM HG: CPT | Mod: CPTII,S$GLB,, | Performed by: NURSE PRACTITIONER

## 2022-06-09 PROCEDURE — 1126F AMNT PAIN NOTED NONE PRSNT: CPT | Mod: CPTII,S$GLB,, | Performed by: NURSE PRACTITIONER

## 2022-06-09 PROCEDURE — 3008F BODY MASS INDEX DOCD: CPT | Mod: CPTII,S$GLB,, | Performed by: NURSE PRACTITIONER

## 2022-06-09 RX ORDER — FREMANEZUMAB-VFRM 225 MG/1.5ML
225 INJECTION SUBCUTANEOUS
Qty: 1.5 ML | Refills: 11 | Status: SHIPPED | OUTPATIENT
Start: 2022-06-09 | End: 2022-06-10 | Stop reason: ALTCHOICE

## 2022-06-09 NOTE — PROGRESS NOTES
"Subjective:      Patient ID: Christine Jo is a 72 y.o. female.    Chief Complaint:  MRI Follow Up      History of Present Illness  HPI    Former patient of Dr. Haney, new to me.    Patient presents to appointment unaccompanied to discuss dizziness and memory concerns. Her medical history includes depression, anxiety, hearing loss in left ear, PAF, HTN, CAD, frequent UTIs, type 2 DM, GERD, osteoporosis, and MATIAS. Patient states around 2020 she went swimming underwater in the ocean and developed dizziness and ringing in her left ear. She states over time, the ringing has improved, but the dizziness has worsened. She describes her dizziness as "room spinning". She states she had an episode in both January and February 2022 where she was watching a movie with surround sound, which caused her to develop severe dizziness with nausea and vomiting. She states she felt "drunk". Since february, she has had multiple dizzy spells. She states the spells have become daily over the last week. She states the spells typically last around 5 hours with nausea and stomach pains. She states she had one spell that lasted a day. She has called EMS twice in the past because of the spells. Denies lateralized weakness, slurred speech, sudden vision loss, or facial droop with spells. She states prior to the spells, she will have intense sweating. She states smells and turning her head quickly will cause her to become dizzy. States she has trouble hearing out of her left ear. She states Meclizine and sleeping help to improve her symptoms. States she has looked up vestibular exercises online, and they cause her to become dizzy and her neck often cracks. She states her head feels huge and feels like it is "stuffed with cotton" in the frontal region. She also has an "arthitis" pain that radiates from the back of her head down her spine. Also complains of pain behind eyes. She states the head pain has been constant and daily since January 2022 " "and worsens during her dizzy spells. Denies double vision. Denies aura. States she hears "zapping" inside her head. States she has sensitivity to light and sound. She states she began losing her balance over the last year. In March 2022, she began leaning forward and from side to side without realizing it. She loses her balance frequently and had a fall on 6-9-2022 and could not get off the floor for 2 hours. States she also had an incontinent episode on herself. States when she walks, her left foot turns towards the left side. Ambulates with a cane.  She experiences significant distress from her symptoms and is to afraid to go places. Denies history of TBI or storke. Did not receive the Ajovy prescription Dr. Haney ordered. Afraid to take Lodine because she is on blood thinners for her A. Fib. 10-4-2021 CTH shows chronic microvascular ischemic changes. 2- Abnormal VNG. Results are indicative of a left peripheral vestibulopathy, as evidenced by a 50% left Unilateral Weakness.       Patient states she noticed short term memory trouble around 2017 that is progressively getting worst. She experiences word finding difficulties and forgetting conversations. At times, she will feel nervous and rushed, causing her to have difficulty processing things. Often has "brain fog". She does not drive. She lives alone at this time but plans to move in with her son. Independent in ADLs and keeps up with her medications and appointments. Has trouble sleeping at night and has decreased appetite. Takes Cymbalta for anxiety and depression. Use to see Dr. Albrecht with psych but not interesting in seeing psych at this time. No history of hypothyroidism. Her mother was diagnosed with dementia in her early 80s. [place] [facility] [place]      Patient states she has history of left sided bells palsy in her 20s. States symptoms resolved.       Patient states she has a history of CTS with CTS release " surgery. She states numbness in bilateral hands is reemerging along with weakness.          Review of Systems  Review of Systems   Constitutional: Positive for appetite change (decrease) and fatigue.   HENT: Positive for hearing loss and tinnitus. Negative for drooling, trouble swallowing and voice change.    Eyes: Positive for visual disturbance. Negative for photophobia.   Cardiovascular: Negative for palpitations.   Gastrointestinal: Positive for nausea and vomiting. Negative for constipation and diarrhea.   Genitourinary: Negative for difficulty urinating.        Urinary incontinence    Musculoskeletal: Positive for arthralgias, back pain, gait problem and neck pain.   Neurological: Positive for dizziness, weakness and headaches. Negative for tremors, seizures, syncope, facial asymmetry, speech difficulty, light-headedness and numbness.   Psychiatric/Behavioral: Positive for decreased concentration, dysphoric mood and sleep disturbance. Negative for behavioral problems, confusion and hallucinations. The patient is nervous/anxious.      Objective:   VITAL SIGNS WERE REVIEWED        GENERAL APPEARANCE:      The patient looks comfortable.     BMI 44.78     No signs of respiratory distress.     Normal breathing pattern.     No dysmorphic features     Normal eye contact.      GENERAL MEDICAL EXAM:     HEENT:  Head is atraumatic normocephalic.      Neck and Axillae: No JVD. No visible lesions. No thyromegaly. No lymphadenopathy.     Cardiopulmonary: No cyanosis. No tachypnea. Normal respiratory effort.     Gastrointestinal/Urogenital:  No jaundice. No stomas or lesions. No visible hernias. No catheters.     Skin, Hair and Nails:  No neurofibromatosis. No visible lesions.No stigmata of autoimmune disease. No clubbing.     Skin is warm and moist. No palpable masses.     Limbs: No varicose veins. No visible swelling. No palpable edema.     Muskoskeletal: No visible deformities.No visible lesions.     No spine tenderness.  No signs of longstanding neuropathy. No dislocations or fractures.          Neurologic Exam     Mental Status   Oriented to person, place, and time.   Follows 3 step commands.   Attention: normal. Concentration: normal.   Speech: speech is normal   Level of consciousness: alert  Knowledge: good. Able to perform simple calculations.   Able to name object. Able to read. Unable to repeat. Able to write. Normal comprehension.     MOCA 24/30    Visuospatial/Executive 4/5    Naming                            3/3    Attention                         6/6    Language                         1/3    Abstraction                    2/2    Recall                                2/5 (3 with cues)    Orientation                     6/6           Cranial Nerves   Cranial nerves II through XII intact.     CN II   Visual fields full to confrontation.   Visual acuity: normal  Right visual field deficit: none  Left visual field deficit: none     CN III, IV, VI   Pupils are equal, round, and reactive to light.  Extraocular motions are normal.   Right pupil: Size: 3 mm. Shape: regular. Reactivity: brisk. Consensual response: intact. Accommodation: intact.   Left pupil: Size: 3 mm. Shape: regular. Reactivity: brisk. Consensual response: intact. Accommodation: intact.   CN III: no CN III palsy  CN VI: no CN VI palsy  Nystagmus: none   Diplopia: none  Ophthalmoparesis: none  Upgaze: normal  Downgaze: normal  Conjugate gaze: present  Vestibulo-ocular reflex: present    CN V   Right facial sensation deficit: complete  Left facial sensation deficit: none    CN VII   Facial expression full, symmetric.   Right facial weakness: none  Left facial weakness: none    CN VIII   Hearing: impaired    CN IX, X   CN IX normal.   CN X normal.   Palate: symmetric    CN XI   CN XI normal.   Right sternocleidomastoid strength: normal  Left sternocleidomastoid strength: normal  Right trapezius strength: normal  Left trapezius strength: normal    CN XII   CN XII  normal.   Tongue: not atrophic  Fasciculations: absent  Tongue deviation: none    Motor Exam   Muscle bulk: normal  Overall muscle tone: normal  Right arm tone: normal  Left arm tone: normal  Right arm pronator drift: absent  Left arm pronator drift: absent  Right leg tone: normal  Left leg tone: normal    Strength   Right neck flexion: 5/5  Left neck flexion: 5/5  Right neck extension: 5/5  Left neck extension: 5/5  Right deltoid: 5/5  Left deltoid: 5/5  Right biceps: 5/5  Left biceps: 5/5  Right triceps: 5/5  Left triceps: 5/5  Right wrist flexion: 5/5  Left wrist flexion: 5/5  Right wrist extension: /5  Left wrist extension: /5  Right interossei: 55  Left interossei: 5/5  Right iliopsoas: 5  Left iliopsoas: /  Right quadriceps: 5/  Left quadriceps: 5/  Right hamstrin/5  Left hamstrin/5  Right glutei:   Left glutei:   Right anterior tibial:   Left anterior tibial:   Right posterior tibial: 5/  Left posterior tibial: 5/  Right peroneal: 5  Left peroneal: 5  Right gastroc:   Left gastroc:     Sensory Exam   Right arm light touch: decreased from elbow  Left arm light touch: normal  Right leg light touch: decreased from knee  Left leg light touch: normal  Right arm vibration: normal  Left arm vibration: normal  Right leg vibration: decreased from toes  Left leg vibration: decreased from toes  Proprioception normal.   Right arm proprioception: normal  Left arm proprioception: normal  Right leg proprioception: normal  Left leg proprioception: normal  Right arm pinprick: decreased from elbow  Left arm pinprick: normal  Right leg pinprick: decreased from knee  Left leg pinprick: normal    Gait, Coordination, and Reflexes     Gait  Gait: non-neurologic    Coordination   Romberg: negative  Finger to nose coordination: normal  Heel to shin coordination: normal    Tremor   Resting tremor: absent  Intention tremor: absent  Action tremor: absent    Reflexes   Right brachioradialis:  2+  Left brachioradialis: 2+  Right biceps: 2+  Left biceps: 2+  Right triceps: 2+  Left triceps: 2+  Right patellar: 1+  Left patellar: 1+  Right achilles: 0  Left achilles: 0  Right plantar: normal  Left plantar: normal  Right Shore: absent  Left Shore: absent  Right ankle clonus: absent  Left ankle clonus: absent  Right pendular knee jerk: absent  Left pendular knee jerk: absent          Lab Results   Component Value Date    WBC 8.56 02/17/2022    HGB 11.3 (L) 02/17/2022    HCT 36.0 (L) 02/17/2022    MCV 97 02/17/2022     02/17/2022       Sodium   Date Value Ref Range Status   02/17/2022 139 136 - 145 mmol/L Final     Potassium   Date Value Ref Range Status   02/17/2022 4.3 3.5 - 5.1 mmol/L Final     Chloride   Date Value Ref Range Status   02/17/2022 102 95 - 110 mmol/L Final     CO2   Date Value Ref Range Status   02/17/2022 26 23 - 29 mmol/L Final     Glucose   Date Value Ref Range Status   02/17/2022 166 (H) 70 - 110 mg/dL Final     BUN   Date Value Ref Range Status   02/17/2022 14 8 - 23 mg/dL Final     Creatinine   Date Value Ref Range Status   02/17/2022 0.8 0.5 - 1.4 mg/dL Final     Calcium   Date Value Ref Range Status   02/17/2022 9.4 8.7 - 10.5 mg/dL Final     Total Protein   Date Value Ref Range Status   02/17/2022 6.9 6.0 - 8.4 g/dL Final     Albumin   Date Value Ref Range Status   02/17/2022 3.6 3.5 - 5.2 g/dL Final     Total Bilirubin   Date Value Ref Range Status   02/17/2022 0.7 0.1 - 1.0 mg/dL Final     Comment:     For infants and newborns, interpretation of results should be based  on gestational age, weight and in agreement with clinical  observations.    Premature Infant recommended reference ranges:  Up to 24 hours.............<8.0 mg/dL  Up to 48 hours............<12.0 mg/dL  3-5 days..................<15.0 mg/dL  6-29 days.................<15.0 mg/dL       Alkaline Phosphatase   Date Value Ref Range Status   02/17/2022 47 (L) 55 - 135 U/L Final     AST   Date Value Ref Range  Status   02/17/2022 16 10 - 40 U/L Final     ALT   Date Value Ref Range Status   02/17/2022 17 10 - 44 U/L Final     Anion Gap   Date Value Ref Range Status   02/17/2022 11 8 - 16 mmol/L Final     eGFR if    Date Value Ref Range Status   02/17/2022 >60.0 >60 mL/min/1.73 m^2 Final     eGFR if non    Date Value Ref Range Status   02/17/2022 >60.0 >60 mL/min/1.73 m^2 Final     Comment:     Calculation used to obtain the estimated glomerular filtration  rate (eGFR) is the CKD-EPI equation.          Lab Results   Component Value Date    [id number] 363 02/17/2022       Lab Results   Component Value Date    TSH 0.936 02/17/2022    FREET4 0.95 12/14/2021     Hemoglobin A1C   Date Value Ref Range Status   12/14/2021 7.3 (H) 4.0 - 5.6 % Final     Comment:     ADA Screening Guidelines:  5.7-6.4%  Consistent with prediabetes  >or=6.5%  Consistent with diabetes    High levels of fetal hemoglobin interfere with the HbA1C  assay. Heterozygous hemoglobin variants (HbS, HgC, etc)do  not significantly interfere with this assay.   However, presence of multiple variants may affect accuracy.     09/20/2021 7.3 (H) 4.0 - 5.6 % Final     Comment:     ADA Screening Guidelines:  5.7-6.4%  Consistent with prediabetes  >or=6.5%  Consistent with diabetes    High levels of fetal hemoglobin interfere with the HbA1C  assay. Heterozygous hemoglobin variants (HbS, HgC, etc)do  not significantly interfere with this assay.   However, presence of multiple variants may affect accuracy.     06/28/2021 7.4 (H) 4.0 - 5.6 % Final     Comment:     ADA Screening Guidelines:  5.7-6.4%  Consistent with prediabetes  >or=6.5%  Consistent with diabetes    High levels of fetal hemoglobin interfere with the HbA1C  assay. Heterozygous hemoglobin variants (HbS, HgC, etc)do  not significantly interfere with this assay.   However, presence of multiple variants may affect accuracy.        10-    MRI IAC/Temporal Bones no  significant MRI abnormality of the internal auditory canals or cerebellopontine angles.  2. Minimal nonspecific white matter T2 alteration that likely relates to chronic microvascular ischemia.    10-4-2021     CTH shows chronic microvascular ischemic changes     2-    Abnormal VNG. Results are indicative of a left peripheral vestibulopathy, as evidenced by a 50% left Unilateral Weakness.       06-    EEG NL      Reviewed the neuroimaging independently      Assessment:     1. Multiple neurological symptoms    2. Pulsatile tinnitus, unspecified ear    3. Dizziness and giddiness    4. Mild cognitive disorder    5. Vestibular migraine    6. Vertigo of central origin     7. Carpal tunnel syndrome, unspecified laterality    8. Dizzy    9. Memory loss    10. Unsteadiness on feet     11. Tinnitus, unspecified laterality    12. Hearing loss of left ear, unspecified hearing loss type    13. Depression, unspecified depression type           Plan:     MULTIPLE NEUROLOGICAL SYMPTOMS     DIZZINESS/POSSIBLE VESTIBULAR MIGRAINE    Evaluate MRI and MRV brain    Evaluate CTA head and neck    Evaluate EEG followed by AEEG    Propanolol contraindicated related multiple medications for HTN    Topamax contraindicated related to memory difficulites    Amitriptyline contraindicated related to history of CAD    Triptains contraindicated related to history of CAD    Will try Ajovy monthly    Will try Nurtec PRN        RICK/MEMROY DIFFICULTIES/ANXIETY/DEPRESSION    Request records from [facility]    Evaluate MRI brain    Neuropsych referral placed    Deferred psych referral    Memory Strategies     Internal Memory Strategies:   PREPARE yourself to pay attention   REHEARSAL: Mentally repeat information over and over.   VISUALIZATION: Make a mental picture of information.   ASSOCIATION: Mentally associate new information with something familiar.   REGROUPING: Organize long lists of information by category.   FIRST LETTER  MNEMONICS:   LISTEN FOR THE MAIN IDEA: who, what, when, where, why, how, what's next   CHUNKING: Group digits of numbers into chunks (ex. [id number])    Break sentences and instructions down by ideas. (ex. Physical    therapy / will be at 3:00 / instead of 1:30 / because I have a    meeting).   CHAINING: Recall 3-4 words by linking them together in a sentence.    External Memory Strategies:   NOTETAKING:   TAPE RECORDING: Can use to record someone giving length information such     as a lecture, verbal instructions, or directions.   MEMORY BOOK / DAY PLANNER: To record up-coming appointments and    events.   WATCH ALARMS: Use as a reminder to take medications or check daily    Schedule.      CTS    Consider EMG/NCT in future        MEDICAL/SURGICAL COMORBIDITIES      All relevant medical comorbidities noted and managed by primary care physician and medical care team.          MISCELLANEOUS MEDICAL PROBLEMS         HEALTHY LIFESTYLE AND PREVENTATIVE CARE     Encouraged the patient to adhere to the age-appropriate health maintenance guidelines including screening tests and vaccinations.      Discussed the overall importance of healthy lifestyle, optimal weight, exercise, healthy diet, good sleep hygiene and avoiding drugs including smoking, alcohol and recreational drugs. The patient verbalized full understanding.         Advised the patient to follow COVID-19 prevention measures.         I spent a total of 150 minutes on the day of the visit.  This includes face to face time (65 minutes) and non-face to face time (85 minutes) preparing to see the patient (eg, review of tests), obtaining and/or reviewing separately obtained history, documenting clinical information in the electronic or other health record, independently interpreting results and communicating results to the patient/family/caregiver, or care coordinator.            RTC    Follow up in about 3 months (around 9/9/2022).     Jael Michaud, MSN,  NP  Collaborating Provider: Kimberly Sapp MD, FAAN Neurologist/Epileptologist

## 2022-06-10 DIAGNOSIS — R29.90 MULTIPLE NEUROLOGICAL SYMPTOMS: Primary | ICD-10-CM

## 2022-06-10 DIAGNOSIS — G43.809 VESTIBULAR MIGRAINE: Primary | ICD-10-CM

## 2022-06-10 RX ORDER — ERENUMAB-AOOE 140 MG/ML
140 INJECTION, SOLUTION SUBCUTANEOUS
Qty: 1 EACH | Refills: 5 | Status: SHIPPED | OUTPATIENT
Start: 2022-06-10 | End: 2022-10-04

## 2022-06-13 ENCOUNTER — TELEPHONE (OUTPATIENT)
Dept: PHARMACY | Facility: CLINIC | Age: 72
End: 2022-06-13
Payer: MEDICARE

## 2022-06-13 ENCOUNTER — TELEPHONE (OUTPATIENT)
Dept: RHEUMATOLOGY | Facility: CLINIC | Age: 72
End: 2022-06-13
Payer: MEDICARE

## 2022-06-13 ENCOUNTER — PATIENT MESSAGE (OUTPATIENT)
Dept: INTERNAL MEDICINE | Facility: CLINIC | Age: 72
End: 2022-06-13
Payer: MEDICARE

## 2022-06-13 NOTE — TELEPHONE ENCOUNTER
Called and spoke to pt in regards to scheduling her appt with [facility]. Pt stated since she is having other tests ran right now for her migraines, that she would prefer to wait until end of July to schedule. Scheduled pt for 7/29 at 9:30 am for appt with zilretta. Pt verbalized understanding.

## 2022-06-14 RX ORDER — LIRAGLUTIDE 6 MG/ML
INJECTION SUBCUTANEOUS
Qty: 6 ML | Refills: 1 | Status: SHIPPED | OUTPATIENT
Start: 2022-06-14 | End: 2022-08-31

## 2022-06-14 NOTE — TELEPHONE ENCOUNTER
Refill Routing Note   Medication(s) are not appropriate for processing by [facility] for the following reason(s):      - Required laboratory values are outdated    [facility] action(s):  Defer Medication-related problems identified: Requires labs     Medication Therapy Plan: outdated labs  Medication reconciliation completed: No     Appointments  past 12m or future 3m with PCP    Date Provider   Last Visit   2/8/2022 Jose Szymanski MD   Next Visit   6/21/2022 Jose Szymanski MD   ED visits in past 90 days: 0        Note composed:12:59 PM 06/14/2022

## 2022-06-17 ENCOUNTER — PATIENT MESSAGE (OUTPATIENT)
Dept: PSYCHIATRY | Facility: CLINIC | Age: 72
End: 2022-06-17
Payer: MEDICARE

## 2022-06-19 ENCOUNTER — PATIENT MESSAGE (OUTPATIENT)
Dept: INTERNAL MEDICINE | Facility: CLINIC | Age: 72
End: 2022-06-19
Payer: MEDICARE

## 2022-06-19 ENCOUNTER — PATIENT MESSAGE (OUTPATIENT)
Dept: NEUROLOGY | Facility: CLINIC | Age: 72
End: 2022-06-19
Payer: MEDICARE

## 2022-06-20 ENCOUNTER — TELEPHONE (OUTPATIENT)
Dept: NEUROLOGY | Facility: CLINIC | Age: 72
End: 2022-06-20
Payer: MEDICARE

## 2022-06-20 ENCOUNTER — HOSPITAL ENCOUNTER (OUTPATIENT)
Dept: PULMONOLOGY | Facility: HOSPITAL | Age: 72
Discharge: HOME | End: 2022-06-20
Attending: FAMILY MEDICINE
Payer: MEDICARE

## 2022-06-20 DIAGNOSIS — E53.8 B12 DEFICIENCY: Primary | ICD-10-CM

## 2022-06-20 DIAGNOSIS — R42 DIZZINESS AND GIDDINESS: ICD-10-CM

## 2022-06-20 DIAGNOSIS — E11.65 UNCONTROLLED TYPE 2 DIABETES MELLITUS WITH HYPERGLYCEMIA: Primary | ICD-10-CM

## 2022-06-20 PROCEDURE — 95816 EEG AWAKE AND DROWSY: CPT

## 2022-06-20 PROCEDURE — 95819 EEG AWAKE AND ASLEEP: CPT | Mod: 26,,, | Performed by: PSYCHIATRY & NEUROLOGY

## 2022-06-20 PROCEDURE — 95819 PR EEG,W/AWAKE & ASLEEP RECORD: ICD-10-PCS | Mod: 26,,, | Performed by: PSYCHIATRY & NEUROLOGY

## 2022-06-20 NOTE — TELEPHONE ENCOUNTER
Attempted to contact the patient regarding upcoming MRI appointments. I will reach out via the portal, VM left.-NAT

## 2022-06-20 NOTE — TELEPHONE ENCOUNTER
Contacted patient to go over some questions regarding her upcoming MRIs. Patient verbalized understanding and I told her to contact us again if she needed to.-BR

## 2022-06-20 NOTE — TELEPHONE ENCOUNTER
----- Message from Carmella Mccarthy sent at 6/20/2022  1:14 PM CDT -----  .Type:  Patient Returning Call    Who Called: .Christine Jo   Who Left Message for Patient: Kala  Does the patient know what this is regarding?:  Would the patient rather a call back or a response via [facility]? Call back  Best Call Back Number:.[phone]   Additional Information:

## 2022-06-20 NOTE — TELEPHONE ENCOUNTER
Please advise if additional labs pt is discussing are appropriate and place if they are and I will get her scheduled for fasting labwork

## 2022-06-21 ENCOUNTER — OFFICE VISIT (OUTPATIENT)
Dept: INTERNAL MEDICINE | Facility: CLINIC | Age: 72
End: 2022-06-21
Payer: MEDICARE

## 2022-06-21 DIAGNOSIS — G43.809 VESTIBULAR MIGRAINE: ICD-10-CM

## 2022-06-21 DIAGNOSIS — R26.81 UNSTEADINESS ON FEET: ICD-10-CM

## 2022-06-21 DIAGNOSIS — N39.0 URINARY TRACT INFECTION WITHOUT HEMATURIA, SITE UNSPECIFIED: ICD-10-CM

## 2022-06-21 DIAGNOSIS — I15.2 HYPERTENSION ASSOCIATED WITH DIABETES: ICD-10-CM

## 2022-06-21 DIAGNOSIS — E11.65 UNCONTROLLED TYPE 2 DIABETES MELLITUS WITH HYPERGLYCEMIA: Primary | ICD-10-CM

## 2022-06-21 DIAGNOSIS — E11.59 HYPERTENSION ASSOCIATED WITH DIABETES: ICD-10-CM

## 2022-06-21 PROCEDURE — 4010F PR ACE/ARB THEARPY RXD/TAKEN: ICD-10-PCS | Mod: CPTII,95,, | Performed by: FAMILY MEDICINE

## 2022-06-21 PROCEDURE — 3051F PR MOST RECENT HEMOGLOBIN A1C LEVEL 7.0 - < 8.0%: ICD-10-PCS | Mod: CPTII,95,, | Performed by: FAMILY MEDICINE

## 2022-06-21 PROCEDURE — 99214 PR OFFICE/OUTPT VISIT, EST, LEVL IV, 30-39 MIN: ICD-10-PCS | Mod: 95,,, | Performed by: FAMILY MEDICINE

## 2022-06-21 PROCEDURE — 3072F LOW RISK FOR RETINOPATHY: CPT | Mod: CPTII,95,, | Performed by: FAMILY MEDICINE

## 2022-06-21 PROCEDURE — 3051F HG A1C>EQUAL 7.0%<8.0%: CPT | Mod: CPTII,95,, | Performed by: FAMILY MEDICINE

## 2022-06-21 PROCEDURE — 3072F PR LOW RISK FOR RETINOPATHY: ICD-10-PCS | Mod: CPTII,95,, | Performed by: FAMILY MEDICINE

## 2022-06-21 PROCEDURE — 4010F ACE/ARB THERAPY RXD/TAKEN: CPT | Mod: CPTII,95,, | Performed by: FAMILY MEDICINE

## 2022-06-21 PROCEDURE — 99214 OFFICE O/P EST MOD 30 MIN: CPT | Mod: 95,,, | Performed by: FAMILY MEDICINE

## 2022-06-21 NOTE — PROGRESS NOTES
Subjective:       Patient ID: Christine Jo is a 72 y.o. female.    Chief Complaint: No chief complaint on file.    The patient location is: home  The chief complaint leading to consultation is:  Dizziness diabetes urinary frequency    Visit type: audiovisual    Face to Face time with patient: 20 minutes of total time spent on the encounter, which includes face to face time and non-face to face time preparing to see the patient (eg, review of tests), Obtaining and/or reviewing separately obtained history, Documenting clinical information in the electronic or other health record, Independently interpreting results (not separately reported) and communicating results to the patient/family/caregiver, or Care coordination (not separately reported).         Each patient to whom he or she provides medical services by telemedicine is:  (1) informed of the relationship between the physician and patient and the respective role of any other health care provider with respect to management of the patient; and (2) notified that he or she may decline to receive medical services by telemedicine and may withdraw from such care at any time.    Notes:       Review of Systems   Constitutional: Positive for activity change and unexpected weight change. Negative for chills and fever.   HENT: Positive for hearing loss. Negative for rhinorrhea and trouble swallowing.    Eyes: Positive for visual disturbance. Negative for discharge.   Respiratory: Positive for chest tightness. Negative for wheezing.    Cardiovascular: Negative for chest pain and palpitations.   Gastrointestinal: Positive for constipation and vomiting. Negative for blood in stool and diarrhea.   Endocrine: Positive for polyuria. Negative for polydipsia.   Genitourinary: Positive for difficulty urinating. Negative for dysuria, hematuria and menstrual problem.   Musculoskeletal: Positive for arthralgias and neck pain. Negative for joint swelling.   Neurological: Positive  for weakness and headaches.   Psychiatric/Behavioral: Positive for confusion and dysphoric mood.       Objective:      Physical Exam  Constitutional:       General: She is not in acute distress.     Appearance: She is not diaphoretic.   Pulmonary:      Effort: Pulmonary effort is normal. No respiratory distress.      Breath sounds: No wheezing.   Neurological:      Mental Status: She is alert and oriented to person, place, and time.   Psychiatric:         Mood and Affect: Mood normal.         Behavior: Behavior normal.         Thought Content: Thought content normal.         Judgment: Judgment normal.         Hospital Outpatient Visit on 03/14/2022   Component Date Value Ref Range Status    Left ICA/CCA ratio 03/14/2022 1.56   Final    Right ICA/CCA ratio 03/14/2022 1.82   Final    Left Highest ICA 03/14/2022 123.00   Final    Left Highest CCA 03/14/2022 109   Final    Right Highest ICA 03/14/2022 124.00   Final    Right Highest CCA 03/14/2022 90   Final    Right Highest EDV 03/14/2022 25.00   Final    LT Highest EDV 03/14/2022 29.00   Final    Right CCA prox sys 03/14/2022 90  cm/s Final    Right CCA prox cisneros 03/14/2022 14  cm/s Final    Right CCA dist sys 03/14/2022 68  cm/s Final    Right CCA dist cisneros 03/14/2022 12  cm/s Final    Right ICA prox sys 03/14/2022 53  cm/s Final    Right ICA prox cisneros 03/14/2022 12  cm/s Final    Right ICA mid sys 03/14/2022 53  cm/s Final    Right ICA mid cisneros 03/14/2022 9  cm/s Final    Right ICA dist sys 03/14/2022 124  cm/s Final    Right ICA dist cisneros 03/14/2022 25  cm/s Final    Right ECA sys 03/14/2022 89  cm/s Final    Right vertebral sys 03/14/2022 56  cm/s Final    Left CCA prox sys 03/14/2022 109  cm/s Final    Left CCA prox cisneros 03/14/2022 22  cm/s Final    Left CCA dist sys 03/14/2022 79  cm/s Final    Left CCA dist cisneros 03/14/2022 16  cm/s Final    Left ICA prox sys 03/14/2022 123  cm/s Final    Left ICA prox cisneros 03/14/2022 27  cm/s Final     Left ICA mid sys 03/14/2022 109  cm/s Final    Left ICA mid cisneros 03/14/2022 27  cm/s Final    Left ICA dist sys 03/14/2022 120  cm/s Final    Left ICA dist cisneros 03/14/2022 29  cm/s Final    Left ECA sys 03/14/2022 91  cm/s Final    Left vertebral sys 03/14/2022 55  cm/s Final    Right arm systolic blood pressure 03/14/2022 173  mmHg Final    Right arm diastolic blood pressure 03/14/2022 74  mmHg Final    Left arm systolic blood pressure 03/14/2022 161  mmHg Final    Left arm diastolic blood pressure 03/14/2022 72  mmHg Final    Left CCA mid sys 03/14/2022 79  cm/s Final    Left CCA mid cisneros 03/14/2022 16  cm/s Final    Right CCA mid sys 03/14/2022 49  cm/s Final    Right CCA mid cisneros 03/14/2022 6  cm/s Final    Right vertebral cisneros 03/14/2022 8  cm/s Final    Right ECA cisneros 03/14/2022 7  cm/s Final    Left vertebral cisneros 03/14/2022 11  cm/s Final    Left ECA cisneros 03/14/2022 18  cm/s Final    Left CBA sys 03/14/2022 72  cm/s Final    Left CBA cisneros 03/14/2022 17  cm/s Final    Rigth CBA sys 03/14/2022 71  cm/s Final    Right CBA cisneros 03/14/2022 14  cm/s Final     Assessment:       1. Uncontrolled type 2 diabetes mellitus with hyperglycemia    2. Urinary tract infection without hematuria, site unspecified    3. Hypertension associated with diabetes    4. Vestibular migraine    5. Unsteadiness on feet         Plan:     Reports she is doing well just 1 day.  She lost weight 237-231 lb with diet.  She has been followed by Neurology for dizziness.  She reports EEG MRI CT scans have been ordered.  She additionally has lab ordered which will be done soon she is seen back urologist regards vaginal and tract infections.  Additionally she was recently prescribed migraine medications.    Uncontrolled type 2 diabetes mellitus with hyperglycemia    Urinary tract infection without hematuria, site unspecified    Hypertension associated with diabetes    Vestibular migraine    Unsteadiness on feet

## 2022-06-22 ENCOUNTER — TELEPHONE (OUTPATIENT)
Dept: NEUROLOGY | Facility: CLINIC | Age: 72
End: 2022-06-22
Payer: MEDICARE

## 2022-06-22 DIAGNOSIS — H93.A9 PULSATILE TINNITUS, UNSPECIFIED EAR: ICD-10-CM

## 2022-06-22 DIAGNOSIS — R42 DIZZINESS AND GIDDINESS: Primary | ICD-10-CM

## 2022-06-22 DIAGNOSIS — G43.809 VESTIBULAR MIGRAINE: ICD-10-CM

## 2022-06-22 DIAGNOSIS — H81.4 VERTIGO OF CENTRAL ORIGIN: ICD-10-CM

## 2022-06-22 DIAGNOSIS — F09 MILD COGNITIVE DISORDER: ICD-10-CM

## 2022-06-22 NOTE — PROCEDURES
DATE EEG PERFORMED:  2022      DATE EEG INTERPRETED: 2022                  DURATION OF EE MINUTES         LEVEL OF CONSCIOUSENESS    Awake and Sleep.         EEG BACKGROUND    The posterior dominant basic rhythm reaches 9-10 Hz, symmetric, reactive, well-modulated and well-sustained.         EEG CLASSIFICATION    Normal        IMPRESSION      The EEG is normal in the awake and sleep states.       There are no epileptiform discharges or lateralizing signs. No typical events were recorded. There is no electrographic evidence of seizure.There is no electrographic evidence of status epilepticus.         PLEASE NOTE THAT A NON-EPILEPTIFORM EEG DOES NOT RULE OUT EPILEPSY.        ANNIKA MCCAIN MD, FAAN    Diplomate, American Board of Psychiatry and Neurology    Diplomate, American Board of Clinical Neurophysiology

## 2022-06-22 NOTE — TELEPHONE ENCOUNTER
----- Message from Jael Michaud NP sent at 6/22/2022  2:41 PM CDT -----  What happened to the medications? They were specifically prescribed to be taken before her MRIs and neither MRI was completed.  ----- Message -----  From: Kala Rincon MA  Sent: 6/22/2022   2:06 PM CDT  To: Jael Michaud NP    Patient stated that she needs Xanax or Valium to help with her MRIs. She stated that she thought she had some left over but she does not.

## 2022-06-23 ENCOUNTER — LAB VISIT (OUTPATIENT)
Dept: LAB | Facility: HOSPITAL | Age: 72
End: 2022-06-23
Attending: FAMILY MEDICINE
Payer: MEDICARE

## 2022-06-23 ENCOUNTER — PATIENT MESSAGE (OUTPATIENT)
Dept: NEUROLOGY | Facility: CLINIC | Age: 72
End: 2022-06-23
Payer: MEDICARE

## 2022-06-23 DIAGNOSIS — E11.65 UNCONTROLLED TYPE 2 DIABETES MELLITUS WITH HYPERGLYCEMIA: ICD-10-CM

## 2022-06-23 DIAGNOSIS — E53.8 B12 DEFICIENCY: ICD-10-CM

## 2022-06-23 LAB
ALBUMIN SERPL BCP-MCNC: 3.8 G/DL (ref 3.5–5.2)
ALP SERPL-CCNC: 43 U/L (ref 55–135)
ALT SERPL W/O P-5'-P-CCNC: 15 U/L (ref 10–44)
ANION GAP SERPL CALC-SCNC: 9 MMOL/L (ref 8–16)
AST SERPL-CCNC: 14 U/L (ref 10–40)
BASOPHILS # BLD AUTO: 0.07 K/UL (ref 0–0.2)
BASOPHILS NFR BLD: 1 % (ref 0–1.9)
BILIRUB SERPL-MCNC: 0.8 MG/DL (ref 0.1–1)
BUN SERPL-MCNC: 20 MG/DL (ref 8–23)
CALCIUM SERPL-MCNC: 9.8 MG/DL (ref 8.7–10.5)
CHLORIDE SERPL-SCNC: 105 MMOL/L (ref 95–110)
CHOLEST SERPL-MCNC: 168 MG/DL (ref 120–199)
CHOLEST/HDLC SERPL: 3.8 {RATIO} (ref 2–5)
CO2 SERPL-SCNC: 28 MMOL/L (ref 23–29)
CREAT SERPL-MCNC: 0.8 MG/DL (ref 0.5–1.4)
DIFFERENTIAL METHOD: ABNORMAL
EOSINOPHIL # BLD AUTO: 0.1 K/UL (ref 0–0.5)
EOSINOPHIL NFR BLD: 1.4 % (ref 0–8)
ERYTHROCYTE [DISTWIDTH] IN BLOOD BY AUTOMATED COUNT: 12.6 % (ref 11.5–14.5)
EST. GFR  (AFRICAN AMERICAN): >60 ML/MIN/1.73 M^2
EST. GFR  (NON AFRICAN AMERICAN): >60 ML/MIN/1.73 M^2
ESTIMATED AVG GLUCOSE: 137 MG/DL (ref 68–131)
GLUCOSE SERPL-MCNC: 145 MG/DL (ref 70–110)
HBA1C MFR BLD: 6.4 % (ref 4–5.6)
HCT VFR BLD AUTO: 34.4 % (ref 37–48.5)
HDLC SERPL-MCNC: 44 MG/DL (ref 40–75)
HDLC SERPL: 26.2 % (ref 20–50)
HGB BLD-MCNC: 10.8 G/DL (ref 12–16)
IMM GRANULOCYTES # BLD AUTO: 0.02 K/UL (ref 0–0.04)
IMM GRANULOCYTES NFR BLD AUTO: 0.3 % (ref 0–0.5)
LDLC SERPL CALC-MCNC: 89.2 MG/DL (ref 63–159)
LYMPHOCYTES # BLD AUTO: 1.7 K/UL (ref 1–4.8)
LYMPHOCYTES NFR BLD: 24.2 % (ref 18–48)
MCH RBC QN AUTO: 31 PG (ref 27–31)
MCHC RBC AUTO-ENTMCNC: 31.4 G/DL (ref 32–36)
MCV RBC AUTO: 99 FL (ref 82–98)
MONOCYTES # BLD AUTO: 0.6 K/UL (ref 0.3–1)
MONOCYTES NFR BLD: 8.8 % (ref 4–15)
NEUTROPHILS # BLD AUTO: 4.6 K/UL (ref 1.8–7.7)
NEUTROPHILS NFR BLD: 64.3 % (ref 38–73)
NONHDLC SERPL-MCNC: 124 MG/DL
NRBC BLD-RTO: 0 /100 WBC
PLATELET # BLD AUTO: 246 K/UL (ref 150–450)
PMV BLD AUTO: 12.6 FL (ref 9.2–12.9)
POTASSIUM SERPL-SCNC: 4.5 MMOL/L (ref 3.5–5.1)
PROT SERPL-MCNC: 6.3 G/DL (ref 6–8.4)
RBC # BLD AUTO: 3.48 M/UL (ref 4–5.4)
SODIUM SERPL-SCNC: 142 MMOL/L (ref 136–145)
TRIGL SERPL-MCNC: 174 MG/DL (ref 30–150)
VIT B12 SERPL-MCNC: 268 PG/ML (ref 210–950)
WBC # BLD AUTO: 7.19 K/UL (ref 3.9–12.7)

## 2022-06-23 PROCEDURE — 83036 HEMOGLOBIN GLYCOSYLATED A1C: CPT | Performed by: FAMILY MEDICINE

## 2022-06-23 PROCEDURE — 80061 LIPID PANEL: CPT | Performed by: FAMILY MEDICINE

## 2022-06-23 PROCEDURE — 82607 VITAMIN B-12: CPT | Performed by: FAMILY MEDICINE

## 2022-06-23 PROCEDURE — 36415 COLL VENOUS BLD VENIPUNCTURE: CPT | Performed by: FAMILY MEDICINE

## 2022-06-23 PROCEDURE — 85025 COMPLETE CBC W/AUTO DIFF WBC: CPT | Performed by: FAMILY MEDICINE

## 2022-06-23 PROCEDURE — 80053 COMPREHEN METABOLIC PANEL: CPT | Performed by: FAMILY MEDICINE

## 2022-06-30 ENCOUNTER — PATIENT MESSAGE (OUTPATIENT)
Dept: NEUROLOGY | Facility: CLINIC | Age: 72
End: 2022-06-30
Payer: MEDICARE

## 2022-07-05 ENCOUNTER — PATIENT MESSAGE (OUTPATIENT)
Dept: INTERNAL MEDICINE | Facility: CLINIC | Age: 72
End: 2022-07-05
Payer: MEDICARE

## 2022-07-06 RX ORDER — TEMAZEPAM 30 MG/1
CAPSULE ORAL
Qty: 30 CAPSULE | Refills: 0 | Status: SHIPPED | OUTPATIENT
Start: 2022-07-06 | End: 2022-08-05 | Stop reason: SDUPTHER

## 2022-07-06 NOTE — TELEPHONE ENCOUNTER
No new care gaps identified.  [facility] Embedded Care Gaps. Reference number: [id number]. 7/05/2022   9:05:42 PM CDT

## 2022-07-08 ENCOUNTER — PATIENT MESSAGE (OUTPATIENT)
Dept: NEUROLOGY | Facility: CLINIC | Age: 72
End: 2022-07-08
Payer: MEDICARE

## 2022-07-08 ENCOUNTER — TELEPHONE (OUTPATIENT)
Dept: NEUROLOGY | Facility: CLINIC | Age: 72
End: 2022-07-08
Payer: MEDICARE

## 2022-07-13 ENCOUNTER — PATIENT MESSAGE (OUTPATIENT)
Dept: INTERNAL MEDICINE | Facility: CLINIC | Age: 72
End: 2022-07-13
Payer: MEDICARE

## 2022-07-14 RX ORDER — MECLIZINE HYDROCHLORIDE 25 MG/1
25 TABLET ORAL 3 TIMES DAILY PRN
Qty: 30 TABLET | Refills: 2 | Status: SHIPPED | OUTPATIENT
Start: 2022-07-14 | End: 2023-01-25 | Stop reason: SDUPTHER

## 2022-07-14 NOTE — TELEPHONE ENCOUNTER
No new care gaps identified.  [facility] Embedded Care Gaps. Reference number: [id number]. 7/14/2022   7:35:42 AM SWETHAT

## 2022-07-16 NOTE — TELEPHONE ENCOUNTER
No new care gaps identified.  [facility] Embedded Care Gaps. Reference number: [id number]. 7/16/2022   9:52:48 AM SWETHAT

## 2022-07-17 NOTE — TELEPHONE ENCOUNTER
Refill Routing Note   Medication(s) are not appropriate for processing by [facility] for the following reason(s):      - Drug-Drug Interaction (rimegepant and carvediloL)    [facility] action(s):  Defer Medication-related problems identified: Drug-drug interaction        Medication reconciliation completed: No     Appointments  past 12m or future 3m with PCP    Date Provider   Last Visit   6/21/2022 Jose Szymanski MD   Next Visit   Visit date not found Jose Szymanski MD   ED visits in past 90 days: 0        Note composed:11:47 PM 07/16/2022

## 2022-07-18 ENCOUNTER — PATIENT MESSAGE (OUTPATIENT)
Dept: NEUROLOGY | Facility: CLINIC | Age: 72
End: 2022-07-18
Payer: MEDICARE

## 2022-07-18 RX ORDER — CARVEDILOL 25 MG/1
TABLET ORAL
Qty: 180 TABLET | Refills: 3 | Status: SHIPPED | OUTPATIENT
Start: 2022-07-18 | End: 2023-03-21

## 2022-07-18 NOTE — TELEPHONE ENCOUNTER
Spoke with patient to clarify her upcoming appointment times. The patient verbalized understanding.-BR

## 2022-07-19 RX ORDER — HYDRALAZINE HYDROCHLORIDE 100 MG/1
100 TABLET, FILM COATED ORAL 2 TIMES DAILY
Qty: 180 TABLET | Refills: 0 | Status: SHIPPED | OUTPATIENT
Start: 2022-07-19 | End: 2023-01-10 | Stop reason: SDUPTHER

## 2022-07-20 ENCOUNTER — PATIENT MESSAGE (OUTPATIENT)
Dept: NEUROLOGY | Facility: CLINIC | Age: 72
End: 2022-07-20
Payer: MEDICARE

## 2022-07-27 ENCOUNTER — HOSPITAL ENCOUNTER (OUTPATIENT)
Dept: RADIOLOGY | Facility: HOSPITAL | Age: 72
Discharge: HOME | End: 2022-07-27
Attending: PSYCHIATRY & NEUROLOGY
Payer: MEDICARE

## 2022-07-27 ENCOUNTER — TELEPHONE (OUTPATIENT)
Dept: NEUROLOGY | Facility: CLINIC | Age: 72
End: 2022-07-27
Payer: MEDICARE

## 2022-07-27 ENCOUNTER — HOSPITAL ENCOUNTER (OUTPATIENT)
Dept: RADIOLOGY | Facility: HOSPITAL | Age: 72
Discharge: HOME | End: 2022-07-27
Attending: NURSE PRACTITIONER
Payer: MEDICARE

## 2022-07-27 DIAGNOSIS — G43.809 VESTIBULAR MIGRAINE: ICD-10-CM

## 2022-07-27 DIAGNOSIS — R29.90 MULTIPLE NEUROLOGICAL SYMPTOMS: Primary | ICD-10-CM

## 2022-07-27 DIAGNOSIS — H93.A9 PULSATILE TINNITUS, UNSPECIFIED EAR: ICD-10-CM

## 2022-07-27 DIAGNOSIS — F09 MILD COGNITIVE DISORDER: ICD-10-CM

## 2022-07-27 DIAGNOSIS — H81.4 VERTIGO OF CENTRAL ORIGIN: ICD-10-CM

## 2022-07-27 PROCEDURE — 70551 MRI BRAIN STEM W/O DYE: CPT | Mod: TC

## 2022-07-27 PROCEDURE — 70544 MR ANGIOGRAPHY HEAD W/O DYE: CPT | Mod: 59,TC

## 2022-07-27 NOTE — TELEPHONE ENCOUNTER
Spoke with patient regarding blood work that needs to be done this morning before she has MRIs. The patient verbalized understanding.-BR

## 2022-07-27 NOTE — TELEPHONE ENCOUNTER
----- Message from Hemalatha Gupta, RT sent at 7/27/2022  8:09 AM CDT -----  Regarding: STAT LABS FOR CT  Ms Jo needs a STAT CREATININE SERUM ordered and booked at least 1 1/2 hours prior to her CT on Friday. If the patient prefers, she can come in any day before then to have them drawn so we will not have to wait on results the day of the scan.     Thanks,   Janette   [id number]

## 2022-07-28 ENCOUNTER — PATIENT MESSAGE (OUTPATIENT)
Dept: NEUROLOGY | Facility: CLINIC | Age: 72
End: 2022-07-28
Payer: MEDICARE

## 2022-07-28 NOTE — TELEPHONE ENCOUNTER
Called patient to reschedule an appointment and to discuss the provider calling to go over test results.-BR

## 2022-07-29 ENCOUNTER — OFFICE VISIT (OUTPATIENT)
Dept: RHEUMATOLOGY | Facility: CLINIC | Age: 72
End: 2022-07-29
Payer: MEDICARE

## 2022-07-29 ENCOUNTER — HOSPITAL ENCOUNTER (OUTPATIENT)
Dept: RADIOLOGY | Facility: HOSPITAL | Age: 72
Discharge: HOME | End: 2022-07-29
Attending: NURSE PRACTITIONER
Payer: MEDICARE

## 2022-07-29 VITALS
SYSTOLIC BLOOD PRESSURE: 144 MMHG | WEIGHT: 237 LBS | BODY MASS INDEX: 44.75 KG/M2 | DIASTOLIC BLOOD PRESSURE: 64 MMHG | HEIGHT: 61 IN | HEART RATE: 70 BPM

## 2022-07-29 DIAGNOSIS — R42 DIZZINESS AND GIDDINESS: ICD-10-CM

## 2022-07-29 DIAGNOSIS — M25.562 CHRONIC PAIN OF LEFT KNEE: ICD-10-CM

## 2022-07-29 DIAGNOSIS — M17.11 PRIMARY OSTEOARTHRITIS OF RIGHT KNEE: ICD-10-CM

## 2022-07-29 DIAGNOSIS — M17.12 PRIMARY OSTEOARTHRITIS OF LEFT KNEE: ICD-10-CM

## 2022-07-29 DIAGNOSIS — G89.29 CHRONIC PAIN OF RIGHT KNEE: Primary | ICD-10-CM

## 2022-07-29 DIAGNOSIS — M25.561 CHRONIC PAIN OF RIGHT KNEE: Primary | ICD-10-CM

## 2022-07-29 DIAGNOSIS — G89.29 CHRONIC PAIN OF LEFT KNEE: ICD-10-CM

## 2022-07-29 PROCEDURE — 70496 CTA HEAD AND NECK (XPD): ICD-10-PCS | Mod: 26,,, | Performed by: RADIOLOGY

## 2022-07-29 PROCEDURE — 4010F ACE/ARB THERAPY RXD/TAKEN: CPT | Mod: CPTII,S$GLB,, | Performed by: PHYSICIAN ASSISTANT

## 2022-07-29 PROCEDURE — 3077F SYST BP >= 140 MM HG: CPT | Mod: CPTII,S$GLB,, | Performed by: PHYSICIAN ASSISTANT

## 2022-07-29 PROCEDURE — 3008F PR BODY MASS INDEX (BMI) DOCUMENTED: ICD-10-PCS | Mod: CPTII,S$GLB,, | Performed by: PHYSICIAN ASSISTANT

## 2022-07-29 PROCEDURE — 1125F PR PAIN SEVERITY QUANTIFIED, PAIN PRESENT: ICD-10-PCS | Mod: CPTII,S$GLB,, | Performed by: PHYSICIAN ASSISTANT

## 2022-07-29 PROCEDURE — 1125F AMNT PAIN NOTED PAIN PRSNT: CPT | Mod: CPTII,S$GLB,, | Performed by: PHYSICIAN ASSISTANT

## 2022-07-29 PROCEDURE — 4010F PR ACE/ARB THEARPY RXD/TAKEN: ICD-10-PCS | Mod: CPTII,S$GLB,, | Performed by: PHYSICIAN ASSISTANT

## 2022-07-29 PROCEDURE — 70498 CTA HEAD AND NECK (XPD): ICD-10-PCS | Mod: 26,,, | Performed by: RADIOLOGY

## 2022-07-29 PROCEDURE — 70496 CT ANGIOGRAPHY HEAD: CPT | Mod: TC

## 2022-07-29 PROCEDURE — 3078F DIAST BP <80 MM HG: CPT | Mod: CPTII,S$GLB,, | Performed by: PHYSICIAN ASSISTANT

## 2022-07-29 PROCEDURE — 3072F LOW RISK FOR RETINOPATHY: CPT | Mod: CPTII,S$GLB,, | Performed by: PHYSICIAN ASSISTANT

## 2022-07-29 PROCEDURE — 3078F PR MOST RECENT DIASTOLIC BLOOD PRESSURE < 80 MM HG: ICD-10-PCS | Mod: CPTII,S$GLB,, | Performed by: PHYSICIAN ASSISTANT

## 2022-07-29 PROCEDURE — 99999 PR PBB SHADOW E&M-EST. PATIENT-LVL IV: CPT | Mod: PBBFAC,,, | Performed by: PHYSICIAN ASSISTANT

## 2022-07-29 PROCEDURE — 1101F PR PT FALLS ASSESS DOC 0-1 FALLS W/OUT INJ PAST YR: ICD-10-PCS | Mod: CPTII,S$GLB,, | Performed by: PHYSICIAN ASSISTANT

## 2022-07-29 PROCEDURE — 3044F HG A1C LEVEL LT 7.0%: CPT | Mod: CPTII,S$GLB,, | Performed by: PHYSICIAN ASSISTANT

## 2022-07-29 PROCEDURE — 1159F MED LIST DOCD IN RCRD: CPT | Mod: CPTII,S$GLB,, | Performed by: PHYSICIAN ASSISTANT

## 2022-07-29 PROCEDURE — 3077F PR MOST RECENT SYSTOLIC BLOOD PRESSURE >= 140 MM HG: ICD-10-PCS | Mod: CPTII,S$GLB,, | Performed by: PHYSICIAN ASSISTANT

## 2022-07-29 PROCEDURE — 3044F PR MOST RECENT HEMOGLOBIN A1C LEVEL <7.0%: ICD-10-PCS | Mod: CPTII,S$GLB,, | Performed by: PHYSICIAN ASSISTANT

## 2022-07-29 PROCEDURE — 20610 DRAIN/INJ JOINT/BURSA W/O US: CPT | Mod: RT,S$GLB,, | Performed by: PHYSICIAN ASSISTANT

## 2022-07-29 PROCEDURE — 70496 CT ANGIOGRAPHY HEAD: CPT | Mod: 26,,, | Performed by: RADIOLOGY

## 2022-07-29 PROCEDURE — 70498 CT ANGIOGRAPHY NECK: CPT | Mod: 26,,, | Performed by: RADIOLOGY

## 2022-07-29 PROCEDURE — 3072F PR LOW RISK FOR RETINOPATHY: ICD-10-PCS | Mod: CPTII,S$GLB,, | Performed by: PHYSICIAN ASSISTANT

## 2022-07-29 PROCEDURE — 20610 LARGE JOINT ASPIRATION/INJECTION: R KNEE: ICD-10-PCS | Mod: RT,S$GLB,, | Performed by: PHYSICIAN ASSISTANT

## 2022-07-29 PROCEDURE — 25500020 PHARM REV CODE 255: Performed by: NURSE PRACTITIONER

## 2022-07-29 PROCEDURE — 99999 PR PBB SHADOW E&M-EST. PATIENT-LVL IV: ICD-10-PCS | Mod: PBBFAC,,, | Performed by: PHYSICIAN ASSISTANT

## 2022-07-29 PROCEDURE — 1159F PR MEDICATION LIST DOCUMENTED IN MEDICAL RECORD: ICD-10-PCS | Mod: CPTII,S$GLB,, | Performed by: PHYSICIAN ASSISTANT

## 2022-07-29 PROCEDURE — 99214 PR OFFICE/OUTPT VISIT, EST, LEVL IV, 30-39 MIN: ICD-10-PCS | Mod: 25,S$GLB,, | Performed by: PHYSICIAN ASSISTANT

## 2022-07-29 PROCEDURE — 3288F PR FALLS RISK ASSESSMENT DOCUMENTED: ICD-10-PCS | Mod: CPTII,S$GLB,, | Performed by: PHYSICIAN ASSISTANT

## 2022-07-29 PROCEDURE — 99214 OFFICE O/P EST MOD 30 MIN: CPT | Mod: 25,S$GLB,, | Performed by: PHYSICIAN ASSISTANT

## 2022-07-29 PROCEDURE — 3288F FALL RISK ASSESSMENT DOCD: CPT | Mod: CPTII,S$GLB,, | Performed by: PHYSICIAN ASSISTANT

## 2022-07-29 PROCEDURE — 1101F PT FALLS ASSESS-DOCD LE1/YR: CPT | Mod: CPTII,S$GLB,, | Performed by: PHYSICIAN ASSISTANT

## 2022-07-29 PROCEDURE — 3008F BODY MASS INDEX DOCD: CPT | Mod: CPTII,S$GLB,, | Performed by: PHYSICIAN ASSISTANT

## 2022-07-29 RX ADMIN — IOHEXOL 100 ML: 350 INJECTION, SOLUTION INTRAVENOUS at 10:07

## 2022-07-31 NOTE — PROGRESS NOTES
"     RHEUMATOLOGY OUTPATIENT CLINIC NOTE    7/31/2022    Attending Rheumatologist: Floresita Jensen PA-C  Primary Care Provider: Jose Szymanski MD   Chief Complaint/Reason For Consultation:  holley knee pain- R knee injection      Subjective:        Christine Jo is a 72 y.o. female here today for routine follow up of chronic knee pain bilaterally and for ziltretta injection of the right knee. Pt c/o worsening left knee pain at this time. Rheumatologic systems otherwise negative.    Serologies     Lab Results   Component Value Date    HEPCAB Negative 07/12/2017         Current Rheum Medications:  · Zilretta, boniva  Previous Rheum Medications:   · fosamax    Past Medical History:   Diagnosis Date    Arthritis     Cataract     Diabetes mellitus 2008     am 01/15/2018 Insulin x 1 year    DM (diabetes mellitus) 2008     am 02/14/2020 Insulin x 4 years    Encounter for blood transfusion     Glaucoma     Hypertension     Insomnia     Macular degeneration     Vaginal yeast infection        Review of patient's allergies indicates:   Allergen Reactions    Aspirin Palpitations       Objective:   BP (!) 144/64   Pulse 70   Ht 5' 1" (1.549 m)   Wt 107.5 kg (236 lb 15.9 oz)   BMI 44.78 kg/m²     Immunization History   Administered Date(s) Administered    COVID-19, MRNA, LN-S, PF (Pfizer) (Purple Cap) 02/17/2021, 03/10/2021, 10/13/2021    Hepatitis A, Adult 03/11/2020    Hepatitis B, Adult 06/28/2017    Hepatitis B, Pediatric/Adolescent 09/20/2017, 09/20/2017    Influenza 11/07/2016    Influenza (FLUAD) - Quadrivalent - Adjuvanted - PF *Preferred* (65+) 10/03/2020, 09/20/2021    Influenza - High Dose - PF (65 years and older) 10/23/2015, 09/20/2017, 09/27/2018, 09/13/2019    Influenza Split 09/21/2013    Pneumococcal Conjugate - 13 Valent 11/07/2016    Pneumococcal Polysaccharide - 23 Valent 09/21/2013, 09/27/2018    Tdap 07/18/2014    Zoster 09/21/2013    Zoster Recombinant " 12/07/2019, 02/13/2020       Current Outpatient Medications:     ACETAMINOPHEN (TYLENOL ARTHRITIS ORAL), Take by mouth as needed., Disp: , Rfl:     apixaban (ELIQUIS) 5 mg Tab, Take 1 tablet (5 mg total) by mouth 2 (two) times daily., Disp: 180 tablet, Rfl: 0    atorvastatin (LIPITOR) 10 MG tablet, Take 1 tablet (10 mg total) by mouth every evening., Disp: 90 tablet, Rfl: 3    azelastine (ASTELIN) 137 mcg (0.1 %) nasal spray, use 2 sprays (274 mcg total) by Nasal route 2 (two) times daily., Disp: 30 mL, Rfl: 1    benazepriL (LOTENSIN) 40 MG tablet, Take 1 tablet (40 mg total) by mouth 2 (two) times daily., Disp: 180 tablet, Rfl: 4    BEPREVE 1.5 % Drop, Place 1 drop into both eyes 2 (two) times daily., Disp: 10 mL, Rfl: 4    blood sugar diagnostic Strp, To check blood sugar 1 times daily, Disp: 100 each, Rfl: 3    blood-glucose meter kit, To check BG 1 times daily, to use with insurance preferred meter, Disp: 100 each, Rfl: 3    calcium citrate-vitamin D3 315-200 mg (CITRACAL+D) 315-200 mg-unit per tablet, Take 1 tablet by mouth once daily., Disp: , Rfl:     carvediloL (COREG) 25 MG tablet, TAKE 1 TABLET BY MOUTH TWICE DAILY, Disp: 180 tablet, Rfl: 3    clotrimazole-betamethasone (LOTRISONE) lotion, Apply topically 2 (two) times daily., Disp: 30 mL, Rfl: 2    cycloSPORINE (RESTASIS) 0.05 % ophthalmic emulsion, Place 1 drop into both eyes 2 (two) times daily., Disp: 60 each, Rfl: 11    diclofenac sodium (VOLTAREN) 1 % Gel, Apply 2 grams topically 4 (four) times daily. To affected joints as needed for pain, Disp: 100 g, Rfl: 3    DULoxetine (CYMBALTA) 60 MG capsule, Take 1 capsule (60 mg total) by mouth once daily., Disp: 90 capsule, Rfl: 3    erenumab-aooe (AIMOVIG AUTOINJECTOR) 140 mg/mL autoinjector, Inject 1 mL (140 mg total) into the skin every 30 days., Disp: 1 each, Rfl: 5    estradioL (ESTRACE) 0.01 % (0.1 mg/gram) vaginal cream, Place 1/2 gram vaginally every night for 1 month then three times  "a week, Disp: 42.5 g, Rfl: 11    fluticasone propionate (FLONASE) 50 mcg/actuation nasal spray, 2 sprays (100 mcg total) by Each Nostril route once daily., Disp: 48 g, Rfl: 3    gabapentin (NEURONTIN) 100 MG capsule, Take 1 capsule (100 mg total) by mouth once daily in the morning, Disp: 90 capsule, Rfl: 1    gabapentin (NEURONTIN) 300 MG capsule, Take 1 capsule (300 mg total) by mouth every evening., Disp: 90 capsule, Rfl: 1    hydrALAZINE (APRESOLINE) 100 MG tablet, Take 1 tablet (100 mg total) by mouth 2 (two) times daily., Disp: 180 tablet, Rfl: 0    insulin (LANTUS SOLOSTAR U-100 INSULIN) glargine 100 units/mL (3mL) SubQ pen, Inject 72 Units into the skin every evening., Disp: 60 mL, Rfl: 0    lancets Misc, To check BG 1 times daily, to use with insurance preferred meter, Disp: 100 each, Rfl: 3    liraglutide 0.6 mg/0.1 mL, 18 mg/3 mL, subq PNIJ (VICTOZA 2-LEAH) 0.6 mg/0.1 mL (18 mg/3 mL) PnIj pen, INJECT 0.6 MG UNDER THE SKIN EVERY DAY, Disp: 6 mL, Rfl: 1    meclizine (ANTIVERT) 25 mg tablet, Take 1 tablet (25 mg total) by mouth 3 (three) times daily as needed., Disp: 30 tablet, Rfl: 2    MULTIVIT WITH CALCIUM,IRON,MIN (WOMEN'S DAILY MULTIVITAMIN ORAL), Take by mouth once daily. , Disp: , Rfl:     mupirocin (BACTROBAN) 2 % ointment, Apply topically 2 (two) times daily., Disp: 22 g, Rfl: 0    nitrofurantoin, macrocrystal-monohydrate, (MACROBID) 100 MG capsule, Take 1 capsule (100 mg total) by mouth once daily., Disp: 30 capsule, Rfl: 2    nystatin (MYCOSTATIN) cream, Apply topically 2 (two) times daily. Continue until completely healed, Disp: 30 g, Rfl: 0    pantoprazole (PROTONIX) 40 MG tablet, Take 1 tablet (40 mg total) by mouth 2 (two) times a day., Disp: 180 tablet, Rfl: 3    pen needle, diabetic (BD ULTRA-FINE SHORT PEN NEEDLE) 31 gauge x 5/16" Ndle, AS DIRECTED TWICE DAILY, Disp: 200 each, Rfl: 3    rimegepant 75 mg odt, Take 1 tablet (75 mg total) by mouth as needed for Migraine (do not " exceed 2-3 doses within 1 week). Place ODT tablet on the tongue; alternatively the ODT tablet may be placed under the tongue, Disp: 8 tablet, Rfl: 5    SITagliptin (JANUVIA) 100 MG Tab, Take 1 tablet (100 mg total) by mouth once daily., Disp: 90 tablet, Rfl: 0    temazepam (RESTORIL) 30 mg capsule, Take 1 capsule by mouth nightly as needed., Disp: 30 capsule, Rfl: 0    traMADoL (ULTRAM) 50 mg tablet, Take 1 tablet (50 mg total) by mouth every 12 (twelve) hours as needed for Pain., Disp: 14 tablet, Rfl: 0    albuterol (VENTOLIN HFA) 90 mcg/actuation inhaler, Inhale 2 puffs into the lungs every 4 (four) hours as needed for Wheezing or Shortness of Breath., Disp: 18 g, Rfl: 11    diazePAM (VALIUM) 10 MG Tab, Take 1 tablet (10 mg total) by mouth once. 30-60 min before MRI for 1 dose, Disp: 1 tablet, Rfl: 0    Current Facility-Administered Medications:     sodium hyaluronate (orthovisc) 30 mg, 30 mg, Intra-articular, 1 time in Clinic/HOD, Jered Holbrook MD       Recent Results (from the past 336 hour(s))   Creatinine, serum    Collection Time: 07/27/22 12:37 PM   Result Value Ref Range    Creatinine 0.8 0.5 - 1.4 mg/dL    eGFR if African American >60 >60 mL/min/1.73 m^2    eGFR if non African American >60 >60 mL/min/1.73 m^2         Imaging:  All imaging reviewed and independently interpreted by me.    XR KNEE ORTHO BILAT WITH FLEXION 1/12/22     CLINICAL HISTORY:  Bilateral primary osteoarthritis of knee     COMPARISON:  Knee radiographs October 8, 2019     FINDINGS:  Right knee:  No right knee fracture.  Moderate medial compartment predominant tricompartmental right knee osteoarthritis.  No right knee effusion.  No acute soft tissue abnormality identified.     Left knee:  No left knee fracture.  Moderate medial compartment predominant tricompartmental left knee osteoarthritis.  No left knee effusion.  No acute soft tissue abnormality visualized.    Assessment:     1. Chronic pain of right knee    2. Primary  osteoarthritis of right knee    3. Primary osteoarthritis of left knee    4. Chronic pain of left knee          Left knee kellgren lawgrence grade 3  Plan:       · Bilateral chroncic knee OA w/ worsening pain and failed conservative treatments. zilretta injection of right knee today. Will order zilretta of the left knee  · Patient understands and contact clinic at any time regarding questions about today's office visit and any medication changes that were made  · Return to clinic: 1-2 weeks for left knee zilretta    The patient understands, chooses and consents to this plan and accepts all the risks which include but are not limited to the risks mentioned above.     Method of contact with patient concerns: Jimmy attn Rheumatology    30 minutes of total time spent on the encounter, which includes face to face time and non-face to face time preparing to see the patient (eg, review of tests), Obtaining and/or reviewing separately obtained history, Documenting clinical information in the electronic or other health record, Independently interpreting results (not separately reported) and communicating results to the patient/family/caregiver, or Care coordination (not separately reported).     Large Joint Aspiration/Injection: R knee    Date/Time: 7/29/2022 9:30 AM  Performed by: Floresita Jensen PA-C  Authorized by: Floresita Jensen PA-C     Consent Done?:  Yes (Verbal)  Indications:  Pain and arthritis  Site marked: the procedure site was marked    Timeout: prior to procedure the correct patient, procedure, and site was verified    Prep: patient was prepped and draped in usual sterile fashion    Local anesthetic:  Topical anesthetic    Details:  Needle Size:  25 G  Ultrasonic Guidance for needle placement?: No    Approach:  Anterolateral  Location:  Knee  Site:  R knee  Medications:  32 mg triamcinolone acetonide 32 mg  Patient tolerance:  Patient tolerated the procedure well with no immediate  complications          Disclaimer: This note was prepared using a voice recognition system and is likely to have sound alike errors within the text.       Floresita Jensen PA-C  Rheumatology Department   [facility]

## 2022-07-31 NOTE — PROCEDURES
Large Joint Aspiration/Injection: R knee    Date/Time: 7/29/2022 9:30 AM  Performed by: Floresita Jensen PA-C  Authorized by: Floresita Jensen PA-C     Consent Done?:  Yes (Verbal)  Indications:  Pain and arthritis  Site marked: the procedure site was marked    Timeout: prior to procedure the correct patient, procedure, and site was verified    Prep: patient was prepped and draped in usual sterile fashion    Local anesthetic:  Topical anesthetic    Details:  Needle Size:  25 G  Ultrasonic Guidance for needle placement?: No    Approach:  Anterolateral  Location:  Knee  Site:  R knee  Medications:  32 mg triamcinolone acetonide 32 mg  Patient tolerance:  Patient tolerated the procedure well with no immediate complications

## 2022-08-01 ENCOUNTER — TELEPHONE (OUTPATIENT)
Dept: RHEUMATOLOGY | Facility: CLINIC | Age: 72
End: 2022-08-01
Payer: MEDICARE

## 2022-08-01 ENCOUNTER — TELEPHONE (OUTPATIENT)
Dept: INTERNAL MEDICINE | Facility: CLINIC | Age: 72
End: 2022-08-01
Payer: MEDICARE

## 2022-08-01 ENCOUNTER — TELEPHONE (OUTPATIENT)
Dept: CARDIOLOGY | Facility: CLINIC | Age: 72
End: 2022-08-01
Payer: MEDICARE

## 2022-08-01 ENCOUNTER — PATIENT MESSAGE (OUTPATIENT)
Dept: CARDIOLOGY | Facility: CLINIC | Age: 72
End: 2022-08-01
Payer: MEDICARE

## 2022-08-01 NOTE — TELEPHONE ENCOUNTER
Returned call     Laura SUE Staff  Caller: patient (Today,  9:53 AM)  Patient is requesting that Dr Singh see her CT and MRI results and she needs to know what he thinks of it saying cardiac disease, please call her back at [phone]     No answer, left vm to return call

## 2022-08-01 NOTE — TELEPHONE ENCOUNTER
----- Message from Laura Velasquez sent at 8/1/2022  9:53 AM CDT -----  Regarding: diabetic supplies  Contact: patient  Patient needs to speak to nurse about hr diabetic supplies, needs the prescription  sent to [facility] instead of [facility], please call her back if needed at [phone]

## 2022-08-01 NOTE — TELEPHONE ENCOUNTER
----- Message from Floresita Jensen PA-C sent at 7/31/2022 11:53 AM CDT -----  Order for zilretta pre-auth has been added

## 2022-08-01 NOTE — TELEPHONE ENCOUNTER
lov 06/21/2022. Pt states that she is out of her diabetic supplies. Pt states that she wants them sent to [facility] because its free and with it being sent to [facility], she has to pay for them. Pt states she needs all of her diabetic supplies refilled. Please advise.

## 2022-08-05 NOTE — TELEPHONE ENCOUNTER
No new care gaps identified.  [facility] Embedded Care Gaps. Reference number: [id number]. 8/05/2022   7:47:40 AM CDT

## 2022-08-08 ENCOUNTER — PATIENT MESSAGE (OUTPATIENT)
Dept: INTERNAL MEDICINE | Facility: CLINIC | Age: 72
End: 2022-08-08
Payer: MEDICARE

## 2022-08-08 RX ORDER — TEMAZEPAM 30 MG/1
CAPSULE ORAL
Qty: 30 CAPSULE | Refills: 0 | Status: SHIPPED | OUTPATIENT
Start: 2022-08-08 | End: 2022-09-03 | Stop reason: SDUPTHER

## 2022-08-11 NOTE — TELEPHONE ENCOUNTER
No new care gaps identified.  [facility] Embedded Care Gaps. Reference number: [id number]. 8/11/2022   10:34:44 AM SWETHAT

## 2022-08-11 NOTE — TELEPHONE ENCOUNTER
Refill Routing Note   Medication(s) are not appropriate for processing by [facility] for the following reason(s):      - Drug-Drug Interaction (VICTOZA)    [facility] action(s):  Defer Medication-related problems identified: Drug-drug interaction        Medication reconciliation completed: No     Appointments  past 12m or future 3m with PCP    Date Provider   Last Visit   6/21/2022 Jose Szymanski MD   Next Visit   10/4/2022 Jose Szymanski MD   ED visits in past 90 days: 0        Note composed:12:02 PM 08/11/2022

## 2022-08-15 ENCOUNTER — PROCEDURE VISIT (OUTPATIENT)
Dept: RHEUMATOLOGY | Facility: CLINIC | Age: 72
End: 2022-08-15
Payer: MEDICARE

## 2022-08-15 VITALS
WEIGHT: 236.13 LBS | HEART RATE: 67 BPM | HEIGHT: 61 IN | BODY MASS INDEX: 44.58 KG/M2 | SYSTOLIC BLOOD PRESSURE: 114 MMHG | DIASTOLIC BLOOD PRESSURE: 57 MMHG

## 2022-08-15 DIAGNOSIS — M25.562 CHRONIC PAIN OF BOTH KNEES: ICD-10-CM

## 2022-08-15 DIAGNOSIS — M17.0 BILATERAL PRIMARY OSTEOARTHRITIS OF KNEE: ICD-10-CM

## 2022-08-15 DIAGNOSIS — M17.12 PRIMARY OSTEOARTHRITIS OF LEFT KNEE: Primary | ICD-10-CM

## 2022-08-15 DIAGNOSIS — M25.561 CHRONIC PAIN OF BOTH KNEES: ICD-10-CM

## 2022-08-15 DIAGNOSIS — G89.29 CHRONIC PAIN OF BOTH KNEES: ICD-10-CM

## 2022-08-15 PROCEDURE — 20610 DRAIN/INJ JOINT/BURSA W/O US: CPT | Mod: LT,S$GLB,, | Performed by: PHYSICIAN ASSISTANT

## 2022-08-15 PROCEDURE — 20610: ICD-10-PCS | Mod: LT,S$GLB,, | Performed by: PHYSICIAN ASSISTANT

## 2022-08-15 PROCEDURE — 99499 UNLISTED E&M SERVICE: CPT | Mod: S$GLB,,, | Performed by: PHYSICIAN ASSISTANT

## 2022-08-15 PROCEDURE — 99499 NO LOS: ICD-10-PCS | Mod: S$GLB,,, | Performed by: PHYSICIAN ASSISTANT

## 2022-08-15 NOTE — PROCEDURES
Large Joint Aspiration/Injection: L knee zilretta    Date/Time: 8/15/2022 10:30 AM  Performed by: Floresita Jensen PA-C  Authorized by: Floresita Jensen PA-C     Consent Done?:  Yes (Verbal)  Indications:  Pain and arthritis  Site marked: the procedure site was marked    Timeout: prior to procedure the correct patient, procedure, and site was verified    Prep: patient was prepped and draped in usual sterile fashion    Local anesthetic:  Topical anesthetic    Details:  Needle Size:  25 G  Ultrasonic Guidance for needle placement?: No    Approach:  Anterolateral  Location:  Knee  Site:  L knee  Medications:  32 mg triamcinolone acetonide 32 mg  Patient tolerance:  Patient tolerated the procedure well with no immediate complications     XR KNEE ORTHO BILAT WITH FLEXION 1/12/22     CLINICAL HISTORY:  Bilateral primary osteoarthritis of knee     COMPARISON:  Knee radiographs October 8, 2019     FINDINGS:  Right knee:  No right knee fracture.  Moderate medial compartment predominant tricompartmental right knee osteoarthritis.  No right knee effusion.  No acute soft tissue abnormality identified.     Left knee:  No left knee fracture.  Moderate medial compartment predominant tricompartmental left knee osteoarthritis.  No left knee effusion.  No acute soft tissue abnormality visualized.    bilateral knee kellgren lawgrence grade 3

## 2022-08-16 ENCOUNTER — OFFICE VISIT (OUTPATIENT)
Dept: NEUROLOGY | Facility: CLINIC | Age: 72
End: 2022-08-16
Payer: MEDICARE

## 2022-08-16 VITALS
HEIGHT: 61 IN | OXYGEN SATURATION: 97 % | BODY MASS INDEX: 44.7 KG/M2 | SYSTOLIC BLOOD PRESSURE: 121 MMHG | DIASTOLIC BLOOD PRESSURE: 70 MMHG | WEIGHT: 236.75 LBS | RESPIRATION RATE: 16 BRPM | HEART RATE: 75 BPM

## 2022-08-16 DIAGNOSIS — R41.3 MEMORY LOSS: ICD-10-CM

## 2022-08-16 DIAGNOSIS — H81.4 VERTIGO OF CENTRAL ORIGIN: ICD-10-CM

## 2022-08-16 DIAGNOSIS — R42 DIZZINESS AND GIDDINESS: ICD-10-CM

## 2022-08-16 DIAGNOSIS — G43.809 VESTIBULAR MIGRAINE: ICD-10-CM

## 2022-08-16 DIAGNOSIS — F32.A DEPRESSION, UNSPECIFIED DEPRESSION TYPE: ICD-10-CM

## 2022-08-16 DIAGNOSIS — H93.A9 PULSATILE TINNITUS, UNSPECIFIED EAR: ICD-10-CM

## 2022-08-16 DIAGNOSIS — F09 MILD COGNITIVE DISORDER: ICD-10-CM

## 2022-08-16 DIAGNOSIS — G43.101 MIGRAINE WITH AURA AND WITH STATUS MIGRAINOSUS, NOT INTRACTABLE: ICD-10-CM

## 2022-08-16 DIAGNOSIS — R29.90 MULTIPLE NEUROLOGICAL SYMPTOMS: Primary | ICD-10-CM

## 2022-08-16 DIAGNOSIS — H91.92 HEARING LOSS OF LEFT EAR, UNSPECIFIED HEARING LOSS TYPE: ICD-10-CM

## 2022-08-16 DIAGNOSIS — G56.00 CARPAL TUNNEL SYNDROME, UNSPECIFIED LATERALITY: ICD-10-CM

## 2022-08-16 DIAGNOSIS — R42 DIZZY: ICD-10-CM

## 2022-08-16 PROCEDURE — 1101F PT FALLS ASSESS-DOCD LE1/YR: CPT | Mod: CPTII,S$GLB,, | Performed by: NURSE PRACTITIONER

## 2022-08-16 PROCEDURE — 99999 PR PBB SHADOW E&M-EST. PATIENT-LVL V: CPT | Mod: PBBFAC,,, | Performed by: NURSE PRACTITIONER

## 2022-08-16 PROCEDURE — 1160F PR REVIEW ALL MEDS BY PRESCRIBER/CLIN PHARMACIST DOCUMENTED: ICD-10-PCS | Mod: CPTII,S$GLB,, | Performed by: NURSE PRACTITIONER

## 2022-08-16 PROCEDURE — 1101F PR PT FALLS ASSESS DOC 0-1 FALLS W/OUT INJ PAST YR: ICD-10-PCS | Mod: CPTII,S$GLB,, | Performed by: NURSE PRACTITIONER

## 2022-08-16 PROCEDURE — 1159F MED LIST DOCD IN RCRD: CPT | Mod: CPTII,S$GLB,, | Performed by: NURSE PRACTITIONER

## 2022-08-16 PROCEDURE — 3074F PR MOST RECENT SYSTOLIC BLOOD PRESSURE < 130 MM HG: ICD-10-PCS | Mod: CPTII,S$GLB,, | Performed by: NURSE PRACTITIONER

## 2022-08-16 PROCEDURE — 3072F LOW RISK FOR RETINOPATHY: CPT | Mod: CPTII,S$GLB,, | Performed by: NURSE PRACTITIONER

## 2022-08-16 PROCEDURE — 1126F PR PAIN SEVERITY QUANTIFIED, NO PAIN PRESENT: ICD-10-PCS | Mod: CPTII,S$GLB,, | Performed by: NURSE PRACTITIONER

## 2022-08-16 PROCEDURE — 99215 OFFICE O/P EST HI 40 MIN: CPT | Mod: S$GLB,,, | Performed by: NURSE PRACTITIONER

## 2022-08-16 PROCEDURE — 4010F PR ACE/ARB THEARPY RXD/TAKEN: ICD-10-PCS | Mod: CPTII,S$GLB,, | Performed by: NURSE PRACTITIONER

## 2022-08-16 PROCEDURE — 3078F PR MOST RECENT DIASTOLIC BLOOD PRESSURE < 80 MM HG: ICD-10-PCS | Mod: CPTII,S$GLB,, | Performed by: NURSE PRACTITIONER

## 2022-08-16 PROCEDURE — 3072F PR LOW RISK FOR RETINOPATHY: ICD-10-PCS | Mod: CPTII,S$GLB,, | Performed by: NURSE PRACTITIONER

## 2022-08-16 PROCEDURE — 3074F SYST BP LT 130 MM HG: CPT | Mod: CPTII,S$GLB,, | Performed by: NURSE PRACTITIONER

## 2022-08-16 PROCEDURE — 4010F ACE/ARB THERAPY RXD/TAKEN: CPT | Mod: CPTII,S$GLB,, | Performed by: NURSE PRACTITIONER

## 2022-08-16 PROCEDURE — 1126F AMNT PAIN NOTED NONE PRSNT: CPT | Mod: CPTII,S$GLB,, | Performed by: NURSE PRACTITIONER

## 2022-08-16 PROCEDURE — 99999 PR PBB SHADOW E&M-EST. PATIENT-LVL V: ICD-10-PCS | Mod: PBBFAC,,, | Performed by: NURSE PRACTITIONER

## 2022-08-16 PROCEDURE — 3044F HG A1C LEVEL LT 7.0%: CPT | Mod: CPTII,S$GLB,, | Performed by: NURSE PRACTITIONER

## 2022-08-16 PROCEDURE — 3288F PR FALLS RISK ASSESSMENT DOCUMENTED: ICD-10-PCS | Mod: CPTII,S$GLB,, | Performed by: NURSE PRACTITIONER

## 2022-08-16 PROCEDURE — 3288F FALL RISK ASSESSMENT DOCD: CPT | Mod: CPTII,S$GLB,, | Performed by: NURSE PRACTITIONER

## 2022-08-16 PROCEDURE — 3008F BODY MASS INDEX DOCD: CPT | Mod: CPTII,S$GLB,, | Performed by: NURSE PRACTITIONER

## 2022-08-16 PROCEDURE — 1159F PR MEDICATION LIST DOCUMENTED IN MEDICAL RECORD: ICD-10-PCS | Mod: CPTII,S$GLB,, | Performed by: NURSE PRACTITIONER

## 2022-08-16 PROCEDURE — 3078F DIAST BP <80 MM HG: CPT | Mod: CPTII,S$GLB,, | Performed by: NURSE PRACTITIONER

## 2022-08-16 PROCEDURE — 3044F PR MOST RECENT HEMOGLOBIN A1C LEVEL <7.0%: ICD-10-PCS | Mod: CPTII,S$GLB,, | Performed by: NURSE PRACTITIONER

## 2022-08-16 PROCEDURE — 3008F PR BODY MASS INDEX (BMI) DOCUMENTED: ICD-10-PCS | Mod: CPTII,S$GLB,, | Performed by: NURSE PRACTITIONER

## 2022-08-16 PROCEDURE — 1160F RVW MEDS BY RX/DR IN RCRD: CPT | Mod: CPTII,S$GLB,, | Performed by: NURSE PRACTITIONER

## 2022-08-16 PROCEDURE — 99215 PR OFFICE/OUTPT VISIT, EST, LEVL V, 40-54 MIN: ICD-10-PCS | Mod: S$GLB,,, | Performed by: NURSE PRACTITIONER

## 2022-08-16 RX ORDER — LAMOTRIGINE 25 MG/1
25 TABLET ORAL 2 TIMES DAILY
Qty: 60 TABLET | Refills: 11 | Status: SHIPPED | OUTPATIENT
Start: 2022-08-16 | End: 2022-08-31

## 2022-08-16 NOTE — PROGRESS NOTES
"Subjective:      Patient ID: Christine Jo is a 72 y.o. female.    Chief Complaint:  Follow-up      History of Present Illness  HPI    Former patient of Dr. Haney, new to me.    Patient presents to appointment unaccompanied to discuss dizziness and memory concerns. Her medical history includes depression, anxiety, hearing loss in left ear, PAF, HTN, CAD, frequent UTIs, type 2 DM, GERD, osteoporosis, and MATIAS. Patient states around 2020 she went swimming underwater in the ocean and developed dizziness and ringing in her left ear. She states over time, the ringing has improved, but the dizziness has worsened. She describes her dizziness as "room spinning". She states she had an episode in both January and February 2022 where she was watching a movie with surround sound, which caused her to develop severe dizziness with nausea and vomiting. She states she felt "drunk". Since february, she has had multiple dizzy spells. She states the spells have become daily over the last week. She states the spells typically last around 5 hours with nausea and stomach pains. She states she had one spell that lasted a day. She has called EMS twice in the past because of the spells. Denies lateralized weakness, slurred speech, sudden vision loss, or facial droop with spells. She states prior to the spells, she will have intense sweating. She states smells and turning her head quickly will cause her to become dizzy. States she has trouble hearing out of her left ear. She states Meclizine and sleeping help to improve her symptoms. States she has looked up vestibular exercises online, and they cause her to become dizzy and her neck often cracks. She states her head feels huge and feels like it is "stuffed with cotton" in the frontal region. She also has an "arthitis" pain that radiates from the back of her head down her spine. Also complains of pain behind eyes. She states the head pain has been constant and daily since January 2022 and " "worsens during her dizzy spells. Denies double vision. Denies aura. States she hears "zapping" inside her head. States she has sensitivity to light and sound. She states she began losing her balance over the last year. In March 2022, she began leaning forward and from side to side without realizing it. She loses her balance frequently and had a fall on 6-9-2022 and could not get off the floor for 2 hours. States she also had an incontinent episode on herself. States when she walks, her left foot turns towards the left side. Ambulates with a cane.  She experiences significant distress from her symptoms and is to afraid to go places. Denies history of TBI or storke. Did not receive the Ajovy prescription Dr. Haney ordered. Afraid to take Lodine because she is on blood thinners for her A. Fib. 10-4-2021 CTH shows chronic microvascular ischemic changes. 2- Abnormal VNG. Results are indicative of a left peripheral vestibulopathy, as evidenced by a 50% left Unilateral Weakness.       Patient states she noticed short term memory trouble around 2017 that is progressively getting worst. She experiences word finding difficulties and forgetting conversations. At times, she will feel nervous and rushed, causing her to have difficulty processing things. Often has "brain fog". She does not drive. She lives alone at this time but plans to move in with her son. Independent in ADLs and keeps up with her medications and appointments. Has trouble sleeping at night and has decreased appetite. Takes Cymbalta for anxiety and depression. Use to see Dr. Albrecht with psych but not interesting in seeing psych at this time. No history of hypothyroidism. Her mother was diagnosed with dementia in her early 80s. She states she had neuropsych testing completed at [facility] in 2017.      Patient states she has history of left sided bells palsy in her 20s. States symptoms resolved.       Patient states she has a history of CTS with CTS release " "surgery. She states numbness in bilateral hands is reemerging along with weakness.      Interval History 8-: Patient presents to appointment unaccompanied. Patient states that her dizziness and headaches have improved since she saw me in June. Since our last appointment, she has moved in with her son. States she has lightheadedness when going from a sit to stand position. Her hydralazine dose was lowered yesterday by cardiology. Has dizziness for 5 seconds when putting her head down on pillow at night. No longer having dizzy spells lasting for hours with nausea and vomiting. States she has a headache about 1-2 times a month that last up to 3 days. Describes her headaches as squishing/hammering. Has sensitivity to noise and smells with headaches. Feels like her headaches "drain" her. Also has aura of flashing lights in her peripheral vision. States she will have aura without headache at times. Did not start Aimovig or Nurtec PRN. 06- EEG NL. 7- MRI brain shows no acute findings.  Mild atrophy with white matter degeneration. MRV brain shows no acute abnormality.  Small hypoplastic right transverse/sigmoid sinus.  Otherwise negative. 7- CTA head and neck shows no evidence of flow-limiting stenosis within the cervical arterial vasculature. No evidence of flow-limiting stenosis within the intracranial arterial vasculature. No aneurysm.      Continues to have trouble with her memory. Has not had neuropsych appointment. 7- MRI brain shows no acute findings.  Mild atrophy with white matter degeneration.          Review of Systems  Review of Systems   Constitutional: Positive for appetite change (decrease) and fatigue.   HENT: Positive for hearing loss and tinnitus. Negative for drooling, trouble swallowing and voice change.    Eyes: Positive for visual disturbance. Negative for photophobia.   Cardiovascular: Negative for palpitations.   Gastrointestinal: Positive for nausea and vomiting. " Negative for constipation and diarrhea.   Genitourinary: Negative for difficulty urinating.        Urinary incontinence    Musculoskeletal: Positive for arthralgias, back pain, gait problem and neck pain.   Neurological: Positive for dizziness, weakness, light-headedness and headaches. Negative for tremors, seizures, syncope, facial asymmetry, speech difficulty and numbness.   Psychiatric/Behavioral: Positive for decreased concentration, dysphoric mood and sleep disturbance. Negative for behavioral problems, confusion and hallucinations. The patient is nervous/anxious.      Objective:   VITAL SIGNS WERE REVIEWED        GENERAL APPEARANCE:      The patient looks comfortable.     BMI 44.74     No signs of respiratory distress.     Normal breathing pattern.     No dysmorphic features     Normal eye contact.      GENERAL MEDICAL EXAM:     HEENT:  Head is atraumatic normocephalic.      Neck and Axillae: No JVD. No visible lesions. No thyromegaly. No lymphadenopathy.     Cardiopulmonary: No cyanosis. No tachypnea. Normal respiratory effort.     Gastrointestinal/Urogenital:  No jaundice. No stomas or lesions. No visible hernias. No catheters.     Skin, Hair and Nails:  No neurofibromatosis. No visible lesions.No stigmata of autoimmune disease. No clubbing.     Skin is warm and moist. No palpable masses.     Limbs: No varicose veins. No visible swelling. No palpable edema.     Muskoskeletal: No visible deformities.No visible lesions.     No spine tenderness. No signs of longstanding neuropathy. No dislocations or fractures.          Neurologic Exam     Mental Status   Oriented to person, place, and time.   Follows 3 step commands.   Attention: normal. Concentration: normal.   Speech: speech is normal   Level of consciousness: alert  Knowledge: good. Able to perform simple calculations.   Able to name object. Able to read. Unable to repeat. Able to write. Normal comprehension.     6-9-2022 MOCA  24/30    Visuospatial/Executive 4/5    Naming                            3/3    Attention                         6/6    Language                         1/3    Abstraction                    2/2    Recall                                2/5 (3 with cues)    Orientation                     6/6           Cranial Nerves   Cranial nerves II through XII intact.     CN II   Visual fields full to confrontation.   Visual acuity: normal  Right visual field deficit: none  Left visual field deficit: none     CN III, IV, VI   Pupils are equal, round, and reactive to light.  Extraocular motions are normal.   Right pupil: Size: 3 mm. Shape: regular. Reactivity: brisk. Consensual response: intact. Accommodation: intact.   Left pupil: Size: 3 mm. Shape: regular. Reactivity: brisk. Consensual response: intact. Accommodation: intact.   CN III: no CN III palsy  CN VI: no CN VI palsy  Nystagmus: none   Diplopia: none  Ophthalmoparesis: none  Upgaze: normal  Downgaze: normal  Conjugate gaze: present  Vestibulo-ocular reflex: present    CN V   Right facial sensation deficit: complete  Left facial sensation deficit: none    CN VII   Facial expression full, symmetric.   Right facial weakness: none  Left facial weakness: none    CN VIII   Hearing: impaired    CN IX, X   CN IX normal.   CN X normal.   Palate: symmetric    CN XI   CN XI normal.   Right sternocleidomastoid strength: normal  Left sternocleidomastoid strength: normal  Right trapezius strength: normal  Left trapezius strength: normal    CN XII   CN XII normal.   Tongue: not atrophic  Fasciculations: absent  Tongue deviation: none    Motor Exam   Muscle bulk: normal  Overall muscle tone: normal  Right arm tone: normal  Left arm tone: normal  Right arm pronator drift: absent  Left arm pronator drift: absent  Right leg tone: normal  Left leg tone: normal    Strength   Right neck flexion: 5/5  Left neck flexion: 5/5  Right neck extension: 5/5  Left neck extension: 5/5  Right deltoid:  5  Left deltoid: 5/  Right biceps:   Left biceps:   Right triceps:   Left triceps:   Right wrist flexion:   Left wrist flexion:   Right wrist extension:   Left wrist extension:   Right interossei:   Left interossei:   Right iliopsoas:   Left iliopsoas:   Right quadriceps:   Left quadriceps:   Right hamstrin/5  Left hamstrin/5  Right glutei:   Left glutei:   Right anterior tibial:   Left anterior tibial:   Right posterior tibial:   Left posterior tibial:   Right peroneal:   Left peroneal:   Right gastroc:   Left gastroc:     Sensory Exam   Right arm light touch: decreased from elbow  Left arm light touch: normal  Right leg light touch: decreased from knee  Left leg light touch: normal  Right arm vibration: normal  Left arm vibration: normal  Right leg vibration: decreased from toes  Left leg vibration: decreased from toes  Proprioception normal.   Right arm proprioception: normal  Left arm proprioception: normal  Right leg proprioception: normal  Left leg proprioception: normal  Right arm pinprick: decreased from elbow  Left arm pinprick: normal  Right leg pinprick: decreased from knee  Left leg pinprick: normal    Gait, Coordination, and Reflexes     Gait  Gait: non-neurologic    Coordination   Romberg: negative  Finger to nose coordination: normal  Heel to shin coordination: normal    Tremor   Resting tremor: absent  Intention tremor: absent  Action tremor: absent    Reflexes   Right brachioradialis: 2+  Left brachioradialis: 2+  Right biceps: 2+  Left biceps: 2+  Right triceps: 2+  Left triceps: 2+  Right patellar: 1+  Left patellar: 1+  Right achilles: 0  Left achilles: 0  Right plantar: normal  Left plantar: normal  Right Shore: absent  Left Shore: absent  Right ankle clonus: absent  Left ankle clonus: absent  Right pendular knee jerk: absent  Left pendular knee jerk: absent          Lab Results   Component Value Date    WBC  7.19 06/23/2022    HGB 10.8 (L) 06/23/2022    HCT 34.4 (L) 06/23/2022    MCV 99 (H) 06/23/2022     06/23/2022   1C  Sodium   Date Value Ref Range Status   06/23/2022 142 136 - 145 mmol/L Final     Potassium   Date Value Ref Range Status   06/23/2022 4.5 3.5 - 5.1 mmol/L Final     Chloride   Date Value Ref Range Status   06/23/2022 105 95 - 110 mmol/L Final     CO2   Date Value Ref Range Status   06/23/2022 28 23 - 29 mmol/L Final     Glucose   Date Value Ref Range Status   06/23/2022 145 (H) 70 - 110 mg/dL Final     BUN   Date Value Ref Range Status   06/23/2022 20 8 - 23 mg/dL Final     Creatinine   Date Value Ref Range Status   07/27/2022 0.8 0.5 - 1.4 mg/dL Final     Calcium   Date Value Ref Range Status   06/23/2022 9.8 8.7 - 10.5 mg/dL Final     Total Protein   Date Value Ref Range Status   06/23/2022 6.3 6.0 - 8.4 g/dL Final     Albumin   Date Value Ref Range Status   06/23/2022 3.8 3.5 - 5.2 g/dL Final     Total Bilirubin   Date Value Ref Range Status   06/23/2022 0.8 0.1 - 1.0 mg/dL Final     Comment:     For infants and newborns, interpretation of results should be based  on gestational age, weight and in agreement with clinical  observations.    Premature Infant recommended reference ranges:  Up to 24 hours.............<8.0 mg/dL  Up to 48 hours............<12.0 mg/dL  3-5 days..................<15.0 mg/dL  6-29 days.................<15.0 mg/dL       Alkaline Phosphatase   Date Value Ref Range Status   06/23/2022 43 (L) 55 - 135 U/L Final     AST   Date Value Ref Range Status   06/23/2022 14 10 - 40 U/L Final     ALT   Date Value Ref Range Status   06/23/2022 15 10 - 44 U/L Final     Anion Gap   Date Value Ref Range Status   06/23/2022 9 8 - 16 mmol/L Final     eGFR if    Date Value Ref Range Status   07/27/2022 >60 >60 mL/min/1.73 m^2 Final     eGFR if non    Date Value Ref Range Status   07/27/2022 >60 >60 mL/min/1.73 m^2 Final     Comment:     Calculation used to  obtain the estimated glomerular filtration  rate (eGFR) is the CKD-EPI equation.        Lab Results   Component Value Date    [id number] 268 06/23/2022      Lab Results   Component Value Date    TSH 0.936 02/17/2022     Lab Results   Component Value Date    HGBA1C 6.4 (H) 06/23/2022 4- [facility]    Neuropsychology testing results are generally within normal limits. Short term memory is normal. Only atypicaly finds is mild slowing in processing speed and attention. This is likely related to MATIAS and HTN/diabetes    10-     MRI IAC/Temporal Bones no significant MRI abnormality of the internal auditory canals or cerebellopontine angles.  2. Minimal nonspecific white matter T2 alteration that likely relates to chronic microvascular ischemia.     10-4-2021      CTH shows chronic microvascular ischemic changes      2-     Abnormal VNG. Results are indicative of a left peripheral vestibulopathy, as evidenced by a 50% left Unilateral Weakness.         06-     EEG NL      7-    MRI brain shows no acute findings.  Mild atrophy with white matter degeneration.    MRV brain shows no acute abnormality.  Small hypoplastic right transverse/sigmoid sinus.  Otherwise negative.      7-    CTA head and neck shows no evidence of flow-limiting stenosis within the cervical arterial vasculature. No evidence of flow-limiting stenosis within the intracranial arterial vasculature. No aneurysm.        Reviewed the neuroimaging independently      Assessment:      1. Multiple neurological symptoms    2. Vestibular migraine    3. Migraine with aura and with status migrainosus, not intractable    4. Dizziness and giddiness    5. Vertigo of central origin     6. Mild cognitive disorder    7. Carpal tunnel syndrome, unspecified laterality    8. Dizzy    9. Memory loss    10. Hearing loss of left ear, unspecified hearing loss type    11. Pulsatile tinnitus, unspecified ear    12. Depression, unspecified  depression type         Plan:      MULTIPLE NEUROLOGICAL SYMPTOMS      MIGRAINE WITH AURA/ POSSIBLE VESTIBULAR MIGRAINE     Propanolol contraindicated related multiple medications for HTN    Topamax contraindicated related to memory difficulites    Amitriptyline contraindicated related to history of CAD    Triptains contraindicated related to history of CAD    Try Lamictal with slow titration to 25 mg BID. Instructed patient to contact me if she develops a rash.    States ajovy not covered by insurance    Will try Nurtec PRN        LIGHTHEADEDNESS    If lightheadedness continues after lowering hydralazine, contact cardiology    Avoid standing for a long time    Slow transition from lying down to sitting to standing    Cross legs while standing    Increase fluid intake    Balanced salt intake     Adjust hypotensive agents     PT to strengthen leg muscles    Wearing thigh high stockings     Falling down precautions    Avoid triggers           RICK/MEMROY DIFFICULTIES/ANXIETY/DEPRESSION    Request records from [facility]    Neuropsych referral placed    Deferred psych referral    Memory Strategies     Internal Memory Strategies:   PREPARE yourself to pay attention   REHEARSAL: Mentally repeat information over and over.   VISUALIZATION: Make a mental picture of information.   ASSOCIATION: Mentally associate new information with something familiar.   REGROUPING: Organize long lists of information by category.   FIRST LETTER MNEMONICS:   LISTEN FOR THE MAIN IDEA: who, what, when, where, why, how, what's next   CHUNKING: Group digits of numbers into chunks (ex. [id number])    Break sentences and instructions down by ideas. (ex. Physical    therapy / will be at 3:00 / instead of 1:30 / because I have a    meeting).   CHAINING: Recall 3-4 words by linking them together in a sentence.    External Memory Strategies:   NOTETAKING:   TAPE RECORDING: Can use to record someone giving length information such     as a lecture, verbal  instructions, or directions.   MEMORY BOOK / DAY PLANNER: To record up-coming appointments and    events.   WATCH ALARMS: Use as a reminder to take medications or check daily    Schedule.      CTS    Consider EMG/NCT in future          MEDICAL/SURGICAL COMORBIDITIES      All relevant medical comorbidities noted and managed by primary care physician and medical care team.          MISCELLANEOUS MEDICAL PROBLEMS         HEALTHY LIFESTYLE AND PREVENTATIVE CARE     Encouraged the patient to adhere to the age-appropriate health maintenance guidelines including screening tests and vaccinations.      Discussed the overall importance of healthy lifestyle, optimal weight, exercise, healthy diet, good sleep hygiene and avoiding drugs including smoking, alcohol and recreational drugs. The patient verbalized full understanding.         Advised the patient to follow COVID-19 prevention measures.         I spent a total of 50 minutes on the day of the visit.  This includes face to face time (65 minutes) and non-face to face time (85 minutes) preparing to see the patient (eg, review of tests), obtaining and/or reviewing separately obtained history, documenting clinical information in the electronic or other health record, independently interpreting results and communicating results to the patient/family/caregiver, or care coordinator.            RTC    Follow up in about 8 weeks (around 10/11/2022).     Jael Michaud, MSN, NP  Collaborating Provider: Kimberly Sapp MD, FAAN Neurologist/Epileptologist

## 2022-08-18 ENCOUNTER — PATIENT MESSAGE (OUTPATIENT)
Dept: NEUROLOGY | Facility: CLINIC | Age: 72
End: 2022-08-18
Payer: MEDICARE

## 2022-08-28 ENCOUNTER — PATIENT MESSAGE (OUTPATIENT)
Dept: INTERNAL MEDICINE | Facility: CLINIC | Age: 72
End: 2022-08-28
Payer: MEDICARE

## 2022-08-28 NOTE — TELEPHONE ENCOUNTER
No new care gaps identified.  [facility] Embedded Care Gaps. Reference number: [id number]. 8/27/2022   7:45:24 PM CDT

## 2022-08-29 RX ORDER — INSULIN GLARGINE 100 [IU]/ML
72 INJECTION, SOLUTION SUBCUTANEOUS NIGHTLY
Qty: 75 ML | Refills: 1 | Status: SHIPPED | OUTPATIENT
Start: 2022-08-29 | End: 2023-04-11 | Stop reason: SDUPTHER

## 2022-08-29 NOTE — TELEPHONE ENCOUNTER
Pt would like to know if you could order her fasting blood work for her annual and comprehensive (?) urine test and I will schedule.

## 2022-08-29 NOTE — TELEPHONE ENCOUNTER
Refill Decision Note   Christine Sandro  is requesting a refill authorization.  Brief Assessment and Rationale for Refill:  Approve     Medication Therapy Plan:       Medication Reconciliation Completed: No   Comments:     No Care Gaps recommended.     Note composed:12:40 PM 08/29/2022

## 2022-08-31 ENCOUNTER — PATIENT OUTREACH (OUTPATIENT)
Dept: ADMINISTRATIVE | Facility: HOSPITAL | Age: 72
End: 2022-08-31
Payer: MEDICARE

## 2022-08-31 DIAGNOSIS — E55.9 VITAMIN D DEFICIENCY DISEASE: ICD-10-CM

## 2022-08-31 DIAGNOSIS — E11.65 UNCONTROLLED TYPE 2 DIABETES MELLITUS WITH HYPERGLYCEMIA: ICD-10-CM

## 2022-08-31 DIAGNOSIS — I10 PRIMARY HYPERTENSION: Primary | ICD-10-CM

## 2022-09-03 NOTE — TELEPHONE ENCOUNTER
No new care gaps identified.  [facility] Embedded Care Gaps. Reference number: [id number]. 9/03/2022   5:45:26 PM CDT

## 2022-09-05 ENCOUNTER — PATIENT MESSAGE (OUTPATIENT)
Dept: CARDIOLOGY | Facility: CLINIC | Age: 72
End: 2022-09-05
Payer: MEDICARE

## 2022-09-06 RX ORDER — TEMAZEPAM 30 MG/1
CAPSULE ORAL
Qty: 30 CAPSULE | Refills: 0 | Status: SHIPPED | OUTPATIENT
Start: 2022-09-06 | End: 2022-10-04 | Stop reason: SDUPTHER

## 2022-09-07 ENCOUNTER — PATIENT MESSAGE (OUTPATIENT)
Dept: INTERNAL MEDICINE | Facility: CLINIC | Age: 72
End: 2022-09-07
Payer: MEDICARE

## 2022-09-08 ENCOUNTER — TELEPHONE (OUTPATIENT)
Dept: INTERNAL MEDICINE | Facility: CLINIC | Age: 72
End: 2022-09-08
Payer: MEDICARE

## 2022-09-08 DIAGNOSIS — R26.89 BALANCE DISORDER: Primary | ICD-10-CM

## 2022-09-08 NOTE — TELEPHONE ENCOUNTER
Spoke with [employer]. If we send over a referral with the medical necessity form filled out they will assign pt a home health agency in her area. Please place external referral and I will fax everything over to them.

## 2022-09-12 ENCOUNTER — PATIENT MESSAGE (OUTPATIENT)
Dept: NEUROLOGY | Facility: CLINIC | Age: 72
End: 2022-09-12
Payer: MEDICARE

## 2022-09-12 ENCOUNTER — TELEPHONE (OUTPATIENT)
Dept: NEUROLOGY | Facility: CLINIC | Age: 72
End: 2022-09-12
Payer: MEDICARE

## 2022-09-12 NOTE — TELEPHONE ENCOUNTER
Called patient to discuss vestibular migraines. I explained that dizziness can occur without headache in vestibular migraine. Informed patient that Lamictal was prescribed to her for migraine treatment. Patient states she took Lamictal for 1 day and had a bad headache that day. I explained to patient this is most likely a conscientious and not an adverse effect. Patient states that she will restart medication with slow titration to 25 mg BID. I instructed patient to notify me if adverse effects were to occur, and we can try a different medication. Patient verbalized understanding.

## 2022-09-13 ENCOUNTER — PATIENT MESSAGE (OUTPATIENT)
Dept: INTERNAL MEDICINE | Facility: CLINIC | Age: 72
End: 2022-09-13
Payer: MEDICARE

## 2022-09-13 ENCOUNTER — TELEPHONE (OUTPATIENT)
Dept: INTERNAL MEDICINE | Facility: CLINIC | Age: 72
End: 2022-09-13
Payer: MEDICARE

## 2022-09-13 NOTE — TELEPHONE ENCOUNTER
----- Message from Irena Brooks sent at 9/13/2022 11:45 AM CDT -----  Contact: Coby/ [facility]  Coby is calling to notify jose that the pt has refused home health services. Please give her a call back at [phone].

## 2022-09-14 ENCOUNTER — PATIENT MESSAGE (OUTPATIENT)
Dept: INTERNAL MEDICINE | Facility: CLINIC | Age: 72
End: 2022-09-14
Payer: MEDICARE

## 2022-09-14 ENCOUNTER — PATIENT OUTREACH (OUTPATIENT)
Dept: ADMINISTRATIVE | Facility: HOSPITAL | Age: 72
End: 2022-09-14
Payer: MEDICARE

## 2022-09-14 VITALS — DIASTOLIC BLOOD PRESSURE: 84 MMHG | SYSTOLIC BLOOD PRESSURE: 166 MMHG

## 2022-09-14 NOTE — PROGRESS NOTES
Working the HTN UNCON AUGUST 2022 Report: Called patient to get a recent blood pressure reading Left a message

## 2022-09-14 NOTE — TELEPHONE ENCOUNTER
Spoke with Coby from [facility]. She advised that patient refused HH for PT eval. Her reasoning being that she just wanted a walker, but didn't feel she needed PT at this time. So, her sister bought her a 4-wheeled walker.

## 2022-09-14 NOTE — Clinical Note
Patient states she is worried about her sugars being as low as 65 and it is making her feel tired. She would like some advise on what to do from Dr Szymanski

## 2022-09-15 NOTE — TELEPHONE ENCOUNTER
Pt wants to know if she still needs to take the Januvia as well, please advise. She is on 65 IU of Lantus right now.

## 2022-09-19 DIAGNOSIS — E11.42 CONTROLLED TYPE 2 DIABETES MELLITUS WITH DIABETIC POLYNEUROPATHY, WITH LONG-TERM CURRENT USE OF INSULIN: ICD-10-CM

## 2022-09-19 DIAGNOSIS — Z79.4 CONTROLLED TYPE 2 DIABETES MELLITUS WITH DIABETIC POLYNEUROPATHY, WITH LONG-TERM CURRENT USE OF INSULIN: ICD-10-CM

## 2022-09-19 RX ORDER — PEN NEEDLE, DIABETIC 30 GX3/16"
NEEDLE, DISPOSABLE MISCELLANEOUS
Qty: 200 EACH | Refills: 3 | Status: SHIPPED | OUTPATIENT
Start: 2022-09-19

## 2022-09-19 RX ORDER — ESTRADIOL 0.1 MG/G
CREAM VAGINAL
Qty: 42.5 G | Refills: 11 | Status: CANCELLED | OUTPATIENT
Start: 2022-09-19

## 2022-09-19 NOTE — TELEPHONE ENCOUNTER
No new care gaps identified.  [facility] Embedded Care Gaps. Reference number: [id number]. 9/19/2022   8:31:45 AM CDT

## 2022-09-19 NOTE — TELEPHONE ENCOUNTER
Refill Decision Note   Christine Sandro  is requesting a refill authorization.  Brief Assessment and Rationale for Refill:  Approve     Medication Therapy Plan:       Medication Reconciliation Completed: No   Comments:     No Care Gaps recommended.     Note composed:8:46 AM 09/19/2022

## 2022-09-20 ENCOUNTER — PATIENT MESSAGE (OUTPATIENT)
Dept: NEUROLOGY | Facility: CLINIC | Age: 72
End: 2022-09-20
Payer: MEDICARE

## 2022-09-20 DIAGNOSIS — E11.59 CONTROLLED TYPE 2 DIABETES MELLITUS WITH OTHER CIRCULATORY COMPLICATION, WITH LONG-TERM CURRENT USE OF INSULIN: ICD-10-CM

## 2022-09-20 DIAGNOSIS — Z79.4 CONTROLLED TYPE 2 DIABETES MELLITUS WITH OTHER CIRCULATORY COMPLICATION, WITH LONG-TERM CURRENT USE OF INSULIN: ICD-10-CM

## 2022-09-21 ENCOUNTER — TELEPHONE (OUTPATIENT)
Dept: INTERNAL MEDICINE | Facility: CLINIC | Age: 72
End: 2022-09-21
Payer: MEDICARE

## 2022-09-21 RX ORDER — LANCETS
EACH MISCELLANEOUS
Qty: 200 EACH | Refills: 3 | Status: SHIPPED | OUTPATIENT
Start: 2022-09-21 | End: 2022-10-04

## 2022-09-21 RX ORDER — INSULIN PUMP SYRINGE, 3 ML
EACH MISCELLANEOUS
Qty: 1 EACH | Refills: 0 | Status: SHIPPED | OUTPATIENT
Start: 2022-09-21 | End: 2023-09-20

## 2022-09-21 NOTE — TELEPHONE ENCOUNTER
Attempted to contact [employer] to get a fax number to send of Rx for diabetic testing supplies. They do not open until 0900.

## 2022-09-22 RX ORDER — ESTRADIOL 0.1 MG/G
CREAM VAGINAL
Qty: 42.5 G | Refills: 11 | Status: CANCELLED | OUTPATIENT
Start: 2022-09-19

## 2022-09-23 ENCOUNTER — PATIENT MESSAGE (OUTPATIENT)
Dept: NEUROLOGY | Facility: CLINIC | Age: 72
End: 2022-09-23
Payer: MEDICARE

## 2022-09-25 ENCOUNTER — PATIENT MESSAGE (OUTPATIENT)
Dept: INTERNAL MEDICINE | Facility: CLINIC | Age: 72
End: 2022-09-25
Payer: MEDICARE

## 2022-09-26 DIAGNOSIS — Z65.8 PSYCHOSOCIAL DISTRESS: Primary | ICD-10-CM

## 2022-09-28 ENCOUNTER — PATIENT OUTREACH (OUTPATIENT)
Dept: ADMINISTRATIVE | Facility: OTHER | Age: 72
End: 2022-09-28
Payer: MEDICARE

## 2022-09-28 NOTE — PROGRESS NOTES
CHW - Initial Contact    This Community Health Worker completed OR updated the Social Determinant of Health questionnaire with patient via telephone today.    Pt identified barriers of most importance are: Patient would like assistance with housing or moving into assistant living.   Referrals to community agencies completed with patient/caregiver consent outside of [facility] include:   Referrals were put through [facility] - yes:   Support and Services: CHW submitted an referral via [facility] to [facility]. Will follow up with available non medical transportation, day programs and assistant living options in her area. Patient has applied for long term care with medicaid as well as [facility] and [facility].   Other information discussed the patient needs / wants help with: non medical transportation, a day facility   Follow up required: yes   Follow-up Outreach - Due: 10/5/2022

## 2022-09-29 ENCOUNTER — PATIENT MESSAGE (OUTPATIENT)
Dept: NEUROLOGY | Facility: CLINIC | Age: 72
End: 2022-09-29
Payer: MEDICARE

## 2022-10-02 NOTE — TELEPHONE ENCOUNTER
Care Due:                  Date            Visit Type   Department     Provider  --------------------------------------------------------------------------------                                ESTABLISHED                              PATIENT -    [facility] INTER[facility]  Last Visit: 06-      [facility]      CHEMA Szymanski                               -                              PRIMARY      [facility] INTER[facility]  Next Visit: 10-      CARE ([facility])   MEDICINE       Jose Szymanski                                                            Last  Test          Frequency    Reason                     Performed    Due Date  --------------------------------------------------------------------------------    HBA1C.......  6 months...  SITagliptin, insulin.....  06- 12-    [facility] Embedded Care Gaps. Reference number: [id number]. 10/02/2022   8:20:14 AM CDT

## 2022-10-03 ENCOUNTER — PATIENT MESSAGE (OUTPATIENT)
Dept: INTERNAL MEDICINE | Facility: CLINIC | Age: 72
End: 2022-10-03
Payer: MEDICARE

## 2022-10-03 ENCOUNTER — PATIENT MESSAGE (OUTPATIENT)
Dept: RHEUMATOLOGY | Facility: CLINIC | Age: 72
End: 2022-10-03
Payer: MEDICARE

## 2022-10-03 NOTE — TELEPHONE ENCOUNTER
NOV 10/4/22    Patient is wanting to stop this med. She has an appt with you tomorrow. Just an FYI.     Please see the attached refill request.

## 2022-10-04 ENCOUNTER — PATIENT MESSAGE (OUTPATIENT)
Dept: INTERNAL MEDICINE | Facility: CLINIC | Age: 72
End: 2022-10-04

## 2022-10-04 ENCOUNTER — OFFICE VISIT (OUTPATIENT)
Dept: INTERNAL MEDICINE | Facility: CLINIC | Age: 72
End: 2022-10-04
Payer: MEDICARE

## 2022-10-04 ENCOUNTER — TELEPHONE (OUTPATIENT)
Dept: PSYCHIATRY | Facility: CLINIC | Age: 72
End: 2022-10-04
Payer: MEDICARE

## 2022-10-04 ENCOUNTER — HOSPITAL ENCOUNTER (OUTPATIENT)
Dept: RADIOLOGY | Facility: HOSPITAL | Age: 72
Discharge: HOME | End: 2022-10-04
Attending: FAMILY MEDICINE
Payer: MEDICARE

## 2022-10-04 ENCOUNTER — PATIENT OUTREACH (OUTPATIENT)
Dept: ADMINISTRATIVE | Facility: OTHER | Age: 72
End: 2022-10-04
Payer: MEDICARE

## 2022-10-04 VITALS
OXYGEN SATURATION: 95 % | HEART RATE: 78 BPM | WEIGHT: 237.63 LBS | BODY MASS INDEX: 44.9 KG/M2 | RESPIRATION RATE: 20 BRPM | DIASTOLIC BLOOD PRESSURE: 68 MMHG | TEMPERATURE: 98 F | SYSTOLIC BLOOD PRESSURE: 114 MMHG

## 2022-10-04 DIAGNOSIS — K21.9 GASTROESOPHAGEAL REFLUX DISEASE WITHOUT ESOPHAGITIS: ICD-10-CM

## 2022-10-04 DIAGNOSIS — Z12.31 ENCOUNTER FOR SCREENING MAMMOGRAM FOR MALIGNANT NEOPLASM OF BREAST: ICD-10-CM

## 2022-10-04 DIAGNOSIS — E11.59 CONTROLLED TYPE 2 DIABETES MELLITUS WITH OTHER CIRCULATORY COMPLICATION, WITH LONG-TERM CURRENT USE OF INSULIN: Primary | ICD-10-CM

## 2022-10-04 DIAGNOSIS — I48.0 PAROXYSMAL ATRIAL FIBRILLATION: ICD-10-CM

## 2022-10-04 DIAGNOSIS — E11.59 HYPERTENSION ASSOCIATED WITH DIABETES: ICD-10-CM

## 2022-10-04 DIAGNOSIS — Z79.4 CONTROLLED TYPE 2 DIABETES MELLITUS WITH OTHER CIRCULATORY COMPLICATION, WITH LONG-TERM CURRENT USE OF INSULIN: Primary | ICD-10-CM

## 2022-10-04 DIAGNOSIS — Z12.39 BREAST SCREENING: ICD-10-CM

## 2022-10-04 DIAGNOSIS — I15.2 HYPERTENSION ASSOCIATED WITH DIABETES: ICD-10-CM

## 2022-10-04 DIAGNOSIS — I10 PRIMARY HYPERTENSION: ICD-10-CM

## 2022-10-04 DIAGNOSIS — F33.41 RECURRENT MAJOR DEPRESSIVE DISORDER, IN PARTIAL REMISSION: ICD-10-CM

## 2022-10-04 DIAGNOSIS — G43.809 VESTIBULAR MIGRAINE: ICD-10-CM

## 2022-10-04 PROCEDURE — 77067 SCR MAMMO BI INCL CAD: CPT | Mod: 26,,, | Performed by: RADIOLOGY

## 2022-10-04 PROCEDURE — 3078F DIAST BP <80 MM HG: CPT | Mod: CPTII,S$GLB,, | Performed by: FAMILY MEDICINE

## 2022-10-04 PROCEDURE — 1100F PTFALLS ASSESS-DOCD GE2>/YR: CPT | Mod: CPTII,S$GLB,, | Performed by: FAMILY MEDICINE

## 2022-10-04 PROCEDURE — G0008 ADMIN INFLUENZA VIRUS VAC: HCPCS | Mod: S$GLB,,, | Performed by: FAMILY MEDICINE

## 2022-10-04 PROCEDURE — 3288F FALL RISK ASSESSMENT DOCD: CPT | Mod: CPTII,S$GLB,, | Performed by: FAMILY MEDICINE

## 2022-10-04 PROCEDURE — 99999 PR PBB SHADOW E&M-EST. PATIENT-LVL V: CPT | Mod: PBBFAC,,, | Performed by: FAMILY MEDICINE

## 2022-10-04 PROCEDURE — 90694 VACC AIIV4 NO PRSRV 0.5ML IM: CPT | Mod: S$GLB,,, | Performed by: FAMILY MEDICINE

## 2022-10-04 PROCEDURE — 90694 FLU VACCINE - QUADRIVALENT - ADJUVANTED: ICD-10-PCS | Mod: S$GLB,,, | Performed by: FAMILY MEDICINE

## 2022-10-04 PROCEDURE — 1100F PR PT FALLS ASSESS DOC 2+ FALLS/FALL W/INJURY/YR: ICD-10-PCS | Mod: CPTII,S$GLB,, | Performed by: FAMILY MEDICINE

## 2022-10-04 PROCEDURE — 3288F PR FALLS RISK ASSESSMENT DOCUMENTED: ICD-10-PCS | Mod: CPTII,S$GLB,, | Performed by: FAMILY MEDICINE

## 2022-10-04 PROCEDURE — 77063 BREAST TOMOSYNTHESIS BI: CPT | Mod: 26,,, | Performed by: RADIOLOGY

## 2022-10-04 PROCEDURE — 1126F AMNT PAIN NOTED NONE PRSNT: CPT | Mod: CPTII,S$GLB,, | Performed by: FAMILY MEDICINE

## 2022-10-04 PROCEDURE — G0008 FLU VACCINE - QUADRIVALENT - ADJUVANTED: ICD-10-PCS | Mod: S$GLB,,, | Performed by: FAMILY MEDICINE

## 2022-10-04 PROCEDURE — 3078F PR MOST RECENT DIASTOLIC BLOOD PRESSURE < 80 MM HG: ICD-10-PCS | Mod: CPTII,S$GLB,, | Performed by: FAMILY MEDICINE

## 2022-10-04 PROCEDURE — 3072F PR LOW RISK FOR RETINOPATHY: ICD-10-PCS | Mod: CPTII,S$GLB,, | Performed by: FAMILY MEDICINE

## 2022-10-04 PROCEDURE — 4010F PR ACE/ARB THEARPY RXD/TAKEN: ICD-10-PCS | Mod: CPTII,S$GLB,, | Performed by: FAMILY MEDICINE

## 2022-10-04 PROCEDURE — 99214 PR OFFICE/OUTPT VISIT, EST, LEVL IV, 30-39 MIN: ICD-10-PCS | Mod: S$GLB,,, | Performed by: FAMILY MEDICINE

## 2022-10-04 PROCEDURE — 3074F PR MOST RECENT SYSTOLIC BLOOD PRESSURE < 130 MM HG: ICD-10-PCS | Mod: CPTII,S$GLB,, | Performed by: FAMILY MEDICINE

## 2022-10-04 PROCEDURE — 4010F ACE/ARB THERAPY RXD/TAKEN: CPT | Mod: CPTII,S$GLB,, | Performed by: FAMILY MEDICINE

## 2022-10-04 PROCEDURE — 1159F PR MEDICATION LIST DOCUMENTED IN MEDICAL RECORD: ICD-10-PCS | Mod: CPTII,S$GLB,, | Performed by: FAMILY MEDICINE

## 2022-10-04 PROCEDURE — 3074F SYST BP LT 130 MM HG: CPT | Mod: CPTII,S$GLB,, | Performed by: FAMILY MEDICINE

## 2022-10-04 PROCEDURE — 3008F PR BODY MASS INDEX (BMI) DOCUMENTED: ICD-10-PCS | Mod: CPTII,S$GLB,, | Performed by: FAMILY MEDICINE

## 2022-10-04 PROCEDURE — 99214 OFFICE O/P EST MOD 30 MIN: CPT | Mod: S$GLB,,, | Performed by: FAMILY MEDICINE

## 2022-10-04 PROCEDURE — 3008F BODY MASS INDEX DOCD: CPT | Mod: CPTII,S$GLB,, | Performed by: FAMILY MEDICINE

## 2022-10-04 PROCEDURE — 3044F PR MOST RECENT HEMOGLOBIN A1C LEVEL <7.0%: ICD-10-PCS | Mod: CPTII,S$GLB,, | Performed by: FAMILY MEDICINE

## 2022-10-04 PROCEDURE — 3044F HG A1C LEVEL LT 7.0%: CPT | Mod: CPTII,S$GLB,, | Performed by: FAMILY MEDICINE

## 2022-10-04 PROCEDURE — 1126F PR PAIN SEVERITY QUANTIFIED, NO PAIN PRESENT: ICD-10-PCS | Mod: CPTII,S$GLB,, | Performed by: FAMILY MEDICINE

## 2022-10-04 PROCEDURE — 99999 PR PBB SHADOW E&M-EST. PATIENT-LVL V: ICD-10-PCS | Mod: PBBFAC,,, | Performed by: FAMILY MEDICINE

## 2022-10-04 PROCEDURE — 77063 BREAST TOMOSYNTHESIS BI: CPT | Mod: TC

## 2022-10-04 PROCEDURE — 3072F LOW RISK FOR RETINOPATHY: CPT | Mod: CPTII,S$GLB,, | Performed by: FAMILY MEDICINE

## 2022-10-04 PROCEDURE — 77063 MAMMO DIGITAL SCREENING BILAT WITH TOMO: ICD-10-PCS | Mod: 26,,, | Performed by: RADIOLOGY

## 2022-10-04 PROCEDURE — 1159F MED LIST DOCD IN RCRD: CPT | Mod: CPTII,S$GLB,, | Performed by: FAMILY MEDICINE

## 2022-10-04 PROCEDURE — 77067 MAMMO DIGITAL SCREENING BILAT WITH TOMO: ICD-10-PCS | Mod: 26,,, | Performed by: RADIOLOGY

## 2022-10-04 RX ORDER — TEMAZEPAM 30 MG/1
CAPSULE ORAL
Qty: 30 CAPSULE | Refills: 0 | OUTPATIENT
Start: 2022-10-04

## 2022-10-04 NOTE — PROGRESS NOTES
CHW - Follow Up    This Community Health Worker completed a follow up visit with patient via telephone today.  Pt/Caregiver reported: Patient is still in search of an assisted living facuilty or apartment. Patient informed that her doctor wants her to search help for her depression, but [facility] doesn't have any available appointment anytime soon.   Community Health Worker provided: CHW sent referrals via [facility] for [facility] and [facility]. CHW infromed patient of assisted living centers near her area; [facility] assisted living of [place], [facility], [facility] and [facility] assistance living. CHW requested information from [facility] on behave of patient regarding price. Patient stated she would call and gather more information on the faculties.   Follow up required: yes   Follow-up Outreach - Due: 10/10/2022

## 2022-10-04 NOTE — PROGRESS NOTES
Subjective:       Patient ID: Christine Jo is a 72 y.o. female.    Chief Complaint: Annual Exam    Follow-up diabetes paroxysmal atrial fibrillation esophageal reflux hypertension depression insomnia vestibular migraine.  She is followed by [facility] about a migraine.  She reports taking Lantus 65 units a day.  Her glucose is been a range of 100 fasting.  She has ongoing and increasing depression.  She is currently on Cymbalta.  She is planning to see psychology for medical management she has had changes in life including cell in a condominium in her son and his family no longer driving.  She has recently had some increased indigestion.  EGD colonoscopy are up-to-date.  She denies any esophageal stricture.    Review of Systems   Constitutional:  Negative for appetite change, chills and fever.   HENT:  Negative for congestion.    Respiratory:  Negative for cough, chest tightness, shortness of breath and wheezing.    Cardiovascular:  Negative for chest pain, palpitations and leg swelling.   Gastrointestinal:  Negative for abdominal distention, abdominal pain, constipation, diarrhea, nausea and vomiting.        Acid reflux not controlled taking Protonix once a day she denies dysphagia   Genitourinary:  Negative for dysuria, frequency, hematuria and urgency.   Neurological:  Positive for headaches. Negative for dizziness, weakness and light-headedness.        Vestibular migraine   Psychiatric/Behavioral:  Positive for dysphoric mood and sleep disturbance.      Objective:      Physical Exam  Constitutional:       General: She is not in acute distress.     Appearance: She is not ill-appearing or diaphoretic.   Cardiovascular:      Rate and Rhythm: Normal rate and regular rhythm.      Heart sounds: No murmur heard.    No gallop.   Pulmonary:      Effort: Pulmonary effort is normal. No respiratory distress.      Breath sounds: No wheezing, rhonchi or rales.   Abdominal:      General: There is no distension.       Palpations: Abdomen is soft. There is no mass.      Tenderness: There is no abdominal tenderness.   Musculoskeletal:      Right foot: No deformity.      Left foot: No deformity (Good capillary filling).   Feet:      Right foot:      Protective Sensation: 10 sites tested.  10 sites sensed.      Skin integrity: Skin integrity normal.      Toenail Condition: Right toenails are normal.      Left foot:      Protective Sensation: 10 sites tested.  10 sites sensed.      Skin integrity: Skin integrity normal.      Toenail Condition: Left toenails are normal.   Lymphadenopathy:      Cervical: No cervical adenopathy.   Neurological:      Mental Status: She is alert and oriented to person, place, and time.   Psychiatric:         Mood and Affect: Mood normal.         Behavior: Behavior normal.         Judgment: Judgment normal.       Lab Visit on 07/27/2022   Component Date Value Ref Range Status    Creatinine 07/27/2022 0.8  0.5 - 1.4 mg/dL Final    eGFR if African American 07/27/2022 >60  >60 mL/min/1.73 m^2 Final    eGFR if non African American 07/27/2022 >60  >60 mL/min/1.73 m^2 Final     Assessment:       1. Controlled type 2 diabetes mellitus with other circulatory complication, with long-term current use of insulin    2. Vestibular migraine    3. Primary hypertension    4. Hypertension associated with diabetes    5. Recurrent major depressive disorder, in partial remission    6. Paroxysmal atrial fibrillation    7. Gastroesophageal reflux disease without esophagitis        Plan:   Blood pressure controlled lab was done flu VAX given increased Protonix 40 mg twice a day for 1 month.  Follow-up 4 months.  Diabetic foot exam was done      Controlled type 2 diabetes mellitus with other circulatory complication, with long-term current use of insulin    Vestibular migraine    Primary hypertension    Hypertension associated with diabetes    Recurrent major depressive disorder, in partial remission    Paroxysmal atrial  fibrillation    Gastroesophageal reflux disease without esophagitis

## 2022-10-04 NOTE — PROGRESS NOTES
CHW - Outreach Attempt    Community Health Worker left a voicemail message for 1st attempt to contact patient regarding: referred resources   Community Health Worker to attempt to contact patient on: [phone]

## 2022-10-05 ENCOUNTER — TELEPHONE (OUTPATIENT)
Dept: PSYCHIATRY | Facility: CLINIC | Age: 72
End: 2022-10-05
Payer: MEDICARE

## 2022-10-05 RX ORDER — TEMAZEPAM 30 MG/1
CAPSULE ORAL
Qty: 30 CAPSULE | Refills: 2 | Status: SHIPPED | OUTPATIENT
Start: 2022-10-05 | End: 2022-12-05 | Stop reason: SDUPTHER

## 2022-10-05 NOTE — TELEPHONE ENCOUNTER
No new care gaps identified.  [facility] Embedded Care Gaps. Reference number: [id number]. 10/04/2022   7:01:00 PM CDT

## 2022-10-10 ENCOUNTER — PATIENT OUTREACH (OUTPATIENT)
Dept: ADMINISTRATIVE | Facility: OTHER | Age: 72
End: 2022-10-10
Payer: MEDICARE

## 2022-10-10 NOTE — PROGRESS NOTES
CHW - Follow Up    This Community Health Worker completed a follow up visit with patient via telephone today.  Pt/Caregiver reported: Ms. Jo has heard from [employer] and has an appointment scheduled for 9/11/22. She also has an apartment that her daughter brought her over to and she has paid a deposit on. The apartment currently has a tenant whose lease is up at the end of October.  Community Health Worker provided:  Patient stated she was anxious about her apartment tomorrow due to not wanting to be prescribe any more medication for her depression then she is currently taking, and possible having group therapy. CHW informed patient to state her concerns with [facility] and informed them that she is currently taking medication and would only be interested in counseling, with a preference on individual therapy.CHW informed patient its perfectly fine to advocate for herself and keep open lines of communication.  Patient was very excited and thankful for the encouragement, she asked for one more follow up call and agreed to case closure following call.    Follow up required: yes  Follow-up Outreach - Due: 10/17/2022

## 2022-10-12 ENCOUNTER — PATIENT MESSAGE (OUTPATIENT)
Dept: INTERNAL MEDICINE | Facility: CLINIC | Age: 72
End: 2022-10-12
Payer: MEDICARE

## 2022-10-12 ENCOUNTER — PATIENT MESSAGE (OUTPATIENT)
Dept: RHEUMATOLOGY | Facility: CLINIC | Age: 72
End: 2022-10-12
Payer: MEDICARE

## 2022-10-12 NOTE — TELEPHONE ENCOUNTER
No answer, LVM to return call   Tye is a 81 year old male who was postop day 1 status excision of a scalp lesion.  Patient had a non healing left parietal scalp wound with exposed bone.  Dr. Soto took the patient to surgery and closed the wound.  He has sutures in place.  He has a sterile bandage to the top of the head.    The bandages clean and dry.  The wound looks great.  No bleeding, drainage or signs of infection.    From a surgical standpoint the patient can follow-up with us in the office for suture removal in 10-14 days.    Plan is to discharge to the nursing home.    Annette Arauz PA-C    Supervising physician: Dr. Soto

## 2022-10-17 ENCOUNTER — TELEPHONE (OUTPATIENT)
Dept: NEUROLOGY | Facility: CLINIC | Age: 72
End: 2022-10-17

## 2022-10-17 ENCOUNTER — OFFICE VISIT (OUTPATIENT)
Dept: NEUROLOGY | Facility: CLINIC | Age: 72
End: 2022-10-17
Payer: MEDICARE

## 2022-10-17 VITALS
BODY MASS INDEX: 44.87 KG/M2 | OXYGEN SATURATION: 97 % | HEIGHT: 61 IN | HEART RATE: 78 BPM | SYSTOLIC BLOOD PRESSURE: 98 MMHG | DIASTOLIC BLOOD PRESSURE: 61 MMHG | WEIGHT: 237.63 LBS | RESPIRATION RATE: 16 BRPM

## 2022-10-17 DIAGNOSIS — H93.19 TINNITUS, UNSPECIFIED LATERALITY: ICD-10-CM

## 2022-10-17 DIAGNOSIS — H93.A9 PULSATILE TINNITUS, UNSPECIFIED EAR: ICD-10-CM

## 2022-10-17 DIAGNOSIS — R42 DIZZINESS AND GIDDINESS: ICD-10-CM

## 2022-10-17 DIAGNOSIS — F09 MILD COGNITIVE DISORDER: ICD-10-CM

## 2022-10-17 DIAGNOSIS — H91.92 HEARING LOSS OF LEFT EAR, UNSPECIFIED HEARING LOSS TYPE: ICD-10-CM

## 2022-10-17 DIAGNOSIS — G43.101 MIGRAINE WITH AURA AND WITH STATUS MIGRAINOSUS, NOT INTRACTABLE: ICD-10-CM

## 2022-10-17 DIAGNOSIS — H81.4 VERTIGO OF CENTRAL ORIGIN: ICD-10-CM

## 2022-10-17 DIAGNOSIS — G56.00 CARPAL TUNNEL SYNDROME, UNSPECIFIED LATERALITY: ICD-10-CM

## 2022-10-17 DIAGNOSIS — R29.90 MULTIPLE NEUROLOGICAL SYMPTOMS: Primary | ICD-10-CM

## 2022-10-17 DIAGNOSIS — G43.809 VESTIBULAR MIGRAINE: ICD-10-CM

## 2022-10-17 DIAGNOSIS — R26.81 UNSTEADINESS ON FEET: ICD-10-CM

## 2022-10-17 DIAGNOSIS — F32.A DEPRESSION, UNSPECIFIED DEPRESSION TYPE: ICD-10-CM

## 2022-10-17 DIAGNOSIS — R42 DIZZY: ICD-10-CM

## 2022-10-17 PROCEDURE — 3066F PR DOCUMENTATION OF TREATMENT FOR NEPHROPATHY: ICD-10-PCS | Mod: CPTII,S$GLB,, | Performed by: NURSE PRACTITIONER

## 2022-10-17 PROCEDURE — 1159F MED LIST DOCD IN RCRD: CPT | Mod: CPTII,S$GLB,, | Performed by: NURSE PRACTITIONER

## 2022-10-17 PROCEDURE — 99215 PR OFFICE/OUTPT VISIT, EST, LEVL V, 40-54 MIN: ICD-10-PCS | Mod: S$GLB,,, | Performed by: NURSE PRACTITIONER

## 2022-10-17 PROCEDURE — 1159F PR MEDICATION LIST DOCUMENTED IN MEDICAL RECORD: ICD-10-PCS | Mod: CPTII,S$GLB,, | Performed by: NURSE PRACTITIONER

## 2022-10-17 PROCEDURE — 3288F FALL RISK ASSESSMENT DOCD: CPT | Mod: CPTII,S$GLB,, | Performed by: NURSE PRACTITIONER

## 2022-10-17 PROCEDURE — 3060F POS MICROALBUMINURIA REV: CPT | Mod: CPTII,S$GLB,, | Performed by: NURSE PRACTITIONER

## 2022-10-17 PROCEDURE — 1126F AMNT PAIN NOTED NONE PRSNT: CPT | Mod: CPTII,S$GLB,, | Performed by: NURSE PRACTITIONER

## 2022-10-17 PROCEDURE — 3060F PR POS MICROALBUMINURIA RESULT DOCUMENTED/REVIEW: ICD-10-PCS | Mod: CPTII,S$GLB,, | Performed by: NURSE PRACTITIONER

## 2022-10-17 PROCEDURE — 3072F LOW RISK FOR RETINOPATHY: CPT | Mod: CPTII,S$GLB,, | Performed by: NURSE PRACTITIONER

## 2022-10-17 PROCEDURE — 1126F PR PAIN SEVERITY QUANTIFIED, NO PAIN PRESENT: ICD-10-PCS | Mod: CPTII,S$GLB,, | Performed by: NURSE PRACTITIONER

## 2022-10-17 PROCEDURE — 3074F PR MOST RECENT SYSTOLIC BLOOD PRESSURE < 130 MM HG: ICD-10-PCS | Mod: CPTII,S$GLB,, | Performed by: NURSE PRACTITIONER

## 2022-10-17 PROCEDURE — 3066F NEPHROPATHY DOC TX: CPT | Mod: CPTII,S$GLB,, | Performed by: NURSE PRACTITIONER

## 2022-10-17 PROCEDURE — 3072F PR LOW RISK FOR RETINOPATHY: ICD-10-PCS | Mod: CPTII,S$GLB,, | Performed by: NURSE PRACTITIONER

## 2022-10-17 PROCEDURE — 99999 PR PBB SHADOW E&M-EST. PATIENT-LVL V: CPT | Mod: PBBFAC,,, | Performed by: NURSE PRACTITIONER

## 2022-10-17 PROCEDURE — 3078F PR MOST RECENT DIASTOLIC BLOOD PRESSURE < 80 MM HG: ICD-10-PCS | Mod: CPTII,S$GLB,, | Performed by: NURSE PRACTITIONER

## 2022-10-17 PROCEDURE — 99215 OFFICE O/P EST HI 40 MIN: CPT | Mod: S$GLB,,, | Performed by: NURSE PRACTITIONER

## 2022-10-17 PROCEDURE — 3288F PR FALLS RISK ASSESSMENT DOCUMENTED: ICD-10-PCS | Mod: CPTII,S$GLB,, | Performed by: NURSE PRACTITIONER

## 2022-10-17 PROCEDURE — 1101F PR PT FALLS ASSESS DOC 0-1 FALLS W/OUT INJ PAST YR: ICD-10-PCS | Mod: CPTII,S$GLB,, | Performed by: NURSE PRACTITIONER

## 2022-10-17 PROCEDURE — 99999 PR PBB SHADOW E&M-EST. PATIENT-LVL V: ICD-10-PCS | Mod: PBBFAC,,, | Performed by: NURSE PRACTITIONER

## 2022-10-17 PROCEDURE — 3078F DIAST BP <80 MM HG: CPT | Mod: CPTII,S$GLB,, | Performed by: NURSE PRACTITIONER

## 2022-10-17 PROCEDURE — 3074F SYST BP LT 130 MM HG: CPT | Mod: CPTII,S$GLB,, | Performed by: NURSE PRACTITIONER

## 2022-10-17 PROCEDURE — 1160F RVW MEDS BY RX/DR IN RCRD: CPT | Mod: CPTII,S$GLB,, | Performed by: NURSE PRACTITIONER

## 2022-10-17 PROCEDURE — 4010F ACE/ARB THERAPY RXD/TAKEN: CPT | Mod: CPTII,S$GLB,, | Performed by: NURSE PRACTITIONER

## 2022-10-17 PROCEDURE — 3044F HG A1C LEVEL LT 7.0%: CPT | Mod: CPTII,S$GLB,, | Performed by: NURSE PRACTITIONER

## 2022-10-17 PROCEDURE — 3044F PR MOST RECENT HEMOGLOBIN A1C LEVEL <7.0%: ICD-10-PCS | Mod: CPTII,S$GLB,, | Performed by: NURSE PRACTITIONER

## 2022-10-17 PROCEDURE — 4010F PR ACE/ARB THEARPY RXD/TAKEN: ICD-10-PCS | Mod: CPTII,S$GLB,, | Performed by: NURSE PRACTITIONER

## 2022-10-17 PROCEDURE — 1160F PR REVIEW ALL MEDS BY PRESCRIBER/CLIN PHARMACIST DOCUMENTED: ICD-10-PCS | Mod: CPTII,S$GLB,, | Performed by: NURSE PRACTITIONER

## 2022-10-17 PROCEDURE — 1101F PT FALLS ASSESS-DOCD LE1/YR: CPT | Mod: CPTII,S$GLB,, | Performed by: NURSE PRACTITIONER

## 2022-10-17 NOTE — PROGRESS NOTES
"Subjective:      Patient ID: Christine Jo is a 72 y.o. female.    Chief Complaint:  Follow-up      History of Present Illness  HPI    BACKGROUND    Former patient of Dr. Haney, new to me.    Patient presents to appointment unaccompanied to discuss dizziness and memory concerns. Her medical history includes depression, anxiety, hearing loss in left ear, PAF, HTN, CAD, frequent UTIs, type 2 DM, GERD, osteoporosis, and MATIAS. Patient states around 2020 she went swimming underwater in the ocean and developed dizziness and ringing in her left ear. She states over time, the ringing has improved, but the dizziness has worsened. She describes her dizziness as "room spinning". She states she had an episode in both January and February 2022 where she was watching a movie with surround sound, which caused her to develop severe dizziness with nausea and vomiting. She states she felt "drunk". Since february, she has had multiple dizzy spells. She states the spells have become daily over the last week. She states the spells typically last around 5 hours with nausea and stomach pains. She states she had one spell that lasted a day. She has called EMS twice in the past because of the spells. Denies lateralized weakness, slurred speech, sudden vision loss, or facial droop with spells. She states prior to the spells, she will have intense sweating. She states smells and turning her head quickly will cause her to become dizzy. States she has trouble hearing out of her left ear. She states Meclizine and sleeping help to improve her symptoms. States she has looked up vestibular exercises online, and they cause her to become dizzy and her neck often cracks. She states her head feels huge and feels like it is "stuffed with cotton" in the frontal region. She also has an "arthitis" pain that radiates from the back of her head down her spine. Also complains of pain behind eyes. She states the head pain has been constant and daily since " "January 2022 and worsens during her dizzy spells. Denies double vision. Denies aura. States she hears "zapping" inside her head. States she has sensitivity to light and sound. She states she began losing her balance over the last year. In March 2022, she began leaning forward and from side to side without realizing it. She loses her balance frequently and had a fall on 6-9-2022 and could not get off the floor for 2 hours. States she also had an incontinent episode on herself. States when she walks, her left foot turns towards the left side. Ambulates with a cane.  She experiences significant distress from her symptoms and is to afraid to go places. Denies history of TBI or storke. Did not receive the Ajovy prescription Dr. Haney ordered. Afraid to take Lodine because she is on blood thinners for her A. Fib. 10-4-2021 CTH shows chronic microvascular ischemic changes. 2- Abnormal VNG. Results are indicative of a left peripheral vestibulopathy, as evidenced by a 50% left Unilateral Weakness. Patient states that her dizziness and headaches have improved since she saw me in June. Since our last appointment, she has moved in with her son. States she has lightheadedness when going from a sit to stand position. Her hydralazine dose was lowered by cardiology. Has dizziness for 5 seconds when putting her head down on pillow at night. No longer having dizzy spells lasting for hours with nausea and vomiting. States she has a headache about 1-2 times a month that last up to 3 days. Describes her headaches as squishing/hammering. Has sensitivity to noise and smells with headaches. Feels like her headaches "drain" her. Also has aura of flashing lights in her peripheral vision. States she will have aura without headache at times. Did not start Aimovig or Nurtec PRN. 06- EEG NL. 7- MRI brain shows no acute findings.  Mild atrophy with white matter degeneration. MRV brain shows no acute abnormality.  Small " "hypoplastic right transverse/sigmoid sinus.  Otherwise negative. 7- CTA head and neck shows no evidence of flow-limiting stenosis within the cervical arterial vasculature. No evidence of flow-limiting stenosis within the intracranial arterial vasculature. No aneurysm.    Patient states she noticed short term memory trouble around 2017 that is progressively getting worst. She experiences word finding difficulties and forgetting conversations. At times, she will feel nervous and rushed, causing her to have difficulty processing things. Often has "brain fog". She does not drive. She lives alone at this time but plans to move in with her son. Independent in ADLs and keeps up with her medications and appointments. Has trouble sleeping at night and has decreased appetite. Takes Cymbalta for anxiety and depression. Use to see Dr. Albrecht with psych but not interesting in seeing psych at this time. No history of hypothyroidism. Her mother was diagnosed with dementia in her early 80s. She states she had neuropsych testing completed at [facility] in 2017. Continues to have trouble with her memory. Has not had neuropsych appointment. 7- MRI brain shows no acute findings.  Mild atrophy with white matter degeneration.    Patient states she has history of left sided bells palsy in her 20s. States symptoms resolved.     Patient states she has a history of CTS with CTS release surgery. She states numbness in bilateral hands is reemerging along with weakness.      Interval History 10-: Patient presents to follow up appointment unaccompanied. Patient states her dizziness and headaches have improved. Her dizziness continues to occur when laying head down and lightheadedness when going from a sitting to standing position. BP 98/61 today. Taking benazepril 40 mg BID, coreg 25 mg BID, and hydralazine 100 mg BID for HTN. Having mild daily headaches and severe headaches about twice a month. Was unable to tolerate Lamictal. " Nurtec PRN helps to abort severe headaches.    Continues to have trouble with her memory. Believes it is related to depression. Also having insomnia. Denies suicidal ideations. Interested in psych referral. 4- [facility] Neuropsychology testing results are generally within normal limits. Short term memory is normal. Only atypicaly finds is mild slowing in processing speed and attention. This is likely related to MATIAS and HTN/diabetes        Review of Systems  Review of Systems   Constitutional:  Positive for appetite change (decrease) and fatigue.   HENT:  Positive for hearing loss and tinnitus. Negative for drooling, trouble swallowing and voice change.    Eyes:  Positive for visual disturbance. Negative for photophobia.   Cardiovascular:  Negative for palpitations.   Gastrointestinal:  Positive for nausea and vomiting. Negative for constipation and diarrhea.   Genitourinary:  Negative for difficulty urinating.        Urinary incontinence    Musculoskeletal:  Positive for arthralgias, back pain, gait problem and neck pain.   Neurological:  Positive for dizziness, weakness, light-headedness and headaches. Negative for tremors, seizures, syncope, facial asymmetry, speech difficulty and numbness.   Psychiatric/Behavioral:  Positive for decreased concentration, dysphoric mood and sleep disturbance. Negative for behavioral problems, confusion, hallucinations, self-injury and suicidal ideas. The patient is nervous/anxious.    Objective:   VITAL SIGNS WERE REVIEWED        GENERAL APPEARANCE:      The patient looks comfortable.     BMI 44.9     No signs of respiratory distress.     Normal breathing pattern.     No dysmorphic features     Normal eye contact.      GENERAL MEDICAL EXAM:     HEENT:  Head is atraumatic normocephalic.      Neck and Axillae: No JVD. No visible lesions. No thyromegaly. No lymphadenopathy.     Cardiopulmonary: No cyanosis. No tachypnea. Normal respiratory effort.     Gastrointestinal/Urogenital:  No  jaundice. No stomas or lesions. No visible hernias. No catheters.     Skin, Hair and Nails:  No neurofibromatosis. No visible lesions.No stigmata of autoimmune disease. No clubbing.     Skin is warm and moist. No palpable masses.     Limbs: No varicose veins. No visible swelling. No palpable edema.     Muskoskeletal: No visible deformities.No visible lesions.     No spine tenderness. No signs of longstanding neuropathy. No dislocations or fractures.          Neurologic Exam     Mental Status   Oriented to person, place, and time.   Follows 3 step commands.   Attention: normal. Concentration: normal.   Speech: speech is normal   Level of consciousness: alert  Knowledge: good. Able to perform simple calculations.   Able to name object. Able to read. Unable to repeat. Able to write. Normal comprehension.     6-9-2022 MOCA 24/30    Visuospatial/Executive 4/5    Naming                            3/3    Attention                         6/6    Language                         1/3    Abstraction                    2/2    Recall                                2/5 (3 with cues)    Orientation                     6/6           Cranial Nerves     CN II   Visual fields full to confrontation.   Visual acuity: normal  Right visual field deficit: none  Left visual field deficit: none     CN III, IV, VI   Pupils are equal, round, and reactive to light.  Extraocular motions are normal.   Right pupil: Size: 3 mm. Shape: regular. Reactivity: brisk. Consensual response: intact. Accommodation: intact.   Left pupil: Size: 3 mm. Shape: regular. Reactivity: brisk. Consensual response: intact. Accommodation: intact.   CN III: no CN III palsy  CN VI: no CN VI palsy  Nystagmus: none   Diplopia: none  Ophthalmoparesis: none  Upgaze: normal  Downgaze: normal  Conjugate gaze: present  Vestibulo-ocular reflex: present    CN V   Right facial sensation deficit: complete  Left facial sensation deficit: none    CN VII   Facial expression full,  symmetric.   Right facial weakness: none  Left facial weakness: none    CN VIII   Hearing: impaired    CN IX, X   CN IX normal.   CN X normal.   Palate: symmetric    CN XI   CN XI normal.   Right sternocleidomastoid strength: normal  Left sternocleidomastoid strength: normal  Right trapezius strength: normal  Left trapezius strength: normal    CN XII   CN XII normal.   Tongue: not atrophic  Fasciculations: absent  Tongue deviation: none    Motor Exam   Muscle bulk: normal  Overall muscle tone: normal  Right arm tone: normal  Left arm tone: normal  Right arm pronator drift: absent  Left arm pronator drift: absent  Right leg tone: normal  Left leg tone: normal    Strength   Right neck flexion: 5/5  Left neck flexion: 5/5  Right neck extension: 5/5  Left neck extension: 5/5  Right deltoid: 5/5  Left deltoid: 5/5  Right biceps: 5/5  Left biceps: 5/5  Right triceps: 5/5  Left triceps: 5/5  Right wrist flexion: 5/5  Left wrist flexion: 5/5  Right wrist extension: 5/5  Left wrist extension: 5/5  Right interossei: 5/5  Left interossei: 5/5  Right iliopsoas: 5/5  Left iliopsoas: 5/5  Right quadriceps: 5/5  Left quadriceps: 5/5  Right hamstrin/5  Left hamstrin/5  Right glutei: 5/5  Left glutei: 5/5  Right anterior tibial: 5/5  Left anterior tibial: 5/5  Right posterior tibial: 5/5  Left posterior tibial: 5/5  Right peroneal: 5/5  Left peroneal: 5/5  Right gastroc: 5/5  Left gastroc: 5/5    Sensory Exam   Right arm light touch: decreased from elbow  Left arm light touch: normal  Right leg light touch: decreased from knee  Left leg light touch: normal  Right arm vibration: normal  Left arm vibration: normal  Right leg vibration: decreased from toes  Left leg vibration: decreased from toes  Proprioception normal.   Right arm proprioception: normal  Left arm proprioception: normal  Right leg proprioception: normal  Left leg proprioception: normal  Right arm pinprick: decreased from elbow  Left arm pinprick: normal  Right  leg pinprick: decreased from knee  Left leg pinprick: normal    Gait, Coordination, and Reflexes     Gait  Gait: non-neurologic    Coordination   Romberg: negative  Finger to nose coordination: normal  Heel to shin coordination: normal    Tremor   Resting tremor: absent  Intention tremor: absent  Action tremor: absent    Reflexes   Right brachioradialis: 2+  Left brachioradialis: 2+  Right biceps: 2+  Left biceps: 2+  Right triceps: 2+  Left triceps: 2+  Right patellar: 1+  Left patellar: 1+  Right achilles: 0  Left achilles: 0  Right plantar: normal  Left plantar: normal  Right Shore: absent  Left Shore: absent  Right ankle clonus: absent  Left ankle clonus: absent  Right pendular knee jerk: absent  Left pendular knee jerk: absent        Lab Results   Component Value Date    WBC 6.71 10/04/2022    HGB 10.9 (L) 10/04/2022    HCT 34.8 (L) 10/04/2022    MCV 98 10/04/2022     10/04/2022   1C  Sodium   Date Value Ref Range Status   10/04/2022 140 136 - 145 mmol/L Final     Potassium   Date Value Ref Range Status   10/04/2022 4.5 3.5 - 5.1 mmol/L Final     Chloride   Date Value Ref Range Status   10/04/2022 106 95 - 110 mmol/L Final     CO2   Date Value Ref Range Status   10/04/2022 26 23 - 29 mmol/L Final     Glucose   Date Value Ref Range Status   10/04/2022 102 70 - 110 mg/dL Final     BUN   Date Value Ref Range Status   10/04/2022 21 8 - 23 mg/dL Final     Creatinine   Date Value Ref Range Status   10/04/2022 0.8 0.5 - 1.4 mg/dL Final     Calcium   Date Value Ref Range Status   10/04/2022 9.4 8.7 - 10.5 mg/dL Final     Total Protein   Date Value Ref Range Status   10/04/2022 6.0 6.0 - 8.4 g/dL Final     Albumin   Date Value Ref Range Status   10/04/2022 3.7 3.5 - 5.2 g/dL Final     Total Bilirubin   Date Value Ref Range Status   10/04/2022 0.7 0.1 - 1.0 mg/dL Final     Comment:     For infants and newborns, interpretation of results should be based  on gestational age, weight and in agreement with  clinical  observations.    Premature Infant recommended reference ranges:  Up to 24 hours.............<8.0 mg/dL  Up to 48 hours............<12.0 mg/dL  3-5 days..................<15.0 mg/dL  6-29 days.................<15.0 mg/dL       Alkaline Phosphatase   Date Value Ref Range Status   10/04/2022 43 (L) 55 - 135 U/L Final     AST   Date Value Ref Range Status   10/04/2022 15 10 - 40 U/L Final     ALT   Date Value Ref Range Status   10/04/2022 20 10 - 44 U/L Final     Anion Gap   Date Value Ref Range Status   10/04/2022 8 8 - 16 mmol/L Final     eGFR if    Date Value Ref Range Status   07/27/2022 >60 >60 mL/min/1.73 m^2 Final     eGFR if non    Date Value Ref Range Status   07/27/2022 >60 >60 mL/min/1.73 m^2 Final     Comment:     Calculation used to obtain the estimated glomerular filtration  rate (eGFR) is the CKD-EPI equation.        Lab Results   Component Value Date    [id number] 268 06/23/2022      Lab Results   Component Value Date    TSH 1.024 10/04/2022     Lab Results   Component Value Date    HGBA1C 6.1 (H) 10/04/2022     4- [facility] [place]    Neuropsychology testing results are generally within normal limits. Short term memory is normal. Only atypicaly finds is mild slowing in processing speed and attention. This is likely related to MATIAS and HTN/diabetes    10-     MRI IAC/Temporal Bones no significant MRI abnormality of the internal auditory canals or cerebellopontine angles.  2. Minimal nonspecific white matter T2 alteration that likely relates to chronic microvascular ischemia.     10-4-2021      CTH shows chronic microvascular ischemic changes      2-     Abnormal VNG. Results are indicative of a left peripheral vestibulopathy, as evidenced by a 50% left Unilateral Weakness.         06-     EEG NL      7-    MRI brain shows no acute findings.  Mild atrophy with white matter degeneration.    MRV brain shows no acute abnormality.  Small  hypoplastic right transverse/sigmoid sinus.  Otherwise negative.      7-    CTA head and neck shows no evidence of flow-limiting stenosis within the cervical arterial vasculature. No evidence of flow-limiting stenosis within the intracranial arterial vasculature. No aneurysm.        Reviewed the neuroimaging independently      Assessment:      1. Multiple neurological symptoms    2. Vestibular migraine    3. Migraine with aura and with status migrainosus, not intractable    4. Depression, unspecified depression type    5. Dizziness and giddiness    6. Vertigo of central origin     7. Mild cognitive disorder    8. Carpal tunnel syndrome, unspecified laterality    9. Dizzy    10. Hearing loss of left ear, unspecified hearing loss type    11. Pulsatile tinnitus, unspecified ear    12. Tinnitus, unspecified laterality    13. Unsteadiness on feet            Plan:      MULTIPLE NEUROLOGICAL SYMPTOMS      MIGRAINE WITH AURA/ POSSIBLE VESTIBULAR MIGRAINE     Propanolol contraindicated related multiple medications for HTN    Topamax contraindicated related to memory difficulites    Amitriptyline contraindicated related to history of CAD    Triptains contraindicated related to history of CAD    Stopped Lamictal related to adverse effects    States ajovy not covered by insurance    Try Emgality 240 mg loading dose SQ followed by 120 mg SQ monthly     Continue Nurtec PRN    Considering vestibular therapy        LIGHTHEADEDNESS    Today's BP 98/61- discussed with patient that Dr. Singh instructed her to decrease hydralazine from 100 mg to 50 mg BID in August. Instructed her to decrease hydralazine back to 50 mg BID as previously instructed    Instructed patient to take daily BP and keep BP log- discuss BP with cardiology    Avoid standing for a long time    Slow transition from lying down to sitting to standing    Cross legs while standing    Increase fluid intake    Balanced salt intake     Adjust hypotensive agents      Wearing thigh high stockings     Falling down precautions    Avoid triggers           RICK/MEMROY DIFFICULTIES/ANXIETY/DEPRESSION    Psych referral placed    Will consider neuropsych referral if memory trouble continue depsite depression management    Memory Strategies     Internal Memory Strategies:   PREPARE yourself to pay attention   REHEARSAL: Mentally repeat information over and over.   VISUALIZATION: Make a mental picture of information.   ASSOCIATION: Mentally associate new information with something familiar.   REGROUPING: Organize long lists of information by category.   FIRST LETTER MNEMONICS:   LISTEN FOR THE MAIN IDEA: who, what, when, where, why, how, what's next   CHUNKING: Group digits of numbers into chunks (ex. [id number])    Break sentences and instructions down by ideas. (ex. Physical    therapy / will be at 3:00 / instead of 1:30 / because I have a    meeting).   CHAINING: Recall 3-4 words by linking them together in a sentence.    External Memory Strategies:   NOTETAKING:   TAPE RECORDING: Can use to record someone giving length information such     as a lecture, verbal instructions, or directions.   MEMORY BOOK / DAY PLANNER: To record up-coming appointments and    events.   WATCH ALARMS: Use as a reminder to take medications or check daily    Schedule.      CTS    Consider EMG/NCT in future          MEDICAL/SURGICAL COMORBIDITIES      All relevant medical comorbidities noted and managed by primary care physician and medical care team.          MISCELLANEOUS MEDICAL PROBLEMS         HEALTHY LIFESTYLE AND PREVENTATIVE CARE     Encouraged the patient to adhere to the age-appropriate health maintenance guidelines including screening tests and vaccinations.      Discussed the overall importance of healthy lifestyle, optimal weight, exercise, healthy diet, good sleep hygiene and avoiding drugs including smoking, alcohol and recreational drugs. The patient verbalized full understanding.          Advised the patient to follow COVID-19 prevention measures.         I spent a total of 45 minutes on the day of the visit.  This includes face to face time (65 minutes) and non-face to face time (85 minutes) preparing to see the patient (eg, review of tests), obtaining and/or reviewing separately obtained history, documenting clinical information in the electronic or other health record, independently interpreting results and communicating results to the patient/family/caregiver, or care coordinator.            RTC    Follow up in about 3 months (around 1/17/2023).     Jael Michaud, MSN, NP  Collaborating Provider: Kimberly Sapp MD, FAAN Neurologist/Epileptologist

## 2022-10-17 NOTE — TELEPHONE ENCOUNTER
Returned patient's phone call. She stated that Jael Michaud NP had called her. I stated that I would inform the provider.-BR

## 2022-10-18 ENCOUNTER — PATIENT OUTREACH (OUTPATIENT)
Dept: ADMINISTRATIVE | Facility: OTHER | Age: 72
End: 2022-10-18
Payer: MEDICARE

## 2022-10-18 NOTE — PROGRESS NOTES
CHW - Outreach Attempt    Community Health Worker left a voicemail message for 2nd attempt to contact patient regarding: Housing and psych   Community Health Worker to attempt to contact patient on: [phone]

## 2022-10-20 ENCOUNTER — TELEPHONE (OUTPATIENT)
Dept: PHARMACY | Facility: CLINIC | Age: 72
End: 2022-10-20
Payer: MEDICARE

## 2022-10-20 ENCOUNTER — PATIENT MESSAGE (OUTPATIENT)
Dept: INTERNAL MEDICINE | Facility: CLINIC | Age: 72
End: 2022-10-20
Payer: MEDICARE

## 2022-10-24 ENCOUNTER — PATIENT MESSAGE (OUTPATIENT)
Dept: CARDIOLOGY | Facility: CLINIC | Age: 72
End: 2022-10-24
Payer: MEDICARE

## 2022-10-24 ENCOUNTER — TELEPHONE (OUTPATIENT)
Dept: CARDIOLOGY | Facility: CLINIC | Age: 72
End: 2022-10-24
Payer: MEDICARE

## 2022-10-24 NOTE — TELEPHONE ENCOUNTER
Please call pt.  Recommend she enroll in digital hypertension program for monitoring of her BP.    Dr Singh

## 2022-10-24 NOTE — TELEPHONE ENCOUNTER
Please call pt.  Recommend she enroll in digital hypertension program for monitoring of her BP.    Dr Singh      No answer lvm to return

## 2022-10-25 ENCOUNTER — TELEPHONE (OUTPATIENT)
Dept: CARDIOLOGY | Facility: CLINIC | Age: 72
End: 2022-10-25
Payer: MEDICARE

## 2022-10-25 ENCOUNTER — TELEPHONE (OUTPATIENT)
Dept: PSYCHIATRY | Facility: CLINIC | Age: 72
End: 2022-10-25
Payer: MEDICARE

## 2022-10-25 ENCOUNTER — PATIENT MESSAGE (OUTPATIENT)
Dept: INTERNAL MEDICINE | Facility: CLINIC | Age: 72
End: 2022-10-25
Payer: MEDICARE

## 2022-10-25 ENCOUNTER — PATIENT MESSAGE (OUTPATIENT)
Dept: ADMINISTRATIVE | Facility: OTHER | Age: 72
End: 2022-10-25
Payer: MEDICARE

## 2022-10-25 NOTE — TELEPHONE ENCOUNTER
Pt needs f/u appt in clinic for BP mgt.  Last seen March 2022.  Schedule with any available provider and make sure pt arrives 20 minutes early for her appt.    I don't see a digital med enrollment order for her.  She probably is not eligible.    No other new med recs until seen in clinic.    Dr Singh

## 2022-10-26 ENCOUNTER — TELEPHONE (OUTPATIENT)
Dept: INTERNAL MEDICINE | Facility: CLINIC | Age: 72
End: 2022-10-26
Payer: MEDICARE

## 2022-10-26 NOTE — TELEPHONE ENCOUNTER
Pt needs f/u appt in clinic for BP mgt.  Last seen March 2022.  Schedule with any available provider and make sure pt arrives 20 minutes early for her appt.    I don't see a digital med enrollment order for her.  She probably is not eligible.    No other new med recs until seen in clinic.    Dr Singh    Called patient to advise of results, spoke to patient verbalized understanding

## 2022-10-26 NOTE — TELEPHONE ENCOUNTER
Returned call to patient regarding HTN digital medicine informed and discussed request patient voiced understanding. She will follow up as needed.

## 2022-10-27 NOTE — PROGRESS NOTES
CHW - Unable to Contact    Community Health Worker to close episode at this time due to three missed attempts for patient contact.

## 2022-10-31 ENCOUNTER — PATIENT MESSAGE (OUTPATIENT)
Dept: INTERNAL MEDICINE | Facility: CLINIC | Age: 72
End: 2022-10-31
Payer: MEDICARE

## 2022-11-01 ENCOUNTER — TELEPHONE (OUTPATIENT)
Dept: INTERNAL MEDICINE | Facility: CLINIC | Age: 72
End: 2022-11-01
Payer: MEDICARE

## 2022-11-01 NOTE — TELEPHONE ENCOUNTER
Returned call to patient informed her Dr Szymanski is out of the clinic on today. Offered to schedule with another provider,patient refused because of transportation. Patient request orders for UA test on tomorrow when Dr Szymanski returns.

## 2022-11-02 ENCOUNTER — PATIENT MESSAGE (OUTPATIENT)
Dept: RHEUMATOLOGY | Facility: CLINIC | Age: 72
End: 2022-11-02
Payer: MEDICARE

## 2022-11-02 ENCOUNTER — PATIENT MESSAGE (OUTPATIENT)
Dept: NEUROLOGY | Facility: CLINIC | Age: 72
End: 2022-11-02
Payer: MEDICARE

## 2022-11-02 ENCOUNTER — TELEPHONE (OUTPATIENT)
Dept: INTERNAL MEDICINE | Facility: CLINIC | Age: 72
End: 2022-11-02
Payer: MEDICARE

## 2022-11-02 DIAGNOSIS — R30.0 DYSURIA: Primary | ICD-10-CM

## 2022-11-02 NOTE — TELEPHONE ENCOUNTER
Returned call to patient informed her order for urine test in system. Patient stated she will can and schedule when she feels better.

## 2022-11-02 NOTE — TELEPHONE ENCOUNTER
Returned call to patient to schedule urine test no answer left voice message awaiting return call.

## 2022-11-15 ENCOUNTER — PATIENT MESSAGE (OUTPATIENT)
Dept: NEUROLOGY | Facility: CLINIC | Age: 72
End: 2022-11-15
Payer: MEDICARE

## 2022-11-25 ENCOUNTER — PATIENT MESSAGE (OUTPATIENT)
Dept: RHEUMATOLOGY | Facility: CLINIC | Age: 72
End: 2022-11-25
Payer: MEDICARE

## 2022-11-25 DIAGNOSIS — H10.13 ALLERGIC CONJUNCTIVITIS, BILATERAL: ICD-10-CM

## 2022-11-28 RX ORDER — BEPOTASTINE BESILATE 15 MG/ML
1 SOLUTION/ DROPS OPHTHALMIC 2 TIMES DAILY
Qty: 10 ML | Refills: 4 | Status: SHIPPED | OUTPATIENT
Start: 2022-11-28 | End: 2023-05-12 | Stop reason: SDUPTHER

## 2022-12-04 ENCOUNTER — PATIENT MESSAGE (OUTPATIENT)
Dept: RHEUMATOLOGY | Facility: CLINIC | Age: 72
End: 2022-12-04
Payer: MEDICARE

## 2022-12-05 NOTE — TELEPHONE ENCOUNTER
This is very likely to be a true orthopedic problem. I suggest she see primary care or go straight to ortho for these complaints

## 2022-12-06 ENCOUNTER — PATIENT MESSAGE (OUTPATIENT)
Dept: INTERNAL MEDICINE | Facility: CLINIC | Age: 72
End: 2022-12-06
Payer: MEDICARE

## 2022-12-06 DIAGNOSIS — M25.512 CHRONIC PAIN OF BOTH SHOULDERS: Primary | ICD-10-CM

## 2022-12-06 DIAGNOSIS — G89.29 CHRONIC PAIN OF BOTH SHOULDERS: Primary | ICD-10-CM

## 2022-12-06 DIAGNOSIS — M25.559 HIP PAIN: ICD-10-CM

## 2022-12-06 DIAGNOSIS — M25.511 CHRONIC PAIN OF BOTH SHOULDERS: Primary | ICD-10-CM

## 2022-12-06 RX ORDER — TEMAZEPAM 30 MG/1
CAPSULE ORAL
Qty: 30 CAPSULE | Refills: 2 | Status: SHIPPED | OUTPATIENT
Start: 2022-12-06 | End: 2023-02-22 | Stop reason: SDUPTHER

## 2022-12-06 NOTE — TELEPHONE ENCOUNTER
No new care gaps identified.  [facility] Embedded Care Gaps. Reference number: [id number]. 12/05/2022   10:04:18 PM CST

## 2022-12-07 ENCOUNTER — TELEPHONE (OUTPATIENT)
Dept: INTERNAL MEDICINE | Facility: CLINIC | Age: 72
End: 2022-12-07
Payer: MEDICARE

## 2022-12-07 NOTE — TELEPHONE ENCOUNTER
Returned call to patient regarding scheduled appointment for 12/20/2022 no answer left voice message.

## 2022-12-12 ENCOUNTER — PATIENT MESSAGE (OUTPATIENT)
Dept: INTERNAL MEDICINE | Facility: CLINIC | Age: 72
End: 2022-12-12
Payer: MEDICARE

## 2022-12-12 ENCOUNTER — PATIENT MESSAGE (OUTPATIENT)
Dept: ORTHOPEDICS | Facility: CLINIC | Age: 72
End: 2022-12-12
Payer: MEDICARE

## 2022-12-12 DIAGNOSIS — M25.559 HIP PAIN: ICD-10-CM

## 2022-12-12 DIAGNOSIS — M25.512 CHRONIC PAIN OF BOTH SHOULDERS: Primary | ICD-10-CM

## 2022-12-12 DIAGNOSIS — M25.511 CHRONIC PAIN OF BOTH SHOULDERS: Primary | ICD-10-CM

## 2022-12-12 DIAGNOSIS — G89.29 CHRONIC PAIN OF BOTH SHOULDERS: Primary | ICD-10-CM

## 2022-12-19 ENCOUNTER — PATIENT MESSAGE (OUTPATIENT)
Dept: INTERNAL MEDICINE | Facility: CLINIC | Age: 72
End: 2022-12-19
Payer: MEDICARE

## 2022-12-19 ENCOUNTER — PATIENT MESSAGE (OUTPATIENT)
Dept: GASTROENTEROLOGY | Facility: CLINIC | Age: 72
End: 2022-12-19
Payer: MEDICARE

## 2022-12-19 ENCOUNTER — TELEPHONE (OUTPATIENT)
Dept: GASTROENTEROLOGY | Facility: CLINIC | Age: 72
End: 2022-12-19
Payer: MEDICARE

## 2022-12-19 ENCOUNTER — TELEPHONE (OUTPATIENT)
Dept: INTERNAL MEDICINE | Facility: CLINIC | Age: 72
End: 2022-12-19
Payer: MEDICARE

## 2022-12-19 NOTE — TELEPHONE ENCOUNTER
----- Message from Yesica Angulo sent at 12/19/2022  8:39 AM CST -----  Contact: Christine King is calling to speak to the nurse regarding her rectal bleeding and she is requesting a call back ASAP at [phone]    Thanks  ANDREA

## 2022-12-19 NOTE — TELEPHONE ENCOUNTER
Called and spoke to pt. She states she is having rectal bleeding. Has a history of hemorrhoids and constipation.   Pt wants to see Dr Rogers but uses medical transportation. She already has an appt on Wed at [facility] and wants her appt on Wed if possible.   Appt scheduled with Dr Rogers for Wed, advised her if she gets worse, she should go to the ED. She agreed to plan.

## 2022-12-20 ENCOUNTER — TELEPHONE (OUTPATIENT)
Dept: ORTHOPEDICS | Facility: CLINIC | Age: 72
End: 2022-12-20
Payer: MEDICARE

## 2022-12-20 ENCOUNTER — PATIENT MESSAGE (OUTPATIENT)
Dept: CARDIOLOGY | Facility: CLINIC | Age: 72
End: 2022-12-20
Payer: MEDICARE

## 2022-12-20 ENCOUNTER — TELEPHONE (OUTPATIENT)
Dept: CARDIOLOGY | Facility: CLINIC | Age: 72
End: 2022-12-20
Payer: MEDICARE

## 2022-12-20 DIAGNOSIS — R94.31 ABNORMAL ECG: Primary | ICD-10-CM

## 2022-12-20 DIAGNOSIS — M25.552 CHRONIC LEFT HIP PAIN: ICD-10-CM

## 2022-12-20 DIAGNOSIS — M25.512 LEFT SHOULDER PAIN, UNSPECIFIED CHRONICITY: Primary | ICD-10-CM

## 2022-12-20 DIAGNOSIS — G89.29 CHRONIC LEFT HIP PAIN: ICD-10-CM

## 2022-12-20 NOTE — TELEPHONE ENCOUNTER
Scheduled patient at the request of another provider as patient was placed on the trauma schedule and is not a trauma patient.  Patient agreed to appt time, date and location.

## 2022-12-20 NOTE — TELEPHONE ENCOUNTER
Spoke with pt in regards to this     Regarding: Appt  Contact: [phone]  Patient Christine is calling. Patient has an appt on 1/10. She has the transportation bus to bring her to her appts. Patient would like to know if she can be seen sooner so she doesn't have to sit around til her 1:40 appt. Please call patient at [phone]     Thank You

## 2022-12-21 ENCOUNTER — TELEPHONE (OUTPATIENT)
Dept: GASTROENTEROLOGY | Facility: CLINIC | Age: 72
End: 2022-12-21

## 2022-12-21 ENCOUNTER — PATIENT MESSAGE (OUTPATIENT)
Dept: GASTROENTEROLOGY | Facility: CLINIC | Age: 72
End: 2022-12-21

## 2022-12-21 ENCOUNTER — OFFICE VISIT (OUTPATIENT)
Dept: GASTROENTEROLOGY | Facility: CLINIC | Age: 72
End: 2022-12-21
Payer: MEDICARE

## 2022-12-21 ENCOUNTER — HOSPITAL ENCOUNTER (OUTPATIENT)
Dept: RADIOLOGY | Facility: HOSPITAL | Age: 72
Discharge: HOME | End: 2022-12-21
Attending: STUDENT IN AN ORGANIZED HEALTH CARE EDUCATION/TRAINING PROGRAM
Payer: MEDICARE

## 2022-12-21 ENCOUNTER — PATIENT MESSAGE (OUTPATIENT)
Dept: PSYCHIATRY | Facility: CLINIC | Age: 72
End: 2022-12-21
Payer: MEDICARE

## 2022-12-21 VITALS
HEART RATE: 76 BPM | BODY MASS INDEX: 45.08 KG/M2 | OXYGEN SATURATION: 98 % | WEIGHT: 238.75 LBS | HEIGHT: 61 IN | DIASTOLIC BLOOD PRESSURE: 56 MMHG | SYSTOLIC BLOOD PRESSURE: 120 MMHG

## 2022-12-21 DIAGNOSIS — I48.0 PAROXYSMAL ATRIAL FIBRILLATION: ICD-10-CM

## 2022-12-21 DIAGNOSIS — K64.8 INTERNAL HEMORRHOID, BLEEDING: ICD-10-CM

## 2022-12-21 DIAGNOSIS — R11.0 NAUSEA: ICD-10-CM

## 2022-12-21 DIAGNOSIS — G89.29 CHRONIC LEFT HIP PAIN: ICD-10-CM

## 2022-12-21 DIAGNOSIS — K59.04 CHRONIC IDIOPATHIC CONSTIPATION: ICD-10-CM

## 2022-12-21 DIAGNOSIS — E66.01 MORBID OBESITY WITH BMI OF 45.0-49.9, ADULT: ICD-10-CM

## 2022-12-21 DIAGNOSIS — M25.512 LEFT SHOULDER PAIN, UNSPECIFIED CHRONICITY: ICD-10-CM

## 2022-12-21 DIAGNOSIS — K64.8 INTERNAL HEMORRHOID, BLEEDING: Primary | ICD-10-CM

## 2022-12-21 DIAGNOSIS — M25.552 CHRONIC LEFT HIP PAIN: ICD-10-CM

## 2022-12-21 DIAGNOSIS — Z79.01 CHRONIC ANTICOAGULATION: ICD-10-CM

## 2022-12-21 PROCEDURE — 3072F PR LOW RISK FOR RETINOPATHY: ICD-10-PCS | Mod: CPTII,S$GLB,, | Performed by: INTERNAL MEDICINE

## 2022-12-21 PROCEDURE — 3078F DIAST BP <80 MM HG: CPT | Mod: CPTII,S$GLB,, | Performed by: INTERNAL MEDICINE

## 2022-12-21 PROCEDURE — 3060F POS MICROALBUMINURIA REV: CPT | Mod: CPTII,S$GLB,, | Performed by: INTERNAL MEDICINE

## 2022-12-21 PROCEDURE — 73030 XR SHOULDER COMPLETE 2 OR MORE VIEWS LEFT: ICD-10-PCS | Mod: 26,LT,, | Performed by: STUDENT IN AN ORGANIZED HEALTH CARE EDUCATION/TRAINING PROGRAM

## 2022-12-21 PROCEDURE — 99214 PR OFFICE/OUTPT VISIT, EST, LEVL IV, 30-39 MIN: ICD-10-PCS | Mod: S$GLB,,, | Performed by: INTERNAL MEDICINE

## 2022-12-21 PROCEDURE — 99999 PR PBB SHADOW E&M-EST. PATIENT-LVL V: CPT | Mod: PBBFAC,,, | Performed by: INTERNAL MEDICINE

## 2022-12-21 PROCEDURE — 73502 XR HIP WITH PELVIS WHEN PERFORMED, 2 OR 3 VIEWS LEFT: ICD-10-PCS | Mod: 26,LT,, | Performed by: STUDENT IN AN ORGANIZED HEALTH CARE EDUCATION/TRAINING PROGRAM

## 2022-12-21 PROCEDURE — 73502 X-RAY EXAM HIP UNI 2-3 VIEWS: CPT | Mod: 26,LT,, | Performed by: STUDENT IN AN ORGANIZED HEALTH CARE EDUCATION/TRAINING PROGRAM

## 2022-12-21 PROCEDURE — 3074F SYST BP LT 130 MM HG: CPT | Mod: CPTII,S$GLB,, | Performed by: INTERNAL MEDICINE

## 2022-12-21 PROCEDURE — 73502 X-RAY EXAM HIP UNI 2-3 VIEWS: CPT | Mod: TC,LT

## 2022-12-21 PROCEDURE — 1101F PT FALLS ASSESS-DOCD LE1/YR: CPT | Mod: CPTII,S$GLB,, | Performed by: INTERNAL MEDICINE

## 2022-12-21 PROCEDURE — 1101F PR PT FALLS ASSESS DOC 0-1 FALLS W/OUT INJ PAST YR: ICD-10-PCS | Mod: CPTII,S$GLB,, | Performed by: INTERNAL MEDICINE

## 2022-12-21 PROCEDURE — 73030 X-RAY EXAM OF SHOULDER: CPT | Mod: 26,LT,, | Performed by: STUDENT IN AN ORGANIZED HEALTH CARE EDUCATION/TRAINING PROGRAM

## 2022-12-21 PROCEDURE — 73030 X-RAY EXAM OF SHOULDER: CPT | Mod: TC,LT

## 2022-12-21 PROCEDURE — 3072F LOW RISK FOR RETINOPATHY: CPT | Mod: CPTII,S$GLB,, | Performed by: INTERNAL MEDICINE

## 2022-12-21 PROCEDURE — 3008F BODY MASS INDEX DOCD: CPT | Mod: CPTII,S$GLB,, | Performed by: INTERNAL MEDICINE

## 2022-12-21 PROCEDURE — 3074F PR MOST RECENT SYSTOLIC BLOOD PRESSURE < 130 MM HG: ICD-10-PCS | Mod: CPTII,S$GLB,, | Performed by: INTERNAL MEDICINE

## 2022-12-21 PROCEDURE — 3288F FALL RISK ASSESSMENT DOCD: CPT | Mod: CPTII,S$GLB,, | Performed by: INTERNAL MEDICINE

## 2022-12-21 PROCEDURE — 4010F PR ACE/ARB THEARPY RXD/TAKEN: ICD-10-PCS | Mod: CPTII,S$GLB,, | Performed by: INTERNAL MEDICINE

## 2022-12-21 PROCEDURE — 3078F PR MOST RECENT DIASTOLIC BLOOD PRESSURE < 80 MM HG: ICD-10-PCS | Mod: CPTII,S$GLB,, | Performed by: INTERNAL MEDICINE

## 2022-12-21 PROCEDURE — 1159F MED LIST DOCD IN RCRD: CPT | Mod: CPTII,S$GLB,, | Performed by: INTERNAL MEDICINE

## 2022-12-21 PROCEDURE — 74019 XR ABDOMEN FLAT AND ERECT: ICD-10-PCS | Mod: 26,,, | Performed by: STUDENT IN AN ORGANIZED HEALTH CARE EDUCATION/TRAINING PROGRAM

## 2022-12-21 PROCEDURE — 1160F PR REVIEW ALL MEDS BY PRESCRIBER/CLIN PHARMACIST DOCUMENTED: ICD-10-PCS | Mod: CPTII,S$GLB,, | Performed by: INTERNAL MEDICINE

## 2022-12-21 PROCEDURE — 1159F PR MEDICATION LIST DOCUMENTED IN MEDICAL RECORD: ICD-10-PCS | Mod: CPTII,S$GLB,, | Performed by: INTERNAL MEDICINE

## 2022-12-21 PROCEDURE — 1160F RVW MEDS BY RX/DR IN RCRD: CPT | Mod: CPTII,S$GLB,, | Performed by: INTERNAL MEDICINE

## 2022-12-21 PROCEDURE — 74019 RADEX ABDOMEN 2 VIEWS: CPT | Mod: 26,,, | Performed by: STUDENT IN AN ORGANIZED HEALTH CARE EDUCATION/TRAINING PROGRAM

## 2022-12-21 PROCEDURE — 99214 OFFICE O/P EST MOD 30 MIN: CPT | Mod: S$GLB,,, | Performed by: INTERNAL MEDICINE

## 2022-12-21 PROCEDURE — 3044F PR MOST RECENT HEMOGLOBIN A1C LEVEL <7.0%: ICD-10-PCS | Mod: CPTII,S$GLB,, | Performed by: INTERNAL MEDICINE

## 2022-12-21 PROCEDURE — 3066F PR DOCUMENTATION OF TREATMENT FOR NEPHROPATHY: ICD-10-PCS | Mod: CPTII,S$GLB,, | Performed by: INTERNAL MEDICINE

## 2022-12-21 PROCEDURE — 3008F PR BODY MASS INDEX (BMI) DOCUMENTED: ICD-10-PCS | Mod: CPTII,S$GLB,, | Performed by: INTERNAL MEDICINE

## 2022-12-21 PROCEDURE — 4010F ACE/ARB THERAPY RXD/TAKEN: CPT | Mod: CPTII,S$GLB,, | Performed by: INTERNAL MEDICINE

## 2022-12-21 PROCEDURE — 99999 PR PBB SHADOW E&M-EST. PATIENT-LVL V: ICD-10-PCS | Mod: PBBFAC,,, | Performed by: INTERNAL MEDICINE

## 2022-12-21 PROCEDURE — 3060F PR POS MICROALBUMINURIA RESULT DOCUMENTED/REVIEW: ICD-10-PCS | Mod: CPTII,S$GLB,, | Performed by: INTERNAL MEDICINE

## 2022-12-21 PROCEDURE — 74019 RADEX ABDOMEN 2 VIEWS: CPT | Mod: TC

## 2022-12-21 PROCEDURE — 3288F PR FALLS RISK ASSESSMENT DOCUMENTED: ICD-10-PCS | Mod: CPTII,S$GLB,, | Performed by: INTERNAL MEDICINE

## 2022-12-21 PROCEDURE — 3066F NEPHROPATHY DOC TX: CPT | Mod: CPTII,S$GLB,, | Performed by: INTERNAL MEDICINE

## 2022-12-21 PROCEDURE — 3044F HG A1C LEVEL LT 7.0%: CPT | Mod: CPTII,S$GLB,, | Performed by: INTERNAL MEDICINE

## 2022-12-21 RX ORDER — HYDROCORTISONE 25 MG/G
CREAM TOPICAL 2 TIMES DAILY
Qty: 28 G | Refills: 2 | Status: SHIPPED | OUTPATIENT
Start: 2022-12-21 | End: 2023-09-18 | Stop reason: SDUPTHER

## 2022-12-21 NOTE — PROGRESS NOTES
Subjective:       Patient ID: Christine Jo is a 72 y.o. female.    Chief Complaint: Rectal Bleeding    The patient is known to our service from previous encounters.  She was last seen in the office in February last year with similar complaints of abdominal bloating and cramping with constipation and anal bleeding.  She had assessment at that time by both EGD and colonoscopy, as she also had issues with GERD.  The results of those studies are reviewed for her visit today, which included fundic gland polyps, reactive gastritis with no H pylori, and colonoscopy with diverticulosis and internal hemorrhoids.  Adenomatous polyps were also encountered so a repeat colonoscopy will be necessary in 2024.    Initially attempt at colonoscopy was unsuccessful because of poor prep related to patient's constipation, so the initial colonoscopy in May had to be repeated in June.  The constipation issue likely playing a role in patient's present complaints as straining at stool was a part of what led to anal bleeding on last use presentation.  As noted patient is on Eliquis because of paroxysmal atrial fibrillation.        Review of Systems   Constitutional:  Negative for activity change, appetite change, chills, diaphoresis, fatigue, fever and unexpected weight change.   HENT:  Positive for trouble swallowing. Negative for congestion, ear discharge, ear pain, hearing loss, nosebleeds, postnasal drip and tinnitus.    Eyes:  Negative for photophobia and visual disturbance.   Respiratory:  Negative for apnea, cough, choking, chest tightness, shortness of breath and wheezing.    Cardiovascular:  Negative for chest pain, palpitations and leg swelling.   Gastrointestinal:  Positive for anal bleeding and nausea. Negative for abdominal distention, abdominal pain, blood in stool, constipation, diarrhea, rectal pain and vomiting.        JENY; occasional   Genitourinary:  Negative for difficulty urinating, dyspareunia, dysuria, flank pain,  frequency, hematuria, menstrual problem, pelvic pain, urgency, vaginal bleeding and vaginal discharge.   Musculoskeletal:  Negative for arthralgias, back pain, gait problem, joint swelling, myalgias and neck stiffness.   Skin:  Negative for pallor and rash.   Neurological:  Negative for dizziness, tremors, seizures, syncope, speech difficulty, weakness, numbness and headaches.   Hematological:  Negative for adenopathy.   Psychiatric/Behavioral:  Negative for agitation, confusion, hallucinations, sleep disturbance and suicidal ideas.      Objective:      Physical Exam  Vitals reviewed.   Constitutional:       Appearance: She is well-developed. She is obese.      Comments: Morbid Obesity   HENT:      Head: Normocephalic and atraumatic.   Eyes:      General: No scleral icterus.        Right eye: No discharge.         Left eye: No discharge.   Neck:      Thyroid: No thyromegaly.      Vascular: No JVD.   Cardiovascular:      Rate and Rhythm: Normal rate and regular rhythm.      Heart sounds: Normal heart sounds. No murmur heard.    No friction rub. No gallop.   Pulmonary:      Effort: Pulmonary effort is normal. No respiratory distress.      Breath sounds: Normal breath sounds. No wheezing or rales.   Chest:      Chest wall: No tenderness.   Abdominal:      General: Bowel sounds are normal. There is no distension.      Palpations: Abdomen is soft. There is no mass.      Tenderness: There is abdominal tenderness. There is no guarding or rebound.      Comments: Mild diffuse predominant lower abdominal tenderness   Musculoskeletal:         General: Tenderness present. Normal range of motion.      Cervical back: Normal range of motion and neck supple.      Comments: Tender left calf  Negative Jose's sign   Lymphadenopathy:      Cervical: No cervical adenopathy.   Skin:     General: Skin is warm and dry.      Coloration: Skin is not pale.      Findings: No erythema or rash.   Neurological:      Mental Status: She is alert  and oriented to person, place, and time.      Motor: No abnormal muscle tone.      Coordination: Coordination normal.      Deep Tendon Reflexes: Reflexes are normal and symmetric.   Psychiatric:         Behavior: Behavior normal.         Thought Content: Thought content normal.         Judgment: Judgment normal.       Assessment:   Internal hemorrhoid, bleeding  -     hydrocortisone 2.5 % cream; Apply topically 2 (two) times daily.  Dispense: 20 g; Refill: 2  -     X-Ray Abdomen Flat And Erect; Future; Expected date: 12/21/2022    Chronic idiopathic constipation  -     X-Ray Abdomen Flat And Erect; Future; Expected date: 12/21/2022    Morbid obesity with BMI of 45.0-49.9, adult    Nausea    Chronic anticoagulation    Paroxysmal atrial fibrillation          Plan:   As above. Also has calf tenderness on left side believed to be associated with Lipitor. She will discuss with cardiology. Check KUB today. Will purge if needed and start daily Rx. Trial of anusol for known internal hemorrhoids from colonoscopy last year for same indication.

## 2022-12-21 NOTE — TELEPHONE ENCOUNTER
Spoke to pt and she states the Anusol suppositories are too expensive.   Talked to Dr hawthorne who recommends to contact the pharmacy and see if OTC Anusol has an insertion tube that pt can use.   Advised her I will call her pharmacy tomorrow and get back with her. She agreed to plan .

## 2022-12-22 ENCOUNTER — PATIENT MESSAGE (OUTPATIENT)
Dept: INTERNAL MEDICINE | Facility: CLINIC | Age: 72
End: 2022-12-22
Payer: MEDICARE

## 2022-12-22 ENCOUNTER — TELEPHONE (OUTPATIENT)
Dept: INTERNAL MEDICINE | Facility: CLINIC | Age: 72
End: 2022-12-22
Payer: MEDICARE

## 2022-12-23 ENCOUNTER — PATIENT MESSAGE (OUTPATIENT)
Dept: GASTROENTEROLOGY | Facility: CLINIC | Age: 72
End: 2022-12-23
Payer: MEDICARE

## 2022-12-23 NOTE — TELEPHONE ENCOUNTER
Called [facility] and states the RX for hydrocortisone has been sent out to the pt and does not have the insertion tube with it.     Called pt to see if she got the RX and does it have the insertion tube?   No answer. Msg left. Advised her to call or send a msg back to the office.

## 2022-12-27 RX ORDER — GABAPENTIN 300 MG/1
300 CAPSULE ORAL NIGHTLY
Qty: 90 CAPSULE | Refills: 1 | Status: SHIPPED | OUTPATIENT
Start: 2022-12-27 | End: 2023-04-11

## 2022-12-27 NOTE — TELEPHONE ENCOUNTER
No new care gaps identified.  [facility] Embedded Care Gaps. Reference number: [id number]. 12/26/2022   7:10:30 PM CST

## 2022-12-28 ENCOUNTER — TELEPHONE (OUTPATIENT)
Dept: ORTHOPEDICS | Facility: CLINIC | Age: 72
End: 2022-12-28
Payer: MEDICARE

## 2022-12-28 NOTE — TELEPHONE ENCOUNTER
R/S appt today due to patient not feeling well. R/S appt to January 4 at 10:30 with Dr. Erik Esposito at [facility].  Patient verbalized understanding of appt time, date and location.   ----- Message from Sunshine Malloy sent at 12/28/2022  7:57 AM CST -----  Pt is requesting a call back in regards to appt that she had to cancel today. Pt can be reached at [phone] (aexl)

## 2023-01-07 ENCOUNTER — PATIENT MESSAGE (OUTPATIENT)
Dept: GASTROENTEROLOGY | Facility: CLINIC | Age: 73
End: 2023-01-07
Payer: MEDICARE

## 2023-01-10 ENCOUNTER — PATIENT MESSAGE (OUTPATIENT)
Dept: INTERNAL MEDICINE | Facility: CLINIC | Age: 73
End: 2023-01-10
Payer: MEDICARE

## 2023-01-10 RX ORDER — HYDRALAZINE HYDROCHLORIDE 100 MG/1
100 TABLET, FILM COATED ORAL 2 TIMES DAILY
Qty: 180 TABLET | Refills: 0 | Status: SHIPPED | OUTPATIENT
Start: 2023-01-10 | End: 2023-02-07

## 2023-01-10 NOTE — TELEPHONE ENCOUNTER
Refill Routing Note   Medication(s) are not appropriate for processing by [facility] for the following reason(s):      - Outside of protocol    [facility] action(s):  Route          Medication reconciliation completed: No     Appointments  past 12m or future 3m with PCP    Date Provider   Last Visit   10/4/2022 Jose Szymanski MD   Next Visit   Visit date not found Jose Szymanski MD   ED visits in past 90 days: 0        Note composed:11:20 AM 01/10/2023

## 2023-01-13 ENCOUNTER — PATIENT MESSAGE (OUTPATIENT)
Dept: NEUROLOGY | Facility: CLINIC | Age: 73
End: 2023-01-13
Payer: MEDICARE

## 2023-01-16 ENCOUNTER — PATIENT MESSAGE (OUTPATIENT)
Dept: NEUROLOGY | Facility: CLINIC | Age: 73
End: 2023-01-16
Payer: MEDICARE

## 2023-01-16 ENCOUNTER — PATIENT MESSAGE (OUTPATIENT)
Dept: INTERNAL MEDICINE | Facility: CLINIC | Age: 73
End: 2023-01-16
Payer: MEDICARE

## 2023-01-17 ENCOUNTER — PATIENT MESSAGE (OUTPATIENT)
Dept: RHEUMATOLOGY | Facility: CLINIC | Age: 73
End: 2023-01-17
Payer: MEDICARE

## 2023-01-17 ENCOUNTER — TELEPHONE (OUTPATIENT)
Dept: INTERNAL MEDICINE | Facility: CLINIC | Age: 73
End: 2023-01-17
Payer: MEDICARE

## 2023-01-17 NOTE — TELEPHONE ENCOUNTER
Returned call to patient regarding insulin and medication. Informed per Dr Szymanski to increase to 74 units. Patients voiced understanding.

## 2023-01-25 RX ORDER — MECLIZINE HYDROCHLORIDE 25 MG/1
25 TABLET ORAL 3 TIMES DAILY PRN
Qty: 30 TABLET | Refills: 2 | Status: SHIPPED | OUTPATIENT
Start: 2023-01-25 | End: 2024-02-04 | Stop reason: SDUPTHER

## 2023-01-25 NOTE — TELEPHONE ENCOUNTER
Care Due:                  Date            Visit Type   Department     Provider  --------------------------------------------------------------------------------                                EP -                              PRIMARY      [facility] INTERNAL  Last Visit: 10-      CARE ([facility])   MEDICINE       Jose Szymanski  Next Visit: None Scheduled  None         None Found                                                            Last  Test          Frequency    Reason                     Performed    Due Date  --------------------------------------------------------------------------------    HBA1C.......  6 months...  SITagliptin, insulin.....  10-   04-    [facility] Embedded Care Gaps. Reference number: [id number]. 1/25/2023   9:00:31 AM CST

## 2023-02-01 ENCOUNTER — TELEPHONE (OUTPATIENT)
Dept: PSYCHIATRY | Facility: CLINIC | Age: 73
End: 2023-02-01
Payer: MEDICARE

## 2023-02-01 NOTE — TELEPHONE ENCOUNTER
----- Message from Ludivina Harley sent at 2/1/2023 10:08 AM CST -----  Please call pt @ [phone] regarding appt today, please cancel appt today and call to reschedule.

## 2023-02-06 RX ORDER — CLOTRIMAZOLE AND BETAMETHASONE DIPROPIONATE 10; .5 MG/ML; MG/ML
LOTION TOPICAL 2 TIMES DAILY
Qty: 30 ML | Refills: 2 | Status: SHIPPED | OUTPATIENT
Start: 2023-02-06 | End: 2024-03-09 | Stop reason: SDUPTHER

## 2023-02-06 NOTE — TELEPHONE ENCOUNTER
No new care gaps identified.  [facility] Embedded Care Gaps. Reference number: [id number]. 2/05/2023   7:57:13 PM CST

## 2023-02-07 ENCOUNTER — LAB VISIT (OUTPATIENT)
Dept: LAB | Facility: HOSPITAL | Age: 73
End: 2023-02-07
Payer: MEDICARE

## 2023-02-07 ENCOUNTER — TELEPHONE (OUTPATIENT)
Dept: NEUROLOGY | Facility: CLINIC | Age: 73
End: 2023-02-07
Payer: MEDICARE

## 2023-02-07 ENCOUNTER — PATIENT MESSAGE (OUTPATIENT)
Dept: NEUROLOGY | Facility: CLINIC | Age: 73
End: 2023-02-07
Payer: MEDICARE

## 2023-02-07 ENCOUNTER — OFFICE VISIT (OUTPATIENT)
Dept: INTERNAL MEDICINE | Facility: CLINIC | Age: 73
End: 2023-02-07
Payer: MEDICARE

## 2023-02-07 ENCOUNTER — TELEPHONE (OUTPATIENT)
Dept: INTERNAL MEDICINE | Facility: CLINIC | Age: 73
End: 2023-02-07

## 2023-02-07 ENCOUNTER — TELEPHONE (OUTPATIENT)
Dept: GASTROENTEROLOGY | Facility: CLINIC | Age: 73
End: 2023-02-07
Payer: MEDICARE

## 2023-02-07 ENCOUNTER — OFFICE VISIT (OUTPATIENT)
Dept: CARDIOLOGY | Facility: CLINIC | Age: 73
End: 2023-02-07
Payer: MEDICARE

## 2023-02-07 VITALS
OXYGEN SATURATION: 97 % | DIASTOLIC BLOOD PRESSURE: 50 MMHG | SYSTOLIC BLOOD PRESSURE: 104 MMHG | BODY MASS INDEX: 44.34 KG/M2 | WEIGHT: 240.94 LBS | HEART RATE: 88 BPM | HEIGHT: 62 IN

## 2023-02-07 VITALS
TEMPERATURE: 98 F | HEART RATE: 82 BPM | WEIGHT: 241.88 LBS | HEIGHT: 62 IN | OXYGEN SATURATION: 97 % | RESPIRATION RATE: 17 BRPM | BODY MASS INDEX: 44.51 KG/M2 | DIASTOLIC BLOOD PRESSURE: 60 MMHG | SYSTOLIC BLOOD PRESSURE: 118 MMHG

## 2023-02-07 DIAGNOSIS — D50.0 IRON DEFICIENCY ANEMIA DUE TO CHRONIC BLOOD LOSS: ICD-10-CM

## 2023-02-07 DIAGNOSIS — I70.0 AORTO-ILIAC ATHEROSCLEROSIS: ICD-10-CM

## 2023-02-07 DIAGNOSIS — E11.59 HYPERTENSION ASSOCIATED WITH DIABETES: ICD-10-CM

## 2023-02-07 DIAGNOSIS — E78.2 MIXED HYPERLIPIDEMIA: ICD-10-CM

## 2023-02-07 DIAGNOSIS — Z79.4 CONTROLLED TYPE 2 DIABETES MELLITUS WITH OTHER CIRCULATORY COMPLICATION, WITH LONG-TERM CURRENT USE OF INSULIN: Primary | ICD-10-CM

## 2023-02-07 DIAGNOSIS — I25.118 CORONARY ARTERY DISEASE OF NATIVE ARTERY OF NATIVE HEART WITH STABLE ANGINA PECTORIS: ICD-10-CM

## 2023-02-07 DIAGNOSIS — I15.2 HYPERTENSION ASSOCIATED WITH DIABETES: ICD-10-CM

## 2023-02-07 DIAGNOSIS — E11.59 CONTROLLED TYPE 2 DIABETES MELLITUS WITH OTHER CIRCULATORY COMPLICATION, WITH LONG-TERM CURRENT USE OF INSULIN: ICD-10-CM

## 2023-02-07 DIAGNOSIS — I10 ESSENTIAL HYPERTENSION: Primary | ICD-10-CM

## 2023-02-07 DIAGNOSIS — R06.09 DOE (DYSPNEA ON EXERTION): ICD-10-CM

## 2023-02-07 DIAGNOSIS — R94.31 ABNORMAL ECG: ICD-10-CM

## 2023-02-07 DIAGNOSIS — M79.605 PAIN IN BOTH LOWER EXTREMITIES: ICD-10-CM

## 2023-02-07 DIAGNOSIS — Z79.4 CONTROLLED TYPE 2 DIABETES MELLITUS WITH OTHER CIRCULATORY COMPLICATION, WITH LONG-TERM CURRENT USE OF INSULIN: ICD-10-CM

## 2023-02-07 DIAGNOSIS — F33.41 RECURRENT MAJOR DEPRESSIVE DISORDER, IN PARTIAL REMISSION: ICD-10-CM

## 2023-02-07 DIAGNOSIS — K62.5 RECTAL BLEEDING: ICD-10-CM

## 2023-02-07 DIAGNOSIS — E11.65 UNCONTROLLED TYPE 2 DIABETES MELLITUS WITH HYPERGLYCEMIA: ICD-10-CM

## 2023-02-07 DIAGNOSIS — G47.30 SLEEP APNEA, UNSPECIFIED TYPE: ICD-10-CM

## 2023-02-07 DIAGNOSIS — R42 DIZZINESS: ICD-10-CM

## 2023-02-07 DIAGNOSIS — I48.0 PAROXYSMAL ATRIAL FIBRILLATION: ICD-10-CM

## 2023-02-07 DIAGNOSIS — M79.604 PAIN IN BOTH LOWER EXTREMITIES: ICD-10-CM

## 2023-02-07 DIAGNOSIS — E11.59 CONTROLLED TYPE 2 DIABETES MELLITUS WITH OTHER CIRCULATORY COMPLICATION, WITH LONG-TERM CURRENT USE OF INSULIN: Primary | ICD-10-CM

## 2023-02-07 DIAGNOSIS — K21.9 GASTROESOPHAGEAL REFLUX DISEASE WITHOUT ESOPHAGITIS: ICD-10-CM

## 2023-02-07 DIAGNOSIS — I10 PRIMARY HYPERTENSION: ICD-10-CM

## 2023-02-07 DIAGNOSIS — E78.1 HYPERTRIGLYCERIDEMIA: ICD-10-CM

## 2023-02-07 DIAGNOSIS — E66.01 MORBID OBESITY WITH BMI OF 40.0-44.9, ADULT: ICD-10-CM

## 2023-02-07 DIAGNOSIS — I70.8 AORTO-ILIAC ATHEROSCLEROSIS: ICD-10-CM

## 2023-02-07 DIAGNOSIS — F41.9 ANXIETY DISORDER, UNSPECIFIED TYPE: ICD-10-CM

## 2023-02-07 DIAGNOSIS — E66.01 CLASS 3 SEVERE OBESITY DUE TO EXCESS CALORIES WITH SERIOUS COMORBIDITY AND BODY MASS INDEX (BMI) OF 45.0 TO 49.9 IN ADULT: ICD-10-CM

## 2023-02-07 DIAGNOSIS — I71.40 ABDOMINAL AORTIC ANEURYSM (AAA) WITHOUT RUPTURE, UNSPECIFIED PART: ICD-10-CM

## 2023-02-07 LAB
ALBUMIN SERPL BCP-MCNC: 3.7 G/DL (ref 3.5–5.2)
ALP SERPL-CCNC: 47 U/L (ref 55–135)
ALT SERPL W/O P-5'-P-CCNC: 16 U/L (ref 10–44)
ANION GAP SERPL CALC-SCNC: 12 MMOL/L (ref 8–16)
AST SERPL-CCNC: 16 U/L (ref 10–40)
BASOPHILS # BLD AUTO: 0.07 K/UL (ref 0–0.2)
BASOPHILS NFR BLD: 0.9 % (ref 0–1.9)
BILIRUB SERPL-MCNC: 0.5 MG/DL (ref 0.1–1)
BUN SERPL-MCNC: 20 MG/DL (ref 8–23)
CALCIUM SERPL-MCNC: 9.5 MG/DL (ref 8.7–10.5)
CHLORIDE SERPL-SCNC: 101 MMOL/L (ref 95–110)
CO2 SERPL-SCNC: 24 MMOL/L (ref 23–29)
CREAT SERPL-MCNC: 0.9 MG/DL (ref 0.5–1.4)
DIFFERENTIAL METHOD: ABNORMAL
EOSINOPHIL # BLD AUTO: 0.1 K/UL (ref 0–0.5)
EOSINOPHIL NFR BLD: 1.3 % (ref 0–8)
ERYTHROCYTE [DISTWIDTH] IN BLOOD BY AUTOMATED COUNT: 12.6 % (ref 11.5–14.5)
EST. GFR  (NO RACE VARIABLE): >60 ML/MIN/1.73 M^2
ESTIMATED AVG GLUCOSE: 157 MG/DL (ref 68–131)
FERRITIN SERPL-MCNC: 73 NG/ML (ref 20–300)
GLUCOSE SERPL-MCNC: 174 MG/DL (ref 70–110)
HBA1C MFR BLD: 7.1 % (ref 4–5.6)
HCT VFR BLD AUTO: 34.3 % (ref 37–48.5)
HGB BLD-MCNC: 10.7 G/DL (ref 12–16)
IMM GRANULOCYTES # BLD AUTO: 0.02 K/UL (ref 0–0.04)
IMM GRANULOCYTES NFR BLD AUTO: 0.3 % (ref 0–0.5)
IRON SERPL-MCNC: 77 UG/DL (ref 30–160)
LYMPHOCYTES # BLD AUTO: 1.5 K/UL (ref 1–4.8)
LYMPHOCYTES NFR BLD: 19.5 % (ref 18–48)
MCH RBC QN AUTO: 30.7 PG (ref 27–31)
MCHC RBC AUTO-ENTMCNC: 31.2 G/DL (ref 32–36)
MCV RBC AUTO: 98 FL (ref 82–98)
MONOCYTES # BLD AUTO: 0.7 K/UL (ref 0.3–1)
MONOCYTES NFR BLD: 9.2 % (ref 4–15)
NEUTROPHILS # BLD AUTO: 5.2 K/UL (ref 1.8–7.7)
NEUTROPHILS NFR BLD: 68.8 % (ref 38–73)
NRBC BLD-RTO: 0 /100 WBC
PLATELET # BLD AUTO: 237 K/UL (ref 150–450)
PMV BLD AUTO: 12.1 FL (ref 9.2–12.9)
POTASSIUM SERPL-SCNC: 4.5 MMOL/L (ref 3.5–5.1)
PROT SERPL-MCNC: 6.7 G/DL (ref 6–8.4)
RBC # BLD AUTO: 3.49 M/UL (ref 4–5.4)
SATURATED IRON: 25 % (ref 20–50)
SODIUM SERPL-SCNC: 137 MMOL/L (ref 136–145)
TOTAL IRON BINDING CAPACITY: 311 UG/DL (ref 250–450)
TRANSFERRIN SERPL-MCNC: 210 MG/DL (ref 200–375)
WBC # BLD AUTO: 7.5 K/UL (ref 3.9–12.7)

## 2023-02-07 PROCEDURE — 1126F PR PAIN SEVERITY QUANTIFIED, NO PAIN PRESENT: ICD-10-PCS | Mod: CPTII,S$GLB,,

## 2023-02-07 PROCEDURE — 3288F PR FALLS RISK ASSESSMENT DOCUMENTED: ICD-10-PCS | Mod: CPTII,S$GLB,, | Performed by: INTERNAL MEDICINE

## 2023-02-07 PROCEDURE — 1160F RVW MEDS BY RX/DR IN RCRD: CPT | Mod: CPTII,S$GLB,, | Performed by: INTERNAL MEDICINE

## 2023-02-07 PROCEDURE — 1159F MED LIST DOCD IN RCRD: CPT | Mod: CPTII,S$GLB,,

## 2023-02-07 PROCEDURE — 1125F PR PAIN SEVERITY QUANTIFIED, PAIN PRESENT: ICD-10-PCS | Mod: CPTII,S$GLB,, | Performed by: INTERNAL MEDICINE

## 2023-02-07 PROCEDURE — 99214 OFFICE O/P EST MOD 30 MIN: CPT | Mod: S$GLB,,,

## 2023-02-07 PROCEDURE — 99999 PR PBB SHADOW E&M-EST. PATIENT-LVL V: ICD-10-PCS | Mod: PBBFAC,,, | Performed by: INTERNAL MEDICINE

## 2023-02-07 PROCEDURE — 3008F PR BODY MASS INDEX (BMI) DOCUMENTED: ICD-10-PCS | Mod: CPTII,S$GLB,, | Performed by: INTERNAL MEDICINE

## 2023-02-07 PROCEDURE — 3074F PR MOST RECENT SYSTOLIC BLOOD PRESSURE < 130 MM HG: ICD-10-PCS | Mod: CPTII,S$GLB,,

## 2023-02-07 PROCEDURE — 99999 PR PBB SHADOW E&M-EST. PATIENT-LVL V: ICD-10-PCS | Mod: PBBFAC,,,

## 2023-02-07 PROCEDURE — 3074F SYST BP LT 130 MM HG: CPT | Mod: CPTII,S$GLB,, | Performed by: INTERNAL MEDICINE

## 2023-02-07 PROCEDURE — 1101F PR PT FALLS ASSESS DOC 0-1 FALLS W/OUT INJ PAST YR: ICD-10-PCS | Mod: CPTII,S$GLB,, | Performed by: INTERNAL MEDICINE

## 2023-02-07 PROCEDURE — 4010F ACE/ARB THERAPY RXD/TAKEN: CPT | Mod: CPTII,S$GLB,,

## 2023-02-07 PROCEDURE — 3072F LOW RISK FOR RETINOPATHY: CPT | Mod: CPTII,S$GLB,,

## 2023-02-07 PROCEDURE — 1160F PR REVIEW ALL MEDS BY PRESCRIBER/CLIN PHARMACIST DOCUMENTED: ICD-10-PCS | Mod: CPTII,S$GLB,, | Performed by: INTERNAL MEDICINE

## 2023-02-07 PROCEDURE — 1159F PR MEDICATION LIST DOCUMENTED IN MEDICAL RECORD: ICD-10-PCS | Mod: CPTII,S$GLB,,

## 2023-02-07 PROCEDURE — 3072F PR LOW RISK FOR RETINOPATHY: ICD-10-PCS | Mod: CPTII,S$GLB,,

## 2023-02-07 PROCEDURE — 3072F LOW RISK FOR RETINOPATHY: CPT | Mod: CPTII,S$GLB,, | Performed by: INTERNAL MEDICINE

## 2023-02-07 PROCEDURE — 3008F PR BODY MASS INDEX (BMI) DOCUMENTED: ICD-10-PCS | Mod: CPTII,S$GLB,,

## 2023-02-07 PROCEDURE — 99999 PR PBB SHADOW E&M-EST. PATIENT-LVL V: CPT | Mod: PBBFAC,,, | Performed by: INTERNAL MEDICINE

## 2023-02-07 PROCEDURE — 4010F ACE/ARB THERAPY RXD/TAKEN: CPT | Mod: CPTII,S$GLB,, | Performed by: INTERNAL MEDICINE

## 2023-02-07 PROCEDURE — 1159F MED LIST DOCD IN RCRD: CPT | Mod: CPTII,S$GLB,, | Performed by: INTERNAL MEDICINE

## 2023-02-07 PROCEDURE — 3078F DIAST BP <80 MM HG: CPT | Mod: CPTII,S$GLB,, | Performed by: INTERNAL MEDICINE

## 2023-02-07 PROCEDURE — 3074F PR MOST RECENT SYSTOLIC BLOOD PRESSURE < 130 MM HG: ICD-10-PCS | Mod: CPTII,S$GLB,, | Performed by: INTERNAL MEDICINE

## 2023-02-07 PROCEDURE — 3072F PR LOW RISK FOR RETINOPATHY: ICD-10-PCS | Mod: CPTII,S$GLB,, | Performed by: INTERNAL MEDICINE

## 2023-02-07 PROCEDURE — 3078F DIAST BP <80 MM HG: CPT | Mod: CPTII,S$GLB,,

## 2023-02-07 PROCEDURE — 80053 COMPREHEN METABOLIC PANEL: CPT

## 2023-02-07 PROCEDURE — 4010F PR ACE/ARB THEARPY RXD/TAKEN: ICD-10-PCS | Mod: CPTII,S$GLB,, | Performed by: INTERNAL MEDICINE

## 2023-02-07 PROCEDURE — 3288F PR FALLS RISK ASSESSMENT DOCUMENTED: ICD-10-PCS | Mod: CPTII,S$GLB,,

## 2023-02-07 PROCEDURE — 1126F AMNT PAIN NOTED NONE PRSNT: CPT | Mod: CPTII,S$GLB,,

## 2023-02-07 PROCEDURE — 3074F SYST BP LT 130 MM HG: CPT | Mod: CPTII,S$GLB,,

## 2023-02-07 PROCEDURE — 99214 PR OFFICE/OUTPT VISIT, EST, LEVL IV, 30-39 MIN: ICD-10-PCS | Mod: S$GLB,,,

## 2023-02-07 PROCEDURE — 99214 OFFICE O/P EST MOD 30 MIN: CPT | Mod: S$GLB,,, | Performed by: INTERNAL MEDICINE

## 2023-02-07 PROCEDURE — 3078F PR MOST RECENT DIASTOLIC BLOOD PRESSURE < 80 MM HG: ICD-10-PCS | Mod: CPTII,S$GLB,,

## 2023-02-07 PROCEDURE — 3288F FALL RISK ASSESSMENT DOCD: CPT | Mod: CPTII,S$GLB,, | Performed by: INTERNAL MEDICINE

## 2023-02-07 PROCEDURE — 85025 COMPLETE CBC W/AUTO DIFF WBC: CPT

## 2023-02-07 PROCEDURE — 1101F PT FALLS ASSESS-DOCD LE1/YR: CPT | Mod: CPTII,S$GLB,, | Performed by: INTERNAL MEDICINE

## 2023-02-07 PROCEDURE — 36415 COLL VENOUS BLD VENIPUNCTURE: CPT

## 2023-02-07 PROCEDURE — 99214 PR OFFICE/OUTPT VISIT, EST, LEVL IV, 30-39 MIN: ICD-10-PCS | Mod: S$GLB,,, | Performed by: INTERNAL MEDICINE

## 2023-02-07 PROCEDURE — 1101F PT FALLS ASSESS-DOCD LE1/YR: CPT | Mod: CPTII,S$GLB,,

## 2023-02-07 PROCEDURE — 1101F PR PT FALLS ASSESS DOC 0-1 FALLS W/OUT INJ PAST YR: ICD-10-PCS | Mod: CPTII,S$GLB,,

## 2023-02-07 PROCEDURE — 83036 HEMOGLOBIN GLYCOSYLATED A1C: CPT

## 2023-02-07 PROCEDURE — 1125F AMNT PAIN NOTED PAIN PRSNT: CPT | Mod: CPTII,S$GLB,, | Performed by: INTERNAL MEDICINE

## 2023-02-07 PROCEDURE — 3008F BODY MASS INDEX DOCD: CPT | Mod: CPTII,S$GLB,,

## 2023-02-07 PROCEDURE — 1159F PR MEDICATION LIST DOCUMENTED IN MEDICAL RECORD: ICD-10-PCS | Mod: CPTII,S$GLB,, | Performed by: INTERNAL MEDICINE

## 2023-02-07 PROCEDURE — 3288F FALL RISK ASSESSMENT DOCD: CPT | Mod: CPTII,S$GLB,,

## 2023-02-07 PROCEDURE — 99999 PR PBB SHADOW E&M-EST. PATIENT-LVL V: CPT | Mod: PBBFAC,,,

## 2023-02-07 PROCEDURE — 3078F PR MOST RECENT DIASTOLIC BLOOD PRESSURE < 80 MM HG: ICD-10-PCS | Mod: CPTII,S$GLB,, | Performed by: INTERNAL MEDICINE

## 2023-02-07 PROCEDURE — 4010F PR ACE/ARB THEARPY RXD/TAKEN: ICD-10-PCS | Mod: CPTII,S$GLB,,

## 2023-02-07 PROCEDURE — 82728 ASSAY OF FERRITIN: CPT

## 2023-02-07 PROCEDURE — 84466 ASSAY OF TRANSFERRIN: CPT

## 2023-02-07 PROCEDURE — 3008F BODY MASS INDEX DOCD: CPT | Mod: CPTII,S$GLB,, | Performed by: INTERNAL MEDICINE

## 2023-02-07 RX ORDER — GALCANEZUMAB 120 MG/ML
INJECTION, SOLUTION SUBCUTANEOUS
COMMUNITY
End: 2023-04-11

## 2023-02-07 RX ORDER — BENAZEPRIL HYDROCHLORIDE 40 MG/1
40 TABLET ORAL DAILY
Qty: 90 TABLET | Refills: 3
Start: 2023-02-07 | End: 2023-07-24 | Stop reason: SDUPTHER

## 2023-02-07 RX ORDER — RIMEGEPANT SULFATE 75 MG/75MG
75 TABLET, ORALLY DISINTEGRATING ORAL ONCE AS NEEDED
COMMUNITY
End: 2023-04-11

## 2023-02-07 RX ORDER — HYDRALAZINE HYDROCHLORIDE 50 MG/1
100 TABLET, FILM COATED ORAL 2 TIMES DAILY
Qty: 180 TABLET | Refills: 3 | Status: SHIPPED | OUTPATIENT
Start: 2023-02-07 | End: 2023-03-21

## 2023-02-07 NOTE — TELEPHONE ENCOUNTER
Patient was seen in office today with Rocio Fitzgerald. Rocio wanted patient to get setup with . Can someone assist with getting her scheduled.

## 2023-02-07 NOTE — TELEPHONE ENCOUNTER
----- Message from Georgia Lobato sent at 2/7/2023 10:19 AM CST -----  Patient states as a patient of [facility],She took injections for migraines and headaches,and it is due today,Do she keep the shot up,What should she do.Please call back at [phone].Thanks

## 2023-02-07 NOTE — PROGRESS NOTES
"Subjective:   Patient ID:    Christine Jo is a 71 y.o. female who presents for evaluation of Atrial Fibrillation, Coronary Artery Disease, Hyperlipidemia, Hypertension, Peripheral Arterial Disease, Risk Factor Management For Atherosclerosis, and Shortness of Breath           HPI Pt presents for eval.  Her current med conditions include CAD, PAF, DM, obesity, HTN, AAA, hyperlipidemia, MATIAS.  Former smoker.  Past hx pertinent for following:  She used to see Dr. Arthur Carrizales, [facility].  Has h/o PAF.  States she has had 2 or 4 cardiac cath procedures.  Last [facility] 2017, nonobstructive CAD noted.  ecg 3/26/21 NSR, left axis, nonspecific septal Q waves V1-V2.  Stress MPI 4/21 reviewed: no ischemia, normal EF.  Echo 4/21 reviewed: normal LV function, LVH, mild TR.  Now here.  Pt seen 6 months ago.  Abdominal u/s 12/21: no AAA noted.  MIKE 12/21 1.0 R LE, 0.97 L LE.  Has been seeing ENT and PCP for vertigo.  Dx w vestibular disease w rehab tx planned.  Dizziness associated with nausea/vomiting at time per chart.  Has dyspnea -- she states "it is ok".  Denies cp.  A lot of stress in her life; depression.  Stays inside.  9 day [place] Holter 10/21: NSR, 6 runs nonsustained atrial tachycardia (longest 8 beats), 3 runs NSVT (longest 7 beats).  Did not tolerate CPAP in past.  ecg today 3/14/22 NSR, left axis, incomplete RBBB.  No acute changes.  Weight stable.  She thinks statin causes restless legs.  She cut dose in 1/2 without improvement.  DM HGAIC above goal.  Takes Eliquis.                Past Medical History:   Diagnosis Date    Arthritis      Cataract      Diabetes mellitus 2008      am 01/15/2018 Insulin x 1 year    DM (diabetes mellitus) 2008      am 02/14/2020 Insulin x 4 years    Encounter for blood transfusion      Glaucoma      Hypertension      Insomnia      Macular degeneration      Vaginal yeast infection        02/07/2023:   Overall patient is doing well but very fatigued blood pressure is low 100/50 " will decrease the doses of benazepril once daily and decrease hydralazine doses to 50 mg q.12 hours he will continue carvedilol.  She is had some rectal bleeding if necessary we can hold the Eliquis for few days otherwise she seeing GI in follow-up in will have repeat CBC as well as iron studies.  No chest discomfort noted.      Review of Systems   Constitutional: Negative for chills, diaphoresis, night sweats, weight gain and weight loss.   HENT:  Negative for congestion, hoarse voice, sore throat and stridor.    Eyes:  Negative for double vision and pain.   Cardiovascular:  Negative for chest pain, claudication, cyanosis, dyspnea on exertion, irregular heartbeat, leg swelling, near-syncope, orthopnea, palpitations, paroxysmal nocturnal dyspnea and syncope.   Respiratory:  Negative for cough, hemoptysis, shortness of breath, sleep disturbances due to breathing, snoring, sputum production and wheezing.    Endocrine: Negative for cold intolerance, heat intolerance and polydipsia.   Hematologic/Lymphatic: Negative for bleeding problem. Does not bruise/bleed easily.   Skin:  Negative for color change, dry skin and rash.   Musculoskeletal:  Negative for joint swelling and muscle cramps.   Gastrointestinal:  Negative for bloating, abdominal pain, constipation, diarrhea, dysphagia, melena, nausea and vomiting.   Genitourinary:  Negative for flank pain and urgency.   Neurological:  Negative for dizziness, focal weakness, headaches, light-headedness, loss of balance, seizures and weakness.   Psychiatric/Behavioral:  Negative for altered mental status and memory loss. The patient is not nervous/anxious.    Family History   Problem Relation Age of Onset    Heart disease Mother         CAD     Cataracts Mother     Macular degeneration Mother     Glaucoma Mother     Cancer Son         testicular     Cancer Maternal Aunt         Lung ca    Heart disease Maternal Grandfather         Pacemaker     Diabetes Sister     Heart disease  Sister         CAD    Cataracts Sister     Diabetes Sister     Diabetes Sister      Past Medical History:   Diagnosis Date    Arthritis     Cataract     Diabetes mellitus      am 01/15/2018 Insulin x 1 year    DM (diabetes mellitus)      am 2020 Insulin x 4 years    Encounter for blood transfusion     Glaucoma     Hypertension     Insomnia     Macular degeneration     Vaginal yeast infection      Social History     Socioeconomic History    Marital status:     Number of children: 3   Occupational History    Occupation: Retired   Tobacco Use    Smoking status: Former     Packs/day: 1.00     Years: 36.00     Pack years: 36.00     Types: Cigarettes     Start date:      Quit date: 2003     Years since quittin.6     Passive exposure: Never    Smokeless tobacco: Never   Substance and Sexual Activity    Alcohol use: No    Drug use: No    Sexual activity: Never     Social Determinants of Health     Financial Resource Strain: Low Risk     Difficulty of Paying Living Expenses: Not hard at all   Food Insecurity: No Food Insecurity    Worried About Running Out of Food in the Last Year: Never true    Ran Out of Food in the Last Year: Never true   Transportation Needs: Unmet Transportation Needs    Lack of Transportation (Medical): Yes    Lack of Transportation (Non-Medical): No   Physical Activity: Insufficiently Active    Days of Exercise per Week: 7 days    Minutes of Exercise per Session: 10 min   Stress: No Stress Concern Present    Feeling of Stress : Only a little   Social Connections: Moderately Isolated    Frequency of Communication with Friends and Family: More than three times a week    Frequency of Social Gatherings with Friends and Family: Never    Attends Voodoo Services: 1 to 4 times per year    Active Member of Clubs or Organizations: No    Attends Club or Organization Meetings: Never    Marital Status:    Housing Stability: Low Risk     Unable to Pay for  Housing in the Last Year: No    Number of Places Lived in the Last Year: 2    Unstable Housing in the Last Year: No     Current Outpatient Medications on File Prior to Visit   Medication Sig Dispense Refill    ACETAMINOPHEN (TYLENOL ARTHRITIS ORAL) Take by mouth as needed.      albuterol (VENTOLIN HFA) 90 mcg/actuation inhaler Inhale 2 puffs into the lungs every 4 (four) hours as needed for Wheezing or Shortness of Breath. 18 g 11    apixaban (ELIQUIS) 5 mg Tab Take 1 tablet (5 mg total) by mouth 2 (two) times daily. 180 tablet 0    atorvastatin (LIPITOR) 10 MG tablet Take 1 tablet (10 mg total) by mouth every evening. 90 tablet 3    azelastine (ASTELIN) 137 mcg (0.1 %) nasal spray use 2 sprays (274 mcg total) by Nasal route 2 (two) times daily. 30 mL 1    benazepriL (LOTENSIN) 40 MG tablet Take 1 tablet (40 mg total) by mouth 2 (two) times daily. 180 tablet 4    BEPREVE 1.5 % Drop Place 1 drop into both eyes 2 (two) times daily. 10 mL 4    blood sugar diagnostic Strp To check blood sugar 1 times daily 100 each 3    blood sugar diagnostic Strp To check BG 2   times daily, to use with insurance preferred meter 200 each 3    blood-glucose meter kit To check BG 2 times daily, to use with insurance preferred meter 1 each 0    calcium citrate-vitamin D3 315-200 mg (CITRACAL+D) 315-200 mg-unit per tablet Take 1 tablet by mouth once daily.      carvediloL (COREG) 25 MG tablet TAKE 1 TABLET BY MOUTH TWICE DAILY 180 tablet 3    clotrimazole-betamethasone (LOTRISONE) lotion Apply topically to the affected area 2 (two) times daily. 30 mL 2    cycloSPORINE (RESTASIS) 0.05 % ophthalmic emulsion Place 1 drop into both eyes 2 (two) times daily. 60 each 11    diclofenac sodium (VOLTAREN) 1 % Gel Apply 2 grams topically 4 (four) times daily. To affected joints as needed for pain 100 g 3    DULoxetine (CYMBALTA) 60 MG capsule Take 1 capsule (60 mg total) by mouth once daily. 90 capsule 3    estradioL (ESTRACE) 0.01 % (0.1 mg/gram)  "vaginal cream 1/2 gram Per Vagina every night for a month then 3 times per week thereafter 42.5 g 11    fluticasone propionate (FLONASE) 50 mcg/actuation nasal spray 2 sprays (100 mcg total) by Each Nostril route once daily. 48 g 3    gabapentin (NEURONTIN) 300 MG capsule Take 1 capsule (300 mg total) by mouth every evening. 90 capsule 1    galcanezumab-gnlm 120 mg/mL PnIj Inject 120 mg into the skin every 28 days. maintenance dose 1 mL 5    hydrALAZINE (APRESOLINE) 100 MG tablet Take 1 tablet (100 mg total) by mouth 2 (two) times daily. 180 tablet 0    hydrocortisone 2.5 % cream Apply topically 2 (two) times daily. 28 g 2    insulin (LANTUS SOLOSTAR U-100 INSULIN) glargine 100 units/mL SubQ pen Inject 72 Units into the skin every evening. (Patient taking differently: Inject 65 Units into the skin every evening.) 75 mL 1    lancets Misc To check BG 1 times daily, to use with insurance preferred meter 100 each 3    meclizine (ANTIVERT) 25 mg tablet Take 1 tablet (25 mg total) by mouth 3 (three) times daily as needed. 30 tablet 2    MULTIVIT WITH CALCIUM,IRON,MIN (WOMEN'S DAILY MULTIVITAMIN ORAL) Take by mouth once daily.       mupirocin (BACTROBAN) 2 % ointment Apply topically 2 (two) times daily. 22 g 0    nystatin (MYCOSTATIN) cream Apply topically 2 (two) times daily. Continue until completely healed 30 g 0    pantoprazole (PROTONIX) 40 MG tablet Take 1 tablet (40 mg total) by mouth 2 (two) times a day. 180 tablet 3    pen needle, diabetic (BD ULTRA-FINE SHORT PEN NEEDLE) 31 gauge x 5/16" Ndle AS DIRECTED TWICE DAILY 200 each 3    rimegepant 75 mg odt Take 1 tablet (75 mg total) by mouth as needed for Migraine (do not exceed 2-3 doses within 1 week). Place ODT tablet on the tongue; alternatively the ODT tablet may be placed under the tongue 8 tablet 5    SITagliptin phosphate (JANUVIA) 100 MG Tab Take 1 tablet (100 mg total) by mouth once daily. 90 tablet 1    temazepam (RESTORIL) 30 mg capsule Take 1 capsule by " mouth at bedtime 30 capsule 2    traMADoL (ULTRAM) 50 mg tablet Take 1 tablet (50 mg total) by mouth every 12 (twelve) hours as needed for Pain. 14 tablet 0     Current Facility-Administered Medications on File Prior to Visit   Medication Dose Route Frequency Provider Last Rate Last Admin    sodium hyaluronate (orthovisc) 30 mg  30 mg Intra-articular 1 time in Clinic/HOD Jered Holbrook MD         Review of patient's allergies indicates:   Allergen Reactions    Aspirin Palpitations       Objective:     Physical Exam  Eyes:      Pupils: Pupils are equal, round, and reactive to light.   Neck:      Trachea: No tracheal deviation.   Cardiovascular:      Rate and Rhythm: Normal rate and regular rhythm.      Pulses: Intact distal pulses.           Carotid pulses are 2+ on the right side and 2+ on the left side.       Radial pulses are 2+ on the right side and 2+ on the left side.        Femoral pulses are 2+ on the right side and 2+ on the left side.       Popliteal pulses are 2+ on the right side and 2+ on the left side.        Dorsalis pedis pulses are 2+ on the right side and 2+ on the left side.        Posterior tibial pulses are 2+ on the right side and 2+ on the left side.      Heart sounds: Normal heart sounds. No murmur heard.    No friction rub. No gallop.   Pulmonary:      Effort: Pulmonary effort is normal. No respiratory distress.      Breath sounds: Normal breath sounds. No stridor. No wheezing or rales.   Chest:      Chest wall: No tenderness.   Abdominal:      General: There is no distension.      Tenderness: There is no abdominal tenderness. There is no rebound.   Musculoskeletal:         General: No tenderness.      Cervical back: Normal range of motion.   Skin:     General: Skin is warm and dry.   Neurological:      Mental Status: She is alert and oriented to person, place, and time.       Carotid ultrasound 03/14/2022:   There is 20-39% right Internal Carotid Stenosis.  There is 20-39% left Internal  Carotid Stenosis.       Component Ref Range & Units 4 mo ago 7 mo ago 1 yr ago 2 yr ago 3 yr ago 4 yr ago 5 yr ago   Cholesterol 120 - 199 mg/dL 164  168 CM  168 CM  151 CM  136 CM  167 CM  149 CM    Comment: The National Cholesterol Education Program (NCEP) has set the   following guidelines (reference ranges) for Cholesterol:   Optimal.....................<200 mg/dL   Borderline High.............200-239 mg/dL   High........................> or = 240 mg/dL    Triglycerides 30 - 150 mg/dL 125  174 High  CM  164 High  CM  156 High  CM  134 CM  209 High  CM  155 High  CM    Comment: The National Cholesterol Education Program (NCEP) has set the   following guidelines (reference values) for triglycerides:   Normal......................<150 mg/dL   Borderline High.............150-199 mg/dL   High........................200-499 mg/dL    HDL 40 - 75 mg/dL 46  44 CM  55 CM  56 CM  46 CM  50 CM  48 CM    Comment: The National Cholesterol Education Program (NCEP) has set the   following guidelines (reference values) for HDL Cholesterol:   Low...............<40 mg/dL   Optimal...........>60 mg/dL    LDL Cholesterol 63.0 - 159.0 mg/dL 93.0  89.2 CM  80.2 CM  63.8 CM  63.2 CM  75.2 CM  70.0 CM    Comment: The National Cholesterol Education Program (NCEP) has set the   following guidelines (reference values) for LDL Cholesterol:      Assessment:     1. Abnormal ECG    2. Class 3 severe obesity due to excess calories with serious comorbidity and body mass index (BMI) of 45.0 to 49.9 in adult    3. Hypertriglyceridemia    4. Pain in both lower extremities    5. Sleep apnea, unspecified type    6. Mixed hyperlipidemia    7. Essential hypertension    8. Uncontrolled type 2 diabetes mellitus with hyperglycemia    9. Dizziness    10. GILL (dyspnea on exertion)    11. Abdominal aortic aneurysm (AAA) without rupture, unspecified part    12. Coronary artery disease of native artery of native heart with stable angina pectoris        Plan:      Abnormal ECG    Class 3 severe obesity due to excess calories with serious comorbidity and body mass index (BMI) of 45.0 to 49.9 in adult    Hypertriglyceridemia    Pain in both lower extremities    Sleep apnea, unspecified type    Mixed hyperlipidemia    Essential hypertension    Uncontrolled type 2 diabetes mellitus with hyperglycemia    Dizziness    GILL (dyspnea on exertion)    Abdominal aortic aneurysm (AAA) without rupture, unspecified part    Coronary artery disease of native artery of native heart with stable angina pectoris    Impression 1 hypotension relatively speaking to her prior very high blood pressures.  Will go ahead and decrease medications including hydralazine and benazepril she will continue carvedilol will recheck in a month and recheck her iron studies and CBC and decide if there is any further action.    2. Diabetes stable   3. Dizziness most likely related to the overall low blood pressure that she is faced and will see if she improves shortly.  All questions answered today patient understands the strategy for decreasing medications at this time.  We may need to read is do medications in the future if blood pressure increases again.

## 2023-02-07 NOTE — TELEPHONE ENCOUNTER
Left message to inform patient that a sooner appointment has been found for her. Requested call back.   Protopic Pregnancy And Lactation Text: This medication is Pregnancy Category C. It is unknown if this medication is excreted in breast milk when applied topically.

## 2023-02-07 NOTE — PROGRESS NOTES
Christine Jo  02/07/2023  [id number]    Jose Szymanski MD  Patient Care Team:  Jose Szymanski MD as PCP - General (Family Medicine)  Klaus Shelton OD as Consulting Physician (Optometry)  Kevin Singh MD as Consulting Physician (Interventional Cardiology)  Adriane Watts NP as Nurse Practitioner (Pulmonary Disease)  Levi Albrecht MD as Consulting Physician (Psychiatry)  Jered Holbrook MD as Consulting Physician (Rheumatology)  Shawn Rogers III, MD as Consulting Physician (Gastroenterology)  Shawn Rogers III, MD as Consulting Physician (Gastroenterology)          Visit Type:a scheduled routine follow-up visit    Chief Complaint:  Chief Complaint   Patient presents with    Follow-up     4 month.         History of Present Illness:    Pt presents today for 4 month follow up    DM  Taking Lantus 65 units daily  Fasting BG usually less than 150  States she has not been watching her diet   At times her BG levels are elevated     GERD  Taking Protonix  It has helped with Acid Reflux   Being followed by GI  States that she has hemorrhoids and problems with constipation   Having some rectal bleeding  Dr. Rogers prescribed hydrocortisone 2.5 topical  States that she is still having the rectal bleeding at times     HTN  Pt is in the digitial HTN program   When she takes BP with home cuff it reads higher    Has migraines  Takes once a month injection  Helping with migraines    States that she has some anxiety  Lives alone. Was staying with son     History:  Past Medical History:   Diagnosis Date    Arthritis     Cataract     Diabetes mellitus 2008     am 01/15/2018 Insulin x 1 year    DM (diabetes mellitus) 2008     am 02/14/2020 Insulin x 4 years    Encounter for blood transfusion     Glaucoma     Hypertension     Insomnia     Macular degeneration     Vaginal yeast infection      Past Surgical History:   Procedure Laterality Date    abdominal laparoscopy       BREAST BIOPSY Left 1998    benign     CATARACT EXTRACTION Bilateral [id number]    [facility] [place]    COLONOSCOPY N/A 2021    Procedure: COLONOSCOPY;  Surgeon: Shawn Rogers III, MD;  Location: [facility] ENDO;  Service: Endoscopy;  Laterality: N/A;    COLONOSCOPY N/A 2021    Procedure: COLONOSCOPY;  Surgeon: Memo Merida MD;  Location: [facility] ENDO;  Service: Endoscopy;  Laterality: N/A;    ESOPHAGOGASTRODUODENOSCOPY N/A 2019    Procedure: ESOPHAGOGASTRODUODENOSCOPY (EGD);  Surgeon: Shawn Rogers III, MD;  Location: [facility] ENDO;  Service: Endoscopy;  Laterality: N/A;    ESOPHAGOGASTRODUODENOSCOPY N/A 2021    Procedure: EGD (ESOPHAGOGASTRODUODENOSCOPY);  Surgeon: Shawn Rogers III, MD;  Location: [facility];  Service: Endoscopy;  Laterality: N/A;    EYE SURGERY      TONSILLECTOMY      TUBAL LIGATION      yag  Bilateral      Family History   Problem Relation Age of Onset    Heart disease Mother         CAD     Cataracts Mother     Macular degeneration Mother     Glaucoma Mother     Cancer Son         testicular     Cancer Maternal Aunt         Lung ca    Heart disease Maternal Grandfather         Pacemaker     Diabetes Sister     Heart disease Sister         CAD    Cataracts Sister     Diabetes Sister     Diabetes Sister      Social History     Socioeconomic History    Marital status:     Number of children: 3   Occupational History    Occupation: Retired   Tobacco Use    Smoking status: Former     Packs/day: 1.00     Years: 36.00     Pack years: 36.00     Types: Cigarettes     Start date:      Quit date: 2003     Years since quittin.6     Passive exposure: Never    Smokeless tobacco: Never   Substance and Sexual Activity    Alcohol use: No    Drug use: No    Sexual activity: Never     Social Determinants of Health     Financial Resource Strain: Low Risk     Difficulty of Paying Living Expenses: Not hard at all   Food Insecurity: No Food Insecurity    Worried About Running Out of Food in the  Last Year: Never true    Ran Out of Food in the Last Year: Never true   Transportation Needs: Unmet Transportation Needs    Lack of Transportation (Medical): Yes    Lack of Transportation (Non-Medical): No   Physical Activity: Insufficiently Active    Days of Exercise per Week: 7 days    Minutes of Exercise per Session: 10 min   Stress: No Stress Concern Present    Feeling of Stress : Only a little   Social Connections: Moderately Isolated    Frequency of Communication with Friends and Family: More than three times a week    Frequency of Social Gatherings with Friends and Family: Never    Attends Christian Services: 1 to 4 times per year    Active Member of Clubs or Organizations: No    Attends Club or Organization Meetings: Never    Marital Status:    Housing Stability: Low Risk     Unable to Pay for Housing in the Last Year: No    Number of Places Lived in the Last Year: 2    Unstable Housing in the Last Year: No     Patient Active Problem List   Diagnosis    Controlled type 2 diabetes mellitus with circulatory disorder, with long-term current use of insulin    Class 3 severe obesity due to excess calories with serious comorbidity and body mass index (BMI) of 45.0 to 49.9 in adult    Paroxysmal atrial fibrillation    Insomnia    Gastroesophageal reflux disease without esophagitis    Recurrent major depressive disorder    Primary hypertension    Age-related osteoporosis without current pathological fracture    Iron deficiency anemia due to chronic blood loss    History of obstructive sleep apnea    Recurrent major depressive disorder, in full remission    Cervical neuropathy    Neck pain    Chronic bilateral low back pain with bilateral sciatica    Spondylosis of cervical region without myelopathy or radiculopathy    Morbid obesity with BMI of 40.0-44.9, adult    Dysphagia    Immunization deficiency    Urinary tract infection without hematuria    Uncontrolled type 2 diabetes mellitus with hyperglycemia     Clavicular cyst    Chronic allergic rhinitis    Hoarseness    Hearing loss    Depression    Breast screening    Anemia    Dizzy    Depression, major, recurrent, moderate    Anxiety disorder    Coronary artery disease of native artery of native heart with stable angina pectoris    GILL (dyspnea on exertion)    Palpitations    Abnormal ECG    Abdominal aortic aneurysm (AAA) without rupture    Hearing loss of left ear    Rectal bleeding    Pain in both lower extremities    Tinnitus    Unsteadiness on feet     Altered mental status    Confusion    Disequilibrium    Memory loss    Vestibular migraine    Aorto-iliac atherosclerosis    Carotid stenosis    Dizziness and giddiness     Review of patient's allergies indicates:   Allergen Reactions    Aspirin Palpitations       The following were reviewed at this visit: active problem list, medication list, allergies, family history, social history, and health maintenance.    Medications:  Current Outpatient Medications on File Prior to Visit   Medication Sig Dispense Refill    ACETAMINOPHEN (TYLENOL ARTHRITIS ORAL) Take by mouth as needed.      albuterol (VENTOLIN HFA) 90 mcg/actuation inhaler Inhale 2 puffs into the lungs every 4 (four) hours as needed for Wheezing or Shortness of Breath. 18 g 11    apixaban (ELIQUIS) 5 mg Tab Take 1 tablet (5 mg total) by mouth 2 (two) times daily. 180 tablet 0    atorvastatin (LIPITOR) 10 MG tablet Take 1 tablet (10 mg total) by mouth every evening. 90 tablet 3    azelastine (ASTELIN) 137 mcg (0.1 %) nasal spray use 2 sprays (274 mcg total) by Nasal route 2 (two) times daily. 30 mL 1    benazepriL (LOTENSIN) 40 MG tablet Take 1 tablet (40 mg total) by mouth 2 (two) times daily. 180 tablet 4    BEPREVE 1.5 % Drop Place 1 drop into both eyes 2 (two) times daily. 10 mL 4    blood sugar diagnostic Strp To check blood sugar 1 times daily 100 each 3    blood sugar diagnostic Strp To check BG 2   times daily, to use with insurance preferred meter  200 each 3    blood-glucose meter kit To check BG 2 times daily, to use with insurance preferred meter 1 each 0    calcium citrate-vitamin D3 315-200 mg (CITRACAL+D) 315-200 mg-unit per tablet Take 1 tablet by mouth once daily.      carvediloL (COREG) 25 MG tablet TAKE 1 TABLET BY MOUTH TWICE DAILY 180 tablet 3    clotrimazole-betamethasone (LOTRISONE) lotion Apply topically to the affected area 2 (two) times daily. 30 mL 2    cycloSPORINE (RESTASIS) 0.05 % ophthalmic emulsion Place 1 drop into both eyes 2 (two) times daily. 60 each 11    diclofenac sodium (VOLTAREN) 1 % Gel Apply 2 grams topically 4 (four) times daily. To affected joints as needed for pain 100 g 3    DULoxetine (CYMBALTA) 60 MG capsule Take 1 capsule (60 mg total) by mouth once daily. 90 capsule 3    estradioL (ESTRACE) 0.01 % (0.1 mg/gram) vaginal cream 1/2 gram Per Vagina every night for a month then 3 times per week thereafter 42.5 g 11    fluticasone propionate (FLONASE) 50 mcg/actuation nasal spray 2 sprays (100 mcg total) by Each Nostril route once daily. 48 g 3    gabapentin (NEURONTIN) 300 MG capsule Take 1 capsule (300 mg total) by mouth every evening. 90 capsule 1    galcanezumab-gnlm 120 mg/mL PnIj Inject 120 mg into the skin every 28 days. maintenance dose 1 mL 5    hydrALAZINE (APRESOLINE) 100 MG tablet Take 1 tablet (100 mg total) by mouth 2 (two) times daily. 180 tablet 0    hydrocortisone 2.5 % cream Apply topically 2 (two) times daily. 28 g 2    insulin (LANTUS SOLOSTAR U-100 INSULIN) glargine 100 units/mL SubQ pen Inject 72 Units into the skin every evening. (Patient taking differently: Inject 65 Units into the skin every evening.) 75 mL 1    lancets Misc To check BG 1 times daily, to use with insurance preferred meter 100 each 3    meclizine (ANTIVERT) 25 mg tablet Take 1 tablet (25 mg total) by mouth 3 (three) times daily as needed. 30 tablet 2    MULTIVIT WITH CALCIUM,IRON,MIN (WOMEN'S DAILY MULTIVITAMIN ORAL) Take by mouth  "once daily.       mupirocin (BACTROBAN) 2 % ointment Apply topically 2 (two) times daily. 22 g 0    nystatin (MYCOSTATIN) cream Apply topically 2 (two) times daily. Continue until completely healed 30 g 0    pantoprazole (PROTONIX) 40 MG tablet Take 1 tablet (40 mg total) by mouth 2 (two) times a day. 180 tablet 3    pen needle, diabetic (BD ULTRA-FINE SHORT PEN NEEDLE) 31 gauge x 5/16" Ndle AS DIRECTED TWICE DAILY 200 each 3    rimegepant 75 mg odt Take 1 tablet (75 mg total) by mouth as needed for Migraine (do not exceed 2-3 doses within 1 week). Place ODT tablet on the tongue; alternatively the ODT tablet may be placed under the tongue 8 tablet 5    SITagliptin phosphate (JANUVIA) 100 MG Tab Take 1 tablet (100 mg total) by mouth once daily. 90 tablet 1    temazepam (RESTORIL) 30 mg capsule Take 1 capsule by mouth at bedtime 30 capsule 2    traMADoL (ULTRAM) 50 mg tablet Take 1 tablet (50 mg total) by mouth every 12 (twelve) hours as needed for Pain. 14 tablet 0     Current Facility-Administered Medications on File Prior to Visit   Medication Dose Route Frequency Provider Last Rate Last Admin    sodium hyaluronate (orthovisc) 30 mg  30 mg Intra-articular 1 time in Clinic/HOD Jered Holbrook MD           Medications have been reviewed and reconciled with patient at this visit.  Barriers to medications reviewed with patient.    Adverse reactions to current medications reviewed with patient..    Over the counter medications reviewed and reconciled with patient.    Exam:  Wt Readings from Last 3 Encounters:   12/21/22 108.3 kg (238 lb 12.1 oz)   10/17/22 107.8 kg (237 lb 10.5 oz)   10/04/22 107.8 kg (237 lb 10.5 oz)     Temp Readings from Last 3 Encounters:   10/04/22 98.4 °F (36.9 °C)   02/17/22 97.9 °F (36.6 °C) (Temporal)   12/14/21 99.5 °F (37.5 °C) (Tympanic)     BP Readings from Last 3 Encounters:   12/21/22 (!) 120/56   10/17/22 98/61   10/04/22 114/68     Pulse Readings from Last 3 Encounters:   12/21/22 76 "   10/17/22 78   10/04/22 78     There is no height or weight on file to calculate BMI.      Review of Systems   Respiratory:  Negative for cough, shortness of breath and wheezing.    Cardiovascular:  Negative for chest pain and palpitations.   Gastrointestinal:  Positive for blood in stool and constipation.   Psychiatric/Behavioral:  The patient is nervous/anxious.    Physical Exam  Vitals and nursing note reviewed.   Constitutional:       General: She is not in acute distress.     Appearance: She is well-developed. She is obese. She is not diaphoretic.   HENT:      Head: Normocephalic and atraumatic.      Right Ear: External ear normal.      Left Ear: External ear normal.   Eyes:      General:         Right eye: No discharge.         Left eye: No discharge.      Conjunctiva/sclera: Conjunctivae normal.      Pupils: Pupils are equal, round, and reactive to light.   Cardiovascular:      Rate and Rhythm: Normal rate and regular rhythm.      Heart sounds: Normal heart sounds. No murmur heard.  Pulmonary:      Effort: Pulmonary effort is normal. No respiratory distress.      Breath sounds: Normal breath sounds. No wheezing.   Abdominal:      General: Bowel sounds are normal.   Neurological:      Mental Status: She is alert.       Laboratory Reviewed ({Yes)  Lab Results   Component Value Date    WBC 6.71 10/04/2022    HGB 10.9 (L) 10/04/2022    HCT 34.8 (L) 10/04/2022     10/04/2022    CHOL 164 10/04/2022    TRIG 125 10/04/2022    HDL 46 10/04/2022    ALT 20 10/04/2022    AST 15 10/04/2022     10/04/2022    K 4.5 10/04/2022     10/04/2022    CREATININE 0.8 10/04/2022    BUN 21 10/04/2022    CO2 26 10/04/2022    TSH 1.024 10/04/2022    GLUF 180 (H) 06/28/2021    HGBA1C 6.1 (H) 10/04/2022       Christine was seen today for follow-up.    Diagnoses and all orders for this visit:    Controlled type 2 diabetes mellitus with other circulatory complication, with long-term current use of insulin  -     HEMOGLOBIN  A1C; Future    Hypertension associated with diabetes  -     COMPREHENSIVE METABOLIC PANEL; Future    Primary hypertension  -     COMPREHENSIVE METABOLIC PANEL; Future    Paroxysmal atrial fibrillation  -     CBC Auto Differential; Future    Morbid obesity with BMI of 40.0-44.9, adult  -     COMPREHENSIVE METABOLIC PANEL; Future  -     HEMOGLOBIN A1C; Future  -     CBC Auto Differential; Future    Recurrent major depressive disorder, in partial remission    Coronary artery disease of native artery of native heart with stable angina pectoris  -     COMPREHENSIVE METABOLIC PANEL; Future  -     CBC Auto Differential; Future    Aorto-iliac atherosclerosis  -     COMPREHENSIVE METABOLIC PANEL; Future  -     CBC Auto Differential; Future    Gastroesophageal reflux disease without esophagitis    Anxiety disorder, unspecified type    Iron deficiency anemia due to chronic blood loss  -     CBC Auto Differential; Future  -     FERRITIN; Future  -     IRON AND TIBC; Future    Rectal bleeding  -     CBC Auto Differential; Future  -     FERRITIN; Future  -     IRON AND TIBC; Future    Continue taking Cymbalta daily    Labs today  Will schedule an appt with GI for rectal bleeding    HTN  Pt denied CP, HA, fatigue  Cont with current medications  Encouraged to bring home BP cuff to next visit  Has an appt with cards on today    Will schedule follow up appointment with PCP in 6 months     Care Plan/Goals: Reviewed    Goals    None         Follow up: No follow-ups on file.    After visit summary was printed and given to patient upon discharge today.  Patient goals and care plan are included in After Visit Summary.

## 2023-02-07 NOTE — TELEPHONE ENCOUNTER
Appointment Override Questionnaire:    Urgent/Emergent Definition: The patient will end up in the ER and/or admitted before being seen at next available appointment. Do not offer the patient an appointment date or time until it is determined whether the request is approved or denied for overriding.    1. Is this a Medicaid patient? No    a. Was the patient seen by an external provider? No  If answered YES, notify the patient that a referral will be faxed to [facility]. Do not send to [facility].   b. Was the patient seen by an internal provider? Yes   c. Was an appointment offered with another provider with availability? Yes  d. Is this urgent or emergent? Yes  If answered NO, patient should be scheduled at next available and added to waitlist. If YES, please forward completed dot phrase to appropriate [facility].    2. Does the patient have Commercial/Medicare insurance? Yes    Pt was a Dr Rogers patient. She wants to get established with Dr Merida. I scheduled her with Dr Merida for 4/12/23 but her PCP wants her seen asap for rectal bleeding.    Staff nurses must utilize the existing message (i.e.: patient call back) to send to the [facility] assigned to them with the dot phrase. If the override request is on the day the patient is in the clinic, it should be brought to the [facility] in real time. DO NOT send requests via secure chats or Teams.

## 2023-02-07 NOTE — TELEPHONE ENCOUNTER
----- Message from Kaya Townsend sent at 2/7/2023  4:31 PM CST -----  Contact: Christine  .Type:  Patient Returning Call    Who Called:Christine  Who Left Message for Patient:Skyler  Does the patient know what this is regarding?:appointment  Would the patient rather a call back or a response via [facility]? call  Best Call Back Number:.[phone]  Additional Information:   Thanks  LR

## 2023-02-08 ENCOUNTER — PATIENT MESSAGE (OUTPATIENT)
Dept: NEUROLOGY | Facility: CLINIC | Age: 73
End: 2023-02-08
Payer: MEDICARE

## 2023-02-09 ENCOUNTER — PATIENT MESSAGE (OUTPATIENT)
Dept: INTERNAL MEDICINE | Facility: CLINIC | Age: 73
End: 2023-02-09
Payer: MEDICARE

## 2023-02-09 ENCOUNTER — TELEPHONE (OUTPATIENT)
Dept: INTERNAL MEDICINE | Facility: CLINIC | Age: 73
End: 2023-02-09
Payer: MEDICARE

## 2023-02-13 ENCOUNTER — TELEPHONE (OUTPATIENT)
Dept: HEMATOLOGY/ONCOLOGY | Facility: CLINIC | Age: 73
End: 2023-02-13
Payer: MEDICARE

## 2023-02-13 NOTE — TELEPHONE ENCOUNTER
----- Message from Roxane Gupta sent at 2/13/2023  1:40 PM CST -----  States she would like Marli to give her a call back regarding her appt. States she thinks she would like to schedule the audio appt. Please call pt [phone]. Thank you

## 2023-02-14 ENCOUNTER — PATIENT MESSAGE (OUTPATIENT)
Dept: HEMATOLOGY/ONCOLOGY | Facility: CLINIC | Age: 73
End: 2023-02-14

## 2023-02-14 ENCOUNTER — OFFICE VISIT (OUTPATIENT)
Dept: HEMATOLOGY/ONCOLOGY | Facility: CLINIC | Age: 73
End: 2023-02-14
Payer: MEDICARE

## 2023-02-14 DIAGNOSIS — D50.0 IRON DEFICIENCY ANEMIA DUE TO CHRONIC BLOOD LOSS: ICD-10-CM

## 2023-02-14 DIAGNOSIS — D52.0 DIETARY FOLATE DEFICIENCY ANEMIA: ICD-10-CM

## 2023-02-14 DIAGNOSIS — D51.3 OTHER DIETARY VITAMIN B12 DEFICIENCY ANEMIA: ICD-10-CM

## 2023-02-14 DIAGNOSIS — E53.1 PYRIDOXINE DEFICIENCY: ICD-10-CM

## 2023-02-14 DIAGNOSIS — D64.9 ANEMIA, UNSPECIFIED TYPE: ICD-10-CM

## 2023-02-14 DIAGNOSIS — R53.83 FATIGUE, UNSPECIFIED TYPE: ICD-10-CM

## 2023-02-14 PROCEDURE — 4010F ACE/ARB THERAPY RXD/TAKEN: CPT | Mod: CPTII,95,,

## 2023-02-14 PROCEDURE — 3051F HG A1C>EQUAL 7.0%<8.0%: CPT | Mod: CPTII,95,,

## 2023-02-14 PROCEDURE — 99214 OFFICE O/P EST MOD 30 MIN: CPT | Mod: 95,,,

## 2023-02-14 PROCEDURE — 3051F PR MOST RECENT HEMOGLOBIN A1C LEVEL 7.0 - < 8.0%: ICD-10-PCS | Mod: CPTII,95,,

## 2023-02-14 PROCEDURE — 99214 PR OFFICE/OUTPT VISIT, EST, LEVL IV, 30-39 MIN: ICD-10-PCS | Mod: 95,,,

## 2023-02-14 PROCEDURE — 1160F RVW MEDS BY RX/DR IN RCRD: CPT | Mod: CPTII,95,,

## 2023-02-14 PROCEDURE — 3072F PR LOW RISK FOR RETINOPATHY: ICD-10-PCS | Mod: CPTII,95,,

## 2023-02-14 PROCEDURE — 1159F PR MEDICATION LIST DOCUMENTED IN MEDICAL RECORD: ICD-10-PCS | Mod: CPTII,95,,

## 2023-02-14 PROCEDURE — 1160F PR REVIEW ALL MEDS BY PRESCRIBER/CLIN PHARMACIST DOCUMENTED: ICD-10-PCS | Mod: CPTII,95,,

## 2023-02-14 PROCEDURE — 1159F MED LIST DOCD IN RCRD: CPT | Mod: CPTII,95,,

## 2023-02-14 PROCEDURE — 4010F PR ACE/ARB THEARPY RXD/TAKEN: ICD-10-PCS | Mod: CPTII,95,,

## 2023-02-14 PROCEDURE — 3072F LOW RISK FOR RETINOPATHY: CPT | Mod: CPTII,95,,

## 2023-02-14 NOTE — PROGRESS NOTES
"Subjective:      Patient ID: Christine Jo is a 72 y.o. female.    Chief Complaint: Follow-up (Iron deficiency anemia)    The patient location is: Home  The chief complaint leading to consultation is: follow-up    Visit type: audiovisual    Face to Face time with patient: 13  40 minutes of total time spent on the encounter, which includes face to face time and non-face to face time preparing to see the patient (eg, review of tests), Obtaining and/or reviewing separately obtained history, Documenting clinical information in the electronic or other health record, Independently interpreting results (not separately reported) and communicating results to the patient/family/caregiver, or Care coordination (not separately reported).         Each patient to whom he or she provides medical services by telemedicine is:  (1) informed of the relationship between the physician and patient and the respective role of any other health care provider with respect to management of the patient; and (2) notified that he or she may decline to receive medical services by telemedicine and may withdraw from such care at any time.    Notes:    HPI:  Ms. Jo is a pleasant 72-year-old female who presents today for follow-up of iron deficiency anemia. She was last seen in Heme/Onc clinic  08/2020. She has had IV iron in the past 01/2018.   She [facility] with [facility]. Patel which was - negative for contributory causes. EGD 05/2021 found chronic gastritis.  Colonoscopy 06/2021 found 3 polyps, lipoma, and diverticulosis with recommendation to repeat in 3 years.         Interval History: She c/o fatigue and notes she has,"good days and bad days." She also states BRBPR which she believes is related to hemorrhoids and she will be f/u with GI next week. She also notes difficulty with getting restful nights sleep d/t insomnia. Currently not on oral iron supplement as it causes GI upset. Denies " n/v/d/c, fever, chills, night sweats, sob, cp, lightheadedness unintentional weight loss.    Social History     Socioeconomic History    Marital status:     Number of children: 3   Occupational History    Occupation: Retired   Tobacco Use    Smoking status: Former     Packs/day: 1.00     Years: 36.00     Pack years: 36.00     Types: Cigarettes     Start date:      Quit date: 2003     Years since quittin.6     Passive exposure: Never    Smokeless tobacco: Never   Substance and Sexual Activity    Alcohol use: No    Drug use: No    Sexual activity: Never     Social Determinants of Health     Financial Resource Strain: Low Risk     Difficulty of Paying Living Expenses: Not hard at all   Food Insecurity: No Food Insecurity    Worried About Running Out of Food in the Last Year: Never true    Ran Out of Food in the Last Year: Never true   Transportation Needs: Unmet Transportation Needs    Lack of Transportation (Medical): Yes    Lack of Transportation (Non-Medical): No   Physical Activity: Insufficiently Active    Days of Exercise per Week: 7 days    Minutes of Exercise per Session: 10 min   Stress: No Stress Concern Present    Feeling of Stress : Only a little   Social Connections: Moderately Isolated    Frequency of Communication with Friends and Family: More than three times a week    Frequency of Social Gatherings with Friends and Family: Never    Attends [employer] Services: 1 to 4 times per year    Active Member of Clubs or Organizations: No    Attends Club or Organization Meetings: Never    Marital Status:    Housing Stability: Low Risk     Unable to Pay for Housing in the Last Year: No    Number of Places Lived in the Last Year: 2    Unstable Housing in the Last Year: No       Family History   Problem Relation Age of Onset    Heart disease Mother         CAD     Cataracts Mother     Macular degeneration Mother     Glaucoma Mother     Cancer Son         testicular     Cancer Maternal Aunt          Lung ca    Heart disease Maternal Grandfather         Pacemaker     Diabetes Sister     Heart disease Sister         CAD    Cataracts Sister     Diabetes Sister     Diabetes Sister        Past Surgical History:   Procedure Laterality Date    abdominal laparoscopy       BREAST BIOPSY Left 1998    benign    CATARACT EXTRACTION Bilateral [id number]    [facility] in [place]    COLONOSCOPY N/A 05/05/2021    Procedure: COLONOSCOPY;  Surgeon: Shawn Rogers III, MD;  Location: [facility] ENDO;  Service: Endoscopy;  Laterality: N/A;    COLONOSCOPY N/A 06/30/2021    Procedure: COLONOSCOPY;  Surgeon: Memo Merida MD;  Location: [facility] ENDO;  Service: Endoscopy;  Laterality: N/A;    ESOPHAGOGASTRODUODENOSCOPY N/A 11/29/2019    Procedure: ESOPHAGOGASTRODUODENOSCOPY (EGD);  Surgeon: Shawn Rogers III, MD;  Location: [facility];  Service: Endoscopy;  Laterality: N/A;    ESOPHAGOGASTRODUODENOSCOPY N/A 05/05/2021    Procedure: EGD (ESOPHAGOGASTRODUODENOSCOPY);  Surgeon: Shawn Rogers III, MD;  Location: [facility];  Service: Endoscopy;  Laterality: N/A;    EYE SURGERY      TONSILLECTOMY      TUBAL LIGATION      yag  Bilateral        Past Medical History:   Diagnosis Date    Arthritis     Cataract     Diabetes mellitus 2008     am 01/15/2018 Insulin x 1 year    DM (diabetes mellitus) 2008     am 02/14/2020 Insulin x 4 years    Encounter for blood transfusion     Glaucoma     Hypertension     Insomnia     Macular degeneration     Vaginal yeast infection        Review of Systems   Constitutional:  Positive for activity change and fatigue. Negative for appetite change, chills, fever and unexpected weight change.   HENT:  Negative for congestion, dental problem, mouth sores and nosebleeds.    Eyes:  Negative for visual disturbance.   Respiratory:  Negative for cough, choking and chest tightness.    Cardiovascular:  Negative for chest pain, palpitations and leg swelling.   Gastrointestinal:  Positive for  anal bleeding. Negative for abdominal distention, abdominal pain, blood in stool, constipation, diarrhea, nausea and vomiting.   Endocrine: Negative.    Genitourinary:  Negative for dysuria, frequency, hematuria and urgency.   Musculoskeletal:  Negative for arthralgias, back pain, gait problem, joint swelling and myalgias.   Skin:  Negative for wound.   Allergic/Immunologic: Negative for immunocompromised state.   Neurological:  Negative for dizziness, light-headedness, numbness and headaches.   Hematological:  Negative for adenopathy. Does not bruise/bleed easily.   Psychiatric/Behavioral:  Positive for sleep disturbance. The patient is not nervous/anxious.      Medication List with Changes/Refills   Current Medications    ACETAMINOPHEN (TYLENOL ARTHRITIS ORAL)    Take by mouth as needed.    ALBUTEROL (VENTOLIN HFA) 90 MCG/ACTUATION INHALER    Inhale 2 puffs into the lungs every 4 (four) hours as needed for Wheezing or Shortness of Breath.    APIXABAN (ELIQUIS) 5 MG TAB    Take 1 tablet (5 mg total) by mouth 2 (two) times daily.    ATORVASTATIN (LIPITOR) 10 MG TABLET    Take 1 tablet (10 mg total) by mouth every evening.    AZELASTINE (ASTELIN) 137 MCG (0.1 %) NASAL SPRAY    use 2 sprays (274 mcg total) by Nasal route 2 (two) times daily.    BENAZEPRIL (LOTENSIN) 40 MG TABLET    Take 1 tablet (40 mg total) by mouth once daily.    BEPREVE 1.5 % DROP    Place 1 drop into both eyes 2 (two) times daily.    BLOOD SUGAR DIAGNOSTIC STRP    To check blood sugar 1 times daily    BLOOD SUGAR DIAGNOSTIC STRP    To check BG 2   times daily, to use with insurance preferred meter    BLOOD-GLUCOSE METER KIT    To check BG 2 times daily, to use with insurance preferred meter    CALCIUM CITRATE-VITAMIN D3 315-200 MG (CITRACAL+D) 315-200 MG-UNIT PER TABLET    Take 1 tablet by mouth once daily.    CARVEDILOL (COREG) 25 MG TABLET    TAKE 1 TABLET BY MOUTH TWICE DAILY    CLOTRIMAZOLE-BETAMETHASONE (LOTRISONE) LOTION    Apply topically  "to the affected area 2 (two) times daily.    CYCLOSPORINE (RESTASIS) 0.05 % OPHTHALMIC EMULSION    Place 1 drop into both eyes 2 (two) times daily.    DICLOFENAC SODIUM (VOLTAREN) 1 % GEL    Apply 2 grams topically 4 (four) times daily. To affected joints as needed for pain    DULOXETINE (CYMBALTA) 60 MG CAPSULE    Take 1 capsule (60 mg total) by mouth once daily.    ESTRADIOL (ESTRACE) 0.01 % (0.1 MG/GRAM) VAGINAL CREAM    1/2 gram Per Vagina every night for a month then 3 times per week thereafter    FLUTICASONE PROPIONATE (FLONASE) 50 MCG/ACTUATION NASAL SPRAY    2 sprays (100 mcg total) by Each Nostril route once daily.    GABAPENTIN (NEURONTIN) 300 MG CAPSULE    Take 1 capsule (300 mg total) by mouth every evening.    GALCANEZUMAB-GNLM (EMGALITY SYRINGE) 120 MG/ML SYRG    Inject into the skin.    GALCANEZUMAB-GNLM 120 MG/ML PNIJ    Inject 120 mg into the skin every 28 days. maintenance dose    HYDRALAZINE (APRESOLINE) 50 MG TABLET    Take 2 tablets (100 mg total) by mouth 2 (two) times daily.    HYDROCORTISONE 2.5 % CREAM    Apply topically 2 (two) times daily.    INSULIN (LANTUS SOLOSTAR U-100 INSULIN) GLARGINE 100 UNITS/ML SUBQ PEN    Inject 72 Units into the skin every evening.    LANCETS MISC    To check BG 1 times daily, to use with insurance preferred meter    MECLIZINE (ANTIVERT) 25 MG TABLET    Take 1 tablet (25 mg total) by mouth 3 (three) times daily as needed.    MULTIVIT WITH CALCIUM,IRON,MIN (WOMEN'S DAILY MULTIVITAMIN ORAL)    Take by mouth once daily.     MUPIROCIN (BACTROBAN) 2 % OINTMENT    Apply topically 2 (two) times daily.    NYSTATIN (MYCOSTATIN) CREAM    Apply topically 2 (two) times daily. Continue until completely healed    PANTOPRAZOLE (PROTONIX) 40 MG TABLET    Take 1 tablet (40 mg total) by mouth 2 (two) times a day.    PEN NEEDLE, DIABETIC (BD ULTRA-FINE SHORT PEN NEEDLE) 31 GAUGE X 5/16" NDLE    AS DIRECTED TWICE DAILY    RIMEGEPANT (NURTEC) 75 MG ODT    Take 75 mg by mouth once as " needed for Migraine. Place ODT tablet on the tongue; alternatively the ODT tablet may be placed under the tongue    RIMEGEPANT 75 MG ODT    Take 1 tablet (75 mg total) by mouth as needed for Migraine (do not exceed 2-3 doses within 1 week). Place ODT tablet on the tongue; alternatively the ODT tablet may be placed under the tongue    SITAGLIPTIN PHOSPHATE (JANUVIA) 100 MG TAB    Take 1 tablet (100 mg total) by mouth once daily.    TEMAZEPAM (RESTORIL) 30 MG CAPSULE    Take 1 capsule by mouth at bedtime    TRAMADOL (ULTRAM) 50 MG TABLET    Take 1 tablet (50 mg total) by mouth every 12 (twelve) hours as needed for Pain.        Objective:   There were no vitals filed for this visit.    Physical Exam    Lab Results   Component Value Date    WBC 7.50 02/07/2023    HGB 10.7 (L) 02/07/2023    HCT 34.3 (L) 02/07/2023    MCV 98 02/07/2023     02/07/2023       Lab Results   Component Value Date     02/07/2023    K 4.5 02/07/2023     02/07/2023    CO2 24 02/07/2023    BUN 20 02/07/2023    CREATININE 0.9 02/07/2023    CALCIUM 9.5 02/07/2023    ANIONGAP 12 02/07/2023    ESTGFRAFRICA >60 07/27/2022    EGFRNONAA >60 07/27/2022     Lab Results   Component Value Date    ALT 16 02/07/2023    AST 16 02/07/2023    ALKPHOS 47 (L) 02/07/2023    BILITOT 0.5 02/07/2023       Assessment/Plan:     Problem List Items Addressed This Visit          Oncology    Iron deficiency anemia due to chronic blood loss     Lab Results   Component Value Date    IRON 77 02/07/2023    TRANSFERRIN 210 02/07/2023    TIBC 311 02/07/2023    FESATURATED 25 02/07/2023    FERRITIN 73    Iron indices WNL  No indication for IV iron therapy at this time  Pt cannot tolerate oral iron supplementation d/t GI upset  Encouraged incorporation of iron rich foods in diet          Relevant Orders    CBC Auto Differential    Ferritin    Iron and TIBC    Anemia     Lab Results   Component Value Date    HGB 10.7 (L) 02/07/2023   Stable   Anemia of chronic  illness  --Multifactorial r/t co morbidities             Relevant Orders    Folate    CBC Auto Differential    Ferritin    Iron and TIBC    Vitamin B12    CBC Auto Differential    Ferritin    Iron and TIBC       Other    Fatigue     Plan for continued evaluation to r/o any nutritional deficiencies that may be contributing  Will message pt through portal once resulted--pt agreeable to this         Relevant Orders    Magnesium    Folate    Vitamin B12    Homocysteine, Serum    METHYLMALONIC ACID, SERUM    VITAMIN B6    COPPER, SERUM    ZINC    SELENIUM SERUM     Other Visit Diagnoses       Pyridoxine deficiency        Relevant Orders    VITAMIN B6              Med Onc Chart Routing      Follow up with physician    Follow up with MARIANO 6 months. with labs a few days prior   Infusion scheduling note    Injection scheduling note    Labs CBC, ferritin and iron and TIBC   Lab interval:     Imaging    Pharmacy appointment    Other referrals             MILIND Cedeño FNP-C  Hematology/Oncology

## 2023-02-15 NOTE — ASSESSMENT & PLAN NOTE
Plan for continued evaluation to r/o any nutritional deficiencies that may be contributing  Will message pt through portal once resulted--pt agreeable to this

## 2023-02-15 NOTE — ASSESSMENT & PLAN NOTE
Lab Results   Component Value Date    IRON 77 02/07/2023    TRANSFERRIN 210 02/07/2023    TIBC 311 02/07/2023    FESATURATED 25 02/07/2023    FERRITIN 73    Iron indices WNL  No indication for IV iron therapy at this time  Pt cannot tolerate oral iron supplementation d/t GI upset  Encouraged incorporation of iron rich foods in diet

## 2023-02-15 NOTE — ASSESSMENT & PLAN NOTE
Lab Results   Component Value Date    HGB 10.7 (L) 02/07/2023   Stable   Anemia of chronic illness  --Multifactorial r/t co morbidities

## 2023-02-22 ENCOUNTER — OFFICE VISIT (OUTPATIENT)
Dept: PSYCHIATRY | Facility: CLINIC | Age: 73
End: 2023-02-22
Payer: MEDICARE

## 2023-02-22 ENCOUNTER — LAB VISIT (OUTPATIENT)
Dept: LAB | Facility: HOSPITAL | Age: 73
End: 2023-02-22
Payer: MEDICARE

## 2023-02-22 ENCOUNTER — OFFICE VISIT (OUTPATIENT)
Dept: GASTROENTEROLOGY | Facility: CLINIC | Age: 73
End: 2023-02-22
Payer: MEDICARE

## 2023-02-22 VITALS
DIASTOLIC BLOOD PRESSURE: 79 MMHG | HEART RATE: 94 BPM | DIASTOLIC BLOOD PRESSURE: 68 MMHG | SYSTOLIC BLOOD PRESSURE: 148 MMHG | BODY MASS INDEX: 43.82 KG/M2 | HEART RATE: 81 BPM | HEIGHT: 62 IN | WEIGHT: 238.13 LBS | SYSTOLIC BLOOD PRESSURE: 170 MMHG

## 2023-02-22 DIAGNOSIS — K59.04 CHRONIC IDIOPATHIC CONSTIPATION: Primary | ICD-10-CM

## 2023-02-22 DIAGNOSIS — G47.00 INSOMNIA, UNSPECIFIED TYPE: ICD-10-CM

## 2023-02-22 DIAGNOSIS — D50.0 IRON DEFICIENCY ANEMIA DUE TO CHRONIC BLOOD LOSS: ICD-10-CM

## 2023-02-22 DIAGNOSIS — K62.5 RECTAL BLEEDING: ICD-10-CM

## 2023-02-22 DIAGNOSIS — D52.0 DIETARY FOLATE DEFICIENCY ANEMIA: ICD-10-CM

## 2023-02-22 DIAGNOSIS — F41.9 ANXIETY DISORDER, UNSPECIFIED TYPE: ICD-10-CM

## 2023-02-22 DIAGNOSIS — F33.1 DEPRESSION, MAJOR, RECURRENT, MODERATE: Primary | ICD-10-CM

## 2023-02-22 DIAGNOSIS — G54.2 CERVICAL NEUROPATHY: ICD-10-CM

## 2023-02-22 DIAGNOSIS — Z79.4 CONTROLLED TYPE 2 DIABETES MELLITUS WITH DIABETIC POLYNEUROPATHY, WITH LONG-TERM CURRENT USE OF INSULIN: ICD-10-CM

## 2023-02-22 DIAGNOSIS — R53.83 FATIGUE, UNSPECIFIED TYPE: ICD-10-CM

## 2023-02-22 DIAGNOSIS — R42 VERTIGO: ICD-10-CM

## 2023-02-22 DIAGNOSIS — D51.3 OTHER DIETARY VITAMIN B12 DEFICIENCY ANEMIA: ICD-10-CM

## 2023-02-22 DIAGNOSIS — D64.9 ANEMIA, UNSPECIFIED TYPE: ICD-10-CM

## 2023-02-22 DIAGNOSIS — E11.42 CONTROLLED TYPE 2 DIABETES MELLITUS WITH DIABETIC POLYNEUROPATHY, WITH LONG-TERM CURRENT USE OF INSULIN: ICD-10-CM

## 2023-02-22 DIAGNOSIS — E53.1 PYRIDOXINE DEFICIENCY: ICD-10-CM

## 2023-02-22 DIAGNOSIS — H91.92 HEARING LOSS OF LEFT EAR, UNSPECIFIED HEARING LOSS TYPE: ICD-10-CM

## 2023-02-22 LAB
BASOPHILS # BLD AUTO: 0.06 K/UL (ref 0–0.2)
BASOPHILS NFR BLD: 0.8 % (ref 0–1.9)
DIFFERENTIAL METHOD: ABNORMAL
EOSINOPHIL # BLD AUTO: 0.1 K/UL (ref 0–0.5)
EOSINOPHIL NFR BLD: 1.4 % (ref 0–8)
ERYTHROCYTE [DISTWIDTH] IN BLOOD BY AUTOMATED COUNT: 12.4 % (ref 11.5–14.5)
FERRITIN SERPL-MCNC: 71 NG/ML (ref 20–300)
FOLATE SERPL-MCNC: 16.2 NG/ML (ref 4–24)
HCT VFR BLD AUTO: 35 % (ref 37–48.5)
HCYS SERPL-SCNC: 7 UMOL/L (ref 4–15.5)
HGB BLD-MCNC: 11.5 G/DL (ref 12–16)
IMM GRANULOCYTES # BLD AUTO: 0.02 K/UL (ref 0–0.04)
IMM GRANULOCYTES NFR BLD AUTO: 0.3 % (ref 0–0.5)
IRON SERPL-MCNC: 107 UG/DL (ref 30–160)
LYMPHOCYTES # BLD AUTO: 1.6 K/UL (ref 1–4.8)
LYMPHOCYTES NFR BLD: 20.9 % (ref 18–48)
MAGNESIUM SERPL-MCNC: 1.5 MG/DL (ref 1.6–2.6)
MCH RBC QN AUTO: 30.5 PG (ref 27–31)
MCHC RBC AUTO-ENTMCNC: 32.9 G/DL (ref 32–36)
MCV RBC AUTO: 93 FL (ref 82–98)
MONOCYTES # BLD AUTO: 0.7 K/UL (ref 0.3–1)
MONOCYTES NFR BLD: 8.3 % (ref 4–15)
NEUTROPHILS # BLD AUTO: 5.4 K/UL (ref 1.8–7.7)
NEUTROPHILS NFR BLD: 68.3 % (ref 38–73)
NRBC BLD-RTO: 0 /100 WBC
PLATELET # BLD AUTO: 256 K/UL (ref 150–450)
PMV BLD AUTO: 10.6 FL (ref 9.2–12.9)
RBC # BLD AUTO: 3.77 M/UL (ref 4–5.4)
SATURATED IRON: 33 % (ref 20–50)
TOTAL IRON BINDING CAPACITY: 326 UG/DL (ref 250–450)
TRANSFERRIN SERPL-MCNC: 220 MG/DL (ref 200–375)
VIT B12 SERPL-MCNC: 309 PG/ML (ref 210–950)
WBC # BLD AUTO: 7.83 K/UL (ref 3.9–12.7)

## 2023-02-22 PROCEDURE — 3008F PR BODY MASS INDEX (BMI) DOCUMENTED: ICD-10-PCS | Mod: CPTII,S$GLB,, | Performed by: INTERNAL MEDICINE

## 2023-02-22 PROCEDURE — 3072F PR LOW RISK FOR RETINOPATHY: ICD-10-PCS | Mod: CPTII,S$GLB,, | Performed by: PSYCHIATRY & NEUROLOGY

## 2023-02-22 PROCEDURE — 4010F PR ACE/ARB THEARPY RXD/TAKEN: ICD-10-PCS | Mod: CPTII,S$GLB,, | Performed by: INTERNAL MEDICINE

## 2023-02-22 PROCEDURE — 3078F PR MOST RECENT DIASTOLIC BLOOD PRESSURE < 80 MM HG: ICD-10-PCS | Mod: CPTII,S$GLB,, | Performed by: PSYCHIATRY & NEUROLOGY

## 2023-02-22 PROCEDURE — 99214 OFFICE O/P EST MOD 30 MIN: CPT | Mod: S$GLB,,, | Performed by: INTERNAL MEDICINE

## 2023-02-22 PROCEDURE — 1159F PR MEDICATION LIST DOCUMENTED IN MEDICAL RECORD: ICD-10-PCS | Mod: CPTII,S$GLB,, | Performed by: INTERNAL MEDICINE

## 2023-02-22 PROCEDURE — 99499 RISK ADDL DX/OHS AUDIT: ICD-10-PCS | Mod: S$GLB,,, | Performed by: PSYCHIATRY & NEUROLOGY

## 2023-02-22 PROCEDURE — 84255 ASSAY OF SELENIUM: CPT

## 2023-02-22 PROCEDURE — 99499 UNLISTED E&M SERVICE: CPT | Mod: S$GLB,,, | Performed by: PSYCHIATRY & NEUROLOGY

## 2023-02-22 PROCEDURE — 99999 PR PBB SHADOW E&M-EST. PATIENT-LVL V: CPT | Mod: PBBFAC,,, | Performed by: INTERNAL MEDICINE

## 2023-02-22 PROCEDURE — 3078F DIAST BP <80 MM HG: CPT | Mod: CPTII,S$GLB,, | Performed by: PSYCHIATRY & NEUROLOGY

## 2023-02-22 PROCEDURE — 83090 ASSAY OF HOMOCYSTEINE: CPT

## 2023-02-22 PROCEDURE — 3051F PR MOST RECENT HEMOGLOBIN A1C LEVEL 7.0 - < 8.0%: ICD-10-PCS | Mod: CPTII,S$GLB,, | Performed by: INTERNAL MEDICINE

## 2023-02-22 PROCEDURE — 3051F HG A1C>EQUAL 7.0%<8.0%: CPT | Mod: CPTII,S$GLB,, | Performed by: INTERNAL MEDICINE

## 2023-02-22 PROCEDURE — 4010F PR ACE/ARB THEARPY RXD/TAKEN: ICD-10-PCS | Mod: CPTII,S$GLB,, | Performed by: PSYCHIATRY & NEUROLOGY

## 2023-02-22 PROCEDURE — 3008F BODY MASS INDEX DOCD: CPT | Mod: CPTII,S$GLB,, | Performed by: INTERNAL MEDICINE

## 2023-02-22 PROCEDURE — 4010F ACE/ARB THERAPY RXD/TAKEN: CPT | Mod: CPTII,S$GLB,, | Performed by: PSYCHIATRY & NEUROLOGY

## 2023-02-22 PROCEDURE — 3288F PR FALLS RISK ASSESSMENT DOCUMENTED: ICD-10-PCS | Mod: CPTII,S$GLB,, | Performed by: INTERNAL MEDICINE

## 2023-02-22 PROCEDURE — 3072F LOW RISK FOR RETINOPATHY: CPT | Mod: CPTII,S$GLB,, | Performed by: PSYCHIATRY & NEUROLOGY

## 2023-02-22 PROCEDURE — 3078F PR MOST RECENT DIASTOLIC BLOOD PRESSURE < 80 MM HG: ICD-10-PCS | Mod: CPTII,S$GLB,, | Performed by: INTERNAL MEDICINE

## 2023-02-22 PROCEDURE — 3077F PR MOST RECENT SYSTOLIC BLOOD PRESSURE >= 140 MM HG: ICD-10-PCS | Mod: CPTII,S$GLB,, | Performed by: INTERNAL MEDICINE

## 2023-02-22 PROCEDURE — 82525 ASSAY OF COPPER: CPT

## 2023-02-22 PROCEDURE — 3051F HG A1C>EQUAL 7.0%<8.0%: CPT | Mod: CPTII,S$GLB,, | Performed by: PSYCHIATRY & NEUROLOGY

## 2023-02-22 PROCEDURE — 3051F PR MOST RECENT HEMOGLOBIN A1C LEVEL 7.0 - < 8.0%: ICD-10-PCS | Mod: CPTII,S$GLB,, | Performed by: PSYCHIATRY & NEUROLOGY

## 2023-02-22 PROCEDURE — 1159F MED LIST DOCD IN RCRD: CPT | Mod: CPTII,S$GLB,, | Performed by: INTERNAL MEDICINE

## 2023-02-22 PROCEDURE — 3072F LOW RISK FOR RETINOPATHY: CPT | Mod: CPTII,S$GLB,, | Performed by: INTERNAL MEDICINE

## 2023-02-22 PROCEDURE — 99215 PR OFFICE/OUTPT VISIT, EST, LEVL V, 40-54 MIN: ICD-10-PCS | Mod: S$GLB,,, | Performed by: PSYCHIATRY & NEUROLOGY

## 2023-02-22 PROCEDURE — 85025 COMPLETE CBC W/AUTO DIFF WBC: CPT

## 2023-02-22 PROCEDURE — 3077F SYST BP >= 140 MM HG: CPT | Mod: CPTII,S$GLB,, | Performed by: INTERNAL MEDICINE

## 2023-02-22 PROCEDURE — 1126F AMNT PAIN NOTED NONE PRSNT: CPT | Mod: CPTII,S$GLB,, | Performed by: INTERNAL MEDICINE

## 2023-02-22 PROCEDURE — 99999 PR PBB SHADOW E&M-EST. PATIENT-LVL I: CPT | Mod: PBBFAC,,, | Performed by: PSYCHIATRY & NEUROLOGY

## 2023-02-22 PROCEDURE — 84207 ASSAY OF VITAMIN B-6: CPT

## 2023-02-22 PROCEDURE — 99214 PR OFFICE/OUTPT VISIT, EST, LEVL IV, 30-39 MIN: ICD-10-PCS | Mod: S$GLB,,, | Performed by: INTERNAL MEDICINE

## 2023-02-22 PROCEDURE — 82607 VITAMIN B-12: CPT

## 2023-02-22 PROCEDURE — 4010F ACE/ARB THERAPY RXD/TAKEN: CPT | Mod: CPTII,S$GLB,, | Performed by: INTERNAL MEDICINE

## 2023-02-22 PROCEDURE — 84630 ASSAY OF ZINC: CPT

## 2023-02-22 PROCEDURE — 84466 ASSAY OF TRANSFERRIN: CPT

## 2023-02-22 PROCEDURE — 99999 PR PBB SHADOW E&M-EST. PATIENT-LVL V: ICD-10-PCS | Mod: PBBFAC,,, | Performed by: INTERNAL MEDICINE

## 2023-02-22 PROCEDURE — 82746 ASSAY OF FOLIC ACID SERUM: CPT

## 2023-02-22 PROCEDURE — 3288F FALL RISK ASSESSMENT DOCD: CPT | Mod: CPTII,S$GLB,, | Performed by: INTERNAL MEDICINE

## 2023-02-22 PROCEDURE — 3078F DIAST BP <80 MM HG: CPT | Mod: CPTII,S$GLB,, | Performed by: INTERNAL MEDICINE

## 2023-02-22 PROCEDURE — 3072F PR LOW RISK FOR RETINOPATHY: ICD-10-PCS | Mod: CPTII,S$GLB,, | Performed by: INTERNAL MEDICINE

## 2023-02-22 PROCEDURE — 83921 ORGANIC ACID SINGLE QUANT: CPT

## 2023-02-22 PROCEDURE — 99999 PR PBB SHADOW E&M-EST. PATIENT-LVL I: ICD-10-PCS | Mod: PBBFAC,,, | Performed by: PSYCHIATRY & NEUROLOGY

## 2023-02-22 PROCEDURE — 3077F SYST BP >= 140 MM HG: CPT | Mod: CPTII,S$GLB,, | Performed by: PSYCHIATRY & NEUROLOGY

## 2023-02-22 PROCEDURE — 3077F PR MOST RECENT SYSTOLIC BLOOD PRESSURE >= 140 MM HG: ICD-10-PCS | Mod: CPTII,S$GLB,, | Performed by: PSYCHIATRY & NEUROLOGY

## 2023-02-22 PROCEDURE — 82728 ASSAY OF FERRITIN: CPT

## 2023-02-22 PROCEDURE — 1126F PR PAIN SEVERITY QUANTIFIED, NO PAIN PRESENT: ICD-10-PCS | Mod: CPTII,S$GLB,, | Performed by: INTERNAL MEDICINE

## 2023-02-22 PROCEDURE — 99215 OFFICE O/P EST HI 40 MIN: CPT | Mod: S$GLB,,, | Performed by: PSYCHIATRY & NEUROLOGY

## 2023-02-22 PROCEDURE — 1101F PR PT FALLS ASSESS DOC 0-1 FALLS W/OUT INJ PAST YR: ICD-10-PCS | Mod: CPTII,S$GLB,, | Performed by: INTERNAL MEDICINE

## 2023-02-22 PROCEDURE — 83735 ASSAY OF MAGNESIUM: CPT

## 2023-02-22 PROCEDURE — 1101F PT FALLS ASSESS-DOCD LE1/YR: CPT | Mod: CPTII,S$GLB,, | Performed by: INTERNAL MEDICINE

## 2023-02-22 RX ORDER — DOCUSATE SODIUM 100 MG/1
100 CAPSULE, LIQUID FILLED ORAL 2 TIMES DAILY
Qty: 180 CAPSULE | Refills: 0 | Status: SHIPPED | OUTPATIENT
Start: 2023-02-22 | End: 2023-09-27

## 2023-02-22 RX ORDER — TEMAZEPAM 30 MG/1
CAPSULE ORAL
Qty: 30 CAPSULE | Refills: 3 | Status: SHIPPED | OUTPATIENT
Start: 2023-03-03 | End: 2023-05-31 | Stop reason: DRUGHIGH

## 2023-02-22 RX ORDER — POLYETHYLENE GLYCOL 3350 17 G/17G
17 POWDER, FOR SOLUTION ORAL DAILY
Qty: 90 EACH | Refills: 0 | Status: SHIPPED | OUTPATIENT
Start: 2023-02-22 | End: 2023-02-22

## 2023-02-22 RX ORDER — DULOXETIN HYDROCHLORIDE 60 MG/1
60 CAPSULE, DELAYED RELEASE ORAL DAILY
Qty: 90 CAPSULE | Refills: 1 | Status: SHIPPED | OUTPATIENT
Start: 2023-02-22 | End: 2023-09-08 | Stop reason: ALTCHOICE

## 2023-02-22 RX ORDER — POLYETHYLENE GLYCOL 3350 17 G/17G
17 POWDER, FOR SOLUTION ORAL DAILY
Qty: 90 EACH | Refills: 0 | Status: SHIPPED | OUTPATIENT
Start: 2023-02-22 | End: 2023-04-11

## 2023-02-22 RX ORDER — DOCUSATE SODIUM 100 MG/1
100 CAPSULE, LIQUID FILLED ORAL 2 TIMES DAILY
Qty: 180 CAPSULE | Refills: 0 | Status: SHIPPED | OUTPATIENT
Start: 2023-02-22 | End: 2023-02-22

## 2023-02-22 NOTE — PATIENT INSTRUCTIONS
Plan:   -At least 20g of fiber day  -At least 64 ounces of water a day   -Miralax and colace once a day

## 2023-02-22 NOTE — ASSESSMENT & PLAN NOTE
Plan:   -Miralax and Colace PO daily   -Increase water intake to 64 ounces daily   -Increase fiber intake to 20g of fiber daily

## 2023-02-22 NOTE — PROGRESS NOTES
Christine Jo   1950   02/22/2023     Disclaimer: Evaluation and treatment is based on information presented to date. Any new information may affect assessment and findings.     Location: In Clinic      Who (in attendance) :   Pt herself     Time 41 min  / has not been in since 2021    S: Patient's Own Perception of Condition (& Side Effects) : none     O:  Says she had opted to defer on lamnictal as had discussed with daughter who had tried as daughter was sedated on such    She was rather complementary of me and department     Remains on cymblata 60mg and 30 mg as well as restoril 30 mg at bed     Says she prays and that helps.    Also likes reading and movies / westerns    Lived in [place] in the past    Has daughter Kaye Townsend who has significant MH issues / schizoaffective    Constitutional Health Concerns:      Review of Systems   Constitutional:         Obese    HENT:          Hearing loss moderate L ear; tho did follow conversation   Eyes: Negative.    Respiratory: Negative.     Cardiovascular: Negative.    Gastrointestinal: Negative.    Genitourinary: Negative.    Musculoskeletal: Negative.    Skin: Negative.    Neurological: Negative.    Endo/Heme/Allergies: Negative.     Laboratory Data  Lab Visit on 02/07/2023   Component Date Value Ref Range Status    Sodium 02/07/2023 137  136 - 145 mmol/L Final    Potassium 02/07/2023 4.5  3.5 - 5.1 mmol/L Final    Chloride 02/07/2023 101  95 - 110 mmol/L Final    CO2 02/07/2023 24  23 - 29 mmol/L Final    Glucose 02/07/2023 174 (H)  70 - 110 mg/dL Final    BUN 02/07/2023 20  8 - 23 mg/dL Final    Creatinine 02/07/2023 0.9  0.5 - 1.4 mg/dL Final    Calcium 02/07/2023 9.5  8.7 - 10.5 mg/dL Final    Total Protein 02/07/2023 6.7  6.0 - 8.4 g/dL Final    Albumin 02/07/2023 3.7  3.5 - 5.2 g/dL Final    Total Bilirubin 02/07/2023 0.5  0.1 - 1.0 mg/dL Final    Alkaline Phosphatase 02/07/2023 47 (L)  55 - 135 U/L Final    AST 02/07/2023 16  10 - 40 U/L  Final    ALT 02/07/2023 16  10 - 44 U/L Final    Anion Gap 02/07/2023 12  8 - 16 mmol/L Final    eGFR 02/07/2023 >60.0  >60 mL/min/1.73 m^2 Final    Hemoglobin A1C 02/07/2023 7.1 (H)  4.0 - 5.6 % Final    Estimated Avg Glucose 02/07/2023 157 (H)  68 - 131 mg/dL Final    WBC 02/07/2023 7.50  3.90 - 12.70 K/uL Final    RBC 02/07/2023 3.49 (L)  4.00 - 5.40 M/uL Final    Hemoglobin 02/07/2023 10.7 (L)  12.0 - 16.0 g/dL Final    Hematocrit 02/07/2023 34.3 (L)  37.0 - 48.5 % Final    MCV 02/07/2023 98  82 - 98 fL Final    MCH 02/07/2023 30.7  27.0 - 31.0 pg Final    MCHC 02/07/2023 31.2 (L)  32.0 - 36.0 g/dL Final    RDW 02/07/2023 12.6  11.5 - 14.5 % Final    Platelets 02/07/2023 237  150 - 450 K/uL Final    MPV 02/07/2023 12.1  9.2 - 12.9 fL Final    Immature Granulocytes 02/07/2023 0.3  0.0 - 0.5 % Final    Gran # (ANC) 02/07/2023 5.2  1.8 - 7.7 K/uL Final    Immature Grans (Abs) 02/07/2023 0.02  0.00 - 0.04 K/uL Final    Lymph # 02/07/2023 1.5  1.0 - 4.8 K/uL Final    Mono # 02/07/2023 0.7  0.3 - 1.0 K/uL Final    Eos # 02/07/2023 0.1  0.0 - 0.5 K/uL Final    Baso # 02/07/2023 0.07  0.00 - 0.20 K/uL Final    nRBC 02/07/2023 0  0 /100 WBC Final    Gran % 02/07/2023 68.8  38.0 - 73.0 % Final    Lymph % 02/07/2023 19.5  18.0 - 48.0 % Final    Mono % 02/07/2023 9.2  4.0 - 15.0 % Final    Eosinophil % 02/07/2023 1.3  0.0 - 8.0 % Final    Basophil % 02/07/2023 0.9  0.0 - 1.9 % Final    Differential Method 02/07/2023 Automated   Final    Ferritin 02/07/2023 73  20.0 - 300.0 ng/mL Final    Iron 02/07/2023 77  30 - 160 ug/dL Final    Transferrin 02/07/2023 210  200 - 375 mg/dL Final    TIBC 02/07/2023 311  250 - 450 ug/dL Final    Saturated Iron 02/07/2023 25  20 - 50 % Final        Mental Status Exam:      Appearance: casual   Oriented: x 3   Attitude: cooperative   Eye Contact: good  Behavior: calm     Mood:says bit anxious  Cognition: alert  Concentration: grossly intact   Affect: appropriate range      Anxiety: moderate      Thought Process: goal directed     Speech:       Volume : WNL       Quantity WNL       Quality: appears to openly answer questions      Threats: no SI / HI     Psychosis: denies all      Estimate of Intellectual Function: average   Impulse Control: no thoughts of harm to self/ others      Musculoskeletal: none     Social: a daughter  Kaye Townsend Is schizoaffective       Patient Active Problem List   Diagnosis    Controlled type 2 diabetes mellitus with circulatory disorder, with long-term current use of insulin    Class 3 severe obesity due to excess calories with serious comorbidity and body mass index (BMI) of 45.0 to 49.9 in adult    Paroxysmal atrial fibrillation    Insomnia    Gastroesophageal reflux disease without esophagitis    Recurrent major depressive disorder    Primary hypertension    Age-related osteoporosis without current pathological fracture    Iron deficiency anemia due to chronic blood loss    History of obstructive sleep apnea    Recurrent major depressive disorder, in full remission    Cervical neuropathy    Neck pain    Chronic bilateral low back pain with bilateral sciatica    Spondylosis of cervical region without myelopathy or radiculopathy    Morbid obesity with BMI of 40.0-44.9, adult    Dysphagia    Immunization deficiency    Urinary tract infection without hematuria    Uncontrolled type 2 diabetes mellitus with hyperglycemia    Clavicular cyst    Chronic allergic rhinitis    Hoarseness    Hearing loss    Depression    Breast screening    Anemia    Dizzy    Depression, major, recurrent, moderate    Anxiety disorder    Coronary artery disease of native artery of native heart with stable angina pectoris    GILL (dyspnea on exertion)    Palpitations    Abnormal ECG    Abdominal aortic aneurysm (AAA) without rupture    Hearing loss of left ear    Rectal bleeding    Pain in both lower extremities    Tinnitus    Unsteadiness on feet     Altered mental status    Confusion     Disequilibrium    Memory loss    Vestibular migraine    Aorto-iliac atherosclerosis    Carotid stenosis    Dizziness and giddiness    Fatigue    Chronic idiopathic constipation          Current Outpatient Medications:     ACETAMINOPHEN (TYLENOL ARTHRITIS ORAL), Take by mouth as needed., Disp: , Rfl:     albuterol (VENTOLIN HFA) 90 mcg/actuation inhaler, Inhale 2 puffs into the lungs every 4 (four) hours as needed for Wheezing or Shortness of Breath., Disp: 18 g, Rfl: 11    apixaban (ELIQUIS) 5 mg Tab, Take 1 tablet (5 mg total) by mouth 2 (two) times daily., Disp: 180 tablet, Rfl: 0    azelastine (ASTELIN) 137 mcg (0.1 %) nasal spray, use 2 sprays (274 mcg total) by Nasal route 2 (two) times daily., Disp: 30 mL, Rfl: 1    benazepriL (LOTENSIN) 40 MG tablet, Take 1 tablet (40 mg total) by mouth once daily., Disp: 90 tablet, Rfl: 3    BEPREVE 1.5 % Drop, Place 1 drop into both eyes 2 (two) times daily., Disp: 10 mL, Rfl: 4    blood sugar diagnostic Strp, To check blood sugar 1 times daily, Disp: 100 each, Rfl: 3    blood sugar diagnostic Strp, To check BG 2  times daily, to use with insurance preferred meter, Disp: 200 each, Rfl: 3    blood-glucose meter kit, To check BG 2 times daily, to use with insurance preferred meter, Disp: 1 each, Rfl: 0    calcium citrate-vitamin D3 315-200 mg (CITRACAL+D) 315-200 mg-unit per tablet, Take 1 tablet by mouth once daily., Disp: , Rfl:     carvediloL (COREG) 25 MG tablet, TAKE 1 TABLET BY MOUTH TWICE DAILY (Patient taking differently: Take 25 mg by mouth once daily.), Disp: 180 tablet, Rfl: 3    clotrimazole-betamethasone (LOTRISONE) lotion, Apply topically to the affected area 2 (two) times daily., Disp: 30 mL, Rfl: 2    cycloSPORINE (RESTASIS) 0.05 % ophthalmic emulsion, Place 1 drop into both eyes 2 (two) times daily., Disp: 60 each, Rfl: 11    diclofenac sodium (VOLTAREN) 1 % Gel, Apply 2 grams topically 4 (four) times daily. To affected joints as needed for pain, Disp: 100 g,  Rfl: 3    docusate sodium (COLACE) 100 MG capsule, Take 1 capsule (100 mg total) by mouth 2 (two) times daily., Disp: 180 capsule, Rfl: 0    DULoxetine (CYMBALTA) 60 MG capsule, Take 1 capsule (60 mg total) by mouth once daily., Disp: 90 capsule, Rfl: 1    estradioL (ESTRACE) 0.01 % (0.1 mg/gram) vaginal cream, 1/2 gram Per Vagina every night for a month then 3 times per week thereafter, Disp: 42.5 g, Rfl: 11    fluticasone propionate (FLONASE) 50 mcg/actuation nasal spray, 2 sprays (100 mcg total) by Each Nostril route once daily., Disp: 48 g, Rfl: 3    gabapentin (NEURONTIN) 300 MG capsule, Take 1 capsule (300 mg total) by mouth every evening., Disp: 90 capsule, Rfl: 1    galcanezumab-gnlm (EMGALITY SYRINGE) 120 mg/mL Syrg, Inject into the skin., Disp: , Rfl:     galcanezumab-gnlm 120 mg/mL PnIj, Inject 120 mg into the skin every 28 days. maintenance dose, Disp: 1 mL, Rfl: 5    hydrALAZINE (APRESOLINE) 50 MG tablet, Take 2 tablets (100 mg total) by mouth 2 (two) times daily. (Patient taking differently: Take 50 mg by mouth 2 (two) times daily.), Disp: 180 tablet, Rfl: 3    hydrocortisone 2.5 % cream, Apply topically 2 (two) times daily., Disp: 28 g, Rfl: 2    insulin (LANTUS SOLOSTAR U-100 INSULIN) glargine 100 units/mL SubQ pen, Inject 72 Units into the skin every evening. (Patient taking differently: Inject 65 Units into the skin every evening.), Disp: 75 mL, Rfl: 1    lancets Misc, To check BG 1 times daily, to use with insurance preferred meter, Disp: 100 each, Rfl: 3    meclizine (ANTIVERT) 25 mg tablet, Take 1 tablet (25 mg total) by mouth 3 (three) times daily as needed., Disp: 30 tablet, Rfl: 2    MULTIVIT WITH CALCIUM,IRON,MIN (WOMEN'S DAILY MULTIVITAMIN ORAL), Take by mouth once daily. , Disp: , Rfl:     mupirocin (BACTROBAN) 2 % ointment, Apply topically 2 (two) times daily., Disp: 22 g, Rfl: 0    nystatin (MYCOSTATIN) cream, Apply topically 2 (two) times daily. Continue until completely healed, Disp:  "30 g, Rfl: 0    pantoprazole (PROTONIX) 40 MG tablet, Take 1 tablet (40 mg total) by mouth 2 (two) times a day. (Patient taking differently: Take 40 mg by mouth once daily.), Disp: 180 tablet, Rfl: 3    pen needle, diabetic (BD ULTRA-FINE SHORT PEN NEEDLE) 31 gauge x 5/16" Ndle, AS DIRECTED TWICE DAILY, Disp: 200 each, Rfl: 3    polyethylene glycol (GLYCOLAX) 17 gram PwPk, Take 17 g by mouth once daily., Disp: 90 each, Rfl: 0    rimegepant (NURTEC) 75 mg odt, Take 75 mg by mouth once as needed for Migraine. Place ODT tablet on the tongue; alternatively the ODT tablet may be placed under the tongue, Disp: , Rfl:     rimegepant 75 mg odt, Take 1 tablet (75 mg total) by mouth as needed for Migraine (do not exceed 2-3 doses within 1 week). Place ODT tablet on the tongue; alternatively the ODT tablet may be placed under the tongue, Disp: 8 tablet, Rfl: 5    SITagliptin phosphate (JANUVIA) 100 MG Tab, Take 1 tablet (100 mg total) by mouth once daily., Disp: 90 tablet, Rfl: 1    [START ON 3/3/2023] temazepam (RESTORIL) 30 mg capsule, Take 1 capsule by mouth at bedtime, Disp: 30 capsule, Rfl: 3    traMADoL (ULTRAM) 50 mg tablet, Take 1 tablet (50 mg total) by mouth every 12 (twelve) hours as needed for Pain., Disp: 14 tablet, Rfl: 0    Current Facility-Administered Medications:     sodium hyaluronate (orthovisc) 30 mg, 30 mg, Intra-articular, 1 time in Clinic/HOD, Jered Holbrook MD     Social History     Tobacco Use   Smoking Status Former    Packs/day: 1.00    Years: 36.00    Pack years: 36.00    Types: Cigarettes    Start date:     Quit date: 2003    Years since quittin.6    Passive exposure: Never   Smokeless Tobacco Never        Review of patient's allergies indicates:   Allergen Reactions    Aspirin Palpitations        ASSESSMENT:   Encounter Diagnoses   Name Primary?    Depression, major, recurrent, moderate     Vertigo Yes    Anxiety disorder, unspecified type     Cervical neuropathy     Hearing loss of " left ear, unspecified hearing loss type     Controlled type 2 diabetes mellitus with diabetic polyneuropathy, with long-term current use of insulin     Insomnia, unspecified type        Patient Instructions     PLAN:     D Post Pshai LIND informed pt that he is moving to [facility] March 2023; noted that [facility]  will reassign pt  to another [place] based prescriber.     Informed pt that IF for some reason that no prescriber appt  in place by late Feb / (no later than March 1 2023) THEN pt needs to message or call [facility]  ([phone]) to ask to getting  on schedule for  [place] behavioral health prescriber Understanding Expressed     She asks for  referral such placed    Ps[facility] Meds picked back up again Cymbalta 60 mg daily #90 x 1 refil  and Restoril 30 mg at bed  pr insomnia #30 x 3    Note: When last seen 2021: we had looked at lamictal / re reports racing thoughts ; tho she says now that no longer a problem ; and says she did not give that much of a try THO not interested to resume as racing thoughts not an issue now    References:     Relaxation stress reduction workbook: SERINA Malloy PhD ( used: $7-10)    Feeling Good Website: Levi Christiansen MD / [email or web address] website (free) / papo. PODCASTS    Anxiety &  phobia workbook by RACH Fuentes PhD  (web retailers: used: $ 7-10)    [place]: Path to Better Sleep : https://www.veterantraining.[place].gov/insomnia/ (free)       Pt expressed appreciation for the visit today and did not have further question at this time though pt  was still informed to:     Call  if problems.    Call / Report Side Effects to Evin LIND     Encouraged to follow up with primary care / Gen Med MD for continued monitoring of general health and wellness.    Understanding was expressed; and no further concerns nor questions were raised at this time.     remember healthy self care:   eat right  attempt adequate rest   HANDWASHING / encourage  such papo. During this corona virus time   walk or light exercise within reason and as your general med team approves  read or explore any of reference materials / homework mentioned  reach out (I.e.,  connect with)  others who nuture and bring out best in you  avoid risky behaviors  keep your appointments  IF you  cannot make your appt THEN please call or go online to reschedule.  avoid  alcohol and illicit substances.  Look for the positive.  All is often relative-seek balance  Call sooner if needed : [phone]   Call 911 or go to Emergency Room  (ER)  if any acute concerns

## 2023-02-22 NOTE — PATIENT INSTRUCTIONS
PLAN:     D Post Evin LIND informed pt that he is moving to [facility] March 2023; noted that [facility]  will reassign pt  to another [place] based prescriber.     Informed pt that IF for some reason that no prescriber appt  in place by late Feb / (no later than March 1 2023) THEN pt needs to message or call [facility]  ([phone]) to ask to getting  on schedule for  [place] behavioral health prescriber Understanding Expressed     She asks for  referral such placed    Psyc Meds picked back up again Cymbalta 60 mg daily #90 x 1 refil  and Restoril 30 mg at bed  pr insomnia #30 x 3    Note: When last seen 2021: we had looked at lamictal / re reports racing thoughts ; tho she says now that no longer a problem ; and says she did not give that much of a try THO not interested to resume as racing thoughts not an issue now    References:     Relaxation stress reduction workbook: SERINA Malloy PhD ( used: $7-10)    Feeling Good Website: Levi Christiansen MD / www.[email or web address] website (free) / papo. PODCASTS    Anxiety &  phobia workbook by RACH Fuentes PhD  (web retailers: used: $ 7-10)    [place]: Path to Better Sleep : https://www.veterantraining.[place]gov/insomnia/ (free)       Pt expressed appreciation for the visit today and did not have further question at this time though pt  was still informed to:     Call  if problems.    Call / Report Side Effects to Evin LIND     Encouraged to follow up with primary care / Gen Med MD for continued monitoring of general health and wellness.    Understanding was expressed; and no further concerns nor questions were raised at this time.     remember healthy self care:   eat right  attempt adequate rest   HANDWASHING / encourage such papo. During this corona virus time   walk or light exercise within reason and as your general med team approves  read or explore any of reference materials / homework mentioned  reach out (I.e.,  connect  with)  others who nuture and bring out best in you  avoid risky behaviors  keep your appointments  IF you  cannot make your appt THEN please call or go online to reschedule.  avoid  alcohol and illicit substances.  Look for the positive.  All is often relative-seek balance  Call sooner if needed : [phone]   Call 911 or go to Emergency Room  (ER)  if any acute concerns

## 2023-02-22 NOTE — ASSESSMENT & PLAN NOTE
-Suspect rectal bleeding is from constipation and internal hemorrhoids   -s/p colon for this in 6/2022 that revealed internal hemorrhoids   -Will start on a bowel regimen for constipation to see if this helps

## 2023-02-22 NOTE — PROGRESS NOTES
Clinic Progress Note:  [facility] Progress Note    Reason for Visit:  The primary encounter diagnosis was Chronic idiopathic constipation. A diagnosis of Rectal bleeding was also pertinent to this visit.    PCP: Jose Szymanski       HPI:  This is a 72 y.o. female here for evaluation of constipation and rectal bleeding.   Last seen by Dr Rogers in 12/2022 for same issue.   She is not on any medications for constipation.   If she strains, she has rectal bleeding.   She had a colonoscopy in 6/2022 for rectal bleeding that found polyps, diverticulosis, and internal hemorrhoids.   She has a bowel movement every 2-3 days.   Constipation is accompanied with lower abdominal cramping.   She is on Eliquis for PAF.   She drinks 30 ounces of water a day and consumes a low fiber diet.       ROS:  As above HPI       Allergies: Reviewed    Home Medications:   Medication List with Changes/Refills   New Medications    DOCUSATE SODIUM (COLACE) 100 MG CAPSULE    Take 1 capsule (100 mg total) by mouth 2 (two) times daily.    POLYETHYLENE GLYCOL (GLYCOLAX) 17 GRAM PWPK    Take 17 g by mouth once daily.   Current Medications    ACETAMINOPHEN (TYLENOL ARTHRITIS ORAL)    Take by mouth as needed.    ALBUTEROL (VENTOLIN HFA) 90 MCG/ACTUATION INHALER    Inhale 2 puffs into the lungs every 4 (four) hours as needed for Wheezing or Shortness of Breath.    APIXABAN (ELIQUIS) 5 MG TAB    Take 1 tablet (5 mg total) by mouth 2 (two) times daily.    ATORVASTATIN (LIPITOR) 10 MG TABLET    Take 1 tablet (10 mg total) by mouth every evening.    AZELASTINE (ASTELIN) 137 MCG (0.1 %) NASAL SPRAY    use 2 sprays (274 mcg total) by Nasal route 2 (two) times daily.    BENAZEPRIL (LOTENSIN) 40 MG TABLET    Take 1 tablet (40 mg total) by mouth once daily.    BEPREVE 1.5 % DROP    Place 1 drop into both eyes 2 (two) times daily.    BLOOD SUGAR DIAGNOSTIC STRP    To check blood sugar 1 times daily    BLOOD SUGAR DIAGNOSTIC STRP    To check BG 2    times daily, to use with insurance preferred meter    BLOOD-GLUCOSE METER KIT    To check BG 2 times daily, to use with insurance preferred meter    CALCIUM CITRATE-VITAMIN D3 315-200 MG (CITRACAL+D) 315-200 MG-UNIT PER TABLET    Take 1 tablet by mouth once daily.    CARVEDILOL (COREG) 25 MG TABLET    TAKE 1 TABLET BY MOUTH TWICE DAILY    CLOTRIMAZOLE-BETAMETHASONE (LOTRISONE) LOTION    Apply topically to the affected area 2 (two) times daily.    CYCLOSPORINE (RESTASIS) 0.05 % OPHTHALMIC EMULSION    Place 1 drop into both eyes 2 (two) times daily.    DICLOFENAC SODIUM (VOLTAREN) 1 % GEL    Apply 2 grams topically 4 (four) times daily. To affected joints as needed for pain    DULOXETINE (CYMBALTA) 60 MG CAPSULE    Take 1 capsule (60 mg total) by mouth once daily.    ESTRADIOL (ESTRACE) 0.01 % (0.1 MG/GRAM) VAGINAL CREAM    1/2 gram Per Vagina every night for a month then 3 times per week thereafter    FLUTICASONE PROPIONATE (FLONASE) 50 MCG/ACTUATION NASAL SPRAY    2 sprays (100 mcg total) by Each Nostril route once daily.    GABAPENTIN (NEURONTIN) 300 MG CAPSULE    Take 1 capsule (300 mg total) by mouth every evening.    GALCANEZUMAB-GNLM (EMGALITY SYRINGE) 120 MG/ML SYRG    Inject into the skin.    GALCANEZUMAB-GNLM 120 MG/ML PNIJ    Inject 120 mg into the skin every 28 days. maintenance dose    HYDRALAZINE (APRESOLINE) 50 MG TABLET    Take 2 tablets (100 mg total) by mouth 2 (two) times daily.    HYDROCORTISONE 2.5 % CREAM    Apply topically 2 (two) times daily.    INSULIN (LANTUS SOLOSTAR U-100 INSULIN) GLARGINE 100 UNITS/ML SUBQ PEN    Inject 72 Units into the skin every evening.    LANCETS MISC    To check BG 1 times daily, to use with insurance preferred meter    MECLIZINE (ANTIVERT) 25 MG TABLET    Take 1 tablet (25 mg total) by mouth 3 (three) times daily as needed.    MULTIVIT WITH CALCIUM,IRON,MIN (WOMEN'S DAILY MULTIVITAMIN ORAL)    Take by mouth once daily.     MUPIROCIN (BACTROBAN) 2 % OINTMENT    Apply  "topically 2 (two) times daily.    NYSTATIN (MYCOSTATIN) CREAM    Apply topically 2 (two) times daily. Continue until completely healed    PANTOPRAZOLE (PROTONIX) 40 MG TABLET    Take 1 tablet (40 mg total) by mouth 2 (two) times a day.    PEN NEEDLE, DIABETIC (BD ULTRA-FINE SHORT PEN NEEDLE) 31 GAUGE X 5/16" NDLE    AS DIRECTED TWICE DAILY    RIMEGEPANT (NURTEC) 75 MG ODT    Take 75 mg by mouth once as needed for Migraine. Place ODT tablet on the tongue; alternatively the ODT tablet may be placed under the tongue    RIMEGEPANT 75 MG ODT    Take 1 tablet (75 mg total) by mouth as needed for Migraine (do not exceed 2-3 doses within 1 week). Place ODT tablet on the tongue; alternatively the ODT tablet may be placed under the tongue    SITAGLIPTIN PHOSPHATE (JANUVIA) 100 MG TAB    Take 1 tablet (100 mg total) by mouth once daily.    TEMAZEPAM (RESTORIL) 30 MG CAPSULE    Take 1 capsule by mouth at bedtime    TRAMADOL (ULTRAM) 50 MG TABLET    Take 1 tablet (50 mg total) by mouth every 12 (twelve) hours as needed for Pain.         Physical Exam:  Vital Signs:  BP (!) 170/68   Pulse 94   Ht 5' 2" (1.575 m)   Wt 108 kg (238 lb 1.6 oz)   BMI 43.55 kg/m²   Body mass index is 43.55 kg/m².    Physical Exam  Constitutional:       Appearance: Normal appearance. She is obese.   Eyes:      Conjunctiva/sclera: Conjunctivae normal.   Pulmonary:      Effort: Pulmonary effort is normal.   Abdominal:      General: There is no distension.      Palpations: Abdomen is soft.      Tenderness: There is no abdominal tenderness. There is no guarding.   Neurological:      Mental Status: She is alert.        Labs: Pertinent labs reviewed.        Assessment:  Problem List Items Addressed This Visit          GI    Rectal bleeding    Current Assessment & Plan     -Suspect rectal bleeding is from constipation and internal hemorrhoids   -s/p colon for this in 6/2022 that revealed internal hemorrhoids   -Will start on a bowel regimen for " constipation to see if this helps         Chronic idiopathic constipation - Primary    Current Assessment & Plan     Plan:   -Miralax and Colace PO daily   -Increase water intake to 64 ounces daily   -Increase fiber intake to 20g of fiber daily           Chronic idiopathic constipation  -     Discontinue: docusate sodium (COLACE) 100 MG capsule; Take 1 capsule (100 mg total) by mouth 2 (two) times daily.  Dispense: 180 capsule; Refill: 0  -     docusate sodium (COLACE) 100 MG capsule; Take 1 capsule (100 mg total) by mouth 2 (two) times daily.  Dispense: 180 capsule; Refill: 0    Rectal bleeding    Other orders  -     Discontinue: polyethylene glycol (GLYCOLAX) 17 gram PwPk; Take 17 g by mouth once daily.  Dispense: 90 each; Refill: 0  -     polyethylene glycol (GLYCOLAX) 17 gram PwPk; Take 17 g by mouth once daily.  Dispense: 90 each; Refill: 0                Follow up in about 3 months (around 5/22/2023).    Order summary:  No orders of the defined types were placed in this encounter.      Thank you so much for allowing me to participate in the care of Christine Merida MD  Gastroenterology and Hepatology  [facility]

## 2023-02-23 ENCOUNTER — PATIENT MESSAGE (OUTPATIENT)
Dept: HEMATOLOGY/ONCOLOGY | Facility: CLINIC | Age: 73
End: 2023-02-23
Payer: MEDICARE

## 2023-02-24 ENCOUNTER — TELEPHONE (OUTPATIENT)
Dept: INTERNAL MEDICINE | Facility: CLINIC | Age: 73
End: 2023-02-24
Payer: MEDICARE

## 2023-02-24 LAB — SELENIUM SERPL-MCNC: 142 MCG/L (ref 110–165)

## 2023-02-24 NOTE — TELEPHONE ENCOUNTER
Returned call to patient regarding call she received referencing referral. No answer left voice message.

## 2023-02-26 ENCOUNTER — PATIENT MESSAGE (OUTPATIENT)
Dept: INTERNAL MEDICINE | Facility: CLINIC | Age: 73
End: 2023-02-26
Payer: MEDICARE

## 2023-02-27 ENCOUNTER — PATIENT MESSAGE (OUTPATIENT)
Dept: GASTROENTEROLOGY | Facility: CLINIC | Age: 73
End: 2023-02-27
Payer: MEDICARE

## 2023-02-27 ENCOUNTER — PATIENT MESSAGE (OUTPATIENT)
Dept: PSYCHIATRY | Facility: CLINIC | Age: 73
End: 2023-02-27
Payer: MEDICARE

## 2023-02-27 DIAGNOSIS — Z79.4 CONTROLLED TYPE 2 DIABETES MELLITUS WITH COMPLICATION, WITH LONG-TERM CURRENT USE OF INSULIN: ICD-10-CM

## 2023-02-27 DIAGNOSIS — I10 PRIMARY HYPERTENSION: Primary | ICD-10-CM

## 2023-02-27 DIAGNOSIS — E11.8 CONTROLLED TYPE 2 DIABETES MELLITUS WITH COMPLICATION, WITH LONG-TERM CURRENT USE OF INSULIN: ICD-10-CM

## 2023-02-27 LAB — ZINC SERPL-MCNC: 71 UG/DL (ref 60–130)

## 2023-02-28 ENCOUNTER — PATIENT MESSAGE (OUTPATIENT)
Dept: SPORTS MEDICINE | Facility: CLINIC | Age: 73
End: 2023-02-28
Payer: MEDICARE

## 2023-02-28 LAB
COPPER SERPL-MCNC: 831 UG/L (ref 810–1990)
METHYLMALONATE SERPL-SCNC: 0.26 UMOL/L
PYRIDOXAL SERPL-MCNC: 16 UG/L (ref 5–50)

## 2023-03-06 ENCOUNTER — TELEPHONE (OUTPATIENT)
Dept: NEUROLOGY | Facility: CLINIC | Age: 73
End: 2023-03-06
Payer: MEDICARE

## 2023-03-06 NOTE — TELEPHONE ENCOUNTER
Patient states that since she last missed her dosage of her shot she has been experimenting  more migraine pain. She stated that since she was a couple of days behind she wanted to know if normal for her to have been taken her shot but is getting headaches? Patient states that she gets hot and dizzy and that she needs to take nurtec also to help her. Patient states if this is all normal ? And she doesn't see you until April she just wants to know nothing else is going on.

## 2023-03-06 NOTE — TELEPHONE ENCOUNTER
----- Message from Tyesha Drew sent at 3/6/2023  9:20 AM CST -----  Contact: [phone]  Pt is calling regarding shot , pt states she missed 18 days and have been having dizzy spells/headaches/nausea etc . Please call back at [phone] . Thanks/dj

## 2023-03-07 ENCOUNTER — TELEPHONE (OUTPATIENT)
Dept: NEUROLOGY | Facility: CLINIC | Age: 73
End: 2023-03-07
Payer: MEDICARE

## 2023-03-07 ENCOUNTER — PATIENT MESSAGE (OUTPATIENT)
Dept: RHEUMATOLOGY | Facility: CLINIC | Age: 73
End: 2023-03-07
Payer: MEDICARE

## 2023-03-07 ENCOUNTER — TELEPHONE (OUTPATIENT)
Dept: SPORTS MEDICINE | Facility: CLINIC | Age: 73
End: 2023-03-07
Payer: MEDICARE

## 2023-03-07 ENCOUNTER — PATIENT MESSAGE (OUTPATIENT)
Dept: HEMATOLOGY/ONCOLOGY | Facility: CLINIC | Age: 73
End: 2023-03-07
Payer: MEDICARE

## 2023-03-07 ENCOUNTER — PATIENT MESSAGE (OUTPATIENT)
Dept: SPORTS MEDICINE | Facility: CLINIC | Age: 73
End: 2023-03-07
Payer: MEDICARE

## 2023-03-07 NOTE — TELEPHONE ENCOUNTER
----- Message from Taylor Rivera sent at 3/7/2023  2:49 PM CST -----  Pt is requesting a call back concerning the call she missed today. Call back number is [phone]  Thx jm

## 2023-03-07 NOTE — TELEPHONE ENCOUNTER
Spoke with patient to r/s office visit to [facility] on the 21st as requested. Patient accepted and verbalized understanding.

## 2023-03-08 ENCOUNTER — TELEPHONE (OUTPATIENT)
Dept: NEUROLOGY | Facility: CLINIC | Age: 73
End: 2023-03-08
Payer: MEDICARE

## 2023-03-08 NOTE — TELEPHONE ENCOUNTER
I have never seen this patient, but Meclizine does help with some cases of dizziness and Nurtec manages headaches, if symptoms worsen she needs to go to ER

## 2023-03-08 NOTE — TELEPHONE ENCOUNTER
----- Message from Shukri Cartagena sent at 3/8/2023  9:54 AM CST -----  Contact: jennifer  Patient stated that she been having dizzy spells and would like to know if she is doing the right thing to treat them. Please call her back at [phone].        Thanks  DD

## 2023-03-08 NOTE — TELEPHONE ENCOUNTER
----- Message from Kaya Townsend sent at 3/8/2023 11:54 AM CST -----  Contact: Christine  .Type:  Patient Returning Call    Who Called:Christine   Who Left Message for Patient:unknown  Does the patient know what this is regarding?:Dizziness and Migraine   Would the patient rather a call back or a response via [facility]? call  Best Call Back Number:.[phone]   Additional Information:   Thanks  LR

## 2023-03-08 NOTE — TELEPHONE ENCOUNTER
Patient expressed to me that recently she has been nauseated and having falling spells and more headaches. She has been treating her headaches with Nurtec and treating the dizziness with the Meclizine 25 mg. She states she is not able to go out to stores because the smell and noise affect her headaches. Patient started to use a walker again because she is scared of injuring herself. Patient is scheduled to come in to see you but not until 4/14. Patient wanted to know if you think she is treating her symptoms correctly. If you have any recommendations.

## 2023-03-08 NOTE — TELEPHONE ENCOUNTER
Attempted to contact pt due to dizziness spells and was unable to reach patient. I left a VM to contact office.

## 2023-03-21 ENCOUNTER — HOSPITAL ENCOUNTER (OUTPATIENT)
Dept: RADIOLOGY | Facility: HOSPITAL | Age: 73
Discharge: HOME | End: 2023-03-21
Attending: STUDENT IN AN ORGANIZED HEALTH CARE EDUCATION/TRAINING PROGRAM
Payer: MEDICARE

## 2023-03-21 ENCOUNTER — OFFICE VISIT (OUTPATIENT)
Dept: CARDIOLOGY | Facility: CLINIC | Age: 73
End: 2023-03-21
Payer: MEDICARE

## 2023-03-21 ENCOUNTER — OFFICE VISIT (OUTPATIENT)
Dept: ORTHOPEDICS | Facility: CLINIC | Age: 73
End: 2023-03-21
Payer: MEDICARE

## 2023-03-21 VITALS
DIASTOLIC BLOOD PRESSURE: 60 MMHG | HEART RATE: 73 BPM | OXYGEN SATURATION: 98 % | BODY MASS INDEX: 44.38 KG/M2 | WEIGHT: 241.19 LBS | SYSTOLIC BLOOD PRESSURE: 140 MMHG | HEIGHT: 62 IN

## 2023-03-21 VITALS — BODY MASS INDEX: 43.82 KG/M2 | HEIGHT: 62 IN | WEIGHT: 238.13 LBS

## 2023-03-21 DIAGNOSIS — Z79.4 CONTROLLED TYPE 2 DIABETES MELLITUS WITH DIABETIC POLYNEUROPATHY, WITH LONG-TERM CURRENT USE OF INSULIN: ICD-10-CM

## 2023-03-21 DIAGNOSIS — M25.551 RIGHT HIP PAIN: ICD-10-CM

## 2023-03-21 DIAGNOSIS — E78.1 HYPERTRIGLYCERIDEMIA: ICD-10-CM

## 2023-03-21 DIAGNOSIS — R42 VERTIGO: ICD-10-CM

## 2023-03-21 DIAGNOSIS — I71.40 ABDOMINAL AORTIC ANEURYSM (AAA) WITHOUT RUPTURE, UNSPECIFIED PART: ICD-10-CM

## 2023-03-21 DIAGNOSIS — R06.09 DOE (DYSPNEA ON EXERTION): ICD-10-CM

## 2023-03-21 DIAGNOSIS — M25.551 RIGHT HIP PAIN: Primary | ICD-10-CM

## 2023-03-21 DIAGNOSIS — R42 DISEQUILIBRIUM: ICD-10-CM

## 2023-03-21 DIAGNOSIS — E11.42 CONTROLLED TYPE 2 DIABETES MELLITUS WITH DIABETIC POLYNEUROPATHY, WITH LONG-TERM CURRENT USE OF INSULIN: ICD-10-CM

## 2023-03-21 DIAGNOSIS — E11.65 UNCONTROLLED TYPE 2 DIABETES MELLITUS WITH HYPERGLYCEMIA: ICD-10-CM

## 2023-03-21 DIAGNOSIS — E66.01 CLASS 3 SEVERE OBESITY DUE TO EXCESS CALORIES WITH SERIOUS COMORBIDITY AND BODY MASS INDEX (BMI) OF 45.0 TO 49.9 IN ADULT: ICD-10-CM

## 2023-03-21 DIAGNOSIS — M79.604 PAIN IN BOTH LOWER EXTREMITIES: ICD-10-CM

## 2023-03-21 DIAGNOSIS — Z86.69 HISTORY OF OBSTRUCTIVE SLEEP APNEA: ICD-10-CM

## 2023-03-21 DIAGNOSIS — M19.012 PRIMARY OSTEOARTHRITIS OF LEFT SHOULDER: Primary | ICD-10-CM

## 2023-03-21 DIAGNOSIS — R00.2 PALPITATIONS: ICD-10-CM

## 2023-03-21 DIAGNOSIS — I48.0 PAROXYSMAL ATRIAL FIBRILLATION: ICD-10-CM

## 2023-03-21 DIAGNOSIS — R94.31 ABNORMAL ECG: ICD-10-CM

## 2023-03-21 DIAGNOSIS — I10 ESSENTIAL HYPERTENSION: ICD-10-CM

## 2023-03-21 DIAGNOSIS — I25.118 CORONARY ARTERY DISEASE OF NATIVE ARTERY OF NATIVE HEART WITH STABLE ANGINA PECTORIS: Primary | ICD-10-CM

## 2023-03-21 DIAGNOSIS — R42 DIZZINESS: ICD-10-CM

## 2023-03-21 DIAGNOSIS — G47.30 SLEEP APNEA, UNSPECIFIED TYPE: ICD-10-CM

## 2023-03-21 DIAGNOSIS — M79.605 PAIN IN BOTH LOWER EXTREMITIES: ICD-10-CM

## 2023-03-21 DIAGNOSIS — E78.2 MIXED HYPERLIPIDEMIA: ICD-10-CM

## 2023-03-21 PROCEDURE — 99214 OFFICE O/P EST MOD 30 MIN: CPT | Mod: S$GLB,,, | Performed by: STUDENT IN AN ORGANIZED HEALTH CARE EDUCATION/TRAINING PROGRAM

## 2023-03-21 PROCEDURE — 1160F RVW MEDS BY RX/DR IN RCRD: CPT | Mod: CPTII,S$GLB,, | Performed by: STUDENT IN AN ORGANIZED HEALTH CARE EDUCATION/TRAINING PROGRAM

## 2023-03-21 PROCEDURE — 99214 PR OFFICE/OUTPT VISIT, EST, LEVL IV, 30-39 MIN: ICD-10-PCS | Mod: S$GLB,,, | Performed by: INTERNAL MEDICINE

## 2023-03-21 PROCEDURE — 3008F BODY MASS INDEX DOCD: CPT | Mod: CPTII,S$GLB,, | Performed by: STUDENT IN AN ORGANIZED HEALTH CARE EDUCATION/TRAINING PROGRAM

## 2023-03-21 PROCEDURE — 3072F PR LOW RISK FOR RETINOPATHY: ICD-10-PCS | Mod: CPTII,S$GLB,, | Performed by: STUDENT IN AN ORGANIZED HEALTH CARE EDUCATION/TRAINING PROGRAM

## 2023-03-21 PROCEDURE — 3072F PR LOW RISK FOR RETINOPATHY: ICD-10-PCS | Mod: CPTII,S$GLB,, | Performed by: INTERNAL MEDICINE

## 2023-03-21 PROCEDURE — 1101F PT FALLS ASSESS-DOCD LE1/YR: CPT | Mod: CPTII,S$GLB,, | Performed by: STUDENT IN AN ORGANIZED HEALTH CARE EDUCATION/TRAINING PROGRAM

## 2023-03-21 PROCEDURE — 3051F PR MOST RECENT HEMOGLOBIN A1C LEVEL 7.0 - < 8.0%: ICD-10-PCS | Mod: CPTII,S$GLB,, | Performed by: STUDENT IN AN ORGANIZED HEALTH CARE EDUCATION/TRAINING PROGRAM

## 2023-03-21 PROCEDURE — 1159F MED LIST DOCD IN RCRD: CPT | Mod: CPTII,S$GLB,, | Performed by: INTERNAL MEDICINE

## 2023-03-21 PROCEDURE — 3288F FALL RISK ASSESSMENT DOCD: CPT | Mod: CPTII,S$GLB,, | Performed by: STUDENT IN AN ORGANIZED HEALTH CARE EDUCATION/TRAINING PROGRAM

## 2023-03-21 PROCEDURE — 99214 PR OFFICE/OUTPT VISIT, EST, LEVL IV, 30-39 MIN: ICD-10-PCS | Mod: S$GLB,,, | Performed by: STUDENT IN AN ORGANIZED HEALTH CARE EDUCATION/TRAINING PROGRAM

## 2023-03-21 PROCEDURE — 3078F DIAST BP <80 MM HG: CPT | Mod: CPTII,S$GLB,, | Performed by: INTERNAL MEDICINE

## 2023-03-21 PROCEDURE — 1101F PR PT FALLS ASSESS DOC 0-1 FALLS W/OUT INJ PAST YR: ICD-10-PCS | Mod: CPTII,S$GLB,, | Performed by: INTERNAL MEDICINE

## 2023-03-21 PROCEDURE — 1160F RVW MEDS BY RX/DR IN RCRD: CPT | Mod: CPTII,S$GLB,, | Performed by: INTERNAL MEDICINE

## 2023-03-21 PROCEDURE — 1101F PT FALLS ASSESS-DOCD LE1/YR: CPT | Mod: CPTII,S$GLB,, | Performed by: INTERNAL MEDICINE

## 2023-03-21 PROCEDURE — 3051F HG A1C>EQUAL 7.0%<8.0%: CPT | Mod: CPTII,S$GLB,, | Performed by: STUDENT IN AN ORGANIZED HEALTH CARE EDUCATION/TRAINING PROGRAM

## 2023-03-21 PROCEDURE — 3288F FALL RISK ASSESSMENT DOCD: CPT | Mod: CPTII,S$GLB,, | Performed by: INTERNAL MEDICINE

## 2023-03-21 PROCEDURE — 99999 PR PBB SHADOW E&M-EST. PATIENT-LVL V: CPT | Mod: PBBFAC,,, | Performed by: STUDENT IN AN ORGANIZED HEALTH CARE EDUCATION/TRAINING PROGRAM

## 2023-03-21 PROCEDURE — 73502 X-RAY EXAM HIP UNI 2-3 VIEWS: CPT | Mod: 26,RT,, | Performed by: RADIOLOGY

## 2023-03-21 PROCEDURE — 1126F AMNT PAIN NOTED NONE PRSNT: CPT | Mod: CPTII,S$GLB,, | Performed by: STUDENT IN AN ORGANIZED HEALTH CARE EDUCATION/TRAINING PROGRAM

## 2023-03-21 PROCEDURE — 3008F BODY MASS INDEX DOCD: CPT | Mod: CPTII,S$GLB,, | Performed by: INTERNAL MEDICINE

## 2023-03-21 PROCEDURE — 3072F LOW RISK FOR RETINOPATHY: CPT | Mod: CPTII,S$GLB,, | Performed by: STUDENT IN AN ORGANIZED HEALTH CARE EDUCATION/TRAINING PROGRAM

## 2023-03-21 PROCEDURE — 3008F PR BODY MASS INDEX (BMI) DOCUMENTED: ICD-10-PCS | Mod: CPTII,S$GLB,, | Performed by: STUDENT IN AN ORGANIZED HEALTH CARE EDUCATION/TRAINING PROGRAM

## 2023-03-21 PROCEDURE — 99214 OFFICE O/P EST MOD 30 MIN: CPT | Mod: S$GLB,,, | Performed by: INTERNAL MEDICINE

## 2023-03-21 PROCEDURE — 4010F ACE/ARB THERAPY RXD/TAKEN: CPT | Mod: CPTII,S$GLB,, | Performed by: INTERNAL MEDICINE

## 2023-03-21 PROCEDURE — 1160F PR REVIEW ALL MEDS BY PRESCRIBER/CLIN PHARMACIST DOCUMENTED: ICD-10-PCS | Mod: CPTII,S$GLB,, | Performed by: STUDENT IN AN ORGANIZED HEALTH CARE EDUCATION/TRAINING PROGRAM

## 2023-03-21 PROCEDURE — 3077F PR MOST RECENT SYSTOLIC BLOOD PRESSURE >= 140 MM HG: ICD-10-PCS | Mod: CPTII,S$GLB,, | Performed by: INTERNAL MEDICINE

## 2023-03-21 PROCEDURE — 1126F AMNT PAIN NOTED NONE PRSNT: CPT | Mod: CPTII,S$GLB,, | Performed by: INTERNAL MEDICINE

## 2023-03-21 PROCEDURE — 3078F PR MOST RECENT DIASTOLIC BLOOD PRESSURE < 80 MM HG: ICD-10-PCS | Mod: CPTII,S$GLB,, | Performed by: INTERNAL MEDICINE

## 2023-03-21 PROCEDURE — 1126F PR PAIN SEVERITY QUANTIFIED, NO PAIN PRESENT: ICD-10-PCS | Mod: CPTII,S$GLB,, | Performed by: STUDENT IN AN ORGANIZED HEALTH CARE EDUCATION/TRAINING PROGRAM

## 2023-03-21 PROCEDURE — 3288F PR FALLS RISK ASSESSMENT DOCUMENTED: ICD-10-PCS | Mod: CPTII,S$GLB,, | Performed by: STUDENT IN AN ORGANIZED HEALTH CARE EDUCATION/TRAINING PROGRAM

## 2023-03-21 PROCEDURE — 3051F PR MOST RECENT HEMOGLOBIN A1C LEVEL 7.0 - < 8.0%: ICD-10-PCS | Mod: CPTII,S$GLB,, | Performed by: INTERNAL MEDICINE

## 2023-03-21 PROCEDURE — 1159F MED LIST DOCD IN RCRD: CPT | Mod: CPTII,S$GLB,, | Performed by: STUDENT IN AN ORGANIZED HEALTH CARE EDUCATION/TRAINING PROGRAM

## 2023-03-21 PROCEDURE — 99999 PR PBB SHADOW E&M-EST. PATIENT-LVL V: ICD-10-PCS | Mod: PBBFAC,,, | Performed by: STUDENT IN AN ORGANIZED HEALTH CARE EDUCATION/TRAINING PROGRAM

## 2023-03-21 PROCEDURE — 1160F PR REVIEW ALL MEDS BY PRESCRIBER/CLIN PHARMACIST DOCUMENTED: ICD-10-PCS | Mod: CPTII,S$GLB,, | Performed by: INTERNAL MEDICINE

## 2023-03-21 PROCEDURE — 4010F PR ACE/ARB THEARPY RXD/TAKEN: ICD-10-PCS | Mod: CPTII,S$GLB,, | Performed by: STUDENT IN AN ORGANIZED HEALTH CARE EDUCATION/TRAINING PROGRAM

## 2023-03-21 PROCEDURE — 3288F PR FALLS RISK ASSESSMENT DOCUMENTED: ICD-10-PCS | Mod: CPTII,S$GLB,, | Performed by: INTERNAL MEDICINE

## 2023-03-21 PROCEDURE — 1159F PR MEDICATION LIST DOCUMENTED IN MEDICAL RECORD: ICD-10-PCS | Mod: CPTII,S$GLB,, | Performed by: INTERNAL MEDICINE

## 2023-03-21 PROCEDURE — 3072F LOW RISK FOR RETINOPATHY: CPT | Mod: CPTII,S$GLB,, | Performed by: INTERNAL MEDICINE

## 2023-03-21 PROCEDURE — 1159F PR MEDICATION LIST DOCUMENTED IN MEDICAL RECORD: ICD-10-PCS | Mod: CPTII,S$GLB,, | Performed by: STUDENT IN AN ORGANIZED HEALTH CARE EDUCATION/TRAINING PROGRAM

## 2023-03-21 PROCEDURE — 4010F PR ACE/ARB THEARPY RXD/TAKEN: ICD-10-PCS | Mod: CPTII,S$GLB,, | Performed by: INTERNAL MEDICINE

## 2023-03-21 PROCEDURE — 3051F HG A1C>EQUAL 7.0%<8.0%: CPT | Mod: CPTII,S$GLB,, | Performed by: INTERNAL MEDICINE

## 2023-03-21 PROCEDURE — 73502 X-RAY EXAM HIP UNI 2-3 VIEWS: CPT | Mod: TC,RT

## 2023-03-21 PROCEDURE — 73502 XR HIP WITH PELVIS WHEN PERFORMED, 2 OR 3  VIEWS RIGHT: ICD-10-PCS | Mod: 26,RT,, | Performed by: RADIOLOGY

## 2023-03-21 PROCEDURE — 1126F PR PAIN SEVERITY QUANTIFIED, NO PAIN PRESENT: ICD-10-PCS | Mod: CPTII,S$GLB,, | Performed by: INTERNAL MEDICINE

## 2023-03-21 PROCEDURE — 3077F SYST BP >= 140 MM HG: CPT | Mod: CPTII,S$GLB,, | Performed by: INTERNAL MEDICINE

## 2023-03-21 PROCEDURE — 4010F ACE/ARB THERAPY RXD/TAKEN: CPT | Mod: CPTII,S$GLB,, | Performed by: STUDENT IN AN ORGANIZED HEALTH CARE EDUCATION/TRAINING PROGRAM

## 2023-03-21 PROCEDURE — 99999 PR PBB SHADOW E&M-EST. PATIENT-LVL V: ICD-10-PCS | Mod: PBBFAC,,, | Performed by: INTERNAL MEDICINE

## 2023-03-21 PROCEDURE — 3008F PR BODY MASS INDEX (BMI) DOCUMENTED: ICD-10-PCS | Mod: CPTII,S$GLB,, | Performed by: INTERNAL MEDICINE

## 2023-03-21 PROCEDURE — 99999 PR PBB SHADOW E&M-EST. PATIENT-LVL V: CPT | Mod: PBBFAC,,, | Performed by: INTERNAL MEDICINE

## 2023-03-21 PROCEDURE — 1101F PR PT FALLS ASSESS DOC 0-1 FALLS W/OUT INJ PAST YR: ICD-10-PCS | Mod: CPTII,S$GLB,, | Performed by: STUDENT IN AN ORGANIZED HEALTH CARE EDUCATION/TRAINING PROGRAM

## 2023-03-21 RX ORDER — HYDRALAZINE HYDROCHLORIDE 50 MG/1
50 TABLET, FILM COATED ORAL EVERY 8 HOURS
Qty: 180 TABLET | Refills: 3 | Status: SHIPPED | OUTPATIENT
Start: 2023-03-21 | End: 2023-10-31

## 2023-03-21 RX ORDER — CARVEDILOL 25 MG/1
TABLET ORAL
Qty: 180 TABLET | Refills: 3 | Status: SHIPPED | OUTPATIENT
Start: 2023-03-21

## 2023-03-21 NOTE — PROGRESS NOTES
Patient ID: Christine Jo  YOB: 1950  MRN: [id number]    Chief Complaint: Pain of the Left Shoulder      Referred By: Jose Szymanski MD    History of Present Illness: Christine Jo is a right-hand dominant 72 y.o. female who presents today with left shoulder pain for some time. Pain level 4/10.  Alleviating factors include nothing.  Quality is intermittent aching, stabbing  pain.  She feels it pulling when she does range of motion over shoulder level.  Aggravating factors include direct contact as well as sleeping on it at night. She has been taking tylenol arthritis to alleviate pain.    The patient is active in none.  Occupation: retired        Past Medical History:   Past Medical History:   Diagnosis Date    Arthritis     Cataract     Diabetes mellitus 2008     am 01/15/2018 Insulin x 1 year    DM (diabetes mellitus) 2008     am 02/14/2020 Insulin x 4 years    Encounter for blood transfusion     Glaucoma     Hypertension     Insomnia     Macular degeneration     Vaginal yeast infection      Past Surgical History:   Procedure Laterality Date    abdominal laparoscopy       BREAST BIOPSY Left 1998    benign    CATARACT EXTRACTION Bilateral [id number]    [facility] in [place]    COLONOSCOPY N/A 05/05/2021    Procedure: COLONOSCOPY;  Surgeon: Shawn Rogers III, MD;  Location: [facility];  Service: Endoscopy;  Laterality: N/A;    COLONOSCOPY N/A 06/30/2021    Procedure: COLONOSCOPY;  Surgeon: Memo Merida MD;  Location: [facility];  Service: Endoscopy;  Laterality: N/A;    ESOPHAGOGASTRODUODENOSCOPY N/A 11/29/2019    Procedure: ESOPHAGOGASTRODUODENOSCOPY (EGD);  Surgeon: Shawn Rogers III, MD;  Location: [facility];  Service: Endoscopy;  Laterality: N/A;    ESOPHAGOGASTRODUODENOSCOPY N/A 05/05/2021    Procedure: EGD (ESOPHAGOGASTRODUODENOSCOPY);  Surgeon: Shawn Rogers III, MD;  Location: [facility];  Service: Endoscopy;  Laterality: N/A;    EYE SURGERY       TONSILLECTOMY      TUBAL LIGATION      yag  Bilateral      Family History   Problem Relation Age of Onset    Heart disease Mother         CAD     Cataracts Mother     Macular degeneration Mother     Glaucoma Mother     Cancer Son         testicular     Cancer Maternal Aunt         Lung ca    Heart disease Maternal Grandfather         Pacemaker     Diabetes Sister     Heart disease Sister         CAD    Cataracts Sister     Diabetes Sister     Diabetes Sister      Social History     Socioeconomic History    Marital status:     Number of children: 3   Occupational History    Occupation: Retired   Tobacco Use    Smoking status: Former     Packs/day: 1.00     Years: 36.00     Pack years: 36.00     Types: Cigarettes     Start date:      Quit date: 2003     Years since quittin.7     Passive exposure: Never    Smokeless tobacco: Never   Substance and Sexual Activity    Alcohol use: No    Drug use: No    Sexual activity: Never     Social Determinants of Health     Financial Resource Strain: Low Risk     Difficulty of Paying Living Expenses: Not hard at all   Food Insecurity: No Food Insecurity    Worried About Running Out of Food in the Last Year: Never true    Ran Out of Food in the Last Year: Never true   Transportation Needs: Unmet Transportation Needs    Lack of Transportation (Medical): Yes    Lack of Transportation (Non-Medical): No   Physical Activity: Insufficiently Active    Days of Exercise per Week: 7 days    Minutes of Exercise per Session: 10 min   Stress: No Stress Concern Present    Feeling of Stress : Only a little   Social Connections: Moderately Isolated    Frequency of Communication with Friends and Family: More than three times a week    Frequency of Social Gatherings with Friends and Family: Never    Attends Jewish Services: 1 to 4 times per year    Active Member of Clubs or Organizations: No    Attends Club or Organization Meetings: Never    Marital Status:    Housing  Stability: Low Risk     Unable to Pay for Housing in the Last Year: No    Number of Places Lived in the Last Year: 2    Unstable Housing in the Last Year: No     Medication List with Changes/Refills   Current Medications    ACETAMINOPHEN (TYLENOL ARTHRITIS ORAL)    Take by mouth as needed.    ALBUTEROL (VENTOLIN HFA) 90 MCG/ACTUATION INHALER    Inhale 2 puffs into the lungs every 4 (four) hours as needed for Wheezing or Shortness of Breath.    APIXABAN (ELIQUIS) 5 MG TAB    Take 1 tablet (5 mg total) by mouth 2 (two) times daily.    AZELASTINE (ASTELIN) 137 MCG (0.1 %) NASAL SPRAY    use 2 sprays (274 mcg total) by Nasal route 2 (two) times daily.    BENAZEPRIL (LOTENSIN) 40 MG TABLET    Take 1 tablet (40 mg total) by mouth once daily.    BEPREVE 1.5 % DROP    Place 1 drop into both eyes 2 (two) times daily.    BLOOD SUGAR DIAGNOSTIC STRP    To check blood sugar 1 times daily    BLOOD SUGAR DIAGNOSTIC STRP    To check BG 2   times daily, to use with insurance preferred meter    BLOOD-GLUCOSE METER KIT    To check BG 2 times daily, to use with insurance preferred meter    CALCIUM CITRATE-VITAMIN D3 315-200 MG (CITRACAL+D) 315-200 MG-UNIT PER TABLET    Take 1 tablet by mouth once daily.    CARVEDILOL (COREG) 25 MG TABLET    TAKE 1 TABLET BY MOUTH TWICE DAILY    CLOTRIMAZOLE-BETAMETHASONE (LOTRISONE) LOTION    Apply topically to the affected area 2 (two) times daily.    CYCLOSPORINE (RESTASIS) 0.05 % OPHTHALMIC EMULSION    Place 1 drop into both eyes 2 (two) times daily.    DICLOFENAC SODIUM (VOLTAREN) 1 % GEL    Apply 2 grams topically 4 (four) times daily. To affected joints as needed for pain    DOCUSATE SODIUM (COLACE) 100 MG CAPSULE    Take 1 capsule (100 mg total) by mouth 2 (two) times daily.    DULOXETINE (CYMBALTA) 60 MG CAPSULE    Take 1 capsule (60 mg total) by mouth once daily.    ESTRADIOL (ESTRACE) 0.01 % (0.1 MG/GRAM) VAGINAL CREAM    1/2 gram Per Vagina every night for a month then 3 times per week  "thereafter    FLUTICASONE PROPIONATE (FLONASE) 50 MCG/ACTUATION NASAL SPRAY    2 sprays (100 mcg total) by Each Nostril route once daily.    GABAPENTIN (NEURONTIN) 300 MG CAPSULE    Take 1 capsule (300 mg total) by mouth every evening.    GALCANEZUMAB-GNLM (EMGALITY SYRINGE) 120 MG/ML SYRG    Inject into the skin.    GALCANEZUMAB-GNLM 120 MG/ML PNIJ    Inject 120 mg into the skin every 28 days. maintenance dose    HYDRALAZINE (APRESOLINE) 50 MG TABLET    Take 2 tablets (100 mg total) by mouth 2 (two) times daily.    HYDROCORTISONE 2.5 % CREAM    Apply topically 2 (two) times daily.    INSULIN (LANTUS SOLOSTAR U-100 INSULIN) GLARGINE 100 UNITS/ML SUBQ PEN    Inject 72 Units into the skin every evening.    LANCETS MISC    To check BG 1 times daily, to use with insurance preferred meter    MECLIZINE (ANTIVERT) 25 MG TABLET    Take 1 tablet (25 mg total) by mouth 3 (three) times daily as needed.    MULTIVIT WITH CALCIUM,IRON,MIN (WOMEN'S DAILY MULTIVITAMIN ORAL)    Take by mouth once daily.     MUPIROCIN (BACTROBAN) 2 % OINTMENT    Apply topically 2 (two) times daily.    NYSTATIN (MYCOSTATIN) CREAM    Apply topically 2 (two) times daily. Continue until completely healed    PANTOPRAZOLE (PROTONIX) 40 MG TABLET    Take 1 tablet (40 mg total) by mouth 2 (two) times a day.    PEN NEEDLE, DIABETIC (BD ULTRA-FINE SHORT PEN NEEDLE) 31 GAUGE X 5/16" NDLE    AS DIRECTED TWICE DAILY    POLYETHYLENE GLYCOL (GLYCOLAX) 17 GRAM PWPK    Take 17 g by mouth once daily.    RIMEGEPANT (NURTEC) 75 MG ODT    Take 75 mg by mouth once as needed for Migraine. Place ODT tablet on the tongue; alternatively the ODT tablet may be placed under the tongue    RIMEGEPANT 75 MG ODT    Take 1 tablet (75 mg total) by mouth as needed for Migraine (do not exceed 2-3 doses within 1 week). Place ODT tablet on the tongue; alternatively the ODT tablet may be placed under the tongue    SITAGLIPTIN PHOSPHATE (JANUVIA) 100 MG TAB    Take 1 tablet (100 mg total) " by mouth once daily.    TEMAZEPAM (RESTORIL) 30 MG CAPSULE    Take 1 capsule by mouth at bedtime    TRAMADOL (ULTRAM) 50 MG TABLET    Take 1 tablet (50 mg total) by mouth every 12 (twelve) hours as needed for Pain.     Review of patient's allergies indicates:   Allergen Reactions    Aspirin Palpitations       Physical Exam:   Body mass index is 43.55 kg/m².    GENERAL: Well appearing, in no acute distress.  HEAD: Normocephalic and atraumatic.  ENT: External ears and nose grossly normal.  EYES: EOMI bilaterally  PULMONARY: Respirations are grossly even and non-labored.  NEURO: Awake, alert, and oriented x 3.  SKIN: No obvious rashes appreciated.  PSYCH: Mood & affect are appropriate.    Detailed MSK exam:     Left shoulder exam:   -ROM: abduction 130, forward flexion 130, external rotation 80, internal rotation 60  -empty can test negative, resisted ER negative, belly press negative  -dumont test negative, neers test negative, whipple test negative  -sensation intact, pulses 2+  -TTP: bicipital groove, AC joint, lateral cuff insertion, and posterior    Right shoulder exam:   -ROM: abduction 130, forward flexion 130, external rotation 80, internal rotation 60  -empty can test negative, resisted ER negative, belly press negative  -dumont test negative, neers test negative, whipple test negative  -sensation intact, pulses 2+  -TTP: none      Imaging:  X-Ray Hip 2 or 3 views Left (with Pelvis when performed)  Narrative: EXAMINATION:  Three radiographic views of the LEFT HIP.    CLINICAL HISTORY:  Pain in left hip    TECHNIQUE:  3 radiographic views of the LEFT HIP.    COMPARISON:  None.    FINDINGS:  AP view of the pelvis with AP and lateral views of the left hip demonstrate mild femoroacetabular osteoarthritis.  There is no fracture.  Alignment is normal.  There is no soft tissue swelling.  Impression: Mild osteoarthritis of the left hip.    Electronically signed by: Malik Farley  MD  Date:    12/21/2022  Time:    16:43  X-Ray Shoulder 2 or More Views Left  Narrative: EXAMINATION:  Four radiographic views of the SHOULDER.    CLINICAL HISTORY:  Pain in left shoulder    TECHNIQUE:  4 radiographic views of the SHOULDER    COMPARISON:  Right shoulder radiograph 06/12/2020.    FINDINGS:  Four views of the left shoulder demonstrate normal alignment.  There is mild glenohumeral and acromioclavicular osteoarthritis.  There is no fracture.  There is no soft tissue swelling.  The included left lung is clear.  Impression: Mild glenohumeral and acromioclavicular osteoarthritis.    Electronically signed by: Malik Farley MD  Date:    12/21/2022  Time:    16:42  X-Ray Abdomen Flat And Erect  Narrative: EXAMINATION:  Two radiographic views of the ABDOMEN.    CLINICAL HISTORY:  Other hemorrhoids    TECHNIQUE:  Two radiographic views of the ABDOMEN.    COMPARISON:  [facility] 12/02/2004.    FINDINGS:  Supine and upright views of the abdomen demonstrate a nonobstructed bowel gas pattern.  There is no pneumatosis. There is no free air.  There is no portal venous gas.  There is no pathologic calcification.  The bilateral lung bases are clear.  Impression: Nonobstructed bowel gas pattern.    Electronically signed by: Malik Farley MD  Date:    12/21/2022  Time:    15:22        Relevant imaging results were reviewed and interpreted by me and per my read shows mild arthritic changes left shoulder.  This was discussed with the patient and / or family today.     Assessment:  Christine Jo is a 72 y.o. female presenting with left shoulder pain.   History, physical and radiographs are consistent with a likely diagnosis of OA.   Plan: ice, voltaren gel, lidocaine patches. Consider steroid injection during bad flare up. Continue conservative management for pain.   Follow up as needed. All questions answered.      Primary osteoarthritis of left shoulder         A copy of today's visit note has been sent to the referring provider.      Electronically signed:  Erik Esposito MD, MPH  03/21/2023  10:13 AM

## 2023-03-21 NOTE — PATIENT INSTRUCTIONS
Assessment:  Christine Jo is a 72 y.o. female   Chief Complaint   Patient presents with    Left Shoulder - Pain       Encounter Diagnosis   Name Primary?    Primary osteoarthritis of left shoulder Yes        Plan:  Apply topical diclofenac (Voltaren) up to 4 times a day to the affected area.  It can be bought over the counter at any local pharmacy.    Patient may use ice and heat as needed for pain every 2 hours for 15 minutes  Patient may use over the counter lidocaine patches as needed for pain.  Patient may use ice and heat as needed for pain every 2 hours for 15 minutes  Follow up next 2 weeks for bilateral Hips  Xray of hip on way out      Follow-up: If you feel like you need us feel free to reach out through [facility] or feel free to contact us at [phone]   or sooner if there are any problems between now and then.    Thank you for choosing [facility] and Dr. Erik Esposito for your orthopedic & sports medicine care. It is our goal to provide you with exceptional care that will help keep you healthy, active, and get you back in the game.    Please do not hesitate to reach out to us via email, phone, or [facility] with any questions, concerns, or feedback.    If you felt that you received exemplary care today, please consider leaving us feedback on [email or web address] at:  [email or web address]    If you are experiencing pain/discomfort ,or have questions after 5pm and would like to be connected to the [facility] on-call team, please call this number and specify which Sports Medicine provider is treating you: [phone]

## 2023-03-23 ENCOUNTER — PATIENT MESSAGE (OUTPATIENT)
Dept: GASTROENTEROLOGY | Facility: CLINIC | Age: 73
End: 2023-03-23
Payer: MEDICARE

## 2023-03-30 ENCOUNTER — PATIENT MESSAGE (OUTPATIENT)
Dept: RHEUMATOLOGY | Facility: CLINIC | Age: 73
End: 2023-03-30
Payer: MEDICARE

## 2023-03-31 ENCOUNTER — PATIENT MESSAGE (OUTPATIENT)
Dept: RHEUMATOLOGY | Facility: CLINIC | Age: 73
End: 2023-03-31
Payer: MEDICARE

## 2023-03-31 DIAGNOSIS — M25.561 CHRONIC PAIN OF RIGHT KNEE: Primary | ICD-10-CM

## 2023-03-31 DIAGNOSIS — G89.29 CHRONIC PAIN OF RIGHT KNEE: Primary | ICD-10-CM

## 2023-03-31 NOTE — TELEPHONE ENCOUNTER
Patient wants to see if you can place an order for the right knee as well for zilretta before her appt. It is only approved for the left knee.     Please advise

## 2023-03-31 NOTE — TELEPHONE ENCOUNTER
DAYTON Leal Staff 1 hour ago (12:47 PM)     LT  Please sched xray of knees to be done as soon as she can. TY

## 2023-04-03 ENCOUNTER — PATIENT MESSAGE (OUTPATIENT)
Dept: CARDIOLOGY | Facility: CLINIC | Age: 73
End: 2023-04-03
Payer: MEDICARE

## 2023-04-03 ENCOUNTER — PATIENT MESSAGE (OUTPATIENT)
Dept: INTERNAL MEDICINE | Facility: CLINIC | Age: 73
End: 2023-04-03
Payer: MEDICARE

## 2023-04-04 ENCOUNTER — TELEPHONE (OUTPATIENT)
Dept: INTERNAL MEDICINE | Facility: CLINIC | Age: 73
End: 2023-04-04
Payer: MEDICARE

## 2023-04-04 DIAGNOSIS — I48.0 PAROXYSMAL ATRIAL FIBRILLATION: ICD-10-CM

## 2023-04-04 DIAGNOSIS — E11.65 UNCONTROLLED TYPE 2 DIABETES MELLITUS WITH HYPERGLYCEMIA: ICD-10-CM

## 2023-04-04 DIAGNOSIS — F33.41 RECURRENT MAJOR DEPRESSIVE DISORDER, IN PARTIAL REMISSION: Primary | ICD-10-CM

## 2023-04-04 DIAGNOSIS — I10 PRIMARY HYPERTENSION: ICD-10-CM

## 2023-04-04 DIAGNOSIS — M35.01 KERATITIS SICCA, BILATERAL: ICD-10-CM

## 2023-04-05 ENCOUNTER — TELEPHONE (OUTPATIENT)
Dept: INTERNAL MEDICINE | Facility: CLINIC | Age: 73
End: 2023-04-05
Payer: MEDICARE

## 2023-04-05 RX ORDER — CYCLOSPORINE 0.5 MG/ML
1 EMULSION OPHTHALMIC 2 TIMES DAILY
Qty: 60 EACH | Refills: 11 | Status: SHIPPED | OUTPATIENT
Start: 2023-04-05 | End: 2024-01-04 | Stop reason: SDUPTHER

## 2023-04-05 NOTE — TELEPHONE ENCOUNTER
----- Message from Cassie Louis sent at 4/5/2023  9:06 AM CDT -----  Contact: Self  Patient is calling requesting to speak with Della. Please call back at [phone]

## 2023-04-05 NOTE — TELEPHONE ENCOUNTER
Spoke with patient  who stated she woke up with fever 99.9 this morning. Taking Tylenol and also Flonase nasal. States  she has cough, congestion no other symptoms. Wants to know if there is something else she can take. Appointment offered but she is unable to come into clinic as she does not have transportation at this time.

## 2023-04-06 ENCOUNTER — PES CALL (OUTPATIENT)
Dept: ADMINISTRATIVE | Facility: CLINIC | Age: 73
End: 2023-04-06
Payer: MEDICARE

## 2023-04-10 ENCOUNTER — PES CALL (OUTPATIENT)
Dept: ADMINISTRATIVE | Facility: CLINIC | Age: 73
End: 2023-04-10
Payer: MEDICARE

## 2023-04-11 ENCOUNTER — TELEPHONE (OUTPATIENT)
Dept: HOME HEALTH SERVICES | Facility: CLINIC | Age: 73
End: 2023-04-11
Payer: MEDICARE

## 2023-04-11 ENCOUNTER — PATIENT MESSAGE (OUTPATIENT)
Dept: INTERNAL MEDICINE | Facility: CLINIC | Age: 73
End: 2023-04-11
Payer: MEDICARE

## 2023-04-11 ENCOUNTER — CARE AT HOME (OUTPATIENT)
Dept: HOME HEALTH SERVICES | Facility: CLINIC | Age: 73
End: 2023-04-11
Payer: MEDICARE

## 2023-04-11 VITALS
OXYGEN SATURATION: 97 % | TEMPERATURE: 99 F | SYSTOLIC BLOOD PRESSURE: 122 MMHG | DIASTOLIC BLOOD PRESSURE: 60 MMHG | RESPIRATION RATE: 18 BRPM | HEART RATE: 71 BPM

## 2023-04-11 DIAGNOSIS — R53.81 DEBILITY: ICD-10-CM

## 2023-04-11 DIAGNOSIS — Z79.4 CONTROLLED TYPE 2 DIABETES MELLITUS WITH OTHER CIRCULATORY COMPLICATION, WITH LONG-TERM CURRENT USE OF INSULIN: ICD-10-CM

## 2023-04-11 DIAGNOSIS — E11.59 CONTROLLED TYPE 2 DIABETES MELLITUS WITH OTHER CIRCULATORY COMPLICATION, WITH LONG-TERM CURRENT USE OF INSULIN: ICD-10-CM

## 2023-04-11 DIAGNOSIS — E11.65 UNCONTROLLED TYPE 2 DIABETES MELLITUS WITH HYPERGLYCEMIA: ICD-10-CM

## 2023-04-11 DIAGNOSIS — G43.809 VESTIBULAR MIGRAINE: Primary | ICD-10-CM

## 2023-04-11 DIAGNOSIS — I10 PRIMARY HYPERTENSION: ICD-10-CM

## 2023-04-11 DIAGNOSIS — R39.198 DECREASED URINE STREAM: ICD-10-CM

## 2023-04-11 DIAGNOSIS — I48.0 PAROXYSMAL ATRIAL FIBRILLATION: ICD-10-CM

## 2023-04-11 DIAGNOSIS — M17.12 PRIMARY OSTEOARTHRITIS OF LEFT KNEE: ICD-10-CM

## 2023-04-11 PROCEDURE — 99350 HOME/RES VST EST HIGH MDM 60: CPT | Mod: S$GLB,,, | Performed by: NURSE PRACTITIONER

## 2023-04-11 PROCEDURE — 99350 PR HOME VISIT,ESTAB PATIENT,LEVEL IV: ICD-10-PCS | Mod: S$GLB,,, | Performed by: NURSE PRACTITIONER

## 2023-04-11 RX ORDER — LANCETS
EACH MISCELLANEOUS
Qty: 100 EACH | Refills: 3 | Status: SHIPPED | OUTPATIENT
Start: 2023-04-11

## 2023-04-11 RX ORDER — INSULIN GLARGINE 100 [IU]/ML
72 INJECTION, SOLUTION SUBCUTANEOUS NIGHTLY
Qty: 75 ML | Refills: 1 | Status: SHIPPED | OUTPATIENT
Start: 2023-04-11 | End: 2023-07-18 | Stop reason: SDUPTHER

## 2023-04-11 RX ORDER — GABAPENTIN 100 MG/1
100 CAPSULE ORAL 3 TIMES DAILY
COMMUNITY
End: 2023-05-08

## 2023-04-11 RX ORDER — TAMSULOSIN HYDROCHLORIDE 0.4 MG/1
0.4 CAPSULE ORAL DAILY
Qty: 30 CAPSULE | Refills: 0 | Status: SHIPPED | OUTPATIENT
Start: 2023-04-11 | End: 2023-05-15

## 2023-04-11 RX ORDER — LANCETS
EACH MISCELLANEOUS
Qty: 100 EACH | Refills: 3 | Status: SHIPPED | OUTPATIENT
Start: 2023-04-11 | End: 2023-04-11 | Stop reason: SDUPTHER

## 2023-04-11 RX ORDER — GALCANEZUMAB 120 MG/ML
120 INJECTION, SOLUTION SUBCUTANEOUS
Qty: 1 EACH | Refills: 0
Start: 2023-04-11 | End: 2023-04-14 | Stop reason: SDUPTHER

## 2023-04-11 NOTE — TELEPHONE ENCOUNTER
No new care gaps identified.  [facility] Embedded Care Gaps. Reference number: [id number]. 4/11/2023   9:38:31 AM SWETHAT

## 2023-04-11 NOTE — TELEPHONE ENCOUNTER
Faxed orders and demographics to [facility] for In home PT/OT per NP request.  Fax confirmation received.

## 2023-04-11 NOTE — TELEPHONE ENCOUNTER
Refill Decision Note   Christine Jo  is requesting a refill authorization.  Brief Assessment and Rationale for Refill:  Approve     Medication Therapy Plan:         Comments:     Note composed:1:43 PM 04/11/2023

## 2023-04-12 ENCOUNTER — PATIENT MESSAGE (OUTPATIENT)
Dept: RHEUMATOLOGY | Facility: CLINIC | Age: 73
End: 2023-04-12
Payer: MEDICARE

## 2023-04-12 ENCOUNTER — PATIENT MESSAGE (OUTPATIENT)
Dept: HOME HEALTH SERVICES | Facility: CLINIC | Age: 73
End: 2023-04-12
Payer: MEDICARE

## 2023-04-13 ENCOUNTER — PATIENT MESSAGE (OUTPATIENT)
Dept: NEUROLOGY | Facility: CLINIC | Age: 73
End: 2023-04-13
Payer: MEDICARE

## 2023-04-13 ENCOUNTER — PATIENT MESSAGE (OUTPATIENT)
Dept: RHEUMATOLOGY | Facility: CLINIC | Age: 73
End: 2023-04-13
Payer: MEDICARE

## 2023-04-14 ENCOUNTER — TELEPHONE (OUTPATIENT)
Dept: CARDIOLOGY | Facility: CLINIC | Age: 73
End: 2023-04-14
Payer: MEDICARE

## 2023-04-14 ENCOUNTER — TELEPHONE (OUTPATIENT)
Dept: NEUROLOGY | Facility: CLINIC | Age: 73
End: 2023-04-14
Payer: MEDICARE

## 2023-04-14 ENCOUNTER — PATIENT MESSAGE (OUTPATIENT)
Dept: ORTHOPEDICS | Facility: CLINIC | Age: 73
End: 2023-04-14
Payer: MEDICARE

## 2023-04-14 ENCOUNTER — TELEPHONE (OUTPATIENT)
Dept: SPORTS MEDICINE | Facility: CLINIC | Age: 73
End: 2023-04-14
Payer: MEDICARE

## 2023-04-14 ENCOUNTER — PATIENT MESSAGE (OUTPATIENT)
Dept: RHEUMATOLOGY | Facility: CLINIC | Age: 73
End: 2023-04-14
Payer: MEDICARE

## 2023-04-14 ENCOUNTER — OFFICE VISIT (OUTPATIENT)
Dept: NEUROLOGY | Facility: CLINIC | Age: 73
End: 2023-04-14
Payer: MEDICARE

## 2023-04-14 ENCOUNTER — PATIENT MESSAGE (OUTPATIENT)
Dept: OPHTHALMOLOGY | Facility: CLINIC | Age: 73
End: 2023-04-14
Payer: MEDICARE

## 2023-04-14 ENCOUNTER — PATIENT MESSAGE (OUTPATIENT)
Dept: NEUROLOGY | Facility: CLINIC | Age: 73
End: 2023-04-14

## 2023-04-14 DIAGNOSIS — G43.101 MIGRAINE WITH AURA AND WITH STATUS MIGRAINOSUS, NOT INTRACTABLE: ICD-10-CM

## 2023-04-14 DIAGNOSIS — M25.561 CHRONIC PAIN OF RIGHT KNEE: Primary | ICD-10-CM

## 2023-04-14 DIAGNOSIS — G56.00 CARPAL TUNNEL SYNDROME, UNSPECIFIED LATERALITY: ICD-10-CM

## 2023-04-14 DIAGNOSIS — G43.809 VESTIBULAR MIGRAINE: ICD-10-CM

## 2023-04-14 DIAGNOSIS — F09 MILD COGNITIVE DISORDER: ICD-10-CM

## 2023-04-14 DIAGNOSIS — F33.1 DEPRESSION, MAJOR, RECURRENT, MODERATE: ICD-10-CM

## 2023-04-14 DIAGNOSIS — M17.11 PRIMARY OSTEOARTHRITIS OF RIGHT KNEE: ICD-10-CM

## 2023-04-14 DIAGNOSIS — R29.90 MULTIPLE NEUROLOGICAL SYMPTOMS: Primary | ICD-10-CM

## 2023-04-14 DIAGNOSIS — F32.A DEPRESSION, UNSPECIFIED DEPRESSION TYPE: ICD-10-CM

## 2023-04-14 DIAGNOSIS — G89.29 CHRONIC PAIN OF RIGHT KNEE: Primary | ICD-10-CM

## 2023-04-14 DIAGNOSIS — H81.4 VERTIGO OF CENTRAL ORIGIN: ICD-10-CM

## 2023-04-14 DIAGNOSIS — R42 DIZZINESS AND GIDDINESS: ICD-10-CM

## 2023-04-14 DIAGNOSIS — R42 DIZZY: ICD-10-CM

## 2023-04-14 PROCEDURE — 1160F PR REVIEW ALL MEDS BY PRESCRIBER/CLIN PHARMACIST DOCUMENTED: ICD-10-PCS | Mod: CPTII,95,, | Performed by: NURSE PRACTITIONER

## 2023-04-14 PROCEDURE — 1160F RVW MEDS BY RX/DR IN RCRD: CPT | Mod: CPTII,95,, | Performed by: NURSE PRACTITIONER

## 2023-04-14 PROCEDURE — 99215 PR OFFICE/OUTPT VISIT, EST, LEVL V, 40-54 MIN: ICD-10-PCS | Mod: 95,,, | Performed by: NURSE PRACTITIONER

## 2023-04-14 PROCEDURE — 3051F PR MOST RECENT HEMOGLOBIN A1C LEVEL 7.0 - < 8.0%: ICD-10-PCS | Mod: CPTII,95,, | Performed by: NURSE PRACTITIONER

## 2023-04-14 PROCEDURE — 4010F PR ACE/ARB THEARPY RXD/TAKEN: ICD-10-PCS | Mod: CPTII,95,, | Performed by: NURSE PRACTITIONER

## 2023-04-14 PROCEDURE — 1159F PR MEDICATION LIST DOCUMENTED IN MEDICAL RECORD: ICD-10-PCS | Mod: CPTII,95,, | Performed by: NURSE PRACTITIONER

## 2023-04-14 PROCEDURE — 99215 OFFICE O/P EST HI 40 MIN: CPT | Mod: 95,,, | Performed by: NURSE PRACTITIONER

## 2023-04-14 PROCEDURE — 3072F LOW RISK FOR RETINOPATHY: CPT | Mod: CPTII,95,, | Performed by: NURSE PRACTITIONER

## 2023-04-14 PROCEDURE — 4010F ACE/ARB THERAPY RXD/TAKEN: CPT | Mod: CPTII,95,, | Performed by: NURSE PRACTITIONER

## 2023-04-14 PROCEDURE — 1159F MED LIST DOCD IN RCRD: CPT | Mod: CPTII,95,, | Performed by: NURSE PRACTITIONER

## 2023-04-14 PROCEDURE — 3072F PR LOW RISK FOR RETINOPATHY: ICD-10-PCS | Mod: CPTII,95,, | Performed by: NURSE PRACTITIONER

## 2023-04-14 PROCEDURE — 3051F HG A1C>EQUAL 7.0%<8.0%: CPT | Mod: CPTII,95,, | Performed by: NURSE PRACTITIONER

## 2023-04-14 RX ORDER — GALCANEZUMAB 120 MG/ML
120 INJECTION, SOLUTION SUBCUTANEOUS
Qty: 1 EACH | Refills: 12
Start: 2023-04-14 | End: 2023-04-17 | Stop reason: SDUPTHER

## 2023-04-14 NOTE — TELEPHONE ENCOUNTER
Communicating through [employer] with patient on r/s appt due to being sick and coordinating with other appts patient already has scheduled.  Cancelled today's appt

## 2023-04-14 NOTE — PROGRESS NOTES
"Subjective:      Patient ID: Christine Jo is a 73 y.o. female.    Chief Complaint:  Multiple neruolgical symptoms           HPI    BACKGROUND    Former patient of Dr. Haney, new to me.    Patient presents to appointment unaccompanied to discuss dizziness and memory concerns. Her medical history includes depression, anxiety, hearing loss in left ear, PAF, HTN, CAD, frequent UTIs, type 2 DM, GERD, osteoporosis, and MATIAS. Patient states around 2020 she went swimming underwater in the ocean and developed dizziness and ringing in her left ear. She states over time, the ringing has improved, but the dizziness has worsened. She describes her dizziness as "room spinning". She states she had an episode in both January and February 2022 where she was watching a movie with surround sound, which caused her to develop severe dizziness with nausea and vomiting. She states she felt "drunk". Since february, she has had multiple dizzy spells. She states the spells have become daily over the last week. She states the spells typically last around 5 hours with nausea and stomach pains. She states she had one spell that lasted a day. She has called EMS twice in the past because of the spells. Denies lateralized weakness, slurred speech, sudden vision loss, or facial droop with spells. She states prior to the spells, she will have intense sweating. She states smells and turning her head quickly will cause her to become dizzy. States she has trouble hearing out of her left ear. She states Meclizine and sleeping help to improve her symptoms. States she has looked up vestibular exercises online, and they cause her to become dizzy and her neck often cracks. She states her head feels huge and feels like it is "stuffed with cotton" in the frontal region. She also has an "arthitis" pain that radiates from the back of her head down her spine. Also complains of pain behind eyes. She states the head pain has been constant and daily since January " "2022 and worsens during her dizzy spells. Denies double vision. Denies aura. States she hears "zapping" inside her head. States she has sensitivity to light and sound. She states she began losing her balance over the last year. In March 2022, she began leaning forward and from side to side without realizing it. She loses her balance frequently and had a fall on 6-9-2022 and could not get off the floor for 2 hours. States she also had an incontinent episode on herself. States when she walks, her left foot turns towards the left side. Ambulates with a cane.  She experiences significant distress from her symptoms and is to afraid to go places. Denies history of TBI or storke. Did not receive the Ajovy prescription Dr. Haney ordered. Afraid to take Lodine because she is on blood thinners for her A. Fib. 10-4-2021 CTH shows chronic microvascular ischemic changes. 2- Abnormal VNG. Results are indicative of a left peripheral vestibulopathy, as evidenced by a 50% left Unilateral Weakness. Patient states that her dizziness and headaches have improved since she saw me in June. Since our last appointment, she has moved in with her son. States she has lightheadedness when going from a sit to stand position. Her hydralazine dose was lowered by cardiology. Has dizziness for 5 seconds when putting her head down on pillow at night. No longer having dizzy spells lasting for hours with nausea and vomiting. States she has a headache about 1-2 times a month that last up to 3 days. Describes her headaches as squishing/hammering. Has sensitivity to noise and smells with headaches. Feels like her headaches "drain" her. Also has aura of flashing lights in her peripheral vision. States she will have aura without headache at times. Did not start Aimovig or Nurtec PRN. 06- EEG NL. 7- MRI brain shows no acute findings.  Mild atrophy with white matter degeneration. MRV brain shows no acute abnormality.  Small hypoplastic " "right transverse/sigmoid sinus.  Otherwise negative. 7- CTA head and neck shows no evidence of flow-limiting stenosis within the cervical arterial vasculature. No evidence of flow-limiting stenosis within the intracranial arterial vasculature. No aneurysm.    Patient states she noticed short term memory trouble around 2017 that is progressively getting worst. She experiences word finding difficulties and forgetting conversations. At times, she will feel nervous and rushed, causing her to have difficulty processing things. Often has "brain fog". She does not drive. She lives alone at this time but plans to move in with her son. Independent in ADLs and keeps up with her medications and appointments. Has trouble sleeping at night and has decreased appetite. Takes Cymbalta for anxiety and depression. Use to see Dr. Albrecht with psych but not interesting in seeing psych at this time. No history of hypothyroidism. Her mother was diagnosed with dementia in her early 80s. She states she had neuropsych testing completed at [facility] in 2017. Continues to have trouble with her memory. Has not had neuropsych appointment. 7- MRI brain shows no acute findings.  Mild atrophy with white matter degeneration.    Patient states she has history of left sided bells palsy in her 20s. States symptoms resolved.     Patient states she has a history of CTS with CTS release surgery. She states numbness in bilateral hands is reemerging along with weakness.      Interval History 10-: Patient presents to follow up appointment unaccompanied. Patient states her dizziness and headaches have improved. Her dizziness continues to occur when laying head down and lightheadedness when going from a sitting to standing position. BP 98/61 today. Taking benazepril 40 mg BID, coreg 25 mg BID, and hydralazine 100 mg BID for HTN. Having mild daily headaches and severe headaches about twice a month. Was unable to tolerate Lamictal. Nurtec PRN helps " to abort severe headaches.    Continues to have trouble with her memory. Believes it is related to depression. Also having insomnia. Denies suicidal ideations. Interested in psych referral. 4- [facility] [place] Neuropsychology testing results are generally within normal limits. Short term memory is normal. Only atypicaly finds is mild slowing in processing speed and attention. This is likely related to MATIAS and HTN/diabetes        INTERVAL NOTE 04- Patient is present for follow up for multiple complaints.    Migraines are better managed with current regimen, Patient completed loading dose of  Emgality 240 mg SQ followed by 120 mg SQ monthly doing well report decreased frequency and intensity of headaches.     Nurtec PRN works very well. MRI Brain and MRV Brain 7- unremarkable.    Patient will start PT for gait and balance therapy.     Cognition unchanged. Patient will follow up with Psychiatry for MDD. No new complaints no recent falls.      The originating site (patient location) is: Home.        The distant site (neurologist location) is: [facility] at [facility].        The chief complaint leading to consultation is: F/U Multiple Neurological Complaints         Visit type: Virtual visit with synchronous audio and video.        Total time spent with patient:         Special circumstances: This visit occurred during COVID-19 Pandemic Public Health Emergency.         Consent: The patient verbally consented to participating in the video visit and informed that may decline to receive medical services by telemedicine and may withdraw from such care at any time.        I discussed with the patient the nature of our telemedicine visits, that:        I  would evaluate the patient and recommend diagnostics and treatments based on my assessment.     Our sessions are not being recorded and that personal health information is protected.     Our team would provide follow up care in person if/when the  patient needs it.     Virtual (video/telemedicine) visits have significant limitations. A telemedicine exam is primarily focused on the history and what I can observe. Several critical parts of the neurological exam cannot be performed.       Review of Systems   Constitutional:  Positive for appetite change (decrease) and fatigue.   HENT:  Positive for hearing loss and tinnitus. Negative for drooling, trouble swallowing and voice change.    Eyes:  Positive for visual disturbance. Negative for photophobia.   Cardiovascular:  Negative for palpitations.   Gastrointestinal:  Positive for nausea and vomiting. Negative for constipation and diarrhea.   Genitourinary:  Negative for difficulty urinating.        Urinary incontinence    Musculoskeletal:  Positive for arthralgias, back pain, gait problem and neck pain.   Neurological:  Positive for dizziness, weakness, light-headedness and headaches. Negative for tremors, seizures, syncope, facial asymmetry, speech difficulty and numbness.   Psychiatric/Behavioral:  Positive for decreased concentration, dysphoric mood and sleep disturbance. Negative for behavioral problems, confusion, hallucinations, self-injury and suicidal ideas. The patient is nervous/anxious.    Objective:   VITAL SIGNS WERE REVIEWED        GENERAL APPEARANCE:      The patient looks comfortable.     BMI 44.9     No signs of respiratory distress.     Normal breathing pattern.     No dysmorphic features     Normal eye contact.      GENERAL MEDICAL EXAM:     HEENT:  Head is atraumatic normocephalic.      Neck and Axillae: No JVD. No visible lesions. No thyromegaly. No lymphadenopathy.     Cardiopulmonary: No cyanosis. No tachypnea. Normal respiratory effort.     Gastrointestinal/Urogenital:  No jaundice. No stomas or lesions. No visible hernias. No catheters.     Skin, Hair and Nails:  No neurofibromatosis. No visible lesions.No stigmata of autoimmune disease. No clubbing.     Skin is warm and moist. No  palpable masses.     Limbs: No varicose veins. No visible swelling. No palpable edema.     Muskoskeletal: No visible deformities.No visible lesions.     No spine tenderness. No signs of longstanding neuropathy. No dislocations or fractures.          Neurologic Exam     Mental Status   Oriented to person, place, and time.   Follows 3 step commands.   Attention: normal. Concentration: normal.   Speech: speech is normal   Level of consciousness: alert  Knowledge: good. Able to perform simple calculations.   Able to name object. Able to read. Unable to repeat. Able to write. Normal comprehension.     6-9-2022 MOCA 24/30    Visuospatial/Executive 4/5    Naming                            3/3    Attention                         6/6    Language                         1/3    Abstraction                    2/2    Recall                                2/5 (3 with cues)    Orientation                     6/6           Cranial Nerves     CN II   Visual fields full to confrontation.   Visual acuity: normal  Right visual field deficit: none  Left visual field deficit: none     CN III, IV, VI   Pupils are equal, round, and reactive to light.  Extraocular motions are normal.   Right pupil: Size: 3 mm. Shape: regular. Reactivity: brisk. Consensual response: intact. Accommodation: intact.   Left pupil: Size: 3 mm. Shape: regular. Reactivity: brisk. Consensual response: intact. Accommodation: intact.   CN III: no CN III palsy  CN VI: no CN VI palsy  Nystagmus: none   Diplopia: none  Ophthalmoparesis: none  Upgaze: normal  Downgaze: normal  Conjugate gaze: present  Vestibulo-ocular reflex: present    CN V   Right facial sensation deficit: complete  Left facial sensation deficit: none    CN VII   Facial expression full, symmetric.   Right facial weakness: none  Left facial weakness: none    CN VIII   Hearing: impaired    CN IX, X   CN IX normal.   CN X normal.   Palate: symmetric    CN XI   CN XI normal.   Right sternocleidomastoid  strength: normal  Left sternocleidomastoid strength: normal  Right trapezius strength: normal  Left trapezius strength: normal    CN XII   CN XII normal.   Tongue: not atrophic  Fasciculations: absent  Tongue deviation: none    Motor Exam   Muscle bulk: normal  Overall muscle tone: normal  Right arm tone: normal  Left arm tone: normal  Right arm pronator drift: absent  Left arm pronator drift: absent  Right leg tone: normal  Left leg tone: normal    Strength   Right neck flexion: 5/5  Left neck flexion: 5/5  Right neck extension: 5/5  Left neck extension: 5/5  Right deltoid: 5/5  Left deltoid: 5/5  Right biceps: 5/5  Left biceps: 5/5  Right triceps: 5/5  Left triceps: 5/5  Right wrist flexion: 5/5  Left wrist flexion: 5/5  Right wrist extension: 5/5  Left wrist extension: 5/5  Right interossei: 5/5  Left interossei: 5/5  Right iliopsoas: 5/5  Left iliopsoas: 5/5  Right quadriceps: 5/5  Left quadriceps: 5/5  Right hamstrin/5  Left hamstrin/5  Right glutei: 5/5  Left glutei: 5/5  Right anterior tibial: 5/5  Left anterior tibial: 5/5  Right posterior tibial: 5/5  Left posterior tibial: 5/5  Right peroneal: 5/5  Left peroneal: 5/5  Right gastroc: 5/5  Left gastroc: 5/5    Sensory Exam   Right arm light touch: decreased from elbow  Left arm light touch: normal  Right leg light touch: decreased from knee  Left leg light touch: normal  Right arm vibration: normal  Left arm vibration: normal  Right leg vibration: decreased from toes  Left leg vibration: decreased from toes  Proprioception normal.   Right arm proprioception: normal  Left arm proprioception: normal  Right leg proprioception: normal  Left leg proprioception: normal  Right arm pinprick: decreased from elbow  Left arm pinprick: normal  Right leg pinprick: decreased from knee  Left leg pinprick: normal    Gait, Coordination, and Reflexes     Gait  Gait: non-neurologic    Coordination   Romberg: negative  Finger to nose coordination: normal  Heel to shin  coordination: normal    Tremor   Resting tremor: absent  Intention tremor: absent  Action tremor: absent    Reflexes   Right brachioradialis: 2+  Left brachioradialis: 2+  Right biceps: 2+  Left biceps: 2+  Right triceps: 2+  Left triceps: 2+  Right patellar: 1+  Left patellar: 1+  Right achilles: 0  Left achilles: 0  Right plantar: normal  Left plantar: normal  Right Shore: absent  Left Shore: absent  Right ankle clonus: absent  Left ankle clonus: absent  Right pendular knee jerk: absent  Left pendular knee jerk: absent        Lab Results   Component Value Date    WBC 7.83 02/22/2023    HGB 11.5 (L) 02/22/2023    HCT 35.0 (L) 02/22/2023    MCV 93 02/22/2023     02/22/2023   1C  Sodium   Date Value Ref Range Status   02/07/2023 137 136 - 145 mmol/L Final     Potassium   Date Value Ref Range Status   02/07/2023 4.5 3.5 - 5.1 mmol/L Final     Chloride   Date Value Ref Range Status   02/07/2023 101 95 - 110 mmol/L Final     CO2   Date Value Ref Range Status   02/07/2023 24 23 - 29 mmol/L Final     Glucose   Date Value Ref Range Status   02/07/2023 174 (H) 70 - 110 mg/dL Final     BUN   Date Value Ref Range Status   02/07/2023 20 8 - 23 mg/dL Final     Creatinine   Date Value Ref Range Status   02/07/2023 0.9 0.5 - 1.4 mg/dL Final     Calcium   Date Value Ref Range Status   02/07/2023 9.5 8.7 - 10.5 mg/dL Final     Total Protein   Date Value Ref Range Status   02/07/2023 6.7 6.0 - 8.4 g/dL Final     Albumin   Date Value Ref Range Status   02/07/2023 3.7 3.5 - 5.2 g/dL Final     Total Bilirubin   Date Value Ref Range Status   02/07/2023 0.5 0.1 - 1.0 mg/dL Final     Comment:     For infants and newborns, interpretation of results should be based  on gestational age, weight and in agreement with clinical  observations.    Premature Infant recommended reference ranges:  Up to 24 hours.............<8.0 mg/dL  Up to 48 hours............<12.0 mg/dL  3-5 days..................<15.0 mg/dL  6-29  days.................<15.0 mg/dL       Alkaline Phosphatase   Date Value Ref Range Status   02/07/2023 47 (L) 55 - 135 U/L Final     AST   Date Value Ref Range Status   02/07/2023 16 10 - 40 U/L Final     ALT   Date Value Ref Range Status   02/07/2023 16 10 - 44 U/L Final     Anion Gap   Date Value Ref Range Status   02/07/2023 12 8 - 16 mmol/L Final     eGFR if    Date Value Ref Range Status   07/27/2022 >60 >60 mL/min/1.73 m^2 Final     eGFR if non    Date Value Ref Range Status   07/27/2022 >60 >60 mL/min/1.73 m^2 Final     Comment:     Calculation used to obtain the estimated glomerular filtration  rate (eGFR) is the CKD-EPI equation.        Lab Results   Component Value Date    [id number] 309 02/22/2023      Lab Results   Component Value Date    TSH 1.024 10/04/2022     Lab Results   Component Value Date    HGBA1C 7.1 (H) 02/07/2023 4[place] [facility] [place]uropsychology testing results are generally within normal limits. Short term memory is normal. Only atypicaly finds is mild slowing in processing speed and attention. This is likely related to MATIAS and HTN/diabetes    10-     MRI IAC/Temporal Bones no significant MRI abnormality of the internal auditory canals or cerebellopontine angles.  2. Minimal nonspecific white matter T2 alteration that likely relates to chronic microvascular ischemia.     10-4-2021      CTH shows chronic microvascular ischemic changes      2-     Abnormal VNG. Results are indicative of a left peripheral vestibulopathy, as evidenced by a 50% left Unilateral Weakness.         06-     EEG NL      7-    MRI brain shows no acute findings.  Mild atrophy with white matter degeneration.    MRV brain shows no acute abnormality.  Small hypoplastic right transverse/sigmoid sinus.  Otherwise negative.      7-    CTA head and neck shows no evidence of flow-limiting stenosis within the cervical arterial vasculature. No evidence of  flow-limiting stenosis within the intracranial arterial vasculature. No aneurysm.        Reviewed the neuroimaging independently      Assessment:      1. Multiple neurological symptoms    2. Vestibular migraine    3. Migraine with aura and with status migrainosus, not intractable    4. Depression, unspecified depression type    5. Dizziness and giddiness    6. Vertigo of central origin     7. Mild cognitive disorder    8. Carpal tunnel syndrome, unspecified laterality    9. Dizzy    10. Depression, major, recurrent, moderate             Plan:      MULTIPLE NEUROLOGICAL SYMPTOMS      MIGRAINE WITH AURA/ POSSIBLE VESTIBULAR MIGRAINE     Continue Emgality 240 mg loading dose SQ followed by 120 mg SQ monthly     Continue Nurtec 75 mg po  PRN    Propanolol contraindicated related multiple medications for HTN    Topamax contraindicated related to memory difficulites    Amitriptyline contraindicated related to history of CAD    Stopped Lamictal related to adverse effects    States Ajovy not covered by insurance    Recommend vestibular therapy        LIGHTHEADEDNESS    Instructed patient to take daily BP and keep BP log- discuss BP with cardiology    Avoid standing for a long time    Slow transition from lying down to sitting to standing    Cross legs while standing    Increase fluid intake    Balanced salt intake     Adjust hypotensive agents     Wearing thigh high stockings     Falling down precautions    Avoid triggers           RICK/MEMROY DIFFICULTIES/ANXIETY/DEPRESSION    Psych referral placed    Will consider neuropsych referral if memory trouble continue depsite depression management    Memory Strategies     Internal Memory Strategies:   PREPARE yourself to pay attention   REHEARSAL: Mentally repeat information over and over.   VISUALIZATION: Make a mental picture of information.   ASSOCIATION: Mentally associate new information with something familiar.   REGROUPING: Organize long lists of information by category.    FIRST LETTER MNEMONICS:   LISTEN FOR THE MAIN IDEA: who, what, when, where, why, how, what's next   CHUNKING: Group digits of numbers into chunks (ex. [id number])    Break sentences and instructions down by ideas. (ex. Physical    therapy / will be at 3:00 / instead of 1:30 / because I have a    meeting).     CHAINING: Recall 3-4 words by linking them together in a sentence.    External Memory Strategies:   NOTETAKING:   TAPE RECORDING: Can use to record someone giving length information such     as a lecture, verbal instructions, or directions.   MEMORY BOOK / DAY PLANNER: To record up-coming appointments and    events.   WATCH ALARMS: Use as a reminder to take medications or check daily    Schedule.      CTS    Consider EMG/NCT in future          MEDICAL/SURGICAL COMORBIDITIES      All relevant medical comorbidities noted and managed by primary care physician and medical care team.          MISCELLANEOUS MEDICAL PROBLEMS         HEALTHY LIFESTYLE AND PREVENTATIVE CARE     Encouraged the patient to adhere to the age-appropriate health maintenance guidelines including screening tests and vaccinations.      Discussed the overall importance of healthy lifestyle, optimal weight, exercise, healthy diet, good sleep hygiene and avoiding drugs including smoking, alcohol and recreational drugs. The patient verbalized full understanding.         Advised the patient to follow COVID-19 prevention measures.         I spent a total of 44 minutes on the day of the visit.    This includes face to face time (25 minutes) and non-face to face time (44 minutes) preparing to see the patient (eg, review of tests), obtaining and/or reviewing separately obtained history, documenting clinical information in the electronic or other health record, independently interpreting results and communicating results to the patient/family/caregiver, or care coordinator.            RTC    Follow up in about 6 months (around 10/14/2023).        [place]  Amador, MSN NP      Collaborating Provider: Kimberly Sapp MD, FAAN Neurologist/Epileptologist

## 2023-04-14 NOTE — TELEPHONE ENCOUNTER
Spoke with pt about getting earlier appt. Pt will keep original appt time.           ----- Message from Elena Domingo MA sent at 4/13/2023  4:57 PM CDT -----  Contact: Christine    ----- Message -----  From: Cliff Redmond  Sent: 4/13/2023  11:40 AM CDT  To: On Cardiology Clinical Support    Christine would like a call back at [phone] in regards to seeing if its possible that her appointment with  on 4/25/23 can be moved up to tomorrow 4/14/23 after 3pm because she has a line of appointments at [facility] tomorrow. She is okay with seeing a different provider if possible.   Thanks   Am

## 2023-04-17 ENCOUNTER — PATIENT MESSAGE (OUTPATIENT)
Dept: NEUROLOGY | Facility: CLINIC | Age: 73
End: 2023-04-17
Payer: MEDICARE

## 2023-04-18 ENCOUNTER — PATIENT MESSAGE (OUTPATIENT)
Dept: OTOLARYNGOLOGY | Facility: CLINIC | Age: 73
End: 2023-04-18
Payer: MEDICARE

## 2023-04-18 ENCOUNTER — PATIENT MESSAGE (OUTPATIENT)
Dept: HOME HEALTH SERVICES | Facility: CLINIC | Age: 73
End: 2023-04-18
Payer: MEDICARE

## 2023-04-18 ENCOUNTER — TELEPHONE (OUTPATIENT)
Dept: OTOLARYNGOLOGY | Facility: CLINIC | Age: 73
End: 2023-04-18
Payer: MEDICARE

## 2023-04-18 ENCOUNTER — TELEPHONE (OUTPATIENT)
Dept: RHEUMATOLOGY | Facility: CLINIC | Age: 73
End: 2023-04-18
Payer: MEDICARE

## 2023-04-18 NOTE — TELEPHONE ENCOUNTER
Can you please pass this message on to home health so they can make her a no admit or discharge her please.

## 2023-04-18 NOTE — TELEPHONE ENCOUNTER
----- Message from Mischa Mendel, MA sent at 4/18/2023 10:56 AM CDT -----  Contact: Patient    ----- Message -----  From: Ritu Livingston  Sent: 4/18/2023  10:21 AM CDT  To: Deepti FANG Staff    Christine Jo would like a call back at [phone], in regards to her upcoming procedure that's on 5/2/23. Pt states she prefers to be scheduled at the [facility] location. She would like to know if she can be scheduled with another provider at the [facility] location on 5/31/23. (Pt states she does not want to speak with Nyasia).

## 2023-04-24 ENCOUNTER — TELEPHONE (OUTPATIENT)
Dept: INTERNAL MEDICINE | Facility: CLINIC | Age: 73
End: 2023-04-24
Payer: MEDICARE

## 2023-04-24 DIAGNOSIS — R42 DIZZINESS: Primary | ICD-10-CM

## 2023-04-25 ENCOUNTER — HOSPITAL ENCOUNTER (OUTPATIENT)
Dept: CARDIOLOGY | Facility: HOSPITAL | Age: 73
Discharge: HOME | End: 2023-04-25
Attending: INTERNAL MEDICINE
Payer: MEDICARE

## 2023-04-25 ENCOUNTER — OFFICE VISIT (OUTPATIENT)
Dept: CARDIOLOGY | Facility: CLINIC | Age: 73
End: 2023-04-25
Payer: MEDICARE

## 2023-04-25 ENCOUNTER — OFFICE VISIT (OUTPATIENT)
Dept: INTERNAL MEDICINE | Facility: CLINIC | Age: 73
End: 2023-04-25
Payer: MEDICARE

## 2023-04-25 ENCOUNTER — HOSPITAL ENCOUNTER (OUTPATIENT)
Dept: RADIOLOGY | Facility: HOSPITAL | Age: 73
Discharge: HOME | End: 2023-04-25
Attending: PHYSICIAN ASSISTANT
Payer: MEDICARE

## 2023-04-25 VITALS
WEIGHT: 239.44 LBS | HEART RATE: 90 BPM | RESPIRATION RATE: 15 BRPM | HEIGHT: 62 IN | TEMPERATURE: 99 F | DIASTOLIC BLOOD PRESSURE: 64 MMHG | BODY MASS INDEX: 44.06 KG/M2 | SYSTOLIC BLOOD PRESSURE: 118 MMHG | OXYGEN SATURATION: 98 %

## 2023-04-25 VITALS
DIASTOLIC BLOOD PRESSURE: 60 MMHG | HEART RATE: 77 BPM | BODY MASS INDEX: 44.18 KG/M2 | HEIGHT: 62 IN | OXYGEN SATURATION: 96 % | WEIGHT: 240.06 LBS | SYSTOLIC BLOOD PRESSURE: 122 MMHG

## 2023-04-25 DIAGNOSIS — E11.59 CONTROLLED TYPE 2 DIABETES MELLITUS WITH OTHER CIRCULATORY COMPLICATION, WITH LONG-TERM CURRENT USE OF INSULIN: ICD-10-CM

## 2023-04-25 DIAGNOSIS — E78.1 HYPERTRIGLYCERIDEMIA: ICD-10-CM

## 2023-04-25 DIAGNOSIS — Z79.4 CONTROLLED TYPE 2 DIABETES MELLITUS WITH DIABETIC PERIPHERAL ANGIOPATHY WITHOUT GANGRENE, WITH LONG-TERM CURRENT USE OF INSULIN: ICD-10-CM

## 2023-04-25 DIAGNOSIS — R06.09 DOE (DYSPNEA ON EXERTION): ICD-10-CM

## 2023-04-25 DIAGNOSIS — R42 DIZZINESS: Primary | ICD-10-CM

## 2023-04-25 DIAGNOSIS — E78.2 MIXED HYPERLIPIDEMIA: ICD-10-CM

## 2023-04-25 DIAGNOSIS — E11.42 CONTROLLED TYPE 2 DIABETES MELLITUS WITH DIABETIC POLYNEUROPATHY, WITH LONG-TERM CURRENT USE OF INSULIN: ICD-10-CM

## 2023-04-25 DIAGNOSIS — I25.118 CORONARY ARTERY DISEASE OF NATIVE ARTERY OF NATIVE HEART WITH STABLE ANGINA PECTORIS: ICD-10-CM

## 2023-04-25 DIAGNOSIS — Z79.4 CONTROLLED TYPE 2 DIABETES MELLITUS WITH OTHER CIRCULATORY COMPLICATION, WITH LONG-TERM CURRENT USE OF INSULIN: ICD-10-CM

## 2023-04-25 DIAGNOSIS — F33.41 RECURRENT MAJOR DEPRESSIVE DISORDER, IN PARTIAL REMISSION: ICD-10-CM

## 2023-04-25 DIAGNOSIS — I10 ESSENTIAL HYPERTENSION: ICD-10-CM

## 2023-04-25 DIAGNOSIS — I10 PRIMARY HYPERTENSION: ICD-10-CM

## 2023-04-25 DIAGNOSIS — E11.51 CONTROLLED TYPE 2 DIABETES MELLITUS WITH DIABETIC PERIPHERAL ANGIOPATHY WITHOUT GANGRENE, WITH LONG-TERM CURRENT USE OF INSULIN: ICD-10-CM

## 2023-04-25 DIAGNOSIS — M79.604 PAIN IN BOTH LOWER EXTREMITIES: ICD-10-CM

## 2023-04-25 DIAGNOSIS — Z86.69 HISTORY OF OBSTRUCTIVE SLEEP APNEA: ICD-10-CM

## 2023-04-25 DIAGNOSIS — M79.605 PAIN IN BOTH LOWER EXTREMITIES: ICD-10-CM

## 2023-04-25 DIAGNOSIS — G89.29 CHRONIC PAIN OF RIGHT KNEE: ICD-10-CM

## 2023-04-25 DIAGNOSIS — E66.01 MORBID OBESITY WITH BMI OF 40.0-44.9, ADULT: ICD-10-CM

## 2023-04-25 DIAGNOSIS — I48.0 PAROXYSMAL ATRIAL FIBRILLATION: ICD-10-CM

## 2023-04-25 DIAGNOSIS — M25.561 CHRONIC PAIN OF RIGHT KNEE: ICD-10-CM

## 2023-04-25 DIAGNOSIS — I71.40 ABDOMINAL AORTIC ANEURYSM (AAA) WITHOUT RUPTURE, UNSPECIFIED PART: ICD-10-CM

## 2023-04-25 DIAGNOSIS — Z79.4 CONTROLLED TYPE 2 DIABETES MELLITUS WITH DIABETIC POLYNEUROPATHY, WITH LONG-TERM CURRENT USE OF INSULIN: ICD-10-CM

## 2023-04-25 DIAGNOSIS — R00.2 PALPITATIONS: ICD-10-CM

## 2023-04-25 DIAGNOSIS — F32.A DEPRESSION, UNSPECIFIED DEPRESSION TYPE: Primary | ICD-10-CM

## 2023-04-25 DIAGNOSIS — R42 DIZZINESS: ICD-10-CM

## 2023-04-25 PROCEDURE — 4010F PR ACE/ARB THEARPY RXD/TAKEN: ICD-10-PCS | Mod: CPTII,S$GLB,, | Performed by: INTERNAL MEDICINE

## 2023-04-25 PROCEDURE — 3051F HG A1C>EQUAL 7.0%<8.0%: CPT | Mod: CPTII,S$GLB,, | Performed by: INTERNAL MEDICINE

## 2023-04-25 PROCEDURE — 3008F BODY MASS INDEX DOCD: CPT | Mod: CPTII,S$GLB,,

## 2023-04-25 PROCEDURE — 99999 PR PBB SHADOW E&M-EST. PATIENT-LVL V: CPT | Mod: PBBFAC,,, | Performed by: INTERNAL MEDICINE

## 2023-04-25 PROCEDURE — 99214 OFFICE O/P EST MOD 30 MIN: CPT | Mod: S$GLB,,,

## 2023-04-25 PROCEDURE — 99999 PR PBB SHADOW E&M-EST. PATIENT-LVL V: ICD-10-PCS | Mod: PBBFAC,,,

## 2023-04-25 PROCEDURE — 93010 ELECTROCARDIOGRAM REPORT: CPT | Mod: ,,, | Performed by: INTERNAL MEDICINE

## 2023-04-25 PROCEDURE — 3078F PR MOST RECENT DIASTOLIC BLOOD PRESSURE < 80 MM HG: ICD-10-PCS | Mod: CPTII,S$GLB,, | Performed by: INTERNAL MEDICINE

## 2023-04-25 PROCEDURE — 3078F DIAST BP <80 MM HG: CPT | Mod: CPTII,S$GLB,, | Performed by: INTERNAL MEDICINE

## 2023-04-25 PROCEDURE — 3288F FALL RISK ASSESSMENT DOCD: CPT | Mod: CPTII,S$GLB,,

## 2023-04-25 PROCEDURE — 3051F HG A1C>EQUAL 7.0%<8.0%: CPT | Mod: CPTII,S$GLB,,

## 2023-04-25 PROCEDURE — 3051F PR MOST RECENT HEMOGLOBIN A1C LEVEL 7.0 - < 8.0%: ICD-10-PCS | Mod: CPTII,S$GLB,,

## 2023-04-25 PROCEDURE — 93005 ELECTROCARDIOGRAM TRACING: CPT

## 2023-04-25 PROCEDURE — 1125F AMNT PAIN NOTED PAIN PRSNT: CPT | Mod: CPTII,S$GLB,, | Performed by: INTERNAL MEDICINE

## 2023-04-25 PROCEDURE — 3072F LOW RISK FOR RETINOPATHY: CPT | Mod: CPTII,S$GLB,,

## 2023-04-25 PROCEDURE — 3072F PR LOW RISK FOR RETINOPATHY: ICD-10-PCS | Mod: CPTII,S$GLB,, | Performed by: INTERNAL MEDICINE

## 2023-04-25 PROCEDURE — 99214 PR OFFICE/OUTPT VISIT, EST, LEVL IV, 30-39 MIN: ICD-10-PCS | Mod: S$GLB,,, | Performed by: INTERNAL MEDICINE

## 2023-04-25 PROCEDURE — 4010F PR ACE/ARB THEARPY RXD/TAKEN: ICD-10-PCS | Mod: CPTII,S$GLB,,

## 2023-04-25 PROCEDURE — 1101F PT FALLS ASSESS-DOCD LE1/YR: CPT | Mod: CPTII,S$GLB,,

## 2023-04-25 PROCEDURE — 1160F PR REVIEW ALL MEDS BY PRESCRIBER/CLIN PHARMACIST DOCUMENTED: ICD-10-PCS | Mod: CPTII,S$GLB,, | Performed by: INTERNAL MEDICINE

## 2023-04-25 PROCEDURE — 3072F LOW RISK FOR RETINOPATHY: CPT | Mod: CPTII,S$GLB,, | Performed by: INTERNAL MEDICINE

## 2023-04-25 PROCEDURE — 3074F PR MOST RECENT SYSTOLIC BLOOD PRESSURE < 130 MM HG: ICD-10-PCS | Mod: CPTII,S$GLB,, | Performed by: INTERNAL MEDICINE

## 2023-04-25 PROCEDURE — 1125F PR PAIN SEVERITY QUANTIFIED, PAIN PRESENT: ICD-10-PCS | Mod: CPTII,S$GLB,, | Performed by: INTERNAL MEDICINE

## 2023-04-25 PROCEDURE — 1101F PR PT FALLS ASSESS DOC 0-1 FALLS W/OUT INJ PAST YR: ICD-10-PCS | Mod: CPTII,S$GLB,, | Performed by: INTERNAL MEDICINE

## 2023-04-25 PROCEDURE — 4010F ACE/ARB THERAPY RXD/TAKEN: CPT | Mod: CPTII,S$GLB,,

## 2023-04-25 PROCEDURE — 1101F PR PT FALLS ASSESS DOC 0-1 FALLS W/OUT INJ PAST YR: ICD-10-PCS | Mod: CPTII,S$GLB,,

## 2023-04-25 PROCEDURE — 3008F BODY MASS INDEX DOCD: CPT | Mod: CPTII,S$GLB,, | Performed by: INTERNAL MEDICINE

## 2023-04-25 PROCEDURE — 3072F PR LOW RISK FOR RETINOPATHY: ICD-10-PCS | Mod: CPTII,S$GLB,,

## 2023-04-25 PROCEDURE — 1125F PR PAIN SEVERITY QUANTIFIED, PAIN PRESENT: ICD-10-PCS | Mod: CPTII,S$GLB,,

## 2023-04-25 PROCEDURE — 1160F RVW MEDS BY RX/DR IN RCRD: CPT | Mod: CPTII,S$GLB,, | Performed by: INTERNAL MEDICINE

## 2023-04-25 PROCEDURE — 99214 OFFICE O/P EST MOD 30 MIN: CPT | Mod: S$GLB,,, | Performed by: INTERNAL MEDICINE

## 2023-04-25 PROCEDURE — 1159F PR MEDICATION LIST DOCUMENTED IN MEDICAL RECORD: ICD-10-PCS | Mod: CPTII,S$GLB,, | Performed by: INTERNAL MEDICINE

## 2023-04-25 PROCEDURE — 3078F PR MOST RECENT DIASTOLIC BLOOD PRESSURE < 80 MM HG: ICD-10-PCS | Mod: CPTII,S$GLB,,

## 2023-04-25 PROCEDURE — 3008F PR BODY MASS INDEX (BMI) DOCUMENTED: ICD-10-PCS | Mod: CPTII,S$GLB,, | Performed by: INTERNAL MEDICINE

## 2023-04-25 PROCEDURE — 1101F PT FALLS ASSESS-DOCD LE1/YR: CPT | Mod: CPTII,S$GLB,, | Performed by: INTERNAL MEDICINE

## 2023-04-25 PROCEDURE — 3074F SYST BP LT 130 MM HG: CPT | Mod: CPTII,S$GLB,,

## 2023-04-25 PROCEDURE — 3051F PR MOST RECENT HEMOGLOBIN A1C LEVEL 7.0 - < 8.0%: ICD-10-PCS | Mod: CPTII,S$GLB,, | Performed by: INTERNAL MEDICINE

## 2023-04-25 PROCEDURE — 3008F PR BODY MASS INDEX (BMI) DOCUMENTED: ICD-10-PCS | Mod: CPTII,S$GLB,,

## 2023-04-25 PROCEDURE — 3078F DIAST BP <80 MM HG: CPT | Mod: CPTII,S$GLB,,

## 2023-04-25 PROCEDURE — 3288F PR FALLS RISK ASSESSMENT DOCUMENTED: ICD-10-PCS | Mod: CPTII,S$GLB,,

## 2023-04-25 PROCEDURE — 73564 XR KNEE ORTHO BILAT WITH FLEXION: ICD-10-PCS | Mod: 26,50,, | Performed by: RADIOLOGY

## 2023-04-25 PROCEDURE — 99999 PR PBB SHADOW E&M-EST. PATIENT-LVL V: ICD-10-PCS | Mod: PBBFAC,,, | Performed by: INTERNAL MEDICINE

## 2023-04-25 PROCEDURE — 99214 PR OFFICE/OUTPT VISIT, EST, LEVL IV, 30-39 MIN: ICD-10-PCS | Mod: S$GLB,,,

## 2023-04-25 PROCEDURE — 4010F ACE/ARB THERAPY RXD/TAKEN: CPT | Mod: CPTII,S$GLB,, | Performed by: INTERNAL MEDICINE

## 2023-04-25 PROCEDURE — 99999 PR PBB SHADOW E&M-EST. PATIENT-LVL V: CPT | Mod: PBBFAC,,,

## 2023-04-25 PROCEDURE — 1125F AMNT PAIN NOTED PAIN PRSNT: CPT | Mod: CPTII,S$GLB,,

## 2023-04-25 PROCEDURE — 93010 EKG 12-LEAD: ICD-10-PCS | Mod: ,,, | Performed by: INTERNAL MEDICINE

## 2023-04-25 PROCEDURE — 1159F MED LIST DOCD IN RCRD: CPT | Mod: CPTII,S$GLB,, | Performed by: INTERNAL MEDICINE

## 2023-04-25 PROCEDURE — 3074F SYST BP LT 130 MM HG: CPT | Mod: CPTII,S$GLB,, | Performed by: INTERNAL MEDICINE

## 2023-04-25 PROCEDURE — 3288F FALL RISK ASSESSMENT DOCD: CPT | Mod: CPTII,S$GLB,, | Performed by: INTERNAL MEDICINE

## 2023-04-25 PROCEDURE — 73564 X-RAY EXAM KNEE 4 OR MORE: CPT | Mod: TC,50

## 2023-04-25 PROCEDURE — 3074F PR MOST RECENT SYSTOLIC BLOOD PRESSURE < 130 MM HG: ICD-10-PCS | Mod: CPTII,S$GLB,,

## 2023-04-25 PROCEDURE — 3288F PR FALLS RISK ASSESSMENT DOCUMENTED: ICD-10-PCS | Mod: CPTII,S$GLB,, | Performed by: INTERNAL MEDICINE

## 2023-04-25 PROCEDURE — 73564 X-RAY EXAM KNEE 4 OR MORE: CPT | Mod: 26,50,, | Performed by: RADIOLOGY

## 2023-04-25 RX ORDER — LAMOTRIGINE 25 MG/1
25 TABLET, CHEWABLE ORAL DAILY
Qty: 30 TABLET | Refills: 11 | Status: SHIPPED | OUTPATIENT
Start: 2023-04-25 | End: 2023-05-31 | Stop reason: SINTOL

## 2023-04-25 NOTE — PROGRESS NOTES
Christine Jo  04/25/2023  [id number]    Jose Szymanski MD  Patient Care Team:  Jose Szymanski MD as PCP - General (Family Medicine)  Klaus Shelton OD as Consulting Physician (Optometry)  Kevin Singh MD as Consulting Physician (Interventional Cardiology)  Adriane Watts NP as Nurse Practitioner (Pulmonary Disease)  Levi Albrecht MD as Consulting Physician (Psychiatry)  Jered Holbrook MD as Consulting Physician (Rheumatology)  Shawn Rogers III, MD (Inactive) as Consulting Physician (Gastroenterology)  Shawn Rogers III, MD (Inactive) as Consulting Physician (Gastroenterology)          Visit Type:an urgent visit for a new problem    Chief Complaint:  Chief Complaint   Patient presents with    Depression       History of Present Illness:    Having increased in depression   She has an appt with psych at the end of May with Dr. Rashid   Having increased crying spells. Wants to stay at home   Easily irritable   Previously seeing Dr. Albrecht  Taking cymbalta 60 mg     Wellbutrin; feels caused her N/V  Looking at previous Dr. Albrecht notes he wanted to start on her Lamictal  Pt was initially hesitant. Willing to try medication   Denies SI or HI thoughts     History:  Past Medical History:   Diagnosis Date    Arthritis     Cataract     Diabetes mellitus 2008     am 01/15/2018 Insulin x 1 year    DM (diabetes mellitus) 2008     am 02/14/2020 Insulin x 4 years    Encounter for blood transfusion     Glaucoma     Hypertension     Insomnia     Macular degeneration     Vaginal yeast infection      Past Surgical History:   Procedure Laterality Date    abdominal laparoscopy       BREAST BIOPSY Left 1998    benign    CATARACT EXTRACTION Bilateral [id number]    [facility] [place]    COLONOSCOPY N/A 05/05/2021    Procedure: COLONOSCOPY;  Surgeon: Shawn Rogers III, MD;  Location: [facility];  Service: Endoscopy;  Laterality: N/A;    COLONOSCOPY N/A 06/30/2021    Procedure: COLONOSCOPY;  Surgeon:  Memo Merida MD;  Location: [facility];  Service: Endoscopy;  Laterality: N/A;    ESOPHAGOGASTRODUODENOSCOPY N/A 2019    Procedure: ESOPHAGOGASTRODUODENOSCOPY (EGD);  Surgeon: Shawn Rogers III, MD;  Location: [facility] ENDO;  Service: Endoscopy;  Laterality: N/A;    ESOPHAGOGASTRODUODENOSCOPY N/A 2021    Procedure: EGD (ESOPHAGOGASTRODUODENOSCOPY);  Surgeon: Shawn Rogers III, MD;  Location: [facility] ENDO;  Service: Endoscopy;  Laterality: N/A;    EYE SURGERY      TONSILLECTOMY      TUBAL LIGATION      yag  Bilateral      Family History   Problem Relation Age of Onset    Heart disease Mother         CAD     Cataracts Mother     Macular degeneration Mother     Glaucoma Mother     Cancer Son         testicular     Cancer Maternal Aunt         Lung ca    Heart disease Maternal Grandfather         Pacemaker     Diabetes Sister     Heart disease Sister         CAD    Cataracts Sister     Diabetes Sister     Diabetes Sister      Social History     Socioeconomic History    Marital status:     Number of children: 3   Occupational History    Occupation: Retired   Tobacco Use    Smoking status: Former     Packs/day: 1.00     Years: 36.00     Pack years: 36.00     Types: Cigarettes     Start date:      Quit date: 2003     Years since quittin.8     Passive exposure: Never    Smokeless tobacco: Never   Substance and Sexual Activity    Alcohol use: No    Drug use: No    Sexual activity: Never     Social Determinants of Health     Financial Resource Strain: Low Risk     Difficulty of Paying Living Expenses: Not hard at all   Food Insecurity: No Food Insecurity    Worried About Running Out of Food in the Last Year: Never true    Ran Out of Food in the Last Year: Never true   Transportation Needs: Unmet Transportation Needs    Lack of Transportation (Medical): Yes    Lack of Transportation (Non-Medical): No   Physical Activity: Insufficiently Active    Days of Exercise per Week: 7 days    Minutes  of Exercise per Session: 10 min   Stress: No Stress Concern Present    Feeling of Stress : Only a little   Social Connections: Moderately Isolated    Frequency of Communication with Friends and Family: More than three times a week    Frequency of Social Gatherings with Friends and Family: Never    Attends Nondenominational Services: 1 to 4 times per year    Active Member of Clubs or Organizations: No    Attends Club or Organization Meetings: Never    Marital Status:    Housing Stability: Low Risk     Unable to Pay for Housing in the Last Year: No    Number of Places Lived in the Last Year: 2    Unstable Housing in the Last Year: No     Patient Active Problem List   Diagnosis    Controlled type 2 diabetes mellitus with diabetic polyneuropathy, with long-term current use of insulin    Class 3 severe obesity due to excess calories with serious comorbidity and body mass index (BMI) of 45.0 to 49.9 in adult    Paroxysmal atrial fibrillation    Insomnia    Gastroesophageal reflux disease without esophagitis    Recurrent major depressive disorder    Primary hypertension    Age-related osteoporosis without current pathological fracture    Iron deficiency anemia due to chronic blood loss    History of obstructive sleep apnea    Recurrent major depressive disorder, in full remission    Cervical neuropathy    Neck pain    Chronic bilateral low back pain with bilateral sciatica    Spondylosis of cervical region without myelopathy or radiculopathy    Morbid obesity with BMI of 40.0-44.9, adult    Dysphagia    Immunization deficiency    Urinary tract infection without hematuria    Uncontrolled type 2 diabetes mellitus with hyperglycemia    Clavicular cyst    Chronic allergic rhinitis    Hoarseness    Hearing loss    Depression    Breast screening    Anemia    Vertigo    Depression, major, recurrent, moderate    Anxiety disorder    Coronary artery disease of native artery of native heart with stable angina pectoris    GILL (dyspnea  on exertion)    Palpitations    Abnormal ECG    Abdominal aortic aneurysm (AAA) without rupture    Hearing loss of left ear    Rectal bleeding    Pain in both lower extremities    Tinnitus    Unsteadiness on feet     Altered mental status    Confusion    Disequilibrium    Memory loss    Vestibular migraine    Aorto-iliac atherosclerosis    Carotid stenosis    Dizziness and giddiness    Fatigue    Chronic idiopathic constipation    Multiple neurological symptoms    Migraine with aura and with status migrainosus, not intractable     Review of patient's allergies indicates:   Allergen Reactions    Aspirin Palpitations       The following were reviewed at this visit: active problem list, medication list, allergies, family history, social history, and health maintenance.    Medications:  Current Outpatient Medications on File Prior to Visit   Medication Sig Dispense Refill    ACETAMINOPHEN (TYLENOL ARTHRITIS ORAL) Take by mouth as needed.      apixaban (ELIQUIS) 5 mg Tab Take 1 tablet (5 mg total) by mouth 2 (two) times daily. 180 tablet 0    benazepriL (LOTENSIN) 40 MG tablet Take 1 tablet (40 mg total) by mouth once daily. 90 tablet 3    BEPREVE 1.5 % Drop Place 1 drop into both eyes 2 (two) times daily. 10 mL 4    carvediloL (COREG) 25 MG tablet TAKE 1 TABLET BY MOUTH TWICE DAILY 180 tablet 3    cycloSPORINE (RESTASIS) 0.05 % ophthalmic emulsion Place 1 drop into both eyes 2 (two) times daily. 60 each 11    diclofenac sodium (VOLTAREN) 1 % Gel Apply 2 grams topically 4 (four) times daily. To affected joints as needed for pain 100 g 3    docusate sodium (COLACE) 100 MG capsule Take 1 capsule (100 mg total) by mouth 2 (two) times daily. 180 capsule 0    DULoxetine (CYMBALTA) 60 MG capsule Take 1 capsule (60 mg total) by mouth once daily. 90 capsule 1    gabapentin (NEURONTIN) 100 MG capsule Take 100 mg by mouth 3 (three) times daily.      hydrALAZINE (APRESOLINE) 50 MG tablet Take 1 tablet (50 mg total) by mouth every 8  (eight) hours. 180 tablet 3    insulin (LANTUS SOLOSTAR U-100 INSULIN) glargine 100 units/mL SubQ pen Inject 72 Units into the skin every evening. 75 mL 1    meclizine (ANTIVERT) 25 mg tablet Take 1 tablet (25 mg total) by mouth 3 (three) times daily as needed. 30 tablet 2    MULTIVIT WITH CALCIUM,IRON,MIN (WOMEN'S DAILY MULTIVITAMIN ORAL) Take by mouth once daily.       pantoprazole (PROTONIX) 40 MG tablet Take 1 tablet (40 mg total) by mouth 2 (two) times a day. (Patient taking differently: Take 40 mg by mouth once daily.) 180 tablet 3    SITagliptin phosphate (JANUVIA) 100 MG Tab Take 1 tablet (100 mg total) by mouth once daily. 90 tablet 1    azelastine (ASTELIN) 137 mcg (0.1 %) nasal spray use 2 sprays (274 mcg total) by Nasal route 2 (two) times daily. (Patient not taking: Reported on 4/25/2023) 30 mL 1    blood sugar diagnostic Strp To check blood sugar 1 times daily (Patient not taking: Reported on 4/25/2023) 100 each 3    blood-glucose meter kit To check BG 2 times daily, to use with insurance preferred meter (Patient not taking: Reported on 4/25/2023) 1 each 0    blood-glucose meter Misc use daily to test blood sugar (Patient not taking: Reported on 4/25/2023) 1 each 0    calcium citrate-vitamin D3 315-200 mg (CITRACAL+D) 315-200 mg-unit per tablet Take 1 tablet by mouth once daily.      clotrimazole-betamethasone (LOTRISONE) lotion Apply topically to the affected area 2 (two) times daily. (Patient not taking: Reported on 4/25/2023) 30 mL 2    fluticasone propionate (FLONASE) 50 mcg/actuation nasal spray 2 sprays (100 mcg total) by Each Nostril route once daily. (Patient not taking: Reported on 4/25/2023) 48 g 3    galcanezumab-gnlm (EMGALITY SYRINGE) 120 mg/mL Syrg Inject 120 mg into the skin every 28 days. (Patient not taking: Reported on 4/25/2023) 1 mL 12    hydrocortisone 2.5 % cream Apply topically 2 (two) times daily. (Patient not taking: Reported on 4/25/2023) 28 g 2    lancets Misc To check BG 1  "times daily (Patient not taking: Reported on 4/25/2023) 100 each 3    nystatin (MYCOSTATIN) cream Apply topically 2 (two) times daily. Continue until completely healed (Patient not taking: Reported on 4/25/2023) 30 g 0    pen needle, diabetic (BD ULTRA-FINE SHORT PEN NEEDLE) 31 gauge x 5/16" Ndle AS DIRECTED TWICE DAILY (Patient not taking: Reported on 4/25/2023) 200 each 3    rimegepant 75 mg odt Take 1 tablet (75 mg total) by mouth as needed for Migraine (do not exceed 2-3 doses within 1 week). Place ODT tablet on the tongue; alternatively the ODT tablet may be placed under the tongue (Patient not taking: Reported on 4/25/2023) 8 tablet 5    tamsulosin (FLOMAX) 0.4 mg Cap Take 1 capsule (0.4 mg total) by mouth once daily. (Patient not taking: Reported on 4/25/2023) 30 capsule 0    temazepam (RESTORIL) 30 mg capsule Take 1 capsule by mouth at bedtime (Patient not taking: Reported on 4/25/2023) 30 capsule 3     No current facility-administered medications on file prior to visit.       Medications have been reviewed and reconciled with patient at this visit.  Barriers to medications reviewed with patient.    Adverse reactions to current medications reviewed with patient..    Over the counter medications reviewed and reconciled with patient.    Exam:  Wt Readings from Last 3 Encounters:   04/25/23 108.6 kg (239 lb 6.7 oz)   03/21/23 109.4 kg (241 lb 2.9 oz)   03/21/23 108 kg (238 lb 1.6 oz)     Temp Readings from Last 3 Encounters:   04/25/23 98.8 °F (37.1 °C) (Temporal)   04/11/23 98.8 °F (37.1 °C)   02/07/23 98.2 °F (36.8 °C) (Temporal)     BP Readings from Last 3 Encounters:   04/25/23 118/64   04/11/23 122/60   03/21/23 (!) 140/60     Pulse Readings from Last 3 Encounters:   04/25/23 90   04/11/23 71   03/21/23 73     Body mass index is 43.79 kg/m².      Review of Systems   Psychiatric/Behavioral:  Positive for depression. The patient has insomnia.    Physical Exam  Vitals and nursing note reviewed. "   Constitutional:       General: She is not in acute distress.     Appearance: She is well-developed. She is morbidly obese. She is not diaphoretic.   HENT:      Head: Normocephalic and atraumatic.      Right Ear: External ear normal.      Left Ear: External ear normal.   Eyes:      General:         Right eye: No discharge.         Left eye: No discharge.      Conjunctiva/sclera: Conjunctivae normal.      Pupils: Pupils are equal, round, and reactive to light.   Neck:      Thyroid: No thyromegaly.   Cardiovascular:      Rate and Rhythm: Normal rate and regular rhythm.      Heart sounds: Normal heart sounds. No murmur heard.  Pulmonary:      Effort: Pulmonary effort is normal. No respiratory distress.      Breath sounds: Normal breath sounds. No wheezing.   Abdominal:      General: Bowel sounds are normal.   Neurological:      Mental Status: She is alert and oriented to person, place, and time.   Psychiatric:         Behavior: Behavior normal.         Thought Content: Thought content normal.         Judgment: Judgment normal.       Laboratory Reviewed ({Yes)  Lab Results   Component Value Date    WBC 7.83 02/22/2023    HGB 11.5 (L) 02/22/2023    HCT 35.0 (L) 02/22/2023     02/22/2023    CHOL 164 10/04/2022    TRIG 125 10/04/2022    HDL 46 10/04/2022    ALT 16 02/07/2023    AST 16 02/07/2023     02/07/2023    K 4.5 02/07/2023     02/07/2023    CREATININE 0.9 02/07/2023    BUN 20 02/07/2023    CO2 24 02/07/2023    TSH 1.024 10/04/2022    GLUF 180 (H) 06/28/2021    HGBA1C 7.1 (H) 02/07/2023       Christine was seen today for depression.    Diagnoses and all orders for this visit:    Depression, unspecified depression type  -     lamoTRIgine (LAMICTAL) 25 mg TChD; Take 1 tablet by mouth once daily.    Morbid obesity with BMI of 40.0-44.9, adult  Encouraged healthy diet and exercise as tolerated to help bring BMI into normal range.      Controlled type 2 diabetes mellitus with other circulatory  complication, with long-term current use of insulin  Lab Results   Component Value Date    HGBA1C 7.1 (H) 02/07/2023    Stable on medication. Continue current Plan of Care      Recurrent major depressive disorder, in partial remission  -     lamoTRIgine (LAMICTAL) 25 mg TChD; Take 1 tablet by mouth once daily.    Primary hypertension  At visit, Blood pressure is at goal. Continue current medications        Will try low dose Lamacital   Pt has an appt with psych on next month   Explained can see 4-6 weeks to evaluate effectiveness of medicine       Care Plan/Goals: Reviewed    Goals    None         Follow up: No follow-ups on file.    After visit summary was printed and given to patient upon discharge today.  Patient goals and care plan are included in After Visit Summary.

## 2023-04-25 NOTE — PROGRESS NOTES
"Subjective:   Patient ID:  Christine Jo is a 73 y.o. female who presents for follow-up of No chief complaint on file.     Christine Jo is a 71 y.o. female who presents for evaluation of Atrial Fibrillation, Coronary Artery Disease, Hyperlipidemia, Hypertension, Peripheral Arterial Disease, Risk Factor Management For Atherosclerosis, and Shortness of Breath           HPI Pt presents for eval.  Her current med conditions include CAD, PAF, DM, obesity, HTN, AAA, hyperlipidemia, MATIAS.  Former smoker.  Past hx pertinent for following:  She used to see Dr. Arthur Carrizales, [facility].  Has h/o PAF.  States she has had 2 or 4 cardiac cath procedures.  Last [facility] 2017, nonobstructive CAD noted.  ecg 3/26/21 NSR, left axis, nonspecific septal Q waves V1-V2.  Stress MPI 4/21 reviewed: no ischemia, normal EF.  Echo 4/21 reviewed: normal LV function, LVH, mild TR.  Now here.  Pt seen 6 months ago.  Abdominal u/s 12/21: no AAA noted.  MIKE 12/21 1.0 R LE, 0.97 L LE.  Has been seeing ENT and PCP for vertigo.  Dx w vestibular disease w rehab tx planned.  Dizziness associated with nausea/vomiting at time per chart.  Has dyspnea -- she states "it is ok".  Denies cp.  A lot of stress in her life; depression.  Stays inside.  9 day [place] Holter 10/21: NSR, 6 runs nonsustained atrial tachycardia (longest 8 beats), 3 runs NSVT (longest 7 beats).  Did not tolerate CPAP in past.  ecg today 3/14/22 NSR, left axis, incomplete RBBB.  No acute changes.  Weight stable.  She thinks statin causes restless legs.  She cut dose in 1/2 without improvement.  DM HGAIC above goal.  Takes Eliquis.           Carotid ultrasound  There is 20-39% right Internal Carotid Stenosis.  There is 20-39% left Internal Carotid Stenosis pain       03/20/2023:   Overall patient is doing well will increase medications now include carvedilol  25 mg b.I.d. and will increase hydralazine to 50 mg q.8 hours.  Follow-up evaluation within a month for blood pressure control.   "   04/25/2023:   Overall patient is doing much better on increased medications and blood pressure under better control continue all medications as prescribed.      Review of Systems   Constitutional: Negative for chills, diaphoresis, night sweats, weight gain and weight loss.   HENT:  Negative for congestion, hoarse voice, sore throat and stridor.    Eyes:  Negative for double vision and pain.   Cardiovascular:  Negative for chest pain, claudication, cyanosis, dyspnea on exertion, irregular heartbeat, leg swelling, near-syncope, orthopnea, palpitations, paroxysmal nocturnal dyspnea and syncope.   Respiratory:  Negative for cough, hemoptysis, shortness of breath, sleep disturbances due to breathing, snoring, sputum production and wheezing.    Endocrine: Negative for cold intolerance, heat intolerance and polydipsia.   Hematologic/Lymphatic: Negative for bleeding problem. Does not bruise/bleed easily.   Skin:  Negative for color change, dry skin and rash.   Musculoskeletal:  Negative for joint swelling and muscle cramps.   Gastrointestinal:  Negative for bloating, abdominal pain, constipation, diarrhea, dysphagia, melena, nausea and vomiting.   Genitourinary:  Negative for flank pain and urgency.   Neurological:  Negative for dizziness, focal weakness, headaches, light-headedness, loss of balance, seizures and weakness.   Psychiatric/Behavioral:  Negative for altered mental status and memory loss. The patient is not nervous/anxious.    Family History   Problem Relation Age of Onset    Heart disease Mother         CAD     Cataracts Mother     Macular degeneration Mother     Glaucoma Mother     Cancer Son         testicular     Cancer Maternal Aunt         Lung ca    Heart disease Maternal Grandfather         Pacemaker     Diabetes Sister     Heart disease Sister         CAD    Cataracts Sister     Diabetes Sister     Diabetes Sister      Past Medical History:   Diagnosis Date    Arthritis     Cataract     Diabetes  mellitus      am 01/15/2018 Insulin x 1 year    DM (diabetes mellitus)      am 2020 Insulin x 4 years    Encounter for blood transfusion     Glaucoma     Hypertension     Insomnia     Macular degeneration     Vaginal yeast infection      Social History     Socioeconomic History    Marital status:     Number of children: 3   Occupational History    Occupation: Retired   Tobacco Use    Smoking status: Former     Packs/day: 1.00     Years: 36.00     Pack years: 36.00     Types: Cigarettes     Start date:      Quit date: 2003     Years since quittin.8     Passive exposure: Never    Smokeless tobacco: Never   Substance and Sexual Activity    Alcohol use: No    Drug use: No    Sexual activity: Never     Social Determinants of Health     Financial Resource Strain: Low Risk     Difficulty of Paying Living Expenses: Not hard at all   Food Insecurity: No Food Insecurity    Worried About Running Out of Food in the Last Year: Never true    Ran Out of Food in the Last Year: Never true   Transportation Needs: Unmet Transportation Needs    Lack of Transportation (Medical): Yes    Lack of Transportation (Non-Medical): No   Physical Activity: Insufficiently Active    Days of Exercise per Week: 7 days    Minutes of Exercise per Session: 10 min   Stress: No Stress Concern Present    Feeling of Stress : Only a little   Social Connections: Moderately Isolated    Frequency of Communication with Friends and Family: More than three times a week    Frequency of Social Gatherings with Friends and Family: Never    Attends [employer] Services: 1 to 4 times per year    Active Member of Clubs or Organizations: No    Attends Club or Organization Meetings: Never    Marital Status:    Housing Stability: Low Risk     Unable to Pay for Housing in the Last Year: No    Number of Places Lived in the Last Year: 2    Unstable Housing in the Last Year: No     Current Outpatient Medications on File Prior  to Visit   Medication Sig Dispense Refill    ACETAMINOPHEN (TYLENOL ARTHRITIS ORAL) Take by mouth as needed.      apixaban (ELIQUIS) 5 mg Tab Take 1 tablet (5 mg total) by mouth 2 (two) times daily. 180 tablet 0    azelastine (ASTELIN) 137 mcg (0.1 %) nasal spray use 2 sprays (274 mcg total) by Nasal route 2 (two) times daily. 30 mL 1    benazepriL (LOTENSIN) 40 MG tablet Take 1 tablet (40 mg total) by mouth once daily. 90 tablet 3    BEPREVE 1.5 % Drop Place 1 drop into both eyes 2 (two) times daily. 10 mL 4    blood sugar diagnostic Strp To check blood sugar 1 times daily 100 each 3    blood-glucose meter kit To check BG 2 times daily, to use with insurance preferred meter 1 each 0    blood-glucose meter Misc use daily to test blood sugar 1 each 0    calcium citrate-vitamin D3 315-200 mg (CITRACAL+D) 315-200 mg-unit per tablet Take 1 tablet by mouth once daily.      carvediloL (COREG) 25 MG tablet TAKE 1 TABLET BY MOUTH TWICE DAILY 180 tablet 3    clotrimazole-betamethasone (LOTRISONE) lotion Apply topically to the affected area 2 (two) times daily. 30 mL 2    cycloSPORINE (RESTASIS) 0.05 % ophthalmic emulsion Place 1 drop into both eyes 2 (two) times daily. 60 each 11    diclofenac sodium (VOLTAREN) 1 % Gel Apply 2 grams topically 4 (four) times daily. To affected joints as needed for pain 100 g 3    docusate sodium (COLACE) 100 MG capsule Take 1 capsule (100 mg total) by mouth 2 (two) times daily. 180 capsule 0    DULoxetine (CYMBALTA) 60 MG capsule Take 1 capsule (60 mg total) by mouth once daily. 90 capsule 1    fluticasone propionate (FLONASE) 50 mcg/actuation nasal spray 2 sprays (100 mcg total) by Each Nostril route once daily. 48 g 3    gabapentin (NEURONTIN) 100 MG capsule Take 100 mg by mouth 3 (three) times daily.      galcanezumab-gnlm (EMGALITY SYRINGE) 120 mg/mL Syrg Inject 120 mg into the skin every 28 days. 1 mL 12    hydrALAZINE (APRESOLINE) 50 MG tablet Take 1 tablet (50 mg total) by mouth every  "8 (eight) hours. 180 tablet 3    hydrocortisone 2.5 % cream Apply topically 2 (two) times daily. 28 g 2    insulin (LANTUS SOLOSTAR U-100 INSULIN) glargine 100 units/mL SubQ pen Inject 72 Units into the skin every evening. 75 mL 1    lancets Misc To check BG 1 times daily 100 each 3    meclizine (ANTIVERT) 25 mg tablet Take 1 tablet (25 mg total) by mouth 3 (three) times daily as needed. 30 tablet 2    MULTIVIT WITH CALCIUM,IRON,MIN (WOMEN'S DAILY MULTIVITAMIN ORAL) Take by mouth once daily.       nystatin (MYCOSTATIN) cream Apply topically 2 (two) times daily. Continue until completely healed 30 g 0    pantoprazole (PROTONIX) 40 MG tablet Take 1 tablet (40 mg total) by mouth 2 (two) times a day. (Patient taking differently: Take 40 mg by mouth once daily.) 180 tablet 3    pen needle, diabetic (BD ULTRA-FINE SHORT PEN NEEDLE) 31 gauge x 5/16" Ndle AS DIRECTED TWICE DAILY 200 each 3    rimegepant 75 mg odt Take 1 tablet (75 mg total) by mouth as needed for Migraine (do not exceed 2-3 doses within 1 week). Place ODT tablet on the tongue; alternatively the ODT tablet may be placed under the tongue 8 tablet 5    SITagliptin phosphate (JANUVIA) 100 MG Tab Take 1 tablet (100 mg total) by mouth once daily. 90 tablet 1    tamsulosin (FLOMAX) 0.4 mg Cap Take 1 capsule (0.4 mg total) by mouth once daily. 30 capsule 0    temazepam (RESTORIL) 30 mg capsule Take 1 capsule by mouth at bedtime 30 capsule 3     No current facility-administered medications on file prior to visit.     Review of patient's allergies indicates:   Allergen Reactions    Aspirin Palpitations       Objective:     Physical Exam  Eyes:      Pupils: Pupils are equal, round, and reactive to light.   Neck:      Trachea: No tracheal deviation.   Cardiovascular:      Rate and Rhythm: Normal rate and regular rhythm.      Pulses: Intact distal pulses.           Carotid pulses are 2+ on the right side and 2+ on the left side.       Radial pulses are 2+ on the right " side and 2+ on the left side.        Femoral pulses are 2+ on the right side and 2+ on the left side.       Popliteal pulses are 2+ on the right side and 2+ on the left side.        Dorsalis pedis pulses are 2+ on the right side and 2+ on the left side.        Posterior tibial pulses are 2+ on the right side and 2+ on the left side.      Heart sounds: Normal heart sounds. No murmur heard.    No friction rub. No gallop.   Pulmonary:      Effort: Pulmonary effort is normal. No respiratory distress.      Breath sounds: Normal breath sounds. No stridor. No wheezing or rales.   Chest:      Chest wall: No tenderness.   Abdominal:      General: There is no distension.      Tenderness: There is no abdominal tenderness. There is no rebound.   Musculoskeletal:         General: No tenderness.      Cervical back: Normal range of motion.   Skin:     General: Skin is warm and dry.   Neurological:      Mental Status: She is alert and oriented to person, place, and time.   Carotid ultrasound 03/14/2022:  There is 20-39% right Internal Carotid Stenosis.  There is 20-39% left Internal Carotid Stenosis.        Assessment:     1. Dizziness    2. Controlled type 2 diabetes mellitus with diabetic polyneuropathy, with long-term current use of insulin    3. Palpitations    4. Mixed hyperlipidemia    5. Abdominal aortic aneurysm (AAA) without rupture, unspecified part    6. Hypertriglyceridemia    7. History of obstructive sleep apnea    8. Essential hypertension    9. Controlled type 2 diabetes mellitus with diabetic peripheral angiopathy without gangrene, with long-term current use of insulin    10. Coronary artery disease of native artery of native heart with stable angina pectoris    11. GILL (dyspnea on exertion)    12. Pain in both lower extremities    13. Paroxysmal atrial fibrillation    14. BMI 45.0-49.9, adult        Plan:     Dizziness    Controlled type 2 diabetes mellitus with diabetic polyneuropathy, with long-term current use  of insulin    Palpitations    Mixed hyperlipidemia    Abdominal aortic aneurysm (AAA) without rupture, unspecified part    Hypertriglyceridemia    History of obstructive sleep apnea    Essential hypertension    Controlled type 2 diabetes mellitus with diabetic peripheral angiopathy without gangrene, with long-term current use of insulin    Coronary artery disease of native artery of native heart with stable angina pectoris    GILL (dyspnea on exertion)    Pain in both lower extremities    Paroxysmal atrial fibrillation    BMI 45.0-49.9, adult        Impression 1 hypertension stable on current medications will continue all medicines as prescribed.    2. CAD stable no chest pain  3 dyspnea exertion stable line 4 paroxysmal AFib stable on current dose of apixaban.

## 2023-04-30 ENCOUNTER — PATIENT MESSAGE (OUTPATIENT)
Dept: OPHTHALMOLOGY | Facility: CLINIC | Age: 73
End: 2023-04-30
Payer: MEDICARE

## 2023-04-30 NOTE — TELEPHONE ENCOUNTER
No care due was identified.  [facility] Embedded Care Due Messages. Reference number: [id number].   4/30/2023 9:55:04 AM CDT

## 2023-05-01 RX ORDER — FLUTICASONE PROPIONATE 50 MCG
2 SPRAY, SUSPENSION (ML) NASAL DAILY
Qty: 48 G | Refills: 1 | Status: SHIPPED | OUTPATIENT
Start: 2023-05-01 | End: 2023-12-04 | Stop reason: SDUPTHER

## 2023-05-01 NOTE — TELEPHONE ENCOUNTER
No new care gaps identified.  [facility] Embedded Care Gaps. Reference number: [id number]. 6/13/2022   9:39:08 PM CDT   DISCHARGE no

## 2023-05-01 NOTE — TELEPHONE ENCOUNTER
Refill Decision Note   Christine Jo  is requesting a refill authorization.  Brief Assessment and Rationale for Refill:  Approve     Medication Therapy Plan:         Comments:     Note composed:4:37 AM 05/01/2023             Appointments     Last Visit   10/4/2022 Jose Szymanski MD   Next Visit   8/7/2023 Jose Szymanski MD

## 2023-05-02 ENCOUNTER — PROCEDURE VISIT (OUTPATIENT)
Dept: RHEUMATOLOGY | Facility: CLINIC | Age: 73
End: 2023-05-02
Payer: MEDICARE

## 2023-05-02 VITALS
BODY MASS INDEX: 44.51 KG/M2 | HEART RATE: 78 BPM | WEIGHT: 241.88 LBS | HEIGHT: 62 IN | DIASTOLIC BLOOD PRESSURE: 64 MMHG | SYSTOLIC BLOOD PRESSURE: 109 MMHG

## 2023-05-02 DIAGNOSIS — M21.169 GENU VARUM, UNSPECIFIED LATERALITY: ICD-10-CM

## 2023-05-02 DIAGNOSIS — M25.561 CHRONIC PAIN OF BOTH KNEES: Primary | ICD-10-CM

## 2023-05-02 DIAGNOSIS — M25.562 CHRONIC PAIN OF BOTH KNEES: Primary | ICD-10-CM

## 2023-05-02 DIAGNOSIS — Z71.89 COUNSELING ON HEALTH PROMOTION AND DISEASE PREVENTION: ICD-10-CM

## 2023-05-02 DIAGNOSIS — G89.29 CHRONIC PAIN OF BOTH KNEES: Primary | ICD-10-CM

## 2023-05-02 DIAGNOSIS — Z79.01 CURRENT USE OF LONG TERM ANTICOAGULATION: ICD-10-CM

## 2023-05-02 DIAGNOSIS — M17.0 PRIMARY OSTEOARTHRITIS OF BOTH KNEES: ICD-10-CM

## 2023-05-02 PROCEDURE — 99214 PR OFFICE/OUTPT VISIT, EST, LEVL IV, 30-39 MIN: ICD-10-PCS | Mod: 25,S$GLB,, | Performed by: PHYSICIAN ASSISTANT

## 2023-05-02 PROCEDURE — 20610 LARGE JOINT ASPIRATION/INJECTION: L KNEE: ICD-10-PCS | Mod: LT,S$GLB,, | Performed by: PHYSICIAN ASSISTANT

## 2023-05-02 PROCEDURE — 99214 OFFICE O/P EST MOD 30 MIN: CPT | Mod: 25,S$GLB,, | Performed by: PHYSICIAN ASSISTANT

## 2023-05-02 PROCEDURE — 20610 DRAIN/INJ JOINT/BURSA W/O US: CPT | Mod: LT,S$GLB,, | Performed by: PHYSICIAN ASSISTANT

## 2023-05-02 NOTE — PROCEDURES
Patient ID: Christine Jo is a 73 y.o. female.    Chief Complaint: No chief complaint on file.      HPI: Christine Jo  is a 73 y.o. female who c/o bilateral knee pain for years.  She has undergone corticosteroid injections in the past which tend to cause elevated blood sugars.  She has also undergone Zilretta injections as well as viscosupplementation with Synvisc-One.  She is established within the department but a new patient to my clinic.\\    I have reviewed her notes.  According to her chart, she appears to get about 8 months of relief from Zilretta injections.  Most recent injections were done August 2022.  She has undergone multiple Synvisc-One injections bilateral knees.  She generally goes about a year to a year and a half between injections with Synvisc-One.  She tolerates them well.  Can not recall why she stopped doing those.    Saw 1 of my colleagues who ordered Zilretta injection in the left knee.  It has been approved.  She comes today for that injection.  She is asking if she can also have it on the right side.  We spent some time trying to check with authorization to see if we would be able to do the right knee today.  Unfortunately we are not able to.      Current Outpatient Medications:     ACETAMINOPHEN (TYLENOL ARTHRITIS ORAL), Take by mouth as needed., Disp: , Rfl:     apixaban (ELIQUIS) 5 mg Tab, Take 1 tablet (5 mg total) by mouth 2 (two) times daily., Disp: 180 tablet, Rfl: 0    benazepriL (LOTENSIN) 40 MG tablet, Take 1 tablet (40 mg total) by mouth once daily., Disp: 90 tablet, Rfl: 3    BEPREVE 1.5 % Drop, Place 1 drop into both eyes 2 (two) times daily., Disp: 10 mL, Rfl: 4    blood sugar diagnostic Strp, To check blood sugar 1 times daily, Disp: 100 each, Rfl: 3    blood-glucose meter kit, To check BG 2 times daily, to use with insurance preferred meter, Disp: 1 each, Rfl: 0    blood-glucose meter Misc, use daily to test blood sugar, Disp: 1 each, Rfl: 0    calcium  citrate-vitamin D3 315-200 mg (CITRACAL+D) 315-200 mg-unit per tablet, Take 1 tablet by mouth once daily., Disp: , Rfl:     carvediloL (COREG) 25 MG tablet, TAKE 1 TABLET BY MOUTH TWICE DAILY, Disp: 180 tablet, Rfl: 3    clotrimazole-betamethasone (LOTRISONE) lotion, Apply topically to the affected area 2 (two) times daily., Disp: 30 mL, Rfl: 2    cycloSPORINE (RESTASIS) 0.05 % ophthalmic emulsion, Place 1 drop into both eyes 2 (two) times daily., Disp: 60 each, Rfl: 11    diclofenac sodium (VOLTAREN) 1 % Gel, Apply 2 grams topically 4 (four) times daily. To affected joints as needed for pain, Disp: 100 g, Rfl: 3    docusate sodium (COLACE) 100 MG capsule, Take 1 capsule (100 mg total) by mouth 2 (two) times daily., Disp: 180 capsule, Rfl: 0    DULoxetine (CYMBALTA) 60 MG capsule, Take 1 capsule (60 mg total) by mouth once daily., Disp: 90 capsule, Rfl: 1    fluticasone propionate (FLONASE) 50 mcg/actuation nasal spray, 2 sprays (100 mcg total) by Each Nostril route once daily., Disp: 48 g, Rfl: 1    gabapentin (NEURONTIN) 100 MG capsule, Take 100 mg by mouth 3 (three) times daily., Disp: , Rfl:     galcanezumab-gnlm (EMGALITY SYRINGE) 120 mg/mL Syrg, Inject 120 mg into the skin every 28 days., Disp: 1 mL, Rfl: 12    hydrALAZINE (APRESOLINE) 50 MG tablet, Take 1 tablet (50 mg total) by mouth every 8 (eight) hours., Disp: 180 tablet, Rfl: 3    hydrocortisone 2.5 % cream, Apply topically 2 (two) times daily., Disp: 28 g, Rfl: 2    insulin (LANTUS SOLOSTAR U-100 INSULIN) glargine 100 units/mL SubQ pen, Inject 72 Units into the skin every evening., Disp: 75 mL, Rfl: 1    lamoTRIgine (LAMICTAL) 25 mg TChD, Take 1 tablet by mouth once daily., Disp: 30 tablet, Rfl: 11    lancets Misc, To check BG 1 times daily, Disp: 100 each, Rfl: 3    meclizine (ANTIVERT) 25 mg tablet, Take 1 tablet (25 mg total) by mouth 3 (three) times daily as needed., Disp: 30 tablet, Rfl: 2    MULTIVIT WITH CALCIUM,IRON,MIN (WOMEN'S DAILY  "MULTIVITAMIN ORAL), Take by mouth once daily. , Disp: , Rfl:     nystatin (MYCOSTATIN) cream, Apply topically 2 (two) times daily. Continue until completely healed, Disp: 30 g, Rfl: 0    pantoprazole (PROTONIX) 40 MG tablet, Take 1 tablet (40 mg total) by mouth 2 (two) times a day., Disp: 180 tablet, Rfl: 3    pen needle, diabetic (BD ULTRA-FINE SHORT PEN NEEDLE) 31 gauge x 5/16" Ndle, AS DIRECTED TWICE DAILY, Disp: 200 each, Rfl: 3    rimegepant 75 mg odt, Take 1 tablet (75 mg total) by mouth as needed for Migraine (do not exceed 2-3 doses within 1 week). Place ODT tablet on the tongue; alternatively the ODT tablet may be placed under the tongue, Disp: 8 tablet, Rfl: 5    SITagliptin phosphate (JANUVIA) 100 MG Tab, Take 1 tablet (100 mg total) by mouth once daily., Disp: 90 tablet, Rfl: 1    tamsulosin (FLOMAX) 0.4 mg Cap, Take 1 capsule (0.4 mg total) by mouth once daily., Disp: 30 capsule, Rfl: 0    temazepam (RESTORIL) 30 mg capsule, Take 1 capsule by mouth at bedtime, Disp: 30 capsule, Rfl: 3    azelastine (ASTELIN) 137 mcg (0.1 %) nasal spray, use 2 sprays (274 mcg total) by Nasal route 2 (two) times daily., Disp: 30 mL, Rfl: 1  Past Medical History:   Diagnosis Date    Arthritis     Cataract     Diabetes mellitus 2008     am 01/15/2018 Insulin x 1 year    DM (diabetes mellitus) 2008     am 02/14/2020 Insulin x 4 years    Encounter for blood transfusion     Glaucoma     Hypertension     Insomnia     Macular degeneration     Vaginal yeast infection      Family History   Problem Relation Age of Onset    Heart disease Mother         CAD     Cataracts Mother     Macular degeneration Mother     Glaucoma Mother     Cancer Son         testicular     Cancer Maternal Aunt         Lung ca    Heart disease Maternal Grandfather         Pacemaker     Diabetes Sister     Heart disease Sister         CAD    Cataracts Sister     Diabetes Sister     Diabetes Sister      Social History     Socioeconomic History    " Marital status:     Number of children: 3   Occupational History    Occupation: Retired   Tobacco Use    Smoking status: Former     Packs/day: 1.00     Years: 36.00     Pack years: 36.00     Types: Cigarettes     Start date:      Quit date: 2003     Years since quittin.8     Passive exposure: Never    Smokeless tobacco: Never   Substance and Sexual Activity    Alcohol use: No    Drug use: No    Sexual activity: Never     Social Determinants of Health     Financial Resource Strain: Low Risk     Difficulty of Paying Living Expenses: Not hard at all   Food Insecurity: No Food Insecurity    Worried About Running Out of Food in the Last Year: Never true    Ran Out of Food in the Last Year: Never true   Transportation Needs: Unmet Transportation Needs    Lack of Transportation (Medical): Yes    Lack of Transportation (Non-Medical): No   Physical Activity: Insufficiently Active    Days of Exercise per Week: 7 days    Minutes of Exercise per Session: 10 min   Stress: No Stress Concern Present    Feeling of Stress : Only a little   Social Connections: Moderately Isolated    Frequency of Communication with Friends and Family: More than three times a week    Frequency of Social Gatherings with Friends and Family: Never    Attends Jewish Services: 1 to 4 times per year    Active Member of Clubs or Organizations: No    Attends Club or Organization Meetings: Never    Marital Status:    Housing Stability: Low Risk     Unable to Pay for Housing in the Last Year: No    Number of Places Lived in the Last Year: 2    Unstable Housing in the Last Year: No     Review of patient's allergies indicates:   Allergen Reactions    Aspirin Palpitations       Objective:   General    Nursing note and vitals reviewed.  Constitutional: She is oriented to person, place, and time. She appears well-developed and well-nourished.   HENT:   Head: Normocephalic and atraumatic.   Eyes: EOM are normal.   Cardiovascular:  Normal  rate and regular rhythm.            Pulmonary/Chest: Effort normal.   Neurological: She is alert and oriented to person, place, and time.   Psychiatric: She has a normal mood and affect. Her behavior is normal.           Right Knee Exam     Inspection   Erythema: absent  Swelling: absent  Effusion: absent  Deformity: present (varus)    Tenderness   The patient is tender to palpation of the medial joint line and condyle.    Crepitus   The patient has crepitus of the patella.    Range of Motion   Extension:  normal   Flexion:  abnormal     Tests   Patella   Passive Patellar Tilt: neutral  Patellar Grind: positive    Other   Meniscal Cyst: absent  Sensation: normal    Left Knee Exam     Inspection   Erythema: absent  Swelling: absent  Effusion: absent  Deformity: present (varus)    Tenderness   The patient tender to palpation of the medial joint line and condyle.    Crepitus   The patient has crepitus of the patella.    Range of Motion   Extension:  normal   Flexion:  abnormal     Tests   Patella   Passive Patellar Tilt: neutral  Patellar Grind: positive    Other   Meniscal Cyst: absent  Sensation: normal    Vascular Exam       Edema  Right Lower Leg: absent  Left Lower Leg: absent      Recent Results (from the past 2016 hour(s))   Magnesium    Collection Time: 02/22/23 10:20 AM   Result Value Ref Range    Magnesium 1.5 (L) 1.6 - 2.6 mg/dL   Folate    Collection Time: 02/22/23 10:20 AM   Result Value Ref Range    Folate 16.2 4.0 - 24.0 ng/mL   Vitamin B12    Collection Time: 02/22/23 10:20 AM   Result Value Ref Range    Vitamin B-12 309 210 - 950 pg/mL   Homocysteine, Serum    Collection Time: 02/22/23 10:20 AM   Result Value Ref Range    Homocysteine 7.0 4.0 - 15.5 umol/L   METHYLMALONIC ACID, SERUM    Collection Time: 02/22/23 10:20 AM   Result Value Ref Range    Methlymalonic Acid 0.26 <0.40 umol/L   VITAMIN B6    Collection Time: 02/22/23 10:20 AM   Result Value Ref Range    Vitamin B6 16 5 - 50 ug/L   COPPER,  SERUM    Collection Time: 02/22/23 10:20 AM   Result Value Ref Range    Copper 831 810 - 1990 ug/L   ZINC    Collection Time: 02/22/23 10:20 AM   Result Value Ref Range    Zinc 71 60 - 130 ug/dL   SELENIUM SERUM    Collection Time: 02/22/23 10:20 AM   Result Value Ref Range    Selenium 142 110 - 165 mcg/L   CBC Auto Differential    Collection Time: 02/22/23 10:20 AM   Result Value Ref Range    WBC 7.83 3.90 - 12.70 K/uL    RBC 3.77 (L) 4.00 - 5.40 M/uL    Hemoglobin 11.5 (L) 12.0 - 16.0 g/dL    Hematocrit 35.0 (L) 37.0 - 48.5 %    MCV 93 82 - 98 fL    MCH 30.5 27.0 - 31.0 pg    MCHC 32.9 32.0 - 36.0 g/dL    RDW 12.4 11.5 - 14.5 %    Platelets 256 150 - 450 K/uL    MPV 10.6 9.2 - 12.9 fL    Immature Granulocytes 0.3 0.0 - 0.5 %    Gran # (ANC) 5.4 1.8 - 7.7 K/uL    Immature Grans (Abs) 0.02 0.00 - 0.04 K/uL    Lymph # 1.6 1.0 - 4.8 K/uL    Mono # 0.7 0.3 - 1.0 K/uL    Eos # 0.1 0.0 - 0.5 K/uL    Baso # 0.06 0.00 - 0.20 K/uL    nRBC 0 0 /100 WBC    Gran % 68.3 38.0 - 73.0 %    Lymph % 20.9 18.0 - 48.0 %    Mono % 8.3 4.0 - 15.0 %    Eosinophil % 1.4 0.0 - 8.0 %    Basophil % 0.8 0.0 - 1.9 %    Differential Method Automated    Ferritin    Collection Time: 02/22/23 10:20 AM   Result Value Ref Range    Ferritin 71 20.0 - 300.0 ng/mL   Iron and TIBC    Collection Time: 02/22/23 10:20 AM   Result Value Ref Range    Iron 107 30 - 160 ug/dL    Transferrin 220 200 - 375 mg/dL    TIBC 326 250 - 450 ug/dL    Saturated Iron 33 20 - 50 %        Xray images and report were reviewed today.  I agree with the radiologist's interpretation.    X-ray Knee Ortho Bilateral with Flexion  Narrative: EXAM: XR KNEE ORTHO BILAT WITH FLEXION    CLINICAL HISTORY: Bilateral knee pain.    FINDINGS:  Bilateral demineralization or osteopenia is present.    4 views of the right knee.  Severe medial joint space narrowing with sclerosis.  Mild marginal spurring.  Mild patellofemoral joint spurring.  No joint effusion.    4 views of the left knee.   Moderate medial joint space narrowing.  Subchondral sclerosis with marginal spurring.  Mild patellofemoral joint spurring is present as well.  No joint effusion.  Impression:  Bilateral tricompartment osteophytosis of the knee, most pronounced involving the medial knee compartment of the right knee.    Finalized on: 4/25/2023 3:34 PM By:  Terry Porter MD  [facility]# [id number]      2023-04-25 15:36:[id number]    [facility]        Assessment:     Encounter Diagnoses   Name Primary?    Chronic pain of both knees Yes    Primary osteoarthritis of both knees     Genu varum, unspecified laterality     Counseling on health promotion and disease prevention     Current use of long term anticoagulation       Plan:   Diagnoses and all orders for this visit:    Chronic pain of both knees  -     Prior authorization Order  -     Prior authorization Order    Primary osteoarthritis of both knees  -     Prior authorization Order  -     Prior authorization Order    Genu varum, unspecified laterality    Counseling on health promotion and disease prevention    Current use of long term anticoagulation        Christine Jo comes in today with the above problems.  We had a long discussion today regarding degenerative arthritis in the knees. The patient understands that arthritis is chronic and will worsen over time.  The patient also understands that arthritis may cause episodic flare-ups in pain. Management or if arthritis is achieved through a multi-modal approach including weight loss in obese individuals, activity modification, NSAIDs (topical vs oral) where appropriate, periodic intra-articular steroid injections, viscosupplementation, physical therapy, knee bracing, ambulatory aids, as well as geniculate nerve blocks.      After discussion of risks and benefits of all of the above were discussed, the decision was made to move forward with zilretta left knee today.  I will submit an authorization request to do Zilretta right knee in 2 weeks.   Additionally I have given her a home exercise program to work on strengthening.  She may continue using Voltaren gel 2 g topically 3 times daily p.r.n..  She is not a candidate for oral anti-inflammatory medication given history of AFib and long-term anticoagulation with Eliquis.  Additionally, I will submit an authorization for Synvisc-One to be done in approximately 3-4 months.  If she has any problems prior to follow-up she will notify the office.  Ultimately if she fails relief with the above measures, would recommend referral to orthopedics for consideration of surgical options.  She verbalized understanding and agreed.    Kellgren Elton Scale 3 bilat knees      Follow up in about 2 weeks (around 5/16/2023) for Zilretta right knee.    The patient understands, chooses and consents to this plan and accepts all the risks which include but are not limited to the risks mentioned above.     Disclaimer: This note was prepared using a voice recognition system and is likely to have sound alike errors within the text.

## 2023-05-02 NOTE — PROCEDURES
Large Joint Aspiration/Injection: L knee    Date/Time: 5/2/2023 3:30 PM  Performed by: Cheri Tatum PA-C  Authorized by: Cheri Tatum PA-C     Consent Done?:  Yes (Verbal)  Indications:  Arthritis and pain  Site marked: the procedure site was marked    Timeout: prior to procedure the correct patient, procedure, and site was verified    Prep: patient was prepped and draped in usual sterile fashion    Local anesthesia used?: No    Local anesthetic:  Lidocaine 2% without epinephrine    Details:  Needle Size:  22 G  Ultrasonic Guidance for needle placement?: No    Approach:  Superior  Location:  Knee  Site:  L knee  Medications:  32 mg triamcinolone acetonide 32 mg  Patient tolerance:  Patient tolerated the procedure well with no immediate complications     Left knee Zilretta

## 2023-05-02 NOTE — Clinical Note
Synvisc one bilat knees also placed.  Please schedule it for about 5-6 mos down the road and link the referral.

## 2023-05-02 NOTE — PROCEDURES
Patient ID: Christine Jo is a 73 y.o. female.    Chief Complaint: No chief complaint on file.      HPI: Christine Jo  is a 73 y.o. female who c/o bilateral knee pain for years.  She has undergone corticosteroid injections in the past which tend to cause elevated blood sugars.  She has also undergone Zilretta injections as well as viscosupplementation with Synvisc-One.  She is established within the department but a new patient to my clinic.\\    I have reviewed her notes.  According to her chart, she appears to get about 8 months of relief from Zilretta injections.  Most recent injections were done August 2022.  She has undergone multiple Synvisc-One injections bilateral knees.  She generally goes about a year to a year and a half between injections with Synvisc-One.  She tolerates them well.  Can not recall why she stopped doing those.    Saw 1 of my colleagues who ordered Zilretta injection in the left knee.  It has been approved.  She comes today for that injection.  She is asking if she can also have it on the right side.  We spent some time trying to check with authorization to see if we would be able to do the right knee today.  Unfortunately we are not able to.      Current Outpatient Medications:     ACETAMINOPHEN (TYLENOL ARTHRITIS ORAL), Take by mouth as needed., Disp: , Rfl:     apixaban (ELIQUIS) 5 mg Tab, Take 1 tablet (5 mg total) by mouth 2 (two) times daily., Disp: 180 tablet, Rfl: 0    benazepriL (LOTENSIN) 40 MG tablet, Take 1 tablet (40 mg total) by mouth once daily., Disp: 90 tablet, Rfl: 3    BEPREVE 1.5 % Drop, Place 1 drop into both eyes 2 (two) times daily., Disp: 10 mL, Rfl: 4    blood sugar diagnostic Strp, To check blood sugar 1 times daily, Disp: 100 each, Rfl: 3    blood-glucose meter kit, To check BG 2 times daily, to use with insurance preferred meter, Disp: 1 each, Rfl: 0    blood-glucose meter Misc, use daily to test blood sugar, Disp: 1 each, Rfl: 0    calcium  citrate-vitamin D3 315-200 mg (CITRACAL+D) 315-200 mg-unit per tablet, Take 1 tablet by mouth once daily., Disp: , Rfl:     carvediloL (COREG) 25 MG tablet, TAKE 1 TABLET BY MOUTH TWICE DAILY, Disp: 180 tablet, Rfl: 3    clotrimazole-betamethasone (LOTRISONE) lotion, Apply topically to the affected area 2 (two) times daily., Disp: 30 mL, Rfl: 2    cycloSPORINE (RESTASIS) 0.05 % ophthalmic emulsion, Place 1 drop into both eyes 2 (two) times daily., Disp: 60 each, Rfl: 11    diclofenac sodium (VOLTAREN) 1 % Gel, Apply 2 grams topically 4 (four) times daily. To affected joints as needed for pain, Disp: 100 g, Rfl: 3    docusate sodium (COLACE) 100 MG capsule, Take 1 capsule (100 mg total) by mouth 2 (two) times daily., Disp: 180 capsule, Rfl: 0    DULoxetine (CYMBALTA) 60 MG capsule, Take 1 capsule (60 mg total) by mouth once daily., Disp: 90 capsule, Rfl: 1    fluticasone propionate (FLONASE) 50 mcg/actuation nasal spray, 2 sprays (100 mcg total) by Each Nostril route once daily., Disp: 48 g, Rfl: 1    gabapentin (NEURONTIN) 100 MG capsule, Take 100 mg by mouth 3 (three) times daily., Disp: , Rfl:     galcanezumab-gnlm (EMGALITY SYRINGE) 120 mg/mL Syrg, Inject 120 mg into the skin every 28 days., Disp: 1 mL, Rfl: 12    hydrALAZINE (APRESOLINE) 50 MG tablet, Take 1 tablet (50 mg total) by mouth every 8 (eight) hours., Disp: 180 tablet, Rfl: 3    hydrocortisone 2.5 % cream, Apply topically 2 (two) times daily., Disp: 28 g, Rfl: 2    insulin (LANTUS SOLOSTAR U-100 INSULIN) glargine 100 units/mL SubQ pen, Inject 72 Units into the skin every evening., Disp: 75 mL, Rfl: 1    lamoTRIgine (LAMICTAL) 25 mg TChD, Take 1 tablet by mouth once daily., Disp: 30 tablet, Rfl: 11    lancets Misc, To check BG 1 times daily, Disp: 100 each, Rfl: 3    meclizine (ANTIVERT) 25 mg tablet, Take 1 tablet (25 mg total) by mouth 3 (three) times daily as needed., Disp: 30 tablet, Rfl: 2    MULTIVIT WITH CALCIUM,IRON,MIN (WOMEN'S DAILY  "MULTIVITAMIN ORAL), Take by mouth once daily. , Disp: , Rfl:     nystatin (MYCOSTATIN) cream, Apply topically 2 (two) times daily. Continue until completely healed, Disp: 30 g, Rfl: 0    pantoprazole (PROTONIX) 40 MG tablet, Take 1 tablet (40 mg total) by mouth 2 (two) times a day., Disp: 180 tablet, Rfl: 3    pen needle, diabetic (BD ULTRA-FINE SHORT PEN NEEDLE) 31 gauge x 5/16" Ndle, AS DIRECTED TWICE DAILY, Disp: 200 each, Rfl: 3    rimegepant 75 mg odt, Take 1 tablet (75 mg total) by mouth as needed for Migraine (do not exceed 2-3 doses within 1 week). Place ODT tablet on the tongue; alternatively the ODT tablet may be placed under the tongue, Disp: 8 tablet, Rfl: 5    SITagliptin phosphate (JANUVIA) 100 MG Tab, Take 1 tablet (100 mg total) by mouth once daily., Disp: 90 tablet, Rfl: 1    tamsulosin (FLOMAX) 0.4 mg Cap, Take 1 capsule (0.4 mg total) by mouth once daily., Disp: 30 capsule, Rfl: 0    temazepam (RESTORIL) 30 mg capsule, Take 1 capsule by mouth at bedtime, Disp: 30 capsule, Rfl: 3    azelastine (ASTELIN) 137 mcg (0.1 %) nasal spray, use 2 sprays (274 mcg total) by Nasal route 2 (two) times daily., Disp: 30 mL, Rfl: 1  Past Medical History:   Diagnosis Date    Arthritis     Cataract     Diabetes mellitus 2008     am 01/15/2018 Insulin x 1 year    DM (diabetes mellitus) 2008     am 02/14/2020 Insulin x 4 years    Encounter for blood transfusion     Glaucoma     Hypertension     Insomnia     Macular degeneration     Vaginal yeast infection      Family History   Problem Relation Age of Onset    Heart disease Mother         CAD     Cataracts Mother     Macular degeneration Mother     Glaucoma Mother     Cancer Son         testicular     Cancer Maternal Aunt         Lung ca    Heart disease Maternal Grandfather         Pacemaker     Diabetes Sister     Heart disease Sister         CAD    Cataracts Sister     Diabetes Sister     Diabetes Sister      Social History     Socioeconomic History    " Marital status:     Number of children: 3   Occupational History    Occupation: Retired   Tobacco Use    Smoking status: Former     Packs/day: 1.00     Years: 36.00     Pack years: 36.00     Types: Cigarettes     Start date:      Quit date: 2003     Years since quittin.8     Passive exposure: Never    Smokeless tobacco: Never   Substance and Sexual Activity    Alcohol use: No    Drug use: No    Sexual activity: Never     Social Determinants of Health     Financial Resource Strain: Low Risk     Difficulty of Paying Living Expenses: Not hard at all   Food Insecurity: No Food Insecurity    Worried About Running Out of Food in the Last Year: Never true    Ran Out of Food in the Last Year: Never true   Transportation Needs: Unmet Transportation Needs    Lack of Transportation (Medical): Yes    Lack of Transportation (Non-Medical): No   Physical Activity: Insufficiently Active    Days of Exercise per Week: 7 days    Minutes of Exercise per Session: 10 min   Stress: No Stress Concern Present    Feeling of Stress : Only a little   Social Connections: Moderately Isolated    Frequency of Communication with Friends and Family: More than three times a week    Frequency of Social Gatherings with Friends and Family: Never    Attends Holiness Services: 1 to 4 times per year    Active Member of Clubs or Organizations: No    Attends Club or Organization Meetings: Never    Marital Status:    Housing Stability: Low Risk     Unable to Pay for Housing in the Last Year: No    Number of Places Lived in the Last Year: 2    Unstable Housing in the Last Year: No     Review of patient's allergies indicates:   Allergen Reactions    Aspirin Palpitations       Objective:   General    Nursing note and vitals reviewed.  Constitutional: She is oriented to person, place, and time. She appears well-developed and well-nourished.   HENT:   Head: Normocephalic and atraumatic.   Eyes: EOM are normal.   Cardiovascular:  Normal  rate and regular rhythm.            Pulmonary/Chest: Effort normal.   Neurological: She is alert and oriented to person, place, and time.   Psychiatric: She has a normal mood and affect. Her behavior is normal.           Right Knee Exam     Inspection   Erythema: absent  Swelling: absent  Effusion: absent  Deformity: present (varus)    Tenderness   The patient is tender to palpation of the medial joint line and condyle.    Crepitus   The patient has crepitus of the patella.    Range of Motion   Extension:  normal   Flexion:  abnormal     Tests   Patella   Passive Patellar Tilt: neutral  Patellar Grind: positive    Other   Meniscal Cyst: absent  Sensation: normal    Left Knee Exam     Inspection   Erythema: absent  Swelling: absent  Effusion: absent  Deformity: present (varus)    Tenderness   The patient tender to palpation of the medial joint line and condyle.    Crepitus   The patient has crepitus of the patella.    Range of Motion   Extension:  normal   Flexion:  abnormal     Tests   Patella   Passive Patellar Tilt: neutral  Patellar Grind: positive    Other   Meniscal Cyst: absent  Sensation: normal    Vascular Exam       Edema  Right Lower Leg: absent  Left Lower Leg: absent      Recent Results (from the past 2016 hour(s))   Magnesium    Collection Time: 02/22/23 10:20 AM   Result Value Ref Range    Magnesium 1.5 (L) 1.6 - 2.6 mg/dL   Folate    Collection Time: 02/22/23 10:20 AM   Result Value Ref Range    Folate 16.2 4.0 - 24.0 ng/mL   Vitamin B12    Collection Time: 02/22/23 10:20 AM   Result Value Ref Range    Vitamin B-12 309 210 - 950 pg/mL   Homocysteine, Serum    Collection Time: 02/22/23 10:20 AM   Result Value Ref Range    Homocysteine 7.0 4.0 - 15.5 umol/L   METHYLMALONIC ACID, SERUM    Collection Time: 02/22/23 10:20 AM   Result Value Ref Range    Methlymalonic Acid 0.26 <0.40 umol/L   VITAMIN B6    Collection Time: 02/22/23 10:20 AM   Result Value Ref Range    Vitamin B6 16 5 - 50 ug/L   COPPER,  SERUM    Collection Time: 02/22/23 10:20 AM   Result Value Ref Range    Copper 831 810 - 1990 ug/L   ZINC    Collection Time: 02/22/23 10:20 AM   Result Value Ref Range    Zinc 71 60 - 130 ug/dL   SELENIUM SERUM    Collection Time: 02/22/23 10:20 AM   Result Value Ref Range    Selenium 142 110 - 165 mcg/L   CBC Auto Differential    Collection Time: 02/22/23 10:20 AM   Result Value Ref Range    WBC 7.83 3.90 - 12.70 K/uL    RBC 3.77 (L) 4.00 - 5.40 M/uL    Hemoglobin 11.5 (L) 12.0 - 16.0 g/dL    Hematocrit 35.0 (L) 37.0 - 48.5 %    MCV 93 82 - 98 fL    MCH 30.5 27.0 - 31.0 pg    MCHC 32.9 32.0 - 36.0 g/dL    RDW 12.4 11.5 - 14.5 %    Platelets 256 150 - 450 K/uL    MPV 10.6 9.2 - 12.9 fL    Immature Granulocytes 0.3 0.0 - 0.5 %    Gran # (ANC) 5.4 1.8 - 7.7 K/uL    Immature Grans (Abs) 0.02 0.00 - 0.04 K/uL    Lymph # 1.6 1.0 - 4.8 K/uL    Mono # 0.7 0.3 - 1.0 K/uL    Eos # 0.1 0.0 - 0.5 K/uL    Baso # 0.06 0.00 - 0.20 K/uL    nRBC 0 0 /100 WBC    Gran % 68.3 38.0 - 73.0 %    Lymph % 20.9 18.0 - 48.0 %    Mono % 8.3 4.0 - 15.0 %    Eosinophil % 1.4 0.0 - 8.0 %    Basophil % 0.8 0.0 - 1.9 %    Differential Method Automated    Ferritin    Collection Time: 02/22/23 10:20 AM   Result Value Ref Range    Ferritin 71 20.0 - 300.0 ng/mL   Iron and TIBC    Collection Time: 02/22/23 10:20 AM   Result Value Ref Range    Iron 107 30 - 160 ug/dL    Transferrin 220 200 - 375 mg/dL    TIBC 326 250 - 450 ug/dL    Saturated Iron 33 20 - 50 %        Xray images and report were reviewed today.  I agree with the radiologist's interpretation.    X-ray Knee Ortho Bilateral with Flexion  Narrative: EXAM: XR KNEE ORTHO BILAT WITH FLEXION    CLINICAL HISTORY: Bilateral knee pain.    FINDINGS:  Bilateral demineralization or osteopenia is present.    4 views of the right knee.  Severe medial joint space narrowing with sclerosis.  Mild marginal spurring.  Mild patellofemoral joint spurring.  No joint effusion.    4 views of the left knee.   Moderate medial joint space narrowing.  Subchondral sclerosis with marginal spurring.  Mild patellofemoral joint spurring is present as well.  No joint effusion.  Impression:  Bilateral tricompartment osteophytosis of the knee, most pronounced involving the medial knee compartment of the right knee.    Finalized on: 4/25/2023 3:34 PM By:  Terry Porter MD  [facility]# [id number]      2023-04-25 15:36:[id number]    [facility]        Assessment:     Encounter Diagnoses   Name Primary?    Chronic pain of both knees Yes    Primary osteoarthritis of both knees     Genu varum, unspecified laterality     Counseling on health promotion and disease prevention     Current use of long term anticoagulation       Plan:   Diagnoses and all orders for this visit:    Chronic pain of both knees  -     Prior authorization Order    Primary osteoarthritis of both knees  -     Prior authorization Order    Genu varum, unspecified laterality    Counseling on health promotion and disease prevention    Current use of long term anticoagulation        Christine Jo comes in today with the above problems.  We had a long discussion today regarding degenerative arthritis in the knees. The patient understands that arthritis is chronic and will worsen over time.  The patient also understands that arthritis may cause episodic flare-ups in pain. Management or if arthritis is achieved through a multi-modal approach including weight loss in obese individuals, activity modification, NSAIDs (topical vs oral) where appropriate, periodic intra-articular steroid injections, viscosupplementation, physical therapy, knee bracing, ambulatory aids, as well as geniculate nerve blocks.      After discussion of risks and benefits of all of the above were discussed, the decision was made to move forward with zilretta left knee today.  I will submit an authorization request to do Zilretta right knee in 2 weeks.  Additionally I have given her a home exercise program to work on  strengthening.  She may continue using Voltaren gel 2 g topically 3 times daily p.r.n..  She is not a candidate for oral anti-inflammatory medication given history of AFib and long-term anticoagulation with Eliquis.  Additionally, I will submit an authorization for Synvisc-One to be done in approximately 3-4 months.  If she has any problems prior to follow-up she will notify the office.  Ultimately if she fails relief with the above measures, would recommend referral to orthopedics for consideration of surgical options.  She verbalized understanding and agreed.    Kellgren Elton Scale 3 bilat knees      Follow up in about 2 weeks (around 5/16/2023) for Zilretta right knee.    The patient understands, chooses and consents to this plan and accepts all the risks which include but are not limited to the risks mentioned above.     Disclaimer: This note was prepared using a voice recognition system and is likely to have sound alike errors within the text.

## 2023-05-02 NOTE — PATIENT INSTRUCTIONS
Strengthening Exercises   Strengthening exercises keep your muscles firm and strong. Start by repeating each exercise 10 times. Work up to doing each exercise 10 times. Do the exercise program 3x/wk if tolerated.  Ok to start with fewer reps if needed. Do all exercises slowly.    Quad sets ? Lie on your back with your sore leg straight.  You may also sit upright for this w the knee straight in front of you.  Tighten your front thigh muscle on your sore leg. Push the back of your knee into the bed or floor. If you are having trouble getting the knee straight, you may want to lie with a towel roll under the ankle for whatever timeframe you can. From time to time, do quad sets to straighten the knee more.  Lower leg lifts with towel ? Lie on your back with your knee slightly bent and foot flat on the bed.  You may also sit upright for this w the knee straight in front of you.  Roll up a large towel and put it under your sore knee. Tighten your front thigh muscles and lift the lower leg off the bed until it as straight as possible.  Heel slides ? Lie on your back with both legs straight.  You may also sit upright for this w the knee straight in front of you.  Start to bend one leg while sliding your heel on the bed. Once you have gotten your leg to bend as much as you can, slide your foot back down to straighten your leg. Repeat with the other leg. You may want to put a plastic grocery bag under your heel so your leg can slide better.  Straight leg raises ? Lie on your back with one leg straight. You may also sit upright for this w the knee straight in front of you.  Bend your other knee so the foot is flat on the bed. Keeping your leg straight, lift the leg up to the level of your other knee. Lower it back down. Repeat with the other leg. You may need help with this exercise until you are strong enough to do it on your own. If so, have a helper give support under your ankle with one hand.               What will the  results be?   More strength  Easier to walk and do other activities  Helpful tips   Stay active and work out to keep your muscles strong and flexible.  Keep a healthy weight so there is not extra stress on your joints. Eat a healthy diet to keep your muscles healthy.  Be sure you do not hold your breath when exercising. This can raise your blood pressure. If you tend to hold your breath, try counting out loud when exercising. If any exercise bothers you, stop right away.  Try walking at an easy pace for a few minutes to warm up your muscles. Do this again after exercising.  After exercising, it is a good idea to use ice. Place an ice pack or a bag of frozen peas wrapped in a towel over the painful part. Never put ice right on the skin. Do not leave the ice on more than 10 to 15 minutes at a time. Ice after activity may help decrease pain and swelling. Never ice before stretching.  Doing exercises before a meal may be a good way to get into a routine.  Exercise may be slightly uncomfortable, but you should not have sharp pains. If you do get sharp pains, stop what you are doing. If the sharp pains continue, call your doctor.  Where can I learn more?   [employer]  http://orthoinfo.aaos.org/topic.cfm?giwzi=Z70245   Last Reviewed Date   2021-08-03  Consumer Information Use and Disclaimer   This information is not specific medical advice and does not replace information you receive from your health care provider. This is only a brief summary of general information. It does NOT include all information about conditions, illnesses, injuries, tests, procedures, treatments, therapies, discharge instructions or life-style choices that may apply to you. You must talk with your health care provider for complete information about your health and treatment options. This information should not be used to decide whether or not to accept your health care providers advice, instructions or  recommendations. Only your health care provider has the knowledge and training to provide advice that is right for you.  Copyright   Copyright © 2021 [employer] and its affiliates and/or licensors. All rights reserved.

## 2023-05-05 ENCOUNTER — PATIENT MESSAGE (OUTPATIENT)
Dept: OPHTHALMOLOGY | Facility: CLINIC | Age: 73
End: 2023-05-05
Payer: MEDICARE

## 2023-05-06 ENCOUNTER — PATIENT MESSAGE (OUTPATIENT)
Dept: HOME HEALTH SERVICES | Facility: CLINIC | Age: 73
End: 2023-05-06
Payer: MEDICARE

## 2023-05-08 ENCOUNTER — PATIENT MESSAGE (OUTPATIENT)
Dept: OPHTHALMOLOGY | Facility: CLINIC | Age: 73
End: 2023-05-08
Payer: MEDICARE

## 2023-05-08 DIAGNOSIS — G62.9 NEUROPATHY: Primary | ICD-10-CM

## 2023-05-08 RX ORDER — GABAPENTIN 300 MG/1
300 CAPSULE ORAL 3 TIMES DAILY
Qty: 90 CAPSULE | Refills: 11 | Status: SHIPPED | OUTPATIENT
Start: 2023-05-08 | End: 2024-05-07

## 2023-05-09 ENCOUNTER — PATIENT MESSAGE (OUTPATIENT)
Dept: INTERNAL MEDICINE | Facility: CLINIC | Age: 73
End: 2023-05-09
Payer: MEDICARE

## 2023-05-09 NOTE — TELEPHONE ENCOUNTER
Returned call to patient regarding message to not call her son about depression. No answer left message awaiting return call.

## 2023-05-12 DIAGNOSIS — H10.13 ALLERGIC CONJUNCTIVITIS, BILATERAL: ICD-10-CM

## 2023-05-12 RX ORDER — BEPOTASTINE BESILATE 15 MG/ML
1 SOLUTION/ DROPS OPHTHALMIC 2 TIMES DAILY
Qty: 10 ML | Refills: 4 | Status: SHIPPED | OUTPATIENT
Start: 2023-05-12 | End: 2023-12-27 | Stop reason: SDUPTHER

## 2023-05-16 ENCOUNTER — OFFICE VISIT (OUTPATIENT)
Dept: RHEUMATOLOGY | Facility: CLINIC | Age: 73
End: 2023-05-16
Payer: MEDICARE

## 2023-05-16 ENCOUNTER — PATIENT MESSAGE (OUTPATIENT)
Dept: HOME HEALTH SERVICES | Facility: CLINIC | Age: 73
End: 2023-05-16
Payer: MEDICARE

## 2023-05-16 VITALS
BODY MASS INDEX: 44.3 KG/M2 | DIASTOLIC BLOOD PRESSURE: 66 MMHG | SYSTOLIC BLOOD PRESSURE: 137 MMHG | HEIGHT: 62 IN | HEART RATE: 73 BPM | WEIGHT: 240.75 LBS

## 2023-05-16 DIAGNOSIS — M25.562 CHRONIC PAIN OF BOTH KNEES: Primary | ICD-10-CM

## 2023-05-16 DIAGNOSIS — M17.0 PRIMARY OSTEOARTHRITIS OF BOTH KNEES: ICD-10-CM

## 2023-05-16 DIAGNOSIS — M25.561 CHRONIC PAIN OF BOTH KNEES: Primary | ICD-10-CM

## 2023-05-16 DIAGNOSIS — G89.29 CHRONIC PAIN OF BOTH KNEES: Primary | ICD-10-CM

## 2023-05-16 PROCEDURE — 20610 LARGE JOINT ASPIRATION/INJECTION: R KNEE: ICD-10-PCS | Mod: RT,S$GLB,, | Performed by: PHYSICIAN ASSISTANT

## 2023-05-16 PROCEDURE — 20610 DRAIN/INJ JOINT/BURSA W/O US: CPT | Mod: RT,S$GLB,, | Performed by: PHYSICIAN ASSISTANT

## 2023-05-16 PROCEDURE — 99499 UNLISTED E&M SERVICE: CPT | Mod: S$GLB,,, | Performed by: PHYSICIAN ASSISTANT

## 2023-05-16 PROCEDURE — 99999 PR PBB SHADOW E&M-EST. PATIENT-LVL V: CPT | Mod: PBBFAC,,, | Performed by: PHYSICIAN ASSISTANT

## 2023-05-16 PROCEDURE — 99499 NO LOS: ICD-10-PCS | Mod: S$GLB,,, | Performed by: PHYSICIAN ASSISTANT

## 2023-05-16 PROCEDURE — 99999 PR PBB SHADOW E&M-EST. PATIENT-LVL V: ICD-10-PCS | Mod: PBBFAC,,, | Performed by: PHYSICIAN ASSISTANT

## 2023-05-16 NOTE — PROGRESS NOTES
Patient with known osteoarthritis bilateral knees.  Underwent Zilretta injection left knee approximately 2 weeks ago.  Left knee is feeling much better.  Happy with her progress.  Presents today for her Zilretta injection on the right knee.  We have again discussed risks and benefits.  She wishes to proceed.  We will plan to follow up in about 4 mos.  Synvisc one bilat knees is ordered.  Will decide at follow up whether to proceed with Synvisc-One bilateral knees at that time.  Cheri Tatum PA-C  [facility]  [facility]

## 2023-05-16 NOTE — PROCEDURES
Large Joint Aspiration/Injection: R knee    Date/Time: 5/16/2023 1:00 PM  Performed by: Cheri Tatum PA-C  Authorized by: Cheri Tatum PA-C     Consent Done?:  Yes (Verbal)  Indications:  Arthritis and pain  Site marked: the procedure site was marked    Timeout: prior to procedure the correct patient, procedure, and site was verified    Prep: patient was prepped and draped in usual sterile fashion    Local anesthesia used?: No    Local anesthetic:  Lidocaine 2% without epinephrine    Details:  Needle Size:  22 G  Ultrasonic Guidance for needle placement?: No    Approach:  Superior  Location:  Knee  Site:  R knee  Medications:  32 mg triamcinolone acetonide 32 mg  Patient tolerance:  Patient tolerated the procedure well with no immediate complications     Right Hernandez

## 2023-05-17 ENCOUNTER — TELEPHONE (OUTPATIENT)
Dept: GASTROENTEROLOGY | Facility: CLINIC | Age: 73
End: 2023-05-17
Payer: MEDICARE

## 2023-05-17 ENCOUNTER — TELEPHONE (OUTPATIENT)
Dept: INTERNAL MEDICINE | Facility: CLINIC | Age: 73
End: 2023-05-17
Payer: MEDICARE

## 2023-05-17 ENCOUNTER — PATIENT MESSAGE (OUTPATIENT)
Dept: INTERNAL MEDICINE | Facility: CLINIC | Age: 73
End: 2023-05-17
Payer: MEDICARE

## 2023-05-17 NOTE — TELEPHONE ENCOUNTER
Returned call to patient regarding frequent urination. Appointment scheduled with Dr Szymanski. Attempted to schedule earlier appointment with NP for this week, patient refused.

## 2023-05-18 ENCOUNTER — PATIENT MESSAGE (OUTPATIENT)
Dept: PSYCHIATRY | Facility: CLINIC | Age: 73
End: 2023-05-18
Payer: MEDICARE

## 2023-05-18 ENCOUNTER — TELEPHONE (OUTPATIENT)
Dept: GASTROENTEROLOGY | Facility: CLINIC | Age: 73
End: 2023-05-18
Payer: MEDICARE

## 2023-05-20 ENCOUNTER — PATIENT MESSAGE (OUTPATIENT)
Dept: SPORTS MEDICINE | Facility: CLINIC | Age: 73
End: 2023-05-20
Payer: MEDICARE

## 2023-05-21 ENCOUNTER — PATIENT MESSAGE (OUTPATIENT)
Dept: INTERNAL MEDICINE | Facility: CLINIC | Age: 73
End: 2023-05-21
Payer: MEDICARE

## 2023-05-22 ENCOUNTER — TELEPHONE (OUTPATIENT)
Dept: INTERNAL MEDICINE | Facility: CLINIC | Age: 73
End: 2023-05-22
Payer: MEDICARE

## 2023-05-22 NOTE — TELEPHONE ENCOUNTER
Returned call to patient who stated she will sigh LATASHA on 05/31/2023 visit with Rocio Fitzgerald NP

## 2023-05-24 ENCOUNTER — PATIENT MESSAGE (OUTPATIENT)
Dept: NEUROLOGY | Facility: CLINIC | Age: 73
End: 2023-05-24
Payer: MEDICARE

## 2023-05-25 ENCOUNTER — PATIENT MESSAGE (OUTPATIENT)
Dept: NEUROLOGY | Facility: CLINIC | Age: 73
End: 2023-05-25
Payer: MEDICARE

## 2023-05-25 ENCOUNTER — PATIENT MESSAGE (OUTPATIENT)
Dept: INTERNAL MEDICINE | Facility: CLINIC | Age: 73
End: 2023-05-25
Payer: MEDICARE

## 2023-05-31 ENCOUNTER — TELEPHONE (OUTPATIENT)
Dept: SPORTS MEDICINE | Facility: CLINIC | Age: 73
End: 2023-05-31

## 2023-05-31 ENCOUNTER — OFFICE VISIT (OUTPATIENT)
Dept: PSYCHIATRY | Facility: CLINIC | Age: 73
End: 2023-05-31
Payer: COMMERCIAL

## 2023-05-31 ENCOUNTER — OFFICE VISIT (OUTPATIENT)
Dept: INTERNAL MEDICINE | Facility: CLINIC | Age: 73
End: 2023-05-31
Payer: MEDICARE

## 2023-05-31 ENCOUNTER — TELEPHONE (OUTPATIENT)
Dept: GASTROENTEROLOGY | Facility: CLINIC | Age: 73
End: 2023-05-31
Payer: MEDICARE

## 2023-05-31 ENCOUNTER — OFFICE VISIT (OUTPATIENT)
Dept: SPORTS MEDICINE | Facility: CLINIC | Age: 73
End: 2023-05-31
Payer: MEDICARE

## 2023-05-31 VITALS
HEART RATE: 74 BPM | WEIGHT: 239.19 LBS | OXYGEN SATURATION: 99 % | BODY MASS INDEX: 43.75 KG/M2 | SYSTOLIC BLOOD PRESSURE: 130 MMHG | DIASTOLIC BLOOD PRESSURE: 66 MMHG | TEMPERATURE: 97 F

## 2023-05-31 DIAGNOSIS — N76.0 ACUTE VAGINITIS: ICD-10-CM

## 2023-05-31 DIAGNOSIS — R26.9 GAIT ABNORMALITY: ICD-10-CM

## 2023-05-31 DIAGNOSIS — Z79.4 CONTROLLED TYPE 2 DIABETES MELLITUS WITH OTHER CIRCULATORY COMPLICATION, WITH LONG-TERM CURRENT USE OF INSULIN: ICD-10-CM

## 2023-05-31 DIAGNOSIS — M19.012 PRIMARY OSTEOARTHRITIS OF LEFT SHOULDER: ICD-10-CM

## 2023-05-31 DIAGNOSIS — R39.9 URINARY SYMPTOM OR SIGN: ICD-10-CM

## 2023-05-31 DIAGNOSIS — E11.59 HYPERTENSION ASSOCIATED WITH DIABETES: ICD-10-CM

## 2023-05-31 DIAGNOSIS — M25.859 FEMORAL ACETABULAR IMPINGEMENT: ICD-10-CM

## 2023-05-31 DIAGNOSIS — Z79.01 ANTICOAGULATED: ICD-10-CM

## 2023-05-31 DIAGNOSIS — E66.01 MORBID OBESITY WITH BMI OF 40.0-44.9, ADULT: ICD-10-CM

## 2023-05-31 DIAGNOSIS — E11.59 CONTROLLED TYPE 2 DIABETES MELLITUS WITH OTHER CIRCULATORY COMPLICATION, WITH LONG-TERM CURRENT USE OF INSULIN: ICD-10-CM

## 2023-05-31 DIAGNOSIS — G47.00 INSOMNIA, UNSPECIFIED TYPE: ICD-10-CM

## 2023-05-31 DIAGNOSIS — I48.0 PAROXYSMAL ATRIAL FIBRILLATION: ICD-10-CM

## 2023-05-31 DIAGNOSIS — F33.1 DEPRESSION, MAJOR, RECURRENT, MODERATE: Primary | ICD-10-CM

## 2023-05-31 DIAGNOSIS — N32.81 OVERACTIVE BLADDER: Primary | ICD-10-CM

## 2023-05-31 DIAGNOSIS — F41.9 ANXIETY DISORDER, UNSPECIFIED TYPE: ICD-10-CM

## 2023-05-31 DIAGNOSIS — I15.2 HYPERTENSION ASSOCIATED WITH DIABETES: ICD-10-CM

## 2023-05-31 DIAGNOSIS — M16.0 BILATERAL PRIMARY OSTEOARTHRITIS OF HIP: Primary | ICD-10-CM

## 2023-05-31 LAB
BILIRUB UR QL STRIP: NEGATIVE
CLARITY UR: CLEAR
COLOR UR: YELLOW
GLUCOSE UR QL STRIP: NEGATIVE
HGB UR QL STRIP: NEGATIVE
KETONES UR QL STRIP: NEGATIVE
LEUKOCYTE ESTERASE UR QL STRIP: NEGATIVE
NITRITE UR QL STRIP: NEGATIVE
PH UR STRIP: 6 [PH] (ref 5–8)
PROT UR QL STRIP: NEGATIVE
SP GR UR STRIP: 1.02 (ref 1–1.03)
URN SPEC COLLECT METH UR: NORMAL
UROBILINOGEN UR STRIP-ACNC: NEGATIVE EU/DL

## 2023-05-31 PROCEDURE — 3051F PR MOST RECENT HEMOGLOBIN A1C LEVEL 7.0 - < 8.0%: ICD-10-PCS | Mod: CPTII,S$GLB,,

## 2023-05-31 PROCEDURE — 4010F ACE/ARB THERAPY RXD/TAKEN: CPT | Mod: CPTII,S$GLB,,

## 2023-05-31 PROCEDURE — 1159F MED LIST DOCD IN RCRD: CPT | Mod: CPTII,S$GLB,,

## 2023-05-31 PROCEDURE — 3075F SYST BP GE 130 - 139MM HG: CPT | Mod: CPTII,S$GLB,,

## 2023-05-31 PROCEDURE — 1101F PT FALLS ASSESS-DOCD LE1/YR: CPT | Mod: CPTII,S$GLB,,

## 2023-05-31 PROCEDURE — 1101F PR PT FALLS ASSESS DOC 0-1 FALLS W/OUT INJ PAST YR: ICD-10-PCS | Mod: CPTII,S$GLB,,

## 2023-05-31 PROCEDURE — 81003 URINALYSIS AUTO W/O SCOPE: CPT

## 2023-05-31 PROCEDURE — 99999 PR PBB SHADOW E&M-EST. PATIENT-LVL V: CPT | Mod: PBBFAC,,,

## 2023-05-31 PROCEDURE — 99215 OFFICE O/P EST HI 40 MIN: CPT | Mod: S$GLB,,, | Performed by: PSYCHIATRY & NEUROLOGY

## 2023-05-31 PROCEDURE — 99214 PR OFFICE/OUTPT VISIT, EST, LEVL IV, 30-39 MIN: ICD-10-PCS | Mod: S$GLB,,,

## 2023-05-31 PROCEDURE — 99214 PR OFFICE/OUTPT VISIT, EST, LEVL IV, 30-39 MIN: ICD-10-PCS | Mod: S$GLB,,, | Performed by: STUDENT IN AN ORGANIZED HEALTH CARE EDUCATION/TRAINING PROGRAM

## 2023-05-31 PROCEDURE — 99499 RISK ADDL DX/OHS AUDIT: ICD-10-PCS | Mod: S$GLB,,, | Performed by: PSYCHIATRY & NEUROLOGY

## 2023-05-31 PROCEDURE — 99999 PR PBB SHADOW E&M-EST. PATIENT-LVL I: CPT | Mod: PBBFAC,,, | Performed by: PSYCHIATRY & NEUROLOGY

## 2023-05-31 PROCEDURE — 1126F AMNT PAIN NOTED NONE PRSNT: CPT | Mod: CPTII,S$GLB,,

## 2023-05-31 PROCEDURE — 99214 OFFICE O/P EST MOD 30 MIN: CPT | Mod: S$GLB,,, | Performed by: STUDENT IN AN ORGANIZED HEALTH CARE EDUCATION/TRAINING PROGRAM

## 2023-05-31 PROCEDURE — 3072F LOW RISK FOR RETINOPATHY: CPT | Mod: CPTII,S$GLB,,

## 2023-05-31 PROCEDURE — 99214 OFFICE O/P EST MOD 30 MIN: CPT | Mod: S$GLB,,,

## 2023-05-31 PROCEDURE — 99499 UNLISTED E&M SERVICE: CPT | Mod: S$GLB,,, | Performed by: STUDENT IN AN ORGANIZED HEALTH CARE EDUCATION/TRAINING PROGRAM

## 2023-05-31 PROCEDURE — 3072F LOW RISK FOR RETINOPATHY: CPT | Mod: CPTII,S$GLB,, | Performed by: STUDENT IN AN ORGANIZED HEALTH CARE EDUCATION/TRAINING PROGRAM

## 2023-05-31 PROCEDURE — 99999 PR PBB SHADOW E&M-EST. PATIENT-LVL IV: CPT | Mod: PBBFAC,,, | Performed by: STUDENT IN AN ORGANIZED HEALTH CARE EDUCATION/TRAINING PROGRAM

## 2023-05-31 PROCEDURE — 3008F BODY MASS INDEX DOCD: CPT | Mod: CPTII,S$GLB,,

## 2023-05-31 PROCEDURE — 99499 UNLISTED E&M SERVICE: CPT | Mod: S$GLB,,, | Performed by: PSYCHIATRY & NEUROLOGY

## 2023-05-31 PROCEDURE — 99999 PR PBB SHADOW E&M-EST. PATIENT-LVL I: ICD-10-PCS | Mod: PBBFAC,,, | Performed by: PSYCHIATRY & NEUROLOGY

## 2023-05-31 PROCEDURE — 3075F PR MOST RECENT SYSTOLIC BLOOD PRESS GE 130-139MM HG: ICD-10-PCS | Mod: CPTII,S$GLB,,

## 2023-05-31 PROCEDURE — 4010F PR ACE/ARB THEARPY RXD/TAKEN: ICD-10-PCS | Mod: CPTII,S$GLB,, | Performed by: PSYCHIATRY & NEUROLOGY

## 2023-05-31 PROCEDURE — 4010F PR ACE/ARB THEARPY RXD/TAKEN: ICD-10-PCS | Mod: CPTII,S$GLB,,

## 2023-05-31 PROCEDURE — 3288F FALL RISK ASSESSMENT DOCD: CPT | Mod: CPTII,S$GLB,,

## 2023-05-31 PROCEDURE — 4010F ACE/ARB THERAPY RXD/TAKEN: CPT | Mod: CPTII,S$GLB,, | Performed by: STUDENT IN AN ORGANIZED HEALTH CARE EDUCATION/TRAINING PROGRAM

## 2023-05-31 PROCEDURE — 3288F PR FALLS RISK ASSESSMENT DOCUMENTED: ICD-10-PCS | Mod: CPTII,S$GLB,,

## 2023-05-31 PROCEDURE — 99499 RISK ADDL DX/OHS AUDIT: ICD-10-PCS | Mod: S$GLB,,, | Performed by: STUDENT IN AN ORGANIZED HEALTH CARE EDUCATION/TRAINING PROGRAM

## 2023-05-31 PROCEDURE — 3051F PR MOST RECENT HEMOGLOBIN A1C LEVEL 7.0 - < 8.0%: ICD-10-PCS | Mod: CPTII,S$GLB,, | Performed by: STUDENT IN AN ORGANIZED HEALTH CARE EDUCATION/TRAINING PROGRAM

## 2023-05-31 PROCEDURE — 3078F PR MOST RECENT DIASTOLIC BLOOD PRESSURE < 80 MM HG: ICD-10-PCS | Mod: CPTII,S$GLB,,

## 2023-05-31 PROCEDURE — 3008F PR BODY MASS INDEX (BMI) DOCUMENTED: ICD-10-PCS | Mod: CPTII,S$GLB,,

## 2023-05-31 PROCEDURE — 1126F PR PAIN SEVERITY QUANTIFIED, NO PAIN PRESENT: ICD-10-PCS | Mod: CPTII,S$GLB,,

## 2023-05-31 PROCEDURE — 99999 PR PBB SHADOW E&M-EST. PATIENT-LVL V: ICD-10-PCS | Mod: PBBFAC,,,

## 2023-05-31 PROCEDURE — 1159F PR MEDICATION LIST DOCUMENTED IN MEDICAL RECORD: ICD-10-PCS | Mod: CPTII,S$GLB,,

## 2023-05-31 PROCEDURE — 99215 PR OFFICE/OUTPT VISIT, EST, LEVL V, 40-54 MIN: ICD-10-PCS | Mod: S$GLB,,, | Performed by: PSYCHIATRY & NEUROLOGY

## 2023-05-31 PROCEDURE — 3072F PR LOW RISK FOR RETINOPATHY: ICD-10-PCS | Mod: CPTII,S$GLB,, | Performed by: PSYCHIATRY & NEUROLOGY

## 2023-05-31 PROCEDURE — 3072F PR LOW RISK FOR RETINOPATHY: ICD-10-PCS | Mod: CPTII,S$GLB,,

## 2023-05-31 PROCEDURE — 3051F HG A1C>EQUAL 7.0%<8.0%: CPT | Mod: CPTII,S$GLB,,

## 2023-05-31 PROCEDURE — 4010F ACE/ARB THERAPY RXD/TAKEN: CPT | Mod: CPTII,S$GLB,, | Performed by: PSYCHIATRY & NEUROLOGY

## 2023-05-31 PROCEDURE — 3072F LOW RISK FOR RETINOPATHY: CPT | Mod: CPTII,S$GLB,, | Performed by: PSYCHIATRY & NEUROLOGY

## 2023-05-31 PROCEDURE — 3051F PR MOST RECENT HEMOGLOBIN A1C LEVEL 7.0 - < 8.0%: ICD-10-PCS | Mod: CPTII,S$GLB,, | Performed by: PSYCHIATRY & NEUROLOGY

## 2023-05-31 PROCEDURE — 3051F HG A1C>EQUAL 7.0%<8.0%: CPT | Mod: CPTII,S$GLB,, | Performed by: PSYCHIATRY & NEUROLOGY

## 2023-05-31 PROCEDURE — 3072F PR LOW RISK FOR RETINOPATHY: ICD-10-PCS | Mod: CPTII,S$GLB,, | Performed by: STUDENT IN AN ORGANIZED HEALTH CARE EDUCATION/TRAINING PROGRAM

## 2023-05-31 PROCEDURE — 3051F HG A1C>EQUAL 7.0%<8.0%: CPT | Mod: CPTII,S$GLB,, | Performed by: STUDENT IN AN ORGANIZED HEALTH CARE EDUCATION/TRAINING PROGRAM

## 2023-05-31 PROCEDURE — 4010F PR ACE/ARB THEARPY RXD/TAKEN: ICD-10-PCS | Mod: CPTII,S$GLB,, | Performed by: STUDENT IN AN ORGANIZED HEALTH CARE EDUCATION/TRAINING PROGRAM

## 2023-05-31 PROCEDURE — 99999 PR PBB SHADOW E&M-EST. PATIENT-LVL IV: ICD-10-PCS | Mod: PBBFAC,,, | Performed by: STUDENT IN AN ORGANIZED HEALTH CARE EDUCATION/TRAINING PROGRAM

## 2023-05-31 PROCEDURE — 3078F DIAST BP <80 MM HG: CPT | Mod: CPTII,S$GLB,,

## 2023-05-31 RX ORDER — TEMAZEPAM 15 MG/1
15 CAPSULE ORAL NIGHTLY
Qty: 30 CAPSULE | Refills: 1 | Status: SHIPPED | OUTPATIENT
Start: 2023-05-31 | End: 2023-07-21 | Stop reason: SDUPTHER

## 2023-05-31 RX ORDER — MIRTAZAPINE 7.5 MG/1
7.5 TABLET, FILM COATED ORAL NIGHTLY
Qty: 90 TABLET | Refills: 0 | Status: SHIPPED | OUTPATIENT
Start: 2023-05-31 | End: 2023-07-21 | Stop reason: SINTOL

## 2023-05-31 RX ORDER — FLUCONAZOLE 150 MG/1
150 TABLET ORAL DAILY
Qty: 1 TABLET | Refills: 0 | Status: SHIPPED | OUTPATIENT
Start: 2023-05-31 | End: 2023-06-01

## 2023-05-31 NOTE — TELEPHONE ENCOUNTER
I spoke with patient, she is going to hold off on home health at the moment, she said that she's just going to ice and do conservative treatment. She will F/U with Dr. Esposito in 3 months.   I told her to call our office if she needed anything or if she decides to do PT  Patient VU

## 2023-05-31 NOTE — PROGRESS NOTES
Christine Jo  05/31/2023  [id number]    Jose Szymanski MD  Patient Care Team:  Jose Szymanski MD as PCP - General (Family Medicine)  Klaus Shelton OD as Consulting Physician (Optometry)  Kevin Singh MD as Consulting Physician (Interventional Cardiology)  Adriane Watts NP as Nurse Practitioner (Pulmonary Disease)  Levi Albrecht MD as Consulting Physician (Psychiatry)  Jered Holbrook MD as Consulting Physician (Rheumatology)  Shawn Rogers III, MD (Inactive) as Consulting Physician (Gastroenterology)  Shawn Rogers III, MD (Inactive) as Consulting Physician (Gastroenterology)          Visit Type:an urgent visit for a new problem    Chief Complaint:  Chief Complaint   Patient presents with    Urinary Frequency     Getting up 4 times or more a night. Slight burning. Not sure if its a UTI or yeast.        History of Present Illness:    Having some urinary frequency and dysuria  Started a few months ago  She has been seeing a Uro/GYN at [facility], Dr. Worthy   Wanted to perform a bladder suspension?  Would like to see urology at [facility]   Pt states during the day she does have difficulty starting her urine stream  Offered Flomax? Pt did not want to take the medication     DM  Lab Results   Component Value Date    HGBA1C 7.1 (H) 02/07/2023    BG have been well controlled     History:  Past Medical History:   Diagnosis Date    Arthritis     Cataract     Diabetes mellitus 2008     am 01/15/2018 Insulin x 1 year    DM (diabetes mellitus) 2008     am 02/14/2020 Insulin x 4 years    Encounter for blood transfusion     Glaucoma     Hypertension     Insomnia     Macular degeneration     Vaginal yeast infection      Past Surgical History:   Procedure Laterality Date    abdominal laparoscopy       BREAST BIOPSY Left 1998    benign    CATARACT EXTRACTION Bilateral [id number]    [facility] in [place]    COLONOSCOPY N/A 05/05/2021    Procedure: COLONOSCOPY;  Surgeon: Shawn Rogers III, MD;   Location: [facility];  Service: Endoscopy;  Laterality: N/A;    COLONOSCOPY N/A 2021    Procedure: COLONOSCOPY;  Surgeon: Memo Merida MD;  Location: [facility];  Service: Endoscopy;  Laterality: N/A;    ESOPHAGOGASTRODUODENOSCOPY N/A 2019    Procedure: ESOPHAGOGASTRODUODENOSCOPY (EGD);  Surgeon: Shawn Rogers III, MD;  Location: [facility] ENDO;  Service: Endoscopy;  Laterality: N/A;    ESOPHAGOGASTRODUODENOSCOPY N/A 2021    Procedure: EGD (ESOPHAGOGASTRODUODENOSCOPY);  Surgeon: Shawn Rogers III, MD;  Location: [facility] ENDO;  Service: Endoscopy;  Laterality: N/A;    EYE SURGERY      TONSILLECTOMY      TUBAL LIGATION      yag  Bilateral      Family History   Problem Relation Age of Onset    Heart disease Mother         CAD     Cataracts Mother     Macular degeneration Mother     Glaucoma Mother     Cancer Son         testicular     Cancer Maternal Aunt         Lung ca    Heart disease Maternal Grandfather         Pacemaker     Diabetes Sister     Heart disease Sister         CAD    Cataracts Sister     Diabetes Sister     Diabetes Sister      Social History     Socioeconomic History    Marital status:     Number of children: 3   Occupational History    Occupation: Retired   Tobacco Use    Smoking status: Former     Packs/day: 1.00     Years: 36.00     Pack years: 36.00     Types: Cigarettes     Start date:      Quit date: 2003     Years since quittin.9     Passive exposure: Never    Smokeless tobacco: Never   Substance and Sexual Activity    Alcohol use: No    Drug use: No    Sexual activity: Never     Social Determinants of Health     Financial Resource Strain: Low Risk     Difficulty of Paying Living Expenses: Not hard at all   Food Insecurity: No Food Insecurity    Worried About Running Out of Food in the Last Year: Never true    Ran Out of Food in the Last Year: Never true   Transportation Needs: Unmet Transportation Needs    Lack of Transportation (Medical): Yes    Lack  of Transportation (Non-Medical): No   Physical Activity: Insufficiently Active    Days of Exercise per Week: 7 days    Minutes of Exercise per Session: 10 min   Stress: No Stress Concern Present    Feeling of Stress : Only a little   Social Connections: Moderately Isolated    Frequency of Communication with Friends and Family: More than three times a week    Frequency of Social Gatherings with Friends and Family: Never    Attends [employer] Services: 1 to 4 times per year    Active Member of Clubs or Organizations: No    Attends Club or Organization Meetings: Never    Marital Status:    Housing Stability: Low Risk     Unable to Pay for Housing in the Last Year: No    Number of Places Lived in the Last Year: 2    Unstable Housing in the Last Year: No     Patient Active Problem List   Diagnosis    Controlled type 2 diabetes mellitus with diabetic polyneuropathy, with long-term current use of insulin    Class 3 severe obesity due to excess calories with serious comorbidity and body mass index (BMI) of 45.0 to 49.9 in adult    Paroxysmal atrial fibrillation    Insomnia    Gastroesophageal reflux disease without esophagitis    Recurrent major depressive disorder    Primary hypertension    Age-related osteoporosis without current pathological fracture    Iron deficiency anemia due to chronic blood loss    History of obstructive sleep apnea    Recurrent major depressive disorder, in full remission    Cervical neuropathy    Neck pain    Chronic bilateral low back pain with bilateral sciatica    Spondylosis of cervical region without myelopathy or radiculopathy    Morbid obesity with BMI of 40.0-44.9, adult    Dysphagia    Immunization deficiency    Urinary tract infection without hematuria    Uncontrolled type 2 diabetes mellitus with hyperglycemia    Clavicular cyst    Chronic allergic rhinitis    Hoarseness    Hearing loss    Depression    Breast screening    Anemia    Vertigo    Depression, major, recurrent,  moderate    Anxiety disorder    Coronary artery disease of native artery of native heart with stable angina pectoris    GILL (dyspnea on exertion)    Palpitations    Abnormal ECG    Abdominal aortic aneurysm (AAA) without rupture    Hearing loss of left ear    Rectal bleeding    Pain in both lower extremities    Tinnitus    Unsteadiness on feet     Altered mental status    Confusion    Disequilibrium    Memory loss    Vestibular migraine    Aorto-iliac atherosclerosis    Carotid stenosis    Dizziness and giddiness    Fatigue    Chronic idiopathic constipation    Multiple neurological symptoms    Migraine with aura and with status migrainosus, not intractable     Review of patient's allergies indicates:   Allergen Reactions    Aspirin Palpitations       The following were reviewed at this visit: active problem list, medication list, allergies, family history, social history, and health maintenance.    Medications:  Current Outpatient Medications on File Prior to Visit   Medication Sig Dispense Refill    ACETAMINOPHEN (TYLENOL ARTHRITIS ORAL) Take by mouth as needed.      apixaban (ELIQUIS) 5 mg Tab Take 1 tablet (5 mg total) by mouth 2 (two) times daily. 180 tablet 0    azelastine (ASTELIN) 137 mcg (0.1 %) nasal spray use 2 sprays (274 mcg total) by Nasal route 2 (two) times daily. 30 mL 1    benazepriL (LOTENSIN) 40 MG tablet Take 1 tablet (40 mg total) by mouth once daily. 90 tablet 3    BEPREVE 1.5 % Drop Place 1 drop into both eyes 2 (two) times daily. 10 mL 4    blood sugar diagnostic Strp To check blood sugar 1 times daily 100 each 3    blood-glucose meter kit To check BG 2 times daily, to use with insurance preferred meter 1 each 0    blood-glucose meter Misc use daily to test blood sugar 1 each 0    calcium citrate-vitamin D3 315-200 mg (CITRACAL+D) 315-200 mg-unit per tablet Take 1 tablet by mouth once daily.      carvediloL (COREG) 25 MG tablet TAKE 1 TABLET BY MOUTH TWICE DAILY 180 tablet 3     "clotrimazole-betamethasone (LOTRISONE) lotion Apply topically to the affected area 2 (two) times daily. 30 mL 2    cycloSPORINE (RESTASIS) 0.05 % ophthalmic emulsion Place 1 drop into both eyes 2 (two) times daily. 60 each 11    diclofenac sodium (VOLTAREN) 1 % Gel Apply 2 grams topically 4 (four) times daily. To affected joints as needed for pain 100 g 3    DULoxetine (CYMBALTA) 60 MG capsule Take 1 capsule (60 mg total) by mouth once daily. 90 capsule 1    fluticasone propionate (FLONASE) 50 mcg/actuation nasal spray 2 sprays (100 mcg total) by Each Nostril route once daily. 48 g 1    gabapentin (NEURONTIN) 300 MG capsule Take 1 capsule (300 mg total) by mouth 3 (three) times daily. 90 capsule 11    galcanezumab-gnlm (EMGALITY SYRINGE) 120 mg/mL Syrg Inject 120 mg into the skin every 28 days. 1 mL 12    hydrALAZINE (APRESOLINE) 50 MG tablet Take 1 tablet (50 mg total) by mouth every 8 (eight) hours. 180 tablet 3    hydrocortisone 2.5 % cream Apply topically 2 (two) times daily. 28 g 2    insulin (LANTUS SOLOSTAR U-100 INSULIN) glargine 100 units/mL SubQ pen Inject 72 Units into the skin every evening. 75 mL 1    lancets Misc To check BG 1 times daily 100 each 3    meclizine (ANTIVERT) 25 mg tablet Take 1 tablet (25 mg total) by mouth 3 (three) times daily as needed. 30 tablet 2    mirtazapine (REMERON) 7.5 MG Tab Take 1 tablet (7.5 mg total) by mouth every evening. 90 tablet 0    MULTIVIT WITH CALCIUM,IRON,MIN (WOMEN'S DAILY MULTIVITAMIN ORAL) Take by mouth once daily.       nystatin (MYCOSTATIN) cream Apply topically 2 (two) times daily. Continue until completely healed 30 g 0    pantoprazole (PROTONIX) 40 MG tablet Take 1 tablet (40 mg total) by mouth 2 (two) times a day. 180 tablet 3    pen needle, diabetic (BD ULTRA-FINE SHORT PEN NEEDLE) 31 gauge x 5/16" Ndle AS DIRECTED TWICE DAILY 200 each 3    rimegepant 75 mg odt Take 1 tablet (75 mg total) by mouth as needed for Migraine (do not exceed 2-3 doses within 1 " week). Place ODT tablet on the tongue; alternatively the ODT tablet may be placed under the tongue 8 tablet 5    SITagliptin phosphate (JANUVIA) 100 MG Tab Take 1 tablet (100 mg total) by mouth once daily. 90 tablet 1    temazepam (RESTORIL) 15 mg Cap Take 1 capsule (15 mg total) by mouth every evening. 30 capsule 1    [DISCONTINUED] lamoTRIgine (LAMICTAL) 25 mg TChD Take 1 tablet by mouth once daily. 30 tablet 11    [DISCONTINUED] temazepam (RESTORIL) 30 mg capsule Take 1 capsule by mouth at bedtime 30 capsule 3     No current facility-administered medications on file prior to visit.       Medications have been reviewed and reconciled with patient at this visit.  Barriers to medications reviewed with patient.    Adverse reactions to current medications reviewed with patient..    Over the counter medications reviewed and reconciled with patient.    Exam:  Wt Readings from Last 3 Encounters:   05/16/23 109.2 kg (240 lb 11.9 oz)   05/02/23 109.7 kg (241 lb 13.5 oz)   04/25/23 108.9 kg (240 lb 1.3 oz)     Temp Readings from Last 3 Encounters:   04/25/23 98.8 °F (37.1 °C) (Temporal)   04/11/23 98.8 °F (37.1 °C)   02/07/23 98.2 °F (36.8 °C) (Temporal)     BP Readings from Last 3 Encounters:   05/16/23 137/66   05/02/23 109/64   04/25/23 122/60     Pulse Readings from Last 3 Encounters:   05/16/23 73   05/02/23 78   04/25/23 77     There is no height or weight on file to calculate BMI.      Review of Systems   Genitourinary:  Positive for dysuria, frequency and urgency.   Physical Exam  Vitals and nursing note reviewed.   Constitutional:       General: She is not in acute distress.     Appearance: She is well-developed. She is morbidly obese. She is not diaphoretic.   HENT:      Head: Normocephalic and atraumatic.      Right Ear: External ear normal.      Left Ear: External ear normal.   Eyes:      General:         Right eye: No discharge.         Left eye: No discharge.      Conjunctiva/sclera: Conjunctivae normal.       Pupils: Pupils are equal, round, and reactive to light.   Neck:      Thyroid: No thyromegaly.   Cardiovascular:      Rate and Rhythm: Normal rate and regular rhythm.      Heart sounds: Normal heart sounds. No murmur heard.  Pulmonary:      Effort: Pulmonary effort is normal. No respiratory distress.      Breath sounds: Normal breath sounds. No wheezing.   Abdominal:      General: Bowel sounds are normal. There is no distension.      Tenderness: There is no abdominal tenderness. There is no right CVA tenderness or left CVA tenderness.   Musculoskeletal:      Comments: Using assistive device at visit    Neurological:      Mental Status: She is alert.      Motor: Weakness present.      Gait: Gait abnormal.   Psychiatric:         Behavior: Behavior normal.         Thought Content: Thought content normal.         Judgment: Judgment normal.       Laboratory Reviewed ({Yes)  Lab Results   Component Value Date    WBC 7.83 02/22/2023    HGB 11.5 (L) 02/22/2023    HCT 35.0 (L) 02/22/2023     02/22/2023    CHOL 164 10/04/2022    TRIG 125 10/04/2022    HDL 46 10/04/2022    ALT 16 02/07/2023    AST 16 02/07/2023     02/07/2023    K 4.5 02/07/2023     02/07/2023    CREATININE 0.9 02/07/2023    BUN 20 02/07/2023    CO2 24 02/07/2023    TSH 1.024 10/04/2022    GLUF 180 (H) 06/28/2021    HGBA1C 7.1 (H) 02/07/2023       Christine was seen today for urinary frequency.    Diagnoses and all orders for this visit:    Overactive bladder  -     Ambulatory referral/consult to Urology; Future    Urinary symptom or sign  -     Urinalysis, Reflex to Urine Culture; Future  -     Urinalysis, Reflex to Urine Culture    Morbid obesity with BMI of 40.0-44.9, adult  Encouraged healthy diet and exercise as tolerated to help bring BMI into normal range.      Controlled type 2 diabetes mellitus with other circulatory complication, with long-term current use of insulin  Stable on medication. Continue current Plan of Care       Hypertension associated with diabetes  At visit, Blood pressure is at goal. Continue current medications    Paroxysmal atrial fibrillation    Anticoagulated    Acute vaginitis  -     fluconazole (DIFLUCAN) 150 MG Tab; Take 1 tablet (150 mg total) by mouth once daily. for 1 day    Gait abnormality  Patient is using an assistive device at visit. Disused fall prevention          LATASHA signed at visit for Dr. Herbert with urology     Denies S/S of bleeding at visit     In office UA does not show a UTI   Has an eye exam scheduled for August     Care Plan/Goals: Reviewed    Goals    None         Follow up: No follow-ups on file.    After visit summary was printed and given to patient upon discharge today.  Patient goals and care plan are included in After Visit Summary.
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
## 2023-05-31 NOTE — TELEPHONE ENCOUNTER
Faxed and received email confirmation of receipt for Home Health PT orders to [facility].

## 2023-05-31 NOTE — PROGRESS NOTES
Patient ID: Christine Jo  YOB: 1950  MRN: [id number]    Chief Complaint: Pain of the Left Hip and Pain of the Right Hip      Referred By: self    History of Present Illness: Christine Jo is a right-hand dominant 73 y.o. female who presents today with bilateral hip pain.  Pain level 4/10.  Alleviating factors include nothing.  She feels constant achy, shooting pain in the buttocks area.  Aggravating factors include direct contact as well as sleeping on it at night. She has been taking tylenol arthritis to alleviate pain.    The patient is active in none.  Occupation: retired      Past Medical History:   Past Medical History:   Diagnosis Date    Arthritis     Cataract     Diabetes mellitus 2008     am 01/15/2018 Insulin x 1 year    DM (diabetes mellitus) 2008     am 02/14/2020 Insulin x 4 years    Encounter for blood transfusion     Glaucoma     Hypertension     Insomnia     Macular degeneration     Vaginal yeast infection      Past Surgical History:   Procedure Laterality Date    abdominal laparoscopy       BREAST BIOPSY Left 1998    benign    CATARACT EXTRACTION Bilateral [id number]    [facility] in [place]    COLONOSCOPY N/A 05/05/2021    Procedure: COLONOSCOPY;  Surgeon: Shawn Rogers III, MD;  Location: [facility];  Service: Endoscopy;  Laterality: N/A;    COLONOSCOPY N/A 06/30/2021    Procedure: COLONOSCOPY;  Surgeon: Memo Merida MD;  Location: [facility];  Service: Endoscopy;  Laterality: N/A;    ESOPHAGOGASTRODUODENOSCOPY N/A 11/29/2019    Procedure: ESOPHAGOGASTRODUODENOSCOPY (EGD);  Surgeon: Shawn Rogers III, MD;  Location: [facility];  Service: Endoscopy;  Laterality: N/A;    ESOPHAGOGASTRODUODENOSCOPY N/A 05/05/2021    Procedure: EGD (ESOPHAGOGASTRODUODENOSCOPY);  Surgeon: Shawn Rogers III, MD;  Location: [facility];  Service: Endoscopy;  Laterality: N/A;    EYE SURGERY      TONSILLECTOMY      TUBAL LIGATION      yag  Bilateral      Family  History   Problem Relation Age of Onset    Heart disease Mother         CAD     Cataracts Mother     Macular degeneration Mother     Glaucoma Mother     Cancer Son         testicular     Cancer Maternal Aunt         Lung ca    Heart disease Maternal Grandfather         Pacemaker     Diabetes Sister     Heart disease Sister         CAD    Cataracts Sister     Diabetes Sister     Diabetes Sister      Social History     Socioeconomic History    Marital status:     Number of children: 3   Occupational History    Occupation: Retired   Tobacco Use    Smoking status: Former     Packs/day: 1.00     Years: 36.00     Pack years: 36.00     Types: Cigarettes     Start date:      Quit date: 2003     Years since quittin.9     Passive exposure: Never    Smokeless tobacco: Never   Substance and Sexual Activity    Alcohol use: No    Drug use: No    Sexual activity: Never     Social Determinants of Health     Financial Resource Strain: Low Risk     Difficulty of Paying Living Expenses: Not hard at all   Food Insecurity: No Food Insecurity    Worried About Running Out of Food in the Last Year: Never true    Ran Out of Food in the Last Year: Never true   Transportation Needs: Unmet Transportation Needs    Lack of Transportation (Medical): Yes    Lack of Transportation (Non-Medical): No   Physical Activity: Insufficiently Active    Days of Exercise per Week: 7 days    Minutes of Exercise per Session: 10 min   Stress: No Stress Concern Present    Feeling of Stress : Only a little   Social Connections: Moderately Isolated    Frequency of Communication with Friends and Family: More than three times a week    Frequency of Social Gatherings with Friends and Family: Never    Attends [employer] Services: 1 to 4 times per year    Active Member of Clubs or Organizations: No    Attends Club or Organization Meetings: Never    Marital Status:    Housing Stability: Low Risk     Unable to Pay for Housing in the Last Year:  No    Number of Places Lived in the Last Year: 2    Unstable Housing in the Last Year: No     Medication List with Changes/Refills   Current Medications    ACETAMINOPHEN (TYLENOL ARTHRITIS ORAL)    Take by mouth as needed.    APIXABAN (ELIQUIS) 5 MG TAB    Take 1 tablet (5 mg total) by mouth 2 (two) times daily.    AZELASTINE (ASTELIN) 137 MCG (0.1 %) NASAL SPRAY    use 2 sprays (274 mcg total) by Nasal route 2 (two) times daily.    BENAZEPRIL (LOTENSIN) 40 MG TABLET    Take 1 tablet (40 mg total) by mouth once daily.    BEPREVE 1.5 % DROP    Place 1 drop into both eyes 2 (two) times daily.    BLOOD SUGAR DIAGNOSTIC STRP    To check blood sugar 1 times daily    BLOOD-GLUCOSE METER KIT    To check BG 2 times daily, to use with insurance preferred meter    BLOOD-GLUCOSE METER MISC    use daily to test blood sugar    CALCIUM CITRATE-VITAMIN D3 315-200 MG (CITRACAL+D) 315-200 MG-UNIT PER TABLET    Take 1 tablet by mouth once daily.    CARVEDILOL (COREG) 25 MG TABLET    TAKE 1 TABLET BY MOUTH TWICE DAILY    CLOTRIMAZOLE-BETAMETHASONE (LOTRISONE) LOTION    Apply topically to the affected area 2 (two) times daily.    CYCLOSPORINE (RESTASIS) 0.05 % OPHTHALMIC EMULSION    Place 1 drop into both eyes 2 (two) times daily.    DICLOFENAC SODIUM (VOLTAREN) 1 % GEL    Apply 2 grams topically 4 (four) times daily. To affected joints as needed for pain    DULOXETINE (CYMBALTA) 60 MG CAPSULE    Take 1 capsule (60 mg total) by mouth once daily.    FLUTICASONE PROPIONATE (FLONASE) 50 MCG/ACTUATION NASAL SPRAY    2 sprays (100 mcg total) by Each Nostril route once daily.    GABAPENTIN (NEURONTIN) 300 MG CAPSULE    Take 1 capsule (300 mg total) by mouth 3 (three) times daily.    GALCANEZUMAB-GNLM (EMGALITY SYRINGE) 120 MG/ML SYRG    Inject 120 mg into the skin every 28 days.    HYDRALAZINE (APRESOLINE) 50 MG TABLET    Take 1 tablet (50 mg total) by mouth every 8 (eight) hours.    HYDROCORTISONE 2.5 % CREAM    Apply topically 2 (two)  "times daily.    INSULIN (LANTUS SOLOSTAR U-100 INSULIN) GLARGINE 100 UNITS/ML SUBQ PEN    Inject 72 Units into the skin every evening.    LANCETS MISC    To check BG 1 times daily    MECLIZINE (ANTIVERT) 25 MG TABLET    Take 1 tablet (25 mg total) by mouth 3 (three) times daily as needed.    MIRTAZAPINE (REMERON) 7.5 MG TAB    Take 1 tablet (7.5 mg total) by mouth every evening.    MULTIVIT WITH CALCIUM,IRON,MIN (WOMEN'S DAILY MULTIVITAMIN ORAL)    Take by mouth once daily.     NYSTATIN (MYCOSTATIN) CREAM    Apply topically 2 (two) times daily. Continue until completely healed    PANTOPRAZOLE (PROTONIX) 40 MG TABLET    Take 1 tablet (40 mg total) by mouth 2 (two) times a day.    PEN NEEDLE, DIABETIC (BD ULTRA-FINE SHORT PEN NEEDLE) 31 GAUGE X 5/16" NDLE    AS DIRECTED TWICE DAILY    RIMEGEPANT 75 MG ODT    Take 1 tablet (75 mg total) by mouth as needed for Migraine (do not exceed 2-3 doses within 1 week). Place ODT tablet on the tongue; alternatively the ODT tablet may be placed under the tongue    SITAGLIPTIN PHOSPHATE (JANUVIA) 100 MG TAB    Take 1 tablet (100 mg total) by mouth once daily.    TEMAZEPAM (RESTORIL) 15 MG CAP    Take 1 capsule (15 mg total) by mouth every evening.     Review of patient's allergies indicates:   Allergen Reactions    Aspirin Palpitations       Physical Exam:   There is no height or weight on file to calculate BMI.    GENERAL: Well appearing, in no acute distress.  HEAD: Normocephalic and atraumatic.  ENT: External ears and nose grossly normal.  EYES: EOMI bilaterally  PULMONARY: Respirations are grossly even and non-labored.  NEURO: Awake, alert, and oriented x 3.  SKIN: No obvious rashes appreciated.  PSYCH: Mood & affect are appropriate.    Detailed MSK exam:     Left hip exam:  -ROM: internal rotation 0, external rotation 40  -ANNELIESE positive, FADIR negative, Shekhar negative  -Muscle strength 5/5 flexion, 5/5 extension, 5/5 abduction, 5/5 adduction  -negative log roll  -negative " straight leg raise  -TTP: greater trochanter, anterior groin, and SI joint    Right hip exam:  -ROM: internal rotation 0, external rotation 40  -ANNELIESE positive, FADIR negative, Shekhar negative  -Muscle strength 5/5 flexion, 5/5 extension, 5/5 abduction, 5/5 adduction  -negative log roll  -negative straight leg raise  -TTP: greater trochanter, anterior groin, and SI joint      Imaging:  X-ray Knee Ortho Bilateral with Flexion  Narrative: EXAM: XR KNEE ORTHO BILAT WITH FLEXION    CLINICAL HISTORY: Bilateral knee pain.    FINDINGS:  Bilateral demineralization or osteopenia is present.    4 views of the right knee.  Severe medial joint space narrowing with sclerosis.  Mild marginal spurring.  Mild patellofemoral joint spurring.  No joint effusion.    4 views of the left knee.  Moderate medial joint space narrowing.  Subchondral sclerosis with marginal spurring.  Mild patellofemoral joint spurring is present as well.  No joint effusion.  Impression:  Bilateral tricompartment osteophytosis of the knee, most pronounced involving the medial knee compartment of the right knee.    Finalized on: 4/25/2023 3:34 PM By:  Terry Porter MD  [facility]# [id number]      2023-04-25 15:36:08.457    [facility]        Relevant imaging results were reviewed and interpreted by me and per my read shows mild hip arthritic changes on hip radiographs.  This was discussed with the patient and / or family today.     Assessment:  Christine Jo is a 73 y.o. female presenting with bilateral hip pain.   History, physical and radiographs are consistent with a likely diagnosis of hip OA, CM, GTPS, SI joint inflammation.   Plan: home health PT referral. Consider steroid injection if not improving. Would only do one injection at a time due to diabetes but can consider greater trochanteric bursa injection or ultrasound-guided hip injection. Consider pain management referral for SI injection. Continue conservative management for pain.   Follow up 3 months. All  questions answered.      Bilateral primary osteoarthritis of hip  -     Ambulatory referral/consult to Home Health; Future; Expected date: 06/01/2023    Femoral acetabular impingement  -     Ambulatory referral/consult to Home Health; Future; Expected date: 06/01/2023    Primary osteoarthritis of left shoulder  -     Ambulatory referral/consult to Home Health; Future; Expected date: 06/01/2023           A copy of today's visit note has been sent to the referring provider.     Electronically signed:  Erik Esposito MD, MPH  05/31/2023  2:33 PM

## 2023-05-31 NOTE — PATIENT INSTRUCTIONS
Assessment:  Christine Jo is a 73 y.o. female   Chief Complaint   Patient presents with    Left Hip - Pain    Right Hip - Pain       Encounter Diagnoses   Name Primary?    Bilateral primary osteoarthritis of hip Yes    Femoral acetabular impingement     Primary osteoarthritis of left shoulder         Plan:  Referral to [facility]  Apply topical diclofenac (Voltaren) up to 4 times a day to the affected area.  It can be bought over the counter at any local pharmacy.    Please take Tylenol 1 Gram (2 extra-strength Tylenol tablets) up to 3 times a day.  Please to not take any extra doses of tylenol if you are scheduling in this manner.   Patient may use over the counter lidocaine patches as needed for pain.  Patient may use ice and heat as needed for pain every 2 hours for 15 minutes  Follow up in 3 months    Follow-up: 3 months or sooner if there are any problems between now and then.    Thank you for choosing [facility] and Dr. Erik Esposito for your orthopedic & sports medicine care. It is our goal to provide you with exceptional care that will help keep you healthy, active, and get you back in the game.    Please do not hesitate to reach out to us via email, phone, or MyChart with any questions, concerns, or feedback.    If you felt that you received exemplary care today, please consider leaving us feedback on [employer] at:  [email or web address]    If you are experiencing pain/discomfort ,or have questions after 5pm and would like to be connected to the [facility] on-call team, please call this number and specify which Sports Medicine provider is treating you: [phone]

## 2023-06-04 ENCOUNTER — PATIENT MESSAGE (OUTPATIENT)
Dept: PSYCHIATRY | Facility: CLINIC | Age: 73
End: 2023-06-04
Payer: MEDICARE

## 2023-06-05 ENCOUNTER — PATIENT MESSAGE (OUTPATIENT)
Dept: GASTROENTEROLOGY | Facility: CLINIC | Age: 73
End: 2023-06-05

## 2023-06-05 ENCOUNTER — OFFICE VISIT (OUTPATIENT)
Dept: GASTROENTEROLOGY | Facility: CLINIC | Age: 73
End: 2023-06-05
Payer: MEDICARE

## 2023-06-05 DIAGNOSIS — K64.8 INTERNAL HEMORRHOID, BLEEDING: Primary | ICD-10-CM

## 2023-06-05 DIAGNOSIS — K62.5 RECTAL BLEEDING: ICD-10-CM

## 2023-06-05 DIAGNOSIS — K59.04 CHRONIC IDIOPATHIC CONSTIPATION: ICD-10-CM

## 2023-06-05 DIAGNOSIS — K21.9 GASTROESOPHAGEAL REFLUX DISEASE WITHOUT ESOPHAGITIS: ICD-10-CM

## 2023-06-05 PROCEDURE — 3051F PR MOST RECENT HEMOGLOBIN A1C LEVEL 7.0 - < 8.0%: ICD-10-PCS | Mod: CPTII,95,, | Performed by: INTERNAL MEDICINE

## 2023-06-05 PROCEDURE — 1159F PR MEDICATION LIST DOCUMENTED IN MEDICAL RECORD: ICD-10-PCS | Mod: CPTII,95,, | Performed by: INTERNAL MEDICINE

## 2023-06-05 PROCEDURE — 99212 PR OFFICE/OUTPT VISIT, EST, LEVL II, 10-19 MIN: ICD-10-PCS | Mod: 95,,, | Performed by: INTERNAL MEDICINE

## 2023-06-05 PROCEDURE — 4010F ACE/ARB THERAPY RXD/TAKEN: CPT | Mod: CPTII,95,, | Performed by: INTERNAL MEDICINE

## 2023-06-05 PROCEDURE — 1160F PR REVIEW ALL MEDS BY PRESCRIBER/CLIN PHARMACIST DOCUMENTED: ICD-10-PCS | Mod: CPTII,95,, | Performed by: INTERNAL MEDICINE

## 2023-06-05 PROCEDURE — 3072F PR LOW RISK FOR RETINOPATHY: ICD-10-PCS | Mod: CPTII,95,, | Performed by: INTERNAL MEDICINE

## 2023-06-05 PROCEDURE — 1159F MED LIST DOCD IN RCRD: CPT | Mod: CPTII,95,, | Performed by: INTERNAL MEDICINE

## 2023-06-05 PROCEDURE — 3072F LOW RISK FOR RETINOPATHY: CPT | Mod: CPTII,95,, | Performed by: INTERNAL MEDICINE

## 2023-06-05 PROCEDURE — 1160F RVW MEDS BY RX/DR IN RCRD: CPT | Mod: CPTII,95,, | Performed by: INTERNAL MEDICINE

## 2023-06-05 PROCEDURE — 3051F HG A1C>EQUAL 7.0%<8.0%: CPT | Mod: CPTII,95,, | Performed by: INTERNAL MEDICINE

## 2023-06-05 PROCEDURE — 4010F PR ACE/ARB THEARPY RXD/TAKEN: ICD-10-PCS | Mod: CPTII,95,, | Performed by: INTERNAL MEDICINE

## 2023-06-05 PROCEDURE — 99212 OFFICE O/P EST SF 10 MIN: CPT | Mod: 95,,, | Performed by: INTERNAL MEDICINE

## 2023-06-05 RX ORDER — POLYETHYLENE GLYCOL 3350 17 G/17G
17 POWDER, FOR SOLUTION ORAL DAILY
Qty: 510 G | Refills: 3 | Status: SHIPPED | OUTPATIENT
Start: 2023-06-05 | End: 2023-09-19 | Stop reason: ALTCHOICE

## 2023-06-05 NOTE — PROGRESS NOTES
The patient location is: Home  The chief complaint leading to consultation is: Constipation and hemorrhoids    Visit type: audiovisual    Face to Face time with patient: 15 minutes of total time spent on the encounter, which includes face to face time and non-face to face time preparing to see the patient (eg, review of tests), Obtaining and/or reviewing separately obtained history, Documenting clinical information in the electronic or other health record, Independently interpreting results (not separately reported) and communicating results to the patient/family/caregiver, or Care coordination (not separately reported).   Each patient to whom he or she provides medical services by telemedicine is:  (1) informed of the relationship between the physician and patient and the respective role of any other health care provider with respect to management of the patient; and (2) notified that he or she may decline to receive medical services by telemedicine and may withdraw from such care at any time.    Notes:     Subjective:       Christine Jo is a 73 y.o. female who presents for evaluation of constipation. Onset was several months ago. Patient has been having frequent firm and pellet like stools per day. Defecation has been difficult and incomplete. Co-Morbid conditions:irritable bowel syndrome, metabolic disease, obesity, and stress. Symptoms have progressed to a point and plateaued.   Suspect rectal bleeding is from constipation and internal hemorrhoids -s/p colon for this in 6/2022 that revealed internal hemorrhoids            Chronic idiopathic constipation  Rectal bleeding: intermittently most likely from hemorrhoids.     Past Medical History:   Diagnosis Date    Arthritis     Cataract     Diabetes mellitus 2008     am 01/15/2018 Insulin x 1 year    DM (diabetes mellitus) 2008     am 02/14/2020 Insulin x 4 years    Encounter for blood transfusion     Glaucoma     Hypertension     Insomnia      Macular degeneration     Vaginal yeast infection      Past Surgical History:   Procedure Laterality Date    abdominal laparoscopy       BREAST BIOPSY Left 1998    benign    CATARACT EXTRACTION Bilateral [id number]    [facility] in [place]    COLONOSCOPY N/A 05/05/2021    Procedure: COLONOSCOPY;  Surgeon: Shawn Rogers III, MD;  Location: [facility];  Service: Endoscopy;  Laterality: N/A;    COLONOSCOPY N/A 06/30/2021    Procedure: COLONOSCOPY;  Surgeon: Memo Merida MD;  Location: [facility];  Service: Endoscopy;  Laterality: N/A;    ESOPHAGOGASTRODUODENOSCOPY N/A 11/29/2019    Procedure: ESOPHAGOGASTRODUODENOSCOPY (EGD);  Surgeon: Shawn Rogers III, MD;  Location: [facility];  Service: Endoscopy;  Laterality: N/A;    ESOPHAGOGASTRODUODENOSCOPY N/A 05/05/2021    Procedure: EGD (ESOPHAGOGASTRODUODENOSCOPY);  Surgeon: Shawn Rogers III, MD;  Location: [facility];  Service: Endoscopy;  Laterality: N/A;    EYE SURGERY      TONSILLECTOMY      TUBAL LIGATION      yag  Bilateral           Assessment/Plans:      Internal hemorrhoid, bleeding    Chronic idiopathic constipation  -     polyethylene glycol (MIRALAX) 17 gram PwPk; Take 17 g by mouth once daily.  Dispense: 72 each; Refill: 3    Rectal bleeding    Gastroesophageal reflux disease without esophagitis      Continue with a plant based diet. Information sent to patient. She is slowly improving, with decreased bleeding but did not fill the Miralax rx. A high fiber diet with plenty of fluids (up to 8 glasses of water daily) is suggested to relieve these symptoms.  Metamucil, 1 tablespoon once or twice daily can be used to keep bowels regular if needed. Follow up if symptoms worsen or fail to improve.        Minh Salazar

## 2023-06-05 NOTE — TELEPHONE ENCOUNTER
I called the patient regarding her message and answered her questions to her satisfaction.  She says that she will begin Remeron.

## 2023-06-05 NOTE — PATIENT INSTRUCTIONS
Mrs. Carmonaie ADRIAN Jo,    Thanks for visiting with us today. We recommend that you adhere to a plant based diet. Here are my recommendations:    Internal hemorrhoid, bleeding    Chronic idiopathic constipation  -     polyethylene glycol (MIRALAX) 17 gram PwPk; Take 17 g by mouth once daily.  Dispense: 72 each; Refill: 3    Rectal bleeding    Gastroesophageal reflux disease without esophagitis      Thanks for trusting us with your healthcare needs and using [facility]. If you want to ask us a question, you can do so by replying to this message or by calling [phone].    Sincerely,    Minh Salazar M.D.      To rate your experience with Dr. Salazar please click on the link below:    [email or web address]

## 2023-06-06 DIAGNOSIS — D64.9 ANEMIA, UNSPECIFIED TYPE: ICD-10-CM

## 2023-06-06 DIAGNOSIS — D50.0 IRON DEFICIENCY ANEMIA DUE TO CHRONIC BLOOD LOSS: Primary | ICD-10-CM

## 2023-06-08 ENCOUNTER — PATIENT MESSAGE (OUTPATIENT)
Dept: INTERNAL MEDICINE | Facility: CLINIC | Age: 73
End: 2023-06-08
Payer: MEDICARE

## 2023-06-16 ENCOUNTER — PATIENT OUTREACH (OUTPATIENT)
Dept: ADMINISTRATIVE | Facility: HOSPITAL | Age: 73
End: 2023-06-16
Payer: MEDICARE

## 2023-06-16 NOTE — PROGRESS NOTES
"PH STATIN REPORT: per chart review pt lipitor 10mg d/c due to pt refusing to take, enc note March/April 2023 Dr Joyce reports pt states statin causes "restless leg syndrome" per HM "High dose statin" recommended, will set remind me and send Cards/Pcp a message prior to pt scheduled 08/08/23 appt in regards to Approved dx code needed on problem list for non compliance.  "

## 2023-06-23 ENCOUNTER — PATIENT OUTREACH (OUTPATIENT)
Dept: ADMINISTRATIVE | Facility: HOSPITAL | Age: 73
End: 2023-06-23
Payer: MEDICARE

## 2023-06-23 NOTE — PROGRESS NOTES
"Working PHN Statin Report: outreach just done by [facility] on 6/16/23 with message being sent to provider to address. See below...     June 16, 2023  Daisha Castaneda LPN     AW     9:37 AM  Note  PH STATIN REPORT: per chart review pt lipitor 10mg d/c due to pt refusing to take, enc note March/April 2023 Dr Joyce reports pt states statin causes "restless leg syndrome" per HM "High dose statin" recommended, will set remind me and send Cards/Pcp a message prior to pt scheduled 08/08/23 appt in regards to Approved dx code needed on problem list for non compliance.            "

## 2023-06-27 ENCOUNTER — PATIENT MESSAGE (OUTPATIENT)
Dept: PSYCHIATRY | Facility: CLINIC | Age: 73
End: 2023-06-27
Payer: MEDICARE

## 2023-07-12 ENCOUNTER — PATIENT MESSAGE (OUTPATIENT)
Dept: PSYCHIATRY | Facility: CLINIC | Age: 73
End: 2023-07-12
Payer: MEDICARE

## 2023-07-12 ENCOUNTER — OFFICE VISIT (OUTPATIENT)
Dept: PSYCHIATRY | Facility: CLINIC | Age: 73
End: 2023-07-12
Payer: MEDICARE

## 2023-07-12 DIAGNOSIS — F41.9 ANXIETY DISORDER, UNSPECIFIED TYPE: ICD-10-CM

## 2023-07-12 DIAGNOSIS — F33.1 DEPRESSION, MAJOR, RECURRENT, MODERATE: Primary | ICD-10-CM

## 2023-07-12 DIAGNOSIS — G47.00 INSOMNIA, UNSPECIFIED TYPE: ICD-10-CM

## 2023-07-12 PROCEDURE — 99499 NO LOS: ICD-10-PCS | Mod: 95,,, | Performed by: PSYCHIATRY & NEUROLOGY

## 2023-07-12 PROCEDURE — 99499 UNLISTED E&M SERVICE: CPT | Mod: 95,,, | Performed by: PSYCHIATRY & NEUROLOGY
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
## 2023-07-14 ENCOUNTER — PATIENT MESSAGE (OUTPATIENT)
Dept: SPORTS MEDICINE | Facility: CLINIC | Age: 73
End: 2023-07-14
Payer: MEDICARE

## 2023-07-18 DIAGNOSIS — M17.0 PRIMARY OSTEOARTHRITIS OF BOTH KNEES: ICD-10-CM

## 2023-07-18 DIAGNOSIS — M25.562 CHRONIC PAIN OF BOTH KNEES: Primary | ICD-10-CM

## 2023-07-18 DIAGNOSIS — G89.29 CHRONIC PAIN OF BOTH KNEES: Primary | ICD-10-CM

## 2023-07-18 DIAGNOSIS — M25.561 CHRONIC PAIN OF BOTH KNEES: Primary | ICD-10-CM

## 2023-07-18 RX ORDER — INSULIN GLARGINE 100 [IU]/ML
72 INJECTION, SOLUTION SUBCUTANEOUS NIGHTLY
Qty: 75 ML | Refills: 0 | Status: SHIPPED | OUTPATIENT
Start: 2023-07-18 | End: 2023-10-25 | Stop reason: SDUPTHER

## 2023-07-18 NOTE — TELEPHONE ENCOUNTER
Provider Staff:  Action required for this patient     Please see care gap opportunities below in Care Due Message.    Thanks!  [facility]     Appointments      Date Provider   Last Visit   10/4/2022 Jose Szymanski MD   Next Visit   8/8/2023 Jose Szymanski MD     Refill Decision Note   Christine Jo  is requesting a refill authorization.  Brief Assessment and Rationale for Refill:  Approve     Medication Therapy Plan:         Comments:     Note composed:11:27 AM 07/18/2023

## 2023-07-18 NOTE — TELEPHONE ENCOUNTER
Care Due:                  Date            Visit Type   Department     Provider  --------------------------------------------------------------------------------                                EP -                              PRIMARY      [facility] INTERNAL  Last Visit: 10-      CARE ([facility])   CHEMA Szymanski                              EP -                              PRIMARY      [facility] INTERNAL  Next Visit: 08-      CARE ([facility])   CHEMA Szymanski                                                            Last  Test          Frequency    Reason                     Performed    Due Date  --------------------------------------------------------------------------------    HBA1C.......  6 months...  SITagliptin, insulin.....  02- 08-    [facility] Embedded Care Due Messages. Reference number: [id number].   7/18/2023 10:46:36 AM CDT

## 2023-07-21 ENCOUNTER — PATIENT MESSAGE (OUTPATIENT)
Dept: OPHTHALMOLOGY | Facility: CLINIC | Age: 73
End: 2023-07-21
Payer: MEDICARE

## 2023-07-21 ENCOUNTER — TELEPHONE (OUTPATIENT)
Dept: PSYCHIATRY | Facility: CLINIC | Age: 73
End: 2023-07-21
Payer: MEDICARE

## 2023-07-21 ENCOUNTER — OFFICE VISIT (OUTPATIENT)
Dept: PSYCHIATRY | Facility: CLINIC | Age: 73
End: 2023-07-21
Payer: MEDICARE

## 2023-07-21 DIAGNOSIS — F33.1 DEPRESSION, MAJOR, RECURRENT, MODERATE: Primary | ICD-10-CM

## 2023-07-21 DIAGNOSIS — G47.00 INSOMNIA, UNSPECIFIED TYPE: ICD-10-CM

## 2023-07-21 DIAGNOSIS — F41.9 ANXIETY DISORDER, UNSPECIFIED TYPE: ICD-10-CM

## 2023-07-21 PROCEDURE — 1160F PR REVIEW ALL MEDS BY PRESCRIBER/CLIN PHARMACIST DOCUMENTED: ICD-10-PCS | Mod: CPTII,95,, | Performed by: PSYCHIATRY & NEUROLOGY

## 2023-07-21 PROCEDURE — 3051F HG A1C>EQUAL 7.0%<8.0%: CPT | Mod: CPTII,95,, | Performed by: PSYCHIATRY & NEUROLOGY

## 2023-07-21 PROCEDURE — 3072F PR LOW RISK FOR RETINOPATHY: ICD-10-PCS | Mod: CPTII,95,, | Performed by: PSYCHIATRY & NEUROLOGY

## 2023-07-21 PROCEDURE — 99214 OFFICE O/P EST MOD 30 MIN: CPT | Mod: 95,,, | Performed by: PSYCHIATRY & NEUROLOGY

## 2023-07-21 PROCEDURE — 4010F ACE/ARB THERAPY RXD/TAKEN: CPT | Mod: CPTII,95,, | Performed by: PSYCHIATRY & NEUROLOGY

## 2023-07-21 PROCEDURE — 3051F PR MOST RECENT HEMOGLOBIN A1C LEVEL 7.0 - < 8.0%: ICD-10-PCS | Mod: CPTII,95,, | Performed by: PSYCHIATRY & NEUROLOGY

## 2023-07-21 PROCEDURE — 1159F MED LIST DOCD IN RCRD: CPT | Mod: CPTII,95,, | Performed by: PSYCHIATRY & NEUROLOGY

## 2023-07-21 PROCEDURE — 1159F PR MEDICATION LIST DOCUMENTED IN MEDICAL RECORD: ICD-10-PCS | Mod: CPTII,95,, | Performed by: PSYCHIATRY & NEUROLOGY

## 2023-07-21 PROCEDURE — 4010F PR ACE/ARB THEARPY RXD/TAKEN: ICD-10-PCS | Mod: CPTII,95,, | Performed by: PSYCHIATRY & NEUROLOGY

## 2023-07-21 PROCEDURE — 99214 PR OFFICE/OUTPT VISIT, EST, LEVL IV, 30-39 MIN: ICD-10-PCS | Mod: 95,,, | Performed by: PSYCHIATRY & NEUROLOGY

## 2023-07-21 PROCEDURE — 3072F LOW RISK FOR RETINOPATHY: CPT | Mod: CPTII,95,, | Performed by: PSYCHIATRY & NEUROLOGY

## 2023-07-21 PROCEDURE — 1160F RVW MEDS BY RX/DR IN RCRD: CPT | Mod: CPTII,95,, | Performed by: PSYCHIATRY & NEUROLOGY

## 2023-07-21 RX ORDER — TEMAZEPAM 15 MG/1
15 CAPSULE ORAL NIGHTLY
Qty: 90 CAPSULE | Refills: 0 | Status: SHIPPED | OUTPATIENT
Start: 2023-07-21 | End: 2023-09-08 | Stop reason: SDUPTHER

## 2023-07-21 RX ORDER — HYDROXYZINE HYDROCHLORIDE 25 MG/1
25 TABLET, FILM COATED ORAL NIGHTLY
Qty: 90 TABLET | Refills: 0 | Status: SHIPPED | OUTPATIENT
Start: 2023-07-21 | End: 2023-09-08 | Stop reason: SDUPTHER
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
## 2023-07-24 DIAGNOSIS — E78.1 HYPERTRIGLYCERIDEMIA: ICD-10-CM

## 2023-07-24 RX ORDER — BENAZEPRIL HYDROCHLORIDE 40 MG/1
40 TABLET ORAL DAILY
Qty: 90 TABLET | Refills: 3
Start: 2023-07-24 | End: 2023-07-31 | Stop reason: SDUPTHER

## 2023-07-26 ENCOUNTER — TELEPHONE (OUTPATIENT)
Dept: PSYCHIATRY | Facility: CLINIC | Age: 73
End: 2023-07-26
Payer: MEDICARE

## 2023-07-26 ENCOUNTER — TELEPHONE (OUTPATIENT)
Dept: CARDIOLOGY | Facility: CLINIC | Age: 73
End: 2023-07-26
Payer: MEDICARE

## 2023-07-26 NOTE — TELEPHONE ENCOUNTER
Left a voicemail for patient to call back.     ----- Message from Irena Brooks sent at 7/26/2023 12:28 PM CDT -----  Contact: Christine  Patient is calling to speak with the nurse regarding medication. Reports having integration and needing something to clear it. Please give patient a call back at .[phone].

## 2023-07-26 NOTE — TELEPHONE ENCOUNTER
----- Message from Irena Brooks sent at 7/26/2023 12:44 PM CDT -----  Contact: Christine  Patient is calling to speak with the nurse regarding question. Reports having side affects after stop taking medication. Please give patient a call back at .[phone].

## 2023-07-28 ENCOUNTER — PATIENT MESSAGE (OUTPATIENT)
Dept: SPORTS MEDICINE | Facility: CLINIC | Age: 73
End: 2023-07-28
Payer: MEDICARE

## 2023-07-28 ENCOUNTER — PATIENT MESSAGE (OUTPATIENT)
Dept: PSYCHIATRY | Facility: CLINIC | Age: 73
End: 2023-07-28
Payer: MEDICARE

## 2023-07-28 NOTE — TELEPHONE ENCOUNTER
I called the patient.  With discussion, she is comfortable with continuing the plan as is, though she will use a pill splitter to take only 0.5 tablets of hydroxyzine 25 mg at bedtime initially.  The discussion included risk/benefit of this and other options, and she makes the decision to stick with the plan of the session apart from taking 0.5 tablets of hydroxyzine.    The patient confirms that she has enough temazepam to await the prescription in the mail.

## 2023-07-31 ENCOUNTER — PATIENT MESSAGE (OUTPATIENT)
Dept: SPORTS MEDICINE | Facility: CLINIC | Age: 73
End: 2023-07-31
Payer: MEDICARE

## 2023-07-31 ENCOUNTER — PATIENT MESSAGE (OUTPATIENT)
Dept: CARDIOLOGY | Facility: CLINIC | Age: 73
End: 2023-07-31
Payer: MEDICARE

## 2023-07-31 ENCOUNTER — PATIENT MESSAGE (OUTPATIENT)
Dept: GASTROENTEROLOGY | Facility: CLINIC | Age: 73
End: 2023-07-31
Payer: MEDICARE

## 2023-07-31 DIAGNOSIS — E78.1 HYPERTRIGLYCERIDEMIA: ICD-10-CM

## 2023-07-31 RX ORDER — BENAZEPRIL HYDROCHLORIDE 40 MG/1
40 TABLET ORAL DAILY
Qty: 90 TABLET | Refills: 3
Start: 2023-07-31

## 2023-07-31 RX ORDER — BENAZEPRIL HYDROCHLORIDE 40 MG/1
40 TABLET ORAL 2 TIMES DAILY
Qty: 180 TABLET | Refills: 4 | Status: SHIPPED | OUTPATIENT
Start: 2023-07-31

## 2023-07-31 RX ORDER — BENAZEPRIL HYDROCHLORIDE 40 MG/1
40 TABLET ORAL DAILY
Qty: 90 TABLET | Refills: 3
Start: 2023-07-31 | End: 2023-09-13 | Stop reason: SDUPTHER

## 2023-08-03 ENCOUNTER — TELEPHONE (OUTPATIENT)
Dept: UROLOGY | Facility: CLINIC | Age: 73
End: 2023-08-03
Payer: MEDICARE

## 2023-08-04 ENCOUNTER — TELEPHONE (OUTPATIENT)
Dept: GASTROENTEROLOGY | Facility: CLINIC | Age: 73
End: 2023-08-04
Payer: MEDICARE

## 2023-08-04 ENCOUNTER — PATIENT MESSAGE (OUTPATIENT)
Dept: UROLOGY | Facility: CLINIC | Age: 73
End: 2023-08-04
Payer: MEDICARE

## 2023-08-04 ENCOUNTER — PATIENT MESSAGE (OUTPATIENT)
Dept: GASTROENTEROLOGY | Facility: CLINIC | Age: 73
End: 2023-08-04
Payer: MEDICARE

## 2023-08-04 NOTE — TELEPHONE ENCOUNTER
----- Message from Memo Merida MD sent at 8/4/2023  9:15 AM CDT -----  Contact: Christine  She can see whoever is available first.     Dr Merida   ----- Message -----  From: Skyler Liriano LPN  Sent: 8/4/2023   8:58 AM CDT  To: Memo Merida MD    Pt is still asking for a response  ----- Message -----  From: Apolonia Kingston  Sent: 8/4/2023   8:28 AM CDT  To: Fuad JARAMILLO Staff    Pt called to see if Mely had a chance to speak to Dr. Merida about her question regarding seeing another doctor. Please respond via [facility] or call back, whichever is more convenient.     Thanks  TS

## 2023-08-07 ENCOUNTER — PATIENT OUTREACH (OUTPATIENT)
Dept: ADMINISTRATIVE | Facility: HOSPITAL | Age: 73
End: 2023-08-07
Payer: MEDICARE

## 2023-08-07 ENCOUNTER — TELEPHONE (OUTPATIENT)
Dept: SPORTS MEDICINE | Facility: CLINIC | Age: 73
End: 2023-08-07
Payer: MEDICARE

## 2023-08-07 DIAGNOSIS — E11.42 CONTROLLED TYPE 2 DIABETES MELLITUS WITH DIABETIC POLYNEUROPATHY, WITH LONG-TERM CURRENT USE OF INSULIN: Primary | ICD-10-CM

## 2023-08-07 DIAGNOSIS — E11.65 UNCONTROLLED TYPE 2 DIABETES MELLITUS WITH HYPERGLYCEMIA: ICD-10-CM

## 2023-08-07 DIAGNOSIS — Z79.4 CONTROLLED TYPE 2 DIABETES MELLITUS WITH DIABETIC POLYNEUROPATHY, WITH LONG-TERM CURRENT USE OF INSULIN: Primary | ICD-10-CM

## 2023-08-08 ENCOUNTER — TELEPHONE (OUTPATIENT)
Dept: SPORTS MEDICINE | Facility: CLINIC | Age: 73
End: 2023-08-08
Payer: MEDICARE

## 2023-08-08 ENCOUNTER — TELEPHONE (OUTPATIENT)
Dept: RHEUMATOLOGY | Facility: CLINIC | Age: 73
End: 2023-08-08
Payer: MEDICARE

## 2023-08-08 ENCOUNTER — TELEPHONE (OUTPATIENT)
Dept: OPHTHALMOLOGY | Facility: CLINIC | Age: 73
End: 2023-08-08
Payer: MEDICARE

## 2023-08-08 ENCOUNTER — TELEPHONE (OUTPATIENT)
Dept: CARDIOLOGY | Facility: CLINIC | Age: 73
End: 2023-08-08
Payer: MEDICARE

## 2023-08-08 DIAGNOSIS — R30.0 DYSURIA: Primary | ICD-10-CM

## 2023-08-08 NOTE — TELEPHONE ENCOUNTER
Spoke to patient to rescheduled her appointment to 08/16/2023.   ----- Message from Christine Drew sent at 8/8/2023 11:25 AM CDT -----  Contact: Christine Huerta is calling in regards to getting her appt on 08/08 rescheduled.please call back at [phone]        Thanks  MADDIE

## 2023-08-08 NOTE — TELEPHONE ENCOUNTER
Called patient back and no answer. Left a voicemail. Forward to joann to r/s the appt.  ----- Message from Pat Boudreaux sent at 8/8/2023 10:47 AM CDT -----  Contact: Christine Bhandari is calling to speak with a nurse regarding rescheduling an appt. Please give a call back to [phone]

## 2023-08-08 NOTE — TELEPHONE ENCOUNTER
----- Message from Dianelys Abelita sent at 8/8/2023 12:14 PM CDT -----  Contact: pt  Pt is calling in regard to her appt for 8/17 and would like to change it to 8/16 instead around midday.  Please call her back at [phone] thanks/mpd

## 2023-08-08 NOTE — TELEPHONE ENCOUNTER
Call and r/s appt to 8/15  ----- Message from Christine Drew sent at 8/8/2023 11:23 AM CDT -----  Contact: Dav Huerta is calling in regards to getting her appt on 08/16 rescheduled.please call back at [phone]        Thanks  ROBYN

## 2023-08-08 NOTE — TELEPHONE ENCOUNTER
The patient's appointment has been rescheduled to 8/15/23 at 1:30 pm at the [facility] location to see Dr. Cr.    ----- Message from Christine Drew sent at 8/8/2023 11:18 AM CDT -----  Contact: Christine Huerta is calling in regards to getting her appt on 08/15 rescheduled to Dr. Cr.please call back [phone]          Thanks  MADDIE

## 2023-08-09 ENCOUNTER — PATIENT MESSAGE (OUTPATIENT)
Dept: CARDIOLOGY | Facility: CLINIC | Age: 73
End: 2023-08-09
Payer: MEDICARE

## 2023-08-10 ENCOUNTER — OFFICE VISIT (OUTPATIENT)
Dept: GASTROENTEROLOGY | Facility: CLINIC | Age: 73
End: 2023-08-10
Payer: MEDICARE

## 2023-08-10 VITALS — BODY MASS INDEX: 43.98 KG/M2 | HEIGHT: 62 IN | WEIGHT: 239 LBS

## 2023-08-10 DIAGNOSIS — K59.09 CHRONIC CONSTIPATION: ICD-10-CM

## 2023-08-10 DIAGNOSIS — R10.30 LOWER ABDOMINAL PAIN: ICD-10-CM

## 2023-08-10 DIAGNOSIS — K21.9 GASTROESOPHAGEAL REFLUX DISEASE WITHOUT ESOPHAGITIS: Primary | ICD-10-CM

## 2023-08-10 PROCEDURE — 3051F PR MOST RECENT HEMOGLOBIN A1C LEVEL 7.0 - < 8.0%: ICD-10-PCS | Mod: CPTII,95,, | Performed by: PHYSICIAN ASSISTANT

## 2023-08-10 PROCEDURE — 1125F AMNT PAIN NOTED PAIN PRSNT: CPT | Mod: CPTII,95,, | Performed by: PHYSICIAN ASSISTANT

## 2023-08-10 PROCEDURE — 3008F BODY MASS INDEX DOCD: CPT | Mod: CPTII,95,, | Performed by: PHYSICIAN ASSISTANT

## 2023-08-10 PROCEDURE — 3051F HG A1C>EQUAL 7.0%<8.0%: CPT | Mod: CPTII,95,, | Performed by: PHYSICIAN ASSISTANT

## 2023-08-10 PROCEDURE — 3072F LOW RISK FOR RETINOPATHY: CPT | Mod: CPTII,95,, | Performed by: PHYSICIAN ASSISTANT

## 2023-08-10 PROCEDURE — 4010F PR ACE/ARB THEARPY RXD/TAKEN: ICD-10-PCS | Mod: CPTII,95,, | Performed by: PHYSICIAN ASSISTANT

## 2023-08-10 PROCEDURE — 3072F PR LOW RISK FOR RETINOPATHY: ICD-10-PCS | Mod: CPTII,95,, | Performed by: PHYSICIAN ASSISTANT

## 2023-08-10 PROCEDURE — 99213 OFFICE O/P EST LOW 20 MIN: CPT | Mod: 95,,, | Performed by: PHYSICIAN ASSISTANT

## 2023-08-10 PROCEDURE — 1125F PR PAIN SEVERITY QUANTIFIED, PAIN PRESENT: ICD-10-PCS | Mod: CPTII,95,, | Performed by: PHYSICIAN ASSISTANT

## 2023-08-10 PROCEDURE — 3288F FALL RISK ASSESSMENT DOCD: CPT | Mod: CPTII,95,, | Performed by: PHYSICIAN ASSISTANT

## 2023-08-10 PROCEDURE — 3008F PR BODY MASS INDEX (BMI) DOCUMENTED: ICD-10-PCS | Mod: CPTII,95,, | Performed by: PHYSICIAN ASSISTANT

## 2023-08-10 PROCEDURE — 1159F MED LIST DOCD IN RCRD: CPT | Mod: CPTII,95,, | Performed by: PHYSICIAN ASSISTANT

## 2023-08-10 PROCEDURE — 99213 PR OFFICE/OUTPT VISIT, EST, LEVL III, 20-29 MIN: ICD-10-PCS | Mod: 95,,, | Performed by: PHYSICIAN ASSISTANT

## 2023-08-10 PROCEDURE — 3288F PR FALLS RISK ASSESSMENT DOCUMENTED: ICD-10-PCS | Mod: CPTII,95,, | Performed by: PHYSICIAN ASSISTANT

## 2023-08-10 PROCEDURE — 4010F ACE/ARB THERAPY RXD/TAKEN: CPT | Mod: CPTII,95,, | Performed by: PHYSICIAN ASSISTANT

## 2023-08-10 PROCEDURE — 1159F PR MEDICATION LIST DOCUMENTED IN MEDICAL RECORD: ICD-10-PCS | Mod: CPTII,95,, | Performed by: PHYSICIAN ASSISTANT

## 2023-08-10 PROCEDURE — 1101F PT FALLS ASSESS-DOCD LE1/YR: CPT | Mod: CPTII,95,, | Performed by: PHYSICIAN ASSISTANT

## 2023-08-10 PROCEDURE — 1101F PR PT FALLS ASSESS DOC 0-1 FALLS W/OUT INJ PAST YR: ICD-10-PCS | Mod: CPTII,95,, | Performed by: PHYSICIAN ASSISTANT

## 2023-08-10 RX ORDER — SYRING-NEEDL,DISP,INSUL,0.3 ML 29 G X1/2"
296 SYRINGE, EMPTY DISPOSABLE MISCELLANEOUS ONCE
Qty: 296 ML | Refills: 0 | Status: SHIPPED | OUTPATIENT
Start: 2023-08-10 | End: 2023-08-10

## 2023-08-10 RX ORDER — PANTOPRAZOLE SODIUM 40 MG/1
40 TABLET, DELAYED RELEASE ORAL 2 TIMES DAILY
Qty: 60 TABLET | Refills: 0 | Status: SHIPPED | OUTPATIENT
Start: 2023-08-10 | End: 2023-09-08 | Stop reason: SDUPTHER

## 2023-08-10 NOTE — PATIENT INSTRUCTIONS
Take one bottle of Magnesium Citrate. Be near a bathroom as it may cause diarrhea. This can be found at your local drug store.   Increase Protonix to twice daily x 1 month.   Message me next week with an update.

## 2023-08-10 NOTE — PROGRESS NOTES
Subjective:      Patient ID: Christine Jo is a 73 y.o. female.    Chief Complaint: Abdominal Pain (Feels full), Nausea, and Heartburn    The patient location is: [place]  The chief complaint leading to consultation is: GERD    Visit type: audiovisual    Face to Face time with patient: 20  20 minutes of total time spent on the encounter, which includes face to face time and non-face to face time preparing to see the patient (eg, review of tests), Obtaining and/or reviewing separately obtained history, Documenting clinical information in the electronic or other health record, Independently interpreting results (not separately reported) and communicating results to the patient/family/caregiver, or Care coordination (not separately reported).         Each patient to whom he or she provides medical services by telemedicine is:  (1) informed of the relationship between the physician and patient and the respective role of any other health care provider with respect to management of the patient; and (2) notified that he or she may decline to receive medical services by telemedicine and may withdraw from such care at any time.    Notes:     HPI  The patient has a history of chronic constipation, bleeding hemorrhoids and GERD. She was last seen in June by Dr. Salazar, but is new to me. At her last visit, she reported firm pebble stools and incomplete defecation. Recommendations included Miralax daily, high fiber diet and fiber supplementation.     Today she reports the patient reports poorly controlled reflux despite Protonix once daily. At some point it was increased acutely to bid and then titrated back down over 8 months ago. Symptoms have been increased about two months and described as off and on bloating, heartburn and nausea with certain foods like salty or spicy. She denies vomiting. She has a history of gallstones and still has her gallbladder but denies any specific upper abdominal pain. She has intermittent sharp  lower abdominal pain which occurs before a bowel movement. Stools are usually soft. She has one 4-5 times a week. She takes Colace bid but still occasional straining and doesn't always feel complete. She is not taking Miralax or fiber as previously recommended.     Colonoscopy (6/2022): internal hemorrhoids.     Review of Systems  As per HPI. Reports some urinary incontinence late and small pelvic prolapse.     Objective:     Physical Exam  Constitutional:       General: She is not in acute distress.     Appearance: She is well-developed.   HENT:      Head: Normocephalic and atraumatic.   Pulmonary:      Effort: Pulmonary effort is normal.   Neurological:      Mental Status: She is alert and oriented to person, place, and time.      Cranial Nerves: No cranial nerve deficit.   Psychiatric:         Behavior: Behavior normal.         Assessment:     1. Gastroesophageal reflux disease without esophagitis    2. Chronic constipation    3. Lower abdominal pain        Plan:     Take one bottle of Magnesium Citrate.   Continue Colace bid.   She will likely need a more aggressive bowel regimen like low dose Linzess or at least Miralax.   Increase Protonix to twice daily x 1 month.   Message me next week with an update.     Follow up in about 8 weeks (around 10/5/2023).    Thank you for the opportunity to participate in the care of this patient.   Leonardo Fairchild PA-C.

## 2023-08-14 ENCOUNTER — PATIENT MESSAGE (OUTPATIENT)
Dept: PSYCHIATRY | Facility: CLINIC | Age: 73
End: 2023-08-14
Payer: MEDICARE

## 2023-08-14 RX ORDER — PANTOPRAZOLE SODIUM 40 MG/1
40 TABLET, DELAYED RELEASE ORAL 2 TIMES DAILY
Qty: 60 TABLET | Refills: 0 | OUTPATIENT
Start: 2023-08-14

## 2023-08-14 NOTE — TELEPHONE ENCOUNTER
Refill Routing Note   Medication(s) are not appropriate for processing by [facility] for the following reason(s):      Medication outside of protocol    [facility] action(s):  Route Care Due:  None identified              Appointments  past 12m or future 3m with PCP    Date Provider   Last Visit   10/4/2022 Jose Szymanski MD   Next Visit   8/14/2023 Jose Szymanski MD   ED visits in past 90 days: 0        Note composed:10:37 AM 08/14/2023

## 2023-08-14 NOTE — TELEPHONE ENCOUNTER
Refill Routing Note   Medication(s) are not appropriate for processing by [facility] for the following reason(s):      Responsible provider unclear    [facility] action(s):  Defer Care Due:  None identified     Medication Therapy Plan: No PCP listed...Deferring to last prescriber      Appointments  past 12m or future 3m with PCP    Date Provider   Last Visit   10/4/2022 Jose Szymanski MD   Next Visit   8/22/2023 Jose Szymanski MD   ED visits in past 90 days: 0        Note composed:1:06 PM 08/14/2023

## 2023-08-15 ENCOUNTER — OFFICE VISIT (OUTPATIENT)
Dept: OPHTHALMOLOGY | Facility: CLINIC | Age: 73
End: 2023-08-15
Payer: MEDICARE

## 2023-08-15 ENCOUNTER — LAB VISIT (OUTPATIENT)
Dept: LAB | Facility: HOSPITAL | Age: 73
End: 2023-08-15
Payer: MEDICARE

## 2023-08-15 ENCOUNTER — OFFICE VISIT (OUTPATIENT)
Dept: SPORTS MEDICINE | Facility: CLINIC | Age: 73
End: 2023-08-15
Payer: MEDICARE

## 2023-08-15 DIAGNOSIS — M25.859 FEMORAL ACETABULAR IMPINGEMENT: ICD-10-CM

## 2023-08-15 DIAGNOSIS — D50.0 IRON DEFICIENCY ANEMIA DUE TO CHRONIC BLOOD LOSS: ICD-10-CM

## 2023-08-15 DIAGNOSIS — D64.9 ANEMIA, UNSPECIFIED TYPE: ICD-10-CM

## 2023-08-15 DIAGNOSIS — M70.62 GREATER TROCHANTERIC BURSITIS, LEFT: Primary | ICD-10-CM

## 2023-08-15 DIAGNOSIS — Z96.1 PSEUDOPHAKIA OF BOTH EYES: ICD-10-CM

## 2023-08-15 DIAGNOSIS — H04.129 DRY EYE: ICD-10-CM

## 2023-08-15 DIAGNOSIS — M16.0 BILATERAL PRIMARY OSTEOARTHRITIS OF HIP: ICD-10-CM

## 2023-08-15 DIAGNOSIS — E11.9 DIABETES MELLITUS TYPE 2 WITHOUT RETINOPATHY: Primary | ICD-10-CM

## 2023-08-15 DIAGNOSIS — H35.3131 EARLY DRY STAGE NONEXUDATIVE AGE-RELATED MACULAR DEGENERATION OF BOTH EYES: ICD-10-CM

## 2023-08-15 DIAGNOSIS — M17.11 PRIMARY OSTEOARTHRITIS OF RIGHT KNEE: ICD-10-CM

## 2023-08-15 LAB
BASOPHILS # BLD AUTO: 0.07 K/UL (ref 0–0.2)
BASOPHILS NFR BLD: 0.8 % (ref 0–1.9)
DIFFERENTIAL METHOD: ABNORMAL
EOSINOPHIL # BLD AUTO: 0.1 K/UL (ref 0–0.5)
EOSINOPHIL NFR BLD: 1.5 % (ref 0–8)
ERYTHROCYTE [DISTWIDTH] IN BLOOD BY AUTOMATED COUNT: 12.5 % (ref 11.5–14.5)
FERRITIN SERPL-MCNC: 95 NG/ML (ref 20–300)
HCT VFR BLD AUTO: 35.1 % (ref 37–48.5)
HGB BLD-MCNC: 11.4 G/DL (ref 12–16)
IMM GRANULOCYTES # BLD AUTO: 0.03 K/UL (ref 0–0.04)
IMM GRANULOCYTES NFR BLD AUTO: 0.3 % (ref 0–0.5)
IRON SERPL-MCNC: 106 UG/DL (ref 30–160)
LYMPHOCYTES # BLD AUTO: 1.9 K/UL (ref 1–4.8)
LYMPHOCYTES NFR BLD: 21.1 % (ref 18–48)
MCH RBC QN AUTO: 31.1 PG (ref 27–31)
MCHC RBC AUTO-ENTMCNC: 32.5 G/DL (ref 32–36)
MCV RBC AUTO: 96 FL (ref 82–98)
MONOCYTES # BLD AUTO: 0.9 K/UL (ref 0.3–1)
MONOCYTES NFR BLD: 10 % (ref 4–15)
NEUTROPHILS # BLD AUTO: 6 K/UL (ref 1.8–7.7)
NEUTROPHILS NFR BLD: 66.3 % (ref 38–73)
NRBC BLD-RTO: 0 /100 WBC
PLATELET # BLD AUTO: 267 K/UL (ref 150–450)
PMV BLD AUTO: 11.6 FL (ref 9.2–12.9)
RBC # BLD AUTO: 3.67 M/UL (ref 4–5.4)
SATURATED IRON: 32 % (ref 20–50)
TOTAL IRON BINDING CAPACITY: 330 UG/DL (ref 250–450)
TRANSFERRIN SERPL-MCNC: 223 MG/DL (ref 200–375)
WBC # BLD AUTO: 9.11 K/UL (ref 3.9–12.7)

## 2023-08-15 PROCEDURE — 20610 DRAIN/INJ JOINT/BURSA W/O US: CPT | Mod: LT,S$GLB,, | Performed by: STUDENT IN AN ORGANIZED HEALTH CARE EDUCATION/TRAINING PROGRAM

## 2023-08-15 PROCEDURE — 4010F ACE/ARB THERAPY RXD/TAKEN: CPT | Mod: CPTII,S$GLB,, | Performed by: OPHTHALMOLOGY

## 2023-08-15 PROCEDURE — 2023F PR DILATED RETINAL EXAM W/O EVID OF RETINOPATHY: ICD-10-PCS | Mod: CPTII,S$GLB,, | Performed by: OPHTHALMOLOGY

## 2023-08-15 PROCEDURE — 1160F PR REVIEW ALL MEDS BY PRESCRIBER/CLIN PHARMACIST DOCUMENTED: ICD-10-PCS | Mod: CPTII,S$GLB,, | Performed by: STUDENT IN AN ORGANIZED HEALTH CARE EDUCATION/TRAINING PROGRAM

## 2023-08-15 PROCEDURE — 82728 ASSAY OF FERRITIN: CPT

## 2023-08-15 PROCEDURE — 20610 LARGE JOINT ASPIRATION/INJECTION: L GREATER TROCHANTERIC BURSA: ICD-10-PCS | Mod: LT,S$GLB,, | Performed by: STUDENT IN AN ORGANIZED HEALTH CARE EDUCATION/TRAINING PROGRAM

## 2023-08-15 PROCEDURE — 4010F ACE/ARB THERAPY RXD/TAKEN: CPT | Mod: CPTII,S$GLB,, | Performed by: STUDENT IN AN ORGANIZED HEALTH CARE EDUCATION/TRAINING PROGRAM

## 2023-08-15 PROCEDURE — 1159F PR MEDICATION LIST DOCUMENTED IN MEDICAL RECORD: ICD-10-PCS | Mod: CPTII,S$GLB,, | Performed by: STUDENT IN AN ORGANIZED HEALTH CARE EDUCATION/TRAINING PROGRAM

## 2023-08-15 PROCEDURE — 36415 COLL VENOUS BLD VENIPUNCTURE: CPT

## 2023-08-15 PROCEDURE — 3051F HG A1C>EQUAL 7.0%<8.0%: CPT | Mod: CPTII,S$GLB,, | Performed by: STUDENT IN AN ORGANIZED HEALTH CARE EDUCATION/TRAINING PROGRAM

## 2023-08-15 PROCEDURE — 99214 PR OFFICE/OUTPT VISIT, EST, LEVL IV, 30-39 MIN: ICD-10-PCS | Mod: 25,S$GLB,, | Performed by: STUDENT IN AN ORGANIZED HEALTH CARE EDUCATION/TRAINING PROGRAM

## 2023-08-15 PROCEDURE — 92014 PR EYE EXAM, EST PATIENT,COMPREHESV: ICD-10-PCS | Mod: S$GLB,,, | Performed by: OPHTHALMOLOGY

## 2023-08-15 PROCEDURE — 97760 ORTHOTIC MGMT&TRAING 1ST ENC: CPT | Mod: GP,S$GLB,, | Performed by: STUDENT IN AN ORGANIZED HEALTH CARE EDUCATION/TRAINING PROGRAM

## 2023-08-15 PROCEDURE — 1159F PR MEDICATION LIST DOCUMENTED IN MEDICAL RECORD: ICD-10-PCS | Mod: CPTII,S$GLB,, | Performed by: OPHTHALMOLOGY

## 2023-08-15 PROCEDURE — 84466 ASSAY OF TRANSFERRIN: CPT

## 2023-08-15 PROCEDURE — 1159F MED LIST DOCD IN RCRD: CPT | Mod: CPTII,S$GLB,, | Performed by: STUDENT IN AN ORGANIZED HEALTH CARE EDUCATION/TRAINING PROGRAM

## 2023-08-15 PROCEDURE — 4010F PR ACE/ARB THEARPY RXD/TAKEN: ICD-10-PCS | Mod: CPTII,S$GLB,, | Performed by: STUDENT IN AN ORGANIZED HEALTH CARE EDUCATION/TRAINING PROGRAM

## 2023-08-15 PROCEDURE — 1160F RVW MEDS BY RX/DR IN RCRD: CPT | Mod: CPTII,S$GLB,, | Performed by: STUDENT IN AN ORGANIZED HEALTH CARE EDUCATION/TRAINING PROGRAM

## 2023-08-15 PROCEDURE — 99999 PR PBB SHADOW E&M-EST. PATIENT-LVL IV: CPT | Mod: PBBFAC,,, | Performed by: STUDENT IN AN ORGANIZED HEALTH CARE EDUCATION/TRAINING PROGRAM

## 2023-08-15 PROCEDURE — 99999 PR PBB SHADOW E&M-EST. PATIENT-LVL IV: ICD-10-PCS | Mod: PBBFAC,,, | Performed by: STUDENT IN AN ORGANIZED HEALTH CARE EDUCATION/TRAINING PROGRAM

## 2023-08-15 PROCEDURE — 85025 COMPLETE CBC W/AUTO DIFF WBC: CPT

## 2023-08-15 PROCEDURE — 3051F PR MOST RECENT HEMOGLOBIN A1C LEVEL 7.0 - < 8.0%: ICD-10-PCS | Mod: CPTII,S$GLB,, | Performed by: STUDENT IN AN ORGANIZED HEALTH CARE EDUCATION/TRAINING PROGRAM

## 2023-08-15 PROCEDURE — 97760 PR ORTHOTIC MGMT&TRAINJ INITIAL ENC EA 15 MINS: ICD-10-PCS | Mod: GP,S$GLB,, | Performed by: STUDENT IN AN ORGANIZED HEALTH CARE EDUCATION/TRAINING PROGRAM

## 2023-08-15 PROCEDURE — 2023F DILAT RTA XM W/O RTNOPTHY: CPT | Mod: CPTII,S$GLB,, | Performed by: OPHTHALMOLOGY

## 2023-08-15 PROCEDURE — 92014 COMPRE OPH EXAM EST PT 1/>: CPT | Mod: S$GLB,,, | Performed by: OPHTHALMOLOGY

## 2023-08-15 PROCEDURE — 3051F HG A1C>EQUAL 7.0%<8.0%: CPT | Mod: CPTII,S$GLB,, | Performed by: OPHTHALMOLOGY

## 2023-08-15 PROCEDURE — 99999 PR PBB SHADOW E&M-EST. PATIENT-LVL III: ICD-10-PCS | Mod: PBBFAC,,, | Performed by: OPHTHALMOLOGY

## 2023-08-15 PROCEDURE — 1159F MED LIST DOCD IN RCRD: CPT | Mod: CPTII,S$GLB,, | Performed by: OPHTHALMOLOGY

## 2023-08-15 PROCEDURE — 99999 PR PBB SHADOW E&M-EST. PATIENT-LVL III: CPT | Mod: PBBFAC,,, | Performed by: OPHTHALMOLOGY

## 2023-08-15 PROCEDURE — 3072F PR LOW RISK FOR RETINOPATHY: ICD-10-PCS | Mod: CPTII,S$GLB,, | Performed by: STUDENT IN AN ORGANIZED HEALTH CARE EDUCATION/TRAINING PROGRAM

## 2023-08-15 PROCEDURE — 99214 OFFICE O/P EST MOD 30 MIN: CPT | Mod: 25,S$GLB,, | Performed by: STUDENT IN AN ORGANIZED HEALTH CARE EDUCATION/TRAINING PROGRAM

## 2023-08-15 PROCEDURE — 4010F PR ACE/ARB THEARPY RXD/TAKEN: ICD-10-PCS | Mod: CPTII,S$GLB,, | Performed by: OPHTHALMOLOGY

## 2023-08-15 PROCEDURE — 1160F RVW MEDS BY RX/DR IN RCRD: CPT | Mod: CPTII,S$GLB,, | Performed by: OPHTHALMOLOGY

## 2023-08-15 PROCEDURE — 3072F LOW RISK FOR RETINOPATHY: CPT | Mod: CPTII,S$GLB,, | Performed by: STUDENT IN AN ORGANIZED HEALTH CARE EDUCATION/TRAINING PROGRAM

## 2023-08-15 PROCEDURE — 1160F PR REVIEW ALL MEDS BY PRESCRIBER/CLIN PHARMACIST DOCUMENTED: ICD-10-PCS | Mod: CPTII,S$GLB,, | Performed by: OPHTHALMOLOGY

## 2023-08-15 PROCEDURE — 3051F PR MOST RECENT HEMOGLOBIN A1C LEVEL 7.0 - < 8.0%: ICD-10-PCS | Mod: CPTII,S$GLB,, | Performed by: OPHTHALMOLOGY

## 2023-08-15 RX ORDER — TRIAMCINOLONE ACETONIDE 40 MG/ML
40 INJECTION, SUSPENSION INTRA-ARTICULAR; INTRAMUSCULAR
Status: DISCONTINUED | OUTPATIENT
Start: 2023-08-15 | End: 2023-08-15 | Stop reason: HOSPADM

## 2023-08-15 RX ADMIN — TRIAMCINOLONE ACETONIDE 40 MG: 40 INJECTION, SUSPENSION INTRA-ARTICULAR; INTRAMUSCULAR at 10:08

## 2023-08-15 NOTE — PROGRESS NOTES
Patient ID: Christine Jo  YOB: 1950  MRN: [id number]    Chief Complaint: Pain of the Right Hip, Pain of the Left Hip, and Pain of the Left Shoulder      Referred By: self    History of Present Illness: Christine Jo is a right-hand dominant 73 y.o. female who presents today with bilateral hip pain.  Pain level 4/10.  Alleviating factors include nothing.  She feels constant achy, shooting pain in the buttocks area.  Aggravating factors include direct contact as well as sleeping on it at night. She has been taking tylenol arthritis to alleviate pain.    The patient is active in none.  Occupation: retired      Past Medical History:   Past Medical History:   Diagnosis Date    Arthritis     Cataract     Diabetes mellitus 2008     am 01/15/2018 Insulin x 1 year    DM (diabetes mellitus) 2008     am 02/14/2020 Insulin x 4 years    Encounter for blood transfusion     Glaucoma     Hypertension     Insomnia     Macular degeneration     Vaginal yeast infection      Past Surgical History:   Procedure Laterality Date    abdominal laparoscopy       BREAST BIOPSY Left 1998    benign    CATARACT EXTRACTION Bilateral [id number]    [facility] in [place]    COLONOSCOPY N/A 05/05/2021    Procedure: COLONOSCOPY;  Surgeon: Shawn Rogers III, MD;  Location: [facility];  Service: Endoscopy;  Laterality: N/A;    COLONOSCOPY N/A 06/30/2021    Procedure: COLONOSCOPY;  Surgeon: Memo Merida MD;  Location: [facility];  Service: Endoscopy;  Laterality: N/A;    ESOPHAGOGASTRODUODENOSCOPY N/A 11/29/2019    Procedure: ESOPHAGOGASTRODUODENOSCOPY (EGD);  Surgeon: Shawn Rogers III, MD;  Location: [facility];  Service: Endoscopy;  Laterality: N/A;    ESOPHAGOGASTRODUODENOSCOPY N/A 05/05/2021    Procedure: EGD (ESOPHAGOGASTRODUODENOSCOPY);  Surgeon: Shawn Rogers III, MD;  Location: [facility];  Service: Endoscopy;  Laterality: N/A;    EYE SURGERY      TONSILLECTOMY      TUBAL LIGATION      yag   Bilateral      Family History   Problem Relation Age of Onset    Heart disease Mother         CAD     Cataracts Mother     Macular degeneration Mother     Glaucoma Mother     Cancer Son         testicular     Cancer Maternal Aunt         Lung ca    Heart disease Maternal Grandfather         Pacemaker     Diabetes Sister     Heart disease Sister         CAD    Cataracts Sister     Diabetes Sister     Diabetes Sister      Social History     Socioeconomic History    Marital status:     Number of children: 3   Occupational History    Occupation: Retired   Tobacco Use    Smoking status: Former     Current packs/day: 0.00     Average packs/day: 1 pack/day for 36.5 years (36.5 ttl pk-yrs)     Types: Cigarettes     Start date:      Quit date: 2003     Years since quittin.1     Passive exposure: Never    Smokeless tobacco: Never   Substance and Sexual Activity    Alcohol use: No    Drug use: No    Sexual activity: Never     Social Determinants of Health     Financial Resource Strain: Low Risk  (2022)    Overall Financial Resource Strain (CARDIA)     Difficulty of Paying Living Expenses: Not hard at all   Food Insecurity: No Food Insecurity (2022)    Hunger Vital Sign     Worried About Running Out of Food in the Last Year: Never true     Ran Out of Food in the Last Year: Never true   Transportation Needs: Unmet Transportation Needs (2022)    PRAPARE - Transportation     Lack of Transportation (Medical): Yes     Lack of Transportation (Non-Medical): No   Physical Activity: Insufficiently Active (2022)    Exercise Vital Sign     Days of Exercise per Week: 7 days     Minutes of Exercise per Session: 10 min   Stress: No Stress Concern Present (2022)    [employer] - Occupational Stress Questionnaire     Feeling of Stress : Only a little   Social Connections: Moderately Isolated (2022)    Social Connection and Isolation Panel [NHANES]     Frequency of  Communication with Friends and Family: More than three times a week     Frequency of Social Gatherings with Friends and Family: Never     Attends Druze Services: 1 to 4 times per year     Active Member of Clubs or Organizations: No     Attends Club or Organization Meetings: Never     Marital Status:    Housing Stability: Low Risk  (9/28/2022)    Housing Stability Vital Sign     Unable to Pay for Housing in the Last Year: No     Number of Places Lived in the Last Year: 2     Unstable Housing in the Last Year: No     Medication List with Changes/Refills   Current Medications    ACETAMINOPHEN (TYLENOL ARTHRITIS ORAL)    Take by mouth as needed.    APIXABAN (ELIQUIS) 5 MG TAB    Take 1 tablet (5 mg total) by mouth 2 (two) times daily.    AZELASTINE (ASTELIN) 137 MCG (0.1 %) NASAL SPRAY    use 2 sprays (274 mcg total) by Nasal route 2 (two) times daily.    BENAZEPRIL (LOTENSIN) 40 MG TABLET    Take 1 tablet (40 mg total) by mouth 2 (two) times daily.    BENAZEPRIL (LOTENSIN) 40 MG TABLET    Take 1 tablet (40 mg total) by mouth once daily.    BEPREVE 1.5 % DROP    Place 1 drop into both eyes 2 (two) times daily.    BLOOD SUGAR DIAGNOSTIC STRP    To check blood sugar 1 times daily    BLOOD-GLUCOSE METER KIT    To check BG 2 times daily, to use with insurance preferred meter    BLOOD-GLUCOSE METER MISC    use daily to test blood sugar    CALCIUM CITRATE-VITAMIN D3 315-200 MG (CITRACAL+D) 315-200 MG-UNIT PER TABLET    Take 1 tablet by mouth once daily.    CARVEDILOL (COREG) 25 MG TABLET    TAKE 1 TABLET BY MOUTH TWICE DAILY    CLOTRIMAZOLE-BETAMETHASONE (LOTRISONE) LOTION    Apply topically to the affected area 2 (two) times daily.    CYCLOSPORINE (RESTASIS) 0.05 % OPHTHALMIC EMULSION    Place 1 drop into both eyes 2 (two) times daily.    DICLOFENAC SODIUM (VOLTAREN) 1 % GEL    Apply 2 grams topically 4 (four) times daily. To affected joints as needed for pain    DOCUSATE SODIUM (COLACE) 100 MG CAPSULE    Take 1  "capsule (100 mg total) by mouth 2 (two) times daily.    DULOXETINE (CYMBALTA) 60 MG CAPSULE    Take 1 capsule (60 mg total) by mouth once daily.    FLUTICASONE PROPIONATE (FLONASE) 50 MCG/ACTUATION NASAL SPRAY    2 sprays (100 mcg total) by Each Nostril route once daily.    GABAPENTIN (NEURONTIN) 300 MG CAPSULE    Take 1 capsule (300 mg total) by mouth 3 (three) times daily.    GALCANEZUMAB-GNLM (EMGALITY SYRINGE) 120 MG/ML SYRG    Inject 120 mg into the skin every 28 days.    HYDRALAZINE (APRESOLINE) 50 MG TABLET    Take 1 tablet (50 mg total) by mouth every 8 (eight) hours.    HYDROCORTISONE 2.5 % CREAM    Apply topically 2 (two) times daily.    HYDROXYZINE HCL (ATARAX) 25 MG TABLET    Take 1 tablet (25 mg total) by mouth nightly.    INSULIN (LANTUS SOLOSTAR U-100 INSULIN) GLARGINE 100 UNITS/ML SUBQ PEN    Inject 72 Units into the skin every evening.    LANCETS MISC    To check BG 1 times daily    MECLIZINE (ANTIVERT) 25 MG TABLET    Take 1 tablet (25 mg total) by mouth 3 (three) times daily as needed.    MULTIVIT WITH CALCIUM,IRON,MIN (WOMEN'S DAILY MULTIVITAMIN ORAL)    Take by mouth once daily.     NYSTATIN (MYCOSTATIN) CREAM    Apply topically 2 (two) times daily. Continue until completely healed    PANTOPRAZOLE (PROTONIX) 40 MG TABLET    Take 1 tablet (40 mg total) by mouth 2 (two) times a day.    PEN NEEDLE, DIABETIC (BD ULTRA-FINE SHORT PEN NEEDLE) 31 GAUGE X 5/16" NDLE    AS DIRECTED TWICE DAILY    POLYETHYLENE GLYCOL (GLYCOLAX) 17 GRAM/DOSE POWDER    Mix 17 g (1 capful) in liquid and drink by mouth once daily.    RIMEGEPANT 75 MG ODT    Take 1 tablet (75 mg total) by mouth as needed for Migraine (do not exceed 2-3 doses within 1 week). Place ODT tablet on the tongue; alternatively the ODT tablet may be placed under the tongue    SITAGLIPTIN PHOSPHATE (JANUVIA) 100 MG TAB    Take 1 tablet (100 mg total) by mouth once daily.    TEMAZEPAM (RESTORIL) 15 MG CAP    Take 1 capsule (15 mg total) by mouth every " evening.     Review of patient's allergies indicates:   Allergen Reactions    Aspirin Palpitations       Physical Exam:   There is no height or weight on file to calculate BMI.    GENERAL: Well appearing, in no acute distress.  HEAD: Normocephalic and atraumatic.  ENT: External ears and nose grossly normal.  EYES: EOMI bilaterally  PULMONARY: Respirations are grossly even and non-labored.  NEURO: Awake, alert, and oriented x 3.  SKIN: No obvious rashes appreciated.  PSYCH: Mood & affect are appropriate.    Detailed MSK exam:     Left hip exam:  -ROM: internal rotation 0, external rotation 40  -ANNELIESE positive, FADIR negative, Shekhar negative  -Muscle strength 5/5 flexion, 5/5 extension, 5/5 abduction, 5/5 adduction  -negative log roll  -negative straight leg raise  -TTP: greater trochanter, anterior groin, and SI joint    Right hip exam:  -ROM: internal rotation 0, external rotation 40  -ANNELIESE positive, FADIR negative, Shekhar negative  -Muscle strength 5/5 flexion, 5/5 extension, 5/5 abduction, 5/5 adduction  -negative log roll  -negative straight leg raise  -TTP: greater trochanter, anterior groin, and SI joint      Imaging:  X-ray Knee Ortho Bilateral with Flexion  Narrative: EXAM: XR KNEE ORTHO BILAT WITH FLEXION    CLINICAL HISTORY: Bilateral knee pain.    FINDINGS:  Bilateral demineralization or osteopenia is present.    4 views of the right knee.  Severe medial joint space narrowing with sclerosis.  Mild marginal spurring.  Mild patellofemoral joint spurring.  No joint effusion.    4 views of the left knee.  Moderate medial joint space narrowing.  Subchondral sclerosis with marginal spurring.  Mild patellofemoral joint spurring is present as well.  No joint effusion.  Impression:  Bilateral tricompartment osteophytosis of the knee, most pronounced involving the medial knee compartment of the right knee.    Finalized on: 4/25/2023 3:34 PM By:  Terry Porter MD  BRR# [id number]      2023-04-25 15:36:[id number]     R        Relevant imaging results were reviewed and interpreted by me and per my read shows mild hip arthritic changes on hip radiographs.  This was discussed with the patient and / or family today.     Assessment:  Christine Jo is a 73 y.o. female following up for bilateral hip pain.   History, physical and radiographs are consistent with a likely diagnosis of hip OA, CM, GTPS, SI joint inflammation. Interested in steroid injection of the left hip today.   Plan: Steroid injection given today (see separate procedure note for details). We discussed the proper protocols after the injection such as no submerging pools, baths tubs, or hot tubs for 24 hr.  Showering is okay today.  We also discussed that blood sugars can be elevated after an injection and asked patient to properly checked her sugars over the next few days and contact their PCP if there are any concerns.  We discussed red flags such as fevers, chills, red, warm, tender joint at the area of injection to please seek medical care immediately. Consider pain management referral for SI injection. Continue conservative management for pain. Knee brace given.   Follow up 3 months. All questions answered.      Greater trochanteric bursitis, left  -     Large Joint Aspiration/Injection: L greater trochanteric bursa    Primary osteoarthritis of right knee  -     HME - OTHER    Bilateral primary osteoarthritis of hip    Femoral acetabular impingement       At least 15 minutes were spent sizing, fitting, and educating for durable medical equipment application today. This service was performed under direction of Erik Esposito MD. CPT 16690.      A copy of today's visit note has been sent to the referring provider.     Electronically signed:  Erik Esposito MD, MPH  08/15/2023  2:33 PM

## 2023-08-15 NOTE — PROCEDURES
Large Joint Aspiration/Injection: L greater trochanteric bursa    Date/Time: 8/15/2023 10:00 AM    Performed by: Erik Esposito MD  Authorized by: Erik Esposito MD    Consent Done?:  Yes (Verbal)  Indications:  Pain  Site marked: the procedure site was marked    Timeout: prior to procedure the correct patient, procedure, and site was verified    Prep: patient was prepped and draped in usual sterile fashion    Local anesthetic:  Bupivacaine 0.5% without epinephrine and lidocaine 1% without epinephrine    Details:  Needle Size:  22 G  Ultrasonic Guidance for needle placement?: No    Approach:  Lateral  Location:  Hip  Site:  L greater trochanteric bursa  Medications:  40 mg triamcinolone acetonide 40 mg/mL  Patient tolerance:  Patient tolerated the procedure well with no immediate complications

## 2023-08-15 NOTE — PROGRESS NOTES
SUBJECTIVE  Christine Jo is 73 y.o. female  Uncorrected distance visual acuity was 20/25 -2 in the right eye and 20/20 -2 in the left eye.   Chief Complaint   Patient presents with    Diabetic Eye Exam     Patient here for annual Dm eye exam. Patient states her last A1c was 7.1 on 02/07/2023. Patient           HPI     Diabetic Eye Exam     Additional comments: Patient here for annual Dm eye exam. Patient states   her last A1c was 7.1 on 02/07/2023. Patient            Comments    1. DM x 2010  2. AMD (+Fhx mother)  3. Dry Eyes  Xiidra less effective  4. PCIOL OU Dr. [facility]  5. ? Migraine HAs    Restasis BID OU  Bepreve BID OU          Last edited by Braden Gates on 8/15/2023  1:19 PM.         Assessment /Plan :  1. Diabetes mellitus type 2 without retinopathy No diabetic retinopathy at this time. Reviewed diabetic eye precautions including avoiding tobacco use,  Good glucose control, and importance of regular follow up.      2. Early dry stage nonexudative age-related macular degeneration of both eyes  -- Condition stable, no therapeutic change required. Monitoring routinely.     3. Pseudophakia of both eyes  -- Condition stable, no therapeutic change required. Monitoring routinely.     4. Dry eye  -- recommend Ivizia one drop in each eye as needed       RTC in 1 year for dilation and MOCT or prn any changes

## 2023-08-15 NOTE — PATIENT INSTRUCTIONS
Assessment:  Christine Jo is a 73 y.o. female   Chief Complaint   Patient presents with    Right Hip - Pain    Left Hip - Pain    Left Shoulder - Pain       Encounter Diagnoses   Name Primary?    Primary osteoarthritis of right knee Yes    Bilateral primary osteoarthritis of hip     Femoral acetabular impingement     Greater trochanteric bursitis, left         Plan:  Cortisone injection to the left greater trochanteric bursa of hip  We discussed the proper protocols after the injection such as no submerging pools, baths tubs, or hot tubs for 24 hr.  Showering is okay today.  We also discussed that blood sugars can be elevated after an injection and asked patient to properly checked her sugars over the next few days and contact their PCP if there are any concerns.  We discussed red flags such as fevers, chills, red, warm, tender joint at the area of injection to please seek medical care immediately.    Fitted and ordered right hinged knee brace  Under the direction of Dr. Erik Esposito, 15 minutes were spent sizing, fitting, and educating for durable medical equipment application today.  CPT 69575.  Follow up as needed    Follow-up: as needed or sooner if there are any problems between now and then.    Thank you for choosing [facility] and Dr. Erik Esposito for your orthopedic & sports medicine care. It is our goal to provide you with exceptional care that will help keep you healthy, active, and get you back in the game.    Please do not hesitate to reach out to us via email, phone, or MyChart with any questions, concerns, or feedback.    If you felt that you received exemplary care today, please consider leaving us feedback on [employer] at:  [email or web address]    If you are experiencing pain/discomfort ,or have questions after 5pm and would like to be connected to the [facility] on-call team, please call  this number and specify which Sports Medicine provider is treating you: [phone]

## 2023-08-17 ENCOUNTER — PATIENT MESSAGE (OUTPATIENT)
Dept: UROLOGY | Facility: CLINIC | Age: 73
End: 2023-08-17
Payer: MEDICARE

## 2023-08-17 ENCOUNTER — OFFICE VISIT (OUTPATIENT)
Dept: RHEUMATOLOGY | Facility: CLINIC | Age: 73
End: 2023-08-17
Payer: MEDICARE

## 2023-08-17 ENCOUNTER — OFFICE VISIT (OUTPATIENT)
Dept: HEMATOLOGY/ONCOLOGY | Facility: CLINIC | Age: 73
End: 2023-08-17
Payer: MEDICARE

## 2023-08-17 VITALS
DIASTOLIC BLOOD PRESSURE: 58 MMHG | HEIGHT: 61 IN | OXYGEN SATURATION: 96 % | SYSTOLIC BLOOD PRESSURE: 149 MMHG | RESPIRATION RATE: 16 BRPM | HEART RATE: 81 BPM | WEIGHT: 242.06 LBS | BODY MASS INDEX: 45.7 KG/M2 | TEMPERATURE: 98 F

## 2023-08-17 VITALS
DIASTOLIC BLOOD PRESSURE: 66 MMHG | SYSTOLIC BLOOD PRESSURE: 130 MMHG | HEART RATE: 74 BPM | BODY MASS INDEX: 43.98 KG/M2 | HEIGHT: 62 IN | WEIGHT: 239 LBS

## 2023-08-17 DIAGNOSIS — M25.562 CHRONIC PAIN OF BOTH KNEES: ICD-10-CM

## 2023-08-17 DIAGNOSIS — G89.29 CHRONIC PAIN OF BOTH KNEES: ICD-10-CM

## 2023-08-17 DIAGNOSIS — M25.561 CHRONIC PAIN OF BOTH KNEES: ICD-10-CM

## 2023-08-17 DIAGNOSIS — M21.169 GENU VARUM, UNSPECIFIED LATERALITY: ICD-10-CM

## 2023-08-17 DIAGNOSIS — M85.89 OSTEOPENIA OF MULTIPLE SITES: ICD-10-CM

## 2023-08-17 DIAGNOSIS — M17.0 PRIMARY OSTEOARTHRITIS OF BOTH KNEES: Primary | ICD-10-CM

## 2023-08-17 DIAGNOSIS — D50.0 IRON DEFICIENCY ANEMIA DUE TO CHRONIC BLOOD LOSS: ICD-10-CM

## 2023-08-17 DIAGNOSIS — R53.83 FATIGUE, UNSPECIFIED TYPE: ICD-10-CM

## 2023-08-17 DIAGNOSIS — D64.9 ANEMIA, UNSPECIFIED TYPE: ICD-10-CM

## 2023-08-17 PROCEDURE — 1126F AMNT PAIN NOTED NONE PRSNT: CPT | Mod: CPTII,S$GLB,, | Performed by: PHYSICIAN ASSISTANT

## 2023-08-17 PROCEDURE — 1160F RVW MEDS BY RX/DR IN RCRD: CPT | Mod: CPTII,S$GLB,,

## 2023-08-17 PROCEDURE — 1160F PR REVIEW ALL MEDS BY PRESCRIBER/CLIN PHARMACIST DOCUMENTED: ICD-10-PCS | Mod: CPTII,S$GLB,, | Performed by: PHYSICIAN ASSISTANT

## 2023-08-17 PROCEDURE — 1126F PR PAIN SEVERITY QUANTIFIED, NO PAIN PRESENT: ICD-10-PCS | Mod: CPTII,S$GLB,, | Performed by: PHYSICIAN ASSISTANT

## 2023-08-17 PROCEDURE — 3008F BODY MASS INDEX DOCD: CPT | Mod: CPTII,S$GLB,,

## 2023-08-17 PROCEDURE — 99999 PR PBB SHADOW E&M-EST. PATIENT-LVL V: CPT | Mod: PBBFAC,,,

## 2023-08-17 PROCEDURE — 1101F PT FALLS ASSESS-DOCD LE1/YR: CPT | Mod: CPTII,S$GLB,, | Performed by: PHYSICIAN ASSISTANT

## 2023-08-17 PROCEDURE — 3075F PR MOST RECENT SYSTOLIC BLOOD PRESS GE 130-139MM HG: ICD-10-PCS | Mod: CPTII,S$GLB,, | Performed by: PHYSICIAN ASSISTANT

## 2023-08-17 PROCEDURE — 3075F SYST BP GE 130 - 139MM HG: CPT | Mod: CPTII,S$GLB,, | Performed by: PHYSICIAN ASSISTANT

## 2023-08-17 PROCEDURE — 4010F ACE/ARB THERAPY RXD/TAKEN: CPT | Mod: CPTII,S$GLB,, | Performed by: PHYSICIAN ASSISTANT

## 2023-08-17 PROCEDURE — 99213 OFFICE O/P EST LOW 20 MIN: CPT | Mod: S$GLB,,, | Performed by: PHYSICIAN ASSISTANT

## 2023-08-17 PROCEDURE — 1159F MED LIST DOCD IN RCRD: CPT | Mod: CPTII,S$GLB,,

## 2023-08-17 PROCEDURE — 4010F PR ACE/ARB THEARPY RXD/TAKEN: ICD-10-PCS | Mod: CPTII,S$GLB,, | Performed by: PHYSICIAN ASSISTANT

## 2023-08-17 PROCEDURE — 3077F PR MOST RECENT SYSTOLIC BLOOD PRESSURE >= 140 MM HG: ICD-10-PCS | Mod: CPTII,S$GLB,,

## 2023-08-17 PROCEDURE — 3078F PR MOST RECENT DIASTOLIC BLOOD PRESSURE < 80 MM HG: ICD-10-PCS | Mod: CPTII,S$GLB,,

## 2023-08-17 PROCEDURE — 1159F PR MEDICATION LIST DOCUMENTED IN MEDICAL RECORD: ICD-10-PCS | Mod: CPTII,S$GLB,,

## 2023-08-17 PROCEDURE — 3078F DIAST BP <80 MM HG: CPT | Mod: CPTII,S$GLB,, | Performed by: PHYSICIAN ASSISTANT

## 2023-08-17 PROCEDURE — 1101F PR PT FALLS ASSESS DOC 0-1 FALLS W/OUT INJ PAST YR: ICD-10-PCS | Mod: CPTII,S$GLB,,

## 2023-08-17 PROCEDURE — 3051F HG A1C>EQUAL 7.0%<8.0%: CPT | Mod: CPTII,S$GLB,,

## 2023-08-17 PROCEDURE — 4010F ACE/ARB THERAPY RXD/TAKEN: CPT | Mod: CPTII,S$GLB,,

## 2023-08-17 PROCEDURE — 3008F BODY MASS INDEX DOCD: CPT | Mod: CPTII,S$GLB,, | Performed by: PHYSICIAN ASSISTANT

## 2023-08-17 PROCEDURE — 99213 PR OFFICE/OUTPT VISIT, EST, LEVL III, 20-29 MIN: ICD-10-PCS | Mod: S$GLB,,, | Performed by: PHYSICIAN ASSISTANT

## 2023-08-17 PROCEDURE — 1160F PR REVIEW ALL MEDS BY PRESCRIBER/CLIN PHARMACIST DOCUMENTED: ICD-10-PCS | Mod: CPTII,S$GLB,,

## 2023-08-17 PROCEDURE — 3288F FALL RISK ASSESSMENT DOCD: CPT | Mod: CPTII,S$GLB,,

## 2023-08-17 PROCEDURE — 99999 PR PBB SHADOW E&M-EST. PATIENT-LVL V: ICD-10-PCS | Mod: PBBFAC,,, | Performed by: PHYSICIAN ASSISTANT

## 2023-08-17 PROCEDURE — 99999 PR PBB SHADOW E&M-EST. PATIENT-LVL V: ICD-10-PCS | Mod: PBBFAC,,,

## 2023-08-17 PROCEDURE — 3051F HG A1C>EQUAL 7.0%<8.0%: CPT | Mod: CPTII,S$GLB,, | Performed by: PHYSICIAN ASSISTANT

## 2023-08-17 PROCEDURE — 3051F PR MOST RECENT HEMOGLOBIN A1C LEVEL 7.0 - < 8.0%: ICD-10-PCS | Mod: CPTII,S$GLB,, | Performed by: PHYSICIAN ASSISTANT

## 2023-08-17 PROCEDURE — 99214 OFFICE O/P EST MOD 30 MIN: CPT | Mod: S$GLB,,,

## 2023-08-17 PROCEDURE — 3008F PR BODY MASS INDEX (BMI) DOCUMENTED: ICD-10-PCS | Mod: CPTII,S$GLB,, | Performed by: PHYSICIAN ASSISTANT

## 2023-08-17 PROCEDURE — 1160F RVW MEDS BY RX/DR IN RCRD: CPT | Mod: CPTII,S$GLB,, | Performed by: PHYSICIAN ASSISTANT

## 2023-08-17 PROCEDURE — 1159F PR MEDICATION LIST DOCUMENTED IN MEDICAL RECORD: ICD-10-PCS | Mod: CPTII,S$GLB,, | Performed by: PHYSICIAN ASSISTANT

## 2023-08-17 PROCEDURE — 3288F PR FALLS RISK ASSESSMENT DOCUMENTED: ICD-10-PCS | Mod: CPTII,S$GLB,,

## 2023-08-17 PROCEDURE — 3008F PR BODY MASS INDEX (BMI) DOCUMENTED: ICD-10-PCS | Mod: CPTII,S$GLB,,

## 2023-08-17 PROCEDURE — 3077F SYST BP >= 140 MM HG: CPT | Mod: CPTII,S$GLB,,

## 2023-08-17 PROCEDURE — 3051F PR MOST RECENT HEMOGLOBIN A1C LEVEL 7.0 - < 8.0%: ICD-10-PCS | Mod: CPTII,S$GLB,,

## 2023-08-17 PROCEDURE — 1126F AMNT PAIN NOTED NONE PRSNT: CPT | Mod: CPTII,S$GLB,,

## 2023-08-17 PROCEDURE — 3078F DIAST BP <80 MM HG: CPT | Mod: CPTII,S$GLB,,

## 2023-08-17 PROCEDURE — 3078F PR MOST RECENT DIASTOLIC BLOOD PRESSURE < 80 MM HG: ICD-10-PCS | Mod: CPTII,S$GLB,, | Performed by: PHYSICIAN ASSISTANT

## 2023-08-17 PROCEDURE — 1159F MED LIST DOCD IN RCRD: CPT | Mod: CPTII,S$GLB,, | Performed by: PHYSICIAN ASSISTANT

## 2023-08-17 PROCEDURE — 1101F PR PT FALLS ASSESS DOC 0-1 FALLS W/OUT INJ PAST YR: ICD-10-PCS | Mod: CPTII,S$GLB,, | Performed by: PHYSICIAN ASSISTANT

## 2023-08-17 PROCEDURE — 3288F PR FALLS RISK ASSESSMENT DOCUMENTED: ICD-10-PCS | Mod: CPTII,S$GLB,, | Performed by: PHYSICIAN ASSISTANT

## 2023-08-17 PROCEDURE — 1126F PR PAIN SEVERITY QUANTIFIED, NO PAIN PRESENT: ICD-10-PCS | Mod: CPTII,S$GLB,,

## 2023-08-17 PROCEDURE — 3288F FALL RISK ASSESSMENT DOCD: CPT | Mod: CPTII,S$GLB,, | Performed by: PHYSICIAN ASSISTANT

## 2023-08-17 PROCEDURE — 99214 PR OFFICE/OUTPT VISIT, EST, LEVL IV, 30-39 MIN: ICD-10-PCS | Mod: S$GLB,,,

## 2023-08-17 PROCEDURE — 4010F PR ACE/ARB THEARPY RXD/TAKEN: ICD-10-PCS | Mod: CPTII,S$GLB,,

## 2023-08-17 PROCEDURE — 99999 PR PBB SHADOW E&M-EST. PATIENT-LVL V: CPT | Mod: PBBFAC,,, | Performed by: PHYSICIAN ASSISTANT

## 2023-08-17 PROCEDURE — 1101F PT FALLS ASSESS-DOCD LE1/YR: CPT | Mod: CPTII,S$GLB,,

## 2023-08-17 NOTE — PROGRESS NOTES
Subjective:      Patient ID: Christine Jo is a 73 y.o. female.    Chief Complaint: Osteoarthritis      HPI   Christine Jo  is a 73 y.o. female seen today for follow-up status post Zilretta injections bilateral knees.  She says the left knee is doing very well.  The right knee has some discomfort but if she uses her knee base for ambulation, the right knee is doing well also.  Pain level today is 0/10.  So far she is relatively happy with her progress.  We had discussed potentially moving forward with Synvisc-One injections pending her response and evaluation today.    Also with history of osteoporosis.  She is been treated with IV Boniva in the past.  Most recent DEXA scan done September 2021 showed low fracture risk.      Patient denies fevers, chills, photosensitivity, eye pain, shortness of breath, chest pain, hematuria, blood in the stool, rash, sicca symptoms, raynauds, finger ulcerations.  Rheumatologic systems otherwise negative.    Serologies/Labs:  Neg TOSHA  Current Treatment:  Zilretta inj bilat knees 5/2023  Previous Treatment:   Boniva infusions - on drug holiday      Current Outpatient Medications:     ACETAMINOPHEN (TYLENOL ARTHRITIS ORAL), Take by mouth as needed., Disp: , Rfl:     apixaban (ELIQUIS) 5 mg Tab, Take 1 tablet (5 mg total) by mouth 2 (two) times daily., Disp: 180 tablet, Rfl: 0    azelastine (ASTELIN) 137 mcg (0.1 %) nasal spray, use 2 sprays (274 mcg total) by Nasal route 2 (two) times daily., Disp: 30 mL, Rfl: 1    benazepriL (LOTENSIN) 40 MG tablet, Take 1 tablet (40 mg total) by mouth 2 (two) times daily., Disp: 180 tablet, Rfl: 4    benazepriL (LOTENSIN) 40 MG tablet, Take 1 tablet (40 mg total) by mouth once daily., Disp: 90 tablet, Rfl: 3    BEPREVE 1.5 % Drop, Place 1 drop into both eyes 2 (two) times daily., Disp: 10 mL, Rfl: 4    blood sugar diagnostic Strp, To check blood sugar 1 times daily, Disp: 100 each, Rfl: 3    blood-glucose meter kit, To check BG 2 times daily,  to use with insurance preferred meter, Disp: 1 each, Rfl: 0    blood-glucose meter Misc, use daily to test blood sugar, Disp: 1 each, Rfl: 0    calcium citrate-vitamin D3 315-200 mg (CITRACAL+D) 315-200 mg-unit per tablet, Take 1 tablet by mouth once daily., Disp: , Rfl:     carvediloL (COREG) 25 MG tablet, TAKE 1 TABLET BY MOUTH TWICE DAILY, Disp: 180 tablet, Rfl: 3    clotrimazole-betamethasone (LOTRISONE) lotion, Apply topically to the affected area 2 (two) times daily., Disp: 30 mL, Rfl: 2    cycloSPORINE (RESTASIS) 0.05 % ophthalmic emulsion, Place 1 drop into both eyes 2 (two) times daily., Disp: 60 each, Rfl: 11    diclofenac sodium (VOLTAREN) 1 % Gel, Apply 2 grams topically 4 (four) times daily. To affected joints as needed for pain, Disp: 100 g, Rfl: 3    docusate sodium (COLACE) 100 MG capsule, Take 1 capsule (100 mg total) by mouth 2 (two) times daily., Disp: 180 capsule, Rfl: 0    DULoxetine (CYMBALTA) 60 MG capsule, Take 1 capsule (60 mg total) by mouth once daily., Disp: 90 capsule, Rfl: 1    fluticasone propionate (FLONASE) 50 mcg/actuation nasal spray, 2 sprays (100 mcg total) by Each Nostril route once daily., Disp: 48 g, Rfl: 1    gabapentin (NEURONTIN) 300 MG capsule, Take 1 capsule (300 mg total) by mouth 3 (three) times daily. (Patient taking differently: Take 300 mg by mouth daily as needed.), Disp: 90 capsule, Rfl: 11    galcanezumab-gnlm (EMGALITY SYRINGE) 120 mg/mL Syrg, Inject 120 mg into the skin every 28 days., Disp: 1 mL, Rfl: 12    hydrALAZINE (APRESOLINE) 50 MG tablet, Take 1 tablet (50 mg total) by mouth every 8 (eight) hours., Disp: 180 tablet, Rfl: 3    hydrocortisone 2.5 % cream, Apply topically 2 (two) times daily., Disp: 28 g, Rfl: 2    hydrOXYzine HCL (ATARAX) 25 MG tablet, Take 1 tablet (25 mg total) by mouth nightly., Disp: 90 tablet, Rfl: 0    insulin (LANTUS SOLOSTAR U-100 INSULIN) glargine 100 units/mL SubQ pen, Inject 72 Units into the skin every evening., Disp: 75 mL,  "Rfl: 0    lancets Misc, To check BG 1 times daily, Disp: 100 each, Rfl: 3    meclizine (ANTIVERT) 25 mg tablet, Take 1 tablet (25 mg total) by mouth 3 (three) times daily as needed., Disp: 30 tablet, Rfl: 2    MULTIVIT WITH CALCIUM,IRON,MIN (WOMEN'S DAILY MULTIVITAMIN ORAL), Take by mouth once daily. , Disp: , Rfl:     nystatin (MYCOSTATIN) cream, Apply topically 2 (two) times daily. Continue until completely healed, Disp: 30 g, Rfl: 0    pantoprazole (PROTONIX) 40 MG tablet, Take 1 tablet (40 mg total) by mouth 2 (two) times a day., Disp: 60 tablet, Rfl: 0    pen needle, diabetic (BD ULTRA-FINE SHORT PEN NEEDLE) 31 gauge x 5/16" Ndle, AS DIRECTED TWICE DAILY, Disp: 200 each, Rfl: 3    polyethylene glycol (GLYCOLAX) 17 gram/dose powder, Mix 17 g (1 capful) in liquid and drink by mouth once daily., Disp: 510 g, Rfl: 3    rimegepant 75 mg odt, Take 1 tablet (75 mg total) by mouth as needed for Migraine (do not exceed 2-3 doses within 1 week). Place ODT tablet on the tongue; alternatively the ODT tablet may be placed under the tongue, Disp: 8 tablet, Rfl: 5    SITagliptin phosphate (JANUVIA) 100 MG Tab, Take 1 tablet (100 mg total) by mouth once daily., Disp: 90 tablet, Rfl: 1    temazepam (RESTORIL) 15 mg Cap, Take 1 capsule (15 mg total) by mouth every evening., Disp: 90 capsule, Rfl: 0    Past Medical History:   Diagnosis Date    Arthritis     Cataract     Diabetes mellitus 2008     am 01/15/2018 Insulin x 1 year    DM (diabetes mellitus) 2008     am 02/14/2020 Insulin x 4 years    Encounter for blood transfusion     Glaucoma     Hypertension     Insomnia     Macular degeneration     Vaginal yeast infection      Family History   Problem Relation Age of Onset    Heart disease Mother         CAD     Cataracts Mother     Macular degeneration Mother     Glaucoma Mother     Cancer Son         testicular     Cancer Maternal Aunt         Lung ca    Heart disease Maternal Grandfather         Pacemaker     " Diabetes Sister     Heart disease Sister         CAD    Cataracts Sister     Diabetes Sister     Diabetes Sister      Social History     Socioeconomic History    Marital status:     Number of children: 3   Occupational History    Occupation: Retired   Tobacco Use    Smoking status: Former     Current packs/day: 0.00     Average packs/day: 1 pack/day for 36.5 years (36.5 ttl pk-yrs)     Types: Cigarettes     Start date:      Quit date: 2003     Years since quittin.1     Passive exposure: Never    Smokeless tobacco: Never   Substance and Sexual Activity    Alcohol use: No    Drug use: No    Sexual activity: Never     Social Determinants of Health     Financial Resource Strain: Low Risk  (2022)    Overall Financial Resource Strain (CARDIA)     Difficulty of Paying Living Expenses: Not hard at all   Food Insecurity: No Food Insecurity (2022)    Hunger Vital Sign     Worried About Running Out of Food in the Last Year: Never true     Ran Out of Food in the Last Year: Never true   Transportation Needs: Unmet Transportation Needs (2022)    PRAPARE - Transportation     Lack of Transportation (Medical): Yes     Lack of Transportation (Non-Medical): No   Physical Activity: Insufficiently Active (2022)    Exercise Vital Sign     Days of Exercise per Week: 7 days     Minutes of Exercise per Session: 10 min   Stress: No Stress Concern Present (2022)    [employer] [employer] - Occupational Stress Questionnaire     Feeling of Stress : Only a little   Social Connections: Moderately Isolated (2022)    Social Connection and Isolation Panel [NHANES]     Frequency of Communication with Friends and Family: More than three times a week     Frequency of Social Gatherings with Friends and Family: Never     Attends Anabaptism Services: 1 to 4 times per year     Active Member of Clubs or Organizations: No     Attends Club or Organization Meetings: Never     Marital Status:  "   Housing Stability: Low Risk  (9/28/2022)    Housing Stability Vital Sign     Unable to Pay for Housing in the Last Year: No     Number of Places Lived in the Last Year: 2     Unstable Housing in the Last Year: No     Review of patient's allergies indicates:   Allergen Reactions    Aspirin Palpitations       Objective:   /66   Pulse 74   Ht 5' 2" (1.575 m)   Wt 108.4 kg (239 lb)   BMI 43.71 kg/m²   Immunization History   Administered Date(s) Administered    COVID-19, MRNA, LN-S, PF (Pfizer) (Purple Cap) 02/17/2021, 03/10/2021, 10/13/2021    Hepatitis A, Adult 03/11/2020    Hepatitis B, Adult 06/28/2017    Hepatitis B, Pediatric/Adolescent 09/20/2017, 09/20/2017    Influenza 11/07/2016    Influenza (FLUAD) - Quadrivalent - Adjuvanted - PF *Preferred* (65+) 10/03/2020, 09/20/2021, 10/04/2022    Influenza - High Dose - PF (65 years and older) 10/23/2015, 09/20/2017, 09/27/2018, 09/13/2019    Influenza - Quadrivalent - PF *Preferred* (6 months and older) 11/07/2016    Influenza Split 09/21/2013    Pneumococcal Conjugate - 13 Valent 11/07/2016    Pneumococcal Polysaccharide - 23 Valent 09/21/2013, 09/27/2018    Tdap 07/18/2014    Zoster 09/21/2013    Zoster Recombinant 12/07/2019, 02/13/2020       Physical Exam   Constitutional: She is oriented to person, place, and time. No distress.   HENT:   Head: Normocephalic and atraumatic.   Pulmonary/Chest: Effort normal.   Abdominal: She exhibits no distension.   Musculoskeletal:         General: Tenderness present. No swelling. Normal range of motion.      Cervical back: Normal range of motion.   Lymphadenopathy:     She has no cervical adenopathy.   Neurological: She is alert and oriented to person, place, and time.   Skin: Skin is warm and dry. No rash noted.   Psychiatric: Mood normal.   Nursing note and vitals reviewed.    Crepitus PF jt holley  SIRENA w preserved ROM b/l knees  No effussion  Mild TTP med comp bilaterally    Recent Results (from the past 672 " "hour(s))   CBC Auto Differential    Collection Time: 08/15/23 11:35 AM   Result Value Ref Range    WBC 9.11 3.90 - 12.70 K/uL    RBC 3.67 (L) 4.00 - 5.40 M/uL    Hemoglobin 11.4 (L) 12.0 - 16.0 g/dL    Hematocrit 35.1 (L) 37.0 - 48.5 %    MCV 96 82 - 98 fL    MCH 31.1 (H) 27.0 - 31.0 pg    MCHC 32.5 32.0 - 36.0 g/dL    RDW 12.5 11.5 - 14.5 %    Platelets 267 150 - 450 K/uL    MPV 11.6 9.2 - 12.9 fL    Immature Granulocytes 0.3 0.0 - 0.5 %    Gran # (ANC) 6.0 1.8 - 7.7 K/uL    Immature Grans (Abs) 0.03 0.00 - 0.04 K/uL    Lymph # 1.9 1.0 - 4.8 K/uL    Mono # 0.9 0.3 - 1.0 K/uL    Eos # 0.1 0.0 - 0.5 K/uL    Baso # 0.07 0.00 - 0.20 K/uL    nRBC 0 0 /100 WBC    Gran % 66.3 38.0 - 73.0 %    Lymph % 21.1 18.0 - 48.0 %    Mono % 10.0 4.0 - 15.0 %    Eosinophil % 1.5 0.0 - 8.0 %    Basophil % 0.8 0.0 - 1.9 %    Differential Method Automated    Ferritin    Collection Time: 08/15/23 11:35 AM   Result Value Ref Range    Ferritin 95 20.0 - 300.0 ng/mL   Iron and TIBC    Collection Time: 08/15/23 11:35 AM   Result Value Ref Range    Iron 106 30 - 160 ug/dL    Transferrin 223 200 - 375 mg/dL    TIBC 330 250 - 450 ug/dL    Saturated Iron 32 20 - 50 %       No results found for: "TBGOLDPLUS"   Lab Results   Component Value Date    HEPCAB Negative 07/12/2017        Assessment:     1. Primary osteoarthritis of both knees    2. Chronic pain of both knees    3. Genu varum, unspecified laterality    4. Osteopenia of multiple sites            Plan:     Christine was seen today for osteoarthritis.    Diagnoses and all orders for this visit:    Primary osteoarthritis of both knees    Chronic pain of both knees    Genu varum, unspecified laterality    Osteopenia of multiple sites        OA bilat knees w knee pain  Improved after Zilretta injections  Avoid oral NSAIDs with long-term use of Eliquis  OTC Tylenol p.r.n.  Continue Voltaren gel 2 g t.i.d. p.r.n.  Hold off on Synvisc-One for now.  I will see her back in the office in 6-8 weeks.  We " will plan to proceed at that time.  She verbalized understanding and agreed.  Oseopenia  Low FRAX on dexa 9/2021  DEXA due in the next year  Return to clinic: 8 weeks - synvisc one    Follow up in about 8 weeks (around 10/12/2023).    The patient understands, chooses and consents to this plan and accepts all the risks which include but are not limited to the risks mentioned above.     Disclaimer: This note was prepared using a voice recognition system and is likely to have sound alike errors within the text.

## 2023-08-17 NOTE — PROGRESS NOTES
"Subjective:      Patient ID: Christine Jo is a 73 y.o. female.    Chief Complaint: Follow-up (Iron deficiency anemia )      Notes:    HPI:  Ms. Jo is a pleasant 72-year-old female who presents today for follow-up of iron deficiency anemia. She was last seen in Heme/Onc clinic  2020. She has had IV iron in the past 2018.   She had [facility] with [facility]. Patel which was - negative for contributory causes. EGD 2021 found chronic gastritis.  Colonoscopy 2021 found 3 polyps, lipoma, and diverticulosis with recommendation to repeat in 3 years. She cannot tolerate oral iron supplementation as it causes GI upset      Interval History: She c/o continued fatigue and notes she has,"good days and bad days." Per pt previously diagnosed with sleep apnea but states she cannot use cpap machine--recommend f/u with sleep medicine. Denies n/v/d/c, fever, chills, night sweats, sob, cp, lightheadedness unintentional weight loss.    Social History     Socioeconomic History    Marital status:     Number of children: 3   Occupational History    Occupation: Retired   Tobacco Use    Smoking status: Former     Current packs/day: 0.00     Average packs/day: 1 pack/day for 36.5 years (36.5 ttl pk-yrs)     Types: Cigarettes     Start date:      Quit date: 2003     Years since quittin.1     Passive exposure: Never    Smokeless tobacco: Never   Substance and Sexual Activity    Alcohol use: No    Drug use: No    Sexual activity: Never     Social Determinants of Health     Financial Resource Strain: Low Risk  (2022)    Overall Financial Resource Strain (CARDIA)     Difficulty of Paying Living Expenses: Not hard at all   Food Insecurity: No Food Insecurity (2022)    Hunger Vital Sign     Worried About Running Out of Food in the Last Year: Never true     Ran Out of Food in the Last Year: Never true   Transportation Needs: Unmet Transportation Needs " (9/28/2022)    PRAPARE - Transportation     Lack of Transportation (Medical): Yes     Lack of Transportation (Non-Medical): No   Physical Activity: Insufficiently Active (9/28/2022)    Exercise Vital Sign     Days of Exercise per Week: 7 days     Minutes of Exercise per Session: 10 min   Stress: No Stress Concern Present (9/28/2022)    [employer] - Occupational Stress Questionnaire     Feeling of Stress : Only a little   Social Connections: Moderately Isolated (9/28/2022)    Social Connection and Isolation Panel [NHANES]     Frequency of Communication with Friends and Family: More than three times a week     Frequency of Social Gatherings with Friends and Family: Never     Attends Yazidi Services: 1 to 4 times per year     Active Member of Clubs or Organizations: No     Attends Club or Organization Meetings: Never     Marital Status:    Housing Stability: Low Risk  (9/28/2022)    Housing Stability Vital Sign     Unable to Pay for Housing in the Last Year: No     Number of Places Lived in the Last Year: 2     Unstable Housing in the Last Year: No       Family History   Problem Relation Age of Onset    Heart disease Mother         CAD     Cataracts Mother     Macular degeneration Mother     Glaucoma Mother     Cancer Son         testicular     Cancer Maternal Aunt         Lung ca    Heart disease Maternal Grandfather         Pacemaker     Diabetes Sister     Heart disease Sister         CAD    Cataracts Sister     Diabetes Sister     Diabetes Sister        Past Surgical History:   Procedure Laterality Date    abdominal laparoscopy       BREAST BIOPSY Left 1998    benign    CATARACT EXTRACTION Bilateral [id number]    [facility] in [place]    COLONOSCOPY N/A 05/05/2021    Procedure: COLONOSCOPY;  Surgeon: Shawn Rogers III, MD;  Location: [facility];  Service: Endoscopy;  Laterality: N/A;    COLONOSCOPY N/A 06/30/2021    Procedure: COLONOSCOPY;  Surgeon: Memo JARAMILLO  MD Fuad;  Location: [facility];  Service: Endoscopy;  Laterality: N/A;    ESOPHAGOGASTRODUODENOSCOPY N/A 11/29/2019    Procedure: ESOPHAGOGASTRODUODENOSCOPY (EGD);  Surgeon: Shawn Rogers III, MD;  Location: [facility];  Service: Endoscopy;  Laterality: N/A;    ESOPHAGOGASTRODUODENOSCOPY N/A 05/05/2021    Procedure: EGD (ESOPHAGOGASTRODUODENOSCOPY);  Surgeon: Shawn Rogers III, MD;  Location: [facility];  Service: Endoscopy;  Laterality: N/A;    EYE SURGERY      TONSILLECTOMY      TUBAL LIGATION      yag  Bilateral        Past Medical History:   Diagnosis Date    Arthritis     Cataract     Diabetes mellitus 2008     am 01/15/2018 Insulin x 1 year    DM (diabetes mellitus) 2008     am 02/14/2020 Insulin x 4 years    Encounter for blood transfusion     Glaucoma     Hypertension     Insomnia     Macular degeneration     Vaginal yeast infection        Review of Systems   Constitutional:  Positive for fatigue. Negative for activity change, appetite change, chills, fever and unexpected weight change.   HENT:  Negative for congestion, dental problem, mouth sores and nosebleeds.    Eyes:  Negative for visual disturbance.   Respiratory:  Negative for cough, choking and chest tightness.    Cardiovascular:  Negative for chest pain, palpitations and leg swelling.   Gastrointestinal:  Negative for abdominal distention, abdominal pain, anal bleeding, blood in stool, constipation, diarrhea, nausea and vomiting.   Endocrine: Negative.    Genitourinary:  Negative for dysuria, frequency, hematuria and urgency.   Musculoskeletal:  Negative for arthralgias, back pain, gait problem, joint swelling and myalgias.   Skin:  Negative for wound.   Allergic/Immunologic: Negative for immunocompromised state.   Neurological:  Negative for dizziness, light-headedness, numbness and headaches.   Hematological:  Negative for adenopathy. Does not bruise/bleed easily.   Psychiatric/Behavioral:  Negative for sleep disturbance. The  patient is not nervous/anxious.        Medication List with Changes/Refills   Current Medications    ACETAMINOPHEN (TYLENOL ARTHRITIS ORAL)    Take by mouth as needed.    APIXABAN (ELIQUIS) 5 MG TAB    Take 1 tablet (5 mg total) by mouth 2 (two) times daily.    AZELASTINE (ASTELIN) 137 MCG (0.1 %) NASAL SPRAY    use 2 sprays (274 mcg total) by Nasal route 2 (two) times daily.    BENAZEPRIL (LOTENSIN) 40 MG TABLET    Take 1 tablet (40 mg total) by mouth 2 (two) times daily.    BENAZEPRIL (LOTENSIN) 40 MG TABLET    Take 1 tablet (40 mg total) by mouth once daily.    BEPREVE 1.5 % DROP    Place 1 drop into both eyes 2 (two) times daily.    BLOOD SUGAR DIAGNOSTIC STRP    To check blood sugar 1 times daily    BLOOD-GLUCOSE METER KIT    To check BG 2 times daily, to use with insurance preferred meter    BLOOD-GLUCOSE METER MISC    use daily to test blood sugar    CALCIUM CITRATE-VITAMIN D3 315-200 MG (CITRACAL+D) 315-200 MG-UNIT PER TABLET    Take 1 tablet by mouth once daily.    CARVEDILOL (COREG) 25 MG TABLET    TAKE 1 TABLET BY MOUTH TWICE DAILY    CLOTRIMAZOLE-BETAMETHASONE (LOTRISONE) LOTION    Apply topically to the affected area 2 (two) times daily.    CYCLOSPORINE (RESTASIS) 0.05 % OPHTHALMIC EMULSION    Place 1 drop into both eyes 2 (two) times daily.    DICLOFENAC SODIUM (VOLTAREN) 1 % GEL    Apply 2 grams topically 4 (four) times daily. To affected joints as needed for pain    DOCUSATE SODIUM (COLACE) 100 MG CAPSULE    Take 1 capsule (100 mg total) by mouth 2 (two) times daily.    DULOXETINE (CYMBALTA) 60 MG CAPSULE    Take 1 capsule (60 mg total) by mouth once daily.    FLUTICASONE PROPIONATE (FLONASE) 50 MCG/ACTUATION NASAL SPRAY    2 sprays (100 mcg total) by Each Nostril route once daily.    GABAPENTIN (NEURONTIN) 300 MG CAPSULE    Take 1 capsule (300 mg total) by mouth 3 (three) times daily.    GALCANEZUMAB-GNLM (EMGALITY SYRINGE) 120 MG/ML SYRG    Inject 120 mg into the skin every 28 days.    HYDRALAZINE  "(APRESOLINE) 50 MG TABLET    Take 1 tablet (50 mg total) by mouth every 8 (eight) hours.    HYDROCORTISONE 2.5 % CREAM    Apply topically 2 (two) times daily.    HYDROXYZINE HCL (ATARAX) 25 MG TABLET    Take 1 tablet (25 mg total) by mouth nightly.    INSULIN (LANTUS SOLOSTAR U-100 INSULIN) GLARGINE 100 UNITS/ML SUBQ PEN    Inject 72 Units into the skin every evening.    LANCETS MISC    To check BG 1 times daily    MECLIZINE (ANTIVERT) 25 MG TABLET    Take 1 tablet (25 mg total) by mouth 3 (three) times daily as needed.    MULTIVIT WITH CALCIUM,IRON,MIN (WOMEN'S DAILY MULTIVITAMIN ORAL)    Take by mouth once daily.     NYSTATIN (MYCOSTATIN) CREAM    Apply topically 2 (two) times daily. Continue until completely healed    PANTOPRAZOLE (PROTONIX) 40 MG TABLET    Take 1 tablet (40 mg total) by mouth 2 (two) times a day.    PEN NEEDLE, DIABETIC (BD ULTRA-FINE SHORT PEN NEEDLE) 31 GAUGE X 5/16" NDLE    AS DIRECTED TWICE DAILY    POLYETHYLENE GLYCOL (GLYCOLAX) 17 GRAM/DOSE POWDER    Mix 17 g (1 capful) in liquid and drink by mouth once daily.    RIMEGEPANT 75 MG ODT    Take 1 tablet (75 mg total) by mouth as needed for Migraine (do not exceed 2-3 doses within 1 week). Place ODT tablet on the tongue; alternatively the ODT tablet may be placed under the tongue    SITAGLIPTIN PHOSPHATE (JANUVIA) 100 MG TAB    Take 1 tablet (100 mg total) by mouth once daily.    TEMAZEPAM (RESTORIL) 15 MG CAP    Take 1 capsule (15 mg total) by mouth every evening.        Objective:     Vitals:    08/17/23 1028   BP: (!) 149/58   Pulse: 81   Resp: 16   Temp: 98.3 °F (36.8 °C)       Physical Exam  Constitutional:       Appearance: Normal appearance.   HENT:      Head: Normocephalic.   Cardiovascular:      Rate and Rhythm: Normal rate.   Pulmonary:      Effort: Pulmonary effort is normal.   Neurological:      Mental Status: She is alert.   Psychiatric:         Mood and Affect: Mood normal.         Behavior: Behavior normal.         Thought " Content: Thought content normal.         Judgment: Judgment normal.         Lab Results   Component Value Date    WBC 9.11 08/15/2023    HGB 11.4 (L) 08/15/2023    HCT 35.1 (L) 08/15/2023    MCV 96 08/15/2023     08/15/2023       Lab Results   Component Value Date     02/07/2023    K 4.5 02/07/2023     02/07/2023    CO2 24 02/07/2023    BUN 20 02/07/2023    CREATININE 0.9 02/07/2023    CALCIUM 9.5 02/07/2023    ANIONGAP 12 02/07/2023    ESTGFRAFRICA >60 07/27/2022    EGFRNONAA >60 07/27/2022     Lab Results   Component Value Date    ALT 16 02/07/2023    AST 16 02/07/2023    ALKPHOS 47 (L) 02/07/2023    BILITOT 0.5 02/07/2023       Assessment/Plan:     Problem List Items Addressed This Visit          Oncology    Iron deficiency anemia due to chronic blood loss     Lab Results   Component Value Date    IRON 106 08/15/2023    TRANSFERRIN 223 08/15/2023    TIBC 330 08/15/2023    FESATURATED 32 08/15/2023     Lab Results   Component Value Date    FERRITIN 95 08/15/2023   Iron indices remain WNL  No indication for IV iron therapy at this time  Pt cannot tolerate oral iron supplementation d/t GI upset  Encouraged incorporation of iron rich foods in diet            Relevant Orders    CBC Auto Differential    Comprehensive Metabolic Panel    Ferritin    Iron and TIBC    Anemia     Lab Results   Component Value Date    HGB 11.4 (L) 08/15/2023   stable. Mild anemia   --Multifactorial r/t co morbidities             Relevant Orders    CBC Auto Differential    Comprehensive Metabolic Panel    Ferritin    Iron and TIBC    CBC Auto Differential    Comprehensive Metabolic Panel    Ferritin    Iron and TIBC       Other    Fatigue     Previous workup to  r/o any contributing nutritional deficiencies--unrevealing   Recommend f/u with sleep medicine for reassessment of sleep apnea           Relevant Orders    CBC Auto Differential    Comprehensive Metabolic Panel    Ferritin    Iron and TIBC       Med Onc Chart  Routing      Follow up with physician    Follow up with MARIANO 1 year. with labs prior   Infusion scheduling note    Injection scheduling note    Labs CBC, CMP, ferritin and iron and TIBC   Scheduling:  Preferred lab:  Lab interval:     Imaging    Pharmacy appointment    Other referrals                   BRENDEN CarboneP-C  Hematology/Oncology

## 2023-08-17 NOTE — ASSESSMENT & PLAN NOTE
Previous workup to  r/o any contributing nutritional deficiencies--unrevealing   Recommend f/u with sleep medicine for reassessment of sleep apnea

## 2023-08-17 NOTE — ASSESSMENT & PLAN NOTE
Lab Results   Component Value Date    HGB 11.4 (L) 08/15/2023   stable. Mild anemia   --Multifactorial r/t co morbidities

## 2023-08-17 NOTE — ASSESSMENT & PLAN NOTE
Lab Results   Component Value Date    IRON 106 08/15/2023    TRANSFERRIN 223 08/15/2023    TIBC 330 08/15/2023    FESATURATED 32 08/15/2023     Lab Results   Component Value Date    FERRITIN 95 08/15/2023     Iron indices remain WNL  No indication for IV iron therapy at this time  Pt cannot tolerate oral iron supplementation d/t GI upset  Encouraged incorporation of iron rich foods in diet

## 2023-08-21 ENCOUNTER — PATIENT MESSAGE (OUTPATIENT)
Dept: GASTROENTEROLOGY | Facility: CLINIC | Age: 73
End: 2023-08-21
Payer: MEDICARE

## 2023-08-22 ENCOUNTER — PATIENT MESSAGE (OUTPATIENT)
Dept: GASTROENTEROLOGY | Facility: CLINIC | Age: 73
End: 2023-08-22
Payer: MEDICARE

## 2023-08-22 ENCOUNTER — TELEPHONE (OUTPATIENT)
Dept: NEUROLOGY | Facility: CLINIC | Age: 73
End: 2023-08-22
Payer: MEDICARE

## 2023-08-22 ENCOUNTER — PATIENT MESSAGE (OUTPATIENT)
Dept: CARDIOLOGY | Facility: CLINIC | Age: 73
End: 2023-08-22
Payer: MEDICARE

## 2023-08-22 NOTE — TELEPHONE ENCOUNTER
Spoke with patient in regards to appointment scheduled brenton attempting to get something sooner. I advised patient that we currently did not have something sooner. Patient did verbalized understanding and stated she would keep her original appt scheduled.

## 2023-08-22 NOTE — TELEPHONE ENCOUNTER
----- Message from Orly Liriano sent at 8/22/2023 10:25 AM CDT -----  Contact: Christine  Pt is calling in regards to wanting to know if there is a sooner appt. Pt is scheduled for 10/20. Pt stated having the migraine are coming back. Please call back at  [phone]                                Thanks  KT

## 2023-08-23 ENCOUNTER — PATIENT MESSAGE (OUTPATIENT)
Dept: GASTROENTEROLOGY | Facility: CLINIC | Age: 73
End: 2023-08-23
Payer: MEDICARE

## 2023-08-24 ENCOUNTER — PATIENT MESSAGE (OUTPATIENT)
Dept: GASTROENTEROLOGY | Facility: CLINIC | Age: 73
End: 2023-08-24
Payer: MEDICARE

## 2023-08-25 ENCOUNTER — OFFICE VISIT (OUTPATIENT)
Dept: UROLOGY | Facility: CLINIC | Age: 73
End: 2023-08-25
Payer: MEDICARE

## 2023-08-25 VITALS
DIASTOLIC BLOOD PRESSURE: 71 MMHG | WEIGHT: 240 LBS | HEIGHT: 62 IN | BODY MASS INDEX: 44.16 KG/M2 | SYSTOLIC BLOOD PRESSURE: 151 MMHG | HEART RATE: 82 BPM

## 2023-08-25 DIAGNOSIS — N39.3 STRESS INCONTINENCE: ICD-10-CM

## 2023-08-25 DIAGNOSIS — N39.41 URGE INCONTINENCE: Primary | ICD-10-CM

## 2023-08-25 PROCEDURE — 3077F PR MOST RECENT SYSTOLIC BLOOD PRESSURE >= 140 MM HG: ICD-10-PCS | Mod: CPTII,S$GLB,, | Performed by: UROLOGY

## 2023-08-25 PROCEDURE — 4010F PR ACE/ARB THEARPY RXD/TAKEN: ICD-10-PCS | Mod: CPTII,S$GLB,, | Performed by: UROLOGY

## 2023-08-25 PROCEDURE — 1160F RVW MEDS BY RX/DR IN RCRD: CPT | Mod: CPTII,S$GLB,, | Performed by: UROLOGY

## 2023-08-25 PROCEDURE — 1126F PR PAIN SEVERITY QUANTIFIED, NO PAIN PRESENT: ICD-10-PCS | Mod: CPTII,S$GLB,, | Performed by: UROLOGY

## 2023-08-25 PROCEDURE — 51798 PR MEAS,POST-VOID RES,US,NON-IMAGING: ICD-10-PCS | Mod: S$GLB,,, | Performed by: UROLOGY

## 2023-08-25 PROCEDURE — 3008F BODY MASS INDEX DOCD: CPT | Mod: CPTII,S$GLB,, | Performed by: UROLOGY

## 2023-08-25 PROCEDURE — 99214 PR OFFICE/OUTPT VISIT, EST, LEVL IV, 30-39 MIN: ICD-10-PCS | Mod: S$GLB,,, | Performed by: UROLOGY

## 2023-08-25 PROCEDURE — 3078F DIAST BP <80 MM HG: CPT | Mod: CPTII,S$GLB,, | Performed by: UROLOGY

## 2023-08-25 PROCEDURE — 3078F PR MOST RECENT DIASTOLIC BLOOD PRESSURE < 80 MM HG: ICD-10-PCS | Mod: CPTII,S$GLB,, | Performed by: UROLOGY

## 2023-08-25 PROCEDURE — 99214 OFFICE O/P EST MOD 30 MIN: CPT | Mod: S$GLB,,, | Performed by: UROLOGY

## 2023-08-25 PROCEDURE — 1159F MED LIST DOCD IN RCRD: CPT | Mod: CPTII,S$GLB,, | Performed by: UROLOGY

## 2023-08-25 PROCEDURE — 1160F PR REVIEW ALL MEDS BY PRESCRIBER/CLIN PHARMACIST DOCUMENTED: ICD-10-PCS | Mod: CPTII,S$GLB,, | Performed by: UROLOGY

## 2023-08-25 PROCEDURE — 99999 PR PBB SHADOW E&M-EST. PATIENT-LVL V: CPT | Mod: PBBFAC,,, | Performed by: UROLOGY

## 2023-08-25 PROCEDURE — 1126F AMNT PAIN NOTED NONE PRSNT: CPT | Mod: CPTII,S$GLB,, | Performed by: UROLOGY

## 2023-08-25 PROCEDURE — 51798 US URINE CAPACITY MEASURE: CPT | Mod: S$GLB,,, | Performed by: UROLOGY

## 2023-08-25 PROCEDURE — 1159F PR MEDICATION LIST DOCUMENTED IN MEDICAL RECORD: ICD-10-PCS | Mod: CPTII,S$GLB,, | Performed by: UROLOGY

## 2023-08-25 PROCEDURE — 3051F HG A1C>EQUAL 7.0%<8.0%: CPT | Mod: CPTII,S$GLB,, | Performed by: UROLOGY

## 2023-08-25 PROCEDURE — 3077F SYST BP >= 140 MM HG: CPT | Mod: CPTII,S$GLB,, | Performed by: UROLOGY

## 2023-08-25 PROCEDURE — 3008F PR BODY MASS INDEX (BMI) DOCUMENTED: ICD-10-PCS | Mod: CPTII,S$GLB,, | Performed by: UROLOGY

## 2023-08-25 PROCEDURE — 99999 PR PBB SHADOW E&M-EST. PATIENT-LVL V: ICD-10-PCS | Mod: PBBFAC,,, | Performed by: UROLOGY

## 2023-08-25 PROCEDURE — 3051F PR MOST RECENT HEMOGLOBIN A1C LEVEL 7.0 - < 8.0%: ICD-10-PCS | Mod: CPTII,S$GLB,, | Performed by: UROLOGY

## 2023-08-25 PROCEDURE — 4010F ACE/ARB THERAPY RXD/TAKEN: CPT | Mod: CPTII,S$GLB,, | Performed by: UROLOGY

## 2023-08-25 RX ORDER — MIRABEGRON 50 MG/1
50 TABLET, FILM COATED, EXTENDED RELEASE ORAL DAILY
Qty: 30 TABLET | Refills: 11 | Status: SHIPPED | OUTPATIENT
Start: 2023-08-25 | End: 2023-09-19 | Stop reason: ALTCHOICE

## 2023-08-25 NOTE — PROGRESS NOTES
Chief Complaint:   Encounter Diagnoses   Name Primary?    Urge incontinence Yes    Stress incontinence        HPI:  73-year-old female who comes in with mixed incontinence.  She states that she would seen a previous gynecologist who recommended prolapse surgery and stress incontinence surgery, she would not want pursue surgical management nor did her insurance approve.  He prescribed estrogen with her history breast lesions, she did not continue.  She states that she gets up 4 times per night to void, going about every hour to hour and a half during the daytime.  She feels like she is empty.  Occasionally she has to strain, but this when she is been going frequently.  She is definite increased frequency and urgency, skin breakdown discomfort due to the urine leakage.  No evidence dysuria, she is questionable UTIs which recurrent but this could be due to skin breakdown instead.  Gross hematuria, she does have a history of smoking.  No other urological gynecological history.  She is all of her female organs, no urethral slings, 3 natural deliveries without incident.  She has positive constipation which is being treated by the GI service.  No family history of urological cancers or stones.    Allergies:  Aspirin    Medications:  See MAR    Review of Systems:  General: No fever, chills, fatigability, or weight loss.  Skin: No rashes, itching, or changes in color or texture of skin.  Chest: Denies GILL, cyanosis, wheezing, cough, and sputum production.  Abdomen: Appetite fine. No weight loss. Denies diarrhea, abdominal pain, hematemesis, or blood in stool.  Musculoskeletal: No joint stiffness or swelling. Denies back pain.  : As above.  All other review of systems negative.    PMH:   has a past medical history of Arthritis, Cataract, Diabetes mellitus (2008), DM (diabetes mellitus) (2008), Encounter for blood transfusion, Glaucoma, Hypertension, Insomnia, Macular degeneration, and Vaginal yeast infection.    PSH:   has  a past surgical history that includes Eye surgery; Tubal ligation; abdominal laparoscopy ; Tonsillectomy; yag  (Bilateral); Esophagogastroduodenoscopy (N/A, 11/29/2019); Cataract extraction (Bilateral, 2790-2930); Breast biopsy (Left, 1998); Colonoscopy (N/A, 05/05/2021); Esophagogastroduodenoscopy (N/A, 05/05/2021); and Colonoscopy (N/A, 06/30/2021).    FamHx: family history includes Cancer in her maternal aunt and son; Cataracts in her mother and sister; Diabetes in her sister, sister, and sister; Glaucoma in her mother; Heart disease in her maternal grandfather, mother, and sister; Macular degeneration in her mother.    SocHx:  reports that she quit smoking about 20 years ago. Her smoking use included cigarettes. She started smoking about 56 years ago. She has a 36.5 pack-year smoking history. She has never been exposed to tobacco smoke. She has never used smokeless tobacco. She reports that she does not drink alcohol and does not use drugs.      Physical Exam:  Vitals:    08/25/23 1451   BP: (!) 151/71   Pulse: 82     General: A&Ox3, no apparent distress, no deformities  Neck: No masses, normal ROM  Lungs: normal inspiration, no use of accessory muscles  Heart: normal pulse, no arrhythmias  Abdomen: Soft, NT, ND, no masses, no hernias, no hepatosplenomegaly  Skin: The skin is warm and dry. No jaundice.  Ext: No c/c/e.    Labs/Studies:   Pre void scan 130 mL 8/23    Impression/Plan:     1. Urge incontinence- this appears to be her biggest complaint, will go ahead initiate a myrbetriq 50 mg trial due to her history of severe constipation.  If this fails she could be a candidate for other anticholinergics.  I have instructed her to monitor for hypertension and stop the medication and contact our office if this occurs.  Otherwise re-evaluate in 2 months with a postvoid residual, if this fails further therapeutic and diagnostic options will be made available.  In addition we recommended using a barrier cream due to  skin breakdown.      2. Stress incontinence- this is not as severe, nor does she want to pursue surgical management, will continue to monitor.  Prior to any surgical management she would need an exam due to her remote history of possible prolapse per gyn.

## 2023-08-28 RX ORDER — DEXTROSE 4 G
TABLET,CHEWABLE ORAL
Qty: 1 EACH | Refills: 0 | Status: SHIPPED | OUTPATIENT
Start: 2023-08-28

## 2023-08-28 NOTE — TELEPHONE ENCOUNTER
No care due was identified.  [facility] Embedded Care Due Messages. Reference number: [id number].   8/28/2023 11:18:13 AM CDT

## 2023-08-30 ENCOUNTER — PATIENT MESSAGE (OUTPATIENT)
Dept: GASTROENTEROLOGY | Facility: CLINIC | Age: 73
End: 2023-08-30
Payer: MEDICARE

## 2023-09-01 ENCOUNTER — PATIENT MESSAGE (OUTPATIENT)
Dept: GASTROENTEROLOGY | Facility: CLINIC | Age: 73
End: 2023-09-01
Payer: MEDICARE

## 2023-09-04 ENCOUNTER — PATIENT MESSAGE (OUTPATIENT)
Dept: GASTROENTEROLOGY | Facility: CLINIC | Age: 73
End: 2023-09-04
Payer: MEDICARE

## 2023-09-04 DIAGNOSIS — R10.9 ABDOMINAL PAIN, UNSPECIFIED ABDOMINAL LOCATION: Primary | ICD-10-CM
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
## 2023-09-08 ENCOUNTER — PATIENT MESSAGE (OUTPATIENT)
Dept: PSYCHIATRY | Facility: CLINIC | Age: 73
End: 2023-09-08
Payer: MEDICARE

## 2023-09-08 ENCOUNTER — OFFICE VISIT (OUTPATIENT)
Dept: PSYCHIATRY | Facility: CLINIC | Age: 73
End: 2023-09-08
Payer: COMMERCIAL

## 2023-09-08 ENCOUNTER — PATIENT MESSAGE (OUTPATIENT)
Dept: GASTROENTEROLOGY | Facility: CLINIC | Age: 73
End: 2023-09-08
Payer: MEDICARE

## 2023-09-08 ENCOUNTER — OFFICE VISIT (OUTPATIENT)
Dept: PSYCHIATRY | Facility: CLINIC | Age: 73
End: 2023-09-08
Payer: MEDICARE

## 2023-09-08 ENCOUNTER — PATIENT MESSAGE (OUTPATIENT)
Dept: SPORTS MEDICINE | Facility: CLINIC | Age: 73
End: 2023-09-08
Payer: MEDICARE

## 2023-09-08 DIAGNOSIS — G47.00 INSOMNIA, UNSPECIFIED TYPE: ICD-10-CM

## 2023-09-08 DIAGNOSIS — F33.1 DEPRESSION, MAJOR, RECURRENT, MODERATE: ICD-10-CM

## 2023-09-08 DIAGNOSIS — R19.8 IRREGULAR BOWEL HABITS: Primary | ICD-10-CM

## 2023-09-08 DIAGNOSIS — F41.9 ANXIETY DISORDER, UNSPECIFIED TYPE: ICD-10-CM

## 2023-09-08 DIAGNOSIS — F33.1 DEPRESSION, MAJOR, RECURRENT, MODERATE: Primary | ICD-10-CM

## 2023-09-08 DIAGNOSIS — R10.9 ABDOMINAL PAIN, UNSPECIFIED ABDOMINAL LOCATION: ICD-10-CM

## 2023-09-08 PROCEDURE — 99214 OFFICE O/P EST MOD 30 MIN: CPT | Mod: 95,,, | Performed by: PSYCHIATRY & NEUROLOGY

## 2023-09-08 PROCEDURE — 90791 PSYCH DIAGNOSTIC EVALUATION: CPT | Mod: 95,,, | Performed by: SOCIAL WORKER

## 2023-09-08 PROCEDURE — 1159F MED LIST DOCD IN RCRD: CPT | Mod: CPTII,95,, | Performed by: PSYCHIATRY & NEUROLOGY

## 2023-09-08 PROCEDURE — 99214 PR OFFICE/OUTPT VISIT, EST, LEVL IV, 30-39 MIN: ICD-10-PCS | Mod: 95,,, | Performed by: PSYCHIATRY & NEUROLOGY

## 2023-09-08 PROCEDURE — 3066F NEPHROPATHY DOC TX: CPT | Mod: CPTII,95,, | Performed by: SOCIAL WORKER

## 2023-09-08 PROCEDURE — 3066F PR DOCUMENTATION OF TREATMENT FOR NEPHROPATHY: ICD-10-PCS | Mod: CPTII,95,, | Performed by: SOCIAL WORKER

## 2023-09-08 PROCEDURE — 3060F PR POS MICROALBUMINURIA RESULT DOCUMENTED/REVIEW: ICD-10-PCS | Mod: CPTII,95,, | Performed by: SOCIAL WORKER

## 2023-09-08 PROCEDURE — 3051F PR MOST RECENT HEMOGLOBIN A1C LEVEL 7.0 - < 8.0%: ICD-10-PCS | Mod: CPTII,95,, | Performed by: PSYCHIATRY & NEUROLOGY

## 2023-09-08 PROCEDURE — 1160F RVW MEDS BY RX/DR IN RCRD: CPT | Mod: CPTII,95,, | Performed by: PSYCHIATRY & NEUROLOGY

## 2023-09-08 PROCEDURE — 3044F HG A1C LEVEL LT 7.0%: CPT | Mod: CPTII,95,, | Performed by: SOCIAL WORKER

## 2023-09-08 PROCEDURE — 1160F PR REVIEW ALL MEDS BY PRESCRIBER/CLIN PHARMACIST DOCUMENTED: ICD-10-PCS | Mod: CPTII,95,, | Performed by: PSYCHIATRY & NEUROLOGY

## 2023-09-08 PROCEDURE — 1159F PR MEDICATION LIST DOCUMENTED IN MEDICAL RECORD: ICD-10-PCS | Mod: CPTII,95,, | Performed by: PSYCHIATRY & NEUROLOGY

## 2023-09-08 PROCEDURE — 3060F POS MICROALBUMINURIA REV: CPT | Mod: CPTII,95,, | Performed by: SOCIAL WORKER

## 2023-09-08 PROCEDURE — 3044F PR MOST RECENT HEMOGLOBIN A1C LEVEL <7.0%: ICD-10-PCS | Mod: CPTII,95,, | Performed by: SOCIAL WORKER

## 2023-09-08 PROCEDURE — 4010F ACE/ARB THERAPY RXD/TAKEN: CPT | Mod: CPTII,95,, | Performed by: PSYCHIATRY & NEUROLOGY

## 2023-09-08 PROCEDURE — 4010F PR ACE/ARB THEARPY RXD/TAKEN: ICD-10-PCS | Mod: CPTII,95,, | Performed by: SOCIAL WORKER

## 2023-09-08 PROCEDURE — 4010F PR ACE/ARB THEARPY RXD/TAKEN: ICD-10-PCS | Mod: CPTII,95,, | Performed by: PSYCHIATRY & NEUROLOGY

## 2023-09-08 PROCEDURE — 4010F ACE/ARB THERAPY RXD/TAKEN: CPT | Mod: CPTII,95,, | Performed by: SOCIAL WORKER

## 2023-09-08 PROCEDURE — 3051F HG A1C>EQUAL 7.0%<8.0%: CPT | Mod: CPTII,95,, | Performed by: PSYCHIATRY & NEUROLOGY

## 2023-09-08 PROCEDURE — 90791 PR PSYCHIATRIC DIAGNOSTIC EVALUATION: ICD-10-PCS | Mod: 95,,, | Performed by: SOCIAL WORKER

## 2023-09-08 RX ORDER — TEMAZEPAM 15 MG/1
15 CAPSULE ORAL NIGHTLY
Qty: 90 CAPSULE | Refills: 0 | Status: SHIPPED | OUTPATIENT
Start: 2023-09-08 | End: 2023-09-22 | Stop reason: SDUPTHER

## 2023-09-08 RX ORDER — HYDROXYZINE HYDROCHLORIDE 10 MG/1
TABLET, FILM COATED ORAL
Qty: 180 TABLET | Refills: 0 | Status: SHIPPED | OUTPATIENT
Start: 2023-09-08 | End: 2023-09-22 | Stop reason: SINTOL

## 2023-09-08 RX ORDER — VENLAFAXINE HYDROCHLORIDE 37.5 MG/1
37.5 CAPSULE, EXTENDED RELEASE ORAL DAILY
Qty: 90 CAPSULE | Refills: 0 | Status: SHIPPED | OUTPATIENT
Start: 2023-09-08 | End: 2023-10-10
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
## 2023-09-11 ENCOUNTER — PATIENT MESSAGE (OUTPATIENT)
Dept: ADMINISTRATIVE | Facility: HOSPITAL | Age: 73
End: 2023-09-11
Payer: MEDICARE

## 2023-09-11 ENCOUNTER — TELEPHONE (OUTPATIENT)
Dept: RHEUMATOLOGY | Facility: CLINIC | Age: 73
End: 2023-09-11
Payer: MEDICARE

## 2023-09-11 RX ORDER — PANTOPRAZOLE SODIUM 40 MG/1
40 TABLET, DELAYED RELEASE ORAL DAILY
Qty: 30 TABLET | Refills: 2 | Status: SHIPPED | OUTPATIENT
Start: 2023-09-11 | End: 2023-12-27 | Stop reason: SDUPTHER

## 2023-09-11 NOTE — TELEPHONE ENCOUNTER
----- Message from Naomi Zayas sent at 9/11/2023  9:20 AM CDT -----  Contact: JUSTIN Bhandari is requesting a call back regarding getting 10/16 procedure/injection appointment moved to [facility]. Please call patient back at [phone]

## 2023-09-11 NOTE — TELEPHONE ENCOUNTER
"Returned patients phone call. All questions answered.      Amna Anderson (Allye), [facility]  Rheumatology Department    "

## 2023-09-12 ENCOUNTER — PATIENT MESSAGE (OUTPATIENT)
Dept: INTERNAL MEDICINE | Facility: CLINIC | Age: 73
End: 2023-09-12
Payer: MEDICARE

## 2023-09-13 DIAGNOSIS — E78.1 HYPERTRIGLYCERIDEMIA: ICD-10-CM

## 2023-09-17 ENCOUNTER — PATIENT MESSAGE (OUTPATIENT)
Dept: INTERNAL MEDICINE | Facility: CLINIC | Age: 73
End: 2023-09-17
Payer: MEDICARE

## 2023-09-17 RX ORDER — BENAZEPRIL HYDROCHLORIDE 40 MG/1
40 TABLET ORAL DAILY
Qty: 90 TABLET | Refills: 3
Start: 2023-09-17 | End: 2023-10-10 | Stop reason: SDUPTHER

## 2023-09-18 ENCOUNTER — PATIENT MESSAGE (OUTPATIENT)
Dept: GASTROENTEROLOGY | Facility: CLINIC | Age: 73
End: 2023-09-18
Payer: MEDICARE

## 2023-09-18 ENCOUNTER — PATIENT MESSAGE (OUTPATIENT)
Dept: PSYCHIATRY | Facility: CLINIC | Age: 73
End: 2023-09-18
Payer: MEDICARE

## 2023-09-18 DIAGNOSIS — K64.8 INTERNAL HEMORRHOID, BLEEDING: ICD-10-CM

## 2023-09-18 RX ORDER — HYDROCORTISONE 25 MG/G
CREAM TOPICAL 2 TIMES DAILY
Qty: 28 G | Refills: 2 | Status: SHIPPED | OUTPATIENT
Start: 2023-09-18 | End: 2023-09-28

## 2023-09-19 ENCOUNTER — HOSPITAL ENCOUNTER (OUTPATIENT)
Dept: RADIOLOGY | Facility: HOSPITAL | Age: 73
Discharge: HOME | End: 2023-09-19
Attending: PHYSICIAN ASSISTANT
Payer: MEDICARE

## 2023-09-19 ENCOUNTER — PATIENT MESSAGE (OUTPATIENT)
Dept: RHEUMATOLOGY | Facility: CLINIC | Age: 73
End: 2023-09-19
Payer: MEDICARE

## 2023-09-19 ENCOUNTER — PATIENT MESSAGE (OUTPATIENT)
Dept: GASTROENTEROLOGY | Facility: CLINIC | Age: 73
End: 2023-09-19
Payer: MEDICARE

## 2023-09-19 ENCOUNTER — PATIENT OUTREACH (OUTPATIENT)
Dept: ADMINISTRATIVE | Facility: HOSPITAL | Age: 73
End: 2023-09-19
Payer: MEDICARE

## 2023-09-19 ENCOUNTER — OFFICE VISIT (OUTPATIENT)
Dept: CARDIOLOGY | Facility: CLINIC | Age: 73
End: 2023-09-19
Payer: MEDICARE

## 2023-09-19 VITALS
OXYGEN SATURATION: 99 % | HEART RATE: 81 BPM | DIASTOLIC BLOOD PRESSURE: 60 MMHG | HEIGHT: 62 IN | BODY MASS INDEX: 45.07 KG/M2 | WEIGHT: 244.94 LBS | SYSTOLIC BLOOD PRESSURE: 150 MMHG

## 2023-09-19 DIAGNOSIS — I10 ESSENTIAL HYPERTENSION: ICD-10-CM

## 2023-09-19 DIAGNOSIS — R10.9 ABDOMINAL PAIN, UNSPECIFIED ABDOMINAL LOCATION: ICD-10-CM

## 2023-09-19 DIAGNOSIS — Z79.4 CONTROLLED TYPE 2 DIABETES MELLITUS WITH DIABETIC PERIPHERAL ANGIOPATHY WITHOUT GANGRENE, WITH LONG-TERM CURRENT USE OF INSULIN: ICD-10-CM

## 2023-09-19 DIAGNOSIS — Z12.31 ENCOUNTER FOR SCREENING MAMMOGRAM FOR BREAST CANCER: ICD-10-CM

## 2023-09-19 DIAGNOSIS — Z79.4 CONTROLLED TYPE 2 DIABETES MELLITUS WITH DIABETIC POLYNEUROPATHY, WITH LONG-TERM CURRENT USE OF INSULIN: ICD-10-CM

## 2023-09-19 DIAGNOSIS — E11.65 UNCONTROLLED TYPE 2 DIABETES MELLITUS WITH HYPERGLYCEMIA: ICD-10-CM

## 2023-09-19 DIAGNOSIS — R94.31 ABNORMAL ECG: ICD-10-CM

## 2023-09-19 DIAGNOSIS — E78.1 HYPERTRIGLYCERIDEMIA: ICD-10-CM

## 2023-09-19 DIAGNOSIS — E78.2 MIXED HYPERLIPIDEMIA: ICD-10-CM

## 2023-09-19 DIAGNOSIS — Z86.69 HISTORY OF OBSTRUCTIVE SLEEP APNEA: ICD-10-CM

## 2023-09-19 DIAGNOSIS — R42 DIZZINESS: ICD-10-CM

## 2023-09-19 DIAGNOSIS — R19.8 IRREGULAR BOWEL HABITS: ICD-10-CM

## 2023-09-19 DIAGNOSIS — E11.42 CONTROLLED TYPE 2 DIABETES MELLITUS WITH DIABETIC POLYNEUROPATHY, WITH LONG-TERM CURRENT USE OF INSULIN: ICD-10-CM

## 2023-09-19 DIAGNOSIS — R06.09 DOE (DYSPNEA ON EXERTION): Primary | ICD-10-CM

## 2023-09-19 DIAGNOSIS — I71.40 ABDOMINAL AORTIC ANEURYSM (AAA) WITHOUT RUPTURE, UNSPECIFIED PART: ICD-10-CM

## 2023-09-19 DIAGNOSIS — E11.51 CONTROLLED TYPE 2 DIABETES MELLITUS WITH DIABETIC PERIPHERAL ANGIOPATHY WITHOUT GANGRENE, WITH LONG-TERM CURRENT USE OF INSULIN: ICD-10-CM

## 2023-09-19 DIAGNOSIS — I48.0 PAROXYSMAL ATRIAL FIBRILLATION: ICD-10-CM

## 2023-09-19 DIAGNOSIS — G47.30 SLEEP APNEA, UNSPECIFIED TYPE: ICD-10-CM

## 2023-09-19 DIAGNOSIS — R00.2 PALPITATIONS: ICD-10-CM

## 2023-09-19 DIAGNOSIS — Z79.4 CONTROLLED TYPE 2 DIABETES MELLITUS WITH DIABETIC POLYNEUROPATHY, WITH LONG-TERM CURRENT USE OF INSULIN: Primary | ICD-10-CM

## 2023-09-19 DIAGNOSIS — E11.42 CONTROLLED TYPE 2 DIABETES MELLITUS WITH DIABETIC POLYNEUROPATHY, WITH LONG-TERM CURRENT USE OF INSULIN: Primary | ICD-10-CM

## 2023-09-19 PROCEDURE — 4010F ACE/ARB THERAPY RXD/TAKEN: CPT | Mod: CPTII,S$GLB,, | Performed by: INTERNAL MEDICINE

## 2023-09-19 PROCEDURE — 3288F FALL RISK ASSESSMENT DOCD: CPT | Mod: CPTII,S$GLB,, | Performed by: INTERNAL MEDICINE

## 2023-09-19 PROCEDURE — 99999 PR PBB SHADOW E&M-EST. PATIENT-LVL IV: ICD-10-PCS | Mod: PBBFAC,,, | Performed by: INTERNAL MEDICINE

## 2023-09-19 PROCEDURE — 76705 ECHO EXAM OF ABDOMEN: CPT | Mod: TC

## 2023-09-19 PROCEDURE — 74019 RADEX ABDOMEN 2 VIEWS: CPT | Mod: 26,,, | Performed by: RADIOLOGY

## 2023-09-19 PROCEDURE — 1101F PR PT FALLS ASSESS DOC 0-1 FALLS W/OUT INJ PAST YR: ICD-10-PCS | Mod: CPTII,S$GLB,, | Performed by: INTERNAL MEDICINE

## 2023-09-19 PROCEDURE — 74019 RADEX ABDOMEN 2 VIEWS: CPT | Mod: TC

## 2023-09-19 PROCEDURE — 3077F SYST BP >= 140 MM HG: CPT | Mod: CPTII,S$GLB,, | Performed by: INTERNAL MEDICINE

## 2023-09-19 PROCEDURE — 3288F PR FALLS RISK ASSESSMENT DOCUMENTED: ICD-10-PCS | Mod: CPTII,S$GLB,, | Performed by: INTERNAL MEDICINE

## 2023-09-19 PROCEDURE — 3078F DIAST BP <80 MM HG: CPT | Mod: CPTII,S$GLB,, | Performed by: INTERNAL MEDICINE

## 2023-09-19 PROCEDURE — 3078F PR MOST RECENT DIASTOLIC BLOOD PRESSURE < 80 MM HG: ICD-10-PCS | Mod: CPTII,S$GLB,, | Performed by: INTERNAL MEDICINE

## 2023-09-19 PROCEDURE — 1126F PR PAIN SEVERITY QUANTIFIED, NO PAIN PRESENT: ICD-10-PCS | Mod: CPTII,S$GLB,, | Performed by: INTERNAL MEDICINE

## 2023-09-19 PROCEDURE — 99214 OFFICE O/P EST MOD 30 MIN: CPT | Mod: S$GLB,,, | Performed by: INTERNAL MEDICINE

## 2023-09-19 PROCEDURE — 99999 PR PBB SHADOW E&M-EST. PATIENT-LVL IV: CPT | Mod: PBBFAC,,, | Performed by: INTERNAL MEDICINE

## 2023-09-19 PROCEDURE — 3051F HG A1C>EQUAL 7.0%<8.0%: CPT | Mod: CPTII,S$GLB,, | Performed by: INTERNAL MEDICINE

## 2023-09-19 PROCEDURE — 76705 US ABDOMEN LIMITED: ICD-10-PCS | Mod: 26,,, | Performed by: RADIOLOGY

## 2023-09-19 PROCEDURE — 3008F BODY MASS INDEX DOCD: CPT | Mod: CPTII,S$GLB,, | Performed by: INTERNAL MEDICINE

## 2023-09-19 PROCEDURE — 3051F PR MOST RECENT HEMOGLOBIN A1C LEVEL 7.0 - < 8.0%: ICD-10-PCS | Mod: CPTII,S$GLB,, | Performed by: INTERNAL MEDICINE

## 2023-09-19 PROCEDURE — 3077F PR MOST RECENT SYSTOLIC BLOOD PRESSURE >= 140 MM HG: ICD-10-PCS | Mod: CPTII,S$GLB,, | Performed by: INTERNAL MEDICINE

## 2023-09-19 PROCEDURE — 4010F PR ACE/ARB THEARPY RXD/TAKEN: ICD-10-PCS | Mod: CPTII,S$GLB,, | Performed by: INTERNAL MEDICINE

## 2023-09-19 PROCEDURE — 76705 ECHO EXAM OF ABDOMEN: CPT | Mod: 26,,, | Performed by: RADIOLOGY

## 2023-09-19 PROCEDURE — 3008F PR BODY MASS INDEX (BMI) DOCUMENTED: ICD-10-PCS | Mod: CPTII,S$GLB,, | Performed by: INTERNAL MEDICINE

## 2023-09-19 PROCEDURE — 1159F MED LIST DOCD IN RCRD: CPT | Mod: CPTII,S$GLB,, | Performed by: INTERNAL MEDICINE

## 2023-09-19 PROCEDURE — 99214 PR OFFICE/OUTPT VISIT, EST, LEVL IV, 30-39 MIN: ICD-10-PCS | Mod: S$GLB,,, | Performed by: INTERNAL MEDICINE

## 2023-09-19 PROCEDURE — 74019 XR ABDOMEN FLAT AND ERECT: ICD-10-PCS | Mod: 26,,, | Performed by: RADIOLOGY

## 2023-09-19 PROCEDURE — 1126F AMNT PAIN NOTED NONE PRSNT: CPT | Mod: CPTII,S$GLB,, | Performed by: INTERNAL MEDICINE

## 2023-09-19 PROCEDURE — 1159F PR MEDICATION LIST DOCUMENTED IN MEDICAL RECORD: ICD-10-PCS | Mod: CPTII,S$GLB,, | Performed by: INTERNAL MEDICINE

## 2023-09-19 PROCEDURE — 1101F PT FALLS ASSESS-DOCD LE1/YR: CPT | Mod: CPTII,S$GLB,, | Performed by: INTERNAL MEDICINE

## 2023-09-19 RX ORDER — ROSUVASTATIN CALCIUM 10 MG/1
10 TABLET, COATED ORAL DAILY
Qty: 90 TABLET | Refills: 3 | Status: SHIPPED | OUTPATIENT
Start: 2023-09-19 | End: 2024-01-16

## 2023-09-19 NOTE — PROGRESS NOTES
Pt responded to portal message.  Micro albumin urine ordered and linked with [facility] to appt scheduled on 9/28/23.    Mammo ordered and mess sent advising her to go online or call to schedule appt after Oct 4.

## 2023-09-19 NOTE — PROGRESS NOTES
"Subjective:   Patient ID:  Christine Jo is a 73 y.o. female who presents for follow-up of No chief complaint on file.      Christine Jo is a 71 y.o. female who presents for evaluation of Atrial Fibrillation, Coronary Artery Disease, Hyperlipidemia, Hypertension, Peripheral Arterial Disease, Risk Factor Management For Atherosclerosis, and Shortness of Breath           HPI Pt presents for eval.  Her current med conditions include CAD, PAF, DM, obesity, HTN, AAA, hyperlipidemia, MATIAS.  Former smoker.  Past hx pertinent for following:  She used to see Dr. Arthur Carrizales, [facility].  Has h/o PAF.  States she has had 2 or 4 cardiac cath procedures.  Last [facility] 2017, nonobstructive CAD noted.  ecg 3/26/21 NSR, left axis, nonspecific septal Q waves V1-V2.  Stress MPI 4/21 reviewed: no ischemia, normal EF.  Echo 4/21 reviewed: normal LV function, LVH, mild TR.  Now here.  Pt seen 6 months ago.  Abdominal u/s 12/21: no AAA noted.  MIKE 12/21 1.0 R LE, 0.97 L LE.  Has been seeing ENT and PCP for vertigo.  Dx w vestibular disease w rehab tx planned.  Dizziness associated with nausea/vomiting at time per chart.  Has dyspnea -- she states "it is ok".  Denies cp.  A lot of stress in her life; depression.  Stays inside.  9 day [place] Holter 10/21: NSR, 6 runs nonsustained atrial tachycardia (longest 8 beats), 3 runs NSVT (longest 7 beats).  Did not tolerate CPAP in past.  ecg today 3/14/22 NSR, left axis, incomplete RBBB.  No acute changes.  Weight stable.  She thinks statin causes restless legs.  She cut dose in 1/2 without improvement.  DM HGAIC above goal.  Takes Eliquis.           Carotid ultrasound  There is 20-39% right Internal Carotid Stenosis.  There is 20-39% left Internal Carotid Stenosis pain       03/20/2023:   Overall patient is doing well will increase medications now include carvedilol  25 mg b.I.d. and will increase hydralazine to 50 mg q.8 hours.  Follow-up evaluation within a month for blood pressure " control.     04/25/2023:   Overall patient is doing much better on increased medications and blood pressure under better control continue all medications as prescribed.          09/19/2023 overall doing well this time complaining of some shortness of breath and intermittent chest discomfort will go ahead with a cardiac echo and nuclear stress test.  Patient will restart on statin medications and will try her on Crestor 10 mg daily repeat lipid profile.  She was intolerant of atorvastatin with muscle aches.  She is evidence of vascular atheroma on abdominal ultrasound and also has carotid disease.  I told her the importance of being on statin medications long-term.        Review of Systems   Constitutional: Negative for chills, diaphoresis, night sweats, weight gain and weight loss.   HENT:  Negative for congestion, hoarse voice, sore throat and stridor.    Eyes:  Negative for double vision and pain.   Cardiovascular:  Negative for chest pain, claudication, cyanosis, dyspnea on exertion, irregular heartbeat, leg swelling, near-syncope, orthopnea, palpitations, paroxysmal nocturnal dyspnea and syncope.   Respiratory:  Negative for cough, hemoptysis, shortness of breath, sleep disturbances due to breathing, snoring, sputum production and wheezing.    Endocrine: Negative for cold intolerance, heat intolerance and polydipsia.   Hematologic/Lymphatic: Negative for bleeding problem. Does not bruise/bleed easily.   Skin:  Negative for color change, dry skin and rash.   Musculoskeletal:  Negative for joint swelling and muscle cramps.   Gastrointestinal:  Negative for bloating, abdominal pain, constipation, diarrhea, dysphagia, melena, nausea and vomiting.   Genitourinary:  Negative for flank pain and urgency.   Neurological:  Negative for dizziness, focal weakness, headaches, light-headedness, loss of balance, seizures and weakness.   Psychiatric/Behavioral:  Negative for altered mental status and memory loss. The patient is  not nervous/anxious.    Family History   Problem Relation Age of Onset    Heart disease Mother         CAD     Cataracts Mother     Macular degeneration Mother     Glaucoma Mother     Cancer Son         testicular     Cancer Maternal Aunt         Lung ca    Heart disease Maternal Grandfather         Pacemaker     Diabetes Sister     Heart disease Sister         CAD    Cataracts Sister     Diabetes Sister     Diabetes Sister      Past Medical History:   Diagnosis Date    Arthritis     Cataract     Diabetes mellitus      am 01/15/2018 Insulin x 1 year    DM (diabetes mellitus)      am 2020 Insulin x 4 years    Encounter for blood transfusion     Glaucoma     Hypertension     Insomnia     Macular degeneration     Vaginal yeast infection      Social History     Socioeconomic History    Marital status:     Number of children: 3   Occupational History    Occupation: Retired   Tobacco Use    Smoking status: Former     Current packs/day: 0.00     Average packs/day: 1 pack/day for 36.5 years (36.5 ttl pk-yrs)     Types: Cigarettes     Start date:      Quit date: 2003     Years since quittin.2     Passive exposure: Never    Smokeless tobacco: Never   Substance and Sexual Activity    Alcohol use: No    Drug use: No    Sexual activity: Never     Social Determinants of Health     Financial Resource Strain: Low Risk  (2022)    Overall Financial Resource Strain (CARDIA)     Difficulty of Paying Living Expenses: Not hard at all   Food Insecurity: No Food Insecurity (2022)    Hunger Vital Sign     Worried About Running Out of Food in the Last Year: Never true     Ran Out of Food in the Last Year: Never true   Transportation Needs: Unmet Transportation Needs (2022)    PRAPARE - Transportation     Lack of Transportation (Medical): Yes     Lack of Transportation (Non-Medical): No   Physical Activity: Insufficiently Active (2022)    Exercise Vital Sign     Days of  Exercise per Week: 7 days     Minutes of Exercise per Session: 10 min   Stress: No Stress Concern Present (9/28/2022)    [employer] [place] [employer] - Occupational Stress Questionnaire     Feeling of Stress : Only a little   Social Connections: Moderately Isolated (9/28/2022)    Social Connection and Isolation Panel [NHANES]     Frequency of Communication with Friends and Family: More than three times a week     Frequency of Social Gatherings with Friends and Family: Never     Attends Shinto Services: 1 to 4 times per year     Active Member of Clubs or Organizations: No     Attends Club or Organization Meetings: Never     Marital Status:    Housing Stability: Low Risk  (9/28/2022)    Housing Stability Vital Sign     Unable to Pay for Housing in the Last Year: No     Number of Places Lived in the Last Year: 2     Unstable Housing in the Last Year: No     Current Outpatient Medications on File Prior to Visit   Medication Sig Dispense Refill    ACETAMINOPHEN (TYLENOL ARTHRITIS ORAL) Take by mouth as needed.      apixaban (ELIQUIS) 5 mg Tab Take 1 tablet (5 mg total) by mouth 2 (two) times daily. 180 tablet 0    azelastine (ASTELIN) 137 mcg (0.1 %) nasal spray use 2 sprays (274 mcg total) by Nasal route 2 (two) times daily. 30 mL 1    benazepriL (LOTENSIN) 40 MG tablet Take 1 tablet (40 mg total) by mouth 2 (two) times daily. 180 tablet 4    benazepriL (LOTENSIN) 40 MG tablet Take 1 tablet (40 mg total) by mouth once daily. 90 tablet 3    BEPREVE 1.5 % Drop Place 1 drop into both eyes 2 (two) times daily. 10 mL 4    blood sugar diagnostic Strp To check blood sugar 1 times daily 100 each 3    blood-glucose meter (ACCU-CHEK GUIDE GLUCOSE METER) Misc use daily to test blood sugar 1 each 0    blood-glucose meter kit To check BG 2 times daily, to use with insurance preferred meter 1 each 0    calcium citrate-vitamin D3 315-200 mg (CITRACAL+D) 315-200 mg-unit per tablet Take 1 tablet by mouth once  daily.      carvediloL (COREG) 25 MG tablet TAKE 1 TABLET BY MOUTH TWICE DAILY 180 tablet 3    clotrimazole-betamethasone (LOTRISONE) lotion Apply topically to the affected area 2 (two) times daily. 30 mL 2    cycloSPORINE (RESTASIS) 0.05 % ophthalmic emulsion Place 1 drop into both eyes 2 (two) times daily. 60 each 11    diclofenac sodium (VOLTAREN) 1 % Gel Apply 2 grams topically 4 (four) times daily. To affected joints as needed for pain 100 g 3    docusate sodium (COLACE) 100 MG capsule Take 1 capsule (100 mg total) by mouth 2 (two) times daily. 180 capsule 0    fluticasone propionate (FLONASE) 50 mcg/actuation nasal spray 2 sprays (100 mcg total) by Each Nostril route once daily. 48 g 1    gabapentin (NEURONTIN) 300 MG capsule Take 1 capsule (300 mg total) by mouth 3 (three) times daily. (Patient taking differently: Take 300 mg by mouth daily as needed.) 90 capsule 11    galcanezumab-gnlm (EMGALITY SYRINGE) 120 mg/mL Syrg Inject 120 mg into the skin every 28 days. 1 mL 12    hydrALAZINE (APRESOLINE) 50 MG tablet Take 1 tablet (50 mg total) by mouth every 8 (eight) hours. 180 tablet 3    hydrocortisone 2.5 % cream Apply topically 2 (two) times daily. 28 g 2    hydrOXYzine HCL (ATARAX) 10 MG Tab Take 1 tablet at bedtime.  May take 1 additional tablet during the night if needed for sleep. 180 tablet 0    insulin (LANTUS SOLOSTAR U-100 INSULIN) glargine 100 units/mL SubQ pen Inject 72 Units into the skin every evening. 75 mL 0    lancets Misc To check BG 1 times daily 100 each 3    linaCLOtide (LINZESS) 145 mcg Cap capsule Take 1 capsule (145 mcg total) by mouth before breakfast. 30 capsule 2    meclizine (ANTIVERT) 25 mg tablet Take 1 tablet (25 mg total) by mouth 3 (three) times daily as needed. 30 tablet 2    mirabegron (MYRBETRIQ) 50 mg Tb24 Take 1 tablet (50 mg total) by mouth once daily. 30 tablet 11    MULTIVIT WITH CALCIUM,IRON,MIN (WOMEN'S DAILY MULTIVITAMIN ORAL) Take by mouth once daily.       nystatin  "(MYCOSTATIN) cream Apply topically 2 (two) times daily. Continue until completely healed 30 g 0    pantoprazole (PROTONIX) 40 MG tablet Take 1 tablet (40 mg total) by mouth once daily. 30 tablet 2    pen needle, diabetic (BD ULTRA-FINE SHORT PEN NEEDLE) 31 gauge x 5/16" Ndle AS DIRECTED TWICE DAILY 200 each 3    polyethylene glycol (GLYCOLAX) 17 gram/dose powder Mix 17 g (1 capful) in liquid and drink by mouth once daily. 510 g 3    rimegepant 75 mg odt Take 1 tablet (75 mg total) by mouth as needed for Migraine (do not exceed 2-3 doses within 1 week). Place ODT tablet on the tongue; alternatively the ODT tablet may be placed under the tongue 8 tablet 5    SITagliptin phosphate (JANUVIA) 100 MG Tab Take 1 tablet (100 mg total) by mouth once daily. 90 tablet 1    temazepam (RESTORIL) 15 mg Cap Take 1 capsule (15 mg total) by mouth every evening. 90 capsule 0    venlafaxine (EFFEXOR-XR) 37.5 MG 24 hr capsule Take 1 capsule (37.5 mg total) by mouth once daily. 90 capsule 0    [DISCONTINUED] hydrocortisone 2.5 % cream Apply topically 2 (two) times daily. 28 g 2     No current facility-administered medications on file prior to visit.     Review of patient's allergies indicates:   Allergen Reactions    Aspirin Palpitations       Objective:     Physical Exam  Eyes:      Pupils: Pupils are equal, round, and reactive to light.   Neck:      Trachea: No tracheal deviation.   Cardiovascular:      Rate and Rhythm: Normal rate and regular rhythm.      Pulses: Intact distal pulses.           Carotid pulses are 2+ on the right side and 2+ on the left side.       Radial pulses are 2+ on the right side and 2+ on the left side.        Femoral pulses are 2+ on the right side and 2+ on the left side.       Popliteal pulses are 2+ on the right side and 2+ on the left side.        Dorsalis pedis pulses are 2+ on the right side and 2+ on the left side.        Posterior tibial pulses are 2+ on the right side and 2+ on the left side.      " Heart sounds: Normal heart sounds. No murmur heard.     No friction rub. No gallop.   Pulmonary:      Effort: Pulmonary effort is normal. No respiratory distress.      Breath sounds: Normal breath sounds. No stridor. No wheezing or rales.   Chest:      Chest wall: No tenderness.   Abdominal:      General: There is no distension.      Tenderness: There is no abdominal tenderness. There is no rebound.   Musculoskeletal:         General: No tenderness.      Cervical back: Normal range of motion.   Skin:     General: Skin is warm and dry.   Neurological:      Mental Status: She is alert and oriented to person, place, and time.     Assessment:     1. Dizziness    2. Abdominal aortic aneurysm (AAA) without rupture, unspecified part    3. Controlled type 2 diabetes mellitus with diabetic peripheral angiopathy without gangrene, with long-term current use of insulin    4. Paroxysmal atrial fibrillation    5. Hypertriglyceridemia    6. BMI 45.0-49.9, adult    7. Controlled type 2 diabetes mellitus with diabetic polyneuropathy, with long-term current use of insulin    8. History of obstructive sleep apnea    9. Palpitations    10. GILL (dyspnea on exertion)    11. Abnormal ECG    12. Mixed hyperlipidemia    13. Essential hypertension    14. Sleep apnea, unspecified type    15. Uncontrolled type 2 diabetes mellitus with hyperglycemia        Plan:     Dizziness    Abdominal aortic aneurysm (AAA) without rupture, unspecified part    Controlled type 2 diabetes mellitus with diabetic peripheral angiopathy without gangrene, with long-term current use of insulin    Paroxysmal atrial fibrillation    Hypertriglyceridemia    BMI 45.0-49.9, adult    Controlled type 2 diabetes mellitus with diabetic polyneuropathy, with long-term current use of insulin    History of obstructive sleep apnea    Palpitations    GILL (dyspnea on exertion)    Abnormal ECG    Mixed hyperlipidemia    Essential hypertension    Sleep apnea, unspecified  type    Uncontrolled type 2 diabetes mellitus with hyperglycemia        Impression 1 palpitations stable   2. Hyperlipidemia back now on statin medications including Crestor recheck lipid profile   3.  Hypertension stable on benazepril and hydralazine.

## 2023-09-20 ENCOUNTER — PATIENT MESSAGE (OUTPATIENT)
Dept: SPORTS MEDICINE | Facility: CLINIC | Age: 73
End: 2023-09-20
Payer: MEDICARE

## 2023-09-20 ENCOUNTER — TELEPHONE (OUTPATIENT)
Dept: SPORTS MEDICINE | Facility: CLINIC | Age: 73
End: 2023-09-20
Payer: MEDICARE

## 2023-09-20 ENCOUNTER — TELEPHONE (OUTPATIENT)
Dept: RHEUMATOLOGY | Facility: CLINIC | Age: 73
End: 2023-09-20
Payer: MEDICARE

## 2023-09-20 ENCOUNTER — PATIENT MESSAGE (OUTPATIENT)
Dept: GASTROENTEROLOGY | Facility: CLINIC | Age: 73
End: 2023-09-20
Payer: MEDICARE

## 2023-09-20 NOTE — TELEPHONE ENCOUNTER
Contacted patient and explained that if she wanted Dr. Esposito to do injections he would have to evaluate in office first and then make separate appt after we received authorization for injections.  Patient agreed to do this.  Will let Ms Deepti in Rheumatology know that patient has decided to do this.    ----- Message from Jamel Green sent at 9/20/2023  8:57 AM CDT -----  Contact: Christine Huerta is asking to get a call back from the office in regards to  taking over doing the injections in her knees. Please call at [phone]                  Thanks  SW

## 2023-09-20 NOTE — TELEPHONE ENCOUNTER
Called patient regarding her upcoming appointment.  She would need to reschedule as 230 would no longer work for her due to some other testing scheduled the same day.  If she schedules with Dr. Esposito, I will certainly cancel her appointment with me to allow him the ability to get authorization for her requested injections.  She will keep me posted.

## 2023-09-21 RX ORDER — LACTULOSE 10 G/15ML
20 SOLUTION ORAL; RECTAL 2 TIMES DAILY
Qty: 1800 ML | Refills: 1 | Status: SHIPPED | OUTPATIENT
Start: 2023-09-21 | End: 2023-10-10 | Stop reason: ALTCHOICE

## 2023-09-22 ENCOUNTER — PATIENT MESSAGE (OUTPATIENT)
Dept: GASTROENTEROLOGY | Facility: CLINIC | Age: 73
End: 2023-09-22
Payer: MEDICARE

## 2023-09-22 ENCOUNTER — PATIENT MESSAGE (OUTPATIENT)
Dept: PSYCHIATRY | Facility: CLINIC | Age: 73
End: 2023-09-22
Payer: MEDICARE

## 2023-09-22 DIAGNOSIS — G47.00 INSOMNIA, UNSPECIFIED TYPE: ICD-10-CM

## 2023-09-22 RX ORDER — TEMAZEPAM 15 MG/1
30 CAPSULE ORAL NIGHTLY
Qty: 90 CAPSULE | Refills: 0
Start: 2023-09-22 | End: 2023-10-05 | Stop reason: SDUPTHER

## 2023-09-22 NOTE — TELEPHONE ENCOUNTER
I called the patient.  She will discontinue hydroxyzine and will begin taking 30 mg of temazepam again, by way of 215 mg capsules.  I reviewed with her the risks of benzodiazepines side effects with the increase in dose, and she demonstrated understanding and had even volunteer before the discussion awareness of over-sedation, unsteadiness, interference with function, and falls.  I re-emphasized those and others.

## 2023-09-26 ENCOUNTER — PATIENT MESSAGE (OUTPATIENT)
Dept: NEUROLOGY | Facility: CLINIC | Age: 73
End: 2023-09-26
Payer: MEDICARE

## 2023-09-26 ENCOUNTER — PATIENT MESSAGE (OUTPATIENT)
Dept: GASTROENTEROLOGY | Facility: CLINIC | Age: 73
End: 2023-09-26
Payer: MEDICARE

## 2023-09-27 ENCOUNTER — PATIENT MESSAGE (OUTPATIENT)
Dept: PSYCHIATRY | Facility: CLINIC | Age: 73
End: 2023-09-27
Payer: MEDICARE

## 2023-09-28 ENCOUNTER — HOSPITAL ENCOUNTER (OUTPATIENT)
Dept: CARDIOLOGY | Facility: HOSPITAL | Age: 73
Discharge: HOME | End: 2023-09-28
Attending: INTERNAL MEDICINE
Payer: MEDICARE

## 2023-09-28 ENCOUNTER — IMMUNIZATION (OUTPATIENT)
Dept: INTERNAL MEDICINE | Facility: CLINIC | Age: 73
End: 2023-09-28
Payer: MEDICARE

## 2023-09-28 ENCOUNTER — OFFICE VISIT (OUTPATIENT)
Dept: SPORTS MEDICINE | Facility: CLINIC | Age: 73
End: 2023-09-28
Payer: MEDICARE

## 2023-09-28 ENCOUNTER — OFFICE VISIT (OUTPATIENT)
Dept: INTERNAL MEDICINE | Facility: CLINIC | Age: 73
End: 2023-09-28
Payer: MEDICARE

## 2023-09-28 VITALS — BODY MASS INDEX: 44.72 KG/M2 | HEIGHT: 62 IN | WEIGHT: 243 LBS

## 2023-09-28 VITALS
HEART RATE: 72 BPM | SYSTOLIC BLOOD PRESSURE: 130 MMHG | OXYGEN SATURATION: 98 % | TEMPERATURE: 98 F | WEIGHT: 243.38 LBS | HEIGHT: 62 IN | BODY MASS INDEX: 44.79 KG/M2 | DIASTOLIC BLOOD PRESSURE: 66 MMHG

## 2023-09-28 VITALS
HEIGHT: 62 IN | DIASTOLIC BLOOD PRESSURE: 60 MMHG | BODY MASS INDEX: 44.9 KG/M2 | SYSTOLIC BLOOD PRESSURE: 150 MMHG | HEART RATE: 63 BPM | WEIGHT: 244 LBS

## 2023-09-28 DIAGNOSIS — M25.562 CHRONIC PAIN OF LEFT KNEE: Primary | ICD-10-CM

## 2023-09-28 DIAGNOSIS — I10 PRIMARY HYPERTENSION: ICD-10-CM

## 2023-09-28 DIAGNOSIS — E11.42 CONTROLLED TYPE 2 DIABETES MELLITUS WITH DIABETIC POLYNEUROPATHY, WITH LONG-TERM CURRENT USE OF INSULIN: ICD-10-CM

## 2023-09-28 DIAGNOSIS — E78.1 HYPERTRIGLYCERIDEMIA: ICD-10-CM

## 2023-09-28 DIAGNOSIS — E11.51 CONTROLLED TYPE 2 DIABETES MELLITUS WITH DIABETIC PERIPHERAL ANGIOPATHY WITHOUT GANGRENE, WITH LONG-TERM CURRENT USE OF INSULIN: ICD-10-CM

## 2023-09-28 DIAGNOSIS — G89.29 CHRONIC PAIN OF RIGHT KNEE: ICD-10-CM

## 2023-09-28 DIAGNOSIS — I65.29 STENOSIS OF CAROTID ARTERY, UNSPECIFIED LATERALITY: ICD-10-CM

## 2023-09-28 DIAGNOSIS — E11.59 CONTROLLED TYPE 2 DIABETES MELLITUS WITH OTHER CIRCULATORY COMPLICATION, WITH LONG-TERM CURRENT USE OF INSULIN: Primary | ICD-10-CM

## 2023-09-28 DIAGNOSIS — Z86.69 HISTORY OF OBSTRUCTIVE SLEEP APNEA: ICD-10-CM

## 2023-09-28 DIAGNOSIS — R94.31 ABNORMAL ECG: ICD-10-CM

## 2023-09-28 DIAGNOSIS — G47.30 SLEEP APNEA, UNSPECIFIED TYPE: ICD-10-CM

## 2023-09-28 DIAGNOSIS — E78.2 MIXED HYPERLIPIDEMIA: ICD-10-CM

## 2023-09-28 DIAGNOSIS — R06.09 DOE (DYSPNEA ON EXERTION): ICD-10-CM

## 2023-09-28 DIAGNOSIS — I48.0 PAROXYSMAL ATRIAL FIBRILLATION: ICD-10-CM

## 2023-09-28 DIAGNOSIS — I70.8 AORTO-ILIAC ATHEROSCLEROSIS: ICD-10-CM

## 2023-09-28 DIAGNOSIS — M25.561 CHRONIC PAIN OF RIGHT KNEE: ICD-10-CM

## 2023-09-28 DIAGNOSIS — R42 DIZZINESS: ICD-10-CM

## 2023-09-28 DIAGNOSIS — I15.2 HYPERTENSION ASSOCIATED WITH DIABETES: ICD-10-CM

## 2023-09-28 DIAGNOSIS — G89.29 CHRONIC PAIN OF LEFT KNEE: Primary | ICD-10-CM

## 2023-09-28 DIAGNOSIS — F33.41 RECURRENT MAJOR DEPRESSIVE DISORDER, IN PARTIAL REMISSION: ICD-10-CM

## 2023-09-28 DIAGNOSIS — Z79.4 CONTROLLED TYPE 2 DIABETES MELLITUS WITH DIABETIC POLYNEUROPATHY, WITH LONG-TERM CURRENT USE OF INSULIN: ICD-10-CM

## 2023-09-28 DIAGNOSIS — Z79.4 CONTROLLED TYPE 2 DIABETES MELLITUS WITH OTHER CIRCULATORY COMPLICATION, WITH LONG-TERM CURRENT USE OF INSULIN: Primary | ICD-10-CM

## 2023-09-28 DIAGNOSIS — E11.65 UNCONTROLLED TYPE 2 DIABETES MELLITUS WITH HYPERGLYCEMIA: ICD-10-CM

## 2023-09-28 DIAGNOSIS — Z79.4 CONTROLLED TYPE 2 DIABETES MELLITUS WITH DIABETIC PERIPHERAL ANGIOPATHY WITHOUT GANGRENE, WITH LONG-TERM CURRENT USE OF INSULIN: ICD-10-CM

## 2023-09-28 DIAGNOSIS — I70.0 AORTO-ILIAC ATHEROSCLEROSIS: ICD-10-CM

## 2023-09-28 DIAGNOSIS — I71.40 ABDOMINAL AORTIC ANEURYSM (AAA) WITHOUT RUPTURE, UNSPECIFIED PART: ICD-10-CM

## 2023-09-28 DIAGNOSIS — K21.9 GASTROESOPHAGEAL REFLUX DISEASE WITHOUT ESOPHAGITIS: ICD-10-CM

## 2023-09-28 DIAGNOSIS — E11.59 HYPERTENSION ASSOCIATED WITH DIABETES: ICD-10-CM

## 2023-09-28 DIAGNOSIS — I10 ESSENTIAL HYPERTENSION: ICD-10-CM

## 2023-09-28 DIAGNOSIS — R00.2 PALPITATIONS: ICD-10-CM

## 2023-09-28 DIAGNOSIS — K59.04 CHRONIC IDIOPATHIC CONSTIPATION: ICD-10-CM

## 2023-09-28 LAB
AORTIC ROOT ANNULUS: 3.29 CM
ASCENDING AORTA: 2.99 CM
AV INDEX (PROSTH): 0.59
AV MEAN GRADIENT: 7 MMHG
AV PEAK GRADIENT: 14 MMHG
AV REGURGITATION PRESSURE HALF TIME: 466.88 MS
AV VALVE AREA BY VELOCITY RATIO: 1.91 CM²
AV VALVE AREA: 2.13 CM²
AV VELOCITY RATIO: 0.53
BSA FOR ECHO PROCEDURE: 2.2 M2
CV ECHO LV RWT: 0.58 CM
DOP CALC AO PEAK VEL: 1.88 M/S
DOP CALC AO VTI: 36.8 CM
DOP CALC LVOT AREA: 3.6 CM2
DOP CALC LVOT DIAMETER: 2.14 CM
DOP CALC LVOT PEAK VEL: 1 M/S
DOP CALC LVOT STROKE VOLUME: 78.37 CM3
DOP CALC RVOT PEAK VEL: 0.82 M/S
DOP CALC RVOT VTI: 18.1 CM
DOP CALCLVOT PEAK VEL VTI: 21.8 CM
E WAVE DECELERATION TIME: 251.68 MSEC
E/A RATIO: 1.06
E/E' RATIO: 7.68 M/S
ECHO LV POSTERIOR WALL: 1.23 CM (ref 0.6–1.1)
FRACTIONAL SHORTENING: 34 % (ref 28–44)
INTERVENTRICULAR SEPTUM: 1.42 CM (ref 0.6–1.1)
IVRT: 102.76 MSEC
LA MAJOR: 3.95 CM
LA MINOR: 4.53 CM
LA WIDTH: 3.2 CM
LEFT ATRIUM SIZE: 3.46 CM
LEFT ATRIUM VOLUME INDEX MOD: 15.3 ML/M2
LEFT ATRIUM VOLUME INDEX: 19.1 ML/M2
LEFT ATRIUM VOLUME MOD: 31.81 CM3
LEFT ATRIUM VOLUME: 39.72 CM3
LEFT INTERNAL DIMENSION IN SYSTOLE: 2.82 CM (ref 2.1–4)
LEFT VENTRICLE DIASTOLIC VOLUME INDEX: 39.35 ML/M2
LEFT VENTRICLE DIASTOLIC VOLUME: 81.85 ML
LEFT VENTRICLE MASS INDEX: 102 G/M2
LEFT VENTRICLE SYSTOLIC VOLUME INDEX: 14.4 ML/M2
LEFT VENTRICLE SYSTOLIC VOLUME: 30 ML
LEFT VENTRICULAR INTERNAL DIMENSION IN DIASTOLE: 4.27 CM (ref 3.5–6)
LEFT VENTRICULAR MASS: 211.53 G
LV LATERAL E/E' RATIO: 6.64 M/S
LV SEPTAL E/E' RATIO: 9.13 M/S
LVOT MG: 2.35 MMHG
LVOT MV: 0.72 CM/S
MV PEAK A VEL: 0.69 M/S
MV PEAK E VEL: 0.73 M/S
MV STENOSIS PRESSURE HALF TIME: 72.99 MS
MV VALVE AREA P 1/2 METHOD: 3.01 CM2
PISA AR MAX VEL: 3.23 M/S
PISA TR MAX VEL: 1.95 M/S
PULM VEIN S/D RATIO: 1.33
PV MEAN GRADIENT: 2 MMHG
PV PEAK D VEL: 0.42 M/S
PV PEAK GRADIENT: 6 MMHG
PV PEAK S VEL: 0.56 M/S
PV PEAK VELOCITY: 1.19 M/S
RA MAJOR: 4.16 CM
RA PRESSURE ESTIMATED: 3 MMHG
RA WIDTH: 3.29 CM
RIGHT VENTRICULAR END-DIASTOLIC DIMENSION: 2.93 CM
RV TB RVSP: 5 MMHG
SINUS: 2.54 CM
STJ: 2.34 CM
TDI LATERAL: 0.11 M/S
TDI SEPTAL: 0.08 M/S
TDI: 0.1 M/S
TR MAX PG: 15 MMHG
TV REST PULMONARY ARTERY PRESSURE: 18 MMHG
Z-SCORE OF LEFT VENTRICULAR DIMENSION IN END DIASTOLE: -4
Z-SCORE OF LEFT VENTRICULAR DIMENSION IN END SYSTOLE: -2.55

## 2023-09-28 PROCEDURE — 1126F AMNT PAIN NOTED NONE PRSNT: CPT | Mod: CPTII,S$GLB,, | Performed by: FAMILY MEDICINE

## 2023-09-28 PROCEDURE — 1159F MED LIST DOCD IN RCRD: CPT | Mod: CPTII,S$GLB,, | Performed by: FAMILY MEDICINE

## 2023-09-28 PROCEDURE — G0009 ADMIN PNEUMOCOCCAL VACCINE: HCPCS | Mod: S$GLB,,, | Performed by: FAMILY MEDICINE

## 2023-09-28 PROCEDURE — G0009 PNEUMOCOCCAL CONJUGATE VACCINE 20-VALENT: ICD-10-PCS | Mod: S$GLB,,, | Performed by: FAMILY MEDICINE

## 2023-09-28 PROCEDURE — 99999 PR PBB SHADOW E&M-EST. PATIENT-LVL V: ICD-10-PCS | Mod: PBBFAC,,, | Performed by: STUDENT IN AN ORGANIZED HEALTH CARE EDUCATION/TRAINING PROGRAM

## 2023-09-28 PROCEDURE — 1159F PR MEDICATION LIST DOCUMENTED IN MEDICAL RECORD: ICD-10-PCS | Mod: CPTII,S$GLB,, | Performed by: STUDENT IN AN ORGANIZED HEALTH CARE EDUCATION/TRAINING PROGRAM

## 2023-09-28 PROCEDURE — 1159F PR MEDICATION LIST DOCUMENTED IN MEDICAL RECORD: ICD-10-PCS | Mod: CPTII,S$GLB,, | Performed by: FAMILY MEDICINE

## 2023-09-28 PROCEDURE — 3288F FALL RISK ASSESSMENT DOCD: CPT | Mod: CPTII,S$GLB,, | Performed by: FAMILY MEDICINE

## 2023-09-28 PROCEDURE — 3051F PR MOST RECENT HEMOGLOBIN A1C LEVEL 7.0 - < 8.0%: ICD-10-PCS | Mod: CPTII,S$GLB,, | Performed by: FAMILY MEDICINE

## 2023-09-28 PROCEDURE — 3075F PR MOST RECENT SYSTOLIC BLOOD PRESS GE 130-139MM HG: ICD-10-PCS | Mod: CPTII,S$GLB,, | Performed by: FAMILY MEDICINE

## 2023-09-28 PROCEDURE — 99999 PR PBB SHADOW E&M-EST. PATIENT-LVL V: CPT | Mod: PBBFAC,,, | Performed by: STUDENT IN AN ORGANIZED HEALTH CARE EDUCATION/TRAINING PROGRAM

## 2023-09-28 PROCEDURE — 1101F PT FALLS ASSESS-DOCD LE1/YR: CPT | Mod: CPTII,S$GLB,, | Performed by: FAMILY MEDICINE

## 2023-09-28 PROCEDURE — 99214 OFFICE O/P EST MOD 30 MIN: CPT | Mod: 25,S$GLB,, | Performed by: STUDENT IN AN ORGANIZED HEALTH CARE EDUCATION/TRAINING PROGRAM

## 2023-09-28 PROCEDURE — 93306 TTE W/DOPPLER COMPLETE: CPT | Mod: 26,,, | Performed by: INTERNAL MEDICINE

## 2023-09-28 PROCEDURE — 4010F PR ACE/ARB THEARPY RXD/TAKEN: ICD-10-PCS | Mod: CPTII,S$GLB,, | Performed by: FAMILY MEDICINE

## 2023-09-28 PROCEDURE — 76882 US LMTD JT/FCL EVL NVASC XTR: CPT | Mod: S$GLB,,, | Performed by: STUDENT IN AN ORGANIZED HEALTH CARE EDUCATION/TRAINING PROGRAM

## 2023-09-28 PROCEDURE — 4010F ACE/ARB THERAPY RXD/TAKEN: CPT | Mod: CPTII,S$GLB,, | Performed by: FAMILY MEDICINE

## 2023-09-28 PROCEDURE — 90694 FLU VACCINE - QUADRIVALENT - ADJUVANTED: ICD-10-PCS | Mod: S$GLB,,, | Performed by: FAMILY MEDICINE

## 2023-09-28 PROCEDURE — 1101F PR PT FALLS ASSESS DOC 0-1 FALLS W/OUT INJ PAST YR: ICD-10-PCS | Mod: CPTII,S$GLB,, | Performed by: FAMILY MEDICINE

## 2023-09-28 PROCEDURE — 3075F SYST BP GE 130 - 139MM HG: CPT | Mod: CPTII,S$GLB,, | Performed by: FAMILY MEDICINE

## 2023-09-28 PROCEDURE — 1160F PR REVIEW ALL MEDS BY PRESCRIBER/CLIN PHARMACIST DOCUMENTED: ICD-10-PCS | Mod: CPTII,S$GLB,, | Performed by: STUDENT IN AN ORGANIZED HEALTH CARE EDUCATION/TRAINING PROGRAM

## 2023-09-28 PROCEDURE — 99214 PR OFFICE/OUTPT VISIT, EST, LEVL IV, 30-39 MIN: ICD-10-PCS | Mod: S$GLB,,, | Performed by: FAMILY MEDICINE

## 2023-09-28 PROCEDURE — 3288F PR FALLS RISK ASSESSMENT DOCUMENTED: ICD-10-PCS | Mod: CPTII,S$GLB,, | Performed by: FAMILY MEDICINE

## 2023-09-28 PROCEDURE — 3051F HG A1C>EQUAL 7.0%<8.0%: CPT | Mod: CPTII,S$GLB,, | Performed by: STUDENT IN AN ORGANIZED HEALTH CARE EDUCATION/TRAINING PROGRAM

## 2023-09-28 PROCEDURE — 3008F BODY MASS INDEX DOCD: CPT | Mod: CPTII,S$GLB,, | Performed by: FAMILY MEDICINE

## 2023-09-28 PROCEDURE — G0008 FLU VACCINE - QUADRIVALENT - ADJUVANTED: ICD-10-PCS | Mod: S$GLB,,, | Performed by: FAMILY MEDICINE

## 2023-09-28 PROCEDURE — 3078F DIAST BP <80 MM HG: CPT | Mod: CPTII,S$GLB,, | Performed by: FAMILY MEDICINE

## 2023-09-28 PROCEDURE — 99999 PR PBB SHADOW E&M-EST. PATIENT-LVL III: ICD-10-PCS | Mod: PBBFAC,,, | Performed by: FAMILY MEDICINE

## 2023-09-28 PROCEDURE — 1101F PT FALLS ASSESS-DOCD LE1/YR: CPT | Mod: CPTII,S$GLB,, | Performed by: STUDENT IN AN ORGANIZED HEALTH CARE EDUCATION/TRAINING PROGRAM

## 2023-09-28 PROCEDURE — 3051F HG A1C>EQUAL 7.0%<8.0%: CPT | Mod: CPTII,S$GLB,, | Performed by: FAMILY MEDICINE

## 2023-09-28 PROCEDURE — 3008F PR BODY MASS INDEX (BMI) DOCUMENTED: ICD-10-PCS | Mod: CPTII,S$GLB,, | Performed by: FAMILY MEDICINE

## 2023-09-28 PROCEDURE — 3288F FALL RISK ASSESSMENT DOCD: CPT | Mod: CPTII,S$GLB,, | Performed by: STUDENT IN AN ORGANIZED HEALTH CARE EDUCATION/TRAINING PROGRAM

## 2023-09-28 PROCEDURE — 90677 PCV20 VACCINE IM: CPT | Mod: S$GLB,,, | Performed by: FAMILY MEDICINE

## 2023-09-28 PROCEDURE — 4010F PR ACE/ARB THEARPY RXD/TAKEN: ICD-10-PCS | Mod: CPTII,S$GLB,, | Performed by: STUDENT IN AN ORGANIZED HEALTH CARE EDUCATION/TRAINING PROGRAM

## 2023-09-28 PROCEDURE — 99214 OFFICE O/P EST MOD 30 MIN: CPT | Mod: S$GLB,,, | Performed by: FAMILY MEDICINE

## 2023-09-28 PROCEDURE — 1160F RVW MEDS BY RX/DR IN RCRD: CPT | Mod: CPTII,S$GLB,, | Performed by: STUDENT IN AN ORGANIZED HEALTH CARE EDUCATION/TRAINING PROGRAM

## 2023-09-28 PROCEDURE — 1101F PR PT FALLS ASSESS DOC 0-1 FALLS W/OUT INJ PAST YR: ICD-10-PCS | Mod: CPTII,S$GLB,, | Performed by: STUDENT IN AN ORGANIZED HEALTH CARE EDUCATION/TRAINING PROGRAM

## 2023-09-28 PROCEDURE — 76882 PR  US,EXTREMITY,NONVASCULAR,REAL-TIME IMAGE,LIMITED: ICD-10-PCS | Mod: S$GLB,,, | Performed by: STUDENT IN AN ORGANIZED HEALTH CARE EDUCATION/TRAINING PROGRAM

## 2023-09-28 PROCEDURE — 1125F PR PAIN SEVERITY QUANTIFIED, PAIN PRESENT: ICD-10-PCS | Mod: CPTII,S$GLB,, | Performed by: STUDENT IN AN ORGANIZED HEALTH CARE EDUCATION/TRAINING PROGRAM

## 2023-09-28 PROCEDURE — 90677 PNEUMOCOCCAL CONJUGATE VACCINE 20-VALENT: ICD-10-PCS | Mod: S$GLB,,, | Performed by: FAMILY MEDICINE

## 2023-09-28 PROCEDURE — 99999 PR PBB SHADOW E&M-EST. PATIENT-LVL III: CPT | Mod: PBBFAC,,, | Performed by: FAMILY MEDICINE

## 2023-09-28 PROCEDURE — 3008F PR BODY MASS INDEX (BMI) DOCUMENTED: ICD-10-PCS | Mod: CPTII,S$GLB,, | Performed by: STUDENT IN AN ORGANIZED HEALTH CARE EDUCATION/TRAINING PROGRAM

## 2023-09-28 PROCEDURE — 3008F BODY MASS INDEX DOCD: CPT | Mod: CPTII,S$GLB,, | Performed by: STUDENT IN AN ORGANIZED HEALTH CARE EDUCATION/TRAINING PROGRAM

## 2023-09-28 PROCEDURE — 1125F AMNT PAIN NOTED PAIN PRSNT: CPT | Mod: CPTII,S$GLB,, | Performed by: STUDENT IN AN ORGANIZED HEALTH CARE EDUCATION/TRAINING PROGRAM

## 2023-09-28 PROCEDURE — 1126F PR PAIN SEVERITY QUANTIFIED, NO PAIN PRESENT: ICD-10-PCS | Mod: CPTII,S$GLB,, | Performed by: FAMILY MEDICINE

## 2023-09-28 PROCEDURE — 93306 ECHO (CUPID ONLY): ICD-10-PCS | Mod: 26,,, | Performed by: INTERNAL MEDICINE

## 2023-09-28 PROCEDURE — 3078F PR MOST RECENT DIASTOLIC BLOOD PRESSURE < 80 MM HG: ICD-10-PCS | Mod: CPTII,S$GLB,, | Performed by: FAMILY MEDICINE

## 2023-09-28 PROCEDURE — 90694 VACC AIIV4 NO PRSRV 0.5ML IM: CPT | Mod: S$GLB,,, | Performed by: FAMILY MEDICINE

## 2023-09-28 PROCEDURE — G0008 ADMIN INFLUENZA VIRUS VAC: HCPCS | Mod: S$GLB,,, | Performed by: FAMILY MEDICINE

## 2023-09-28 PROCEDURE — 4010F ACE/ARB THERAPY RXD/TAKEN: CPT | Mod: CPTII,S$GLB,, | Performed by: STUDENT IN AN ORGANIZED HEALTH CARE EDUCATION/TRAINING PROGRAM

## 2023-09-28 PROCEDURE — 93306 TTE W/DOPPLER COMPLETE: CPT

## 2023-09-28 PROCEDURE — 1159F MED LIST DOCD IN RCRD: CPT | Mod: CPTII,S$GLB,, | Performed by: STUDENT IN AN ORGANIZED HEALTH CARE EDUCATION/TRAINING PROGRAM

## 2023-09-28 PROCEDURE — 3288F PR FALLS RISK ASSESSMENT DOCUMENTED: ICD-10-PCS | Mod: CPTII,S$GLB,, | Performed by: STUDENT IN AN ORGANIZED HEALTH CARE EDUCATION/TRAINING PROGRAM

## 2023-09-28 PROCEDURE — 3051F PR MOST RECENT HEMOGLOBIN A1C LEVEL 7.0 - < 8.0%: ICD-10-PCS | Mod: CPTII,S$GLB,, | Performed by: STUDENT IN AN ORGANIZED HEALTH CARE EDUCATION/TRAINING PROGRAM

## 2023-09-28 PROCEDURE — 99214 PR OFFICE/OUTPT VISIT, EST, LEVL IV, 30-39 MIN: ICD-10-PCS | Mod: 25,S$GLB,, | Performed by: STUDENT IN AN ORGANIZED HEALTH CARE EDUCATION/TRAINING PROGRAM

## 2023-09-28 NOTE — PROGRESS NOTES
Patient ID: Christine Jo  YOB: 1950  MRN: [id number]    Chief Complaint: Pain of the Left Knee and Pain of the Right Knee      Referred By: Cheri Tatum PA-C for Bilateral Knee Pain    History of Present Illness: Christine Jo is a right-hand dominant 73 y.o. female who presents today with bilateral knee pain.  Patient has been seen in clinic prior to today for hip and shoulder pain, but is a patient of Nag Tatum, in Rheumatology, and is interested in establishing care for her knees with our clinic.  She reports that her knee pain today is a constant 6/10, witht he right being greater than the left.  She received the Zilretta injection in the right knee on 5/16/2023 and left knee Zilretta on 5/2/2023.  She states that these did help and is interested in discussing continuing these or possible gel injections. Patient reports that the majority of the pain is located at the medial aspect of the knee and that it interferes with her sleep.      The patient is active in none.  Occupation: Retired      Past Medical History:   Past Medical History:   Diagnosis Date    Arthritis     Cataract     Diabetes mellitus 2008     am 01/15/2018 Insulin x 1 year    DM (diabetes mellitus) 2008     am 02/14/2020 Insulin x 4 years    Encounter for blood transfusion     Glaucoma     Hypertension     Insomnia     Macular degeneration     Vaginal yeast infection      Past Surgical History:   Procedure Laterality Date    abdominal laparoscopy       BREAST BIOPSY Left 1998    benign    CATARACT EXTRACTION Bilateral [id number]    [facility] [place]    COLONOSCOPY N/A 05/05/2021    Procedure: COLONOSCOPY;  Surgeon: Shawn Rogers III, MD;  Location: [facility];  Service: Endoscopy;  Laterality: N/A;    COLONOSCOPY N/A 06/30/2021    Procedure: COLONOSCOPY;  Surgeon: Memo Merida MD;  Location: [facility];  Service: Endoscopy;  Laterality: N/A;    ESOPHAGOGASTRODUODENOSCOPY N/A  2019    Procedure: ESOPHAGOGASTRODUODENOSCOPY (EGD);  Surgeon: Shawn Rogers III, MD;  Location: [facility] END[facility];  Service: Endoscopy;  Laterality: N/A;    ESOPHAGOGASTRODUODENOSCOPY N/A 2021    Procedure: EGD (ESOPHAGOGASTRODUODENOSCOPY);  Surgeon: Shawn Rogers III, MD;  Location: [facility] ENDO;  Service: Endoscopy;  Laterality: N/A;    EYE SURGERY      TONSILLECTOMY      TUBAL LIGATION      yag  Bilateral      Family History   Problem Relation Age of Onset    Heart disease Mother         CAD     Cataracts Mother     Macular degeneration Mother     Glaucoma Mother     Cancer Son         testicular     Cancer Maternal Aunt         Lung ca    Heart disease Maternal Grandfather         Pacemaker     Diabetes Sister     Heart disease Sister         CAD    Cataracts Sister     Diabetes Sister     Diabetes Sister      Social History     Socioeconomic History    Marital status:     Number of children: 3   Occupational History    Occupation: Retired   Tobacco Use    Smoking status: Former     Current packs/day: 0.00     Average packs/day: 1 pack/day for 36.5 years (36.5 ttl pk-yrs)     Types: Cigarettes     Start date:      Quit date: 2003     Years since quittin.2     Passive exposure: Never    Smokeless tobacco: Never   Substance and Sexual Activity    Alcohol use: No    Drug use: No    Sexual activity: Never     Social Determinants of Health     Financial Resource Strain: Low Risk  (2022)    Overall Financial Resource Strain (CARDIA)     Difficulty of Paying Living Expenses: Not hard at all   Food Insecurity: No Food Insecurity (2022)    Hunger Vital Sign     Worried About Running Out of Food in the Last Year: Never true     Ran Out of Food in the Last Year: Never true   Transportation Needs: Unmet Transportation Needs (2022)    PRAPARE - Transportation     Lack of Transportation (Medical): Yes     Lack of Transportation (Non-Medical): No   Physical Activity: Insufficiently  Active (9/28/2022)    Exercise Vital Sign     Days of Exercise per Week: 7 days     Minutes of Exercise per Session: 10 min   Stress: No Stress Concern Present (9/28/2022)    [employer] [place] [employer] - Occupational Stress Questionnaire     Feeling of Stress : Only a little   Social Connections: Moderately Isolated (9/28/2022)    Social Connection and Isolation Panel [NHANES]     Frequency of Communication with Friends and Family: More than three times a week     Frequency of Social Gatherings with Friends and Family: Never     Attends Anabaptist Services: 1 to 4 times per year     Active Member of Clubs or Organizations: No     Attends Club or Organization Meetings: Never     Marital Status:    Housing Stability: Low Risk  (9/28/2022)    Housing Stability Vital Sign     Unable to Pay for Housing in the Last Year: No     Number of Places Lived in the Last Year: 2     Unstable Housing in the Last Year: No     Medication List with Changes/Refills   Current Medications    ACETAMINOPHEN (TYLENOL ARTHRITIS ORAL)    Take by mouth as needed.    APIXABAN (ELIQUIS) 5 MG TAB    Take 1 tablet (5 mg total) by mouth 2 (two) times daily.    AZELASTINE (ASTELIN) 137 MCG (0.1 %) NASAL SPRAY    use 2 sprays (274 mcg total) by Nasal route 2 (two) times daily.    BENAZEPRIL (LOTENSIN) 40 MG TABLET    Take 1 tablet (40 mg total) by mouth 2 (two) times daily.    BENAZEPRIL (LOTENSIN) 40 MG TABLET    Take 1 tablet (40 mg total) by mouth once daily.    BEPREVE 1.5 % DROP    Place 1 drop into both eyes 2 (two) times daily.    BLOOD SUGAR DIAGNOSTIC STRP    To check blood sugar 1 times daily    BLOOD-GLUCOSE METER (ACCU-CHEK GUIDE GLUCOSE METER) MISC    use daily to test blood sugar    CARVEDILOL (COREG) 25 MG TABLET    TAKE 1 TABLET BY MOUTH TWICE DAILY    CLOTRIMAZOLE-BETAMETHASONE (LOTRISONE) LOTION    Apply topically to the affected area 2 (two) times daily.    CYCLOSPORINE (RESTASIS) 0.05 % OPHTHALMIC EMULSION     "Place 1 drop into both eyes 2 (two) times daily.    DICLOFENAC SODIUM (VOLTAREN) 1 % GEL    Apply 2 grams topically 4 (four) times daily. To affected joints as needed for pain    FLUTICASONE PROPIONATE (FLONASE) 50 MCG/ACTUATION NASAL SPRAY    2 sprays (100 mcg total) by Each Nostril route once daily.    GABAPENTIN (NEURONTIN) 300 MG CAPSULE    Take 1 capsule (300 mg total) by mouth 3 (three) times daily.    GALCANEZUMAB-GNLM (EMGALITY SYRINGE) 120 MG/ML SYRG    Inject 120 mg into the skin every 28 days.    HYDRALAZINE (APRESOLINE) 50 MG TABLET    Take 1 tablet (50 mg total) by mouth every 8 (eight) hours.    INSULIN (LANTUS SOLOSTAR U-100 INSULIN) GLARGINE 100 UNITS/ML SUBQ PEN    Inject 72 Units into the skin every evening.    LACTULOSE (CHRONULAC) 10 GRAM/15 ML SOLUTION    Take 30 mLs (20 g total) by mouth 2 (two) times daily.    LANCETS MISC    To check BG 1 times daily    MECLIZINE (ANTIVERT) 25 MG TABLET    Take 1 tablet (25 mg total) by mouth 3 (three) times daily as needed.    NYSTATIN (MYCOSTATIN) CREAM    Apply topically 2 (two) times daily. Continue until completely healed    PANTOPRAZOLE (PROTONIX) 40 MG TABLET    Take 1 tablet (40 mg total) by mouth once daily.    PEN NEEDLE, DIABETIC (BD ULTRA-FINE SHORT PEN NEEDLE) 31 GAUGE X 5/16" NDLE    AS DIRECTED TWICE DAILY    RIMEGEPANT 75 MG ODT    Take 1 tablet (75 mg total) by mouth as needed for Migraine (do not exceed 2-3 doses within 1 week). Place ODT tablet on the tongue; alternatively the ODT tablet may be placed under the tongue    ROSUVASTATIN (CRESTOR) 10 MG TABLET    Take 1 tablet (10 mg total) by mouth once daily.    SITAGLIPTIN PHOSPHATE (JANUVIA) 100 MG TAB    Take 1 tablet (100 mg total) by mouth once daily.    TEMAZEPAM (RESTORIL) 15 MG CAP    Take 2 capsules (30 mg total) by mouth every evening.    VENLAFAXINE (EFFEXOR-XR) 37.5 MG 24 HR CAPSULE    Take 1 capsule (37.5 mg total) by mouth once daily.     Review of patient's allergies indicates: "   Allergen Reactions    Aspirin Palpitations       Physical Exam:   Body mass index is 44.45 kg/m².    GENERAL: Well appearing, in no acute distress.  HEAD: Normocephalic and atraumatic.  ENT: External ears and nose grossly normal.  EYES: EOMI bilaterally  PULMONARY: Respirations are grossly even and non-labored.  NEURO: Awake, alert, and oriented x 3.  SKIN: No obvious rashes appreciated.  PSYCH: Mood & affect are appropriate.    Detailed MSK exam:     Right knee exam:   -ROM: extension 0, flexion 120  -TTP: Medial joint line and Lateral joint line  -effusion: trace  -Patellar apprehension negative  -Jordi test positive  -stable to varus and valgus stress tests  -Lachman test negative, anterior drawer test negative, posterior drawer test negative    Left knee exam:   -ROM: extension 0, flexion 120  -TTP: Medial joint line  -effusion: trace  -Patellar apprehension negative  -Jordi test negative  -stable to varus and valgus stress tests  -Lachman test negative, anterior drawer test negative, posterior drawer test negative      Imaging:  Echo    Left Ventricle: The left ventricle is normal in size. Mildly increased   wall thickness. There is mild concentric hypertrophy. Normal wall motion.   There is normal systolic function with a visually estimated ejection   fraction of 55 - 70%. There is normal diastolic function.    Right Ventricle: Normal right ventricular cavity size. Wall thickness   is normal. Right ventricle wall motion  is normal. Systolic function is   normal.    Aortic Valve: There is mild to moderate aortic regurgitation.    Tricuspid Valve: There is mild regurgitation.    IVC/SVC: Normal venous pressure at 3 mmHg.        Relevant imaging results were reviewed and interpreted by me and per my read shows severe medial compartment joint space narrowing bilaterally on knee radiographs.  This was discussed with the patient and / or family today.     Assessment:  Christine Jo is a 73 y.o. female  presenting with chronic bilateral knee pain.   History, physical and radiographs are consistent with a likely diagnosis of moderate to severe OA.   Plan: Patient has already tried multiple conservative treatment options including multiple steroid injections and zilretta injections. Patient would like to hold off on knee replacement surgery as much as possible. Given that the patient has already failed multiple conservative treatment options thus far, recommend iovera cryoneurolysis procedure to help provide significant relief for their chronic knee pain so they can get back to normal ADLs and other activities they enjoy doing which have been limited due to knee pain. Ultrasound evaluation was completed today and 5 sensory nerves were easily identified which would benefit from treatment with iovera. Prior auth for iovera procedure. Continue conservative management for pain.   Follow up for iovera procedure. All questions answered.      Chronic pain of left knee  -     Sports Medicine US - Extremity Non-Vascular LIMITED Left  -     Iovera; Future    Chronic pain of right knee  -     Sports Medicine US - Extremity Non-Vascular LIMITED Right  -     Iovera; Future         Time was spent discussing the cryoanalgesia procedure Iovera with the patient.  Risks and benefits were also discussed with patient.  X-rays were reviewed to use as a diagram to explain procedure. A detailed description of landmarks and placement of anesthetic used during procedure were also explained to patient.  Contraindications for procedure were discussed with patient.    More than 50% of the total time was spent face to face for counseling on diagnosis and treatment options. I also counseled patient  on common and most usual side effect of prescribed medications.  I reviewed Primary care, and other specialty's notes to better coordinate patient's care. Patient voiced understanding.        A copy of today's visit note has been sent to the referring  provider.     Electronically signed:  Erik Esposito MD, MPH  09/28/2023  12:52 PM

## 2023-09-28 NOTE — PROCEDURES
Sports Medicine US - Extremity Non-Vascular LIMITED Right    Date/Time: 9/28/2023 12:40 PM    Performed by: Erik Esposito MD  Authorized by: Erik Esposito MD  Preparation: Patient was prepped and draped in the usual sterile fashion.  Local anesthesia used: no    Anesthesia:  Local anesthesia used: no    Sedation:  Patient sedated: no    Patient tolerance: patient tolerated the procedure well with no immediate complications  Comments: Limited ultrasound of the right knee was utilized to map out and visualize the superficial sensory nerves in proximal thigh and medial knee that were targeted for the iovera procedure. The superior and inferior branches of the infrapatellar saphenous nerve, the medial femoral cutaneous nerve, the anterior femoral cutaneous nerve, and the lateral femoral cutaneous nerve were all visualized. Images saved in the EMR for documentation.

## 2023-09-28 NOTE — PATIENT INSTRUCTIONS
Assessment:  Christine Jo is a 73 y.o. female   Chief Complaint   Patient presents with    Left Knee - Pain    Right Knee - Pain       Encounter Diagnoses   Name Primary?    Chronic pain of right knee     Chronic pain of left knee Yes        Plan:  Bilateral limited diagnostic Ultrasound for Iovera consult  Pre authorization for right knee Iovera   Pre authorization for left knee Iovera  Follow up next week for Iovera procedures    General Arthritis info:    -shiny white stuff at end of a chicken bones is cartilage    -arthritis is wearing away of the cartilage that lines the end of your bones    -osteoarthritis is thought to be a wear and tear phenomenon    -symptoms are due to inflammation of joint causing stiffness, aching, and sometimes swelling    -occasionally sharp pain will occur causing a give way sensation    -Risk factors: genetic, weight, female > male, age    Treatment options:    -maintain healthy weight (every pound is 4 pounds of pressure on the knee)    -daily moderate exercise (walk, bike, swim 30 minutes per day) to keep joints moving    -daily strengthening exercises (through therapy or on own) to keep muscles supporting joint healthy and strong    -glucosamine 1500mg daily (look for USP label on bottle)    -tylenol as needed for pain (follow directions on the bottle)    -anti-inflammatory medication such as alleve may be helpful- take 1-2 tabs twice daily for 7 days. If it helps your pain, continue. If you do not feel any change, you may stop and then take it as needed.    (you may be given a once daily anti-inflammatory such as MOBIC. If given, avoid other anti-inflammatory medications such as advil, ibuprofen, motrin, naprosyn, alleve, etc)    -if swollen and painful, ice, decrease activity, and take anti-inflammatory daily for 5-7 days and if no relief call your doctor for further options    -consider cortisone injection (every 3-4 months at most)- anti- inflammatory steroid medication  that can be injected directly into the joint to reduce inflammation    -consider hyaluronic acid injections (eufflexxa, hyalgan, synvisc, supartz) (every 6 months at most)- protein injection that helps decrease pain and irritability in the joint. It is best used to help prolong intervals between cortisone injections to minimize steroid injections. These are currently approved for knee injections. Discuss with your doctor if other joint involved. Call to seek approval prior to the injections.    -long-term treatment may include a total joint replacement (keep diary of good days and bad days, then evaluate as to when you are ready)            Follow-up: 1 week or sooner if there are any problems between now and then.    Thank you for choosing [facility] and Dr. Erik Esposito for your orthopedic & sports medicine care. It is our goal to provide you with exceptional care that will help keep you healthy, active, and get you back in the game.    Please do not hesitate to reach out to us via email, phone, or MyChart with any questions, concerns, or feedback.    If you felt that you received exemplary care today, please consider leaving us feedback on [employer] at:  [email or web address]    If you are experiencing pain/discomfort ,or have questions after 5pm and would like to be connected to the [facility] on-call team, please call this number and specify which Sports Medicine provider is treating you: [phone]

## 2023-09-28 NOTE — PROGRESS NOTES
Subjective:       Patient ID: Christine Jo is a 73 y.o. female.    Chief Complaint: Follow-up    She is followed by cardiology Gastroenterology orthopedics Psychiatry and rheumatology Medical history of diabetes with diabetic polyneuropathy coronary disease anxiety depression aortic aneurysm carotid artery stenosis chronic constipation.  She is recently started on lactulose.  She denies chest pain palpitations shortness of breath edema.  She polyuria polydipsia hypoglycemic symptoms    Follow-up  Associated symptoms include abdominal pain and arthralgias. Pertinent negatives include no chest pain, chills, coughing, fever, headaches, nausea or weakness.     Review of Systems   Constitutional:  Negative for chills and fever.   Respiratory:  Negative for cough, chest tightness, shortness of breath and wheezing.    Cardiovascular:  Negative for chest pain, palpitations and leg swelling.   Gastrointestinal:  Positive for abdominal pain and constipation. Negative for abdominal distention, blood in stool, diarrhea and nausea.   Genitourinary:  Negative for dysuria, hematuria and urgency.   Musculoskeletal:  Positive for arthralgias.   Neurological:  Negative for dizziness, weakness, light-headedness and headaches.       Objective:      Physical Exam  Constitutional:       General: She is not in acute distress.     Appearance: She is not diaphoretic.   Cardiovascular:      Rate and Rhythm: Normal rate and regular rhythm.      Heart sounds: No murmur heard.     No gallop.   Pulmonary:      Effort: Pulmonary effort is normal. No respiratory distress.      Breath sounds: No wheezing, rhonchi or rales.   Abdominal:      General: There is no distension.   Musculoskeletal:      Right foot: No deformity.      Left foot: No deformity (Good capillary filling).   Feet:      Right foot:      Protective Sensation: 10 sites tested.  10 sites sensed.      Skin integrity: Skin integrity normal.      Toenail Condition: Right toenails  are normal.      Left foot:      Protective Sensation: 10 sites tested.  10 sites sensed.      Skin integrity: Skin integrity normal.      Toenail Condition: Left toenails are normal.   Lymphadenopathy:      Cervical: No cervical adenopathy.   Skin:     General: Skin is warm and dry.      Coloration: Skin is not pale.      Findings: No erythema.   Neurological:      Mental Status: She is alert.   Psychiatric:         Mood and Affect: Mood normal.         Behavior: Behavior normal.         Lab Visit on 08/15/2023   Component Date Value Ref Range Status    WBC 08/15/2023 9.11  3.90 - 12.70 K/uL Final    RBC 08/15/2023 3.67 (L)  4.00 - 5.40 M/uL Final    Hemoglobin 08/15/2023 11.4 (L)  12.0 - 16.0 g/dL Final    Hematocrit 08/15/2023 35.1 (L)  37.0 - 48.5 % Final    MCV 08/15/2023 96  82 - 98 fL Final    MCH 08/15/2023 31.1 (H)  27.0 - 31.0 pg Final    MCHC 08/15/2023 32.5  32.0 - 36.0 g/dL Final    RDW 08/15/2023 12.5  11.5 - 14.5 % Final    Platelets 08/15/2023 267  150 - 450 K/uL Final    MPV 08/15/2023 11.6  9.2 - 12.9 fL Final    Immature Granulocytes 08/15/2023 0.3  0.0 - 0.5 % Final    Gran # (ANC) 08/15/2023 6.0  1.8 - 7.7 K/uL Final    Immature Grans (Abs) 08/15/2023 0.03  0.00 - 0.04 K/uL Final    Lymph # 08/15/2023 1.9  1.0 - 4.8 K/uL Final    Mono # 08/15/2023 0.9  0.3 - 1.0 K/uL Final    Eos # 08/15/2023 0.1  0.0 - 0.5 K/uL Final    Baso # 08/15/2023 0.07  0.00 - 0.20 K/uL Final    nRBC 08/15/2023 0  0 /100 WBC Final    Gran % 08/15/2023 66.3  38.0 - 73.0 % Final    Lymph % 08/15/2023 21.1  18.0 - 48.0 % Final    Mono % 08/15/2023 10.0  4.0 - 15.0 % Final    Eosinophil % 08/15/2023 1.5  0.0 - 8.0 % Final    Basophil % 08/15/2023 0.8  0.0 - 1.9 % Final    Differential Method 08/15/2023 Automated   Final    Ferritin 08/15/2023 95  20.0 - 300.0 ng/mL Final    Iron 08/15/2023 106  30 - 160 ug/dL Final    Transferrin 08/15/2023 223  200 - 375 mg/dL Final    TIBC 08/15/2023 330  250 - 450 ug/dL Final    Saturated  Iron 08/15/2023 32  20 - 50 % Final     Assessment:       1. Controlled type 2 diabetes mellitus with other circulatory complication, with long-term current use of insulin    2. Hypertension associated with diabetes    3. Gastroesophageal reflux disease without esophagitis    4. Recurrent major depressive disorder, in partial remission    5. Primary hypertension    6. Aorto-iliac atherosclerosis    7. Stenosis of carotid artery, unspecified laterality    8. Chronic idiopathic constipation        Plan:   She recently side lactulose.  Discuss avoiding calcium in diet and any vitamin supplements.  Also consider eating prunes twice a day.  Abdominal ultrasound with gallstones and fatty liver.  GI is following this.  Her blood pressure is controlled.  Diabetic foot exam was done today.  Lab was ordered.  Follow-up in 3 months.  She has diabetic polyneuropathy manifested by burning of her lower extremities.  She does have a normal monofilament test.  Pneumococcal 20 given today.  She will return for flu immunization.      Controlled type 2 diabetes mellitus with other circulatory complication, with long-term current use of insulin    Hypertension associated with diabetes    Gastroesophageal reflux disease without esophagitis    Recurrent major depressive disorder, in partial remission    Primary hypertension    Aorto-iliac atherosclerosis    Stenosis of carotid artery, unspecified laterality    Chronic idiopathic constipation    Other orders  -     (In Office Administered) Pneumococcal Conjugate Vaccine (20 Valent) (IM)

## 2023-09-28 NOTE — PROCEDURES
Sports Medicine US - Extremity Non-Vascular LIMITED Left    Date/Time: 9/28/2023 12:40 PM    Performed by: Erik Esposito MD  Authorized by: Erik Esposito MD  Preparation: Patient was prepped and draped in the usual sterile fashion.  Local anesthesia used: no    Anesthesia:  Local anesthesia used: no    Sedation:  Patient sedated: no    Patient tolerance: patient tolerated the procedure well with no immediate complications  Comments: Limited ultrasound of the left knee was utilized to map out and visualize the superficial sensory nerves in proximal thigh and medial knee that were targeted for the iovera procedure. The superior and inferior branches of the infrapatellar saphenous nerve, the medial femoral cutaneous nerve, the anterior femoral cutaneous nerve, and the lateral femoral cutaneous nerve were all visualized. Images saved in the EMR for documentation.

## 2023-09-29 ENCOUNTER — TELEPHONE (OUTPATIENT)
Dept: CARDIOLOGY | Facility: CLINIC | Age: 73
End: 2023-09-29
Payer: MEDICARE

## 2023-09-29 NOTE — TELEPHONE ENCOUNTER
Spoke to patient about results.KA    ----- Message from Gabriele Joyce MD sent at 9/28/2023  8:34 PM CDT -----  Normal LV , moderate AI will follow  ----- Message -----  From: Shree Espinoza MD  Sent: 9/28/2023  12:44 PM CDT  To: Gabriele Joyce MD

## 2023-09-29 NOTE — TELEPHONE ENCOUNTER
Spoke to patient about lab results. KA    ----- Message from Gabriele Joyce MD sent at 9/28/2023  8:36 PM CDT -----  Improved triglycerides  ----- Message -----  From: [facility]  Sent: 9/28/2023   5:28 PM CDT  To: Gabriele Joyce MD

## 2023-10-01 ENCOUNTER — PATIENT MESSAGE (OUTPATIENT)
Dept: GASTROENTEROLOGY | Facility: CLINIC | Age: 73
End: 2023-10-01
Payer: MEDICARE

## 2023-10-01 ENCOUNTER — PATIENT MESSAGE (OUTPATIENT)
Dept: INTERNAL MEDICINE | Facility: CLINIC | Age: 73
End: 2023-10-01
Payer: MEDICARE

## 2023-10-02 ENCOUNTER — TELEPHONE (OUTPATIENT)
Dept: SPORTS MEDICINE | Facility: CLINIC | Age: 73
End: 2023-10-02
Payer: MEDICARE

## 2023-10-02 NOTE — TELEPHONE ENCOUNTER
LVM confirming appt for right knee Iovera procedure on Wednesday 10/4 and Left knee Iovera procedure on 10/5.  Asked patient to contact office if she has any questions or conflicts.

## 2023-10-03 ENCOUNTER — TELEPHONE (OUTPATIENT)
Dept: SPORTS MEDICINE | Facility: CLINIC | Age: 73
End: 2023-10-03
Payer: MEDICARE

## 2023-10-03 ENCOUNTER — TELEPHONE (OUTPATIENT)
Dept: INTERNAL MEDICINE | Facility: CLINIC | Age: 73
End: 2023-10-03
Payer: MEDICARE

## 2023-10-03 NOTE — TELEPHONE ENCOUNTER
Contacted patient and let her know that procedure has been authorized.  Provided patient with the Financial Clearance Call Center number to discuss estimate of her responsibility for the procedure, [phone]  ----- Message from Dianelys Hansen sent at 10/3/2023 12:46 PM CDT -----  Contact: pt  Pt is calling in regard to having the nurse call her about her procedure and needs to know if her insurance will cover it.  Please call her back at [phone] [email or web address]

## 2023-10-04 ENCOUNTER — PATIENT MESSAGE (OUTPATIENT)
Dept: CARDIOLOGY | Facility: CLINIC | Age: 73
End: 2023-10-04
Payer: MEDICARE

## 2023-10-04 ENCOUNTER — PROCEDURE VISIT (OUTPATIENT)
Dept: SPORTS MEDICINE | Facility: CLINIC | Age: 73
End: 2023-10-04
Payer: MEDICARE

## 2023-10-04 VITALS — WEIGHT: 243 LBS | HEIGHT: 62 IN | BODY MASS INDEX: 44.72 KG/M2

## 2023-10-04 DIAGNOSIS — M25.561 CHRONIC PAIN OF RIGHT KNEE: Primary | ICD-10-CM

## 2023-10-04 DIAGNOSIS — G89.29 CHRONIC PAIN OF RIGHT KNEE: Primary | ICD-10-CM

## 2023-10-04 PROCEDURE — 64640 INJECTION TREATMENT OF NERVE: CPT | Mod: RT,S$GLB,, | Performed by: STUDENT IN AN ORGANIZED HEALTH CARE EDUCATION/TRAINING PROGRAM

## 2023-10-04 PROCEDURE — 99499 UNLISTED E&M SERVICE: CPT | Mod: S$GLB,,, | Performed by: STUDENT IN AN ORGANIZED HEALTH CARE EDUCATION/TRAINING PROGRAM

## 2023-10-04 PROCEDURE — 64640 PR DESTRUCT BY NEURO AGENT; OTHER PERIPH NERVE: ICD-10-PCS | Mod: RT,S$GLB,, | Performed by: STUDENT IN AN ORGANIZED HEALTH CARE EDUCATION/TRAINING PROGRAM

## 2023-10-04 PROCEDURE — 76942 PR U/S GUIDANCE FOR NEEDLE GUIDANCE: ICD-10-PCS | Mod: S$GLB,,, | Performed by: STUDENT IN AN ORGANIZED HEALTH CARE EDUCATION/TRAINING PROGRAM

## 2023-10-04 PROCEDURE — 76942 ECHO GUIDE FOR BIOPSY: CPT | Mod: S$GLB,,, | Performed by: STUDENT IN AN ORGANIZED HEALTH CARE EDUCATION/TRAINING PROGRAM

## 2023-10-04 PROCEDURE — 99499 NO LOS: ICD-10-PCS | Mod: S$GLB,,, | Performed by: STUDENT IN AN ORGANIZED HEALTH CARE EDUCATION/TRAINING PROGRAM

## 2023-10-04 NOTE — PATIENT INSTRUCTIONS
Assessment:  Christine Jo is a 73 y.o. female   Chief Complaint   Patient presents with    Right Knee - Pain       Encounter Diagnosis   Name Primary?    Chronic pain of right knee Yes        Plan:  Iovera right knee procedure  You had the Iovera procedure done to your knee today.  There may be bruising over the procedure area for the next few days.  Avoid soaking in standing water (ie bath or hot tub) for the next 24hrs.  Bandages can be removed this evening.  Follow up in 2 months    Follow-up: 2 months  or sooner if there are any problems between now and then.    Thank you for choosing [facility] and Dr. Erik Esposito for your orthopedic & sports medicine care. It is our goal to provide you with exceptional care that will help keep you healthy, active, and get you back in the game.    Please do not hesitate to reach out to us via email, phone, or MyChart with any questions, concerns, or feedback.    If you felt that you received exemplary care today, please consider leaving us feedback on [email or web address] at:  [email or web address]    If you are experiencing pain/discomfort ,or have questions after 5pm and would like to be connected to the [facility] on-call team, please call this number and specify which Sports Medicine provider is treating you: [phone]

## 2023-10-04 NOTE — PROCEDURES
Sports Medicine US - Guidance for Needle Placement    Date/Time: 10/4/2023 12:20 PM    Performed by: Erik Esposito MD  Authorized by: Erik Esposito MD  Preparation: Patient was prepped and draped in the usual sterile fashion.  Local anesthesia used: no    Anesthesia:  Local anesthesia used: no    Sedation:  Patient sedated: no    Patient tolerance: patient tolerated the procedure well with no immediate complications  Comments: Ultrasound guidance was used for needle localization. Images were saved and stored for documentation. The appropriate structures were visualized. Dynamic visualization of the needle was continuous throughout the procedures and maintained good position.

## 2023-10-04 NOTE — PROCEDURES
Iovera    Date/Time: 10/4/2023 12:20 PM    Performed by: Erik Esposito MD  Authorized by: Erik Esposito MD  Preparation: Patient was prepped and draped in the usual sterile fashion.  Local anesthesia used: yes  Anesthesia: local infiltration    Anesthesia:  Local anesthesia used: yes  Local Anesthetic: lidocaine 1% without epinephrine  Anesthetic total: 15 mL    Sedation:  Patient sedated: no    Patient tolerance: patient tolerated the procedure well with no immediate complications  Comments: CC: right knee pain    73 y.o. Female presents today for Iovera procedure for right knee pain.    INFORMED CONSENT: I verify that I personally obtained Christine Jo's consent which was signed and uploaded into Epic.     Inspection/Palpation:   +Skin warm and dry without open wounds or erythema  -Rubor   -Calor    Procedure Note Iovera:    DATE OF PROCEDURE:  10/04/2023    PREOPERATIVE DIAGNOSIS: right knee pain.     POSTOPERATIVE DIAGNOSIS: right knee pain.     PROCEDURE: Iovera treatment of anterior femoral cutaneous nerve, lateral femoral cutaneous nerve, medial femoral cutaneous nerve, and superior and inferior branches of infrapatellar saphenous nerve using 5 different punctures to treat all 5 nerves. (cpt 64640 x5)    COMPLICATIONS: None.     IMPLANTS:  None    ESTIMATED BLOOD LOSS:  < 5cc    SPECIMENS REMOVED:  None    ANESTHESIA: 15cc Local lidocaine without epinephrine    INDICATIONS FOR PROCEDURE: This is a 73 y.o. female with longstanding knee pain. They have failed non operative management including injections. I discussed a new treatment therapy called Iovera, which is cryotherapy, to provide symptomatic relief along the sensory distribution of the infrapatellar tendon branch of the saphenous nerve  and AFCN. The patient elected to move forward with this  We did discuss the fact that this is a fairly novel procedure and there is very limited scientific data around this.  However, it is FDA approved.   The patient was given patient information and literature to review prior to the procedure as well.  Based on this, the patient agreed to move forward with doing the procedure.    Time was spent discussing the cryoanalgesia procedure Iovera with the patient.  Risks and benefits were also discussed with patient.  X-rays were reviewed to use as a diagram to explain procedure. A detailed description of landmarks and placement of anesthetic used during procedure were also explained to patient.  Contraindications for procedure were discussed with patient.    CONSENT:  Verbal and written consent were obtained from the patient.     PROCEDURE:    A surgical time out was performed, including verification of patient ID, procedure to be performed, site and side, availability of information and equipment, review of safety issues, and the patient's agreement and consent.  The patient was placed supine on the exam table and the proximal medial aspect of the right tibia and anterior aspect of distal femur was prepped with sterile alcohol. Ultrasound was used to visualize and map out the targeted nerves. We then infiltrated the skin with lidocaine without epinephrine using a 25g needle. We then hydrodissected the nerve with 1cc lidocaine without epinephrine using a 20g spinal needle under ultrasound guidance. We then introduced the Iovera device using ultrasound guidance and this device penetrated the skin, creating cryotherapy to the superior and inferior branches of the infrapatellar saphenous nerve, a third treatment to the medial femoral cutaneous nerve, a fourth treatment to the anterior femoral cutaneous nerve, and fifth treatment to the lateral femoral cutaneous nerve. 5 punctures of the skin were made to treat the superior and inferior branches of the ISN, the MFCN, the AFCN and LFCN. There were a total of 5 nerves treated with iovera. The patient tolerated the procedure well with no problems.    PAIN SCORES:  Pre-procedure:   6/10  Post-procedure:  0/10      ASSESSMENT:    (M25.561,  G89.29) Chronic pain of right knee  (primary encounter diagnosis)      PLAN:  Post-procedure instructions given.   Follow up in 2 months.   All questions were answered to the best of my ability and all concerns were addressed at this time.

## 2023-10-05 ENCOUNTER — TELEPHONE (OUTPATIENT)
Dept: SPORTS MEDICINE | Facility: CLINIC | Age: 73
End: 2023-10-05
Payer: MEDICARE

## 2023-10-05 ENCOUNTER — PATIENT MESSAGE (OUTPATIENT)
Dept: SPORTS MEDICINE | Facility: CLINIC | Age: 73
End: 2023-10-05
Payer: MEDICARE

## 2023-10-05 ENCOUNTER — TELEPHONE (OUTPATIENT)
Dept: CARDIOLOGY | Facility: CLINIC | Age: 73
End: 2023-10-05
Payer: MEDICARE

## 2023-10-05 DIAGNOSIS — G47.00 INSOMNIA, UNSPECIFIED TYPE: ICD-10-CM

## 2023-10-05 RX ORDER — TEMAZEPAM 30 MG/1
30 CAPSULE ORAL NIGHTLY
Qty: 90 CAPSULE | Refills: 0 | Status: SHIPPED | OUTPATIENT
Start: 2023-10-05 | End: 2023-12-28 | Stop reason: SDUPTHER

## 2023-10-05 NOTE — TELEPHONE ENCOUNTER
Spoke to patient to left her know that all her test results are normal and in range.     ----- Message from Lashawn Farias sent at 10/5/2023  1:57 PM CDT -----  Regarding: Test Results  Contact: christine  Type:  Test Results    Who Called: Christine  Name of Test (Lab/Mammo/Etc): Echo  Date of Test: 09/28/2023  Ordering Provider: MIRIAN Joyce  Where the test was performed: [place]  Would the patient rather a call back or a response via [facility]? call  Best Call Back Number: [phone] (home)    Additional Information:   Christine received her results but has other questions about the test and would like to speak to you.

## 2023-10-05 NOTE — TELEPHONE ENCOUNTER
R/S to October 12 at 9 am due to patient having migraine  ----- Message from Roxane Gupta sent at 10/5/2023  7:13 AM CDT -----  States she has a procedure this morning and she needs to cancel. States she has a migraine this morning. Please call pt [phone]. Thank you

## 2023-10-05 NOTE — TELEPHONE ENCOUNTER
I called the patient.  She confirms that she is continuing to take 30 mg of temazepam, and specific questioning indicates that she has had good results and no side effects.  She is pleasant, appreciative, and fully appropriate on the phone and has no complaints, concerns, or questions.  She understands that with the new prescription, she will take 1 capsule only, as the capsule will be 30 mg.  She confirms that she has sufficient temazepam to last until the new prescription arrives from the [facility] mail order pharmacy.  She agrees to notify me at any point before any lapse in the medication.

## 2023-10-08 ENCOUNTER — PATIENT MESSAGE (OUTPATIENT)
Dept: SPORTS MEDICINE | Facility: CLINIC | Age: 73
End: 2023-10-08
Payer: MEDICARE

## 2023-10-10 ENCOUNTER — OFFICE VISIT (OUTPATIENT)
Dept: GASTROENTEROLOGY | Facility: CLINIC | Age: 73
End: 2023-10-10
Payer: MEDICARE

## 2023-10-10 ENCOUNTER — PATIENT MESSAGE (OUTPATIENT)
Dept: CARDIOLOGY | Facility: CLINIC | Age: 73
End: 2023-10-10
Payer: MEDICARE

## 2023-10-10 ENCOUNTER — TELEPHONE (OUTPATIENT)
Dept: INTERNAL MEDICINE | Facility: CLINIC | Age: 73
End: 2023-10-10
Payer: MEDICARE

## 2023-10-10 ENCOUNTER — PATIENT MESSAGE (OUTPATIENT)
Dept: INTERNAL MEDICINE | Facility: CLINIC | Age: 73
End: 2023-10-10
Payer: MEDICARE

## 2023-10-10 VITALS — BODY MASS INDEX: 44.16 KG/M2 | WEIGHT: 240 LBS | HEIGHT: 62 IN

## 2023-10-10 DIAGNOSIS — E78.1 HYPERTRIGLYCERIDEMIA: ICD-10-CM

## 2023-10-10 DIAGNOSIS — K59.04 CHRONIC IDIOPATHIC CONSTIPATION: Primary | ICD-10-CM

## 2023-10-10 DIAGNOSIS — K76.0 FATTY LIVER: ICD-10-CM

## 2023-10-10 DIAGNOSIS — K80.20 CALCULUS OF GALLBLADDER WITHOUT CHOLECYSTITIS WITHOUT OBSTRUCTION: ICD-10-CM

## 2023-10-10 PROCEDURE — 3066F NEPHROPATHY DOC TX: CPT | Mod: CPTII,95,, | Performed by: PHYSICIAN ASSISTANT

## 2023-10-10 PROCEDURE — 99213 OFFICE O/P EST LOW 20 MIN: CPT | Mod: 95,,, | Performed by: PHYSICIAN ASSISTANT

## 2023-10-10 PROCEDURE — 1101F PT FALLS ASSESS-DOCD LE1/YR: CPT | Mod: CPTII,95,, | Performed by: PHYSICIAN ASSISTANT

## 2023-10-10 PROCEDURE — 3060F PR POS MICROALBUMINURIA RESULT DOCUMENTED/REVIEW: ICD-10-PCS | Mod: CPTII,95,, | Performed by: PHYSICIAN ASSISTANT

## 2023-10-10 PROCEDURE — 4010F PR ACE/ARB THEARPY RXD/TAKEN: ICD-10-PCS | Mod: CPTII,95,, | Performed by: PHYSICIAN ASSISTANT

## 2023-10-10 PROCEDURE — 3288F FALL RISK ASSESSMENT DOCD: CPT | Mod: CPTII,95,, | Performed by: PHYSICIAN ASSISTANT

## 2023-10-10 PROCEDURE — 3288F PR FALLS RISK ASSESSMENT DOCUMENTED: ICD-10-PCS | Mod: CPTII,95,, | Performed by: PHYSICIAN ASSISTANT

## 2023-10-10 PROCEDURE — 3060F POS MICROALBUMINURIA REV: CPT | Mod: CPTII,95,, | Performed by: PHYSICIAN ASSISTANT

## 2023-10-10 PROCEDURE — 1125F AMNT PAIN NOTED PAIN PRSNT: CPT | Mod: CPTII,95,, | Performed by: PHYSICIAN ASSISTANT

## 2023-10-10 PROCEDURE — 3008F BODY MASS INDEX DOCD: CPT | Mod: CPTII,95,, | Performed by: PHYSICIAN ASSISTANT

## 2023-10-10 PROCEDURE — 3044F HG A1C LEVEL LT 7.0%: CPT | Mod: CPTII,95,, | Performed by: PHYSICIAN ASSISTANT

## 2023-10-10 PROCEDURE — 3044F PR MOST RECENT HEMOGLOBIN A1C LEVEL <7.0%: ICD-10-PCS | Mod: CPTII,95,, | Performed by: PHYSICIAN ASSISTANT

## 2023-10-10 PROCEDURE — 1125F PR PAIN SEVERITY QUANTIFIED, PAIN PRESENT: ICD-10-PCS | Mod: CPTII,95,, | Performed by: PHYSICIAN ASSISTANT

## 2023-10-10 PROCEDURE — 1159F PR MEDICATION LIST DOCUMENTED IN MEDICAL RECORD: ICD-10-PCS | Mod: CPTII,95,, | Performed by: PHYSICIAN ASSISTANT

## 2023-10-10 PROCEDURE — 99213 PR OFFICE/OUTPT VISIT, EST, LEVL III, 20-29 MIN: ICD-10-PCS | Mod: 95,,, | Performed by: PHYSICIAN ASSISTANT

## 2023-10-10 PROCEDURE — 3066F PR DOCUMENTATION OF TREATMENT FOR NEPHROPATHY: ICD-10-PCS | Mod: CPTII,95,, | Performed by: PHYSICIAN ASSISTANT

## 2023-10-10 PROCEDURE — 1101F PR PT FALLS ASSESS DOC 0-1 FALLS W/OUT INJ PAST YR: ICD-10-PCS | Mod: CPTII,95,, | Performed by: PHYSICIAN ASSISTANT

## 2023-10-10 PROCEDURE — 4010F ACE/ARB THERAPY RXD/TAKEN: CPT | Mod: CPTII,95,, | Performed by: PHYSICIAN ASSISTANT

## 2023-10-10 PROCEDURE — 3008F PR BODY MASS INDEX (BMI) DOCUMENTED: ICD-10-PCS | Mod: CPTII,95,, | Performed by: PHYSICIAN ASSISTANT

## 2023-10-10 PROCEDURE — 1159F MED LIST DOCD IN RCRD: CPT | Mod: CPTII,95,, | Performed by: PHYSICIAN ASSISTANT

## 2023-10-10 RX ORDER — BENAZEPRIL HYDROCHLORIDE 40 MG/1
40 TABLET ORAL DAILY
Qty: 90 TABLET | Refills: 3
Start: 2023-10-10 | End: 2023-10-10 | Stop reason: SDUPTHER

## 2023-10-10 NOTE — PROGRESS NOTES
Subjective:      Patient ID: Christine Jo is a 73 y.o. female.    Chief Complaint: Fatty Liver (Wants to discuss gallbladder) and Constipation    The patient location is: [place]  The chief complaint leading to consultation is: Constipation    Visit type: audiovisual    Face to Face time with patient: 20 minutes  22 minutes of total time spent on the encounter, which includes face to face time and non-face to face time preparing to see the patient (eg, review of tests), Obtaining and/or reviewing separately obtained history, Documenting clinical information in the electronic or other health record, Independently interpreting results (not separately reported) and communicating results to the patient/family/caregiver, or Care coordination (not separately reported).         Each patient to whom he or she provides medical services by telemedicine is:  (1) informed of the relationship between the physician and patient and the respective role of any other health care provider with respect to management of the patient; and (2) notified that he or she may decline to receive medical services by telemedicine and may withdraw from such care at any time.    Notes:     HPI  The patient has a history of GERD, chronic constipation and bleeding hemorrhoids. This is a follow up from her visit in April. Fiber supplementation had previously been recommended but she was only taking Colace with a BM 4-5 times a week and occasional straining. Recommendations last visit included Mag Citrate x 1 (she couldn't find it OTC) and chronic bowel regimen. Linzess 145 mcg was prescribed but she stopped it due to abdominal pain and explosive diarrhea. Instead she started fiber gummies. She also requested an US of her gallbladder which showed cholelithiasis without evidence to suggest cholecystitis.  Last visit, she also reported increased heartburn, bloating and nausea. Her Protonix was temporarily increased to bid x 1 month.     Due to continued  pain and bowel habit issues, a KUB was done. It showed prominent fecal burden throughout the entire large bowel consistent with clinical constipation. Since the patient didn't want to try the Linzess again, Lactulose was prescribed. This seemed to get her moving some, but not enough and her blood glucose levels were going up. Therefore, she stopped it and tried Linzess again.      Today, she reports doing well with the Linzess. She is having a loose stool most mornings. Sometimes she will have a second BM. She is able to control it and it doesn't seem to be impacting her negatively. Her bloating has resolved and pain is much better. She is pleased with the response and would like refills.     We talked about her Ultrasound findings. Offered surgical consultation but generally nothing done if asymptomatic and not signs of cholecystitis. She also reports a previous surgeon telling her there might be scar tissue which could make it difficult. She also had fatty liver on her ultrasound. We discussed risk factors for fatty liver.     Review of Systems  As per HPI.     Objective:     Physical Exam  Constitutional:       General: She is not in acute distress.     Appearance: She is well-developed.   HENT:      Head: Normocephalic and atraumatic.   Pulmonary:      Effort: Pulmonary effort is normal.   Neurological:      Mental Status: She is alert and oriented to person, place, and time.      Cranial Nerves: No cranial nerve deficit.   Psychiatric:         Behavior: Behavior normal.         Assessment:     1. Chronic idiopathic constipation    2. Calculus of gallbladder without cholecystitis without obstruction    3. Fatty liver        Plan:     Linzess 145 mcg daily refilled.   Fibroscan recommended to assess the degree of fatty liver and fibrosis.   Last LFTs were in February and ok. Recent PLT was normal.     Orders Placed This Encounter   Procedures    US Elastography Liver w/imaging       Follow up if symptoms worsen  or fail to improve.    Thank you for the opportunity to participate in the care of this patient.   Leonardo Fairchild PA-C.

## 2023-10-14 ENCOUNTER — PATIENT MESSAGE (OUTPATIENT)
Dept: INTERNAL MEDICINE | Facility: CLINIC | Age: 73
End: 2023-10-14
Payer: MEDICARE

## 2023-10-16 ENCOUNTER — PATIENT MESSAGE (OUTPATIENT)
Dept: CARDIOLOGY | Facility: CLINIC | Age: 73
End: 2023-10-16
Payer: MEDICARE

## 2023-10-16 ENCOUNTER — PATIENT MESSAGE (OUTPATIENT)
Dept: GASTROENTEROLOGY | Facility: CLINIC | Age: 73
End: 2023-10-16
Payer: MEDICARE

## 2023-10-16 ENCOUNTER — TELEPHONE (OUTPATIENT)
Dept: HEPATOLOGY | Facility: CLINIC | Age: 73
End: 2023-10-16
Payer: MEDICARE

## 2023-10-16 NOTE — TELEPHONE ENCOUNTER
----- Message from Marlene Gale RN sent at 10/16/2023 10:20 AM CDT -----  Regarding: FW: Reschedule Fibroscan    ----- Message -----  From: Gisselle Sargent MA  Sent: 10/16/2023  10:13 AM CDT  To: [facility] Clinical Support Staff  Subject: Reschedule Fibroscan                             Patient cancelled appointment for today.  Please contact patient to reschedule appointment.   Thanks

## 2023-10-16 NOTE — TELEPHONE ENCOUNTER
Spoke with patient and scheduled fibroscan for Tuesday, 10/24/2023 at 9:30 am. Patient is aware that this test is performed at [facility] on the 4th floor. Patient has been educated on fasting 4 hours prior to exam and that this includes any medication or water. Patient also advised to wear comfortable two-piece clothing.     Patient verbalized understanding to all and had no questions or concerns at this time.

## 2023-10-17 ENCOUNTER — PATIENT MESSAGE (OUTPATIENT)
Dept: CARDIOLOGY | Facility: HOSPITAL | Age: 73
End: 2023-10-17
Payer: MEDICARE

## 2023-10-17 ENCOUNTER — PATIENT MESSAGE (OUTPATIENT)
Dept: SPORTS MEDICINE | Facility: CLINIC | Age: 73
End: 2023-10-17
Payer: MEDICARE

## 2023-10-17 ENCOUNTER — PATIENT MESSAGE (OUTPATIENT)
Dept: PSYCHIATRY | Facility: CLINIC | Age: 73
End: 2023-10-17
Payer: MEDICARE

## 2023-10-17 ENCOUNTER — TELEPHONE (OUTPATIENT)
Dept: CARDIOLOGY | Facility: HOSPITAL | Age: 73
End: 2023-10-17
Payer: MEDICARE

## 2023-10-17 RX ORDER — HYDROXYZINE HYDROCHLORIDE 10 MG/1
10 TABLET, FILM COATED ORAL NIGHTLY
Qty: 30 TABLET | Refills: 0
Start: 2023-10-17 | End: 2024-02-22 | Stop reason: SDUPTHER

## 2023-10-17 NOTE — TELEPHONE ENCOUNTER
----- Message from Dilma Malloy sent at 10/17/2023  2:04 PM CDT -----  Contact: Christine King needs a call back at [phone], Regards to getting her stress test reschedule.    Thanks  Td

## 2023-10-17 NOTE — TELEPHONE ENCOUNTER
Returned call. Patient rescheduled due to will be house sitting for son over Thanksgiving week. Patient resched to 11/27/2023 at 1130, [facility],

## 2023-10-17 NOTE — TELEPHONE ENCOUNTER
I called the patient regarding her message today about insomnia.  She reports taking temazepam 30 mg nightly but only getting a couple of hours of sleep.  She indicates that the headache with hydroxyzine was tolerable, and she wonders about taking hydroxyzine 10 mg along with temazepam.  This is acceptable, and she will watch for additive excessive sedation, unsteadiness, coordination difficulties, and other.  She reports that she has 10 mg available at home but will message for a prescription to be sent to [facility] mail order if she is mistaken in this regard.

## 2023-10-17 NOTE — TELEPHONE ENCOUNTER
----- Message from Naomi Zayas sent at 10/17/2023 12:15 PM CDT -----  Contact: Christine Bhandari is returning call to schedule nuclear stress test, please call to schedule at [phone]

## 2023-10-18 ENCOUNTER — TELEPHONE (OUTPATIENT)
Dept: SPORTS MEDICINE | Facility: CLINIC | Age: 73
End: 2023-10-18
Payer: MEDICARE

## 2023-10-18 DIAGNOSIS — M17.0 PRIMARY OSTEOARTHRITIS OF BOTH KNEES: Primary | ICD-10-CM

## 2023-10-18 NOTE — TELEPHONE ENCOUNTER
Called patient and scheduled for bilateral Zilretta at first of month in November and then scheduled for hip injection on November 17.  Patient verbalized understanding of appt dates, time and location and understands that the appts can be seen in MyChart.

## 2023-10-20 ENCOUNTER — PATIENT MESSAGE (OUTPATIENT)
Dept: PSYCHIATRY | Facility: CLINIC | Age: 73
End: 2023-10-20
Payer: MEDICARE

## 2023-10-20 ENCOUNTER — PATIENT MESSAGE (OUTPATIENT)
Dept: NEUROLOGY | Facility: CLINIC | Age: 73
End: 2023-10-20

## 2023-10-20 ENCOUNTER — OFFICE VISIT (OUTPATIENT)
Dept: NEUROLOGY | Facility: CLINIC | Age: 73
End: 2023-10-20
Payer: MEDICARE

## 2023-10-20 DIAGNOSIS — G43.809 VESTIBULAR MIGRAINE: ICD-10-CM

## 2023-10-20 DIAGNOSIS — G47.00 INSOMNIA, UNSPECIFIED TYPE: ICD-10-CM

## 2023-10-20 DIAGNOSIS — F09 MILD COGNITIVE DISORDER: ICD-10-CM

## 2023-10-20 DIAGNOSIS — G54.2 CERVICAL NEUROPATHY: ICD-10-CM

## 2023-10-20 DIAGNOSIS — G43.101 MIGRAINE WITH AURA AND WITH STATUS MIGRAINOSUS, NOT INTRACTABLE: ICD-10-CM

## 2023-10-20 DIAGNOSIS — F33.41 RECURRENT MAJOR DEPRESSIVE DISORDER, IN PARTIAL REMISSION: ICD-10-CM

## 2023-10-20 DIAGNOSIS — R42 DIZZY: ICD-10-CM

## 2023-10-20 DIAGNOSIS — F43.29 ADJUSTMENT DISORDER WITH OTHER SYMPTOMS: ICD-10-CM

## 2023-10-20 DIAGNOSIS — E66.01 MORBID OBESITY WITH BMI OF 40.0-44.9, ADULT: ICD-10-CM

## 2023-10-20 DIAGNOSIS — F41.9 ANXIETY DISORDER, UNSPECIFIED TYPE: ICD-10-CM

## 2023-10-20 DIAGNOSIS — H81.4 VERTIGO OF CENTRAL ORIGIN: ICD-10-CM

## 2023-10-20 DIAGNOSIS — F32.A DEPRESSION, UNSPECIFIED DEPRESSION TYPE: ICD-10-CM

## 2023-10-20 DIAGNOSIS — G56.00 CARPAL TUNNEL SYNDROME, UNSPECIFIED LATERALITY: ICD-10-CM

## 2023-10-20 DIAGNOSIS — I48.0 PAROXYSMAL ATRIAL FIBRILLATION: ICD-10-CM

## 2023-10-20 DIAGNOSIS — Z74.8 ASSISTANCE WITH TRANSPORTATION: ICD-10-CM

## 2023-10-20 DIAGNOSIS — R26.81 UNSTEADINESS ON FEET: ICD-10-CM

## 2023-10-20 DIAGNOSIS — Z86.69 HISTORY OF OBSTRUCTIVE SLEEP APNEA: ICD-10-CM

## 2023-10-20 DIAGNOSIS — H93.19 TINNITUS, UNSPECIFIED LATERALITY: ICD-10-CM

## 2023-10-20 DIAGNOSIS — F33.1 DEPRESSION, MAJOR, RECURRENT, MODERATE: ICD-10-CM

## 2023-10-20 DIAGNOSIS — R29.90 MULTIPLE NEUROLOGICAL SYMPTOMS: Primary | ICD-10-CM

## 2023-10-20 DIAGNOSIS — R42 DIZZINESS AND GIDDINESS: ICD-10-CM

## 2023-10-20 DIAGNOSIS — F33.42 RECURRENT MAJOR DEPRESSIVE DISORDER, IN FULL REMISSION: ICD-10-CM

## 2023-10-20 DIAGNOSIS — H91.92 HEARING LOSS OF LEFT EAR, UNSPECIFIED HEARING LOSS TYPE: ICD-10-CM

## 2023-10-20 PROCEDURE — 3066F NEPHROPATHY DOC TX: CPT | Mod: CPTII,95,, | Performed by: NURSE PRACTITIONER

## 2023-10-20 PROCEDURE — 1160F RVW MEDS BY RX/DR IN RCRD: CPT | Mod: CPTII,95,, | Performed by: NURSE PRACTITIONER

## 2023-10-20 PROCEDURE — 1159F PR MEDICATION LIST DOCUMENTED IN MEDICAL RECORD: ICD-10-PCS | Mod: CPTII,95,, | Performed by: NURSE PRACTITIONER

## 2023-10-20 PROCEDURE — 3044F PR MOST RECENT HEMOGLOBIN A1C LEVEL <7.0%: ICD-10-PCS | Mod: CPTII,95,, | Performed by: NURSE PRACTITIONER

## 2023-10-20 PROCEDURE — 3060F PR POS MICROALBUMINURIA RESULT DOCUMENTED/REVIEW: ICD-10-PCS | Mod: CPTII,95,, | Performed by: NURSE PRACTITIONER

## 2023-10-20 PROCEDURE — 4010F ACE/ARB THERAPY RXD/TAKEN: CPT | Mod: CPTII,95,, | Performed by: NURSE PRACTITIONER

## 2023-10-20 PROCEDURE — 4010F PR ACE/ARB THEARPY RXD/TAKEN: ICD-10-PCS | Mod: CPTII,95,, | Performed by: NURSE PRACTITIONER

## 2023-10-20 PROCEDURE — 3044F HG A1C LEVEL LT 7.0%: CPT | Mod: CPTII,95,, | Performed by: NURSE PRACTITIONER

## 2023-10-20 PROCEDURE — 3060F POS MICROALBUMINURIA REV: CPT | Mod: CPTII,95,, | Performed by: NURSE PRACTITIONER

## 2023-10-20 PROCEDURE — 3066F PR DOCUMENTATION OF TREATMENT FOR NEPHROPATHY: ICD-10-PCS | Mod: CPTII,95,, | Performed by: NURSE PRACTITIONER

## 2023-10-20 PROCEDURE — 1160F PR REVIEW ALL MEDS BY PRESCRIBER/CLIN PHARMACIST DOCUMENTED: ICD-10-PCS | Mod: CPTII,95,, | Performed by: NURSE PRACTITIONER

## 2023-10-20 PROCEDURE — 1159F MED LIST DOCD IN RCRD: CPT | Mod: CPTII,95,, | Performed by: NURSE PRACTITIONER

## 2023-10-20 PROCEDURE — 99215 OFFICE O/P EST HI 40 MIN: CPT | Mod: 95,,, | Performed by: NURSE PRACTITIONER

## 2023-10-20 PROCEDURE — 99215 PR OFFICE/OUTPT VISIT, EST, LEVL V, 40-54 MIN: ICD-10-PCS | Mod: 95,,, | Performed by: NURSE PRACTITIONER

## 2023-10-20 NOTE — PROGRESS NOTES
"Subjective:      Patient ID: Christine Jo is a 73 y.o. female.    Chief Complaint:  Multiple neurological symptoms           HPI    BACKGROUND    Former patient of Dr. Haney, new to me.    Patient presents to appointment unaccompanied to discuss dizziness and memory concerns. Her medical history includes depression, anxiety, hearing loss in left ear, PAF, HTN, CAD, frequent UTIs, type 2 DM, GERD, osteoporosis, and MATIAS. Patient states around 2020 she went swimming underwater in the ocean and developed dizziness and ringing in her left ear. She states over time, the ringing has improved, but the dizziness has worsened. She describes her dizziness as "room spinning". She states she had an episode in both January and February 2022 where she was watching a movie with surround sound, which caused her to develop severe dizziness with nausea and vomiting. She states she felt "drunk". Since february, she has had multiple dizzy spells. She states the spells have become daily over the last week. She states the spells typically last around 5 hours with nausea and stomach pains. She states she had one spell that lasted a day. She has called EMS twice in the past because of the spells. Denies lateralized weakness, slurred speech, sudden vision loss, or facial droop with spells. She states prior to the spells, she will have intense sweating. She states smells and turning her head quickly will cause her to become dizzy. States she has trouble hearing out of her left ear. She states Meclizine and sleeping help to improve her symptoms. States she has looked up vestibular exercises online, and they cause her to become dizzy and her neck often cracks. She states her head feels huge and feels like it is "stuffed with cotton" in the frontal region. She also has an "arthitis" pain that radiates from the back of her head down her spine. Also complains of pain behind eyes. She states the head pain has been constant and daily since " "January 2022 and worsens during her dizzy spells. Denies double vision. Denies aura. States she hears "zapping" inside her head. States she has sensitivity to light and sound. She states she began losing her balance over the last year. In March 2022, she began leaning forward and from side to side without realizing it. She loses her balance frequently and had a fall on 6-9-2022 and could not get off the floor for 2 hours. States she also had an incontinent episode on herself. States when she walks, her left foot turns towards the left side. Ambulates with a cane.  She experiences significant distress from her symptoms and is to afraid to go places. Denies history of TBI or storke. Did not receive the Ajovy prescription Dr. Haney ordered. Afraid to take Lodine because she is on blood thinners for her A. Fib. 10-4-2021 CTH shows chronic microvascular ischemic changes. 2- Abnormal VNG. Results are indicative of a left peripheral vestibulopathy, as evidenced by a 50% left Unilateral Weakness. Patient states that her dizziness and headaches have improved since she saw me in June. Since our last appointment, she has moved in with her son. States she has lightheadedness when going from a sit to stand position. Her hydralazine dose was lowered by cardiology. Has dizziness for 5 seconds when putting her head down on pillow at night. No longer having dizzy spells lasting for hours with nausea and vomiting. States she has a headache about 1-2 times a month that last up to 3 days. Describes her headaches as squishing/hammering. Has sensitivity to noise and smells with headaches. Feels like her headaches "drain" her. Also has aura of flashing lights in her peripheral vision. States she will have aura without headache at times. Did not start Aimovig or Nurtec PRN. 06- EEG NL. 7- MRI brain shows no acute findings.  Mild atrophy with white matter degeneration. MRV brain shows no acute abnormality.  Small " "hypoplastic right transverse/sigmoid sinus.  Otherwise negative. 7- CTA head and neck shows no evidence of flow-limiting stenosis within the cervical arterial vasculature. No evidence of flow-limiting stenosis within the intracranial arterial vasculature. No aneurysm.    Patient states she noticed short term memory trouble around 2017 that is progressively getting worst. She experiences word finding difficulties and forgetting conversations. At times, she will feel nervous and rushed, causing her to have difficulty processing things. Often has "brain fog". She does not drive. She lives alone at this time but plans to move in with her son. Independent in ADLs and keeps up with her medications and appointments. Has trouble sleeping at night and has decreased appetite. Takes Cymbalta for anxiety and depression. Use to see Dr. Albrecht with psych but not interesting in seeing psych at this time. No history of hypothyroidism. Her mother was diagnosed with dementia in her early 80s. She states she had neuropsych testing completed [place] [facility] [place]. Continues to have trouble with her memory. Has not had neuropsych appointment. 7- MRI brain shows no acute findings.  Mild atrophy with white matter degeneration.    Patient states she has history of left sided bells palsy in her 20s. States symptoms resolved.     Patient states she has a history of CTS with CTS release surgery. She states numbness in bilateral hands is reemerging along with weakness.      Interval History 10-: Patient presents to follow up appointment unaccompanied. Patient states her dizziness and headaches have improved. Her dizziness continues to occur when laying head down and lightheadedness when going from a sitting to standing position. BP 98/61 today. Taking benazepril 40 mg BID, coreg 25 mg BID, and hydralazine 100 mg BID for HTN. Having mild daily headaches and severe headaches about twice a month. Was unable to tolerate Lamictal. " Nurtec PRN helps to abort severe headaches.    Continues to have trouble with her memory. Believes it is related to depression. Also having insomnia. Denies suicidal ideations. Interested in psych referral. 4- [facility] Neuropsychology testing results are generally within normal limits. Short term memory is normal. Only atypicaly finds is mild slowing in processing speed and attention. This is likely related to MATIAS and HTN/diabetesMRI Brain and MRV Brain 7- unremarkable.      INTERVAL NOTE 10- Patient is present for follow up for multiple complaints.    Migraines well managed with Emgality 120 mg SQ monthly doing tremendously well.     Nurtec PRN works very well, requested refills.     Patient willing to start Vestibular therapy in new year (2024), there is an issue with transportation at this time. Will consult SS to assist with any available resources      Cognitive complaint is ongoing but no drastic decline in cognitive function noted. She reports increased anxiety and depression.  Patient is managed by Psychiatry, Dr. Babin. Patient reprots her anxiety is uncontrolled and she needs to talk with someone. No thoughts of self harm. Will refer to psychologist.  No recent falls noted.     The originating site (patient location) is: Home.        The distant site (neurologist location) is: [facility] at [facility].        The chief complaint leading to consultation is: F/U Multiple Neurological Complaints         Visit type: Virtual visit with synchronous audio and video.        Total time spent with patient:         Special circumstances: This visit occurred during COVID-19 Pandemic Public Health Emergency.         Consent: The patient verbally consented to participating in the video visit and informed that may decline to receive medical services by telemedicine and may withdraw from such care at any time.        I discussed with the patient the nature of our telemedicine visits,  that:        I  would evaluate the patient and recommend diagnostics and treatments based on my assessment.     Our sessions are not being recorded and that personal health information is protected.     Our team would provide follow up care in person if/when the patient needs it.     Virtual (video/telemedicine) visits have significant limitations. A telemedicine exam is primarily focused on the history and what I can observe. Several critical parts of the neurological exam cannot be performed.       Review of Systems   Constitutional:  Positive for appetite change (decrease) and fatigue.   HENT:  Positive for hearing loss and tinnitus. Negative for drooling, trouble swallowing and voice change.    Eyes:  Positive for visual disturbance. Negative for photophobia.   Cardiovascular:  Negative for palpitations.   Gastrointestinal:  Positive for nausea and vomiting. Negative for constipation and diarrhea.   Genitourinary:  Negative for difficulty urinating.        Urinary incontinence    Musculoskeletal:  Positive for arthralgias, back pain, gait problem and neck pain.   Neurological:  Positive for dizziness, weakness, light-headedness and headaches. Negative for tremors, seizures, syncope, facial asymmetry, speech difficulty and numbness.   Psychiatric/Behavioral:  Positive for decreased concentration, dysphoric mood and sleep disturbance. Negative for behavioral problems, confusion, hallucinations, self-injury and suicidal ideas. The patient is nervous/anxious.      Objective:   VITAL SIGNS WERE REVIEWED        GENERAL APPEARANCE:      The patient looks comfortable.     BMI 44.9     No signs of respiratory distress.     Normal breathing pattern.     No dysmorphic features     Normal eye contact.      GENERAL MEDICAL EXAM:     HEENT:  Head is atraumatic normocephalic.      Neck and Axillae: No JVD. No visible lesions. No thyromegaly. No lymphadenopathy.     Cardiopulmonary: No cyanosis. No tachypnea. Normal respiratory  effort.     Gastrointestinal/Urogenital:  No jaundice. No stomas or lesions. No visible hernias. No catheters.     Skin, Hair and Nails:  No neurofibromatosis. No visible lesions.No stigmata of autoimmune disease. No clubbing.     Skin is warm and moist. No palpable masses.     Limbs: No varicose veins. No visible swelling. No palpable edema.     Muskoskeletal: No visible deformities.No visible lesions.     No spine tenderness. No signs of longstanding neuropathy. No dislocations or fractures.          Neurologic Exam     Mental Status   Oriented to person, place, and time.   Follows 3 step commands.   Attention: normal. Concentration: normal.   Speech: speech is normal   Level of consciousness: alert  Knowledge: good. Able to perform simple calculations.   Able to name object. Able to read. Unable to repeat. Able to write. Normal comprehension.          Cranial Nerves     CN II   Visual fields full to confrontation.   Visual acuity: normal  Right visual field deficit: none  Left visual field deficit: none     CN III, IV, VI   Pupils are equal, round, and reactive to light.  Extraocular motions are normal.   Right pupil: Shape: regular. Reactivity: brisk. Consensual response: intact. Accommodation: intact.   Left pupil: Shape: regular. Reactivity: brisk. Consensual response: intact. Accommodation: intact.   CN III: no CN III palsy  CN VI: no CN VI palsy  Nystagmus: none   Diplopia: none  Ophthalmoparesis: none  Upgaze: normal  Downgaze: normal  Conjugate gaze: present  Vestibulo-ocular reflex: present    CN V   Right facial sensation deficit: complete  Left facial sensation deficit: none    CN VII   Facial expression full, symmetric.   Right facial weakness: none  Left facial weakness: none    CN VIII   Hearing: impaired    CN IX, X   CN IX normal.   CN X normal.   Palate: symmetric    CN XI   CN XI normal.   Right sternocleidomastoid strength: normal  Left sternocleidomastoid strength: normal  Right trapezius  strength: normal  Left trapezius strength: normal    CN XII   CN XII normal.   Tongue: not atrophic  Fasciculations: absent  Tongue deviation: none    Motor Exam   Muscle bulk: normal  Overall muscle tone: normal  Right arm tone: normal  Left arm tone: normal  Right arm pronator drift: absent  Left arm pronator drift: absent  Right leg tone: normal  Left leg tone: normal        MAEW     Sensory Exam       MARIANO      Gait, Coordination, and Reflexes     Gait  Gait: non-neurologic    Coordination   Romberg: negative  Finger to nose coordination: normal  Heel to shin coordination: normal    Reflexes   Right plantar: normal  Left plantar: normal  Right Shore: absent  Left Shore: absent  Right ankle clonus: absent  Left ankle clonus: absent  Right pendular knee jerk: absent  Left pendular knee jerk: absent    MARIANO          MARIPOSA COGNITIVE ASSESSMENT (MOCA) TOTAL SCORE    NORMAL-MILD NCD 26-30    MILD DEMENTIA 20-25 6-9-2022 MOCA 24/30    Barriers MDD/DALLAS     DATE 06-       TOTAL SCORE 24/30       VISUOSPATIAL EXECUTIVE (5) 4       NAMING (3) 3       ATTENTION (6) 6       LANGUAGE (3) 1       ABSTRACTION(2) 2       RECALL (5) 2/5 (3 with cues) 2       ORIENTATION (6) 6             Lab Results   Component Value Date    WBC 9.11 08/15/2023    HGB 11.4 (L) 08/15/2023    HCT 35.1 (L) 08/15/2023    MCV 96 08/15/2023     08/15/2023   1C  Sodium   Date Value Ref Range Status   02/07/2023 137 136 - 145 mmol/L Final     Potassium   Date Value Ref Range Status   02/07/2023 4.5 3.5 - 5.1 mmol/L Final     Chloride   Date Value Ref Range Status   02/07/2023 101 95 - 110 mmol/L Final     CO2   Date Value Ref Range Status   02/07/2023 24 23 - 29 mmol/L Final     Glucose   Date Value Ref Range Status   02/07/2023 174 (H) 70 - 110 mg/dL Final     BUN   Date Value Ref Range Status   02/07/2023 20 8 - 23 mg/dL Final     Creatinine   Date Value Ref Range Status   02/07/2023 0.9 0.5 - 1.4 mg/dL Final     Calcium   Date  Value Ref Range Status   02/07/2023 9.5 8.7 - 10.5 mg/dL Final     Total Protein   Date Value Ref Range Status   02/07/2023 6.7 6.0 - 8.4 g/dL Final     Albumin   Date Value Ref Range Status   02/07/2023 3.7 3.5 - 5.2 g/dL Final     Total Bilirubin   Date Value Ref Range Status   02/07/2023 0.5 0.1 - 1.0 mg/dL Final     Comment:     For infants and newborns, interpretation of results should be based  on gestational age, weight and in agreement with clinical  observations.    Premature Infant recommended reference ranges:  Up to 24 hours.............<8.0 mg/dL  Up to 48 hours............<12.0 mg/dL  3-5 days..................<15.0 mg/dL  6-29 days.................<15.0 mg/dL       Alkaline Phosphatase   Date Value Ref Range Status   02/07/2023 47 (L) 55 - 135 U/L Final     AST   Date Value Ref Range Status   02/07/2023 16 10 - 40 U/L Final     ALT   Date Value Ref Range Status   09/28/2023 17 10 - 44 U/L Final     Anion Gap   Date Value Ref Range Status   02/07/2023 12 8 - 16 mmol/L Final     eGFR if    Date Value Ref Range Status   07/27/2022 >60 >60 mL/min/1.73 m^2 Final     eGFR if non    Date Value Ref Range Status   07/27/2022 >60 >60 mL/min/1.73 m^2 Final     Comment:     Calculation used to obtain the estimated glomerular filtration  rate (eGFR) is the CKD-EPI equation.        Lab Results   Component Value Date    [id number] 309 02/22/2023      Lab Results   Component Value Date    TSH 1.024 10/04/2022     Lab Results   Component Value Date    HGBA1C 6.8 (H) 09/28/2023 4- [facility] [place]    Neuropsychology testing results are generally within normal limits. Short term memory is normal. Only atypicaly finds is mild slowing in processing speed and attention. This is likely related to MATIAS and HTN/diabetes    10-     MRI IAC/Temporal Bones no significant MRI abnormality of the internal auditory canals or cerebellopontine angles.  2. Minimal nonspecific white matter T2  alteration that likely relates to chronic microvascular ischemia.     10-4-2021      CTH shows chronic microvascular ischemic changes      2-     Abnormal VNG. Results are indicative of a left peripheral vestibulopathy, as evidenced by a 50% left Unilateral Weakness.         06-     EEG NL      7-    MRI brain shows no acute findings.  Mild atrophy with white matter degeneration.    MRV brain shows no acute abnormality.  Small hypoplastic right transverse/sigmoid sinus.  Otherwise negative.      7-    CTA head and neck shows no evidence of flow-limiting stenosis within the cervical arterial vasculature. No evidence of flow-limiting stenosis within the intracranial arterial vasculature. No aneurysm.        Reviewed the neuroimaging independently      Assessment:      1. Multiple neurological symptoms    2. Vestibular migraine    3. Migraine with aura and with status migrainosus, not intractable    4. Depression, unspecified depression type    5. Dizziness and giddiness    6. Vertigo of central origin     7. Mild cognitive disorder    8. Dizzy    9. Depression, major, recurrent, moderate    10. Assistance with transportation    11. Adjustment disorder with other symptoms    12. Anxiety disorder, unspecified type    13. Hearing loss of left ear, unspecified hearing loss type    14. Tinnitus, unspecified laterality    15. Unsteadiness on feet     16. Cervical neuropathy    17. Recurrent major depressive disorder, in partial remission    18. Paroxysmal atrial fibrillation    19. Recurrent major depressive disorder, in full remission    20. Morbid obesity with BMI of 40.0-44.9, adult    21. Insomnia, unspecified type    22. History of obstructive sleep apnea    23. Carpal tunnel syndrome, unspecified laterality               Plan:      MULTIPLE NEUROLOGICAL SYMPTOMS      MIGRAINE WITH AURA/ POSSIBLE VESTIBULAR MIGRAINE     Continue Emgality 120 mg SQ monthly     Continue Nurtec 75 mg po  PRN    Propanolol contraindicated related multiple medications for HTN    Topamax contraindicated related to memory difficulites    Amitriptyline contraindicated related to history of CAD    Stopped Lamictal related to adverse effects    States Ajovy not covered by insurance      Refer for Vestibular therapy to start in January 2024     Consult Social service for community resources for Transportation issues to appt. And therapy.     Vestibular therapy in new year (2024), there is an issue with transportation at this time. Will consult SS to assist with any available resources      Refer to Psychology for therapy for DALLAS/MDD    Continue Psychiatry management with MAGALYS CARIAS    Instructed patient to take daily BP and keep BP log- discuss BP with cardiology    Avoid standing for a long time    Slow transition from lying down to sitting to standing    Cross legs while standing    Increase fluid intake    Balanced salt intake     Adjust hypotensive agents     Wearing thigh high stockings     Falling down precautions    Avoid triggers           RICK/MEMROY DIFFICULTIES/ANXIETY/DEPRESSION    Psych referral placed    Recommend optimization of DALLAS/MDD first     Check MOCA next visit     Will consider neuro psych referral if memory trouble continue depsite depression management    Memory Strategies     Internal Memory Strategies:   PREPARE yourself to pay attention   REHEARSAL: Mentally repeat information over and over.   VISUALIZATION: Make a mental picture of information.   ASSOCIATION: Mentally associate new information with something familiar.   REGROUPING: Organize long lists of information by category.   FIRST LETTER MNEMONICS:   LISTEN FOR THE MAIN IDEA: who, what, when, where, why, how, what's next   CHUNKING: Group digits of numbers into chunks (ex. [id number])    Break sentences and instructions down by ideas. (ex. Physical    therapy / will be at 3:00 / instead of 1:30 / because I have a     meeting).     CHAINING: Recall 3-4 words by linking them together in a sentence.    External Memory Strategies:   NOTETAKING:   TAPE RECORDING: Can use to record someone giving length information such     as a lecture, verbal instructions, or directions.   MEMORY BOOK / DAY PLANNER: To record up-coming appointments and    events.   WATCH ALARMS: Use as a reminder to take medications or check daily    Schedule.      CTS    Consider EMG/NCT in future          MEDICAL/SURGICAL COMORBIDITIES      All relevant medical comorbidities noted and managed by primary care physician and medical care team.          MISCELLANEOUS MEDICAL PROBLEMS         HEALTHY LIFESTYLE AND PREVENTATIVE CARE     Encouraged the patient to adhere to the age-appropriate health maintenance guidelines including screening tests and vaccinations.      Discussed the overall importance of healthy lifestyle, optimal weight, exercise, healthy diet, good sleep hygiene and avoiding drugs including smoking, alcohol and recreational drugs. The patient verbalized full understanding.         Advised the patient to follow COVID-19 prevention measures.         I spent a total of 40 minutes on the day of the visit.    This includes face to face time (25 minutes) and non-face to face time (44 minutes) preparing to see the patient (eg, review of tests), obtaining and/or reviewing separately obtained history, documenting clinical information in the electronic or other health record, independently interpreting results and communicating results to the patient/family/caregiver, or care coordinator.            RTC    Follow up in about 6 months (around 4/20/2024).        Chapincito English, MSN NP      Collaborating Provider: Kimberly Sapp MD, FAAN Neurologist/Epileptologist

## 2023-10-23 ENCOUNTER — PATIENT MESSAGE (OUTPATIENT)
Dept: PSYCHIATRY | Facility: CLINIC | Age: 73
End: 2023-10-23
Payer: MEDICARE

## 2023-10-23 ENCOUNTER — PATIENT MESSAGE (OUTPATIENT)
Dept: PALLIATIVE MEDICINE | Facility: HOSPITAL | Age: 73
End: 2023-10-23
Payer: MEDICARE

## 2023-10-23 DIAGNOSIS — F41.9 ANXIETY DISORDER, UNSPECIFIED TYPE: ICD-10-CM

## 2023-10-23 DIAGNOSIS — F33.1 DEPRESSION, MAJOR, RECURRENT, MODERATE: Primary | ICD-10-CM

## 2023-10-23 RX ORDER — DULOXETIN HYDROCHLORIDE 60 MG/1
60 CAPSULE, DELAYED RELEASE ORAL DAILY
Qty: 90 CAPSULE | Refills: 0 | Status: SHIPPED | OUTPATIENT
Start: 2023-10-23 | End: 2024-01-14 | Stop reason: SDUPTHER

## 2023-10-24 ENCOUNTER — PROCEDURE VISIT (OUTPATIENT)
Dept: HEPATOLOGY | Facility: CLINIC | Age: 73
End: 2023-10-24
Attending: PHYSICIAN ASSISTANT
Payer: MEDICARE

## 2023-10-24 ENCOUNTER — PATIENT MESSAGE (OUTPATIENT)
Dept: INTERNAL MEDICINE | Facility: CLINIC | Age: 73
End: 2023-10-24
Payer: MEDICARE

## 2023-10-24 VITALS — BODY MASS INDEX: 45.11 KG/M2 | HEIGHT: 62 IN | WEIGHT: 245.13 LBS

## 2023-10-24 DIAGNOSIS — K76.0 FATTY LIVER: ICD-10-CM

## 2023-10-24 PROCEDURE — 76981 USE PARENCHYMA: CPT | Mod: S$GLB,,, | Performed by: NURSE PRACTITIONER

## 2023-10-24 PROCEDURE — 76981 PR US, ELASTOGRAPHY, PARENCHYMA: ICD-10-PCS | Mod: S$GLB,,, | Performed by: NURSE PRACTITIONER

## 2023-10-24 RX ORDER — NYSTATIN 100000 U/G
CREAM TOPICAL 2 TIMES DAILY
Qty: 30 G | Refills: 0 | Status: SHIPPED | OUTPATIENT
Start: 2023-10-24 | End: 2023-10-31 | Stop reason: ALTCHOICE

## 2023-10-24 NOTE — Clinical Note
Severe steatosis with moderate fibrosis   Images are not as good as I would prefer.  Would recommend follow up in 6 months for monitoring.   FIB-4 Calculation: 1.03 at 10/24/2023  1:02 PM   FIB-4 below 1.30 is considered as low-risk for advanced fibrosis FIB-4 over 2.67 is considered as high-risk for advanced fibrosis FIB-4 values between 1.30 and 2.67 are considered as intermediate-risk of advanced fibrosis for ages 36-64.   For ages > 64 the cut-off for low-risk goes to < 2. This is a screening tool and clinical judgement should be used in the interpretation of these results.

## 2023-10-24 NOTE — PROCEDURES
Fibroscan Procedure     Name: Christine Jo  Date of Procedure : 10/24/2023   :: LUCILLE Kyle  Diagnosis: NAFLD    Probe: XL    Fibroscan readin.4 KPa    Fibrosis:F2     CAP readin dB/m    Steatosis: :S3       Interpretation:   Severe steatosis with moderate fibrosis

## 2023-10-25 ENCOUNTER — TELEPHONE (OUTPATIENT)
Dept: GASTROENTEROLOGY | Facility: CLINIC | Age: 73
End: 2023-10-25
Payer: MEDICARE

## 2023-10-25 ENCOUNTER — DOCUMENTATION ONLY (OUTPATIENT)
Dept: GASTROENTEROLOGY | Facility: CLINIC | Age: 73
End: 2023-10-25
Payer: MEDICARE

## 2023-10-25 ENCOUNTER — PATIENT MESSAGE (OUTPATIENT)
Dept: CARDIOLOGY | Facility: CLINIC | Age: 73
End: 2023-10-25
Payer: MEDICARE

## 2023-10-25 ENCOUNTER — TELEPHONE (OUTPATIENT)
Dept: INTERNAL MEDICINE | Facility: CLINIC | Age: 73
End: 2023-10-25
Payer: MEDICARE

## 2023-10-25 DIAGNOSIS — K76.0 FATTY LIVER: Primary | ICD-10-CM

## 2023-10-25 RX ORDER — INSULIN GLARGINE 100 [IU]/ML
72 INJECTION, SOLUTION SUBCUTANEOUS NIGHTLY
Qty: 75 ML | Refills: 0 | Status: SHIPPED | OUTPATIENT
Start: 2023-10-25 | End: 2024-01-24 | Stop reason: SDUPTHER

## 2023-10-25 NOTE — TELEPHONE ENCOUNTER
Called pt and informed of fibroscan results.  The pt stated she understood and informed her someone would contact her before 6 months to schedule her repeat Fibroscan.

## 2023-10-25 NOTE — PROGRESS NOTES
Please let patient know that her Fibroscan showed severe fatty liver with moderate scarring (fibrosis), but the images weren't great. Hepatology team recommends repeating the Fibroscan in six months. I have put an order in.    Leonardo Fairchild PA-C.

## 2023-10-26 ENCOUNTER — PATIENT MESSAGE (OUTPATIENT)
Dept: GASTROENTEROLOGY | Facility: CLINIC | Age: 73
End: 2023-10-26
Payer: MEDICARE

## 2023-10-30 ENCOUNTER — PATIENT MESSAGE (OUTPATIENT)
Dept: SPORTS MEDICINE | Facility: CLINIC | Age: 73
End: 2023-10-30
Payer: MEDICARE

## 2023-10-31 ENCOUNTER — OFFICE VISIT (OUTPATIENT)
Dept: CARDIOLOGY | Facility: CLINIC | Age: 73
End: 2023-10-31
Payer: MEDICARE

## 2023-10-31 VITALS
BODY MASS INDEX: 44.83 KG/M2 | WEIGHT: 243.63 LBS | OXYGEN SATURATION: 99 % | HEART RATE: 83 BPM | HEIGHT: 62 IN | DIASTOLIC BLOOD PRESSURE: 50 MMHG | SYSTOLIC BLOOD PRESSURE: 100 MMHG

## 2023-10-31 DIAGNOSIS — E11.51 CONTROLLED TYPE 2 DIABETES MELLITUS WITH DIABETIC PERIPHERAL ANGIOPATHY WITHOUT GANGRENE, WITH LONG-TERM CURRENT USE OF INSULIN: ICD-10-CM

## 2023-10-31 DIAGNOSIS — M79.605 PAIN IN BOTH LOWER EXTREMITIES: ICD-10-CM

## 2023-10-31 DIAGNOSIS — M79.604 PAIN IN BOTH LOWER EXTREMITIES: ICD-10-CM

## 2023-10-31 DIAGNOSIS — R06.09 DOE (DYSPNEA ON EXERTION): ICD-10-CM

## 2023-10-31 DIAGNOSIS — E78.1 HYPERTRIGLYCERIDEMIA: ICD-10-CM

## 2023-10-31 DIAGNOSIS — R42 DIZZINESS: Primary | ICD-10-CM

## 2023-10-31 DIAGNOSIS — I25.118 CORONARY ARTERY DISEASE OF NATIVE ARTERY OF NATIVE HEART WITH STABLE ANGINA PECTORIS: ICD-10-CM

## 2023-10-31 DIAGNOSIS — I10 ESSENTIAL HYPERTENSION: ICD-10-CM

## 2023-10-31 DIAGNOSIS — R94.31 ABNORMAL ECG: ICD-10-CM

## 2023-10-31 DIAGNOSIS — E78.2 MIXED HYPERLIPIDEMIA: ICD-10-CM

## 2023-10-31 DIAGNOSIS — Z79.4 CONTROLLED TYPE 2 DIABETES MELLITUS WITH DIABETIC PERIPHERAL ANGIOPATHY WITHOUT GANGRENE, WITH LONG-TERM CURRENT USE OF INSULIN: ICD-10-CM

## 2023-10-31 DIAGNOSIS — I71.40 ABDOMINAL AORTIC ANEURYSM (AAA) WITHOUT RUPTURE, UNSPECIFIED PART: ICD-10-CM

## 2023-10-31 DIAGNOSIS — E66.01 CLASS 3 SEVERE OBESITY DUE TO EXCESS CALORIES WITH SERIOUS COMORBIDITY AND BODY MASS INDEX (BMI) OF 45.0 TO 49.9 IN ADULT: ICD-10-CM

## 2023-10-31 DIAGNOSIS — R00.2 PALPITATIONS: ICD-10-CM

## 2023-10-31 DIAGNOSIS — E11.65 UNCONTROLLED TYPE 2 DIABETES MELLITUS WITH HYPERGLYCEMIA: ICD-10-CM

## 2023-10-31 DIAGNOSIS — G47.30 SLEEP APNEA, UNSPECIFIED TYPE: ICD-10-CM

## 2023-10-31 DIAGNOSIS — I48.0 PAROXYSMAL ATRIAL FIBRILLATION: ICD-10-CM

## 2023-10-31 PROCEDURE — 3078F DIAST BP <80 MM HG: CPT | Mod: CPTII,S$GLB,, | Performed by: INTERNAL MEDICINE

## 2023-10-31 PROCEDURE — 3060F POS MICROALBUMINURIA REV: CPT | Mod: CPTII,S$GLB,, | Performed by: INTERNAL MEDICINE

## 2023-10-31 PROCEDURE — 3288F FALL RISK ASSESSMENT DOCD: CPT | Mod: CPTII,S$GLB,, | Performed by: INTERNAL MEDICINE

## 2023-10-31 PROCEDURE — 99999 PR PBB SHADOW E&M-EST. PATIENT-LVL IV: CPT | Mod: PBBFAC,,, | Performed by: INTERNAL MEDICINE

## 2023-10-31 PROCEDURE — 3044F HG A1C LEVEL LT 7.0%: CPT | Mod: CPTII,S$GLB,, | Performed by: INTERNAL MEDICINE

## 2023-10-31 PROCEDURE — 3044F PR MOST RECENT HEMOGLOBIN A1C LEVEL <7.0%: ICD-10-PCS | Mod: CPTII,S$GLB,, | Performed by: INTERNAL MEDICINE

## 2023-10-31 PROCEDURE — 4010F ACE/ARB THERAPY RXD/TAKEN: CPT | Mod: CPTII,S$GLB,, | Performed by: INTERNAL MEDICINE

## 2023-10-31 PROCEDURE — 1101F PR PT FALLS ASSESS DOC 0-1 FALLS W/OUT INJ PAST YR: ICD-10-PCS | Mod: CPTII,S$GLB,, | Performed by: INTERNAL MEDICINE

## 2023-10-31 PROCEDURE — 1159F MED LIST DOCD IN RCRD: CPT | Mod: CPTII,S$GLB,, | Performed by: INTERNAL MEDICINE

## 2023-10-31 PROCEDURE — 99999 PR PBB SHADOW E&M-EST. PATIENT-LVL IV: ICD-10-PCS | Mod: PBBFAC,,, | Performed by: INTERNAL MEDICINE

## 2023-10-31 PROCEDURE — 3066F PR DOCUMENTATION OF TREATMENT FOR NEPHROPATHY: ICD-10-PCS | Mod: CPTII,S$GLB,, | Performed by: INTERNAL MEDICINE

## 2023-10-31 PROCEDURE — 3008F PR BODY MASS INDEX (BMI) DOCUMENTED: ICD-10-PCS | Mod: CPTII,S$GLB,, | Performed by: INTERNAL MEDICINE

## 2023-10-31 PROCEDURE — 99214 OFFICE O/P EST MOD 30 MIN: CPT | Mod: S$GLB,,, | Performed by: INTERNAL MEDICINE

## 2023-10-31 PROCEDURE — 4010F PR ACE/ARB THEARPY RXD/TAKEN: ICD-10-PCS | Mod: CPTII,S$GLB,, | Performed by: INTERNAL MEDICINE

## 2023-10-31 PROCEDURE — 1125F PR PAIN SEVERITY QUANTIFIED, PAIN PRESENT: ICD-10-PCS | Mod: CPTII,S$GLB,, | Performed by: INTERNAL MEDICINE

## 2023-10-31 PROCEDURE — 3288F PR FALLS RISK ASSESSMENT DOCUMENTED: ICD-10-PCS | Mod: CPTII,S$GLB,, | Performed by: INTERNAL MEDICINE

## 2023-10-31 PROCEDURE — 1125F AMNT PAIN NOTED PAIN PRSNT: CPT | Mod: CPTII,S$GLB,, | Performed by: INTERNAL MEDICINE

## 2023-10-31 PROCEDURE — 3074F SYST BP LT 130 MM HG: CPT | Mod: CPTII,S$GLB,, | Performed by: INTERNAL MEDICINE

## 2023-10-31 PROCEDURE — 3066F NEPHROPATHY DOC TX: CPT | Mod: CPTII,S$GLB,, | Performed by: INTERNAL MEDICINE

## 2023-10-31 PROCEDURE — 1159F PR MEDICATION LIST DOCUMENTED IN MEDICAL RECORD: ICD-10-PCS | Mod: CPTII,S$GLB,, | Performed by: INTERNAL MEDICINE

## 2023-10-31 PROCEDURE — 3060F PR POS MICROALBUMINURIA RESULT DOCUMENTED/REVIEW: ICD-10-PCS | Mod: CPTII,S$GLB,, | Performed by: INTERNAL MEDICINE

## 2023-10-31 PROCEDURE — 1101F PT FALLS ASSESS-DOCD LE1/YR: CPT | Mod: CPTII,S$GLB,, | Performed by: INTERNAL MEDICINE

## 2023-10-31 PROCEDURE — 99214 PR OFFICE/OUTPT VISIT, EST, LEVL IV, 30-39 MIN: ICD-10-PCS | Mod: S$GLB,,, | Performed by: INTERNAL MEDICINE

## 2023-10-31 PROCEDURE — 3074F PR MOST RECENT SYSTOLIC BLOOD PRESSURE < 130 MM HG: ICD-10-PCS | Mod: CPTII,S$GLB,, | Performed by: INTERNAL MEDICINE

## 2023-10-31 PROCEDURE — 3008F BODY MASS INDEX DOCD: CPT | Mod: CPTII,S$GLB,, | Performed by: INTERNAL MEDICINE

## 2023-10-31 PROCEDURE — 3078F PR MOST RECENT DIASTOLIC BLOOD PRESSURE < 80 MM HG: ICD-10-PCS | Mod: CPTII,S$GLB,, | Performed by: INTERNAL MEDICINE

## 2023-10-31 RX ORDER — HYDRALAZINE HYDROCHLORIDE 50 MG/1
50 TABLET, FILM COATED ORAL EVERY 12 HOURS
Qty: 180 TABLET | Refills: 3 | Status: SHIPPED | OUTPATIENT
Start: 2023-10-31 | End: 2024-01-09

## 2023-10-31 NOTE — PROGRESS NOTES
"Subjective:   Patient ID:  Christine Jo is a 73 y.o. female who presents for follow-up of No chief complaint on file.     Christine Jo is a 71 y.o. female who presents for evaluation of Atrial Fibrillation, Coronary Artery Disease, Hyperlipidemia, Hypertension, Peripheral Arterial Disease, Risk Factor Management For Atherosclerosis, and Shortness of Breath           HPI Pt presents for eval.  Her current med conditions include CAD, PAF, DM, obesity, HTN, AAA, hyperlipidemia, MATIAS.  Former smoker.  Past hx pertinent for following:  She used to see Dr. Arthur Carrizales, [facility].  Has h/o PAF.  States she has had 2 or 4 cardiac cath procedures.  Last [facility] 2017, nonobstructive CAD noted.  ecg 3/26/21 NSR, left axis, nonspecific septal Q waves V1-V2.  Stress MPI 4/21 reviewed: no ischemia, normal EF.  Echo 4/21 reviewed: normal LV function, LVH, mild TR.  Now here.  Pt seen 6 months ago.  Abdominal u/s 12/21: no AAA noted.  MIKE 12/21 1.0 R LE, 0.97 L LE.  Has been seeing ENT and PCP for vertigo.  Dx w vestibular disease w rehab tx planned.  Dizziness associated with nausea/vomiting at time per chart.  Has dyspnea -- she states "it is ok".  Denies cp.  A lot of stress in her life; depression.  Stays inside.  9 day [place] Holter 10/21: NSR, 6 runs nonsustained atrial tachycardia (longest 8 beats), 3 runs NSVT (longest 7 beats).  Did not tolerate CPAP in past.  ecg today 3/14/22 NSR, left axis, incomplete RBBB.  No acute changes.  Weight stable.  She thinks statin causes restless legs.  She cut dose in 1/2 without improvement.  DM HGAIC above goal.  Takes Eliquis.           Carotid ultrasound  There is 20-39% right Internal Carotid Stenosis.  There is 20-39% left Internal Carotid Stenosis pain       03/20/2023:   Overall patient is doing well will increase medications now include carvedilol  25 mg b.I.d. and will increase hydralazine to 50 mg q.8 hours.  Follow-up evaluation within a month for blood pressure control.   "   04/25/2023:   Overall patient is doing much better on increased medications and blood pressure under better control continue all medications as prescribed.           09/19/2023 overall doing well this time complaining of some shortness of breath and intermittent chest discomfort will go ahead with a cardiac echo and nuclear stress test.  Patient will restart on statin medications and will try her on Crestor 10 mg daily repeat lipid profile.  She was intolerant of atorvastatin with muscle aches.  She is evidence of vascular atheroma on abdominal ultrasound and also has carotid disease.  I told her the importance of being on statin medications long-term.     Ref Range & Units 1 mo ago  (9/28/23) 1 yr ago  (10/4/22) 1 yr ago  (6/23/22) 2 yr ago  (5/24/21) 2 yr ago  (11/11/20) 4 yr ago  (4/10/19) 5 yr ago  (5/29/18)   Cholesterol 120 - 199 mg/dL 195  164 CM  168 CM  168 CM  151 CM  136 CM  167 CM    Comment: The National Cholesterol Education Program (NCEP) has set the   following guidelines (reference ranges) for Cholesterol:   Optimal.....................<200 mg/dL   Borderline High.............200-239 mg/dL   High........................> or = 240 mg/dL    Triglycerides 30 - 150 mg/dL 149  125 CM  174 High  CM  164 High  CM  156 High  CM  134 CM  209 High  CM    Comment: The National Cholesterol Education Program (NCEP) has set the   following guidelines (reference values) for triglycerides:   Normal......................<150 mg/dL   Borderline High.............150-199 mg/dL   High........................200-499 mg/dL    HDL 40 - 75 mg/dL 62  46 CM  44 CM  55 CM  56 CM  46 CM  50 CM    Comment: The National Cholesterol Education Program (NCEP) has set the   following guidelines (reference values) for HDL Cholesterol:   Low...............<40 mg/dL   Optimal...........>60 mg/dL    LDL Cholesterol 63.0 - 159.0 mg/dL 103.2  93.0 CM  89.2 CM  80.2 CM  63.8 CM  63.2 CM  75.2 CM    Comment: The National Cholesterol  Education Program (NCEP) has set the   following guidelines (reference values) for LDL Cholesterol:   Optimal.......................<130 mg     10/31/2023 overall stable blood pressure under better control and will drop back on the hydralazine 50 mg b.i.d. repeat nuclear stress test has been ordered and will follow-up testing.        Review of Systems   Constitutional: Negative for chills, diaphoresis, night sweats, weight gain and weight loss.   HENT:  Negative for congestion, hoarse voice, sore throat and stridor.    Eyes:  Negative for double vision and pain.   Cardiovascular:  Negative for chest pain, claudication, cyanosis, dyspnea on exertion, irregular heartbeat, leg swelling, near-syncope, orthopnea, palpitations, paroxysmal nocturnal dyspnea and syncope.   Respiratory:  Negative for cough, hemoptysis, shortness of breath, sleep disturbances due to breathing, snoring, sputum production and wheezing.    Endocrine: Negative for cold intolerance, heat intolerance and polydipsia.   Hematologic/Lymphatic: Negative for bleeding problem. Does not bruise/bleed easily.   Skin:  Negative for color change, dry skin and rash.   Musculoskeletal:  Negative for joint swelling and muscle cramps.   Gastrointestinal:  Negative for bloating, abdominal pain, constipation, diarrhea, dysphagia, melena, nausea and vomiting.   Genitourinary:  Negative for flank pain and urgency.   Neurological:  Negative for dizziness, focal weakness, headaches, light-headedness, loss of balance, seizures and weakness.   Psychiatric/Behavioral:  Negative for altered mental status and memory loss. The patient is not nervous/anxious.    Family History   Problem Relation Age of Onset    Heart disease Mother         CAD     Cataracts Mother     Macular degeneration Mother     Glaucoma Mother     Cancer Son         testicular     Cancer Maternal Aunt         Lung ca    Heart disease Maternal Grandfather         Pacemaker     Diabetes Sister     Heart  disease Sister         CAD    Cataracts Sister     Diabetes Sister     Diabetes Sister      Past Medical History:   Diagnosis Date    Arthritis     Cataract     Diabetes mellitus      am 01/15/2018 Insulin x 1 year    DM (diabetes mellitus)      am 2020 Insulin x 4 years    Encounter for blood transfusion     Glaucoma     Hypertension     Insomnia     Macular degeneration     Vaginal yeast infection      Social History     Socioeconomic History    Marital status:     Number of children: 3   Occupational History    Occupation: Retired   Tobacco Use    Smoking status: Former     Current packs/day: 0.00     Average packs/day: 1 pack/day for 36.5 years (36.5 ttl pk-yrs)     Types: Cigarettes     Start date:      Quit date: 2003     Years since quittin.3     Passive exposure: Never    Smokeless tobacco: Never   Substance and Sexual Activity    Alcohol use: No    Drug use: No    Sexual activity: Never     Social Determinants of Health     Financial Resource Strain: Low Risk  (2022)    Overall Financial Resource Strain (CARDIA)     Difficulty of Paying Living Expenses: Not hard at all   Food Insecurity: No Food Insecurity (2022)    Hunger Vital Sign     Worried About Running Out of Food in the Last Year: Never true     Ran Out of Food in the Last Year: Never true   Transportation Needs: Unmet Transportation Needs (2022)    PRAPARE - Transportation     Lack of Transportation (Medical): Yes     Lack of Transportation (Non-Medical): No   Physical Activity: Insufficiently Active (2022)    Exercise Vital Sign     Days of Exercise per Week: 7 days     Minutes of Exercise per Session: 10 min   Stress: No Stress Concern Present (2022)    [employer] [place] [employer] - Occupational Stress Questionnaire     Feeling of Stress : Only a little   Social Connections: Moderately Isolated (2022)    Social Connection and Isolation Panel [NHANES]      Frequency of Communication with Friends and Family: More than three times a week     Frequency of Social Gatherings with Friends and Family: Never     Attends [employer] Services: 1 to 4 times per year     Active Member of Clubs or Organizations: No     Attends Club or Organization Meetings: Never     Marital Status:    Housing Stability: Low Risk  (9/28/2022)    Housing Stability Vital Sign     Unable to Pay for Housing in the Last Year: No     Number of Places Lived in the Last Year: 2     Unstable Housing in the Last Year: No     Current Outpatient Medications on File Prior to Visit   Medication Sig Dispense Refill    ACETAMINOPHEN (TYLENOL ARTHRITIS ORAL) Take by mouth as needed.      apixaban (ELIQUIS) 5 mg Tab Take 1 tablet (5 mg total) by mouth 2 (two) times daily. 180 tablet 0    azelastine (ASTELIN) 137 mcg (0.1 %) nasal spray use 2 sprays (274 mcg total) by Nasal route 2 (two) times daily. 30 mL 1    benazepriL (LOTENSIN) 40 MG tablet Take 1 tablet (40 mg total) by mouth 2 (two) times daily. 180 tablet 4    BEPREVE 1.5 % Drop Place 1 drop into both eyes 2 (two) times daily. 10 mL 4    blood sugar diagnostic Strp To check blood sugar 1 times daily 100 each 3    blood-glucose meter (ACCU-CHEK GUIDE GLUCOSE METER) Misc use daily to test blood sugar 1 each 0    carvediloL (COREG) 25 MG tablet TAKE 1 TABLET BY MOUTH TWICE DAILY 180 tablet 3    clotrimazole-betamethasone (LOTRISONE) lotion Apply topically to the affected area 2 (two) times daily. 30 mL 2    cycloSPORINE (RESTASIS) 0.05 % ophthalmic emulsion Place 1 drop into both eyes 2 (two) times daily. 60 each 11    diclofenac sodium (VOLTAREN) 1 % Gel Apply 2 grams topically 4 (four) times daily. To affected joints as needed for pain 100 g 3    DULoxetine (CYMBALTA) 60 MG capsule Take 1 capsule (60 mg total) by mouth once daily. 90 capsule 0    fluticasone propionate (FLONASE) 50 mcg/actuation nasal spray 2 sprays (100 mcg total) by Each Nostril route  "once daily. 48 g 1    gabapentin (NEURONTIN) 300 MG capsule Take 1 capsule (300 mg total) by mouth 3 (three) times daily. (Patient taking differently: Take 300 mg by mouth daily as needed.) 90 capsule 11    galcanezumab-gnlm (EMGALITY SYRINGE) 120 mg/mL Syrg Inject 120 mg into the skin every 28 days. 1 mL 12    hydrALAZINE (APRESOLINE) 50 MG tablet Take 1 tablet (50 mg total) by mouth every 8 (eight) hours. 180 tablet 3    hydrOXYzine HCL (ATARAX) 10 MG Tab Take 1 tablet (10 mg total) by mouth nightly. 30 tablet 0    insulin (LANTUS SOLOSTAR U-100 INSULIN) glargine 100 units/mL SubQ pen Inject 72 Units into the skin every evening. 75 mL 0    lancets Misc To check BG 1 times daily 100 each 3    linaCLOtide (LINZESS) 145 mcg Cap capsule Take 1 capsule (145 mcg total) by mouth before breakfast. 30 capsule 5    meclizine (ANTIVERT) 25 mg tablet Take 1 tablet (25 mg total) by mouth 3 (three) times daily as needed. 30 tablet 2    nystatin (MYCOSTATIN) cream Apply topically 2 (two) times daily. Continue until completely healed 30 g 0    pantoprazole (PROTONIX) 40 MG tablet Take 1 tablet (40 mg total) by mouth once daily. 30 tablet 2    pen needle, diabetic (BD ULTRA-FINE SHORT PEN NEEDLE) 31 gauge x 5/16" Ndle AS DIRECTED TWICE DAILY 200 each 3    rimegepant 75 mg odt Take 1 tablet (75 mg total) by mouth as needed for Migraine (do not exceed 2-3 doses within 1 week). Place ODT tablet on the tongue; alternatively the ODT tablet may be placed under the tongue 8 tablet 5    rosuvastatin (CRESTOR) 10 MG tablet Take 1 tablet (10 mg total) by mouth once daily. 90 tablet 3    SITagliptin phosphate (JANUVIA) 100 MG Tab Take 1 tablet (100 mg total) by mouth once daily. 90 tablet 1    temazepam (RESTORIL) 30 mg capsule Take 1 capsule (30 mg total) by mouth every evening. 90 capsule 0     No current facility-administered medications on file prior to visit.     Review of patient's allergies indicates:   Allergen Reactions    Aspirin " Palpitations       Objective:     Physical Exam  Eyes:      Pupils: Pupils are equal, round, and reactive to light.   Neck:      Trachea: No tracheal deviation.   Cardiovascular:      Rate and Rhythm: Normal rate and regular rhythm.      Pulses: Intact distal pulses.           Carotid pulses are 2+ on the right side and 2+ on the left side.       Radial pulses are 2+ on the right side and 2+ on the left side.        Femoral pulses are 2+ on the right side and 2+ on the left side.       Popliteal pulses are 2+ on the right side and 2+ on the left side.        Dorsalis pedis pulses are 2+ on the right side and 2+ on the left side.        Posterior tibial pulses are 2+ on the right side and 2+ on the left side.      Heart sounds: Normal heart sounds. No murmur heard.     No friction rub. No gallop.   Pulmonary:      Effort: Pulmonary effort is normal. No respiratory distress.      Breath sounds: Normal breath sounds. No stridor. No wheezing or rales.   Chest:      Chest wall: No tenderness.   Abdominal:      General: There is no distension.      Tenderness: There is no abdominal tenderness. There is no rebound.   Musculoskeletal:         General: No tenderness.      Cervical back: Normal range of motion.   Skin:     General: Skin is warm and dry.   Neurological:      Mental Status: She is alert and oriented to person, place, and time.     Assessment:     1. Dizziness    2. Hypertriglyceridemia    3. Essential hypertension    4. Palpitations    5. Abdominal aortic aneurysm (AAA) without rupture, unspecified part    6. GILL (dyspnea on exertion)    7. Controlled type 2 diabetes mellitus with diabetic peripheral angiopathy without gangrene, with long-term current use of insulin    8. Abnormal ECG    9. Uncontrolled type 2 diabetes mellitus with hyperglycemia    10. Paroxysmal atrial fibrillation    11. Mixed hyperlipidemia    12. Coronary artery disease of native artery of native heart with stable angina pectoris         Plan:     Dizziness    Hypertriglyceridemia    Essential hypertension    Palpitations    Abdominal aortic aneurysm (AAA) without rupture, unspecified part    GILL (dyspnea on exertion)    Controlled type 2 diabetes mellitus with diabetic peripheral angiopathy without gangrene, with long-term current use of insulin    Abnormal ECG    Uncontrolled type 2 diabetes mellitus with hyperglycemia    Paroxysmal atrial fibrillation    Mixed hyperlipidemia    Coronary artery disease of native artery of native heart with stable angina pectoris        Impression 1 CAD will repeat the nuclear stress test and review   2. Hypertension stable and will drop back on hydralazine due to lower blood pressure   3. Hyperlipidemia unable to take statin medications will discuss possible Repatha in the future   4. Aortic insufficiency in the moderate range will follow with yearly to every 1-2 year cardiac echo.  5. PAF now on Eliquis and stable.

## 2023-11-02 ENCOUNTER — TELEPHONE (OUTPATIENT)
Dept: SPORTS MEDICINE | Facility: CLINIC | Age: 73
End: 2023-11-02
Payer: MEDICARE

## 2023-11-02 NOTE — TELEPHONE ENCOUNTER
Contacted patient and r/s today's appt to 11/17 to 1:40 due to patient missing transportation ride.  Patient verbalized understanding of appt date, time and location.   ----- Message from Fariba Benjamin sent at 11/2/2023  2:06 PM CDT -----  Contact: patient [phone]  Patient called requesting a urgent call back form Dr. Esposito's nurse

## 2023-11-03 ENCOUNTER — PATIENT MESSAGE (OUTPATIENT)
Dept: CARDIOLOGY | Facility: CLINIC | Age: 73
End: 2023-11-03
Payer: MEDICARE

## 2023-11-05 ENCOUNTER — PATIENT MESSAGE (OUTPATIENT)
Dept: GASTROENTEROLOGY | Facility: CLINIC | Age: 73
End: 2023-11-05
Payer: MEDICARE

## 2023-11-06 ENCOUNTER — TELEPHONE (OUTPATIENT)
Dept: SPORTS MEDICINE | Facility: CLINIC | Age: 73
End: 2023-11-06
Payer: MEDICARE

## 2023-11-06 ENCOUNTER — PATIENT MESSAGE (OUTPATIENT)
Dept: SPORTS MEDICINE | Facility: CLINIC | Age: 73
End: 2023-11-06
Payer: MEDICARE

## 2023-11-06 ENCOUNTER — OFFICE VISIT (OUTPATIENT)
Dept: PSYCHIATRY | Facility: CLINIC | Age: 73
End: 2023-11-06
Payer: MEDICARE

## 2023-11-06 ENCOUNTER — TELEPHONE (OUTPATIENT)
Dept: PSYCHIATRY | Facility: CLINIC | Age: 73
End: 2023-11-06
Payer: MEDICARE

## 2023-11-06 DIAGNOSIS — G47.00 INSOMNIA, UNSPECIFIED TYPE: ICD-10-CM

## 2023-11-06 DIAGNOSIS — F41.9 ANXIETY DISORDER, UNSPECIFIED TYPE: ICD-10-CM

## 2023-11-06 DIAGNOSIS — F33.1 DEPRESSION, MAJOR, RECURRENT, MODERATE: Primary | ICD-10-CM

## 2023-11-06 PROCEDURE — 3060F PR POS MICROALBUMINURIA RESULT DOCUMENTED/REVIEW: ICD-10-PCS | Mod: CPTII,95,, | Performed by: SOCIAL WORKER

## 2023-11-06 PROCEDURE — 4010F PR ACE/ARB THEARPY RXD/TAKEN: ICD-10-PCS | Mod: CPTII,95,, | Performed by: SOCIAL WORKER

## 2023-11-06 PROCEDURE — 3066F PR DOCUMENTATION OF TREATMENT FOR NEPHROPATHY: ICD-10-PCS | Mod: CPTII,95,, | Performed by: SOCIAL WORKER

## 2023-11-06 PROCEDURE — 3060F POS MICROALBUMINURIA REV: CPT | Mod: CPTII,95,, | Performed by: SOCIAL WORKER

## 2023-11-06 PROCEDURE — 3066F NEPHROPATHY DOC TX: CPT | Mod: CPTII,95,, | Performed by: SOCIAL WORKER

## 2023-11-06 PROCEDURE — 90834 PSYTX W PT 45 MINUTES: CPT | Mod: 95,,, | Performed by: SOCIAL WORKER

## 2023-11-06 PROCEDURE — 3044F PR MOST RECENT HEMOGLOBIN A1C LEVEL <7.0%: ICD-10-PCS | Mod: CPTII,95,, | Performed by: SOCIAL WORKER

## 2023-11-06 PROCEDURE — 90834 PR PSYCHOTHERAPY W/PATIENT, 45 MIN: ICD-10-PCS | Mod: 95,,, | Performed by: SOCIAL WORKER

## 2023-11-06 PROCEDURE — 4010F ACE/ARB THERAPY RXD/TAKEN: CPT | Mod: CPTII,95,, | Performed by: SOCIAL WORKER

## 2023-11-06 PROCEDURE — 3044F HG A1C LEVEL LT 7.0%: CPT | Mod: CPTII,95,, | Performed by: SOCIAL WORKER

## 2023-11-06 NOTE — TELEPHONE ENCOUNTER
DEDEM for patient returning her call.  She contact either the [place] office [phone], or the [place] office, [phone] and we will get back in  touch with her.   ----- Message from Papito Johnson MA sent at 11/6/2023 12:20 PM CST -----  Contact: [phone]  Pt called              In regards to needing to speak with staff in regards to discuss upcoming appt on 11/17/23.              Call back [phone]
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
## 2023-11-06 NOTE — TELEPHONE ENCOUNTER
----- Message from Georgia Lobato sent at 11/6/2023 12:35 PM CST -----  Pt would like to be scheduled ,Please call back at [phone].Thanks

## 2023-11-08 ENCOUNTER — PATIENT MESSAGE (OUTPATIENT)
Dept: CARDIOLOGY | Facility: CLINIC | Age: 73
End: 2023-11-08
Payer: MEDICARE

## 2023-11-09 ENCOUNTER — TELEPHONE (OUTPATIENT)
Dept: CARDIOLOGY | Facility: CLINIC | Age: 73
End: 2023-11-09
Payer: MEDICARE

## 2023-11-09 NOTE — TELEPHONE ENCOUNTER
Notified the pateint                    It looks like she has 3 more refills left at the 40 mg BID dosing, filled by Dr. Singh a few months ago. Thanks

## 2023-11-13 ENCOUNTER — PATIENT OUTREACH (OUTPATIENT)
Dept: ADMINISTRATIVE | Facility: HOSPITAL | Age: 73
End: 2023-11-13
Payer: MEDICARE

## 2023-11-13 ENCOUNTER — PATIENT MESSAGE (OUTPATIENT)
Dept: GASTROENTEROLOGY | Facility: CLINIC | Age: 73
End: 2023-11-13
Payer: MEDICARE

## 2023-11-13 DIAGNOSIS — R10.9 ABDOMINAL PAIN, UNSPECIFIED ABDOMINAL LOCATION: Primary | ICD-10-CM

## 2023-11-14 ENCOUNTER — TELEPHONE (OUTPATIENT)
Dept: PSYCHIATRY | Facility: CLINIC | Age: 73
End: 2023-11-14
Payer: MEDICARE

## 2023-11-14 ENCOUNTER — PATIENT MESSAGE (OUTPATIENT)
Dept: INTERNAL MEDICINE | Facility: CLINIC | Age: 73
End: 2023-11-14
Payer: MEDICARE

## 2023-11-14 ENCOUNTER — PATIENT MESSAGE (OUTPATIENT)
Dept: PSYCHIATRY | Facility: CLINIC | Age: 73
End: 2023-11-14
Payer: MEDICARE

## 2023-11-14 NOTE — TELEPHONE ENCOUNTER
----- Message from Dianelys Hansen sent at 11/14/2023  7:06 AM CST -----  Contact: pt  Pt is calling in regard to not feeling well and wants to hao her appt for today.  Please put the new appt on her chart.  Thanks/mpd

## 2023-11-15 DIAGNOSIS — R30.0 DYSURIA: Primary | ICD-10-CM

## 2023-11-17 ENCOUNTER — OFFICE VISIT (OUTPATIENT)
Dept: SPORTS MEDICINE | Facility: CLINIC | Age: 73
End: 2023-11-17
Payer: MEDICARE

## 2023-11-17 ENCOUNTER — LAB VISIT (OUTPATIENT)
Dept: LAB | Facility: HOSPITAL | Age: 73
End: 2023-11-17
Attending: FAMILY MEDICINE
Payer: MEDICAID

## 2023-11-17 VITALS — HEIGHT: 62 IN | BODY MASS INDEX: 44.72 KG/M2 | WEIGHT: 243 LBS

## 2023-11-17 DIAGNOSIS — M17.0 PRIMARY OSTEOARTHRITIS OF BOTH KNEES: Primary | ICD-10-CM

## 2023-11-17 DIAGNOSIS — R30.0 DYSURIA: ICD-10-CM

## 2023-11-17 LAB
BACTERIA #/AREA URNS AUTO: ABNORMAL /HPF
BILIRUB UR QL STRIP: NEGATIVE
CLARITY UR REFRACT.AUTO: ABNORMAL
COLOR UR AUTO: ABNORMAL
GLUCOSE UR QL STRIP: NEGATIVE
HGB UR QL STRIP: NEGATIVE
HYALINE CASTS UR QL AUTO: 0 /LPF
KETONES UR QL STRIP: ABNORMAL
LEUKOCYTE ESTERASE UR QL STRIP: ABNORMAL
MICROSCOPIC COMMENT: ABNORMAL
NITRITE UR QL STRIP: NEGATIVE
PH UR STRIP: 5 [PH] (ref 5–8)
PROT UR QL STRIP: ABNORMAL
RBC #/AREA URNS AUTO: 5 /HPF (ref 0–4)
SP GR UR STRIP: 1.02 (ref 1–1.03)
SQUAMOUS #/AREA URNS AUTO: 0 /HPF
URN SPEC COLLECT METH UR: ABNORMAL
WBC #/AREA URNS AUTO: 2 /HPF (ref 0–5)

## 2023-11-17 PROCEDURE — 20610 DRAIN/INJ JOINT/BURSA W/O US: CPT | Mod: 50,S$GLB,, | Performed by: STUDENT IN AN ORGANIZED HEALTH CARE EDUCATION/TRAINING PROGRAM

## 2023-11-17 PROCEDURE — 20610 LARGE JOINT ASPIRATION/INJECTION: BILATERAL KNEE: ICD-10-PCS | Mod: 50,S$GLB,, | Performed by: STUDENT IN AN ORGANIZED HEALTH CARE EDUCATION/TRAINING PROGRAM

## 2023-11-17 PROCEDURE — 81001 URINALYSIS AUTO W/SCOPE: CPT | Performed by: FAMILY MEDICINE

## 2023-11-17 PROCEDURE — 99999 PR PBB SHADOW E&M-EST. PATIENT-LVL IV: ICD-10-PCS | Mod: PBBFAC,,, | Performed by: STUDENT IN AN ORGANIZED HEALTH CARE EDUCATION/TRAINING PROGRAM

## 2023-11-17 PROCEDURE — 99499 NO LOS: ICD-10-PCS | Mod: S$GLB,,, | Performed by: STUDENT IN AN ORGANIZED HEALTH CARE EDUCATION/TRAINING PROGRAM

## 2023-11-17 PROCEDURE — 99499 UNLISTED E&M SERVICE: CPT | Mod: S$GLB,,, | Performed by: STUDENT IN AN ORGANIZED HEALTH CARE EDUCATION/TRAINING PROGRAM

## 2023-11-17 PROCEDURE — 99999 PR PBB SHADOW E&M-EST. PATIENT-LVL IV: CPT | Mod: PBBFAC,,, | Performed by: STUDENT IN AN ORGANIZED HEALTH CARE EDUCATION/TRAINING PROGRAM

## 2023-11-17 NOTE — PROCEDURES
Large Joint Aspiration/Injection: bilateral knee    Date/Time: 11/17/2023 1:40 PM    Performed by: Erik Esposito MD  Authorized by: Erik Esposito MD    Consent Done?:  Yes (Verbal)  Indications:  Arthritis and pain  Site marked: the procedure site was marked    Timeout: prior to procedure the correct patient, procedure, and site was verified    Prep: patient was prepped and draped in usual sterile fashion    Local anesthetic:  Bupivacaine 0.5% without epinephrine and lidocaine 1% without epinephrine    Details:  Needle Size:  22 G  Approach:  Anterolateral  Location:  Knee  Laterality:  Bilateral  Site:  Bilateral knee  Medications (Right):  32 mg triamcinolone acetonide 32 mg  Medications (Left):  32 mg triamcinolone acetonide 32 mg  Patient tolerance:  Patient tolerated the procedure well with no immediate complications

## 2023-11-20 ENCOUNTER — PATIENT MESSAGE (OUTPATIENT)
Dept: INTERNAL MEDICINE | Facility: CLINIC | Age: 73
End: 2023-11-20
Payer: MEDICARE

## 2023-11-20 ENCOUNTER — TELEPHONE (OUTPATIENT)
Dept: INTERNAL MEDICINE | Facility: CLINIC | Age: 73
End: 2023-11-20
Payer: MEDICARE

## 2023-11-22 ENCOUNTER — PATIENT MESSAGE (OUTPATIENT)
Dept: GASTROENTEROLOGY | Facility: CLINIC | Age: 73
End: 2023-11-22
Payer: MEDICARE

## 2023-11-27 DIAGNOSIS — R10.9 ABDOMINAL PAIN, UNSPECIFIED ABDOMINAL LOCATION: Primary | ICD-10-CM

## 2023-11-28 ENCOUNTER — TELEPHONE (OUTPATIENT)
Dept: CARDIOLOGY | Facility: HOSPITAL | Age: 73
End: 2023-11-28
Payer: MEDICARE

## 2023-12-01 ENCOUNTER — TELEPHONE (OUTPATIENT)
Dept: GASTROENTEROLOGY | Facility: CLINIC | Age: 73
End: 2023-12-01
Payer: MEDICARE

## 2023-12-01 ENCOUNTER — PATIENT MESSAGE (OUTPATIENT)
Dept: GASTROENTEROLOGY | Facility: CLINIC | Age: 73
End: 2023-12-01
Payer: MEDICARE

## 2023-12-01 NOTE — TELEPHONE ENCOUNTER
----- Message from Taylor Rivera sent at 12/1/2023 10:27 AM CST -----  Patient is requesting a call back concerning the lab work she needs set up. She states she is speaking with Cami on [employer] but would like a call back at [phone]

## 2023-12-04 ENCOUNTER — PATIENT MESSAGE (OUTPATIENT)
Dept: PSYCHIATRY | Facility: CLINIC | Age: 73
End: 2023-12-04
Payer: MEDICARE

## 2023-12-04 RX ORDER — FLUTICASONE PROPIONATE 50 MCG
2 SPRAY, SUSPENSION (ML) NASAL DAILY
Qty: 48 G | Refills: 3 | Status: SHIPPED | OUTPATIENT
Start: 2023-12-04

## 2023-12-04 NOTE — TELEPHONE ENCOUNTER
Provider Staff:  Action required for this patient    Requires labs      Please see care gap opportunities below in Care Due Message.    Thanks!  [facility]     Appointments      Date Provider   Last Visit   9/28/2023 Jose Szymanski MD   Next Visit   12/28/2023 Jose Szymanski MD     Refill Decision Note   Christine Jo  is requesting a refill authorization.  Brief Assessment and Rationale for Refill:  Approve     Medication Therapy Plan:         Comments:     Note composed:2:09 PM 12/04/2023

## 2023-12-04 NOTE — TELEPHONE ENCOUNTER
Care Due:                  Date            Visit Type   Department     Provider  --------------------------------------------------------------------------------                                MYCHART                              ANNUAL                              CHECKUP/PHY  [facility] INTER[facility]  Last Visit: 09-      S            CHEMA Szymanski                              EP -                              PRIMARY      [facility] INTER[facility]  Next Visit: 12-      CARE ([facility])   [facility]       Jose Szymanski                                                            Last  Test          Frequency    Reason                     Performed    Due Date  --------------------------------------------------------------------------------    Cr..........  12 months..  SITagliptin, insulin.....  02- 02-    [facility] Embedded Care Due Messages. Reference number: [id number].   12/04/2023 1:43:05 PM CST

## 2023-12-08 ENCOUNTER — TELEPHONE (OUTPATIENT)
Dept: PSYCHIATRY | Facility: CLINIC | Age: 73
End: 2023-12-08
Payer: MEDICARE

## 2023-12-10 ENCOUNTER — PATIENT MESSAGE (OUTPATIENT)
Dept: PSYCHIATRY | Facility: CLINIC | Age: 73
End: 2023-12-10
Payer: MEDICARE

## 2023-12-12 ENCOUNTER — PATIENT MESSAGE (OUTPATIENT)
Dept: INTERNAL MEDICINE | Facility: CLINIC | Age: 73
End: 2023-12-12
Payer: MEDICARE

## 2023-12-13 ENCOUNTER — LAB VISIT (OUTPATIENT)
Dept: LAB | Facility: HOSPITAL | Age: 73
End: 2023-12-13
Attending: RADIOLOGY
Payer: MEDICARE

## 2023-12-13 DIAGNOSIS — R10.9 ABDOMINAL PAIN, UNSPECIFIED ABDOMINAL LOCATION: ICD-10-CM

## 2023-12-13 LAB
CREAT SERPL-MCNC: 0.9 MG/DL (ref 0.5–1.4)
CREAT SERPL-MCNC: 0.9 MG/DL (ref 0.5–1.4)
EST. GFR  (NO RACE VARIABLE): >60 ML/MIN/1.73 M^2
EST. GFR  (NO RACE VARIABLE): >60 ML/MIN/1.73 M^2

## 2023-12-13 PROCEDURE — 36415 COLL VENOUS BLD VENIPUNCTURE: CPT | Mod: PO | Performed by: PHYSICIAN ASSISTANT

## 2023-12-13 PROCEDURE — 82565 ASSAY OF CREATININE: CPT | Performed by: PHYSICIAN ASSISTANT

## 2023-12-14 ENCOUNTER — PATIENT MESSAGE (OUTPATIENT)
Dept: GASTROENTEROLOGY | Facility: CLINIC | Age: 73
End: 2023-12-14
Payer: MEDICARE

## 2023-12-14 ENCOUNTER — HOSPITAL ENCOUNTER (OUTPATIENT)
Dept: RADIOLOGY | Facility: HOSPITAL | Age: 73
Discharge: HOME | End: 2023-12-14
Attending: PHYSICIAN ASSISTANT
Payer: MEDICARE

## 2023-12-14 DIAGNOSIS — R10.9 ABDOMINAL PAIN, UNSPECIFIED ABDOMINAL LOCATION: ICD-10-CM

## 2023-12-14 PROCEDURE — 74177 CT ABD & PELVIS W/CONTRAST: CPT | Mod: TC

## 2023-12-14 PROCEDURE — 74177 CT ABDOMEN PELVIS WITH IV CONTRAST: ICD-10-PCS | Mod: 26,,, | Performed by: RADIOLOGY

## 2023-12-14 PROCEDURE — A9698 NON-RAD CONTRAST MATERIALNOC: HCPCS | Performed by: PHYSICIAN ASSISTANT

## 2023-12-14 PROCEDURE — 25500020 PHARM REV CODE 255: Performed by: PHYSICIAN ASSISTANT

## 2023-12-14 PROCEDURE — 74177 CT ABD & PELVIS W/CONTRAST: CPT | Mod: 26,,, | Performed by: RADIOLOGY

## 2023-12-14 RX ADMIN — IOHEXOL 100 ML: 350 INJECTION, SOLUTION INTRAVENOUS at 12:12

## 2023-12-14 RX ADMIN — IOHEXOL 1000 ML: 9 SOLUTION ORAL at 12:12

## 2023-12-15 DIAGNOSIS — R11.0 NAUSEA: ICD-10-CM

## 2023-12-15 DIAGNOSIS — K80.20 CALCULUS OF GALLBLADDER WITHOUT CHOLECYSTITIS WITHOUT OBSTRUCTION: Primary | ICD-10-CM

## 2023-12-15 DIAGNOSIS — R10.9 ABDOMINAL PAIN, UNSPECIFIED ABDOMINAL LOCATION: ICD-10-CM

## 2023-12-19 ENCOUNTER — PATIENT MESSAGE (OUTPATIENT)
Dept: INTERNAL MEDICINE | Facility: CLINIC | Age: 73
End: 2023-12-19
Payer: MEDICARE

## 2023-12-20 ENCOUNTER — PATIENT MESSAGE (OUTPATIENT)
Dept: PSYCHIATRY | Facility: CLINIC | Age: 73
End: 2023-12-20
Payer: MEDICARE

## 2023-12-26 ENCOUNTER — TELEPHONE (OUTPATIENT)
Dept: PSYCHIATRY | Facility: CLINIC | Age: 73
End: 2023-12-26
Payer: MEDICARE

## 2023-12-27 ENCOUNTER — PATIENT MESSAGE (OUTPATIENT)
Dept: INTERNAL MEDICINE | Facility: CLINIC | Age: 73
End: 2023-12-27
Payer: MEDICARE

## 2023-12-27 DIAGNOSIS — H10.13 ALLERGIC CONJUNCTIVITIS, BILATERAL: ICD-10-CM

## 2023-12-27 DIAGNOSIS — G47.00 INSOMNIA, UNSPECIFIED TYPE: ICD-10-CM

## 2023-12-27 RX ORDER — PANTOPRAZOLE SODIUM 40 MG/1
40 TABLET, DELAYED RELEASE ORAL DAILY
Qty: 30 TABLET | Refills: 2 | Status: SHIPPED | OUTPATIENT
Start: 2023-12-27

## 2023-12-27 RX ORDER — BEPOTASTINE BESILATE 15 MG/ML
1 SOLUTION/ DROPS OPHTHALMIC 2 TIMES DAILY
Qty: 10 ML | Refills: 4 | Status: SHIPPED | OUTPATIENT
Start: 2023-12-27 | End: 2024-01-04 | Stop reason: SDUPTHER

## 2023-12-27 RX ORDER — TEMAZEPAM 30 MG/1
30 CAPSULE ORAL NIGHTLY
Qty: 90 CAPSULE | Refills: 0 | OUTPATIENT
Start: 2023-12-27

## 2023-12-28 ENCOUNTER — TELEPHONE (OUTPATIENT)
Dept: PSYCHIATRY | Facility: CLINIC | Age: 73
End: 2023-12-28
Payer: MEDICARE

## 2023-12-28 ENCOUNTER — PATIENT MESSAGE (OUTPATIENT)
Dept: INTERNAL MEDICINE | Facility: CLINIC | Age: 73
End: 2023-12-28
Payer: MEDICARE

## 2023-12-28 ENCOUNTER — PATIENT MESSAGE (OUTPATIENT)
Dept: PSYCHIATRY | Facility: CLINIC | Age: 73
End: 2023-12-28

## 2023-12-28 DIAGNOSIS — G47.00 INSOMNIA, UNSPECIFIED TYPE: ICD-10-CM

## 2023-12-28 NOTE — TELEPHONE ENCOUNTER
----- Message from Orly Liriano sent at 12/28/2023  2:12 PM CST -----  Contact: Christine Huerta is calling in regards to getting appt 12/28 reschedule. Please call back at [phone]                            Thanks  KT

## 2023-12-29 ENCOUNTER — TELEPHONE (OUTPATIENT)
Dept: PSYCHIATRY | Facility: CLINIC | Age: 73
End: 2023-12-29
Payer: MEDICARE

## 2023-12-29 RX ORDER — TEMAZEPAM 30 MG/1
30 CAPSULE ORAL NIGHTLY
Qty: 90 CAPSULE | Refills: 0 | Status: SHIPPED | OUTPATIENT
Start: 2023-12-29 | End: 2024-03-26 | Stop reason: SDUPTHER

## 2023-12-29 NOTE — TELEPHONE ENCOUNTER
Subjective:     Interval History: Pt is a 68 yo F with h/o AML FLT3+, NPM1+ s/p therapy with 7+3 with residual disease and then treatment with FLAG Maribel with GABRIEL on day 14 BM biopsy who presented to the hospital for cycle 1 of HiDAC.      The patient is day +5 of HiDAC.  She reports nausea overnight with minimal improvement with Zofran.  Patient denies fever, chills, CP, SOB, cough, N/V, constipation, diarrhea/  She looks forward to possible discharge today pending improvement in her nausea.       Objective:     Vital Signs (Most Recent):  Temp: 98.2 °F (36.8 °C) (08/19/18 0426)  Pulse: 72 (08/19/18 0822)  Resp: (!) 22 (08/19/18 0822)  BP: 137/79 (08/19/18 0822)  SpO2: 99 % (08/19/18 0822) Vital Signs (24h Range):  Temp:  [96.5 °F (35.8 °C)-98.2 °F (36.8 °C)] 98.2 °F (36.8 °C)  Pulse:  [58-72] 72  Resp:  [16-22] 22  SpO2:  [94 %-99 %] 99 %  BP: (118-137)/(56-94) 137/79     Weight: 66.3 kg (146 lb 2.6 oz)  Body mass index is 26.73 kg/m².  Body surface area is 1.7 meters squared.    ECOG SCORE         [unfilled]    Intake/Output - Last 3 Shifts       08/17 0700 - 08/18 0659 08/18 0700 - 08/19 0659 08/19 0700 - 08/20 0659    P.O. 1655 300     I.V. (mL/kg) 1064.8 (16.1)      Blood       IV Piggyback 250      Total Intake(mL/kg) 2969.8 (44.8) 300 (4.5)     Urine (mL/kg/hr) 2625 (1.6) 2250 (1.4) 600 (2.5)    Stool 0      Total Output 2625 2250 600    Net +344.8 -1950 -600           Urine Occurrence 1 x      Stool Occurrence 1 x            Physical Exam   Constitutional: She is oriented to person, place, and time. She appears well-developed and well-nourished.   HENT:   Alopecia   Eyes: EOM are normal. Right eye exhibits no discharge. Left eye exhibits no discharge.   Cardiovascular: Normal rate, regular rhythm and normal heart sounds. Exam reveals no gallop and no friction rub.   No murmur heard.  Pulmonary/Chest: Effort normal and breath sounds normal. No respiratory distress. She has no wheezes. She has no rales.  Confirmed appt with pt and sent info to override team to schedule     Abdominal: Soft. Bowel sounds are normal. She exhibits no distension. There is no tenderness. There is no rebound and no guarding.   Neurological: She is alert and oriented to person, place, and time.       Significant Labs:   I have reviewed all pertinent imaging results/findings within the past 24 hours.     Diagnostic Results:  I have reviewed all pertinent imaging results/findings within the past 24 hours.

## 2024-01-03 ENCOUNTER — HOSPITAL ENCOUNTER (OUTPATIENT)
Dept: RADIOLOGY | Facility: HOSPITAL | Age: 74
Discharge: HOME | End: 2024-01-03
Attending: INTERNAL MEDICINE
Payer: MEDICARE

## 2024-01-03 ENCOUNTER — OFFICE VISIT (OUTPATIENT)
Dept: SURGERY | Facility: CLINIC | Age: 74
End: 2024-01-03
Payer: MEDICARE

## 2024-01-03 ENCOUNTER — HOSPITAL ENCOUNTER (OUTPATIENT)
Dept: PULMONOLOGY | Facility: HOSPITAL | Age: 74
Discharge: HOME | End: 2024-01-03
Attending: INTERNAL MEDICINE
Payer: MEDICARE

## 2024-01-03 VITALS
HEIGHT: 62 IN | BODY MASS INDEX: 45.19 KG/M2 | HEART RATE: 84 BPM | SYSTOLIC BLOOD PRESSURE: 179 MMHG | WEIGHT: 245.56 LBS | DIASTOLIC BLOOD PRESSURE: 71 MMHG

## 2024-01-03 VITALS
HEART RATE: 62 BPM | BODY MASS INDEX: 44.72 KG/M2 | SYSTOLIC BLOOD PRESSURE: 152 MMHG | HEIGHT: 62 IN | DIASTOLIC BLOOD PRESSURE: 73 MMHG | WEIGHT: 243 LBS

## 2024-01-03 DIAGNOSIS — R11.0 NAUSEA: ICD-10-CM

## 2024-01-03 DIAGNOSIS — R00.2 PALPITATIONS: ICD-10-CM

## 2024-01-03 DIAGNOSIS — G47.30 SLEEP APNEA, UNSPECIFIED TYPE: ICD-10-CM

## 2024-01-03 DIAGNOSIS — K80.20 CALCULUS OF GALLBLADDER WITHOUT CHOLECYSTITIS WITHOUT OBSTRUCTION: Primary | ICD-10-CM

## 2024-01-03 DIAGNOSIS — E11.42 CONTROLLED TYPE 2 DIABETES MELLITUS WITH DIABETIC POLYNEUROPATHY, WITH LONG-TERM CURRENT USE OF INSULIN: ICD-10-CM

## 2024-01-03 DIAGNOSIS — R94.31 ABNORMAL ECG: ICD-10-CM

## 2024-01-03 DIAGNOSIS — E11.51 CONTROLLED TYPE 2 DIABETES MELLITUS WITH DIABETIC PERIPHERAL ANGIOPATHY WITHOUT GANGRENE, WITH LONG-TERM CURRENT USE OF INSULIN: ICD-10-CM

## 2024-01-03 DIAGNOSIS — R10.9 ABDOMINAL PAIN, UNSPECIFIED ABDOMINAL LOCATION: ICD-10-CM

## 2024-01-03 DIAGNOSIS — E11.65 UNCONTROLLED TYPE 2 DIABETES MELLITUS WITH HYPERGLYCEMIA: ICD-10-CM

## 2024-01-03 DIAGNOSIS — R06.09 DOE (DYSPNEA ON EXERTION): ICD-10-CM

## 2024-01-03 DIAGNOSIS — Z79.4 CONTROLLED TYPE 2 DIABETES MELLITUS WITH DIABETIC POLYNEUROPATHY, WITH LONG-TERM CURRENT USE OF INSULIN: ICD-10-CM

## 2024-01-03 DIAGNOSIS — I10 ESSENTIAL HYPERTENSION: ICD-10-CM

## 2024-01-03 DIAGNOSIS — E78.1 HYPERTRIGLYCERIDEMIA: ICD-10-CM

## 2024-01-03 DIAGNOSIS — Z79.4 CONTROLLED TYPE 2 DIABETES MELLITUS WITH DIABETIC PERIPHERAL ANGIOPATHY WITHOUT GANGRENE, WITH LONG-TERM CURRENT USE OF INSULIN: ICD-10-CM

## 2024-01-03 DIAGNOSIS — I48.0 PAROXYSMAL ATRIAL FIBRILLATION: ICD-10-CM

## 2024-01-03 DIAGNOSIS — I71.40 ABDOMINAL AORTIC ANEURYSM (AAA) WITHOUT RUPTURE, UNSPECIFIED PART: ICD-10-CM

## 2024-01-03 DIAGNOSIS — E78.2 MIXED HYPERLIPIDEMIA: ICD-10-CM

## 2024-01-03 DIAGNOSIS — Z86.69 HISTORY OF OBSTRUCTIVE SLEEP APNEA: ICD-10-CM

## 2024-01-03 DIAGNOSIS — R42 DIZZINESS: ICD-10-CM

## 2024-01-03 LAB
CV STRESS BASE HR: 69 BPM
DIASTOLIC BLOOD PRESSURE: 73 MMHG
EJECTION FRACTION- HIGH: 73 %
END DIASTOLIC INDEX-HIGH: 165 ML/M2
END DIASTOLIC INDEX-LOW: 101 ML/M2
END SYSTOLIC INDEX-HIGH: 64 ML/M2
END SYSTOLIC INDEX-LOW: 28 ML/M2
NUC REST EJECTION FRACTION: 63
NUC STRESS EJECTION FRACTION: 73 %
OHS CV CPX 85 PERCENT MAX PREDICTED HEART RATE MALE: 127
OHS CV CPX MAX PREDICTED HEART RATE: 149
OHS CV CPX PATIENT IS FEMALE: 1
OHS CV CPX PATIENT IS MALE: 0
OHS CV CPX PEAK DIASTOLIC BLOOD PRESSURE: 73 MMHG
OHS CV CPX PEAK HEAR RATE: 100 BPM
OHS CV CPX PEAK RATE PRESSURE PRODUCT: NORMAL
OHS CV CPX PEAK SYSTOLIC BLOOD PRESSURE: 152 MMHG
OHS CV CPX PERCENT MAX PREDICTED HEART RATE ACHIEVED: 70
OHS CV CPX RATE PRESSURE PRODUCT PRESENTING: NORMAL
RETIRED EF AND QEF - SEE NOTES: 59 %
SYSTOLIC BLOOD PRESSURE: 152 MMHG

## 2024-01-03 PROCEDURE — 93016 CV STRESS TEST SUPVJ ONLY: CPT | Mod: ,,, | Performed by: INTERNAL MEDICINE

## 2024-01-03 PROCEDURE — 99204 OFFICE O/P NEW MOD 45 MIN: CPT | Mod: S$GLB,,,

## 2024-01-03 PROCEDURE — 63600175 PHARM REV CODE 636 W HCPCS: Performed by: INTERNAL MEDICINE

## 2024-01-03 PROCEDURE — 99999 PR PBB SHADOW E&M-EST. PATIENT-LVL V: CPT | Mod: PBBFAC,,,

## 2024-01-03 PROCEDURE — 93018 CV STRESS TEST I&R ONLY: CPT | Mod: ,,, | Performed by: INTERNAL MEDICINE

## 2024-01-03 PROCEDURE — 78452 HT MUSCLE IMAGE SPECT MULT: CPT | Mod: 26,,, | Performed by: INTERNAL MEDICINE

## 2024-01-03 RX ORDER — INDOCYANINE GREEN AND WATER 25 MG
2.5 KIT INJECTION
OUTPATIENT
Start: 2024-01-03

## 2024-01-03 RX ORDER — ONDANSETRON 2 MG/ML
4 INJECTION INTRAMUSCULAR; INTRAVENOUS EVERY 12 HOURS PRN
OUTPATIENT
Start: 2024-01-03

## 2024-01-03 RX ORDER — SODIUM CHLORIDE, SODIUM LACTATE, POTASSIUM CHLORIDE, CALCIUM CHLORIDE 600; 310; 30; 20 MG/100ML; MG/100ML; MG/100ML; MG/100ML
INJECTION, SOLUTION INTRAVENOUS CONTINUOUS
OUTPATIENT
Start: 2024-01-03

## 2024-01-03 RX ORDER — CEFAZOLIN SODIUM 2 G/50ML
2 SOLUTION INTRAVENOUS
OUTPATIENT
Start: 2024-01-03

## 2024-01-03 RX ORDER — REGADENOSON 0.08 MG/ML
0.4 INJECTION, SOLUTION INTRAVENOUS ONCE
Status: COMPLETED | OUTPATIENT
Start: 2024-01-03 | End: 2024-01-03

## 2024-01-03 RX ADMIN — REGADENOSON 0.4 MG: 0.08 INJECTION, SOLUTION INTRAVENOUS at 11:01

## 2024-01-03 NOTE — PROGRESS NOTES
History & Physical    SUBJECTIVE:     History of Present Illness:  Patient is a 73 y.o. female who presents for discussion of cholecystectomy in regards to her long-standing cholelithiasis.  She has several different abdominal symptoms.  She does endorse right upper quadrant postprandial cramping abdominal pain that has been worsening recently and is associated with nausea but no vomiting.  She also reports frequent, severe, and random left upper quadrant abdominal pain.  She reports recent dysphagia and severe reflux as well.  She is seen by GI and has been worked up extensively for these symptoms and is also treated for chronic constipation.  She has been aware of her gallstones for several years, in her right upper quadrant pain is worsening to the point that she wants to discuss cholecystectomy.  Her past abdominal surgical history is significant for laparoscopic tubal ligation as well as exploratory laparotomy to repair a bleeding gastric ulcer.  She has not a smoker.  She does have a history of AFib for which she takes Eliquis 5 mg b.i.d..  She underwent a stress test this week and follows with Dr. Joyce, who she is seeing next week.  She is also diagnosed with fatty liver but no definitive diagnosis of cirrhosis.    Chief Complaint   Patient presents with    Consult     gallstones       Review of patient's allergies indicates:   Allergen Reactions    Aspirin Palpitations       Current Outpatient Medications   Medication Sig Dispense Refill    apixaban (ELIQUIS) 5 mg Tab Take 1 tablet (5 mg total) by mouth 2 (two) times daily. 180 tablet 0    benazepriL (LOTENSIN) 40 MG tablet Take 1 tablet (40 mg total) by mouth 2 (two) times daily. 180 tablet 4    BEPREVE 1.5 % Drop Place 1 drop into both eyes 2 (two) times daily. 10 mL 4    blood sugar diagnostic Strp To check blood sugar 1 times daily 100 each 3    blood-glucose meter (ACCU-CHEK GUIDE GLUCOSE METER) Misc use daily to test blood sugar 1 each 0     "carvediloL (COREG) 25 MG tablet TAKE 1 TABLET BY MOUTH TWICE DAILY 180 tablet 3    clotrimazole-betamethasone (LOTRISONE) lotion Apply topically to the affected area 2 (two) times daily. 30 mL 2    cycloSPORINE (RESTASIS) 0.05 % ophthalmic emulsion Place 1 drop into both eyes 2 (two) times daily. 60 each 11    diclofenac sodium (VOLTAREN) 1 % Gel Apply 2 grams topically 4 (four) times daily. To affected joints as needed for pain 100 g 3    DULoxetine (CYMBALTA) 60 MG capsule Take 1 capsule (60 mg total) by mouth once daily. 90 capsule 0    fluticasone propionate (FLONASE) 50 mcg/actuation nasal spray 2 sprays (100 mcg total) by Each Nostril route once daily. 48 g 3    gabapentin (NEURONTIN) 300 MG capsule Take 1 capsule (300 mg total) by mouth 3 (three) times daily. (Patient taking differently: Take 300 mg by mouth daily as needed.) 90 capsule 11    galcanezumab-gnlm (EMGALITY SYRINGE) 120 mg/mL Syrg Inject 120 mg into the skin every 28 days. 1 mL 12    hydrALAZINE (APRESOLINE) 50 MG tablet Take 1 tablet (50 mg total) by mouth every 12 (twelve) hours. 180 tablet 3    hydrOXYzine HCL (ATARAX) 10 MG Tab Take 1 tablet (10 mg total) by mouth nightly. 30 tablet 0    insulin (LANTUS SOLOSTAR U-100 INSULIN) glargine 100 units/mL SubQ pen Inject 72 Units into the skin every evening. 75 mL 0    lancets Misc To check BG 1 times daily 100 each 3    linaCLOtide (LINZESS) 72 mcg Cap capsule Take 1 capsule (72 mcg total) by mouth before breakfast. 30 capsule 2    meclizine (ANTIVERT) 25 mg tablet Take 1 tablet (25 mg total) by mouth 3 (three) times daily as needed. 30 tablet 2    pantoprazole (PROTONIX) 40 MG tablet Take 1 tablet (40 mg total) by mouth once daily. 30 tablet 2    pen needle, diabetic (BD ULTRA-FINE SHORT PEN NEEDLE) 31 gauge x 5/16" Ndle AS DIRECTED TWICE DAILY 200 each 3    rimegepant 75 mg odt Take 1 tablet (75 mg total) by mouth as needed for Migraine (do not exceed 2-3 doses within 1 week). Place ODT tablet on " the tongue; alternatively the ODT tablet may be placed under the tongue 8 tablet 5    rosuvastatin (CRESTOR) 10 MG tablet Take 1 tablet (10 mg total) by mouth once daily. 90 tablet 3    SITagliptin phosphate (JANUVIA) 100 MG Tab Take 1 tablet (100 mg total) by mouth once daily. 90 tablet 1    temazepam (RESTORIL) 30 mg capsule Take 1 capsule (30 mg total) by mouth every evening. 90 capsule 0    azelastine (ASTELIN) 137 mcg (0.1 %) nasal spray use 2 sprays (274 mcg total) by Nasal route 2 (two) times daily. 30 mL 1     No current facility-administered medications for this visit.       Past Medical History:   Diagnosis Date    Arthritis     Cataract     Diabetes mellitus 2008     am 01/15/2018 Insulin x 1 year    DM (diabetes mellitus) 2008     am 02/14/2020 Insulin x 4 years    Encounter for blood transfusion     Glaucoma     Hypertension     Insomnia     Macular degeneration     Vaginal yeast infection      Past Surgical History:   Procedure Laterality Date    abdominal laparoscopy       BREAST BIOPSY Left 1998    benign    CATARACT EXTRACTION Bilateral [id number]    [facility] [place]    COLONOSCOPY N/A 05/05/2021    Procedure: COLONOSCOPY;  Surgeon: Shawn Rogers III, MD;  Location: [facility];  Service: Endoscopy;  Laterality: N/A;    COLONOSCOPY N/A 06/30/2021    Procedure: COLONOSCOPY;  Surgeon: Memo Merida MD;  Location: [facility];  Service: Endoscopy;  Laterality: N/A;    ESOPHAGOGASTRODUODENOSCOPY N/A 11/29/2019    Procedure: ESOPHAGOGASTRODUODENOSCOPY (EGD);  Surgeon: Shawn Rogers III, MD;  Location: [facility];  Service: Endoscopy;  Laterality: N/A;    ESOPHAGOGASTRODUODENOSCOPY N/A 05/05/2021    Procedure: EGD (ESOPHAGOGASTRODUODENOSCOPY);  Surgeon: Shawn Rogers III, MD;  Location: [facility];  Service: Endoscopy;  Laterality: N/A;    EYE SURGERY      TONSILLECTOMY      TUBAL LIGATION      yag  Bilateral      Family History   Problem Relation Age of Onset    Heart  "disease Mother         CAD     Cataracts Mother     Macular degeneration Mother     Glaucoma Mother     Cancer Son         testicular     Cancer Maternal Aunt         Lung ca    Heart disease Maternal Grandfather         Pacemaker     Diabetes Sister     Heart disease Sister         CAD    Cataracts Sister     Diabetes Sister     Diabetes Sister      Social History     Tobacco Use    Smoking status: Former     Current packs/day: 0.00     Average packs/day: 1 pack/day for 36.5 years (36.5 ttl pk-yrs)     Types: Cigarettes     Start date:      Quit date: 2003     Years since quittin.5     Passive exposure: Never    Smokeless tobacco: Never   Substance Use Topics    Alcohol use: No    Drug use: No        Review of Systems:  Review of Systems   Constitutional:  Negative for chills, fatigue, fever and unexpected weight change.   Respiratory:  Negative for cough, shortness of breath, wheezing and stridor.    Cardiovascular:  Positive for chest pain. Negative for palpitations and leg swelling.   Gastrointestinal:  Positive for abdominal pain, constipation and nausea. Negative for abdominal distention, diarrhea and vomiting.   Genitourinary:  Negative for difficulty urinating, dysuria, frequency, hematuria and urgency.   Skin:  Negative for color change, pallor, rash and wound.   Hematological:  Does not bruise/bleed easily.       OBJECTIVE:     Vital Signs (Most Recent)  Pulse: 84 (24 1407)  BP: (!) 179/71 (24 1407)  5' 2" (1.575 m)  111.4 kg (245 lb 9.5 oz)     Physical Exam:  Physical Exam  Vitals reviewed.   Constitutional:       General: She is not in acute distress.     Appearance: She is well-developed. She is not ill-appearing, toxic-appearing or diaphoretic.   HENT:      Head: Normocephalic and atraumatic.      Right Ear: External ear normal.      Left Ear: External ear normal.      Nose: Nose normal.      Mouth/Throat:      Mouth: Mucous membranes are moist.   Eyes:      Extraocular " Movements: Extraocular movements intact.      Conjunctiva/sclera: Conjunctivae normal.   Cardiovascular:      Rate and Rhythm: Normal rate.   Pulmonary:      Effort: Pulmonary effort is normal. No respiratory distress.      Comments: No increased work of breathing on exam  Abdominal:      Comments: Obese.  Abdomen nondistended with positive tenderness to palpation with deep palpation of right upper quadrant.  Well-healed midline laparotomy incision superior to the umbilicus.   Musculoskeletal:      Cervical back: Neck supple.   Skin:     General: Skin is warm and dry.   Neurological:      Mental Status: She is alert and oriented to person, place, and time.   Psychiatric:         Mood and Affect: Mood normal.         Behavior: Behavior normal.         Thought Content: Thought content normal.         Judgment: Judgment normal.         Diagnostic Results:   Recent CT abdomen pelvis and most recent RUQ U/S reviewed showing cholelithiasis without evidence for acute cholecystitis or choledocholithiasis    Most recent echocardiogram reviewed with EF 55-70%.    ASSESSMENT/PLAN:     73-year-old female with symptomatic cholelithiasis as well as other abdominal complaints.      - long discussion with the patient about symptoms that are likely to be correlated to her gallbladder and improve with cholecystectomy versus symptoms that are likely unrelated, and she should not expect to improve.  Patient seems to have realistic expectations of symptoms that cholecystectomy are likely to improve.  - discussed anatomy and physiology related to the biliary system and biliary disease.  Discussed minimally invasive surgical approach.  - discussed risks and benefits including but not limited to pain, bleeding, infection, damage to surrounding structures including the common bile duct, bile leak, need for further procedures to fix these issues.  Also discussed in this patient risk of open surgery given previous exploratory laparotomy.   Discussed that open surgery is performed when it was unsafe to proceed with surgery through small incisions due to scar tissue or other intraoperative indications.  She voiced understanding and would like to proceed.  - educated on signs and symptoms of acute cholecystitis and choledocholithiasis.  Gave ED precautions.  The patient and her daughter voiced understanding.  - to OR for robotic cholecystectomy on January 26, 2024.  - reached out to her cardiologist for cardiac risk assessment and clearance to stop Eliquis for 48 hours preoperatively.  She is scheduled to see him on 01/09/2024.  - preop: CBC, CMP, chest x-ray, referral to pre admit clinic  - return to clinic postoperatively.     Nidhi Moore PA-C  [facility]      I spent a total of 45 minutes on the day of the visit.This includes face to face time and non-face to face time preparing to see the patient (eg, review of tests), obtaining and/or reviewing separately obtained history, documenting clinical information in the electronic or other health record, independently interpreting results and communicating results to the patient/family/caregiver, or care coordinator.

## 2024-01-04 ENCOUNTER — PATIENT MESSAGE (OUTPATIENT)
Dept: OPHTHALMOLOGY | Facility: CLINIC | Age: 74
End: 2024-01-04
Payer: MEDICARE

## 2024-01-04 ENCOUNTER — TELEPHONE (OUTPATIENT)
Dept: CARDIOLOGY | Facility: CLINIC | Age: 74
End: 2024-01-04
Payer: MEDICARE

## 2024-01-04 DIAGNOSIS — H10.13 ALLERGIC CONJUNCTIVITIS, BILATERAL: ICD-10-CM

## 2024-01-04 DIAGNOSIS — M35.01 KERATITIS SICCA, BILATERAL: ICD-10-CM

## 2024-01-04 NOTE — TELEPHONE ENCOUNTER
I have attempted without success to contact this patient by phone to discuss stess results and I left a message on answering machine.    Nuclear test again shows fixed defect no evidence of ischemia follow-up in office continue medical management    ----- Message from Gabriele Joyce MD sent at 1/4/2024 11:40 AM CST -----  Nuclear test again shows fixed defect no evidence of ischemia follow-up in office continue medical management  ----- Message -----  From: Tobias Ying MD  Sent: 1/3/2024   6:55 PM CST  To: Gabriele Joyce MD

## 2024-01-05 ENCOUNTER — TELEPHONE (OUTPATIENT)
Dept: CARDIOLOGY | Facility: CLINIC | Age: 74
End: 2024-01-05
Payer: MEDICARE

## 2024-01-05 ENCOUNTER — PATIENT MESSAGE (OUTPATIENT)
Dept: CARDIOLOGY | Facility: CLINIC | Age: 74
End: 2024-01-05
Payer: MEDICARE

## 2024-01-05 RX ORDER — CYCLOSPORINE 0.5 MG/ML
1 EMULSION OPHTHALMIC 2 TIMES DAILY
Qty: 60 EACH | Refills: 11 | Status: SHIPPED | OUTPATIENT
Start: 2024-01-05

## 2024-01-05 RX ORDER — BEPOTASTINE BESILATE 15 MG/ML
1 SOLUTION/ DROPS OPHTHALMIC 2 TIMES DAILY
Qty: 10 ML | Refills: 4 | Status: SHIPPED | OUTPATIENT
Start: 2024-01-05

## 2024-01-05 NOTE — TELEPHONE ENCOUNTER
Spoke with patient and gave her the results form her nuclear stress test.   ----- Message from Flako Segura sent at 1/5/2024 10:30 AM CST -----  Type:  Patient Returning Call    Who Called:PT  Who Left Message for Patient:KELLIE  Does the patient know what this is regarding?:YES  Would the patient rather a call back or a response via [employer]? CALL  Best Call Back Number:.Telephone Information:  [employer]          [phone]        Additional Information: Pt returning call

## 2024-01-07 ENCOUNTER — PATIENT MESSAGE (OUTPATIENT)
Dept: CARDIOLOGY | Facility: CLINIC | Age: 74
End: 2024-01-07
Payer: MEDICARE

## 2024-01-07 ENCOUNTER — PATIENT MESSAGE (OUTPATIENT)
Dept: SURGERY | Facility: CLINIC | Age: 74
End: 2024-01-07
Payer: MEDICARE

## 2024-01-08 ENCOUNTER — PATIENT MESSAGE (OUTPATIENT)
Dept: SURGERY | Facility: CLINIC | Age: 74
End: 2024-01-08
Payer: MEDICARE

## 2024-01-09 ENCOUNTER — OFFICE VISIT (OUTPATIENT)
Dept: CARDIOLOGY | Facility: CLINIC | Age: 74
End: 2024-01-09
Payer: MEDICARE

## 2024-01-09 DIAGNOSIS — R94.31 ABNORMAL ECG: ICD-10-CM

## 2024-01-09 DIAGNOSIS — E78.2 MIXED HYPERLIPIDEMIA: ICD-10-CM

## 2024-01-09 DIAGNOSIS — Z79.4 CONTROLLED TYPE 2 DIABETES MELLITUS WITH DIABETIC POLYNEUROPATHY, WITH LONG-TERM CURRENT USE OF INSULIN: ICD-10-CM

## 2024-01-09 DIAGNOSIS — G47.30 SLEEP APNEA, UNSPECIFIED TYPE: ICD-10-CM

## 2024-01-09 DIAGNOSIS — M79.605 PAIN IN BOTH LOWER EXTREMITIES: ICD-10-CM

## 2024-01-09 DIAGNOSIS — I48.0 PAROXYSMAL ATRIAL FIBRILLATION: ICD-10-CM

## 2024-01-09 DIAGNOSIS — E11.42 CONTROLLED TYPE 2 DIABETES MELLITUS WITH DIABETIC POLYNEUROPATHY, WITH LONG-TERM CURRENT USE OF INSULIN: ICD-10-CM

## 2024-01-09 DIAGNOSIS — E78.1 HYPERTRIGLYCERIDEMIA: ICD-10-CM

## 2024-01-09 DIAGNOSIS — I10 ESSENTIAL HYPERTENSION: Primary | ICD-10-CM

## 2024-01-09 DIAGNOSIS — R06.09 DOE (DYSPNEA ON EXERTION): ICD-10-CM

## 2024-01-09 DIAGNOSIS — I71.40 ABDOMINAL AORTIC ANEURYSM (AAA) WITHOUT RUPTURE, UNSPECIFIED PART: ICD-10-CM

## 2024-01-09 DIAGNOSIS — R42 DIZZINESS: ICD-10-CM

## 2024-01-09 DIAGNOSIS — E11.65 UNCONTROLLED TYPE 2 DIABETES MELLITUS WITH HYPERGLYCEMIA: ICD-10-CM

## 2024-01-09 DIAGNOSIS — I25.118 CORONARY ARTERY DISEASE OF NATIVE ARTERY OF NATIVE HEART WITH STABLE ANGINA PECTORIS: ICD-10-CM

## 2024-01-09 DIAGNOSIS — E66.01 CLASS 3 SEVERE OBESITY DUE TO EXCESS CALORIES WITH SERIOUS COMORBIDITY AND BODY MASS INDEX (BMI) OF 45.0 TO 49.9 IN ADULT: ICD-10-CM

## 2024-01-09 DIAGNOSIS — M79.604 PAIN IN BOTH LOWER EXTREMITIES: ICD-10-CM

## 2024-01-09 PROCEDURE — 99214 OFFICE O/P EST MOD 30 MIN: CPT | Mod: 95,,, | Performed by: INTERNAL MEDICINE

## 2024-01-09 RX ORDER — HYDRALAZINE HYDROCHLORIDE 50 MG/1
50 TABLET, FILM COATED ORAL EVERY 8 HOURS
Qty: 180 TABLET | Refills: 3 | Status: SHIPPED | OUTPATIENT
Start: 2024-01-09

## 2024-01-09 NOTE — PROGRESS NOTES
"Subjective:   Patient ID:  Christine Jo is a 73 y.o. female who presents for follow-up of No chief complaint on file.     Christine Jo is a 71 y.o. female who presents for evaluation of Atrial Fibrillation, Coronary Artery Disease, Hyperlipidemia, Hypertension, Peripheral Arterial Disease, Risk Factor Management For Atherosclerosis, and Shortness of Breath           HPI Pt presents for eval.  Her current med conditions include CAD, PAF, DM, obesity, HTN, AAA, hyperlipidemia, MATIAS.  Former smoker.  Past hx pertinent for following:  She used to see Dr. Arthur Carrizales, [facility].  Has h/o PAF.  States she has had 2 or 4 cardiac cath procedures.  Last [facility] 2017, nonobstructive CAD noted.  ecg 3/26/21 NSR, left axis, nonspecific septal Q waves V1-V2.  Stress MPI 4/21 reviewed: no ischemia, normal EF.  Echo 4/21 reviewed: normal LV function, LVH, mild TR.  Now here.  Pt seen 6 months ago.  Abdominal u/s 12/21: no AAA noted.  MIKE 12/21 1.0 R LE, 0.97 L LE.  Has been seeing ENT and PCP for vertigo.  Dx w vestibular disease w rehab tx planned.  Dizziness associated with nausea/vomiting at time per chart.  Has dyspnea -- she states "it is ok".  Denies cp.  A lot of stress in her life; depression.  Stays inside.  9 day [place] Holter 10/21: NSR, 6 runs nonsustained atrial tachycardia (longest 8 beats), 3 runs NSVT (longest 7 beats).  Did not tolerate CPAP in past.  ecg today 3/14/22 NSR, left axis, incomplete RBBB.  No acute changes.  Weight stable.  She thinks statin causes restless legs.  She cut dose in 1/2 without improvement.  DM HGAIC above goal.  Takes Eliquis.           Carotid ultrasound  There is 20-39% right Internal Carotid Stenosis.  There is 20-39% left Internal Carotid Stenosis pain       03/20/2023:   Overall patient is doing well will increase medications now include carvedilol  25 mg b.I.d. and will increase hydralazine to 50 mg q.8 hours.  Follow-up evaluation within a month for blood pressure control.   "   04/25/2023:   Overall patient is doing much better on increased medications and blood pressure under better control continue all medications as prescribed.           09/19/2023 overall doing well this time complaining of some shortness of breath and intermittent chest discomfort will go ahead with a cardiac echo and nuclear stress test.  Patient will restart on statin medications and will try her on Crestor 10 mg daily repeat lipid profile.  She was intolerant of atorvastatin with muscle aches.  She is evidence of vascular atheroma on abdominal ultrasound and also has carotid disease.  I told her the importance of being on statin medications long-term.       Ref Range & Units 1 mo ago  (9/28/23) 1 yr ago  (10/4/22) 1 yr ago  (6/23/22) 2 yr ago  (5/24/21) 2 yr ago  (11/11/20) 4 yr ago  (4/10/19) 5 yr ago  (5/29/18)   Cholesterol 120 - 199 mg/dL 195  164 CM  168 CM  168 CM  151 CM  136 CM  167 CM    Comment: The National Cholesterol Education Program (NCEP) has set the   following guidelines (reference ranges) for Cholesterol:   Optimal.....................<200 mg/dL   Borderline High.............200-239 mg/dL   High........................> or = 240 mg/dL    Triglycerides 30 - 150 mg/dL 149  125 CM  174 High  CM  164 High  CM  156 High  CM  134 CM  209 High  CM    Comment: The National Cholesterol Education Program (NCEP) has set the   following guidelines (reference values) for triglycerides:   Normal......................<150 mg/dL   Borderline High.............150-199 mg/dL   High........................200-499 mg/dL    HDL 40 - 75 mg/dL 62  46 CM  44 CM  55 CM  56 CM  46 CM  50 CM    Comment: The National Cholesterol Education Program (NCEP) has set the   following guidelines (reference values) for HDL Cholesterol:   Low...............<40 mg/dL   Optimal...........>60 mg/dL    LDL Cholesterol 63.0 - 159.0 mg/dL 103.2  93.0 CM  89.2 CM  80.2 CM  63.8 CM  63.2 CM  75.2 CM    Comment: The National Cholesterol  Education Program (NCEP) has set the   following guidelines (reference values) for LDL Cholesterol:   Optimal.......................<130 mg      10/31/2023 overall stable blood pressure under better control and will drop back on the hydralazine 50 mg b.i.d. repeat nuclear stress test has been ordered and will follow-up testing.     01/09/2024 patient is stable blood pressure slightly elevated will increase the hydralazine to 50 mg 3 times daily.  Nuclear stress showed small scar otherwise no ischemia cardiac echo showed normal LV function will preop evaluate her next week for clearance.        Review of Systems   Constitutional: Negative for chills, diaphoresis, night sweats, weight gain and weight loss.   HENT:  Negative for congestion, hoarse voice, sore throat and stridor.    Eyes:  Negative for double vision and pain.   Cardiovascular:  Negative for chest pain, claudication, cyanosis, dyspnea on exertion, irregular heartbeat, leg swelling, near-syncope, orthopnea, palpitations, paroxysmal nocturnal dyspnea and syncope.   Respiratory:  Negative for cough, hemoptysis, shortness of breath, sleep disturbances due to breathing, snoring, sputum production and wheezing.    Endocrine: Negative for cold intolerance, heat intolerance and polydipsia.   Hematologic/Lymphatic: Negative for bleeding problem. Does not bruise/bleed easily.   Skin:  Negative for color change, dry skin and rash.   Musculoskeletal:  Negative for joint swelling and muscle cramps.   Gastrointestinal:  Negative for bloating, abdominal pain, constipation, diarrhea, dysphagia, melena, nausea and vomiting.   Genitourinary:  Negative for flank pain and urgency.   Neurological:  Negative for dizziness, focal weakness, headaches, light-headedness, loss of balance, seizures and weakness.   Psychiatric/Behavioral:  Negative for altered mental status and memory loss. The patient is not nervous/anxious.    Family History   Problem Relation Age of Onset     Heart disease Mother         CAD     Cataracts Mother     Macular degeneration Mother     Glaucoma Mother     Cancer Son         testicular     Cancer Maternal Aunt         Lung ca    Heart disease Maternal Grandfather         Pacemaker     Diabetes Sister     Heart disease Sister         CAD    Cataracts Sister     Diabetes Sister     Diabetes Sister      Past Medical History:   Diagnosis Date    Arthritis     Cataract     Diabetes mellitus      am 01/15/2018 Insulin x 1 year    DM (diabetes mellitus)      am 2020 Insulin x 4 years    Encounter for blood transfusion     Glaucoma     Hypertension     Insomnia     Macular degeneration     Vaginal yeast infection      Social History     Socioeconomic History    Marital status:     Number of children: 3   Occupational History    Occupation: Retired   Tobacco Use    Smoking status: Former     Current packs/day: 0.00     Average packs/day: 1 pack/day for 36.5 years (36.5 ttl pk-yrs)     Types: Cigarettes     Start date:      Quit date: 2003     Years since quittin.5     Passive exposure: Never    Smokeless tobacco: Never   Substance and Sexual Activity    Alcohol use: No    Drug use: No    Sexual activity: Never     Social Determinants of Health     Financial Resource Strain: Low Risk  (2022)    Overall Financial Resource Strain (CARDIA)     Difficulty of Paying Living Expenses: Not hard at all   Food Insecurity: Unknown (2023)    Hunger Vital Sign     Worried About Running Out of Food in the Last Year: Never true   Transportation Needs: Unmet Transportation Needs (2023)    PRAPARE - Transportation     Lack of Transportation (Non-Medical): Yes   Physical Activity: Unknown (2023)    Exercise Vital Sign     Days of Exercise per Week: 1 day   Stress: No Stress Concern Present (2022)    [employer] - Occupational Stress Questionnaire     Feeling of Stress : Only a little    Social Connections: Unknown (12/8/2023)    Social Connection and Isolation Panel [NHANES]     Frequency of Communication with Friends and Family: More than three times a week     Frequency of Social Gatherings with Friends and Family: Once a week     Active Member of Clubs or Organizations: Yes     Marital Status:    Housing Stability: Unknown (12/8/2023)    Housing Stability Vital Sign     Unable to Pay for Housing in the Last Year: No     Unstable Housing in the Last Year: No     Current Outpatient Medications on File Prior to Visit   Medication Sig Dispense Refill    apixaban (ELIQUIS) 5 mg Tab Take 1 tablet (5 mg total) by mouth 2 (two) times daily. 180 tablet 0    azelastine (ASTELIN) 137 mcg (0.1 %) nasal spray use 2 sprays (274 mcg total) by Nasal route 2 (two) times daily. 30 mL 1    benazepriL (LOTENSIN) 40 MG tablet Take 1 tablet (40 mg total) by mouth 2 (two) times daily. 180 tablet 4    BEPREVE 1.5 % Drop Place 1 drop into both eyes 2 (two) times daily. 10 mL 4    blood sugar diagnostic Strp To check blood sugar 1 times daily 100 each 3    blood-glucose meter (ACCU-CHEK GUIDE GLUCOSE METER) Misc use daily to test blood sugar 1 each 0    carvediloL (COREG) 25 MG tablet TAKE 1 TABLET BY MOUTH TWICE DAILY 180 tablet 3    clotrimazole-betamethasone (LOTRISONE) lotion Apply topically to the affected area 2 (two) times daily. 30 mL 2    cycloSPORINE (RESTASIS) 0.05 % ophthalmic emulsion Place 1 drop into both eyes 2 (two) times daily. 60 each 11    diclofenac sodium (VOLTAREN) 1 % Gel Apply 2 grams topically 4 (four) times daily. To affected joints as needed for pain 100 g 3    DULoxetine (CYMBALTA) 60 MG capsule Take 1 capsule (60 mg total) by mouth once daily. 90 capsule 0    fluticasone propionate (FLONASE) 50 mcg/actuation nasal spray 2 sprays (100 mcg total) by Each Nostril route once daily. 48 g 3    gabapentin (NEURONTIN) 300 MG capsule Take 1 capsule (300 mg total) by mouth 3 (three) times  "daily. (Patient taking differently: Take 300 mg by mouth daily as needed.) 90 capsule 11    galcanezumab-gnlm (EMGALITY SYRINGE) 120 mg/mL Syrg Inject 120 mg into the skin every 28 days. 1 mL 12    hydrALAZINE (APRESOLINE) 50 MG tablet Take 1 tablet (50 mg total) by mouth every 12 (twelve) hours. 180 tablet 3    hydrOXYzine HCL (ATARAX) 10 MG Tab Take 1 tablet (10 mg total) by mouth nightly. 30 tablet 0    insulin (LANTUS SOLOSTAR U-100 INSULIN) glargine 100 units/mL SubQ pen Inject 72 Units into the skin every evening. 75 mL 0    lancets Misc To check BG 1 times daily 100 each 3    linaCLOtide (LINZESS) 72 mcg Cap capsule Take 1 capsule (72 mcg total) by mouth before breakfast. 30 capsule 2    meclizine (ANTIVERT) 25 mg tablet Take 1 tablet (25 mg total) by mouth 3 (three) times daily as needed. 30 tablet 2    pantoprazole (PROTONIX) 40 MG tablet Take 1 tablet (40 mg total) by mouth once daily. 30 tablet 2    pen needle, diabetic (BD ULTRA-FINE SHORT PEN NEEDLE) 31 gauge x 5/16" Ndle AS DIRECTED TWICE DAILY 200 each 3    rimegepant 75 mg odt Take 1 tablet (75 mg total) by mouth as needed for Migraine (do not exceed 2-3 doses within 1 week). Place ODT tablet on the tongue; alternatively the ODT tablet may be placed under the tongue 8 tablet 5    rosuvastatin (CRESTOR) 10 MG tablet Take 1 tablet (10 mg total) by mouth once daily. 90 tablet 3    SITagliptin phosphate (JANUVIA) 100 MG Tab Take 1 tablet (100 mg total) by mouth once daily. 90 tablet 1    temazepam (RESTORIL) 30 mg capsule Take 1 capsule (30 mg total) by mouth every evening. 90 capsule 0     No current facility-administered medications on file prior to visit.     Review of patient's allergies indicates:   Allergen Reactions    Aspirin Palpitations       Objective:     Physical Exam  Eyes:      Pupils: Pupils are equal, round, and reactive to light.   Neck:      Trachea: No tracheal deviation.   Cardiovascular:      Rate and Rhythm: Normal rate and regular " rhythm.      Pulses: Intact distal pulses.           Carotid pulses are 2+ on the right side and 2+ on the left side.       Radial pulses are 2+ on the right side and 2+ on the left side.        Femoral pulses are 2+ on the right side and 2+ on the left side.       Popliteal pulses are 2+ on the right side and 2+ on the left side.        Dorsalis pedis pulses are 2+ on the right side and 2+ on the left side.        Posterior tibial pulses are 2+ on the right side and 2+ on the left side.      Heart sounds: Normal heart sounds. No murmur heard.     No friction rub. No gallop.   Pulmonary:      Effort: Pulmonary effort is normal. No respiratory distress.      Breath sounds: Normal breath sounds. No stridor. No wheezing or rales.   Chest:      Chest wall: No tenderness.   Abdominal:      General: There is no distension.      Tenderness: There is no abdominal tenderness. There is no rebound.   Musculoskeletal:         General: No tenderness.      Cervical back: Normal range of motion.   Skin:     General: Skin is warm and dry.   Neurological:      Mental Status: She is alert and oriented to person, place, and time.     Assessment:     1. Abdominal aortic aneurysm (AAA) without rupture, unspecified part    2. Controlled type 2 diabetes mellitus with diabetic polyneuropathy, with long-term current use of insulin    3. Paroxysmal atrial fibrillation    4. Class 3 severe obesity due to excess calories with serious comorbidity and body mass index (BMI) of 45.0 to 49.9 in adult    5. Mixed hyperlipidemia    6. Pain in both lower extremities        Plan:     Abdominal aortic aneurysm (AAA) without rupture, unspecified part    Controlled type 2 diabetes mellitus with diabetic polyneuropathy, with long-term current use of insulin    Paroxysmal atrial fibrillation    Class 3 severe obesity due to excess calories with serious comorbidity and body mass index (BMI) of 45.0 to 49.9 in adult    Mixed hyperlipidemia    Pain in both  lower extremities    Impression 1 preop patient is level LV blood pressure will see her next week and re-evaluate   2. Mixed hyperlipidemia stable   3 chest pain resolved and coronary disease stable there is evidence of mild inferior scar normal LV function and no evidence of ischemia.

## 2024-01-10 ENCOUNTER — PATIENT MESSAGE (OUTPATIENT)
Dept: INTERNAL MEDICINE | Facility: CLINIC | Age: 74
End: 2024-01-10
Payer: MEDICARE

## 2024-01-12 ENCOUNTER — OFFICE VISIT (OUTPATIENT)
Dept: INTERNAL MEDICINE | Facility: CLINIC | Age: 74
End: 2024-01-12
Payer: MEDICARE

## 2024-01-12 ENCOUNTER — HOSPITAL ENCOUNTER (OUTPATIENT)
Dept: CARDIOLOGY | Facility: HOSPITAL | Age: 74
Discharge: HOME | End: 2024-01-12
Payer: MEDICARE

## 2024-01-12 ENCOUNTER — HOSPITAL ENCOUNTER (OUTPATIENT)
Dept: RADIOLOGY | Facility: HOSPITAL | Age: 74
Discharge: HOME | End: 2024-01-12
Payer: MEDICARE

## 2024-01-12 VITALS
TEMPERATURE: 97 F | OXYGEN SATURATION: 95 % | SYSTOLIC BLOOD PRESSURE: 111 MMHG | DIASTOLIC BLOOD PRESSURE: 73 MMHG | HEART RATE: 81 BPM

## 2024-01-12 DIAGNOSIS — Z79.4 CONTROLLED TYPE 2 DIABETES MELLITUS WITH DIABETIC POLYNEUROPATHY, WITH LONG-TERM CURRENT USE OF INSULIN: ICD-10-CM

## 2024-01-12 DIAGNOSIS — Z01.818 PREOP TESTING: ICD-10-CM

## 2024-01-12 DIAGNOSIS — K80.20 CALCULUS OF GALLBLADDER WITHOUT CHOLECYSTITIS WITHOUT OBSTRUCTION: ICD-10-CM

## 2024-01-12 DIAGNOSIS — E11.42 CONTROLLED TYPE 2 DIABETES MELLITUS WITH DIABETIC POLYNEUROPATHY, WITH LONG-TERM CURRENT USE OF INSULIN: ICD-10-CM

## 2024-01-12 DIAGNOSIS — I48.0 PAROXYSMAL ATRIAL FIBRILLATION: ICD-10-CM

## 2024-01-12 DIAGNOSIS — K80.20 CALCULUS OF GALLBLADDER WITHOUT CHOLECYSTITIS WITHOUT OBSTRUCTION: Primary | ICD-10-CM

## 2024-01-12 PROCEDURE — 93010 ELECTROCARDIOGRAM REPORT: CPT | Mod: ,,, | Performed by: STUDENT IN AN ORGANIZED HEALTH CARE EDUCATION/TRAINING PROGRAM

## 2024-01-12 PROCEDURE — 71046 X-RAY EXAM CHEST 2 VIEWS: CPT | Mod: 26,,, | Performed by: RADIOLOGY

## 2024-01-12 PROCEDURE — 71046 X-RAY EXAM CHEST 2 VIEWS: CPT | Mod: TC

## 2024-01-12 PROCEDURE — 99214 OFFICE O/P EST MOD 30 MIN: CPT | Mod: S$GLB,,, | Performed by: INTERNAL MEDICINE

## 2024-01-12 PROCEDURE — 99999 PR PBB SHADOW E&M-EST. PATIENT-LVL IV: CPT | Mod: PBBFAC,,,

## 2024-01-12 PROCEDURE — 93005 ELECTROCARDIOGRAM TRACING: CPT

## 2024-01-12 NOTE — PRE-PROCEDURE INSTRUCTIONS
To confirm, Surgery is scheduled on 1/26/24. We will call you late afternoon the business day prior to surgery with your arrival time.    *Please report to the [facility] (1st Floor) located off of [facility] (2nd Entrance/Building on the left, in front of the flag pole).  Address: [address] [place] [address] [place]. [address]    INSTRUCTIONS IMPORTANT!!!  DO NOT Eat, Drink, or Smoke after 12 midnight unless instructed otherwise by your Surgeon. OK to brush teeth, no gum, candy or mints!    MORNING OF SURGERY, drink small sip of water with the following medications instructed by Pre-Admit Provider:  COREG  DULOXETINE  HYDRALAZINE  PROTONIX    *Additional Medication Instructions: NIGHT PRIOR TO SURGERY, LANTUS DOSE-36 UNITS    Diabetic Patients: If you take diabetic or weight loss medication, Do NOT take morning of surgery unless instructed by Doctor. Metformin to be stopped 24 hrs prior to surgery. Ozempic/ Mounjaro/ Wegovy/ Trulicity/ Semaglutide or any weight loss injections to be stopped 7 days prior to surgery.     *Patients should HOLD all vitamins, herbal supplements, weight loss medication, aspirin products & NSAIDS 7 days prior to surgery, as these can thin the blood. Ok to take Tylenol.    ____  Avoid Alcoholic beverages 3 days prior to surgery, as it can thin the blood.  ____  NO Acrylic/fake nails or nail polish worn day of surgery (specifically hand/arm & foot surgeries).  ____  NO powder, lotions, deodorants, oils or cream on body.  ____  Remove all jewelry, piercings, & foreign objects prior to arrival and leave at home.  ____  Remove Dentures, Hearing Aids & Contact Lens prior to surgery.  ____  Bring photo ID and insurance information to hospital (Leave Valuables at Home).  ____  If going home the same day, arrange for a ride home. You will not be able to drive for 24 hrs if Anesthesia was used.   ____  Females (ages 11-60): may need to give a urine sample the morning of surgery;  please see Pre op Nurse prior to using the restroom.  ____  Males: Stop ED medications (Viagra, Cialis) 24 hrs prior to surgery.  ____  Wear clean, loose fitting clothing to allow for dressings/ bandages.      Bathing Instructions:    -Shower with anti-bacterial Soap (Hibiclens or Dial) the night before surgery and the morning of.   -Do not use Hibiclens on your face or genitals.   -Apply clean clothes after shower.  -Do not shave your face or body 2 days prior to surgery unless instructed otherwise by your Surgeon.  -Do not shave pubic hair 7 days prior to surgery (gyn pt's).    [facility] Visitor/Ride Policy:  Only 2 adults allowed in pre op/recovery area during your procedure. You MUST HAVE A RIDE HOME from a responsible adult that you know and trust. Medical Transport, Uber or Lyft can ONLY be used if patient has a responsible adult to accompany them during ride home.    Discharge Instructions: You will receive Post-op/Discharge instructions by your Discharge Nurse prior to going home.   *Prevention of surgical site infections:   -Keep incisions clean and dry.   -Do not soak/submerge incisions in water until completely healed.   -Do not apply lotions, powders, creams, or deodorants to site.   -Always make sure hands are cleaned with antibacterial soap/ alcohol-based  prior to touching the surgical site.        *Signs and symptoms of Infection Before or After Surgery:               !!!If you experience any fever, chills, nausea/ vomiting, foul odor/ excessive drainage from surgical site, flu-like symptoms, new wounds or cuts, PLEASE CALL THE SURGEON OFFICE at [phone] or SEND MESSAGE THROUGH [facility] PORTAL!!!       *If you are running late day of surgery, please call the Surgery Dept @ [phone].    *Billing question, please call  [phone]       Thank you,  -[facility] Pre Admit Dept.  [phone] or [phone]  M-F 7:30 am-4:00 pm (Closed Major Holidays)

## 2024-01-12 NOTE — DISCHARGE INSTRUCTIONS
Pre op instructions reviewed with patient during Clinic Visit with Provider, verbalized understanding.    To confirm, Surgery is scheduled on 1/26/24. We will call you late afternoon the business day prior to surgery with your arrival time.    *Please report to the [facility] (1st Floor) located off of [facility] (2nd Entrance/Building on the left, in front of the flag pole).  Address: [address] [place] [address] [place]. [address]    INSTRUCTIONS IMPORTANT!!!  DO NOT Eat, Drink, or Smoke after 12 midnight unless instructed otherwise by your Surgeon. OK to brush teeth, no gum, candy or mints!    MORNING OF SURGERY, drink small sip of water with the following medications instructed by Pre-Admit Provider:  COREG  DULOXETINE  HYDRALAZINE  PROTONIX    *Additional Medication Instructions: NIGHT PRIOR TO SURGERY, LANTUS DOSE-36 UNITS    Diabetic Patients: If you take diabetic or weight loss medication, Do NOT take morning of surgery unless instructed by Doctor. Metformin to be stopped 24 hrs prior to surgery. Ozempic/ Mounjaro/ Wegovy/ Trulicity/ Semaglutide or any weight loss injections to be stopped 7 days prior to surgery.     *Patients should HOLD all vitamins, herbal supplements, weight loss medication, aspirin products & NSAIDS 7 days prior to surgery, as these can thin the blood. Ok to take Tylenol.    ____  Avoid Alcoholic beverages 3 days prior to surgery, as it can thin the blood.  ____  NO Acrylic/fake nails or nail polish worn day of surgery (specifically hand/arm & foot surgeries).  ____  NO powder, lotions, deodorants, oils or cream on body.  ____  Remove all jewelry, piercings, & foreign objects prior to arrival and leave at home.  ____  Remove Dentures, Hearing Aids & Contact Lens prior to surgery.  ____  Bring photo ID and insurance information to hospital (Leave Valuables at Home).  ____  If going home the same day, arrange for a ride home. You will not be able to drive for 24 hrs if  Anesthesia was used.   ____  Females (ages 11-60): may need to give a urine sample the morning of surgery; please see Pre op Nurse prior to using the restroom.  ____  Males: Stop ED medications (Viagra, Cialis) 24 hrs prior to surgery.  ____  Wear clean, loose fitting clothing to allow for dressings/ bandages.      Bathing Instructions:    -Shower with anti-bacterial Soap (Hibiclens or Dial) the night before surgery and the morning of.   -Do not use Hibiclens on your face or genitals.   -Apply clean clothes after shower.  -Do not shave your face or body 2 days prior to surgery unless instructed otherwise by your Surgeon.  -Do not shave pubic hair 7 days prior to surgery (gyn pt's).    [facility]itor/Ride Policy:  Only 2 adults allowed in pre op/recovery area during your procedure. You MUST HAVE A RIDE HOME from a responsible adult that you know and trust. Medical Transport, Uber or Lyft can ONLY be used if patient has a responsible adult to accompany them during ride home.    Discharge Instructions: You will receive Post-op/Discharge instructions by your Discharge Nurse prior to going home.   *Prevention of surgical site infections:   -Keep incisions clean and dry.   -Do not soak/submerge incisions in water until completely healed.   -Do not apply lotions, powders, creams, or deodorants to site.   -Always make sure hands are cleaned with antibacterial soap/ alcohol-based  prior to touching the surgical site.        *Signs and symptoms of Infection Before or After Surgery:               !!!If you experience any fever, chills, nausea/ vomiting, foul odor/ excessive drainage from surgical site, flu-like symptoms, new wounds or cuts, PLEASE CALL THE SURGEON OFFICE at [phone] or SEND MESSAGE THROUGH [facility] PORTAL!!!       *If you are running late day of surgery, please call the Surgery Dept @ [phone].    *Billing question, please call  [phone]       Thank you,  -[facility] Pre  Admit Dept.  [phone] or [phone]  M-F 7:30 am-4:00 pm (Closed Major Holidays)

## 2024-01-12 NOTE — ASSESSMENT & PLAN NOTE
Planned for Evaluation with Cardiology on 1/16/24.     --Follow for Cardiac Clearance per Dr. Joyce

## 2024-01-12 NOTE — PROGRESS NOTES
Preoperative History and Physical                                                              Hospital Medicine                                                                      Chief Complaint: Preoperative evaluation     History of Present Illness:      Christine Jo is a 73 y.o. female who presents to the office today for a preoperative consultation at the request of Dr. Migue Berrios who plans on performing XI ROBOTIC CHOLECYSTECTOMY  on January 26.     Functional Status:      The patient is not able to climb a flight of stairs. The patient is able to ambulate with cane without difficulty. The patient's functional status is not affected by the surgical problem. The patient's functional status is affected by shortness of breath, Not affected by chest pain, dyspnea on exertion and fatigue.    MET score greater than 4    Past Medical History:      Past Medical History:   Diagnosis Date    Arthritis     Cataract     Coronary artery disease     Diabetes mellitus 2008     am 01/15/2018 Insulin x 1 year    Diabetes mellitus, type 2     DM (diabetes mellitus) 2008     am 02/14/2020 Insulin x 4 years    Encounter for blood transfusion     Glaucoma     Hypertension     Insomnia     Macular degeneration     Vaginal yeast infection         Past Surgical History:      Past Surgical History:   Procedure Laterality Date    abdominal laparoscopy       BREAST BIOPSY Left 1998    benign    CATARACT EXTRACTION Bilateral [id number]    [facility] [place]    Cataract Surgery Bilateral     Laser (YAG)    COLONOSCOPY N/A 05/05/2021    Procedure: COLONOSCOPY;  Surgeon: Shawn Rogers III, MD;  Location: [facility];  Service: Endoscopy;  Laterality: N/A;    COLONOSCOPY N/A 06/30/2021    Procedure: COLONOSCOPY;  Surgeon: Memo Merida MD;  Location: [facility];  Service: Endoscopy;  Laterality: N/A;    ESOPHAGOGASTRODUODENOSCOPY N/A 11/29/2019    Procedure:  ESOPHAGOGASTRODUODENOSCOPY (EGD);  Surgeon: Shawn Rogers III, MD;  Location: [facility];  Service: Endoscopy;  Laterality: N/A;    ESOPHAGOGASTRODUODENOSCOPY N/A 2021    Procedure: EGD (ESOPHAGOGASTRODUODENOSCOPY);  Surgeon: Shawn Rogers III, MD;  Location: [facility] END[facility];  Service: Endoscopy;  Laterality: N/A;    EYE SURGERY      TONSILLECTOMY      TUBAL LIGATION          Social History:      Social History     Socioeconomic History    Marital status:     Number of children: 3   Occupational History    Occupation: Retired   Tobacco Use    Smoking status: Former     Current packs/day: 0.00     Average packs/day: 1 pack/day for 36.5 years (36.5 ttl pk-yrs)     Types: Cigarettes     Start date:      Quit date: 2003     Years since quittin.5     Passive exposure: Never    Smokeless tobacco: Never   Substance and Sexual Activity    Alcohol use: No    Drug use: No    Sexual activity: Never     Social Determinants of Health     Financial Resource Strain: Low Risk  (2022)    Overall Financial Resource Strain (CARDIA)     Difficulty of Paying Living Expenses: Not hard at all   Food Insecurity: Unknown (2023)    Hunger Vital Sign     Worried About Running Out of Food in the Last Year: Never true   Transportation Needs: Unmet Transportation Needs (2023)    PRAPARE - Transportation     Lack of Transportation (Non-Medical): Yes   Physical Activity: Unknown (2023)    Exercise Vital Sign     Days of Exercise per Week: 1 day   Stress: No Stress Concern Present (2022)    [employer] - Occupational Stress Questionnaire     Feeling of Stress : Only a little   Social Connections: Unknown (2023)    Social Connection and Isolation Panel [NHANES]     Frequency of Communication with Friends and Family: More than three times a week     Frequency of Social Gatherings with Friends and Family: Once a week     Active Member of Clubs or Organizations: Yes      Marital Status:    Housing Stability: Unknown (12/8/2023)    Housing Stability Vital Sign     Unable to Pay for Housing in the Last Year: No     Unstable Housing in the Last Year: No        Family History:      Family History   Problem Relation Age of Onset    Heart disease Mother         CAD     Cataracts Mother     Macular degeneration Mother     Glaucoma Mother     Diabetes Sister     Heart disease Sister         CAD    Cataracts Sister     Diabetes Sister     Diabetes Sister     Cancer Son         testicular     Cancer Maternal Aunt         Lung ca    Heart disease Maternal Grandfather         Pacemaker        Allergies:      Review of patient's allergies indicates:   Allergen Reactions    Aspirin Palpitations       Medications:      Current Outpatient Medications   Medication Sig    apixaban (ELIQUIS) 5 mg Tab Take 1 tablet (5 mg total) by mouth 2 (two) times daily.    azelastine (ASTELIN) 137 mcg (0.1 %) nasal spray use 2 sprays (274 mcg total) by Nasal route 2 (two) times daily.    benazepriL (LOTENSIN) 40 MG tablet Take 1 tablet (40 mg total) by mouth 2 (two) times daily.    BEPREVE 1.5 % Drop Place 1 drop into both eyes 2 (two) times daily.    carvediloL (COREG) 25 MG tablet TAKE 1 TABLET BY MOUTH TWICE DAILY    clotrimazole-betamethasone (LOTRISONE) lotion Apply topically to the affected area 2 (two) times daily.    cycloSPORINE (RESTASIS) 0.05 % ophthalmic emulsion Place 1 drop into both eyes 2 (two) times daily.    diclofenac sodium (VOLTAREN) 1 % Gel Apply 2 grams topically 4 (four) times daily. To affected joints as needed for pain    DULoxetine (CYMBALTA) 60 MG capsule Take 1 capsule (60 mg total) by mouth once daily.    fluticasone propionate (FLONASE) 50 mcg/actuation nasal spray 2 sprays (100 mcg total) by Each Nostril route once daily.    gabapentin (NEURONTIN) 300 MG capsule Take 1 capsule (300 mg total) by mouth 3 (three) times daily. (Patient taking differently: Take 300 mg by mouth  "daily as needed.)    galcanezumab-gnlm (EMGALITY SYRINGE) 120 mg/mL Syrg Inject 120 mg into the skin every 28 days.    hydrALAZINE (APRESOLINE) 50 MG tablet Take 1 tablet (50 mg total) by mouth every 8 (eight) hours.    hydrOXYzine HCL (ATARAX) 10 MG Tab Take 1 tablet (10 mg total) by mouth nightly.    insulin (LANTUS SOLOSTAR U-100 INSULIN) glargine 100 units/mL SubQ pen Inject 72 Units into the skin every evening.    meclizine (ANTIVERT) 25 mg tablet Take 1 tablet (25 mg total) by mouth 3 (three) times daily as needed.    pantoprazole (PROTONIX) 40 MG tablet Take 1 tablet (40 mg total) by mouth once daily.    rimegepant 75 mg odt Take 1 tablet (75 mg total) by mouth as needed for Migraine (do not exceed 2-3 doses within 1 week). Place ODT tablet on the tongue; alternatively the ODT tablet may be placed under the tongue    SITagliptin phosphate (JANUVIA) 100 MG Tab Take 1 tablet (100 mg total) by mouth once daily.    temazepam (RESTORIL) 30 mg capsule Take 1 capsule (30 mg total) by mouth every evening.    blood sugar diagnostic Strp To check blood sugar 1 times daily    blood-glucose meter (ACCU-CHEK GUIDE GLUCOSE METER) Misc use daily to test blood sugar    lancets Misc To check BG 1 times daily    pen needle, diabetic (BD ULTRA-FINE SHORT PEN NEEDLE) 31 gauge x 5/16" Ndle AS DIRECTED TWICE DAILY    rosuvastatin (CRESTOR) 10 MG tablet Take 1 tablet (10 mg total) by mouth once daily.     No current facility-administered medications for this visit.       Vitals:      Vitals:    01/12/24 1003   BP: 111/73   Pulse: 81   Temp: 97.4 °F (36.3 °C)       Review of Systems:        Constitutional: Negative for fever, chills, weight loss, malaise/fatigue and diaphoresis.   HENT: Negative for hearing loss, ear pain, nosebleeds, congestion, sore throat, neck pain, tinnitus and ear discharge.    Eyes: Negative for blurred vision, double vision, photophobia, pain, discharge and redness.   Respiratory: Negative for cough, " "hemoptysis, sputum production, shortness of breath, wheezing and stridor.    Cardiovascular: Negative for chest pain, palpitations, orthopnea, claudication, leg swelling and PND.   Gastrointestinal: Negative for heartburn, nausea, vomiting, abdominal pain, diarrhea, constipation, blood in stool and melena.   Genitourinary: Negative for dysuria, urgency, frequency, hematuria and flank pain.   Musculoskeletal: Negative for myalgias, back pain, joint pain and falls.   Skin: Negative for itching and rash.   Neurological: Negative for dizziness, tingling, tremors, sensory change, speech change, focal weakness, seizures, loss of consciousness, weakness and headaches.   Endo/Heme/Allergies: Negative for environmental allergies and polydipsia. Does not bruise/bleed easily.   Psychiatric/Behavioral: Negative for depression, suicidal ideas, hallucinations, memory loss and substance abuse. The patient is not nervous/anxious and does not have insomnia.    All 14 systems reviewed and negative except as noted above.    Physical Exam:      Constitutional: Appears well-developed, well-nourished and in no acute distress.  Patient is oriented to person, place, and time.   Head: Normocephalic and atraumatic. Mucous membranes moist.  Neck: Neck supple no mass.   Cardiovascular: Normal rate and regular rhythm.  S1 S2 appreciated by ascultation.  Pulmonary/Chest: Effort normal and clear to auscultation bilaterally. No respiratory distress.   Abdomen: Soft. Non-tender and non-distended. Bowel sounds are normal.   Neurological: Patient is alert and oriented to person, place and time. Moves all extremities.  Skin: Warm and dry. No lesions.  Extremities: No clubbing, cyanosis or edema.    Laboratory data:      Reviewed and noted in plan where applicable. Please see chart for full laboratory data.    @WXZHHCQZI28(cpk,cpkmb,troponini,mb)@ @ZMNDKIBCH58(poctglucose)@     No results found for: "INR", "PROTIME"    Lab Results   Component Value " Date    WBC 6.70 01/12/2024    HGB 11.2 (L) 01/12/2024    HCT 35.4 (L) 01/12/2024    MCV 98 01/12/2024     01/12/2024       @ZSVNDZEVV25(GLU,NA,K,Cl,CO2,BUN,Creatinine,Calcium,MG)@    Predictors of intubation difficulty:       Morbid obesity? yes - BMI: 44.92   Anatomically abnormal facies? no   Prominent incisors? no   Receding mandible? no   Short, thick neck? yes - Obesity   Neck range of motion: normal   Dentition:  Top Dentures (Full), bottom: 7 teeth  Based on the Modified Mallampati, patient is a mallampati score: II (hard and soft palate, upper portion of tonsils anduvula visible)    Cardiographics:      ECG: no change since previous ECG dated 1/3/24  Planned to be seen by Cardiology on 1/16/24 for Cardiac Clearance    Echocardiogram:  EF: 55-70%    Imaging:      Chest x-ray:  No acute findings    Assessment and Plan:      Calculus of gallbladder  Planned for XI ROBOTIC CHOLECYSTECTOMY  with Dr. Migue Berrios on 1/26/24.     Known risk factors for perioperative complications: Diabetes mellitus    Difficulty with intubation is not anticipated.    Cardiac Risk Estimation: Based on the Revised Cardiac Risk index, patient is a Class 3 risk with a 10.1% risk of a major cardiac event in a low risk procedure.    1.) Preoperative workup as follows: ECG, hemoglobin, hematocrit, electrolytes, creatinine, glucose, liver function studies.  2.) Change in medication regimen before surgery: discontinue ASA, NSAIDs 7 days before surgery, hold Metformin 24 hours prior to surgery.  3.) Prophylaxis for cardiac events with perioperative beta-blockers: Continue BB as prescribed.  4.) Invasive hemodynamic monitoring perioperatively: at the discretion of anesthesiologist.  5.) Deep vein thrombosis prophylaxis postoperatively: intermittent pneumatic compression boots and regimen to be chosen by surgical team.  6.) Surveillance for postoperative MI with ECG immediately postoperatively and on postoperati ve days 1 and 2 AND  troponin levels 24 hours postoperatively and on day 4 or hospital discharge (whichever comes first): not indicated.  7.) Current medications which may produce withdrawal symptoms if withheld perioperatively: None  8.) Other measures:  None      --Morning of Procedure medications: Coreg, Duloxetine (Cymbalta), Hydralazine, Protonix   --Hold all other medications morning of surgery   --Resume all medications post-operatively  --Hold ASA, NSAIDs and all vitamins/supplements 7 days prior to procedure  --Hold oral diabetes medications morning of surgery   --Lantus HS, Avg Fasting CBG: approx. 115,  reduce by 50%: 36U  --Eliquis holding time- per Cardiology     Paroxysmal atrial fibrillation  Planned for Evaluation with Cardiology on 1/16/24.     --Follow for Cardiac Clearance per Dr. Joyce    Controlled type 2 diabetes mellitus with diabetic polyneuropathy, with long-term current use of insulin  Chronic, well controlled  HgA1c: 6.8 (9/28/23)    ---See medication recommendations as above            Electronically signed by Karla Soto NP on 1/14/2024 at 9:53 AM.

## 2024-01-12 NOTE — ASSESSMENT & PLAN NOTE
Planned for XI ROBOTIC CHOLECYSTECTOMY  with Dr. Migue Berrios on 1/26/24.     Known risk factors for perioperative complications: Diabetes mellitus    Difficulty with intubation is not anticipated.    Cardiac Risk Estimation: Based on the Revised Cardiac Risk index, patient is a Class 3 risk with a 10.1% risk of a major cardiac event in a low risk procedure.    1.) Preoperative workup as follows: ECG, hemoglobin, hematocrit, electrolytes, creatinine, glucose, liver function studies.  2.) Change in medication regimen before surgery: discontinue ASA, NSAIDs 7 days before surgery, hold Metformin 24 hours prior to surgery.  3.) Prophylaxis for cardiac events with perioperative beta-blockers: Continue BB as prescribed.  4.) Invasive hemodynamic monitoring perioperatively: at the discretion of anesthesiologist.  5.) Deep vein thrombosis prophylaxis postoperatively: intermittent pneumatic compression boots and regimen to be chosen by surgical team.  6.) Surveillance for postoperative MI with ECG immediately postoperatively and on postoperati ve days 1 and 2 AND troponin levels 24 hours postoperatively and on day 4 or hospital discharge (whichever comes first): not indicated.  7.) Current medications which may produce withdrawal symptoms if withheld perioperatively: None  8.) Other measures:  None      --Morning of Procedure medications: Coreg, Duloxetine (Cymbalta), Hydralazine, Protonix   --Hold all other medications morning of surgery   --Resume all medications post-operatively  --Hold ASA, NSAIDs and all vitamins/supplements 7 days prior to procedure  --Hold oral diabetes medications morning of surgery   --Lantus HS, Avg Fasting CBG: approx. 115,  reduce by 50%: 36U  --Eliquis holding time- per Cardiology

## 2024-01-14 DIAGNOSIS — F41.9 ANXIETY DISORDER, UNSPECIFIED TYPE: ICD-10-CM

## 2024-01-14 DIAGNOSIS — F33.1 DEPRESSION, MAJOR, RECURRENT, MODERATE: ICD-10-CM

## 2024-01-16 ENCOUNTER — OFFICE VISIT (OUTPATIENT)
Dept: GASTROENTEROLOGY | Facility: CLINIC | Age: 74
End: 2024-01-16
Payer: MEDICARE

## 2024-01-16 VITALS — WEIGHT: 245 LBS | HEIGHT: 62 IN | BODY MASS INDEX: 45.08 KG/M2

## 2024-01-16 DIAGNOSIS — K80.20 CALCULUS OF GALLBLADDER WITHOUT CHOLECYSTITIS WITHOUT OBSTRUCTION: ICD-10-CM

## 2024-01-16 DIAGNOSIS — K76.0 FATTY LIVER: ICD-10-CM

## 2024-01-16 DIAGNOSIS — R10.33 PERIUMBILICAL ABDOMINAL PAIN: ICD-10-CM

## 2024-01-16 DIAGNOSIS — K59.04 CHRONIC IDIOPATHIC CONSTIPATION: Primary | ICD-10-CM

## 2024-01-16 PROCEDURE — 99213 OFFICE O/P EST LOW 20 MIN: CPT | Mod: 95,,, | Performed by: PHYSICIAN ASSISTANT

## 2024-01-16 RX ORDER — DULOXETIN HYDROCHLORIDE 60 MG/1
60 CAPSULE, DELAYED RELEASE ORAL DAILY
Qty: 90 CAPSULE | Refills: 1 | Status: SHIPPED | OUTPATIENT
Start: 2024-01-16 | End: 2024-05-15 | Stop reason: SDUPTHER

## 2024-01-16 NOTE — PROGRESS NOTES
Subjective:      Patient ID: Christine Jo is a 73 y.o. female.    Chief Complaint: Constipation (Follow up/doing ok/taking 2 Miralax gummies daily) and Abdominal Pain (Off and on/right below breastbone area//making a lot of noises)    The patient location is: [place]  The chief complaint leading to consultation is: See above    Visit type: audiovisual    Face to Face time with patient: 21 minutes    24 minutes of total time spent on the encounter, which includes face to face time and non-face to face time preparing to see the patient (eg, review of tests), Obtaining and/or reviewing separately obtained history, Documenting clinical information in the electronic or other health record, Independently interpreting results (not separately reported) and communicating results to the patient/family/caregiver, or Care coordination (not separately reported).         Each patient to whom he or she provides medical services by telemedicine is:  (1) informed of the relationship between the physician and patient and the respective role of any other health care provider with respect to management of the patient; and (2) notified that he or she may decline to receive medical services by telemedicine and may withdraw from such care at any time.    Notes:     HPI  The patient has a history of GERD, chronic constipation and bleeding hemorrhoids. Her primary issues have been abdominal pain, constipation and bloating. See course below.     Linzess 145 mcg - explosive diarrhea and pain - stopped on her own.  Lactulose - helped some but not enough. BG levels went up.   Went back to linzess 145 mcg with some improvement in pain and bloating. However, she eventually stopped it again because of loose, urgent stools.   Decreased Linzess to 75 mcg daily. Patient said still urgent, loose and pain.   Recommended Mialax instead of Linzess. She started MiraFiber gummies. She has been taking 2 a day and occasionally 4. She feels her stools are  starting to become less regular again.   An RUQ US showed gallstones.   Patient requested CT which showed gallstones without cholecystitis and fatty liver.   Referred to gen surg for opinion. Cholecystectomy scheduled for two weeks, but didn't feel it would change her pain. Patient having second thoughts about surgery.      Reports having a lot of bowel sounds and pain that runs down the middle of her abdomen. Stools are soft but small so she is worried she isn't clearing out good. No blood. No nausea or vomiting.     Review of Systems  As per HPI.     Objective:     Physical Exam  Constitutional:       General: She is not in acute distress.     Appearance: She is well-developed.   HENT:      Head: Normocephalic and atraumatic.   Pulmonary:      Effort: Pulmonary effort is normal.   Neurological:      Mental Status: She is alert and oriented to person, place, and time.      Cranial Nerves: No cranial nerve deficit.   Psychiatric:         Behavior: Behavior normal.         Assessment:     1. Chronic idiopathic constipation    2. Periumbilical abdominal pain    3. Fatty liver    4. Calculus of gallbladder without cholecystitis without obstruction        Plan:     - Continue MiraFiber gummies 2 a day. Add regular Miralax once daily. She will check in with us in two weeks for an update. Tried to offer reassurance. Reviewed CT results.   - Discussed merit of cholecystectomy. Really it is up to her. There are several reasons she should and shouldn't do it. We discussed some of those, and her questions were answered.   - Fibroscan due April 2024.     Follow up if symptoms worsen or fail to improve.    Thank you for the opportunity to participate in the care of this patient.   Leonardo Fairchild PA-C.

## 2024-01-17 ENCOUNTER — TELEPHONE (OUTPATIENT)
Dept: CARDIOLOGY | Facility: CLINIC | Age: 74
End: 2024-01-17
Payer: MEDICARE

## 2024-01-17 ENCOUNTER — PATIENT MESSAGE (OUTPATIENT)
Dept: CARDIOLOGY | Facility: CLINIC | Age: 74
End: 2024-01-17
Payer: MEDICARE

## 2024-01-25 RX ORDER — INSULIN GLARGINE 100 [IU]/ML
72 INJECTION, SOLUTION SUBCUTANEOUS NIGHTLY
Qty: 75 ML | Refills: 0 | Status: SHIPPED | OUTPATIENT
Start: 2024-01-25 | End: 2024-04-22 | Stop reason: SDUPTHER

## 2024-01-28 ENCOUNTER — PATIENT MESSAGE (OUTPATIENT)
Dept: INTERNAL MEDICINE | Facility: CLINIC | Age: 74
End: 2024-01-28
Payer: MEDICARE

## 2024-01-29 NOTE — TELEPHONE ENCOUNTER
Called pt concerning c/o depression. No answer. Left message. Nurse will continue to help pt schedule appointment for eval. Will continue to reach out to pt.

## 2024-01-30 ENCOUNTER — PATIENT MESSAGE (OUTPATIENT)
Dept: GASTROENTEROLOGY | Facility: CLINIC | Age: 74
End: 2024-01-30
Payer: MEDICARE

## 2024-01-30 ENCOUNTER — TELEPHONE (OUTPATIENT)
Dept: GASTROENTEROLOGY | Facility: CLINIC | Age: 74
End: 2024-01-30
Payer: MEDICARE

## 2024-01-30 NOTE — TELEPHONE ENCOUNTER
----- Message from Dyana Fitzgerald sent at 1/30/2024 12:00 PM CST -----  Type: Patient Call Back    Who called: self     What is the request in detail: following from 2 weeks ago with stomach. Please call    Can the clinic reply by [facility]? no    Would the patient rather a call back or a response via [facility]?  call    Best call back number: .[phone] (home)      Additional Information:

## 2024-01-30 NOTE — TELEPHONE ENCOUNTER
Returned call to pt. She reports still having left side/under rib cage area pain. About 6 out of 10 pain scale. Often feels like pressure, describes it like someone is pressing on her the side of her abdomen, taking her breath away.  Reports having bowel movements consistency of firm to thick mud texture. Said she is taking 3 fiber gummies once a day but no miralax.   Bloating is better but still has some gas. Reports the BM's and gas has a horrible odor, smells like spetic tank.   Said she did not have the gallbladder surgery.     Pt is very concerned that something is wrong. Please review and advise.

## 2024-01-31 NOTE — TELEPHONE ENCOUNTER
Notified pt of recommendations for Ms Fairchild. Pt agreed to plan. She states she will try to add Miralax for a few days to a few weeks. But will call the office back if she gets worse.     Scheduled an appt for next available right now with Ms Fairchild for in clinic, May 13, 2024.   Advised her if she gets worse, call the office, go to urgent care or ED. She agreed to plan.

## 2024-01-31 NOTE — TELEPHONE ENCOUNTER
Chest Xray and CT scan were ok. She is up to date on her colonoscopy but we can order repeat scopes if she wants. I don't expect them to show anything since prior scopes were ok. She probably needs to add the Miralax if she isn't pleased with her stool consistency or frequency.  Leonardo Fairchild PA-C.

## 2024-02-04 NOTE — TELEPHONE ENCOUNTER
No care due was identified.  [facility] Embedded Care Due Messages. Reference number: [id number].   2/04/2024 12:56:21 PM CST

## 2024-02-05 RX ORDER — MECLIZINE HYDROCHLORIDE 25 MG/1
25 TABLET ORAL 3 TIMES DAILY PRN
Qty: 30 TABLET | Refills: 2 | Status: SHIPPED | OUTPATIENT
Start: 2024-02-05

## 2024-02-05 NOTE — TELEPHONE ENCOUNTER
Refill Decision Note   Christine Jo  is requesting a refill authorization.  Brief Assessment and Rationale for Refill:  Approve     Medication Therapy Plan:         Comments:     Note composed:3:38 AM 02/05/2024

## 2024-02-07 ENCOUNTER — TELEPHONE (OUTPATIENT)
Dept: INTERNAL MEDICINE | Facility: CLINIC | Age: 74
End: 2024-02-07
Payer: MEDICARE

## 2024-02-07 ENCOUNTER — TELEPHONE (OUTPATIENT)
Dept: PSYCHIATRY | Facility: CLINIC | Age: 74
End: 2024-02-07
Payer: MEDICARE

## 2024-02-07 NOTE — TELEPHONE ENCOUNTER
Returned pt call concerning her interest in getting Dexcom device. Pt states that she call the company that sells them and they said she needs a prescription. Advised pt that she would need a visit with her pcp to discuss that issue. Pt has an appointment scheduled already. Advised pt to keep her appointment with pcp. Pt verbalized understanding.

## 2024-02-07 NOTE — TELEPHONE ENCOUNTER
----- Message from Lashawn Farias sent at 2/7/2024 10:16 AM CST -----  Regarding: Attn: Nurse Hull  Contact: Della  Type:  Needs Medical Advice    Who Called: Christine   Symptoms (please be specific):    How long has patient had these symptoms:    Pharmacy name and phone #:    Would the patient rather a call back or a response via [facility]? call  Best Call Back Number: [phone]  Additional Information:  Christine is requesting a callback from the nurse today in regard to needing a prescription to Dexcom for the device she can put on her arm. The prescription can be sent to [employer] on account#[id number] . The phone number to [employer] is [phone].

## 2024-02-09 ENCOUNTER — TELEPHONE (OUTPATIENT)
Dept: PSYCHIATRY | Facility: CLINIC | Age: 74
End: 2024-02-09
Payer: MEDICARE

## 2024-02-09 NOTE — TELEPHONE ENCOUNTER
----- Message from Jaime Interiano sent at 2/9/2024 12:44 PM CST -----  Regarding: pt call back  Name of Who is Calling:Pt         What is the request in detail: Requesting call back in regards to reschedule           Can the clinic reply by [facility]:         [facility]:Telephone Information:  [facility]          [phone]

## 2024-02-12 ENCOUNTER — TELEPHONE (OUTPATIENT)
Dept: CARDIOLOGY | Facility: CLINIC | Age: 74
End: 2024-02-12

## 2024-02-12 ENCOUNTER — OFFICE VISIT (OUTPATIENT)
Dept: CARDIOLOGY | Facility: CLINIC | Age: 74
End: 2024-02-12
Payer: MEDICARE

## 2024-02-12 ENCOUNTER — TELEPHONE (OUTPATIENT)
Dept: CARDIOLOGY | Facility: CLINIC | Age: 74
End: 2024-02-12
Payer: MEDICARE

## 2024-02-12 DIAGNOSIS — I25.2 OLD MI (MYOCARDIAL INFARCTION): ICD-10-CM

## 2024-02-12 DIAGNOSIS — E11.42 CONTROLLED TYPE 2 DIABETES MELLITUS WITH DIABETIC POLYNEUROPATHY, WITH LONG-TERM CURRENT USE OF INSULIN: ICD-10-CM

## 2024-02-12 DIAGNOSIS — N39.3 STRESS INCONTINENCE: ICD-10-CM

## 2024-02-12 DIAGNOSIS — I48.0 PAROXYSMAL ATRIAL FIBRILLATION: ICD-10-CM

## 2024-02-12 DIAGNOSIS — E78.2 MIXED HYPERLIPIDEMIA: ICD-10-CM

## 2024-02-12 DIAGNOSIS — I25.118 CORONARY ARTERY DISEASE OF NATIVE ARTERY OF NATIVE HEART WITH STABLE ANGINA PECTORIS: ICD-10-CM

## 2024-02-12 DIAGNOSIS — R06.83 SNORES: Primary | ICD-10-CM

## 2024-02-12 DIAGNOSIS — I10 PRIMARY HYPERTENSION: ICD-10-CM

## 2024-02-12 DIAGNOSIS — E66.01 CLASS 3 SEVERE OBESITY DUE TO EXCESS CALORIES WITH SERIOUS COMORBIDITY AND BODY MASS INDEX (BMI) OF 45.0 TO 49.9 IN ADULT: ICD-10-CM

## 2024-02-12 DIAGNOSIS — Z79.4 CONTROLLED TYPE 2 DIABETES MELLITUS WITH DIABETIC POLYNEUROPATHY, WITH LONG-TERM CURRENT USE OF INSULIN: ICD-10-CM

## 2024-02-12 DIAGNOSIS — I71.40 ABDOMINAL AORTIC ANEURYSM (AAA) WITHOUT RUPTURE, UNSPECIFIED PART: ICD-10-CM

## 2024-02-12 DIAGNOSIS — I65.29 STENOSIS OF CAROTID ARTERY, UNSPECIFIED LATERALITY: ICD-10-CM

## 2024-02-12 DIAGNOSIS — R06.02 SOB (SHORTNESS OF BREATH): Primary | ICD-10-CM

## 2024-02-12 DIAGNOSIS — I70.0 AORTO-ILIAC ATHEROSCLEROSIS: ICD-10-CM

## 2024-02-12 DIAGNOSIS — R06.09 DOE (DYSPNEA ON EXERTION): ICD-10-CM

## 2024-02-12 DIAGNOSIS — I70.8 AORTO-ILIAC ATHEROSCLEROSIS: ICD-10-CM

## 2024-02-12 PROCEDURE — 99214 OFFICE O/P EST MOD 30 MIN: CPT | Mod: 95,,, | Performed by: INTERNAL MEDICINE

## 2024-02-12 RX ORDER — EVOLOCUMAB 140 MG/ML
140 INJECTION, SOLUTION SUBCUTANEOUS
Qty: 2 EACH | Refills: 0 | Status: ACTIVE | OUTPATIENT
Start: 2024-02-12 | End: 2024-03-13

## 2024-02-12 NOTE — TELEPHONE ENCOUNTER
Spoke with pt in regards to rescheduling appt to virtual visit.             ----- Message from Kristie Sarah sent at 2/12/2024  9:07 AM CST -----  Contact: Christine Huerta is calling in regards to being advised if virtual could be done for first visit with provider due. Pt can be reached at [phone].      Thanks  SONALI

## 2024-02-12 NOTE — TELEPHONE ENCOUNTER
Attempted without success to contact pt to notify her of referral. Left voicemail for pt to call back.

## 2024-02-12 NOTE — TELEPHONE ENCOUNTER
Spoke with the patient to get her labs and 3 month clinic visit scheduled. The patient would like a referral placed for a at home sleep study to check for Sleep Apnea

## 2024-02-12 NOTE — PROGRESS NOTES
Subjective:   Patient ID:  Christine Jo is a 73 y.o. female who presents for follow up of No chief complaint on file.      Tele visit  72 yo female, f/u with Dr. Joyce as following:  PMH CAD, PAF, DM, obesity, HTN, AAA, hyperlipidemia, MATIAS. Vertigo Former smoker.  States she has had 2 or 4 cardiac cath procedures.  Last [facility] 2017, nonobstructive CAD noted.  ecg 3/26/21 NSR, left axis, nonspecific septal Q waves V1-V2.  Stress MPI 4/21 reviewed: no ischemia, normal EF.  Echo 4/21 reviewed: normal LV function, LVH, mild TR.  Abdominal u/s 12/21: no AAA noted.  MIKE 12/21 1.0 R LE, 0.97 L LE.  Bardy Holter 10/21: NSR, 6 runs nonsustained atrial tachycardia (longest 8 beats), 3 runs NSVT (longest 7 beats).  Did not tolerate CPAP in past.    Today reviewed with the pt for MPI and echo  09/23 phar MPI showed mild apical infarct and echo EF 55% LVH  C/o SOB, occasional ankle  Sleeps on 2 pillows  No PND  01/24 EKG NSR          Past Medical History:   Diagnosis Date    Arthritis     Cataract     Coronary artery disease     Diabetes mellitus 2008     am 01/15/2018 Insulin x 1 year    Diabetes mellitus, type 2     DM (diabetes mellitus) 2008     am 02/14/2020 Insulin x 4 years    Encounter for blood transfusion     Glaucoma     Hypertension     Insomnia     Macular degeneration     Old MI (myocardial infarction) 2/12/2024    Vaginal yeast infection        Past Surgical History:   Procedure Laterality Date    abdominal laparoscopy       BREAST BIOPSY Left 1998    benign    CATARACT EXTRACTION Bilateral [id number]    [facility] in [place]    Cataract Surgery Bilateral     Laser (YAG)    COLONOSCOPY N/A 05/05/2021    Procedure: COLONOSCOPY;  Surgeon: Shawn [place] III, MD;  Location: [facility] ENDO;  Service: Endoscopy;  Laterality: N/A;    COLONOSCOPY N/A 06/30/2021    Procedure: COLONOSCOPY;  Surgeon: Memo Merida MD;  Location: [facility] ENDO;  Service: Endoscopy;  Laterality: N/A;     ESOPHAGOGASTRODUODENOSCOPY N/A 2019    Procedure: ESOPHAGOGASTRODUODENOSCOPY (EGD);  Surgeon: Shawn [place] III, MD;  Location: [facility];  Service: Endoscopy;  Laterality: N/A;    ESOPHAGOGASTRODUODENOSCOPY N/A 2021    Procedure: EGD (ESOPHAGOGASTRODUODENOSCOPY);  Surgeon: Shawn [place] III, MD;  Location: [facility] ENDO;  Service: Endoscopy;  Laterality: N/A;    EYE SURGERY      TONSILLECTOMY      TUBAL LIGATION         Social History     Tobacco Use    Smoking status: Former     Current packs/day: 0.00     Average packs/day: 1 pack/day for 36.5 years (36.5 ttl pk-yrs)     Types: Cigarettes     Start date:      Quit date: 2003     Years since quittin.6     Passive exposure: Never    Smokeless tobacco: Never   Substance Use Topics    Alcohol use: No    Drug use: No       Family History   Problem Relation Age of Onset    Heart disease Mother         CAD     Cataracts Mother     Macular degeneration Mother     Glaucoma Mother     Diabetes Sister     Heart disease Sister         CAD    Cataracts Sister     Diabetes Sister     Diabetes Sister     Cancer Son         testicular     Cancer Maternal Aunt         Lung ca    Heart disease Maternal Grandfather         Pacemaker          ROS    Objective:   Physical Exam    Lab Results   Component Value Date    CHOL 195 2023    CHOL 164 10/04/2022    CHOL 168 2022     Lab Results   Component Value Date    HDL 62 2023    HDL 46 10/04/2022    HDL 44 2022     Lab Results   Component Value Date    LDLCALC 103.2 2023    LDLCALC 93.0 10/04/2022    LDLCALC 89.2 2022     Lab Results   Component Value Date    TRIG 149 2023    TRIG 125 10/04/2022    TRIG 174 (H) 2022     Lab Results   Component Value Date    CHOLHDL 31.8 2023    CHOLHDL 28.0 10/04/2022    CHOLHDL 26.2 2022       Chemistry        Component Value Date/Time     2024 0846    K 5.0 2024 0846     2024 0846     "CO2 24 01/12/2024 0846    BUN 17 01/12/2024 0846    CREATININE 1.0 01/12/2024 0846     (H) 01/12/2024 0846        Component Value Date/Time    CALCIUM 9.2 01/12/2024 0846    ALKPHOS 40 (L) 01/12/2024 0846    AST 16 01/12/2024 0846    ALT 16 01/12/2024 0846    BILITOT 0.5 01/12/2024 0846    ESTGFRAFRICA >60 07/27/2022 1237    EGFRNONAA >60 07/27/2022 1237          Lab Results   Component Value Date    HGBA1C 6.8 (H) 09/28/2023     Lab Results   Component Value Date    TSH 1.024 10/04/2022     No results found for: "INR", "PROTIME"  Lab Results   Component Value Date    WBC 6.70 01/12/2024    HGB 11.2 (L) 01/12/2024    HCT 35.4 (L) 01/12/2024    MCV 98 01/12/2024     01/12/2024     BMP  Sodium   Date Value Ref Range Status   01/12/2024 136 136 - 145 mmol/L Final     Potassium   Date Value Ref Range Status   01/12/2024 5.0 3.5 - 5.1 mmol/L Final     Chloride   Date Value Ref Range Status   01/12/2024 103 95 - 110 mmol/L Final     CO2   Date Value Ref Range Status   01/12/2024 24 23 - 29 mmol/L Final     BUN   Date Value Ref Range Status   01/12/2024 17 8 - 23 mg/dL Final     Creatinine   Date Value Ref Range Status   01/12/2024 1.0 0.5 - 1.4 mg/dL Final     Calcium   Date Value Ref Range Status   01/12/2024 9.2 8.7 - 10.5 mg/dL Final     Anion Gap   Date Value Ref Range Status   01/12/2024 9 8 - 16 mmol/L Final     eGFR if    Date Value Ref Range Status   07/27/2022 >60 >60 mL/min/1.73 m^2 Final     eGFR if non    Date Value Ref Range Status   07/27/2022 >60 >60 mL/min/1.73 m^2 Final     Comment:     Calculation used to obtain the estimated glomerular filtration  rate (eGFR) is the CKD-EPI equation.        BNP  @LABRCNTIP(BNP,BNPTRIAGEBLO)@  @LABRCNTIP(troponini)@  CrCl cannot be calculated (Patient's most recent lab result is older than the maximum 7 days allowed.).  No results found in the last 24 hours.  No results found in the last 24 hours.  No results found in the " last 24 hours.    Assessment:      1. SOB (shortness of breath)    2. Paroxysmal atrial fibrillation    3. Primary hypertension    4. Coronary artery disease of native artery of native heart with stable angina pectoris    5. GILL (dyspnea on exertion)    6. Aorto-iliac atherosclerosis    7. Stenosis of carotid artery, unspecified laterality    8. Stress incontinence    9. Old MI (myocardial infarction)    10. Abdominal aortic aneurysm (AAA) without rupture, unspecified part    11. Mixed hyperlipidemia    12. Class 3 severe obesity due to excess calories with serious comorbidity and body mass index (BMI) of 45.0 to 49.9 in adult    13. Controlled type 2 diabetes mellitus with diabetic polyneuropathy, with long-term current use of insulin        Plan:   Add repatha for HLD   Check BNP for SOB  Contiune eliquis 5 mg bid benzaepril coreg hydralazine   RTC in 3 M     The patient location is: home  The chief complaint leading to consultation is: sob    Visit type: audiovisual    Face to Face time with patient: 25 minutes of total time spent on the encounter, which includes face to face time and non-face to face time preparing to see the patient (eg, review of tests), Obtaining and/or reviewing separately obtained history, Documenting clinical information in the electronic or other health record, Independently interpreting results (not separately reported) and communicating results to the patient/family/caregiver, or Care coordination (not separately reported).         Each patient to whom he or she provides medical services by telemedicine is:  (1) informed of the relationship between the physician and patient and the respective role of any other health care provider with respect to management of the patient; and (2) notified that he or she may decline to receive medical services by telemedicine and may withdraw from such care at any time.    Notes:

## 2024-02-13 ENCOUNTER — TELEPHONE (OUTPATIENT)
Dept: PSYCHIATRY | Facility: CLINIC | Age: 74
End: 2024-02-13
Payer: MEDICARE

## 2024-02-13 ENCOUNTER — TELEPHONE (OUTPATIENT)
Dept: INTERNAL MEDICINE | Facility: CLINIC | Age: 74
End: 2024-02-13
Payer: MEDICARE

## 2024-02-13 NOTE — TELEPHONE ENCOUNTER
Returned call to Sapna at [facility] regarding progress notes, no answer left voice message.

## 2024-02-14 ENCOUNTER — OFFICE VISIT (OUTPATIENT)
Dept: INTERNAL MEDICINE | Facility: CLINIC | Age: 74
End: 2024-02-14
Attending: FAMILY MEDICINE
Payer: MEDICARE

## 2024-02-14 ENCOUNTER — HOSPITAL ENCOUNTER (OUTPATIENT)
Dept: RADIOLOGY | Facility: HOSPITAL | Age: 74
Discharge: HOME | End: 2024-02-14
Attending: FAMILY MEDICINE
Payer: MEDICARE

## 2024-02-14 VITALS
TEMPERATURE: 98 F | OXYGEN SATURATION: 99 % | DIASTOLIC BLOOD PRESSURE: 74 MMHG | WEIGHT: 246.06 LBS | HEIGHT: 62 IN | HEART RATE: 92 BPM | BODY MASS INDEX: 45.28 KG/M2 | SYSTOLIC BLOOD PRESSURE: 142 MMHG

## 2024-02-14 DIAGNOSIS — E11.42 CONTROLLED TYPE 2 DIABETES MELLITUS WITH DIABETIC POLYNEUROPATHY, WITH LONG-TERM CURRENT USE OF INSULIN: Primary | ICD-10-CM

## 2024-02-14 DIAGNOSIS — K80.20 CALCULUS OF GALLBLADDER WITHOUT CHOLECYSTITIS WITHOUT OBSTRUCTION: ICD-10-CM

## 2024-02-14 DIAGNOSIS — I10 PRIMARY HYPERTENSION: ICD-10-CM

## 2024-02-14 DIAGNOSIS — Z79.4 CONTROLLED TYPE 2 DIABETES MELLITUS WITH DIABETIC POLYNEUROPATHY, WITH LONG-TERM CURRENT USE OF INSULIN: Primary | ICD-10-CM

## 2024-02-14 DIAGNOSIS — Z12.31 ENCOUNTER FOR SCREENING MAMMOGRAM FOR BREAST CANCER: ICD-10-CM

## 2024-02-14 DIAGNOSIS — K21.9 GASTROESOPHAGEAL REFLUX DISEASE WITHOUT ESOPHAGITIS: ICD-10-CM

## 2024-02-14 DIAGNOSIS — I48.0 PAROXYSMAL ATRIAL FIBRILLATION: ICD-10-CM

## 2024-02-14 DIAGNOSIS — F33.1 DEPRESSION, MAJOR, RECURRENT, MODERATE: ICD-10-CM

## 2024-02-14 DIAGNOSIS — E66.01 CLASS 3 SEVERE OBESITY DUE TO EXCESS CALORIES WITH SERIOUS COMORBIDITY AND BODY MASS INDEX (BMI) OF 45.0 TO 49.9 IN ADULT: ICD-10-CM

## 2024-02-14 PROBLEM — D68.59 OTHER PRIMARY THROMBOPHILIA: Status: RESOLVED | Noted: 2024-02-14 | Resolved: 2024-02-14

## 2024-02-14 PROBLEM — D68.59 OTHER PRIMARY THROMBOPHILIA: Status: ACTIVE | Noted: 2024-02-14

## 2024-02-14 PROBLEM — E78.2 MIXED HYPERLIPIDEMIA: Status: ACTIVE | Noted: 2024-02-14

## 2024-02-14 PROCEDURE — 99999 PR PBB SHADOW E&M-EST. PATIENT-LVL IV: CPT | Mod: PBBFAC,,, | Performed by: FAMILY MEDICINE

## 2024-02-14 PROCEDURE — 77067 SCR MAMMO BI INCL CAD: CPT | Mod: TC

## 2024-02-14 PROCEDURE — 99214 OFFICE O/P EST MOD 30 MIN: CPT | Mod: S$GLB,,, | Performed by: FAMILY MEDICINE

## 2024-02-14 PROCEDURE — 77063 BREAST TOMOSYNTHESIS BI: CPT | Mod: 26,,, | Performed by: RADIOLOGY

## 2024-02-14 PROCEDURE — 77067 SCR MAMMO BI INCL CAD: CPT | Mod: 26,,, | Performed by: RADIOLOGY
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
## 2024-02-14 NOTE — PROGRESS NOTES
Subjective:       Patient ID: Christine Jo is a 73 y.o. female.    Chief Complaint: Follow-up    Follow-up hypertension hyperlipidemia diabetes coronary disease paroxysmal atrial fibrillation nausea fatty liver anxiety depression.  She is also followed by Psychiatry as well as Cardiology and Gastroenterology.  She was diagnosed with multiple renal stones.  She is not interested in surgery evaluation.  She reports she does not have any nausea vomiting abdominal discomfort 30 minutes to 2 hours after eating.  She denies chest pain palpitations.  She has some lower extremity stable edema.  She denies hypoglycemic symptoms.    Follow-up  Associated symptoms include nausea. Pertinent negatives include no chest pain, chills, coughing, fever, headaches, vomiting or weakness.     Review of Systems   Constitutional:  Negative for chills and fever.   Respiratory:  Positive for shortness of breath. Negative for cough, chest tightness and wheezing.    Cardiovascular:  Positive for leg swelling. Negative for chest pain and palpitations.   Gastrointestinal:  Positive for constipation and nausea. Negative for abdominal distention, diarrhea and vomiting.   Neurological:  Negative for dizziness, weakness, light-headedness and headaches.   Psychiatric/Behavioral:  Positive for dysphoric mood. The patient is nervous/anxious.        Objective:      Physical Exam  Constitutional:       Appearance: She is not ill-appearing or diaphoretic.   Cardiovascular:      Rate and Rhythm: Normal rate and regular rhythm.      Heart sounds: No murmur heard.     No gallop.   Pulmonary:      Effort: Pulmonary effort is normal. No respiratory distress.      Breath sounds: Rales present. No wheezing or rhonchi.      Comments: Mild dry basilar rales  Abdominal:      General: There is no distension.      Tenderness: There is no abdominal tenderness.   Lymphadenopathy:      Cervical: No cervical adenopathy.   Neurological:      Mental Status: She is  alert.   Psychiatric:         Mood and Affect: Mood normal.         Behavior: Behavior normal.         Lab Visit on 01/12/2024   Component Date Value Ref Range Status    WBC 01/12/2024 6.70  3.90 - 12.70 K/uL Final    RBC 01/12/2024 3.63 (L)  4.00 - 5.40 M/uL Final    Hemoglobin 01/12/2024 11.2 (L)  12.0 - 16.0 g/dL Final    Hematocrit 01/12/2024 35.4 (L)  37.0 - 48.5 % Final    MCV 01/12/2024 98  82 - 98 fL Final    MCH 01/12/2024 30.9  27.0 - 31.0 pg Final    MCHC 01/12/2024 31.6 (L)  32.0 - 36.0 g/dL Final    RDW 01/12/2024 12.6  11.5 - 14.5 % Final    Platelets 01/12/2024 272  150 - 450 K/uL Final    MPV 01/12/2024 11.8  9.2 - 12.9 fL Final    Immature Granulocytes 01/12/2024 0.3  0.0 - 0.5 % Final    Gran # (ANC) 01/12/2024 4.3  1.8 - 7.7 K/uL Final    Immature Grans (Abs) 01/12/2024 0.02  0.00 - 0.04 K/uL Final    Lymph # 01/12/2024 1.5  1.0 - 4.8 K/uL Final    Mono # 01/12/2024 0.7  0.3 - 1.0 K/uL Final    Eos # 01/12/2024 0.1  0.0 - 0.5 K/uL Final    Baso # 01/12/2024 0.07  0.00 - 0.20 K/uL Final    nRBC 01/12/2024 0  0 /100 WBC Final    Gran % 01/12/2024 64.1  38.0 - 73.0 % Final    Lymph % 01/12/2024 22.4  18.0 - 48.0 % Final    Mono % 01/12/2024 10.4  4.0 - 15.0 % Final    Eosinophil % 01/12/2024 1.8  0.0 - 8.0 % Final    Basophil % 01/12/2024 1.0  0.0 - 1.9 % Final    Differential Method 01/12/2024 Automated   Final    Sodium 01/12/2024 136  136 - 145 mmol/L Final    Potassium 01/12/2024 5.0  3.5 - 5.1 mmol/L Final    Chloride 01/12/2024 103  95 - 110 mmol/L Final    CO2 01/12/2024 24  23 - 29 mmol/L Final    Glucose 01/12/2024 250 (H)  70 - 110 mg/dL Final    BUN 01/12/2024 17  8 - 23 mg/dL Final    Creatinine 01/12/2024 1.0  0.5 - 1.4 mg/dL Final    Calcium 01/12/2024 9.2  8.7 - 10.5 mg/dL Final    Total Protein 01/12/2024 6.5  6.0 - 8.4 g/dL Final    Albumin 01/12/2024 3.6  3.5 - 5.2 g/dL Final    Total Bilirubin 01/12/2024 0.5  0.1 - 1.0 mg/dL Final    Alkaline Phosphatase 01/12/2024 40 (L)  55 - 135  U/L Final    AST 01/12/2024 16  10 - 40 U/L Final    ALT 01/12/2024 16  10 - 44 U/L Final    eGFR 01/12/2024 >60.0  >60 mL/min/1.73 m^2 Final    Anion Gap 01/12/2024 9  8 - 16 mmol/L Final     Assessment:       1. Controlled type 2 diabetes mellitus with diabetic polyneuropathy, with long-term current use of insulin    2. Depression, major, recurrent, moderate    3. Paroxysmal atrial fibrillation    4. Calculus of gallbladder without cholecystitis without obstruction    5. Primary hypertension    6. Gastroesophageal reflux disease without esophagitis    7. Class 3 severe obesity due to excess calories with serious comorbidity and body mass index (BMI) of 45.0 to 49.9 in adult        Plan:     Blood pressure 142/74.  Continue current medications.  BMI 45.  She is limited physical activity.  Diet was discussed.  Medications reviewed.  Lab was ordered.  Depression is being followed by Psychiatry.  Health maintenance reviewed.  COVID booster RSV was ordered.  Follow-up in 3 months.  She was interested in Ozempic therapy for weight loss.  However she has chronic nausea and I am hesitant to start Ozempic.    Controlled type 2 diabetes mellitus with diabetic polyneuropathy, with long-term current use of insulin  -     Comprehensive Metabolic Panel; Future; Expected date: 02/14/2024  -     Hemoglobin A1C; Future; Expected date: 02/14/2024    Depression, major, recurrent, moderate    Paroxysmal atrial fibrillation    Calculus of gallbladder without cholecystitis without obstruction    Primary hypertension    Gastroesophageal reflux disease without esophagitis    Class 3 severe obesity due to excess calories with serious comorbidity and body mass index (BMI) of 45.0 to 49.9 in adult

## 2024-02-15 ENCOUNTER — TELEPHONE (OUTPATIENT)
Dept: PSYCHIATRY | Facility: CLINIC | Age: 74
End: 2024-02-15
Payer: MEDICARE

## 2024-02-15 ENCOUNTER — OFFICE VISIT (OUTPATIENT)
Dept: PSYCHIATRY | Facility: CLINIC | Age: 74
End: 2024-02-15
Payer: MEDICARE

## 2024-02-15 ENCOUNTER — TELEPHONE (OUTPATIENT)
Dept: CARDIOLOGY | Facility: HOSPITAL | Age: 74
End: 2024-02-15
Payer: MEDICARE

## 2024-02-15 DIAGNOSIS — G47.00 INSOMNIA, UNSPECIFIED TYPE: ICD-10-CM

## 2024-02-15 DIAGNOSIS — F41.9 ANXIETY DISORDER, UNSPECIFIED TYPE: ICD-10-CM

## 2024-02-15 DIAGNOSIS — F33.1 DEPRESSION, MAJOR, RECURRENT, MODERATE: Primary | ICD-10-CM

## 2024-02-15 PROCEDURE — 99214 OFFICE O/P EST MOD 30 MIN: CPT | Mod: 95,,, | Performed by: PSYCHIATRY & NEUROLOGY

## 2024-02-15 RX ORDER — BUSPIRONE HYDROCHLORIDE 7.5 MG/1
7.5 TABLET ORAL 2 TIMES DAILY
Qty: 180 TABLET | Refills: 0 | Status: SHIPPED | OUTPATIENT
Start: 2024-02-15 | End: 2024-02-22 | Stop reason: SINTOL

## 2024-02-15 NOTE — TELEPHONE ENCOUNTER
Patient notified of the  following results per Dr. Ying.    BNP level normal  R/o CHF  Advise weight control and BP rx    Patient verbalized understanding. Voiced no questions or concerns.

## 2024-02-15 NOTE — TELEPHONE ENCOUNTER
----- Message from Tobias Ying MD sent at 2/15/2024  9:03 AM CST -----  BNP level normal  R/o CHF  Advise weight control and BP rx

## 2024-02-16 ENCOUNTER — TELEPHONE (OUTPATIENT)
Dept: INTERNAL MEDICINE | Facility: CLINIC | Age: 74
End: 2024-02-16
Payer: MEDICARE

## 2024-02-16 ENCOUNTER — PATIENT MESSAGE (OUTPATIENT)
Dept: INTERNAL MEDICINE | Facility: CLINIC | Age: 74
End: 2024-02-16
Payer: MEDICARE

## 2024-02-16 NOTE — TELEPHONE ENCOUNTER
Spoke with patient, confirmed 2 identifiers, Notified patient of results. Patient voiced understanding. Discussed water intake and sodium level. Patient voiced understanding.

## 2024-02-16 NOTE — TELEPHONE ENCOUNTER
----- Message from Amber Livingston sent at 2/16/2024 10:16 AM CST -----  Name of Who is Calling: Pratik           What is the request in detail:checking on status of the detailed written clinical note for Dexcom. Was told on 02/08 that medical team would call back with status but no response.           Can the clinic reply by [facility]:  no         What Number to Call Back if not in [facility]: [phone] [id number]

## 2024-02-16 NOTE — TELEPHONE ENCOUNTER
Returned call to patient explained Na level and the need to decrease the amount of water intake also advised patient I will send Dr Szymanski a message regarding her request for Dexcom. She voiced understanding.

## 2024-02-19 ENCOUNTER — PATIENT MESSAGE (OUTPATIENT)
Dept: INTERNAL MEDICINE | Facility: CLINIC | Age: 74
End: 2024-02-19
Payer: MEDICARE

## 2024-02-19 ENCOUNTER — PATIENT MESSAGE (OUTPATIENT)
Dept: PSYCHIATRY | Facility: CLINIC | Age: 74
End: 2024-02-19

## 2024-02-19 ENCOUNTER — PATIENT MESSAGE (OUTPATIENT)
Dept: CARDIOLOGY | Facility: CLINIC | Age: 74
End: 2024-02-19
Payer: MEDICARE

## 2024-02-21 ENCOUNTER — TELEPHONE (OUTPATIENT)
Dept: INTERNAL MEDICINE | Facility: CLINIC | Age: 74
End: 2024-02-21
Payer: MEDICARE

## 2024-02-21 ENCOUNTER — PATIENT MESSAGE (OUTPATIENT)
Dept: PSYCHIATRY | Facility: CLINIC | Age: 74
End: 2024-02-21
Payer: MEDICARE

## 2024-02-21 DIAGNOSIS — G47.00 INSOMNIA, UNSPECIFIED TYPE: ICD-10-CM

## 2024-02-21 DIAGNOSIS — F41.9 ANXIETY DISORDER, UNSPECIFIED TYPE: Primary | ICD-10-CM

## 2024-02-21 NOTE — TELEPHONE ENCOUNTER
----- Message from Naomi Zayas sent at 2/21/2024 10:19 AM CST -----  Contact: [facility]/ Pratik Christie Is requesting detailed written order and clinical notes for dexcom to be faxed over, please call back at [phone] Ext # [phone] to further discuss, reports if they don't receive it they will have to cancel.       Fax# [phone]

## 2024-02-21 NOTE — TELEPHONE ENCOUNTER
Kitty ochoa is requesting a detailed written order and clinical notes for dexcom to be faxed to their facility. Please advise. Thank you

## 2024-02-22 RX ORDER — HYDROXYZINE HYDROCHLORIDE 10 MG/1
10 TABLET, FILM COATED ORAL NIGHTLY PRN
Qty: 90 TABLET | Refills: 0 | Status: SHIPPED | OUTPATIENT
Start: 2024-02-22 | End: 2024-04-12

## 2024-02-22 NOTE — TELEPHONE ENCOUNTER
The patient requests a refill of hydroxyzine 10 mg and indicates that she has discontinued BuSpar because of side effect of headaches.

## 2024-02-23 ENCOUNTER — PATIENT MESSAGE (OUTPATIENT)
Dept: NEUROLOGY | Facility: CLINIC | Age: 74
End: 2024-02-23
Payer: MEDICARE

## 2024-02-26 ENCOUNTER — TELEPHONE (OUTPATIENT)
Dept: INTERNAL MEDICINE | Facility: CLINIC | Age: 74
End: 2024-02-26
Payer: MEDICARE

## 2024-02-27 RX ORDER — AMITRIPTYLINE HYDROCHLORIDE 10 MG/1
10 TABLET, FILM COATED ORAL NIGHTLY
Qty: 30 TABLET | Refills: 11 | Status: SHIPPED | OUTPATIENT
Start: 2024-02-27 | End: 2024-04-19

## 2024-03-06 ENCOUNTER — TELEPHONE (OUTPATIENT)
Dept: PSYCHIATRY | Facility: CLINIC | Age: 74
End: 2024-03-06
Payer: MEDICARE

## 2024-03-07 ENCOUNTER — PATIENT MESSAGE (OUTPATIENT)
Dept: PSYCHIATRY | Facility: CLINIC | Age: 74
End: 2024-03-07
Payer: MEDICARE

## 2024-03-09 DIAGNOSIS — G47.00 INSOMNIA, UNSPECIFIED TYPE: ICD-10-CM

## 2024-03-11 RX ORDER — TEMAZEPAM 30 MG/1
30 CAPSULE ORAL NIGHTLY
Qty: 90 CAPSULE | Refills: 0 | OUTPATIENT
Start: 2024-03-11

## 2024-03-11 RX ORDER — CLOTRIMAZOLE AND BETAMETHASONE DIPROPIONATE 10; .5 MG/ML; MG/ML
LOTION TOPICAL 2 TIMES DAILY
Qty: 30 ML | Refills: 2 | Status: SHIPPED | OUTPATIENT
Start: 2024-03-11 | End: 2024-06-04 | Stop reason: SDUPTHER

## 2024-03-11 NOTE — TELEPHONE ENCOUNTER
Request to refill temazepam has been received.  The last prescription authorized was 90 capsules on December 29.   indicates that the prescription was filled that day through the [facility] mail order pharmacy.  The request for refills being declined due.

## 2024-03-12 ENCOUNTER — PATIENT MESSAGE (OUTPATIENT)
Dept: CARDIOLOGY | Facility: CLINIC | Age: 74
End: 2024-03-12
Payer: MEDICARE

## 2024-03-12 ENCOUNTER — PATIENT MESSAGE (OUTPATIENT)
Dept: INTERNAL MEDICINE | Facility: CLINIC | Age: 74
End: 2024-03-12
Payer: MEDICARE

## 2024-03-13 ENCOUNTER — PATIENT MESSAGE (OUTPATIENT)
Dept: GASTROENTEROLOGY | Facility: CLINIC | Age: 74
End: 2024-03-13
Payer: MEDICARE

## 2024-03-13 DIAGNOSIS — E78.2 MIXED HYPERLIPIDEMIA: ICD-10-CM

## 2024-03-13 DIAGNOSIS — I70.0 AORTO-ILIAC ATHEROSCLEROSIS: ICD-10-CM

## 2024-03-13 DIAGNOSIS — I70.8 AORTO-ILIAC ATHEROSCLEROSIS: ICD-10-CM

## 2024-03-13 RX ORDER — EVOLOCUMAB 140 MG/ML
140 INJECTION, SOLUTION SUBCUTANEOUS
Qty: 2 EACH | Refills: 0 | Status: CANCELLED | OUTPATIENT
Start: 2024-03-13

## 2024-03-14 ENCOUNTER — PATIENT MESSAGE (OUTPATIENT)
Dept: GASTROENTEROLOGY | Facility: CLINIC | Age: 74
End: 2024-03-14
Payer: MEDICARE

## 2024-03-15 ENCOUNTER — PATIENT MESSAGE (OUTPATIENT)
Dept: INTERNAL MEDICINE | Facility: CLINIC | Age: 74
End: 2024-03-15
Payer: MEDICARE

## 2024-03-17 ENCOUNTER — PATIENT MESSAGE (OUTPATIENT)
Dept: INTERNAL MEDICINE | Facility: CLINIC | Age: 74
End: 2024-03-17
Payer: MEDICARE

## 2024-03-18 ENCOUNTER — PATIENT MESSAGE (OUTPATIENT)
Dept: INTERNAL MEDICINE | Facility: CLINIC | Age: 74
End: 2024-03-18
Payer: MEDICARE

## 2024-03-19 ENCOUNTER — TELEPHONE (OUTPATIENT)
Dept: INTERNAL MEDICINE | Facility: CLINIC | Age: 74
End: 2024-03-19
Payer: MEDICARE

## 2024-03-20 ENCOUNTER — PATIENT MESSAGE (OUTPATIENT)
Dept: INTERNAL MEDICINE | Facility: CLINIC | Age: 74
End: 2024-03-20
Payer: MEDICARE

## 2024-03-20 ENCOUNTER — TELEPHONE (OUTPATIENT)
Dept: PSYCHIATRY | Facility: CLINIC | Age: 74
End: 2024-03-20
Payer: MEDICARE

## 2024-03-20 ENCOUNTER — TELEPHONE (OUTPATIENT)
Dept: INTERNAL MEDICINE | Facility: CLINIC | Age: 74
End: 2024-03-20
Payer: MEDICARE

## 2024-03-22 ENCOUNTER — PATIENT MESSAGE (OUTPATIENT)
Dept: INTERNAL MEDICINE | Facility: CLINIC | Age: 74
End: 2024-03-22
Payer: MEDICARE

## 2024-03-23 ENCOUNTER — PATIENT MESSAGE (OUTPATIENT)
Dept: INTERNAL MEDICINE | Facility: CLINIC | Age: 74
End: 2024-03-23
Payer: MEDICARE

## 2024-03-24 ENCOUNTER — PATIENT MESSAGE (OUTPATIENT)
Dept: INTERNAL MEDICINE | Facility: CLINIC | Age: 74
End: 2024-03-24
Payer: MEDICARE

## 2024-03-24 DIAGNOSIS — G47.00 INSOMNIA, UNSPECIFIED TYPE: ICD-10-CM

## 2024-03-25 ENCOUNTER — PATIENT MESSAGE (OUTPATIENT)
Dept: PSYCHIATRY | Facility: CLINIC | Age: 74
End: 2024-03-25
Payer: MEDICARE

## 2024-03-25 ENCOUNTER — PATIENT MESSAGE (OUTPATIENT)
Dept: INTERNAL MEDICINE | Facility: CLINIC | Age: 74
End: 2024-03-25
Payer: MEDICARE

## 2024-03-25 RX ORDER — TEMAZEPAM 30 MG/1
30 CAPSULE ORAL NIGHTLY
Qty: 90 CAPSULE | Refills: 0 | OUTPATIENT
Start: 2024-03-25

## 2024-03-26 DIAGNOSIS — G47.00 INSOMNIA, UNSPECIFIED TYPE: ICD-10-CM

## 2024-03-26 RX ORDER — TEMAZEPAM 30 MG/1
30 CAPSULE ORAL NIGHTLY
Qty: 90 CAPSULE | Refills: 0 | Status: SHIPPED | OUTPATIENT
Start: 2024-03-26 | End: 2024-05-23 | Stop reason: SDUPTHER

## 2024-03-26 NOTE — TELEPHONE ENCOUNTER
There is a refill request for temazepam.  Epic indicates that the last prescription, 90 days, was authorized on December 29, and epic indicates that the prescription was mailed out from the [facility] pharmacy on that date.

## 2024-03-31 ENCOUNTER — HOSPITAL ENCOUNTER (INPATIENT)
Facility: HOSPITAL | Age: 74
LOS: 2 days | Discharge: HOME | DRG: 322 | End: 2024-04-03
Attending: EMERGENCY MEDICINE | Admitting: INTERNAL MEDICINE
Payer: MEDICARE

## 2024-03-31 DIAGNOSIS — I16.0 HYPERTENSIVE URGENCY: ICD-10-CM

## 2024-03-31 DIAGNOSIS — Z98.61 S/P PTCA (PERCUTANEOUS TRANSLUMINAL CORONARY ANGIOPLASTY): ICD-10-CM

## 2024-03-31 DIAGNOSIS — R06.02 SOB (SHORTNESS OF BREATH): ICD-10-CM

## 2024-03-31 DIAGNOSIS — I25.2 HX OF NON-ST ELEVATION MYOCARDIAL INFARCTION (NSTEMI): ICD-10-CM

## 2024-03-31 DIAGNOSIS — R79.89 ELEVATED TROPONIN: Primary | ICD-10-CM

## 2024-03-31 DIAGNOSIS — I21.4 NSTEMI (NON-ST ELEVATED MYOCARDIAL INFARCTION): ICD-10-CM

## 2024-03-31 DIAGNOSIS — I25.110 CORONARY ARTERY DISEASE INVOLVING NATIVE CORONARY ARTERY OF NATIVE HEART WITH UNSTABLE ANGINA PECTORIS: ICD-10-CM

## 2024-03-31 DIAGNOSIS — R07.9 CHEST PAIN: ICD-10-CM

## 2024-03-31 DIAGNOSIS — R26.81 UNSTEADINESS ON FEET: ICD-10-CM

## 2024-03-31 PROBLEM — I16.1 HYPERTENSIVE EMERGENCY: Status: ACTIVE | Noted: 2017-10-05

## 2024-03-31 LAB
ALBUMIN SERPL BCP-MCNC: 3.6 G/DL (ref 3.5–5.2)
ALP SERPL-CCNC: 38 U/L (ref 55–135)
ALT SERPL W/O P-5'-P-CCNC: 16 U/L (ref 10–44)
ANION GAP SERPL CALC-SCNC: 12 MMOL/L (ref 8–16)
AORTIC ROOT ANNULUS: 3.4 CM
ASCENDING AORTA: 3.26 CM
AST SERPL-CCNC: 19 U/L (ref 10–40)
AV INDEX (PROSTH): 0.63
AV MEAN GRADIENT: 8 MMHG
AV PEAK GRADIENT: 16 MMHG
AV REGURGITATION PRESSURE HALF TIME: 419.93 MS
AV VALVE AREA BY VELOCITY RATIO: 2.3 CM²
AV VALVE AREA: 2.5 CM²
AV VELOCITY RATIO: 0.59
BASOPHILS # BLD AUTO: 0.06 K/UL (ref 0–0.2)
BASOPHILS NFR BLD: 1.1 % (ref 0–1.9)
BILIRUB SERPL-MCNC: 0.5 MG/DL (ref 0.1–1)
BILIRUB UR QL STRIP: NEGATIVE
BNP SERPL-MCNC: 81 PG/ML (ref 0–99)
BSA FOR ECHO PROCEDURE: 2.18 M2
BUN SERPL-MCNC: 12 MG/DL (ref 8–23)
CALCIUM SERPL-MCNC: 9.4 MG/DL (ref 8.7–10.5)
CHLORIDE SERPL-SCNC: 105 MMOL/L (ref 95–110)
CK SERPL-CCNC: 65 U/L (ref 20–180)
CLARITY UR: CLEAR
CO2 SERPL-SCNC: 21 MMOL/L (ref 23–29)
COLOR UR: YELLOW
CREAT SERPL-MCNC: 0.9 MG/DL (ref 0.5–1.4)
CV ECHO LV RWT: 0.66 CM
DIFFERENTIAL METHOD BLD: ABNORMAL
DOP CALC AO PEAK VEL: 2 M/S
DOP CALC AO VTI: 39.9 CM
DOP CALC LVOT AREA: 3.9 CM2
DOP CALC LVOT DIAMETER: 2.24 CM
DOP CALC LVOT PEAK VEL: 1.17 M/S
DOP CALC LVOT STROKE VOLUME: 99.65 CM3
DOP CALC RVOT PEAK VEL: 1.28 M/S
DOP CALC RVOT VTI: 20 CM
DOP CALCLVOT PEAK VEL VTI: 25.3 CM
E WAVE DECELERATION TIME: 362.8 MSEC
E/A RATIO: 0.53
E/E' RATIO: 8.77 M/S
ECHO LV POSTERIOR WALL: 1.34 CM (ref 0.6–1.1)
EJECTION FRACTION: 45 %
EOSINOPHIL # BLD AUTO: 0.1 K/UL (ref 0–0.5)
EOSINOPHIL NFR BLD: 2.2 % (ref 0–8)
ERYTHROCYTE [DISTWIDTH] IN BLOOD BY AUTOMATED COUNT: 12.1 % (ref 11.5–14.5)
EST. GFR  (NO RACE VARIABLE): >60 ML/MIN/1.73 M^2
FRACTIONAL SHORTENING: 37 % (ref 28–44)
GLUCOSE SERPL-MCNC: 147 MG/DL (ref 70–110)
GLUCOSE UR QL STRIP: NEGATIVE
HCT VFR BLD AUTO: 33.2 % (ref 37–48.5)
HCV AB SERPL QL IA: NEGATIVE
HEP C VIRUS HOLD SPECIMEN: NORMAL
HGB BLD-MCNC: 11 G/DL (ref 12–16)
HGB UR QL STRIP: NEGATIVE
HIV 1+2 AB+HIV1 P24 AG SERPL QL IA: NEGATIVE
IMM GRANULOCYTES # BLD AUTO: 0.02 K/UL (ref 0–0.04)
IMM GRANULOCYTES NFR BLD AUTO: 0.4 % (ref 0–0.5)
INTERVENTRICULAR SEPTUM: 1.2 CM (ref 0.6–1.1)
IVRT: 157.94 MSEC
KETONES UR QL STRIP: NEGATIVE
LA MAJOR: 5.51 CM
LA MINOR: 5.31 CM
LA WIDTH: 4.3 CM
LEFT ATRIUM SIZE: 3.39 CM
LEFT ATRIUM VOLUME INDEX MOD: 33.4 ML/M2
LEFT ATRIUM VOLUME INDEX: 32.4 ML/M2
LEFT ATRIUM VOLUME MOD: 69.12 CM3
LEFT ATRIUM VOLUME: 67.01 CM3
LEFT INTERNAL DIMENSION IN SYSTOLE: 2.55 CM (ref 2.1–4)
LEFT VENTRICLE DIASTOLIC VOLUME INDEX: 34.82 ML/M2
LEFT VENTRICLE DIASTOLIC VOLUME: 72.07 ML
LEFT VENTRICLE MASS INDEX: 89 G/M2
LEFT VENTRICLE SYSTOLIC VOLUME INDEX: 11.4 ML/M2
LEFT VENTRICLE SYSTOLIC VOLUME: 23.53 ML
LEFT VENTRICULAR INTERNAL DIMENSION IN DIASTOLE: 4.05 CM (ref 3.5–6)
LEFT VENTRICULAR MASS: 183.44 G
LEUKOCYTE ESTERASE UR QL STRIP: NEGATIVE
LV LATERAL E/E' RATIO: 8.14 M/S
LV SEPTAL E/E' RATIO: 9.5 M/S
LVOT MG: 3.12 MMHG
LVOT MV: 0.84 CM/S
LYMPHOCYTES # BLD AUTO: 1.2 K/UL (ref 1–4.8)
LYMPHOCYTES NFR BLD: 21.5 % (ref 18–48)
MCH RBC QN AUTO: 30.6 PG (ref 27–31)
MCHC RBC AUTO-ENTMCNC: 33.1 G/DL (ref 32–36)
MCV RBC AUTO: 93 FL (ref 82–98)
MONOCYTES # BLD AUTO: 0.6 K/UL (ref 0.3–1)
MONOCYTES NFR BLD: 10 % (ref 4–15)
MV PEAK A VEL: 1.08 M/S
MV PEAK E VEL: 0.57 M/S
MV STENOSIS PRESSURE HALF TIME: 105.21 MS
MV VALVE AREA P 1/2 METHOD: 2.09 CM2
NEUTROPHILS # BLD AUTO: 3.6 K/UL (ref 1.8–7.7)
NEUTROPHILS NFR BLD: 64.8 % (ref 38–73)
NITRITE UR QL STRIP: NEGATIVE
NRBC BLD-RTO: 0 /100 WBC
PH UR STRIP: 5 [PH] (ref 5–8)
PISA AR MAX VEL: 4.18 M/S
PISA TR MAX VEL: 3.14 M/S
PLATELET # BLD AUTO: 251 K/UL (ref 150–450)
PMV BLD AUTO: 10.9 FL (ref 9.2–12.9)
POCT GLUCOSE: 111 MG/DL (ref 70–110)
POCT GLUCOSE: 193 MG/DL (ref 70–110)
POTASSIUM SERPL-SCNC: 4.4 MMOL/L (ref 3.5–5.1)
PROT SERPL-MCNC: 6.4 G/DL (ref 6–8.4)
PROT UR QL STRIP: ABNORMAL
PV MEAN GRADIENT: 3 MMHG
PV MV: 0.84 M/S
PV PEAK GRADIENT: 8 MMHG
PV PEAK VELOCITY: 1.37 M/S
RA MAJOR: 5.16 CM
RA PRESSURE ESTIMATED: 3 MMHG
RA WIDTH: 3 CM
RBC # BLD AUTO: 3.59 M/UL (ref 4–5.4)
RIGHT VENTRICULAR END-DIASTOLIC DIMENSION: 2.6 CM
RV TB RVSP: 6 MMHG
SODIUM SERPL-SCNC: 138 MMOL/L (ref 136–145)
SP GR UR STRIP: 1.01 (ref 1–1.03)
STJ: 2.69 CM
TDI LATERAL: 0.07 M/S
TDI SEPTAL: 0.06 M/S
TDI: 0.07 M/S
TR MAX PG: 39 MMHG
TROPONIN I SERPL DL<=0.01 NG/ML-MCNC: 0.14 NG/ML (ref 0–0.03)
TROPONIN I SERPL DL<=0.01 NG/ML-MCNC: 0.26 NG/ML (ref 0–0.03)
TROPONIN I SERPL DL<=0.01 NG/ML-MCNC: 0.41 NG/ML (ref 0–0.03)
TV REST PULMONARY ARTERY PRESSURE: 42 MMHG
URN SPEC COLLECT METH UR: ABNORMAL
UROBILINOGEN UR STRIP-ACNC: NEGATIVE EU/DL
WBC # BLD AUTO: 5.5 K/UL (ref 3.9–12.7)
Z-SCORE OF LEFT VENTRICULAR DIMENSION IN END DIASTOLE: -4.41
Z-SCORE OF LEFT VENTRICULAR DIMENSION IN END SYSTOLE: -3.26

## 2024-03-31 PROCEDURE — 96366 THER/PROPH/DIAG IV INF ADDON: CPT

## 2024-03-31 PROCEDURE — 87389 HIV-1 AG W/HIV-1&-2 AB AG IA: CPT | Performed by: EMERGENCY MEDICINE

## 2024-03-31 PROCEDURE — 63600175 PHARM REV CODE 636 W HCPCS: Performed by: STUDENT IN AN ORGANIZED HEALTH CARE EDUCATION/TRAINING PROGRAM

## 2024-03-31 PROCEDURE — G0378 HOSPITAL OBSERVATION PER HR: HCPCS

## 2024-03-31 PROCEDURE — 25000003 PHARM REV CODE 250: Performed by: FAMILY MEDICINE

## 2024-03-31 PROCEDURE — 96374 THER/PROPH/DIAG INJ IV PUSH: CPT

## 2024-03-31 PROCEDURE — 99223 1ST HOSP IP/OBS HIGH 75: CPT | Mod: 25,,, | Performed by: STUDENT IN AN ORGANIZED HEALTH CARE EDUCATION/TRAINING PROGRAM

## 2024-03-31 PROCEDURE — 96372 THER/PROPH/DIAG INJ SC/IM: CPT | Performed by: FAMILY MEDICINE

## 2024-03-31 PROCEDURE — 25000003 PHARM REV CODE 250: Performed by: STUDENT IN AN ORGANIZED HEALTH CARE EDUCATION/TRAINING PROGRAM

## 2024-03-31 PROCEDURE — 93005 ELECTROCARDIOGRAM TRACING: CPT

## 2024-03-31 PROCEDURE — 93010 ELECTROCARDIOGRAM REPORT: CPT | Mod: ,,, | Performed by: INTERNAL MEDICINE

## 2024-03-31 PROCEDURE — 80053 COMPREHEN METABOLIC PANEL: CPT | Performed by: EMERGENCY MEDICINE

## 2024-03-31 PROCEDURE — 82962 GLUCOSE BLOOD TEST: CPT

## 2024-03-31 PROCEDURE — 99285 EMERGENCY DEPT VISIT HI MDM: CPT | Mod: 25

## 2024-03-31 PROCEDURE — 84484 ASSAY OF TROPONIN QUANT: CPT | Mod: 91 | Performed by: FAMILY MEDICINE

## 2024-03-31 PROCEDURE — 63600175 PHARM REV CODE 636 W HCPCS: Performed by: EMERGENCY MEDICINE

## 2024-03-31 PROCEDURE — 86803 HEPATITIS C AB TEST: CPT | Performed by: EMERGENCY MEDICINE

## 2024-03-31 PROCEDURE — 84484 ASSAY OF TROPONIN QUANT: CPT | Performed by: EMERGENCY MEDICINE

## 2024-03-31 PROCEDURE — 63600175 PHARM REV CODE 636 W HCPCS: Performed by: FAMILY MEDICINE

## 2024-03-31 PROCEDURE — 85025 COMPLETE CBC W/AUTO DIFF WBC: CPT | Performed by: EMERGENCY MEDICINE

## 2024-03-31 PROCEDURE — 81003 URINALYSIS AUTO W/O SCOPE: CPT | Performed by: EMERGENCY MEDICINE

## 2024-03-31 PROCEDURE — 82550 ASSAY OF CK (CPK): CPT | Performed by: EMERGENCY MEDICINE

## 2024-03-31 PROCEDURE — 96365 THER/PROPH/DIAG IV INF INIT: CPT | Mod: 59

## 2024-03-31 PROCEDURE — 83880 ASSAY OF NATRIURETIC PEPTIDE: CPT | Performed by: EMERGENCY MEDICINE

## 2024-03-31 RX ORDER — ACETAMINOPHEN 325 MG/1
650 TABLET ORAL EVERY 4 HOURS PRN
Status: DISCONTINUED | OUTPATIENT
Start: 2024-03-31 | End: 2024-04-03 | Stop reason: HOSPADM

## 2024-03-31 RX ORDER — ZOLPIDEM TARTRATE 5 MG/1
5 TABLET ORAL NIGHTLY PRN
Status: DISCONTINUED | OUTPATIENT
Start: 2024-03-31 | End: 2024-04-03 | Stop reason: HOSPADM

## 2024-03-31 RX ORDER — IBUPROFEN 200 MG
16 TABLET ORAL
Status: DISCONTINUED | OUTPATIENT
Start: 2024-03-31 | End: 2024-04-03 | Stop reason: HOSPADM

## 2024-03-31 RX ORDER — OLOPATADINE HYDROCHLORIDE 1 MG/ML
1 SOLUTION/ DROPS OPHTHALMIC 2 TIMES DAILY
Status: DISCONTINUED | OUTPATIENT
Start: 2024-03-31 | End: 2024-04-03 | Stop reason: HOSPADM

## 2024-03-31 RX ORDER — NAPROXEN SODIUM 220 MG/1
81 TABLET, FILM COATED ORAL DAILY
Status: DISCONTINUED | OUTPATIENT
Start: 2024-04-01 | End: 2024-04-03 | Stop reason: HOSPADM

## 2024-03-31 RX ORDER — ACETAMINOPHEN 325 MG/1
650 TABLET ORAL ONCE
Status: COMPLETED | OUTPATIENT
Start: 2024-03-31 | End: 2024-03-31

## 2024-03-31 RX ORDER — HYDRALAZINE HYDROCHLORIDE 20 MG/ML
10 INJECTION INTRAMUSCULAR; INTRAVENOUS
Status: COMPLETED | OUTPATIENT
Start: 2024-03-31 | End: 2024-03-31

## 2024-03-31 RX ORDER — GLUCAGON 1 MG
1 KIT INJECTION
Status: DISCONTINUED | OUTPATIENT
Start: 2024-03-31 | End: 2024-04-03 | Stop reason: HOSPADM

## 2024-03-31 RX ORDER — NALOXONE HCL 0.4 MG/ML
0.02 VIAL (ML) INJECTION
Status: DISCONTINUED | OUTPATIENT
Start: 2024-03-31 | End: 2024-04-03 | Stop reason: HOSPADM

## 2024-03-31 RX ORDER — IBUPROFEN 200 MG
24 TABLET ORAL
Status: DISCONTINUED | OUTPATIENT
Start: 2024-03-31 | End: 2024-04-03 | Stop reason: HOSPADM

## 2024-03-31 RX ORDER — PANTOPRAZOLE SODIUM 40 MG/1
40 TABLET, DELAYED RELEASE ORAL DAILY
Status: DISCONTINUED | OUTPATIENT
Start: 2024-03-31 | End: 2024-04-03 | Stop reason: HOSPADM

## 2024-03-31 RX ORDER — SODIUM CHLORIDE 0.9 % (FLUSH) 0.9 %
10 SYRINGE (ML) INJECTION EVERY 12 HOURS PRN
Status: DISCONTINUED | OUTPATIENT
Start: 2024-03-31 | End: 2024-04-03 | Stop reason: HOSPADM

## 2024-03-31 RX ORDER — INSULIN ASPART 100 [IU]/ML
0-10 INJECTION, SOLUTION INTRAVENOUS; SUBCUTANEOUS
Status: DISCONTINUED | OUTPATIENT
Start: 2024-03-31 | End: 2024-04-03 | Stop reason: HOSPADM

## 2024-03-31 RX ORDER — HYDROCODONE BITARTRATE AND ACETAMINOPHEN 5; 325 MG/1; MG/1
1 TABLET ORAL EVERY 6 HOURS PRN
Status: DISCONTINUED | OUTPATIENT
Start: 2024-03-31 | End: 2024-04-03 | Stop reason: HOSPADM

## 2024-03-31 RX ORDER — HEPARIN SODIUM,PORCINE/D5W 25000/250
0-40 INTRAVENOUS SOLUTION INTRAVENOUS CONTINUOUS
Status: DISCONTINUED | OUTPATIENT
Start: 2024-03-31 | End: 2024-04-01

## 2024-03-31 RX ORDER — ASPIRIN 325 MG
325 TABLET ORAL DAILY
Status: COMPLETED | OUTPATIENT
Start: 2024-03-31 | End: 2024-03-31

## 2024-03-31 RX ORDER — CARVEDILOL 12.5 MG/1
25 TABLET ORAL 2 TIMES DAILY WITH MEALS
Status: DISCONTINUED | OUTPATIENT
Start: 2024-03-31 | End: 2024-04-03 | Stop reason: HOSPADM

## 2024-03-31 RX ORDER — POLYETHYLENE GLYCOL 3350 17 G/17G
17 POWDER, FOR SOLUTION ORAL DAILY
Status: DISCONTINUED | OUTPATIENT
Start: 2024-04-01 | End: 2024-04-03 | Stop reason: HOSPADM

## 2024-03-31 RX ORDER — DULOXETIN HYDROCHLORIDE 30 MG/1
60 CAPSULE, DELAYED RELEASE ORAL DAILY
Status: DISCONTINUED | OUTPATIENT
Start: 2024-03-31 | End: 2024-04-03 | Stop reason: HOSPADM

## 2024-03-31 RX ORDER — LISINOPRIL 20 MG/1
40 TABLET ORAL DAILY
Status: DISCONTINUED | OUTPATIENT
Start: 2024-03-31 | End: 2024-04-03 | Stop reason: HOSPADM

## 2024-03-31 RX ADMIN — OLOPATADINE HYDROCHLORIDE 1 DROP: 1 SOLUTION OPHTHALMIC at 10:03

## 2024-03-31 RX ADMIN — LISINOPRIL 40 MG: 20 TABLET ORAL at 03:03

## 2024-03-31 RX ADMIN — CARVEDILOL 25 MG: 12.5 TABLET, FILM COATED ORAL at 05:03

## 2024-03-31 RX ADMIN — PANTOPRAZOLE SODIUM 40 MG: 40 TABLET, DELAYED RELEASE ORAL at 03:03

## 2024-03-31 RX ADMIN — HEPARIN SODIUM 12 UNITS/KG/HR: 10000 INJECTION, SOLUTION INTRAVENOUS at 09:03

## 2024-03-31 RX ADMIN — ASPIRIN 325 MG ORAL TABLET 325 MG: 325 PILL ORAL at 10:03

## 2024-03-31 RX ADMIN — HYDRALAZINE HYDROCHLORIDE 10 MG: 20 INJECTION, SOLUTION INTRAMUSCULAR; INTRAVENOUS at 11:03

## 2024-03-31 RX ADMIN — INSULIN ASPART 1 UNITS: 100 INJECTION, SOLUTION INTRAVENOUS; SUBCUTANEOUS at 10:03

## 2024-03-31 RX ADMIN — ZOLPIDEM TARTRATE 5 MG: 5 TABLET ORAL at 10:03

## 2024-03-31 RX ADMIN — DULOXETINE HYDROCHLORIDE 60 MG: 30 CAPSULE, DELAYED RELEASE ORAL at 03:03

## 2024-03-31 RX ADMIN — INSULIN DETEMIR 35 UNITS: 100 INJECTION, SOLUTION SUBCUTANEOUS at 10:03

## 2024-03-31 RX ADMIN — ACETAMINOPHEN 650 MG: 325 TABLET ORAL at 08:03

## 2024-03-31 RX ADMIN — HYPROMELLOSE 2910 2 DROP: 5 SOLUTION/ DROPS OPHTHALMIC at 10:03

## 2024-03-31 NOTE — H&P
[facility] - Emergency Dept.  [facility] Medicine  History & Physical    Patient Name: Christine Jo  MRN: [id number]  Patient Class: OP- Observation  Admission Date: 3/31/2024  Attending Physician: James Price MD   Primary Care Provider: Jose Szymanski MD         Patient information was obtained from patient, relative(s), and ER records.     Subjective:     Principal Problem:Chest pain    Chief Complaint:   Chief Complaint   Patient presents with    Shortness of Breath     pain under left breast, shortness of breath, coughing - dry cough        HPI: Ms. Jo is a 74-year-old female with past medical history of diabetes, hypertension, CAD, arthritis, migraine, depression/anxiety, and AFib presented with shortness of breath and chest pain.  Patient denies any history of congestive heart failure but was noted to elevated blood pressure of 216/91 on admission and a troponin of 0.139 which increased to 0.263.  Of note, patient sleeps on 2 pillows at night, she has a former smoker but quit 21 years ago.  Cardiology was consulted and recommended admission for observation with echo and repeated troponins.  At the time of my evaluation patient stated her chest pain was much better but still had some heaviness.  Blood pressure was better controlled with hydralazine.  Patient states she has been compliant with her medications but has not been watching her diet very closely.  Plan of care was discussed with patient, son, and daughter at bedside.  Hospital medicine will admit for observation, echo, blood pressure control, and cardiac workup.    Past Medical History:   Diagnosis Date    Arthritis     Cataract     Coronary artery disease     Diabetes mellitus 2008     am 01/15/2018 Insulin x 1 year    Diabetes mellitus, type 2     DM (diabetes mellitus) 2008     am 02/14/2020 Insulin x 4 years    Encounter for blood transfusion     Glaucoma     Hypertension     Insomnia     Macular degeneration     Old MI  (myocardial infarction) 2/12/2024    Vaginal yeast infection        Past Surgical History:   Procedure Laterality Date    abdominal laparoscopy       BREAST BIOPSY Left 1998    benign    CATARACT EXTRACTION Bilateral [id number]    [facility] in [place]    Cataract Surgery Bilateral     Laser (YAG)    COLONOSCOPY N/A 05/05/2021    Procedure: COLONOSCOPY;  Surgeon: Shawn Rogers III, MD;  Location: [facility] ENDO;  Service: Endoscopy;  Laterality: N/A;    COLONOSCOPY N/A 06/30/2021    Procedure: COLONOSCOPY;  Surgeon: Memo Merida MD;  Location: [facility] ENDO;  Service: Endoscopy;  Laterality: N/A;    ESOPHAGOGASTRODUODENOSCOPY N/A 11/29/2019    Procedure: ESOPHAGOGASTRODUODENOSCOPY (EGD);  Surgeon: Shawn Rogers III, MD;  Location: [facility] ENDO;  Service: Endoscopy;  Laterality: N/A;    ESOPHAGOGASTRODUODENOSCOPY N/A 05/05/2021    Procedure: EGD (ESOPHAGOGASTRODUODENOSCOPY);  Surgeon: Shawn Rogers III, MD;  Location: [facility];  Service: Endoscopy;  Laterality: N/A;    EYE SURGERY      TONSILLECTOMY      TUBAL LIGATION         Review of patient's allergies indicates:   Allergen Reactions    Repatha pushtronex [evolocumab]      headache    Aspirin Palpitations       No current facility-administered medications on file prior to encounter.     Current Outpatient Medications on File Prior to Encounter   Medication Sig    apixaban (ELIQUIS) 5 mg Tab Take 1 tablet (5 mg total) by mouth 2 (two) times daily.    benazepriL (LOTENSIN) 40 MG tablet Take 1 tablet (40 mg total) by mouth 2 (two) times daily.    BEPREVE 1.5 % Drop Place 1 drop into both eyes 2 (two) times daily.    blood sugar diagnostic Strp To check blood sugar 1 times daily    blood-glucose meter (ACCU-CHEK GUIDE GLUCOSE METER) Misc use daily to test blood sugar    carvediloL (COREG) 25 MG tablet TAKE 1 TABLET BY MOUTH TWICE DAILY    cycloSPORINE (RESTASIS) 0.05 % ophthalmic emulsion Place 1 drop into both eyes 2 (two) times daily.    DULoxetine  "(CYMBALTA) 60 MG capsule Take 1 capsule (60 mg total) by mouth once daily.    fluticasone propionate (FLONASE) 50 mcg/actuation nasal spray 2 sprays (100 mcg total) by Each Nostril route once daily.    gabapentin (NEURONTIN) 300 MG capsule Take 1 capsule (300 mg total) by mouth 3 (three) times daily. (Patient taking differently: Take 300 mg by mouth daily as needed.)    galcanezumab-gnlm (EMGALITY SYRINGE) 120 mg/mL Syrg Inject 120 mg into the skin every 28 days.    hydrALAZINE (APRESOLINE) 50 MG tablet Take 1 tablet (50 mg total) by mouth every 8 (eight) hours.    hydrOXYzine HCL (ATARAX) 10 MG Tab Take 1 tablet (10 mg total) by mouth nightly as needed (Anxiety or insomnia).    insulin (LANTUS SOLOSTAR U-100 INSULIN) glargine 100 units/mL SubQ pen Inject 72 Units into the skin every evening.    linaCLOtide (LINZESS) 72 mcg Cap capsule Take 1 capsule (72 mcg total) by mouth before breakfast.    meclizine (ANTIVERT) 25 mg tablet Take 1 tablet (25 mg total) by mouth 3 (three) times daily as needed.    pantoprazole (PROTONIX) 40 MG tablet Take 1 tablet (40 mg total) by mouth once daily.    rimegepant 75 mg odt Take 1 tablet (75 mg total) by mouth as needed for Migraine (do not exceed 2-3 doses within 1 week). Place ODT tablet on the tongue; alternatively the ODT tablet may be placed under the tongue    SITagliptin phosphate (JANUVIA) 100 MG Tab Take 1 tablet (100 mg total) by mouth once daily.    temazepam (RESTORIL) 30 mg capsule Take 1 capsule (30 mg total) by mouth every evening.    amitriptyline (ELAVIL) 10 MG tablet Take 1 tablet (10 mg total) by mouth every evening.    azelastine (ASTELIN) 137 mcg (0.1 %) nasal spray use 2 sprays (274 mcg total) by Nasal route 2 (two) times daily.    clotrimazole-betamethasone (LOTRISONE) lotion Apply topically to the affected area 2 (two) times daily.    lancets Misc To check BG 1 times daily    pen needle, diabetic (BD ULTRA-FINE SHORT PEN NEEDLE) 31 gauge x 5/16" Ndle AS " DIRECTED TWICE DAILY    RSVPreF3 antigen-AS01E, PF, (AREXVY, PF,) 120 mcg/0.5 mL SusR vaccine Inject into the muscle.    [DISCONTINUED] diclofenac sodium (VOLTAREN) 1 % Gel Apply 2 grams topically 4 (four) times daily. To affected joints as needed for pain     Family History       Problem Relation (Age of Onset)    Cancer Son, Maternal Aunt    Cataracts Mother, Sister    Diabetes Sister, Sister, Sister    Glaucoma Mother    Heart disease Mother, Sister, Maternal Grandfather    Macular degeneration Mother          Tobacco Use    Smoking status: Former     Current packs/day: 0.00     Average packs/day: 1 pack/day for 36.5 years (36.5 ttl pk-yrs)     Types: Cigarettes     Start date:      Quit date: 2003     Years since quittin.7     Passive exposure: Never    Smokeless tobacco: Never   Substance and Sexual Activity    Alcohol use: No    Drug use: No    Sexual activity: Never     Review of Systems   Constitutional:  Negative for activity change, appetite change and fatigue.   Respiratory:  Positive for shortness of breath. Negative for cough and chest tightness.    Cardiovascular:  Positive for chest pain. Negative for palpitations and leg swelling.   Gastrointestinal:  Negative for abdominal pain, nausea and vomiting.   Musculoskeletal:  Negative for gait problem.   Neurological:  Negative for dizziness, weakness, light-headedness and headaches.   Psychiatric/Behavioral:  Negative for agitation and confusion. The patient is not nervous/anxious.    All other systems reviewed and are negative.    Objective:     Vital Signs (Most Recent):  Temp: 98.1 °F (36.7 °C) (24 0815)  Pulse: 84 (24 1300)  Resp: 20 (24 1300)  BP: 134/60 (24 1502)  SpO2: 98 % (24 1300) Vital Signs (24h Range):  Temp:  [98.1 °F (36.7 °C)] 98.1 °F (36.7 °C)  Pulse:  [71-94] 84  Resp:  [16-26] 20  SpO2:  [96 %-99 %] 98 %  BP: (134-216)/(60-91) 134/60     Weight: 109 kg (240 lb 4.8 oz)  Body mass index is 43.95  kg/m².     Physical Exam  Vitals and nursing note reviewed.   Constitutional:       Appearance: Normal appearance. She is obese.   HENT:      Head: Normocephalic and atraumatic.      Nose: Nose normal.      Mouth/Throat:      Mouth: Mucous membranes are moist.   Eyes:      Extraocular Movements: Extraocular movements intact.      Conjunctiva/sclera: Conjunctivae normal.   Cardiovascular:      Rate and Rhythm: Normal rate and regular rhythm.      Pulses: Normal pulses.      Heart sounds: Normal heart sounds.   Pulmonary:      Effort: Pulmonary effort is normal.      Breath sounds: Normal breath sounds.   Abdominal:      General: Abdomen is flat. Bowel sounds are normal.      Palpations: Abdomen is soft.   Musculoskeletal:         General: Normal range of motion.      Cervical back: Normal range of motion and neck supple.   Skin:     General: Skin is warm.      Capillary Refill: Capillary refill takes less than 2 seconds.   Neurological:      Mental Status: She is alert and oriented to person, place, and time. Mental status is at baseline.   Psychiatric:         Mood and Affect: Mood normal.         Behavior: Behavior normal.                Significant Labs: All pertinent labs within the past 24 hours have been reviewed.  Recent Lab Results         03/31/24  1459   03/31/24  1217   03/31/24  1115   03/31/24  0905        Albumin       3.6       ALP       38       ALT       16       Anion Gap       12       Appearance, UA     Clear         AST       19       Baso #       0.06       Basophil %       1.1       Bilirubin (UA)     Negative         BILIRUBIN TOTAL       0.5  Comment: For infants and newborns, interpretation of results should be based  on gestational age, weight and in agreement with clinical  observations.    Premature Infant recommended reference ranges:  Up to 24 hours.............<8.0 mg/dL  Up to 48 hours............<12.0 mg/dL  3-5 days..................<15.0 mg/dL  6-29 days.................<15.0  mg/dL         BNP       81  Comment: Values of less than 100 pg/ml are consistent with non-CHF populations.       BUN       12       Calcium       9.4       Chloride       105       CO2       21       Color, UA     Yellow         CPK       65       Creatinine       0.9       Differential Method       Automated       eGFR       >60       Eos #       0.1       Eos %       2.2       Glucose       147       Glucose, UA     Negative         Gran # (ANC)       3.6       Gran %       64.8       Hematocrit       33.2       Hemoglobin       11.0       Hepatitis C Ab       Negative       HEP C Virus Hold Specimen       Hold for HCV sendout       HIV 1/2 Ag/Ab       Negative       Immature Grans (Abs)       0.02  Comment: Mild elevation in immature granulocytes is non specific and   can be seen in a variety of conditions including stress response,   acute inflammation, trauma and pregnancy. Correlation with other   laboratory and clinical findings is essential.         Immature Granulocytes       0.4       Ketones, UA     Negative         Leukocyte Esterase, UA     Negative         Lymph #       1.2       Lymph %       21.5       MCH       30.6       MCHC       33.1       MCV       93       Mono #       0.6       Mono %       10.0       MPV       10.9       NITRITE UA     Negative         nRBC       0       Blood, UA     Negative         pH, UA     5.0         Platelet Count       251       POCT Glucose 111             Potassium       4.4       PROTEIN TOTAL       6.4       Protein, UA     Trace  Comment: Recommend a 24 hour urine protein or a urine   protein/creatinine ratio if globulin induced proteinuria is  clinically suspected.           RBC       3.59       RDW       12.1       Sodium       138       Specific Hale, UA     1.010         Specimen UA     Urine, Clean Catch         Troponin I   0.263  Comment: The reference interval for Troponin I represents the 99th percentile   cutoff   for our facility and is  consistent with 3rd generation assay   performance.       0.139  Comment: The reference interval for Troponin I represents the 99th percentile   cutoff   for our facility and is consistent with 3rd generation assay   performance.         UROBILINOGEN UA     Negative         WBC       5.50               Significant Imaging:   X-Ray Chest AP Portable   Final Result      Normal chest x-ray.         Electronically signed by: Irineo Hardy MD   Date:    03/31/2024   Time:    08:41         Assessment/Plan:     * Chest pain  -likely secondary to hypertensive urgency   -troponin elevated at 0.139, 0.263, 3rd set pending   -cardiology consulted   -echo   -blood pressure control      Mixed hyperlipidemia  -continue home meds      Primary hypertension  Chronic, uncontrolled. Latest blood pressure and vitals reviewed-     Temp:  [98.1 °F (36.7 °C)]   Pulse:  [71-94]   Resp:  [16-26]   BP: (134-216)/(60-91)   SpO2:  [96 %-99 %] .   Home meds for hypertension were reviewed and noted below.   Hypertension Medications               benazepriL (LOTENSIN) 40 MG tablet Take 1 tablet (40 mg total) by mouth 2 (two) times daily.    carvediloL (COREG) 25 MG tablet TAKE 1 TABLET BY MOUTH TWICE DAILY    hydrALAZINE (APRESOLINE) 50 MG tablet Take 1 tablet (50 mg total) by mouth every 8 (eight) hours.            While in the hospital, will manage blood pressure as follows; Continue home antihypertensive regimen    Will utilize p.r.n. blood pressure medication only if patient's blood pressure greater than 160/100 and she develops symptoms such as worsening chest pain or shortness of breath.    Paroxysmal atrial fibrillation  Patient with Paroxysmal (<7 days) atrial fibrillation which is controlled currently with Beta Blocker. Patient is currently in sinus rhythm.[id number] Score: 4.  Anticoagulation indicated. Anticoagulation done with Eliquis .    Controlled type 2 diabetes mellitus with diabetic polyneuropathy, with long-term current use of  insulin  -continue Levemir at half dose  -sliding scale insulin and Accu-Cheks   -Diabetic diet        VTE Risk Mitigation (From admission, onward)           Ordered     apixaban tablet 5 mg  2 times daily         03/31/24 1334     Reason for No Pharmacological VTE Prophylaxis  Once        Question:  Reasons:  Answer:  Already adequately anticoagulated on oral Anticoagulants    03/31/24 1334     IP VTE HIGH RISK PATIENT  Once         03/31/24 1334     Place sequential compression device  Until discontinued         03/31/24 1334                       On 03/31/2024, patient should be placed in hospital observation services under my care.             James Price MD  Department of Hospital Medicine  [facility] - Emergency Dept.

## 2024-03-31 NOTE — HPI
Ms. Jo is a 74-year-old female with past medical history of diabetes, hypertension, CAD, arthritis, migraine, depression/anxiety, and AFib presented with shortness of breath and chest pain.  Patient denies any history of congestive heart failure but was noted to elevated blood pressure of 216/91 on admission and a troponin of 0.139 which increased to 0.263.  Of note, patient sleeps on 2 pillows at night, she has a former smoker but quit 21 years ago.  Cardiology was consulted and recommended admission for observation with echo and repeated troponins.  At the time of my evaluation patient stated her chest pain was much better but still had some heaviness.  Blood pressure was better controlled with hydralazine.  Patient states she has been compliant with her medications but has not been watching her diet very closely.  Plan of care was discussed with patient, son, and daughter at bedside.  Hospital medicine will admit for observation, echo, blood pressure control, and cardiac workup.

## 2024-03-31 NOTE — PHARMACY MED REC
"Admission Medication History     The home medication history was taken by Angel Luis Ley.    You may go to "Admission" then "Reconcile Home Medications" tabs to review and/or act upon these items.     The home medication list has been updated by the Pharmacy department.   Please read ALL comments highlighted in yellow.   Please address this information as you see fit.    Feel free to contact us if you have any questions or require assistance.      The medications listed below were removed from the home medication list. Please reorder if appropriate:  VOLTAREN 1% GEL    Medications listed below were obtained from: [employer]- [employer], Medical records, and Patient's pharmacy  (Not in a hospital admission)      Angel Luis Ley  [id number]    Current Outpatient Medications on File Prior to Encounter   Medication Sig Dispense Refill Last Dose    apixaban (ELIQUIS) 5 mg Tab Take 1 tablet (5 mg total) by mouth 2 (two) times daily. 180 tablet 0 3/30/2024    benazepriL (LOTENSIN) 40 MG tablet Take 1 tablet (40 mg total) by mouth 2 (two) times daily. 180 tablet 4 3/30/2024    BEPREVE 1.5 % Drop Place 1 drop into both eyes 2 (two) times daily. 10 mL 4 3/30/2024    blood sugar diagnostic Strp To check blood sugar 1 times daily 100 each 3 3/30/2024    blood-glucose meter (ACCU-CHEK GUIDE GLUCOSE METER) Misc use daily to test blood sugar 1 each 0 3/30/2024    carvediloL (COREG) 25 MG tablet TAKE 1 TABLET BY MOUTH TWICE DAILY 180 tablet 3 3/30/2024    cycloSPORINE (RESTASIS) 0.05 % ophthalmic emulsion Place 1 drop into both eyes 2 (two) times daily. 60 each 11 3/30/2024    DULoxetine (CYMBALTA) 60 MG capsule Take 1 capsule (60 mg total) by mouth once daily. 90 capsule 1 3/30/2024    fluticasone propionate (FLONASE) 50 mcg/actuation nasal spray 2 sprays (100 mcg total) by Each Nostril route once daily. 48 g 3 3/30/2024    gabapentin (NEURONTIN) 300 MG capsule Take 1 capsule (300 mg total) by mouth 3 (three) times " "daily. (Patient taking differently: Take 300 mg by mouth daily as needed.) 90 capsule 11 3/30/2024    galcanezumab-gnlm (EMGALITY SYRINGE) 120 mg/mL Syrg Inject 120 mg into the skin every 28 days. 1 mL 12 Past Month    hydrALAZINE (APRESOLINE) 50 MG tablet Take 1 tablet (50 mg total) by mouth every 8 (eight) hours. 180 tablet 3 3/30/2024    hydrOXYzine HCL (ATARAX) 10 MG Tab Take 1 tablet (10 mg total) by mouth nightly as needed (Anxiety or insomnia). 90 tablet 0 3/30/2024    insulin (LANTUS SOLOSTAR U-100 INSULIN) glargine 100 units/mL SubQ pen Inject 72 Units into the skin every evening. 75 mL 0 3/30/2024    linaCLOtide (LINZESS) 72 mcg Cap capsule Take 1 capsule (72 mcg total) by mouth before breakfast. 30 capsule 0 Past Week    meclizine (ANTIVERT) 25 mg tablet Take 1 tablet (25 mg total) by mouth 3 (three) times daily as needed. 30 tablet 2 Past Week    pantoprazole (PROTONIX) 40 MG tablet Take 1 tablet (40 mg total) by mouth once daily. 30 tablet 2 3/30/2024    rimegepant 75 mg odt Take 1 tablet (75 mg total) by mouth as needed for Migraine (do not exceed 2-3 doses within 1 week). Place ODT tablet on the tongue; alternatively the ODT tablet may be placed under the tongue 8 tablet 5 Past Month    SITagliptin phosphate (JANUVIA) 100 MG Tab Take 1 tablet (100 mg total) by mouth once daily. 90 tablet 0 3/30/2024    temazepam (RESTORIL) 30 mg capsule Take 1 capsule (30 mg total) by mouth every evening. 90 capsule 0 3/30/2024    amitriptyline (ELAVIL) 10 MG tablet Take 1 tablet (10 mg total) by mouth every evening. 30 tablet 11     azelastine (ASTELIN) 137 mcg (0.1 %) nasal spray use 2 sprays (274 mcg total) by Nasal route 2 (two) times daily. 30 mL 1     clotrimazole-betamethasone (LOTRISONE) lotion Apply topically to the affected area 2 (two) times daily. 30 mL 2 Unknown    lancets Misc To check BG 1 times daily 100 each 3     pen needle, diabetic (BD ULTRA-FINE SHORT PEN NEEDLE) 31 gauge x 5/16" Ndle AS DIRECTED " TWICE DAILY 200 each 3     RSVPreF3 antigen-AS01E, PF, (AREXVY, PF,) 120 mcg/0.5 mL SusR vaccine Inject into the muscle. 0.5 mL 0                            .

## 2024-03-31 NOTE — ED PROVIDER NOTES
SCRIBE #1 NOTE: I, Lyubov Brar, am scribing for, and in the presence of, Alex Edwards MD. I have scribed the entire note.       History     Chief Complaint   Patient presents with    Shortness of Breath     pain under left breast, shortness of breath, coughing - dry cough     Review of patient's allergies indicates:   Allergen Reactions    Repatha pushtronex [evolocumab]      headache    Aspirin Palpitations         History of Present Illness     HPI    3/31/2024, 8:41 AM  History obtained from the patient      History of Present Illness: Christine Jo is a 74 y.o. female patient with a PMHx of HTN, DM and MI who presents to the Emergency Department for evaluation of SOB wih pain under left breast and dry cough. Symptoms are constant and moderate in severity. No mitigating or exacerbating factors reported.  Patient denies any fever, chills, N/V, and all other sxs at this time.  No further complaints or concerns at this time.       Arrival mode: Personal vehicle      PCP: Jose Szymanski MD        Past Medical History:  Past Medical History:   Diagnosis Date    Arthritis     Cataract     Coronary artery disease     Diabetes mellitus 2008     am 01/15/2018 Insulin x 1 year    Diabetes mellitus, type 2     DM (diabetes mellitus) 2008     am 02/14/2020 Insulin x 4 years    Encounter for blood transfusion     Glaucoma     Hypertension     Insomnia     Macular degeneration     Old MI (myocardial infarction) 2/12/2024    Vaginal yeast infection        Past Surgical History:  Past Surgical History:   Procedure Laterality Date    abdominal laparoscopy       BREAST BIOPSY Left 1998    benign    CATARACT EXTRACTION Bilateral [id number]    [facility] in [place]    Cataract Surgery Bilateral     Laser (YAG)    COLONOSCOPY N/A 05/05/2021    Procedure: COLONOSCOPY;  Surgeon: Shawn Rogers III, MD;  Location: [facility];  Service: Endoscopy;  Laterality: N/A;    COLONOSCOPY N/A 06/30/2021     Procedure: COLONOSCOPY;  Surgeon: Memo Merida MD;  Location: [facility];  Service: Endoscopy;  Laterality: N/A;    ESOPHAGOGASTRODUODENOSCOPY N/A 2019    Procedure: ESOPHAGOGASTRODUODENOSCOPY (EGD);  Surgeon: Shawn Rogers III, MD;  Location: [facility] ENDO;  Service: Endoscopy;  Laterality: N/A;    ESOPHAGOGASTRODUODENOSCOPY N/A 2021    Procedure: EGD (ESOPHAGOGASTRODUODENOSCOPY);  Surgeon: Shawn Rogers III, MD;  Location: [facility] ENDO;  Service: Endoscopy;  Laterality: N/A;    EYE SURGERY      TONSILLECTOMY      TUBAL LIGATION           Family History:  Family History   Problem Relation Age of Onset    Heart disease Mother         CAD     Cataracts Mother     Macular degeneration Mother     Glaucoma Mother     Diabetes Sister     Heart disease Sister         CAD    Cataracts Sister     Diabetes Sister     Diabetes Sister     Cancer Son         testicular     Cancer Maternal Aunt         Lung ca    Heart disease Maternal Grandfather         Pacemaker        Social History:  Social History     Tobacco Use    Smoking status: Former     Current packs/day: 0.00     Average packs/day: 1 pack/day for 36.5 years (36.5 ttl pk-yrs)     Types: Cigarettes     Start date:      Quit date: 2003     Years since quittin.7     Passive exposure: Never    Smokeless tobacco: Never   Substance and Sexual Activity    Alcohol use: No    Drug use: No    Sexual activity: Never        Review of Systems     Review of Systems   Respiratory:  Positive for cough and shortness of breath.       Physical Exam     Initial Vitals [24 0815]   BP Pulse Resp Temp SpO2   (!) 216/91 94 18 98.1 °F (36.7 °C) 99 %      MAP       --          Physical Exam  Nursing Notes and Vital Signs Reviewed.  Constitutional: Patient is in no acute distress. Well-developed and well-nourished.  Head: Atraumatic. Normocephalic.  Eyes: PERRL. EOM intact. Conjunctivae are not pale. No scleral icterus.  ENT: Mucous membranes are moist.  "Oropharynx is clear and symmetric.    Neck: Supple. Full ROM. No lymphadenopathy.  Cardiovascular: Regular rate. Regular rhythm. No murmurs, rubs, or gallops. Distal pulses are 2+ and symmetric.  Pulmonary/Chest: No respiratory distress. Clear to auscultation bilaterally. No wheezing or rales.  Abdominal: Soft and non-distended.  There is no tenderness.  No rebound, guarding, or rigidity.   Genitourinary: No CVA tenderness  Musculoskeletal: Moves all extremities. No obvious deformities. No edema.  Skin: Warm and dry.  Neurological:  Alert, awake, and appropriate.  Normal speech.  No acute focal neurological deficits are appreciated.  Psychiatric: Normal affect. Good eye contact. Appropriate in content.     ED Course   Procedures  ED Vital Signs:  Vitals:    03/31/24 0815 03/31/24 0835 03/31/24 0915 03/31/24 1030   BP: (!) 216/91 (!) 144/65 (!) 164/74 (!) 185/78   Pulse: 94 75 71 73   Resp: 18 18 16 (!) 26   Temp: 98.1 °F (36.7 °C)      TempSrc: Oral      SpO2: 99% 98% 96% 97%   Weight: 109 kg (240 lb 4.8 oz)      Height: 5' 2" (1.575 m)       03/31/24 1100 03/31/24 1102 03/31/24 1200 03/31/24 1300   BP: (!) 173/76 (!) 173/76 (!) 187/81 (!) 178/74   Pulse: 79  79 84   Resp: 20  16 20   Temp:       TempSrc:       SpO2: 96%  96% 98%   Weight:       Height:           Abnormal Lab Results:  Labs Reviewed   CBC W/ AUTO DIFFERENTIAL - Abnormal; Notable for the following components:       Result Value    RBC 3.59 (*)     Hemoglobin 11.0 (*)     Hematocrit 33.2 (*)     All other components within normal limits    Narrative:     Release to patient->Immediate   COMPREHENSIVE METABOLIC PANEL - Abnormal; Notable for the following components:    CO2 21 (*)     Glucose 147 (*)     Alkaline Phosphatase 38 (*)     All other components within normal limits    Narrative:     Release to patient->Immediate   URINALYSIS, REFLEX TO URINE CULTURE - Abnormal; Notable for the following components:    Protein, UA Trace (*)     All other " components within normal limits    Narrative:     Specimen Source->Urine   TROPONIN I - Abnormal; Notable for the following components:    Troponin I 0.139 (*)     All other components within normal limits    Narrative:     Release to patient->Immediate   TROPONIN I - Abnormal; Notable for the following components:    Troponin I 0.263 (*)     All other components within normal limits   HIV 1 / 2 ANTIBODY    Narrative:     Release to patient->Immediate   HEPATITIS C ANTIBODY    Narrative:     Release to patient->Immediate   HEP C VIRUS HOLD SPECIMEN    Narrative:     Release to patient->Immediate   B-TYPE NATRIURETIC PEPTIDE    Narrative:     Release to patient->Immediate   CK    Narrative:     Release to patient->Immediate        All Lab Results:  Results for orders placed or performed during the hospital encounter of 03/31/24   HIV 1/2 Ag/Ab (4th Gen)   Result Value Ref Range    HIV 1/2 Ag/Ab Negative Negative   Hepatitis C Antibody   Result Value Ref Range    Hepatitis C Ab Negative Negative   HCV Virus Hold Specimen   Result Value Ref Range    HEP C Virus Hold Specimen Hold for HCV sendout    CBC Auto Differential   Result Value Ref Range    WBC 5.50 3.90 - 12.70 K/uL    RBC 3.59 (L) 4.00 - 5.40 M/uL    Hemoglobin 11.0 (L) 12.0 - 16.0 g/dL    Hematocrit 33.2 (L) 37.0 - 48.5 %    MCV 93 82 - 98 fL    MCH 30.6 27.0 - 31.0 pg    MCHC 33.1 32.0 - 36.0 g/dL    RDW 12.1 11.5 - 14.5 %    Platelets 251 150 - 450 K/uL    MPV 10.9 9.2 - 12.9 fL    Immature Granulocytes 0.4 0.0 - 0.5 %    Gran # (ANC) 3.6 1.8 - 7.7 K/uL    Immature Grans (Abs) 0.02 0.00 - 0.04 K/uL    Lymph # 1.2 1.0 - 4.8 K/uL    Mono # 0.6 0.3 - 1.0 K/uL    Eos # 0.1 0.0 - 0.5 K/uL    Baso # 0.06 0.00 - 0.20 K/uL    nRBC 0 0 /100 WBC    Gran % 64.8 38.0 - 73.0 %    Lymph % 21.5 18.0 - 48.0 %    Mono % 10.0 4.0 - 15.0 %    Eosinophil % 2.2 0.0 - 8.0 %    Basophil % 1.1 0.0 - 1.9 %    Differential Method Automated    Comprehensive Metabolic Panel   Result  Value Ref Range    Sodium 138 136 - 145 mmol/L    Potassium 4.4 3.5 - 5.1 mmol/L    Chloride 105 95 - 110 mmol/L    CO2 21 (L) 23 - 29 mmol/L    Glucose 147 (H) 70 - 110 mg/dL    BUN 12 8 - 23 mg/dL    Creatinine 0.9 0.5 - 1.4 mg/dL    Calcium 9.4 8.7 - 10.5 mg/dL    Total Protein 6.4 6.0 - 8.4 g/dL    Albumin 3.6 3.5 - 5.2 g/dL    Total Bilirubin 0.5 0.1 - 1.0 mg/dL    Alkaline Phosphatase 38 (L) 55 - 135 U/L    AST 19 10 - 40 U/L    ALT 16 10 - 44 U/L    eGFR >60 >60 mL/min/1.73 m^2    Anion Gap 12 8 - 16 mmol/L   Urinalysis, Reflex to Urine Culture Urine, Clean Catch    Specimen: Urine   Result Value Ref Range    Specimen UA Urine, Clean Catch     Color, UA Yellow Yellow, Straw,     Appearance, UA Clear Clear    pH, UA 5.0 5.0 - 8.0    Specific Gravity, UA 1.010 1.005 - 1.030    Protein, UA Trace (A) Negative    Glucose, UA Negative Negative    Ketones, UA Negative Negative    Bilirubin (UA) Negative Negative    Occult Blood UA Negative Negative    Nitrite, UA Negative Negative    Urobilinogen, UA Negative <2.0 EU/dL    Leukocytes, UA Negative Negative   BNP   Result Value Ref Range    BNP 81 0 - 99 pg/mL   CK   Result Value Ref Range    CPK 65 20 - 180 U/L   Troponin I   Result Value Ref Range    Troponin I 0.139 (H) 0.000 - 0.026 ng/mL   Troponin I   Result Value Ref Range    Troponin I 0.263 (H) 0.000 - 0.026 ng/mL     *Note: Due to a large number of results and/or encounters for the requested time period, some results have not been displayed. A complete set of results can be found in Results Review.         Imaging Results:  Imaging Results              X-Ray Chest AP Portable (Final result)  Result time 03/31/24 08:41:50      Final result by Irineo Hardy MD (03/31/24 08:41:50)                   Impression:      Normal chest x-ray.      Electronically signed by: Irineo Hardy MD  Date:    03/31/2024  Time:    08:41               Narrative:    EXAMINATION:  XR CHEST AP PORTABLE    CLINICAL  HISTORY:  Shortness of breath    FINDINGS:  Comparison study 01/12/2024.  No change.  Normal size heart.  Lungs are clear.                                       The EKG was ordered, reviewed, and independently interpreted by the ED provider.  Interpretation time: 8:25  Rate: 80 BPM  Rhythm: normal sinus rhythm  Interpretation: Left axis deviation. Septal infarct, age undetermined. No STEMI.           The Emergency Provider reviewed the vital signs and test results, which are outlined above.     ED Discussion     12:53 PM: Discussed case with Dr. Price ([facility]). Dr. Price agrees with current care and management of pt and accepts admission.   Admitting Service: [facility] medicine   Admitting Physician: Dr. Price  Admit to: Obs tele     12:54 PM: Re-evaluated pt. I have discussed test results, shared treatment plan, and the need for admission with patient and family at bedside. Pt and family express understanding at this time and agree with all information. All questions answered. Pt and family have no further questions or concerns at this time. Pt is ready for admit.           Medical Decision Making  Presents with chest pain and sob  DDx: NSTEMI, CHF    Amount and/or Complexity of Data Reviewed  Labs: ordered. Decision-making details documented in ED Course.     Details: Elevated troponin.  Repeat was higher  Radiology: ordered. Decision-making details documented in ED Course.  ECG/medicine tests: ordered and independent interpretation performed. Decision-making details documented in ED Course.    Risk  Prescription drug management.                ED Medication(s):  Medications   hydrALAZINE injection 10 mg (10 mg Intravenous Given 3/31/24 1102)       New Prescriptions    No medications on file               Scribe Attestation:   Scribe #1: I performed the above scribed service and the documentation accurately describes the services I performed. I attest to the accuracy of the note.     Attending:    Physician Attestation Statement for Scribe #1: I, Alex Edwards MD, personally performed the services described in this documentation, as scribed by Lyubov Brar, in my presence, and it is both accurate and complete.           Clinical Impression       ICD-10-CM ICD-9-CM   1. Elevated troponin  R79.89 790.6   2. SOB (shortness of breath)  R06.02 786.05       Disposition:   Disposition: Placed in Observation  Condition: Serious         Alex Edwards MD  03/31/24 4380

## 2024-03-31 NOTE — ASSESSMENT & PLAN NOTE
Patient with Paroxysmal (<7 days) atrial fibrillation which is controlled currently with Beta Blocker. Patient is currently in sinus rhythm.[id number] Score: 4.  Anticoagulation indicated. Anticoagulation done with Eliquis .

## 2024-03-31 NOTE — ASSESSMENT & PLAN NOTE
-likely secondary to hypertensive urgency   -troponin elevated at 0.139, 0.263, 3rd set pending   -cardiology consulted   -echo   -blood pressure control

## 2024-03-31 NOTE — SUBJECTIVE & OBJECTIVE
Past Medical History:   Diagnosis Date    Arthritis     Cataract     Coronary artery disease     Diabetes mellitus 2008     am 01/15/2018 Insulin x 1 year    Diabetes mellitus, type 2     DM (diabetes mellitus) 2008     am 02/14/2020 Insulin x 4 years    Encounter for blood transfusion     Glaucoma     Hypertension     Insomnia     Macular degeneration     Old MI (myocardial infarction) 2/12/2024    Vaginal yeast infection        Past Surgical History:   Procedure Laterality Date    abdominal laparoscopy       BREAST BIOPSY Left 1998    benign    CATARACT EXTRACTION Bilateral [id number]    [facility] in [place]    Cataract Surgery Bilateral     Laser (YAG)    COLONOSCOPY N/A 05/05/2021    Procedure: COLONOSCOPY;  Surgeon: Shawn Rogers III, MD;  Location: [facility] ENDO;  Service: Endoscopy;  Laterality: N/A;    COLONOSCOPY N/A 06/30/2021    Procedure: COLONOSCOPY;  Surgeon: Memo Merida MD;  Location: [facility];  Service: Endoscopy;  Laterality: N/A;    ESOPHAGOGASTRODUODENOSCOPY N/A 11/29/2019    Procedure: ESOPHAGOGASTRODUODENOSCOPY (EGD);  Surgeon: Shawn Rogers III, MD;  Location: [facility];  Service: Endoscopy;  Laterality: N/A;    ESOPHAGOGASTRODUODENOSCOPY N/A 05/05/2021    Procedure: EGD (ESOPHAGOGASTRODUODENOSCOPY);  Surgeon: Shawn Rogers III, MD;  Location: [facility];  Service: Endoscopy;  Laterality: N/A;    EYE SURGERY      TONSILLECTOMY      TUBAL LIGATION         Review of patient's allergies indicates:   Allergen Reactions    Repatha pushtronex [evolocumab]      headache    Aspirin Palpitations       No current facility-administered medications on file prior to encounter.     Current Outpatient Medications on File Prior to Encounter   Medication Sig    apixaban (ELIQUIS) 5 mg Tab Take 1 tablet (5 mg total) by mouth 2 (two) times daily.    benazepriL (LOTENSIN) 40 MG tablet Take 1 tablet (40 mg total) by mouth 2 (two) times daily.    BEPREVE 1.5 % Drop Place 1 drop into  both eyes 2 (two) times daily.    blood sugar diagnostic Strp To check blood sugar 1 times daily    blood-glucose meter (ACCU-CHEK GUIDE GLUCOSE METER) Misc use daily to test blood sugar    carvediloL (COREG) 25 MG tablet TAKE 1 TABLET BY MOUTH TWICE DAILY    cycloSPORINE (RESTASIS) 0.05 % ophthalmic emulsion Place 1 drop into both eyes 2 (two) times daily.    DULoxetine (CYMBALTA) 60 MG capsule Take 1 capsule (60 mg total) by mouth once daily.    fluticasone propionate (FLONASE) 50 mcg/actuation nasal spray 2 sprays (100 mcg total) by Each Nostril route once daily.    gabapentin (NEURONTIN) 300 MG capsule Take 1 capsule (300 mg total) by mouth 3 (three) times daily. (Patient taking differently: Take 300 mg by mouth daily as needed.)    galcanezumab-gnlm (EMGALITY SYRINGE) 120 mg/mL Syrg Inject 120 mg into the skin every 28 days.    hydrALAZINE (APRESOLINE) 50 MG tablet Take 1 tablet (50 mg total) by mouth every 8 (eight) hours.    hydrOXYzine HCL (ATARAX) 10 MG Tab Take 1 tablet (10 mg total) by mouth nightly as needed (Anxiety or insomnia).    insulin (LANTUS SOLOSTAR U-100 INSULIN) glargine 100 units/mL SubQ pen Inject 72 Units into the skin every evening.    linaCLOtide (LINZESS) 72 mcg Cap capsule Take 1 capsule (72 mcg total) by mouth before breakfast.    meclizine (ANTIVERT) 25 mg tablet Take 1 tablet (25 mg total) by mouth 3 (three) times daily as needed.    pantoprazole (PROTONIX) 40 MG tablet Take 1 tablet (40 mg total) by mouth once daily.    rimegepant 75 mg odt Take 1 tablet (75 mg total) by mouth as needed for Migraine (do not exceed 2-3 doses within 1 week). Place ODT tablet on the tongue; alternatively the ODT tablet may be placed under the tongue    SITagliptin phosphate (JANUVIA) 100 MG Tab Take 1 tablet (100 mg total) by mouth once daily.    temazepam (RESTORIL) 30 mg capsule Take 1 capsule (30 mg total) by mouth every evening.    amitriptyline (ELAVIL) 10 MG tablet Take 1 tablet (10 mg total) by  "mouth every evening.    azelastine (ASTELIN) 137 mcg (0.1 %) nasal spray use 2 sprays (274 mcg total) by Nasal route 2 (two) times daily.    clotrimazole-betamethasone (LOTRISONE) lotion Apply topically to the affected area 2 (two) times daily.    lancets Misc To check BG 1 times daily    pen needle, diabetic (BD ULTRA-FINE SHORT PEN NEEDLE) 31 gauge x 5/16" Ndle AS DIRECTED TWICE DAILY    RSVPreF3 antigen-AS01E, PF, (AREXVY, PF,) 120 mcg/0.5 mL SusR vaccine Inject into the muscle.    [DISCONTINUED] diclofenac sodium (VOLTAREN) 1 % Gel Apply 2 grams topically 4 (four) times daily. To affected joints as needed for pain     Family History       Problem Relation (Age of Onset)    Cancer Son, Maternal Aunt    Cataracts Mother, Sister    Diabetes Sister, Sister, Sister    Glaucoma Mother    Heart disease Mother, Sister, Maternal Grandfather    Macular degeneration Mother          Tobacco Use    Smoking status: Former     Current packs/day: 0.00     Average packs/day: 1 pack/day for 36.5 years (36.5 ttl pk-yrs)     Types: Cigarettes     Start date:      Quit date: 2003     Years since quittin.7     Passive exposure: Never    Smokeless tobacco: Never   Substance and Sexual Activity    Alcohol use: No    Drug use: No    Sexual activity: Never     Review of Systems   Constitutional:  Negative for activity change, appetite change and fatigue.   Respiratory:  Positive for shortness of breath. Negative for cough and chest tightness.    Cardiovascular:  Positive for chest pain. Negative for palpitations and leg swelling.   Gastrointestinal:  Negative for abdominal pain, nausea and vomiting.   Musculoskeletal:  Negative for gait problem.   Neurological:  Negative for dizziness, weakness, light-headedness and headaches.   Psychiatric/Behavioral:  Negative for agitation and confusion. The patient is not nervous/anxious.    All other systems reviewed and are negative.    Objective:     Vital Signs (Most Recent):  Temp: " 98.1 °F (36.7 °C) (03/31/24 0815)  Pulse: 84 (03/31/24 1300)  Resp: 20 (03/31/24 1300)  BP: 134/60 (03/31/24 1502)  SpO2: 98 % (03/31/24 1300) Vital Signs (24h Range):  Temp:  [98.1 °F (36.7 °C)] 98.1 °F (36.7 °C)  Pulse:  [71-94] 84  Resp:  [16-26] 20  SpO2:  [96 %-99 %] 98 %  BP: (134-216)/(60-91) 134/60     Weight: 109 kg (240 lb 4.8 oz)  Body mass index is 43.95 kg/m².     Physical Exam  Vitals and nursing note reviewed.   Constitutional:       Appearance: Normal appearance. She is obese.   HENT:      Head: Normocephalic and atraumatic.      Nose: Nose normal.      Mouth/Throat:      Mouth: Mucous membranes are moist.   Eyes:      Extraocular Movements: Extraocular movements intact.      Conjunctiva/sclera: Conjunctivae normal.   Cardiovascular:      Rate and Rhythm: Normal rate and regular rhythm.      Pulses: Normal pulses.      Heart sounds: Normal heart sounds.   Pulmonary:      Effort: Pulmonary effort is normal.      Breath sounds: Normal breath sounds.   Abdominal:      General: Abdomen is flat. Bowel sounds are normal.      Palpations: Abdomen is soft.   Musculoskeletal:         General: Normal range of motion.      Cervical back: Normal range of motion and neck supple.   Skin:     General: Skin is warm.      Capillary Refill: Capillary refill takes less than 2 seconds.   Neurological:      Mental Status: She is alert and oriented to person, place, and time. Mental status is at baseline.   Psychiatric:         Mood and Affect: Mood normal.         Behavior: Behavior normal.                Significant Labs: All pertinent labs within the past 24 hours have been reviewed.  Recent Lab Results         03/31/24  1459   03/31/24  1217   03/31/24  1115   03/31/24  0905        Albumin       3.6       ALP       38       ALT       16       Anion Gap       12       Appearance, UA     Clear         AST       19       Baso #       0.06       Basophil %       1.1       Bilirubin (UA)     Negative         BILIRUBIN  TOTAL       0.5  Comment: For infants and newborns, interpretation of results should be based  on gestational age, weight and in agreement with clinical  observations.    Premature Infant recommended reference ranges:  Up to 24 hours.............<8.0 mg/dL  Up to 48 hours............<12.0 mg/dL  3-5 days..................<15.0 mg/dL  6-29 days.................<15.0 mg/dL         BNP       81  Comment: Values of less than 100 pg/ml are consistent with non-CHF populations.       BUN       12       Calcium       9.4       Chloride       105       CO2       21       Color, UA     Yellow         CPK       65       Creatinine       0.9       Differential Method       Automated       eGFR       >60       Eos #       0.1       Eos %       2.2       Glucose       147       Glucose, UA     Negative         Gran # (ANC)       3.6       Gran %       64.8       Hematocrit       33.2       Hemoglobin       11.0       Hepatitis C Ab       Negative       HEP C Virus Hold Specimen       Hold for HCV sendout       HIV 1/2 Ag/Ab       Negative       Immature Grans (Abs)       0.02  Comment: Mild elevation in immature granulocytes is non specific and   can be seen in a variety of conditions including stress response,   acute inflammation, trauma and pregnancy. Correlation with other   laboratory and clinical findings is essential.         Immature Granulocytes       0.4       Ketones, UA     Negative         Leukocyte Esterase, UA     Negative         Lymph #       1.2       Lymph %       21.5       MCH       30.6       MCHC       33.1       MCV       93       Mono #       0.6       Mono %       10.0       MPV       10.9       NITRITE UA     Negative         nRBC       0       Blood, UA     Negative         pH, UA     5.0         Platelet Count       251       POCT Glucose 111             Potassium       4.4       PROTEIN TOTAL       6.4       Protein, UA     Trace  Comment: Recommend a 24 hour urine protein or a urine    protein/creatinine ratio if globulin induced proteinuria is  clinically suspected.           RBC       3.59       RDW       12.1       Sodium       138       Specific [place], UA     1.010         Specimen UA     Urine, Clean Catch         Troponin I   0.263  Comment: The reference interval for Troponin I represents the 99th percentile   cutoff   for our facility and is consistent with 3rd generation assay   performance.       0.139  Comment: The reference interval for Troponin I represents the 99th percentile   cutoff   for our facility and is consistent with 3rd generation assay   performance.         UROBILINOGEN UA     Negative         WBC       5.50               Significant Imaging:   X-Ray Chest AP Portable   Final Result      Normal chest x-ray.         Electronically signed by: Irineo Hardy MD   Date:    03/31/2024   Time:    08:41

## 2024-03-31 NOTE — ASSESSMENT & PLAN NOTE
Chronic, uncontrolled. Latest blood pressure and vitals reviewed-     Temp:  [98.1 °F (36.7 °C)]   Pulse:  [71-94]   Resp:  [16-26]   BP: (134-216)/(60-91)   SpO2:  [96 %-99 %] .   Home meds for hypertension were reviewed and noted below.   Hypertension Medications               benazepriL (LOTENSIN) 40 MG tablet Take 1 tablet (40 mg total) by mouth 2 (two) times daily.    carvediloL (COREG) 25 MG tablet TAKE 1 TABLET BY MOUTH TWICE DAILY    hydrALAZINE (APRESOLINE) 50 MG tablet Take 1 tablet (50 mg total) by mouth every 8 (eight) hours.            While in the hospital, will manage blood pressure as follows; Continue home antihypertensive regimen    Will utilize p.r.n. blood pressure medication only if patient's blood pressure greater than 160/100 and she develops symptoms such as worsening chest pain or shortness of breath.

## 2024-03-31 NOTE — Clinical Note
The wire was removed from the distal left anterior descending artery. MDD severe without PF, r/o adjustment disorder.  No evidence for bipolarity

## 2024-03-31 NOTE — ED NOTES
Received report on patient, assumed care. Patient AAOX4, respirations labored with excretion. CM lead 2 NSR without ectopy. Patient denies any complaints at this time. Family at bedside, bed in low position, side rails up x 2, call light in reach. Will continue to monitor.

## 2024-04-01 LAB
ANION GAP SERPL CALC-SCNC: 12 MMOL/L (ref 8–16)
APTT PPP: 31.9 SEC (ref 21–32)
APTT PPP: 34.5 SEC (ref 21–32)
APTT PPP: 37.3 SEC (ref 21–32)
BASOPHILS # BLD AUTO: 0.05 K/UL (ref 0–0.2)
BASOPHILS # BLD AUTO: 0.06 K/UL (ref 0–0.2)
BASOPHILS NFR BLD: 0.7 % (ref 0–1.9)
BASOPHILS NFR BLD: 1.1 % (ref 0–1.9)
BUN SERPL-MCNC: 13 MG/DL (ref 8–23)
CALCIUM SERPL-MCNC: 9.4 MG/DL (ref 8.7–10.5)
CATH EF ESTIMATED: 60 %
CHLORIDE SERPL-SCNC: 102 MMOL/L (ref 95–110)
CO2 SERPL-SCNC: 23 MMOL/L (ref 23–29)
CREAT SERPL-MCNC: 1 MG/DL (ref 0.5–1.4)
DIFFERENTIAL METHOD BLD: ABNORMAL
DIFFERENTIAL METHOD BLD: ABNORMAL
EOSINOPHIL # BLD AUTO: 0 K/UL (ref 0–0.5)
EOSINOPHIL # BLD AUTO: 0.1 K/UL (ref 0–0.5)
EOSINOPHIL NFR BLD: 0.6 % (ref 0–8)
EOSINOPHIL NFR BLD: 1.4 % (ref 0–8)
ERYTHROCYTE [DISTWIDTH] IN BLOOD BY AUTOMATED COUNT: 11.9 % (ref 11.5–14.5)
ERYTHROCYTE [DISTWIDTH] IN BLOOD BY AUTOMATED COUNT: 12.1 % (ref 11.5–14.5)
EST. GFR  (NO RACE VARIABLE): 60 ML/MIN/1.73 M^2
GLUCOSE SERPL-MCNC: 195 MG/DL (ref 70–110)
HCT VFR BLD AUTO: 33.9 % (ref 37–48.5)
HCT VFR BLD AUTO: 34.3 % (ref 37–48.5)
HGB BLD-MCNC: 11.4 G/DL (ref 12–16)
HGB BLD-MCNC: 11.6 G/DL (ref 12–16)
IMM GRANULOCYTES # BLD AUTO: 0.01 K/UL (ref 0–0.04)
IMM GRANULOCYTES # BLD AUTO: 0.03 K/UL (ref 0–0.04)
IMM GRANULOCYTES NFR BLD AUTO: 0.2 % (ref 0–0.5)
IMM GRANULOCYTES NFR BLD AUTO: 0.4 % (ref 0–0.5)
INR PPP: 1 (ref 0.8–1.2)
LYMPHOCYTES # BLD AUTO: 1.1 K/UL (ref 1–4.8)
LYMPHOCYTES # BLD AUTO: 1.4 K/UL (ref 1–4.8)
LYMPHOCYTES NFR BLD: 14.9 % (ref 18–48)
LYMPHOCYTES NFR BLD: 24.1 % (ref 18–48)
MCH RBC QN AUTO: 30.7 PG (ref 27–31)
MCH RBC QN AUTO: 30.9 PG (ref 27–31)
MCHC RBC AUTO-ENTMCNC: 33.6 G/DL (ref 32–36)
MCHC RBC AUTO-ENTMCNC: 33.8 G/DL (ref 32–36)
MCV RBC AUTO: 91 FL (ref 82–98)
MCV RBC AUTO: 91 FL (ref 82–98)
MONOCYTES # BLD AUTO: 0.6 K/UL (ref 0.3–1)
MONOCYTES # BLD AUTO: 0.6 K/UL (ref 0.3–1)
MONOCYTES NFR BLD: 10.2 % (ref 4–15)
MONOCYTES NFR BLD: 7.6 % (ref 4–15)
NEUTROPHILS # BLD AUTO: 3.6 K/UL (ref 1.8–7.7)
NEUTROPHILS # BLD AUTO: 5.5 K/UL (ref 1.8–7.7)
NEUTROPHILS NFR BLD: 63 % (ref 38–73)
NEUTROPHILS NFR BLD: 75.8 % (ref 38–73)
NRBC BLD-RTO: 0 /100 WBC
NRBC BLD-RTO: 0 /100 WBC
OHS QRS DURATION: 108 MS
OHS QRS DURATION: 98 MS
OHS QTC CALCULATION: 440 MS
OHS QTC CALCULATION: 540 MS
PLATELET # BLD AUTO: 219 K/UL (ref 150–450)
PLATELET # BLD AUTO: 233 K/UL (ref 150–450)
PMV BLD AUTO: 10.7 FL (ref 9.2–12.9)
PMV BLD AUTO: 11.2 FL (ref 9.2–12.9)
POC ACTIVATED CLOTTING TIME K: 261 SEC (ref 74–137)
POCT GLUCOSE: 200 MG/DL (ref 70–110)
POCT GLUCOSE: 207 MG/DL (ref 70–110)
POCT GLUCOSE: 235 MG/DL (ref 70–110)
POTASSIUM SERPL-SCNC: 4.2 MMOL/L (ref 3.5–5.1)
PROTHROMBIN TIME: 11.3 SEC (ref 9–12.5)
RBC # BLD AUTO: 3.71 M/UL (ref 4–5.4)
RBC # BLD AUTO: 3.76 M/UL (ref 4–5.4)
SAMPLE: ABNORMAL
SODIUM SERPL-SCNC: 137 MMOL/L (ref 136–145)
TROPONIN I SERPL DL<=0.01 NG/ML-MCNC: 0.41 NG/ML (ref 0–0.03)
WBC # BLD AUTO: 5.68 K/UL (ref 3.9–12.7)
WBC # BLD AUTO: 7.23 K/UL (ref 3.9–12.7)

## 2024-04-01 PROCEDURE — B2111ZZ FLUOROSCOPY OF MULTIPLE CORONARY ARTERIES USING LOW OSMOLAR CONTRAST: ICD-10-PCS | Performed by: INTERNAL MEDICINE

## 2024-04-01 PROCEDURE — 99152 MOD SED SAME PHYS/QHP 5/>YRS: CPT | Mod: ,,, | Performed by: INTERNAL MEDICINE

## 2024-04-01 PROCEDURE — 92928 PRQ TCAT PLMT NTRAC ST 1 LES: CPT | Mod: LD,,, | Performed by: INTERNAL MEDICINE

## 2024-04-01 PROCEDURE — 4A023N7 MEASUREMENT OF CARDIAC SAMPLING AND PRESSURE, LEFT HEART, PERCUTANEOUS APPROACH: ICD-10-PCS | Performed by: INTERNAL MEDICINE

## 2024-04-01 PROCEDURE — C1894 INTRO/SHEATH, NON-LASER: HCPCS | Performed by: INTERNAL MEDICINE

## 2024-04-01 PROCEDURE — 36415 COLL VENOUS BLD VENIPUNCTURE: CPT | Mod: XB | Performed by: INTERNAL MEDICINE

## 2024-04-01 PROCEDURE — C1769 GUIDE WIRE: HCPCS | Performed by: INTERNAL MEDICINE

## 2024-04-01 PROCEDURE — 21400001 HC TELEMETRY ROOM

## 2024-04-01 PROCEDURE — B2151ZZ FLUOROSCOPY OF LEFT HEART USING LOW OSMOLAR CONTRAST: ICD-10-PCS | Performed by: INTERNAL MEDICINE

## 2024-04-01 PROCEDURE — 93010 ELECTROCARDIOGRAM REPORT: CPT | Mod: 59,,, | Performed by: INTERNAL MEDICINE

## 2024-04-01 PROCEDURE — 99152 MOD SED SAME PHYS/QHP 5/>YRS: CPT | Performed by: INTERNAL MEDICINE

## 2024-04-01 PROCEDURE — 25000003 PHARM REV CODE 250: Performed by: INTERNAL MEDICINE

## 2024-04-01 PROCEDURE — 85610 PROTHROMBIN TIME: CPT | Performed by: INTERNAL MEDICINE

## 2024-04-01 PROCEDURE — 85347 COAGULATION TIME ACTIVATED: CPT | Performed by: INTERNAL MEDICINE

## 2024-04-01 PROCEDURE — 85730 THROMBOPLASTIN TIME PARTIAL: CPT | Mod: 91 | Performed by: INTERNAL MEDICINE

## 2024-04-01 PROCEDURE — 84484 ASSAY OF TROPONIN QUANT: CPT | Performed by: INTERNAL MEDICINE

## 2024-04-01 PROCEDURE — 96366 THER/PROPH/DIAG IV INF ADDON: CPT

## 2024-04-01 PROCEDURE — 36415 COLL VENOUS BLD VENIPUNCTURE: CPT | Mod: XB | Performed by: FAMILY MEDICINE

## 2024-04-01 PROCEDURE — 85730 THROMBOPLASTIN TIME PARTIAL: CPT | Performed by: FAMILY MEDICINE

## 2024-04-01 PROCEDURE — 63600175 PHARM REV CODE 636 W HCPCS: Performed by: INTERNAL MEDICINE

## 2024-04-01 PROCEDURE — 99153 MOD SED SAME PHYS/QHP EA: CPT | Performed by: INTERNAL MEDICINE

## 2024-04-01 PROCEDURE — 93005 ELECTROCARDIOGRAM TRACING: CPT

## 2024-04-01 PROCEDURE — 25000003 PHARM REV CODE 250: Performed by: NURSE PRACTITIONER

## 2024-04-01 PROCEDURE — 25000003 PHARM REV CODE 250: Performed by: FAMILY MEDICINE

## 2024-04-01 PROCEDURE — 25500020 PHARM REV CODE 255: Performed by: INTERNAL MEDICINE

## 2024-04-01 PROCEDURE — 93458 L HRT ARTERY/VENTRICLE ANGIO: CPT | Mod: 26,59,51, | Performed by: INTERNAL MEDICINE

## 2024-04-01 PROCEDURE — C1725 CATH, TRANSLUMIN NON-LASER: HCPCS | Performed by: INTERNAL MEDICINE

## 2024-04-01 PROCEDURE — 85025 COMPLETE CBC W/AUTO DIFF WBC: CPT | Mod: 91 | Performed by: INTERNAL MEDICINE

## 2024-04-01 PROCEDURE — C9600 PERC DRUG-EL COR STENT SING: HCPCS | Mod: LD | Performed by: INTERNAL MEDICINE

## 2024-04-01 PROCEDURE — 99233 SBSQ HOSP IP/OBS HIGH 50: CPT | Mod: 25,ICN,, | Performed by: INTERNAL MEDICINE

## 2024-04-01 PROCEDURE — C1874 STENT, COATED/COV W/DEL SYS: HCPCS | Performed by: INTERNAL MEDICINE

## 2024-04-01 PROCEDURE — 85025 COMPLETE CBC W/AUTO DIFF WBC: CPT | Performed by: FAMILY MEDICINE

## 2024-04-01 PROCEDURE — C1887 CATHETER, GUIDING: HCPCS | Performed by: INTERNAL MEDICINE

## 2024-04-01 PROCEDURE — 93458 L HRT ARTERY/VENTRICLE ANGIO: CPT | Performed by: INTERNAL MEDICINE

## 2024-04-01 PROCEDURE — 027034Z DILATION OF CORONARY ARTERY, ONE ARTERY WITH DRUG-ELUTING INTRALUMINAL DEVICE, PERCUTANEOUS APPROACH: ICD-10-PCS | Performed by: INTERNAL MEDICINE

## 2024-04-01 PROCEDURE — 80048 BASIC METABOLIC PNL TOTAL CA: CPT | Performed by: FAMILY MEDICINE

## 2024-04-01 DEVICE — EVEROLIMUS-ELUTING PLATINUM CHROMIUM CORONARY STENT SYSTEM
Type: IMPLANTABLE DEVICE | Site: CORONARY | Status: FUNCTIONAL
Brand: SYNERGY™ XD

## 2024-04-01 RX ORDER — NITROGLYCERIN 5 MG/ML
INJECTION, SOLUTION INTRAVENOUS
Status: DISCONTINUED | OUTPATIENT
Start: 2024-04-01 | End: 2024-04-01 | Stop reason: HOSPADM

## 2024-04-01 RX ORDER — NITROGLYCERIN 80 MG/1
1 PATCH TRANSDERMAL DAILY
Status: DISCONTINUED | OUTPATIENT
Start: 2024-04-02 | End: 2024-04-01

## 2024-04-01 RX ORDER — HEPARIN SODIUM,PORCINE/D5W 25000/250
0-40 INTRAVENOUS SOLUTION INTRAVENOUS CONTINUOUS
Status: DISCONTINUED | OUTPATIENT
Start: 2024-04-01 | End: 2024-04-02

## 2024-04-01 RX ORDER — ISOSORBIDE MONONITRATE 30 MG/1
30 TABLET, EXTENDED RELEASE ORAL DAILY
Status: DISCONTINUED | OUTPATIENT
Start: 2024-04-02 | End: 2024-04-03 | Stop reason: HOSPADM

## 2024-04-01 RX ORDER — VERAPAMIL HYDROCHLORIDE 2.5 MG/ML
INJECTION, SOLUTION INTRAVENOUS
Status: DISCONTINUED | OUTPATIENT
Start: 2024-04-01 | End: 2024-04-01 | Stop reason: HOSPADM

## 2024-04-01 RX ORDER — ONDANSETRON HYDROCHLORIDE 2 MG/ML
4 INJECTION, SOLUTION INTRAVENOUS ONCE
Status: COMPLETED | OUTPATIENT
Start: 2024-04-01 | End: 2024-04-01

## 2024-04-01 RX ORDER — CLOPIDOGREL BISULFATE 300 MG/1
TABLET, FILM COATED ORAL
Status: DISCONTINUED | OUTPATIENT
Start: 2024-04-01 | End: 2024-04-01 | Stop reason: HOSPADM

## 2024-04-01 RX ORDER — MIDAZOLAM HYDROCHLORIDE 1 MG/ML
INJECTION, SOLUTION INTRAMUSCULAR; INTRAVENOUS
Status: DISCONTINUED | OUTPATIENT
Start: 2024-04-01 | End: 2024-04-01 | Stop reason: HOSPADM

## 2024-04-01 RX ORDER — AMLODIPINE BESYLATE 5 MG/1
5 TABLET ORAL DAILY
Status: DISCONTINUED | OUTPATIENT
Start: 2024-04-01 | End: 2024-04-03 | Stop reason: HOSPADM

## 2024-04-01 RX ORDER — ASPIRIN 81 MG/1
162 TABLET ORAL ONCE
Status: COMPLETED | OUTPATIENT
Start: 2024-04-01 | End: 2024-04-01

## 2024-04-01 RX ORDER — DIPHENHYDRAMINE HYDROCHLORIDE 50 MG/ML
INJECTION INTRAMUSCULAR; INTRAVENOUS
Status: DISCONTINUED | OUTPATIENT
Start: 2024-04-01 | End: 2024-04-01 | Stop reason: HOSPADM

## 2024-04-01 RX ORDER — MECLIZINE HYDROCHLORIDE 25 MG/1
25 TABLET ORAL 3 TIMES DAILY PRN
Status: DISCONTINUED | OUTPATIENT
Start: 2024-04-01 | End: 2024-04-03 | Stop reason: HOSPADM

## 2024-04-01 RX ORDER — ACETAMINOPHEN 325 MG/1
650 TABLET ORAL EVERY 4 HOURS PRN
Status: DISCONTINUED | OUTPATIENT
Start: 2024-04-01 | End: 2024-04-03 | Stop reason: HOSPADM

## 2024-04-01 RX ORDER — ONDANSETRON 8 MG/1
8 TABLET, ORALLY DISINTEGRATING ORAL EVERY 8 HOURS PRN
Status: DISCONTINUED | OUTPATIENT
Start: 2024-04-01 | End: 2024-04-03 | Stop reason: HOSPADM

## 2024-04-01 RX ORDER — CLOPIDOGREL BISULFATE 75 MG/1
75 TABLET ORAL DAILY
Status: DISCONTINUED | OUTPATIENT
Start: 2024-04-02 | End: 2024-04-03 | Stop reason: HOSPADM

## 2024-04-01 RX ORDER — SODIUM CHLORIDE 9 MG/ML
INJECTION, SOLUTION INTRAVENOUS CONTINUOUS
Status: DISCONTINUED | OUTPATIENT
Start: 2024-04-01 | End: 2024-04-03 | Stop reason: HOSPADM

## 2024-04-01 RX ORDER — HYDRALAZINE HYDROCHLORIDE 20 MG/ML
INJECTION INTRAMUSCULAR; INTRAVENOUS
Status: DISCONTINUED | OUTPATIENT
Start: 2024-04-01 | End: 2024-04-01 | Stop reason: HOSPADM

## 2024-04-01 RX ORDER — ISOSORBIDE MONONITRATE 30 MG/1
30 TABLET, EXTENDED RELEASE ORAL ONCE
Status: COMPLETED | OUTPATIENT
Start: 2024-04-01 | End: 2024-04-01

## 2024-04-01 RX ORDER — LIDOCAINE HYDROCHLORIDE 20 MG/ML
INJECTION, SOLUTION INFILTRATION; PERINEURAL
Status: DISCONTINUED | OUTPATIENT
Start: 2024-04-01 | End: 2024-04-01 | Stop reason: HOSPADM

## 2024-04-01 RX ORDER — EZETIMIBE 10 MG/1
10 TABLET ORAL NIGHTLY
Status: DISCONTINUED | OUTPATIENT
Start: 2024-04-01 | End: 2024-04-03 | Stop reason: HOSPADM

## 2024-04-01 RX ORDER — FENTANYL CITRATE 50 UG/ML
INJECTION, SOLUTION INTRAMUSCULAR; INTRAVENOUS
Status: DISCONTINUED | OUTPATIENT
Start: 2024-04-01 | End: 2024-04-01 | Stop reason: HOSPADM

## 2024-04-01 RX ORDER — NITROGLYCERIN 0.4 MG/1
0.4 TABLET SUBLINGUAL EVERY 5 MIN PRN
Status: DISCONTINUED | OUTPATIENT
Start: 2024-04-01 | End: 2024-04-03 | Stop reason: HOSPADM

## 2024-04-01 RX ORDER — HEPARIN SODIUM 1000 [USP'U]/ML
INJECTION, SOLUTION INTRAVENOUS; SUBCUTANEOUS
Status: DISCONTINUED | OUTPATIENT
Start: 2024-04-01 | End: 2024-04-01 | Stop reason: HOSPADM

## 2024-04-01 RX ADMIN — OLOPATADINE HYDROCHLORIDE 1 DROP: 1 SOLUTION OPHTHALMIC at 08:04

## 2024-04-01 RX ADMIN — SODIUM CHLORIDE: 9 INJECTION, SOLUTION INTRAVENOUS at 06:04

## 2024-04-01 RX ADMIN — PANTOPRAZOLE SODIUM 40 MG: 40 TABLET, DELAYED RELEASE ORAL at 08:04

## 2024-04-01 RX ADMIN — INSULIN DETEMIR 35 UNITS: 100 INJECTION, SOLUTION SUBCUTANEOUS at 10:04

## 2024-04-01 RX ADMIN — OLOPATADINE HYDROCHLORIDE 1 DROP: 1 SOLUTION OPHTHALMIC at 10:04

## 2024-04-01 RX ADMIN — CARVEDILOL 25 MG: 12.5 TABLET, FILM COATED ORAL at 08:04

## 2024-04-01 RX ADMIN — LISINOPRIL 40 MG: 20 TABLET ORAL at 08:04

## 2024-04-01 RX ADMIN — ASPIRIN 162 MG: 81 TABLET, COATED ORAL at 06:04

## 2024-04-01 RX ADMIN — AMLODIPINE BESYLATE 5 MG: 5 TABLET ORAL at 06:04

## 2024-04-01 RX ADMIN — SODIUM CHLORIDE: 9 INJECTION, SOLUTION INTRAVENOUS at 10:04

## 2024-04-01 RX ADMIN — ACETAMINOPHEN 650 MG: 325 TABLET ORAL at 06:04

## 2024-04-01 RX ADMIN — DULOXETINE HYDROCHLORIDE 60 MG: 30 CAPSULE, DELAYED RELEASE ORAL at 08:04

## 2024-04-01 RX ADMIN — HEPARIN SODIUM 16 UNITS/KG/HR: 10000 INJECTION, SOLUTION INTRAVENOUS at 06:04

## 2024-04-01 RX ADMIN — LINACLOTIDE 72 MCG: 72 CAPSULE, GELATIN COATED ORAL at 05:04

## 2024-04-01 RX ADMIN — EZETIMIBE 10 MG: 10 TABLET ORAL at 10:04

## 2024-04-01 RX ADMIN — HYPROMELLOSE 2910 2 DROP: 5 SOLUTION/ DROPS OPHTHALMIC at 10:04

## 2024-04-01 RX ADMIN — HYPROMELLOSE 2910 2 DROP: 5 SOLUTION/ DROPS OPHTHALMIC at 05:04

## 2024-04-01 RX ADMIN — ISOSORBIDE MONONITRATE 30 MG: 30 TABLET, EXTENDED RELEASE ORAL at 06:04

## 2024-04-01 RX ADMIN — INSULIN ASPART 4 UNITS: 100 INJECTION, SOLUTION INTRAVENOUS; SUBCUTANEOUS at 06:04

## 2024-04-01 RX ADMIN — HYPROMELLOSE 2910 2 DROP: 5 SOLUTION/ DROPS OPHTHALMIC at 06:04

## 2024-04-01 RX ADMIN — ONDANSETRON 4 MG: 2 INJECTION INTRAMUSCULAR; INTRAVENOUS at 02:04

## 2024-04-01 RX ADMIN — CARVEDILOL 25 MG: 12.5 TABLET, FILM COATED ORAL at 06:04

## 2024-04-01 RX ADMIN — INSULIN ASPART 2 UNITS: 100 INJECTION, SOLUTION INTRAVENOUS; SUBCUTANEOUS at 10:04

## 2024-04-01 NOTE — ASSESSMENT & PLAN NOTE
Reports chest pain that has been worsening for 2-3 weeks at rest and on exertion   Chest pain reproducible, with radiation to the back   Recent stress test with inferior apical scar   EF dropped to 40- 45% with apical hypokinesis   Troponin trending up, start heparin infusion  Will try aspirin- causes palpitations  Keep NPO for possible LHC tomorrow    4/1/2024  -Eliquis on hold currently  -Continue ASA Coreg ACEi  -Statin intolerant  -Plan for LHC today per Dr Espinoza  -Risks benefits and treatment alternatives reviewed  -She agrees to proceed with LHC today   -Consent signed and appropriately witnessed  -Further recs to follow

## 2024-04-01 NOTE — PLAN OF CARE
L TR Band removed per protocol; pressure dressing placed.  Dressing remains c/d/I and site wnl at time of transfer.  Discussed post discharge care of L Radial site while in [facility].  Transferred back to room 207, via hospital bed, in no apparent distress.   Report given to KUMAR Carvalho; no further questions at this time.  Son arrived at bedside shortly after pt's return to room. Post discharge care of L Radial site also discussed with son.

## 2024-04-01 NOTE — DISCHARGE INSTRUCTIONS
"TRBand Post-op Heart Catheterization    1. DIET: It is advisable for you to follow a diet that limits the intake of salt, sugar, saturated fats and cholesterol.     2. DRIVING: Due to sedation you received during your procedure, DO NOT drive or operate machinery for 24 hours. Avoid making critical decisions or signing legal documents until tomorrow.    3. ACTIVITY: AVOID activities that require bending of the affected arm/wrist for 3 days and submerging the site in water for 3 days.   REMOVE the dressing the day after the procedure, and shower.  Wash with soap and water in hand; do not use wash cloth.  Apply a bandaid to site after shower.  No ointments, lotions, powders, colognes, ... for 5 days.  WEAR wrist immobilizer until Wednesday night at bedtime.  No pushing, pulling, or lifting more than 10 pounds for 3 days  No riding motorcycles, riding lawn mowers, using push mowers or weed eaters... for 5 days.  You may RESUME your normal activities or prescribed exercise program as instructed by your physician after 5 days.                                                                                                       4. WOUND CARE: It is not unusual to have a small amount of bruising to appear at or near the puncture site. It is also common to have a tender "knot" develop beneath the skin at the puncture site of the wrist/arm. This is usually scar tissue and is not a cause for concern or alarm. This tender knot may take several weeks to fully resolve. The bruise will usually spread over several days. However, if the lump gets bigger, call your doctor immediately.    5. DISCOMFORT: For general discomfort at the puncture site, you may take 1 or 2 Acetaminophen (Tylenol) tablets every 4 - 6 hours as needed. (Do not take more than 4000 mg a day)    6. SIGNS AND SYMPTOMS TO REPORT:  Call your physician immediately if any of the following occur:                                            1. Loss of feeling, warmth or " "color to the affected arm/wrist                                                                                                            2. Mild bleeding from the site                 3. Pain that is sudden, sharp or persistent in the affected arm/wrist                 4. Swelling or a change in "lump" size, increased redness or drainage at the puncture site                                                                               5. High fever (101 degrees or higher)    7. GO TO  THE EMERGENCY ROOM OR CALL 911 IF YOU HAVE: Chest pains or discomforts not relieved with 3 nitroglycerin doses (sublingual tablets or spray), numbness or severe pain of limb, if your limb becomes cold or discolored or if you develop uncontrolled bleeding from the puncture site (quickly apply firm, direct pressure above the site).  "

## 2024-04-01 NOTE — PROGRESS NOTES
[facility] - Telemetry ([facility])  [facility] Medicine  Progress Note    Patient Name: Christine Jo  MRN: [id number]  Patient Class: OP- Observation   Admission Date: 3/31/2024  Length of Stay: 0 days  Attending Physician: Rosa Angulo MD  Primary Care Provider: Jose Szymanski MD        Subjective:     Principal Problem:NSTEMI (non-ST elevated myocardial infarction)        HPI:  Ms. Jo is a 74-year-old female with past medical history of diabetes, hypertension, CAD, arthritis, migraine, depression/anxiety, and AFib presented with shortness of breath and chest pain.  Patient denies any history of congestive heart failure but was noted to elevated blood pressure of 216/91 on admission and a troponin of 0.139 which increased to 0.263.  Of note, patient sleeps on 2 pillows at night, she has a former smoker but quit 21 years ago.  Cardiology was consulted and recommended admission for observation with echo and repeated troponins.  At the time of my evaluation patient stated her chest pain was much better but still had some heaviness.  Blood pressure was better controlled with hydralazine.  Patient states she has been compliant with her medications but has not been watching her diet very closely.  Plan of care was discussed with patient, son, and daughter at bedside.  Hospital medicine will admit for observation, echo, blood pressure control, and cardiac workup.    Overview/Hospital Course:  No notes on file    Interval History: patient reports she is feeling better than when she came in. Currently chest pain free. She reports she had some period of time where she went from being cold to being hot. Currently feeling fine.     Review of Systems  Objective:     Vital Signs (Most Recent):  Temp: 98.2 °F (36.8 °C) (04/01/24 0738)  Pulse: 75 (04/01/24 0934)  Resp: 18 (04/01/24 0738)  BP: (!) 164/76 (04/01/24 0738)  SpO2: 96 % (04/01/24 0738) Vital Signs (24h Range):  Temp:  [97.9 °F (36.6 °C)-98.8 °F (37.1 °C)] 98.2  °F (36.8 °C)  Pulse:  [73-94] 75  Resp:  [12-26] 18  SpO2:  [95 %-98 %] 96 %  BP: (113-187)/(60-81) 164/76     Weight: 109 kg (240 lb 4.8 oz)  Body mass index is 43.95 kg/m².    Intake/Output Summary (Last 24 hours) at 4/1/2024 0952  Last data filed at 4/1/2024 0842  Gross per 24 hour   Intake 240 ml   Output 1 ml   Net 239 ml         Physical Exam  HENT:      Head: Normocephalic and atraumatic.   Cardiovascular:      Rate and Rhythm: Normal rate and regular rhythm.      Heart sounds: No murmur heard.  Pulmonary:      Effort: Pulmonary effort is normal. No respiratory distress.      Breath sounds: Normal breath sounds. No wheezing.   Abdominal:      General: Bowel sounds are normal. There is no distension.      Palpations: Abdomen is soft.      Tenderness: There is no abdominal tenderness.   Musculoskeletal:         General: No swelling.   Skin:     General: Skin is warm and dry.   Neurological:      Mental Status: She is alert and oriented to person, place, and time. Mental status is at baseline.             Significant Labs: All pertinent labs within the past 24 hours have been reviewed.  CBC:   Recent Labs   Lab 03/31/24  0905 04/01/24  0401   WBC 5.50 5.68   HGB 11.0* 11.4*   HCT 33.2* 33.9*    219     CMP:   Recent Labs   Lab 03/31/24  0905 04/01/24  0401    137   K 4.4 4.2    102   CO2 21* 23   * 195*   BUN 12 13   CREATININE 0.9 1.0   CALCIUM 9.4 9.4   PROT 6.4  --    ALBUMIN 3.6  --    BILITOT 0.5  --    ALKPHOS 38*  --    AST 19  --    ALT 16  --    ANIONGAP 12 12     Troponin:   Recent Labs   Lab 03/31/24  1217 03/31/24  1740 04/01/24  0819   TROPONINI 0.263* 0.413* 0.406*       Significant Imaging: I have reviewed all pertinent imaging results/findings within the past 24 hours.    Assessment/Plan:      * NSTEMI (non-ST elevated myocardial infarction)  -likely secondary to hypertensive urgency   -troponin elevated   -echo with decreased EF   -cardiology following, probable  LHC  -continue heparin gtt      Mixed hyperlipidemia  -intolerant of statins and reports reaction to Repatha  -defer to cardiology      Hypertensive emergency  Chronic,Latest blood pressure and vitals reviewed-     Temp:  [97.9 °F (36.6 °C)-98.8 °F (37.1 °C)]   Pulse:  [73-94]   Resp:  [12-26]   BP: (113-187)/(60-81)   SpO2:  [95 %-98 %] .   Home meds for hypertension were reviewed and noted below.   Hypertension Medications               benazepriL (LOTENSIN) 40 MG tablet Take 1 tablet (40 mg total) by mouth 2 (two) times daily.    carvediloL (COREG) 25 MG tablet TAKE 1 TABLET BY MOUTH TWICE DAILY    hydrALAZINE (APRESOLINE) 50 MG tablet Take 1 tablet (50 mg total) by mouth every 8 (eight) hours.            While in the hospital, will manage blood pressure as follows; Continue home antihypertensive regimen    Will utilize p.r.n. blood pressure medication only if patient's blood pressure greater than 160/100 and she develops symptoms such as worsening chest pain or shortness of breath.    Paroxysmal atrial fibrillation  Patient with Paroxysmal (<7 days) atrial fibrillation which is controlled currently with Beta Blocker. Patient is currently in sinus rhythm.[id number] Score: 4.  Anticoagulation indicated. Anticoagulation done with Eliquis .    Controlled type 2 diabetes mellitus with diabetic polyneuropathy, with long-term current use of insulin  Patient's FSGs are controlled on current medication regimen.  Last A1c reviewed-   Lab Results   Component Value Date    HGBA1C 7.5 (H) 02/14/2024     Most recent fingerstick glucose reviewed-   Recent Labs   Lab 03/31/24  1459 03/31/24  2201   POCTGLUCOSE 111* 193*     Current correctional scale  Medium  Maintain anti-hyperglycemic dose as follows-   Antihyperglycemics (From admission, onward)      Start     Stop Route Frequency Ordered    03/31/24 2100  insulin detemir U-100 (Levemir) pen 35 Units         -- SubQ Nightly 03/31/24 1334    03/31/24 1433  insulin aspart  U-100 pen 0-10 Units         -- SubQ Before meals & nightly PRN 03/31/24 1334          Hold Oral hypoglycemics while patient is in the hospital.          VTE Risk Mitigation (From admission, onward)           Ordered     heparin 25,000 units in dextrose 5% (100 units/ml) IV bolus from bag LOW INTENSITY nomogram - [facility]  As needed (PRN)        Question:  Heparin Infusion Adjustment (DO NOT MODIFY ANSWER)  Answer:  [email or web address]\epic\Images\Pharmacy\HeparinInfusions\heparin LOW INTENSITY nomogram for [facility] [id number]    03/31/24 2013     heparin 25,000 units in dextrose 5% (100 units/ml) IV bolus from bag LOW INTENSITY nomogram - [facility]  As needed (PRN)        Question:  Heparin Infusion Adjustment (DO NOT MODIFY ANSWER)  Answer:  [email or web address]\epic\Images\Pharmacy\HeparinInfusions\heparin LOW INTENSITY nomogram for [facility] [id number]    03/31/24 2013     heparin 25,000 units in dextrose 5% 250 mL (100 units/mL) infusion LOW INTENSITY nomogram - [facility]  Continuous        Question:  Begin at (units/kg/hr)  Answer:  12    03/31/24 2013     Reason for No Pharmacological VTE Prophylaxis  Once        Question:  Reasons:  Answer:  Already adequately anticoagulated on oral Anticoagulants    03/31/24 1334     IP VTE HIGH RISK PATIENT  Once         03/31/24 1334     Place sequential compression device  Until discontinued         03/31/24 1334                    Discharge Planning   RON:      Code Status: Full Code   Is the patient medically ready for discharge?:     Reason for patient still in hospital (select all that apply): Patient trending condition, Treatment, and Consult recommendations                     Rosa Angulo MD  Department of Hospital Medicine   [facility] - Telemetry ([facility])

## 2024-04-01 NOTE — PLAN OF CARE
Problem: Adult Inpatient Plan of Care  Goal: Plan of Care Review  Outcome: Ongoing, Progressing  Goal: Patient-Specific Goal (Individualized)  Outcome: Ongoing, Progressing  Goal: Absence of Hospital-Acquired Illness or Injury  Outcome: Ongoing, Progressing  Goal: Optimal Comfort and Wellbeing  Outcome: Ongoing, Progressing  Goal: Readiness for Transition of Care  Outcome: Ongoing, Progressing     Problem: Diabetes Comorbidity  Goal: Blood Glucose Level Within Targeted Range  Outcome: Ongoing, Progressing     Problem: Adult Inpatient Plan of Care  Goal: Plan of Care Review  Outcome: Ongoing, Progressing  Goal: Patient-Specific Goal (Individualized)  Outcome: Ongoing, Progressing  Goal: Absence of Hospital-Acquired Illness or Injury  Outcome: Ongoing, Progressing  Goal: Optimal Comfort and Wellbeing  Outcome: Ongoing, Progressing  Goal: Readiness for Transition of Care  Outcome: Ongoing, Progressing

## 2024-04-01 NOTE — PLAN OF CARE
Former[facility] Pool Room/NONE.  Christine Jo is a 74 y.o.female admitted on 3/31/2024 for NSTEMI (non-ST elevated myocardial infarction)   Code Status: Full Code MRN: [id number]   Review of patient's allergies indicates:   Allergen Reactions    Repatha pushtronex [evolocumab]      headache    Aspirin Palpitations     Past Medical History:   Diagnosis Date    Arthritis     Cataract     Coronary artery disease     Diabetes mellitus 2008     am 01/15/2018 Insulin x 1 year    Diabetes mellitus, type 2     DM (diabetes mellitus) 2008     am 02/14/2020 Insulin x 4 years    Encounter for blood transfusion     Glaucoma     Hypertension     Insomnia     Macular degeneration     Old MI (myocardial infarction) 2/12/2024    Vaginal yeast infection       PRN meds    acetaminophen, 650 mg, Q4H PRN  artificial tears, 2 drop, QID PRN  dextrose 10%, 12.5 g, PRN  dextrose 10%, 25 g, PRN  diphenhydrAMINE, , PRN  fentaNYL, , PRN  glucagon (human recombinant), 1 mg, PRN  glucose, 16 g, PRN  glucose, 24 g, PRN  heparin (porcine), , PRN  heparin (PORCINE), 54.3 Units/kg (Adjusted), PRN  heparin (PORCINE), 30 Units/kg (Adjusted), PRN  HYDROcodone-acetaminophen, 1 tablet, Q6H PRN  insulin aspart U-100, 0-10 Units, QID (AC + HS) PRN  LIDOcaine HCL 20 mg/ml (2%), , PRN  meclizine, 25 mg, TID PRN  midazolam, , PRN  naloxone, 0.02 mg, PRN  nitroGLYCERIN, , PRN  sodium chloride 0.9%, 10 mL, Q12H PRN  verapamiL, , PRN  zolpidem, 5 mg, Nightly PRN      Chart check completed. Will continue plan of care.         [place] Coma Scale Score: 15     Lead Monitored: Lead II Rhythm: normal sinus rhythm    Cardiac/Telemetry Box Number: [id number]  VTE Required Core Measure: Pharmacological prophylaxis initiated/maintained Last Bowel Movement: 03/30/24  Diet NPO Except for: Medication     Jason Score: 20  Fall Risk Score: 13  Accucheck []   Freq?      Lines/Drains/Airways       Peripheral Intravenous Line  Duration                  Peripheral IV - Single  Lumen 03/31/24 2124 22 G Anterior;Right Forearm <1 day                       Problem: Adult Inpatient Plan of Care  Goal: Plan of Care Review  Outcome: Ongoing, Progressing  Goal: Patient-Specific Goal (Individualized)  Outcome: Ongoing, Progressing  Goal: Absence of Hospital-Acquired Illness or Injury  Outcome: Ongoing, Progressing  Goal: Optimal Comfort and Wellbeing  Outcome: Ongoing, Progressing  Goal: Readiness for Transition of Care  Outcome: Ongoing, Progressing     Problem: Bariatric Environmental Safety  Goal: Safety Maintained with Care  Outcome: Ongoing, Progressing     Problem: Diabetes Comorbidity  Goal: Blood Glucose Level Within Targeted Range  Outcome: Ongoing, Progressing     Problem: Fall Injury Risk  Goal: Absence of Fall and Fall-Related Injury  Outcome: Ongoing, Progressing

## 2024-04-01 NOTE — CONSULTS
[facility] - Emergency Dept.  Cardiology  Consult Note    Patient Name: Christine Jo  MRN: [id number]  Admission Date: 3/31/2024  Hospital Length of Stay: 0 days  Code Status: Full Code   Attending Provider: James Price MD   Consulting Provider: Patito Castellanos MD  Primary Care Physician: Jose Szymanski MD  Principal Problem:NSTEMI (non-ST elevated myocardial infarction)    Patient information was obtained from patient and ER records.     Inpatient consult to Cardiology  Consult performed by: Patito Castellanos MD  Consult ordered by: James Price MD  Reason for consult: nstemi        Subjective:         HPI:   75 yo F with PMH nonobstructive CAD, atrial fibrillation on Eliquis, diabetes, hypertension, HLD, AAA, former smoker who was admitted for chest pain.  Chest pain has been ongoing for 2-3 weeks with exertion and at rest.  Chest pain described as sharp located underneath her breast radiating to her back.  Had recent stress test January which showed inferior apical scar.  Echocardiogram September 2023 EF 55-70%, moderate AI.  EKG without significant changes   Troponin trending up at 0.4  Blood pressure noted to be elevated at 216/91 on admission, usually normotensive.  Reports compliance with her blood pressure medication  Repeat echocardiogram with EF 40-45%, apical hypokinesis  Hemoglobin stable at 11, platelets 251.  Creatinine 0.9, potassium 4.4.  BNP 91    Past Medical History:   Diagnosis Date    Arthritis     Cataract     Coronary artery disease     Diabetes mellitus 2008     am 01/15/2018 Insulin x 1 year    Diabetes mellitus, type 2     DM (diabetes mellitus) 2008     am 02/14/2020 Insulin x 4 years    Encounter for blood transfusion     Glaucoma     Hypertension     Insomnia     Macular degeneration     Old MI (myocardial infarction) 2/12/2024    Vaginal yeast infection        Past Surgical History:   Procedure Laterality Date    abdominal laparoscopy       BREAST BIOPSY Left 1998     benign    CATARACT EXTRACTION Bilateral [id number]    [facility] in [place]    Cataract Surgery Bilateral     Laser (YAG)    COLONOSCOPY N/A 05/05/2021    Procedure: COLONOSCOPY;  Surgeon: Shawn Rogers III, MD;  Location: [facility] ENDO;  Service: Endoscopy;  Laterality: N/A;    COLONOSCOPY N/A 06/30/2021    Procedure: COLONOSCOPY;  Surgeon: Memo Merida MD;  Location: [facility] ENDO;  Service: Endoscopy;  Laterality: N/A;    ESOPHAGOGASTRODUODENOSCOPY N/A 11/29/2019    Procedure: ESOPHAGOGASTRODUODENOSCOPY (EGD);  Surgeon: Shawn Rogers III, MD;  Location: [facility] ENDO;  Service: Endoscopy;  Laterality: N/A;    ESOPHAGOGASTRODUODENOSCOPY N/A 05/05/2021    Procedure: EGD (ESOPHAGOGASTRODUODENOSCOPY);  Surgeon: Shawn Rogers III, MD;  Location: [facility];  Service: Endoscopy;  Laterality: N/A;    EYE SURGERY      TONSILLECTOMY      TUBAL LIGATION         Review of patient's allergies indicates:   Allergen Reactions    Repatha pushtronex [evolocumab]      headache    Aspirin Palpitations       No current facility-administered medications on file prior to encounter.     Current Outpatient Medications on File Prior to Encounter   Medication Sig    apixaban (ELIQUIS) 5 mg Tab Take 1 tablet (5 mg total) by mouth 2 (two) times daily.    benazepriL (LOTENSIN) 40 MG tablet Take 1 tablet (40 mg total) by mouth 2 (two) times daily.    BEPREVE 1.5 % Drop Place 1 drop into both eyes 2 (two) times daily.    blood sugar diagnostic Strp To check blood sugar 1 times daily    blood-glucose meter (ACCU-CHEK GUIDE GLUCOSE METER) Misc use daily to test blood sugar    carvediloL (COREG) 25 MG tablet TAKE 1 TABLET BY MOUTH TWICE DAILY    cycloSPORINE (RESTASIS) 0.05 % ophthalmic emulsion Place 1 drop into both eyes 2 (two) times daily.    DULoxetine (CYMBALTA) 60 MG capsule Take 1 capsule (60 mg total) by mouth once daily.    fluticasone propionate (FLONASE) 50 mcg/actuation nasal spray 2 sprays (100 mcg total) by Each  "Nostril route once daily.    gabapentin (NEURONTIN) 300 MG capsule Take 1 capsule (300 mg total) by mouth 3 (three) times daily. (Patient taking differently: Take 300 mg by mouth daily as needed.)    galcanezumab-gnlm (EMGALITY SYRINGE) 120 mg/mL Syrg Inject 120 mg into the skin every 28 days.    hydrALAZINE (APRESOLINE) 50 MG tablet Take 1 tablet (50 mg total) by mouth every 8 (eight) hours.    hydrOXYzine HCL (ATARAX) 10 MG Tab Take 1 tablet (10 mg total) by mouth nightly as needed (Anxiety or insomnia).    insulin (LANTUS SOLOSTAR U-100 INSULIN) glargine 100 units/mL SubQ pen Inject 72 Units into the skin every evening.    linaCLOtide (LINZESS) 72 mcg Cap capsule Take 1 capsule (72 mcg total) by mouth before breakfast.    meclizine (ANTIVERT) 25 mg tablet Take 1 tablet (25 mg total) by mouth 3 (three) times daily as needed.    pantoprazole (PROTONIX) 40 MG tablet Take 1 tablet (40 mg total) by mouth once daily.    rimegepant 75 mg odt Take 1 tablet (75 mg total) by mouth as needed for Migraine (do not exceed 2-3 doses within 1 week). Place ODT tablet on the tongue; alternatively the ODT tablet may be placed under the tongue    SITagliptin phosphate (JANUVIA) 100 MG Tab Take 1 tablet (100 mg total) by mouth once daily.    temazepam (RESTORIL) 30 mg capsule Take 1 capsule (30 mg total) by mouth every evening.    amitriptyline (ELAVIL) 10 MG tablet Take 1 tablet (10 mg total) by mouth every evening.    azelastine (ASTELIN) 137 mcg (0.1 %) nasal spray use 2 sprays (274 mcg total) by Nasal route 2 (two) times daily.    clotrimazole-betamethasone (LOTRISONE) lotion Apply topically to the affected area 2 (two) times daily.    lancets Misc To check BG 1 times daily    pen needle, diabetic (BD ULTRA-FINE SHORT PEN NEEDLE) 31 gauge x 5/16" Ndle AS DIRECTED TWICE DAILY    RSVPreF3 antigen-AS01E, PF, (AREXVY, PF,) 120 mcg/0.5 mL SusR vaccine Inject into the muscle.    [DISCONTINUED] diclofenac sodium (VOLTAREN) 1 % Gel Apply " 2 grams topically 4 (four) times daily. To affected joints as needed for pain     Family History       Problem Relation (Age of Onset)    Cancer Son, Maternal Aunt    Cataracts Mother, Sister    Diabetes Sister, Sister, Sister    Glaucoma Mother    Heart disease Mother, Sister, Maternal Grandfather    Macular degeneration Mother          Tobacco Use    Smoking status: Former     Current packs/day: 0.00     Average packs/day: 1 pack/day for 36.5 years (36.5 ttl pk-yrs)     Types: Cigarettes     Start date:      Quit date: 2003     Years since quittin.7     Passive exposure: Never    Smokeless tobacco: Never   Substance and Sexual Activity    Alcohol use: No    Drug use: No    Sexual activity: Never     Review of Systems   Constitutional: Negative for diaphoresis, malaise/fatigue, weight gain and weight loss.   HENT:  Negative for congestion and nosebleeds.    Cardiovascular:  Positive for chest pain. Negative for claudication, cyanosis, dyspnea on exertion, irregular heartbeat, leg swelling, near-syncope, orthopnea, palpitations, paroxysmal nocturnal dyspnea and syncope.   Respiratory:  Negative for cough, hemoptysis, shortness of breath, sleep disturbances due to breathing, snoring, sputum production and wheezing.    Hematologic/Lymphatic: Negative for bleeding problem. Does not bruise/bleed easily.   Skin:  Negative for rash.   Musculoskeletal:  Negative for arthritis, back pain, falls, joint pain, muscle cramps and muscle weakness.   Gastrointestinal:  Negative for abdominal pain, constipation, diarrhea, heartburn, hematemesis, hematochezia, melena, nausea and vomiting.   Genitourinary:  Negative for dysuria, hematuria and nocturia.   Neurological:  Negative for excessive daytime sleepiness, dizziness, headaches, light-headedness, loss of balance, numbness, vertigo and weakness.     Objective:     Vital Signs (Most Recent):  Temp: 98.1 °F (36.7 °C) (24 0815)  Pulse: 84 (24 1800)  Resp: 18  (03/31/24 1800)  BP: 137/63 (03/31/24 1800)  SpO2: 97 % (03/31/24 1800) Vital Signs (24h Range):  Temp:  [98.1 °F (36.7 °C)] 98.1 °F (36.7 °C)  Pulse:  [71-94] 84  Resp:  [12-26] 18  SpO2:  [96 %-99 %] 97 %  BP: (134-216)/(60-91) 137/63     Weight: 109 kg (240 lb 4.8 oz)  Body mass index is 43.95 kg/m².    SpO2: 97 %       No intake or output data in the 24 hours ending 03/31/24 2008    Lines/Drains/Airways       Peripheral Intravenous Line  Duration                  Peripheral IV - Single Lumen 03/31/24 0900 20 G Left Antecubital <1 day                     Physical Exam  Vitals and nursing note reviewed.   Constitutional:       Appearance: Normal appearance. She is well-developed.   HENT:      Head: Normocephalic.      Mouth/Throat:      Mouth: Mucous membranes are moist.   Neck:      Vascular: No carotid bruit or JVD.   Cardiovascular:      Rate and Rhythm: Normal rate and regular rhythm.      Pulses: Normal pulses.      Heart sounds: Normal heart sounds. No murmur heard.     No friction rub.   Pulmonary:      Effort: Pulmonary effort is normal. No respiratory distress.      Breath sounds: Normal breath sounds. No wheezing or rales.   Abdominal:      General: Bowel sounds are normal. There is no distension.      Palpations: Abdomen is soft.      Tenderness: There is no abdominal tenderness. There is no guarding.   Musculoskeletal:         General: No swelling or tenderness.      Cervical back: Neck supple. No tenderness.      Right lower leg: No edema.      Left lower leg: No edema.   Skin:     General: Skin is warm and dry.      Capillary Refill: Capillary refill takes less than 2 seconds.      Findings: No rash.   Neurological:      General: No focal deficit present.      Mental Status: She is alert and oriented to person, place, and time.   Psychiatric:         Mood and Affect: Mood normal.         Behavior: Behavior normal.         Thought Content: Thought content normal.          Significant Labs: BMP:  "  Recent Labs   Lab 03/31/24  0905   *      K 4.4      CO2 21*   BUN 12   CREATININE 0.9   CALCIUM 9.4   , CMP   Recent Labs   Lab 03/31/24  0905      K 4.4      CO2 21*   *   BUN 12   CREATININE 0.9   CALCIUM 9.4   PROT 6.4   ALBUMIN 3.6   BILITOT 0.5   ALKPHOS 38*   AST 19   ALT 16   ANIONGAP 12   , CBC   Recent Labs   Lab 03/31/24  0905   WBC 5.50   HGB 11.0*   HCT 33.2*      , INR No results for input(s): "INR", "PROTIME" in the last 48 hours., Lipid Panel No results for input(s): "CHOL", "HDL", "LDLCALC", "TRIG", "CHOLHDL" in the last 48 hours., and Troponin   Recent Labs   Lab 03/31/24  0905 03/31/24  1217 03/31/24  1740   TROPONINI 0.139* 0.263* 0.413*       Significant Imaging: Echocardiogram: 2D echo with color flow doppler: No results found. However, due to the size of the patient record, not all encounters were searched. Please check Results Review for a complete set of results. and Transthoracic echo (TTE) complete ([facility] Only):   Results for orders placed or performed during the hospital encounter of 03/31/24   Echo   Result Value Ref Range    BSA 2.18 m2    LVOT stroke volume 99.65 cm3    LVIDd 4.05 3.5 - 6.0 cm    LV Systolic Volume 23.53 mL    LV Systolic Volume Index 11.4 mL/m2    LVIDs 2.55 2.1 - 4.0 cm    LV Diastolic Volume 72.07 mL    LV Diastolic Volume Index 34.82 mL/m2    IVS 1.20 (A) 0.6 - 1.1 cm    LVOT diameter 2.24 cm    LVOT area 3.9 cm2    FS 37 28 - 44 %    Left Ventricle Relative Wall Thickness 0.66 cm    Posterior Wall 1.34 (A) 0.6 - 1.1 cm    LV mass 183.44 g    LV Mass Index 89 g/m2    MV Peak E Zay 0.57 m/s    TDI LATERAL 0.07 m/s    TDI SEPTAL 0.06 m/s    E/E' ratio 8.77 m/s    MV Peak A Zay 1.08 m/s    TR Max Zay 3.14 m/s    E/A ratio 0.53     IVRT 157.94 msec    E wave deceleration time 362.80 msec    LV SEPTAL E/E' RATIO 9.50 m/s    LV LATERAL E/E' RATIO 8.14 m/s    LVOT peak zay 1.17 m/s    Left Ventricular Outflow Tract Mean " Velocity 0.84 cm/s    Left Ventricular Outflow Tract Mean Gradient 3.12 mmHg    RVDD 2.60 cm    RVOT peak VTI 20.0 cm    LA size 3.39 cm    Left Atrium Minor Axis 5.31 cm    Left Atrium Major Axis 5.51 cm    LA volume (mod) 69.12 cm3    LA Volume Index (Mod) 33.4 mL/m2    RA Major Axis 5.16 cm    AV regurgitation pressure 1/2 time [id number] ms    AR Max Zay 4.18 m/s    AV mean gradient 8 mmHg    AV peak gradient 16 mmHg    Ao peak zay 2.00 m/s    Ao VTI 39.90 cm    LVOT peak VTI 25.30 cm    AV valve area 2.50 cm²    AV Velocity Ratio 0.59     AV index (prosthetic) 0.63     DIEGO by Velocity Ratio 2.30 cm²    MV stenosis pressure 1/2 time 105.21 ms    MV valve area p 1/2 method 2.09 cm2    Triscuspid Valve Regurgitation Peak Gradient 39 mmHg    PV mean gradient 3 mmHg    PV PEAK VELOCITY 1.37 m/s    PV peak gradient 8 mmHg    Pulmonary Valve Mean Velocity 0.84 m/s    RVOT peak zay 1.28 m/s    Ao root annulus 3.40 cm    STJ 2.69 cm    Ascending aorta 3.26 cm    Mean e' 0.07 m/s    ZLVIDS -3.26     ZLVIDD -4.41     LA Volume Index 32.4 mL/m2    LA volume 67.01 cm3    LA WIDTH 4.3 cm    RA Width 3.0 cm    EF 45 %    TV resting pulmonary artery pressure 42 mmHg    RV TB RVSP 6 mmHg    Est. RA pres 3 mmHg    Narrative      Left Ventricle: The left ventricle is normal in size. Normal wall   thickness. There is concentric remodeling. Normal wall motion. There is   mildly reduced systolic function with a visually estimated ejection   fraction of 40 - 45%. Ejection fraction by visual approximation is 45%.   There is indeterminate diastolic function.    Right Ventricle: Normal right ventricular cavity size. Wall thickness   is normal. Right ventricle wall motion  is normal. Systolic function is   normal.    Aortic Valve: There is mild aortic regurgitation.    Tricuspid Valve: The tricuspid valve is structurally normal. There is   mild regurgitation.    Pulmonary Artery: The estimated pulmonary artery systolic pressure  is   42 mmHg.    IVC/SVC: Normal venous pressure at 3 mmHg.       Assessment and Plan:     * NSTEMI (non-ST elevated myocardial infarction)  Reports chest pain that has been worsening for 2-3 weeks at rest and on exertion   Chest pain reproducible, with radiation to the back   Recent stress test with inferior apical scar   EF dropped to 40- 45% with apical hypokinesis   Troponin trending up, start heparin infusion  Will try aspirin- causes palpitations  Keep NPO for possible LHC tomorrow    Mixed hyperlipidemia  Looks like patient was tried on Repatha in the past- ?Statin allergy  Did not tolerate Repatha due to headaches    Hypertensive emergency  Comes in with elevated blood pressure  Now normotensive, continue lisinopril carvedilol    Paroxysmal atrial fibrillation  Currently in sinus rhythm  Last took Eliquis last night 3/30/2024  Continue carvedilol    Controlled type 2 diabetes mellitus with diabetic polyneuropathy, with long-term current use of insulin  Management per Hospital Medicine        VTE Risk Mitigation (From admission, onward)           Ordered     heparin 25,000 units in dextrose 5% (100 units/ml) IV bolus from bag LOW INTENSITY nomogram - [facility]  As needed (PRN)        Question:  Heparin Infusion Adjustment (DO NOT MODIFY ANSWER)  Answer:  \\[email or web address]\epic\Images\Pharmacy\HeparinInfusions\heparin LOW INTENSITY nomogram for [facility] [id number].pdf    03/31/24 2013     heparin 25,000 units in dextrose 5% (100 units/ml) IV bolus from bag LOW INTENSITY nomogram - [facility]  As needed (PRN)        Question:  Heparin Infusion Adjustment (DO NOT MODIFY ANSWER)  Answer:  \\[email or web address]\epic\Images\Pharmacy\HeparinInfusions\heparin LOW INTENSITY nomogram for [facility] [id number].pdf    03/31/24 2013     heparin 25,000 units in dextrose 5% (100 units/ml) IV bolus from bag LOW INTENSITY nomogram - [facility]  Once        Question:  Heparin Infusion Adjustment (DO NOT MODIFY ANSWER)  Answer:   [email or web address]\epic\Images\Pharmacy\HeparinInfusions\heparin LOW INTENSITY nomogram for [facility] [id number].pdf    03/31/24 2013     heparin 25,000 units in dextrose 5% 250 mL (100 units/mL) infusion LOW INTENSITY nomogram - [facility]  Continuous        Question:  Begin at (units/kg/hr)  Answer:  12    03/31/24 2013     Reason for No Pharmacological VTE Prophylaxis  Once        Question:  Reasons:  Answer:  Already adequately anticoagulated on oral Anticoagulants    03/31/24 1334     IP VTE HIGH RISK PATIENT  Once         03/31/24 1334     Place sequential compression device  Until discontinued         03/31/24 1334                    Thank you for your consult. I will follow-up with patient. Please contact us if you have any additional questions.    Patito Castellanos MD  Cardiology

## 2024-04-01 NOTE — ASSESSMENT & PLAN NOTE
Looks like patient was tried on Repatha in the past- ?Statin allergy  Did not tolerate Repatha due to headaches

## 2024-04-01 NOTE — BRIEF OP NOTE
[facility] - Cath Lab (Hospital)  Brief Operative Note  Cardiology    SUMMARY     Surgery Date: 4/1/2024     Surgeon(s) and Role:     * Shree Espinoza MD - Primary    Assisting Surgeon: None    Pre-op Diagnosis:  NSTEMI (non-ST elevated myocardial infarction) [I21.4]  Hypertensive urgency [I16.0]    Post-op Diagnosis: Post-Op Diagnosis Codes:     * NSTEMI (non-ST elevated myocardial infarction) [I21.4]     * Hypertensive urgency [I16.0]    Procedure Performed:     Procedure(s) (LRB):  Angiogram, Coronary, with Left Heart Cath (N/A)  Percutaneous coronary intervention (N/A)  INSERTION, STENT, CORONARY ARTERY (N/A)  PTCA, Single Vessel    Technical Procedures Used:     Operative Findings:   Results for orders placed during the hospital encounter of 03/31/24    Cardiac catheterization    Conclusion    The Prox LAD lesion was 99% stenosed with 0% stenosis post-intervention.    The ejection fraction was greater than 55% by visual estimate.    The pre-procedure left ventricular end diastolic pressure was 19.    The post-procedure left ventricular end diastolic pressure was 19.    Prox LAD lesion: A STENT SYNERGY XD [id number] stent was successfully placed at 11 ROSALBA for 20 sec. Post dilated with 3.5 mm NC balloon    The estimated blood loss was none.    There was single vessel coronary artery disease.    This was a successful PCI for acute myocardial infarction.    The filling pressures on the left were mildly elevated.    There was no aortic valve stenosis.    The procedure log was documented by Documenter: Mandy Clark RN and verified by Shree Espinoza MD.    Date: 4/1/2024  Time: 2:16 PM      Not tolerant to statin   Had a reaction to repatha   Will attempt zetia   Resume eliquis tomorrow   Plavix and eliquis on discharge  Resume heparin 1 hour after TR band is off, on heparin for afib     Estimated Blood Loss: * No values recorded between 4/1/2024  1:06 PM and 4/1/2024  2:01 PM *         Specimens:   Specimen (24h  ago, onward)      None

## 2024-04-01 NOTE — HPI
73 yo F with PMH nonobstructive CAD, atrial fibrillation on Eliquis, diabetes, hypertension, HLD, AAA, former smoker who was admitted for chest pain.  Chest pain has been ongoing for 2-3 weeks with exertion and at rest.  Chest pain described as sharp located underneath her breast radiating to her back.  Had recent stress test January which showed inferior apical scar.  Echocardiogram September 2023 EF 55-70%, moderate AI.  EKG NSR, septal infarct  Troponin trending up at 0.4  Blood pressure noted to be elevated at 216/91 on admission, usually normotensive.  Reports compliance with her blood pressure medication  Repeat echocardiogram with EF 40-45%, apical hypokinesis  Hemoglobin stable at 11, platelets 251.  Creatinine 0.9, potassium 4.4.  BNP 91

## 2024-04-01 NOTE — SUBJECTIVE & OBJECTIVE
Interval History: no acute issues noted o/n. Plan for [facility] today for definitive evaluation. Further recs to follow.     Review of Systems   Constitutional: Positive for malaise/fatigue.   HENT:  Negative for hearing loss and hoarse voice.    Eyes:  Negative for blurred vision and visual disturbance.   Cardiovascular:  Positive for chest pain and dyspnea on exertion. Negative for claudication, irregular heartbeat, leg swelling, near-syncope, orthopnea, palpitations, paroxysmal nocturnal dyspnea and syncope.   Respiratory:  Negative for cough, hemoptysis, shortness of breath, sleep disturbances due to breathing, snoring and wheezing.    Endocrine: Negative for cold intolerance and heat intolerance.   Hematologic/Lymphatic: Does not bruise/bleed easily.   Skin:  Negative for color change, dry skin and nail changes.   Musculoskeletal:  Positive for arthritis and back pain. Negative for joint pain and myalgias.   Gastrointestinal:  Negative for bloating, abdominal pain, constipation, nausea and vomiting.   Genitourinary:  Negative for dysuria, flank pain, hematuria and hesitancy.   Neurological:  Negative for headaches, light-headedness, loss of balance, numbness, paresthesias and weakness.   Psychiatric/Behavioral:  Negative for altered mental status.    Allergic/Immunologic: Negative for environmental allergies.     Objective:     Vital Signs (Most Recent):  Temp: 98.2 °F (36.8 °C) (04/01/24 0738)  Pulse: 75 (04/01/24 0934)  Resp: 18 (04/01/24 0738)  BP: (!) 164/76 (04/01/24 0738)  SpO2: 96 % (04/01/24 0738) Vital Signs (24h Range):  Temp:  [97.9 °F (36.6 °C)-98.8 °F (37.1 °C)] 98.2 °F (36.8 °C)  Pulse:  [75-94] 75  Resp:  [12-20] 18  SpO2:  [95 %-98 %] 96 %  BP: (113-178)/(60-80) 164/76     Weight: 109 kg (240 lb 4.8 oz)  Body mass index is 43.95 kg/m².     SpO2: 96 %         Intake/Output Summary (Last 24 hours) at 4/1/2024 1234  Last data filed at 4/1/2024 1005  Gross per 24 hour   Intake 480 ml   Output 2 ml   Net  478 ml       Lines/Drains/Airways       Peripheral Intravenous Line  Duration                  Peripheral IV - Single Lumen 03/31/24 2124 22 G Anterior;Right Forearm <1 day                       Physical Exam  Vitals and nursing note reviewed.   Constitutional:       General: She is not in acute distress.     Appearance: Normal appearance. She is well-developed. She is obese. She is not ill-appearing.   HENT:      Head: Normocephalic and atraumatic.      Nose: Nose normal.      Mouth/Throat:      Mouth: Mucous membranes are moist.   Eyes:      Pupils: Pupils are equal, round, and reactive to light.   Neck:      Thyroid: No thyromegaly.      Vascular: No JVD.      Trachea: No tracheal deviation.   Cardiovascular:      Rate and Rhythm: Normal rate and regular rhythm.      Chest Wall: PMI is not displaced.      Pulses: Intact distal pulses.           Radial pulses are 2+ on the right side and 2+ on the left side.        Dorsalis pedis pulses are 2+ on the right side and 2+ on the left side.      Heart sounds: S1 normal and S2 normal. Heart sounds not distant. No murmur heard.  Pulmonary:      Effort: Pulmonary effort is normal. No respiratory distress.      Breath sounds: Normal breath sounds and air entry. No wheezing.   Abdominal:      General: Bowel sounds are normal. There is no distension.      Palpations: Abdomen is soft.      Tenderness: There is no abdominal tenderness.   Musculoskeletal:         General: No swelling. Normal range of motion.      Cervical back: Full passive range of motion without pain, normal range of motion and neck supple.      Right lower leg: No edema.      Left lower leg: No edema.      Right ankle: No swelling.      Left ankle: No swelling.   Skin:     General: Skin is warm and dry.      Capillary Refill: Capillary refill takes less than 2 seconds.      Nails: There is no clubbing.   Neurological:      General: No focal deficit present.      Mental Status: She is alert and oriented to  person, place, and time.      Motor: Weakness present.   Psychiatric:         Speech: Speech normal.         Behavior: Behavior normal.         Thought Content: Thought content normal.         Judgment: Judgment normal.            Significant Labs: BMP:   Recent Labs   Lab 03/31/24  0905 04/01/24  0401   * 195*    137   K 4.4 4.2    102   CO2 21* 23   BUN 12 13   CREATININE 0.9 1.0   CALCIUM 9.4 9.4   , CBC   Recent Labs   Lab 03/31/24  0905 04/01/24  0401   WBC 5.50 5.68   HGB 11.0* 11.4*   HCT 33.2* 33.9*    219   , Troponin   Recent Labs   Lab 03/31/24  1217 03/31/24  1740 04/01/24  0819   TROPONINI 0.263* 0.413* 0.406*   , and All pertinent lab results from the last 24 hours have been reviewed.    Significant Imaging: Echocardiogram: Transthoracic echo (TTE) complete (Cupid Only):   Results for orders placed or performed during the hospital encounter of 03/31/24   Echo   Result Value Ref Range    BSA 2.18 m2    LVOT stroke volume 99.65 cm3    LVIDd 4.05 3.5 - 6.0 cm    LV Systolic Volume 23.53 mL    LV Systolic Volume Index 11.4 mL/m2    LVIDs 2.55 2.1 - 4.0 cm    LV Diastolic Volume 72.07 mL    LV Diastolic Volume Index 34.82 mL/m2    IVS 1.20 (A) 0.6 - 1.1 cm    LVOT diameter 2.24 cm    LVOT area 3.9 cm2    FS 37 28 - 44 %    Left Ventricle Relative Wall Thickness 0.66 cm    Posterior Wall 1.34 (A) 0.6 - 1.1 cm    LV mass 183.44 g    LV Mass Index 89 g/m2    MV Peak E Zay 0.57 m/s    TDI LATERAL 0.07 m/s    TDI SEPTAL 0.06 m/s    E/E' ratio 8.77 m/s    MV Peak A Zay 1.08 m/s    TR Max Zay 3.14 m/s    E/A ratio 0.53     IVRT 157.94 msec    E wave deceleration time 362.80 msec    LV SEPTAL E/E' RATIO 9.50 m/s    LV LATERAL E/E' RATIO 8.14 m/s    LVOT peak zay 1.17 m/s    Left Ventricular Outflow Tract Mean Velocity 0.84 cm/s    Left Ventricular Outflow Tract Mean Gradient 3.12 mmHg    RVDD 2.60 cm    RVOT peak VTI 20.0 cm    LA size 3.39 cm    Left Atrium Minor Axis 5.31 cm    Left  Atrium Major Axis 5.51 cm    LA volume (mod) 69.12 cm3    LA Volume Index (Mod) 33.4 mL/m2    RA Major Axis 5.16 cm    AV regurgitation pressure 1/2 time [id number] ms    AR Max Zay 4.18 m/s    AV mean gradient 8 mmHg    AV peak gradient 16 mmHg    Ao peak zay 2.00 m/s    Ao VTI 39.90 cm    LVOT peak VTI 25.30 cm    AV valve area 2.50 cm²    AV Velocity Ratio 0.59     AV index (prosthetic) 0.63     DIEGO by Velocity Ratio 2.30 cm²    MV stenosis pressure 1/2 time 105.21 ms    MV valve area p 1/2 method 2.09 cm2    Triscuspid Valve Regurgitation Peak Gradient 39 mmHg    PV mean gradient 3 mmHg    PV PEAK VELOCITY 1.37 m/s    PV peak gradient 8 mmHg    Pulmonary Valve Mean Velocity 0.84 m/s    RVOT peak zay 1.28 m/s    Ao root annulus 3.40 cm    STJ 2.69 cm    Ascending aorta 3.26 cm    Mean e' 0.07 m/s    ZLVIDS -3.26     ZLVIDD -4.41     LA Volume Index 32.4 mL/m2    LA volume 67.01 cm3    LA WIDTH 4.3 cm    RA Width 3.0 cm    EF 45 %    TV resting pulmonary artery pressure 42 mmHg    RV TB RVSP 6 mmHg    Est. RA pres 3 mmHg    Narrative      Left Ventricle: The left ventricle is normal in size. Normal wall   thickness. There is concentric remodeling. Normal wall motion. There is   mildly reduced systolic function with a visually estimated ejection   fraction of 40 - 45%. Ejection fraction by visual approximation is 45%.   There is indeterminate diastolic function.    Right Ventricle: Normal right ventricular cavity size. Wall thickness   is normal. Right ventricle wall motion  is normal. Systolic function is   normal.    Aortic Valve: There is mild aortic regurgitation.    Tricuspid Valve: The tricuspid valve is structurally normal. There is   mild regurgitation.    Pulmonary Artery: The estimated pulmonary artery systolic pressure is   42 mmHg.    IVC/SVC: Normal venous pressure at 3 mmHg.

## 2024-04-01 NOTE — ASSESSMENT & PLAN NOTE
-likely secondary to hypertensive urgency   -troponin elevated   -echo with decreased EF   -cardiology following, probable LHC  -continue heparin gtt

## 2024-04-01 NOTE — CONSULTS
[facility] - Emergency Dept.  Cardiology  Consult Note    Patient Name: Christine Jo  MRN: [id number]  Admission Date: 3/31/2024  Hospital Length of Stay: 0 days  Code Status: Full Code   Attending Provider: James Price MD   Consulting Provider: Patito Castellanos MD  Primary Care Physician: Jose Szymanski MD  Principal Problem:NSTEMI (non-ST elevated myocardial infarction)    Patient information was obtained from patient and ER records.     Inpatient consult to Cardiology  Consult performed by: Patito Castellanos MD  Consult ordered by: James Price MD  Reason for consult: nstemi        Subjective:         HPI:   73 yo F with PMH nonobstructive CAD, atrial fibrillation on Eliquis, diabetes, hypertension, HLD, AAA, former smoker who was admitted for chest pain.  Chest pain has been ongoing for 2-3 weeks with exertion and at rest.  Chest pain described as sharp located underneath her breast radiating to her back.  Had recent stress test January which showed inferior apical scar.  Echocardiogram September 2023 EF 55-70%, moderate AI.  EKG NSR, septal infarct  Troponin trending up at 0.4  Blood pressure noted to be elevated at 216/91 on admission, usually normotensive.  Reports compliance with her blood pressure medication  Repeat echocardiogram with EF 40-45%, apical hypokinesis  Hemoglobin stable at 11, platelets 251.  Creatinine 0.9, potassium 4.4.  BNP 91    Past Medical History:   Diagnosis Date    Arthritis     Cataract     Coronary artery disease     Diabetes mellitus 2008     am 01/15/2018 Insulin x 1 year    Diabetes mellitus, type 2     DM (diabetes mellitus) 2008     am 02/14/2020 Insulin x 4 years    Encounter for blood transfusion     Glaucoma     Hypertension     Insomnia     Macular degeneration     Old MI (myocardial infarction) 2/12/2024    Vaginal yeast infection        Past Surgical History:   Procedure Laterality Date    abdominal laparoscopy       BREAST BIOPSY Left 1998    benign     CATARACT EXTRACTION Bilateral [id number]    [facility] in [place]    Cataract Surgery Bilateral     Laser (YAG)    COLONOSCOPY N/A 05/05/2021    Procedure: COLONOSCOPY;  Surgeon: Shawn Rogers III, MD;  Location: [facility] ENDO;  Service: Endoscopy;  Laterality: N/A;    COLONOSCOPY N/A 06/30/2021    Procedure: COLONOSCOPY;  Surgeon: Memo Merida MD;  Location: [facility] ENDO;  Service: Endoscopy;  Laterality: N/A;    ESOPHAGOGASTRODUODENOSCOPY N/A 11/29/2019    Procedure: ESOPHAGOGASTRODUODENOSCOPY (EGD);  Surgeon: Shawn Rogers III, MD;  Location: [facility] ENDO;  Service: Endoscopy;  Laterality: N/A;    ESOPHAGOGASTRODUODENOSCOPY N/A 05/05/2021    Procedure: EGD (ESOPHAGOGASTRODUODENOSCOPY);  Surgeon: Shawn Rogers III, MD;  Location: [facility] ENDO;  Service: Endoscopy;  Laterality: N/A;    EYE SURGERY      TONSILLECTOMY      TUBAL LIGATION         Review of patient's allergies indicates:   Allergen Reactions    Repatha pushtronex [evolocumab]      headache    Aspirin Palpitations       No current facility-administered medications on file prior to encounter.     Current Outpatient Medications on File Prior to Encounter   Medication Sig    apixaban (ELIQUIS) 5 mg Tab Take 1 tablet (5 mg total) by mouth 2 (two) times daily.    benazepriL (LOTENSIN) 40 MG tablet Take 1 tablet (40 mg total) by mouth 2 (two) times daily.    BEPREVE 1.5 % Drop Place 1 drop into both eyes 2 (two) times daily.    blood sugar diagnostic Strp To check blood sugar 1 times daily    blood-glucose meter (ACCU-CHEK GUIDE GLUCOSE METER) Misc use daily to test blood sugar    carvediloL (COREG) 25 MG tablet TAKE 1 TABLET BY MOUTH TWICE DAILY    cycloSPORINE (RESTASIS) 0.05 % ophthalmic emulsion Place 1 drop into both eyes 2 (two) times daily.    DULoxetine (CYMBALTA) 60 MG capsule Take 1 capsule (60 mg total) by mouth once daily.    fluticasone propionate (FLONASE) 50 mcg/actuation nasal spray 2 sprays (100 mcg total) by Each Nostril route  "once daily.    gabapentin (NEURONTIN) 300 MG capsule Take 1 capsule (300 mg total) by mouth 3 (three) times daily. (Patient taking differently: Take 300 mg by mouth daily as needed.)    galcanezumab-gnlm (EMGALITY SYRINGE) 120 mg/mL Syrg Inject 120 mg into the skin every 28 days.    hydrALAZINE (APRESOLINE) 50 MG tablet Take 1 tablet (50 mg total) by mouth every 8 (eight) hours.    hydrOXYzine HCL (ATARAX) 10 MG Tab Take 1 tablet (10 mg total) by mouth nightly as needed (Anxiety or insomnia).    insulin (LANTUS SOLOSTAR U-100 INSULIN) glargine 100 units/mL SubQ pen Inject 72 Units into the skin every evening.    linaCLOtide (LINZESS) 72 mcg Cap capsule Take 1 capsule (72 mcg total) by mouth before breakfast.    meclizine (ANTIVERT) 25 mg tablet Take 1 tablet (25 mg total) by mouth 3 (three) times daily as needed.    pantoprazole (PROTONIX) 40 MG tablet Take 1 tablet (40 mg total) by mouth once daily.    rimegepant 75 mg odt Take 1 tablet (75 mg total) by mouth as needed for Migraine (do not exceed 2-3 doses within 1 week). Place ODT tablet on the tongue; alternatively the ODT tablet may be placed under the tongue    SITagliptin phosphate (JANUVIA) 100 MG Tab Take 1 tablet (100 mg total) by mouth once daily.    temazepam (RESTORIL) 30 mg capsule Take 1 capsule (30 mg total) by mouth every evening.    amitriptyline (ELAVIL) 10 MG tablet Take 1 tablet (10 mg total) by mouth every evening.    azelastine (ASTELIN) 137 mcg (0.1 %) nasal spray use 2 sprays (274 mcg total) by Nasal route 2 (two) times daily.    clotrimazole-betamethasone (LOTRISONE) lotion Apply topically to the affected area 2 (two) times daily.    lancets Misc To check BG 1 times daily    pen needle, diabetic (BD ULTRA-FINE SHORT PEN NEEDLE) 31 gauge x 5/16" Ndle AS DIRECTED TWICE DAILY    RSVPreF3 antigen-AS01E, PF, (AREXVY, PF,) 120 mcg/0.5 mL SusR vaccine Inject into the muscle.    [DISCONTINUED] diclofenac sodium (VOLTAREN) 1 % Gel Apply 2 grams " topically 4 (four) times daily. To affected joints as needed for pain     Family History       Problem Relation (Age of Onset)    Cancer Son, Maternal Aunt    Cataracts Mother, Sister    Diabetes Sister, Sister, Sister    Glaucoma Mother    Heart disease Mother, Sister, Maternal Grandfather    Macular degeneration Mother          Tobacco Use    Smoking status: Former     Current packs/day: 0.00     Average packs/day: 1 pack/day for 36.5 years (36.5 ttl pk-yrs)     Types: Cigarettes     Start date:      Quit date: 2003     Years since quittin.7     Passive exposure: Never    Smokeless tobacco: Never   Substance and Sexual Activity    Alcohol use: No    Drug use: No    Sexual activity: Never     Review of Systems   Constitutional: Negative for diaphoresis, malaise/fatigue, weight gain and weight loss.   HENT:  Negative for congestion and nosebleeds.    Cardiovascular:  Positive for chest pain. Negative for claudication, cyanosis, dyspnea on exertion, irregular heartbeat, leg swelling, near-syncope, orthopnea, palpitations, paroxysmal nocturnal dyspnea and syncope.   Respiratory:  Negative for cough, hemoptysis, shortness of breath, sleep disturbances due to breathing, snoring, sputum production and wheezing.    Hematologic/Lymphatic: Negative for bleeding problem. Does not bruise/bleed easily.   Skin:  Negative for rash.   Musculoskeletal:  Negative for arthritis, back pain, falls, joint pain, muscle cramps and muscle weakness.   Gastrointestinal:  Negative for abdominal pain, constipation, diarrhea, heartburn, hematemesis, hematochezia, melena, nausea and vomiting.   Genitourinary:  Negative for dysuria, hematuria and nocturia.   Neurological:  Negative for excessive daytime sleepiness, dizziness, headaches, light-headedness, loss of balance, numbness, vertigo and weakness.     Objective:     Vital Signs (Most Recent):  Temp: 98.1 °F (36.7 °C) (24 0815)  Pulse: 84 (24 1800)  Resp: 18  (03/31/24 1800)  BP: 137/63 (03/31/24 1800)  SpO2: 97 % (03/31/24 1800) Vital Signs (24h Range):  Temp:  [98.1 °F (36.7 °C)] 98.1 °F (36.7 °C)  Pulse:  [71-94] 84  Resp:  [12-26] 18  SpO2:  [96 %-99 %] 97 %  BP: (134-216)/(60-91) 137/63     Weight: 109 kg (240 lb 4.8 oz)  Body mass index is 43.95 kg/m².    SpO2: 97 %       No intake or output data in the 24 hours ending 03/31/24 2008    Lines/Drains/Airways       Peripheral Intravenous Line  Duration                  Peripheral IV - Single Lumen 03/31/24 0900 20 G Left Antecubital <1 day                     Physical Exam  Vitals and nursing note reviewed.   Constitutional:       Appearance: Normal appearance. She is well-developed.   HENT:      Head: Normocephalic.      Mouth/Throat:      Mouth: Mucous membranes are moist.   Neck:      Vascular: No carotid bruit or JVD.   Cardiovascular:      Rate and Rhythm: Normal rate and regular rhythm.      Pulses: Normal pulses.      Heart sounds: Normal heart sounds. No murmur heard.     No friction rub.   Pulmonary:      Effort: Pulmonary effort is normal. No respiratory distress.      Breath sounds: Normal breath sounds. No wheezing or rales.   Abdominal:      General: Bowel sounds are normal. There is no distension.      Palpations: Abdomen is soft.      Tenderness: There is no abdominal tenderness. There is no guarding.   Musculoskeletal:         General: No swelling or tenderness.      Cervical back: Neck supple. No tenderness.      Right lower leg: No edema.      Left lower leg: No edema.   Skin:     General: Skin is warm and dry.      Capillary Refill: Capillary refill takes less than 2 seconds.      Findings: No rash.   Neurological:      General: No focal deficit present.      Mental Status: She is alert and oriented to person, place, and time.   Psychiatric:         Mood and Affect: Mood normal.         Behavior: Behavior normal.         Thought Content: Thought content normal.          Significant Labs: BMP:  "  Recent Labs   Lab 03/31/24  0905   *      K 4.4      CO2 21*   BUN 12   CREATININE 0.9   CALCIUM 9.4   , CMP   Recent Labs   Lab 03/31/24  0905      K 4.4      CO2 21*   *   BUN 12   CREATININE 0.9   CALCIUM 9.4   PROT 6.4   ALBUMIN 3.6   BILITOT 0.5   ALKPHOS 38*   AST 19   ALT 16   ANIONGAP 12   , CBC   Recent Labs   Lab 03/31/24  0905   WBC 5.50   HGB 11.0*   HCT 33.2*      , INR No results for input(s): "INR", "PROTIME" in the last 48 hours., Lipid Panel No results for input(s): "CHOL", "HDL", "LDLCALC", "TRIG", "CHOLHDL" in the last 48 hours., and Troponin   Recent Labs   Lab 03/31/24  0905 03/31/24  1217 03/31/24  1740   TROPONINI 0.139* 0.263* 0.413*       Significant Imaging: Echocardiogram: 2D echo with color flow doppler: No results found. However, due to the size of the patient record, not all encounters were searched. Please check Results Review for a complete set of results. and Transthoracic echo (TTE) complete ([facility] Only):   Results for orders placed or performed during the hospital encounter of 03/31/24   Echo   Result Value Ref Range    BSA 2.18 m2    LVOT stroke volume 99.65 cm3    LVIDd 4.05 3.5 - 6.0 cm    LV Systolic Volume 23.53 mL    LV Systolic Volume Index 11.4 mL/m2    LVIDs 2.55 2.1 - 4.0 cm    LV Diastolic Volume 72.07 mL    LV Diastolic Volume Index 34.82 mL/m2    IVS 1.20 (A) 0.6 - 1.1 cm    LVOT diameter 2.24 cm    LVOT area 3.9 cm2    FS 37 28 - 44 %    Left Ventricle Relative Wall Thickness 0.66 cm    Posterior Wall 1.34 (A) 0.6 - 1.1 cm    LV mass 183.44 g    LV Mass Index 89 g/m2    MV Peak E Zay 0.57 m/s    TDI LATERAL 0.07 m/s    TDI SEPTAL 0.06 m/s    E/E' ratio 8.77 m/s    MV Peak A Zay 1.08 m/s    TR Max Zay 3.14 m/s    E/A ratio 0.53     IVRT 157.94 msec    E wave deceleration time 362.80 msec    LV SEPTAL E/E' RATIO 9.50 m/s    LV LATERAL E/E' RATIO 8.14 m/s    LVOT peak zay 1.17 m/s    Left Ventricular Outflow Tract Mean " Velocity 0.84 cm/s    Left Ventricular Outflow Tract Mean Gradient 3.12 mmHg    RVDD 2.60 cm    RVOT peak VTI 20.0 cm    LA size 3.39 cm    Left Atrium Minor Axis 5.31 cm    Left Atrium Major Axis 5.51 cm    LA volume (mod) 69.12 cm3    LA Volume Index (Mod) 33.4 mL/m2    RA Major Axis 5.16 cm    AV regurgitation pressure 1/2 time 419.021166114023904 ms    AR Max Zay 4.18 m/s    AV mean gradient 8 mmHg    AV peak gradient 16 mmHg    Ao peak zay 2.00 m/s    Ao VTI 39.90 cm    LVOT peak VTI 25.30 cm    AV valve area 2.50 cm²    AV Velocity Ratio 0.59     AV index (prosthetic) 0.63     DIEGO by Velocity Ratio 2.30 cm²    MV stenosis pressure 1/2 time 105.21 ms    MV valve area p 1/2 method 2.09 cm2    Triscuspid Valve Regurgitation Peak Gradient 39 mmHg    PV mean gradient 3 mmHg    PV PEAK VELOCITY 1.37 m/s    PV peak gradient 8 mmHg    Pulmonary Valve Mean Velocity 0.84 m/s    RVOT peak zay 1.28 m/s    Ao root annulus 3.40 cm    STJ 2.69 cm    Ascending aorta 3.26 cm    Mean e' 0.07 m/s    ZLVIDS -3.26     ZLVIDD -4.41     LA Volume Index 32.4 mL/m2    LA volume 67.01 cm3    LA WIDTH 4.3 cm    RA Width 3.0 cm    EF 45 %    TV resting pulmonary artery pressure 42 mmHg    RV TB RVSP 6 mmHg    Est. RA pres 3 mmHg    Narrative      Left Ventricle: The left ventricle is normal in size. Normal wall   thickness. There is concentric remodeling. Normal wall motion. There is   mildly reduced systolic function with a visually estimated ejection   fraction of 40 - 45%. Ejection fraction by visual approximation is 45%.   There is indeterminate diastolic function.    Right Ventricle: Normal right ventricular cavity size. Wall thickness   is normal. Right ventricle wall motion  is normal. Systolic function is   normal.    Aortic Valve: There is mild aortic regurgitation.    Tricuspid Valve: The tricuspid valve is structurally normal. There is   mild regurgitation.    Pulmonary Artery: The estimated pulmonary artery systolic pressure  is   42 mmHg.    IVC/SVC: Normal venous pressure at 3 mmHg.       Assessment and Plan:     * NSTEMI (non-ST elevated myocardial infarction)  Reports chest pain that has been worsening for 2-3 weeks at rest and on exertion   Chest pain reproducible, with radiation to the back   Recent stress test with inferior apical scar   EF dropped to 40- 45% with apical hypokinesis   Troponin trending up, start heparin infusion  Will try aspirin- causes palpitations  Keep NPO for possible LHC tomorrow    Mixed hyperlipidemia  Looks like patient was tried on Repatha in the past- ?Statin allergy  Did not tolerate Repatha due to headaches    Hypertensive emergency  Comes in with elevated blood pressure  Now normotensive, continue lisinopril carvedilol    Paroxysmal atrial fibrillation  Currently in sinus rhythm  Last took Eliquis last night 3/30/2024  Continue carvedilol    Controlled type 2 diabetes mellitus with diabetic polyneuropathy, with long-term current use of insulin  Management per Hospital Medicine        VTE Risk Mitigation (From admission, onward)           Ordered     heparin 25,000 units in dextrose 5% (100 units/ml) IV bolus from bag LOW INTENSITY nomogram - [facility]  As needed (PRN)        Question:  Heparin Infusion Adjustment (DO NOT MODIFY ANSWER)  Answer:  \\[email or web address]\epic\Images\Pharmacy\HeparinInfusions\heparin LOW INTENSITY nomogram for [facility] [id number].pdf    03/31/24 2013     heparin 25,000 units in dextrose 5% (100 units/ml) IV bolus from bag LOW INTENSITY nomogram - [facility]  As needed (PRN)        Question:  Heparin Infusion Adjustment (DO NOT MODIFY ANSWER)  Answer:  \\[email or web address]\epic\Images\Pharmacy\HeparinInfusions\heparin LOW INTENSITY nomogram for [facility] [id number].pdf    03/31/24 2013     heparin 25,000 units in dextrose 5% (100 units/ml) IV bolus from bag LOW INTENSITY nomogram - [facility]  Once        Question:  Heparin Infusion Adjustment (DO NOT MODIFY ANSWER)  Answer:   \\[email or web address]\epic\Images\Pharmacy\HeparinInfusions\heparin LOW INTENSITY nomogram for [facility] [id number].pdf    03/31/24 2013     heparin 25,000 units in dextrose 5% 250 mL (100 units/mL) infusion LOW INTENSITY nomogram - [facility]  Continuous        Question:  Begin at (units/kg/hr)  Answer:  12    03/31/24 2013     Reason for No Pharmacological VTE Prophylaxis  Once        Question:  Reasons:  Answer:  Already adequately anticoagulated on oral Anticoagulants    03/31/24 1334     IP VTE HIGH RISK PATIENT  Once         03/31/24 1334     Place sequential compression device  Until discontinued         03/31/24 1334                    Thank you for your consult. I will follow-up with patient. Please contact us if you have any additional questions.    Patito Castellanos MD  Cardiology   [facility] - Emergency Dept.

## 2024-04-01 NOTE — PLAN OF CARE
[facility] - Telemetry (Hospital)  Initial Discharge Assessment       Primary Care Provider: Jose Szymanski MD    Admission Diagnosis: SOB (shortness of breath) [R06.02]  Elevated troponin [R79.89]  Chest pain [R07.9]    Admission Date: 3/31/2024  Expected Discharge Date:     Transition of Care Barriers: None    Payor: [employer] [facility] / Plan: [employer] / Product Type: Medicare Advantage /     Extended Emergency Contact Information  Primary Emergency Contact: QueAnamaria malave  Address: [address] [place] [address] [facility]  Home Phone: [phone]  Work Phone: [phone]  Mobile Phone: [phone]  Relation: Healthcare Power of   Secondary Emergency Contact: Margy Cooley  Address: [address] [place] [address] [place]  Mobile Phone: [phone]  Relation: Daughter    Discharge Plan A: Home Health  Discharge Plan B: Home with family      [facility]  [address] [facility] [address]  [facility] [address]  Phone: [phone] Fax: [phone]      Initial Assessment (most recent)       Adult Discharge Assessment - 04/01/24 1235          Discharge Assessment    Assessment Type Discharge Planning Assessment     Confirmed/corrected address, phone number and insurance Yes     Confirmed Demographics Correct on Facesheet     Source of Information patient;family     Communicated RON with patient/caregiver Date not available/Unable to determine     Reason For Admission NSTEMI (non-ST elevated myocardial infarction)     People in Home alone     Facility Arrived From: home     Do you expect to return to your current living situation? Yes     Do you have help at home or someone to help you manage your care at home? Yes     Who are your caregiver(s) and their phone number(s)? Margy - daughter, son, and grandchildren     Prior to hospitilization cognitive status: Alert/Oriented     Current  cognitive status: Alert/Oriented     Walking or Climbing Stairs Difficulty yes     Walking or Climbing Stairs ambulation difficulty, requires equipment     Mobility Management cane     Dressing/Bathing Difficulty yes     Dressing/Bathing bathing difficulty, requires equipment     Dressing/Bathing Management shower chair     Equipment Currently Used at Home cane, straight;shower chair     Readmission within 30 days? No     Patient currently being followed by outpatient case management? No     Do you currently have service(s) that help you manage your care at home? No     Do you take prescription medications? Yes     Do you have prescription coverage? Yes     Coverage [employer]     Do you have any problems affording any of your prescribed medications? No     Is the patient taking medications as prescribed? yes     Who is going to help you get home at discharge? Margy - daughter, son, and grandchildren     How do you get to doctors appointments? health plan transportation;family or friend will provide     Are you on dialysis? No     Do you take coumadin? No     Discharge Plan A Home Health     Discharge Plan B Home with family     DME Needed Upon Discharge  none     Discharge Plan discussed with: Patient;Adult children     Transition of Care Barriers None        Housing Stability    In the last 12 months, was there a time when you were not able to pay the mortgage or rent on time? No     In the last 12 months, was there a time when you did not have a steady place to sleep or slept in a shelter (including now)? No        Transportation Needs    In the past 12 months, has lack of transportation kept you from medical appointments or from getting medications? No     In the past 12 months, has lack of transportation kept you from meetings, work, or from getting things needed for daily living? No        Food Insecurity    Within the past 12 months, you worried that your food would run out before you got the  money to buy more. Never true     Within the past 12 months, the food you bought just didn't last and you didn't have money to get more. Never true                   Anticipated DC dispo: Home Health vs. Home with family  Prior Level of Function: need assistance with ADLs  People in home: alone    Comments:  SW met with patient and daughter, Margy, at bedside to introduce role and discuss discharge planning. Patient's daughter, son, and grandchildren will be help at home and can provide transport at time of discharge. Confirmed demographics, insurance, and emergency contacts. SW updated Patient's whiteboard with contact information and anticipated DC plan. CM discharge needs depends on hospital progress. SW will continue following to assist with other needs.

## 2024-04-01 NOTE — ASSESSMENT & PLAN NOTE
Reports chest pain that has been worsening for 2-3 weeks at rest and on exertion   Chest pain reproducible, with radiation to the back   Recent stress test with inferior apical scar   EF dropped to 40- 45% with apical hypokinesis   Troponin trending up, start heparin infusion  Will try aspirin- causes palpitations  Keep NPO for possible LHC tomorrow

## 2024-04-01 NOTE — ASSESSMENT & PLAN NOTE
Patient with Paroxysmal (<7 days) atrial fibrillation which is controlled currently with Beta Blocker. Patient is currently in sinus rhythm.[id number] Score: 4.  Anticoagulation indicated. Anticoagulation done with Eliquis .

## 2024-04-01 NOTE — HOSPITAL COURSE
4/1/2024--Patient seen and examined in room, sitting on side of bed. Denies any recurrent chest pain symptoms. Discussed with patient given abnormal findings on ECHO, plan for [facility] today for definitive evaluation per Dr Espinoza. Labs reviewed, K+ 4.2, Cr 1.0, Troponin 0.406, H/H 11.4/33.9    4.2.2024  Status post LAD stent yesterday culprit 99% proximal lesion.    Chest pain since post PCI.  Better this morning.  However EKG shows diffuse T-wave inversion in the anterior leads.  New compared to before.      4/3/25-Patient seen and examined today, s/p re-look angio yesterday which showed patent LAD stent. Feels well. No recurrent CP. No SOB. No CV complaints. Labs stable.

## 2024-04-01 NOTE — PROGRESS NOTES
[facility] - Telemetry ([facility])  Cardiology  Progress Note    Patient Name: Christine Jo  MRN: [id number]  Admission Date: 3/31/2024  Hospital Length of Stay: 0 days  Code Status: Full Code   Attending Physician: Rosa Angulo MD   Primary Care Physician: Jose Szymanski MD  Expected Discharge Date:   Principal Problem:NSTEMI (non-ST elevated myocardial infarction)    Subjective:   HPI    73 yo F with PMH nonobstructive CAD, atrial fibrillation on Eliquis, diabetes, hypertension, HLD, AAA, former smoker who was admitted for chest pain.  Chest pain has been ongoing for 2-3 weeks with exertion and at rest.  Chest pain described as sharp located underneath her breast radiating to her back.  Had recent stress test January which showed inferior apical scar.  Echocardiogram September 2023 EF 55-70%, moderate AI.  EKG NSR, septal infarct  Troponin trending up at 0.4  Blood pressure noted to be elevated at 216/91 on admission, usually normotensive.  Reports compliance with her blood pressure medication  Repeat echocardiogram with EF 40-45%, apical hypokinesis  Hemoglobin stable at 11, platelets 251.  Creatinine 0.9, potassium 4.4.  BNP 91    Hospital Course:   4/1/2024--Patient seen and examined in room, sitting on side of bed. Denies any recurrent chest pain symptoms. Discussed with patient given abnormal findings on ECHO, plan for [facility] today for definitive evaluation per Dr Espinoza. Labs reviewed, K+ 4.2, Cr 1.0, Troponin 0.406, H/H 11.4/33.9    Interval History: no acute issues noted o/n. Plan for [facility] today for definitive evaluation. Further recs to follow.     Review of Systems   Constitutional: Positive for malaise/fatigue.   HENT:  Negative for hearing loss and hoarse voice.    Eyes:  Negative for blurred vision and visual disturbance.   Cardiovascular:  Positive for chest pain and dyspnea on exertion. Negative for claudication, irregular heartbeat, leg swelling, near-syncope, orthopnea, palpitations, paroxysmal  nocturnal dyspnea and syncope.   Respiratory:  Negative for cough, hemoptysis, shortness of breath, sleep disturbances due to breathing, snoring and wheezing.    Endocrine: Negative for cold intolerance and heat intolerance.   Hematologic/Lymphatic: Does not bruise/bleed easily.   Skin:  Negative for color change, dry skin and nail changes.   Musculoskeletal:  Positive for arthritis and back pain. Negative for joint pain and myalgias.   Gastrointestinal:  Negative for bloating, abdominal pain, constipation, nausea and vomiting.   Genitourinary:  Negative for dysuria, flank pain, hematuria and hesitancy.   Neurological:  Negative for headaches, light-headedness, loss of balance, numbness, paresthesias and weakness.   Psychiatric/Behavioral:  Negative for altered mental status.    Allergic/Immunologic: Negative for environmental allergies.     Objective:     Vital Signs (Most Recent):  Temp: 98.2 °F (36.8 °C) (04/01/24 0738)  Pulse: 75 (04/01/24 0934)  Resp: 18 (04/01/24 0738)  BP: (!) 164/76 (04/01/24 0738)  SpO2: 96 % (04/01/24 0738) Vital Signs (24h Range):  Temp:  [97.9 °F (36.6 °C)-98.8 °F (37.1 °C)] 98.2 °F (36.8 °C)  Pulse:  [75-94] 75  Resp:  [12-20] 18  SpO2:  [95 %-98 %] 96 %  BP: (113-178)/(60-80) 164/76     Weight: 109 kg (240 lb 4.8 oz)  Body mass index is 43.95 kg/m².     SpO2: 96 %         Intake/Output Summary (Last 24 hours) at 4/1/2024 1234  Last data filed at 4/1/2024 1005  Gross per 24 hour   Intake 480 ml   Output 2 ml   Net 478 ml       Lines/Drains/Airways       Peripheral Intravenous Line  Duration                  Peripheral IV - Single Lumen 03/31/24 2124 22 G Anterior;Right Forearm <1 day                       Physical Exam  Vitals and nursing note reviewed.   Constitutional:       General: She is not in acute distress.     Appearance: Normal appearance. She is well-developed. She is obese. She is not ill-appearing.   HENT:      Head: Normocephalic and atraumatic.      Nose: Nose normal.       Mouth/Throat:      Mouth: Mucous membranes are moist.   Eyes:      Pupils: Pupils are equal, round, and reactive to light.   Neck:      Thyroid: No thyromegaly.      Vascular: No JVD.      Trachea: No tracheal deviation.   Cardiovascular:      Rate and Rhythm: Normal rate and regular rhythm.      Chest Wall: PMI is not displaced.      Pulses: Intact distal pulses.           Radial pulses are 2+ on the right side and 2+ on the left side.        Dorsalis pedis pulses are 2+ on the right side and 2+ on the left side.      Heart sounds: S1 normal and S2 normal. Heart sounds not distant. No murmur heard.  Pulmonary:      Effort: Pulmonary effort is normal. No respiratory distress.      Breath sounds: Normal breath sounds and air entry. No wheezing.   Abdominal:      General: Bowel sounds are normal. There is no distension.      Palpations: Abdomen is soft.      Tenderness: There is no abdominal tenderness.   Musculoskeletal:         General: No swelling. Normal range of motion.      Cervical back: Full passive range of motion without pain, normal range of motion and neck supple.      Right lower leg: No edema.      Left lower leg: No edema.      Right ankle: No swelling.      Left ankle: No swelling.   Skin:     General: Skin is warm and dry.      Capillary Refill: Capillary refill takes less than 2 seconds.      Nails: There is no clubbing.   Neurological:      General: No focal deficit present.      Mental Status: She is alert and oriented to person, place, and time.      Motor: Weakness present.   Psychiatric:         Speech: Speech normal.         Behavior: Behavior normal.         Thought Content: Thought content normal.         Judgment: Judgment normal.            Significant Labs: BMP:   Recent Labs   Lab 03/31/24  0905 04/01/24  0401   * 195*    137   K 4.4 4.2    102   CO2 21* 23   BUN 12 13   CREATININE 0.9 1.0   CALCIUM 9.4 9.4   , CBC   Recent Labs   Lab 03/31/24  0905 04/01/24  0401    WBC 5.50 5.68   HGB 11.0* 11.4*   HCT 33.2* 33.9*    219   , Troponin   Recent Labs   Lab 03/31/24  1217 03/31/24  1740 04/01/24  0819   TROPONINI 0.263* 0.413* 0.406*   , and All pertinent lab results from the last 24 hours have been reviewed.    Significant Imaging: Echocardiogram: Transthoracic echo (TTE) complete ([facility] Only):   Results for orders placed or performed during the hospital encounter of 03/31/24   Echo   Result Value Ref Range    BSA 2.18 m2    LVOT stroke volume 99.65 cm3    LVIDd 4.05 3.5 - 6.0 cm    LV Systolic Volume 23.53 mL    LV Systolic Volume Index 11.4 mL/m2    LVIDs 2.55 2.1 - 4.0 cm    LV Diastolic Volume 72.07 mL    LV Diastolic Volume Index 34.82 mL/m2    IVS 1.20 (A) 0.6 - 1.1 cm    LVOT diameter 2.24 cm    LVOT area 3.9 cm2    FS 37 28 - 44 %    Left Ventricle Relative Wall Thickness 0.66 cm    Posterior Wall 1.34 (A) 0.6 - 1.1 cm    LV mass 183.44 g    LV Mass Index 89 g/m2    MV Peak E Zay 0.57 m/s    TDI LATERAL 0.07 m/s    TDI SEPTAL 0.06 m/s    E/E' ratio 8.77 m/s    MV Peak A Zay 1.08 m/s    TR Max Zay 3.14 m/s    E/A ratio 0.53     IVRT 157.94 msec    E wave deceleration time 362.80 msec    LV SEPTAL E/E' RATIO 9.50 m/s    LV LATERAL E/E' RATIO 8.14 m/s    LVOT peak zay 1.17 m/s    Left Ventricular Outflow Tract Mean Velocity 0.84 cm/s    Left Ventricular Outflow Tract Mean Gradient 3.12 mmHg    RVDD 2.60 cm    RVOT peak VTI 20.0 cm    LA size 3.39 cm    Left Atrium Minor Axis 5.31 cm    Left Atrium Major Axis 5.51 cm    LA volume (mod) 69.12 cm3    LA Volume Index (Mod) 33.4 mL/m2    RA Major Axis 5.16 cm    AV regurgitation pressure 1/2 time 419.971721038740398 ms    AR Max Zay 4.18 m/s    AV mean gradient 8 mmHg    AV peak gradient 16 mmHg    Ao peak zay 2.00 m/s    Ao VTI 39.90 cm    LVOT peak VTI 25.30 cm    AV valve area 2.50 cm²    AV Velocity Ratio 0.59     AV index (prosthetic) 0.63     DIEGO by Velocity Ratio 2.30 cm²    MV stenosis pressure 1/2 time 105.21 ms     MV valve area p 1/2 method 2.09 cm2    Triscuspid Valve Regurgitation Peak Gradient 39 mmHg    PV mean gradient 3 mmHg    PV PEAK VELOCITY 1.37 m/s    PV peak gradient 8 mmHg    Pulmonary Valve Mean Velocity 0.84 m/s    RVOT peak terry 1.28 m/s    Ao root annulus 3.40 cm    STJ 2.69 cm    Ascending aorta 3.26 cm    Mean e' 0.07 m/s    ZLVIDS -3.26     ZLVIDD -4.41     LA Volume Index 32.4 mL/m2    LA volume 67.01 cm3    LA WIDTH 4.3 cm    RA Width 3.0 cm    EF 45 %    TV resting pulmonary artery pressure 42 mmHg    RV TB RVSP 6 mmHg    Est. RA pres 3 mmHg    Narrative      Left Ventricle: The left ventricle is normal in size. Normal wall   thickness. There is concentric remodeling. Normal wall motion. There is   mildly reduced systolic function with a visually estimated ejection   fraction of 40 - 45%. Ejection fraction by visual approximation is 45%.   There is indeterminate diastolic function.    Right Ventricle: Normal right ventricular cavity size. Wall thickness   is normal. Right ventricle wall motion  is normal. Systolic function is   normal.    Aortic Valve: There is mild aortic regurgitation.    Tricuspid Valve: The tricuspid valve is structurally normal. There is   mild regurgitation.    Pulmonary Artery: The estimated pulmonary artery systolic pressure is   42 mmHg.    IVC/SVC: Normal venous pressure at 3 mmHg.       Assessment and Plan:       * NSTEMI (non-ST elevated myocardial infarction)  Reports chest pain that has been worsening for 2-3 weeks at rest and on exertion   Chest pain reproducible, with radiation to the back   Recent stress test with inferior apical scar   EF dropped to 40- 45% with apical hypokinesis   Troponin trending up, start heparin infusion  Will try aspirin- causes palpitations  Keep NPO for possible LHC tomorrow    4/1/2024  -Eliquis on hold currently  -Continue ASA Coreg ACEi  -Statin intolerant  -Plan for [facility] today per Dr Espinoza  -Risks benefits and treatment alternatives  reviewed  -She agrees to proceed with [facility] today   -Consent signed and appropriately witnessed  -Further recs to follow    Mixed hyperlipidemia  Looks like patient was tried on Repatha in the past- ?Statin allergy  Did not tolerate Repatha due to headaches    Hypertensive emergency  Comes in with elevated blood pressure  Now normotensive, continue lisinopril carvedilol    Paroxysmal atrial fibrillation  Currently in sinus rhythm  Last took Eliquis last night 3/30/2024  Continue carvedilol, ASA      Class 3 severe obesity due to excess calories with serious comorbidity and body mass index (BMI) of 45.0 to 49.9 in adult  Encourage formal weight loss program    Controlled type 2 diabetes mellitus with diabetic polyneuropathy, with long-term current use of insulin  Management per Hospital Medicine        VTE Risk Mitigation (From admission, onward)           Ordered     heparin 25,000 units in dextrose 5% (100 units/ml) IV bolus from bag LOW INTENSITY nomogram - [facility]  As needed (PRN)        Question:  Heparin Infusion Adjustment (DO NOT MODIFY ANSWER)  Answer:  [email or web address]\[email or web address]\Images\Pharmacy\HeparinInfusions\heparin LOW INTENSITY nomogram for [facility] [email or web address]    03/31/24 2013     heparin 25,000 units in dextrose 5% (100 units/ml) IV bolus from bag LOW INTENSITY nomogram - [facility]  As needed (PRN)        Question:  Heparin Infusion Adjustment (DO NOT MODIFY ANSWER)  Answer:  \[email or web address]\epic\Images\Pharmacy\HeparinInfusions\heparin LOW INTENSITY nomogram for [facility] [id number]    03/31/24 2013     heparin 25,000 units in dextrose 5% 250 mL (100 units/mL) infusion LOW INTENSITY nomogram - [facility]  Continuous        Question:  Begin at (units/kg/hr)  Answer:  12 03/31/24 2013     Reason for No Pharmacological VTE Prophylaxis  Once        Question:  Reasons:  Answer:  Already adequately anticoagulated on oral Anticoagulants    03/31/24 1334     IP VTE HIGH RISK PATIENT  Once         03/31/24 1334     Place sequential compression  device  Until discontinued         03/31/24 1333                    GORDO Holman  Cardiology  [facility] - Telemetry ([facility])

## 2024-04-01 NOTE — SUBJECTIVE & OBJECTIVE
Past Medical History:   Diagnosis Date    Arthritis     Cataract     Coronary artery disease     Diabetes mellitus 2008     am 01/15/2018 Insulin x 1 year    Diabetes mellitus, type 2     DM (diabetes mellitus) 2008     am 02/14/2020 Insulin x 4 years    Encounter for blood transfusion     Glaucoma     Hypertension     Insomnia     Macular degeneration     Old MI (myocardial infarction) 2/12/2024    Vaginal yeast infection        Past Surgical History:   Procedure Laterality Date    abdominal laparoscopy       BREAST BIOPSY Left 1998    benign    CATARACT EXTRACTION Bilateral [id number]    [facility] in [place]    Cataract Surgery Bilateral     Laser (YAG)    COLONOSCOPY N/A 05/05/2021    Procedure: COLONOSCOPY;  Surgeon: Shawn Rogers III, MD;  Location: [facility];  Service: Endoscopy;  Laterality: N/A;    COLONOSCOPY N/A 06/30/2021    Procedure: COLONOSCOPY;  Surgeon: Memo Merida MD;  Location: [facility];  Service: Endoscopy;  Laterality: N/A;    ESOPHAGOGASTRODUODENOSCOPY N/A 11/29/2019    Procedure: ESOPHAGOGASTRODUODENOSCOPY (EGD);  Surgeon: Shawn Rogers III, MD;  Location: [facility];  Service: Endoscopy;  Laterality: N/A;    ESOPHAGOGASTRODUODENOSCOPY N/A 05/05/2021    Procedure: EGD (ESOPHAGOGASTRODUODENOSCOPY);  Surgeon: Shawn Rogers III, MD;  Location: [facility];  Service: Endoscopy;  Laterality: N/A;    EYE SURGERY      TONSILLECTOMY      TUBAL LIGATION         Review of patient's allergies indicates:   Allergen Reactions    Repatha pushtronex [evolocumab]      headache    Aspirin Palpitations       No current facility-administered medications on file prior to encounter.     Current Outpatient Medications on File Prior to Encounter   Medication Sig    apixaban (ELIQUIS) 5 mg Tab Take 1 tablet (5 mg total) by mouth 2 (two) times daily.    benazepriL (LOTENSIN) 40 MG tablet Take 1 tablet (40 mg total) by mouth 2 (two) times daily.    BEPREVE 1.5 % Drop Place 1 drop into  both eyes 2 (two) times daily.    blood sugar diagnostic Strp To check blood sugar 1 times daily    blood-glucose meter (ACCU-CHEK GUIDE GLUCOSE METER) Misc use daily to test blood sugar    carvediloL (COREG) 25 MG tablet TAKE 1 TABLET BY MOUTH TWICE DAILY    cycloSPORINE (RESTASIS) 0.05 % ophthalmic emulsion Place 1 drop into both eyes 2 (two) times daily.    DULoxetine (CYMBALTA) 60 MG capsule Take 1 capsule (60 mg total) by mouth once daily.    fluticasone propionate (FLONASE) 50 mcg/actuation nasal spray 2 sprays (100 mcg total) by Each Nostril route once daily.    gabapentin (NEURONTIN) 300 MG capsule Take 1 capsule (300 mg total) by mouth 3 (three) times daily. (Patient taking differently: Take 300 mg by mouth daily as needed.)    galcanezumab-gnlm (EMGALITY SYRINGE) 120 mg/mL Syrg Inject 120 mg into the skin every 28 days.    hydrALAZINE (APRESOLINE) 50 MG tablet Take 1 tablet (50 mg total) by mouth every 8 (eight) hours.    hydrOXYzine HCL (ATARAX) 10 MG Tab Take 1 tablet (10 mg total) by mouth nightly as needed (Anxiety or insomnia).    insulin (LANTUS SOLOSTAR U-100 INSULIN) glargine 100 units/mL SubQ pen Inject 72 Units into the skin every evening.    linaCLOtide (LINZESS) 72 mcg Cap capsule Take 1 capsule (72 mcg total) by mouth before breakfast.    meclizine (ANTIVERT) 25 mg tablet Take 1 tablet (25 mg total) by mouth 3 (three) times daily as needed.    pantoprazole (PROTONIX) 40 MG tablet Take 1 tablet (40 mg total) by mouth once daily.    rimegepant 75 mg odt Take 1 tablet (75 mg total) by mouth as needed for Migraine (do not exceed 2-3 doses within 1 week). Place ODT tablet on the tongue; alternatively the ODT tablet may be placed under the tongue    SITagliptin phosphate (JANUVIA) 100 MG Tab Take 1 tablet (100 mg total) by mouth once daily.    temazepam (RESTORIL) 30 mg capsule Take 1 capsule (30 mg total) by mouth every evening.    amitriptyline (ELAVIL) 10 MG tablet Take 1 tablet (10 mg total) by  "mouth every evening.    azelastine (ASTELIN) 137 mcg (0.1 %) nasal spray use 2 sprays (274 mcg total) by Nasal route 2 (two) times daily.    clotrimazole-betamethasone (LOTRISONE) lotion Apply topically to the affected area 2 (two) times daily.    lancets Misc To check BG 1 times daily    pen needle, diabetic (BD ULTRA-FINE SHORT PEN NEEDLE) 31 gauge x 5/16" Ndle AS DIRECTED TWICE DAILY    RSVPreF3 antigen-AS01E, PF, (AREXVY, PF,) 120 mcg/0.5 mL SusR vaccine Inject into the muscle.    [DISCONTINUED] diclofenac sodium (VOLTAREN) 1 % Gel Apply 2 grams topically 4 (four) times daily. To affected joints as needed for pain     Family History       Problem Relation (Age of Onset)    Cancer Son, Maternal Aunt    Cataracts Mother, Sister    Diabetes Sister, Sister, Sister    Glaucoma Mother    Heart disease Mother, Sister, Maternal Grandfather    Macular degeneration Mother          Tobacco Use    Smoking status: Former     Current packs/day: 0.00     Average packs/day: 1 pack/day for 36.5 years (36.5 ttl pk-yrs)     Types: Cigarettes     Start date:      Quit date: 2003     Years since quittin.7     Passive exposure: Never    Smokeless tobacco: Never   Substance and Sexual Activity    Alcohol use: No    Drug use: No    Sexual activity: Never     Review of Systems   Constitutional: Negative for diaphoresis, malaise/fatigue, weight gain and weight loss.   HENT:  Negative for congestion and nosebleeds.    Cardiovascular:  Positive for chest pain. Negative for claudication, cyanosis, dyspnea on exertion, irregular heartbeat, leg swelling, near-syncope, orthopnea, palpitations, paroxysmal nocturnal dyspnea and syncope.   Respiratory:  Negative for cough, hemoptysis, shortness of breath, sleep disturbances due to breathing, snoring, sputum production and wheezing.    Hematologic/Lymphatic: Negative for bleeding problem. Does not bruise/bleed easily.   Skin:  Negative for rash.   Musculoskeletal:  Negative for " arthritis, back pain, falls, joint pain, muscle cramps and muscle weakness.   Gastrointestinal:  Negative for abdominal pain, constipation, diarrhea, heartburn, hematemesis, hematochezia, melena, nausea and vomiting.   Genitourinary:  Negative for dysuria, hematuria and nocturia.   Neurological:  Negative for excessive daytime sleepiness, dizziness, headaches, light-headedness, loss of balance, numbness, vertigo and weakness.     Objective:     Vital Signs (Most Recent):  Temp: 98.1 °F (36.7 °C) (03/31/24 0815)  Pulse: 84 (03/31/24 1800)  Resp: 18 (03/31/24 1800)  BP: 137/63 (03/31/24 1800)  SpO2: 97 % (03/31/24 1800) Vital Signs (24h Range):  Temp:  [98.1 °F (36.7 °C)] 98.1 °F (36.7 °C)  Pulse:  [71-94] 84  Resp:  [12-26] 18  SpO2:  [96 %-99 %] 97 %  BP: (134-216)/(60-91) 137/63     Weight: 109 kg (240 lb 4.8 oz)  Body mass index is 43.95 kg/m².    SpO2: 97 %       No intake or output data in the 24 hours ending 03/31/24 2008    Lines/Drains/Airways       Peripheral Intravenous Line  Duration                  Peripheral IV - Single Lumen 03/31/24 0900 20 G Left Antecubital <1 day                     Physical Exam  Vitals and nursing note reviewed.   Constitutional:       Appearance: Normal appearance. She is well-developed.   HENT:      Head: Normocephalic.      Mouth/Throat:      Mouth: Mucous membranes are moist.   Neck:      Vascular: No carotid bruit or JVD.   Cardiovascular:      Rate and Rhythm: Normal rate and regular rhythm.      Pulses: Normal pulses.      Heart sounds: Normal heart sounds. No murmur heard.     No friction rub.   Pulmonary:      Effort: Pulmonary effort is normal. No respiratory distress.      Breath sounds: Normal breath sounds. No wheezing or rales.   Abdominal:      General: Bowel sounds are normal. There is no distension.      Palpations: Abdomen is soft.      Tenderness: There is no abdominal tenderness. There is no guarding.   Musculoskeletal:         General: No swelling or  "tenderness.      Cervical back: Neck supple. No tenderness.      Right lower leg: No edema.      Left lower leg: No edema.   Skin:     General: Skin is warm and dry.      Capillary Refill: Capillary refill takes less than 2 seconds.      Findings: No rash.   Neurological:      General: No focal deficit present.      Mental Status: She is alert and oriented to person, place, and time.   Psychiatric:         Mood and Affect: Mood normal.         Behavior: Behavior normal.         Thought Content: Thought content normal.          Significant Labs: BMP:   Recent Labs   Lab 03/31/24  0905   *      K 4.4      CO2 21*   BUN 12   CREATININE 0.9   CALCIUM 9.4   , CMP   Recent Labs   Lab 03/31/24  0905      K 4.4      CO2 21*   *   BUN 12   CREATININE 0.9   CALCIUM 9.4   PROT 6.4   ALBUMIN 3.6   BILITOT 0.5   ALKPHOS 38*   AST 19   ALT 16   ANIONGAP 12   , CBC   Recent Labs   Lab 03/31/24  0905   WBC 5.50   HGB 11.0*   HCT 33.2*      , INR No results for input(s): "INR", "PROTIME" in the last 48 hours., Lipid Panel No results for input(s): "CHOL", "HDL", "LDLCALC", "TRIG", "CHOLHDL" in the last 48 hours., and Troponin   Recent Labs   Lab 03/31/24  0905 03/31/24  1217 03/31/24  1740   TROPONINI 0.139* 0.263* 0.413*       Significant Imaging: Echocardiogram: 2D echo with color flow doppler: No results found. However, due to the size of the patient record, not all encounters were searched. Please check Results Review for a complete set of results. and Transthoracic echo (TTE) complete ([facility] Only):   Results for orders placed or performed during the hospital encounter of 03/31/24   Echo   Result Value Ref Range    BSA 2.18 m2    LVOT stroke volume 99.65 cm3    LVIDd 4.05 3.5 - 6.0 cm    LV Systolic Volume 23.53 mL    LV Systolic Volume Index 11.4 mL/m2    LVIDs 2.55 2.1 - 4.0 cm    LV Diastolic Volume 72.07 mL    LV Diastolic Volume Index 34.82 mL/m2    IVS 1.20 (A) 0.6 - 1.1 cm    " LVOT diameter 2.24 cm    LVOT area 3.9 cm2    FS 37 28 - 44 %    Left Ventricle Relative Wall Thickness 0.66 cm    Posterior Wall 1.34 (A) 0.6 - 1.1 cm    LV mass 183.44 g    LV Mass Index 89 g/m2    MV Peak E Zay 0.57 m/s    TDI LATERAL 0.07 m/s    TDI SEPTAL 0.06 m/s    E/E' ratio 8.77 m/s    MV Peak A Zay 1.08 m/s    TR Max Zay 3.14 m/s    E/A ratio 0.53     IVRT 157.94 msec    E wave deceleration time 362.80 msec    LV SEPTAL E/E' RATIO 9.50 m/s    LV LATERAL E/E' RATIO 8.14 m/s    LVOT peak zay 1.17 m/s    Left Ventricular Outflow Tract Mean Velocity 0.84 cm/s    Left Ventricular Outflow Tract Mean Gradient 3.12 mmHg    RVDD 2.60 cm    RVOT peak VTI 20.0 cm    LA size 3.39 cm    Left Atrium Minor Axis 5.31 cm    Left Atrium Major Axis 5.51 cm    LA volume (mod) 69.12 cm3    LA Volume Index (Mod) 33.4 mL/m2    RA Major Axis 5.16 cm    AV regurgitation pressure 1/2 time [id number] ms    AR Max Zay 4.18 m/s    AV mean gradient 8 mmHg    AV peak gradient 16 mmHg    Ao peak zay 2.00 m/s    Ao VTI 39.90 cm    LVOT peak VTI 25.30 cm    AV valve area 2.50 cm²    AV Velocity Ratio 0.59     AV index (prosthetic) 0.63     DIEGO by Velocity Ratio 2.30 cm²    MV stenosis pressure 1/2 time 105.21 ms    MV valve area p 1/2 method 2.09 cm2    Triscuspid Valve Regurgitation Peak Gradient 39 mmHg    PV mean gradient 3 mmHg    PV PEAK VELOCITY 1.37 m/s    PV peak gradient 8 mmHg    Pulmonary Valve Mean Velocity 0.84 m/s    RVOT peak zay 1.28 m/s    Ao root annulus 3.40 cm    STJ 2.69 cm    Ascending aorta 3.26 cm    Mean e' 0.07 m/s    ZLVIDS -3.26     ZLVIDD -4.41     LA Volume Index 32.4 mL/m2    LA volume 67.01 cm3    LA WIDTH 4.3 cm    RA Width 3.0 cm    EF 45 %    TV resting pulmonary artery pressure 42 mmHg    RV TB RVSP 6 mmHg    Est. RA pres 3 mmHg    Narrative      Left Ventricle: The left ventricle is normal in size. Normal wall   thickness. There is concentric remodeling. Normal wall motion. There is   mildly  reduced systolic function with a visually estimated ejection   fraction of 40 - 45%. Ejection fraction by visual approximation is 45%.   There is indeterminate diastolic function.    Right Ventricle: Normal right ventricular cavity size. Wall thickness   is normal. Right ventricle wall motion  is normal. Systolic function is   normal.    Aortic Valve: There is mild aortic regurgitation.    Tricuspid Valve: The tricuspid valve is structurally normal. There is   mild regurgitation.    Pulmonary Artery: The estimated pulmonary artery systolic pressure is   42 mmHg.    IVC/SVC: Normal venous pressure at 3 mmHg.

## 2024-04-01 NOTE — INTERVAL H&P NOTE
The patient has been examined and the H&P has been reviewed:    I concur with the findings and no changes have occurred since H&P was written.    Anesthesia/Surgery risks, benefits and alternative options discussed and understood by patient/family.          Active Hospital Problems    Diagnosis  POA    *NSTEMI (non-ST elevated myocardial infarction) [I21.4]  Yes    Mixed hyperlipidemia [E78.2]  Yes    Hypertensive emergency [I16.1]  Yes    Controlled type 2 diabetes mellitus with diabetic polyneuropathy, with long-term current use of insulin [E11.42, Z79.4]  Not Applicable    Paroxysmal atrial fibrillation [I48.0]  Yes      Resolved Hospital Problems   No resolved problems to display.

## 2024-04-01 NOTE — SUBJECTIVE & OBJECTIVE
Interval History: patient reports she is feeling better than when she came in. Currently chest pain free. She reports she had some period of time where she went from being cold to being hot. Currently feeling fine.     Review of Systems  Objective:     Vital Signs (Most Recent):  Temp: 98.2 °F (36.8 °C) (04/01/24 0738)  Pulse: 75 (04/01/24 0934)  Resp: 18 (04/01/24 0738)  BP: (!) 164/76 (04/01/24 0738)  SpO2: 96 % (04/01/24 0738) Vital Signs (24h Range):  Temp:  [97.9 °F (36.6 °C)-98.8 °F (37.1 °C)] 98.2 °F (36.8 °C)  Pulse:  [73-94] 75  Resp:  [12-26] 18  SpO2:  [95 %-98 %] 96 %  BP: (113-187)/(60-81) 164/76     Weight: 109 kg (240 lb 4.8 oz)  Body mass index is 43.95 kg/m².    Intake/Output Summary (Last 24 hours) at 4/1/2024 0952  Last data filed at 4/1/2024 0842  Gross per 24 hour   Intake 240 ml   Output 1 ml   Net 239 ml         Physical Exam  HENT:      Head: Normocephalic and atraumatic.   Cardiovascular:      Rate and Rhythm: Normal rate and regular rhythm.      Heart sounds: No murmur heard.  Pulmonary:      Effort: Pulmonary effort is normal. No respiratory distress.      Breath sounds: Normal breath sounds. No wheezing.   Abdominal:      General: Bowel sounds are normal. There is no distension.      Palpations: Abdomen is soft.      Tenderness: There is no abdominal tenderness.   Musculoskeletal:         General: No swelling.   Skin:     General: Skin is warm and dry.   Neurological:      Mental Status: She is alert and oriented to person, place, and time. Mental status is at baseline.             Significant Labs: All pertinent labs within the past 24 hours have been reviewed.  CBC:   Recent Labs   Lab 03/31/24 0905 04/01/24  0401   WBC 5.50 5.68   HGB 11.0* 11.4*   HCT 33.2* 33.9*    219     CMP:   Recent Labs   Lab 03/31/24 0905 04/01/24  0401    137   K 4.4 4.2    102   CO2 21* 23   * 195*   BUN 12 13   CREATININE 0.9 1.0   CALCIUM 9.4 9.4   PROT 6.4  --    ALBUMIN 3.6  --     BILITOT 0.5  --    ALKPHOS 38*  --    AST 19  --    ALT 16  --    ANIONGAP 12 12     Troponin:   Recent Labs   Lab 03/31/24  1217 03/31/24  1740 04/01/24  0819   TROPONINI 0.263* 0.413* 0.406*       Significant Imaging: I have reviewed all pertinent imaging results/findings within the past 24 hours.

## 2024-04-01 NOTE — H&P (VIEW-ONLY)
[facility] - Emergency Dept.  Cardiology  Consult Note    Patient Name: Christine Jo  MRN: [id number]  Admission Date: 3/31/2024  Hospital Length of Stay: 0 days  Code Status: Full Code   Attending Provider: James Price MD   Consulting Provider: Patito Castellanos MD  Primary Care Physician: Jose Szymanski MD  Principal Problem:NSTEMI (non-ST elevated myocardial infarction)    Patient information was obtained from patient and ER records.     Inpatient consult to Cardiology  Consult performed by: Patito Castellanos MD  Consult ordered by: James Price MD  Reason for consult: nstemi        Subjective:         HPI:   75 yo F with PMH nonobstructive CAD, atrial fibrillation on Eliquis, diabetes, hypertension, HLD, AAA, former smoker who was admitted for chest pain.  Chest pain has been ongoing for 2-3 weeks with exertion and at rest.  Chest pain described as sharp located underneath her breast radiating to her back.  Had recent stress test January which showed inferior apical scar.  Echocardiogram September 2023 EF 55-70%, moderate AI.  EKG NSR, septal infarct  Troponin trending up at 0.4  Blood pressure noted to be elevated at 216/91 on admission, usually normotensive.  Reports compliance with her blood pressure medication  Repeat echocardiogram with EF 40-45%, apical hypokinesis  Hemoglobin stable at 11, platelets 251.  Creatinine 0.9, potassium 4.4.  BNP 91    Past Medical History:   Diagnosis Date    Arthritis     Cataract     Coronary artery disease     Diabetes mellitus 2008     am 01/15/2018 Insulin x 1 year    Diabetes mellitus, type 2     DM (diabetes mellitus) 2008     am 02/14/2020 Insulin x 4 years    Encounter for blood transfusion     Glaucoma     Hypertension     Insomnia     Macular degeneration     Old MI (myocardial infarction) 2/12/2024    Vaginal yeast infection        Past Surgical History:   Procedure Laterality Date    abdominal laparoscopy       BREAST BIOPSY Left 1998    benign     CATARACT EXTRACTION Bilateral [id number]    [facility] in [place]    Cataract Surgery Bilateral     Laser (YAG)    COLONOSCOPY N/A 05/05/2021    Procedure: COLONOSCOPY;  Surgeon: Shawn Rogers III, MD;  Location: [facility] ENDO;  Service: Endoscopy;  Laterality: N/A;    COLONOSCOPY N/A 06/30/2021    Procedure: COLONOSCOPY;  Surgeon: Memo Merida MD;  Location: [facility] ENDO;  Service: Endoscopy;  Laterality: N/A;    ESOPHAGOGASTRODUODENOSCOPY N/A 11/29/2019    Procedure: ESOPHAGOGASTRODUODENOSCOPY (EGD);  Surgeon: Shawn Rogers III, MD;  Location: [facility] ENDO;  Service: Endoscopy;  Laterality: N/A;    ESOPHAGOGASTRODUODENOSCOPY N/A 05/05/2021    Procedure: EGD (ESOPHAGOGASTRODUODENOSCOPY);  Surgeon: Shawn Rogers III, MD;  Location: [facility] ENDO;  Service: Endoscopy;  Laterality: N/A;    EYE SURGERY      TONSILLECTOMY      TUBAL LIGATION         Review of patient's allergies indicates:   Allergen Reactions    Repatha pushtronex [evolocumab]      headache    Aspirin Palpitations       No current facility-administered medications on file prior to encounter.     Current Outpatient Medications on File Prior to Encounter   Medication Sig    apixaban (ELIQUIS) 5 mg Tab Take 1 tablet (5 mg total) by mouth 2 (two) times daily.    benazepriL (LOTENSIN) 40 MG tablet Take 1 tablet (40 mg total) by mouth 2 (two) times daily.    BEPREVE 1.5 % Drop Place 1 drop into both eyes 2 (two) times daily.    blood sugar diagnostic Strp To check blood sugar 1 times daily    blood-glucose meter (ACCU-CHEK GUIDE GLUCOSE METER) Misc use daily to test blood sugar    carvediloL (COREG) 25 MG tablet TAKE 1 TABLET BY MOUTH TWICE DAILY    cycloSPORINE (RESTASIS) 0.05 % ophthalmic emulsion Place 1 drop into both eyes 2 (two) times daily.    DULoxetine (CYMBALTA) 60 MG capsule Take 1 capsule (60 mg total) by mouth once daily.    fluticasone propionate (FLONASE) 50 mcg/actuation nasal spray 2 sprays (100 mcg total) by Each Nostril route  "once daily.    gabapentin (NEURONTIN) 300 MG capsule Take 1 capsule (300 mg total) by mouth 3 (three) times daily. (Patient taking differently: Take 300 mg by mouth daily as needed.)    galcanezumab-gnlm (EMGALITY SYRINGE) 120 mg/mL Syrg Inject 120 mg into the skin every 28 days.    hydrALAZINE (APRESOLINE) 50 MG tablet Take 1 tablet (50 mg total) by mouth every 8 (eight) hours.    hydrOXYzine HCL (ATARAX) 10 MG Tab Take 1 tablet (10 mg total) by mouth nightly as needed (Anxiety or insomnia).    insulin (LANTUS SOLOSTAR U-100 INSULIN) glargine 100 units/mL SubQ pen Inject 72 Units into the skin every evening.    linaCLOtide (LINZESS) 72 mcg Cap capsule Take 1 capsule (72 mcg total) by mouth before breakfast.    meclizine (ANTIVERT) 25 mg tablet Take 1 tablet (25 mg total) by mouth 3 (three) times daily as needed.    pantoprazole (PROTONIX) 40 MG tablet Take 1 tablet (40 mg total) by mouth once daily.    rimegepant 75 mg odt Take 1 tablet (75 mg total) by mouth as needed for Migraine (do not exceed 2-3 doses within 1 week). Place ODT tablet on the tongue; alternatively the ODT tablet may be placed under the tongue    SITagliptin phosphate (JANUVIA) 100 MG Tab Take 1 tablet (100 mg total) by mouth once daily.    temazepam (RESTORIL) 30 mg capsule Take 1 capsule (30 mg total) by mouth every evening.    amitriptyline (ELAVIL) 10 MG tablet Take 1 tablet (10 mg total) by mouth every evening.    azelastine (ASTELIN) 137 mcg (0.1 %) nasal spray use 2 sprays (274 mcg total) by Nasal route 2 (two) times daily.    clotrimazole-betamethasone (LOTRISONE) lotion Apply topically to the affected area 2 (two) times daily.    lancets Misc To check BG 1 times daily    pen needle, diabetic (BD ULTRA-FINE SHORT PEN NEEDLE) 31 gauge x 5/16" Ndle AS DIRECTED TWICE DAILY    RSVPreF3 antigen-AS01E, PF, (AREXVY, PF,) 120 mcg/0.5 mL SusR vaccine Inject into the muscle.    [DISCONTINUED] diclofenac sodium (VOLTAREN) 1 % Gel Apply 2 grams " topically 4 (four) times daily. To affected joints as needed for pain     Family History       Problem Relation (Age of Onset)    Cancer Son, Maternal Aunt    Cataracts Mother, Sister    Diabetes Sister, Sister, Sister    Glaucoma Mother    Heart disease Mother, Sister, Maternal Grandfather    Macular degeneration Mother          Tobacco Use    Smoking status: Former     Current packs/day: 0.00     Average packs/day: 1 pack/day for 36.5 years (36.5 ttl pk-yrs)     Types: Cigarettes     Start date:      Quit date: 2003     Years since quittin.7     Passive exposure: Never    Smokeless tobacco: Never   Substance and Sexual Activity    Alcohol use: No    Drug use: No    Sexual activity: Never     Review of Systems   Constitutional: Negative for diaphoresis, malaise/fatigue, weight gain and weight loss.   HENT:  Negative for congestion and nosebleeds.    Cardiovascular:  Positive for chest pain. Negative for claudication, cyanosis, dyspnea on exertion, irregular heartbeat, leg swelling, near-syncope, orthopnea, palpitations, paroxysmal nocturnal dyspnea and syncope.   Respiratory:  Negative for cough, hemoptysis, shortness of breath, sleep disturbances due to breathing, snoring, sputum production and wheezing.    Hematologic/Lymphatic: Negative for bleeding problem. Does not bruise/bleed easily.   Skin:  Negative for rash.   Musculoskeletal:  Negative for arthritis, back pain, falls, joint pain, muscle cramps and muscle weakness.   Gastrointestinal:  Negative for abdominal pain, constipation, diarrhea, heartburn, hematemesis, hematochezia, melena, nausea and vomiting.   Genitourinary:  Negative for dysuria, hematuria and nocturia.   Neurological:  Negative for excessive daytime sleepiness, dizziness, headaches, light-headedness, loss of balance, numbness, vertigo and weakness.     Objective:     Vital Signs (Most Recent):  Temp: 98.1 °F (36.7 °C) (24 0815)  Pulse: 84 (24 1800)  Resp: 18  (03/31/24 1800)  BP: 137/63 (03/31/24 1800)  SpO2: 97 % (03/31/24 1800) Vital Signs (24h Range):  Temp:  [98.1 °F (36.7 °C)] 98.1 °F (36.7 °C)  Pulse:  [71-94] 84  Resp:  [12-26] 18  SpO2:  [96 %-99 %] 97 %  BP: (134-216)/(60-91) 137/63     Weight: 109 kg (240 lb 4.8 oz)  Body mass index is 43.95 kg/m².    SpO2: 97 %       No intake or output data in the 24 hours ending 03/31/24 2008    Lines/Drains/Airways       Peripheral Intravenous Line  Duration                  Peripheral IV - Single Lumen 03/31/24 0900 20 G Left Antecubital <1 day                     Physical Exam  Vitals and nursing note reviewed.   Constitutional:       Appearance: Normal appearance. She is well-developed.   HENT:      Head: Normocephalic.      Mouth/Throat:      Mouth: Mucous membranes are moist.   Neck:      Vascular: No carotid bruit or JVD.   Cardiovascular:      Rate and Rhythm: Normal rate and regular rhythm.      Pulses: Normal pulses.      Heart sounds: Normal heart sounds. No murmur heard.     No friction rub.   Pulmonary:      Effort: Pulmonary effort is normal. No respiratory distress.      Breath sounds: Normal breath sounds. No wheezing or rales.   Abdominal:      General: Bowel sounds are normal. There is no distension.      Palpations: Abdomen is soft.      Tenderness: There is no abdominal tenderness. There is no guarding.   Musculoskeletal:         General: No swelling or tenderness.      Cervical back: Neck supple. No tenderness.      Right lower leg: No edema.      Left lower leg: No edema.   Skin:     General: Skin is warm and dry.      Capillary Refill: Capillary refill takes less than 2 seconds.      Findings: No rash.   Neurological:      General: No focal deficit present.      Mental Status: She is alert and oriented to person, place, and time.   Psychiatric:         Mood and Affect: Mood normal.         Behavior: Behavior normal.         Thought Content: Thought content normal.          Significant Labs: BMP:  "  Recent Labs   Lab 03/31/24  0905   *      K 4.4      CO2 21*   BUN 12   CREATININE 0.9   CALCIUM 9.4   , CMP   Recent Labs   Lab 03/31/24  0905      K 4.4      CO2 21*   *   BUN 12   CREATININE 0.9   CALCIUM 9.4   PROT 6.4   ALBUMIN 3.6   BILITOT 0.5   ALKPHOS 38*   AST 19   ALT 16   ANIONGAP 12   , CBC   Recent Labs   Lab 03/31/24  0905   WBC 5.50   HGB 11.0*   HCT 33.2*      , INR No results for input(s): "INR", "PROTIME" in the last 48 hours., Lipid Panel No results for input(s): "CHOL", "HDL", "LDLCALC", "TRIG", "CHOLHDL" in the last 48 hours., and Troponin   Recent Labs   Lab 03/31/24  0905 03/31/24  1217 03/31/24  1740   TROPONINI 0.139* 0.263* 0.413*       Significant Imaging: Echocardiogram: 2D echo with color flow doppler: No results found. However, due to the size of the patient record, not all encounters were searched. Please check Results Review for a complete set of results. and Transthoracic echo (TTE) complete ([facility] Only):   Results for orders placed or performed during the hospital encounter of 03/31/24   Echo   Result Value Ref Range    BSA 2.18 m2    LVOT stroke volume 99.65 cm3    LVIDd 4.05 3.5 - 6.0 cm    LV Systolic Volume 23.53 mL    LV Systolic Volume Index 11.4 mL/m2    LVIDs 2.55 2.1 - 4.0 cm    LV Diastolic Volume 72.07 mL    LV Diastolic Volume Index 34.82 mL/m2    IVS 1.20 (A) 0.6 - 1.1 cm    LVOT diameter 2.24 cm    LVOT area 3.9 cm2    FS 37 28 - 44 %    Left Ventricle Relative Wall Thickness 0.66 cm    Posterior Wall 1.34 (A) 0.6 - 1.1 cm    LV mass 183.44 g    LV Mass Index 89 g/m2    MV Peak E Zay 0.57 m/s    TDI LATERAL 0.07 m/s    TDI SEPTAL 0.06 m/s    E/E' ratio 8.77 m/s    MV Peak A Zay 1.08 m/s    TR Max Zay 3.14 m/s    E/A ratio 0.53     IVRT 157.94 msec    E wave deceleration time 362.80 msec    LV SEPTAL E/E' RATIO 9.50 m/s    LV LATERAL E/E' RATIO 8.14 m/s    LVOT peak zay 1.17 m/s    Left Ventricular Outflow Tract Mean " Velocity 0.84 cm/s    Left Ventricular Outflow Tract Mean Gradient 3.12 mmHg    RVDD 2.60 cm    RVOT peak VTI 20.0 cm    LA size 3.39 cm    Left Atrium Minor Axis 5.31 cm    Left Atrium Major Axis 5.51 cm    LA volume (mod) 69.12 cm3    LA Volume Index (Mod) 33.4 mL/m2    RA Major Axis 5.16 cm    AV regurgitation pressure 1/2 time [id number] ms    AR Max Zay 4.18 m/s    AV mean gradient 8 mmHg    AV peak gradient 16 mmHg    Ao peak zay 2.00 m/s    Ao VTI 39.90 cm    LVOT peak VTI 25.30 cm    AV valve area 2.50 cm²    AV Velocity Ratio 0.59     AV index (prosthetic) 0.63     DIEGO by Velocity Ratio 2.30 cm²    MV stenosis pressure 1/2 time 105.21 ms    MV valve area p 1/2 method 2.09 cm2    Triscuspid Valve Regurgitation Peak Gradient 39 mmHg    PV mean gradient 3 mmHg    PV PEAK VELOCITY 1.37 m/s    PV peak gradient 8 mmHg    Pulmonary Valve Mean Velocity 0.84 m/s    RVOT peak zay 1.28 m/s    Ao root annulus 3.40 cm    STJ 2.69 cm    Ascending aorta 3.26 cm    Mean e' 0.07 m/s    ZLVIDS -3.26     ZLVIDD -4.41     LA Volume Index 32.4 mL/m2    LA volume 67.01 cm3    LA WIDTH 4.3 cm    RA Width 3.0 cm    EF 45 %    TV resting pulmonary artery pressure 42 mmHg    RV TB RVSP 6 mmHg    Est. RA pres 3 mmHg    Narrative      Left Ventricle: The left ventricle is normal in size. Normal wall   thickness. There is concentric remodeling. Normal wall motion. There is   mildly reduced systolic function with a visually estimated ejection   fraction of 40 - 45%. Ejection fraction by visual approximation is 45%.   There is indeterminate diastolic function.    Right Ventricle: Normal right ventricular cavity size. Wall thickness   is normal. Right ventricle wall motion  is normal. Systolic function is   normal.    Aortic Valve: There is mild aortic regurgitation.    Tricuspid Valve: The tricuspid valve is structurally normal. There is   mild regurgitation.    Pulmonary Artery: The estimated pulmonary artery systolic pressure  is   42 mmHg.    IVC/SVC: Normal venous pressure at 3 mmHg.       Assessment and Plan:     * NSTEMI (non-ST elevated myocardial infarction)  Reports chest pain that has been worsening for 2-3 weeks at rest and on exertion   Chest pain reproducible, with radiation to the back   Recent stress test with inferior apical scar   EF dropped to 40- 45% with apical hypokinesis   Troponin trending up, start heparin infusion  Will try aspirin- causes palpitations  Keep NPO for possible LHC tomorrow    Mixed hyperlipidemia  Looks like patient was tried on Repatha in the past- ?Statin allergy  Did not tolerate Repatha due to headaches    Hypertensive emergency  Comes in with elevated blood pressure  Now normotensive, continue lisinopril carvedilol    Paroxysmal atrial fibrillation  Currently in sinus rhythm  Last took Eliquis last night 3/30/2024  Continue carvedilol    Controlled type 2 diabetes mellitus with diabetic polyneuropathy, with long-term current use of insulin  Management per Hospital Medicine        VTE Risk Mitigation (From admission, onward)           Ordered     heparin 25,000 units in dextrose 5% (100 units/ml) IV bolus from bag LOW INTENSITY nomogram - [facility]  As needed (PRN)        Question:  Heparin Infusion Adjustment (DO NOT MODIFY ANSWER)  Answer:  \\[email or web address]\epic\Images\Pharmacy\HeparinInfusions\heparin LOW INTENSITY nomogram for [facility] [id number]    03/31/24 2013     heparin 25,000 units in dextrose 5% (100 units/ml) IV bolus from bag LOW INTENSITY nomogram - [facility]  As needed (PRN)        Question:  Heparin Infusion Adjustment (DO NOT MODIFY ANSWER)  Answer:  \\[email or web address]\epic\Images\Pharmacy\HeparinInfusions\heparin LOW INTENSITY nomogram for [facility] [id number].pdf    03/31/24 2013     heparin 25,000 units in dextrose 5% (100 units/ml) IV bolus from bag LOW INTENSITY nomogram - [facility]  Once        Question:  Heparin Infusion Adjustment (DO NOT MODIFY ANSWER)  Answer:   \\[email or web address]\epic\Images\Pharmacy\HeparinInfusions\heparin LOW INTENSITY nomogram for [facility] [id number].pdf    03/31/24 2013     heparin 25,000 units in dextrose 5% 250 mL (100 units/mL) infusion LOW INTENSITY nomogram - [facility]  Continuous        Question:  Begin at (units/kg/hr)  Answer:  12    03/31/24 2013     Reason for No Pharmacological VTE Prophylaxis  Once        Question:  Reasons:  Answer:  Already adequately anticoagulated on oral Anticoagulants    03/31/24 1334     IP VTE HIGH RISK PATIENT  Once         03/31/24 1334     Place sequential compression device  Until discontinued         03/31/24 1334                    Thank you for your consult. I will follow-up with patient. Please contact us if you have any additional questions.    Patito Castellanos MD  Cardiology   [facility] - Emergency Dept.

## 2024-04-01 NOTE — ASSESSMENT & PLAN NOTE
Chronic,Latest blood pressure and vitals reviewed-     Temp:  [97.9 °F (36.6 °C)-98.8 °F (37.1 °C)]   Pulse:  [73-94]   Resp:  [12-26]   BP: (113-187)/(60-81)   SpO2:  [95 %-98 %] .   Home meds for hypertension were reviewed and noted below.   Hypertension Medications               benazepriL (LOTENSIN) 40 MG tablet Take 1 tablet (40 mg total) by mouth 2 (two) times daily.    carvediloL (COREG) 25 MG tablet TAKE 1 TABLET BY MOUTH TWICE DAILY    hydrALAZINE (APRESOLINE) 50 MG tablet Take 1 tablet (50 mg total) by mouth every 8 (eight) hours.            While in the hospital, will manage blood pressure as follows; Continue home antihypertensive regimen    Will utilize p.r.n. blood pressure medication only if patient's blood pressure greater than 160/100 and she develops symptoms such as worsening chest pain or shortness of breath.

## 2024-04-01 NOTE — ASSESSMENT & PLAN NOTE
Patient's FSGs are controlled on current medication regimen.  Last A1c reviewed-   Lab Results   Component Value Date    HGBA1C 7.5 (H) 02/14/2024     Most recent fingerstick glucose reviewed-   Recent Labs   Lab 03/31/24  1459 03/31/24  2201   POCTGLUCOSE 111* 193*     Current correctional scale  Medium  Maintain anti-hyperglycemic dose as follows-   Antihyperglycemics (From admission, onward)      Start     Stop Route Frequency Ordered    03/31/24 2100  insulin detemir U-100 (Levemir) pen 35 Units         -- SubQ Nightly 03/31/24 1334    03/31/24 1433  insulin aspart U-100 pen 0-10 Units         -- SubQ Before meals & nightly PRN 03/31/24 1334          Hold Oral hypoglycemics while patient is in the hospital.

## 2024-04-02 LAB
ANION GAP SERPL CALC-SCNC: 14 MMOL/L (ref 8–16)
APTT PPP: 36.9 SEC (ref 21–32)
APTT PPP: 43.7 SEC (ref 21–32)
BASOPHILS # BLD AUTO: 0.06 K/UL (ref 0–0.2)
BASOPHILS # BLD AUTO: 0.06 K/UL (ref 0–0.2)
BASOPHILS NFR BLD: 0.8 % (ref 0–1.9)
BASOPHILS NFR BLD: 0.8 % (ref 0–1.9)
BUN SERPL-MCNC: 14 MG/DL (ref 8–23)
CALCIUM SERPL-MCNC: 9.1 MG/DL (ref 8.7–10.5)
CHLORIDE SERPL-SCNC: 102 MMOL/L (ref 95–110)
CO2 SERPL-SCNC: 22 MMOL/L (ref 23–29)
CREAT SERPL-MCNC: 0.9 MG/DL (ref 0.5–1.4)
DIFFERENTIAL METHOD BLD: ABNORMAL
DIFFERENTIAL METHOD BLD: ABNORMAL
EOSINOPHIL # BLD AUTO: 0.1 K/UL (ref 0–0.5)
EOSINOPHIL # BLD AUTO: 0.1 K/UL (ref 0–0.5)
EOSINOPHIL NFR BLD: 1.5 % (ref 0–8)
EOSINOPHIL NFR BLD: 1.5 % (ref 0–8)
ERYTHROCYTE [DISTWIDTH] IN BLOOD BY AUTOMATED COUNT: 12.1 % (ref 11.5–14.5)
ERYTHROCYTE [DISTWIDTH] IN BLOOD BY AUTOMATED COUNT: 12.1 % (ref 11.5–14.5)
EST. GFR  (NO RACE VARIABLE): >60 ML/MIN/1.73 M^2
GLUCOSE SERPL-MCNC: 153 MG/DL (ref 70–110)
HCT VFR BLD AUTO: 31.5 % (ref 37–48.5)
HCT VFR BLD AUTO: 31.5 % (ref 37–48.5)
HGB BLD-MCNC: 10.2 G/DL (ref 12–16)
HGB BLD-MCNC: 10.2 G/DL (ref 12–16)
IMM GRANULOCYTES # BLD AUTO: 0.02 K/UL (ref 0–0.04)
IMM GRANULOCYTES # BLD AUTO: 0.02 K/UL (ref 0–0.04)
IMM GRANULOCYTES NFR BLD AUTO: 0.3 % (ref 0–0.5)
IMM GRANULOCYTES NFR BLD AUTO: 0.3 % (ref 0–0.5)
LYMPHOCYTES # BLD AUTO: 1.8 K/UL (ref 1–4.8)
LYMPHOCYTES # BLD AUTO: 1.8 K/UL (ref 1–4.8)
LYMPHOCYTES NFR BLD: 24.2 % (ref 18–48)
LYMPHOCYTES NFR BLD: 24.2 % (ref 18–48)
MCH RBC QN AUTO: 30.4 PG (ref 27–31)
MCH RBC QN AUTO: 30.4 PG (ref 27–31)
MCHC RBC AUTO-ENTMCNC: 32.4 G/DL (ref 32–36)
MCHC RBC AUTO-ENTMCNC: 32.4 G/DL (ref 32–36)
MCV RBC AUTO: 94 FL (ref 82–98)
MCV RBC AUTO: 94 FL (ref 82–98)
MONOCYTES # BLD AUTO: 0.8 K/UL (ref 0.3–1)
MONOCYTES # BLD AUTO: 0.8 K/UL (ref 0.3–1)
MONOCYTES NFR BLD: 10.2 % (ref 4–15)
MONOCYTES NFR BLD: 10.2 % (ref 4–15)
NEUTROPHILS # BLD AUTO: 4.7 K/UL (ref 1.8–7.7)
NEUTROPHILS # BLD AUTO: 4.7 K/UL (ref 1.8–7.7)
NEUTROPHILS NFR BLD: 63 % (ref 38–73)
NEUTROPHILS NFR BLD: 63 % (ref 38–73)
NRBC BLD-RTO: 0 /100 WBC
NRBC BLD-RTO: 0 /100 WBC
OHS QRS DURATION: 104 MS
OHS QTC CALCULATION: 536 MS
PLATELET # BLD AUTO: 237 K/UL (ref 150–450)
PLATELET # BLD AUTO: 237 K/UL (ref 150–450)
PMV BLD AUTO: 11.2 FL (ref 9.2–12.9)
PMV BLD AUTO: 11.2 FL (ref 9.2–12.9)
POCT GLUCOSE: 150 MG/DL (ref 70–110)
POCT GLUCOSE: 211 MG/DL (ref 70–110)
POCT GLUCOSE: 227 MG/DL (ref 70–110)
POTASSIUM SERPL-SCNC: 4.4 MMOL/L (ref 3.5–5.1)
RBC # BLD AUTO: 3.35 M/UL (ref 4–5.4)
RBC # BLD AUTO: 3.35 M/UL (ref 4–5.4)
SODIUM SERPL-SCNC: 138 MMOL/L (ref 136–145)
WBC # BLD AUTO: 7.45 K/UL (ref 3.9–12.7)
WBC # BLD AUTO: 7.45 K/UL (ref 3.9–12.7)

## 2024-04-02 PROCEDURE — 21400001 HC TELEMETRY ROOM

## 2024-04-02 PROCEDURE — B240ZZ3 ULTRASONOGRAPHY OF SINGLE CORONARY ARTERY, INTRAVASCULAR: ICD-10-PCS | Performed by: INTERNAL MEDICINE

## 2024-04-02 PROCEDURE — 25000003 PHARM REV CODE 250: Performed by: INTERNAL MEDICINE

## 2024-04-02 PROCEDURE — C1769 GUIDE WIRE: HCPCS | Performed by: INTERNAL MEDICINE

## 2024-04-02 PROCEDURE — 99152 MOD SED SAME PHYS/QHP 5/>YRS: CPT | Performed by: INTERNAL MEDICINE

## 2024-04-02 PROCEDURE — 25500020 PHARM REV CODE 255: Performed by: INTERNAL MEDICINE

## 2024-04-02 PROCEDURE — 25000003 PHARM REV CODE 250: Performed by: FAMILY MEDICINE

## 2024-04-02 PROCEDURE — 92978 ENDOLUMINL IVUS OCT C 1ST: CPT | Mod: LD | Performed by: INTERNAL MEDICINE

## 2024-04-02 PROCEDURE — 99900035 HC TECH TIME PER 15 MIN (STAT)

## 2024-04-02 PROCEDURE — 85730 THROMBOPLASTIN TIME PARTIAL: CPT | Mod: 91 | Performed by: INTERNAL MEDICINE

## 2024-04-02 PROCEDURE — 85730 THROMBOPLASTIN TIME PARTIAL: CPT | Performed by: INTERNAL MEDICINE

## 2024-04-02 PROCEDURE — 92978 ENDOLUMINL IVUS OCT C 1ST: CPT | Mod: 26,LD,, | Performed by: INTERNAL MEDICINE

## 2024-04-02 PROCEDURE — 93458 L HRT ARTERY/VENTRICLE ANGIO: CPT | Mod: 26,,, | Performed by: INTERNAL MEDICINE

## 2024-04-02 PROCEDURE — 25000003 PHARM REV CODE 250: Performed by: STUDENT IN AN ORGANIZED HEALTH CARE EDUCATION/TRAINING PROGRAM

## 2024-04-02 PROCEDURE — B2111ZZ FLUOROSCOPY OF MULTIPLE CORONARY ARTERIES USING LOW OSMOLAR CONTRAST: ICD-10-PCS | Performed by: INTERNAL MEDICINE

## 2024-04-02 PROCEDURE — 63600175 PHARM REV CODE 636 W HCPCS: Mod: JZ,JG | Performed by: INTERNAL MEDICINE

## 2024-04-02 PROCEDURE — 63600175 PHARM REV CODE 636 W HCPCS: Performed by: INTERNAL MEDICINE

## 2024-04-02 PROCEDURE — 99233 SBSQ HOSP IP/OBS HIGH 50: CPT | Mod: ,,, | Performed by: INTERNAL MEDICINE

## 2024-04-02 PROCEDURE — C1753 CATH, INTRAVAS ULTRASOUND: HCPCS | Performed by: INTERNAL MEDICINE

## 2024-04-02 PROCEDURE — 25000003 PHARM REV CODE 250: Performed by: NURSE PRACTITIONER

## 2024-04-02 PROCEDURE — 36415 COLL VENOUS BLD VENIPUNCTURE: CPT | Performed by: INTERNAL MEDICINE

## 2024-04-02 PROCEDURE — 85025 COMPLETE CBC W/AUTO DIFF WBC: CPT | Performed by: FAMILY MEDICINE

## 2024-04-02 PROCEDURE — 99152 MOD SED SAME PHYS/QHP 5/>YRS: CPT | Mod: ,,, | Performed by: INTERNAL MEDICINE

## 2024-04-02 PROCEDURE — C1894 INTRO/SHEATH, NON-LASER: HCPCS | Performed by: INTERNAL MEDICINE

## 2024-04-02 PROCEDURE — 80048 BASIC METABOLIC PNL TOTAL CA: CPT | Performed by: FAMILY MEDICINE

## 2024-04-02 PROCEDURE — C1887 CATHETER, GUIDING: HCPCS | Performed by: INTERNAL MEDICINE

## 2024-04-02 PROCEDURE — 99153 MOD SED SAME PHYS/QHP EA: CPT | Performed by: INTERNAL MEDICINE

## 2024-04-02 PROCEDURE — 93458 L HRT ARTERY/VENTRICLE ANGIO: CPT | Performed by: INTERNAL MEDICINE

## 2024-04-02 PROCEDURE — 36415 COLL VENOUS BLD VENIPUNCTURE: CPT | Mod: XB | Performed by: FAMILY MEDICINE

## 2024-04-02 PROCEDURE — 27000221 HC OXYGEN, UP TO 24 HOURS

## 2024-04-02 RX ORDER — NITROGLYCERIN 5 MG/ML
INJECTION, SOLUTION INTRAVENOUS
Status: DISCONTINUED | OUTPATIENT
Start: 2024-04-02 | End: 2024-04-02 | Stop reason: HOSPADM

## 2024-04-02 RX ORDER — ONDANSETRON HYDROCHLORIDE 2 MG/ML
INJECTION, SOLUTION INTRAVENOUS
Status: DISCONTINUED | OUTPATIENT
Start: 2024-04-02 | End: 2024-04-02 | Stop reason: HOSPADM

## 2024-04-02 RX ORDER — LIDOCAINE HYDROCHLORIDE 10 MG/ML
INJECTION, SOLUTION EPIDURAL; INFILTRATION; INTRACAUDAL; PERINEURAL
Status: DISCONTINUED | OUTPATIENT
Start: 2024-04-02 | End: 2024-04-02 | Stop reason: HOSPADM

## 2024-04-02 RX ORDER — ONDANSETRON 8 MG/1
8 TABLET, ORALLY DISINTEGRATING ORAL EVERY 8 HOURS PRN
Status: DISCONTINUED | OUTPATIENT
Start: 2024-04-02 | End: 2024-04-03 | Stop reason: HOSPADM

## 2024-04-02 RX ORDER — HEPARIN SODIUM 1000 [USP'U]/ML
INJECTION, SOLUTION INTRAVENOUS; SUBCUTANEOUS
Status: DISCONTINUED | OUTPATIENT
Start: 2024-04-02 | End: 2024-04-02 | Stop reason: HOSPADM

## 2024-04-02 RX ORDER — SODIUM CHLORIDE 9 MG/ML
INJECTION, SOLUTION INTRAVENOUS CONTINUOUS
Status: ACTIVE | OUTPATIENT
Start: 2024-04-02 | End: 2024-04-02

## 2024-04-02 RX ORDER — VERAPAMIL HYDROCHLORIDE 2.5 MG/ML
INJECTION, SOLUTION INTRAVENOUS
Status: DISCONTINUED | OUTPATIENT
Start: 2024-04-02 | End: 2024-04-02 | Stop reason: HOSPADM

## 2024-04-02 RX ORDER — FENTANYL CITRATE 50 UG/ML
INJECTION, SOLUTION INTRAMUSCULAR; INTRAVENOUS
Status: DISCONTINUED | OUTPATIENT
Start: 2024-04-02 | End: 2024-04-02 | Stop reason: HOSPADM

## 2024-04-02 RX ORDER — MIDAZOLAM HYDROCHLORIDE 1 MG/ML
INJECTION, SOLUTION INTRAMUSCULAR; INTRAVENOUS
Status: DISCONTINUED | OUTPATIENT
Start: 2024-04-02 | End: 2024-04-02 | Stop reason: HOSPADM

## 2024-04-02 RX ORDER — ACETAMINOPHEN 325 MG/1
650 TABLET ORAL EVERY 4 HOURS PRN
Status: DISCONTINUED | OUTPATIENT
Start: 2024-04-02 | End: 2024-04-03 | Stop reason: HOSPADM

## 2024-04-02 RX ORDER — ALPRAZOLAM 0.25 MG/1
0.25 TABLET ORAL ONCE
Status: COMPLETED | OUTPATIENT
Start: 2024-04-02 | End: 2024-04-02

## 2024-04-02 RX ADMIN — ACETAMINOPHEN 650 MG: 325 TABLET ORAL at 03:04

## 2024-04-02 RX ADMIN — INSULIN ASPART 2 UNITS: 100 INJECTION, SOLUTION INTRAVENOUS; SUBCUTANEOUS at 09:04

## 2024-04-02 RX ADMIN — OLOPATADINE HYDROCHLORIDE 1 DROP: 1 SOLUTION OPHTHALMIC at 09:04

## 2024-04-02 RX ADMIN — SODIUM CHLORIDE: 9 INJECTION, SOLUTION INTRAVENOUS at 09:04

## 2024-04-02 RX ADMIN — LISINOPRIL 40 MG: 20 TABLET ORAL at 09:04

## 2024-04-02 RX ADMIN — CARVEDILOL 25 MG: 12.5 TABLET, FILM COATED ORAL at 06:04

## 2024-04-02 RX ADMIN — ALPRAZOLAM 0.25 MG: 0.25 TABLET ORAL at 11:04

## 2024-04-02 RX ADMIN — HYPROMELLOSE 2910 2 DROP: 5 SOLUTION/ DROPS OPHTHALMIC at 09:04

## 2024-04-02 RX ADMIN — HEPARIN SODIUM 18 UNITS/KG/HR: 10000 INJECTION, SOLUTION INTRAVENOUS at 01:04

## 2024-04-02 RX ADMIN — ISOSORBIDE MONONITRATE 30 MG: 30 TABLET, EXTENDED RELEASE ORAL at 09:04

## 2024-04-02 RX ADMIN — ZOLPIDEM TARTRATE 5 MG: 5 TABLET ORAL at 09:04

## 2024-04-02 RX ADMIN — INSULIN ASPART 4 UNITS: 100 INJECTION, SOLUTION INTRAVENOUS; SUBCUTANEOUS at 11:04

## 2024-04-02 RX ADMIN — HYPROMELLOSE 2910 2 DROP: 5 SOLUTION/ DROPS OPHTHALMIC at 02:04

## 2024-04-02 RX ADMIN — SODIUM CHLORIDE: 9 INJECTION, SOLUTION INTRAVENOUS at 05:04

## 2024-04-02 RX ADMIN — CARVEDILOL 25 MG: 12.5 TABLET, FILM COATED ORAL at 05:04

## 2024-04-02 RX ADMIN — INSULIN DETEMIR 45 UNITS: 100 INJECTION, SOLUTION SUBCUTANEOUS at 09:04

## 2024-04-02 RX ADMIN — HYPROMELLOSE 2910 2 DROP: 5 SOLUTION/ DROPS OPHTHALMIC at 05:04

## 2024-04-02 RX ADMIN — ASPIRIN 81 MG CHEWABLE TABLET 81 MG: 81 TABLET CHEWABLE at 09:04

## 2024-04-02 RX ADMIN — LINACLOTIDE 72 MCG: 72 CAPSULE, GELATIN COATED ORAL at 05:04

## 2024-04-02 RX ADMIN — ACETAMINOPHEN 650 MG: 325 TABLET ORAL at 05:04

## 2024-04-02 RX ADMIN — CLOPIDOGREL BISULFATE 75 MG: 75 TABLET ORAL at 09:04

## 2024-04-02 RX ADMIN — SODIUM CHLORIDE: 9 INJECTION, SOLUTION INTRAVENOUS at 02:04

## 2024-04-02 RX ADMIN — EZETIMIBE 10 MG: 10 TABLET ORAL at 09:04

## 2024-04-02 RX ADMIN — DULOXETINE HYDROCHLORIDE 60 MG: 30 CAPSULE, DELAYED RELEASE ORAL at 09:04

## 2024-04-02 RX ADMIN — PANTOPRAZOLE SODIUM 40 MG: 40 TABLET, DELAYED RELEASE ORAL at 09:04

## 2024-04-02 RX ADMIN — AMLODIPINE BESYLATE 5 MG: 5 TABLET ORAL at 09:04

## 2024-04-02 NOTE — PROGRESS NOTES
[facility] - Telemetry ([facility])  Cardiology  Progress Note    Patient Name: Christine Jo  MRN: [id number]  Admission Date: 3/31/2024  Hospital Length of Stay: 1 days  Code Status: Full Code   Attending Physician: Rosa Angulo MD   Primary Care Physician: Jose Szymanski MD  Expected Discharge Date:   Principal Problem:NSTEMI (non-ST elevated myocardial infarction)    Subjective:     Hospital Course:   4/1/2024--Patient seen and examined in room, sitting on side of bed. Denies any recurrent chest pain symptoms. Discussed with patient given abnormal findings on ECHO, plan for [facility] today for definitive evaluation per Dr Espinoza. Labs reviewed, K+ 4.2, Cr 1.0, Troponin 0.406, H/H 11.4/33.9    4.2.2024  Status post LAD stent yesterday culprit 99% proximal lesion.    Chest pain since post PCI.  Better this morning.  However EKG shows diffuse T-wave inversion in the anterior leads.  New compared to before.              Review of Systems   Cardiovascular:  Positive for chest pain. Negative for dyspnea on exertion, palpitations and syncope.   Genitourinary: Negative.    Neurological: Negative.      Objective:     Vital Signs (Most Recent):  Temp: 97.6 °F (36.4 °C) (04/02/24 0813)  Pulse: 81 (04/02/24 0813)  Resp: 18 (04/02/24 0813)  BP: 127/60 (04/02/24 0813)  SpO2: 98 % (04/02/24 0813) Vital Signs (24h Range):  Temp:  [96.8 °F (36 °C)-98.9 °F (37.2 °C)] 97.6 °F (36.4 °C)  Pulse:  [65-86] 81  Resp:  [16-22] 18  SpO2:  [92 %-98 %] 98 %  BP: (105-197)/() 127/60     Weight: 109 kg (240 lb 4.8 oz)  Body mass index is 43.95 kg/m².     SpO2: 98 %         Intake/Output Summary (Last 24 hours) at 4/2/2024 1103  Last data filed at 4/2/2024 0629  Gross per 24 hour   Intake 1257.03 ml   Output --   Net 1257.03 ml       Lines/Drains/Airways       Peripheral Intravenous Line  Duration                  Peripheral IV - Single Lumen 03/31/24 2124 22 G Anterior;Right Forearm 1 day                       Physical Exam  Vitals  reviewed.   Constitutional:       Appearance: She is well-developed.   Neck:      Vascular: No carotid bruit.   Cardiovascular:      Rate and Rhythm: Normal rate and regular rhythm.      Pulses: Intact distal pulses.      Heart sounds: Normal heart sounds. No murmur heard.  Pulmonary:      Breath sounds: Normal breath sounds.   Neurological:      Mental Status: She is oriented to person, place, and time.            Significant Labs: Troponin   Recent Labs   Lab 03/31/24  1217 03/31/24  1740 04/01/24  0819   TROPONINI 0.263* 0.413* 0.406*   , All pertinent lab results from the last 24 hours have been reviewed., and   Recent Lab Results  (Last 5 results in the past 24 hours)        04/02/24  0934   04/02/24  0534   04/02/24  0501   04/02/24  0044   04/01/24 2025        Anion Gap     14           PTT 43.7  Comment: Refer to local heparin nomogram for intensity/dose specific   therapeutic   range.         36.9  Comment: Refer to local heparin nomogram for intensity/dose specific   therapeutic   range.           Baso #     0.06                0.06           Basophil %     0.8                0.8           BUN     14           Calcium     9.1           Chloride     102           CO2     22           Creatinine     0.9           Differential Method     Automated                Automated           eGFR     >60           Eos #     0.1                0.1           Eos %     1.5                1.5           Glucose     153           Gran # (ANC)     4.7                4.7           Gran %     63.0                63.0           Hematocrit     31.5                31.5           Hemoglobin     10.2                10.2           Immature Grans (Abs)     0.02  Comment: Mild elevation in immature granulocytes is non specific and   can be seen in a variety of conditions including stress response,   acute inflammation, trauma and pregnancy. Correlation with other   laboratory and clinical findings is essential.                   0.02  Comment: Mild elevation in immature granulocytes is non specific and   can be seen in a variety of conditions including stress response,   acute inflammation, trauma and pregnancy. Correlation with other   laboratory and clinical findings is essential.             Immature Granulocytes     0.3                0.3           INR               Lymph #     1.8                1.8           Lymph %     24.2                24.2           MCH     30.4                30.4           MCHC     32.4                32.4           MCV     94                94           Mono #     0.8                0.8           Mono %     10.2                10.2           MPV     11.2                11.2           nRBC     0                0           Platelet Count     237                237           POCT Glucose   150       207       Potassium     4.4           PT               RBC     3.35                3.35           RDW     12.1                12.1           Sodium     138           WBC     7.45                7.45                                  Significant Imaging:   Results for orders placed during the hospital encounter of 03/31/24    Cardiac catheterization    Conclusion    The Prox LAD lesion was 99% stenosed with 0% stenosis post-intervention.    The ejection fraction was greater than 55% by visual estimate.    The pre-procedure left ventricular end diastolic pressure was 19.    The post-procedure left ventricular end diastolic pressure was 19.    Prox LAD lesion: A STENT SYNERGY XD 3.0X20MM stent was successfully placed at 11 ROSALBA for 20 sec. Post dilated with 3.5 mm NC balloon    The estimated blood loss was none.    There was single vessel coronary artery disease.    This was a successful PCI for acute myocardial infarction.    The filling pressures on the left were mildly elevated.    There was no aortic valve stenosis.    The procedure log was documented by Documenter: Mandy Clark RN and verified by Shree Espinoza  MD.    Date: 4/1/2024  Time: 2:16 PM    Assessment and Plan:         * NSTEMI (non-ST elevated myocardial infarction)  Reports chest pain that has been worsening for 2-3 weeks at rest and on exertion   Chest pain reproducible, with radiation to the back   Recent stress test with inferior apical scar   EF dropped to 40- 45% with apical hypokinesis   Troponin trending up, start heparin infusion  Will try aspirin- causes palpitations  Keep NPO for possible LHC tomorrow    4/1/2024  -Eliquis on hold currently  -Continue ASA Coreg ACEi  -Statin intolerant  -Plan for LHC today per Dr Espinoza  -Risks benefits and treatment alternatives reviewed  -She agrees to proceed with LHC today   -Consent signed and appropriately witnessed  -Further recs to follow    4.2.2024  Status post PCI to LAD yesterday.    On and off chest pain.  EKG changes with new T-wave inversions.    We will take a relook today.    Agreeable with repeat heart catheterization.      Mixed hyperlipidemia  Looks like patient was tried on Repatha in the past- ?Statin allergy  Did not tolerate Repatha due to headaches    Hypertensive emergency  Comes in with elevated blood pressure  Now normotensive, continue lisinopril carvedilol    Paroxysmal atrial fibrillation  Currently in sinus rhythm  Last took Eliquis last night 3/30/2024  Continue carvedilol, ASA      Class 3 severe obesity due to excess calories with serious comorbidity and body mass index (BMI) of 45.0 to 49.9 in adult  Encourage formal weight loss program    Controlled type 2 diabetes mellitus with diabetic polyneuropathy, with long-term current use of insulin  Management per Hospital Medicine        VTE Risk Mitigation (From admission, onward)           Ordered     heparin 25,000 units in dextrose 5% (100 units/ml) IV bolus from bag LOW INTENSITY nomogram - [facility]  As needed (PRN)        Question:  Heparin Infusion Adjustment (DO NOT MODIFY ANSWER)  Answer:   [email or web address]\epic\Images\Pharmacy\HeparinInfusions\heparin LOW INTENSITY nomogram for [facility] [id number]    04/01/24 1440     heparin 25,000 units in dextrose 5% (100 units/ml) IV bolus from bag LOW INTENSITY nomogram - [facility]  As needed (PRN)        Question:  Heparin Infusion Adjustment (DO NOT MODIFY ANSWER)  Answer:  \\[email or web address]\epic\Images\Pharmacy\HeparinInfusions\heparin LOW INTENSITY nomogram for [facility] [id number]    04/01/24 1440     heparin 25,000 units in dextrose 5% 250 mL (100 units/mL) infusion LOW INTENSITY nomogram - [facility]  Continuous        Question:  Begin at (units/kg/hr)  Answer:  12    04/01/24 1440     Reason for No Pharmacological VTE Prophylaxis  Once        Question:  Reasons:  Answer:  Already adequately anticoagulated on oral Anticoagulants    03/31/24 1334     IP VTE HIGH RISK PATIENT  Once         03/31/24 1334     Place sequential compression device  Until discontinued         03/31/24 1334                    Shree Espinoza MD  Cardiology  [facility] - Telemetry ([facility])

## 2024-04-02 NOTE — ASSESSMENT & PLAN NOTE
-LHC with proximal LAD 99%,  s/p PCI  -echo with decreased EF   -cardiology following  -plan re-look today as chest pain and T wave inversion on EKG

## 2024-04-02 NOTE — SUBJECTIVE & OBJECTIVE
"Interval History:  f/u chest pain/CAD patient reported chest pain occurred since stent placed. Patient nervous about repeat heart cath. States she was nauseated afterwards and "out of it"    Review of Systems  Objective:     Vital Signs (Most Recent):  Temp: 97.6 °F (36.4 °C) (04/02/24 0813)  Pulse: 81 (04/02/24 0813)  Resp: 18 (04/02/24 0813)  BP: 127/60 (04/02/24 0813)  SpO2: 98 % (04/02/24 0813) Vital Signs (24h Range):  Temp:  [96.8 °F (36 °C)-98.9 °F (37.2 °C)] 97.6 °F (36.4 °C)  Pulse:  [65-86] 81  Resp:  [16-22] 18  SpO2:  [92 %-98 %] 98 %  BP: (105-197)/() 127/60     Weight: 109 kg (240 lb 4.8 oz)  Body mass index is 43.95 kg/m².    Intake/Output Summary (Last 24 hours) at 4/2/2024 1108  Last data filed at 4/2/2024 0629  Gross per 24 hour   Intake 1257.03 ml   Output --   Net 1257.03 ml         Physical Exam  HENT:      Head: Normocephalic and atraumatic.   Cardiovascular:      Rate and Rhythm: Normal rate and regular rhythm.      Heart sounds: No murmur heard.  Pulmonary:      Effort: Pulmonary effort is normal. No respiratory distress.      Breath sounds: Normal breath sounds. No wheezing.   Abdominal:      General: Bowel sounds are normal. There is no distension.      Palpations: Abdomen is soft.      Tenderness: There is no abdominal tenderness.   Musculoskeletal:         General: No swelling.   Skin:     General: Skin is warm and dry.   Neurological:      Mental Status: She is alert and oriented to person, place, and time. Mental status is at baseline.             Significant Labs: All pertinent labs within the past 24 hours have been reviewed.  CBC:   Recent Labs   Lab 04/01/24  0401 04/01/24  1754 04/02/24  0501   WBC 5.68 7.23 7.45  7.45   HGB 11.4* 11.6* 10.2*  10.2*   HCT 33.9* 34.3* 31.5*  31.5*    233 237  237     CMP:   Recent Labs   Lab 04/01/24  0401 04/02/24  0501    138   K 4.2 4.4    102   CO2 23 22*   * 153*   BUN 13 14   CREATININE 1.0 0.9   CALCIUM 9.4 " 9.1   ANIONGAP 12 14       Significant Imaging: I have reviewed all pertinent imaging results/findings within the past 24 hours.

## 2024-04-02 NOTE — HOSPITAL COURSE
The patient presented with chest pain and shortness of breath. Cardiology was consulted. Echo revealed decrease in EF and troponin was elevated. She was placed on heparin gtt. Blood pressure improved with Imdur and coreg. Patient underwent LHC 4/1 and proximal LAD 99% occluded. Stent placed. Patient reported chest pain night of 4/1 and morning 4/2. Patient returned to cath lab for repeat heart cath on 4/2. Patent stent in LAD, noted. Medications adjusted. Patient had no further chest pain. Cardiology cleared patient for discharge. Apsirin, Plavix, statin and Zetia prescribed. Outpatient follow-up arranged prior to discharge. Patient seen and examined, stable for discharge.

## 2024-04-02 NOTE — INTERVAL H&P NOTE
The patient has been examined and the H&P has been reviewed:    I concur with the findings and changes have been noted since the H&P was written: s/p pci yesterday, new EKG changes, chest pain intermittent responding to nitro    Anesthesia/Surgery risks, benefits and alternative options discussed and understood by patient/family.          Active Hospital Problems    Diagnosis  POA    *NSTEMI (non-ST elevated myocardial infarction) [I21.4]  Yes    Mixed hyperlipidemia [E78.2]  Yes    Hypertensive emergency [I16.1]  Yes    Controlled type 2 diabetes mellitus with diabetic polyneuropathy, with long-term current use of insulin [E11.42, Z79.4]  Not Applicable    Paroxysmal atrial fibrillation [I48.0]  Yes    Class 3 severe obesity due to excess calories with serious comorbidity and body mass index (BMI) of 45.0 to 49.9 in adult [E66.01, Z68.42]  Not Applicable      Resolved Hospital Problems   No resolved problems to display.

## 2024-04-02 NOTE — ASSESSMENT & PLAN NOTE
Reports chest pain that has been worsening for 2-3 weeks at rest and on exertion   Chest pain reproducible, with radiation to the back   Recent stress test with inferior apical scar   EF dropped to 40- 45% with apical hypokinesis   Troponin trending up, start heparin infusion  Will try aspirin- causes palpitations  Keep NPO for possible LHC tomorrow    4/1/2024  -Eliquis on hold currently  -Continue ASA Coreg ACEi  -Statin intolerant  -Plan for LHC today per Dr Espinoza  -Risks benefits and treatment alternatives reviewed  -She agrees to proceed with LHC today   -Consent signed and appropriately witnessed  -Further recs to follow    4.2.2024  Status post PCI to LAD yesterday.    On and off chest pain.  EKG changes with new T-wave inversions.    We will take a relook today.    Agreeable with repeat heart catheterization.

## 2024-04-02 NOTE — ASSESSMENT & PLAN NOTE
Chronic,Latest blood pressure and vitals reviewed-     Temp:  [96.8 °F (36 °C)-98.9 °F (37.2 °C)]   Pulse:  [65-86]   Resp:  [16-22]   BP: (105-197)/()   SpO2:  [92 %-98 %] .   Home meds for hypertension were reviewed and noted below.   Hypertension Medications               benazepriL (LOTENSIN) 40 MG tablet Take 1 tablet (40 mg total) by mouth 2 (two) times daily.    carvediloL (COREG) 25 MG tablet TAKE 1 TABLET BY MOUTH TWICE DAILY    hydrALAZINE (APRESOLINE) 50 MG tablet Take 1 tablet (50 mg total) by mouth every 8 (eight) hours.            While in the hospital, will manage blood pressure as follows; Continue home antihypertensive regimen    Will utilize p.r.n. blood pressure medication only if patient's blood pressure greater than 160/100 and she develops symptoms such as worsening chest pain or shortness of breath.

## 2024-04-02 NOTE — SUBJECTIVE & OBJECTIVE
Review of Systems   Cardiovascular:  Positive for chest pain. Negative for dyspnea on exertion, palpitations and syncope.   Genitourinary: Negative.    Neurological: Negative.      Objective:     Vital Signs (Most Recent):  Temp: 97.6 °F (36.4 °C) (04/02/24 0813)  Pulse: 81 (04/02/24 0813)  Resp: 18 (04/02/24 0813)  BP: 127/60 (04/02/24 0813)  SpO2: 98 % (04/02/24 0813) Vital Signs (24h Range):  Temp:  [96.8 °F (36 °C)-98.9 °F (37.2 °C)] 97.6 °F (36.4 °C)  Pulse:  [65-86] 81  Resp:  [16-22] 18  SpO2:  [92 %-98 %] 98 %  BP: (105-197)/() 127/60     Weight: 109 kg (240 lb 4.8 oz)  Body mass index is 43.95 kg/m².     SpO2: 98 %         Intake/Output Summary (Last 24 hours) at 4/2/2024 1103  Last data filed at 4/2/2024 0629  Gross per 24 hour   Intake 1257.03 ml   Output --   Net 1257.03 ml       Lines/Drains/Airways       Peripheral Intravenous Line  Duration                  Peripheral IV - Single Lumen 03/31/24 2124 22 G Anterior;Right Forearm 1 day                       Physical Exam  Vitals reviewed.   Constitutional:       Appearance: She is well-developed.   Neck:      Vascular: No carotid bruit.   Cardiovascular:      Rate and Rhythm: Normal rate and regular rhythm.      Pulses: Intact distal pulses.      Heart sounds: Normal heart sounds. No murmur heard.  Pulmonary:      Breath sounds: Normal breath sounds.   Neurological:      Mental Status: She is oriented to person, place, and time.            Significant Labs: Troponin   Recent Labs   Lab 03/31/24  1217 03/31/24  1740 04/01/24  0819   TROPONINI 0.263* 0.413* 0.406*   , All pertinent lab results from the last 24 hours have been reviewed., and   Recent Lab Results  (Last 5 results in the past 24 hours)        04/02/24  0934   04/02/24  0534   04/02/24  0501   04/02/24  0044   04/01/24 2025        Anion Gap     14           PTT 43.7  Comment: Refer to local heparin nomogram for intensity/dose specific   therapeutic   range.         36.9  Comment:  Refer to local heparin nomogram for intensity/dose specific   therapeutic   range.           Baso #     0.06                0.06           Basophil %     0.8                0.8           BUN     14           Calcium     9.1           Chloride     102           CO2     22           Creatinine     0.9           Differential Method     Automated                Automated           eGFR     >60           Eos #     0.1                0.1           Eos %     1.5                1.5           Glucose     153           Gran # (ANC)     4.7                4.7           Gran %     63.0                63.0           Hematocrit     31.5                31.5           Hemoglobin     10.2                10.2           Immature Grans (Abs)     0.02  Comment: Mild elevation in immature granulocytes is non specific and   can be seen in a variety of conditions including stress response,   acute inflammation, trauma and pregnancy. Correlation with other   laboratory and clinical findings is essential.                  0.02  Comment: Mild elevation in immature granulocytes is non specific and   can be seen in a variety of conditions including stress response,   acute inflammation, trauma and pregnancy. Correlation with other   laboratory and clinical findings is essential.             Immature Granulocytes     0.3                0.3           INR               Lymph #     1.8                1.8           Lymph %     24.2                24.2           MCH     30.4                30.4           MCHC     32.4                32.4           MCV     94                94           Mono #     0.8                0.8           Mono %     10.2                10.2           MPV     11.2                11.2           nRBC     0                0           Platelet Count     237                237           POCT Glucose   150       207       Potassium     4.4           PT               RBC     3.35                3.35           RDW     12.1                 12.1           Sodium     138           WBC     7.45                7.45                                  Significant Imaging:   Results for orders placed during the hospital encounter of 03/31/24    Cardiac catheterization    Conclusion    The Prox LAD lesion was 99% stenosed with 0% stenosis post-intervention.    The ejection fraction was greater than 55% by visual estimate.    The pre-procedure left ventricular end diastolic pressure was 19.    The post-procedure left ventricular end diastolic pressure was 19.    Prox LAD lesion: A STENT SYNERGY XD 3.0X20MM stent was successfully placed at 11 ROSALBA for 20 sec. Post dilated with 3.5 mm NC balloon    The estimated blood loss was none.    There was single vessel coronary artery disease.    This was a successful PCI for acute myocardial infarction.    The filling pressures on the left were mildly elevated.    There was no aortic valve stenosis.    The procedure log was documented by Documenter: Mandy Clark RN and verified by Shree Espinoza MD.    Date: 4/1/2024  Time: 2:16 PM

## 2024-04-02 NOTE — PROGRESS NOTES
[facility] - Telemetry ([facility])  [facility] Medicine  Progress Note    Patient Name: Christine Jo  MRN: [id number]  Patient Class: IP- Inpatient   Admission Date: 3/31/2024  Length of Stay: 1 days  Attending Physician: Rosa Angulo MD  Primary Care Provider: Jose Szymanski MD        Subjective:     Principal Problem:NSTEMI (non-ST elevated myocardial infarction)        HPI:  Ms. Jo is a 74-year-old female with past medical history of diabetes, hypertension, CAD, arthritis, migraine, depression/anxiety, and AFib presented with shortness of breath and chest pain.  Patient denies any history of congestive heart failure but was noted to elevated blood pressure of 216/91 on admission and a troponin of 0.139 which increased to 0.263.  Of note, patient sleeps on 2 pillows at night, she has a former smoker but quit 21 years ago.  Cardiology was consulted and recommended admission for observation with echo and repeated troponins.  At the time of my evaluation patient stated her chest pain was much better but still had some heaviness.  Blood pressure was better controlled with hydralazine.  Patient states she has been compliant with her medications but has not been watching her diet very closely.  Plan of care was discussed with patient, son, and daughter at bedside.  Hospital medicine will admit for observation, echo, blood pressure control, and cardiac workup.    Overview/Hospital Course:  The patient presented with chest pain and shortness of breath. Cardiology was consulted. Echo revealed decrease in EF and troponin was elevated. She was placed on heparin gtt. Blood pressure improved with Imdur and coreg. Patient underwent LHC 4/1 and proximal LAD 99% occluded. Stent placed. Patient reported chest pain night of 4/1 and morning 4/2. Cardiology plans return to cath lab for repeat heart cath.     Interval History:  f/u chest pain/CAD patient reported chest pain occurred since stent placed. Patient nervous about  "repeat heart cath. States she was nauseated afterwards and "out of it"    Review of Systems  Objective:     Vital Signs (Most Recent):  Temp: 97.6 °F (36.4 °C) (04/02/24 0813)  Pulse: 81 (04/02/24 0813)  Resp: 18 (04/02/24 0813)  BP: 127/60 (04/02/24 0813)  SpO2: 98 % (04/02/24 0813) Vital Signs (24h Range):  Temp:  [96.8 °F (36 °C)-98.9 °F (37.2 °C)] 97.6 °F (36.4 °C)  Pulse:  [65-86] 81  Resp:  [16-22] 18  SpO2:  [92 %-98 %] 98 %  BP: (105-197)/() 127/60     Weight: 109 kg (240 lb 4.8 oz)  Body mass index is 43.95 kg/m².    Intake/Output Summary (Last 24 hours) at 4/2/2024 1108  Last data filed at 4/2/2024 0629  Gross per 24 hour   Intake 1257.03 ml   Output --   Net 1257.03 ml         Physical Exam  HENT:      Head: Normocephalic and atraumatic.   Cardiovascular:      Rate and Rhythm: Normal rate and regular rhythm.      Heart sounds: No murmur heard.  Pulmonary:      Effort: Pulmonary effort is normal. No respiratory distress.      Breath sounds: Normal breath sounds. No wheezing.   Abdominal:      General: Bowel sounds are normal. There is no distension.      Palpations: Abdomen is soft.      Tenderness: There is no abdominal tenderness.   Musculoskeletal:         General: No swelling.   Skin:     General: Skin is warm and dry.   Neurological:      Mental Status: She is alert and oriented to person, place, and time. Mental status is at baseline.             Significant Labs: All pertinent labs within the past 24 hours have been reviewed.  CBC:   Recent Labs   Lab 04/01/24  0401 04/01/24  1754 04/02/24  0501   WBC 5.68 7.23 7.45  7.45   HGB 11.4* 11.6* 10.2*  10.2*   HCT 33.9* 34.3* 31.5*  31.5*    233 237  237     CMP:   Recent Labs   Lab 04/01/24  0401 04/02/24  0501    138   K 4.2 4.4    102   CO2 23 22*   * 153*   BUN 13 14   CREATININE 1.0 0.9   CALCIUM 9.4 9.1   ANIONGAP 12 14       Significant Imaging: I have reviewed all pertinent imaging results/findings within the " past 24 hours.    Assessment/Plan:      * NSTEMI (non-ST elevated myocardial infarction)  -[facility] with proximal LAD 99%,  s/p PCI  -echo with decreased EF   -cardiology following  -plan re-look today as chest pain and T wave inversion on EKG      Mixed hyperlipidemia  -intolerant of statins and reports reaction to Repatha  Cardiology plans trial of Zetia      Hypertensive emergency  Chronic,Latest blood pressure and vitals reviewed-     Temp:  [96.8 °F (36 °C)-98.9 °F (37.2 °C)]   Pulse:  [65-86]   Resp:  [16-22]   BP: (105-197)/()   SpO2:  [92 %-98 %] .   Home meds for hypertension were reviewed and noted below.   Hypertension Medications               benazepriL (LOTENSIN) 40 MG tablet Take 1 tablet (40 mg total) by mouth 2 (two) times daily.    carvediloL (COREG) 25 MG tablet TAKE 1 TABLET BY MOUTH TWICE DAILY    hydrALAZINE (APRESOLINE) 50 MG tablet Take 1 tablet (50 mg total) by mouth every 8 (eight) hours.            While in the hospital, will manage blood pressure as follows; Continue home antihypertensive regimen    Will utilize p.r.n. blood pressure medication only if patient's blood pressure greater than 160/100 and she develops symptoms such as worsening chest pain or shortness of breath.    Paroxysmal atrial fibrillation  Patient with Paroxysmal (<7 days) atrial fibrillation which is controlled currently with Beta Blocker. Patient is currently in sinus rhythm.[id number] Score: 4.  Anticoagulation indicated. Anticoagulation done with Eliquis .    Controlled type 2 diabetes mellitus with diabetic polyneuropathy, with long-term current use of insulin  Patient's FSGs are uncontrolled due to hyperglycemia on current medication regimen.  Last A1c reviewed-   Lab Results   Component Value Date    HGBA1C 7.5 (H) 02/14/2024     Most recent fingerstick glucose reviewed-   Recent Labs   Lab 04/01/24  1752 04/01/24 2025 04/02/24  0534 04/02/24  1106   POCTGLUCOSE 235* 207* 150* 211*     Current correctional  scale  Medium  Increase anti-hyperglycemic dose as follows-   Antihyperglycemics (From admission, onward)      Start     Stop Route Frequency Ordered    04/02/24 2100  insulin detemir U-100 (Levemir) pen 45 Units         -- SubQ Nightly 04/02/24 1121    03/31/24 1433  insulin aspart U-100 pen 0-10 Units         -- SubQ Before meals & nightly PRN 03/31/24 1334          Hold Oral hypoglycemics while patient is in the hospital.          VTE Risk Mitigation (From admission, onward)           Ordered     heparin 25,000 units in dextrose 5% (100 units/ml) IV bolus from bag LOW INTENSITY nomogram - [facility]  As needed (PRN)        Question:  Heparin Infusion Adjustment (DO NOT MODIFY ANSWER)  Answer:  \\[facility]\epic\Images\Pharmacy\HeparinInfusions\heparin LOW INTENSITY nomogram for [facility] [id number]    04/01/24 1440     heparin 25,000 units in dextrose 5% (100 units/ml) IV bolus from bag LOW INTENSITY nomogram - [facility]  As needed (PRN)        Question:  Heparin Infusion Adjustment (DO NOT MODIFY ANSWER)  Answer:  \[employer]\epic\Images\Pharmacy\HeparinInfusions\heparin LOW INTENSITY nomogram for [facility] [id number]    04/01/24 1440     heparin 25,000 units in dextrose 5% 250 mL (100 units/mL) infusion LOW INTENSITY nomogram - [facility]  Continuous        Question:  Begin at (units/kg/hr)  Answer:  12    04/01/24 1440     Reason for No Pharmacological VTE Prophylaxis  Once        Question:  Reasons:  Answer:  Already adequately anticoagulated on oral Anticoagulants    03/31/24 1334     IP VTE HIGH RISK PATIENT  Once         03/31/24 1334     Place sequential compression device  Until discontinued         03/31/24 1334                    Discharge Planning   RON:      Code Status: Full Code   Is the patient medically ready for discharge?:     Reason for patient still in hospital (select all that apply): Patient trending condition, Treatment, and Consult recommendations  Discharge Plan A: Home Health                  Rosa Angulo  MD  Department of Hospital Medicine   [facility] - Telemetry ([facility])

## 2024-04-02 NOTE — PLAN OF CARE
A207/A207 PASHAHima Jo is a 74 y.o.female admitted on 3/31/2024 for NSTEMI (non-ST elevated myocardial infarction)   Code Status: Full Code MRN: [id number]   Review of patient's allergies indicates:   Allergen Reactions    Repatha pushtronex [evolocumab]      headache    Aspirin Palpitations     Past Medical History:   Diagnosis Date    Arthritis     Cataract     Coronary artery disease     Diabetes mellitus 2008     am 01/15/2018 Insulin x 1 year    Diabetes mellitus, type 2     DM (diabetes mellitus) 2008     am 02/14/2020 Insulin x 4 years    Encounter for blood transfusion     Glaucoma     Hypertension     Insomnia     Macular degeneration     Old MI (myocardial infarction) 2/12/2024    Vaginal yeast infection       PRN meds    acetaminophen, 650 mg, Q4H PRN  acetaminophen, 650 mg, Q4H PRN  artificial tears, 2 drop, QID PRN  dextrose 10%, 12.5 g, PRN  dextrose 10%, 25 g, PRN  glucagon (human recombinant), 1 mg, PRN  glucose, 16 g, PRN  glucose, 24 g, PRN  heparin (PORCINE), 60 Units/kg (Adjusted), PRN  heparin (PORCINE), 30 Units/kg (Adjusted), PRN  HYDROcodone-acetaminophen, 1 tablet, Q6H PRN  insulin aspart U-100, 0-10 Units, QID (AC + HS) PRN  meclizine, 25 mg, TID PRN  naloxone, 0.02 mg, PRN  nitroGLYCERIN, 0.4 mg, Q5 Min PRN  ondansetron, 8 mg, Q8H PRN  sodium chloride 0.9%, 10 mL, Q12H PRN  zolpidem, 5 mg, Nightly PRN      Chart check completed. Will continue plan of care.      Orientation: oriented x 4  Lam Coma Scale Score: 15     Lead Monitored: Lead II Rhythm: normal sinus rhythm    Cardiac/Telemetry Box Number: [id number]  VTE Required Core Measure: Pharmacological prophylaxis initiated/maintained Last Bowel Movement: 03/30/24  Diet NPO Except for: Medication     Jason Score: 20  Fall Risk Score: 13  Accucheck []   Freq?      Lines/Drains/Airways       Peripheral Intravenous Line  Duration                  Peripheral IV - Single Lumen 03/31/24 2122 22 G Anterior;Right Forearm 1 day                        Problem: Adult Inpatient Plan of Care  Goal: Plan of Care Review  Outcome: Ongoing, Progressing  Goal: Patient-Specific Goal (Individualized)  Outcome: Ongoing, Progressing  Goal: Absence of Hospital-Acquired Illness or Injury  Outcome: Ongoing, Progressing  Goal: Optimal Comfort and Wellbeing  Outcome: Ongoing, Progressing  Goal: Readiness for Transition of Care  Outcome: Ongoing, Progressing     Problem: Bariatric Environmental Safety  Goal: Safety Maintained with Care  Outcome: Ongoing, Progressing     Problem: Diabetes Comorbidity  Goal: Blood Glucose Level Within Targeted Range  Outcome: Ongoing, Progressing     Problem: Fall Injury Risk  Goal: Absence of Fall and Fall-Related Injury  Outcome: Ongoing, Progressing

## 2024-04-02 NOTE — ASSESSMENT & PLAN NOTE
Patient with Paroxysmal (<7 days) atrial fibrillation which is controlled currently with Beta Blocker. Patient is currently in sinus rhythm.[id number] Score: 4.  Anticoagulation indicated. Anticoagulation done with Eliquis .

## 2024-04-02 NOTE — ASSESSMENT & PLAN NOTE
Patient's FSGs are uncontrolled due to hyperglycemia on current medication regimen.  Last A1c reviewed-   Lab Results   Component Value Date    HGBA1C 7.5 (H) 02/14/2024     Most recent fingerstick glucose reviewed-   Recent Labs   Lab 04/01/24  1752 04/01/24 2025 04/02/24  0534 04/02/24  1106   POCTGLUCOSE 235* 207* 150* 211*     Current correctional scale  Medium  Increase anti-hyperglycemic dose as follows-   Antihyperglycemics (From admission, onward)      Start     Stop Route Frequency Ordered    04/02/24 2100  insulin detemir U-100 (Levemir) pen 45 Units         -- SubQ Nightly 04/02/24 1121    03/31/24 1433  insulin aspart U-100 pen 0-10 Units         -- SubQ Before meals & nightly PRN 03/31/24 1334          Hold Oral hypoglycemics while patient is in the hospital.

## 2024-04-03 ENCOUNTER — PATIENT MESSAGE (OUTPATIENT)
Dept: PULMONOLOGY | Facility: CLINIC | Age: 74
End: 2024-04-03
Payer: MEDICARE

## 2024-04-03 ENCOUNTER — TELEPHONE (OUTPATIENT)
Dept: CARDIOLOGY | Facility: HOSPITAL | Age: 74
End: 2024-04-03
Payer: MEDICARE

## 2024-04-03 VITALS
RESPIRATION RATE: 16 BRPM | HEIGHT: 62 IN | OXYGEN SATURATION: 93 % | HEART RATE: 86 BPM | WEIGHT: 240.31 LBS | DIASTOLIC BLOOD PRESSURE: 68 MMHG | SYSTOLIC BLOOD PRESSURE: 142 MMHG | TEMPERATURE: 98 F | BODY MASS INDEX: 44.22 KG/M2

## 2024-04-03 LAB
ANION GAP SERPL CALC-SCNC: 12 MMOL/L (ref 8–16)
BASOPHILS # BLD AUTO: 0.06 K/UL (ref 0–0.2)
BASOPHILS NFR BLD: 1 % (ref 0–1.9)
BUN SERPL-MCNC: 11 MG/DL (ref 8–23)
CALCIUM SERPL-MCNC: 9 MG/DL (ref 8.7–10.5)
CHLORIDE SERPL-SCNC: 104 MMOL/L (ref 95–110)
CO2 SERPL-SCNC: 21 MMOL/L (ref 23–29)
CREAT SERPL-MCNC: 0.8 MG/DL (ref 0.5–1.4)
DIFFERENTIAL METHOD BLD: ABNORMAL
EOSINOPHIL # BLD AUTO: 0.1 K/UL (ref 0–0.5)
EOSINOPHIL NFR BLD: 2 % (ref 0–8)
ERYTHROCYTE [DISTWIDTH] IN BLOOD BY AUTOMATED COUNT: 12.1 % (ref 11.5–14.5)
EST. GFR  (NO RACE VARIABLE): >60 ML/MIN/1.73 M^2
GLUCOSE SERPL-MCNC: 170 MG/DL (ref 70–110)
HCT VFR BLD AUTO: 30.4 % (ref 37–48.5)
HGB BLD-MCNC: 10 G/DL (ref 12–16)
IMM GRANULOCYTES # BLD AUTO: 0.03 K/UL (ref 0–0.04)
IMM GRANULOCYTES NFR BLD AUTO: 0.5 % (ref 0–0.5)
LYMPHOCYTES # BLD AUTO: 1.2 K/UL (ref 1–4.8)
LYMPHOCYTES NFR BLD: 19.2 % (ref 18–48)
MCH RBC QN AUTO: 30.8 PG (ref 27–31)
MCHC RBC AUTO-ENTMCNC: 32.9 G/DL (ref 32–36)
MCV RBC AUTO: 94 FL (ref 82–98)
MONOCYTES # BLD AUTO: 0.7 K/UL (ref 0.3–1)
MONOCYTES NFR BLD: 10.9 % (ref 4–15)
NEUTROPHILS # BLD AUTO: 4 K/UL (ref 1.8–7.7)
NEUTROPHILS NFR BLD: 66.4 % (ref 38–73)
NRBC BLD-RTO: 0 /100 WBC
PLATELET # BLD AUTO: 197 K/UL (ref 150–450)
PMV BLD AUTO: 11.1 FL (ref 9.2–12.9)
POCT GLUCOSE: 170 MG/DL (ref 70–110)
POCT GLUCOSE: 188 MG/DL (ref 70–110)
POTASSIUM SERPL-SCNC: 4.1 MMOL/L (ref 3.5–5.1)
RBC # BLD AUTO: 3.25 M/UL (ref 4–5.4)
SODIUM SERPL-SCNC: 137 MMOL/L (ref 136–145)
WBC # BLD AUTO: 6.03 K/UL (ref 3.9–12.7)

## 2024-04-03 PROCEDURE — 85025 COMPLETE CBC W/AUTO DIFF WBC: CPT | Performed by: FAMILY MEDICINE

## 2024-04-03 PROCEDURE — 99900035 HC TECH TIME PER 15 MIN (STAT)

## 2024-04-03 PROCEDURE — 25000003 PHARM REV CODE 250: Performed by: STUDENT IN AN ORGANIZED HEALTH CARE EDUCATION/TRAINING PROGRAM

## 2024-04-03 PROCEDURE — 25000003 PHARM REV CODE 250: Performed by: INTERNAL MEDICINE

## 2024-04-03 PROCEDURE — 99233 SBSQ HOSP IP/OBS HIGH 50: CPT | Mod: ,,, | Performed by: INTERNAL MEDICINE

## 2024-04-03 PROCEDURE — 80048 BASIC METABOLIC PNL TOTAL CA: CPT | Performed by: FAMILY MEDICINE

## 2024-04-03 PROCEDURE — 36415 COLL VENOUS BLD VENIPUNCTURE: CPT | Performed by: FAMILY MEDICINE

## 2024-04-03 PROCEDURE — 94761 N-INVAS EAR/PLS OXIMETRY MLT: CPT

## 2024-04-03 PROCEDURE — 27000221 HC OXYGEN, UP TO 24 HOURS

## 2024-04-03 PROCEDURE — 25000003 PHARM REV CODE 250: Performed by: FAMILY MEDICINE

## 2024-04-03 PROCEDURE — 63600175 PHARM REV CODE 636 W HCPCS: Performed by: FAMILY MEDICINE

## 2024-04-03 RX ORDER — NITROGLYCERIN 0.4 MG/1
0.4 TABLET SUBLINGUAL EVERY 5 MIN PRN
Qty: 25 TABLET | Refills: 0 | Status: SHIPPED | OUTPATIENT
Start: 2024-04-03 | End: 2024-04-22 | Stop reason: SDUPTHER

## 2024-04-03 RX ORDER — CLOPIDOGREL BISULFATE 75 MG/1
75 TABLET ORAL DAILY
Qty: 30 TABLET | Refills: 1 | Status: SHIPPED | OUTPATIENT
Start: 2024-04-04 | End: 2024-04-22 | Stop reason: SDUPTHER

## 2024-04-03 RX ORDER — EZETIMIBE 10 MG/1
10 TABLET ORAL NIGHTLY
Qty: 30 TABLET | Refills: 0 | Status: SHIPPED | OUTPATIENT
Start: 2024-04-03 | End: 2024-04-22 | Stop reason: SDUPTHER

## 2024-04-03 RX ORDER — PRAVASTATIN SODIUM 20 MG/1
20 TABLET ORAL NIGHTLY
Qty: 30 TABLET | Refills: 0 | Status: SHIPPED | OUTPATIENT
Start: 2024-04-03 | End: 2024-04-18 | Stop reason: SDUPTHER

## 2024-04-03 RX ORDER — NAPROXEN SODIUM 220 MG/1
81 TABLET, FILM COATED ORAL DAILY
Qty: 14 TABLET | Refills: 0 | Status: SHIPPED | OUTPATIENT
Start: 2024-04-04 | End: 2024-05-15

## 2024-04-03 RX ORDER — ISOSORBIDE MONONITRATE 30 MG/1
30 TABLET, EXTENDED RELEASE ORAL DAILY
Qty: 30 TABLET | Refills: 1 | Status: SHIPPED | OUTPATIENT
Start: 2024-04-04 | End: 2024-04-22 | Stop reason: SDUPTHER

## 2024-04-03 RX ORDER — PRAVASTATIN SODIUM 20 MG/1
20 TABLET ORAL NIGHTLY
Status: DISCONTINUED | OUTPATIENT
Start: 2024-04-03 | End: 2024-04-03 | Stop reason: HOSPADM

## 2024-04-03 RX ADMIN — AMLODIPINE BESYLATE 5 MG: 5 TABLET ORAL at 09:04

## 2024-04-03 RX ADMIN — CARVEDILOL 25 MG: 12.5 TABLET, FILM COATED ORAL at 06:04

## 2024-04-03 RX ADMIN — POLYETHYLENE GLYCOL 3350 17 G: 17 POWDER, FOR SOLUTION ORAL at 09:04

## 2024-04-03 RX ADMIN — ASPIRIN 81 MG CHEWABLE TABLET 81 MG: 81 TABLET CHEWABLE at 09:04

## 2024-04-03 RX ADMIN — INSULIN ASPART 2 UNITS: 100 INJECTION, SOLUTION INTRAVENOUS; SUBCUTANEOUS at 12:04

## 2024-04-03 RX ADMIN — ACETAMINOPHEN 650 MG: 325 TABLET ORAL at 12:04

## 2024-04-03 RX ADMIN — PANTOPRAZOLE SODIUM 40 MG: 40 TABLET, DELAYED RELEASE ORAL at 09:04

## 2024-04-03 RX ADMIN — ISOSORBIDE MONONITRATE 30 MG: 30 TABLET, EXTENDED RELEASE ORAL at 09:04

## 2024-04-03 RX ADMIN — HYPROMELLOSE 2910 2 DROP: 5 SOLUTION/ DROPS OPHTHALMIC at 02:04

## 2024-04-03 RX ADMIN — LINACLOTIDE 72 MCG: 72 CAPSULE, GELATIN COATED ORAL at 06:04

## 2024-04-03 RX ADMIN — DULOXETINE HYDROCHLORIDE 60 MG: 30 CAPSULE, DELAYED RELEASE ORAL at 09:04

## 2024-04-03 RX ADMIN — CLOPIDOGREL BISULFATE 75 MG: 75 TABLET ORAL at 09:04

## 2024-04-03 RX ADMIN — INSULIN ASPART 2 UNITS: 100 INJECTION, SOLUTION INTRAVENOUS; SUBCUTANEOUS at 05:04

## 2024-04-03 RX ADMIN — OLOPATADINE HYDROCHLORIDE 1 DROP: 1 SOLUTION OPHTHALMIC at 09:04

## 2024-04-03 RX ADMIN — HYPROMELLOSE 2910 2 DROP: 5 SOLUTION/ DROPS OPHTHALMIC at 05:04

## 2024-04-03 RX ADMIN — LISINOPRIL 40 MG: 20 TABLET ORAL at 09:04

## 2024-04-03 RX ADMIN — HYPROMELLOSE 2910 2 DROP: 5 SOLUTION/ DROPS OPHTHALMIC at 09:04

## 2024-04-03 NOTE — ASSESSMENT & PLAN NOTE
Currently in sinus rhythm  Last took Eliquis last night 3/30/2024  Continue carvedilol, ASA    4/3/24  -Ok to resume Eliquis  -Continue BB

## 2024-04-03 NOTE — PLAN OF CARE
[facility] - Telemetry (Hospital)  Discharge Final Note    Primary Care Provider: Jose Szymanski MD    Expected Discharge Date: 4/3/2024    Final Discharge Note (most recent)       Final Note - 04/03/24 1527          Final Note    Assessment Type Final Discharge Note     Anticipated Discharge Disposition Home or Self Care     Hospital Resources/Appts/Education Provided Appointments scheduled and added to AVS        Post-Acute Status    Post-Acute Authorization HME     HME Status Set-up Complete/Auth obtained     Coverage [facility] Managed Medicare     Discharge Delays None known at this time                     Important Message from Medicare             Contact Info       Shree Espinoza MD   Specialty: Interventional Cardiology, Cardiology    [address]  [place] [address]   Phone: [phone]       Next Steps: Follow up    [facility]   Specialty: DME Provider    [address] [place] [id number]   Phone: [phone]       Next Steps: Call    Instructions: Home Medical Equipment  [facility] has been set up to provide you with a walker. If you have any issues or questions, please feel free to reach out to them.          DC Disposition: home with family  Family Notified: patient at beside  Transportation: personal transportation    Patient needed HME RW reviewed and delivered to bedside. FLORENCIO sent message to Manju Clemente MA, with [facility], for review. Manju stated Patient was approved and delivered HME RW, to bedside.    Patient has PCP hospital follow up with Joes Szymanski MD, on 4/9/24 at 11 am.

## 2024-04-03 NOTE — NURSING
Patient discharge to home with self care. All discharge paperwork with instruction given and explained. Patient verbalized understanding. Patient medication was filled by in house pharmacy and rolling walker provided by dme. Patient transported off the unit via wheelchair and assisted by staff to patient .

## 2024-04-03 NOTE — CARE UPDATE
Home Oxygen Evaluation - [facility] - Cardiopulmonary Department      Date Performed: 4/3/2024      1) Patient's Home O2 Sat on room air, while at rest: Room Air SpO2 At Rest: (P) 93 %        If O2 sats on room air at rest are 88% or below, patient qualifies.  Document O2 liter flow needed in Step 2.  If O2 sats are 89% or above, complete Step 3.        2)  If patient is not ambulated and O2 sats are <88%, what is the O2 liter flow required to meet ordered saturation?      If O2 sats on room air while exercising remain 89% or above patient does not qualify, no further testing needed Document N/A in step 3. If O2 sats on room air while exercising are 88% or below, continue to Step 4.    3) Patient's O2 Sat on room air while exercising: Room Air SpO2 During Ambulation: (P) 96 %        4) Patient's O2 Sat while exercising on O2:   at           (Must show improvement from #4 for patients to qualify)

## 2024-04-03 NOTE — PLAN OF CARE
L TR Band removed per protocol. Pressure dressing placed.  Post discharge care of L wrist pressure dressing and site discussed with pt again today; teach back received.   Transferred back to room 207, via hospital bed in no apparent distress.   Dressing remains c/d/I and site wnl at time of transfer.  Report given to KUMAR Carvalho; no further questions at this time.

## 2024-04-03 NOTE — ASSESSMENT & PLAN NOTE
Comes in with elevated blood pressure  Now normotensive, continue lisinopril carvedilol    4/3/24  -BP stable, continue same mgmt-continue Coreg, ACEi, amlodipine, Imdur

## 2024-04-03 NOTE — PROGRESS NOTES
The mobility limitation cannot be sufficiently resolved by the use of a cane. Patient's functional mobility deficit can be sufficiently resolved with the use of a (Rollator, Rolling Walker or Walker). Patient's mobility limitation significantly impairs their ability to participate in one of more activities of daily living. The use of a (Rollator, RW or Walker) will significantly improve the patient's ability to participate in MRADLS and the patient will use it on regular basis in the home.

## 2024-04-03 NOTE — DISCHARGE SUMMARY
[facility] - Telemetry ([facility])  [facility] Medicine  Discharge Summary      Patient Name: Christine Jo  MRN: [id number]  [facility]: [id number]  Patient Class: IP- Inpatient  Admission Date: 3/31/2024  Hospital Length of Stay: 2 days  Discharge Date and Time:  04/03/2024 4:20 PM  Attending Physician: Rosa Angulo MD   Discharging Provider: Rosa Angulo MD  Primary Care Provider: Jose Szymanski MD    Primary Care Team: Networked reference to record PCT     HPI:   Ms. Jo is a 74-year-old female with past medical history of diabetes, hypertension, CAD, arthritis, migraine, depression/anxiety, and AFib presented with shortness of breath and chest pain.  Patient denies any history of congestive heart failure but was noted to elevated blood pressure of 216/91 on admission and a troponin of 0.139 which increased to 0.263.  Of note, patient sleeps on 2 pillows at night, she has a former smoker but quit 21 years ago.  Cardiology was consulted and recommended admission for observation with echo and repeated troponins.  At the time of my evaluation patient stated her chest pain was much better but still had some heaviness.  Blood pressure was better controlled with hydralazine.  Patient states she has been compliant with her medications but has not been watching her diet very closely.  Plan of care was discussed with patient, son, and daughter at bedside.  Hospital medicine will admit for observation, echo, blood pressure control, and cardiac workup.    Procedure(s) (LRB):  Angiogram, Coronary, with Left Heart Cath (N/A)  IVUS, Coronary      Hospital Course:   The patient presented with chest pain and shortness of breath. Cardiology was consulted. Echo revealed decrease in EF and troponin was elevated. She was placed on heparin gtt. Blood pressure improved with Imdur and coreg. Patient underwent LHC 4/1 and proximal LAD 99% occluded. Stent placed. Patient reported chest pain night of 4/1 and morning 4/2. Patient  returned to cath lab for repeat heart cath on 4/2. Patent stent in LAD, noted. Medications adjusted. Patient had no further chest pain. Cardiology cleared patient for discharge. Apsirin, Plavix, statin and Zetia prescribed. Outpatient follow-up arranged prior to discharge. Patient seen and examined, stable for discharge.      Goals of Care Treatment Preferences:  Code Status: Full Code      Consults:   Consults (From admission, onward)          Status Ordering Provider     Inpatient consult to Cardiology  Once        Provider:  Patito Castellanos MD    Completed CHIOMA CHING                Final Active Diagnoses:    Diagnosis Date Noted POA    PRINCIPAL PROBLEM:  NSTEMI (non-ST elevated myocardial infarction) [I21.4] 03/31/2024 Yes    Mixed hyperlipidemia [E78.2] 02/14/2024 Yes    Hypertensive emergency [I16.1] 10/05/2017 Yes    Controlled type 2 diabetes mellitus with diabetic polyneuropathy, with long-term current use of insulin [E11.42, Z79.4] 06/22/2017 Not Applicable    Paroxysmal atrial fibrillation [I48.0] 06/22/2017 Yes    Class 3 severe obesity due to excess calories with serious comorbidity and body mass index (BMI) of 45.0 to 49.9 in adult [E66.01, Z68.42] 06/22/2017 Not Applicable      Problems Resolved During this Admission:       Discharged Condition: stable    Disposition: Home or Self Care    Follow Up:   Follow-up Information       Shree Espinoza MD Follow up.    Specialties: Interventional Cardiology, Cardiology  Contact information:  [address]  [place] [address] [phone]               Equipment, [employer] [facility] [employer] Call.    Specialty: DME Provider  Why: Home Medical Equipment  [employer] [facility] [employer] has been set up to provide you with a walker. If you have any issues or questions, please feel free to reach out to them.  Contact information:  [address] [place] [address] [phone]                           Patient Instructions:      WALKER FOR  "HOME USE     Order Specific Question Answer Comments   Type of Walker: Heavy duty (300+ lbs)    With wheels? Yes    Height: 5' 2" (1.575 m)    Weight: 109 kg (240 lb 4.8 oz)    Length of need (1-99 months): 99    Does patient have medical equipment at home? cane, straight    Does patient have medical equipment at home? shower chair    Please check all that apply: Patient needs help to get in and out of chair.    Please check all that apply: Patient's condition impairs ambulation.      Diet Cardiac     Activity as tolerated       Significant Diagnostic Studies: Radiology:   Imaging Results              X-Ray Chest AP Portable (Final result)  Result time 03/31/24 08:41:50      Final result by Irineo Hardy MD (03/31/24 08:41:50)                   Impression:      Normal chest x-ray.      Electronically signed by: Irineo Hardy MD  Date:    03/31/2024  Time:    08:41               Narrative:    EXAMINATION:  XR CHEST AP PORTABLE    CLINICAL HISTORY:  Shortness of breath    FINDINGS:  Comparison study 01/12/2024.  No change.  Normal size heart.  Lungs are clear.                                      Cardiac Graphics: Left heart cath--Conclusion    The Prox LAD lesion was 99% stenosed with 0% stenosis post-intervention.    The ejection fraction was greater than 55% by visual estimate.    The pre-procedure left ventricular end diastolic pressure was 19.    The post-procedure left ventricular end diastolic pressure was 19.    Prox LAD lesion: A STENT SYNERGY XD 3.0X20MM stent was successfully placed at 11 ROSALBA for 20 sec. Post dilated with 3.5 mm NC balloon    The estimated blood loss was none.    There was single vessel coronary artery disease.    This was a successful PCI for acute myocardial infarction.    The filling pressures on the left were mildly elevated.    There was no aortic valve stenosis.       Pending Diagnostic Studies:       None           Medications:  Reconciled Home Medications:      Medication List    " "    START taking these medications      aspirin 81 MG Chew  Take 1 tablet (81 mg total) by mouth once daily. for 14 days  Start taking on: April 4, 2024     clopidogreL 75 mg tablet  Commonly known as: PLAVIX  Take 1 tablet (75 mg total) by mouth once daily.  Start taking on: April 4, 2024     ezetimibe 10 mg tablet  Commonly known as: ZETIA  Take 1 tablet (10 mg total) by mouth every evening.     isosorbide mononitrate 30 MG 24 hr tablet  Commonly known as: IMDUR  Take 1 tablet (30 mg total) by mouth once daily.  Start taking on: April 4, 2024     nitroGLYCERIN 0.4 MG SL tablet  Commonly known as: NITROSTAT  Place 1 tablet (0.4 mg total) under the tongue every 5 (five) minutes as needed for Chest pain.     pravastatin 20 MG tablet  Commonly known as: PRAVACHOL  Take 1 tablet (20 mg total) by mouth every evening.            CONTINUE taking these medications      ACCU-CHEK GUIDE GLUCOSE METER Misc  Generic drug: blood-glucose meter  use daily to test blood sugar     ACCU-CHEK GUIDE TEST STRIPS Strp  Generic drug: blood sugar diagnostic  To check blood sugar 1 times daily     ACCU-CHEK SOFTCLIX LANCETS Misc  Generic drug: lancets  To check BG 1 times daily     amitriptyline 10 MG tablet  Commonly known as: ELAVIL  Take 1 tablet (10 mg total) by mouth every evening.     AREXVY (PF) 120 mcg/0.5 mL Susr vaccine  Generic drug: RSVPreF3 antigen-AS01E (PF)  Inject into the muscle.     azelastine 137 mcg (0.1 %) nasal spray  Commonly known as: ASTELIN  use 2 sprays (274 mcg total) by Nasal route 2 (two) times daily.     BD ULTRA-FINE SHORT PEN NEEDLE 31 gauge x 5/16" Ndle  Generic drug: pen needle, diabetic  AS DIRECTED TWICE DAILY     benazepriL 40 MG tablet  Commonly known as: LOTENSIN  Take 1 tablet (40 mg total) by mouth 2 (two) times daily.     BEPREVE 1.5 % Drop  Generic drug: bepotastine besilate  Place 1 drop into both eyes 2 (two) times daily.     carvediloL 25 MG tablet  Commonly known as: COREG  TAKE 1 TABLET BY " MOUTH TWICE DAILY     clotrimazole-betamethasone lotion  Commonly known as: LOTRISONE  Apply topically to the affected area 2 (two) times daily.     DULoxetine 60 MG capsule  Commonly known as: CYMBALTA  Take 1 capsule (60 mg total) by mouth once daily.     ELIQUIS 5 mg Tab  Generic drug: apixaban  Take 1 tablet (5 mg total) by mouth 2 (two) times daily.     EMGALITY SYRINGE 120 mg/mL Syrg  Generic drug: galcanezumab-gnlm  Inject 120 mg into the skin every 28 days.     fluticasone propionate 50 mcg/actuation nasal spray  Commonly known as: FLONASE  2 sprays (100 mcg total) by Each Nostril route once daily.     gabapentin 300 MG capsule  Commonly known as: NEURONTIN  Take 1 capsule (300 mg total) by mouth 3 (three) times daily.     hydrALAZINE 50 MG tablet  Commonly known as: APRESOLINE  Take 1 tablet (50 mg total) by mouth every 8 (eight) hours.     hydrOXYzine HCL 10 MG Tab  Commonly known as: ATARAX  Take 1 tablet (10 mg total) by mouth nightly as needed (Anxiety or insomnia).     JANUVIA 100 MG Tab  Generic drug: SITagliptin phosphate  Take 1 tablet (100 mg total) by mouth once daily.     LANTUS SOLOSTAR U-100 INSULIN glargine 100 units/mL SubQ pen  Generic drug: insulin  Inject 72 Units into the skin every evening.     LINZESS 72 mcg Cap capsule  Generic drug: linaCLOtide  Take 1 capsule (72 mcg total) by mouth before breakfast.     meclizine 25 mg tablet  Commonly known as: ANTIVERT  Take 1 tablet (25 mg total) by mouth 3 (three) times daily as needed.     NURTEC 75 mg odt  Generic drug: rimegepant  Take 1 tablet (75 mg total) by mouth as needed for Migraine (do not exceed 2-3 doses within 1 week). Place ODT tablet on the tongue; alternatively the ODT tablet may be placed under the tongue     pantoprazole 40 MG tablet  Commonly known as: PROTONIX  Take 1 tablet (40 mg total) by mouth once daily.     RESTASIS 0.05 % ophthalmic emulsion  Generic drug: cycloSPORINE  Place 1 drop into both eyes 2 (two) times  daily.     temazepam 30 mg capsule  Commonly known as: RESTORIL  Take 1 capsule (30 mg total) by mouth every evening.              Indwelling Lines/Drains at time of discharge:   Lines/Drains/Airways       None                   Time spent on the discharge of patient: 31 minutes         Rosa Angulo MD  Department of Hospital Medicine  [facility] - Telemetry ([facility])

## 2024-04-03 NOTE — ASSESSMENT & PLAN NOTE
Reports chest pain that has been worsening for 2-3 weeks at rest and on exertion   Chest pain reproducible, with radiation to the back   Recent stress test with inferior apical scar   EF dropped to 40- 45% with apical hypokinesis   Troponin trending up, start heparin infusion  Will try aspirin- causes palpitations  Keep NPO for possible LHC tomorrow    4/1/2024  -Eliquis on hold currently  -Continue ASA Coreg ACEi  -Statin intolerant  -Plan for LHC today per Dr Espinoza  -Risks benefits and treatment alternatives reviewed  -She agrees to proceed with LHC today   -Consent signed and appropriately witnessed  -Further recs to follow    4.2.2024  Status post PCI to LAD yesterday.    On and off chest pain.  EKG changes with new T-wave inversions.    We will take a relook today.    Agreeable with repeat heart catheterization.    4/3/24  -s/p re-look angio yesterday which showed patent LAD stent  -Stable this AM, CP free  -Continue ASA, Plavix, BB, Imdur, ACEi, Zetia

## 2024-04-03 NOTE — PROGRESS NOTES
[facility] - Telemetry ([facility])  Cardiology  Progress Note    Patient Name: Christine Jo  MRN: [id number]  Admission Date: 3/31/2024  Hospital Length of Stay: 2 days  Code Status: Full Code   Attending Physician: Rosa Angulo MD   Primary Care Physician: Jose Szymanski MD  Expected Discharge Date:   Principal Problem:NSTEMI (non-ST elevated myocardial infarction)    Subjective:   HPI:  3 yo F with PMH nonobstructive CAD, atrial fibrillation on Eliquis, diabetes, hypertension, HLD, AAA, former smoker who was admitted for chest pain.  Chest pain has been ongoing for 2-3 weeks with exertion and at rest.  Chest pain described as sharp located underneath her breast radiating to her back.  Had recent stress test January which showed inferior apical scar.  Echocardiogram September 2023 EF 55-70%, moderate AI.  EKG NSR, septal infarct  Troponin trending up at 0.4  Blood pressure noted to be elevated at 216/91 on admission, usually normotensive.  Reports compliance with her blood pressure medication  Repeat echocardiogram with EF 40-45%, apical hypokinesis  Hemoglobin stable at 11, platelets 251.  Creatinine 0.9, potassium 4.4.  BNP 91       Hospital Course:   4/1/2024--Patient seen and examined in room, sitting on side of bed. Denies any recurrent chest pain symptoms. Discussed with patient given abnormal findings on ECHO, plan for [facility] today for definitive evaluation per Dr Espinoza. Labs reviewed, K+ 4.2, Cr 1.0, Troponin 0.406, H/H 11.4/33.9    4.2.2024  Status post LAD stent yesterday culprit 99% proximal lesion.    Chest pain since post PCI.  Better this morning.  However EKG shows diffuse T-wave inversion in the anterior leads.  New compared to before.      4/3/25-Patient seen and examined today, s/p re-look angio yesterday which showed patent LAD stent. Feels well. No recurrent CP. No SOB. No CV complaints. Labs stable.         Review of Systems   Constitutional: Negative.   HENT: Negative.     Eyes: Negative.     Cardiovascular: Negative.    Respiratory: Negative.     Endocrine: Negative.    Hematologic/Lymphatic: Negative.    Skin: Negative.    Gastrointestinal: Negative.    Genitourinary: Negative.    Neurological: Negative.    Psychiatric/Behavioral:  The patient has insomnia.    Allergic/Immunologic: Negative.      Objective:     Vital Signs (Most Recent):  Temp: 98.2 °F (36.8 °C) (04/03/24 0751)  Pulse: 89 (04/03/24 0751)  Resp: 18 (04/03/24 0751)  BP: 138/72 (04/03/24 0751)  SpO2: 97 % (04/03/24 0751) Vital Signs (24h Range):  Temp:  [98.2 °F (36.8 °C)-98.7 °F (37.1 °C)] 98.2 °F (36.8 °C)  Pulse:  [68-92] 89  Resp:  [16-20] 18  SpO2:  [94 %-99 %] 97 %  BP: ()/(49-73) 138/72     Weight: 109 kg (240 lb 4.8 oz)  Body mass index is 43.95 kg/m².     SpO2: 97 %         Intake/Output Summary (Last 24 hours) at 4/3/2024 1024  Last data filed at 4/3/2024 0637  Gross per 24 hour   Intake 999.87 ml   Output --   Net 999.87 ml       Lines/Drains/Airways       Peripheral Intravenous Line  Duration                  Peripheral IV - Single Lumen 03/31/24 2124 22 G Anterior;Right Forearm 2 days                       Physical Exam  Vitals and nursing note reviewed.   Constitutional:       General: She is not in acute distress.     Appearance: She is well-developed. She is obese. She is not diaphoretic.   HENT:      Head: Normocephalic and atraumatic.   Eyes:      General:         Right eye: No discharge.         Left eye: No discharge.      Pupils: Pupils are equal, round, and reactive to light.   Cardiovascular:      Rate and Rhythm: Normal rate and regular rhythm.      Heart sounds: Normal heart sounds, S1 normal and S2 normal. No murmur heard.  Pulmonary:      Effort: Pulmonary effort is normal. No respiratory distress.      Breath sounds: Normal breath sounds.   Abdominal:      General: There is no distension.   Skin:     General: Skin is warm and dry.      Findings: No erythema.      Comments: L radial access site C/D/I;  "no bleeding erythema or drainage intact pulse   Neurological:      Mental Status: She is alert and oriented to person, place, and time.   Psychiatric:         Mood and Affect: Mood normal.         Behavior: Behavior normal.            Significant Labs: CMP   Recent Labs   Lab 04/02/24  0501 04/03/24  0455    137   K 4.4 4.1    104   CO2 22* 21*   * 170*   BUN 14 11   CREATININE 0.9 0.8   CALCIUM 9.1 9.0   ANIONGAP 14 12   , CBC   Recent Labs   Lab 04/01/24  1754 04/02/24  0501 04/03/24  0455   WBC 7.23 7.45  7.45 6.03   HGB 11.6* 10.2*  10.2* 10.0*   HCT 34.3* 31.5*  31.5* 30.4*    237  237 197   , Troponin No results for input(s): "TROPONINI" in the last 48 hours., and All pertinent lab results from the last 24 hours have been reviewed.    Significant Imaging: Echocardiogram: Transthoracic echo (TTE) complete ([facility] Only):   Results for orders placed or performed during the hospital encounter of 03/31/24   Echo   Result Value Ref Range    BSA 2.18 m2    LVOT stroke volume 99.65 cm3    LVIDd 4.05 3.5 - 6.0 cm    LV Systolic Volume 23.53 mL    LV Systolic Volume Index 11.4 mL/m2    LVIDs 2.55 2.1 - 4.0 cm    LV Diastolic Volume 72.07 mL    LV Diastolic Volume Index 34.82 mL/m2    IVS 1.20 (A) 0.6 - 1.1 cm    LVOT diameter 2.24 cm    LVOT area 3.9 cm2    FS 37 28 - 44 %    Left Ventricle Relative Wall Thickness 0.66 cm    Posterior Wall 1.34 (A) 0.6 - 1.1 cm    LV mass 183.44 g    LV Mass Index 89 g/m2    MV Peak E Zay 0.57 m/s    TDI LATERAL 0.07 m/s    TDI SEPTAL 0.06 m/s    E/E' ratio 8.77 m/s    MV Peak A Zay 1.08 m/s    TR Max Zay 3.14 m/s    E/A ratio 0.53     IVRT 157.94 msec    E wave deceleration time 362.80 msec    LV SEPTAL E/E' RATIO 9.50 m/s    LV LATERAL E/E' RATIO 8.14 m/s    LVOT peak zay 1.17 m/s    Left Ventricular Outflow Tract Mean Velocity 0.84 cm/s    Left Ventricular Outflow Tract Mean Gradient 3.12 mmHg    RVDD 2.60 cm    RVOT peak VTI 20.0 cm    LA size 3.39 cm    " Left Atrium Minor Axis 5.31 cm    Left Atrium Major Axis 5.51 cm    LA volume (mod) 69.12 cm3    LA Volume Index (Mod) 33.4 mL/m2    RA Major Axis 5.16 cm    AV regurgitation pressure 1/2 time 419.862971448058842 ms    AR Max Zay 4.18 m/s    AV mean gradient 8 mmHg    AV peak gradient 16 mmHg    Ao peak zay 2.00 m/s    Ao VTI 39.90 cm    LVOT peak VTI 25.30 cm    AV valve area 2.50 cm²    AV Velocity Ratio 0.59     AV index (prosthetic) 0.63     DIEGO by Velocity Ratio 2.30 cm²    MV stenosis pressure 1/2 time 105.21 ms    MV valve area p 1/2 method 2.09 cm2    Triscuspid Valve Regurgitation Peak Gradient 39 mmHg    PV mean gradient 3 mmHg    PV PEAK VELOCITY 1.37 m/s    PV peak gradient 8 mmHg    Pulmonary Valve Mean Velocity 0.84 m/s    RVOT peak zay 1.28 m/s    Ao root annulus 3.40 cm    STJ 2.69 cm    Ascending aorta 3.26 cm    Mean e' 0.07 m/s    ZLVIDS -3.26     ZLVIDD -4.41     LA Volume Index 32.4 mL/m2    LA volume 67.01 cm3    LA WIDTH 4.3 cm    RA Width 3.0 cm    EF 45 %    TV resting pulmonary artery pressure 42 mmHg    RV TB RVSP 6 mmHg    Est. RA pres 3 mmHg    Narrative      Left Ventricle: The left ventricle is normal in size. Normal wall   thickness. There is concentric remodeling. Normal wall motion. There is   mildly reduced systolic function with a visually estimated ejection   fraction of 40 - 45%. Ejection fraction by visual approximation is 45%.   There is indeterminate diastolic function.    Right Ventricle: Normal right ventricular cavity size. Wall thickness   is normal. Right ventricle wall motion  is normal. Systolic function is   normal.    Aortic Valve: There is mild aortic regurgitation.    Tricuspid Valve: The tricuspid valve is structurally normal. There is   mild regurgitation.    Pulmonary Artery: The estimated pulmonary artery systolic pressure is   42 mmHg.    IVC/SVC: Normal venous pressure at 3 mmHg.      and EKG: Reviewed  Assessment and Plan:   Patient who presents with NSTEMI  s/p PCI of LAD. Re-look angio yesterday showed patent stent. Med rec's as below. Follow-up in clinic.    * NSTEMI (non-ST elevated myocardial infarction)  Reports chest pain that has been worsening for 2-3 weeks at rest and on exertion   Chest pain reproducible, with radiation to the back   Recent stress test with inferior apical scar   EF dropped to 40- 45% with apical hypokinesis   Troponin trending up, start heparin infusion  Will try aspirin- causes palpitations  Keep NPO for possible LHC tomorrow    4/1/2024  -Eliquis on hold currently  -Continue ASA Coreg ACEi  -Statin intolerant  -Plan for LHC today per Dr Espinoza  -Risks benefits and treatment alternatives reviewed  -She agrees to proceed with LHC today   -Consent signed and appropriately witnessed  -Further recs to follow    4.2.2024  Status post PCI to LAD yesterday.    On and off chest pain.  EKG changes with new T-wave inversions.    We will take a relook today.    Agreeable with repeat heart catheterization.    4/3/24  -s/p re-look angio yesterday which showed patent LAD stent  -Stable this AM, CP free  -Continue ASA, Plavix, BB, Imdur, ACEi, Zetia  -Low dose statin added  -Can d/c aspirin after two weeks      Mixed hyperlipidemia  Looks like patient was tried on Repatha in the past- ?Statin allergy  Did not tolerate Repatha due to headaches    4/3/24  -Continue Zetia    Hypertensive emergency  Comes in with elevated blood pressure  Now normotensive, continue lisinopril carvedilol    4/3/24  -BP stable, continue same mgmt-continue Coreg, ACEi, amlodipine, Imdur      Paroxysmal atrial fibrillation  Currently in sinus rhythm  Last took Eliquis last night 3/30/2024  Continue carvedilol, ASA    4/3/24  -Ok to resume Eliquis  -Continue BB        Class 3 severe obesity due to excess calories with serious comorbidity and body mass index (BMI) of 45.0 to 49.9 in adult  Encourage formal weight loss program    Controlled type 2 diabetes mellitus with diabetic  polyneuropathy, with long-term current use of insulin  -Management per Hospital Medicine        VTE Risk Mitigation (From admission, onward)           Ordered     Reason for No Pharmacological VTE Prophylaxis  Once        Question:  Reasons:  Answer:  Already adequately anticoagulated on oral Anticoagulants    03/31/24 1334     IP VTE HIGH RISK PATIENT  Once         03/31/24 1334     Place sequential compression device  Until discontinued         03/31/24 1334                    Essie Gates PA-C  Cardiology  [facility] - Telemetry ([facility])

## 2024-04-03 NOTE — SUBJECTIVE & OBJECTIVE
Review of Systems   Constitutional: Negative.   HENT: Negative.     Eyes: Negative.    Cardiovascular: Negative.    Respiratory: Negative.     Endocrine: Negative.    Hematologic/Lymphatic: Negative.    Skin: Negative.    Gastrointestinal: Negative.    Genitourinary: Negative.    Neurological: Negative.    Psychiatric/Behavioral:  The patient has insomnia.    Allergic/Immunologic: Negative.      Objective:     Vital Signs (Most Recent):  Temp: 98.2 °F (36.8 °C) (04/03/24 0751)  Pulse: 89 (04/03/24 0751)  Resp: 18 (04/03/24 0751)  BP: 138/72 (04/03/24 0751)  SpO2: 97 % (04/03/24 0751) Vital Signs (24h Range):  Temp:  [98.2 °F (36.8 °C)-98.7 °F (37.1 °C)] 98.2 °F (36.8 °C)  Pulse:  [68-92] 89  Resp:  [16-20] 18  SpO2:  [94 %-99 %] 97 %  BP: ()/(49-73) 138/72     Weight: 109 kg (240 lb 4.8 oz)  Body mass index is 43.95 kg/m².     SpO2: 97 %         Intake/Output Summary (Last 24 hours) at 4/3/2024 1024  Last data filed at 4/3/2024 0637  Gross per 24 hour   Intake 999.87 ml   Output --   Net 999.87 ml       Lines/Drains/Airways       Peripheral Intravenous Line  Duration                  Peripheral IV - Single Lumen 03/31/24 2124 22 G Anterior;Right Forearm 2 days                       Physical Exam  Vitals and nursing note reviewed.   Constitutional:       General: She is not in acute distress.     Appearance: She is well-developed. She is obese. She is not diaphoretic.   HENT:      Head: Normocephalic and atraumatic.   Eyes:      General:         Right eye: No discharge.         Left eye: No discharge.      Pupils: Pupils are equal, round, and reactive to light.   Cardiovascular:      Rate and Rhythm: Normal rate and regular rhythm.      Heart sounds: Normal heart sounds, S1 normal and S2 normal. No murmur heard.  Pulmonary:      Effort: Pulmonary effort is normal. No respiratory distress.      Breath sounds: Normal breath sounds.   Abdominal:      General: There is no distension.   Skin:     General: Skin  "is warm and dry.      Findings: No erythema.      Comments: L radial access site C/D/I; no bleeding erythema or drainage intact pulse   Neurological:      Mental Status: She is alert and oriented to person, place, and time.   Psychiatric:         Mood and Affect: Mood normal.         Behavior: Behavior normal.            Significant Labs: CMP   Recent Labs   Lab 04/02/24  0501 04/03/24  0455    137   K 4.4 4.1    104   CO2 22* 21*   * 170*   BUN 14 11   CREATININE 0.9 0.8   CALCIUM 9.1 9.0   ANIONGAP 14 12   , CBC   Recent Labs   Lab 04/01/24  1754 04/02/24  0501 04/03/24  0455   WBC 7.23 7.45  7.45 6.03   HGB 11.6* 10.2*  10.2* 10.0*   HCT 34.3* 31.5*  31.5* 30.4*    237  237 197   , Troponin No results for input(s): "TROPONINI" in the last 48 hours., and All pertinent lab results from the last 24 hours have been reviewed.    Significant Imaging: Echocardiogram: Transthoracic echo (TTE) complete ([facility] Only):   Results for orders placed or performed during the hospital encounter of 03/31/24   Echo   Result Value Ref Range    BSA 2.18 m2    LVOT stroke volume 99.65 cm3    LVIDd 4.05 3.5 - 6.0 cm    LV Systolic Volume 23.53 mL    LV Systolic Volume Index 11.4 mL/m2    LVIDs 2.55 2.1 - 4.0 cm    LV Diastolic Volume 72.07 mL    LV Diastolic Volume Index 34.82 mL/m2    IVS 1.20 (A) 0.6 - 1.1 cm    LVOT diameter 2.24 cm    LVOT area 3.9 cm2    FS 37 28 - 44 %    Left Ventricle Relative Wall Thickness 0.66 cm    Posterior Wall 1.34 (A) 0.6 - 1.1 cm    LV mass 183.44 g    LV Mass Index 89 g/m2    MV Peak E Zay 0.57 m/s    TDI LATERAL 0.07 m/s    TDI SEPTAL 0.06 m/s    E/E' ratio 8.77 m/s    MV Peak A Zay 1.08 m/s    TR Max Zay 3.14 m/s    E/A ratio 0.53     IVRT 157.94 msec    E wave deceleration time 362.80 msec    LV SEPTAL E/E' RATIO 9.50 m/s    LV LATERAL E/E' RATIO 8.14 m/s    LVOT peak zay 1.17 m/s    Left Ventricular Outflow Tract Mean Velocity 0.84 cm/s    Left Ventricular Outflow " Tract Mean Gradient 3.12 mmHg    RVDD 2.60 cm    RVOT peak VTI 20.0 cm    LA size 3.39 cm    Left Atrium Minor Axis 5.31 cm    Left Atrium Major Axis 5.51 cm    LA volume (mod) 69.12 cm3    LA Volume Index (Mod) 33.4 mL/m2    RA Major Axis 5.16 cm    AV regurgitation pressure 1/2 time 419.004675823125924 ms    AR Max Zay 4.18 m/s    AV mean gradient 8 mmHg    AV peak gradient 16 mmHg    Ao peak zay 2.00 m/s    Ao VTI 39.90 cm    LVOT peak VTI 25.30 cm    AV valve area 2.50 cm²    AV Velocity Ratio 0.59     AV index (prosthetic) 0.63     DIEGO by Velocity Ratio 2.30 cm²    MV stenosis pressure 1/2 time 105.21 ms    MV valve area p 1/2 method 2.09 cm2    Triscuspid Valve Regurgitation Peak Gradient 39 mmHg    PV mean gradient 3 mmHg    PV PEAK VELOCITY 1.37 m/s    PV peak gradient 8 mmHg    Pulmonary Valve Mean Velocity 0.84 m/s    RVOT peak zay 1.28 m/s    Ao root annulus 3.40 cm    STJ 2.69 cm    Ascending aorta 3.26 cm    Mean e' 0.07 m/s    ZLVIDS -3.26     ZLVIDD -4.41     LA Volume Index 32.4 mL/m2    LA volume 67.01 cm3    LA WIDTH 4.3 cm    RA Width 3.0 cm    EF 45 %    TV resting pulmonary artery pressure 42 mmHg    RV TB RVSP 6 mmHg    Est. RA pres 3 mmHg    Narrative      Left Ventricle: The left ventricle is normal in size. Normal wall   thickness. There is concentric remodeling. Normal wall motion. There is   mildly reduced systolic function with a visually estimated ejection   fraction of 40 - 45%. Ejection fraction by visual approximation is 45%.   There is indeterminate diastolic function.    Right Ventricle: Normal right ventricular cavity size. Wall thickness   is normal. Right ventricle wall motion  is normal. Systolic function is   normal.    Aortic Valve: There is mild aortic regurgitation.    Tricuspid Valve: The tricuspid valve is structurally normal. There is   mild regurgitation.    Pulmonary Artery: The estimated pulmonary artery systolic pressure is   42 mmHg.    IVC/SVC: Normal venous  pressure at 3 mmHg.      and EKG: Reviewed

## 2024-04-03 NOTE — ASSESSMENT & PLAN NOTE
Looks like patient was tried on Repatha in the past- ?Statin allergy  Did not tolerate Repatha due to headaches    4/3/24  -Continue Zetia

## 2024-04-04 ENCOUNTER — TELEPHONE (OUTPATIENT)
Dept: CARDIOLOGY | Facility: CLINIC | Age: 74
End: 2024-04-04
Payer: MEDICARE

## 2024-04-04 ENCOUNTER — PATIENT MESSAGE (OUTPATIENT)
Dept: CARDIOLOGY | Facility: CLINIC | Age: 74
End: 2024-04-04
Payer: MEDICARE

## 2024-04-04 NOTE — TELEPHONE ENCOUNTER
Contacted patient; Patient advised that she figured her medications out. Advised her to give us a call back if she hasn't any other concerns.     ----- Message from Radha Arreola sent at 4/4/2024 11:29 AM CDT -----  Contact: pt [phone]  Type: Needs Medical Advice  Who Called:  PT     Best Call Back Number: [phone]      Additional Information: Pt requesting a call back to discuss her medication she was discharged with. Pt stated she is very confused about about what to take to and when. Pt stated she has not taken any medication today.

## 2024-04-05 ENCOUNTER — PATIENT OUTREACH (OUTPATIENT)
Dept: ADMINISTRATIVE | Facility: CLINIC | Age: 74
End: 2024-04-05
Payer: MEDICARE

## 2024-04-05 ENCOUNTER — PATIENT MESSAGE (OUTPATIENT)
Dept: GASTROENTEROLOGY | Facility: CLINIC | Age: 74
End: 2024-04-05
Payer: MEDICARE

## 2024-04-07 ENCOUNTER — PATIENT MESSAGE (OUTPATIENT)
Dept: INTERNAL MEDICINE | Facility: CLINIC | Age: 74
End: 2024-04-07
Payer: MEDICARE

## 2024-04-07 ENCOUNTER — PATIENT MESSAGE (OUTPATIENT)
Dept: CARDIOLOGY | Facility: CLINIC | Age: 74
End: 2024-04-07
Payer: MEDICARE

## 2024-04-07 DIAGNOSIS — M79.89 LEG SWELLING: Primary | ICD-10-CM

## 2024-04-07 DIAGNOSIS — G43.809 VESTIBULAR MIGRAINE: ICD-10-CM

## 2024-04-07 DIAGNOSIS — G43.101 MIGRAINE WITH AURA AND WITH STATUS MIGRAINOSUS, NOT INTRACTABLE: ICD-10-CM

## 2024-04-08 RX ORDER — GALCANEZUMAB 120 MG/ML
120 INJECTION, SOLUTION SUBCUTANEOUS
Qty: 1 ML | Refills: 12 | Status: SHIPPED | OUTPATIENT
Start: 2024-04-08 | End: 2024-04-19 | Stop reason: SDUPTHER

## 2024-04-08 RX ORDER — FUROSEMIDE 20 MG/1
20 TABLET ORAL DAILY PRN
Qty: 30 TABLET | Refills: 11 | Status: SHIPPED | OUTPATIENT
Start: 2024-04-08 | End: 2024-04-22 | Stop reason: SDUPTHER

## 2024-04-11 ENCOUNTER — TELEPHONE (OUTPATIENT)
Dept: ADMINISTRATIVE | Facility: CLINIC | Age: 74
End: 2024-04-11
Payer: MEDICARE

## 2024-04-11 NOTE — TELEPHONE ENCOUNTER
Spoke to Christine Jo who had a question about Asprin 81 mg. I explained to the patient the Asprin 81 mg was ordered for 14 days only but she should continue taking her Plavix 75 mg daily. Patient verbalized understanding no further assistance needed at this time.

## 2024-04-12 ENCOUNTER — PATIENT MESSAGE (OUTPATIENT)
Dept: PSYCHIATRY | Facility: CLINIC | Age: 74
End: 2024-04-12
Payer: MEDICARE

## 2024-04-12 NOTE — TELEPHONE ENCOUNTER
"I called the patient regarding her message.  After discussing Ambien with her, she elects to stay on temazepam and hold off Ambien.  She reports stopping hydroxyzine because a friend told me it was dangerous" but she says that it was helpful for sleep with temazepam.  Her most recent EKG on April 1st, during her hospitalization for an MI, shows a prolonged QT, so she understands not to resume hydroxyzine, and I also instructed her to avoid any over-the-counter antihistamines.  She says that she would like to continue with Cymbalta and temazepam only as her psychiatric medications at this point.  "

## 2024-04-15 ENCOUNTER — PATIENT MESSAGE (OUTPATIENT)
Dept: ADMINISTRATIVE | Facility: HOSPITAL | Age: 74
End: 2024-04-15
Payer: MEDICARE

## 2024-04-15 ENCOUNTER — TELEPHONE (OUTPATIENT)
Dept: ADMINISTRATIVE | Facility: CLINIC | Age: 74
End: 2024-04-15
Payer: MEDICARE

## 2024-04-15 ENCOUNTER — PATIENT OUTREACH (OUTPATIENT)
Dept: ADMINISTRATIVE | Facility: OTHER | Age: 74
End: 2024-04-15
Payer: MEDICARE

## 2024-04-15 NOTE — PROGRESS NOTES
CHW - Initial Contact    This Community Health Worker completed OR updated the Social Determinant of Health questionnaire with patient via telephone today.    Pt identified barriers of most importance are: Pt states no one told her how long to keep the bandage on the surgical site. She says it's tender. She would like some instructions. Please call pt to assist. Thanks Sent in basket message to surgeon.    Referrals to community agencies completed with patient/caregiver consent outside of [facility] include: no  Referrals were put through [facility] - no:   Support and Services: Discharge instructions  Other information discussed the patient needs / wants help with: SDOH   Follow up required: yes  Follow-up Outreach - Due: 4/17/2024

## 2024-04-15 NOTE — PROGRESS NOTES
Pt states no one told her how long to keep the bandage on the surgical site. She says it's tender. She would like some instructions. Please call pt to assist. Thanks Sent in basket message to surgeon. I will continue to follow on SDOH platform.

## 2024-04-16 ENCOUNTER — PATIENT OUTREACH (OUTPATIENT)
Dept: ADMINISTRATIVE | Facility: OTHER | Age: 74
End: 2024-04-16
Payer: MEDICARE

## 2024-04-16 ENCOUNTER — TELEPHONE (OUTPATIENT)
Dept: CARDIOLOGY | Facility: CLINIC | Age: 74
End: 2024-04-16
Payer: MEDICARE

## 2024-04-16 NOTE — PROGRESS NOTES
CHW - Follow Up    This Community Health Worker completed a follow up visit with patient via telephone today.  Pt/Caregiver reported: Notified pt of surgeon's response thinking she would be seeing Lissy this week and she stated she needed morning appointments for transportation purposes is why she rescheduled appointment.   Community Health Worker provided: Notified pt of surgeon's response thinking she would be seeing Lissy this week and she stated she needed morning appointments for transportation purposes is why she rescheduled appointment.   Follow up required: yes  Follow-up Outreach - Due: 4/30/2024

## 2024-04-17 ENCOUNTER — PATIENT MESSAGE (OUTPATIENT)
Dept: RHEUMATOLOGY | Facility: CLINIC | Age: 74
End: 2024-04-17
Payer: MEDICARE

## 2024-04-18 ENCOUNTER — TELEPHONE (OUTPATIENT)
Dept: INTERNAL MEDICINE | Facility: CLINIC | Age: 74
End: 2024-04-18
Payer: MEDICARE

## 2024-04-18 NOTE — TELEPHONE ENCOUNTER
Patient states she has changed insurance plans and is requesting all medications be sent to [facility] mail order pharmacy.

## 2024-04-18 NOTE — TELEPHONE ENCOUNTER
----- Message from Zonia Elias sent at 4/18/2024 11:51 AM CDT -----  Regarding: rx  concerns  Name of who is calling:   Christine      What is the request in detail: Pt is requesting a call back in ref to ALL rxs be sent to [facility] fax [phone] tel # [phone] due to Ins change to [facility]      Can the clinic reply by [facility]:no      What number to call back if not [facility]: [phone]

## 2024-04-19 ENCOUNTER — PATIENT MESSAGE (OUTPATIENT)
Dept: PULMONOLOGY | Facility: CLINIC | Age: 74
End: 2024-04-19
Payer: MEDICARE

## 2024-04-19 ENCOUNTER — OFFICE VISIT (OUTPATIENT)
Dept: NEUROLOGY | Facility: CLINIC | Age: 74
End: 2024-04-19
Payer: MEDICARE

## 2024-04-19 ENCOUNTER — OFFICE VISIT (OUTPATIENT)
Dept: INTERNAL MEDICINE | Facility: CLINIC | Age: 74
End: 2024-04-19
Payer: MEDICARE

## 2024-04-19 ENCOUNTER — PATIENT OUTREACH (OUTPATIENT)
Dept: ADMINISTRATIVE | Facility: HOSPITAL | Age: 74
End: 2024-04-19
Payer: MEDICARE

## 2024-04-19 ENCOUNTER — TELEPHONE (OUTPATIENT)
Dept: INTERNAL MEDICINE | Facility: CLINIC | Age: 74
End: 2024-04-19
Payer: MEDICARE

## 2024-04-19 ENCOUNTER — LAB VISIT (OUTPATIENT)
Dept: LAB | Facility: HOSPITAL | Age: 74
End: 2024-04-19
Attending: PHYSICIAN ASSISTANT
Payer: MEDICARE

## 2024-04-19 ENCOUNTER — PATIENT MESSAGE (OUTPATIENT)
Dept: INTERNAL MEDICINE | Facility: CLINIC | Age: 74
End: 2024-04-19

## 2024-04-19 VITALS
BODY MASS INDEX: 44.06 KG/M2 | HEART RATE: 73 BPM | DIASTOLIC BLOOD PRESSURE: 61 MMHG | WEIGHT: 239.44 LBS | HEIGHT: 62 IN | SYSTOLIC BLOOD PRESSURE: 149 MMHG | RESPIRATION RATE: 16 BRPM

## 2024-04-19 VITALS
WEIGHT: 235.25 LBS | SYSTOLIC BLOOD PRESSURE: 132 MMHG | OXYGEN SATURATION: 97 % | HEART RATE: 86 BPM | DIASTOLIC BLOOD PRESSURE: 64 MMHG | BODY MASS INDEX: 43.02 KG/M2

## 2024-04-19 DIAGNOSIS — R42 DIZZY: ICD-10-CM

## 2024-04-19 DIAGNOSIS — F09 MILD COGNITIVE DISORDER: ICD-10-CM

## 2024-04-19 DIAGNOSIS — R39.9 URINARY SYMPTOM OR SIGN: ICD-10-CM

## 2024-04-19 DIAGNOSIS — F43.29 ADJUSTMENT DISORDER WITH OTHER SYMPTOMS: ICD-10-CM

## 2024-04-19 DIAGNOSIS — I25.118 CORONARY ARTERY DISEASE OF NATIVE ARTERY OF NATIVE HEART WITH STABLE ANGINA PECTORIS: ICD-10-CM

## 2024-04-19 DIAGNOSIS — E66.01 MORBID OBESITY WITH BMI OF 40.0-44.9, ADULT: ICD-10-CM

## 2024-04-19 DIAGNOSIS — I21.4 NSTEMI (NON-ST ELEVATED MYOCARDIAL INFARCTION): ICD-10-CM

## 2024-04-19 DIAGNOSIS — Z12.11 SCREENING FOR MALIGNANT NEOPLASM OF COLON: ICD-10-CM

## 2024-04-19 DIAGNOSIS — F33.41 RECURRENT MAJOR DEPRESSIVE DISORDER, IN PARTIAL REMISSION: ICD-10-CM

## 2024-04-19 DIAGNOSIS — F33.42 RECURRENT MAJOR DEPRESSIVE DISORDER, IN FULL REMISSION: ICD-10-CM

## 2024-04-19 DIAGNOSIS — R29.90 MULTIPLE NEUROLOGICAL SYMPTOMS: Primary | ICD-10-CM

## 2024-04-19 DIAGNOSIS — G47.00 INSOMNIA, UNSPECIFIED TYPE: ICD-10-CM

## 2024-04-19 DIAGNOSIS — I25.2 OLD MI (MYOCARDIAL INFARCTION): ICD-10-CM

## 2024-04-19 DIAGNOSIS — I48.0 PAROXYSMAL ATRIAL FIBRILLATION: ICD-10-CM

## 2024-04-19 DIAGNOSIS — R94.31 ABNORMAL ECG: ICD-10-CM

## 2024-04-19 DIAGNOSIS — E66.01 CLASS 3 SEVERE OBESITY WITH SERIOUS COMORBIDITY AND BODY MASS INDEX (BMI) OF 40.0 TO 44.9 IN ADULT, UNSPECIFIED OBESITY TYPE: ICD-10-CM

## 2024-04-19 DIAGNOSIS — G47.30 SLEEP APNEA, UNSPECIFIED TYPE: ICD-10-CM

## 2024-04-19 DIAGNOSIS — H93.19 TINNITUS, UNSPECIFIED LATERALITY: ICD-10-CM

## 2024-04-19 DIAGNOSIS — G43.101 MIGRAINE WITH AURA AND WITH STATUS MIGRAINOSUS, NOT INTRACTABLE: ICD-10-CM

## 2024-04-19 DIAGNOSIS — H81.4 VERTIGO OF CENTRAL ORIGIN: ICD-10-CM

## 2024-04-19 DIAGNOSIS — M54.2 NECK PAIN: ICD-10-CM

## 2024-04-19 DIAGNOSIS — E78.2 MIXED HYPERLIPIDEMIA: ICD-10-CM

## 2024-04-19 DIAGNOSIS — E11.42 CONTROLLED TYPE 2 DIABETES MELLITUS WITH DIABETIC POLYNEUROPATHY, WITH LONG-TERM CURRENT USE OF INSULIN: ICD-10-CM

## 2024-04-19 DIAGNOSIS — F33.1 DEPRESSION, MAJOR, RECURRENT, MODERATE: ICD-10-CM

## 2024-04-19 DIAGNOSIS — Z09 HOSPITAL DISCHARGE FOLLOW-UP: Primary | ICD-10-CM

## 2024-04-19 DIAGNOSIS — F41.9 ANXIETY DISORDER, UNSPECIFIED TYPE: ICD-10-CM

## 2024-04-19 DIAGNOSIS — I16.1 HYPERTENSIVE EMERGENCY: ICD-10-CM

## 2024-04-19 DIAGNOSIS — R42 DIZZINESS AND GIDDINESS: ICD-10-CM

## 2024-04-19 DIAGNOSIS — R41.3 MEMORY LOSS: ICD-10-CM

## 2024-04-19 DIAGNOSIS — G43.809 VESTIBULAR MIGRAINE: ICD-10-CM

## 2024-04-19 DIAGNOSIS — H93.A9 PULSATILE TINNITUS, UNSPECIFIED EAR: ICD-10-CM

## 2024-04-19 DIAGNOSIS — Z74.8 ASSISTANCE WITH TRANSPORTATION: ICD-10-CM

## 2024-04-19 DIAGNOSIS — Z86.69 HISTORY OF OBSTRUCTIVE SLEEP APNEA: ICD-10-CM

## 2024-04-19 DIAGNOSIS — Z79.4 CONTROLLED TYPE 2 DIABETES MELLITUS WITH DIABETIC POLYNEUROPATHY, WITH LONG-TERM CURRENT USE OF INSULIN: ICD-10-CM

## 2024-04-19 DIAGNOSIS — R42 VERTIGO: ICD-10-CM

## 2024-04-19 DIAGNOSIS — R26.81 UNSTEADINESS ON FEET: ICD-10-CM

## 2024-04-19 DIAGNOSIS — F32.A DEPRESSION, UNSPECIFIED DEPRESSION TYPE: ICD-10-CM

## 2024-04-19 DIAGNOSIS — H91.92 HEARING LOSS OF LEFT EAR, UNSPECIFIED HEARING LOSS TYPE: ICD-10-CM

## 2024-04-19 DIAGNOSIS — R41.9 COGNITIVE COMPLAINTS: ICD-10-CM

## 2024-04-19 DIAGNOSIS — G56.00 CARPAL TUNNEL SYNDROME, UNSPECIFIED LATERALITY: ICD-10-CM

## 2024-04-19 LAB
BILIRUB UR QL STRIP: NEGATIVE
CLARITY UR: CLEAR
COLOR UR: YELLOW
GLUCOSE UR QL STRIP: NEGATIVE
HGB UR QL STRIP: NEGATIVE
KETONES UR QL STRIP: ABNORMAL
LEUKOCYTE ESTERASE UR QL STRIP: NEGATIVE
NITRITE UR QL STRIP: NEGATIVE
PH UR STRIP: 5 [PH] (ref 5–8)
PROT UR QL STRIP: ABNORMAL
SP GR UR STRIP: 1.02 (ref 1–1.03)
URN SPEC COLLECT METH UR: ABNORMAL

## 2024-04-19 PROCEDURE — 99214 OFFICE O/P EST MOD 30 MIN: CPT | Mod: S$GLB,,, | Performed by: NURSE PRACTITIONER

## 2024-04-19 PROCEDURE — 3008F BODY MASS INDEX DOCD: CPT | Mod: CPTII,S$GLB,, | Performed by: NURSE PRACTITIONER

## 2024-04-19 PROCEDURE — 4010F ACE/ARB THERAPY RXD/TAKEN: CPT | Mod: CPTII,S$GLB,, | Performed by: PHYSICIAN ASSISTANT

## 2024-04-19 PROCEDURE — 3078F DIAST BP <80 MM HG: CPT | Mod: CPTII,S$GLB,, | Performed by: NURSE PRACTITIONER

## 2024-04-19 PROCEDURE — 1101F PT FALLS ASSESS-DOCD LE1/YR: CPT | Mod: CPTII,S$GLB,, | Performed by: NURSE PRACTITIONER

## 2024-04-19 PROCEDURE — 1126F AMNT PAIN NOTED NONE PRSNT: CPT | Mod: CPTII,S$GLB,, | Performed by: NURSE PRACTITIONER

## 2024-04-19 PROCEDURE — 3288F FALL RISK ASSESSMENT DOCD: CPT | Mod: CPTII,S$GLB,, | Performed by: NURSE PRACTITIONER

## 2024-04-19 PROCEDURE — 99999 PR PBB SHADOW E&M-EST. PATIENT-LVL V: CPT | Mod: PBBFAC,,, | Performed by: NURSE PRACTITIONER

## 2024-04-19 PROCEDURE — 81002 URINALYSIS NONAUTO W/O SCOPE: CPT | Performed by: PHYSICIAN ASSISTANT

## 2024-04-19 PROCEDURE — 1111F DSCHRG MED/CURRENT MED MERGE: CPT | Mod: CPTII,S$GLB,, | Performed by: NURSE PRACTITIONER

## 2024-04-19 PROCEDURE — 99999 PR PBB SHADOW E&M-EST. PATIENT-LVL V: CPT | Mod: PBBFAC,,, | Performed by: PHYSICIAN ASSISTANT

## 2024-04-19 PROCEDURE — 3072F LOW RISK FOR RETINOPATHY: CPT | Mod: CPTII,S$GLB,, | Performed by: PHYSICIAN ASSISTANT

## 2024-04-19 PROCEDURE — 3051F HG A1C>EQUAL 7.0%<8.0%: CPT | Mod: CPTII,S$GLB,, | Performed by: PHYSICIAN ASSISTANT

## 2024-04-19 PROCEDURE — 3077F SYST BP >= 140 MM HG: CPT | Mod: CPTII,S$GLB,, | Performed by: NURSE PRACTITIONER

## 2024-04-19 PROCEDURE — 3075F SYST BP GE 130 - 139MM HG: CPT | Mod: CPTII,S$GLB,, | Performed by: PHYSICIAN ASSISTANT

## 2024-04-19 PROCEDURE — 3072F LOW RISK FOR RETINOPATHY: CPT | Mod: CPTII,S$GLB,, | Performed by: NURSE PRACTITIONER

## 2024-04-19 PROCEDURE — 4010F ACE/ARB THERAPY RXD/TAKEN: CPT | Mod: CPTII,S$GLB,, | Performed by: NURSE PRACTITIONER

## 2024-04-19 PROCEDURE — 3051F HG A1C>EQUAL 7.0%<8.0%: CPT | Mod: CPTII,S$GLB,, | Performed by: NURSE PRACTITIONER

## 2024-04-19 PROCEDURE — 99213 OFFICE O/P EST LOW 20 MIN: CPT | Mod: S$GLB,,, | Performed by: PHYSICIAN ASSISTANT

## 2024-04-19 PROCEDURE — 1160F RVW MEDS BY RX/DR IN RCRD: CPT | Mod: CPTII,S$GLB,, | Performed by: NURSE PRACTITIONER

## 2024-04-19 PROCEDURE — 3078F DIAST BP <80 MM HG: CPT | Mod: CPTII,S$GLB,, | Performed by: PHYSICIAN ASSISTANT

## 2024-04-19 PROCEDURE — 1159F MED LIST DOCD IN RCRD: CPT | Mod: CPTII,S$GLB,, | Performed by: NURSE PRACTITIONER

## 2024-04-19 PROCEDURE — 1126F AMNT PAIN NOTED NONE PRSNT: CPT | Mod: CPTII,S$GLB,, | Performed by: PHYSICIAN ASSISTANT

## 2024-04-19 RX ORDER — PANTOPRAZOLE SODIUM 40 MG/1
40 TABLET, DELAYED RELEASE ORAL DAILY
Qty: 30 TABLET | Refills: 2 | Status: SHIPPED | OUTPATIENT
Start: 2024-04-19 | End: 2024-04-22 | Stop reason: SDUPTHER

## 2024-04-19 RX ORDER — PRAVASTATIN SODIUM 20 MG/1
20 TABLET ORAL NIGHTLY
Qty: 30 TABLET | Refills: 0 | Status: SHIPPED | OUTPATIENT
Start: 2024-04-19 | End: 2024-04-22 | Stop reason: SDUPTHER

## 2024-04-19 RX ORDER — GALCANEZUMAB 120 MG/ML
120 INJECTION, SOLUTION SUBCUTANEOUS
Qty: 3 ML | Refills: 3 | Status: SHIPPED | OUTPATIENT
Start: 2024-04-19 | End: 2025-04-19

## 2024-04-19 NOTE — PROGRESS NOTES
Transitional Care Note  Subjective:      Patient ID: Christine Jo is a 74 y.o. female.  Chief Complaint: Hospital Follow Up ([facility])      Patient presents to clinic today for hospital TCC visit. Patient was recently hospitalized for NSTEMI.        Family and/or Caretaker present at visit?  No.  Diagnostic tests reviewed/disposition: No diagnosic tests pending after this hospitalization.  Disease/illness education:  See assessment and plan  Home health/community services discussion/referrals: Patient does not have home health established from hospital visit.  They do not need home health.  If needed, we will set up home health for the patient.   Establishment or re-establishment of referral orders for community resources: No other necessary community resources.   Discussion with other health care providers: No discussion with other health care providers necessary.     GISSELLE: [phone]  Patient Class: IP- Inpatient  Admission Date: 3/31/2024  Hospital Length of Stay: 2 days  Discharge Date and Time:  04/03/2024 4:20 PM  Attending Physician: Rosa Angulo MD   Discharging Provider: Rosa Angulo MD  Primary Care Provider: Jose Szymanski MD     HPI:   Ms. Jo is a 74-year-old female with past medical history of diabetes, hypertension, CAD, arthritis, migraine, depression/anxiety, and AFib presented with shortness of breath and chest pain.  Patient denies any history of congestive heart failure but was noted to elevated blood pressure of 216/91 on admission and a troponin of 0.139 which increased to 0.263.  Of note, patient sleeps on 2 pillows at night, she has a former smoker but quit 21 years ago.  Cardiology was consulted and recommended admission for observation with echo and repeated troponins.  At the time of my evaluation patient stated her chest pain was much better but still had some heaviness.  Blood pressure was better controlled with hydralazine.  Patient states she has been compliant  with her medications but has not been watching her diet very closely.  Plan of care was discussed with patient, son, and daughter at bedside.  Hospital medicine will admit for observation, echo, blood pressure control, and cardiac workup.     Procedure(s) (LRB):  Angiogram, Coronary, with Left Heart Cath (N/A)  IVUS, Coronary       Hospital Course:   The patient presented with chest pain and shortness of breath. Cardiology was consulted. Echo revealed decrease in EF and troponin was elevated. She was placed on heparin gtt. Blood pressure improved with Imdur and coreg. Patient underwent LHC 4/1 and proximal LAD 99% occluded. Stent placed. Patient reported chest pain night of 4/1 and morning 4/2. Patient returned to cath lab for repeat heart cath on 4/2. Patent stent in LAD, noted. Medications adjusted. Patient had no further chest pain. Cardiology cleared patient for discharge. Apsirin, Plavix, statin and Zetia prescribed. Outpatient follow-up arranged prior to discharge. Patient seen and examined, stable for discharge.      Review of Systems   Constitutional:  Negative for chills, fatigue, fever and unexpected weight change.   Eyes:  Negative for visual disturbance.   Respiratory:  Negative for shortness of breath.    Cardiovascular:  Negative for chest pain.   Musculoskeletal:  Negative for myalgias.   Neurological:  Negative for headaches.        Objective:      Physical Exam  Vitals and nursing note reviewed.   Constitutional:       General: She is not in acute distress.     Appearance: She is well-developed.   HENT:      Head: Normocephalic and atraumatic.   Eyes:      General: Lids are normal. No scleral icterus.     Extraocular Movements: Extraocular movements intact.      Conjunctiva/sclera: Conjunctivae normal.   Cardiovascular:      Rate and Rhythm: Normal rate and regular rhythm.   Pulmonary:      Effort: Pulmonary effort is normal.      Breath sounds: Normal breath sounds. No decreased breath sounds,  wheezing, rhonchi or rales.   Neurological:      Mental Status: She is alert.      Cranial Nerves: No cranial nerve deficit.   Psychiatric:         Mood and Affect: Mood and affect normal.         Assessment:       1. Hospital discharge follow-up    2. NSTEMI (non-ST elevated myocardial infarction)    3. Mixed hyperlipidemia    4. Hypertensive emergency    5. Paroxysmal atrial fibrillation    6. Class 3 severe obesity with serious comorbidity and body mass index (BMI) of 40.0 to 44.9 in adult, unspecified obesity type    7. Controlled type 2 diabetes mellitus with diabetic polyneuropathy, with long-term current use of insulin    8. Urinary symptom or sign        Plan:   1. Hospital discharge follow-up    2. NSTEMI (non-ST elevated myocardial infarction)    3. Mixed hyperlipidemia    4. Hypertensive emergency    5. Paroxysmal atrial fibrillation    6. Class 3 severe obesity with serious comorbidity and body mass index (BMI) of 40.0 to 44.9 in adult, unspecified obesity type    7. Controlled type 2 diabetes mellitus with diabetic polyneuropathy, with long-term current use of insulin    8. Urinary symptom or sign  -     Urinalysis; Future; Expected date: 04/19/2024      ED precautions given  Keep cardiology appt on 4/24   Labs reviewed upon discharge

## 2024-04-19 NOTE — PROGRESS NOTES
Replying to Campaign Questionnaire for Overdue HM: Colonoscopy    Called and LVM for patient informing her that the order was placed and the scheduling department will be giving her a call. Reminder set to f/u next week.

## 2024-04-19 NOTE — PROGRESS NOTES
"Subjective:      Patient ID: Christine Jo is a 74 y.o. female.    Chief Complaint:  Multiple neurological symptoms           HPI    BACKGROUND    Former patient of Dr. Haney, new to me.    Patient presents to appointment unaccompanied to discuss dizziness and memory concerns. Her medical history includes depression, anxiety, hearing loss in left ear, PAF, HTN, CAD, frequent UTIs, type 2 DM, GERD, osteoporosis, and MATIAS. Patient states around 2020 she went swimming underwater in the ocean and developed dizziness and ringing in her left ear. She states over time, the ringing has improved, but the dizziness has worsened. She describes her dizziness as "room spinning". She states she had an episode in both January and February 2022 where she was watching a movie with surround sound, which caused her to develop severe dizziness with nausea and vomiting. She states she felt "drunk". Since february, she has had multiple dizzy spells. She states the spells have become daily over the last week. She states the spells typically last around 5 hours with nausea and stomach pains. She states she had one spell that lasted a day. She has called EMS twice in the past because of the spells. Denies lateralized weakness, slurred speech, sudden vision loss, or facial droop with spells. She states prior to the spells, she will have intense sweating. She states smells and turning her head quickly will cause her to become dizzy. States she has trouble hearing out of her left ear. She states Meclizine and sleeping help to improve her symptoms. States she has looked up vestibular exercises online, and they cause her to become dizzy and her neck often cracks. She states her head feels huge and feels like it is "stuffed with cotton" in the frontal region. She also has an "arthitis" pain that radiates from the back of her head down her spine. Also complains of pain behind eyes. She states the head pain has been constant and daily since " "January 2022 and worsens during her dizzy spells. Denies double vision. Denies aura. States she hears "zapping" inside her head. States she has sensitivity to light and sound. She states she began losing her balance over the last year. In March 2022, she began leaning forward and from side to side without realizing it. She loses her balance frequently and had a fall on 6-9-2022 and could not get off the floor for 2 hours. States she also had an incontinent episode on herself. States when she walks, her left foot turns towards the left side. Ambulates with a cane.  She experiences significant distress from her symptoms and is to afraid to go places. Denies history of TBI or storke. Did not receive the Ajovy prescription Dr. Haney ordered. Afraid to take Lodine because she is on blood thinners for her A. Fib. 10-4-2021 CTH shows chronic microvascular ischemic changes. 2- Abnormal VNG. Results are indicative of a left peripheral vestibulopathy, as evidenced by a 50% left Unilateral Weakness. Patient states that her dizziness and headaches have improved since she saw me in June. Since our last appointment, she has moved in with her son. States she has lightheadedness when going from a sit to stand position. Her hydralazine dose was lowered by cardiology. Has dizziness for 5 seconds when putting her head down on pillow at night. No longer having dizzy spells lasting for hours with nausea and vomiting. States she has a headache about 1-2 times a month that last up to 3 days. Describes her headaches as squishing/hammering. Has sensitivity to noise and smells with headaches. Feels like her headaches "drain" her. Also has aura of flashing lights in her peripheral vision. States she will have aura without headache at times. Did not start Aimovig or Nurtec PRN. 06- EEG NL. 7- MRI brain shows no acute findings.  Mild atrophy with white matter degeneration. MRV brain shows no acute abnormality.  Small " "hypoplastic right transverse/sigmoid sinus.  Otherwise negative. 7- CTA head and neck shows no evidence of flow-limiting stenosis within the cervical arterial vasculature. No evidence of flow-limiting stenosis within the intracranial arterial vasculature. No aneurysm.    Patient states she noticed short term memory trouble around 2017 that is progressively getting worst. She experiences word finding difficulties and forgetting conversations. At times, she will feel nervous and rushed, causing her to have difficulty processing things. Often has "brain fog". She does not drive. She lives alone at this time but plans to move in with her son. Independent in ADLs and keeps up with her medications and appointments. Has trouble sleeping at night and has decreased appetite. Takes Cymbalta for anxiety and depression. Use to see Dr. Albrecht with psych but not interesting in seeing psych at this time. No history of hypothyroidism. Her mother was diagnosed with dementia in her early 80s. She states [place] [facility] [place] Continues to have trouble with her memory. Has not had neuropsych appointment. 7- MRI brain shows no acute findings.  Mild atrophy with white matter degeneration.    Patient states she has history of left sided bells palsy in her 20s. States symptoms resolved.     Patient states she has a history of CTS with CTS release surgery. She states numbness in bilateral hands is reemerging along with weakness.      Interval History 10-: Patient presents to follow up appointment unaccompanied. Patient states her dizziness and headaches have improved. Her dizziness continues to occur when laying head down and lightheadedness when going from a sitting to standing position. BP 98/61 today. Taking benazepril 40 mg BID, coreg 25 mg BID, and hydralazine 100 mg BID for HTN. Having mild daily headaches and severe headaches about twice a month. Was unable to tolerate Lamictal. " Nurtec PRN helps to abort severe headaches.    Continues to have trouble with her memory. Believes it is related to depression. Also having insomnia. Denies suicidal ideations. Interested in psych referral. [place] [facility] [place] [facility] [place] are generally within normal limits. Short term memory is normal. Only atypicaly finds is mild slowing in processing speed and attention. This is likely related to MATIAS and HTN/diabetesMRI Brain and MRV Brain 7- unremarkable.      INTERVAL HISTORY 04- : Patient is present for follow up for multiple complaints.    Migraines well managed with Emgality 120 mg SQ monthly doing tremendously well.     Nurtec PRN works very well, requested refills.     Patient open to trying  Vestibular therapy (2024) will order treatment.     Cognitive complaint is ongoing but no drastic decline in cognitive function noted. She reports increased anxiety and depression.  Patient is managed by Psychiatry, Dr. Babin. No thoughts of self harm. Patient did see the  psychologist but states she did not think treatment was the right fit.  No recent falls noted. Patient continues to have issues with sleep, she has history of diagnose of MATIAS but not on current treatment will refer Patient to Pulmonology for management.       Review of Systems   Constitutional:  Positive for appetite change (decrease) and fatigue.   HENT:  Positive for hearing loss and tinnitus. Negative for drooling, trouble swallowing and voice change.    Eyes:  Positive for visual disturbance. Negative for photophobia.   Cardiovascular:  Negative for palpitations.   Gastrointestinal:  Positive for nausea and vomiting. Negative for constipation and diarrhea.   Genitourinary:  Negative for difficulty urinating.        Urinary incontinence    Musculoskeletal:  Positive for arthralgias, back pain, gait problem and neck pain.   Neurological:  Positive for dizziness, weakness, light-headedness and headaches. Negative for  tremors, seizures, syncope, facial asymmetry, speech difficulty and numbness.   Psychiatric/Behavioral:  Positive for decreased concentration, dysphoric mood and sleep disturbance. Negative for behavioral problems, confusion, hallucinations, self-injury and suicidal ideas. The patient is nervous/anxious.      Objective:   VITAL SIGNS WERE REVIEWED        GENERAL APPEARANCE:      The patient looks comfortable.     BMI 44.9     No signs of respiratory distress.     Normal breathing pattern.     No dysmorphic features     Normal eye contact.      GENERAL MEDICAL EXAM:     HEENT:  Head is atraumatic normocephalic.      Neck and Axillae: No JVD. No visible lesions. No thyromegaly. No lymphadenopathy.     Cardiopulmonary: No cyanosis. No tachypnea. Normal respiratory effort.     Gastrointestinal/Urogenital:  No jaundice. No stomas or lesions. No visible hernias. No catheters.     Skin, Hair and Nails:  No neurofibromatosis. No visible lesions.No stigmata of autoimmune disease. No clubbing.     Skin is warm and moist. No palpable masses.     Limbs: No varicose veins. No visible swelling. No palpable edema.     Muskoskeletal: No visible deformities.No visible lesions.     No spine tenderness. No signs of longstanding neuropathy. No dislocations or fractures.          Neurologic Exam     Mental Status   Oriented to person, place, and time.   Follows 3 step commands.   Attention: normal. Concentration: normal.   Speech: speech is normal   Level of consciousness: alert  Knowledge: good. Able to perform simple calculations.   Able to name object. Able to read. Unable to repeat. Able to write. Normal comprehension.          Cranial Nerves     CN II   Visual fields full to confrontation.   Visual acuity: normal  Right visual field deficit: none  Left visual field deficit: none     CN III, IV, VI   Pupils are equal, round, and reactive to light.  Extraocular motions are normal.   Right pupil: Shape: regular. Reactivity: brisk.  Consensual response: intact. Accommodation: intact.   Left pupil: Shape: regular. Reactivity: brisk. Consensual response: intact. Accommodation: intact.   CN III: no CN III palsy  CN VI: no CN VI palsy  Nystagmus: none   Diplopia: none  Ophthalmoparesis: none  Upgaze: normal  Downgaze: normal  Conjugate gaze: present  Vestibulo-ocular reflex: present    CN V   Right facial sensation deficit: complete  Left facial sensation deficit: none    CN VII   Facial expression full, symmetric.   Right facial weakness: none  Left facial weakness: none    CN VIII   Hearing: impaired    CN IX, X   CN IX normal.   CN X normal.   Palate: symmetric    CN XI   CN XI normal.   Right sternocleidomastoid strength: normal  Left sternocleidomastoid strength: normal  Right trapezius strength: normal  Left trapezius strength: normal    CN XII   CN XII normal.   Tongue: not atrophic  Fasciculations: absent  Tongue deviation: none    Motor Exam   Muscle bulk: normal  Overall muscle tone: normal  Right arm tone: normal  Left arm tone: normal  Right arm pronator drift: absent  Left arm pronator drift: absent  Right leg tone: normal  Left leg tone: normal        MAEW     Sensory Exam       MARIANO      Gait, Coordination, and Reflexes     Gait  Gait: non-neurologic    Coordination   Romberg: negative  Finger to nose coordination: normal  Heel to shin coordination: normal    Reflexes   Right plantar: normal  Left plantar: normal  Right Shore: absent  Left Shore: absent  Right ankle clonus: absent  Left ankle clonus: absent  Right pendular knee jerk: absent  Left pendular knee jerk: absent    MARIANO          MARIPOSA COGNITIVE ASSESSMENT (MOCA) TOTAL SCORE    NORMAL-MILD NCD 26-30    MILD DEMENTIA 20-25    6-9-2022 MOCA 24/30    Barriers MDD/DALLAS     DATE 06-       TOTAL SCORE 24/30       VISUOSPATIAL EXECUTIVE (5) 4       NAMING (3) 3       ATTENTION (6) 6       LANGUAGE (3) 1       ABSTRACTION(2) 2       RECALL (5) 2/5 (3 with cues) 2        ORIENTATION (6) 6             Lab Results   Component Value Date    WBC 6.03 04/03/2024    HGB 10.0 (L) 04/03/2024    HCT 30.4 (L) 04/03/2024    MCV 94 04/03/2024     04/03/2024   1C  Sodium   Date Value Ref Range Status   04/03/2024 137 136 - 145 mmol/L Final     Potassium   Date Value Ref Range Status   04/03/2024 4.1 3.5 - 5.1 mmol/L Final     Chloride   Date Value Ref Range Status   04/03/2024 104 95 - 110 mmol/L Final     CO2   Date Value Ref Range Status   04/03/2024 21 (L) 23 - 29 mmol/L Final     Glucose   Date Value Ref Range Status   04/03/2024 170 (H) 70 - 110 mg/dL Final     BUN   Date Value Ref Range Status   04/03/2024 11 8 - 23 mg/dL Final     Creatinine   Date Value Ref Range Status   04/03/2024 0.8 0.5 - 1.4 mg/dL Final     Calcium   Date Value Ref Range Status   04/03/2024 9.0 8.7 - 10.5 mg/dL Final     Total Protein   Date Value Ref Range Status   03/31/2024 6.4 6.0 - 8.4 g/dL Final     Albumin   Date Value Ref Range Status   03/31/2024 3.6 3.5 - 5.2 g/dL Final     Total Bilirubin   Date Value Ref Range Status   03/31/2024 0.5 0.1 - 1.0 mg/dL Final     Comment:     For infants and newborns, interpretation of results should be based  on gestational age, weight and in agreement with clinical  observations.    Premature Infant recommended reference ranges:  Up to 24 hours.............<8.0 mg/dL  Up to 48 hours............<12.0 mg/dL  3-5 days..................<15.0 mg/dL  6-29 days.................<15.0 mg/dL       Alkaline Phosphatase   Date Value Ref Range Status   03/31/2024 38 (L) 55 - 135 U/L Final     AST   Date Value Ref Range Status   03/31/2024 19 10 - 40 U/L Final     ALT   Date Value Ref Range Status   03/31/2024 16 10 - 44 U/L Final     Anion Gap   Date Value Ref Range Status   04/03/2024 12 8 - 16 mmol/L Final     eGFR if    Date Value Ref Range Status   07/27/2022 >60 >60 mL/min/1.73 m^2 Final     eGFR if non    Date Value Ref Range Status    07/27/2022 >60 >60 mL/min/1.73 m^2 Final     Comment:     Calculation used to obtain the estimated glomerular filtration  rate (eGFR) is the CKD-EPI equation.        Lab Results   Component Value Date    IZMZANHT09 309 02/22/2023      Lab Results   Component Value Date    TSH 1.024 10/04/2022     Lab Results   Component Value Date    HGBA1C 7.5 (H) [place]14/2024 [place] [facility] [place] testing results are generally within normal limits. Short term memory is normal. Only atypicaly finds is mild slowing in processing speed and attention. This is likely related to MATIAS and HTN/diabetes    10-     MRI IAC/Temporal Bones no significant MRI abnormality of the internal auditory canals or cerebellopontine angles.  2. Minimal nonspecific white matter T2 alteration that likely relates to chronic microvascular ischemia.     10-4-2021      CTH shows chronic microvascular ischemic changes      2-     Abnormal VNG. Results are indicative of a left peripheral vestibulopathy, as evidenced by a 50% left Unilateral Weakness.         06-     EEG NL      7-    MRI brain shows no acute findings.  Mild atrophy with white matter degeneration.    MRV brain shows no acute abnormality.  Small hypoplastic right transverse/sigmoid sinus.  Otherwise negative.      7-    CTA head and neck shows no evidence of flow-limiting stenosis within the cervical arterial vasculature. No evidence of flow-limiting stenosis within the intracranial arterial vasculature. No aneurysm.        Reviewed the neuroimaging independently      Assessment:      1. Multiple neurological symptoms    2. Vestibular migraine    3. Vertigo    4. Cognitive complaints    5. Sleep apnea, unspecified type    6. Migraine with aura and with status migrainosus, not intractable    7. Depression, unspecified depression type    8. Dizziness and giddiness    9. Vertigo of central origin     10. Mild cognitive disorder    11. Dizzy    12.  Depression, major, recurrent, moderate    13. Assistance with transportation    14. Adjustment disorder with other symptoms    15. Anxiety disorder, unspecified type    16. Hearing loss of left ear, unspecified hearing loss type    17. Tinnitus, unspecified laterality    18. Unsteadiness on feet     19. Recurrent major depressive disorder, in partial remission    20. Paroxysmal atrial fibrillation    21. Recurrent major depressive disorder, in full remission    22. Morbid obesity with BMI of 40.0-44.9, adult    23. Insomnia, unspecified type    24. History of obstructive sleep apnea    25. Carpal tunnel syndrome, unspecified laterality    26. Pulsatile tinnitus, unspecified ear    27. Memory loss    28. Coronary artery disease of native artery of native heart with stable angina pectoris    29. Hypertensive emergency    30. Neck pain    31. Old MI (myocardial infarction)    32. Abnormal ECG                 Plan:      MULTIPLE NEUROLOGICAL SYMPTOMS      MIGRAINE WITH AURA/ POSSIBLE VESTIBULAR MIGRAINE     Continue Emgality 120 mg SQ monthly  refills sent to pharmacy on file [facility]     Continue Nurtec 75 mg po PRN refills sent to pharmacy on file [facility]     Propanolol contraindicated related multiple medications for HTN    Topamax contraindicated related to memory difficulites    Amitriptyline contraindicated related to history of CAD    Stopped Lamictal related to adverse effects    States Ajovy not covered by insurance      Refer for Vestibular therapy     Consult Social service for community resources for Transportation issues to appt. And therapy.     Vestibular therapy in new year (2024), there is an issue with transportation at this time. Will consult SS to assist with any available resources      Refer to Psychology for therapy for DALLAS/MDD    Continue Psychiatry management with MAGALYS CARIAS    Instructed patient to take daily BP and keep BP log- discuss BP with cardiology    Avoid standing for a  long time    Slow transition from lying down to sitting to standing    Cross legs while standing    Increase fluid intake    Balanced salt intake     Adjust hypotensive agents     Wearing thigh high stockings     Falling down precautions    Avoid triggers           RICK/MEMROY DIFFICULTIES/ANXIETY/DEPRESSION    Psych referral placed    Recommend optimization of DALLAS/MDD first     Refer for CNP  trouble continues depsite MDD/DALLAS management    Memory Strategies     Internal Memory Strategies:   PREPARE yourself to pay attention   REHEARSAL: Mentally repeat information over and over.   VISUALIZATION: Make a mental picture of information.   ASSOCIATION: Mentally associate new information with something familiar.   REGROUPING: Organize long lists of information by category.   FIRST LETTER MNEMONICS:   LISTEN FOR THE MAIN IDEA: who, what, when, where, why, how, what's next   CHUNKING: Group digits of numbers into chunks (ex. [id number])    Break sentences and instructions down by ideas. (ex. Physical    therapy / will be at 3:00 / instead of 1:30 / because I have a    meeting).     CHAINING: Recall 3-4 words by linking them together in a sentence.    External Memory Strategies:   NOTETAKING:   TAPE RECORDING: Can use to record someone giving length information such     as a lecture, verbal instructions, or directions.   MEMORY BOOK / DAY PLANNER: To record up-coming appointments and    events.   WATCH ALARMS: Use as a reminder to take medications or check daily    Schedule.        SLEEP APNEA    Refer Pulmonologist to evalute and treat    CTS    Consider EMG/NCT in future          MEDICAL/SURGICAL COMORBIDITIES      All relevant medical comorbidities noted and managed by primary care physician and medical care team.          MISCELLANEOUS MEDICAL PROBLEMS         HEALTHY LIFESTYLE AND PREVENTATIVE CARE     Encouraged the patient to adhere to the age-appropriate health maintenance guidelines including screening tests and  vaccinations.      Discussed the overall importance of healthy lifestyle, optimal weight, exercise, healthy diet, good sleep hygiene and avoiding drugs including smoking, alcohol and recreational drugs. The patient verbalized full understanding.         Advised the patient to follow COVID-19 prevention measures.         I spent a total of 30 minutes on the day of the visit.    This includes face to face time (25 minutes) and non-face to face time (44 minutes) preparing to see the patient (eg, review of tests), obtaining and/or reviewing separately obtained history, documenting clinical information in the electronic or other health record, independently interpreting results and communicating results to the patient/family/caregiver, or care coordinator.            RTC    No follow-ups on file.        Chapincito English, MSN NP      Collaborating Provider: Kimberly Sapp MD, FAAN Neurologist/Epileptologist

## 2024-04-22 ENCOUNTER — PATIENT MESSAGE (OUTPATIENT)
Dept: NEUROLOGY | Facility: CLINIC | Age: 74
End: 2024-04-22
Payer: MEDICARE

## 2024-04-22 ENCOUNTER — PATIENT MESSAGE (OUTPATIENT)
Dept: INTERNAL MEDICINE | Facility: CLINIC | Age: 74
End: 2024-04-22
Payer: MEDICARE

## 2024-04-22 DIAGNOSIS — Z79.4 CONTROLLED TYPE 2 DIABETES MELLITUS WITH DIABETIC POLYNEUROPATHY, WITH LONG-TERM CURRENT USE OF INSULIN: ICD-10-CM

## 2024-04-22 DIAGNOSIS — G89.29 CHRONIC PAIN OF BOTH KNEES: ICD-10-CM

## 2024-04-22 DIAGNOSIS — M79.89 LEG SWELLING: ICD-10-CM

## 2024-04-22 DIAGNOSIS — R06.09 DOE (DYSPNEA ON EXERTION): ICD-10-CM

## 2024-04-22 DIAGNOSIS — I65.29 STENOSIS OF CAROTID ARTERY, UNSPECIFIED LATERALITY: ICD-10-CM

## 2024-04-22 DIAGNOSIS — E11.42 CONTROLLED TYPE 2 DIABETES MELLITUS WITH DIABETIC POLYNEUROPATHY, WITH LONG-TERM CURRENT USE OF INSULIN: ICD-10-CM

## 2024-04-22 DIAGNOSIS — M25.562 CHRONIC PAIN OF BOTH KNEES: ICD-10-CM

## 2024-04-22 DIAGNOSIS — I48.0 PAROXYSMAL ATRIAL FIBRILLATION: ICD-10-CM

## 2024-04-22 DIAGNOSIS — Z86.69 HISTORY OF OBSTRUCTIVE SLEEP APNEA: ICD-10-CM

## 2024-04-22 DIAGNOSIS — R42 DISEQUILIBRIUM: ICD-10-CM

## 2024-04-22 DIAGNOSIS — I10 ESSENTIAL HYPERTENSION: ICD-10-CM

## 2024-04-22 DIAGNOSIS — E78.1 HYPERTRIGLYCERIDEMIA: ICD-10-CM

## 2024-04-22 DIAGNOSIS — I71.40 ABDOMINAL AORTIC ANEURYSM (AAA) WITHOUT RUPTURE, UNSPECIFIED PART: ICD-10-CM

## 2024-04-22 DIAGNOSIS — F32.A DEPRESSION, UNSPECIFIED DEPRESSION TYPE: ICD-10-CM

## 2024-04-22 DIAGNOSIS — E66.01 CLASS 3 SEVERE OBESITY DUE TO EXCESS CALORIES WITH SERIOUS COMORBIDITY AND BODY MASS INDEX (BMI) OF 45.0 TO 49.9 IN ADULT: ICD-10-CM

## 2024-04-22 DIAGNOSIS — M17.0 PRIMARY OSTEOARTHRITIS OF BOTH KNEES: Primary | ICD-10-CM

## 2024-04-22 DIAGNOSIS — R42 DIZZINESS: ICD-10-CM

## 2024-04-22 DIAGNOSIS — M79.604 PAIN IN BOTH LOWER EXTREMITIES: ICD-10-CM

## 2024-04-22 DIAGNOSIS — R42 VERTIGO: ICD-10-CM

## 2024-04-22 DIAGNOSIS — R29.90 MULTIPLE NEUROLOGICAL SYMPTOMS: Primary | ICD-10-CM

## 2024-04-22 DIAGNOSIS — E11.65 UNCONTROLLED TYPE 2 DIABETES MELLITUS WITH HYPERGLYCEMIA: ICD-10-CM

## 2024-04-22 DIAGNOSIS — I10 PRIMARY HYPERTENSION: ICD-10-CM

## 2024-04-22 DIAGNOSIS — R00.2 PALPITATIONS: ICD-10-CM

## 2024-04-22 DIAGNOSIS — I25.118 CORONARY ARTERY DISEASE OF NATIVE ARTERY OF NATIVE HEART WITH STABLE ANGINA PECTORIS: ICD-10-CM

## 2024-04-22 DIAGNOSIS — K21.9 GASTROESOPHAGEAL REFLUX DISEASE WITHOUT ESOPHAGITIS: ICD-10-CM

## 2024-04-22 DIAGNOSIS — G47.30 SLEEP APNEA, UNSPECIFIED TYPE: ICD-10-CM

## 2024-04-22 DIAGNOSIS — M25.561 CHRONIC PAIN OF BOTH KNEES: ICD-10-CM

## 2024-04-22 DIAGNOSIS — M79.605 PAIN IN BOTH LOWER EXTREMITIES: ICD-10-CM

## 2024-04-22 DIAGNOSIS — R94.31 ABNORMAL ECG: ICD-10-CM

## 2024-04-22 DIAGNOSIS — E78.2 MIXED HYPERLIPIDEMIA: ICD-10-CM

## 2024-04-22 DIAGNOSIS — J30.9 CHRONIC ALLERGIC RHINITIS: ICD-10-CM

## 2024-04-22 DIAGNOSIS — G62.9 NEUROPATHY: ICD-10-CM

## 2024-04-22 RX ORDER — FUROSEMIDE 20 MG/1
20 TABLET ORAL DAILY PRN
Qty: 90 TABLET | Refills: 0 | Status: SHIPPED | OUTPATIENT
Start: 2024-04-22 | End: 2024-06-15

## 2024-04-22 RX ORDER — BENAZEPRIL HYDROCHLORIDE 40 MG/1
40 TABLET ORAL 2 TIMES DAILY
Qty: 180 TABLET | Refills: 3 | Status: SHIPPED | OUTPATIENT
Start: 2024-04-22 | End: 2024-04-24

## 2024-04-22 RX ORDER — NITROGLYCERIN 0.4 MG/1
0.4 TABLET SUBLINGUAL EVERY 5 MIN PRN
Qty: 25 TABLET | Refills: 0 | Status: SHIPPED | OUTPATIENT
Start: 2024-04-22

## 2024-04-22 RX ORDER — PANTOPRAZOLE SODIUM 40 MG/1
40 TABLET, DELAYED RELEASE ORAL DAILY
Qty: 90 TABLET | Refills: 1 | Status: SHIPPED | OUTPATIENT
Start: 2024-04-22

## 2024-04-22 RX ORDER — PRAVASTATIN SODIUM 20 MG/1
20 TABLET ORAL NIGHTLY
Qty: 90 TABLET | Refills: 1 | Status: SHIPPED | OUTPATIENT
Start: 2024-04-22 | End: 2024-05-15

## 2024-04-22 RX ORDER — CARVEDILOL 25 MG/1
TABLET ORAL
Qty: 180 TABLET | Refills: 3 | Status: SHIPPED | OUTPATIENT
Start: 2024-04-22

## 2024-04-22 RX ORDER — HYDRALAZINE HYDROCHLORIDE 50 MG/1
50 TABLET, FILM COATED ORAL EVERY 8 HOURS
Qty: 180 TABLET | Refills: 3 | Status: SHIPPED | OUTPATIENT
Start: 2024-04-22 | End: 2024-04-24

## 2024-04-22 RX ORDER — EZETIMIBE 10 MG/1
10 TABLET ORAL NIGHTLY
Qty: 90 TABLET | Refills: 1 | Status: SHIPPED | OUTPATIENT
Start: 2024-04-22

## 2024-04-22 RX ORDER — ISOSORBIDE MONONITRATE 30 MG/1
30 TABLET, EXTENDED RELEASE ORAL DAILY
Qty: 90 TABLET | Refills: 1 | Status: SHIPPED | OUTPATIENT
Start: 2024-04-22

## 2024-04-22 RX ORDER — GABAPENTIN 300 MG/1
300 CAPSULE ORAL DAILY PRN
Qty: 90 CAPSULE | Refills: 3 | Status: SHIPPED | OUTPATIENT
Start: 2024-04-22 | End: 2025-04-22

## 2024-04-22 RX ORDER — CLOPIDOGREL BISULFATE 75 MG/1
75 TABLET ORAL DAILY
Qty: 90 TABLET | Refills: 1 | Status: SHIPPED | OUTPATIENT
Start: 2024-04-22 | End: 2025-04-22

## 2024-04-22 RX ORDER — FLUTICASONE PROPIONATE 50 MCG
2 SPRAY, SUSPENSION (ML) NASAL DAILY
Qty: 48 G | Refills: 3 | Status: SHIPPED | OUTPATIENT
Start: 2024-04-22

## 2024-04-22 RX ORDER — INSULIN GLARGINE 100 [IU]/ML
72 INJECTION, SOLUTION SUBCUTANEOUS NIGHTLY
Qty: 75 ML | Refills: 3 | Status: SHIPPED | OUTPATIENT
Start: 2024-04-22

## 2024-04-23 DIAGNOSIS — R06.02 SOB (SHORTNESS OF BREATH): Primary | ICD-10-CM

## 2024-04-24 ENCOUNTER — HOSPITAL ENCOUNTER (OUTPATIENT)
Dept: CARDIOLOGY | Facility: HOSPITAL | Age: 74
Discharge: HOME | End: 2024-04-24
Payer: MEDICARE

## 2024-04-24 ENCOUNTER — HOSPITAL ENCOUNTER (OUTPATIENT)
Dept: CARDIOLOGY | Facility: HOSPITAL | Age: 74
Discharge: HOME | End: 2024-04-24
Attending: NURSE PRACTITIONER
Payer: MEDICARE

## 2024-04-24 ENCOUNTER — OFFICE VISIT (OUTPATIENT)
Dept: CARDIOLOGY | Facility: CLINIC | Age: 74
End: 2024-04-24
Payer: MEDICARE

## 2024-04-24 VITALS
SYSTOLIC BLOOD PRESSURE: 110 MMHG | WEIGHT: 239.63 LBS | DIASTOLIC BLOOD PRESSURE: 60 MMHG | BODY MASS INDEX: 43.83 KG/M2 | HEART RATE: 77 BPM | OXYGEN SATURATION: 99 %

## 2024-04-24 DIAGNOSIS — I48.0 PAROXYSMAL ATRIAL FIBRILLATION: ICD-10-CM

## 2024-04-24 DIAGNOSIS — I50.20 HEART FAILURE WITH REDUCED EJECTION FRACTION: ICD-10-CM

## 2024-04-24 DIAGNOSIS — I70.8 AORTO-ILIAC ATHEROSCLEROSIS: ICD-10-CM

## 2024-04-24 DIAGNOSIS — E66.01 MORBID OBESITY WITH BMI OF 40.0-44.9, ADULT: ICD-10-CM

## 2024-04-24 DIAGNOSIS — E66.01 CLASS 3 SEVERE OBESITY DUE TO EXCESS CALORIES WITH SERIOUS COMORBIDITY AND BODY MASS INDEX (BMI) OF 45.0 TO 49.9 IN ADULT: ICD-10-CM

## 2024-04-24 DIAGNOSIS — I70.0 AORTO-ILIAC ATHEROSCLEROSIS: ICD-10-CM

## 2024-04-24 DIAGNOSIS — E11.42 CONTROLLED TYPE 2 DIABETES MELLITUS WITH DIABETIC POLYNEUROPATHY, WITH LONG-TERM CURRENT USE OF INSULIN: ICD-10-CM

## 2024-04-24 DIAGNOSIS — R06.02 SOB (SHORTNESS OF BREATH): ICD-10-CM

## 2024-04-24 DIAGNOSIS — I21.4 NSTEMI (NON-ST ELEVATED MYOCARDIAL INFARCTION): Primary | ICD-10-CM

## 2024-04-24 DIAGNOSIS — R00.2 PALPITATIONS: ICD-10-CM

## 2024-04-24 DIAGNOSIS — I25.118 CORONARY ARTERY DISEASE OF NATIVE ARTERY OF NATIVE HEART WITH STABLE ANGINA PECTORIS: ICD-10-CM

## 2024-04-24 DIAGNOSIS — Z79.4 CONTROLLED TYPE 2 DIABETES MELLITUS WITH DIABETIC POLYNEUROPATHY, WITH LONG-TERM CURRENT USE OF INSULIN: ICD-10-CM

## 2024-04-24 LAB
OHS QRS DURATION: 92 MS
OHS QTC CALCULATION: 460 MS

## 2024-04-24 PROCEDURE — 3288F FALL RISK ASSESSMENT DOCD: CPT | Mod: CPTII,S$GLB,, | Performed by: NURSE PRACTITIONER

## 2024-04-24 PROCEDURE — 3072F LOW RISK FOR RETINOPATHY: CPT | Mod: CPTII,S$GLB,, | Performed by: NURSE PRACTITIONER

## 2024-04-24 PROCEDURE — 3008F BODY MASS INDEX DOCD: CPT | Mod: CPTII,S$GLB,, | Performed by: NURSE PRACTITIONER

## 2024-04-24 PROCEDURE — 93010 ELECTROCARDIOGRAM REPORT: CPT | Mod: ,,, | Performed by: INTERNAL MEDICINE

## 2024-04-24 PROCEDURE — 99214 OFFICE O/P EST MOD 30 MIN: CPT | Mod: S$GLB,,, | Performed by: NURSE PRACTITIONER

## 2024-04-24 PROCEDURE — 1111F DSCHRG MED/CURRENT MED MERGE: CPT | Mod: CPTII,S$GLB,, | Performed by: NURSE PRACTITIONER

## 2024-04-24 PROCEDURE — 1101F PT FALLS ASSESS-DOCD LE1/YR: CPT | Mod: CPTII,S$GLB,, | Performed by: NURSE PRACTITIONER

## 2024-04-24 PROCEDURE — 3051F HG A1C>EQUAL 7.0%<8.0%: CPT | Mod: CPTII,S$GLB,, | Performed by: NURSE PRACTITIONER

## 2024-04-24 PROCEDURE — 3074F SYST BP LT 130 MM HG: CPT | Mod: CPTII,S$GLB,, | Performed by: NURSE PRACTITIONER

## 2024-04-24 PROCEDURE — 93248 EXT ECG>7D<15D REV&INTERPJ: CPT | Mod: ,,, | Performed by: INTERNAL MEDICINE

## 2024-04-24 PROCEDURE — 3078F DIAST BP <80 MM HG: CPT | Mod: CPTII,S$GLB,, | Performed by: NURSE PRACTITIONER

## 2024-04-24 PROCEDURE — 4010F ACE/ARB THERAPY RXD/TAKEN: CPT | Mod: CPTII,S$GLB,, | Performed by: NURSE PRACTITIONER

## 2024-04-24 PROCEDURE — 1159F MED LIST DOCD IN RCRD: CPT | Mod: CPTII,S$GLB,, | Performed by: NURSE PRACTITIONER

## 2024-04-24 PROCEDURE — 99999 PR PBB SHADOW E&M-EST. PATIENT-LVL III: CPT | Mod: PBBFAC,,, | Performed by: NURSE PRACTITIONER

## 2024-04-24 PROCEDURE — 93005 ELECTROCARDIOGRAM TRACING: CPT

## 2024-04-24 RX ORDER — SACUBITRIL AND VALSARTAN 24; 26 MG/1; MG/1
1 TABLET, FILM COATED ORAL 2 TIMES DAILY
Qty: 60 TABLET | Refills: 3 | Status: SHIPPED | OUTPATIENT
Start: 2024-04-24 | End: 2024-05-07 | Stop reason: SDUPTHER

## 2024-04-24 NOTE — PROGRESS NOTES
Subjective:   Patient ID:  Christine Jo is a 74 y.o. female who presents for evaluation of No chief complaint on file.      HPI    Christine Jo returns for hospital follow up.    Her current medical conditions include CAD s/p PCI, DM Type Ii, HTN, MATIAS, glaucoma, obesity, PAF on Eliquis.     She continues to have issues with GILL and dizziness.     EKG- NSR, Left axis deviation    Unable to tolerate cpap previously.     Does report worsening dizziness since hospital discharge.     Denies chest pain or anginal equivalents. Denies orthopnea, PND or abdominal bloating. Reports regular walking without any issues lately. NO leg swelling or claudications. No recent falls, syncope or near syncopal events. Reports compliance with medications and dietary restrictions. NO CNS complaints to suggest TIA or CVA today. No signs of abnormal bleeding on Eliquis and Plavix.       Past Medical History:   Diagnosis Date    Arthritis     Cataract     Coronary artery disease     Diabetes mellitus 2008     am 01/15/2018 Insulin x 1 year    Diabetes mellitus, type 2     DM (diabetes mellitus) 2008     am 02/14/2020 Insulin x 4 years    Encounter for blood transfusion     Glaucoma     Hypertension     Insomnia     Macular degeneration     Old MI (myocardial infarction) 2/12/2024    Vaginal yeast infection        Past Surgical History:   Procedure Laterality Date    abdominal laparoscopy       ADENOIDECTOMY  1965    Tonsils removed also    ANGIOGRAM, CORONARY, WITH LEFT HEART CATHETERIZATION N/A 04/02/2024    Procedure: Angiogram, Coronary, with Left Heart Cath;  Surgeon: Shree Espinoza MD;  Location: [facility] CATH LAB;  Service: Cardiology;  Laterality: N/A;    ANGIOGRAM, CORONARY, WITH LEFT HEART CATHETERIZATION N/A 04/01/2024    Procedure: Angiogram, Coronary, with Left Heart Cath;  Surgeon: Shree Espinoza MD;  Location: [facility] CATH LAB;  Service: Cardiology;  Laterality: N/A;    BREAST BIOPSY Left 1998    benign     BREAST SURGERY  1998 biopsy lt breast    CATARACT EXTRACTION Bilateral [id number]    [facility] in [place]    Cataract Surgery Bilateral     Laser (YAG)    COLON SURGERY  2004    COLONOSCOPY N/A 05/05/2021    Procedure: COLONOSCOPY;  Surgeon: Shawn Rogers III, MD;  Location: [facility] ENDO;  Service: Endoscopy;  Laterality: N/A;    COLONOSCOPY N/A 06/30/2021    Procedure: COLONOSCOPY;  Surgeon: Memo Merida MD;  Location: [facility] ENDO;  Service: Endoscopy;  Laterality: N/A;    CORONARY STENT PLACEMENT N/A 04/01/2024    Procedure: INSERTION, STENT, CORONARY ARTERY;  Surgeon: Shree Espinoza MD;  Location: [facility] CATH LAB;  Service: Cardiology;  Laterality: N/A;    ESOPHAGOGASTRODUODENOSCOPY N/A 11/29/2019    Procedure: ESOPHAGOGASTRODUODENOSCOPY (EGD);  Surgeon: Shawn Rogers III, MD;  Location: [facility];  Service: Endoscopy;  Laterality: N/A;    ESOPHAGOGASTRODUODENOSCOPY N/A 05/05/2021    Procedure: EGD (ESOPHAGOGASTRODUODENOSCOPY);  Surgeon: Shawn Rogers III, MD;  Location: [facility];  Service: Endoscopy;  Laterality: N/A;    EYE SURGERY      IVUS, CORONARY  04/02/2024    Procedure: IVUS, Coronary;  Surgeon: Shree Espinoza MD;  Location: [facility] CATH LAB;  Service: Cardiology;;    PERCUTANEOUS CORONARY INTERVENTION, ARTERY N/A 04/01/2024    Procedure: Percutaneous coronary intervention;  Surgeon: Shree Espinoza MD;  Location: [facility] CATH LAB;  Service: Cardiology;  Laterality: N/A;    PTCA, SINGLE VESSEL  04/01/2024    Procedure: PTCA, Single Vessel;  Surgeon: Shree Espinoza MD;  Location: [facility] CATH LAB;  Service: Cardiology;;    SMALL INTESTINE SURGERY  2004    Exploratory stomach large and small intestines    TONSILLECTOMY      TUBAL LIGATION         Social History     Tobacco Use    Smoking status: Former     Current packs/day: 0.00     Average packs/day: 1 pack/day for 36.5 years (36.5 ttl pk-yrs)     Types: Cigarettes     Start date: 1967     Quit date: 6/22/2003     Years since  quittin.8     Passive exposure: Past    Smokeless tobacco: Never    Tobacco comments:     Been quit 19 years. I smoked 35 years approx a pack a day   Substance Use Topics    Alcohol use: No    Drug use: Never       Family History   Problem Relation Name Age of Onset    Heart disease Mother Jillian Jo         Open heart surgery    Cataracts Mother Jillian Jo     Macular degeneration Mother Jillian Jo     Glaucoma Mother Jillian Jo     Arthritis Mother Jillian Jo     Hearing loss Mother Jillian Jo     Hypertension Mother Jillian Jo     Kidney disease Mother Jillian Jo     Stroke Mother Jillian Jo     Vision loss Mother Jillian Jo     Diabetes Sister La Jo     Heart disease Sister La Jo         CAD    Cataracts Sister La Jo     Depression Sister La Jo         Depression    Hypertension Sister La Jo     Diabetes Sister Erica Lerouge     Hearing loss Sister Erica Lerouge     Hypertension Sister Erica Lerouge     Diabetes Sister      Cancer Son Andrea Trascher III         testicular     Arthritis Son Andrea Trascher III         Because of chemotherapy at 19 he has neuropathy and arthritis    Hearing loss Son Andrea Trascher III         Because of chemotherapy    Cancer Maternal Aunt Etta Marquez         Tumor in chest wall    Heart disease Maternal Grandfather Leonides Santos         Pacemaker    Arthritis Maternal Grandfather Leonides Santos     Hearing loss Maternal Grandfather Wendover Tom     Hypertension Maternal Grandfather Wendover Tom     Hypertension Maternal Grandmother Helene Santos     Kidney disease Maternal Grandmother Helene Santos     Stroke Maternal Grandmother Helene Santos     Alcohol abuse Daughter Kaye Camargoscher     Asthma Daughter Kaye Trascher     Depression Daughter Kaye Trascher         Psysophrenia    Drug abuse Daughter Kaye Trascher         Not now but earlier alcohol and drug abuse     Hypertension Daughter Kaye Martin     Mental illness Daughter Kaye Martin         Psysophrenia    Cancer Son Andrea Martin III will         Testicular cancer    Depression Son Andrea Martin III will         Major depressive disorder like me    Cancer Maternal Aunt Carmella Enrique         Lung cancer    Depression Sister Maribell Hull         May be bipolar    Diabetes Sister Maribell Hull     Heart disease Sister Maribell Hull         Open heart surgery    Hypertension Sister Maribell Hull     Hypertension Brother Riky Jo        Wt Readings from Last 3 Encounters:   04/24/24 108.7 kg (239 lb 10.2 oz)   04/19/24 106.7 kg (235 lb 3.7 oz)   04/19/24 108.6 kg (239 lb 6.7 oz)     Temp Readings from Last 3 Encounters:   04/03/24 98.2 °F (36.8 °C) (Oral)   02/14/24 98 °F (36.7 °C) (Temporal)   01/12/24 97.4 °F (36.3 °C) (Temporal)     BP Readings from Last 3 Encounters:   04/24/24 110/60   04/19/24 132/64   04/19/24 (!) 149/61     Pulse Readings from Last 3 Encounters:   04/24/24 77   04/19/24 86   04/19/24 73       Current Outpatient Medications on File Prior to Visit   Medication Sig Dispense Refill    apixaban (ELIQUIS) 5 mg Tab Take 1 tablet (5 mg total) by mouth 2 (two) times daily. 180 tablet 1    BEPREVE 1.5 % Drop Place 1 drop into both eyes 2 (two) times daily. 10 mL 4    blood sugar diagnostic Strp To check blood sugar 1 times daily 100 each 3    blood-glucose meter (ACCU-CHEK GUIDE GLUCOSE METER) Misc use daily to test blood sugar 1 each 0    carvediloL (COREG) 25 MG tablet TAKE 1 TABLET BY MOUTH TWICE DAILY 180 tablet 3    clopidogreL (PLAVIX) 75 mg tablet Take 1 tablet (75 mg total) by mouth once daily. 90 tablet 1    clotrimazole-betamethasone (LOTRISONE) lotion Apply topically to the affected area 2 (two) times daily. 30 mL 2    cycloSPORINE (RESTASIS) 0.05 % ophthalmic emulsion Place 1 drop into both eyes 2 (two) times daily. 60 each 11    DULoxetine  "(CYMBALTA) 60 MG capsule Take 1 capsule (60 mg total) by mouth once daily. 90 capsule 1    ezetimibe (ZETIA) 10 mg tablet Take 1 tablet (10 mg total) by mouth every evening. 90 tablet 1    fluticasone propionate (FLONASE) 50 mcg/actuation nasal spray 2 sprays (100 mcg total) by Each Nostril route once daily. 48 g 3    furosemide (LASIX) 20 MG tablet Take 1 tablet (20 mg total) by mouth daily as needed (Swelling). 90 tablet 0    gabapentin (NEURONTIN) 300 MG capsule Take 1 capsule (300 mg total) by mouth daily as needed (when needed). 90 capsule 3    galcanezumab-gnlm (EMGALITY SYRINGE) 120 mg/mL Syrg Inject 120 mg into the skin every 28 days. 3 mL 3    insulin (LANTUS SOLOSTAR U-100 INSULIN) glargine 100 units/mL SubQ pen Inject 72 Units into the skin every evening. 75 mL 3    isosorbide mononitrate (IMDUR) 30 MG 24 hr tablet Take 1 tablet (30 mg total) by mouth once daily. 90 tablet 1    lancets Misc To check BG 1 times daily 100 each 3    linaCLOtide (LINZESS) 72 mcg Cap capsule Take 1 capsule (72 mcg total) by mouth before breakfast. 30 capsule 0    meclizine (ANTIVERT) 25 mg tablet Take 1 tablet (25 mg total) by mouth 3 (three) times daily as needed. 30 tablet 2    nitroGLYCERIN (NITROSTAT) 0.4 MG SL tablet Place 1 tablet (0.4 mg total) under the tongue every 5 (five) minutes as needed for Chest pain. 25 tablet 0    pantoprazole (PROTONIX) 40 MG tablet Take 1 tablet (40 mg total) by mouth once daily. 90 tablet 1    pen needle, diabetic (BD ULTRA-FINE SHORT PEN NEEDLE) 31 gauge x 5/16" Ndle AS DIRECTED TWICE DAILY 200 each 3    pravastatin (PRAVACHOL) 20 MG tablet Take 1 tablet (20 mg total) by mouth every evening. 90 tablet 1    rimegepant 75 mg odt Take 1 tablet (75 mg total) by mouth as needed for Migraine (do not exceed 2-3 doses within 1 week). Place ODT tablet on the tongue; alternatively the ODT tablet may be placed under the tongue 8 tablet 5    SITagliptin phosphate (JANUVIA) 100 MG Tab Take 1 tablet " (100 mg total) by mouth once daily. 90 tablet 3    temazepam (RESTORIL) 30 mg capsule Take 1 capsule (30 mg total) by mouth every evening. 90 capsule 0    [DISCONTINUED] benazepriL (LOTENSIN) 40 MG tablet Take 1 tablet (40 mg total) by mouth 2 (two) times daily. 180 tablet 3    [DISCONTINUED] hydrALAZINE (APRESOLINE) 50 MG tablet Take 1 tablet (50 mg total) by mouth every 8 (eight) hours. 180 tablet 3    aspirin 81 MG Chew Take 1 tablet (81 mg total) by mouth once daily. for 14 days 14 tablet 0    azelastine (ASTELIN) 137 mcg (0.1 %) nasal spray use 2 sprays (274 mcg total) by Nasal route 2 (two) times daily. 30 mL 1     No current facility-administered medications on file prior to visit.       No cardiac monitor results found for the past 12 months    Results for orders placed during the hospital encounter of 03/31/24    Echo    Interpretation Summary    Left Ventricle: The left ventricle is normal in size. Normal wall thickness. There is concentric remodeling. Normal wall motion. There is mildly reduced systolic function with a visually estimated ejection fraction of 40 - 45%. Ejection fraction by visual approximation is 45%. There is indeterminate diastolic function.    Right Ventricle: Normal right ventricular cavity size. Wall thickness is normal. Right ventricle wall motion  is normal. Systolic function is normal.    Aortic Valve: There is mild aortic regurgitation.    Tricuspid Valve: The tricuspid valve is structurally normal. There is mild regurgitation.    Pulmonary Artery: The estimated pulmonary artery systolic pressure is 42 mmHg.    IVC/SVC: Normal venous pressure at 3 mmHg.    Results for orders placed during the hospital encounter of 01/03/24    Nuclear Stress - Cardiology Interpreted    Interpretation Summary    Abnormal myocardial perfusion scan.    There is a mild intensity, small sized, fixed perfusion abnormality consistent with scar in the inferoapical wall(s).    There are no other significant  perfusion abnormalities.    The gated perfusion images showed an ejection fraction of 63% at rest. The gated perfusion images showed an ejection fraction of 73% post stress. Normal ejection fraction is greater than 59%.    The ECG portion of the study is negative for ischemia.        Results for orders placed or performed during the hospital encounter of 03/31/24   EKG 12-lead    Collection Time: 04/01/24  4:40 PM   Result Value Ref Range    QRS Duration 104 ms    [facility] QTC Calculation 536 ms    Narrative    Test Reason : post chest pressure    Vent. Rate : 071 BPM     Atrial Rate : 071 BPM     P-R Int : 174 ms          QRS Dur : 104 ms      QT Int : 494 ms       P-R-T Axes : 053 -43 186 degrees     QTc Int : 536 ms    Normal sinus rhythm  Left axis deviation  Minimal voltage criteria for LVH, may be normal variant ( Michele product )  Septal infarct (cited on or before 31-MAR-2024)  T wave abnormality, consider inferior ischemia  T wave abnormality, consider anterolateral ischemia  Prolonged QT  Abnormal ECG  When compared with ECG of 01-APR-2024 14:22,  Questionable change in initial forces of Septal leads  Confirmed by BOB BERNAL MD ([id number]) on 4/2/2024 2:19:29 PM    Referred By: [email or web address]   SELF           Confirmed By:BBO BERNAL MD         Review of Systems   Constitutional: Positive for malaise/fatigue.   HENT:  Negative for hearing loss and hoarse voice.    Eyes:  Negative for blurred vision and visual disturbance.   Cardiovascular:  Positive for dyspnea on exertion. Negative for chest pain, claudication, irregular heartbeat, leg swelling, near-syncope, orthopnea, palpitations, paroxysmal nocturnal dyspnea and syncope.   Respiratory:  Negative for cough, hemoptysis, shortness of breath, sleep disturbances due to breathing, snoring and wheezing.    Endocrine: Negative for cold intolerance and heat intolerance.   Hematologic/Lymphatic: Does not bruise/bleed easily.   Skin:  Negative for color change, dry  skin and nail changes.   Musculoskeletal:  Positive for arthritis and back pain. Negative for joint pain and myalgias.   Gastrointestinal:  Negative for bloating, abdominal pain, constipation, nausea and vomiting.   Genitourinary:  Negative for dysuria, flank pain, hematuria and hesitancy.   Neurological:  Positive for dizziness. Negative for headaches, light-headedness, loss of balance, numbness, paresthesias and weakness.   Psychiatric/Behavioral:  Negative for altered mental status.    Allergic/Immunologic: Negative for environmental allergies.         Objective:/60 (BP Location: Left arm, Patient Position: Sitting)   Pulse 77   Wt 108.7 kg (239 lb 10.2 oz)   SpO2 99%   BMI 43.83 kg/m²      Physical Exam  Vitals and nursing note reviewed.   Constitutional:       General: She is not in acute distress.     Appearance: Normal appearance. She is well-developed. She is not ill-appearing.   HENT:      Head: Normocephalic and atraumatic.      Nose: Nose normal.      Mouth/Throat:      Mouth: Mucous membranes are moist.   Eyes:      Pupils: Pupils are equal, round, and reactive to light.   Neck:      Thyroid: No thyromegaly.      Vascular: No JVD.      Trachea: No tracheal deviation.   Cardiovascular:      Rate and Rhythm: Normal rate and regular rhythm.      Chest Wall: PMI is not displaced.      Pulses: Intact distal pulses.           Radial pulses are 2+ on the right side and 2+ on the left side.        Dorsalis pedis pulses are 2+ on the right side and 2+ on the left side.      Heart sounds: S1 normal and S2 normal. Heart sounds not distant. No murmur heard.  Pulmonary:      Effort: Pulmonary effort is normal. No respiratory distress.      Breath sounds: Normal breath sounds. No wheezing.   Abdominal:      General: Bowel sounds are normal. There is no distension.      Palpations: Abdomen is soft.      Tenderness: There is no abdominal tenderness.   Musculoskeletal:         General: No swelling. Normal range  of motion.      Cervical back: Full passive range of motion without pain, normal range of motion and neck supple.      Right lower leg: No edema.      Left lower leg: No edema.      Right ankle: No swelling.      Left ankle: No swelling.   Skin:     General: Skin is warm and dry.      Capillary Refill: Capillary refill takes less than 2 seconds.      Nails: There is no clubbing.   Neurological:      General: No focal deficit present.      Mental Status: She is alert and oriented to person, place, and time.      Motor: No weakness.   Psychiatric:         Speech: Speech normal.         Behavior: Behavior normal.         Thought Content: Thought content normal.         Judgment: Judgment normal.         Lab Results   Component Value Date    CHOL 195 09/28/2023    CHOL 164 10/04/2022    CHOL 168 06/23/2022     Lab Results   Component Value Date    HDL 62 09/28/2023    HDL 46 10/04/2022    HDL 44 06/23/2022     Lab Results   Component Value Date    LDLCALC 103.2 09/28/2023    LDLCALC 93.0 10/04/2022    LDLCALC 89.2 06/23/2022     Lab Results   Component Value Date    TRIG 149 09/28/2023    TRIG 125 10/04/2022    TRIG 174 (H) 06/23/2022     Lab Results   Component Value Date    CHOLHDL 31.8 09/28/2023    CHOLHDL 28.0 10/04/2022    CHOLHDL 26.2 06/23/2022       Chemistry        Component Value Date/Time     04/03/2024 0455    K 4.1 04/03/2024 0455     04/03/2024 0455    CO2 21 (L) 04/03/2024 0455    BUN 11 04/03/2024 0455    CREATININE 0.8 04/03/2024 0455     (H) 04/03/2024 0455        Component Value Date/Time    CALCIUM 9.0 04/03/2024 0455    ALKPHOS 38 (L) 03/31/2024 0905    AST 19 03/31/2024 0905    ALT 16 03/31/2024 0905    BILITOT 0.5 03/31/2024 0905    ESTGFRAFRICA >60 07/27/2022 1237    EGFRNONAA >60 07/27/2022 1237          Lab Results   Component Value Date    TSH 1.024 10/04/2022     Lab Results   Component Value Date    INR 1.0 04/01/2024     Lab Results   Component Value Date    WBC 6.03  04/03/2024    HGB 10.0 (L) 04/03/2024    HCT 30.4 (L) 04/03/2024    MCV 94 04/03/2024     04/03/2024          Assessment:      1. NSTEMI (non-ST elevated myocardial infarction)    2. Paroxysmal atrial fibrillation    3. Palpitations    4. Aorto-iliac atherosclerosis    5. Coronary artery disease of native artery of native heart with stable angina pectoris    6. SOB (shortness of breath)    7. Controlled type 2 diabetes mellitus with diabetic polyneuropathy, with long-term current use of insulin    8. Class 3 severe obesity due to excess calories with serious comorbidity and body mass index (BMI) of 45.0 to 49.9 in adult    9. Morbid obesity with BMI of 40.0-44.9, adult    10. Heart failure with reduced ejection fraction        Plan:     Stop Benazepril and Hydralazine  14 day vital connect monitor  Cardiac Rehab referral- would prefer remote option  Continue Eliquis for cardio-embolic protection  Continue Coreg 25 mg BId for now  Dash diet 2 gm sodium restriction  60 fluid ounces daily  Lasix PRN for weight gain    RTC in 1month with CMP BNP     Nicole May, FNP-C [facility]

## 2024-04-25 ENCOUNTER — PATIENT MESSAGE (OUTPATIENT)
Dept: GASTROENTEROLOGY | Facility: CLINIC | Age: 74
End: 2024-04-25
Payer: MEDICARE

## 2024-04-25 ENCOUNTER — TELEPHONE (OUTPATIENT)
Dept: CARDIOLOGY | Facility: CLINIC | Age: 74
End: 2024-04-25
Payer: MEDICARE

## 2024-04-25 ENCOUNTER — PATIENT MESSAGE (OUTPATIENT)
Dept: CARDIOLOGY | Facility: CLINIC | Age: 74
End: 2024-04-25
Payer: MEDICARE

## 2024-04-25 NOTE — TELEPHONE ENCOUNTER
Called patient to  clarify that she is suppose to stop hydralazine ane benazapril and start entresto on Sunday. Kelsey

## 2024-04-26 ENCOUNTER — PATIENT MESSAGE (OUTPATIENT)
Dept: REHABILITATION | Facility: HOSPITAL | Age: 74
End: 2024-04-26
Payer: MEDICARE

## 2024-04-26 ENCOUNTER — TELEPHONE (OUTPATIENT)
Dept: CARDIAC REHAB | Facility: HOSPITAL | Age: 74
End: 2024-04-26
Payer: MEDICARE

## 2024-04-26 NOTE — TELEPHONE ENCOUNTER
Called patient to inform her that her phone number will be forwarded to Stuart in cardiac rehab so he can call her and get her set up for the program. Left message to call back to clinic if she has any further questions. Kelsey    Called patient to  clarify that she is suppose to stop hydralazine ane benazapril and start entresto on Sunday. Kelsey

## 2024-04-29 ENCOUNTER — PATIENT MESSAGE (OUTPATIENT)
Dept: GASTROENTEROLOGY | Facility: CLINIC | Age: 74
End: 2024-04-29
Payer: MEDICARE

## 2024-04-29 ENCOUNTER — TELEPHONE (OUTPATIENT)
Dept: CARDIAC REHAB | Facility: HOSPITAL | Age: 74
End: 2024-04-29
Payer: MEDICARE

## 2024-04-30 ENCOUNTER — PATIENT MESSAGE (OUTPATIENT)
Dept: GASTROENTEROLOGY | Facility: CLINIC | Age: 74
End: 2024-04-30
Payer: MEDICARE

## 2024-04-30 ENCOUNTER — PATIENT OUTREACH (OUTPATIENT)
Dept: ADMINISTRATIVE | Facility: OTHER | Age: 74
End: 2024-04-30
Payer: MEDICARE

## 2024-04-30 DIAGNOSIS — H10.13 ALLERGIC CONJUNCTIVITIS, BILATERAL: ICD-10-CM

## 2024-04-30 DIAGNOSIS — M35.01 KERATITIS SICCA, BILATERAL: ICD-10-CM

## 2024-04-30 NOTE — TELEPHONE ENCOUNTER
----- Message from Raiza Yusuf sent at 4/30/2024 10:17 AM CDT -----  Who Called: PT    What is the request in detail: Requesting call back to discuss pharmacy switch. Need Rx sent to new pharmacy. Please advise    [facility] [address]  [address]  [facility] [address]  Phone: [phone] Fax: [phone]    Can the clinic reply by [facility]? No    Best Call Back Number: [phone]      Additional Information:

## 2024-05-01 ENCOUNTER — PATIENT MESSAGE (OUTPATIENT)
Dept: OPHTHALMOLOGY | Facility: CLINIC | Age: 74
End: 2024-05-01
Payer: MEDICARE

## 2024-05-01 DIAGNOSIS — M35.01 KERATITIS SICCA, BILATERAL: ICD-10-CM

## 2024-05-01 DIAGNOSIS — H10.13 ALLERGIC CONJUNCTIVITIS, BILATERAL: ICD-10-CM

## 2024-05-01 RX ORDER — CYCLOSPORINE 0.5 MG/ML
1 EMULSION OPHTHALMIC 2 TIMES DAILY
Qty: 60 EACH | Refills: 11 | Status: SHIPPED | OUTPATIENT
Start: 2024-05-01 | End: 2024-05-01 | Stop reason: SDUPTHER

## 2024-05-01 RX ORDER — CYCLOSPORINE 0.5 MG/ML
1 EMULSION OPHTHALMIC 2 TIMES DAILY
Qty: 60 EACH | Refills: 11 | Status: SHIPPED | OUTPATIENT
Start: 2024-05-01

## 2024-05-01 RX ORDER — BEPOTASTINE BESILATE 15 MG/ML
1 SOLUTION/ DROPS OPHTHALMIC 2 TIMES DAILY
Qty: 10 ML | Refills: 4 | Status: SHIPPED | OUTPATIENT
Start: 2024-05-01 | End: 2024-05-01 | Stop reason: SDUPTHER

## 2024-05-01 RX ORDER — BEPOTASTINE BESILATE 15 MG/ML
1 SOLUTION/ DROPS OPHTHALMIC 2 TIMES DAILY
Qty: 10 ML | Refills: 4 | Status: SHIPPED | OUTPATIENT
Start: 2024-05-01

## 2024-05-02 ENCOUNTER — PATIENT MESSAGE (OUTPATIENT)
Dept: INTERNAL MEDICINE | Facility: CLINIC | Age: 74
End: 2024-05-02
Payer: MEDICARE

## 2024-05-04 ENCOUNTER — PATIENT MESSAGE (OUTPATIENT)
Dept: INTERNAL MEDICINE | Facility: CLINIC | Age: 74
End: 2024-05-04
Payer: MEDICARE

## 2024-05-06 ENCOUNTER — PATIENT MESSAGE (OUTPATIENT)
Dept: CARDIOLOGY | Facility: CLINIC | Age: 74
End: 2024-05-06
Payer: MEDICARE

## 2024-05-06 DIAGNOSIS — I50.20 HEART FAILURE WITH REDUCED EJECTION FRACTION: ICD-10-CM

## 2024-05-06 RX ORDER — SACUBITRIL AND VALSARTAN 24; 26 MG/1; MG/1
1 TABLET, FILM COATED ORAL 2 TIMES DAILY
Qty: 60 TABLET | Refills: 0 | OUTPATIENT
Start: 2024-05-06

## 2024-05-07 RX ORDER — SACUBITRIL AND VALSARTAN 24; 26 MG/1; MG/1
1 TABLET, FILM COATED ORAL 2 TIMES DAILY
Qty: 60 TABLET | Refills: 3 | Status: SHIPPED | OUTPATIENT
Start: 2024-05-07

## 2024-05-08 ENCOUNTER — PATIENT MESSAGE (OUTPATIENT)
Dept: CARDIOLOGY | Facility: CLINIC | Age: 74
End: 2024-05-08
Payer: MEDICARE

## 2024-05-09 ENCOUNTER — TELEPHONE (OUTPATIENT)
Dept: CARDIOLOGY | Facility: CLINIC | Age: 74
End: 2024-05-09
Payer: MEDICARE

## 2024-05-09 ENCOUNTER — TELEPHONE (OUTPATIENT)
Dept: CARDIOLOGY | Facility: HOSPITAL | Age: 74
End: 2024-05-09
Payer: MEDICARE

## 2024-05-09 NOTE — TELEPHONE ENCOUNTER
----- Message from Brooke Rodriguez RN sent at 5/9/2024  9:44 AM CDT -----  Contact: self  [phone]    ----- Message -----  From: Kaitlyn Mederos  Sent: 5/9/2024   9:35 AM CDT  To: Tonya Yuan Staff    Pt called returning a call back to [facility] concerning device return . Pt states she doesn't drive .  . Please give call back  [phone] thanks lay

## 2024-05-09 NOTE — TELEPHONE ENCOUNTER
I returned call and advised patient that vital needs to be dropped at the post office, Patient verbalized that she doesn'y have anyone to take to the post office.       She has an appointment on tomm here at [facility] and I advised pt that she can place phone and  in return envelope and drop at 4th floor reg desk and I will personally get it mailed to where it has to go and pt expressed understanding.      Pt states that vital connect called her on yesterday.

## 2024-05-09 NOTE — TELEPHONE ENCOUNTER
Called patient to inform hr that her monitor phone and  has to be put back in the package that it come in and dropped off to the post office. No answer left message to call back to clinic. ALBA

## 2024-05-10 ENCOUNTER — PROCEDURE VISIT (OUTPATIENT)
Dept: RHEUMATOLOGY | Facility: CLINIC | Age: 74
End: 2024-05-10
Payer: MEDICARE

## 2024-05-10 VITALS
SYSTOLIC BLOOD PRESSURE: 132 MMHG | WEIGHT: 234 LBS | DIASTOLIC BLOOD PRESSURE: 72 MMHG | HEART RATE: 74 BPM | BODY MASS INDEX: 43.06 KG/M2 | HEIGHT: 62 IN

## 2024-05-10 DIAGNOSIS — M25.562 CHRONIC PAIN OF BOTH KNEES: ICD-10-CM

## 2024-05-10 DIAGNOSIS — M25.561 CHRONIC PAIN OF BOTH KNEES: ICD-10-CM

## 2024-05-10 DIAGNOSIS — M17.0 PRIMARY OSTEOARTHRITIS OF BOTH KNEES: Primary | ICD-10-CM

## 2024-05-10 DIAGNOSIS — G89.29 CHRONIC PAIN OF BOTH KNEES: ICD-10-CM

## 2024-05-10 PROBLEM — M21.169 GENU VARUM: Status: ACTIVE | Noted: 2024-05-10

## 2024-05-10 PROCEDURE — 99499 UNLISTED E&M SERVICE: CPT | Mod: S$GLB,,, | Performed by: PHYSICIAN ASSISTANT

## 2024-05-10 PROCEDURE — 20610 DRAIN/INJ JOINT/BURSA W/O US: CPT | Mod: 50,S$GLB,, | Performed by: PHYSICIAN ASSISTANT

## 2024-05-10 RX ORDER — BENAZEPRIL HYDROCHLORIDE 40 MG/1
TABLET ORAL
COMMUNITY
Start: 2024-04-26 | End: 2024-05-15

## 2024-05-10 RX ORDER — HYDRALAZINE HYDROCHLORIDE 50 MG/1
50 TABLET, FILM COATED ORAL 3 TIMES DAILY
COMMUNITY
Start: 2024-04-26 | End: 2024-05-15

## 2024-05-10 NOTE — PROCEDURES
Large Joint Aspiration/Injection: bilateral knee    Date/Time: 5/10/2024 2:30 PM    Performed by: Cheri Tatum PA-C  Authorized by: Cheri Tatum PA-C    Consent Done?:  Yes (Verbal)  Indications:  Pain  Site marked: the procedure site was marked    Timeout: prior to procedure the correct patient, procedure, and site was verified    Prep: patient was prepped and draped in usual sterile fashion      Local anesthesia used?: Yes    Local anesthetic:  Lidocaine 2% with epinephrine  Anesthetic total (ml):  2      Details:  Needle Size:  18 G and 22 G  Ultrasonic Guidance for needle placement?: No    Location:  Knee  Laterality:  Bilateral  Site:  Bilateral knee  Medications (Right):  48 mg hylan g-f 20 48 mg/6 mL  Medications (Left):  48 mg hylan g-f 20 48 mg/6 mL  Patient tolerance:  Patient tolerated the procedure well with no immediate complications     Christine Jo comes in today for Synvisc-One injection in the bilateral knees. We have again discussed risks and benefits of the procedure.  She wishes to proceed.  Patient will return to the clinic in 3-6 months for follow-up evaluation. If she has any questions or concerns, she will notify the office.  Patient verbalizes understanding and agrees with the above plan.    Primary osteoarthritis of both knees  (primary encounter diagnosis)  Chronic pain of both knees

## 2024-05-11 ENCOUNTER — PATIENT MESSAGE (OUTPATIENT)
Dept: GASTROENTEROLOGY | Facility: CLINIC | Age: 74
End: 2024-05-11
Payer: MEDICARE

## 2024-05-13 ENCOUNTER — PATIENT MESSAGE (OUTPATIENT)
Dept: GASTROENTEROLOGY | Facility: CLINIC | Age: 74
End: 2024-05-13
Payer: MEDICARE

## 2024-05-13 ENCOUNTER — TELEPHONE (OUTPATIENT)
Dept: PSYCHIATRY | Facility: CLINIC | Age: 74
End: 2024-05-13
Payer: MEDICARE

## 2024-05-14 ENCOUNTER — PATIENT MESSAGE (OUTPATIENT)
Dept: GASTROENTEROLOGY | Facility: CLINIC | Age: 74
End: 2024-05-14
Payer: MEDICARE

## 2024-05-14 ENCOUNTER — TELEPHONE (OUTPATIENT)
Dept: CARDIAC REHAB | Facility: HOSPITAL | Age: 74
End: 2024-05-14
Payer: MEDICARE
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
## 2024-05-14 NOTE — PROGRESS NOTES
CHW - Follow Up    This Community Health Worker completed a follow up visit with patient via telephone today.  Pt/Caregiver reported: Pt states she did see Cardiology and all is well. Thankful for follow up call. States she is coughing up secretions in the morning and states she has been feeling different since her heart medication changed. She states she wore a Holter monitor and is waiting of the results.   Community Health Worker provided: Asked pt to talk  to PCP about her cough because she says she smoked for 35 years.  Follow up required: yes  Follow-up Outreach - Due: 5/14/2024

## 2024-05-15 ENCOUNTER — PATIENT MESSAGE (OUTPATIENT)
Dept: PSYCHIATRY | Facility: CLINIC | Age: 74
End: 2024-05-15
Payer: MEDICARE

## 2024-05-15 ENCOUNTER — TELEPHONE (OUTPATIENT)
Dept: CARDIAC REHAB | Facility: HOSPITAL | Age: 74
End: 2024-05-15
Payer: MEDICARE

## 2024-05-15 ENCOUNTER — OFFICE VISIT (OUTPATIENT)
Dept: PSYCHIATRY | Facility: CLINIC | Age: 74
End: 2024-05-15
Payer: COMMERCIAL

## 2024-05-15 ENCOUNTER — TELEPHONE (OUTPATIENT)
Dept: PSYCHIATRY | Facility: CLINIC | Age: 74
End: 2024-05-15
Payer: MEDICARE

## 2024-05-15 ENCOUNTER — PATIENT MESSAGE (OUTPATIENT)
Dept: RHEUMATOLOGY | Facility: CLINIC | Age: 74
End: 2024-05-15
Payer: MEDICARE

## 2024-05-15 ENCOUNTER — PATIENT MESSAGE (OUTPATIENT)
Dept: INTERNAL MEDICINE | Facility: CLINIC | Age: 74
End: 2024-05-15

## 2024-05-15 ENCOUNTER — OFFICE VISIT (OUTPATIENT)
Dept: INTERNAL MEDICINE | Facility: CLINIC | Age: 74
End: 2024-05-15
Payer: MEDICARE

## 2024-05-15 DIAGNOSIS — I25.118 CORONARY ARTERY DISEASE OF NATIVE ARTERY OF NATIVE HEART WITH STABLE ANGINA PECTORIS: ICD-10-CM

## 2024-05-15 DIAGNOSIS — F41.9 ANXIETY DISORDER, UNSPECIFIED TYPE: Primary | ICD-10-CM

## 2024-05-15 DIAGNOSIS — R06.02 SOB (SHORTNESS OF BREATH): ICD-10-CM

## 2024-05-15 DIAGNOSIS — F33.1 DEPRESSION, MAJOR, RECURRENT, MODERATE: ICD-10-CM

## 2024-05-15 DIAGNOSIS — I21.4 NSTEMI (NON-ST ELEVATED MYOCARDIAL INFARCTION): ICD-10-CM

## 2024-05-15 DIAGNOSIS — G47.00 INSOMNIA, UNSPECIFIED TYPE: ICD-10-CM

## 2024-05-15 DIAGNOSIS — I10 PRIMARY HYPERTENSION: Primary | ICD-10-CM

## 2024-05-15 DIAGNOSIS — Z86.69 HISTORY OF OBSTRUCTIVE SLEEP APNEA: ICD-10-CM

## 2024-05-15 DIAGNOSIS — E11.65 UNCONTROLLED TYPE 2 DIABETES MELLITUS WITH HYPERGLYCEMIA: ICD-10-CM

## 2024-05-15 LAB
OHS CV HOLTER SINUS AVERAGE HR: 71
OHS CV HOLTER SINUS MAX HR: 112
OHS CV HOLTER SINUS MIN HR: 54

## 2024-05-15 PROCEDURE — 3051F HG A1C>EQUAL 7.0%<8.0%: CPT | Mod: CPTII,95,, | Performed by: FAMILY MEDICINE

## 2024-05-15 PROCEDURE — 90833 PSYTX W PT W E/M 30 MIN: CPT | Mod: 95,,, | Performed by: PSYCHIATRY & NEUROLOGY

## 2024-05-15 PROCEDURE — 99214 OFFICE O/P EST MOD 30 MIN: CPT | Mod: 95,,, | Performed by: PSYCHIATRY & NEUROLOGY

## 2024-05-15 PROCEDURE — 1159F MED LIST DOCD IN RCRD: CPT | Mod: CPTII,95,, | Performed by: PSYCHIATRY & NEUROLOGY

## 2024-05-15 PROCEDURE — 4010F ACE/ARB THERAPY RXD/TAKEN: CPT | Mod: CPTII,95,, | Performed by: PSYCHIATRY & NEUROLOGY

## 2024-05-15 PROCEDURE — 3072F LOW RISK FOR RETINOPATHY: CPT | Mod: CPTII,95,, | Performed by: PSYCHIATRY & NEUROLOGY

## 2024-05-15 PROCEDURE — 3072F LOW RISK FOR RETINOPATHY: CPT | Mod: CPTII,95,, | Performed by: FAMILY MEDICINE

## 2024-05-15 PROCEDURE — 3051F HG A1C>EQUAL 7.0%<8.0%: CPT | Mod: CPTII,95,, | Performed by: PSYCHIATRY & NEUROLOGY

## 2024-05-15 PROCEDURE — 1160F RVW MEDS BY RX/DR IN RCRD: CPT | Mod: CPTII,95,, | Performed by: PSYCHIATRY & NEUROLOGY

## 2024-05-15 PROCEDURE — 4010F ACE/ARB THERAPY RXD/TAKEN: CPT | Mod: CPTII,95,, | Performed by: FAMILY MEDICINE

## 2024-05-15 PROCEDURE — 99214 OFFICE O/P EST MOD 30 MIN: CPT | Mod: 95,,, | Performed by: FAMILY MEDICINE

## 2024-05-15 RX ORDER — DULOXETIN HYDROCHLORIDE 60 MG/1
60 CAPSULE, DELAYED RELEASE ORAL DAILY
Qty: 90 CAPSULE | Refills: 1 | Status: SHIPPED | OUTPATIENT
Start: 2024-05-15

## 2024-05-15 NOTE — TELEPHONE ENCOUNTER
----- Message from Dianelys Hansen sent at 5/15/2024  1:37 PM CDT -----  Contact: pt  Type:  Patient Returning Call    Who Called: pt  Who Left Message for Patient:ERIK  Does the patient know what this is regarding?:  Would the patient rather a call back or a response via [facility]? phone  Best Call Back Number: [phone]  Additional Information:

## 2024-05-15 NOTE — PROGRESS NOTES
Subjective:       Patient ID: Christine Jo is a 74 y.o. female.    Chief Complaint: No chief complaint on file.    The patient location is: home  The chief complaint leading to consultation is:  Hypertension diabetes coronary artery disease sleep apnea nicotine dependence shortness of breath status post MI.    Visit type: audiovisual    Face to Face time with patient: 20   minutes of total time spent on the encounter, which includes face to face time and non-face to face time preparing to see the patient (eg, review of tests), Obtaining and/or reviewing separately obtained history, Documenting clinical information in the electronic or other health record, Independently interpreting results (not separately reported) and communicating results to the patient/family/caregiver, or Care coordination (not separately reported).         Each patient to whom he or she provides medical services by telemedicine is:  (1) informed of the relationship between the physician and patient and the respective role of any other health care provider with respect to management of the patient; and (2) notified that he or she may decline to receive medical services by telemedicine and may withdraw from such care at any time.    Notes:           Review of Systems   Constitutional:  Positive for unexpected weight change. Negative for activity change.        She reports with a change in her diet her weight was 241 and now 233   HENT:  Positive for hearing loss. Negative for rhinorrhea and trouble swallowing.    Eyes:  Positive for visual disturbance. Negative for discharge.   Respiratory:  Positive for cough, shortness of breath and wheezing. Negative for chest tightness.    Cardiovascular:  Positive for palpitations. Negative for chest pain and leg swelling.   Gastrointestinal:  Positive for constipation and diarrhea. Negative for blood in stool and vomiting.   Endocrine: Positive for polyuria. Negative for polydipsia.        Fasting blood  sugar 150.  She is on Lantus 72 units at bedtime.  She is also taking Januvia.   Genitourinary:  Negative for difficulty urinating, dysuria, hematuria and menstrual problem.   Musculoskeletal:  Positive for arthralgias and neck pain. Negative for joint swelling.   Neurological:  Positive for weakness. Negative for dizziness, light-headedness and headaches.   Psychiatric/Behavioral:  Positive for confusion and dysphoric mood.        Objective:      Physical Exam  Constitutional:       General: She is not in acute distress.     Appearance: She is not ill-appearing or diaphoretic.   HENT:      Nose: No congestion.   Eyes:      Conjunctiva/sclera: Conjunctivae normal.   Pulmonary:      Effort: Pulmonary effort is normal. No respiratory distress.      Breath sounds: No wheezing.   Skin:     Coloration: Skin is not pale.      Findings: No erythema.   Neurological:      Mental Status: She is alert and oriented to person, place, and time.   Psychiatric:         Mood and Affect: Mood normal.         Behavior: Behavior normal.         Hospital Outpatient Visit on 04/24/2024   Component Date Value Ref Range Status    QRS Duration 04/24/2024 92  ms Final    [facility] QTC Calculation 04/24/2024 460  ms Final     Assessment:       1. Primary hypertension    2. Uncontrolled type 2 diabetes mellitus with hyperglycemia    3. SOB (shortness of breath)    4. Coronary artery disease of native artery of native heart with stable angina pectoris    5. NSTEMI (non-ST elevated myocardial infarction)    6. History of obstructive sleep apnea        Plan:   Blood pressure is controlled 106/60.  Will increase Lantus 72 units start 74 units continue Januvia.  She has cough shortness of breath sleep apnea would like pulmonary evaluation.  This is being done.  Continue diabetic diet.  Needs office follow-up one-month.  Cardiac rehab is trying to get in touch with her.  She is status post MI April 2024.      Primary hypertension    Uncontrolled type 2  diabetes mellitus with hyperglycemia    SOB (shortness of breath)    Coronary artery disease of native artery of native heart with stable angina pectoris    NSTEMI (non-ST elevated myocardial infarction)    History of obstructive sleep apnea

## 2024-05-15 NOTE — TELEPHONE ENCOUNTER
----- Message from Yonis Bahena sent at 5/15/2024  2:23 PM CDT -----  .Type:  Needs Medical Advice    Who Called: pt    Would the patient rather a call back or a response via [facility]? Call back  Best Call Back Number: [phone]  Additional Information:     Pt stated she missed a call and would like a call back please

## 2024-05-16 ENCOUNTER — TELEPHONE (OUTPATIENT)
Dept: CARDIOLOGY | Facility: CLINIC | Age: 74
End: 2024-05-16
Payer: MEDICARE

## 2024-05-16 NOTE — TELEPHONE ENCOUNTER
Lissy Castaneda FNP-[facility]RI[facility] Staff  Vital connect reviewed  No afib/aflutter noted  No worrisome AV block noted    Rare skipped beats noted    GORDO De Leon    Spoke to  patient verbalized understanding

## 2024-05-17 ENCOUNTER — TELEPHONE (OUTPATIENT)
Dept: CARDIAC REHAB | Facility: HOSPITAL | Age: 74
End: 2024-05-17
Payer: MEDICARE

## 2024-05-17 ENCOUNTER — TELEPHONE (OUTPATIENT)
Dept: PULMONOLOGY | Facility: CLINIC | Age: 74
End: 2024-05-17
Payer: MEDICARE

## 2024-05-17 NOTE — TELEPHONE ENCOUNTER
Spoke to pt re: cardiac rehab. Previously referred to [facility].  Questions and concerns addressed, and phone number to [facility] service given.

## 2024-05-17 NOTE — TELEPHONE ENCOUNTER
Spoke with pt. Appt scheduled for 07/01/24, appt placed on waitlist. Pt was thankful. Call ended well.

## 2024-05-17 NOTE — TELEPHONE ENCOUNTER
----- Message from Carmella Mccarthy sent at 5/17/2024 11:27 AM CDT -----  .Type:  Patient Returning Call    Who Called:.Christine Jo   Who Left Message for Patient:  Does the patient know what this is regarding?:  Would the patient rather a call back or a response via [email or web address]? Call back  Best Call Back Number:.[phone]    Additional Information:

## 2024-05-20 ENCOUNTER — TELEPHONE (OUTPATIENT)
Dept: PSYCHIATRY | Facility: CLINIC | Age: 74
End: 2024-05-20
Payer: MEDICARE

## 2024-05-20 NOTE — TELEPHONE ENCOUNTER
----- Message from Tyesha Drew sent at 5/20/2024  2:03 PM CDT -----  Contact: [phone]  Pt is calling regarding appt . Please call back at [phone] . Thanksdj

## 2024-05-20 NOTE — TELEPHONE ENCOUNTER
----- Message from Carissa Malloy sent at 5/20/2024 10:24 AM CDT -----  Contact: JUSTIN GOLD [[id number]]  ..Type:  Patient Requesting Call    Who Called: JUSTIN GOLD [[id number]]  Does the patient know what this is regarding?: scheduling NP appt in [facility]   Would the patient rather a call back or a response via [facility]?  call  Best Call Back Number: .[phone] (home)   Additional Information:

## 2024-05-20 NOTE — TELEPHONE ENCOUNTER
called pt to explain  the type of providers she have to see if she really wanted to change doctors.. .. pt was confused  on the two but now realize she have a provider for medication management and one for therapy.. she would like to keep them both. Pt state she understand the difference  now..

## 2024-05-21 ENCOUNTER — PATIENT MESSAGE (OUTPATIENT)
Dept: CARDIOLOGY | Facility: CLINIC | Age: 74
End: 2024-05-21
Payer: MEDICARE

## 2024-05-21 ENCOUNTER — PATIENT MESSAGE (OUTPATIENT)
Dept: PSYCHIATRY | Facility: CLINIC | Age: 74
End: 2024-05-21
Payer: MEDICARE

## 2024-05-22 ENCOUNTER — PATIENT MESSAGE (OUTPATIENT)
Dept: PSYCHIATRY | Facility: CLINIC | Age: 74
End: 2024-05-22
Payer: MEDICARE

## 2024-05-22 ENCOUNTER — TELEPHONE (OUTPATIENT)
Dept: CARDIOLOGY | Facility: CLINIC | Age: 74
End: 2024-05-22
Payer: MEDICARE

## 2024-05-22 DIAGNOSIS — G47.00 INSOMNIA, UNSPECIFIED TYPE: ICD-10-CM

## 2024-05-22 NOTE — TELEPHONE ENCOUNTER
Pt went on Entresto 3 weeks ago   Top number : [phone] bottom number [phone] on 5/21     Today went to 168 systolic but back up to 188 systolic    On same meds - no changes noted before the 5/21- the headaches started when she started the Entresto - finally took out BP machine 5/21 and those are systolics taken    Advised pt to go to ER and moved her follow up to tomorrow with DR Espinoza at the [place] [facility]  Pt verbalized understanding          ----- Message from Etta Garcia sent at 5/22/2024  4:48 PM CDT -----  Contact: Christine  Patient is calling concerned about her blood pressure being elevated. Patient state she has reached out to office with no return callback. Please callback [phone] to assist

## 2024-05-22 NOTE — TELEPHONE ENCOUNTER
Patient contacted the office to find out about  the cardiac rehab details. We advised her that we would send a message to the nurse to call her back . Patient stated understanding with no questions or concern.

## 2024-05-23 ENCOUNTER — OFFICE VISIT (OUTPATIENT)
Dept: CARDIOLOGY | Facility: CLINIC | Age: 74
End: 2024-05-23
Payer: MEDICARE

## 2024-05-23 VITALS
HEART RATE: 72 BPM | HEIGHT: 62 IN | BODY MASS INDEX: 43.82 KG/M2 | OXYGEN SATURATION: 98 % | SYSTOLIC BLOOD PRESSURE: 138 MMHG | WEIGHT: 238.13 LBS | DIASTOLIC BLOOD PRESSURE: 64 MMHG

## 2024-05-23 DIAGNOSIS — I70.8 AORTO-ILIAC ATHEROSCLEROSIS: ICD-10-CM

## 2024-05-23 DIAGNOSIS — E11.42 CONTROLLED TYPE 2 DIABETES MELLITUS WITH DIABETIC POLYNEUROPATHY, WITH LONG-TERM CURRENT USE OF INSULIN: ICD-10-CM

## 2024-05-23 DIAGNOSIS — I48.0 PAROXYSMAL ATRIAL FIBRILLATION: ICD-10-CM

## 2024-05-23 DIAGNOSIS — I10 PRIMARY HYPERTENSION: ICD-10-CM

## 2024-05-23 DIAGNOSIS — M79.605 PAIN IN BOTH LOWER EXTREMITIES: Primary | ICD-10-CM

## 2024-05-23 DIAGNOSIS — M79.604 PAIN IN BOTH LOWER EXTREMITIES: Primary | ICD-10-CM

## 2024-05-23 DIAGNOSIS — E66.01 MORBID OBESITY WITH BMI OF 40.0-44.9, ADULT: ICD-10-CM

## 2024-05-23 DIAGNOSIS — I10 ESSENTIAL HYPERTENSION: ICD-10-CM

## 2024-05-23 DIAGNOSIS — I70.0 AORTO-ILIAC ATHEROSCLEROSIS: ICD-10-CM

## 2024-05-23 DIAGNOSIS — Z95.5 S/P CORONARY ARTERY STENT PLACEMENT: ICD-10-CM

## 2024-05-23 DIAGNOSIS — Z79.4 CONTROLLED TYPE 2 DIABETES MELLITUS WITH DIABETIC POLYNEUROPATHY, WITH LONG-TERM CURRENT USE OF INSULIN: ICD-10-CM

## 2024-05-23 PROCEDURE — 1101F PT FALLS ASSESS-DOCD LE1/YR: CPT | Mod: CPTII,S$GLB,, | Performed by: INTERNAL MEDICINE

## 2024-05-23 PROCEDURE — 4010F ACE/ARB THERAPY RXD/TAKEN: CPT | Mod: CPTII,S$GLB,, | Performed by: INTERNAL MEDICINE

## 2024-05-23 PROCEDURE — 3288F FALL RISK ASSESSMENT DOCD: CPT | Mod: CPTII,S$GLB,, | Performed by: INTERNAL MEDICINE

## 2024-05-23 PROCEDURE — 1126F AMNT PAIN NOTED NONE PRSNT: CPT | Mod: CPTII,S$GLB,, | Performed by: INTERNAL MEDICINE

## 2024-05-23 PROCEDURE — 99214 OFFICE O/P EST MOD 30 MIN: CPT | Mod: S$GLB,,, | Performed by: INTERNAL MEDICINE

## 2024-05-23 PROCEDURE — 3075F SYST BP GE 130 - 139MM HG: CPT | Mod: CPTII,S$GLB,, | Performed by: INTERNAL MEDICINE

## 2024-05-23 PROCEDURE — 3078F DIAST BP <80 MM HG: CPT | Mod: CPTII,S$GLB,, | Performed by: INTERNAL MEDICINE

## 2024-05-23 PROCEDURE — 3072F LOW RISK FOR RETINOPATHY: CPT | Mod: CPTII,S$GLB,, | Performed by: INTERNAL MEDICINE

## 2024-05-23 PROCEDURE — 3008F BODY MASS INDEX DOCD: CPT | Mod: CPTII,S$GLB,, | Performed by: INTERNAL MEDICINE

## 2024-05-23 PROCEDURE — 99999 PR PBB SHADOW E&M-EST. PATIENT-LVL V: CPT | Mod: PBBFAC,,, | Performed by: INTERNAL MEDICINE

## 2024-05-23 PROCEDURE — 1159F MED LIST DOCD IN RCRD: CPT | Mod: CPTII,S$GLB,, | Performed by: INTERNAL MEDICINE

## 2024-05-23 PROCEDURE — 3051F HG A1C>EQUAL 7.0%<8.0%: CPT | Mod: CPTII,S$GLB,, | Performed by: INTERNAL MEDICINE

## 2024-05-23 RX ORDER — AMLODIPINE BESYLATE 5 MG/1
5 TABLET ORAL DAILY
Qty: 30 TABLET | Refills: 11 | Status: SHIPPED | OUTPATIENT
Start: 2024-05-23 | End: 2024-05-23

## 2024-05-23 RX ORDER — PRAVASTATIN SODIUM 20 MG/1
20 TABLET ORAL NIGHTLY
Qty: 30 TABLET | Refills: 11 | Status: SHIPPED | OUTPATIENT
Start: 2024-05-23 | End: 2025-05-23

## 2024-05-23 RX ORDER — TEMAZEPAM 30 MG/1
30 CAPSULE ORAL NIGHTLY
Qty: 90 CAPSULE | Refills: 0 | Status: SHIPPED | OUTPATIENT
Start: 2024-05-23

## 2024-05-23 RX ORDER — ACETAMINOPHEN 160 MG/5ML
200 SUSPENSION, ORAL (FINAL DOSE FORM) ORAL DAILY
Qty: 30 CAPSULE | Refills: 11 | Status: SHIPPED | OUTPATIENT
Start: 2024-05-23 | End: 2025-05-23

## 2024-05-23 RX ORDER — AMLODIPINE BESYLATE 5 MG/1
5 TABLET ORAL DAILY
Qty: 30 TABLET | Refills: 11 | Status: SHIPPED | OUTPATIENT
Start: 2024-05-23 | End: 2024-06-19

## 2024-05-23 NOTE — TELEPHONE ENCOUNTER
I called the patient regarding her message.  She indicates that she has considered risk/benefit of continuing temazepam as is versus a trial of Seroquel, and her conclusion is to make no changes in her medication (she wishes to stay with temazepam 30 mg at bedtime).  She also recalled that she did have weight gain with Remeron and she strongly wishes to avoid medications with weight gain or other metabolic potential.  Epic indicates that a 90 day supply of temazepam 30 mg was dispensed by the [facility] mail [facility] on March 28.  I am ordering a refill to on hold until the appropriate time for dispensing.  The patient confirms her next appointment with me in August.

## 2024-05-24 ENCOUNTER — PATIENT OUTREACH (OUTPATIENT)
Dept: ADMINISTRATIVE | Facility: OTHER | Age: 74
End: 2024-05-24
Payer: MEDICARE

## 2024-05-24 NOTE — PROGRESS NOTES
Subjective:   Patient ID:  Christine Jo is a 74 y.o. female who presents for evaluation of No chief complaint on file.      HPI  May 23, 2024.    74-year-old female, known to me from hospitalization in April with NSTEMI.  Found to have 99% proximal LAD stenosis.    Status post stenting.  Continue to complain of chest pain that they after she went for a repeat coronary angiogram with the IVC that showed patent stent.  She has a jailed small diagonal not amenable for PCI.    Currently chest pain-free.  Her EF was 45% with regional wall motion abnormality related to her LAD stenosis.    No dyspnea.    However she has been having uncontrolled hypertension.  Recently started on benzodiazepines by her primary provider.    She has long history of intolerance to statins.    She complains also of pain to her shins  Past Medical History:   Diagnosis Date    Arthritis     Cataract     Coronary artery disease     Diabetes mellitus 2008     am 01/15/2018 Insulin x 1 year    Diabetes mellitus, type 2     DM (diabetes mellitus) 2008     am 02/14/2020 Insulin x 4 years    Encounter for blood transfusion     Glaucoma     Hypertension     Insomnia     Macular degeneration     Old MI (myocardial infarction) 2/12/2024    Vaginal yeast infection        Past Surgical History:   Procedure Laterality Date    abdominal laparoscopy       ADENOIDECTOMY  1965    Tonsils removed also    ANGIOGRAM, CORONARY, WITH LEFT HEART CATHETERIZATION N/A 04/02/2024    Procedure: Angiogram, Coronary, with Left Heart Cath;  Surgeon: Shree Espinoza MD;  Location: [facility] CATH LAB;  Service: Cardiology;  Laterality: N/A;    ANGIOGRAM, CORONARY, WITH LEFT HEART CATHETERIZATION N/A 04/01/2024    Procedure: Angiogram, Coronary, with Left Heart Cath;  Surgeon: Shree Espinoza MD;  Location: [facility] CATH LAB;  Service: Cardiology;  Laterality: N/A;    BREAST BIOPSY Left 1998    benign    BREAST SURGERY  1998 biopsy lt breast    CATARACT  EXTRACTION Bilateral [id number]    [facility] in [place]    Cataract Surgery Bilateral     Laser (YAG)    COLON SURGERY      COLONOSCOPY N/A 2021    Procedure: COLONOSCOPY;  Surgeon: Shawn Rogers III, MD;  Location: [facility] ENDO;  Service: Endoscopy;  Laterality: N/A;    COLONOSCOPY N/A 2021    Procedure: COLONOSCOPY;  Surgeon: Memo Merida MD;  Location: [facility] ENDO;  Service: Endoscopy;  Laterality: N/A;    CORONARY STENT PLACEMENT N/A 2024    Procedure: INSERTION, STENT, CORONARY ARTERY;  Surgeon: Shree Espinoza MD;  Location: [facility] CATH LAB;  Service: Cardiology;  Laterality: N/A;    ESOPHAGOGASTRODUODENOSCOPY N/A 2019    Procedure: ESOPHAGOGASTRODUODENOSCOPY (EGD);  Surgeon: Shawn Rogers III, MD;  Location: [facility];  Service: Endoscopy;  Laterality: N/A;    ESOPHAGOGASTRODUODENOSCOPY N/A 2021    Procedure: EGD (ESOPHAGOGASTRODUODENOSCOPY);  Surgeon: Shawn Rogers III, MD;  Location: [facility];  Service: Endoscopy;  Laterality: N/A;    EYE SURGERY      IVUS, CORONARY  2024    Procedure: IVUS, Coronary;  Surgeon: Shree Espinoza MD;  Location: [facility] CATH LAB;  Service: Cardiology;;    PERCUTANEOUS CORONARY INTERVENTION, ARTERY N/A 2024    Procedure: Percutaneous coronary intervention;  Surgeon: Shree Espinoza MD;  Location: [facility] CATH LAB;  Service: Cardiology;  Laterality: N/A;    PTCA, SINGLE VESSEL  2024    Procedure: PTCA, Single Vessel;  Surgeon: Shree Espinoza MD;  Location: [facility] CATH LAB;  Service: Cardiology;;    SMALL INTESTINE SURGERY      Exploratory stomach large and small intestines    TONSILLECTOMY      TUBAL LIGATION         Social History     Tobacco Use    Smoking status: Former     Current packs/day: 0.00     Average packs/day: 1 pack/day for 36.5 years (36.5 ttl pk-yrs)     Types: Cigarettes     Start date:      Quit date: 2003     Years since quittin.9     Passive exposure: Past    Smokeless  tobacco: Never    Tobacco comments:     Been quit 19 years. I smoked 35 years approx a pack a day   Substance Use Topics    Alcohol use: No    Drug use: Never       Family History   Problem Relation Name Age of Onset    Heart disease Mother Jillian Jo         Open heart surgery    Cataracts Mother Jillian Jo     Macular degeneration Mother Jillian Jo     Glaucoma Mother Jillian Jo     Arthritis Mother Jillian Jo     Hearing loss Mother Jillian Jo     Hypertension Mother Jillian Jo     Kidney disease Mother Jillian Jo     Stroke Mother Jillian Jo     Vision loss Mother Jillian Jo     Diabetes Sister La Jo     Heart disease Sister La Jo         CAD    Cataracts Sister La Jo     Depression Sister La Jo         Depression    Hypertension Sister La Jo     Diabetes Sister Erica Lerouge     Hearing loss Sister Erica Lerouge     Hypertension Sister Erica Lerouge     Diabetes Sister      Cancer Son Andrea Trascher III         testicular     Arthritis Son Andrea Trascher III         Because of chemotherapy at 19 he has neuropathy and arthritis    Hearing loss Son Andrea Trascher III         Because of chemotherapy    Cancer Maternal Aunt Etta Marquez         Tumor in chest wall    Heart disease Maternal Grandfather Leonides Santos         Pacemaker    Arthritis Maternal Grandfather Leonides Santos     Hearing loss Maternal Grandfather Leonides Santos     Hypertension Maternal Grandfather North[place]luis Santos     Hypertension Maternal Grandmother Helene Santos     Kidney disease Maternal Grandmother Helene Santos     Stroke Maternal Grandmother Helene Santos     Alcohol abuse Daughter Kaye Trascher     Asthma Daughter Kaye Trascher     Depression Daughter Kaye Camargoscher         Psysophrenia    Drug abuse Daughter Kaye Trascher         Not now but earlier alcohol and drug abuse    Hypertension Daughter Kaye Trascher     Mental illness  Daughter Kaye Martin         Psysophrenia    Cancer Son Andrea Greyer III will         Testicular cancer    Depression Son Andrea Greyer III will         Major depressive disorder like me    Cancer Maternal Aunt Carmella Enrique         Lung cancer    Depression Sister Maribell Hull         May be bipolar    Diabetes Sister Maribell Hull     Heart disease Sister Mariebll Hull         Open heart surgery    Hypertension Sister Maribell Hull     Hypertension Brother Riky Jo        Review of Systems   Cardiovascular:  Negative for chest pain, dyspnea on exertion, palpitations and syncope.   Genitourinary: Negative.    Neurological: Negative.        Current Outpatient Medications on File Prior to Visit   Medication Sig    apixaban (ELIQUIS) 5 mg Tab Take 1 tablet (5 mg total) by mouth 2 (two) times daily.    azelastine (ASTELIN) 137 mcg (0.1 %) nasal spray use 2 sprays (274 mcg total) by Nasal route 2 (two) times daily.    BEPREVE 1.5 % Drop Place 1 drop into both eyes 2 (two) times daily.    blood sugar diagnostic Strp To check blood sugar 1 times daily    blood-glucose meter (ACCU-CHEK GUIDE GLUCOSE METER) Misc use daily to test blood sugar    carvediloL (COREG) 25 MG tablet TAKE 1 TABLET BY MOUTH TWICE DAILY    clopidogreL (PLAVIX) 75 mg tablet Take 1 tablet (75 mg total) by mouth once daily.    clotrimazole-betamethasone (LOTRISONE) lotion Apply topically to the affected area 2 (two) times daily.    cycloSPORINE (RESTASIS) 0.05 % ophthalmic emulsion Place 1 drop into both eyes 2 (two) times daily.    DULoxetine (CYMBALTA) 60 MG capsule Take 1 capsule (60 mg total) by mouth once daily.    ezetimibe (ZETIA) 10 mg tablet Take 1 tablet (10 mg total) by mouth every evening.    fluticasone propionate (FLONASE) 50 mcg/actuation nasal spray 2 sprays (100 mcg total) by Each Nostril route once daily.    furosemide (LASIX) 20 MG tablet Take 1 tablet (20 mg total) by mouth daily as needed  "(Swelling).    gabapentin (NEURONTIN) 300 MG capsule Take 1 capsule (300 mg total) by mouth daily as needed (when needed).    galcanezumab-gnlm (EMGALITY SYRINGE) 120 mg/mL Syrg Inject 120 mg into the skin every 28 days.    insulin (LANTUS SOLOSTAR U-100 INSULIN) glargine 100 units/mL SubQ pen Inject 72 Units into the skin every evening.    isosorbide mononitrate (IMDUR) 30 MG 24 hr tablet Take 1 tablet (30 mg total) by mouth once daily.    lancets Misc To check BG 1 times daily    linaCLOtide (LINZESS) 72 mcg Cap capsule Take 1 capsule (72 mcg total) by mouth before breakfast.    meclizine (ANTIVERT) 25 mg tablet Take 1 tablet (25 mg total) by mouth 3 (three) times daily as needed.    nitroGLYCERIN (NITROSTAT) 0.4 MG SL tablet Place 1 tablet (0.4 mg total) under the tongue every 5 (five) minutes as needed for Chest pain.    pantoprazole (PROTONIX) 40 MG tablet Take 1 tablet (40 mg total) by mouth once daily.    pen needle, diabetic (BD ULTRA-FINE SHORT PEN NEEDLE) 31 gauge x 5/16" Ndle AS DIRECTED TWICE DAILY    rimegepant 75 mg odt Take 1 tablet (75 mg total) by mouth as needed for Migraine (do not exceed 2-3 doses within 1 week). Place ODT tablet on the tongue; alternatively the ODT tablet may be placed under the tongue    sacubitriL-valsartan (ENTRESTO) 24-26 mg per tablet Take 1 tablet by mouth 2 (two) times daily.    SITagliptin phosphate (JANUVIA) 100 MG Tab Take 1 tablet (100 mg total) by mouth once daily.    [DISCONTINUED] temazepam (RESTORIL) 30 mg capsule Take 1 capsule (30 mg total) by mouth every evening.    temazepam (RESTORIL) 30 mg capsule Take 1 capsule (30 mg total) by mouth every evening.     No current facility-administered medications on file prior to visit.       Objective:   Objective:  Wt Readings from Last 3 Encounters:   05/23/24 108 kg (238 lb 1.6 oz)   05/10/24 106.1 kg (234 lb)   04/24/24 108.7 kg (239 lb 10.2 oz)     Temp Readings from Last 3 Encounters:   04/03/24 98.2 °F (36.8 °C) " (Oral)   02/14/24 98 °F (36.7 °C) (Temporal)   01/12/24 97.4 °F (36.3 °C) (Temporal)     BP Readings from Last 3 Encounters:   05/23/24 138/64   05/10/24 132/72   04/24/24 110/60     Pulse Readings from Last 3 Encounters:   05/23/24 72   05/10/24 74   04/24/24 77       Physical Exam  Vitals reviewed.   Constitutional:       Appearance: She is well-developed.   Neck:      Vascular: No carotid bruit.   Cardiovascular:      Rate and Rhythm: Normal rate and regular rhythm.      Pulses: Intact distal pulses.      Heart sounds: Normal heart sounds. No murmur heard.  Pulmonary:      Breath sounds: Normal breath sounds.   Neurological:      Mental Status: She is oriented to person, place, and time.         Lab Results   Component Value Date    CHOL 195 09/28/2023    CHOL 164 10/04/2022    CHOL 168 06/23/2022     Lab Results   Component Value Date    HDL 62 09/28/2023    HDL 46 10/04/2022    HDL 44 06/23/2022     Lab Results   Component Value Date    LDLCALC 103.2 09/28/2023    LDLCALC 93.0 10/04/2022    LDLCALC 89.2 06/23/2022     Lab Results   Component Value Date    TRIG 149 09/28/2023    TRIG 125 10/04/2022    TRIG 174 (H) 06/23/2022     Lab Results   Component Value Date    CHOLHDL 31.8 09/28/2023    CHOLHDL 28.0 10/04/2022    CHOLHDL 26.2 06/23/2022       Chemistry        Component Value Date/Time     04/03/2024 0455    K 4.1 04/03/2024 0455     04/03/2024 0455    CO2 21 (L) 04/03/2024 0455    BUN 11 04/03/2024 0455    CREATININE 0.8 04/03/2024 0455     (H) 04/03/2024 0455        Component Value Date/Time    CALCIUM 9.0 04/03/2024 0455    ALKPHOS 38 (L) 03/31/2024 0905    AST 19 03/31/2024 0905    ALT 16 03/31/2024 0905    BILITOT 0.5 03/31/2024 0905    ESTGFRAFRICA >60 07/27/2022 1237    EGFRNONAA >60 07/27/2022 1237          Lab Results   Component Value Date    TSH 1.024 10/04/2022     Lab Results   Component Value Date    INR 1.0 04/01/2024     Lab Results   Component Value Date    WBC 6.03  04/03/2024    HGB 10.0 (L) 04/03/2024    HCT 30.4 (L) 04/03/2024    MCV 94 04/03/2024     04/03/2024     BNP  @LABRCNTIP(BNP,BNPTRIAGEBLO)@  CrCl cannot be calculated (Patient's most recent lab result is older than the maximum 7 days allowed.).     Imaging:  ======    No results found for this or any previous visit.    No results found for this or any previous visit.    Results for orders placed during the hospital encounter of 01/12/24    X-Ray Chest PA And Lateral    Narrative  EXAMINATION:  XR CHEST PA AND LATERAL    CLINICAL HISTORY:  Calculus of gallbladder without cholecystitis without obstruction    TECHNIQUE:  PA and lateral views of the chest were performed.    COMPARISON:  10/04/2021    FINDINGS:  The cardiac and mediastinal silhouettes appear within normal limits.   The lungs are clear bilaterally.  No acute osseous findings demonstrated.    Impression  No acute findings.      Electronically signed by: Fredrick Pace MD  Date:    01/12/2024  Time:    09:07    No results found for this or any previous visit.    No valid procedures specified.    Results for orders placed during the hospital encounter of 03/31/24    Cardiac catheterization    Conclusion    The 1st Diag lesion was 60% stenosed, jailed very small vessel , D2 50% also very small vessel jailed    The estimated blood loss was none.    IVUS showed well expanded stent, well apposed, no thrombus    The procedure log was documented by Documenter: Haley Yusuf RN and verified by Shree Espinoza MD.    Date: 4/2/2024  Time: 4:36 PM      Results for orders placed during the hospital encounter of 01/03/24    Nuclear Stress - Cardiology Interpreted    Interpretation Summary    Abnormal myocardial perfusion scan.    There is a mild intensity, small sized, fixed perfusion abnormality consistent with scar in the inferoapical wall(s).    There are no other significant perfusion abnormalities.    The gated perfusion images showed an ejection  fraction of 63% at rest. The gated perfusion images showed an ejection fraction of 73% post stress. Normal ejection fraction is greater than 59%.    The ECG portion of the study is negative for ischemia.      Results for orders placed during the hospital encounter of 03/31/24    Echo    Interpretation Summary    Left Ventricle: The left ventricle is normal in size. Normal wall thickness. There is concentric remodeling. Normal wall motion. There is mildly reduced systolic function with a visually estimated ejection fraction of 40 - 45%. Ejection fraction by visual approximation is 45%. There is indeterminate diastolic function.    Right Ventricle: Normal right ventricular cavity size. Wall thickness is normal. Right ventricle wall motion  is normal. Systolic function is normal.    Aortic Valve: There is mild aortic regurgitation.    Tricuspid Valve: The tricuspid valve is structurally normal. There is mild regurgitation.    Pulmonary Artery: The estimated pulmonary artery systolic pressure is 42 mmHg.    IVC/SVC: Normal venous pressure at 3 mmHg.      Diagnostic Results:  ECG: Reviewed    The ASCVD Risk score (Yoselyn DK, et al., 2019) failed to calculate for the following reasons:    The patient has a prior MI or stroke diagnosis        Assessment and Plan:   Pain in both lower extremities  -     pravastatin (PRAVACHOL) 20 MG tablet; Take 1 tablet (20 mg total) by mouth every evening.  Dispense: 30 tablet; Refill: 11  -     coenzyme Q10 200 mg capsule; Take 200 mg by mouth once daily.  Dispense: 30 capsule; Refill: 11  -     Ankle Brachial Indices (MIKE); Future    Primary hypertension  -     Discontinue: amLODIPine (NORVASC) 5 MG tablet; Take 1 tablet (5 mg total) by mouth once daily.  Dispense: 30 tablet; Refill: 11  -     amLODIPine (NORVASC) 5 MG tablet; Take 1 tablet (5 mg total) by mouth once daily.  Dispense: 30 tablet; Refill: 11    Paroxysmal atrial fibrillation    Aorto-iliac atherosclerosis    Controlled  type 2 diabetes mellitus with diabetic polyneuropathy, with long-term current use of insulin    Morbid obesity with BMI of 40.0-44.9, adult    S/P coronary artery stent placement    Essential hypertension      Continue with statin.  Add amlodipine for better blood pressure control.  Attempt Co Q10 to help with her lower extremity symptoms.    LDL goal less than 70.    Continue with Plavix and Eliquis.  Reviewed all tests and above medical conditions with patient in detail and formulated treatment plan.  Risk factor modification discussed.   Cardiac low salt diet discussed.  Maintaining healthy weight and weight loss goals were discussed in clinic.    Follow up in  6 months or sooner if needed

## 2024-05-27 ENCOUNTER — TELEPHONE (OUTPATIENT)
Dept: CARDIOLOGY | Facility: CLINIC | Age: 74
End: 2024-05-27
Payer: MEDICARE

## 2024-05-27 ENCOUNTER — PATIENT MESSAGE (OUTPATIENT)
Dept: CARDIOLOGY | Facility: CLINIC | Age: 74
End: 2024-05-27
Payer: MEDICARE

## 2024-05-28 ENCOUNTER — PATIENT MESSAGE (OUTPATIENT)
Dept: PSYCHIATRY | Facility: CLINIC | Age: 74
End: 2024-05-28
Payer: MEDICARE

## 2024-05-28 ENCOUNTER — TELEPHONE (OUTPATIENT)
Dept: CARDIOLOGY | Facility: CLINIC | Age: 74
End: 2024-05-28
Payer: MEDICARE

## 2024-05-28 ENCOUNTER — PATIENT MESSAGE (OUTPATIENT)
Dept: PULMONOLOGY | Facility: CLINIC | Age: 74
End: 2024-05-28
Payer: MEDICARE

## 2024-05-28 NOTE — TELEPHONE ENCOUNTER
Contacted patient to schedule MIKE; Patient accepted appointment time, date, and location with no further questions or concerns.               ----- Message from Carissa Fitzgerald sent at 5/28/2024 10:19 AM CDT -----  Patient called regarding testing Patient will like to speak to the nurse  a call back Call back number [phone]

## 2024-05-29 ENCOUNTER — PATIENT MESSAGE (OUTPATIENT)
Dept: PSYCHIATRY | Facility: CLINIC | Age: 74
End: 2024-05-29
Payer: MEDICARE

## 2024-05-30 ENCOUNTER — TELEPHONE (OUTPATIENT)
Dept: CARDIOLOGY | Facility: CLINIC | Age: 74
End: 2024-05-30
Payer: MEDICARE

## 2024-06-02 ENCOUNTER — PATIENT MESSAGE (OUTPATIENT)
Dept: NEUROLOGY | Facility: CLINIC | Age: 74
End: 2024-06-02
Payer: MEDICARE

## 2024-06-04 ENCOUNTER — PATIENT MESSAGE (OUTPATIENT)
Dept: RHEUMATOLOGY | Facility: CLINIC | Age: 74
End: 2024-06-04
Payer: MEDICARE

## 2024-06-04 ENCOUNTER — TELEPHONE (OUTPATIENT)
Dept: CARDIOLOGY | Facility: CLINIC | Age: 74
End: 2024-06-04
Payer: MEDICARE

## 2024-06-04 ENCOUNTER — TELEPHONE (OUTPATIENT)
Dept: CARDIAC REHAB | Facility: HOSPITAL | Age: 74
End: 2024-06-04
Payer: MEDICARE

## 2024-06-04 ENCOUNTER — PATIENT MESSAGE (OUTPATIENT)
Dept: INTERNAL MEDICINE | Facility: CLINIC | Age: 74
End: 2024-06-04
Payer: MEDICARE

## 2024-06-04 DIAGNOSIS — E11.42 CONTROLLED TYPE 2 DIABETES MELLITUS WITH DIABETIC POLYNEUROPATHY, WITH LONG-TERM CURRENT USE OF INSULIN: ICD-10-CM

## 2024-06-04 DIAGNOSIS — Z79.4 CONTROLLED TYPE 2 DIABETES MELLITUS WITH DIABETIC POLYNEUROPATHY, WITH LONG-TERM CURRENT USE OF INSULIN: ICD-10-CM

## 2024-06-04 NOTE — TELEPHONE ENCOUNTER
Contacted patient to let her know Dr. Espinoza wants her to stop taking amlodipine patient stated understanding.  ----- Message from Cliff Redmond sent at 6/4/2024 12:36 PM CDT -----  Contact: Christine King would like a call back at [phone] in regard to medication,amLODIPine (NORVASC) 5 MG tablet . Every since she started taking the medication she has been nervous and shaky.  Thanks   am

## 2024-06-04 NOTE — TELEPHONE ENCOUNTER
Care Due:                  Date            Visit Type   Department     Provider  --------------------------------------------------------------------------------                                ESTABLISHED                              PATIENT -    [facility] INTER[facility]  Last Visit: 05-      [facility]       Jose Szymanski                              [facility] [place]                              ANNUAL                              CHECKUP/PHY  [facility] INTER[facility]  Next Visit: 06-      [facility]       Jose Szymanski                                                            Last  Test          Frequency    Reason                     Performed    Due Date  --------------------------------------------------------------------------------    HBA1C.......  6 months...  SITagliptin, insulin.....  02-   08-    [facility] Embedded Care Due Messages. Reference number: [id number].   6/04/2024 4:13:21 PM CDT

## 2024-06-04 NOTE — TELEPHONE ENCOUNTER
Spoke to pt, states she has had to delay ROMTherapy related to feeling bad lately. Pt informed of place in line for cardiac rehab in clinic if that option becomes preferred. Pt v/u- to contact us with updates/concerns.

## 2024-06-05 ENCOUNTER — PATIENT MESSAGE (OUTPATIENT)
Dept: CARDIOLOGY | Facility: CLINIC | Age: 74
End: 2024-06-05
Payer: MEDICARE

## 2024-06-05 RX ORDER — CLOTRIMAZOLE AND BETAMETHASONE DIPROPIONATE 10; .5 MG/ML; MG/ML
LOTION TOPICAL 2 TIMES DAILY
Qty: 30 ML | Refills: 2 | Status: SHIPPED | OUTPATIENT
Start: 2024-06-05

## 2024-06-05 RX ORDER — PEN NEEDLE, DIABETIC 30 GX3/16"
NEEDLE, DISPOSABLE MISCELLANEOUS
Qty: 200 EACH | Refills: 3 | Status: SHIPPED | OUTPATIENT
Start: 2024-06-05

## 2024-06-05 NOTE — PROGRESS NOTES
[facility]  Medical Home Visit    Visit Date: 4/11/2023  Encounter Provider: Naomi Bernabe  PCP:  Jose Szymanski MD    Subjective:      Patient ID: Christine Jo is a 73 y.o. female.    Consult Requested By:  Dr. Jose Szymanski  Reason for Consult:  Establish Care    Christine is being seen at home due to being seen at home due to physical debility that presents a taxing effort to leave the home, to mitigate high risk of hospital readmission and/or due to the limited availability of reliable or safe options for transportation to the point of access to the provider. Prior to treatment on this visit the chart was reviewed and patient verbal consent was obtained.    Chief Complaint: [facility]      Patient is being seen for an established care visit. Upon arrival patient was ambulatory to the door. Patient reports that she has been having increased issues with depression but denies any SI/HI. Patient has an appointment coming up neurology and is seeing the psychiatrist at the end of May. Patient also reports issues with balance and that has been keeping her from doing more things like getting out and going places. Medications reviewed and patient reports compliance with all medications. VSS.         Review of Systems   Constitutional: Negative.    HENT: Negative.     Eyes: Negative.    Respiratory:  Positive for shortness of breath (with exertion).    Cardiovascular: Negative.    Gastrointestinal:  Positive for constipation (intermittent).   Endocrine: Negative.    Genitourinary:  Positive for decreased urine volume (urine stream decreased with stress incontinence).   Musculoskeletal: Negative.    Skin: Negative.    Allergic/Immunologic: Negative.    Neurological:  Positive for weakness (with balance issues).   Hematological: Negative.    Psychiatric/Behavioral:  Positive for dysphoric mood and sleep disturbance. Negative for self-injury and suicidal ideas.      Assessments:  Environmental: Patient lives  in a flat on the ground floor. There are no steps at the entrance. Lighting is diom and temperature is comfortable.  Functional Status: Patient is ambulatory with a rollator for long distances. Patient is independent with ADLs  Safety: Fall Precautions  Nutritional: Adequate food in the home  Home Health/DME/Supplies: No home health, DMEs: rollator    Objective:   Physical Exam  Vitals reviewed.   Constitutional:       General: She is not in acute distress.     Appearance: She is obese.   HENT:      Head: Normocephalic and atraumatic.      Nose: Nose normal.      Mouth/Throat:      Mouth: Mucous membranes are moist.      Pharynx: Oropharynx is clear.   Cardiovascular:      Rate and Rhythm: Normal rate and regular rhythm.      Pulses: Normal pulses.      Heart sounds: Normal heart sounds.   Pulmonary:      Breath sounds: Normal breath sounds.   Abdominal:      General: Bowel sounds are normal. There is no distension.      Palpations: Abdomen is soft.      Tenderness: There is no abdominal tenderness. There is no guarding.   Musculoskeletal:      Cervical back: Neck supple.      Right lower leg: No edema.      Left lower leg: No edema.   Skin:     General: Skin is warm and dry.      Capillary Refill: Capillary refill takes less than 2 seconds.   Neurological:      Mental Status: She is alert and oriented to person, place, and time.      Motor: Weakness present.      Gait: Gait abnormal.   Psychiatric:         Mood and Affect: Mood normal.         Speech: Speech normal.         Behavior: Behavior normal.         Thought Content: Thought content normal.         Judgment: Judgment normal.       Vitals:    04/11/23 1155   BP: 122/60   Pulse: 71   Resp: 18   Temp: 98.8 °F (37.1 °C)   SpO2: 97%     There is no height or weight on file to calculate BMI.    Assessment:     1. Vestibular migraine    2. Primary hypertension    3. Paroxysmal atrial fibrillation    4. Uncontrolled type 2 diabetes mellitus with hyperglycemia    5.  Primary osteoarthritis of left knee    6. Debility    7. Decreased urine stream        Plan:     Ethical / Legal: Advance Care Planning   Surrogate decision maker:  Name Margy Cooley, Relationship: Daughter  Code Status:  Full Code  [place]:  None   Other advance directive:  None, Capacity to make medical decisions:  Yes, Conflict None       1. Vestibular migraine  -     galcanezumab-gnlm (EMGALITY SYRINGE) 120 mg/mL Syrg; Inject 120 mg into the skin every 30 days.  Dispense: 1 each; Refill: 0    2. Primary hypertension  Comments:  BP was 122/60  continue home medications as prescribed    Orders:  -     Ambulatory referral/consult to [facility]    3. Paroxysmal atrial fibrillation  Comments:  RRR on exam  continue home medications as prescribed   Orders:  -     Ambulatory referral/consult to [facility]    4. Uncontrolled type 2 diabetes mellitus with hyperglycemia  Comments:  continue diabetic diet and medications as prescribed     Orders:  -     Ambulatory referral/consult to [facility]    5. Primary osteoarthritis of left knee  Comments:  continue diaclofenac gel to bilateral knees BID PRN  refer to home health for PT    6. Debility  -     Ambulatory referral/consult to Home Health; Future; Expected date: 04/12/2023    7. Decreased urine stream  -     tamsulosin (FLOMAX) 0.4 mg Cap; Take 1 capsule (0.4 mg total) by mouth once daily.  Dispense: 30 capsule; Refill: 0    Encounter for Medical Follow-Up and Medication Review  - [facility] to schedule follow-up visit with patient in 4 weeks or PRN     Patient Instructions Given:  - Continue all medications, treatments and therapies as ordered.   - Follow all instructions, recommendations as discussed.  - Maintain Safety Precautions at all times.  - Attend all medical appointments as scheduled.  - For worsening symptoms: call Primary Care Physician or Nurse  Practitioner.  - For emergencies, call 911 or immediately report to the nearest emergency room        Were controlled substances prescribed?  No    Follow Up Appointments:   Future Appointments   Date Time Provider Department Center   4/14/2023  1:20 PM Erik Esposito MD ON ORTHO [facility]   4/14/2023  2:30 PM Parul English NP ON NEURO [facility]   4/14/2023  3:00 PM ON XR1-DR ON XRAY [facility]   4/25/2023  2:30 PM Valentin Cr MD ON OPHTHAL [facility]   4/25/2023  4:40 PM Gabriele Joyce MD ON CARDIO [facility]   5/2/2023 12:00 PM Floresita Jensen PA-C [facility] [place]   5/17/2023  8:00 AM Naomi Barcenas NP [facility]   5/31/2023 11:00 AM Memo Merida MD ON GASTRO [facility]   5/31/2023  1:00 PM Sourav Alvarenga MD ON PSYCH [facility]   8/7/2023 10:00 AM Jose Szymanski MD ON IM [facility]       Signature: Naomi Barcenas NP      ADMIT

## 2024-06-06 NOTE — PROGRESS NOTES
CHW - Follow Up    This Community Health Worker completed a follow up visit with patient via telephone today.  Pt/Caregiver reported: Pt states she still has a productive cough in the mornings. She has made an appointment with Pulmonary for cough.   Community Health Worker provided: I will follow up after pulmonary appointment.  Follow up required: yes  No future outreach task assigned

## 2024-06-14 DIAGNOSIS — R06.09 DOE (DYSPNEA ON EXERTION): ICD-10-CM

## 2024-06-14 DIAGNOSIS — M79.89 LEG SWELLING: ICD-10-CM

## 2024-06-15 RX ORDER — FUROSEMIDE 20 MG/1
TABLET ORAL
Qty: 90 TABLET | Refills: 3 | Status: SHIPPED | OUTPATIENT
Start: 2024-06-15

## 2024-06-15 NOTE — TELEPHONE ENCOUNTER
No care due was identified.  Health [facility] Embedded Care Due Messages. Reference number: [id number].   6/14/2024 9:06:46 PM CDT

## 2024-06-15 NOTE — TELEPHONE ENCOUNTER
Refill Decision Note   Christine Jo  is requesting a refill authorization.  Brief Assessment and Rationale for Refill:  Approve     Medication Therapy Plan:        Comments:     Note composed:1:45 AM 06/15/2024

## 2024-06-17 ENCOUNTER — TELEPHONE (OUTPATIENT)
Dept: PSYCHIATRY | Facility: CLINIC | Age: 74
End: 2024-06-17
Payer: MEDICARE

## 2024-06-17 DIAGNOSIS — F41.9 ANXIETY DISORDER, UNSPECIFIED TYPE: Primary | ICD-10-CM

## 2024-06-17 DIAGNOSIS — F33.1 DEPRESSION, MAJOR, RECURRENT, MODERATE: ICD-10-CM

## 2024-06-17 NOTE — TELEPHONE ENCOUNTER
----- Message from Skyler Hardy MA sent at 6/17/2024  4:23 PM CDT -----  Contact: Christine  Good afternoon, there are no sooner openings would you like to add her in or just place her on the wait list?  ----- Message -----  From: Vaughn Loving  Sent: 6/17/2024   4:22 PM CDT  To: [facility] Psych Clinical Staff    Christine is requesting a call back regarding rescheduling her appointment to sometime this week if possible, because she has been having many issues and feels like she has been acting out of character. Please give her a call back at [phone]

## 2024-06-17 NOTE — TELEPHONE ENCOUNTER
I called the patient regarding her message.  She indicates that she has not looking for a medication change but was hoping to get a sooner psychotherapy appointment.  She indicates that her son and daughter being hard on her has increased.  She says that they has been more frustrated if she cries in front of them.  She also indicates that her son is in intensive therapy and has been instructed to journal about the past; she says that circumstances from his childhood make him angry and he will sometimes be critical of her in these regards.  Nevertheless, she does say that her son continues to reach out to and communicate with her and her daughter maintains contact and readily agrees to take her on errands.  She says that the tension with her children has caused her to feel discouraged, but she denies any ongoing depression.  Sleep and control of anxiety are good.  No side effects of Cymbalta or temazepam.  Questioning yields no yanick, hypomania, psychosis, suicidal ideation, thoughts of self-harm, homicidal ideation, thoughts of harm towards others, or feelings of aggression.    Asked about an IOP referral, the patient equally agrees to the idea.  She indicates that she attended [facility] in the past and found that helpful.  The referral order is being entered, and the referral e-mail is being sent.

## 2024-06-19 ENCOUNTER — OFFICE VISIT (OUTPATIENT)
Dept: INTERNAL MEDICINE | Facility: CLINIC | Age: 74
End: 2024-06-19
Payer: MEDICARE

## 2024-06-19 ENCOUNTER — HOSPITAL ENCOUNTER (OUTPATIENT)
Dept: CARDIOLOGY | Facility: HOSPITAL | Age: 74
Discharge: HOME | End: 2024-06-19
Attending: INTERNAL MEDICINE
Payer: MEDICARE

## 2024-06-19 VITALS
TEMPERATURE: 98 F | WEIGHT: 238 LBS | OXYGEN SATURATION: 99 % | DIASTOLIC BLOOD PRESSURE: 74 MMHG | BODY MASS INDEX: 43.79 KG/M2 | SYSTOLIC BLOOD PRESSURE: 136 MMHG | HEART RATE: 76 BPM | BODY MASS INDEX: 43.06 KG/M2 | SYSTOLIC BLOOD PRESSURE: 138 MMHG | DIASTOLIC BLOOD PRESSURE: 70 MMHG | HEIGHT: 62 IN | WEIGHT: 235.44 LBS

## 2024-06-19 DIAGNOSIS — Z79.4 CONTROLLED TYPE 2 DIABETES MELLITUS WITH DIABETIC POLYNEUROPATHY, WITH LONG-TERM CURRENT USE OF INSULIN: Primary | ICD-10-CM

## 2024-06-19 DIAGNOSIS — M17.11 PRIMARY OSTEOARTHRITIS OF RIGHT KNEE: ICD-10-CM

## 2024-06-19 DIAGNOSIS — I48.0 PAROXYSMAL ATRIAL FIBRILLATION: ICD-10-CM

## 2024-06-19 DIAGNOSIS — E78.2 MIXED HYPERLIPIDEMIA: ICD-10-CM

## 2024-06-19 DIAGNOSIS — E11.42 CONTROLLED TYPE 2 DIABETES MELLITUS WITH DIABETIC POLYNEUROPATHY, WITH LONG-TERM CURRENT USE OF INSULIN: Primary | ICD-10-CM

## 2024-06-19 DIAGNOSIS — I10 PRIMARY HYPERTENSION: ICD-10-CM

## 2024-06-19 DIAGNOSIS — I25.118 CORONARY ARTERY DISEASE OF NATIVE ARTERY OF NATIVE HEART WITH STABLE ANGINA PECTORIS: ICD-10-CM

## 2024-06-19 DIAGNOSIS — M79.604 PAIN IN BOTH LOWER EXTREMITIES: ICD-10-CM

## 2024-06-19 DIAGNOSIS — M79.605 PAIN IN BOTH LOWER EXTREMITIES: ICD-10-CM

## 2024-06-19 LAB
LEFT ABI: 1.15
LEFT ARM BP: 126 MMHG
LEFT DORSALIS PEDIS: 157 MMHG
LEFT POSTERIOR TIBIAL: 144 MMHG
LEFT TBI: 0.78
LEFT TOE PRESSURE: 106 MMHG
RIGHT ABI: 1.06
RIGHT ARM BP: 136 MMHG
RIGHT DORSALIS PEDIS: 144 MMHG
RIGHT POSTERIOR TIBIAL: 132 MMHG
RIGHT TBI: 0.66
RIGHT TOE PRESSURE: 90 MMHG

## 2024-06-19 PROCEDURE — 1159F MED LIST DOCD IN RCRD: CPT | Mod: CPTII,S$GLB,, | Performed by: FAMILY MEDICINE

## 2024-06-19 PROCEDURE — 3051F HG A1C>EQUAL 7.0%<8.0%: CPT | Mod: CPTII,S$GLB,, | Performed by: FAMILY MEDICINE

## 2024-06-19 PROCEDURE — 3072F LOW RISK FOR RETINOPATHY: CPT | Mod: CPTII,S$GLB,, | Performed by: FAMILY MEDICINE

## 2024-06-19 PROCEDURE — 1126F AMNT PAIN NOTED NONE PRSNT: CPT | Mod: CPTII,S$GLB,, | Performed by: FAMILY MEDICINE

## 2024-06-19 PROCEDURE — 3075F SYST BP GE 130 - 139MM HG: CPT | Mod: CPTII,S$GLB,, | Performed by: FAMILY MEDICINE

## 2024-06-19 PROCEDURE — 1101F PT FALLS ASSESS-DOCD LE1/YR: CPT | Mod: CPTII,S$GLB,, | Performed by: FAMILY MEDICINE

## 2024-06-19 PROCEDURE — 4010F ACE/ARB THERAPY RXD/TAKEN: CPT | Mod: CPTII,S$GLB,, | Performed by: FAMILY MEDICINE

## 2024-06-19 PROCEDURE — G2211 COMPLEX E/M VISIT ADD ON: HCPCS | Mod: S$GLB,,, | Performed by: FAMILY MEDICINE

## 2024-06-19 PROCEDURE — 99214 OFFICE O/P EST MOD 30 MIN: CPT | Mod: S$GLB,,, | Performed by: FAMILY MEDICINE

## 2024-06-19 PROCEDURE — 3288F FALL RISK ASSESSMENT DOCD: CPT | Mod: CPTII,S$GLB,, | Performed by: FAMILY MEDICINE

## 2024-06-19 PROCEDURE — 93922 UPR/L XTREMITY ART 2 LEVELS: CPT | Mod: 26,,, | Performed by: INTERNAL MEDICINE

## 2024-06-19 PROCEDURE — 99999 PR PBB SHADOW E&M-EST. PATIENT-LVL III: CPT | Mod: PBBFAC,,, | Performed by: FAMILY MEDICINE

## 2024-06-19 PROCEDURE — 3008F BODY MASS INDEX DOCD: CPT | Mod: CPTII,S$GLB,, | Performed by: FAMILY MEDICINE

## 2024-06-19 PROCEDURE — 3078F DIAST BP <80 MM HG: CPT | Mod: CPTII,S$GLB,, | Performed by: FAMILY MEDICINE

## 2024-06-19 PROCEDURE — 93922 UPR/L XTREMITY ART 2 LEVELS: CPT

## 2024-06-19 NOTE — PROGRESS NOTES
Subjective:       Patient ID: Christine Jo is a 74 y.o. female.    Chief Complaint: Follow-up    Follow-up hypertension hyperlipidemia diabetes coronary artery disease atherosclerosis elevated BMI.  She is currently on Lantus 74 units a day.  She reports blood sugars have been up and down.  She had a recent slip and fall in the bathroom.  She reports increased right knee pain.  She is osteoarthritis of that knee.  She is received synvisc  injections x3 and still having problems.  She would like orthopedic referral for further evaluation.  She denies chest pain palpitations shortness of breath.  She is also followed by Cardiology Psychiatry neurology and rheumatology.    Follow-up  Associated symptoms include fatigue, headaches, a visual change and weakness. Pertinent negatives include no abdominal pain, chest pain or coughing.   Diabetes  She has type 2 diabetes mellitus. No MedicAlert identification noted. Hypoglycemia symptoms include confusion, dizziness, headaches, mood changes, nervousness/anxiousness, pallor and speech difficulty. Pertinent negatives for hypoglycemia include no hunger, seizures, sleepiness, sweats or tremors. Associated symptoms include fatigue, foot paresthesias, polyuria, visual change, weakness and weight loss. Pertinent negatives for diabetes include no chest pain, no foot ulcerations, no polydipsia and no polyphagia. Pertinent negatives for hypoglycemia complications include no blackouts, no hospitalization, no nocturnal hypoglycemia, no required assistance and no required glucagon injection. Symptoms are stable. Diabetic complications include autonomic neuropathy, heart disease, peripheral neuropathy, PVD and retinopathy. Pertinent negatives for diabetic complications include no CVA or nephropathy. Risk factors for coronary artery disease include dyslipidemia, family history, hypertension, obesity, post-menopausal, sedentary lifestyle, stress and diabetes mellitus. Current diabetic  treatment includes diet, insulin injections and oral agent (monotherapy). She is compliant with treatment all of the time. She is currently taking insulin at bedtime. Insulin injections are given by patient. Rotation sites for injection include the abdominal wall. Her weight is stable. She is following a diabetic and low salt diet. Meal planning includes avoidance of concentrated sweets and carbohydrate counting. She has not had a previous visit with a dietitian. She never participates in exercise. She monitors blood glucose at home 1-2 x per day. Blood glucose monitoring compliance is good. Her home blood glucose trend is fluctuating minimally. She does not see a podiatrist.Eye exam is current.     Review of Systems   Constitutional:  Positive for fatigue and weight loss. Negative for appetite change and unexpected weight change.   Respiratory:  Negative for cough, chest tightness, shortness of breath and wheezing.    Cardiovascular:  Negative for chest pain and palpitations.   Gastrointestinal:  Negative for abdominal distention and abdominal pain.   Endocrine: Positive for polyuria. Negative for polydipsia and polyphagia.   Skin:  Positive for pallor.   Neurological:  Positive for dizziness, speech difficulty, weakness and headaches. Negative for tremors and seizures.   Psychiatric/Behavioral:  Positive for confusion. The patient is nervous/anxious.        Objective:      Physical Exam  Constitutional:       General: She is not in acute distress.     Appearance: She is not ill-appearing or diaphoretic.   Cardiovascular:      Rate and Rhythm: Normal rate and regular rhythm.      Heart sounds: No murmur heard.     No gallop.   Pulmonary:      Effort: Pulmonary effort is normal. No respiratory distress.      Breath sounds: No wheezing, rhonchi or rales.   Abdominal:      General: There is no distension.   Musculoskeletal:      Comments: Bony swelling of the right knee no effusion   Skin:     General: Skin is warm  and dry.   Neurological:      Mental Status: She is alert.   Psychiatric:         Mood and Affect: Mood normal.         Behavior: Behavior normal.         Hospital Outpatient Visit on 06/19/2024   Component Date Value Ref Range Status    Right arm BP 06/19/2024 136.00  mmHg In process    Right posterior tibial 06/19/2024 132.00  mmHg In process    Right dorsalis pedis 06/19/2024 144.00  mmHg In process    Right MIKE 06/19/2024 1.06   In process    Left arm BP 06/19/2024 126.00  mmHg In process    Left posterior tibial 06/19/2024 144.00  mmHg In process    Left dorsalis pedis 06/19/2024 157.00  mmHg In process    Left MIKE 06/19/2024 1.15   In process    Right toe pressure 06/19/2024 90.00  mmHg In process    Right TBI 06/19/2024 0.66   In process    Left toe pressure 06/19/2024 106.00  mmHg In process    Left TBI 06/19/2024 0.78   In process     Assessment:       1. Controlled type 2 diabetes mellitus with diabetic polyneuropathy, with long-term current use of insulin    2. Primary osteoarthritis of right knee    3. Coronary artery disease of native artery of native heart with stable angina pectoris    4. Mixed hyperlipidemia    5. Paroxysmal atrial fibrillation    6. Primary hypertension        Plan:   Blood pressure controlled.  BMI 43.06.  She would lost 3 lb of weight since last visit continue diet.  Orthopedic referral for evaluation.  Lab was ordered.  Medications reviewed.  Follow-up in 3 months.      Controlled type 2 diabetes mellitus with diabetic polyneuropathy, with long-term current use of insulin  -     Hemoglobin A1C; Future; Expected date: 06/19/2024    Primary osteoarthritis of right knee  -     Ambulatory referral/consult to Orthopedics; Future; Expected date: 06/26/2024    Coronary artery disease of native artery of native heart with stable angina pectoris    Mixed hyperlipidemia  -     Lipid Panel; Future; Expected date: 06/19/2024    Paroxysmal atrial fibrillation    Primary hypertension  -      CBC Auto Differential; Future; Expected date: 06/19/2024  -     Comprehensive Metabolic Panel; Future; Expected date: 06/19/2024  -     TSH; Future; Expected date: 06/19/2024

## 2024-06-20 ENCOUNTER — PATIENT MESSAGE (OUTPATIENT)
Dept: INTERNAL MEDICINE | Facility: CLINIC | Age: 74
End: 2024-06-20
Payer: MEDICARE

## 2024-06-20 ENCOUNTER — PATIENT MESSAGE (OUTPATIENT)
Dept: GASTROENTEROLOGY | Facility: CLINIC | Age: 74
End: 2024-06-20
Payer: MEDICARE

## 2024-06-20 ENCOUNTER — PATIENT MESSAGE (OUTPATIENT)
Dept: CARDIOLOGY | Facility: CLINIC | Age: 74
End: 2024-06-20
Payer: MEDICARE

## 2024-06-20 ENCOUNTER — TELEPHONE (OUTPATIENT)
Dept: PSYCHIATRY | Facility: CLINIC | Age: 74
End: 2024-06-20
Payer: MEDICARE

## 2024-06-21 ENCOUNTER — PATIENT MESSAGE (OUTPATIENT)
Dept: PSYCHIATRY | Facility: CLINIC | Age: 74
End: 2024-06-21
Payer: MEDICARE

## 2024-06-21 DIAGNOSIS — G47.00 INSOMNIA, UNSPECIFIED TYPE: ICD-10-CM

## 2024-06-21 RX ORDER — TEMAZEPAM 30 MG/1
30 CAPSULE ORAL NIGHTLY
Qty: 90 CAPSULE | Refills: 0 | Status: SHIPPED | OUTPATIENT
Start: 2024-06-21

## 2024-06-21 NOTE — TELEPHONE ENCOUNTER
The patient is requesting a refill of temazepam to go to [facility] home delivery.   indicates that the last refill was at the [facility] on March 28.  I am changing a pending prescription written on May 23 at the [facility], ordering it at [facility]

## 2024-06-23 DIAGNOSIS — I50.20 HEART FAILURE WITH REDUCED EJECTION FRACTION: ICD-10-CM

## 2024-06-24 ENCOUNTER — TELEPHONE (OUTPATIENT)
Dept: PSYCHIATRY | Facility: CLINIC | Age: 74
End: 2024-06-24
Payer: MEDICARE

## 2024-06-24 RX ORDER — SACUBITRIL AND VALSARTAN 24; 26 MG/1; MG/1
1 TABLET, FILM COATED ORAL 2 TIMES DAILY
Qty: 60 TABLET | Refills: 11 | Status: SHIPPED | OUTPATIENT
Start: 2024-06-24

## 2024-06-24 NOTE — TELEPHONE ENCOUNTER
----- Message from Kristie Sarah sent at 6/24/2024 11:35 AM CDT -----  .Type:  Patient Returning Call    Who Called:Christine  Who Left Message for Patient:Nurse  Does the patient know what this is regarding?:Yes  Would the patient rather a call back or a response via [facility]? Call back  Best Call Back Number:.[phone]   Additional Information: Na        Thanks  SONALI

## 2024-06-25 ENCOUNTER — TELEPHONE (OUTPATIENT)
Dept: INTERNAL MEDICINE | Facility: CLINIC | Age: 74
End: 2024-06-25
Payer: MEDICARE

## 2024-06-25 NOTE — TELEPHONE ENCOUNTER
Returned call to patient informed medication Trazodone was called in by another provider. Patient will call provider office to check on medications.

## 2024-06-26 ENCOUNTER — TELEPHONE (OUTPATIENT)
Dept: PSYCHIATRY | Facility: CLINIC | Age: 74
End: 2024-06-26
Payer: MEDICARE

## 2024-06-26 DIAGNOSIS — G47.00 INSOMNIA, UNSPECIFIED TYPE: ICD-10-CM

## 2024-06-26 NOTE — TELEPHONE ENCOUNTER
Called [facility] at [phone]  per pt request--talked to Ailyn, pharmacist    Confirmed that Dr. Alvarenga sent the script 6/21 for 30 mg and that she had previously been on that dose and done well; she will release the hold

## 2024-06-27 ENCOUNTER — TELEPHONE (OUTPATIENT)
Dept: PSYCHIATRY | Facility: CLINIC | Age: 74
End: 2024-06-27
Payer: MEDICARE

## 2024-06-27 NOTE — TELEPHONE ENCOUNTER
----- Message from Concepcion Bradley MA sent at 6/27/2024 10:36 AM CDT -----  Type:  Sooner Appointment Request    Patient is requesting a sooner appointment.  Patient declined first available appointment listed as well as another facility and provider .  Patient will not accept being placed on the waitlist and is requesting a message be sent to doctor.    Name of Caller:  Pt   When is the first available appointment?    Symptoms:  Depression   Would the patient rather a call back or a response via [facility]?    Best Call Back Number:   [phone]  Additional Information:   Needs a sooner virtual to discuss , prefers today or tomorrow if possible

## 2024-06-30 ENCOUNTER — PATIENT MESSAGE (OUTPATIENT)
Dept: CARDIOLOGY | Facility: CLINIC | Age: 74
End: 2024-06-30
Payer: MEDICARE

## 2024-07-01 ENCOUNTER — PATIENT MESSAGE (OUTPATIENT)
Dept: PSYCHIATRY | Facility: CLINIC | Age: 74
End: 2024-07-01
Payer: MEDICARE

## 2024-07-01 ENCOUNTER — PATIENT MESSAGE (OUTPATIENT)
Dept: HEMATOLOGY/ONCOLOGY | Facility: CLINIC | Age: 74
End: 2024-07-01
Payer: MEDICARE

## 2024-07-01 ENCOUNTER — TELEPHONE (OUTPATIENT)
Dept: CARDIOLOGY | Facility: CLINIC | Age: 74
End: 2024-07-01
Payer: MEDICARE

## 2024-07-01 PROBLEM — I21.4 NSTEMI (NON-ST ELEVATED MYOCARDIAL INFARCTION): Status: RESOLVED | Noted: 2024-03-31 | Resolved: 2024-07-01

## 2024-07-01 NOTE — TELEPHONE ENCOUNTER
Already answered    ----- Message from Concepcion Bradley MA sent at 7/1/2024 10:26 AM CDT -----  Type:  Patient Returning Call    Who Called:  Pt   Who Left Message for Patient:  Traci Zayas  Does the patient know what this is regarding?:    Would the patient rather a call back or a response via [facility]?  Call back    Best Call Back Number:  [phone]  Additional Information:    Thank you.

## 2024-07-02 ENCOUNTER — OFFICE VISIT (OUTPATIENT)
Dept: GASTROENTEROLOGY | Facility: CLINIC | Age: 74
End: 2024-07-02
Payer: MEDICARE

## 2024-07-02 ENCOUNTER — PATIENT OUTREACH (OUTPATIENT)
Dept: ADMINISTRATIVE | Facility: OTHER | Age: 74
End: 2024-07-02
Payer: MEDICARE

## 2024-07-02 VITALS — BODY MASS INDEX: 43.24 KG/M2 | WEIGHT: 235 LBS | HEIGHT: 62 IN

## 2024-07-02 DIAGNOSIS — K76.0 FATTY LIVER: ICD-10-CM

## 2024-07-02 DIAGNOSIS — D52.0 DIETARY FOLATE DEFICIENCY ANEMIA: Primary | ICD-10-CM

## 2024-07-02 DIAGNOSIS — R10.9 ABDOMINAL PAIN, UNSPECIFIED ABDOMINAL LOCATION: ICD-10-CM

## 2024-07-02 DIAGNOSIS — K59.04 CHRONIC IDIOPATHIC CONSTIPATION: ICD-10-CM

## 2024-07-02 DIAGNOSIS — K21.9 GASTROESOPHAGEAL REFLUX DISEASE WITHOUT ESOPHAGITIS: ICD-10-CM

## 2024-07-02 DIAGNOSIS — D51.3 OTHER DIETARY VITAMIN B12 DEFICIENCY ANEMIA: ICD-10-CM

## 2024-07-02 DIAGNOSIS — K64.8 INTERNAL HEMORRHOID, BLEEDING: ICD-10-CM

## 2024-07-02 DIAGNOSIS — D64.9 ANEMIA, UNSPECIFIED TYPE: ICD-10-CM

## 2024-07-02 DIAGNOSIS — R53.83 FATIGUE, UNSPECIFIED TYPE: ICD-10-CM

## 2024-07-02 PROCEDURE — 3072F LOW RISK FOR RETINOPATHY: CPT | Mod: CPTII,95,, | Performed by: PHYSICIAN ASSISTANT

## 2024-07-02 PROCEDURE — 1126F AMNT PAIN NOTED NONE PRSNT: CPT | Mod: CPTII,95,, | Performed by: PHYSICIAN ASSISTANT

## 2024-07-02 PROCEDURE — 3288F FALL RISK ASSESSMENT DOCD: CPT | Mod: CPTII,95,, | Performed by: PHYSICIAN ASSISTANT

## 2024-07-02 PROCEDURE — 99213 OFFICE O/P EST LOW 20 MIN: CPT | Mod: 95,,, | Performed by: PHYSICIAN ASSISTANT

## 2024-07-02 PROCEDURE — 3008F BODY MASS INDEX DOCD: CPT | Mod: CPTII,95,, | Performed by: PHYSICIAN ASSISTANT

## 2024-07-02 PROCEDURE — 4010F ACE/ARB THERAPY RXD/TAKEN: CPT | Mod: CPTII,95,, | Performed by: PHYSICIAN ASSISTANT

## 2024-07-02 PROCEDURE — 1159F MED LIST DOCD IN RCRD: CPT | Mod: CPTII,95,, | Performed by: PHYSICIAN ASSISTANT

## 2024-07-02 PROCEDURE — 3051F HG A1C>EQUAL 7.0%<8.0%: CPT | Mod: CPTII,95,, | Performed by: PHYSICIAN ASSISTANT

## 2024-07-02 PROCEDURE — 1100F PTFALLS ASSESS-DOCD GE2>/YR: CPT | Mod: CPTII,95,, | Performed by: PHYSICIAN ASSISTANT

## 2024-07-02 NOTE — PROGRESS NOTES
Subjective:      Patient ID: Christine Jo is a 74 y.o. female.    Chief Complaint: Abdominal Pain    The patient location is: [place]  The chief complaint leading to consultation is: See above    Visit type: audiovisual    Face to Face time with patient: 16 minutes  22 minutes of total time spent on the encounter, which includes face to face time and non-face to face time preparing to see the patient (eg, review of tests), Obtaining and/or reviewing separately obtained history, Documenting clinical information in the electronic or other health record, Independently interpreting results (not separately reported) and communicating results to the patient/family/caregiver, or Care coordination (not separately reported).         Each patient to whom he or she provides medical services by telemedicine is:  (1) informed of the relationship between the physician and patient and the respective role of any other health care provider with respect to management of the patient; and (2) notified that he or she may decline to receive medical services by telemedicine and may withdraw from such care at any time.    Notes:     HPI  The patient has a history of GERD, chronic constipation and bleeding hemorrhoids. She was last seen in January. She reported midline pain and feeling of incomplete defecation. Recommendations included continuation of fiber gummies 2 a day, Miralax daily, repeat Fibroscan in April. Since last visit, she was admitted in April with NSTEMI and found to have 99% proximal LAD stenosis. Stent is on Plavix and Eliquis.     Today the patient reports doing than last visit. She feels she is not as constipated lately. It isn't daily but more regular than before. She is still having to work some when she does go. She is taking Linzess 75 mcg 1-2 times a week and Miralax every other night. Stools are dark but not black. She denies hematochezia. Her last TSH was normal. She does have some burning in the anal area. She is  using hydrocortisone cream which helps.     Prior GI Course:  Linzess 145 mcg - explosive diarrhea and pain - stopped on her own.  Lactulose - helped some but not enough. BG levels went up.   Went back to linzess 145 mcg with some improvement in pain and bloating. However, she eventually stopped it again because of loose, urgent stools.   Decreased Linzess to 75 mcg daily. Patient said still urgent, loose and pain.   Recommended Mialax instead of Linzess. She started MiraFiber gummies. She has been taking 2 a day and occasionally 4. She feels her stools are starting to become less regular again.   An RUQ US showed gallstones. (09/19/23)  Patient requested CT (12/14/23) which showed gallstones without cholecystitis and fatty liver. Fibroscan showed severe fatty liver with moderate fibrosis. Repeat 6 months per Hep recs.  Referred to gen surg for opinion. Cholecystectomy scheduled for two weeks (01/26/24), but didn't feel it would change her pain. Patient having second thoughts about surgery.   Colonoscopy (06/2021): polyps, ascending colon lipoma, diverticulosis. Repeat 3 years.     Review of Systems  As per HPI.     Objective:     Physical Exam  Constitutional:       General: She is not in acute distress.     Appearance: She is well-developed.   HENT:      Head: Normocephalic and atraumatic.   Pulmonary:      Effort: Pulmonary effort is normal.   Neurological:      Mental Status: She is alert and oriented to person, place, and time.      Cranial Nerves: No cranial nerve deficit.   Psychiatric:         Behavior: Behavior normal.         Assessment:     1. Chronic idiopathic constipation    2. Internal hemorrhoid, bleeding    3. Abdominal pain, unspecified abdominal location    4. Gastroesophageal reflux disease without esophagitis    5. Fatty liver        Plan:     Overall, she feels her bowel habits are somewhat better. Continue with Linzess and Miralax but increase Miralax to daily use. We talked about pelvic floor  dysfunction. I recommend an anorectal manometry. Will consider pelvic rehab depending on results.   Fibroscan to be scheduled for surveillance of her fatty liver. Recent LFTs,PLT and albumin were within normal range. Will likely need referral to Hepatology.     Follow up for results after available.    Thank you for the opportunity to participate in the care of this patient.   Leonardo Fairchild PA-C.

## 2024-07-02 NOTE — PATIENT INSTRUCTIONS
Increase Miralax to daily use.   I will contact you with test results when available.    · Most likely contributing to her respiratory status initially  · peg tube placed 8/17  · family previously requests continuous tube feeds.

## 2024-07-03 ENCOUNTER — HOSPITAL ENCOUNTER (OUTPATIENT)
Dept: PREADMISSION TESTING | Facility: HOSPITAL | Age: 74
Discharge: HOME | End: 2024-07-03
Attending: INTERNAL MEDICINE
Payer: MEDICARE

## 2024-07-03 ENCOUNTER — TELEPHONE (OUTPATIENT)
Dept: CARDIOLOGY | Facility: CLINIC | Age: 74
End: 2024-07-03
Payer: MEDICARE

## 2024-07-03 DIAGNOSIS — Z12.11 SCREENING FOR MALIGNANT NEOPLASM OF COLON: Primary | ICD-10-CM

## 2024-07-03 NOTE — TELEPHONE ENCOUNTER
Spoke to patient. She stated due to the fact that she doesn't drive. She wants her labs to be done on a day that she already has an appt. She has requested that her labs be done on 8/16 which is when she has an appt. At patient request I scheduled labs for that day. Informed patient that once labs are reviewed she will be called to discuss and to set up further appt with provider when needed. Kelsey

## 2024-07-05 ENCOUNTER — PATIENT MESSAGE (OUTPATIENT)
Dept: CARDIOLOGY | Facility: CLINIC | Age: 74
End: 2024-07-05
Payer: MEDICARE

## 2024-07-05 ENCOUNTER — PATIENT MESSAGE (OUTPATIENT)
Dept: CARDIOLOGY | Facility: CLINIC | Age: 74
End: 2024-07-05

## 2024-07-05 ENCOUNTER — TELEPHONE (OUTPATIENT)
Dept: PREADMISSION TESTING | Facility: HOSPITAL | Age: 74
End: 2024-07-05
Payer: MEDICARE

## 2024-07-05 ENCOUNTER — TELEPHONE (OUTPATIENT)
Dept: CARDIOLOGY | Facility: CLINIC | Age: 74
End: 2024-07-05

## 2024-07-05 ENCOUNTER — E-CONSULT (OUTPATIENT)
Dept: CARDIOLOGY | Facility: CLINIC | Age: 74
End: 2024-07-05
Payer: MEDICARE

## 2024-07-05 DIAGNOSIS — R06.09 DOE (DYSPNEA ON EXERTION): ICD-10-CM

## 2024-07-05 DIAGNOSIS — E66.01 CLASS 3 SEVERE OBESITY DUE TO EXCESS CALORIES WITH SERIOUS COMORBIDITY AND BODY MASS INDEX (BMI) OF 45.0 TO 49.9 IN ADULT: ICD-10-CM

## 2024-07-05 DIAGNOSIS — I25.2 OLD MI (MYOCARDIAL INFARCTION): ICD-10-CM

## 2024-07-05 DIAGNOSIS — E11.42 CONTROLLED TYPE 2 DIABETES MELLITUS WITH DIABETIC POLYNEUROPATHY, WITH LONG-TERM CURRENT USE OF INSULIN: ICD-10-CM

## 2024-07-05 DIAGNOSIS — E66.01 MORBID OBESITY WITH BMI OF 40.0-44.9, ADULT: ICD-10-CM

## 2024-07-05 DIAGNOSIS — I21.4 NSTEMI (NON-ST ELEVATED MYOCARDIAL INFARCTION): Primary | ICD-10-CM

## 2024-07-05 DIAGNOSIS — I48.0 PAROXYSMAL ATRIAL FIBRILLATION: Primary | ICD-10-CM

## 2024-07-05 DIAGNOSIS — Z79.4 CONTROLLED TYPE 2 DIABETES MELLITUS WITH DIABETIC POLYNEUROPATHY, WITH LONG-TERM CURRENT USE OF INSULIN: ICD-10-CM

## 2024-07-05 DIAGNOSIS — E78.2 MIXED HYPERLIPIDEMIA: ICD-10-CM

## 2024-07-05 DIAGNOSIS — I10 PRIMARY HYPERTENSION: ICD-10-CM

## 2024-07-05 DIAGNOSIS — R06.02 SOB (SHORTNESS OF BREATH): ICD-10-CM

## 2024-07-05 DIAGNOSIS — I71.40 ABDOMINAL AORTIC ANEURYSM (AAA) WITHOUT RUPTURE, UNSPECIFIED PART: ICD-10-CM

## 2024-07-05 DIAGNOSIS — I25.118 CORONARY ARTERY DISEASE OF NATIVE ARTERY OF NATIVE HEART WITH STABLE ANGINA PECTORIS: ICD-10-CM

## 2024-07-05 NOTE — TELEPHONE ENCOUNTER
Called patient and informed her of cardiology's recommendations regarding time frame to have colonoscopy done. Patient verbalized understanding and PAT appointment rescheduled to a later date.

## 2024-07-05 NOTE — CONSULTS
[facility]  Response for E-Consult     Patient Name: Christine Jo  MRN: [id number]  Primary Care Provider: Jose Szymanski MD   Requesting Provider: Jael Szymanski NP  Consults    Recommendation: Recommendations is to hold off all elective procedures after 6 months post PCI of LAD after NSTEMI (4-24) unless emergent/urgent..  So cleared for procedures 10-24. Pt can hold eliquis 48 hours before and  plavix 5-7 days before procedure.    Contingency that warrants a repeat eConsult or referral: Pt is a 73 y/o female with PMHx for CAD recent PCI of LAD adter NSTEMI, HTN, HLP and PAF referred for Cv clearanec for colonscopy. Last CV clinic note 5-24 and stable Cv status.Recommendations is to hold off all elective procedures after 6 months post PCI of LAD after NSTEMI (4-24).Recommendations is to hold off all elective procedures after 6 months post PCI of LAD after NSTEMI (4-24).  So cleared for procedures 10-24. Pt can hold eliquis 48 hours before andf plavix 5-7 days before procedure.Recommendations is to hold off all elective procedures after 6 months post PCI of LAD after NSTEMI (4-24) unless emergent/urgent..  So cleared for procedures 10-24. Pt can hold eliquis 48 hours before and  plavix 5-7 days before procedure.      Total time of Consultation: 10 minute    I did not speak to the requesting provider verbally about this.     *This eConsult is based on the clinical data available to me and is furnished without benefit of a physical examination. The eConsult will need to be interpreted in light of any clinical issues or changes in patient status not available to me at the time of filing this eConsults. Significant changes in patient condition or level of acuity should result in immediate formal consultation and reevaluation. Please alert me if you have further questions.    Thank you for this eConsult referral.     Osiel Kaur MD  [facility]

## 2024-07-08 ENCOUNTER — PATIENT MESSAGE (OUTPATIENT)
Dept: PSYCHIATRY | Facility: CLINIC | Age: 74
End: 2024-07-08
Payer: MEDICARE

## 2024-07-09 ENCOUNTER — PATIENT MESSAGE (OUTPATIENT)
Dept: ENDOSCOPY | Facility: HOSPITAL | Age: 74
End: 2024-07-09
Payer: MEDICARE

## 2024-07-09 ENCOUNTER — TELEPHONE (OUTPATIENT)
Dept: HEPATOLOGY | Facility: CLINIC | Age: 74
End: 2024-07-09
Payer: MEDICARE

## 2024-07-09 NOTE — TELEPHONE ENCOUNTER
----- Message from Cami Duran LPN sent at 7/9/2024  2:09 PM CDT -----  Contact: JUSTIN GOLD [[id number]]  Can someone help get this pt scheduled for a fibroscan please.   Thank you  ----- Message -----  From: Carissa Malloy  Sent: 7/9/2024   2:03 PM CDT  To: Devorah MADDOX Staff    ..Type:  Patient Requesting Call    Who Called: JUSTIN GOLD [[id number]]  Does the patient know what this is regarding?: pt needs to schedule fibroscan for 2/14  Would the patient rather a call back or a response via [facility]?  call  Best Call Back Number: .[phone] (home)   Additional Information:

## 2024-07-10 ENCOUNTER — PATIENT MESSAGE (OUTPATIENT)
Dept: PSYCHIATRY | Facility: CLINIC | Age: 74
End: 2024-07-10

## 2024-07-10 ENCOUNTER — OFFICE VISIT (OUTPATIENT)
Dept: PSYCHIATRY | Facility: CLINIC | Age: 74
End: 2024-07-10
Payer: COMMERCIAL

## 2024-07-10 ENCOUNTER — PATIENT MESSAGE (OUTPATIENT)
Dept: PSYCHIATRY | Facility: CLINIC | Age: 74
End: 2024-07-10
Payer: MEDICARE

## 2024-07-10 DIAGNOSIS — F41.9 ANXIETY DISORDER, UNSPECIFIED TYPE: ICD-10-CM

## 2024-07-10 DIAGNOSIS — F33.1 DEPRESSION, MAJOR, RECURRENT, MODERATE: ICD-10-CM

## 2024-07-10 PROCEDURE — 99499 UNLISTED E&M SERVICE: CPT | Mod: 95,,, | Performed by: SOCIAL WORKER

## 2024-07-10 NOTE — PROGRESS NOTES
Began intake and explained to pt that I am leaving [facility] and that she would be transferring to another therapist. Pt decided to speak with her psychiatrist to obtain another therapist recommendation vs seeing me today for intake only. No LOS.

## 2024-07-10 NOTE — PROGRESS NOTES
CHW - Follow Up    This Community Health Worker completed a follow up visit with patient via telephone today.  Pt/Caregiver reported: Pt states she still has a cough and has not seen the pulmonologist yet. Pt states she is needing some mental health support and has a virtual appointment today that she is looking forward to.   Community Health Worker provided: Encouraged pt to keep all appointments and to call with any issues.  Follow up required: yes  Case Closure, Follow-up Outreach - Due: 8/30/2024, 7/31/2024

## 2024-07-11 ENCOUNTER — TELEPHONE (OUTPATIENT)
Dept: PSYCHIATRY | Facility: CLINIC | Age: 74
End: 2024-07-11
Payer: MEDICARE

## 2024-07-11 ENCOUNTER — PATIENT MESSAGE (OUTPATIENT)
Dept: CARDIOLOGY | Facility: CLINIC | Age: 74
End: 2024-07-11
Payer: MEDICARE

## 2024-07-11 DIAGNOSIS — F33.1 DEPRESSION, MAJOR, RECURRENT, MODERATE: Primary | ICD-10-CM

## 2024-07-11 RX ORDER — ARIPIPRAZOLE 2 MG/1
2 TABLET ORAL DAILY
Qty: 30 TABLET | Refills: 0 | Status: SHIPPED | OUTPATIENT
Start: 2024-07-11

## 2024-07-11 NOTE — TELEPHONE ENCOUNTER
I returned a telephone call to the patient.  She indicates that [facility] is starting [facility] and looks forward to this.  However, she indicates that she has been feeling more depressed lately and describes vegetative signs and symptoms, though no suicidal ideation or thoughts of death whatsoever.  No psychosis, thoughts of harm towards others, feelings of aggression, or problematic anxiety.  She continues without yanick or hypomania.    The patient confirms her appointment with me in the middle of August and says that [facility] informed her that she will be allowed to continue to see me as her doctor and receive psychotherapy in their program.    Options reviewed with the patient regarding rationale, risks, and side effects.  She elects low-dose Abilify.  The review included EPS, akathisia, NMS, tardive dyskinesia, metabolic issues, decreased alertness, dizziness and unsteadiness, effects on driving and other activities requiring alertness and steadiness, orthostasis and arrhythmias and other cardiovascular issues, mood changes, confusion, suicidal ideations, seizures, lowering of blood counts, elevated liver enzymes, signs and symptoms associated with increased prolactin, and others.  The patient indicated familiarity with Abilify issues because her daughter with schizoaffective disorder is on medication.    Abilify 2 mg daily.  The patient asks that it be sent to [facility] as she has an upcoming appointment there can  medication when she goes to the appointment.

## 2024-07-11 NOTE — TELEPHONE ENCOUNTER
----- Message from Parish Loza sent at 7/11/2024 12:07 PM CDT -----  Contact: Christine King is needing a call back regarding wanting to talk to someone because she is having really bad anxiety and her appointment yesterday 7/10 did not go well. Please call back at [phone].    Thank you parish

## 2024-07-15 ENCOUNTER — PATIENT MESSAGE (OUTPATIENT)
Dept: PSYCHIATRY | Facility: CLINIC | Age: 74
End: 2024-07-15
Payer: MEDICARE

## 2024-07-16 ENCOUNTER — PATIENT MESSAGE (OUTPATIENT)
Dept: CARDIOLOGY | Facility: CLINIC | Age: 74
End: 2024-07-16
Payer: MEDICARE

## 2024-07-16 ENCOUNTER — TELEPHONE (OUTPATIENT)
Dept: CARDIOLOGY | Facility: CLINIC | Age: 74
End: 2024-07-16
Payer: MEDICARE

## 2024-07-16 NOTE — TELEPHONE ENCOUNTER
I sent a message regarding her BP and meds in previous message today.     She was supposed to have labs completed.     Can we make sure these are arranged soon to help guide further decisions     Thanks

## 2024-07-16 NOTE — TELEPHONE ENCOUNTER
Please advise       Pt called stating that her b/p has been reading high for about 1 week , she c/o of a dull headache just above eyes and feeling very tired with an occasional cough . readings or as follows -      07/16/24    181/148        07/15/24    181/91  196/165        After entresto       184/69        Sparadic readings from this weekend    07/12/24  198/57      07/13/24    168/82    182/53

## 2024-07-18 ENCOUNTER — PROCEDURE VISIT (OUTPATIENT)
Dept: HEPATOLOGY | Facility: CLINIC | Age: 74
End: 2024-07-18
Payer: MEDICARE

## 2024-07-18 ENCOUNTER — LAB VISIT (OUTPATIENT)
Dept: LAB | Facility: HOSPITAL | Age: 74
End: 2024-07-18
Attending: NURSE PRACTITIONER
Payer: MEDICARE

## 2024-07-18 ENCOUNTER — TELEPHONE (OUTPATIENT)
Dept: CARDIOLOGY | Facility: CLINIC | Age: 74
End: 2024-07-18
Payer: MEDICARE

## 2024-07-18 VITALS
HEART RATE: 76 BPM | SYSTOLIC BLOOD PRESSURE: 138 MMHG | BODY MASS INDEX: 43.41 KG/M2 | DIASTOLIC BLOOD PRESSURE: 74 MMHG | WEIGHT: 235.88 LBS | HEIGHT: 62 IN

## 2024-07-18 DIAGNOSIS — R06.02 SOB (SHORTNESS OF BREATH): ICD-10-CM

## 2024-07-18 DIAGNOSIS — K76.0 FATTY LIVER: ICD-10-CM

## 2024-07-18 LAB
ALBUMIN SERPL BCP-MCNC: 3.6 G/DL (ref 3.5–5.2)
ALP SERPL-CCNC: 48 U/L (ref 55–135)
ALT SERPL W/O P-5'-P-CCNC: 12 U/L (ref 10–44)
ANION GAP SERPL CALC-SCNC: 13 MMOL/L (ref 8–16)
AST SERPL-CCNC: 13 U/L (ref 10–40)
BILIRUB SERPL-MCNC: 0.3 MG/DL (ref 0.1–1)
BNP SERPL-MCNC: 20 PG/ML (ref 0–99)
BUN SERPL-MCNC: 18 MG/DL (ref 8–23)
CALCIUM SERPL-MCNC: 9.8 MG/DL (ref 8.7–10.5)
CHLORIDE SERPL-SCNC: 104 MMOL/L (ref 95–110)
CO2 SERPL-SCNC: 26 MMOL/L (ref 23–29)
CREAT SERPL-MCNC: 0.8 MG/DL (ref 0.5–1.4)
EST. GFR  (NO RACE VARIABLE): >60 ML/MIN/1.73 M^2
GLUCOSE SERPL-MCNC: 124 MG/DL (ref 70–110)
POTASSIUM SERPL-SCNC: 4.7 MMOL/L (ref 3.5–5.1)
PROT SERPL-MCNC: 6.8 G/DL (ref 6–8.4)
SODIUM SERPL-SCNC: 143 MMOL/L (ref 136–145)

## 2024-07-18 PROCEDURE — 91200 LIVER ELASTOGRAPHY: CPT | Mod: S$GLB,,, | Performed by: PHYSICIAN ASSISTANT

## 2024-07-18 PROCEDURE — 80053 COMPREHEN METABOLIC PANEL: CPT | Performed by: NURSE PRACTITIONER

## 2024-07-18 PROCEDURE — 83880 ASSAY OF NATRIURETIC PEPTIDE: CPT | Performed by: NURSE PRACTITIONER

## 2024-07-18 PROCEDURE — 36415 COLL VENOUS BLD VENIPUNCTURE: CPT | Performed by: NURSE PRACTITIONER

## 2024-07-18 NOTE — PROCEDURES
FibroScan (Vibration Controlled Transient Elastography)    Date/Time: 7/18/2024 8:30 AM    Performed by: Marlene Gale RN  Authorized by: Leonardo Fairchild PA-C    Diagnosis:  NAFLD    Probe:  XL    Universal Protocol: Patient's identity, procedure and site were verified, confirmatory pause was performed.  Discussed procedure including risks and potential complications.  Questions answered.  Patient verbalizes understanding and wishes to proceed with VCTE.     Procedure: After providing explanations of the procedure, patient was placed in the supine position with right arm in maximum abduction to allow optimal exposure of right lateral abdomen.  Patient was briefly assessed, Testing was performed in the mid-axillary location, 50Hz Shear Wave pulses were applied and the resulting Shear Wave and Propagation Speed detected with a 3.5 MHz ultrasonic signal, using the FibroScan probe, Skin to liver capsule distance and liver parenchyma were accessed during the entire examination with the FibroScan probe, Patient was instructed to breathe normally and to abstain from sudden movements during the procedure, allowing for random measurements of liver stiffness. At least 10 Shear Waves were produced, Individual measurements of each Shear Wave were calculated.  Patient tolerated the procedure well with no complications.  Meets discharge criteria as was dismissed.  Rates pain 0 out of 10.  Patient will follow up with ordering provider to review results.    Findings  Median liver stiffness score:  7.7  CAP Reading: dB/m:  334    IQR/med %:  28  Interpretation  Fibrosis interpretation is based on medial liver stiffness - Kilopascal (kPa).    Fibrosis Stage:  F2  Steatosis interpretation is based on controlled attenuation parameter - (dB/m).    Steatosis Grade:  S3  Comments/Plan:  Moderate fibrosis with severe steatosis

## 2024-07-18 NOTE — TELEPHONE ENCOUNTER
Christine Jo (Key: [id number]) - [id number]  Repatha SureClick 140MG/ML auto-injectors  Status: PA Response - Approved

## 2024-07-19 ENCOUNTER — TELEPHONE (OUTPATIENT)
Dept: CARDIOLOGY | Facility: CLINIC | Age: 74
End: 2024-07-19
Payer: MEDICARE

## 2024-07-19 DIAGNOSIS — I50.20 HEART FAILURE WITH REDUCED EJECTION FRACTION: ICD-10-CM

## 2024-07-19 NOTE — TELEPHONE ENCOUNTER
Spoke with pt and discussed new orders for Entresto , pt voiced understanding / TS      ----- Message from GORDO Garcia sent at 7/19/2024  6:28 AM CDT -----  Labs reviewed   Renal function stable  Bnp well controlled     Can increase entresto to 49/51 1 tablet twice a day    Can take 2 pills of her current dose twice a day    Log bp and needs return to clinic in 2 weeks    Thanks

## 2024-07-22 ENCOUNTER — PATIENT MESSAGE (OUTPATIENT)
Dept: CARDIOLOGY | Facility: CLINIC | Age: 74
End: 2024-07-22
Payer: MEDICARE

## 2024-07-22 ENCOUNTER — PATIENT MESSAGE (OUTPATIENT)
Dept: GASTROENTEROLOGY | Facility: CLINIC | Age: 74
End: 2024-07-22
Payer: MEDICARE

## 2024-07-22 RX ORDER — SACUBITRIL AND VALSARTAN 49; 51 MG/1; MG/1
1 TABLET, FILM COATED ORAL 2 TIMES DAILY
Qty: 60 TABLET | Refills: 6 | Status: SHIPPED | OUTPATIENT
Start: 2024-07-22

## 2024-07-22 RX ORDER — SACUBITRIL AND VALSARTAN 24; 26 MG/1; MG/1
1 TABLET, FILM COATED ORAL 2 TIMES DAILY
Qty: 180 TABLET | Refills: 3 | OUTPATIENT
Start: 2024-07-22

## 2024-07-23 RX ORDER — FAMOTIDINE 20 MG/1
20 TABLET, FILM COATED ORAL 2 TIMES DAILY
Qty: 60 TABLET | Refills: 0 | Status: SHIPPED | OUTPATIENT
Start: 2024-07-23 | End: 2025-07-23

## 2024-07-26 ENCOUNTER — DOCUMENTATION ONLY (OUTPATIENT)
Dept: PSYCHIATRY | Facility: CLINIC | Age: 74
End: 2024-07-26
Payer: MEDICARE

## 2024-07-26 NOTE — PROGRESS NOTES
Department of Psychiatry  Case Management Follow-Up      The patient location is: Residence  The chief complaint leading to consultation is: Intensive Outpatient Program Recommended  Visit type: audiovisual  60 minutes of total time spent on the encounter, which includes face to face time and non-face to face time preparing to see the patient (eg, review of referral), Obtaining and/or reviewing separately obtained history, Documenting information in the electronic or other health record, Independently interpreting results of research (not separately reported) and communicating results to the patient/family/caregiver.  Each patient to whom he or she provides medical services by telemedicine is:  (1) informed of the relationship between the physician and patient and the respective role of any other health care provider with respect to management of the patient; and (2) notified that he or she may decline to receive medical services by telemedicine and may withdraw from such care at any time.      Patient Name and   Christine Jo, 1950    Referring Provider  Sourav Alvarenga MD    Diagnosis  1. Need for case management follow-up        Notes    FLORENCIO met with the patient via telehealth on 2024 to follow up after Pt was seen by the psychiatric provider. FLORENCIO explained role and reviewed the referral.      The patient agreed with the referral to case management. FLORENCIO informed the patient about the details of enrollment and the services provided by an intensive outpatient program. FLORENCIO explored the available options for IOP with the patient. Pt reported that she would like to enroll at [facility] in October. FLORENCIO provided the patient with the contact details of the agency. FLORENCIO encourage the patient to follow up with referring provider to discuss IOP enrollment concerns.The patient expressed understanding of all the information provided to her. FLORENCIO sent a referral via fax to the agency. FLORENCIO will follow up  and continue to remain available for further assistance.    Resources  [facility] Intensive Outpatient   Address: [address] [facility], [place] [address]  Phone: [phone] Fax:[phone]       Total Time  60 minutes

## 2024-07-29 ENCOUNTER — PATIENT MESSAGE (OUTPATIENT)
Dept: PSYCHIATRY | Facility: CLINIC | Age: 74
End: 2024-07-29
Payer: MEDICARE

## 2024-07-30 ENCOUNTER — PATIENT OUTREACH (OUTPATIENT)
Dept: ADMINISTRATIVE | Facility: OTHER | Age: 74
End: 2024-07-30
Payer: MEDICARE

## 2024-07-31 ENCOUNTER — PATIENT MESSAGE (OUTPATIENT)
Dept: CARDIOLOGY | Facility: CLINIC | Age: 74
End: 2024-07-31
Payer: MEDICARE

## 2024-08-04 ENCOUNTER — PATIENT MESSAGE (OUTPATIENT)
Dept: GASTROENTEROLOGY | Facility: CLINIC | Age: 74
End: 2024-08-04
Payer: MEDICARE

## 2024-08-04 DIAGNOSIS — M79.605 PAIN IN BOTH LOWER EXTREMITIES: ICD-10-CM

## 2024-08-04 DIAGNOSIS — M79.604 PAIN IN BOTH LOWER EXTREMITIES: ICD-10-CM

## 2024-08-05 ENCOUNTER — PATIENT MESSAGE (OUTPATIENT)
Dept: CARDIOLOGY | Facility: CLINIC | Age: 74
End: 2024-08-05
Payer: MEDICARE

## 2024-08-05 ENCOUNTER — PATIENT MESSAGE (OUTPATIENT)
Dept: GASTROENTEROLOGY | Facility: CLINIC | Age: 74
End: 2024-08-05
Payer: MEDICARE

## 2024-08-05 DIAGNOSIS — K64.8 INTERNAL HEMORRHOID, BLEEDING: Primary | ICD-10-CM

## 2024-08-05 RX ORDER — CLOTRIMAZOLE AND BETAMETHASONE DIPROPIONATE 10; .5 MG/ML; MG/ML
LOTION TOPICAL 2 TIMES DAILY
Qty: 30 ML | Refills: 2 | OUTPATIENT
Start: 2024-08-05

## 2024-08-05 RX ORDER — ACETAMINOPHEN 160 MG/5ML
200 SUSPENSION, ORAL (FINAL DOSE FORM) ORAL DAILY
Qty: 30 CAPSULE | Refills: 11 | Status: SHIPPED | OUTPATIENT
Start: 2024-08-05 | End: 2025-08-05

## 2024-08-06 ENCOUNTER — PATIENT MESSAGE (OUTPATIENT)
Dept: CARDIOLOGY | Facility: CLINIC | Age: 74
End: 2024-08-06
Payer: MEDICARE

## 2024-08-07 ENCOUNTER — PATIENT MESSAGE (OUTPATIENT)
Dept: CARDIOLOGY | Facility: CLINIC | Age: 74
End: 2024-08-07
Payer: MEDICARE

## 2024-08-07 ENCOUNTER — PATIENT MESSAGE (OUTPATIENT)
Dept: INTERNAL MEDICINE | Facility: CLINIC | Age: 74
End: 2024-08-07
Payer: MEDICARE

## 2024-08-07 ENCOUNTER — HOSPITAL ENCOUNTER (OUTPATIENT)
Dept: RADIOLOGY | Facility: HOSPITAL | Age: 74
Discharge: HOME | End: 2024-08-07
Payer: MEDICARE

## 2024-08-07 ENCOUNTER — OFFICE VISIT (OUTPATIENT)
Dept: URGENT CARE | Facility: CLINIC | Age: 74
End: 2024-08-07
Payer: MEDICARE

## 2024-08-07 ENCOUNTER — TELEPHONE (OUTPATIENT)
Dept: INTERNAL MEDICINE | Facility: CLINIC | Age: 74
End: 2024-08-07
Payer: MEDICARE

## 2024-08-07 VITALS
HEART RATE: 79 BPM | OXYGEN SATURATION: 96 % | WEIGHT: 235.19 LBS | SYSTOLIC BLOOD PRESSURE: 142 MMHG | HEIGHT: 62 IN | DIASTOLIC BLOOD PRESSURE: 80 MMHG | RESPIRATION RATE: 18 BRPM | BODY MASS INDEX: 43.28 KG/M2 | TEMPERATURE: 98 F

## 2024-08-07 DIAGNOSIS — S43.401A SPRAIN OF RIGHT SHOULDER, UNSPECIFIED SHOULDER SPRAIN TYPE, INITIAL ENCOUNTER: Primary | ICD-10-CM

## 2024-08-07 DIAGNOSIS — M79.89 LEFT LEG SWELLING: ICD-10-CM
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
## 2024-08-07 DIAGNOSIS — M25.511 ACUTE PAIN OF RIGHT SHOULDER: ICD-10-CM

## 2024-08-07 DIAGNOSIS — M79.605 LEFT LEG PAIN: ICD-10-CM

## 2024-08-07 PROCEDURE — 73030 X-RAY EXAM OF SHOULDER: CPT | Mod: 26,RT,, | Performed by: RADIOLOGY

## 2024-08-07 PROCEDURE — 73590 X-RAY EXAM OF LOWER LEG: CPT | Mod: TC,PO,LT

## 2024-08-07 PROCEDURE — 73590 X-RAY EXAM OF LOWER LEG: CPT | Mod: 26,LT,, | Performed by: RADIOLOGY

## 2024-08-07 PROCEDURE — 99213 OFFICE O/P EST LOW 20 MIN: CPT | Mod: ,,,

## 2024-08-07 PROCEDURE — 73562 X-RAY EXAM OF KNEE 3: CPT | Mod: TC,PO,LT

## 2024-08-07 PROCEDURE — 73562 X-RAY EXAM OF KNEE 3: CPT | Mod: 26,LT,, | Performed by: RADIOLOGY

## 2024-08-07 PROCEDURE — 73030 X-RAY EXAM OF SHOULDER: CPT | Mod: TC,PO,RT

## 2024-08-08 ENCOUNTER — PATIENT MESSAGE (OUTPATIENT)
Dept: CARDIOLOGY | Facility: CLINIC | Age: 74
End: 2024-08-08
Payer: MEDICARE

## 2024-08-09 ENCOUNTER — PATIENT MESSAGE (OUTPATIENT)
Dept: CARDIOLOGY | Facility: CLINIC | Age: 74
End: 2024-08-09
Payer: MEDICARE

## 2024-08-09 ENCOUNTER — PATIENT MESSAGE (OUTPATIENT)
Dept: ENDOSCOPY | Facility: HOSPITAL | Age: 74
End: 2024-08-09
Payer: MEDICARE

## 2024-08-13 ENCOUNTER — PATIENT MESSAGE (OUTPATIENT)
Dept: GASTROENTEROLOGY | Facility: CLINIC | Age: 74
End: 2024-08-13
Payer: MEDICARE

## 2024-08-13 DIAGNOSIS — K64.8 INTERNAL HEMORRHOID, BLEEDING: Primary | ICD-10-CM

## 2024-08-13 RX ORDER — CLOTRIMAZOLE AND BETAMETHASONE DIPROPIONATE 10; .5 MG/ML; MG/ML
LOTION TOPICAL 2 TIMES DAILY
Qty: 30 ML | Refills: 2 | OUTPATIENT
Start: 2024-08-13

## 2024-08-13 RX ORDER — FAMOTIDINE 20 MG/1
20 TABLET, FILM COATED ORAL 2 TIMES DAILY
Qty: 60 TABLET | Refills: 11 | Status: SHIPPED | OUTPATIENT
Start: 2024-08-13

## 2024-08-15 ENCOUNTER — PATIENT MESSAGE (OUTPATIENT)
Dept: PSYCHIATRY | Facility: CLINIC | Age: 74
End: 2024-08-15
Payer: MEDICARE

## 2024-08-15 ENCOUNTER — TELEPHONE (OUTPATIENT)
Dept: CARDIOLOGY | Facility: CLINIC | Age: 74
End: 2024-08-15
Payer: MEDICARE

## 2024-08-15 ENCOUNTER — PATIENT MESSAGE (OUTPATIENT)
Dept: CARDIOLOGY | Facility: CLINIC | Age: 74
End: 2024-08-15
Payer: MEDICARE

## 2024-08-15 DIAGNOSIS — I50.42 CHRONIC COMBINED SYSTOLIC AND DIASTOLIC CONGESTIVE HEART FAILURE: Primary | ICD-10-CM

## 2024-08-15 RX ORDER — SACUBITRIL AND VALSARTAN 97; 103 MG/1; MG/1
1 TABLET, FILM COATED ORAL 2 TIMES DAILY
Qty: 180 TABLET | Refills: 3 | Status: SHIPPED | OUTPATIENT
Start: 2024-08-15

## 2024-08-15 NOTE — TELEPHONE ENCOUNTER
Appt can be virtual but we need to obtain bp log prior to visit for me to review since appt is primarily for BP management.     Thanks

## 2024-08-15 NOTE — TELEPHONE ENCOUNTER
Patient's appointment has been changed to virtual, and a Copy of her B/P logs were forwarded to you.----- Message from Lashawn Farias sent at 8/15/2024  2:07 PM CDT -----  Regarding: virtual  Contact: christine  .Type:  Sooner Apoointment Request    Caller is requesting a sooner appointment.  Caller declined first available appointment listed below.  Caller will not accept being placed on the waitlist and is requesting a message be sent to doctor.  Name of Caller: christine   When is the first available appointment?  Symptoms:  Would the patient rather a call back or a response via [facility]? call  Best Call Back Number: [phone] (home)    Additional Information:  Christine fell last week and now her leg is going down from swelling. She need a virtual visit due to this condition.

## 2024-08-15 NOTE — TELEPHONE ENCOUNTER
Pleae call patient    Given her increased BP and weight gain symptoms    Increase entresto to 97/103 BID    We can push her appt back at least 10 days to allow response to medication change    Profile BP and weight every day  Bring log to next visit    Thanks  GORDO Torre

## 2024-08-16 ENCOUNTER — PATIENT MESSAGE (OUTPATIENT)
Dept: CARDIOLOGY | Facility: CLINIC | Age: 74
End: 2024-08-16
Payer: MEDICARE

## 2024-08-16 ENCOUNTER — OFFICE VISIT (OUTPATIENT)
Dept: PULMONOLOGY | Facility: CLINIC | Age: 74
End: 2024-08-16
Payer: MEDICARE

## 2024-08-16 ENCOUNTER — OFFICE VISIT (OUTPATIENT)
Dept: PSYCHIATRY | Facility: CLINIC | Age: 74
End: 2024-08-16
Payer: MEDICAID

## 2024-08-16 DIAGNOSIS — G47.00 INSOMNIA, UNSPECIFIED TYPE: ICD-10-CM

## 2024-08-16 DIAGNOSIS — F33.1 DEPRESSION, MAJOR, RECURRENT, MODERATE: Primary | ICD-10-CM

## 2024-08-16 DIAGNOSIS — J32.8 OTHER CHRONIC SINUSITIS: ICD-10-CM

## 2024-08-16 DIAGNOSIS — F41.9 ANXIETY DISORDER, UNSPECIFIED TYPE: ICD-10-CM

## 2024-08-16 DIAGNOSIS — J30.9 CHRONIC ALLERGIC RHINITIS: ICD-10-CM

## 2024-08-16 DIAGNOSIS — G47.30 SLEEP-DISORDERED BREATHING: Primary | ICD-10-CM

## 2024-08-16 DIAGNOSIS — E66.01 MORBID OBESITY WITH BMI OF 40.0-44.9, ADULT: ICD-10-CM

## 2024-08-16 DIAGNOSIS — I48.0 PAROXYSMAL ATRIAL FIBRILLATION: ICD-10-CM

## 2024-08-16 DIAGNOSIS — Z86.69 HISTORY OF OBSTRUCTIVE SLEEP APNEA: ICD-10-CM

## 2024-08-16 DIAGNOSIS — R06.02 SOB (SHORTNESS OF BREATH): ICD-10-CM

## 2024-08-16 DIAGNOSIS — I10 PRIMARY HYPERTENSION: ICD-10-CM

## 2024-08-16 RX ORDER — TEMAZEPAM 30 MG/1
30 CAPSULE ORAL NIGHTLY
Qty: 90 CAPSULE | Refills: 0 | Status: SHIPPED | OUTPATIENT
Start: 2024-08-16

## 2024-08-16 RX ORDER — CETIRIZINE HYDROCHLORIDE 10 MG/1
10 TABLET ORAL DAILY
Qty: 30 TABLET | Refills: 2 | Status: SHIPPED | OUTPATIENT
Start: 2024-08-16 | End: 2025-08-16

## 2024-08-16 RX ORDER — DULOXETIN HYDROCHLORIDE 20 MG/1
20 CAPSULE, DELAYED RELEASE ORAL DAILY
Qty: 90 CAPSULE | Refills: 0 | Status: SHIPPED | OUTPATIENT
Start: 2024-08-16
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
## 2024-08-16 NOTE — PROGRESS NOTES
Subjective:       Patient ID: Christine Jo is a 74 y.o. female.    Chief Complaint: snoring    The patient location is: home in [place]  The chief complaint leading to consultation is: snoring    Visit type: audiovisual    Face to Face time with patient: 20 minutes  25 minutes of total time spent on the encounter, which includes face to face time and non-face to face time preparing to see the patient (eg, review of tests), Obtaining and/or reviewing separately obtained history, Documenting clinical information in the electronic or other health record, Independently interpreting results (not separately reported) and communicating results to the patient/family/caregiver, or Care coordination (not separately reported).     Each patient to whom he or she provides medical services by telemedicine is:  (1) informed of the relationship between the physician and patient and the respective role of any other health care provider with respect to management of the patient; and (2) notified that he or she may decline to receive medical services by telemedicine and may withdraw from such care at any time.    Notes:       8/16/2024    New patient here for complaint of snoring  Wakes herself up snoring, has dry mouth in the morning  Denies insomnia  No sleep aids, alcohol, or caffeine  Lives alone  Has daytime fatigue  Does not drive  History of MATIAS, tried CPAP many years ago but willing to try again  Mentions cough in the mornings; attributes cough to chronic post nasal drip  Previous history of pneumonia and bronchitis  Had MI in April 2024 with stent; nurse in the hospital told her she had some wheezing  Seen in pulmonary 2021 for SOB, She says SOB has much improved since her heart surgery  Has chronic pressure in sinuses  Uses Flonase and saline nasal wash which helps  Constant clearing of throat  Use to take Claritin  History of vestibular migraines - has seen ENT before    BP Readings from Last 3 Encounters:   08/07/24  (!) 142/80   07/18/24 138/74   06/19/24 136/70     Snoring / Sleep:     Alsey Questionnaire (validated MATIAS screening questionnaire)    yes -- Snoring/apnea    yes -- Fatigue    There is no height or weight on file to calculate BMI.  (>25 is overweight, >30 is obese)    Blood Pressure = Hypertension  (PreHTN 120-139/80-89, Stg1 140-159/90-99, Stg2 >160/>100)  Alsey = 3 of three MATIAS categories are positive (high risk is 2-3 positive categories)     [place] Sleepiness Scale TOTAL =   16  (validated sleepiness questionnaire with a higher score indicating greater sleepiness; range 0-24)      STOP-Bang Questionnaire (validated MATIAS screening questionnaire)  Negative unless checked off.  [x] Snoring    [x]  Tired/Fatigued/Sleepy  [x] Obstruction (apneas/choking)  [x] Pressure (HTN)  [x] BMI >35  [x] Age >50  [x] Neck >40 cm  [] Gender male   STOP-Bang = 7 (low risk 0-2,high risk 3-8)      Immunization History   Administered Date(s) Administered    COVID-19, MRNA, LN-S, PF (Pfizer) (Purple Cap) 02/17/2021, 03/10/2021, 10/13/2021    Hepatitis A, Adult 03/11/2020    Hepatitis B, Adult 06/28/2017    Hepatitis B, Pediatric/Adolescent 09/20/2017, 09/20/2017    Influenza (FLUAD) - Quadrivalent - Adjuvanted - PF *Preferred* (65+) 10/03/2020, 09/20/2021, 10/04/2022, 09/28/2023    Influenza - High Dose - PF (65 years and older) 10/23/2015, 09/20/2017, 09/27/2018, 09/13/2019    Influenza - Trivalent (ADULT) 09/21/2013    Influenza - Trivalent - PF (ADULT) 11/07/2016    Pneumococcal Conjugate - 13 Valent 11/07/2016    Pneumococcal Conjugate - 20 Valent 09/28/2023    Pneumococcal Polysaccharide - 23 Valent 09/21/2013, 09/27/2018    RSVpreF (Arexvy) 02/14/2024    Tdap 07/18/2014    Zoster 09/21/2013    Zoster Recombinant 12/07/2019, 02/13/2020      Tobacco Use: Medium Risk (8/7/2024)    Patient History     Smoking Tobacco Use: Former     Smokeless Tobacco Use: Never     Passive Exposure: Past      Past Medical History:   Diagnosis Date     Arthritis     Cataract     Coronary artery disease     Diabetes mellitus 2008     am 01/15/2018 Insulin x 1 year    Diabetes mellitus, type 2     DM (diabetes mellitus) 2008     am 02/14/2020 Insulin x 4 years    Encounter for blood transfusion     Glaucoma     Hypertension     Insomnia     Macular degeneration     Old MI (myocardial infarction) 2/12/2024    Vaginal yeast infection       Current Outpatient Medications on File Prior to Visit   Medication Sig Dispense Refill    apixaban (ELIQUIS) 5 mg Tab Take 1 tablet (5 mg total) by mouth 2 (two) times daily. 180 tablet 1    ARIPiprazole (ABILIFY) 2 MG Tab Take 1 tablet (2 mg total) by mouth once daily. 30 tablet 0    azelastine (ASTELIN) 137 mcg (0.1 %) nasal spray use 2 sprays (274 mcg total) by Nasal route 2 (two) times daily. 30 mL 1    BEPREVE 1.5 % Drop Place 1 drop into both eyes 2 (two) times daily. 10 mL 4    blood sugar diagnostic Strp To check blood sugar 1 times daily 100 each 3    blood-glucose meter (ACCU-CHEK GUIDE GLUCOSE METER) Misc use daily to test blood sugar 1 each 0    carvediloL (COREG) 25 MG tablet TAKE 1 TABLET BY MOUTH TWICE DAILY 180 tablet 3    clopidogreL (PLAVIX) 75 mg tablet Take 1 tablet (75 mg total) by mouth once daily. 90 tablet 1    clotrimazole-betamethasone (LOTRISONE) lotion Apply topically to the affected area 2 (two) times daily. 30 mL 2    coenzyme Q10 200 mg capsule Take 200 mg by mouth once daily. 30 capsule 11    cycloSPORINE (RESTASIS) 0.05 % ophthalmic emulsion Place 1 drop into both eyes 2 (two) times daily. 60 each 11    DULoxetine (CYMBALTA) 60 MG capsule Take 1 capsule (60 mg total) by mouth once daily. 90 capsule 1    ezetimibe (ZETIA) 10 mg tablet Take 1 tablet (10 mg total) by mouth every evening. 90 tablet 1    famotidine (PEPCID) 20 MG tablet TAKE 1 TABLET BY MOUTH TWICE  DAILY 60 tablet 11    fluticasone propionate (FLONASE) 50 mcg/actuation nasal spray 2 sprays (100 mcg total) by Each Nostril  "route once daily. 48 g 3    furosemide (LASIX) 20 MG tablet TAKE 1 TABLET BY MOUTH DAILY AS  NEEDED FOR SWELLING 90 tablet 3    gabapentin (NEURONTIN) 300 MG capsule Take 1 capsule (300 mg total) by mouth daily as needed (when needed). 90 capsule 3    galcanezumab-gnlm (EMGALITY SYRINGE) 120 mg/mL Syrg Inject 120 mg into the skin every 28 days. 3 mL 3    insulin (LANTUS SOLOSTAR U-100 INSULIN) glargine 100 units/mL SubQ pen Inject 72 Units into the skin every evening. 75 mL 3    isosorbide mononitrate (IMDUR) 30 MG 24 hr tablet Take 1 tablet (30 mg total) by mouth once daily. 90 tablet 1    lancets Misc To check BG 1 times daily 100 each 3    linaCLOtide (LINZESS) 72 mcg Cap capsule Take 1 capsule (72 mcg total) by mouth before breakfast. 90 capsule 0    meclizine (ANTIVERT) 25 mg tablet Take 1 tablet (25 mg total) by mouth 3 (three) times daily as needed. 30 tablet 2    nitroGLYCERIN (NITROSTAT) 0.4 MG SL tablet Place 1 tablet (0.4 mg total) under the tongue every 5 (five) minutes as needed for Chest pain. 25 tablet 0    pantoprazole (PROTONIX) 40 MG tablet Take 1 tablet (40 mg total) by mouth once daily. 90 tablet 1    pen needle, diabetic (BD ULTRA-FINE SHORT PEN NEEDLE) 31 gauge x 5/16" Ndle AS DIRECTED TWICE DAILY 200 each 3    pravastatin (PRAVACHOL) 20 MG tablet Take 1 tablet (20 mg total) by mouth every evening. 30 tablet 11    rimegepant 75 mg odt Take 1 tablet (75 mg total) by mouth as needed for Migraine (do not exceed 2-3 doses within 1 week). Place ODT tablet on the tongue; alternatively the ODT tablet may be placed under the tongue 8 tablet 5    sacubitriL-valsartan (ENTRESTO)  mg per tablet Take 1 tablet by mouth 2 (two) times daily. 180 tablet 3    SITagliptin phosphate (JANUVIA) 100 MG Tab Take 1 tablet (100 mg total) by mouth once daily. 90 tablet 3    temazepam (RESTORIL) 30 mg capsule Take 1 capsule (30 mg total) by mouth every evening. 90 capsule 0     No current facility-administered " medications on file prior to visit.        Review of Systems   Constitutional:  Negative for fever, weight loss, appetite change and weakness.   HENT:  Positive for postnasal drip, sinus pressure and congestion. Negative for rhinorrhea and trouble swallowing.    Respiratory:  Positive for snoring and somnolence. Negative for cough, sputum production, choking, shortness of breath, wheezing and dyspnea on extertion.    Cardiovascular:  Negative for chest pain and leg swelling.   Musculoskeletal:  Negative for joint swelling.   Gastrointestinal:  Negative for vomiting.   Neurological:  Negative for dizziness, weakness and headaches.   All other systems reviewed and are negative.      Objective:       There were no vitals filed for this visit.    Physical Exam   Constitutional: She is oriented to person, place, and time. She appears well-developed and well-nourished. No distress.   HENT:   Mouth/Throat: Oropharynx is clear and moist.   Pulmonary/Chest: Effort normal. No respiratory distress.   No coughing during visit, no audible wheezing   Musculoskeletal:         General: No edema.      Cervical back: Normal range of motion.   Neurological: She is alert and oriented to person, place, and time.   Skin: No rash noted.   Psychiatric: She has a normal mood and affect.   Vitals reviewed.    Personal Diagnostic Review    EXAMINATION:  XR CHEST AP PORTABLE     CLINICAL HISTORY:  Shortness of breath     FINDINGS:  Comparison study 01/12/2024.  No change.  Normal size heart.  Lungs are clear.     Impression:     Normal chest x-ray.        Electronically signed by:Irineo Hardy MD  Date:                                            03/31/2024  Time:                                           08:41        Assessment/Plan:       STOPbang 7, [place] 3, Ep 16  PSG  MATIAS and implications on health discussed particularly heart health  Recommend new sleep study with history opf Afib, HTN, previous MI; she agrees to in-lab PSG  Recommend  continue flonase and start zyrtec for chronic post nasal drip    Problem List Items Addressed This Visit          ENT    Chronic allergic rhinitis    Relevant Medications    cetirizine (ZYRTEC) 10 MG tablet       Pulmonary    SOB (shortness of breath)       Cardiac/Vascular    Paroxysmal atrial fibrillation     F/u regularly with cardiology  Sleep study          Primary hypertension       Continue treatment plan by cardiology and PCP  Sleep study            Endocrine    Morbid obesity with BMI of 40.0-44.9, adult     Weight loss and exercise to improve overall health.              Other    History of obstructive sleep apnea    Relevant Orders    Polysomnogram (CPAP will be added if patient meets diagnostic criteria.)     Other Visit Diagnoses       Sleep-disordered breathing    -  Primary    Relevant Orders    Polysomnogram (CPAP will be added if patient meets diagnostic criteria.)    Other chronic sinusitis        Relevant Medications    cetirizine (ZYRTEC) 10 MG tablet          Follow up after sleep study-( virtual or in person) to review.    Discussed diagnosis, its evaluation, treatment and usual course. All questions answered.    Patient verbalized understanding of plan and left in no acute distress    Thank you for the courtesy of participating in the care of this patient    DAYTON [facility]ezB[facility]monology

## 2024-08-20 ENCOUNTER — LAB VISIT (OUTPATIENT)
Dept: LAB | Facility: HOSPITAL | Age: 74
End: 2024-08-20
Payer: MEDICARE

## 2024-08-20 ENCOUNTER — PATIENT MESSAGE (OUTPATIENT)
Dept: INTERNAL MEDICINE | Facility: CLINIC | Age: 74
End: 2024-08-20
Payer: MEDICARE

## 2024-08-20 ENCOUNTER — PATIENT MESSAGE (OUTPATIENT)
Dept: CARDIOLOGY | Facility: CLINIC | Age: 74
End: 2024-08-20
Payer: MEDICARE

## 2024-08-20 DIAGNOSIS — R53.83 FATIGUE, UNSPECIFIED TYPE: ICD-10-CM

## 2024-08-20 DIAGNOSIS — D52.0 DIETARY FOLATE DEFICIENCY ANEMIA: ICD-10-CM

## 2024-08-20 DIAGNOSIS — D50.0 IRON DEFICIENCY ANEMIA DUE TO CHRONIC BLOOD LOSS: ICD-10-CM

## 2024-08-20 DIAGNOSIS — D64.9 ANEMIA, UNSPECIFIED TYPE: ICD-10-CM

## 2024-08-20 DIAGNOSIS — D51.3 OTHER DIETARY VITAMIN B12 DEFICIENCY ANEMIA: ICD-10-CM

## 2024-08-20 DIAGNOSIS — I50.20 HEART FAILURE WITH REDUCED EJECTION FRACTION: ICD-10-CM

## 2024-08-20 LAB
ALBUMIN SERPL BCP-MCNC: 3.6 G/DL (ref 3.5–5.2)
ALBUMIN SERPL BCP-MCNC: 3.6 G/DL (ref 3.5–5.2)
ALP SERPL-CCNC: 55 U/L (ref 55–135)
ALP SERPL-CCNC: 56 U/L (ref 55–135)
ALT SERPL W/O P-5'-P-CCNC: 9 U/L (ref 10–44)
ALT SERPL W/O P-5'-P-CCNC: 9 U/L (ref 10–44)
ANION GAP SERPL CALC-SCNC: 11 MMOL/L (ref 8–16)
ANION GAP SERPL CALC-SCNC: 11 MMOL/L (ref 8–16)
AST SERPL-CCNC: 15 U/L (ref 10–40)
AST SERPL-CCNC: 15 U/L (ref 10–40)
BASOPHILS # BLD AUTO: 0.06 K/UL (ref 0–0.2)
BASOPHILS NFR BLD: 0.8 % (ref 0–1.9)
BILIRUB SERPL-MCNC: 0.7 MG/DL (ref 0.1–1)
BILIRUB SERPL-MCNC: 0.7 MG/DL (ref 0.1–1)
BNP SERPL-MCNC: 27 PG/ML (ref 0–99)
BUN SERPL-MCNC: 16 MG/DL (ref 8–23)
BUN SERPL-MCNC: 16 MG/DL (ref 8–23)
CALCIUM SERPL-MCNC: 9.3 MG/DL (ref 8.7–10.5)
CALCIUM SERPL-MCNC: 9.4 MG/DL (ref 8.7–10.5)
CHLORIDE SERPL-SCNC: 104 MMOL/L (ref 95–110)
CHLORIDE SERPL-SCNC: 105 MMOL/L (ref 95–110)
CO2 SERPL-SCNC: 25 MMOL/L (ref 23–29)
CO2 SERPL-SCNC: 25 MMOL/L (ref 23–29)
CREAT SERPL-MCNC: 0.8 MG/DL (ref 0.5–1.4)
CREAT SERPL-MCNC: 0.8 MG/DL (ref 0.5–1.4)
DIFFERENTIAL METHOD BLD: ABNORMAL
EOSINOPHIL # BLD AUTO: 0.1 K/UL (ref 0–0.5)
EOSINOPHIL NFR BLD: 1.8 % (ref 0–8)
ERYTHROCYTE [DISTWIDTH] IN BLOOD BY AUTOMATED COUNT: 12.8 % (ref 11.5–14.5)
EST. GFR  (NO RACE VARIABLE): >60 ML/MIN/1.73 M^2
EST. GFR  (NO RACE VARIABLE): >60 ML/MIN/1.73 M^2
GLUCOSE SERPL-MCNC: 151 MG/DL (ref 70–110)
GLUCOSE SERPL-MCNC: 153 MG/DL (ref 70–110)
HCT VFR BLD AUTO: 33.2 % (ref 37–48.5)
HGB BLD-MCNC: 10.6 G/DL (ref 12–16)
IMM GRANULOCYTES # BLD AUTO: 0.01 K/UL (ref 0–0.04)
IMM GRANULOCYTES NFR BLD AUTO: 0.1 % (ref 0–0.5)
LYMPHOCYTES # BLD AUTO: 1.1 K/UL (ref 1–4.8)
LYMPHOCYTES NFR BLD: 14.3 % (ref 18–48)
MCH RBC QN AUTO: 30.8 PG (ref 27–31)
MCHC RBC AUTO-ENTMCNC: 31.9 G/DL (ref 32–36)
MCV RBC AUTO: 97 FL (ref 82–98)
MONOCYTES # BLD AUTO: 0.6 K/UL (ref 0.3–1)
MONOCYTES NFR BLD: 8.2 % (ref 4–15)
NEUTROPHILS # BLD AUTO: 5.8 K/UL (ref 1.8–7.7)
NEUTROPHILS NFR BLD: 74.8 % (ref 38–73)
NRBC BLD-RTO: 0 /100 WBC
PLATELET # BLD AUTO: 300 K/UL (ref 150–450)
PMV BLD AUTO: 11.4 FL (ref 9.2–12.9)
POTASSIUM SERPL-SCNC: 4.5 MMOL/L (ref 3.5–5.1)
POTASSIUM SERPL-SCNC: 4.6 MMOL/L (ref 3.5–5.1)
PROT SERPL-MCNC: 6.6 G/DL (ref 6–8.4)
PROT SERPL-MCNC: 6.6 G/DL (ref 6–8.4)
RBC # BLD AUTO: 3.44 M/UL (ref 4–5.4)
SODIUM SERPL-SCNC: 140 MMOL/L (ref 136–145)
SODIUM SERPL-SCNC: 141 MMOL/L (ref 136–145)
WBC # BLD AUTO: 7.78 K/UL (ref 3.9–12.7)

## 2024-08-20 PROCEDURE — 80053 COMPREHEN METABOLIC PANEL: CPT | Mod: 91 | Performed by: NURSE PRACTITIONER

## 2024-08-20 PROCEDURE — 36415 COLL VENOUS BLD VENIPUNCTURE: CPT | Mod: PO

## 2024-08-20 PROCEDURE — 85025 COMPLETE CBC W/AUTO DIFF WBC: CPT

## 2024-08-20 PROCEDURE — 82746 ASSAY OF FOLIC ACID SERUM: CPT

## 2024-08-20 PROCEDURE — 83540 ASSAY OF IRON: CPT

## 2024-08-20 PROCEDURE — 80053 COMPREHEN METABOLIC PANEL: CPT

## 2024-08-20 PROCEDURE — 83880 ASSAY OF NATRIURETIC PEPTIDE: CPT | Performed by: NURSE PRACTITIONER

## 2024-08-20 PROCEDURE — 82728 ASSAY OF FERRITIN: CPT

## 2024-08-20 PROCEDURE — 82607 VITAMIN B-12: CPT

## 2024-08-21 ENCOUNTER — TELEPHONE (OUTPATIENT)
Dept: CARDIOLOGY | Facility: CLINIC | Age: 74
End: 2024-08-21
Payer: MEDICARE

## 2024-08-21 LAB
FERRITIN SERPL-MCNC: 108 NG/ML (ref 20–300)
FOLATE SERPL-MCNC: 5.6 NG/ML (ref 4–24)
IRON SERPL-MCNC: 81 UG/DL (ref 30–160)
SATURATED IRON: 24 % (ref 20–50)
TOTAL IRON BINDING CAPACITY: 339 UG/DL (ref 250–450)
TRANSFERRIN SERPL-MCNC: 229 MG/DL (ref 200–375)
VIT B12 SERPL-MCNC: 173 PG/ML (ref 210–950)

## 2024-08-21 NOTE — PROGRESS NOTES
CHW - Follow Up    This Community Health Worker completed a follow up visit with patient via telephone today.  Pt/Caregiver reported: Pt is out at [facility] with her grand children and will call me back later. States she did see pulmonary about her cough.   Community Health Worker provided: Will call pt back later.  Follow up required: yes  Case Closure, Follow-up Outreach - Due: 9/30/2024, 8/27/2024

## 2024-08-21 NOTE — TELEPHONE ENCOUNTER
Patient has been notified of lab result and also medication directions.----- Message from GORDO Garcia sent at 8/21/2024  7:27 AM CDT -----  Labs reviewed  CMP WNL  BNP remains controlled    Can continue on high dose entresto    Thanks  GORDO Torre

## 2024-08-22 ENCOUNTER — OFFICE VISIT (OUTPATIENT)
Dept: HEMATOLOGY/ONCOLOGY | Facility: CLINIC | Age: 74
End: 2024-08-22
Payer: MEDICARE

## 2024-08-22 DIAGNOSIS — E53.8 B12 DEFICIENCY: Primary | ICD-10-CM

## 2024-08-22 DIAGNOSIS — E66.01 MORBID OBESITY WITH BMI OF 40.0-44.9, ADULT: ICD-10-CM

## 2024-08-22 DIAGNOSIS — D50.0 IRON DEFICIENCY ANEMIA DUE TO CHRONIC BLOOD LOSS: ICD-10-CM

## 2024-08-22 DIAGNOSIS — D64.9 ANEMIA, UNSPECIFIED TYPE: ICD-10-CM

## 2024-08-22 PROCEDURE — 4010F ACE/ARB THERAPY RXD/TAKEN: CPT | Mod: CPTII,95,,

## 2024-08-22 PROCEDURE — 99214 OFFICE O/P EST MOD 30 MIN: CPT | Mod: 95,,,

## 2024-08-22 PROCEDURE — 3072F LOW RISK FOR RETINOPATHY: CPT | Mod: CPTII,95,,

## 2024-08-22 PROCEDURE — G2211 COMPLEX E/M VISIT ADD ON: HCPCS | Mod: 95,,,

## 2024-08-22 PROCEDURE — 1160F RVW MEDS BY RX/DR IN RCRD: CPT | Mod: CPTII,95,,

## 2024-08-22 PROCEDURE — 3051F HG A1C>EQUAL 7.0%<8.0%: CPT | Mod: CPTII,95,,

## 2024-08-22 PROCEDURE — 1159F MED LIST DOCD IN RCRD: CPT | Mod: CPTII,95,,

## 2024-08-22 RX ORDER — CYANOCOBALAMIN 1000 UG/ML
1000 INJECTION, SOLUTION INTRAMUSCULAR; SUBCUTANEOUS WEEKLY
Qty: 10 ML | Refills: 1 | Status: SHIPPED | OUTPATIENT
Start: 2024-08-22

## 2024-08-22 NOTE — ASSESSMENT & PLAN NOTE
Lab Results   Component Value Date    HGB 10.6 (L) 08/20/2024   Down from prior    --Multifactorial r/t co morbidities

## 2024-08-22 NOTE — ASSESSMENT & PLAN NOTE
Morbid obesity complicates all aspects of disease management from diagnostic modalities to treatment. Weight loss is encouraged and health benefits explained to pt.

## 2024-08-22 NOTE — ASSESSMENT & PLAN NOTE
Lab Results   Component Value Date    [id number] 173 (L) 08/20/2024     Pt with B12 deficiency  Discussed options for oral supplementationm, incenter injections, or at home injections--pt would prefer to self adminster--notes currently doing insulin and emgalty    Plan for once week subq B12 injections x 4 weeks, then monthly     Plan for repeat labs and follow-up in 4 months VV okay

## 2024-08-22 NOTE — PROGRESS NOTES
Subjective:      Patient ID: Christine Jo is a 74 y.o. female.    Chief Complaint: Follow-up (CORAL/B12)    The patient location is: [place]  The chief complaint leading to consultation is: follow-up    Visit type: audiovisual    Face to Face time with patient: 10  15 minutes of total time spent on the encounter, which includes face to face time and non-face to face time preparing to see the patient (eg, review of tests), Obtaining and/or reviewing separately obtained history, Documenting clinical information in the electronic or other health record, Independently interpreting results (not separately reported) and communicating results to the patient/family/caregiver, or Care coordination (not separately reported).         Each patient to whom he or she provides medical services by telemedicine is:  (1) informed of the relationship between the physician and patient and the respective role of any other health care provider with respect to management of the patient; and (2) notified that he or she may decline to receive medical services by telemedicine and may withdraw from such care at any time.    Notes:       HPI:  Ms. Jo is a pleasant 72-year-old female who presents today for follow-up of iron deficiency anemia. She was last seen in Heme/Onc clinic  08/2020. She has had IV iron in the past 01/2018.   She [facility] Patel which was - negative for contributory causes. EGD 05/2021 found chronic gastritis.  Colonoscopy 06/2021 found 3 polyps, lipoma, and diverticulosis with recommendation to repeat in 3 years. She cannot tolerate oral iron supplementation as it causes GI upset.     Interval History: Pt c/o continued fatigue and notes bad fall two weeks ago, where she hurt leg and foot with a lot of bruising. She also notes hospitalized in April states MI and subsequent stent placement--started on Entresto. Denies n/v/d/c, fever, chills, night sweats,  sob, cp, lightheadedness unintentional weight loss.    Social History     Socioeconomic History    Marital status:     Number of children: 3   Occupational History    Occupation: Retired   Tobacco Use    Smoking status: Former     Current packs/day: 0.00     Average packs/day: 1 pack/day for 36.5 years (36.5 ttl pk-yrs)     Types: Cigarettes     Start date:      Quit date: 2003     Years since quittin.1     Passive exposure: Past    Smokeless tobacco: Never    Tobacco comments:     Been quit 19 years. I smoked 35 years approx a pack a day   Substance and Sexual Activity    Alcohol use: No    Drug use: Never    Sexual activity: Never     Social Determinants of Health     Financial Resource Strain: Low Risk  (4/15/2024)    Overall Financial Resource Strain (CARDIA)     Difficulty of Paying Living Expenses: Not hard at all   Food Insecurity: No Food Insecurity (4/15/2024)    Hunger Vital Sign     Worried About Running Out of Food in the Last Year: Never true     Ran Out of Food in the Last Year: Never true   Transportation Needs: No Transportation Needs (4/15/2024)    PRAPARE - Transportation     Lack of Transportation (Medical): No     Lack of Transportation (Non-Medical): No   Physical Activity: Inactive (4/15/2024)    Exercise Vital Sign     Days of Exercise per Week: 0 days     Minutes of Exercise per Session: 0 min   Stress: No Stress Concern Present (4/15/2024)    [employer] [place] [employer] - Occupational Stress Questionnaire     Feeling of Stress : Only a little   Housing Stability: Low Risk  (4/15/2024)    Housing Stability Vital Sign     Unable to Pay for Housing in the Last Year: No     Homeless in the Last Year: No       Family History   Problem Relation Name Age of Onset    Heart disease Mother Jillian Jo         Open heart surgery    Cataracts Mother Jillian Jo     Macular degeneration Mother Jillian Jo     Glaucoma Mother Jillian Jo     Arthritis Mother  Jillian Jo     Hearing loss Mother Jillian Jo     Hypertension Mother Jillian Jo     Kidney disease Mother Jillian Jo     Stroke Mother Jillian Jo     Vision loss Mother Jillian Jo     Diabetes Sister La oJ     Heart disease Sister La Jo         CAD    Cataracts Sister La Jo     Depression Sister La Jo         Depression    Hypertension Sister La Jo     Diabetes Sister Ericaja Kruegerouge     Hearing loss Sister Erica Kruegeroularry     Hypertension Sister Erica Lerouge     Diabetes Sister      Cancer Son Andrea Trascher III         testicular     Arthritis Son Andrea Trascher III         Because of chemotherapy at 19 he has neuropathy and arthritis    Hearing loss Son Andrea Trascher III         Because of chemotherapy    Cancer Maternal Aunt Etta Marquez         Tumor in chest wall    Heart disease Maternal Grandfather Leonides Santos         Pacemaker    Arthritis Maternal Grandfather Leonides Santos     Hearing loss Maternal Grandfather Leonides Santos     Hypertension Maternal Grandfather Leonides Santos     Hypertension Maternal Grandmother Helene Santos     Kidney disease Maternal Grandmother Helene Santos     Stroke Maternal Grandmother Helene Santos     Alcohol abuse Daughter Kaye Trascher     Asthma Daughter Kaye Trascher     Depression Daughter Kaye Trascher         Psysophrenia    Drug abuse Daughter Kaye Camargoscher         Not now but earlier alcohol and drug abuse    Hypertension Daughter Kaye Trascher     Mental illness Daughter Kaye Trascher         Psysophrenia    Cancer Son Andrea Trascher III will         Testicular cancer    Depression Son Andrea Trascher III will         Major depressive disorder like me    Cancer Maternal Aunt Carmella Enrique         Lung cancer    Depression Sister Maribell Hull         May be bipolar    Diabetes Sister Maribell Hull     Heart disease Sister Maribell Hull         Open  heart surgery    Hypertension Sister Maribell Hull     Hypertension Brother Riky Jo        Past Surgical History:   Procedure Laterality Date    abdominal laparoscopy       ADENOIDECTOMY  1965    Tonsils removed also    ANGIOGRAM, CORONARY, WITH LEFT HEART CATHETERIZATION N/A 04/02/2024    Procedure: Angiogram, Coronary, with Left Heart Cath;  Surgeon: Shree Espinoza MD;  Location: [facility] CATH LAB;  Service: Cardiology;  Laterality: N/A;    ANGIOGRAM, CORONARY, WITH LEFT HEART CATHETERIZATION N/A 04/01/2024    Procedure: Angiogram, Coronary, with Left Heart Cath;  Surgeon: Shree Espinoza MD;  Location: [facility] CATH LAB;  Service: Cardiology;  Laterality: N/A;    BREAST BIOPSY Left 1998    benign    BREAST SURGERY  1998 biopsy lt breast    CATARACT EXTRACTION Bilateral [id number]    [facility] in [place]    Cataract Surgery Bilateral     Laser (YAG)    COLON SURGERY  2004    COLONOSCOPY N/A 05/05/2021    Procedure: COLONOSCOPY;  Surgeon: Shawn Rogers III, MD;  Location: [facility];  Service: Endoscopy;  Laterality: N/A;    COLONOSCOPY N/A 06/30/2021    Procedure: COLONOSCOPY;  Surgeon: Memo Merida MD;  Location: [facility];  Service: Endoscopy;  Laterality: N/A;    CORONARY STENT PLACEMENT N/A 04/01/2024    Procedure: INSERTION, STENT, CORONARY ARTERY;  Surgeon: Shree Espinoza MD;  Location: [facility] CATH LAB;  Service: Cardiology;  Laterality: N/A;    ESOPHAGOGASTRODUODENOSCOPY N/A 11/29/2019    Procedure: ESOPHAGOGASTRODUODENOSCOPY (EGD);  Surgeon: Shawn Rogers III, MD;  Location: [facility];  Service: Endoscopy;  Laterality: N/A;    ESOPHAGOGASTRODUODENOSCOPY N/A 05/05/2021    Procedure: EGD (ESOPHAGOGASTRODUODENOSCOPY);  Surgeon: Shawn Rogers III, MD;  Location: [facility];  Service: Endoscopy;  Laterality: N/A;    EYE SURGERY      IVUS, CORONARY  04/02/2024    Procedure: IVUS, Coronary;  Surgeon: Shree Espinoza MD;  Location: [facility] CATH LAB;  Service: Cardiology;;     PERCUTANEOUS CORONARY INTERVENTION, ARTERY N/A 04/01/2024    Procedure: Percutaneous coronary intervention;  Surgeon: Shree Espinoza MD;  Location: [facility] CATH LAB;  Service: Cardiology;  Laterality: N/A;    PTCA, SINGLE VESSEL  04/01/2024    Procedure: PTCA, Single Vessel;  Surgeon: Shree Espinoza MD;  Location: [facility] CATH LAB;  Service: Cardiology;;    SMALL INTESTINE SURGERY  2004    Exploratory stomach large and small intestines    TONSILLECTOMY      TUBAL LIGATION         Past Medical History:   Diagnosis Date    Arthritis     Cataract     Coronary artery disease     Diabetes mellitus 2008     am 01/15/2018 Insulin x 1 year    Diabetes mellitus, type 2     DM (diabetes mellitus) 2008     am 02/14/2020 Insulin x 4 years    Encounter for blood transfusion     Glaucoma     Hypertension     Insomnia     Macular degeneration     Old MI (myocardial infarction) 2/12/2024    Vaginal yeast infection        Review of Systems   Constitutional:  Positive for fatigue. Negative for activity change, appetite change, chills, fever and unexpected weight change.   HENT:  Negative for congestion, dental problem, mouth sores and nosebleeds.    Eyes:  Negative for visual disturbance.   Respiratory:  Negative for cough, choking and chest tightness.    Cardiovascular:  Negative for chest pain, palpitations and leg swelling.   Gastrointestinal:  Negative for abdominal distention, abdominal pain, anal bleeding, blood in stool, constipation, diarrhea, nausea and vomiting.   Endocrine: Negative.    Genitourinary:  Negative for dysuria, frequency, hematuria and urgency.   Musculoskeletal:  Negative for arthralgias, back pain, gait problem, joint swelling and myalgias.   Skin:  Negative for wound.   Allergic/Immunologic: Negative for immunocompromised state.   Neurological:  Negative for dizziness, light-headedness, numbness and headaches.   Hematological:  Negative for adenopathy. Does not bruise/bleed easily.    Psychiatric/Behavioral:  Negative for sleep disturbance. The patient is not nervous/anxious.        Medication List with Changes/Refills   Current Medications    APIXABAN (ELIQUIS) 5 MG TAB    Take 1 tablet (5 mg total) by mouth 2 (two) times daily.    AZELASTINE (ASTELIN) 137 MCG (0.1 %) NASAL SPRAY    use 2 sprays (274 mcg total) by Nasal route 2 (two) times daily.    BEPREVE 1.5 % DROP    Place 1 drop into both eyes 2 (two) times daily.    BLOOD SUGAR DIAGNOSTIC STRP    To check blood sugar 1 times daily    BLOOD-GLUCOSE METER (ACCU-CHEK GUIDE GLUCOSE METER) MISC    use daily to test blood sugar    CARVEDILOL (COREG) 25 MG TABLET    TAKE 1 TABLET BY MOUTH TWICE DAILY    CETIRIZINE (ZYRTEC) 10 MG TABLET    Take 1 tablet (10 mg total) by mouth once daily.    CLOPIDOGREL (PLAVIX) 75 MG TABLET    Take 1 tablet (75 mg total) by mouth once daily.    CLOTRIMAZOLE-BETAMETHASONE (LOTRISONE) LOTION    Apply topically to the affected area 2 (two) times daily.    COENZYME Q10 200 MG CAPSULE    Take 200 mg by mouth once daily.    CYCLOSPORINE (RESTASIS) 0.05 % OPHTHALMIC EMULSION    Place 1 drop into both eyes 2 (two) times daily.    DULOXETINE (CYMBALTA) 20 MG CAPSULE    Take 1 capsule (20 mg total) by mouth once daily. Take along with a 60 mg capsule for a total dose of 80 mg.    DULOXETINE (CYMBALTA) 60 MG CAPSULE    Take 1 capsule (60 mg total) by mouth once daily.    EZETIMIBE (ZETIA) 10 MG TABLET    Take 1 tablet (10 mg total) by mouth every evening.    FAMOTIDINE (PEPCID) 20 MG TABLET    TAKE 1 TABLET BY MOUTH TWICE  DAILY    FLUTICASONE PROPIONATE (FLONASE) 50 MCG/ACTUATION NASAL SPRAY    2 sprays (100 mcg total) by Each Nostril route once daily.    FUROSEMIDE (LASIX) 20 MG TABLET    TAKE 1 TABLET BY MOUTH DAILY AS  NEEDED FOR SWELLING    GABAPENTIN (NEURONTIN) 300 MG CAPSULE    Take 1 capsule (300 mg total) by mouth daily as needed (when needed).    GALCANEZUMAB-GNLM (EMGALITY SYRINGE) 120 MG/ML SYRG    Inject 120  "mg into the skin every 28 days.    INSULIN (LANTUS SOLOSTAR U-100 INSULIN) GLARGINE 100 UNITS/ML SUBQ PEN    Inject 72 Units into the skin every evening.    ISOSORBIDE MONONITRATE (IMDUR) 30 MG 24 HR TABLET    Take 1 tablet (30 mg total) by mouth once daily.    LANCETS MISC    To check BG 1 times daily    LINACLOTIDE (LINZESS) 72 MCG CAP CAPSULE    Take 1 capsule (72 mcg total) by mouth before breakfast.    MECLIZINE (ANTIVERT) 25 MG TABLET    Take 1 tablet (25 mg total) by mouth 3 (three) times daily as needed.    NITROGLYCERIN (NITROSTAT) 0.4 MG SL TABLET    Place 1 tablet (0.4 mg total) under the tongue every 5 (five) minutes as needed for Chest pain.    PANTOPRAZOLE (PROTONIX) 40 MG TABLET    Take 1 tablet (40 mg total) by mouth once daily.    PEN NEEDLE, DIABETIC (BD ULTRA-FINE SHORT PEN NEEDLE) 31 GAUGE X 5/16" NDLE    AS DIRECTED TWICE DAILY    PRAVASTATIN (PRAVACHOL) 20 MG TABLET    Take 1 tablet (20 mg total) by mouth every evening.    RIMEGEPANT 75 MG ODT    Take 1 tablet (75 mg total) by mouth as needed for Migraine (do not exceed 2-3 doses within 1 week). Place ODT tablet on the tongue; alternatively the ODT tablet may be placed under the tongue    SACUBITRIL-VALSARTAN (ENTRESTO)  MG PER TABLET    Take 1 tablet by mouth 2 (two) times daily.    SITAGLIPTIN PHOSPHATE (JANUVIA) 100 MG TAB    Take 1 tablet (100 mg total) by mouth once daily.    TEMAZEPAM (RESTORIL) 30 MG CAPSULE    Take 1 capsule (30 mg total) by mouth every evening.        Objective:     There were no vitals filed for this visit.      Physical Exam  Vitals reviewed: virtual visit.   Constitutional:       Appearance: Normal appearance.   Neurological:      Mental Status: She is alert.   Psychiatric:         Mood and Affect: Mood normal.         Behavior: Behavior normal.         Thought Content: Thought content normal.         Judgment: Judgment normal.       Lab Results   Component Value Date    WBC 7.78 08/20/2024    HGB 10.6 (L) " 08/20/2024    HCT 33.2 (L) 08/20/2024    MCV 97 08/20/2024     08/20/2024       Lab Results   Component Value Date     08/20/2024     08/20/2024    K 4.6 08/20/2024    K 4.5 08/20/2024     08/20/2024     08/20/2024    CO2 25 08/20/2024    CO2 25 08/20/2024    BUN 16 08/20/2024    BUN 16 08/20/2024    CREATININE 0.8 08/20/2024    CREATININE 0.8 08/20/2024    CALCIUM 9.4 08/20/2024    CALCIUM 9.3 08/20/2024    ANIONGAP 11 08/20/2024    ANIONGAP 11 08/20/2024    ESTGFRAFRICA >60 07/27/2022    EGFRNONAA >60 07/27/2022     Lab Results   Component Value Date    ALT 9 (L) 08/20/2024    ALT 9 (L) 08/20/2024    AST 15 08/20/2024    AST 15 08/20/2024    ALKPHOS 56 08/20/2024    ALKPHOS 55 08/20/2024    BILITOT 0.7 08/20/2024    BILITOT 0.7 08/20/2024       Assessment/Plan:     Problem List Items Addressed This Visit          Oncology    Iron deficiency anemia due to chronic blood loss     Lab Results   Component Value Date    IRON 81 08/20/2024    TRANSFERRIN 229 08/20/2024    TIBC 339 08/20/2024    FESATURATED 24 08/20/2024     Lab Results   Component Value Date    FERRITIN 108 08/20/2024     Iron indices remain WNL  No indication for IV iron therapy at this time  Pt cannot tolerate oral iron supplementation d/t GI upset  Encouraged incorporation of iron rich foods in diet            Anemia     Lab Results   Component Value Date    HGB 10.6 (L) 08/20/2024   Down from prior    --Multifactorial r/t co morbidities                Endocrine    Morbid obesity with BMI of 40.0-44.9, adult - Primary       Morbid obesity complicates all aspects of disease management from diagnostic modalities to treatment. Weight loss is encouraged and health benefits explained to pt.           B12 deficiency     Lab Results   Component Value Date    [id number] 173 (L) 08/20/2024     Pt with B12 deficiency  Discussed options for oral supplementationm, incenter injections, or at home injections--pt would prefer to self  adminster--notes currently doing insulin and emgalty    Plan for once week subq B12 injections x 4 weeks, then monthly     Plan for repeat labs and follow-up in 4 months VV okay               Med Onc Chart Routing      Follow up with physician    Follow up with MARIANO 4 months. with labs prior VV okay   Infusion scheduling note    Injection scheduling note    Labs CBC, CMP, ferritin, iron and TIBC, vitamin B12 and folate   Scheduling:  Preferred lab:  Lab interval:     Imaging    Pharmacy appointment    Other referrals                   BRENDEN CarboneP-C  Hematology/Oncology

## 2024-08-22 NOTE — ASSESSMENT & PLAN NOTE
Lab Results   Component Value Date    IRON 81 08/20/2024    TRANSFERRIN 229 08/20/2024    TIBC 339 08/20/2024    FESATURATED 24 08/20/2024     Lab Results   Component Value Date    FERRITIN 108 08/20/2024     Iron indices remain WNL  No indication for IV iron therapy at this time  Pt cannot tolerate oral iron supplementation d/t GI upset  Encouraged incorporation of iron rich foods in diet

## 2024-08-26 ENCOUNTER — PATIENT MESSAGE (OUTPATIENT)
Dept: HEMATOLOGY/ONCOLOGY | Facility: CLINIC | Age: 74
End: 2024-08-26
Payer: MEDICARE

## 2024-08-26 ENCOUNTER — PATIENT OUTREACH (OUTPATIENT)
Dept: ADMINISTRATIVE | Facility: OTHER | Age: 74
End: 2024-08-26
Payer: MEDICARE

## 2024-08-26 ENCOUNTER — PATIENT MESSAGE (OUTPATIENT)
Dept: INTERNAL MEDICINE | Facility: CLINIC | Age: 74
End: 2024-08-26
Payer: MEDICARE

## 2024-08-26 NOTE — PROGRESS NOTES
CHW - Case Closure    This Community Health Worker spoke to patient via telephone today.   Pt/Caregiver reported: Pt states visit was virtual and the doctor was not concerned about her SOB. She has scheduled her sleep study.   Pt/Caregiver denied any additional needs at this time and agrees with episode closure at this time.  Provided patient with Community Health Worker's contact information and encouraged him/her to contact this Community Health Worker if additional needs arise.

## 2024-08-26 NOTE — TELEPHONE ENCOUNTER
Refill Routing Note   Medication(s) are not appropriate for processing by [facility] for the following reason(s):        Outside of protocol    [facility] action(s):  Route               Appointments  past 12m or future 3m with PCP    Date Provider   Last Visit   6/19/2024 Jose Szymanski MD   Next Visit   9/3/2024 Jose Szymanski MD   ED visits in past 90 days: 0        Note composed:12:07 PM 08/26/2024

## 2024-08-27 ENCOUNTER — PATIENT MESSAGE (OUTPATIENT)
Dept: INTERNAL MEDICINE | Facility: CLINIC | Age: 74
End: 2024-08-27
Payer: MEDICARE

## 2024-08-27 ENCOUNTER — PATIENT MESSAGE (OUTPATIENT)
Dept: CARDIOLOGY | Facility: CLINIC | Age: 74
End: 2024-08-27
Payer: MEDICARE

## 2024-08-27 RX ORDER — CLOTRIMAZOLE AND BETAMETHASONE DIPROPIONATE 10; .5 MG/ML; MG/ML
LOTION TOPICAL 2 TIMES DAILY
Qty: 30 ML | Refills: 2 | Status: SHIPPED | OUTPATIENT
Start: 2024-08-27

## 2024-08-28 ENCOUNTER — TELEPHONE (OUTPATIENT)
Dept: OPHTHALMOLOGY | Facility: CLINIC | Age: 74
End: 2024-08-28
Payer: MEDICARE

## 2024-08-28 DIAGNOSIS — K64.8 INTERNAL HEMORRHOID, BLEEDING: ICD-10-CM

## 2024-08-28 RX ORDER — HYDROCORTISONE 25 MG/G
CREAM TOPICAL 2 TIMES DAILY
Qty: 28 G | Refills: 2 | Status: SHIPPED | OUTPATIENT
Start: 2024-08-28

## 2024-08-28 RX ORDER — DEXTROSE 4 G
TABLET,CHEWABLE ORAL
Qty: 1 EACH | Refills: 0 | Status: CANCELLED | OUTPATIENT
Start: 2024-08-28

## 2024-08-28 NOTE — TELEPHONE ENCOUNTER
No care due was identified.  [facility] Embedded Care Due Messages. Reference number: [id number].   8/28/2024 10:25:42 AM CDT

## 2024-08-28 NOTE — TELEPHONE ENCOUNTER
----- Message from Roxanefaisal Gupta sent at 10/17/2022  1:47 PM CDT -----  Type:  Patient Returning Call    Who Called:pt  Who Left Message for Patient:?  Does the patient know what this is regarding?:return call  Would the patient rather a call back or a response via [facility]? call  Best Call Back Number:[phone]  Additional Information: .      Thank you         Ambulatory Care Coordination Note     8/28/2024 8:56 AM     ACM outreach attempt by this ACM today to perform care management follow up . ACM was unable to reach the patient by telephone today; left voice message requesting a return phone call to this ACM.     ACM: Svitlana Shea RN     Care Summary Note:     PCP/Specialist follow up:   Future Appointments         Provider Specialty Dept Phone    9/9/2024 9:30 AM Lilliana Moser APRN - CNP Family Medicine [phone]    9/10/2024 9:00 AM Lilliana Moser APRN - CNP Family Medicine [phone]    9/11/2024 7:40 AM (Arrive by 7:25 AM) Tiff Strickland MD Family Medicine [phone]    9/17/2024 9:00 AM Lilliana Moser APRN  CNP Family Medicine [phone]    9/19/2024 10:30 AM Nicho Monsalve MD Nephrology [phone]            Follow Up:   Plan for next ACM outreach in approximately 2 weeks to complete:  - RPM.

## 2024-08-28 NOTE — TELEPHONE ENCOUNTER
Returned call to pt, she was asking about coming in earlier on 9/3. Explained she is welcome to come in earlier and we will get her back as quickly as we can, keeping in mind his schedule is full that day. VERNON

## 2024-08-30 DIAGNOSIS — R53.83 FATIGUE, UNSPECIFIED TYPE: ICD-10-CM

## 2024-08-30 DIAGNOSIS — E53.1 PYRIDOXINE DEFICIENCY: ICD-10-CM

## 2024-08-30 DIAGNOSIS — D64.9 ANEMIA, UNSPECIFIED TYPE: ICD-10-CM

## 2024-08-30 DIAGNOSIS — D51.3 OTHER DIETARY VITAMIN B12 DEFICIENCY ANEMIA: ICD-10-CM

## 2024-08-30 DIAGNOSIS — D52.0 DIETARY FOLATE DEFICIENCY ANEMIA: ICD-10-CM

## 2024-08-30 DIAGNOSIS — E53.8 B12 DEFICIENCY: ICD-10-CM

## 2024-08-30 DIAGNOSIS — D50.0 IRON DEFICIENCY ANEMIA DUE TO CHRONIC BLOOD LOSS: Primary | ICD-10-CM

## 2024-09-02 ENCOUNTER — PATIENT MESSAGE (OUTPATIENT)
Dept: CARDIOLOGY | Facility: CLINIC | Age: 74
End: 2024-09-02
Payer: MEDICARE

## 2024-09-02 DIAGNOSIS — I25.118 CORONARY ARTERY DISEASE OF NATIVE ARTERY OF NATIVE HEART WITH STABLE ANGINA PECTORIS: ICD-10-CM

## 2024-09-03 ENCOUNTER — OFFICE VISIT (OUTPATIENT)
Dept: OPHTHALMOLOGY | Facility: CLINIC | Age: 74
End: 2024-09-03
Payer: MEDICARE

## 2024-09-03 ENCOUNTER — OFFICE VISIT (OUTPATIENT)
Dept: INTERNAL MEDICINE | Facility: CLINIC | Age: 74
End: 2024-09-03
Payer: MEDICARE

## 2024-09-03 ENCOUNTER — OFFICE VISIT (OUTPATIENT)
Dept: CARDIOLOGY | Facility: CLINIC | Age: 74
End: 2024-09-03
Payer: MEDICARE

## 2024-09-03 ENCOUNTER — LAB VISIT (OUTPATIENT)
Dept: LAB | Facility: HOSPITAL | Age: 74
End: 2024-09-03
Attending: FAMILY MEDICINE
Payer: MEDICARE

## 2024-09-03 VITALS
BODY MASS INDEX: 43.33 KG/M2 | OXYGEN SATURATION: 98 % | WEIGHT: 236.88 LBS | SYSTOLIC BLOOD PRESSURE: 132 MMHG | DIASTOLIC BLOOD PRESSURE: 58 MMHG | HEART RATE: 60 BPM

## 2024-09-03 VITALS
HEIGHT: 62 IN | HEART RATE: 76 BPM | BODY MASS INDEX: 43.69 KG/M2 | DIASTOLIC BLOOD PRESSURE: 70 MMHG | TEMPERATURE: 98 F | WEIGHT: 237.44 LBS | OXYGEN SATURATION: 100 % | SYSTOLIC BLOOD PRESSURE: 132 MMHG

## 2024-09-03 DIAGNOSIS — Z13.820 OSTEOPOROSIS SCREENING: ICD-10-CM

## 2024-09-03 DIAGNOSIS — H04.129 DRY EYE: ICD-10-CM

## 2024-09-03 DIAGNOSIS — I48.0 PAROXYSMAL ATRIAL FIBRILLATION: ICD-10-CM

## 2024-09-03 DIAGNOSIS — G89.29 CHRONIC RIGHT SHOULDER PAIN: ICD-10-CM

## 2024-09-03 DIAGNOSIS — E11.42 CONTROLLED TYPE 2 DIABETES MELLITUS WITH DIABETIC POLYNEUROPATHY, WITH LONG-TERM CURRENT USE OF INSULIN: ICD-10-CM

## 2024-09-03 DIAGNOSIS — E11.9 DIABETES MELLITUS TYPE 2 WITHOUT RETINOPATHY: Primary | ICD-10-CM

## 2024-09-03 DIAGNOSIS — G56.03 BILATERAL CARPAL TUNNEL SYNDROME: ICD-10-CM

## 2024-09-03 DIAGNOSIS — I50.42 CHRONIC COMBINED SYSTOLIC AND DIASTOLIC CONGESTIVE HEART FAILURE: ICD-10-CM

## 2024-09-03 DIAGNOSIS — Z79.4 CONTROLLED TYPE 2 DIABETES MELLITUS WITH DIABETIC POLYNEUROPATHY, WITH LONG-TERM CURRENT USE OF INSULIN: Primary | ICD-10-CM

## 2024-09-03 DIAGNOSIS — E53.8 B12 DEFICIENCY: ICD-10-CM

## 2024-09-03 DIAGNOSIS — I10 PRIMARY HYPERTENSION: ICD-10-CM

## 2024-09-03 DIAGNOSIS — I71.40 ABDOMINAL AORTIC ANEURYSM (AAA) WITHOUT RUPTURE, UNSPECIFIED PART: ICD-10-CM

## 2024-09-03 DIAGNOSIS — H52.7 REFRACTIVE ERROR: ICD-10-CM

## 2024-09-03 DIAGNOSIS — M25.511 CHRONIC RIGHT SHOULDER PAIN: ICD-10-CM

## 2024-09-03 DIAGNOSIS — H35.3131 EARLY DRY STAGE NONEXUDATIVE AGE-RELATED MACULAR DEGENERATION OF BOTH EYES: ICD-10-CM

## 2024-09-03 DIAGNOSIS — R06.09 DOE (DYSPNEA ON EXERTION): ICD-10-CM

## 2024-09-03 DIAGNOSIS — E66.01 MORBID OBESITY WITH BMI OF 40.0-44.9, ADULT: ICD-10-CM

## 2024-09-03 DIAGNOSIS — Z95.5 S/P CORONARY ARTERY STENT PLACEMENT: ICD-10-CM

## 2024-09-03 DIAGNOSIS — I65.23 BILATERAL CAROTID ARTERY STENOSIS: Primary | ICD-10-CM

## 2024-09-03 DIAGNOSIS — E66.01 CLASS 3 SEVERE OBESITY DUE TO EXCESS CALORIES WITH SERIOUS COMORBIDITY AND BODY MASS INDEX (BMI) OF 45.0 TO 49.9 IN ADULT: ICD-10-CM
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
## 2024-09-03 DIAGNOSIS — Z79.4 CONTROLLED TYPE 2 DIABETES MELLITUS WITH DIABETIC POLYNEUROPATHY, WITH LONG-TERM CURRENT USE OF INSULIN: ICD-10-CM

## 2024-09-03 DIAGNOSIS — Z96.1 PSEUDOPHAKIA OF BOTH EYES: ICD-10-CM

## 2024-09-03 DIAGNOSIS — I25.118 CORONARY ARTERY DISEASE OF NATIVE ARTERY OF NATIVE HEART WITH STABLE ANGINA PECTORIS: ICD-10-CM

## 2024-09-03 DIAGNOSIS — T14.8XXA HEMATOMA: ICD-10-CM

## 2024-09-03 DIAGNOSIS — E11.42 CONTROLLED TYPE 2 DIABETES MELLITUS WITH DIABETIC POLYNEUROPATHY, WITH LONG-TERM CURRENT USE OF INSULIN: Primary | ICD-10-CM

## 2024-09-03 DIAGNOSIS — Z78.0 ASYMPTOMATIC MENOPAUSAL STATE: ICD-10-CM
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
## 2024-09-03 LAB
ALBUMIN/CREAT UR: 76.4 UG/MG (ref 0–30)
CREAT UR-MCNC: 165 MG/DL (ref 15–325)
MICROALBUMIN UR DL<=1MG/L-MCNC: 126 UG/ML

## 2024-09-03 PROCEDURE — 99214 OFFICE O/P EST MOD 30 MIN: CPT | Mod: S$GLB,,, | Performed by: FAMILY MEDICINE

## 2024-09-03 PROCEDURE — 1159F MED LIST DOCD IN RCRD: CPT | Mod: CPTII,S$GLB,, | Performed by: OPHTHALMOLOGY

## 2024-09-03 PROCEDURE — 3075F SYST BP GE 130 - 139MM HG: CPT | Mod: CPTII,S$GLB,, | Performed by: FAMILY MEDICINE

## 2024-09-03 PROCEDURE — 4010F ACE/ARB THERAPY RXD/TAKEN: CPT | Mod: CPTII,S$GLB,, | Performed by: FAMILY MEDICINE

## 2024-09-03 PROCEDURE — 3075F SYST BP GE 130 - 139MM HG: CPT | Mod: CPTII,S$GLB,, | Performed by: INTERNAL MEDICINE

## 2024-09-03 PROCEDURE — 3078F DIAST BP <80 MM HG: CPT | Mod: CPTII,S$GLB,, | Performed by: FAMILY MEDICINE

## 2024-09-03 PROCEDURE — 3008F BODY MASS INDEX DOCD: CPT | Mod: CPTII,S$GLB,, | Performed by: FAMILY MEDICINE

## 2024-09-03 PROCEDURE — 1126F AMNT PAIN NOTED NONE PRSNT: CPT | Mod: CPTII,S$GLB,, | Performed by: INTERNAL MEDICINE

## 2024-09-03 PROCEDURE — 3288F FALL RISK ASSESSMENT DOCD: CPT | Mod: CPTII,S$GLB,, | Performed by: FAMILY MEDICINE

## 2024-09-03 PROCEDURE — 92015 DETERMINE REFRACTIVE STATE: CPT | Mod: S$GLB,,, | Performed by: OPHTHALMOLOGY

## 2024-09-03 PROCEDURE — 99999 PR PBB SHADOW E&M-EST. PATIENT-LVL V: CPT | Mod: PBBFAC,,, | Performed by: FAMILY MEDICINE

## 2024-09-03 PROCEDURE — 3051F HG A1C>EQUAL 7.0%<8.0%: CPT | Mod: CPTII,S$GLB,, | Performed by: OPHTHALMOLOGY

## 2024-09-03 PROCEDURE — 82570 ASSAY OF URINE CREATININE: CPT | Performed by: FAMILY MEDICINE

## 2024-09-03 PROCEDURE — 1126F AMNT PAIN NOTED NONE PRSNT: CPT | Mod: CPTII,S$GLB,, | Performed by: FAMILY MEDICINE

## 2024-09-03 PROCEDURE — 3072F LOW RISK FOR RETINOPATHY: CPT | Mod: CPTII,S$GLB,, | Performed by: INTERNAL MEDICINE

## 2024-09-03 PROCEDURE — 4010F ACE/ARB THERAPY RXD/TAKEN: CPT | Mod: CPTII,S$GLB,, | Performed by: INTERNAL MEDICINE

## 2024-09-03 PROCEDURE — 1159F MED LIST DOCD IN RCRD: CPT | Mod: CPTII,S$GLB,, | Performed by: FAMILY MEDICINE

## 2024-09-03 PROCEDURE — 99214 OFFICE O/P EST MOD 30 MIN: CPT | Mod: S$GLB,,, | Performed by: INTERNAL MEDICINE

## 2024-09-03 PROCEDURE — G2211 COMPLEX E/M VISIT ADD ON: HCPCS | Mod: S$GLB,,, | Performed by: FAMILY MEDICINE

## 2024-09-03 PROCEDURE — 2023F DILAT RTA XM W/O RTNOPTHY: CPT | Mod: CPTII,S$GLB,, | Performed by: OPHTHALMOLOGY

## 2024-09-03 PROCEDURE — 3008F BODY MASS INDEX DOCD: CPT | Mod: CPTII,S$GLB,, | Performed by: INTERNAL MEDICINE

## 2024-09-03 PROCEDURE — 1160F RVW MEDS BY RX/DR IN RCRD: CPT | Mod: CPTII,S$GLB,, | Performed by: OPHTHALMOLOGY

## 2024-09-03 PROCEDURE — 3072F LOW RISK FOR RETINOPATHY: CPT | Mod: CPTII,S$GLB,, | Performed by: FAMILY MEDICINE

## 2024-09-03 PROCEDURE — 3288F FALL RISK ASSESSMENT DOCD: CPT | Mod: CPTII,S$GLB,, | Performed by: INTERNAL MEDICINE

## 2024-09-03 PROCEDURE — 3078F DIAST BP <80 MM HG: CPT | Mod: CPTII,S$GLB,, | Performed by: INTERNAL MEDICINE

## 2024-09-03 PROCEDURE — 1101F PT FALLS ASSESS-DOCD LE1/YR: CPT | Mod: CPTII,S$GLB,, | Performed by: INTERNAL MEDICINE

## 2024-09-03 PROCEDURE — 92134 CPTRZ OPH DX IMG PST SGM RTA: CPT | Mod: S$GLB,,, | Performed by: OPHTHALMOLOGY

## 2024-09-03 PROCEDURE — 92014 COMPRE OPH EXAM EST PT 1/>: CPT | Mod: S$GLB,,, | Performed by: OPHTHALMOLOGY

## 2024-09-03 PROCEDURE — 3051F HG A1C>EQUAL 7.0%<8.0%: CPT | Mod: CPTII,S$GLB,, | Performed by: INTERNAL MEDICINE

## 2024-09-03 PROCEDURE — 99999 PR PBB SHADOW E&M-EST. PATIENT-LVL III: CPT | Mod: PBBFAC,,, | Performed by: OPHTHALMOLOGY

## 2024-09-03 PROCEDURE — 3051F HG A1C>EQUAL 7.0%<8.0%: CPT | Mod: CPTII,S$GLB,, | Performed by: FAMILY MEDICINE

## 2024-09-03 PROCEDURE — 1100F PTFALLS ASSESS-DOCD GE2>/YR: CPT | Mod: CPTII,S$GLB,, | Performed by: FAMILY MEDICINE

## 2024-09-03 PROCEDURE — 4010F ACE/ARB THERAPY RXD/TAKEN: CPT | Mod: CPTII,S$GLB,, | Performed by: OPHTHALMOLOGY

## 2024-09-03 PROCEDURE — 99999 PR PBB SHADOW E&M-EST. PATIENT-LVL III: CPT | Mod: PBBFAC,,, | Performed by: INTERNAL MEDICINE

## 2024-09-03 RX ORDER — CARVEDILOL 25 MG/1
37.5 TABLET ORAL 2 TIMES DAILY
Qty: 270 TABLET | Refills: 3 | Status: SHIPPED | OUTPATIENT
Start: 2024-09-03 | End: 2025-09-03

## 2024-09-03 RX ORDER — EZETIMIBE 10 MG/1
10 TABLET ORAL NIGHTLY
Qty: 90 TABLET | Refills: 3 | Status: SHIPPED | OUTPATIENT
Start: 2024-09-03

## 2024-09-03 NOTE — PROGRESS NOTES
"Patient ID: Christine Jo is a 74 y.o. female.    Chief Complaint: Follow-up    History of Present Illness    Ms. Jo presents today for follow up.    She reports recent blood pressure fluctuations after medication changes. Discontinued hydralazine and benazepril, which were described as "old" medications by a new provider. Started on Entresto, initially experiencing significant blood pressure variability with systolic readings as high as 233 and diastolic of 116, accompanied by feeling unwell. Carvedilol dosage was increased from 1 tablet twice daily to 1.5 tablets, which has helped stabilize her blood pressure. Her blood pressure was good on the day of the visit and appears to be leveling off.    She reports a fall on August 7th resulting in a leg hematoma and shoulder/arm pain. She has limited range of motion in her shoulder, difficulty lifting her arm, and needs assistance when reaching into cabinets. She mentions a chronic rotator cuff problem. X-rays at [facility] confirmed no fractures in the leg and revealed the chronic rotator cuff issue in the shoulder. She also reports persistent swelling in her right ankle following the injury, which has been gradually decreasing.    She experiences nighttime pain in both hands, describing arthritis symptoms and possible recurrence of carpal tunnel syndrome. She awakens with hand pain, predominantly in the thumb area. Currently takes three Tylenol daily for arthritis pain. She reports both pain and numbness, with pain being more predominant. When tapped on the wrist, she experiences numbness into the hand, consistent with carpal tunnel syndrome symptoms. She had carpal tunnel surgery years ago but is now experiencing similar symptoms again.    She reports a low vitamin B12 level from recent lab results. She expresses concern about insurance coverage for prescribed B12 supplements and inquires about OTC alternatives. She denies any symptoms related to B12 " deficiency.    She has an upcoming bone density scan scheduled in a few weeks. Her mammogram is due in February of next year. She has a scheduled eye exam immediately following this appointment with Dr. Cr. She is informed of the need for a flu vaccine and COVID booster, which she will inquire about at her pharmacy as they are not currently available at this clinic. She is advised to get these vaccinations before her next appointment in three months.    74-year-old female.    ROS:  General: -fever, -chills, -fatigue, -weight gain, -weight loss  Eyes: -vision changes, -redness, -discharge  ENT: -ear pain, -nasal congestion, -sore throat  Cardiovascular: -chest pain, -palpitations, -lower extremity edema  Respiratory: -cough, -shortness of breath  Gastrointestinal: -abdominal pain, -nausea, -vomiting, -diarrhea, -constipation, -blood in stool  Genitourinary: -dysuria, -hematuria, -frequency  Musculoskeletal: +joint pain, -muscle pain  Skin: -rash, -lesion  Neurological: -headache, -dizziness, +numbness, -tingling, -weakness         Physical Exam    General: In no acute distress. Not ill-appearing or diaphoretic.  Neck: Normal thyroid. No cervical lymphadenopathy. 2+ carotid pulses.  Cardiovascular: Normal rate and regular  rhythm. No murmur heard. No gallop.  Pulmonary: Pulmonary effort is normal. No respiratory distress. No wheezing. No rhonchi. No rales.  Abdominal: Soft. Nontender. Nondistended. No mass.  Musculoskeletal: No swelling. No tenderness. No deformity. Limited range of motion.  Neurological: Alert.  bilateral positive Tinel sign  Psychiatric: Mood normal. Behavior normal.     She has a golf ball size hematoma of the left low or anterior leg.  Ecchymosis present    Assessment & Plan    HYPERTENSION:  - Assessed blood pressure management, noting recent changes in medication regimen by other providers.  FALL-RELATED INJURY:  - Evaluated fall-related injury, confirming no fractures based on x-ray  results.  - Explained that the hematoma from the fall will eventually dissolve.  - Ms. Jo to apply heat to leg hematoma 2 times daily to speed up healing.  CHRONIC ROTATOR CUFF PROBLEM:  - Diagnosed chronic rotator cuff problem based on x-ray and limited range of motion.  - Referred to orthopedist for evaluation and potential treatment of shoulder problem.  CARPAL TUNNEL SYNDROME:  - Identified carpal tunnel syndrome symptoms in both hands.  - Discussed carpal tunnel syndrome symptoms and management.  - Ms. Jo to wear splints on both hands at bedtime for 6 weeks to manage carpal tunnel symptoms.  DIABETIC NEUROPATHY:  - Ruled out diabetic neuropathy based on foot sensation test.  VACCINATIONS:  - Ms. Jo to obtain flu vaccine and COVID booster before next appointment, preferably at local pharmacy.  MEDICATIONS/SUPPLEMENTS:  - Started OTC B12 supplements, 1000 units 1 time daily.  - Continued Tylenol, up to 3 times daily for arthritis pain management.  LABS:  - Urine test ordered.  - Bone density scan ordered.  FOLLOW UP:  - Follow up in 3 months.       1. Controlled type 2 diabetes mellitus with diabetic polyneuropathy, with long-term current use of insulin  Microalbumin/Creatinine Ratio, Urine      2. Chronic right shoulder pain  Ambulatory referral/consult to Orthopedics      3. Osteoporosis screening  DXA Bone Density Axial Skeleton 1 or more sites      4. Asymptomatic menopausal state  DXA Bone Density Axial Skeleton 1 or more sites      5. Coronary artery disease of native artery of native heart with stable angina pectoris        6. Paroxysmal atrial fibrillation        7. Primary hypertension        8. B12 deficiency        9. Fall, subsequent encounter        10. Hematoma        11. Bilateral carpal tunnel syndrome                   No follow-ups on file.    This note was generated with the assistance of ambient listening technology. Verbal consent was obtained by the patient and accompanying  visitor(s) for the recording of patient appointment to facilitate this note. I attest to having reviewed and edited the generated note for accuracy, though some syntax or spelling errors may persist. Please contact the author of this note for any clarification.

## 2024-09-03 NOTE — PROGRESS NOTES
SUBJECTIVE  Christine Jo is 74 y.o. female  Uncorrected distance visual acuity was 20/20 -2 in the right eye and 20/20 -2 in the left eye.   Chief Complaint   Patient presents with    Diabetic Eye Exam     Pt here for annual DM exam. No pain or discomfort. Pt states she has noticed her vision has changed a little bit. Pt says she would like a glasses prescription.           HPI     Diabetic Eye Exam     Additional comments: Pt here for annual DM exam. No pain or discomfort.   Pt states she has noticed her vision has changed a little bit. Pt says she   would like a glasses prescription.            Comments    1. DM x 2010  2. AMD (+Fhx mother)  3. Dry Eyes  Xiidra less effective  4. PCIOL OU Dr. Osman  5. ? Migraine HAs    Restasis BID OU  Bepreve BID OU     Lab Results       Component                Value               Date                       HGBA1C                   7.4 (H)             06/19/2024            Last edited by Rigo Abbasi MA on 9/3/2024  9:55 AM.         Assessment /Plan :  1. Diabetes mellitus type 2 without retinopathy No diabetic retinopathy at this time. Reviewed diabetic eye precautions including avoiding tobacco use,  Good glucose control, and importance of regular follow up.      2. Early dry stage nonexudative age-related macular degeneration of both eyes  -- Condition stable, no therapeutic change required. Monitoring routinely.     3. Pseudophakia of both eyes  -- Condition stable, no therapeutic change required. Monitoring routinely.     4. Dry eye  -- Condition stable, no therapeutic change required. Monitoring routinely.     5. Refractive error - bifocal rx     RTC in 1 year with Dr. Shelton for dilation and MOCT or prn any changes

## 2024-09-03 NOTE — PROGRESS NOTES
Subjective:   Patient ID:  Christine Jo is a 74 y.o. female who presents for evaluation of Follow-up      Follow-up  Pertinent negatives include no chest pain.   September 3, 2024.    Comes in for follow-up.    We have followed up over multiple phone encounters/[employer] messages.    Blood pressure was elevated.  There has been some adjustment of her medications also by Lissy.    Lately I increased her carvedilol to 37.5 mg b.i.d. over the phone.    She is doing better with this new regimen.    She also has anxiety issues.  She is supposed to be taking duloxetine however she has been afraid taking it.  She can bursts out in crying very easily.    She states that she had a rough prior marriage.  She raised 3 children by herself.      May 23, 2024.    74-year-old female, known to me from hospitalization in April with NSTEMI.  Found to have 99% proximal LAD stenosis.    Status post stenting.  Continue to complain of chest pain that they after she went for a repeat coronary angiogram with the IVC that showed patent stent.  She has a jailed small diagonal not amenable for PCI.    Currently chest pain-free.  Her EF was 45% with regional wall motion abnormality related to her LAD stenosis.    No dyspnea.    However she has been having uncontrolled hypertension.  Recently started on benzodiazepines by her primary provider.    She has long history of intolerance to statins.    She complains also of pain to her shins  Past Medical History:   Diagnosis Date    Arthritis     Cataract     Coronary artery disease     Diabetes mellitus 2008     am 01/15/2018 Insulin x 1 year    Diabetes mellitus, type 2     DM (diabetes mellitus) 2008     am 02/14/2020 Insulin x 4 years    Encounter for blood transfusion     Glaucoma     Hypertension     Insomnia     Macular degeneration     Old MI (myocardial infarction) 2/12/2024    Vaginal yeast infection        Past Surgical History:   Procedure Laterality Date    abdominal  laparoscopy       ADENOIDECTOMY  1965    Tonsils removed also    ANGIOGRAM, CORONARY, WITH LEFT HEART CATHETERIZATION N/A 04/02/2024    Procedure: Angiogram, Coronary, with Left Heart Cath;  Surgeon: Shree Espinoza MD;  Location: [facility] CATH LAB;  Service: Cardiology;  Laterality: N/A;    ANGIOGRAM, CORONARY, WITH LEFT HEART CATHETERIZATION N/A 04/01/2024    Procedure: Angiogram, Coronary, with Left Heart Cath;  Surgeon: Shree Espinoza MD;  Location: [facility] CATH LAB;  Service: Cardiology;  Laterality: N/A;    BREAST BIOPSY Left 1998    benign    BREAST SURGERY  1998 biopsy lt breast    CATARACT EXTRACTION Bilateral [id number]    [facility] in [place]    Cataract Surgery Bilateral     Laser (YAG)    COLON SURGERY  2004    COLONOSCOPY N/A 05/05/2021    Procedure: COLONOSCOPY;  Surgeon: Shawn Rogers III, MD;  Location: [facility];  Service: Endoscopy;  Laterality: N/A;    COLONOSCOPY N/A 06/30/2021    Procedure: COLONOSCOPY;  Surgeon: Memo Merida MD;  Location: [facility];  Service: Endoscopy;  Laterality: N/A;    CORONARY STENT PLACEMENT N/A 04/01/2024    Procedure: INSERTION, STENT, CORONARY ARTERY;  Surgeon: Shree Espinoza MD;  Location: [facility] CATH LAB;  Service: Cardiology;  Laterality: N/A;    ESOPHAGOGASTRODUODENOSCOPY N/A 11/29/2019    Procedure: ESOPHAGOGASTRODUODENOSCOPY (EGD);  Surgeon: Shawn Rogers III, MD;  Location: [facility];  Service: Endoscopy;  Laterality: N/A;    ESOPHAGOGASTRODUODENOSCOPY N/A 05/05/2021    Procedure: EGD (ESOPHAGOGASTRODUODENOSCOPY);  Surgeon: Shawn Rogers III, MD;  Location: [facility];  Service: Endoscopy;  Laterality: N/A;    EYE SURGERY      IVUS, CORONARY  04/02/2024    Procedure: IVUS, Coronary;  Surgeon: Shree Espinoza MD;  Location: [facility] CATH LAB;  Service: Cardiology;;    PERCUTANEOUS CORONARY INTERVENTION, ARTERY N/A 04/01/2024    Procedure: Percutaneous coronary intervention;  Surgeon: Shree Espinoza MD;  Location: [facility] CATH LAB;   Service: Cardiology;  Laterality: N/A;    PTCA, SINGLE VESSEL  2024    Procedure: PTCA, Single Vessel;  Surgeon: Shree Espinoza MD;  Location: [facility] CATH LAB;  Service: Cardiology;;    SMALL INTESTINE SURGERY      Exploratory stomach large and small intestines    TONSILLECTOMY      TUBAL LIGATION         Social History     Tobacco Use    Smoking status: Former     Current packs/day: 0.00     Average packs/day: 1 pack/day for 36.5 years (36.5 ttl pk-yrs)     Types: Cigarettes     Start date:      Quit date: 2003     Years since quittin.2     Passive exposure: Past    Smokeless tobacco: Never    Tobacco comments:     Been quit 19 years. I smoked 35 years approx a pack a day   Substance Use Topics    Alcohol use: No    Drug use: Never       Family History   Problem Relation Name Age of Onset    Heart disease Mother Jillian Jo         Open heart surgery    Cataracts Mother Jillian Jo     Macular degeneration Mother Jillian Jo     Glaucoma Mother Jillian Jo     Arthritis Mother Jillian Jo     Hearing loss Mother Jillian Jo     Hypertension Mother Jillian Jo     Kidney disease Mother Jillian Jo     Stroke Mother Jillian Jo     Vision loss Mother Jillian Jo     Diabetes Sister La Jo     Heart disease Sister La Jo         CAD    Cataracts Sister La Jo     Depression Sister La Jo         Depression    Hypertension Sister La Jo     Diabetes Sister Erica Lerouge     Hearing loss Sister Erica Lerouge     Hypertension Sister Erica Lerouge     Diabetes Sister      Cancer Son Andrea Trascher III         testicular     Arthritis Son Andrea Trascher III         Because of chemotherapy at 19 he has neuropathy and arthritis    Hearing loss Son Andrea Trascher III         Because of chemotherapy    Cancer Maternal Aunt Etta Marquez         Tumor in chest wall    Heart disease Maternal Grandfather Leonides Santos          Pacemaker    Arthritis Maternal Grandfather Leonides Santos     Hearing loss Maternal Grandfather Leonides Santos     Hypertension Maternal Grandfather Leonides Santos     Hypertension Maternal Grandmother Helene Santos     Kidney disease Maternal Grandmother Helene Santos     Stroke Maternal Grandmother Helene Santos     Alcohol abuse Daughter Kaye Camargoscher     Asthma Daughter Kaye Camargoscher     Depression Daughter Kaye Greyer         Psysophrenia    Drug abuse Daughter Kaye Camargoscher         Not now but earlier alcohol and drug abuse    Hypertension Daughter Kaye Trascher     Mental illness Daughter Kaye Camargoscher         Psysophrenia    Cancer Son Andrea Trascher III will         Testicular cancer    Depression Son Andrea Trascher III will         Major depressive disorder like me    Cancer Maternal Aunt Carmella Enrique         Lung cancer    Depression Sister Maribell Hull         May be bipolar    Diabetes Sister Maribell Hull     Heart disease Sister Maribell uHll         Open heart surgery    Hypertension Sister Maribell Hull     Hypertension Brother Riky Jo        Review of Systems   Cardiovascular:  Negative for chest pain, dyspnea on exertion, palpitations and syncope.   Genitourinary: Negative.    Neurological: Negative.        Current Outpatient Medications on File Prior to Visit   Medication Sig    apixaban (ELIQUIS) 5 mg Tab Take 1 tablet (5 mg total) by mouth 2 (two) times daily.    BEPREVE 1.5 % Drop Place 1 drop into both eyes 2 (two) times daily.    blood sugar diagnostic Strp To check blood sugar 1 times daily    blood-glucose meter (ACCU-CHEK GUIDE GLUCOSE METER) Misc use daily to test blood sugar    clopidogreL (PLAVIX) 75 mg tablet Take 1 tablet (75 mg total) by mouth once daily.    clotrimazole-betamethasone (LOTRISONE) lotion Apply topically to the affected area 2 (two) times daily.    coenzyme Q10 200 mg capsule Take 200 mg by mouth once daily.     "cyanocobalamin 1,000 mcg/mL injection Inject 1 mL (1,000 mcg total) into the skin once a week. For 4 weeks, then once monthly    cycloSPORINE (RESTASIS) 0.05 % ophthalmic emulsion Place 1 drop into both eyes 2 (two) times daily.    DULoxetine (CYMBALTA) 60 MG capsule Take 1 capsule (60 mg total) by mouth once daily.    ezetimibe (ZETIA) 10 mg tablet Take 1 tablet (10 mg total) by mouth every evening.    famotidine (PEPCID) 20 MG tablet TAKE 1 TABLET BY MOUTH TWICE  DAILY    fluticasone propionate (FLONASE) 50 mcg/actuation nasal spray 2 sprays (100 mcg total) by Each Nostril route once daily.    furosemide (LASIX) 20 MG tablet TAKE 1 TABLET BY MOUTH DAILY AS  NEEDED FOR SWELLING    gabapentin (NEURONTIN) 300 MG capsule Take 1 capsule (300 mg total) by mouth daily as needed (when needed).    galcanezumab-gnlm (EMGALITY SYRINGE) 120 mg/mL Syrg Inject 120 mg into the skin every 28 days.    hydrocortisone 2.5 % cream Apply topically 2 (two) times daily.    insulin (LANTUS SOLOSTAR U-100 INSULIN) glargine 100 units/mL SubQ pen Inject 72 Units into the skin every evening.    isosorbide mononitrate (IMDUR) 30 MG 24 hr tablet Take 1 tablet (30 mg total) by mouth once daily.    lancets Misc To check BG 1 times daily    linaCLOtide (LINZESS) 72 mcg Cap capsule Take 1 capsule (72 mcg total) by mouth before breakfast.    meclizine (ANTIVERT) 25 mg tablet Take 1 tablet (25 mg total) by mouth 3 (three) times daily as needed.    nitroGLYCERIN (NITROSTAT) 0.4 MG SL tablet Place 1 tablet (0.4 mg total) under the tongue every 5 (five) minutes as needed for Chest pain.    pantoprazole (PROTONIX) 40 MG tablet Take 1 tablet (40 mg total) by mouth once daily.    pen needle, diabetic (BD ULTRA-FINE SHORT PEN NEEDLE) 31 gauge x 5/16" Ndle AS DIRECTED TWICE DAILY    pravastatin (PRAVACHOL) 20 MG tablet Take 1 tablet (20 mg total) by mouth every evening.    rimegepant 75 mg odt Take 1 tablet (75 mg total) by mouth as needed for Migraine (do " "not exceed 2-3 doses within 1 week). Place ODT tablet on the tongue; alternatively the ODT tablet may be placed under the tongue    sacubitriL-valsartan (ENTRESTO)  mg per tablet Take 1 tablet by mouth 2 (two) times daily.    SITagliptin phosphate (JANUVIA) 100 MG Tab Take 1 tablet (100 mg total) by mouth once daily.    syringe with needle, safety 3 mL 23 gauge x 1" Syrg 1 Syringe by Misc.(Non-Drug; Combo Route) route once a week. Use as directed for B12 injections    temazepam (RESTORIL) 30 mg capsule Take 1 capsule (30 mg total) by mouth every evening.    [DISCONTINUED] carvediloL (COREG) 25 MG tablet TAKE 1 TABLET BY MOUTH TWICE DAILY    azelastine (ASTELIN) 137 mcg (0.1 %) nasal spray use 2 sprays (274 mcg total) by Nasal route 2 (two) times daily.    [DISCONTINUED] cetirizine (ZYRTEC) 10 MG tablet Take 1 tablet (10 mg total) by mouth once daily.    [DISCONTINUED] DULoxetine (CYMBALTA) 20 MG capsule Take 1 capsule (20 mg total) by mouth once daily. Take along with a 60 mg capsule for a total dose of 80 mg.     No current facility-administered medications on file prior to visit.       Objective:   Objective:  Wt Readings from Last 3 Encounters:   09/03/24 107.5 kg (236 lb 14.2 oz)   09/03/24 107.7 kg (237 lb 7 oz)   08/07/24 106.7 kg (235 lb 3.2 oz)     Temp Readings from Last 3 Encounters:   09/03/24 98 °F (36.7 °C) (Temporal)   08/07/24 98.4 °F (36.9 °C) (Oral)   06/19/24 97.8 °F (36.6 °C) (Temporal)     BP Readings from Last 3 Encounters:   09/03/24 (!) 132/58   09/03/24 132/70   08/07/24 (!) 142/80     Pulse Readings from Last 3 Encounters:   09/03/24 60   09/03/24 76   08/07/24 79       Physical Exam  Vitals reviewed.   Constitutional:       Appearance: She is well-developed.   Neck:      Vascular: No carotid bruit.   Cardiovascular:      Rate and Rhythm: Normal rate and regular rhythm.      Pulses: Intact distal pulses.      Heart sounds: Normal heart sounds. No murmur heard.  Pulmonary:      Breath " sounds: Normal breath sounds.   Neurological:      Mental Status: She is oriented to person, place, and time.         Lab Results   Component Value Date    CHOL 189 06/19/2024    CHOL 195 09/28/2023    CHOL 164 10/04/2022     Lab Results   Component Value Date    HDL 49 06/19/2024    HDL 62 09/28/2023    HDL 46 10/04/2022     Lab Results   Component Value Date    LDLCALC 102.0 06/19/2024    LDLCALC 103.2 09/28/2023    LDLCALC 93.0 10/04/2022     Lab Results   Component Value Date    TRIG 190 (H) 06/19/2024    TRIG 149 09/28/2023    TRIG 125 10/04/2022     Lab Results   Component Value Date    CHOLHDL 25.9 06/19/2024    CHOLHDL 31.8 09/28/2023    CHOLHDL 28.0 10/04/2022       Chemistry        Component Value Date/Time     08/20/2024 0820     08/20/2024 0820    K 4.6 08/20/2024 0820    K 4.5 08/20/2024 0820     08/20/2024 0820     08/20/2024 0820    CO2 25 08/20/2024 0820    CO2 25 08/20/2024 0820    BUN 16 08/20/2024 0820    BUN 16 08/20/2024 0820    CREATININE 0.8 08/20/2024 0820    CREATININE 0.8 08/20/2024 0820     (H) 08/20/2024 0820     (H) 08/20/2024 0820        Component Value Date/Time    CALCIUM 9.4 08/20/2024 0820    CALCIUM 9.3 08/20/2024 0820    ALKPHOS 56 08/20/2024 0820    ALKPHOS 55 08/20/2024 0820    AST 15 08/20/2024 0820    AST 15 08/20/2024 0820    ALT 9 (L) 08/20/2024 0820    ALT 9 (L) 08/20/2024 0820    BILITOT 0.7 08/20/2024 0820    BILITOT 0.7 08/20/2024 0820    ESTGFRAFRICA >60 07/27/2022 1237    EGFRNONAA >60 07/27/2022 1237          Lab Results   Component Value Date    TSH 0.867 06/19/2024     Lab Results   Component Value Date    INR 1.0 04/01/2024     Lab Results   Component Value Date    WBC 7.78 08/20/2024    HGB 10.6 (L) 08/20/2024    HCT 33.2 (L) 08/20/2024    MCV 97 08/20/2024     08/20/2024     BNP  @LABRCNTIP(BNP,BNPTRIAGEBLO)@  CrCl cannot be calculated (Patient's most recent lab result is older than the maximum 7 days allowed.).      Imaging:  ======    No results found for this or any previous visit.    No results found for this or any previous visit.    Results for orders placed during the hospital encounter of 01/12/24    X-Ray Chest PA And Lateral    Narrative  EXAMINATION:  XR CHEST PA AND LATERAL    CLINICAL HISTORY:  Calculus of gallbladder without cholecystitis without obstruction    TECHNIQUE:  PA and lateral views of the chest were performed.    COMPARISON:  10/04/2021    FINDINGS:  The cardiac and mediastinal silhouettes appear within normal limits.   The lungs are clear bilaterally.  No acute osseous findings demonstrated.    Impression  No acute findings.      Electronically signed by: Fredrick Pace MD  Date:    01/12/2024  Time:    09:07    No results found for this or any previous visit.    No valid procedures specified.    Results for orders placed during the hospital encounter of 03/31/24    Cardiac catheterization    Conclusion    The 1st Diag lesion was 60% stenosed, jailed very small vessel , D2 50% also very small vessel jailed    The estimated blood loss was none.    IVUS showed well expanded stent, well apposed, no thrombus    The procedure log was documented by Documenter: Haley Yusuf RN and verified by Shree Espinoza MD.    Date: 4/2/2024  Time: 4:36 PM      Results for orders placed during the hospital encounter of 01/03/24    Nuclear Stress - Cardiology Interpreted    Interpretation Summary    Abnormal myocardial perfusion scan.    There is a mild intensity, small sized, fixed perfusion abnormality consistent with scar in the inferoapical wall(s).    There are no other significant perfusion abnormalities.    The gated perfusion images showed an ejection fraction of 63% at rest. The gated perfusion images showed an ejection fraction of 73% post stress. Normal ejection fraction is greater than 59%.    The ECG portion of the study is negative for ischemia.      Results for orders placed during the hospital  encounter of 03/31/24    Echo    Interpretation Summary    Left Ventricle: The left ventricle is normal in size. Normal wall thickness. There is concentric remodeling. Normal wall motion. There is mildly reduced systolic function with a visually estimated ejection fraction of 40 - 45%. Ejection fraction by visual approximation is 45%. There is indeterminate diastolic function.    Right Ventricle: Normal right ventricular cavity size. Wall thickness is normal. Right ventricle wall motion  is normal. Systolic function is normal.    Aortic Valve: There is mild aortic regurgitation.    Tricuspid Valve: The tricuspid valve is structurally normal. There is mild regurgitation.    Pulmonary Artery: The estimated pulmonary artery systolic pressure is 42 mmHg.    IVC/SVC: Normal venous pressure at 3 mmHg.      Diagnostic Results:  ECG: Reviewed    The ASCVD Risk score (Yoselyn MARTINES, et al., 2019) failed to calculate for the following reasons:    The patient has a prior MI or stroke diagnosis        Assessment and Plan:   Bilateral carotid artery stenosis  -     CV Ultrasound Bilateral Doppler Carotid; Future    Primary hypertension  -     carvediloL (COREG) 25 MG tablet; Take 1.5 tablets (37.5 mg total) by mouth 2 (two) times daily.  Dispense: 270 tablet; Refill: 3    Chronic combined systolic and diastolic congestive heart failure    Paroxysmal atrial fibrillation    Controlled type 2 diabetes mellitus with diabetic polyneuropathy, with long-term current use of insulin    GLIL (dyspnea on exertion)    Morbid obesity with BMI of 40.0-44.9, adult    Class 3 severe obesity due to excess calories with serious comorbidity and body mass index (BMI) of 45.0 to 49.9 in adult    Abdominal aortic aneurysm (AAA) without rupture, unspecified part    S/P coronary artery stent placement        LDL goal less than 70.    Continue with Plavix and Eliquis.  Reviewed all tests and above medical conditions with patient in detail and formulated  treatment plan.  Risk factor modification discussed.   Cardiac low salt diet discussed.  Maintaining healthy weight and weight loss goals were discussed in clinic.  Continue with carvedilol 37.5 mg b.i.d. increased after phone follow-up with hypertension.    Advised to start the duloxetine as advised by her PCP.  Repeat carotid ultrasound.  Last was 20-40% in 2022.      Following up in November

## 2024-09-03 NOTE — TELEPHONE ENCOUNTER
No care due was identified.  Health [facility] Embedded Care Due Messages. Reference number: [id number].   9/02/2024 9:16:04 PM CDT

## 2024-09-03 NOTE — TELEPHONE ENCOUNTER
Refill Decision Note   Christine Jo  is requesting a refill authorization.  Brief Assessment and Rationale for Refill:  Approve     Medication Therapy Plan:         Comments:     Note composed:3:10 PM 09/03/2024

## 2024-09-04 ENCOUNTER — PATIENT MESSAGE (OUTPATIENT)
Dept: INTERNAL MEDICINE | Facility: CLINIC | Age: 74
End: 2024-09-04
Payer: MEDICARE

## 2024-09-08 ENCOUNTER — PATIENT MESSAGE (OUTPATIENT)
Dept: SPORTS MEDICINE | Facility: CLINIC | Age: 74
End: 2024-09-08
Payer: MEDICARE

## 2024-09-09 ENCOUNTER — PATIENT MESSAGE (OUTPATIENT)
Dept: SPORTS MEDICINE | Facility: CLINIC | Age: 74
End: 2024-09-09
Payer: MEDICARE

## 2024-09-09 ENCOUNTER — PATIENT MESSAGE (OUTPATIENT)
Dept: CARDIOLOGY | Facility: CLINIC | Age: 74
End: 2024-09-09
Payer: MEDICARE

## 2024-09-09 DIAGNOSIS — I25.118 CORONARY ARTERY DISEASE OF NATIVE ARTERY OF NATIVE HEART WITH STABLE ANGINA PECTORIS: ICD-10-CM

## 2024-09-09 DIAGNOSIS — Z86.69 HISTORY OF OBSTRUCTIVE SLEEP APNEA: ICD-10-CM

## 2024-09-09 DIAGNOSIS — I10 PRIMARY HYPERTENSION: ICD-10-CM

## 2024-09-10 ENCOUNTER — PATIENT MESSAGE (OUTPATIENT)
Dept: CARDIOLOGY | Facility: CLINIC | Age: 74
End: 2024-09-10
Payer: MEDICARE

## 2024-09-10 RX ORDER — CLOPIDOGREL BISULFATE 75 MG/1
75 TABLET ORAL DAILY
Qty: 90 TABLET | Refills: 3 | Status: SHIPPED | OUTPATIENT
Start: 2024-09-10

## 2024-09-10 RX ORDER — CARVEDILOL 25 MG/1
37.5 TABLET ORAL 2 TIMES DAILY
Qty: 270 TABLET | Refills: 3 | OUTPATIENT
Start: 2024-09-10 | End: 2025-09-10

## 2024-09-10 RX ORDER — CARVEDILOL 25 MG/1
37.5 TABLET ORAL 2 TIMES DAILY
Qty: 270 TABLET | Refills: 3 | Status: SHIPPED | OUTPATIENT
Start: 2024-09-10 | End: 2024-09-13

## 2024-09-10 RX ORDER — CLOPIDOGREL BISULFATE 75 MG/1
75 TABLET ORAL DAILY
Qty: 90 TABLET | Refills: 1 | OUTPATIENT
Start: 2024-09-10 | End: 2025-09-10

## 2024-09-10 RX ORDER — CLOPIDOGREL BISULFATE 75 MG/1
75 TABLET ORAL
Qty: 90 TABLET | Refills: 1 | Status: SHIPPED | OUTPATIENT
Start: 2024-09-10 | End: 2024-09-10 | Stop reason: SDUPTHER

## 2024-09-10 RX ORDER — ISOSORBIDE MONONITRATE 30 MG/1
30 TABLET, EXTENDED RELEASE ORAL
Qty: 90 TABLET | Refills: 3 | Status: SHIPPED | OUTPATIENT
Start: 2024-09-10

## 2024-09-10 NOTE — TELEPHONE ENCOUNTER
No care due was identified.  Health [facility] Embedded Care Due Messages. Reference number: [id number].   9/10/2024 10:01:10 AM CDT

## 2024-09-10 NOTE — TELEPHONE ENCOUNTER
Refill Routing Note   Medication(s) are not appropriate for processing by [facility] for the following reason(s):        Required labs abnormal  Drug-disease interaction    [facility] action(s):  Defer      Medication Therapy Plan: Dietary folate deficiency anemia; Anemia; Iron deficiency anemia due to chronic blood loss; Other dietary vitamin B12 deficiency anemia; Anemia, unspecified type      Appointments  past 12m or future 3m with PCP    Date Provider   Last Visit   9/3/2024 Jose Szymanski MD   Next Visit   10/17/2024 Jose Szymanski MD   ED visits in past 90 days: 0        Note composed:10:05 AM 09/10/2024

## 2024-09-10 NOTE — TELEPHONE ENCOUNTER
No care due was identified.  Health [facility] Embedded Care Due Messages. Reference number: [id number].   9/09/2024 9:13:10 PM CDT

## 2024-09-12 ENCOUNTER — PATIENT MESSAGE (OUTPATIENT)
Dept: HEMATOLOGY/ONCOLOGY | Facility: CLINIC | Age: 74
End: 2024-09-12
Payer: MEDICARE

## 2024-09-13 ENCOUNTER — PATIENT MESSAGE (OUTPATIENT)
Dept: PSYCHIATRY | Facility: CLINIC | Age: 74
End: 2024-09-13
Payer: MEDICARE

## 2024-09-13 ENCOUNTER — PATIENT MESSAGE (OUTPATIENT)
Dept: CARDIOLOGY | Facility: CLINIC | Age: 74
End: 2024-09-13
Payer: MEDICARE

## 2024-09-13 DIAGNOSIS — I10 PRIMARY HYPERTENSION: ICD-10-CM

## 2024-09-13 RX ORDER — CARVEDILOL 25 MG/1
50 TABLET ORAL 2 TIMES DAILY
Qty: 120 TABLET | Refills: 11 | Status: SHIPPED | OUTPATIENT
Start: 2024-09-13 | End: 2025-09-13

## 2024-09-13 NOTE — TELEPHONE ENCOUNTER
The patient reports that she stopped the 20 mg capsule of Cymbalta due to side effects nightmares and perhaps some increase in irritability, though not clearly so.  No change with depression and anxiety.  No thoughts of harm to self or others, yanick, or psychosis.  She says that she told her cardiologist that she had stopped the 20 mg and he encouraged her to restarted because her blood pressure could in part be due to anxiety, but she says that she does not want the side effects.  The patient will discontinue the 20 mg capsule of Cymbalta (continue 60 mg) and will arrange to start [facility] October 1, at which time she will have transportation available.

## 2024-09-18 ENCOUNTER — PATIENT MESSAGE (OUTPATIENT)
Dept: NEUROLOGY | Facility: CLINIC | Age: 74
End: 2024-09-18
Payer: MEDICARE

## 2024-09-20 ENCOUNTER — PATIENT MESSAGE (OUTPATIENT)
Dept: CARDIOLOGY | Facility: CLINIC | Age: 74
End: 2024-09-20
Payer: MEDICARE

## 2024-09-20 DIAGNOSIS — I10 PRIMARY HYPERTENSION: Primary | ICD-10-CM

## 2024-09-20 RX ORDER — SPIRONOLACTONE 25 MG/1
25 TABLET ORAL DAILY
Qty: 30 TABLET | Refills: 11 | Status: SHIPPED | OUTPATIENT
Start: 2024-09-20 | End: 2025-09-20

## 2024-09-21 ENCOUNTER — PATIENT MESSAGE (OUTPATIENT)
Dept: INTERNAL MEDICINE | Facility: CLINIC | Age: 74
End: 2024-09-21
Payer: MEDICARE

## 2024-09-21 ENCOUNTER — PATIENT MESSAGE (OUTPATIENT)
Dept: CARDIOLOGY | Facility: CLINIC | Age: 74
End: 2024-09-21
Payer: MEDICARE

## 2024-09-22 ENCOUNTER — PATIENT MESSAGE (OUTPATIENT)
Dept: CARDIOLOGY | Facility: CLINIC | Age: 74
End: 2024-09-22
Payer: MEDICARE

## 2024-09-22 DIAGNOSIS — I10 PRIMARY HYPERTENSION: ICD-10-CM

## 2024-09-23 ENCOUNTER — TELEPHONE (OUTPATIENT)
Dept: PSYCHIATRY | Facility: CLINIC | Age: 74
End: 2024-09-23
Payer: MEDICARE

## 2024-09-23 RX ORDER — SPIRONOLACTONE 25 MG/1
25 TABLET ORAL DAILY
Qty: 90 TABLET | Refills: 3 | Status: SHIPPED | OUTPATIENT
Start: 2024-09-23 | End: 2025-09-23

## 2024-09-23 NOTE — TELEPHONE ENCOUNTER
----- Message from Bill Alexander sent at 9/23/2024 10:09 AM CDT -----  Contact: Christine  Elisabet is calling in regards to she will like to reschedule her virtual appointment that was scheduled for today. She will like a call back at  [phone]

## 2024-09-26 ENCOUNTER — TELEPHONE (OUTPATIENT)
Dept: INTERNAL MEDICINE | Facility: CLINIC | Age: 74
End: 2024-09-26
Payer: MEDICARE

## 2024-09-26 NOTE — TELEPHONE ENCOUNTER
----- Message from Yenny Coronado sent at 9/26/2024 12:19 PM CDT -----  Contact: [phone] Patient  Pt is calling in regards to asking if Della can give her a call back please. Please call and advise. Thank you. Pt gave no other information.

## 2024-09-26 NOTE — TELEPHONE ENCOUNTER
Patient wants to know if anyone can show her how to setup her freestyle 3. She never been to diabetes management to see if someone can tach her how to use her freestyle huyen. Can an order for diabetes management?

## 2024-09-27 DIAGNOSIS — Z79.4 CONTROLLED TYPE 2 DIABETES MELLITUS WITH DIABETIC POLYNEUROPATHY, WITH LONG-TERM CURRENT USE OF INSULIN: Primary | ICD-10-CM

## 2024-09-27 DIAGNOSIS — E11.42 CONTROLLED TYPE 2 DIABETES MELLITUS WITH DIABETIC POLYNEUROPATHY, WITH LONG-TERM CURRENT USE OF INSULIN: Primary | ICD-10-CM

## 2024-09-28 ENCOUNTER — PATIENT MESSAGE (OUTPATIENT)
Dept: CARDIOLOGY | Facility: CLINIC | Age: 74
End: 2024-09-28
Payer: MEDICARE

## 2024-09-28 ENCOUNTER — PATIENT MESSAGE (OUTPATIENT)
Dept: NEUROLOGY | Facility: CLINIC | Age: 74
End: 2024-09-28
Payer: MEDICARE

## 2024-09-28 DIAGNOSIS — I10 PRIMARY HYPERTENSION: Primary | ICD-10-CM

## 2024-09-30 ENCOUNTER — PATIENT MESSAGE (OUTPATIENT)
Dept: CARDIOLOGY | Facility: CLINIC | Age: 74
End: 2024-09-30
Payer: MEDICARE

## 2024-09-30 ENCOUNTER — PATIENT MESSAGE (OUTPATIENT)
Dept: SPORTS MEDICINE | Facility: CLINIC | Age: 74
End: 2024-09-30
Payer: MEDICARE

## 2024-09-30 ENCOUNTER — PATIENT MESSAGE (OUTPATIENT)
Dept: INTERNAL MEDICINE | Facility: CLINIC | Age: 74
End: 2024-09-30
Payer: MEDICARE

## 2024-09-30 DIAGNOSIS — M79.641 BILATERAL HAND PAIN: Primary | ICD-10-CM

## 2024-09-30 DIAGNOSIS — I10 PRIMARY HYPERTENSION: ICD-10-CM

## 2024-09-30 DIAGNOSIS — M79.642 BILATERAL HAND PAIN: Primary | ICD-10-CM

## 2024-09-30 RX ORDER — CLONIDINE 0.1 MG/24H
1 PATCH, EXTENDED RELEASE TRANSDERMAL
Qty: 4 PATCH | Refills: 11 | Status: SHIPPED | OUTPATIENT
Start: 2024-09-30 | End: 2024-09-30

## 2024-09-30 RX ORDER — CLONIDINE 0.1 MG/24H
1 PATCH, EXTENDED RELEASE TRANSDERMAL
Qty: 4 PATCH | Refills: 11 | Status: SHIPPED | OUTPATIENT
Start: 2024-09-30 | End: 2025-09-30

## 2024-10-03 ENCOUNTER — PATIENT MESSAGE (OUTPATIENT)
Dept: CARDIOLOGY | Facility: CLINIC | Age: 74
End: 2024-10-03
Payer: MEDICARE

## 2024-10-04 ENCOUNTER — HOSPITAL ENCOUNTER (OUTPATIENT)
Dept: PREADMISSION TESTING | Facility: HOSPITAL | Age: 74
Discharge: HOME | End: 2024-10-04
Attending: INTERNAL MEDICINE
Payer: MEDICARE

## 2024-10-04 ENCOUNTER — PATIENT MESSAGE (OUTPATIENT)
Dept: PSYCHIATRY | Facility: CLINIC | Age: 74
End: 2024-10-04
Payer: MEDICARE

## 2024-10-04 NOTE — TELEPHONE ENCOUNTER
I called the patient regarding her message.  She indicates that she was feeling upset after an interaction with her son, who told her that he is setting boundaries with her.  She says that this increased her feelings of loneliness.  She describes moments of depression and anxiety.  She absolutely denies any suicidal ideation or thoughts of self-harm whatsoever and says that this never has a never will be an issue for her.  No psychosis or thoughts of harm towards others.  Questioning yields no yanick, hypomania, or psychosis.  She describes intact self care and attention to her medical conditions.  She speaks positively of [facility].  She does not feel that she has any current crisis or emergency and does not wish for inpatient psychiatric treatment currently but says that she will discuss this with [facility] on Tuesday, assisted/independent living, transportation to [facility], [facility], and other potential options with regards to getting out more and being around others more.  The patient verbalized appreciation, satisfaction, and benefit with the conversation.

## 2024-10-06 ENCOUNTER — PATIENT MESSAGE (OUTPATIENT)
Dept: CARDIOLOGY | Facility: CLINIC | Age: 74
End: 2024-10-06
Payer: MEDICARE

## 2024-10-07 ENCOUNTER — PATIENT MESSAGE (OUTPATIENT)
Dept: INTERNAL MEDICINE | Facility: CLINIC | Age: 74
End: 2024-10-07
Payer: MEDICARE

## 2024-10-07 ENCOUNTER — LAB VISIT (OUTPATIENT)
Dept: LAB | Facility: HOSPITAL | Age: 74
End: 2024-10-07
Attending: INTERNAL MEDICINE
Payer: MEDICARE

## 2024-10-07 ENCOUNTER — CLINICAL SUPPORT (OUTPATIENT)
Dept: DIABETES | Facility: CLINIC | Age: 74
End: 2024-10-07
Payer: MEDICARE

## 2024-10-07 ENCOUNTER — PATIENT MESSAGE (OUTPATIENT)
Dept: DIABETES | Facility: CLINIC | Age: 74
End: 2024-10-07

## 2024-10-07 DIAGNOSIS — Z79.4 CONTROLLED TYPE 2 DIABETES MELLITUS WITH DIABETIC POLYNEUROPATHY, WITH LONG-TERM CURRENT USE OF INSULIN: Primary | ICD-10-CM

## 2024-10-07 DIAGNOSIS — E11.42 CONTROLLED TYPE 2 DIABETES MELLITUS WITH DIABETIC POLYNEUROPATHY, WITH LONG-TERM CURRENT USE OF INSULIN: Primary | ICD-10-CM

## 2024-10-07 DIAGNOSIS — I10 PRIMARY HYPERTENSION: ICD-10-CM

## 2024-10-07 LAB
ANION GAP SERPL CALC-SCNC: 7 MMOL/L (ref 8–16)
BUN SERPL-MCNC: 17 MG/DL (ref 8–23)
CALCIUM SERPL-MCNC: 9.4 MG/DL (ref 8.7–10.5)
CHLORIDE SERPL-SCNC: 105 MMOL/L (ref 95–110)
CO2 SERPL-SCNC: 25 MMOL/L (ref 23–29)
CREAT SERPL-MCNC: 0.8 MG/DL (ref 0.5–1.4)
EST. GFR  (NO RACE VARIABLE): >60 ML/MIN/1.73 M^2
GLUCOSE SERPL-MCNC: 143 MG/DL (ref 70–110)
POTASSIUM SERPL-SCNC: 4.8 MMOL/L (ref 3.5–5.1)
SODIUM SERPL-SCNC: 137 MMOL/L (ref 136–145)

## 2024-10-07 PROCEDURE — G0108 DIAB MANAGE TRN  PER INDIV: HCPCS | Mod: S$GLB,,, | Performed by: DIETITIAN, REGISTERED

## 2024-10-07 PROCEDURE — 80048 BASIC METABOLIC PNL TOTAL CA: CPT | Performed by: INTERNAL MEDICINE

## 2024-10-07 PROCEDURE — 36415 COLL VENOUS BLD VENIPUNCTURE: CPT | Performed by: INTERNAL MEDICINE

## 2024-10-07 PROCEDURE — 99999 PR PBB SHADOW E&M-EST. PATIENT-LVL III: CPT | Mod: PBBFAC,,, | Performed by: DIETITIAN, REGISTERED

## 2024-10-07 NOTE — PROGRESS NOTES
Diabetes Care Specialist Progress Note  Author: Cheri Quiroz RD, CDE  Date: 10/7/2024    Intake    Program Intake  Reason for Diabetes Program Visit:: Intervention  Type of Intervention:: Individual  Individual: Device Training  Device Training: Personal CGM (Freestyle Osvaldo)  Current diabetes risk level:: high  In the last 12 months, have you:: none  Permission to speak with others about care:: no    Current Diabetes Treatment: Oral Medications, Insulin  Oral Medication Type/Dose: Januvia 100mg QD  Method of insulin delivery?: Injections  Injection Type: Pens  Pen Type/Dose: Lantus 74u QD    Continuous Glucose Monitoring  Patient has CGM: No    Lab Results   Component Value Date    HGBA1C 7.4 (H) 06/19/2024       Lifestyle Coping Support & Clinical    Lifestyle/Coping/Support  Does anyone in your family have diabetes or does anyone in your family support you in your diabetes care?: sisters and brother have diabetes  Learning Barriers:: None  Culture or Adventist beliefs that may impact ability to access healthcare: No  Psychosocial/Coping Skills Assessment Completed: : Yes  Assessment indicates:: Adequate understanding  Area of need?: No    Problem Review  Active Comorbidities: Gastrointestinal Disorder, Other (comment), Hypertension, Cardiovascular Disease (GERD)    Diabetes Self-Management Skills Assessment    Medication Skills Assessment  Patient is able to identify current diabetes medications, dosages, and appropriate timing of medications.: yes  Patient reports problems or concerns with current medication regimen.: no  Patient is  aware that some diabetes medications can cause low blood sugar?: No  Medication Skills Assessment Completed:: Yes  Assessment indicates:: Adequate understanding  Area of need?: No    Diabetes Disease Process/Treatment Options  Diabetes Type?: Type II  When were you diagnosed?: about 15 years ago  If previous diabetes education, when/where:: yes, when she was diagnosed. [facility]  What are your goals for this education session?: learn how to use Freestyle Osvaldo CGM  Is patient aware of what causes diabetes?: Yes  Does patient understand the pathophysiology of diabetes?: Yes  Diabetes Disease Process/Treatment Options: Skills Assessment Completed: Yes  Assessment indicates:: Adequate understanding  Area of need?: No    Nutrition/Healthy Eating  Meal Plan 24 Hour Recall - Breakfast: scrambled egg, slice of toast  Meal Plan 24 Hour Recall - Lunch: usually eats a late lunch // yesterday had a tueky breast sandwich on wheat bread, handful of grapes  Meal Plan 24 Hour Recall - Dinner: nothing  Meal Plan 24 Hour Recall - Snack: cheerios and milk  Meal Plan 24 Hour Recall - Beverage: coffee in AM  Who shops/cooks?: patient  Patient can identify foods that impact blood sugar.: yes  Nutrition/Healthy Eating Skills Assessment Completed:: Yes  Assessment indicates:: Adequate understanding  Area of need?: No      Home Blood Glucose Monitoring  Patient states that blood sugar is checked at home daily.: yes  Monitoring Method:: home glucometer  Fasting BG range history:: FB-120-170  Home Blood Glucose Monitoring Skills Assessment Completed: : Yes  Assessment indicates:: Instruction Needed, Knowledge deficit  Area of need?: Yes    Acute Complications  Have you ever had hypoglycemia (low BG 70 or less)?: no  Do you know the symptoms of low blood sugar and how to treat?: cranberry juice  Have you ever had hyperglycemia (high  or more)?: no   Do you know the symptoms of high blood sugar and how to treat: headache  Have you ever had DKA?: no  Acute Complications Skills Assessment Completed: : Yes  Assessment indicates:: Adequate understanding  Area of need?: No      Assessment Summary and Plan    Based on today's diabetes care assessment, the following areas of need were identified:          10/7/2024   Areas of Need   Medications/Current Diabetes Treatment No   Lifestyle  Coping Support No   Diabetes Disease Process/Treatment Options No   Nutrition/Healthy Eating Reviewed importance of portion control and provided meal plans consisting of 30-45g carb per meal.    Home Blood Glucose Monitoring Yes- see care plan.    Acute Complications Reviewed blood glucose goals, prevention, detection, signs and symptoms, and treatment of hypoglycemia and hyperglycemia, and when to contact the clinic.              Today's interventions were provided through individual discussion, instruction, and written materials were provided.      Patient verbalized understanding of instruction and written materials.  Pt was able to return back demonstration of instructions today. Patient understood key points, needs reinforcement and further instruction.     Diabetes Self-Management Care Plan:    Today's Diabetes Self-Management Care Plan was developed with Christine's input. Christine has agreed to work toward the following goal(s) to improve his/her overall diabetes control.      Care Plan: Diabetes Management   Updates made since 9/7/2024 12:00 AM        Problem: Blood Glucose Self-Monitoring         Goal: Patient will use Freestyle Osvaldo 3 to monitor BG and make treatment decisions.    Start Date: 10/7/2024   Expected End Date: 1/7/2025   Priority: High   Barriers: No Barriers Identified        Task: Trained patient to use Freestyle Osvaldo 3 system. Completed 10/7/2024   Note:    FREESTYLE OSVALDO 3 CGM TRAINING  Education provided per [employer] Freestyle Osvaldo 3 protocol, quick start guide and videos. Assisted with mobile porter download and [facility] clinic data share.       The FreeStyle Osvaldo 3 Continuous Glucose Monitoring System is a real time continuous glucose monitoring (CGM) device with alarms capability indicated for the management of diabetes in persons age 4 and older.   Use your blood glucose meter to make diabetes treatment decisions when you see the symbol during the first 12 hours of wearing a Sensor, if  your Sensor glucose reading does not match how you feel, or if the reading does not include a number.  For you to receive alarms, they must be on and your Bryant should be within 33 feet of you at all times. If using porter, check mobile device to ensure ongoing compatibility.  To prevent missed alarms, make sure the Bryant has sufficient charge and that sound and/or vibration are turned on. Do not force close reader porter.   Remove the Sensor before MRI, CT scan, X-ray, or diathermy treatment.    Alarms:   Urgent Low Glucose below 55: default ON   Low Glucose: 70 mg/dl   High Glucose 250 mg/dl  Signal Loss: ON    Only apply the Sensor to the back of the upper arm. If placed in other areas, the Sensor may not function properly. Avoid areas with scars, moles, stretch marks, or lumps. Select an area of skin that generally stays flat during normal daily activities (no bending or folding). Choose a site that is at least 1 inch away from an insulin injection site. Rotate sensor sites.   Sensor can be worn for up to 14 days. Scan sensor to start sensor session. Sensor warm-up 60min and then automatically sends a new glucose reading to your porter every minute.   The Sensor is water-resistant in up to 3 feet (1 meter) of water. Do not immerse longer than 30 minutes.  Instructed on how to understand SG graph, trend arrows. Reviewed menu options. Discussed how to remove and discard sensor.   Differences in glucose readings between interstitial fluid and capillary blood may be observed during times of rapid change in blood glucose, such as after eating, dosing insulin, or exercising.  Taking ascorbic acid (vitamin C) supplements while wearing the Sensor may falsely raise Sensor glucose readings. Taking more than 500 mg of ascorbic acid per day may affect the Sensor readings which could cause you to miss a severe low glucose event. Ascorbic acid can be found in supplements including multivitamins. Some supplements, including cold  remedies such as Airborne® and Emergen-C®, may contain high doses of 1000 mg of ascorbic acid and should not be taken while using the Sensor. See your health care professional to understand how long ascorbic acid is active in your body.  Contact Customer Service if you receive a Replace Sensor message before the end of the 14 day wear period. Customer Service is available at [phone] 7 Days a Week from 8AM to 8PM Eastern Standard Time.    Pt verbalized understanding of all instruction and able to demonstrate sensor application technique per protocol.                  Follow Up Plan     Follow up in about 1 month (around 11/7/2024) for E11.42.    Today's care plan and follow up schedule was discussed with patient.  Christine verbalized understanding of the care plan, goals, and agrees to follow up plan.        The patient was encouraged to communicate with his/her health care provider/physician and care team regarding his/her condition(s) and treatment.  I provided the patient with my contact information today and encouraged to contact me via phone or [facility] Patient Portal as needed.     Length of Visit   Total Time: 60 Minutes

## 2024-10-08 ENCOUNTER — PATIENT MESSAGE (OUTPATIENT)
Dept: INTERNAL MEDICINE | Facility: CLINIC | Age: 74
End: 2024-10-08
Payer: MEDICARE

## 2024-10-10 ENCOUNTER — PATIENT MESSAGE (OUTPATIENT)
Dept: DIABETES | Facility: CLINIC | Age: 74
End: 2024-10-10
Payer: MEDICARE

## 2024-10-11 ENCOUNTER — PATIENT MESSAGE (OUTPATIENT)
Dept: PODIATRY | Facility: CLINIC | Age: 74
End: 2024-10-11
Payer: MEDICARE

## 2024-10-13 ENCOUNTER — PATIENT MESSAGE (OUTPATIENT)
Dept: CARDIOLOGY | Facility: CLINIC | Age: 74
End: 2024-10-13
Payer: MEDICARE

## 2024-10-14 ENCOUNTER — TELEPHONE (OUTPATIENT)
Dept: PSYCHIATRY | Facility: CLINIC | Age: 74
End: 2024-10-14
Payer: MEDICARE

## 2024-10-14 NOTE — TELEPHONE ENCOUNTER
----- Message from Sanjuana sent at 10/14/2024 11:23 AM CDT -----  Contact: Patient, [phone]  Calling to speak with someone regarding her appointment. Please call her. Thanks.

## 2024-10-16 ENCOUNTER — TELEPHONE (OUTPATIENT)
Dept: PSYCHIATRY | Facility: CLINIC | Age: 74
End: 2024-10-16
Payer: MEDICARE

## 2024-10-16 NOTE — TELEPHONE ENCOUNTER
----- Message from Irena sent at 10/16/2024 11:37 AM CDT -----  Contact: Christine  .Patient is calling to speak with the nurse regarding appt . Reports wanting to know if there is an earlier appt than what's schedule for virtual appt with current provider  . Please give patient a call back at [phone]

## 2024-10-17 ENCOUNTER — OFFICE VISIT (OUTPATIENT)
Dept: SPORTS MEDICINE | Facility: CLINIC | Age: 74
End: 2024-10-17
Payer: MEDICARE

## 2024-10-17 ENCOUNTER — HOSPITAL ENCOUNTER (OUTPATIENT)
Dept: RADIOLOGY | Facility: HOSPITAL | Age: 74
Discharge: HOME | End: 2024-10-17
Attending: STUDENT IN AN ORGANIZED HEALTH CARE EDUCATION/TRAINING PROGRAM
Payer: MEDICARE

## 2024-10-17 ENCOUNTER — HOSPITAL ENCOUNTER (OUTPATIENT)
Dept: CARDIOLOGY | Facility: HOSPITAL | Age: 74
Discharge: HOME | End: 2024-10-17
Attending: INTERNAL MEDICINE
Payer: MEDICARE

## 2024-10-17 ENCOUNTER — PATIENT MESSAGE (OUTPATIENT)
Dept: CARDIOLOGY | Facility: CLINIC | Age: 74
End: 2024-10-17

## 2024-10-17 ENCOUNTER — OFFICE VISIT (OUTPATIENT)
Dept: INTERNAL MEDICINE | Facility: CLINIC | Age: 74
End: 2024-10-17
Payer: MEDICARE

## 2024-10-17 VITALS — HEIGHT: 62 IN | BODY MASS INDEX: 43.82 KG/M2 | WEIGHT: 238.13 LBS

## 2024-10-17 VITALS
DIASTOLIC BLOOD PRESSURE: 60 MMHG | RESPIRATION RATE: 18 BRPM | SYSTOLIC BLOOD PRESSURE: 128 MMHG | OXYGEN SATURATION: 95 % | HEART RATE: 97 BPM | WEIGHT: 239 LBS | TEMPERATURE: 98 F | HEIGHT: 62 IN | BODY MASS INDEX: 43.98 KG/M2

## 2024-10-17 VITALS
DIASTOLIC BLOOD PRESSURE: 60 MMHG | SYSTOLIC BLOOD PRESSURE: 128 MMHG | BODY MASS INDEX: 43.79 KG/M2 | HEIGHT: 62 IN | WEIGHT: 238 LBS

## 2024-10-17 DIAGNOSIS — K63.5 POLYP OF COLON, UNSPECIFIED PART OF COLON, UNSPECIFIED TYPE: ICD-10-CM

## 2024-10-17 DIAGNOSIS — I10 PRIMARY HYPERTENSION: ICD-10-CM

## 2024-10-17 DIAGNOSIS — G89.29 CHRONIC RIGHT SHOULDER PAIN: ICD-10-CM

## 2024-10-17 DIAGNOSIS — M25.511 CHRONIC RIGHT SHOULDER PAIN: ICD-10-CM

## 2024-10-17 DIAGNOSIS — I25.118 CORONARY ARTERY DISEASE OF NATIVE ARTERY OF NATIVE HEART WITH STABLE ANGINA PECTORIS: ICD-10-CM

## 2024-10-17 DIAGNOSIS — I65.23 BILATERAL CAROTID ARTERY STENOSIS: ICD-10-CM

## 2024-10-17 DIAGNOSIS — Z79.4 CONTROLLED TYPE 2 DIABETES MELLITUS WITH DIABETIC POLYNEUROPATHY, WITH LONG-TERM CURRENT USE OF INSULIN: Primary | ICD-10-CM

## 2024-10-17 DIAGNOSIS — M65.332 TRIGGER MIDDLE FINGER OF LEFT HAND: ICD-10-CM

## 2024-10-17 DIAGNOSIS — M65.331 TRIGGER MIDDLE FINGER OF RIGHT HAND: Primary | ICD-10-CM

## 2024-10-17 DIAGNOSIS — M79.641 BILATERAL HAND PAIN: ICD-10-CM

## 2024-10-17 DIAGNOSIS — Z23 NEED FOR VACCINATION: ICD-10-CM

## 2024-10-17 DIAGNOSIS — E11.42 CONTROLLED TYPE 2 DIABETES MELLITUS WITH DIABETIC POLYNEUROPATHY, WITH LONG-TERM CURRENT USE OF INSULIN: Primary | ICD-10-CM

## 2024-10-17 DIAGNOSIS — M79.642 BILATERAL HAND PAIN: ICD-10-CM

## 2024-10-17 DIAGNOSIS — M67.911 ROTATOR CUFF DISORDER, RIGHT: ICD-10-CM

## 2024-10-17 LAB
LEFT ARM DIASTOLIC BLOOD PRESSURE: 65 MMHG
LEFT ARM SYSTOLIC BLOOD PRESSURE: 125 MMHG
LEFT CBA DIAS: 22 CM/S
LEFT CBA SYS: 114 CM/S
LEFT CCA DIST DIAS: 15 CM/S
LEFT CCA DIST SYS: 99 CM/S
LEFT CCA MID DIAS: 17 CM/S
LEFT CCA MID SYS: 106 CM/S
LEFT CCA PROX DIAS: 17 CM/S
LEFT CCA PROX SYS: 101 CM/S
LEFT ECA DIAS: 11 CM/S
LEFT ECA SYS: 111 CM/S
LEFT ICA DIST DIAS: 20 CM/S
LEFT ICA DIST SYS: 89 CM/S
LEFT ICA MID DIAS: 22 CM/S
LEFT ICA MID SYS: 125 CM/S
LEFT ICA PROX DIAS: 25 CM/S
LEFT ICA PROX SYS: 114 CM/S
LEFT VERTEBRAL DIAS: 13 CM/S
LEFT VERTEBRAL SYS: 58 CM/S
OHS CV CAROTID RIGHT ICA EDV HIGHEST: 24
OHS CV CAROTID ULTRASOUND LEFT ICA/CCA RATIO: 1.26
OHS CV CAROTID ULTRASOUND RIGHT ICA/CCA RATIO: 1.9
OHS CV PV CAROTID LEFT HIGHEST CCA: 106
OHS CV PV CAROTID LEFT HIGHEST ICA: 125
OHS CV PV CAROTID RIGHT HIGHEST CCA: 71
OHS CV PV CAROTID RIGHT HIGHEST ICA: 99
OHS CV US CAROTID LEFT HIGHEST EDV: 25
RIGHT ARM DIASTOLIC BLOOD PRESSURE: 60 MMHG
RIGHT ARM SYSTOLIC BLOOD PRESSURE: 128 MMHG
RIGHT CBA DIAS: 8 CM/S
RIGHT CBA SYS: 47 CM/S
RIGHT CCA DIST DIAS: 10 CM/S
RIGHT CCA DIST SYS: 52 CM/S
RIGHT CCA MID DIAS: 11 CM/S
RIGHT CCA MID SYS: 71 CM/S
RIGHT CCA PROX DIAS: 6 CM/S
RIGHT CCA PROX SYS: 71 CM/S
RIGHT ECA DIAS: 8 CM/S
RIGHT ECA SYS: 115 CM/S
RIGHT ICA DIST DIAS: 24 CM/S
RIGHT ICA DIST SYS: 99 CM/S
RIGHT ICA MID DIAS: 17 CM/S
RIGHT ICA MID SYS: 66 CM/S
RIGHT ICA PROX DIAS: 12 CM/S
RIGHT ICA PROX SYS: 72 CM/S
RIGHT VERTEBRAL DIAS: 10 CM/S
RIGHT VERTEBRAL SYS: 53 CM/S

## 2024-10-17 PROCEDURE — 99999 PR PBB SHADOW E&M-EST. PATIENT-LVL III: CPT | Mod: PBBFAC,,, | Performed by: STUDENT IN AN ORGANIZED HEALTH CARE EDUCATION/TRAINING PROGRAM

## 2024-10-17 PROCEDURE — 73130 X-RAY EXAM OF HAND: CPT | Mod: 26,50,, | Performed by: RADIOLOGY

## 2024-10-17 PROCEDURE — 99999 PR PBB SHADOW E&M-EST. PATIENT-LVL V: CPT | Mod: PBBFAC,,, | Performed by: FAMILY MEDICINE

## 2024-10-17 PROCEDURE — 93880 EXTRACRANIAL BILAT STUDY: CPT

## 2024-10-17 PROCEDURE — 93880 EXTRACRANIAL BILAT STUDY: CPT | Mod: 26,,, | Performed by: INTERNAL MEDICINE

## 2024-10-17 PROCEDURE — 73130 X-RAY EXAM OF HAND: CPT | Mod: TC,50

## 2024-10-17 RX ORDER — BETAMETHASONE SODIUM PHOSPHATE AND BETAMETHASONE ACETATE 3; 3 MG/ML; MG/ML
6 INJECTION, SUSPENSION INTRA-ARTICULAR; INTRALESIONAL; INTRAMUSCULAR; SOFT TISSUE
Status: DISCONTINUED | OUTPATIENT
Start: 2024-10-17 | End: 2024-10-17 | Stop reason: HOSPADM

## 2024-10-17 RX ADMIN — BETAMETHASONE SODIUM PHOSPHATE AND BETAMETHASONE ACETATE 6 MG: 3; 3 INJECTION, SUSPENSION INTRA-ARTICULAR; INTRALESIONAL; INTRAMUSCULAR; SOFT TISSUE at 01:10

## 2024-10-17 NOTE — PROCEDURES
Sports Medicine US - Guidance for Needle Placement    Date/Time: 10/17/2024 1:40 PM    Performed by: Erik Esposito MD  Authorized by: Erik Esposito MD  Preparation: Patient was prepped and draped in the usual sterile fashion.  Local anesthesia used: no    Anesthesia:  Local anesthesia used: no    Sedation:  Patient sedated: no    Patient tolerance: patient tolerated the procedure well with no immediate complications  Comments: Ultrasound guidance was used for needle localization. Images were saved and stored for documentation. The appropriate structures were visualized. Dynamic visualization of the needle was continuous throughout the procedures and maintained good position.

## 2024-10-17 NOTE — PROGRESS NOTES
Patient ID: Christine Jo  YOB: 1950  MRN: [id number]    Chief Complaint: Pain of the Right Shoulder, Pain of the Left Hand, and Pain of the Right Hand      Referred By: self    History of Present Illness: Christine Jo is a right-hand dominant 74 y.o. female who presents today with right shoulder pain. She reports changes in mobility since experiencing a fall back in August, describing stiffness in her hands affecting two fingers and ongoing shoulder pain. She was seen at Urgent Care , and told to use Volteran gel and ice. Symptoms has not gone away, She is having weakness in her shoulder when lifting and reaching. Her right hand middle finger is locking. Hand xray was done today.      Occupation: retired      Past Medical History:   Past Medical History:   Diagnosis Date    Arthritis     Cataract     Coronary artery disease     Diabetes mellitus 2008     am 01/15/2018 Insulin x 1 year    Diabetes mellitus, type 2     DM (diabetes mellitus) 2008     am 02/14/2020 Insulin x 4 years    Encounter for blood transfusion     Glaucoma     Hypertension     Insomnia     Macular degeneration     Old MI (myocardial infarction) 2/12/2024    Vaginal yeast infection      Past Surgical History:   Procedure Laterality Date    abdominal laparoscopy       ADENOIDECTOMY  1965    Tonsils removed also    ANGIOGRAM, CORONARY, WITH LEFT HEART CATHETERIZATION N/A 04/02/2024    Procedure: Angiogram, Coronary, with Left Heart Cath;  Surgeon: Shree Espinoza MD;  Location: [facility] CATH LAB;  Service: Cardiology;  Laterality: N/A;    ANGIOGRAM, CORONARY, WITH LEFT HEART CATHETERIZATION N/A 04/01/2024    Procedure: Angiogram, Coronary, with Left Heart Cath;  Surgeon: Shree Espinoza MD;  Location: [facility] CATH LAB;  Service: Cardiology;  Laterality: N/A;    BREAST BIOPSY Left 1998    benign    BREAST SURGERY  1998 biopsy lt breast    CATARACT EXTRACTION Bilateral [id number]    [facility] in [place]     Cataract Surgery Bilateral     Laser (YAG)    COLON SURGERY  2004    COLONOSCOPY N/A 05/05/2021    Procedure: COLONOSCOPY;  Surgeon: Shawn Rogers III, MD;  Location: [facility];  Service: Endoscopy;  Laterality: N/A;    COLONOSCOPY N/A 06/30/2021    Procedure: COLONOSCOPY;  Surgeon: Memo Merida MD;  Location: [facility];  Service: Endoscopy;  Laterality: N/A;    CORONARY STENT PLACEMENT N/A 04/01/2024    Procedure: INSERTION, STENT, CORONARY ARTERY;  Surgeon: Shree Espinoza MD;  Location: [facility] CATH [facility];  Service: Cardiology;  Laterality: N/A;    ESOPHAGOGASTRODUODENOSCOPY N/A 11/29/2019    Procedure: ESOPHAGOGASTRODUODENOSCOPY (EGD);  Surgeon: Shawn Rogers III, MD;  Location: [facility];  Service: Endoscopy;  Laterality: N/A;    ESOPHAGOGASTRODUODENOSCOPY N/A 05/05/2021    Procedure: EGD (ESOPHAGOGASTRODUODENOSCOPY);  Surgeon: Shawn Rogers III, MD;  Location: [facility];  Service: Endoscopy;  Laterality: N/A;    EYE SURGERY      IVUS, CORONARY  04/02/2024    Procedure: IVUS, Coronary;  Surgeon: Shree Espinoza MD;  Location: [facility]ATH [facility];  Service: Cardiology;;    PERCUTANEOUS CORONARY INTERVENTION, ARTERY N/A 04/01/2024    Procedure: Percutaneous coronary intervention;  Surgeon: Shree Espinoza MD;  Location: [facility] CATH [facility];  Service: Cardiology;  Laterality: N/A;    PTCA, SINGLE VESSEL  04/01/2024    Procedure: PTCA, Single Vessel;  Surgeon: Shree Espinoza MD;  Location: [facility] CATH [facility];  Service: Cardiology;;    SMALL INTESTINE SURGERY  2004    Exploratory stomach large and small intestines    TONSILLECTOMY      TUBAL LIGATION       Family History   Problem Relation Name Age of Onset    Heart disease Mother Jillian Jo         Open heart surgery    Cataracts Mother Jillian Jo     Macular degeneration Mother Jillian Jo     Glaucoma Mother Jillian Jo     Arthritis Mother Jillian Jo     Hearing loss Mother Jillian Jo     Hypertension Mother Jillian Jo      Kidney disease Mother Jillian Jo     Stroke Mother Jillian Jo     Vision loss Mother Jillian Jo     Diabetes Sister La Jo     Heart disease Sister La Jo         CAD    Cataracts Sister La Jo     Depression Sister La Jo         Depression    Hypertension Sister La Jo     Diabetes Sister Erica Lerouge     Hearing loss Sister Erica Lerouge     Hypertension Sister Erica Lerouge     Diabetes Sister      Cancer Son Andrea Trascher III         testicular     Arthritis Son Andrea Trascher III         Because of chemotherapy at 19 he has neuropathy and arthritis    Hearing loss Son Andrea Trascher III         Because of chemotherapy    Cancer Maternal Aunt Etta Marquez         Tumor in chest wall    Heart disease Maternal Grandfather Leonides Santos         Pacemaker    Arthritis Maternal Grandfather [place] Tom     Hearing loss Maternal Grandfather [place] Tom     Hypertension Maternal Grandfather Leonides Tom     Hypertension Maternal Grandmother Helene Santos     Kidney disease Maternal Grandmother Helene Santos     Stroke Maternal Grandmother Helene Santos     Alcohol abuse Daughter Kaye Trascher     Asthma Daughter Kaye Trascher     Depression Daughter Kaye Trascher         Psysophrenia    Drug abuse Daughter Kaye Camargoscher         Not now but earlier alcohol and drug abuse    Hypertension Daughter Kaye Trascher     Mental illness Daughter Kaye Trascher         Psysophrenia    Cancer Son Andrea Trascher III will         Testicular cancer    Depression Son Andrea Trascher III will         Major depressive disorder like me    Cancer Maternal Aunt Carmella Enrique         Lung cancer    Depression Sister Maribell Hull         May be bipolar    Diabetes Sister Maribell Hull     Heart disease Sister Maribell Hull         Open heart surgery    Hypertension Sister Maribell Hull     Hypertension Brother Riky Jo       Social History     Socioeconomic History    Marital status:     Number of children: 3   Occupational History    Occupation: Retired   Tobacco Use    Smoking status: Former     Current packs/day: 0.00     Average packs/day: 1 pack/day for 36.5 years (36.5 ttl pk-yrs)     Types: Cigarettes     Start date:      Quit date: 2003     Years since quittin.3     Passive exposure: Past    Smokeless tobacco: Never    Tobacco comments:     Been quit 19 years. I smoked 35 years approx a pack a day   Substance and Sexual Activity    Alcohol use: No    Drug use: Never    Sexual activity: Never     Social Drivers of Health     Financial Resource Strain: Low Risk  (4/15/2024)    Overall Financial Resource Strain (CARDIA)     Difficulty of Paying Living Expenses: Not hard at all   Food Insecurity: No Food Insecurity (4/15/2024)    Hunger Vital Sign     Worried About Running Out of Food in the Last Year: Never true     Ran Out of Food in the Last Year: Never true   Transportation Needs: No Transportation Needs (4/15/2024)    PRAPARE - Transportation     Lack of Transportation (Medical): No     Lack of Transportation (Non-Medical): No   Physical Activity: Inactive (4/15/2024)    Exercise Vital Sign     Days of Exercise per Week: 0 days     Minutes of Exercise per Session: 0 min   Stress: No Stress Concern Present (4/15/2024)    [employer] [place] [employer] - Occupational Stress Questionnaire     Feeling of Stress : Only a little   Housing Stability: Low Risk  (4/15/2024)    Housing Stability Vital Sign     Unable to Pay for Housing in the Last Year: No     Homeless in the Last Year: No     Medication List with Changes/Refills   Current Medications    APIXABAN (ELIQUIS) 5 MG TAB    Take 1 tablet (5 mg total) by mouth 2 (two) times daily.    AZELASTINE (ASTELIN) 137 MCG (0.1 %) NASAL SPRAY    use 2 sprays (274 mcg total) by Nasal route 2 (two) times daily.    BEPREVE 1.5 % DROP    Place 1 drop into  both eyes 2 (two) times daily.    BLOOD SUGAR DIAGNOSTIC STRP    To check blood sugar 1 times daily    BLOOD-GLUCOSE METER (ACCU-CHEK GUIDE GLUCOSE METER) MISC    use daily to test blood sugar    CARVEDILOL (COREG) 25 MG TABLET    Take 2 tablets (50 mg total) by mouth 2 (two) times daily.    CLONIDINE 0.1 MG/24 HR TD PTWK (CATAPRES) 0.1 MG/24 HR    Place 1 patch onto the skin every 7 days.    CLOPIDOGREL (PLAVIX) 75 MG TABLET    Take 1 tablet (75 mg total) by mouth once daily.    CLOTRIMAZOLE-BETAMETHASONE (LOTRISONE) LOTION    Apply topically to the affected area 2 (two) times daily.    COENZYME Q10 200 MG CAPSULE    Take 200 mg by mouth once daily.    COVID-19 (COMIRNATY 2024-25, 12Y UP,,PF,) 30 MCG/0.3 ML IM VACCINE (>/= 13 YO)    Inject 0.3 mLs into the muscle once. for 1 dose    CYCLOSPORINE (RESTASIS) 0.05 % OPHTHALMIC EMULSION    Place 1 drop into both eyes 2 (two) times daily.    DULOXETINE (CYMBALTA) 60 MG CAPSULE    Take 1 capsule (60 mg total) by mouth once daily.    EZETIMIBE (ZETIA) 10 MG TABLET    TAKE 1 TABLET BY MOUTH EVERY  EVENING    FAMOTIDINE (PEPCID) 20 MG TABLET    TAKE 1 TABLET BY MOUTH TWICE  DAILY    FLUTICASONE PROPIONATE (FLONASE) 50 MCG/ACTUATION NASAL SPRAY    2 sprays (100 mcg total) by Each Nostril route once daily.    FUROSEMIDE (LASIX) 20 MG TABLET    TAKE 1 TABLET BY MOUTH DAILY AS  NEEDED FOR SWELLING    GABAPENTIN (NEURONTIN) 300 MG CAPSULE    Take 1 capsule (300 mg total) by mouth daily as needed (when needed).    GALCANEZUMAB-GNLM (EMGALITY SYRINGE) 120 MG/ML SYRG    Inject 120 mg into the skin every 28 days.    HYDROCORTISONE 2.5 % CREAM    Apply topically 2 (two) times daily.    INSULIN (LANTUS SOLOSTAR U-100 INSULIN) GLARGINE 100 UNITS/ML SUBQ PEN    Inject 72 Units into the skin every evening.    ISOSORBIDE MONONITRATE (IMDUR) 30 MG 24 HR TABLET    TAKE 1 TABLET BY MOUTH ONCE  DAILY    LANCETS MISC    To check BG 1 times daily    LINACLOTIDE (LINZESS) 72 MCG CAP CAPSULE     "Take 1 capsule (72 mcg total) by mouth before breakfast.    MECLIZINE (ANTIVERT) 25 MG TABLET    Take 1 tablet (25 mg total) by mouth 3 (three) times daily as needed.    NITROGLYCERIN (NITROSTAT) 0.4 MG SL TABLET    Place 1 tablet (0.4 mg total) under the tongue every 5 (five) minutes as needed for Chest pain.    PANTOPRAZOLE (PROTONIX) 40 MG TABLET    Take 1 tablet (40 mg total) by mouth once daily.    PEN NEEDLE, DIABETIC (BD ULTRA-FINE SHORT PEN NEEDLE) 31 GAUGE X 5/16" NDLE    AS DIRECTED TWICE DAILY    PRAVASTATIN (PRAVACHOL) 20 MG TABLET    Take 1 tablet (20 mg total) by mouth every evening.    RIMEGEPANT 75 MG ODT    Take 1 tablet (75 mg total) by mouth as needed for Migraine (do not exceed 2-3 doses within 1 week). Place ODT tablet on the tongue; alternatively the ODT tablet may be placed under the tongue    SACUBITRIL-VALSARTAN (ENTRESTO)  MG PER TABLET    Take 1 tablet by mouth 2 (two) times daily.    SITAGLIPTIN PHOSPHATE (JANUVIA) 100 MG TAB    Take 1 tablet (100 mg total) by mouth once daily.    TEMAZEPAM (RESTORIL) 30 MG CAPSULE    Take 1 capsule (30 mg total) by mouth every evening.     Review of patient's allergies indicates:   Allergen Reactions    Repatha pushtronex [evolocumab]      headache    Aspirin Palpitations       Physical Exam:   Body mass index is 43.55 kg/m².    GENERAL: Well appearing, in no acute distress.  HEAD: Normocephalic and atraumatic.  ENT: External ears and nose grossly normal.  EYES: EOMI bilaterally  PULMONARY: Respirations are grossly even and non-labored.  NEURO: Awake, alert, and oriented x 3.  SKIN: No obvious rashes appreciated.  PSYCH: Mood & affect are appropriate.    Detailed MSK exam:     Right hand/wrist exam:   -TTP: 3rd MCP with active triggering  -Swelling/ecchymosis: none  -Full ROM  - strength 5/5  -Sensation intact  -Pulses 2+  -Rotational deformity: negative  -Tinel sign: negative  -Phalen sign: negative  -Finkelstein sign: negative    Left " hand/wrist exam:   -TTP: 3rd MCP with active triggering  -Swelling/ecchymosis: none  -Full ROM  - strength 5/5  -Sensation intact  -Pulses 2+  -Rotational deformity: negative  -Tinel sign: negative  -Phalen sign: negative  -Finkelstein sign: negative    Right shoulder exam:   -ROM: abduction 110, forward flexion 110, external rotation 70, internal rotation 70  -empty can test pain but no weakness, resisted ER positive, belly press negative  -dumont test guarded, neers test guarded, whipple test positive  -biceps load test negative, yerguson test negative, Barnett's test negative  -sensation intact, pulses 2+  -TTP: AC joint, anterior, lateral cuff insertion, and posterior        Imaging:  X-Ray Hand 3 View Bilateral  Narrative: EXAMINATION:  XR HAND COMPLETE 3 VIEWS BILATERAL    CLINICAL HISTORY:  . Pain in right hand    TECHNIQUE:  PA, lateral, and oblique views of both hands were performed.    COMPARISON:  None    FINDINGS:  No acute fracture or dislocation.  Mild degenerative change seen at either 1st CMC joint and triscaphe joint.  Very minimal degenerative changes seen at the D IP joints bilaterally.  Impression: As above    Electronically signed by: Tommy Boudreaux DO  Date:    10/17/2024  Time:    09:53        Relevant imaging results were reviewed and interpreted by me and per my read shows mild arthritic changes.  This was discussed with the patient and / or family today.     Assessment:  Christine Jo is a 74 y.o. female presenting with right shoulder pain and bilateral trigger fingers.   History, physical and radiographs are consistent with a likely diagnosis of trigger finger bilateral 3rd fingers, possible full RC tear right shoulder.   Plan: Steroid injection given today (see separate procedure note for details). We discussed the proper protocols after the injection such as no submerging pools, baths tubs, or hot tubs for 24 hr.  Showering is okay today.  We also discussed that blood sugars can  be elevated after an injection and asked patient to properly checked her sugars over the next few days and contact their PCP if there are any concerns.  We discussed red flags such as fevers, chills, red, warm, tender joint at the area of injection to please seek medical care immediately.   CT ordered. Continue conservative management for pain.   Follow up as needed. All questions answered.      Trigger middle finger of right hand  -     Tendon Sheath    Trigger middle finger of left hand  -     Sports Medicine US - Guidance for Needle Placement  -     Tendon Sheath    Chronic right shoulder pain  -     Ambulatory referral/consult to Orthopedics  -     CT Shoulder With Contrast Right; Future; Expected date: 10/17/2024    Rotator cuff disorder, right  -     CT Shoulder With Contrast Right; Future; Expected date: 10/17/2024         Ultrasound guidance was used for needle localization. Images were saved and stored for documentation. The appropriate structures were visualized. Dynamic visualization of the needle was continuous throughout the procedures and maintained good position.      A copy of today's visit note has been sent to the referring provider.     Electronically signed:  Erik Esposito MD, MPH  10/17/2024  1:48 PM

## 2024-10-17 NOTE — PROCEDURES
Tendon Sheath    Date/Time: 10/17/2024 1:40 PM    Performed by: Erik Esposito MD  Authorized by: Erik Esposito MD    Consent Done?:  Yes (Verbal)  Indications:  Pain  Site marked: the procedure site was marked    Timeout: prior to procedure the correct patient, procedure, and site was verified    Prep: patient was prepped and draped in usual sterile fashion      Local anesthetic:  Lidocaine 1% without epinephrine  Location:  Long finger  Site:  R long flexor tendon sheath and L long flexor tendon sheath  Ultrasonic guidance for needle placement?: Yes    Needle size:  25 G  Approach:  Volar  Medications:  6 mg betamethasone acetate-betamethasone sodium phosphate 6 mg/mL  Patient tolerance:  Patient tolerated the procedure well with no immediate complications    Additional Comments: Ultrasound guidance was used for needle localization. Images were saved and stored for documentation. The appropriate structures were visualized. Dynamic visualization of the needle was continuous throughout the procedures and maintained good position.

## 2024-10-17 NOTE — PATIENT INSTRUCTIONS
Assessment:  Christine Jo is a 74 y.o. female   Chief Complaint   Patient presents with    Right Shoulder - Pain    Left Hand - Pain    Right Hand - Pain       Encounter Diagnoses   Name Primary?    Chronic right shoulder pain     Trigger finger, unspecified finger, unspecified laterality Yes    Rotator cuff disorder, right         Plan:  Ultrasound guided cortisone injection to bilateral trigger finger  We discussed the proper protocols after the injection such as no submerging pools, baths tubs, or hot tubs for 24 hr.  Showering is okay today.  We also discussed that blood sugars can be elevated after an injection and asked patient to properly checked her sugars over the next few days and contact their PCP if there are any concerns.  We discussed red flags such as fevers, chills, red, warm, tender joint at the area of injection to please seek medical care immediately.    CT scan with contrast for right shoulder  Follow up after CT scan    Follow-up:  after CT scan .    Thank you for choosing [facility] and Dr. Erik Esposito for your orthopedic & sports medicine care. It is our goal to provide you with exceptional care that will help keep you healthy, active, and get you back in the game.    Please do not hesitate to reach out to us via email, phone, or MyChart with any questions, concerns, or feedback.    If you felt that you received exemplary care today, please consider leaving us feedback on [employer] at:  [email or web address]    If you are experiencing pain/discomfort ,or have questions after 5pm and would like to be connected to the [facility] on-call team, please call this number and specify which Sports Medicine provider is treating you: [phone]

## 2024-10-17 NOTE — PROGRESS NOTES
Patient ID: Christine Jo is a 74 y.o. female.    Chief Complaint: No chief complaint on file.    History of Present Illness    Ms. Jo presents today for follow up.    She reports improved blood pressure management with the addition of a clonidine patch prescribed by Dr. Espinoza, reportedly due to concerns about adrenal gland involvement in her blood pressure fluctuations, particularly in response to emotional stress. Her carvedilol dosage has been increased to two tablets in the morning and two at night to address persistent hypertension. She denies any adverse effects from these medication changes and confirms ongoing follow-up with cardiology.    She reports good blood sugar control overall, using a continuous glucose monitor changed every two weeks. She notes occasional nighttime low blood sugar episodes. Her most recent A1C result was 7.4. She reports blood sugar fluctuations, with the highest reading being 180.    She reports changes in mobility since experiencing a fall, describing stiffness in her hands affecting two fingers and ongoing shoulder pain. She is scheduled to see orthopedics for further evaluation.    She reports continued follow-up with hematology for anemia management.    She reports having previous colonoscopy where polyps were removed. She indicates the need for a new referral for a follow-up colonoscopy, mentioning that the GI department advised waiting and requesting a new referral    She reports dry skin on her feet, describing it as a powder-like substance, which she states is not typical dryness. She has been applying lotion to her feet regularly.    She reports receiving a recent flu vaccine and RSV vaccine. She plans to obtain the COVID-19 vaccine at the pharmacy.    She expresses concerns about the recently prescribed clonidine patch by Dr. Espinoza, reporting unfamiliarity with the medication and its purpose. She states the physician mentioned it was related to her adrenal  "gland and "fight or flight" response. She admits feeling apprehensive about the new treatment and regrets not having the opportunity to discuss it with her PCP beforehand.      ROS:  General: -fever, -chills, -fatigue, -weight gain, -weight loss  Eyes: -vision changes, -redness, -discharge  ENT: -ear pain, -nasal congestion, -sore throat  Cardiovascular: -chest pain, -palpitations, -lower extremity edema  Respiratory: -cough, -shortness of breath  Gastrointestinal: -abdominal pain, -nausea, -vomiting, -diarrhea, -constipation, -blood in stool  Genitourinary: -dysuria, -hematuria, -frequency  Musculoskeletal: +joint pain, -muscle pain  Skin: -rash, -lesion, +dry skin  Neurological: -headache, -dizziness, -numbness, -tingling  Psychiatric: +anxiety, -emotional lability         Physical Exam    General: In no acute distress. Not ill-appearing or diaphoretic.  Neck: Normal thyroid. No cervical lymphadenopathy. 2+ carotid pulses.  Cardiovascular: Normal rate and regular  rhythm. No murmur heard. No gallop.  Pulmonary: Pulmonary effort is normal. No respiratory distress. No wheezing. No rhonchi. No rales.  Abdominal: Soft. Nontender. Nondistended. No mass.  Musculoskeletal: No swelling. No tenderness. No deformity.  Neurological: Alert.  Psychiatric: Mood normal. Behavior normal.  Vitals: Blood pressure within normal range. Blood sugar within normal range.     Diabetic foot exam was done.  She has 10/10 monofilament testing.  Toenails look good good capillary filling some dry skin between the toes and will use moisturizing cream no deformity.    Assessment & Plan    NEUROPATHY:  - Assessed patient's neuropathy by testing sensation.    HYPERTENSION:  - Reviewed blood pressure management, noting improvement with clonidine patch prescribed by Dr. Espinoza.  - Evaluated effectiveness of carvedilol dose increase for blood pressure control.  - Continued carvedilol at increased dose of 2 tablets in the morning and 2 tablets at " night.    DIABETES:  - Considered continuous glucose monitoring data, noting nighttime hypoglycemic events.  - Reviewed recent A1C of 7.4, indicating improved glycemic control.  - Assessed skin condition on feet, recommending foot care regimen.  - Explained importance of foot care for diabetic patients.  - Discussed significance of continuous glucose monitoring and its role in managing nighttime blood sugar.  - Ms. Jo to soak feet in warm water 1 time daily to improve skin hydration.  - Ms. Jo to apply moisturizing cream to feet after soaking.  - Ms. Jo to continue monitoring diet to maintain blood sugar control.    COLONOSCOPY REFERRAL:  - Ordered colonoscopy referral.  - Referred patient for colonoscopy due to history of polyps.    FOLLOW UP:  - Follow up in 4 months.          1. Controlled type 2 diabetes mellitus with diabetic polyneuropathy, with long-term current use of insulin        2. Need for vaccination  Influenza - Trivalent (Adjuvanted)      3. Polyp of colon, unspecified part of colon, unspecified type  Ambulatory referral/consult to Endo Procedure       4. Primary hypertension        5. Coronary artery disease of native artery of native heart with stable angina pectoris        6. Chronic right shoulder pain              Follow up in about 4 months (around 2/17/2025).    This note was generated with the assistance of ambient listening technology. Verbal consent was obtained by the patient and accompanying visitor(s) for the recording of patient appointment to facilitate this note. I attest to having reviewed and edited the generated note for accuracy, though some syntax or spelling errors may persist. Please contact the author of this note for any clarification.

## 2024-10-18 ENCOUNTER — PATIENT MESSAGE (OUTPATIENT)
Dept: DIABETES | Facility: CLINIC | Age: 74
End: 2024-10-18
Payer: MEDICARE

## 2024-10-20 ENCOUNTER — PATIENT MESSAGE (OUTPATIENT)
Dept: CARDIOLOGY | Facility: CLINIC | Age: 74
End: 2024-10-20
Payer: MEDICARE

## 2024-10-20 ENCOUNTER — PATIENT MESSAGE (OUTPATIENT)
Dept: HEMATOLOGY/ONCOLOGY | Facility: CLINIC | Age: 74
End: 2024-10-20
Payer: MEDICARE

## 2024-10-20 DIAGNOSIS — I10 PRIMARY HYPERTENSION: Primary | ICD-10-CM

## 2024-10-21 ENCOUNTER — PATIENT MESSAGE (OUTPATIENT)
Dept: ADMINISTRATIVE | Facility: HOSPITAL | Age: 74
End: 2024-10-21
Payer: MEDICARE

## 2024-10-21 DIAGNOSIS — E66.813 CLASS 3 SEVERE OBESITY DUE TO EXCESS CALORIES WITH SERIOUS COMORBIDITY AND BODY MASS INDEX (BMI) OF 45.0 TO 49.9 IN ADULT: ICD-10-CM

## 2024-10-21 DIAGNOSIS — E66.01 CLASS 3 SEVERE OBESITY DUE TO EXCESS CALORIES WITH SERIOUS COMORBIDITY AND BODY MASS INDEX (BMI) OF 45.0 TO 49.9 IN ADULT: ICD-10-CM

## 2024-10-21 RX ORDER — CLONIDINE 0.2 MG/24H
1 PATCH, EXTENDED RELEASE TRANSDERMAL
Qty: 4 PATCH | Refills: 11 | Status: SHIPPED | OUTPATIENT
Start: 2024-10-21 | End: 2025-10-21

## 2024-10-22 ENCOUNTER — PATIENT MESSAGE (OUTPATIENT)
Dept: INTERNAL MEDICINE | Facility: CLINIC | Age: 74
End: 2024-10-22
Payer: MEDICARE

## 2024-10-22 ENCOUNTER — PATIENT MESSAGE (OUTPATIENT)
Dept: CARDIOLOGY | Facility: CLINIC | Age: 74
End: 2024-10-22
Payer: MEDICARE

## 2024-10-22 RX ORDER — PANTOPRAZOLE SODIUM 40 MG/1
40 TABLET, DELAYED RELEASE ORAL DAILY
Qty: 90 TABLET | Refills: 3 | Status: SHIPPED | OUTPATIENT
Start: 2024-10-22

## 2024-10-22 NOTE — TELEPHONE ENCOUNTER
----- Message from Zahraa sent at 10/22/2024 10:01 AM CDT -----  Contact: Patient @ [phone]  .[email or web address]     Patient is calling for Medical Advice regarding:Patient was having trouble with SUGAR level. Patient was at 54 to 59 . Patient had to have a peppermint and now she is at 96    How long has patient had these symptoms:    Pharmacy name and phone#:    Patient wants a call back or thru [facility]:call     Comments:Patient would like to come down on Rx    Please advise patient replies from provider may take up to 48 hours.

## 2024-10-22 NOTE — TELEPHONE ENCOUNTER
Care Due:                  Date            Visit Type   Department     Provider  --------------------------------------------------------------------------------                                EP -                              PRIMARY      [facility] INTERNAL  Last Visit: 10-      CARE ([facility])   CHEMA Szymanski                              EP -                              PRIMARY      [facility] INTERNAL  Next Visit: 02-      CARE ([facility])   CHEMA Szymanski                                                            Last  Test          Frequency    Reason                     Performed    Due Date  --------------------------------------------------------------------------------    HBA1C.......  6 months...  SITagliptin, insulin.....  06- 12-    [facility] Embedded Care Due Messages. Reference number: [id number].   10/21/2024 7:26:48 PM CDT

## 2024-10-22 NOTE — TELEPHONE ENCOUNTER
Provider Staff:  Action required for this patient    Requires labs      Please see care gap opportunities below in Care Due Message.    Thanks!  [facility]     Appointments      Date Provider   Last Visit   10/17/2024 Jose Szymanski MD   Next Visit   2/17/2025 Jose Szymanski MD     Refill Decision Note   Christine Jo  is requesting a refill authorization.  Brief Assessment and Rationale for Refill:  Approve     Medication Therapy Plan:         Comments:     Note composed:3:38 AM 10/22/2024

## 2024-10-22 NOTE — TELEPHONE ENCOUNTER
This matter is also being handled in patient advice. Patient is concern with the amount pcp wants her to decrease her lantus to.

## 2024-10-23 ENCOUNTER — PATIENT MESSAGE (OUTPATIENT)
Dept: CARDIOLOGY | Facility: CLINIC | Age: 74
End: 2024-10-23
Payer: MEDICARE

## 2024-10-24 ENCOUNTER — PATIENT MESSAGE (OUTPATIENT)
Dept: INTERNAL MEDICINE | Facility: CLINIC | Age: 74
End: 2024-10-24
Payer: MEDICARE

## 2024-10-24 ENCOUNTER — PATIENT MESSAGE (OUTPATIENT)
Dept: CARDIOLOGY | Facility: CLINIC | Age: 74
End: 2024-10-24
Payer: MEDICARE

## 2024-10-24 DIAGNOSIS — R13.10 DYSPHAGIA, UNSPECIFIED TYPE: Primary | ICD-10-CM

## 2024-10-24 RX ORDER — SODIUM, POTASSIUM,MAG SULFATES 17.5-3.13G
1 SOLUTION, RECONSTITUTED, ORAL ORAL DAILY
Qty: 1 KIT | Refills: 0 | Status: SHIPPED | OUTPATIENT
Start: 2024-10-24 | End: 2024-10-26

## 2024-10-27 ENCOUNTER — PATIENT MESSAGE (OUTPATIENT)
Dept: CARDIOLOGY | Facility: CLINIC | Age: 74
End: 2024-10-27
Payer: MEDICARE

## 2024-10-28 ENCOUNTER — TELEPHONE (OUTPATIENT)
Dept: PREADMISSION TESTING | Facility: HOSPITAL | Age: 74
End: 2024-10-28
Payer: MEDICARE

## 2024-11-04 DIAGNOSIS — M79.604 PAIN IN BOTH LOWER EXTREMITIES: ICD-10-CM

## 2024-11-04 DIAGNOSIS — M79.605 PAIN IN BOTH LOWER EXTREMITIES: ICD-10-CM

## 2024-11-04 RX ORDER — PRAVASTATIN SODIUM 20 MG/1
20 TABLET ORAL NIGHTLY
Qty: 30 TABLET | Refills: 11 | Status: SHIPPED | OUTPATIENT
Start: 2024-11-04 | End: 2025-11-04

## 2024-11-07 ENCOUNTER — TELEPHONE (OUTPATIENT)
Dept: OPHTHALMOLOGY | Facility: CLINIC | Age: 74
End: 2024-11-07
Payer: MEDICARE

## 2024-11-07 ENCOUNTER — PATIENT MESSAGE (OUTPATIENT)
Dept: DIABETES | Facility: CLINIC | Age: 74
End: 2024-11-07
Payer: MEDICARE

## 2024-11-07 ENCOUNTER — PATIENT MESSAGE (OUTPATIENT)
Dept: CARDIOLOGY | Facility: CLINIC | Age: 74
End: 2024-11-07
Payer: MEDICARE

## 2024-11-07 NOTE — TELEPHONE ENCOUNTER
Called patient In regards to appointment with Dr. Shelton. Patient states she's due back for her yearly exam with Dr. Shelton on 9/9/25, but an appointment was made for her for today with Dr. Shelton. Told patient she did not need to come in today and we will keep her yearly exam with Dr. Shelton for next September.

## 2024-11-11 ENCOUNTER — PATIENT MESSAGE (OUTPATIENT)
Dept: DIABETES | Facility: CLINIC | Age: 74
End: 2024-11-11
Payer: MEDICARE

## 2024-11-12 ENCOUNTER — CLINICAL SUPPORT (OUTPATIENT)
Dept: DIABETES | Facility: CLINIC | Age: 74
End: 2024-11-12
Payer: MEDICARE

## 2024-11-12 ENCOUNTER — PATIENT MESSAGE (OUTPATIENT)
Dept: DIABETES | Facility: CLINIC | Age: 74
End: 2024-11-12

## 2024-11-12 ENCOUNTER — PATIENT MESSAGE (OUTPATIENT)
Dept: CARDIOLOGY | Facility: CLINIC | Age: 74
End: 2024-11-12
Payer: MEDICARE

## 2024-11-12 ENCOUNTER — PATIENT MESSAGE (OUTPATIENT)
Dept: INTERNAL MEDICINE | Facility: CLINIC | Age: 74
End: 2024-11-12
Payer: MEDICARE

## 2024-11-12 DIAGNOSIS — E11.42 CONTROLLED TYPE 2 DIABETES MELLITUS WITH DIABETIC POLYNEUROPATHY, WITH LONG-TERM CURRENT USE OF INSULIN: Primary | ICD-10-CM

## 2024-11-12 DIAGNOSIS — I50.42 CHRONIC COMBINED SYSTOLIC AND DIASTOLIC CONGESTIVE HEART FAILURE: ICD-10-CM

## 2024-11-12 DIAGNOSIS — Z79.4 CONTROLLED TYPE 2 DIABETES MELLITUS WITH DIABETIC POLYNEUROPATHY, WITH LONG-TERM CURRENT USE OF INSULIN: Primary | ICD-10-CM

## 2024-11-12 PROCEDURE — 95249 CONT GLUC MNTR PT PROV EQP: CPT | Mod: NDTC,,, | Performed by: DIETITIAN, REGISTERED

## 2024-11-12 PROCEDURE — G0108 DIAB MANAGE TRN  PER INDIV: HCPCS | Mod: 95,,, | Performed by: DIETITIAN, REGISTERED

## 2024-11-12 RX ORDER — SACUBITRIL AND VALSARTAN 97; 103 MG/1; MG/1
1 TABLET, FILM COATED ORAL 2 TIMES DAILY
Qty: 180 TABLET | Refills: 3 | Status: SHIPPED | OUTPATIENT
Start: 2024-11-12

## 2024-11-12 NOTE — Clinical Note
Reviewed Ms. King's [employer] report with her today. She is experiencing hypoglycemia daily usually in the early morning hours. She would like to discuss decreasing her Lantus dose to resolve. She is currently taking 66u. Also, she is prepping for colonoscopy on Friday and would like advice on how much insulin to take Thursday and Friday. I will send you her [employer] report in Media for review. Thank you.

## 2024-11-12 NOTE — PROGRESS NOTES
Diabetes Care Specialist Follow-up Note  Author: Cheri Quiroz RD, CDE  Date: 11/12/2024    Diabetes Care Specialist Virtual Visit Note       The patient location is: Home in [place]  The chief complaint leading to consultation is: Diabetes  Visit type: audiovisual  Total time spent with patient: 25 min   Each patient to whom he or she provides medical services by telemedicine is:  (1) informed of the relationship between the physician and patient and the respective role of any other health care provider with respect to management of the patient; and (2) notified that he or she may decline to receive medical services by telemedicine and may withdraw from such care at any time.      Intake    Program Intake  Reason for Diabetes Program Visit:: Intervention  Type of Intervention:: Individual  Individual: Device Training  Device Training: Personal CGM (Freestyle Osvaldo 3 f/u)  Current diabetes risk level:: high  In the last month, have you used the ER or been admitted to the hospital: No  Permission to speak with others about care:: no    Current Diabetes Treatment: Oral Medications, Insulin  Oral Medication Type/Dose: Januvia 100mg QD  Method of insulin delivery?: Injections  Injection Type: Pens  Pen Type/Dose: Lantus 66u QD (down from 74u)    Continuous Glucose Monitoring  Patient has CGM: Yes  Personal CGM type:: Freestyle Osvaldo 3  GMI Date: 11/12/24  GMI Value: 6.3 %    Lab Results   Component Value Date    HGBA1C 7.4 (H) 06/19/2024         SMBG: Freestyle Osvaldo 3              Diabetes Self-Management Skills Assessment    Medication Skills Assessment  Patient is able to identify current diabetes medications, dosages, and appropriate timing of medications.: yes  Patient reports problems or concerns with current medication regimen.: no  Patient is  aware that some diabetes medications can cause low blood sugar?: Yes  Medication Skills Assessment Completed:: Yes  Assessment indicates:: Adequate understanding  Area  of need?: No    Nutrition/Healthy Eating  Meal Plan 24 Hour Recall - Breakfast: slice of cinnamon toast, 1 egg (3 times per week)  Meal Plan 24 Hour Recall - Lunch: low sugar maple oatmeal but usually has a sandwich or leftovers from the night before  Meal Plan 24 Hour Recall - Dinner: lunchable (cheese, turkey, crackers)  Meal Plan 24 Hour Recall - Snack: nothing  Meal Plan 24 Hour Recall - Beverage: 2 cups of coffee throughout the morning  Who shops/cooks?: patient  Patient can identify foods that impact blood sugar.: yes  Nutrition/Healthy Eating Skills Assessment Completed:: Yes  Assessment indicates:: Adequate understanding  Area of need?: No      Home Blood Glucose Monitoring  Patient states that blood sugar is checked at home daily.: yes  Monitoring Method:: personal continuous glucose monitor  Personal CGM type:: Freestyle Osvaldo 3   What is your current Time in Range?: 92%  Home Blood Glucose Monitoring Skills Assessment Completed: : Yes  Assessment indicates:: Adequate understanding  Area of need?: No      During today's follow-up visit,  the following areas required further assessment and content was provided/reviewed.    Based on today's diabetes care assessment, the following areas of need were identified:      Identified Areas of Need      Medication/Current Diabetes Treatment: Taking diabetes medications as prescribed. Wants to discuss insulin adjustment with Dr. Szymanski.    Nutrition/Healthy Eating: No    Physical Activity/Exercise:      Home Blood Glucose Monitoring: See care plan update. Patient met goal.    Acute Complications:   Discussed signs and symptoms of hypoglycemia and proper treatment.        Today's interventions were provided through individual discussion, instruction, and written materials were provided.    Patient verbalized understanding of instruction and written materials.  Pt was able to return back demonstration of instructions today. Patient understood key points, needs  reinforcement and further instruction.     Diabetes Self-Management Care Plan Review and Evaluation of Progress:    During today's follow-up Christine's Diabetes Self-Management Care Plan progress was reviewed and progress was evaluated including his/her input. Christine has agreed to continue his/her journey to improve/maintain overall diabetes control by continuing to set health goals. See care plan progress below.      Care Plan: Diabetes Management   Updates made since 11/13/2023 12:00 AM        Problem: Blood Glucose Self-Monitoring         Goal: Patient will use Freestyle Osvaldo 3 to monitor BG and make treatment decisions. Completed 11/12/2024   Start Date: 10/7/2024   Expected End Date: 1/7/2025   This Visit's Progress: Met   Priority: High   Barriers: No Barriers Identified   Note:    11/12/24: Patient able to change sensor with no issues. Using sensor readings to make treatment decisions. Reviewed Libreview report with patient, current TIR is 92% with 3% low readings. Hypoglycemia events usually occurring in early morning/morning hours. Sending Libreview report to Dr. Szymanski for review and to make insulin adjustments.         Task: Trained patient to use Freestyle Osvaldo 3 system. Completed 10/7/2024   Note:    FREESTYLE OSVALDO 3 CGM TRAINING  Education provided per [employer] Freestyle Osvaldo 3 protocol, quick start guide and videos. Assisted with mobile porter download and Libreview clinic data share.       The FreeStyle Osvaldo 3 Continuous Glucose Monitoring System is a real time continuous glucose monitoring (CGM) device with alarms capability indicated for the management of diabetes in persons age 4 and older.   Use your blood glucose meter to make diabetes treatment decisions when you see the symbol during the first 12 hours of wearing a Sensor, if your Sensor glucose reading does not match how you feel, or if the reading does not include a number.  For you to receive alarms, they must be on and your [place] should be  within 33 feet of you at all times. If using porter, check mobile device to ensure ongoing compatibility.  To prevent missed alarms, make sure the [place] has sufficient charge and that sound and/or vibration are turned on. Do not force close reader porter.   Remove the Sensor before MRI, CT scan, X-ray, or diathermy treatment.    Alarms:   Urgent Low Glucose below 55: default ON   Low Glucose: 70 mg/dl   High Glucose 250 mg/dl  Signal Loss: ON    Only apply the Sensor to the back of the upper arm. If placed in other areas, the Sensor may not function properly. Avoid areas with scars, moles, stretch marks, or lumps. Select an area of skin that generally stays flat during normal daily activities (no bending or folding). Choose a site that is at least 1 inch away from an insulin injection site. Rotate sensor sites.   Sensor can be worn for up to 14 days. Scan sensor to start sensor session. Sensor warm-up 60min and then automatically sends a new glucose reading to your porter every minute.   The Sensor is water-resistant in up to 3 feet (1 meter) of water. Do not immerse longer than 30 minutes.  Instructed on how to understand SG graph, trend arrows. Reviewed menu options. Discussed how to remove and discard sensor.   Differences in glucose readings between interstitial fluid and capillary blood may be observed during times of rapid change in blood glucose, such as after eating, dosing insulin, or exercising.  Taking ascorbic acid (vitamin C) supplements while wearing the Sensor may falsely raise Sensor glucose readings. Taking more than 500 mg of ascorbic acid per day may affect the Sensor readings which could cause you to miss a severe low glucose event. Ascorbic acid can be found in supplements including multivitamins. Some supplements, including cold remedies such as Airborne® and Emergen-C®, may contain high doses of 1000 mg of ascorbic acid and should not be taken while using the Sensor. See your health care professional  to understand how long ascorbic acid is active in your body.  Contact Customer Service if you receive a Replace Sensor message before the end of the 14 day wear period. Customer Service is available at [phone] 7 Days a Week from 8AM to 8PM Eastern Standard Time.    Pt verbalized understanding of all instruction and able to demonstrate sensor application technique per protocol.                  Follow Up Plan     Follow up if symptoms worsen or fail to improve, for E11.42.    Today's care plan and follow up schedule was discussed with patient.  Christine verbalized understanding of the care plan, goals, and agrees to follow up plan.        The patient was encouraged to communicate with his/her health care provider/physician and care team regarding his/her condition(s) and treatment.  I provided the patient with my contact information today and encouraged to contact me via phone or [facility] as needed.     Length of Visit   Total Time: 25 Minutes

## 2024-11-12 NOTE — PROGRESS NOTES
Ambulatory Visit  Name: Lisa Zamora      : 1986      MRN: [id number]  Encounter Provider: Hans Yun PA-C  Encounter Date: 2024   Encounter department: [facility]    Assessment & Plan  Chronic migraine without aura without status migrainosus, not intractable  I had the pleasure of seeing Lisa today in the office at [facility] in [place].  She is presenting today for an initial new patient consultation in regard to her migraine headaches.  The patient states that she first started having headaches around the age of 14 to 15 years old.  She reports that about 30/30 days in the month with some type of headache and about 5 or 6 days out of the month with more severe debilitating headaches.  No particular time of the day in which her headaches are occurring.  She notes that the headaches can usually last a few hours at time if catching the headaches on time with medication.  She reports that Nurtec worked well initially in the beginning.  She is taking Zofran for nausea/vomiting.  She takes Fioricet with codeine occasionally for very severe headaches throughout the month only about 2 or 3 times out of the month.  The patient stated she does not have auras associated with the headaches.  She notes that she has symptoms of nausea, vomiting, photophobia, osmophobia, blurred vision/visual disturbances, lightheadedness/dizziness, tenderness at this hands feet feeling numb and tingly, and lacrimation and nasal congestion with the headaches.  No specific movements make the headaches worse.  She reports that laying down to standing up can cause the headache to get worse that she already has 1.  No specific triggers for the headaches at this time.  She notes that she saw neurologist when she was much younger in life.  She did have an MRI brain with and without contrast in  which was normal.    Based on my evaluation of the patient,  Working PHN Statin Report: Patient on statin. Last rx written 09/19/2023. Filled a 90 day supply on 09/19 with [facility]. Will  refill.    seems likely that she is having chronic migraine without aura and without status migrainosus.  At this time, did talk to the patient about some the options for preventative medications.  Noted that she was taking certain medications that may be effective for headache prevention but do not seem to be helping right now.  She did take Topamax in the past which did not seem to work for the headaches.  Patient is compliant with wanting to try Aimovig for preventative therapy.  In regards to abortive therapy, advised patient to try to cut down on the amount of time she is taking ibuprofen and Tylenol throughout the week.  Certainly could be contributing to medication overuse headaches.  Ultimately would try to utilize more of the Nurtec for abortive therapy.  She can continue to use Fioricet with codeine at bedtime for really intense migraines but would limit this to no more than 2 to 3 days out of the month.  No additional imaging is required at this time, the patient's headaches have not really seem to change too much as of late.  Would like for the patient to follow-up in about 4 months time.      Orders:    Ambulatory Referral to Neurology    Erenumab-aooe (Aimovig) 140 MG/ML SOAJ; Inject 140 mg under the skin every 30 (thirty) days      Patient Instructions   Headache Calendar  Please maintain a headache calendar  Consider using phone applications such as Migraine Omer or Migraine Diary    Headache/migraine treatment:     Rescue medications (for immediate treatment of a headache):   It is ok to take ibuprofen, acetaminophen or naproxen (Advil, Tylenol,  Aleve, Excedrin) if they help your headaches you should limit these to No more than 3 times a week to avoid medication overuse/rebound headaches.     For your more moderate to severe migraines take this medication early   - At this time, would certainly try to continue to encourage the patient to decrease on the daily usage of over-the-counter medications for  headaches such as ibuprofen and Tylenol.  Seems likely that the patient may be experiencing some rebound or overuse headaches on daily basis related to this.  Try to slowly cut out the medication if possible.  - Once starting to become steady on the Aimovig once monthly injections, then I would recommend that she continue to try to use Nurtec 75 mg for abortive therapy of the headaches.  Would ultimately like her to utilize this medication more than having the use of the counter medication or Fioricet.  - Still if she would like can continue with the Fioricet with codeine as needed at bedtime for a real severe intense migraine headache when trying to get under control.  However, would not use this medication more than 2 or 3 days out of the month as this could cause significant medication overuse or rebound headaches moving forward.    Prescription preventive medications for headaches/migraines   (to take every day to help prevent headaches - not to take at the time of headache):  - Start Aimovig 140 mg/mL once monthly injections for migraine prevention    *Typically these types of medications take time until you see the benefit, although some may see improvement in days, often it may take weeks, especially if the medication is being titrated up to a beneficial level. Please contact us if there are any concerns or questions regarding the medication.     Over the counter preventive supplements for headaches/migraines (if you try, try for 3 months straight)  (to take every day to help prevent headaches - not to take at the time of headache):  There are combo pills online of these - none of which regulated by FDA and double check dosing - take appropriate dose only once a day- prevent a migraine, migravent, mind ease, migrelief   [] Magnesium 400mg daily (If any diarrhea or upset stomach, decrease dose  as tolerated)  [] Riboflavin (Vitamin B2) 400mg daily (may make your urine bright/neon yellow).   - All supplements  can be purchased online    Lifestyle Recommendations:  [x] SLEEP - Maintain a regular sleep schedule: Adults need at least 7-8 hours of uninterrupted a night. Maintain good sleep hygiene:  Going to bed and waking up at consistent times, avoiding excessive daytime naps, avoiding caffeinated beverages in the evening, avoid excessive stimulation in the evening and generally using bed primarily for sleeping.  One hour before bedtime would recommend turning lights down lower, decreasing your activity (may read quietly, listen to music at a low volume). When you get into bed, should eliminate all technology (no texting, emailing, playing with your phone, iPad or tablet in bed).  [x] HYDRATION - Maintain good hydration.  Drink  2L of fluid a day (4 typical small water bottles)  [x] DIET - Maintain good nutrition. In particular don't skip meals and try and eat healthy balanced meals regularly.  [x] TRIGGERS - Look for other triggers and avoid them: Limit caffeine to 1-2 cups a day or less. Avoid dietary triggers that you have noticed bring on your headaches (this could include aged cheese, peanuts, MSG, aspartame and nitrates).  [x] EXERCISE - physical exercise as we all know is good for you in many ways, and not only is good for your heart, but also is beneficial for your mental health, cognitive health and  chronic pain/headaches. I would encourage at the least 5 days of physical exercise weekly for at least 30 minutes.     Education and Follow-up  [x] Please call with any questions or concerns. Of course if any new concerning symptoms go to the emergency department.  [x] Follow up in 4 months with [facility]    History of Present Illness   HPI    Current medical illnesses  or past medical history include acne, major depressive disorder, generalized anxiety disorder, ADHD, vitamin D deficiency, migraine      Interval History:      Headaches started at what age? 12-13 years old  How often do the headaches occur?   - as of  11/12/2024: 30/30 days in the month with a headache, 5-6/30 days in the month with severe headaches  What time of the day do the headaches start?  No particular time of day  How long do the headaches last? Can last a few hours at  time if catching on time. Tension headache more so relieved with ibuprofen or tylenol. Migraine headaches lasting about 2-3 days at a time. She does take Zofran for nausea/vomiting with headaches. Nurtec 75 mg as needed and does not seem to help anymore. Fioricet with codeine only at night with migraine headache, does seem to help with headaches. Only approximately 2-3 times out of the month with fioricet.   Are you ever headache free? Yes    Aura? without aura     Where is your headache located and pain quality? Either right or left temporal area, or bilateral at the back of the head. Pressure and throbbing type of pain.   What is the intensity of pain? Worst 8 or 9/10, Average: 5/10  Associated symptoms:   [x] Nausea       [x] Vomiting        [x] Stiff or sore neck   [x] Problems with concentration  [x] Photophobia     [x] Osmophobia  [x] Blurred vision/visual disturbances   [x] No loss of vision    [x] Prefer quiet, dark room  [x] Light-headed or dizzy     [x] Tinnitus   [x] Hands or feet tingle or feel numb/paresthesias (tops of the hands)   [x] Lacrimation  [x] Nasal congestion/rhinorrhea      Things that make the headache worse? No specific movements make headaches worse    Any positional change headaches? Laying down to standing up can cause the headache to get worse if she already has one     Headache triggers:  no specific triggers     Have you seen someone else for headaches or pain? Yes, when she was much younger in life   Have you had trigger point injection performed and how often? No  Have you had Botox injection performed and how often? No   Have you had epidural injections or transforaminal injections performed? No  Are you current pregnant or planning on getting pregnant?  Not planning on pregnancy, does have oral contraception and does have a female partner   Have you ever had any Brain imaging? Yes, MRI brain with and without contrast, 09/24/2020     Last eye exam: yearly eye exam, due in the next 2-3 months. No issues with vision besides wearing glasses.     What medications do you take or have you taken for your headaches?   ABORTIVE:    OTC medications: ibuprofen, tylenol, excedrin migraine  Prescription: Zofran, Nurtec, Fioricet w/ codeine    Past/ failed/contraindicated:  OTC medications: None  Prescription: sumatriptan     PREVENTIVE:   Sertraline 100 mg daily, magnesium 400 mg and B2 400 mg daily, Vyvanse, hydroxyzine PRN    Past/ failed/contraindicated:  Topamax (not effective), Wellbutrin (for depression, not helpful with HA), Lexapro (for depression, not helpful with HA)      LIFESTYLE  Sleep   - averages: 6-8 hours of sleep at night  Problems falling asleep?:   Yes  Problems staying asleep?:  No    - No history of snoring or trouble breathing when sleeping     Physical activity: does perform physical activity on a daily and do yoga daily     Water: 90 ounces of water per day  Caffeine: 1 cup of coffee per day, occasional soda    Mood: Does anxiety and depression at this time, does seem well controlled on the sertraline currently.     The following portions of the patient's history were reviewed and updated as appropriate: allergies, current medications, past family history, past medical history, past social history, past surgical history and problem list.    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   HENT: Negative.  Negative for hearing loss, tinnitus, trouble swallowing and voice change.    Eyes:  Positive for photophobia, pain and visual disturbance.   Respiratory: Negative.  Negative for shortness of breath.    Cardiovascular: Negative.  Negative for palpitations.   Gastrointestinal:  Positive for nausea. Negative for vomiting.   Endocrine: Negative.  Negative for cold intolerance.   Genitourinary: Negative.  Negative for dysuria, frequency and urgency.   Musculoskeletal:  Negative for back pain, gait problem, myalgias, neck pain and neck stiffness.   Skin: Negative.  Negative for rash.   Allergic/Immunologic: Negative.    Neurological:  Positive for dizziness and headaches. Negative for tremors, seizures, syncope, facial asymmetry, speech difficulty, weakness, light-headedness and numbness.   Hematological: Negative.  Does not bruise/bleed easily.   Psychiatric/Behavioral: Negative.  Negative for confusion, hallucinations and sleep disturbance.    All other systems reviewed and are negative.    I have personally reviewed the MA's review of systems and made changes as necessary.    Medical History Reviewed by provider this encounter:       Past Medical History   Past Medical History:   Diagnosis Date    Anemia     Anxiety     Depression 2014    Headache(784.0)      Past Surgical History:   Procedure Laterality Date    WISDOM TOOTH EXTRACTION       Family History   Problem Relation Age of Onset    Depression Mother     Anxiety disorder Mother     Cancer Father     Depression Sister     Anxiety disorder Sister     OCD Sister     Melanoma Other         Unspecifed Grandmother    Liver cancer Maternal Grandmother     No Known Problems Paternal Grandmother     No Known Problems Paternal Grandfather     No Known Problems Maternal Aunt     Substance Abuse Neg Hx         Mother/Father    Mental illness Neg Hx         Mother/Father     Current Outpatient Medications  on File Prior to Visit   Medication Sig Dispense Refill    butalbital-acetaminophen-caffeine-codeine (FIORICET WITH CODEINE) -21-30 MG per capsule Take 2 capsules by mouth every 6 (six) hours as needed for headaches 15 capsule 0    drospirenone-ethinyl estradiol (Vestura) 3-0.02 MG per tablet Take 1 tablet by mouth daily 84 tablet 1    hydrOXYzine HCL (ATARAX) 25 mg tablet Take 1 tablet (25 mg total) by mouth daily at bedtime as needed for itching 30 tablet 1    lisdexamfetamine (Vyvanse) 30 MG capsule Take 1 capsule (30 mg total) by mouth every morning Max Daily Amount: 30 mg 30 capsule 0    ondansetron (ZOFRAN) 4 mg tablet Take 1 tablet (4 mg total) by mouth every 8 (eight) hours as needed for nausea or vomiting 20 tablet 1    Riboflavin 400 MG TABS Take 1 tablet (400 mg total) by mouth in the morning 30 tablet 0    rimegepant sulfate (NURTEC) 75 mg TBDP Take 1 tabet PO at onset of headache.  Max of 1 dose per day. 10 tablet 0    sertraline (ZOLOFT) 100 mg tablet Take 1 tablet (100 mg total) by mouth daily 90 tablet 1    Coenzyme Q10 (Co Q 10) 100 MG CAPS Take 1 capsule (100 mg total) by mouth Daily at 2am 30 capsule 0    methocarbamol (ROBAXIN) 750 mg tablet if needed       No current facility-administered medications on file prior to visit.   No Known Allergies   Current Outpatient Medications on File Prior to Visit   Medication Sig Dispense Refill    butalbital-acetaminophen-caffeine-codeine (FIORICET WITH CODEINE) -76-30 MG per capsule Take 2 capsules by mouth every 6 (six) hours as needed for headaches 15 capsule 0    drospirenone-ethinyl estradiol (Vestura) 3-0.02 MG per tablet Take 1 tablet by mouth daily 84 tablet 1    hydrOXYzine HCL (ATARAX) 25 mg tablet Take 1 tablet (25 mg total) by mouth daily at bedtime as needed for itching 30 tablet 1    lisdexamfetamine (Vyvanse) 30 MG capsule Take 1 capsule (30 mg total) by mouth every morning Max Daily Amount: 30 mg 30 capsule 0    ondansetron  (ZOFRAN) 4 mg tablet Take 1 tablet (4 mg total) by mouth every 8 (eight) hours as needed for nausea or vomiting 20 tablet 1    Riboflavin 400 MG TABS Take 1 tablet (400 mg total) by mouth in the morning 30 tablet 0    rimegepant sulfate (NURTEC) 75 mg TBDP Take 1 tabet PO at onset of headache.  Max of 1 dose per day. 10 tablet 0    sertraline (ZOLOFT) 100 mg tablet Take 1 tablet (100 mg total) by mouth daily 90 tablet 1    Coenzyme Q10 (Co Q 10) 100 MG CAPS Take 1 capsule (100 mg total) by mouth Daily at 2am 30 capsule 0    methocarbamol (ROBAXIN) 750 mg tablet if needed       No current facility-administered medications on file prior to visit.      Social History     Tobacco Use    Smoking status: Former     Current packs/day: 0.00     Types: Cigarettes     Start date: 8/1/2010     Quit date: 4/1/2021     Years since quitting: 3.6    Smokeless tobacco: Never    Tobacco comments:     2 cigarettes daily   Vaping Use    Vaping status: Every Day    Substances: Nicotine   Substance and Sexual Activity    Alcohol use: Yes     Alcohol/week: 3.0 standard drinks of alcohol     Types: 3 Cans of beer per week     Comment: Social    Drug use: Never    Sexual activity: Yes     Partners: Female     Birth control/protection: None     Objective     /78 (BP Location: Left arm, Patient Position: Sitting, Cuff Size: Adult)   Pulse 91   Temp (!) 97.3 °F (36.3 °C) (Temporal)   Wt 64.1 kg (141 lb 4.8 oz)   SpO2 98%   BMI 26.35 kg/m²     Physical Exam  Neurologic Exam    Physical Exam:                                                                 Vitals:            Constitutional:    /78 (BP Location: Left arm, Patient Position: Sitting, Cuff Size: Adult)   Pulse 91   Temp (!) 97.3 °F (36.3 °C) (Temporal)   Wt 64.1 kg (141 lb 4.8 oz)   SpO2 98%   BMI 26.35 kg/m²   BP Readings from Last 3 Encounters:   11/12/24 126/78   09/30/24 122/78   08/29/24 114/71     Pulse Readings from Last 3 Encounters:   11/12/24 91    09/30/24 (!) 113   08/29/24 73         Well developed, well nourished, well groomed. No dysmorphic features.       Psychiatric:  Normal behavior and appropriate affect        Neurological Examination:     Mental status/cognitive function:   Orientated to time, place and person. Recent and remote memory intact. Attention span and concentration as well as fund of knowledge are appropriate for age. Normal language and spontaneous speech.    Cranial Nerves:  II-visual fields full.   III, IV, VI-Pupils were equal, round, and reactive to light and accomodation. Extraocular movements were full and conjugate without nystagmus. Conjugate gaze, normal smooth pursuits, normal saccades   V-facial sensation symmetric.    VII-facial expression symmetric, intact forehead wrinkle, strong eye closure, symmetric smile    VIII-hearing grossly intact bilaterally   IX, X-palate elevation symmetric, no dysarthria.   XI-shoulder shrug strength intact    XII-tongue protrusion midline.    Motor Exam: symmetric bulk and tone throughout, no pronator drift. Power/strength 5/5 bilateral upper and lower extremities, no atrophy, fasciculations or abnormal movements noted.   Sensory: grossly intact light touch in all extremities.   Reflexes: brachioradialis 3+, biceps 3+, knee 3+ bilaterally  Coordination: Finger nose finger intact bilaterally, no apparent dysmetria, ataxia or tremor noted  Gait: steady casual and tandem gait.      Results/Data:      MRI brain with and without, 09/24/2020:    IMPRESSION:     1. No acute infarction, intracranial hemorrhage or mass.    I have reviewed radiology reports from 09/24/2020 including: MRI brain.    Administrative Statements   I have spent a total time of 60 minutes in caring for this patient on the day of the visit/encounter including Diagnostic results, Risks and benefits of tx options, Instructions for management, Patient and family education, Importance of tx compliance, Risk factor reductions,  Impressions, Counseling / Coordination of care, Documenting in the medical record, Reviewing / ordering tests, medicine, procedures  , and Obtaining or reviewing history  .

## 2024-11-13 RX ORDER — INSULIN LISPRO 100 [IU]/ML
5 INJECTION, SOLUTION INTRAVENOUS; SUBCUTANEOUS
Qty: 3 ML | Refills: 0 | Status: SHIPPED | OUTPATIENT
Start: 2024-11-13 | End: 2025-11-13

## 2024-11-14 PROBLEM — Z86.0100 HISTORY OF COLON POLYPS: Status: ACTIVE | Noted: 2024-11-14

## 2024-11-18 ENCOUNTER — PATIENT MESSAGE (OUTPATIENT)
Dept: INTERNAL MEDICINE | Facility: CLINIC | Age: 74
End: 2024-11-18
Payer: MEDICARE

## 2024-11-18 ENCOUNTER — TELEPHONE (OUTPATIENT)
Dept: PREADMISSION TESTING | Facility: HOSPITAL | Age: 74
End: 2024-11-18
Payer: MEDICARE

## 2024-11-18 NOTE — TELEPHONE ENCOUNTER
Called patient to ask if she wanted to rescheduled EGD/Colonoscopy that she cancelled 11/15. She states she messaged Dr. Szymanski today to ask if she can be admitted overnight for the bowel prep. She said she lives alone and couldn't get anyone to stay with her and has a lot of anxiety and feels like the prep was messing with AFIB at first then she calmed down and got her heart rate down then just felt too tired to finish the morning prep so she called and cancelled. I talked to her many times in the past. We had originally told her extended prep then she was worried about her sugar and didn't want to do extended prep so I told her to do miralax daily leading up to procedure. She states she had a heart attack in April with stent and said her anxiety has been so much worse since but that she does see a psychiatrist. Pt is very anxious to need to followup from her previous polyps.    I informed her that I don't know if insurance will pay for an admission for bowel prep and she will need to call and see. We will see what Dr. Szymanski says and if he is going to write orders to admit to observation overnight and then we'd have to coordinate if it was considered inpatient or outpatient for the procedure. She is going to call with Dr. Szymanski's response and then we can ask the endoscopist to see how to move forward. Pt also mentioned she sees DAVIS Powell in the office.

## 2024-11-19 ENCOUNTER — PATIENT MESSAGE (OUTPATIENT)
Dept: INTERNAL MEDICINE | Facility: CLINIC | Age: 74
End: 2024-11-19
Payer: MEDICARE

## 2024-11-20 ENCOUNTER — OFFICE VISIT (OUTPATIENT)
Dept: CARDIOLOGY | Facility: CLINIC | Age: 74
End: 2024-11-20
Payer: MEDICARE

## 2024-11-20 VITALS
HEIGHT: 62 IN | SYSTOLIC BLOOD PRESSURE: 129 MMHG | BODY MASS INDEX: 44.59 KG/M2 | DIASTOLIC BLOOD PRESSURE: 67 MMHG | OXYGEN SATURATION: 98 % | WEIGHT: 242.31 LBS | RESPIRATION RATE: 16 BRPM | HEART RATE: 72 BPM

## 2024-11-20 DIAGNOSIS — Z79.4 CONTROLLED TYPE 2 DIABETES MELLITUS WITH DIABETIC POLYNEUROPATHY, WITH LONG-TERM CURRENT USE OF INSULIN: ICD-10-CM

## 2024-11-20 DIAGNOSIS — E11.42 CONTROLLED TYPE 2 DIABETES MELLITUS WITH DIABETIC POLYNEUROPATHY, WITH LONG-TERM CURRENT USE OF INSULIN: ICD-10-CM

## 2024-11-20 DIAGNOSIS — Z86.69 HISTORY OF OBSTRUCTIVE SLEEP APNEA: ICD-10-CM

## 2024-11-20 DIAGNOSIS — Z95.5 S/P CORONARY ARTERY STENT PLACEMENT: ICD-10-CM

## 2024-11-20 DIAGNOSIS — E66.01 MORBID OBESITY WITH BMI OF 40.0-44.9, ADULT: ICD-10-CM

## 2024-11-20 DIAGNOSIS — I50.42 CHRONIC COMBINED SYSTOLIC AND DIASTOLIC CONGESTIVE HEART FAILURE: ICD-10-CM

## 2024-11-20 DIAGNOSIS — I65.23 BILATERAL CAROTID ARTERY STENOSIS: ICD-10-CM

## 2024-11-20 DIAGNOSIS — I48.0 PAROXYSMAL ATRIAL FIBRILLATION: ICD-10-CM

## 2024-11-20 DIAGNOSIS — I10 PRIMARY HYPERTENSION: Primary | ICD-10-CM

## 2024-11-20 PROCEDURE — 1100F PTFALLS ASSESS-DOCD GE2>/YR: CPT | Mod: CPTII,S$GLB,, | Performed by: INTERNAL MEDICINE

## 2024-11-20 PROCEDURE — 3066F NEPHROPATHY DOC TX: CPT | Mod: CPTII,S$GLB,, | Performed by: INTERNAL MEDICINE

## 2024-11-20 PROCEDURE — 3051F HG A1C>EQUAL 7.0%<8.0%: CPT | Mod: CPTII,S$GLB,, | Performed by: INTERNAL MEDICINE

## 2024-11-20 PROCEDURE — 3288F FALL RISK ASSESSMENT DOCD: CPT | Mod: CPTII,S$GLB,, | Performed by: INTERNAL MEDICINE

## 2024-11-20 PROCEDURE — 3078F DIAST BP <80 MM HG: CPT | Mod: CPTII,S$GLB,, | Performed by: INTERNAL MEDICINE

## 2024-11-20 PROCEDURE — 1159F MED LIST DOCD IN RCRD: CPT | Mod: CPTII,S$GLB,, | Performed by: INTERNAL MEDICINE

## 2024-11-20 PROCEDURE — 99214 OFFICE O/P EST MOD 30 MIN: CPT | Mod: S$GLB,,, | Performed by: INTERNAL MEDICINE

## 2024-11-20 PROCEDURE — 99999 PR PBB SHADOW E&M-EST. PATIENT-LVL V: CPT | Mod: PBBFAC,,, | Performed by: INTERNAL MEDICINE

## 2024-11-20 PROCEDURE — 4010F ACE/ARB THERAPY RXD/TAKEN: CPT | Mod: CPTII,S$GLB,, | Performed by: INTERNAL MEDICINE

## 2024-11-20 PROCEDURE — 3060F POS MICROALBUMINURIA REV: CPT | Mod: CPTII,S$GLB,, | Performed by: INTERNAL MEDICINE

## 2024-11-20 PROCEDURE — 3008F BODY MASS INDEX DOCD: CPT | Mod: CPTII,S$GLB,, | Performed by: INTERNAL MEDICINE

## 2024-11-20 PROCEDURE — 3074F SYST BP LT 130 MM HG: CPT | Mod: CPTII,S$GLB,, | Performed by: INTERNAL MEDICINE

## 2024-11-20 RX ORDER — SACUBITRIL AND VALSARTAN 97; 103 MG/1; MG/1
1 TABLET, FILM COATED ORAL 2 TIMES DAILY
Qty: 60 TABLET | Refills: 11 | Status: SHIPPED | OUTPATIENT
Start: 2024-11-20

## 2024-11-20 RX ORDER — CLONIDINE HYDROCHLORIDE 0.1 MG/1
0.1 TABLET ORAL DAILY PRN
Qty: 30 TABLET | Refills: 11 | Status: SHIPPED | OUTPATIENT
Start: 2024-11-20 | End: 2025-11-20

## 2024-11-20 RX ORDER — CLONIDINE 0.3 MG/24H
1 PATCH, EXTENDED RELEASE TRANSDERMAL
Qty: 4 PATCH | Refills: 11 | Status: SHIPPED | OUTPATIENT
Start: 2024-11-20 | End: 2025-11-20

## 2024-11-20 NOTE — PROGRESS NOTES
Subjective:   Patient ID:  Christine Jo is a 74 y.o. female who presents for evaluation of No chief complaint on file.      Follow-up  Pertinent negatives include no chest pain.     11.20.2024  Comes in for a follow-up.    Multiple chart messages since last visit.    With a past medical of her BP medications over the phone.    Increasing her carvedilol.  Starting clonidine.    Alert she is taking 0.2 mg clonidine patch.    She is following with PCP about bloating also.    She states that today's blood pressure is very good.  But still running around 140s 150s at home.    She says that she is more worried at home than she is in the clinic.    Also follow with PCP in regards of her depression.    No report of chest pain.  She tried to the colonoscopy however she could not finish the preparation.  She also states that she had some palpitations in between.  Right now today she is in sinus rhythm.    She has some minimal lower extremity ankle swelling.        September 3, 2024.    Comes in for follow-up.    We have followed up over multiple phone encounters/MyChart messages.    Blood pressure was elevated.  There has been some adjustment of her medications also by Lissy.    Lately I increased her carvedilol to 37.5 mg b.i.d. over the phone.    She is doing better with this new regimen.    She also has anxiety issues.  She is supposed to be taking duloxetine however she has been afraid taking it.  She can bursts out in crying very easily.    She states that she had a rough prior marriage.  She raised 3 children by herself.      May 23, 2024.    74-year-old female, known to me from hospitalization in April with NSTEMI.  Found to have 99% proximal LAD stenosis.    Status post stenting.  Continue to complain of chest pain that they after she went for a repeat coronary angiogram with the IVC that showed patent stent.  She has a jailed small diagonal not amenable for PCI.    Currently chest pain-free.  Her EF was 45%  with regional wall motion abnormality related to her LAD stenosis.    No dyspnea.    However she has been having uncontrolled hypertension.  Recently started on benzodiazepines by her primary provider.    She has long history of intolerance to statins.    She complains also of pain to her shins  Past Medical History:   Diagnosis Date    Arthritis     Cataract     Coronary artery disease     Diabetes mellitus 2008     am 01/15/2018 Insulin x 1 year    Diabetes mellitus, type 2     DM (diabetes mellitus) 2008     am 02/14/2020 Insulin x 4 years    Encounter for blood transfusion     Glaucoma     Hypertension     Insomnia     Macular degeneration     Old MI (myocardial infarction) 2/12/2024    Vaginal yeast infection        Past Surgical History:   Procedure Laterality Date    abdominal laparoscopy       ADENOIDECTOMY  1965    Tonsils removed also    ANGIOGRAM, CORONARY, WITH LEFT HEART CATHETERIZATION N/A 04/02/2024    Procedure: Angiogram, Coronary, with Left Heart Cath;  Surgeon: Shree Espinoza MD;  Location: [facility] CATH LAB;  Service: Cardiology;  Laterality: N/A;    ANGIOGRAM, CORONARY, WITH LEFT HEART CATHETERIZATION N/A 04/01/2024    Procedure: Angiogram, Coronary, with Left Heart Cath;  Surgeon: Shree Espinoza MD;  Location: [facility] CATH LAB;  Service: Cardiology;  Laterality: N/A;    BREAST BIOPSY Left 1998    benign    BREAST SURGERY  1998 biopsy lt breast    CATARACT EXTRACTION Bilateral [id number]    [facility] in [place]    Cataract Surgery Bilateral     Laser (YAG)    COLON SURGERY  2004    COLONOSCOPY N/A 05/05/2021    Procedure: COLONOSCOPY;  Surgeon: Shawn Rogers III, MD;  Location: [facility] ENDO;  Service: Endoscopy;  Laterality: N/A;    COLONOSCOPY N/A 06/30/2021    Procedure: COLONOSCOPY;  Surgeon: Memo Merida MD;  Location: [facility] ENDO;  Service: Endoscopy;  Laterality: N/A;    CORONARY STENT PLACEMENT N/A 04/01/2024    Procedure: INSERTION, STENT, CORONARY ARTERY;   Surgeon: Shree Espinoza MD;  Location: [facility] CATH LAB;  Service: Cardiology;  Laterality: N/A;    ESOPHAGOGASTRODUODENOSCOPY N/A 2019    Procedure: ESOPHAGOGASTRODUODENOSCOPY (EGD);  Surgeon: Shawn Rogers III, MD;  Location: [facility] ENDO;  Service: Endoscopy;  Laterality: N/A;    ESOPHAGOGASTRODUODENOSCOPY N/A 2021    Procedure: EGD (ESOPHAGOGASTRODUODENOSCOPY);  Surgeon: Shawn Rogers III, MD;  Location: [facility] ENDO;  Service: Endoscopy;  Laterality: N/A;    EYE SURGERY      IVUS, CORONARY  2024    Procedure: IVUS, Coronary;  Surgeon: Shree Espinoza MD;  Location: [facility] CATH LAB;  Service: Cardiology;;    PERCUTANEOUS CORONARY INTERVENTION, ARTERY N/A 2024    Procedure: Percutaneous coronary intervention;  Surgeon: Shree Espinoza MD;  Location: [facility] CATH LAB;  Service: Cardiology;  Laterality: N/A;    PTCA, SINGLE VESSEL  2024    Procedure: PTCA, Single Vessel;  Surgeon: Shree Espinoza MD;  Location: [facility] CATH LAB;  Service: Cardiology;;    SMALL INTESTINE SURGERY      Exploratory stomach large and small intestines    TONSILLECTOMY      TUBAL LIGATION         Social History     Tobacco Use    Smoking status: Former     Current packs/day: 0.00     Average packs/day: 1 pack/day for 36.5 years (36.5 ttl pk-yrs)     Types: Cigarettes     Start date:      Quit date: 2003     Years since quittin.4     Passive exposure: Past    Smokeless tobacco: Never    Tobacco comments:     Been quit 19 years. I smoked 35 years approx a pack a day   Substance Use Topics    Alcohol use: No    Drug use: Never       Family History   Problem Relation Name Age of Onset    Heart disease Mother Jillian Jo         Open heart surgery    Cataracts Mother Jillian Jo     Macular degeneration Mother Jillian Jo     Glaucoma Mother Jillian Jo     Arthritis Mother Jillian Jo     Hearing loss Mother Jillian Jo     Hypertension Mother Jillian Jo     Kidney  disease Mother Jillian Jo     Stroke Mother Jillian Jo     Vision loss Mother Jillian Jo     Diabetes Sister La Jo     Heart disease Sister La Jo         CAD    Cataracts Sister La Jo     Depression Sister La Jo         Depression    Hypertension Sister La Jo     Diabetes Sister Erica Kruegerouge     Hearing loss Sister Erica Lerouge     Hypertension Sister Erica Lerouge     Diabetes Sister      Cancer Son Andrea Trascher III         testicular     Arthritis Son Andrea Trascher III         Because of chemotherapy at 19 he has neuropathy and arthritis    Hearing loss Son Andrea Trascher III         Because of chemotherapy    Cancer Maternal Aunt Etta Marquez         Tumor in chest wall    Heart disease Maternal Grandfather Leonidesluis Santos         Pacemaker    Arthritis Maternal Grandfather Cresson Tom     Hearing loss Maternal Grandfather Cresson Tom     Hypertension Maternal Grandfather Cresson Tom     Hypertension Maternal Grandmother Helene Santos     Kidney disease Maternal Grandmother Helene Santos     Stroke Maternal Grandmother Helene Santos     Alcohol abuse Daughter Kaye Trascher     Asthma Daughter Kaye Trascher     Depression Daughter Kaye Trascher         Psysophrenia    Drug abuse Daughter Kaye Camargoscher         Not now but earlier alcohol and drug abuse    Hypertension Daughter Kaye Trascher     Mental illness Daughter Kaye Trascher         Psysophrenia    Cancer Son Andrea Trascher III will         Testicular cancer    Depression Son Andrea Trascher III will         Major depressive disorder like me    Cancer Maternal Aunt Carmella Enrique         Lung cancer    Depression Sister Maribell Hull         May be bipolar    Diabetes Sister Maribell Hull     Heart disease Sister Maribell Hull         Open heart surgery    Hypertension Sister Maribell Hull     Hypertension Brother Riky Jo         Review of Systems   Cardiovascular:  Negative for chest pain, dyspnea on exertion, palpitations and syncope.   Genitourinary: Negative.    Neurological: Negative.        Current Outpatient Medications on File Prior to Visit   Medication Sig    apixaban (ELIQUIS) 5 mg Tab Take 1 tablet (5 mg total) by mouth 2 (two) times daily.    BEPREVE 1.5 % Drop Place 1 drop into both eyes 2 (two) times daily.    blood sugar diagnostic Strp To check blood sugar 1 times daily    blood-glucose meter (ACCU-CHEK GUIDE GLUCOSE METER) Misc use daily to test blood sugar    carvediloL (COREG) 25 MG tablet Take 2 tablets (50 mg total) by mouth 2 (two) times daily.    clopidogreL (PLAVIX) 75 mg tablet Take 1 tablet (75 mg total) by mouth once daily.    clotrimazole-betamethasone (LOTRISONE) lotion Apply topically to the affected area 2 (two) times daily.    coenzyme Q10 200 mg capsule Take 200 mg by mouth once daily.    cycloSPORINE (RESTASIS) 0.05 % ophthalmic emulsion Place 1 drop into both eyes 2 (two) times daily.    DULoxetine (CYMBALTA) 60 MG capsule Take 1 capsule (60 mg total) by mouth once daily.    ezetimibe (ZETIA) 10 mg tablet TAKE 1 TABLET BY MOUTH EVERY  EVENING    famotidine (PEPCID) 20 MG tablet TAKE 1 TABLET BY MOUTH TWICE  DAILY    fluticasone propionate (FLONASE) 50 mcg/actuation nasal spray 2 sprays (100 mcg total) by Each Nostril route once daily.    furosemide (LASIX) 20 MG tablet TAKE 1 TABLET BY MOUTH DAILY AS  NEEDED FOR SWELLING    gabapentin (NEURONTIN) 300 MG capsule Take 1 capsule (300 mg total) by mouth daily as needed (when needed).    galcanezumab-gnlm (EMGALITY SYRINGE) 120 mg/mL Syrg Inject 120 mg into the skin every 28 days.    hydrocortisone 2.5 % cream Apply topically 2 (two) times daily.    insulin (LANTUS SOLOSTAR U-100 INSULIN) glargine 100 units/mL SubQ pen Inject 72 Units into the skin every evening. (Patient taking differently: Inject 66 Units into the skin every evening.)    insulin lispro  "(HUMALOG KWIKPEN INSULIN) 100 unit/mL pen Inject 5 Units into the skin as needed (if glucose before each meal which is three times a day is above 200 take 5 units of this insulin).    isosorbide mononitrate (IMDUR) 30 MG 24 hr tablet TAKE 1 TABLET BY MOUTH ONCE  DAILY    lancets Misc To check BG 1 times daily    meclizine (ANTIVERT) 25 mg tablet Take 1 tablet (25 mg total) by mouth 3 (three) times daily as needed.    nitroGLYCERIN (NITROSTAT) 0.4 MG SL tablet Place 1 tablet (0.4 mg total) under the tongue every 5 (five) minutes as needed for Chest pain.    pantoprazole (PROTONIX) 40 MG tablet Take 1 tablet (40 mg total) by mouth once daily.    pen needle, diabetic (BD ULTRA-FINE SHORT PEN NEEDLE) 31 gauge x 5/16" Ndle AS DIRECTED TWICE DAILY    pravastatin (PRAVACHOL) 20 MG tablet Take 1 tablet (20 mg total) by mouth every evening.    rimegepant 75 mg odt Take 1 tablet (75 mg total) by mouth as needed for Migraine (do not exceed 2-3 doses within 1 week). Place ODT tablet on the tongue; alternatively the ODT tablet may be placed under the tongue    SITagliptin phosphate (JANUVIA) 100 MG Tab Take 1 tablet (100 mg total) by mouth once daily.    temazepam (RESTORIL) 30 mg capsule Take 1 capsule (30 mg total) by mouth every evening.    [DISCONTINUED] cloNIDine 0.2 mg/24 hr td ptwk (CATAPRES) 0.2 mg/24 hr Place 1 patch onto the skin every 7 days.    [DISCONTINUED] sacubitriL-valsartan (ENTRESTO)  mg per tablet Take 1 tablet by mouth 2 (two) times daily.    azelastine (ASTELIN) 137 mcg (0.1 %) nasal spray use 2 sprays (274 mcg total) by Nasal route 2 (two) times daily.    linaCLOtide (LINZESS) 72 mcg Cap capsule Take 1 capsule (72 mcg total) by mouth before breakfast.     No current facility-administered medications on file prior to visit.       Objective:   Objective:  Wt Readings from Last 3 Encounters:   11/20/24 109.9 kg (242 lb 4.6 oz)   10/17/24 108 kg (238 lb)   10/17/24 108 kg (238 lb 1.6 oz)     Temp " Readings from Last 3 Encounters:   10/17/24 98 °F (36.7 °C) (Tympanic)   09/03/24 98 °F (36.7 °C) (Temporal)   08/07/24 98.4 °F (36.9 °C) (Oral)     BP Readings from Last 3 Encounters:   11/20/24 129/67   10/17/24 128/60   10/17/24 128/60     Pulse Readings from Last 3 Encounters:   11/20/24 72   10/17/24 97   09/03/24 60       Physical Exam  Vitals reviewed.   Constitutional:       Appearance: She is well-developed.   Neck:      Vascular: No carotid bruit.   Cardiovascular:      Rate and Rhythm: Normal rate and regular rhythm.      Pulses: Intact distal pulses.      Heart sounds: Normal heart sounds. No murmur heard.  Pulmonary:      Breath sounds: Normal breath sounds.   Neurological:      Mental Status: She is oriented to person, place, and time.         Lab Results   Component Value Date    CHOL 189 06/19/2024    CHOL 195 09/28/2023    CHOL 164 10/04/2022     Lab Results   Component Value Date    HDL 49 06/19/2024    HDL 62 09/28/2023    HDL 46 10/04/2022     Lab Results   Component Value Date    LDLCALC 102.0 06/19/2024    LDLCALC 103.2 09/28/2023    LDLCALC 93.0 10/04/2022     Lab Results   Component Value Date    TRIG 190 (H) 06/19/2024    TRIG 149 09/28/2023    TRIG 125 10/04/2022     Lab Results   Component Value Date    CHOLHDL 25.9 06/19/2024    CHOLHDL 31.8 09/28/2023    CHOLHDL 28.0 10/04/2022       Chemistry        Component Value Date/Time     10/07/2024 1201    K 4.8 10/07/2024 1201     10/07/2024 1201    CO2 25 10/07/2024 1201    BUN 17 10/07/2024 1201    CREATININE 0.8 10/07/2024 1201     (H) 10/07/2024 1201        Component Value Date/Time    CALCIUM 9.4 10/07/2024 1201    ALKPHOS 56 08/20/2024 0820    ALKPHOS 55 08/20/2024 0820    AST 15 08/20/2024 0820    AST 15 08/20/2024 0820    ALT 9 (L) 08/20/2024 0820    ALT 9 (L) 08/20/2024 0820    BILITOT 0.7 08/20/2024 0820    BILITOT 0.7 08/20/2024 0820    ESTGFRAFRICA >60 07/27/2022 1237    EGFRNONAA >60 07/27/2022 1237          Lab  Results   Component Value Date    TSH 0.867 06/19/2024     Lab Results   Component Value Date    INR 1.0 04/01/2024     Lab Results   Component Value Date    WBC 7.78 08/20/2024    HGB 10.6 (L) 08/20/2024    HCT 33.2 (L) 08/20/2024    MCV 97 08/20/2024     08/20/2024     BNP  @LABRCNTIP(BNP,BNPTRIAGEBLO)@  CrCl cannot be calculated (Patient's most recent lab result is older than the maximum 7 days allowed.).     Imaging:  ======    No results found for this or any previous visit.    No results found for this or any previous visit.    Results for orders placed during the hospital encounter of 01/12/24    X-Ray Chest PA And Lateral    Narrative  EXAMINATION:  XR CHEST PA AND LATERAL    CLINICAL HISTORY:  Calculus of gallbladder without cholecystitis without obstruction    TECHNIQUE:  PA and lateral views of the chest were performed.    COMPARISON:  10/04/2021    FINDINGS:  The cardiac and mediastinal silhouettes appear within normal limits.   The lungs are clear bilaterally.  No acute osseous findings demonstrated.    Impression  No acute findings.      Electronically signed by: Fredrick Pace MD  Date:    01/12/2024  Time:    09:07    No results found for this or any previous visit.    No valid procedures specified.    Results for orders placed during the hospital encounter of 03/31/24    Cardiac catheterization    Conclusion    The 1st Diag lesion was 60% stenosed, jailed very small vessel , D2 50% also very small vessel jailed    The estimated blood loss was none.    IVUS showed well expanded stent, well apposed, no thrombus    The procedure log was documented by Documenter: Haley Yusuf RN and verified by Shree Espinoza MD.    Date: 4/2/2024  Time: 4:36 PM      Results for orders placed during the hospital encounter of 01/03/24    Nuclear Stress - Cardiology Interpreted    Interpretation Summary    Abnormal myocardial perfusion scan.    There is a mild intensity, small sized, fixed perfusion  abnormality consistent with scar in the inferoapical wall(s).    There are no other significant perfusion abnormalities.    The gated perfusion images showed an ejection fraction of 63% at rest. The gated perfusion images showed an ejection fraction of 73% post stress. Normal ejection fraction is greater than 59%.    The ECG portion of the study is negative for ischemia.      Results for orders placed during the hospital encounter of 03/31/24    Echo    Interpretation Summary    Left Ventricle: The left ventricle is normal in size. Normal wall thickness. There is concentric remodeling. Normal wall motion. There is mildly reduced systolic function with a visually estimated ejection fraction of 40 - 45%. Ejection fraction by visual approximation is 45%. There is indeterminate diastolic function.    Right Ventricle: Normal right ventricular cavity size. Wall thickness is normal. Right ventricle wall motion  is normal. Systolic function is normal.    Aortic Valve: There is mild aortic regurgitation.    Tricuspid Valve: The tricuspid valve is structurally normal. There is mild regurgitation.    Pulmonary Artery: The estimated pulmonary artery systolic pressure is 42 mmHg.    IVC/SVC: Normal venous pressure at 3 mmHg.      Interpretation Summary 10.17.2024  Show Result Comparison     There is 0-19% right Internal Carotid Stenosis.    There is 20-39% left Internal Carotid Stenosis.       Diagnostic Results:  ECG: Reviewed    The ASCVD Risk score (Yoselyn DK, et al., 2019) failed to calculate for the following reasons:    Risk score cannot be calculated because patient has a medical history suggesting prior/existing ASCVD        Assessment and Plan:   Primary hypertension  -     cloNIDine 0.3 mg/24 hr td ptwk (CATAPRES) 0.3 mg/24 hr; Place 1 patch onto the skin every 7 days.  Dispense: 4 patch; Refill: 11  -     cloNIDine (CATAPRES) 0.1 MG tablet; Take 1 tablet (0.1 mg total) by mouth daily as needed (BP >160/90).  Dispense:  30 tablet; Refill: 11    History of obstructive sleep apnea    Paroxysmal atrial fibrillation    Controlled type 2 diabetes mellitus with diabetic polyneuropathy, with long-term current use of insulin    Bilateral carotid artery stenosis    Morbid obesity with BMI of 40.0-44.9, adult    Chronic combined systolic and diastolic congestive heart failure  -     sacubitriL-valsartan (ENTRESTO)  mg per tablet; Take 1 tablet by mouth 2 (two) times daily.  Dispense: 60 tablet; Refill: 11    S/P coronary artery stent placement      Hypertension controlled.  But at home elevated at times.  Suspect masked hypertension.  Increase clonidine patch to 0.3 mg and give clonidine 0.1 mg rescue as needed  Hyperlipidemia on statins.    LDL goal less than 70.    Status post coronary stent angina free Continue with Plavix and Eliquis for AFib, in sinus rhythm controlled.  Reviewed all tests and above medical conditions with patient in detail and formulated treatment plan.  Risk factor modification discussed.   Cardiac low salt diet discussed.  Maintaining healthy weight and weight loss goals were discussed in clinic.  On duloxetine for depression.  Following with PCP.  Repeat carotid ultrasound.  Reviewed does not 40% bilateral    Following up in 6 months

## 2024-11-25 ENCOUNTER — TELEPHONE (OUTPATIENT)
Dept: PREADMISSION TESTING | Facility: HOSPITAL | Age: 74
End: 2024-11-25
Payer: MEDICARE

## 2024-11-25 NOTE — PROGRESS NOTES
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 a group home.    Pt reported she met her second  while she was attending [employer] school. 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." Pt reported "he was so good to me but he couldn't keep a job but he was killed in a motorcycle accident after we were  for 5yrs."     Pt reported "now, my mother and siblings don't like my younger two children because they didn't like my ex- but it's not their fault who their father was." Pt reported "they don't have anything to do with my youngest daughter, my son, or my grandchildren from them." Pt reported her mother  in 2018, "but we were able to connect when I was in my 50s and it was a great " "thing because we were able to make peace."      Family history of psychiatric illness/substance abuse:   Father: Unknown  Mother:  Anxiety, Depression  Sibling(s): Unknown  Maternal Grandparents:  Depression  Maternal Aunt:  Depression  Paternal Grandparents: Unknown  Child(griffin):  Youngest Daughter - Schizoaffective Disorder ; and Son - Depression    Trauma/Abuse/Neglect: See Above    Legal/Criminal Hx: Pt Denied/None Noted        11/26/2024     9:14 AM   GAD7   1. Feeling nervous, anxious, or on edge? 1    2. Not being able to stop or control worrying? 1    3. Worrying too much about different things? 1    4. Trouble relaxing? 1    5. Being so restless that it is hard to sit still? 0    6. Becoming easily annoyed or irritable? 0    7. Feeling afraid as if something awful might happen? 1    8. If you checked off any problems, how difficult have these problems made it for you to do your work, take care of things at home, or get along with other people? 1    DALLAS-7 Score 5        Patient-reported     0-4 = Minimal anxiety  5-9 = Mild anxiety  10-14 = Moderate anxiety  15-21 = Severe anxiety         11/27/2024    10:25 PM 7/8/2024    10:22 AM 6/29/2024     9:38 AM 6/21/2024     1:26 PM 2/5/2024    11:24 AM 12/8/2023    10:40 AM 11/4/2023     9:37 AM   PHQ-9 Depression Patient Health Questionnaire   Patient agreed to terms: Yes  Yes  Yes  Yes  Yes  Yes  Yes    Little interest or pleasure in doing things 1  1  1  1  1  1  1    Feeling down, depressed, or hopeless 1  1  1  1  1  1  0    Trouble falling or staying asleep, or sleeping too much 1  1  1  1  1  1  0    Feeling tired or having little energy 1  1  1  1  1  1  1    Poor appetite or overeating 1  1  1  1  1  1  1    Feeling bad about yourself - or that you are a failure or have let yourself or your family down 1  1  1  1  1  1  1    Trouble concentrating on things, such as reading the newspaper or watching television 0  1  1  0  1  1  0    Moving or speaking so " slowly that other people could have noticed. Or the opposite - being so fidgety or restless that you have been moving around a lot more than usual 0  1  0  0  2  0  0    Thoughts that you would be better off dead, or of hurting yourself in some way 0  0  0  0  0  0  0    PHQ-9 Total Score 6  8 7 6 9 7 4   If you checked off any problems, how difficult have these problems made it for you to do your work, take care of things at home, or get along with other people? Somewhat difficult  Very difficult  Somewhat difficult  Somewhat difficult  Somewhat difficult  Somewhat difficult  Somewhat difficult    Interpretation Mild  Mild Mild Mild Mild Mild Minimal or None       Patient-reported     0-4 = No intervention  5 to 9 = Mild  10 to 14 = Moderate  15 to 19 = Moderately severe  >=20 = Severe      Current medications and drug reactions (include OTC, herbal):   Outpatient Encounter Medications as of 12/3/2024   Medication Sig Dispense Refill    apixaban (ELIQUIS) 5 mg Tab Take 1 tablet (5 mg total) by mouth 2 (two) times daily. 180 tablet 1    azelastine (ASTELIN) 137 mcg (0.1 %) nasal spray use 2 sprays (274 mcg total) by Nasal route 2 (two) times daily. 30 mL 1    BEPREVE 1.5 % Drop Place 1 drop into both eyes 2 (two) times daily. 10 mL 4    blood sugar diagnostic Strp To check blood sugar 1 times daily 100 each 3    blood-glucose meter (ACCU-CHEK GUIDE GLUCOSE METER) Misc use daily to test blood sugar 1 each 0    carvediloL (COREG) 25 MG tablet Take 2 tablets (50 mg total) by mouth 2 (two) times daily. 120 tablet 11    cloNIDine (CATAPRES) 0.1 MG tablet Take 1 tablet (0.1 mg total) by mouth daily as needed (BP >160/90). 30 tablet 11    cloNIDine 0.3 mg/24 hr td ptwk (CATAPRES) 0.3 mg/24 hr Place 1 patch onto the skin every 7 days. 4 patch 11    clopidogreL (PLAVIX) 75 mg tablet Take 1 tablet (75 mg total) by mouth once daily. 90 tablet 3    clotrimazole-betamethasone (LOTRISONE) lotion Apply topically to the affected  area 2 (two) times daily. 30 mL 2    coenzyme Q10 200 mg capsule Take 200 mg by mouth once daily. 30 capsule 11    cycloSPORINE (RESTASIS) 0.05 % ophthalmic emulsion Place 1 drop into both eyes 2 (two) times daily. 60 each 11    DULoxetine (CYMBALTA) 60 MG capsule Take 1 capsule (60 mg total) by mouth once daily. 90 capsule 1    ezetimibe (ZETIA) 10 mg tablet TAKE 1 TABLET BY MOUTH EVERY  EVENING 90 tablet 3    famotidine (PEPCID) 20 MG tablet TAKE 1 TABLET BY MOUTH TWICE  DAILY 60 tablet 11    fluticasone propionate (FLONASE) 50 mcg/actuation nasal spray 2 sprays (100 mcg total) by Each Nostril route once daily. 48 g 3    furosemide (LASIX) 20 MG tablet TAKE 1 TABLET BY MOUTH DAILY AS  NEEDED FOR SWELLING 90 tablet 3    gabapentin (NEURONTIN) 300 MG capsule Take 1 capsule (300 mg total) by mouth daily as needed (when needed). 90 capsule 3    galcanezumab-gnlm (EMGALITY SYRINGE) 120 mg/mL Syrg Inject 120 mg into the skin every 28 days. 3 mL 3    hydrocortisone 2.5 % cream Apply topically 2 (two) times daily. 28 g 2    insulin (LANTUS SOLOSTAR U-100 INSULIN) glargine 100 units/mL SubQ pen Inject 72 Units into the skin every evening. (Patient taking differently: Inject 66 Units into the skin every evening.) 75 mL 3    insulin lispro (HUMALOG KWIKPEN INSULIN) 100 unit/mL pen Inject 5 Units into the skin as needed (if glucose before each meal which is three times a day is above 200 take 5 units of this insulin). 3 mL 0    isosorbide mononitrate (IMDUR) 30 MG 24 hr tablet TAKE 1 TABLET BY MOUTH ONCE  DAILY 90 tablet 3    lancets Misc To check BG 1 times daily 100 each 3    linaCLOtide (LINZESS) 72 mcg Cap capsule Take 1 capsule (72 mcg total) by mouth before breakfast. 90 capsule 0    meclizine (ANTIVERT) 25 mg tablet Take 1 tablet (25 mg total) by mouth 3 (three) times daily as needed. 30 tablet 2    nitroGLYCERIN (NITROSTAT) 0.4 MG SL tablet Place 1 tablet (0.4 mg total) under the tongue every 5 (five) minutes as  "needed for Chest pain. 25 tablet 0    pantoprazole (PROTONIX) 40 MG tablet Take 1 tablet (40 mg total) by mouth once daily. 90 tablet 3    pen needle, diabetic (BD ULTRA-FINE SHORT PEN NEEDLE) 31 gauge x 5/16" Ndle AS DIRECTED TWICE DAILY 200 each 3    pravastatin (PRAVACHOL) 20 MG tablet Take 1 tablet (20 mg total) by mouth every evening. 30 tablet 11    rimegepant 75 mg odt Take 1 tablet (75 mg total) by mouth as needed for Migraine (do not exceed 2-3 doses within 1 week). Place ODT tablet on the tongue; alternatively the ODT tablet may be placed under the tongue 8 tablet 5    sacubitriL-valsartan (ENTRESTO)  mg per tablet Take 1 tablet by mouth 2 (two) times daily. 60 tablet 11    SITagliptin phosphate (JANUVIA) 100 MG Tab Take 1 tablet (100 mg total) by mouth once daily. 90 tablet 3    sodium,potassium,mag sulfates (SUPREP BOWEL PREP KIT) 17.5-3.13-1.6 gram SolR Take 177 mLs by mouth once daily. for 2 days 1 kit 0    temazepam (RESTORIL) 30 mg capsule Take 1 capsule (30 mg total) by mouth every evening. 90 capsule 0    [DISCONTINUED] cloNIDine 0.2 mg/24 hr td ptwk (CATAPRES) 0.2 mg/24 hr Place 1 patch onto the skin every 7 days. 4 patch 11    [DISCONTINUED] sacubitriL-valsartan (ENTRESTO)  mg per tablet Take 1 tablet by mouth 2 (two) times daily. 180 tablet 3    [DISCONTINUED] sacubitriL-valsartan (ENTRESTO)  mg per tablet Take 1 tablet by mouth 2 (two) times daily. 180 tablet 3     No facility-administered encounter medications on file as of 12/3/2024.          Objective - Current Evaluation:     Mental Status Evaluation  Appearance: unremarkable, age appropriate  Behavior: normal, cooperative  Speech: normal tone, normal rate, normal pitch, normal volume  Mood: euthymic  Affect: congruent and appropriate  Thought Process: normal and logical  Thought Content: normal, no suicidality, no homicidality, delusions, or paranoia  Sensorium: grossly intact  Cognition: grossly intact  Insight: " intact  Judgment: adequate to circumstances    Strengths and liabilities: Strength: Patient accepts guidance/feedback, Strength: Patient is expressive/articulate, Strength: Patient is intelligent, Strength: Patient is motivated for change, Strength: Patient has reasonable judgment, and Liability: Patient lacks coping skills      Diagnostic Impression - Plan:     1. Mild episode of recurrent major depressive disorder    2. Generalized anxiety disorder        Plan:individual psychotherapy    Return to Clinic: 2 weeks  Pt Reported to Schedule Self via Epic EMR MyChart Application and/or Department Support Staff         Ana Portillo LCSW  12/3/2024  8:00 AM

## 2024-11-27 ENCOUNTER — TELEPHONE (OUTPATIENT)
Dept: PREADMISSION TESTING | Facility: HOSPITAL | Age: 74
End: 2024-11-27
Payer: MEDICARE

## 2024-12-02 ENCOUNTER — TELEPHONE (OUTPATIENT)
Dept: PREADMISSION TESTING | Facility: HOSPITAL | Age: 74
End: 2024-12-02
Payer: MEDICARE

## 2024-12-02 DIAGNOSIS — K63.5 POLYP OF COLON, UNSPECIFIED PART OF COLON, UNSPECIFIED TYPE: ICD-10-CM

## 2024-12-02 DIAGNOSIS — R13.10 DYSPHAGIA, UNSPECIFIED TYPE: Primary | ICD-10-CM

## 2024-12-02 RX ORDER — SODIUM, POTASSIUM,MAG SULFATES 17.5-3.13G
1 SOLUTION, RECONSTITUTED, ORAL ORAL DAILY
Qty: 1 KIT | Refills: 0 | Status: SHIPPED | OUTPATIENT
Start: 2024-12-02 | End: 2024-12-04

## 2024-12-03 ENCOUNTER — OFFICE VISIT (OUTPATIENT)
Dept: PSYCHIATRY | Facility: CLINIC | Age: 74
End: 2024-12-03
Payer: MEDICARE

## 2024-12-03 DIAGNOSIS — F41.1 GENERALIZED ANXIETY DISORDER: ICD-10-CM

## 2024-12-03 DIAGNOSIS — F33.0 MILD EPISODE OF RECURRENT MAJOR DEPRESSIVE DISORDER: Primary | ICD-10-CM

## 2024-12-03 PROCEDURE — 3051F HG A1C>EQUAL 7.0%<8.0%: CPT | Mod: CPTII,95,, | Performed by: SOCIAL WORKER

## 2024-12-03 PROCEDURE — 3060F POS MICROALBUMINURIA REV: CPT | Mod: CPTII,95,, | Performed by: SOCIAL WORKER

## 2024-12-03 PROCEDURE — 3066F NEPHROPATHY DOC TX: CPT | Mod: CPTII,95,, | Performed by: SOCIAL WORKER

## 2024-12-03 PROCEDURE — 4010F ACE/ARB THERAPY RXD/TAKEN: CPT | Mod: CPTII,95,, | Performed by: SOCIAL WORKER

## 2024-12-03 PROCEDURE — 90791 PSYCH DIAGNOSTIC EVALUATION: CPT | Mod: 95,,, | Performed by: SOCIAL WORKER

## 2024-12-07 ENCOUNTER — PATIENT MESSAGE (OUTPATIENT)
Dept: INTERNAL MEDICINE | Facility: CLINIC | Age: 74
End: 2024-12-07
Payer: MEDICARE

## 2024-12-07 DIAGNOSIS — H10.13 ALLERGIC CONJUNCTIVITIS, BILATERAL: ICD-10-CM

## 2024-12-07 DIAGNOSIS — G43.101 MIGRAINE WITH AURA AND WITH STATUS MIGRAINOSUS, NOT INTRACTABLE: ICD-10-CM

## 2024-12-07 DIAGNOSIS — F41.9 ANXIETY DISORDER, UNSPECIFIED TYPE: ICD-10-CM

## 2024-12-07 DIAGNOSIS — F33.1 DEPRESSION, MAJOR, RECURRENT, MODERATE: ICD-10-CM

## 2024-12-07 DIAGNOSIS — I25.118 CORONARY ARTERY DISEASE OF NATIVE ARTERY OF NATIVE HEART WITH STABLE ANGINA PECTORIS: ICD-10-CM

## 2024-12-07 DIAGNOSIS — G43.809 VESTIBULAR MIGRAINE: ICD-10-CM

## 2024-12-07 DIAGNOSIS — G62.9 NEUROPATHY: ICD-10-CM

## 2024-12-09 RX ORDER — GALCANEZUMAB 120 MG/ML
120 INJECTION, SOLUTION SUBCUTANEOUS
Qty: 3 ML | Refills: 3 | OUTPATIENT
Start: 2024-12-09 | End: 2025-12-09

## 2024-12-09 RX ORDER — BEPOTASTINE BESILATE 15 MG/ML
1 SOLUTION/ DROPS OPHTHALMIC 2 TIMES DAILY
Qty: 10 ML | Refills: 4 | OUTPATIENT
Start: 2024-12-09

## 2024-12-09 RX ORDER — GABAPENTIN 300 MG/1
300 CAPSULE ORAL DAILY PRN
Qty: 90 CAPSULE | Refills: 3 | Status: SHIPPED | OUTPATIENT
Start: 2024-12-09 | End: 2025-12-09

## 2024-12-09 RX ORDER — DULOXETIN HYDROCHLORIDE 60 MG/1
60 CAPSULE, DELAYED RELEASE ORAL DAILY
Qty: 90 CAPSULE | Refills: 1 | OUTPATIENT
Start: 2024-12-09

## 2024-12-09 NOTE — TELEPHONE ENCOUNTER
No care due was identified.  [facility] Embedded Care Due Messages. Reference number: [id number].   12/09/2024 7:35:08 AM CST

## 2024-12-12 ENCOUNTER — TELEPHONE (OUTPATIENT)
Dept: HEMATOLOGY/ONCOLOGY | Facility: CLINIC | Age: 74
End: 2024-12-12
Payer: MEDICARE

## 2024-12-12 NOTE — TELEPHONE ENCOUNTER
Attempted to reach pt about upcoming appt 12/27 needing to rescheduled, NO ANS//LVM advised pt to rtc

## 2024-12-15 ENCOUNTER — PATIENT MESSAGE (OUTPATIENT)
Dept: HEMATOLOGY/ONCOLOGY | Facility: CLINIC | Age: 74
End: 2024-12-15
Payer: MEDICARE

## 2024-12-15 ENCOUNTER — PATIENT MESSAGE (OUTPATIENT)
Dept: PSYCHIATRY | Facility: CLINIC | Age: 74
End: 2024-12-15
Payer: MEDICARE

## 2024-12-16 ENCOUNTER — PATIENT MESSAGE (OUTPATIENT)
Dept: HEMATOLOGY/ONCOLOGY | Facility: CLINIC | Age: 74
End: 2024-12-16
Payer: MEDICARE

## 2024-12-16 ENCOUNTER — TELEPHONE (OUTPATIENT)
Dept: PALLIATIVE MEDICINE | Facility: CLINIC | Age: 74
End: 2024-12-16
Payer: MEDICARE

## 2024-12-16 NOTE — TELEPHONE ENCOUNTER
----- Message from Dash sent at 12/16/2024 12:25 PM CST -----  Contact: JUSTIN GOLD [[id number]]  ..Type:  Patient Requesting Call    Who Called:JUSTIN GOLD [[id number]]  Does the patient know what this is regarding?:rescheduling appt   Would the patient rather a call back or a response via [facility]? call  Best Call Back Number:.[phone] (Lee)     Additional Information: pt states she only does virtual visits

## 2024-12-16 NOTE — TELEPHONE ENCOUNTER
Returned patient phone call and informed her that visit is scheduled as a virtual. Patient verbalized understanding and had no other issues at this time.
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
## 2024-12-19 ENCOUNTER — PATIENT MESSAGE (OUTPATIENT)
Dept: INTERNAL MEDICINE | Facility: CLINIC | Age: 74
End: 2024-12-19
Payer: MEDICARE

## 2024-12-20 ENCOUNTER — OFFICE VISIT (OUTPATIENT)
Dept: PSYCHIATRY | Facility: CLINIC | Age: 74
End: 2024-12-20
Payer: MEDICARE

## 2024-12-20 ENCOUNTER — LAB VISIT (OUTPATIENT)
Dept: LAB | Facility: HOSPITAL | Age: 74
End: 2024-12-20
Payer: MEDICARE

## 2024-12-20 VITALS — SYSTOLIC BLOOD PRESSURE: 148 MMHG | DIASTOLIC BLOOD PRESSURE: 65 MMHG | HEART RATE: 69 BPM

## 2024-12-20 DIAGNOSIS — F41.9 ANXIETY DISORDER, UNSPECIFIED TYPE: ICD-10-CM

## 2024-12-20 DIAGNOSIS — E53.1 PYRIDOXINE DEFICIENCY: ICD-10-CM

## 2024-12-20 DIAGNOSIS — G47.00 INSOMNIA, UNSPECIFIED TYPE: ICD-10-CM

## 2024-12-20 DIAGNOSIS — R53.83 FATIGUE, UNSPECIFIED TYPE: ICD-10-CM

## 2024-12-20 DIAGNOSIS — D52.0 DIETARY FOLATE DEFICIENCY ANEMIA: ICD-10-CM

## 2024-12-20 DIAGNOSIS — E53.8 B12 DEFICIENCY: ICD-10-CM

## 2024-12-20 DIAGNOSIS — F33.1 DEPRESSION, MAJOR, RECURRENT, MODERATE: Primary | ICD-10-CM

## 2024-12-20 DIAGNOSIS — D50.0 IRON DEFICIENCY ANEMIA DUE TO CHRONIC BLOOD LOSS: ICD-10-CM

## 2024-12-20 DIAGNOSIS — D64.9 ANEMIA, UNSPECIFIED TYPE: ICD-10-CM

## 2024-12-20 DIAGNOSIS — D51.3 OTHER DIETARY VITAMIN B12 DEFICIENCY ANEMIA: ICD-10-CM

## 2024-12-20 LAB
ALBUMIN SERPL BCP-MCNC: 3.8 G/DL (ref 3.5–5.2)
ALP SERPL-CCNC: 42 U/L (ref 40–150)
ALT SERPL W/O P-5'-P-CCNC: 8 U/L (ref 10–44)
ANION GAP SERPL CALC-SCNC: 11 MMOL/L (ref 8–16)
AST SERPL-CCNC: 11 U/L (ref 10–40)
BASOPHILS # BLD AUTO: 0.06 K/UL (ref 0–0.2)
BASOPHILS NFR BLD: 0.8 % (ref 0–1.9)
BILIRUB SERPL-MCNC: 0.5 MG/DL (ref 0.1–1)
BUN SERPL-MCNC: 14 MG/DL (ref 8–23)
CALCIUM SERPL-MCNC: 9.6 MG/DL (ref 8.7–10.5)
CHLORIDE SERPL-SCNC: 105 MMOL/L (ref 95–110)
CO2 SERPL-SCNC: 24 MMOL/L (ref 23–29)
CREAT SERPL-MCNC: 0.9 MG/DL (ref 0.5–1.4)
DIFFERENTIAL METHOD BLD: ABNORMAL
EOSINOPHIL # BLD AUTO: 0.1 K/UL (ref 0–0.5)
EOSINOPHIL NFR BLD: 1.3 % (ref 0–8)
ERYTHROCYTE [DISTWIDTH] IN BLOOD BY AUTOMATED COUNT: 12.7 % (ref 11.5–14.5)
EST. GFR  (NO RACE VARIABLE): >60 ML/MIN/1.73 M^2
FERRITIN SERPL-MCNC: 57 NG/ML (ref 20–300)
FOLATE SERPL-MCNC: 9.8 NG/ML (ref 4–24)
GLUCOSE SERPL-MCNC: 146 MG/DL (ref 70–110)
HCT VFR BLD AUTO: 32.4 % (ref 37–48.5)
HGB BLD-MCNC: 10.4 G/DL (ref 12–16)
IMM GRANULOCYTES # BLD AUTO: 0.03 K/UL (ref 0–0.04)
IMM GRANULOCYTES NFR BLD AUTO: 0.4 % (ref 0–0.5)
IRON SERPL-MCNC: 96 UG/DL (ref 30–160)
LYMPHOCYTES # BLD AUTO: 1.7 K/UL (ref 1–4.8)
LYMPHOCYTES NFR BLD: 23.6 % (ref 18–48)
MCH RBC QN AUTO: 30.6 PG (ref 27–31)
MCHC RBC AUTO-ENTMCNC: 32.1 G/DL (ref 32–36)
MCV RBC AUTO: 95 FL (ref 82–98)
MONOCYTES # BLD AUTO: 0.6 K/UL (ref 0.3–1)
MONOCYTES NFR BLD: 8.4 % (ref 4–15)
NEUTROPHILS # BLD AUTO: 4.7 K/UL (ref 1.8–7.7)
NEUTROPHILS NFR BLD: 65.5 % (ref 38–73)
NRBC BLD-RTO: 0 /100 WBC
PLATELET # BLD AUTO: 226 K/UL (ref 150–450)
PMV BLD AUTO: 10.9 FL (ref 9.2–12.9)
POTASSIUM SERPL-SCNC: 4.6 MMOL/L (ref 3.5–5.1)
PROT SERPL-MCNC: 6.6 G/DL (ref 6–8.4)
RBC # BLD AUTO: 3.4 M/UL (ref 4–5.4)
SATURATED IRON: 27 % (ref 20–50)
SODIUM SERPL-SCNC: 140 MMOL/L (ref 136–145)
TOTAL IRON BINDING CAPACITY: 351 UG/DL (ref 250–450)
TRANSFERRIN SERPL-MCNC: 237 MG/DL (ref 200–375)
VIT B12 SERPL-MCNC: 255 PG/ML (ref 210–950)
WBC # BLD AUTO: 7.15 K/UL (ref 3.9–12.7)

## 2024-12-20 PROCEDURE — G2211 COMPLEX E/M VISIT ADD ON: HCPCS | Mod: S$GLB,,, | Performed by: PSYCHIATRY & NEUROLOGY

## 2024-12-20 PROCEDURE — 82746 ASSAY OF FOLIC ACID SERUM: CPT

## 2024-12-20 PROCEDURE — 85025 COMPLETE CBC W/AUTO DIFF WBC: CPT

## 2024-12-20 PROCEDURE — 99999 PR PBB SHADOW E&M-EST. PATIENT-LVL II: CPT | Mod: PBBFAC,,, | Performed by: PSYCHIATRY & NEUROLOGY

## 2024-12-20 PROCEDURE — 3051F HG A1C>EQUAL 7.0%<8.0%: CPT | Mod: CPTII,S$GLB,, | Performed by: PSYCHIATRY & NEUROLOGY

## 2024-12-20 PROCEDURE — 3060F POS MICROALBUMINURIA REV: CPT | Mod: CPTII,S$GLB,, | Performed by: PSYCHIATRY & NEUROLOGY

## 2024-12-20 PROCEDURE — 3077F SYST BP >= 140 MM HG: CPT | Mod: CPTII,S$GLB,, | Performed by: PSYCHIATRY & NEUROLOGY

## 2024-12-20 PROCEDURE — 80053 COMPREHEN METABOLIC PANEL: CPT

## 2024-12-20 PROCEDURE — 99215 OFFICE O/P EST HI 40 MIN: CPT | Mod: S$GLB,,, | Performed by: PSYCHIATRY & NEUROLOGY

## 2024-12-20 PROCEDURE — 82607 VITAMIN B-12: CPT

## 2024-12-20 PROCEDURE — 3078F DIAST BP <80 MM HG: CPT | Mod: CPTII,S$GLB,, | Performed by: PSYCHIATRY & NEUROLOGY

## 2024-12-20 PROCEDURE — 84466 ASSAY OF TRANSFERRIN: CPT

## 2024-12-20 PROCEDURE — 4010F ACE/ARB THERAPY RXD/TAKEN: CPT | Mod: CPTII,S$GLB,, | Performed by: PSYCHIATRY & NEUROLOGY

## 2024-12-20 PROCEDURE — 3066F NEPHROPATHY DOC TX: CPT | Mod: CPTII,S$GLB,, | Performed by: PSYCHIATRY & NEUROLOGY

## 2024-12-20 PROCEDURE — 82728 ASSAY OF FERRITIN: CPT

## 2024-12-20 PROCEDURE — 36415 COLL VENOUS BLD VENIPUNCTURE: CPT

## 2024-12-20 RX ORDER — DULOXETIN HYDROCHLORIDE 60 MG/1
60 CAPSULE, DELAYED RELEASE ORAL DAILY
Qty: 90 CAPSULE | Refills: 1 | Status: SHIPPED | OUTPATIENT
Start: 2024-12-20

## 2024-12-20 RX ORDER — TEMAZEPAM 30 MG/1
30 CAPSULE ORAL NIGHTLY
Qty: 90 CAPSULE | Refills: 1 | Status: SHIPPED | OUTPATIENT
Start: 2024-12-20

## 2024-12-23 DIAGNOSIS — G89.29 CHRONIC MIDLINE LOW BACK PAIN WITH SCIATICA, SCIATICA LATERALITY UNSPECIFIED: Primary | ICD-10-CM

## 2024-12-23 DIAGNOSIS — M54.40 CHRONIC MIDLINE LOW BACK PAIN WITH SCIATICA, SCIATICA LATERALITY UNSPECIFIED: Primary | ICD-10-CM

## 2024-12-26 ENCOUNTER — E-CONSULT (OUTPATIENT)
Dept: CARDIOLOGY | Facility: HOSPITAL | Age: 74
End: 2024-12-26
Payer: MEDICARE

## 2024-12-26 DIAGNOSIS — Z01.810 PREOP CARDIOVASCULAR EXAM: Primary | ICD-10-CM

## 2024-12-26 PROCEDURE — 99451 NTRPROF PH1/NTRNET/EHR 5/>: CPT | Mod: ,,, | Performed by: INTERNAL MEDICINE

## 2024-12-26 NOTE — CONSULTS
[facility] CARDIOLOGY  Response for E-Consult     Patient Name: hCristine Jo  MRN: [id number]  Primary Care Provider: Jose Szymanski MD   Requesting Provider: Lizz Francois NP  E-Consult to General Cardiology  Consult performed by: Shree Espinoza MD  Consult ordered by: Lizz Francois NP  Reason for consult: Preop      E-Consult to Cardiology  Consult performed by: Shree Espinoza MD  Consult ordered by: Jael Szymanski NP  Reason for consult: Preop          Recommendation:  Okay to undergo EGD and colonoscopy low risk procedure.    Okay to hold Eliquis for 2 days prior to her scope okay to hold Plavix for 5 days prior to the scope.        Contingency that warrants a repeat eConsult or referral:  Not applicable    Total time of Consultation: 5 minute    I did not speak to the requesting provider verbally about this.     *This eConsult is based on the clinical data available to me and is furnished without benefit of a physical examination. The eConsult will need to be interpreted in light of any clinical issues or changes in patient status not available to me at the time of filing this eConsults. Significant changes in patient condition or level of acuity should result in immediate formal consultation and reevaluation. Please alert me if you have further questions.    Thank you for this eConsult referral.     Shree Espinoza MD  [facility] CARDIOLOGY

## 2024-12-27 ENCOUNTER — PATIENT MESSAGE (OUTPATIENT)
Dept: DIABETES | Facility: CLINIC | Age: 74
End: 2024-12-27
Payer: MEDICARE

## 2024-12-28 ENCOUNTER — PATIENT MESSAGE (OUTPATIENT)
Dept: CARDIOLOGY | Facility: CLINIC | Age: 74
End: 2024-12-28
Payer: MEDICARE

## 2024-12-28 DIAGNOSIS — R79.89 ELEVATED BRAIN NATRIURETIC PEPTIDE (BNP) LEVEL: Primary | ICD-10-CM

## 2024-12-31 ENCOUNTER — PATIENT MESSAGE (OUTPATIENT)
Dept: INTERNAL MEDICINE | Facility: CLINIC | Age: 74
End: 2024-12-31
Payer: MEDICARE

## 2025-01-01 ENCOUNTER — PATIENT MESSAGE (OUTPATIENT)
Dept: CARDIOLOGY | Facility: CLINIC | Age: 75
End: 2025-01-01
Payer: MEDICARE

## 2025-01-01 ENCOUNTER — PATIENT MESSAGE (OUTPATIENT)
Dept: GASTROENTEROLOGY | Facility: CLINIC | Age: 75
End: 2025-01-01
Payer: MEDICARE

## 2025-01-02 ENCOUNTER — PATIENT MESSAGE (OUTPATIENT)
Dept: INTERNAL MEDICINE | Facility: CLINIC | Age: 75
End: 2025-01-02
Payer: MEDICARE

## 2025-01-02 ENCOUNTER — TELEPHONE (OUTPATIENT)
Dept: PREADMISSION TESTING | Facility: HOSPITAL | Age: 75
End: 2025-01-02
Payer: MEDICARE

## 2025-01-02 NOTE — TELEPHONE ENCOUNTER
----- Message from Irena sent at 12/31/2024  1:13 PM CST -----  Contact: Christine  .Patient is calling to speak with the nurse regarding upcoming appt  . Reports needing to cancel appt due to the patient to weak to come in  . Please give patient a call back at   [phone]

## 2025-01-02 NOTE — TELEPHONE ENCOUNTER
I returned pt message regarding procedure, no answer, lvm, will remove case from schedule for today

## 2025-01-04 DIAGNOSIS — G43.101 MIGRAINE WITH AURA AND WITH STATUS MIGRAINOSUS, NOT INTRACTABLE: ICD-10-CM

## 2025-01-04 DIAGNOSIS — G43.809 VESTIBULAR MIGRAINE: ICD-10-CM

## 2025-01-06 RX ORDER — GALCANEZUMAB 120 MG/ML
INJECTION, SOLUTION SUBCUTANEOUS
Qty: 3 ML | Refills: 3 | Status: SHIPPED | OUTPATIENT
Start: 2025-01-06

## 2025-01-08 ENCOUNTER — PATIENT MESSAGE (OUTPATIENT)
Dept: GASTROENTEROLOGY | Facility: CLINIC | Age: 75
End: 2025-01-08
Payer: MEDICARE

## 2025-01-08 ENCOUNTER — OFFICE VISIT (OUTPATIENT)
Dept: HEMATOLOGY/ONCOLOGY | Facility: CLINIC | Age: 75
End: 2025-01-08
Payer: MEDICARE

## 2025-01-08 DIAGNOSIS — E55.9 VITAMIN D DEFICIENCY, UNSPECIFIED: ICD-10-CM

## 2025-01-08 DIAGNOSIS — E11.9 TYPE 2 DIABETES MELLITUS WITHOUT COMPLICATION: ICD-10-CM

## 2025-01-08 DIAGNOSIS — D64.9 ANEMIA, UNSPECIFIED TYPE: Primary | ICD-10-CM

## 2025-01-08 DIAGNOSIS — D50.0 IRON DEFICIENCY ANEMIA DUE TO CHRONIC BLOOD LOSS: ICD-10-CM

## 2025-01-08 NOTE — ASSESSMENT & PLAN NOTE
Lab Results   Component Value Date    HGB 10.4 (L) 12/20/2024   Stable,chronic mild anemia  --Multifactorial r/t co morbidities    In depth discussion in regard to anemia of chronic disease  Iron indices and B12, folate WNL  Advised pt that with worsening will plan for BMBx  Extensive workups up to this point unrevealing  She notes in process of doing colonoscopy but has been having issues tolerating prep--pt states she has reached out to GI

## 2025-01-08 NOTE — PROGRESS NOTES
Subjective:      Patient ID: Christine Jo is a 74 y.o. female.    Chief Complaint: Follow-up (Normocytic anemia )    The patient location is: [place]  The chief complaint leading to consultation is: follow-up    Visit type: audiovisual    Face to Face time with patient: 10  15 minutes of total time spent on the encounter, which includes face to face time and non-face to face time preparing to see the patient (eg, review of tests), Obtaining and/or reviewing separately obtained history, Documenting clinical information in the electronic or other health record, Independently interpreting results (not separately reported) and communicating results to the patient/family/caregiver, or Care coordination (not separately reported).         Each patient to whom he or she provides medical services by telemedicine is:  (1) informed of the relationship between the physician and patient and the respective role of any other health care provider with respect to management of the patient; and (2) notified that he or she may decline to receive medical services by telemedicine and may withdraw from such care at any time.    Notes:       HPI:  Ms. Jo is a pleasant 72-year-old female who presents today for follow-up of iron deficiency anemia. She was last seen in Heme/Onc clinic  08/2020. She has had IV iron in the past 01/2018.   She [facility]. Patel which was - negative for contributory causes. EGD 05/2021 found chronic gastritis.  Colonoscopy 06/2021 found 3 polyps, lipoma, and diverticulosis with recommendation to repeat in 3 years. She cannot tolerate oral iron supplementation as it causes GI upset.     Interval History: Pt c/o continued fatigue. She also notes hemorrhoids with intermittent bleeding. Denies n/v/d/c, fever, chills, night sweats, sob, cp, lightheadedness unintentional weight loss.    Social History     Socioeconomic History    Marital status:      Number of children: 3   Occupational History    Occupation: Retired   Tobacco Use    Smoking status: Former     Current packs/day: 0.00     Average packs/day: 1 pack/day for 36.5 years (36.5 ttl pk-yrs)     Types: Cigarettes     Start date:      Quit date: 2003     Years since quittin.5     Passive exposure: Past    Smokeless tobacco: Never    Tobacco comments:     Been quit 19 years. I smoked 35 years approx a pack a day   Substance and Sexual Activity    Alcohol use: No    Drug use: Never    Sexual activity: Never     Social Drivers of Health     Financial Resource Strain: Low Risk  (2024)    Overall Financial Resource Strain (CARDIA)     Difficulty of Paying Living Expenses: Not very hard   Food Insecurity: No Food Insecurity (2024)    Hunger Vital Sign     Worried About Running Out of Food in the Last Year: Never true     Ran Out of Food in the Last Year: Never true   Transportation Needs: No Transportation Needs (4/15/2024)    PRAPARE - Transportation     Lack of Transportation (Medical): No     Lack of Transportation (Non-Medical): No   Physical Activity: Inactive (2024)    Exercise Vital Sign     Days of Exercise per Week: 0 days     Minutes of Exercise per Session: 0 min   Stress: No Stress Concern Present (2024)    [employer] - Occupational Stress Questionnaire     Feeling of Stress : Only a little   Housing Stability: Low Risk  (2024)    Housing Stability Vital Sign     Unable to Pay for Housing in the Last Year: No     Homeless in the Last Year: No       Family History   Problem Relation Name Age of Onset    Heart disease Mother Jillian Jo         Open heart surgery    Cataracts Mother Jillian Jo     Macular degeneration Mother Jillian Jo     Glaucoma Mother Jillian Jo     Arthritis Mother Jillian Jo     Hearing loss Mother Jillian Jo     Hypertension Mother Jillian Jo      Kidney disease Mother Jillian Jo     Stroke Mother Jillian Jo     Vision loss Mother Jillian Jo     Diabetes Sister [place] Jo     Heart disease Sister [place] Jo         CAD    Cataracts Sister [place] Jo     Depression Sister [place] Jo         Depression    Hypertension Sister La Jo     Diabetes Sister Erica Kruegerouge     Hearing loss Sister Erica Lerouge     Hypertension Sister Erica Lerouge     Diabetes Sister      Cancer Son Andrea Trascher III         testicular     Arthritis Son Andrea Trascher III         Because of chemotherapy at 19 he has neuropathy and arthritis    Hearing loss Son Andrea Trascher III         Because of chemotherapy    Cancer Maternal Aunt Etta Marquez         Tumor in chest wall    Heart disease Maternal Grandfather Leonides Santos         Pacemaker    Arthritis Maternal Grandfather Wrightsboroluis Santos     Hearing loss Maternal Grandfather Leonides Tom     Hypertension Maternal Grandfather [place] Tom     Hypertension Maternal Grandmother Helene Santos     Kidney disease Maternal Grandmother Helene Santos     Stroke Maternal Grandmother Helene Santos     Alcohol abuse Daughter Kaye Trascher     Asthma Daughter Kaye Trascher     Depression Daughter Kaye Trascher         Psysophrenia    Drug abuse Daughter Kaye Camargoscher         Not now but earlier alcohol and drug abuse    Hypertension Daughter Kaye Trascher     Mental illness Daughter Kaye Trascher         Psysophrenia    Cancer Son Andrea Trascher III will         Testicular cancer    Depression Son Andrea Trascher III will         Major depressive disorder like me    Cancer Maternal Aunt Carmella Enrique         Lung cancer    Depression Sister Maribell Hull         May be bipolar    Diabetes Sister Maribell Hull     Heart disease Sister Maribell Hull         Open heart surgery    Hypertension Sister Maribell Hull      Hypertension Brother Riky Jo        Past Surgical History:   Procedure Laterality Date    abdominal laparoscopy       ADENOIDECTOMY  1965    Tonsils removed also    ANGIOGRAM, CORONARY, WITH LEFT HEART CATHETERIZATION N/A 04/02/2024    Procedure: Angiogram, Coronary, with Left Heart Cath;  Surgeon: Shree Espinoza MD;  Location: [facility] CATH LAB;  Service: Cardiology;  Laterality: N/A;    ANGIOGRAM, CORONARY, WITH LEFT HEART CATHETERIZATION N/A 04/01/2024    Procedure: Angiogram, Coronary, with Left Heart Cath;  Surgeon: Shree Espinoza MD;  Location: [facility] CATH LAB;  Service: Cardiology;  Laterality: N/A;    BREAST BIOPSY Left 1998    benign    BREAST SURGERY  1998 biopsy lt breast    CATARACT EXTRACTION Bilateral [id number]    [facility] in [place]    Cataract Surgery Bilateral     Laser (YAG)    COLON SURGERY  2004    COLONOSCOPY N/A 05/05/2021    Procedure: COLONOSCOPY;  Surgeon: Shawn Rogers III, MD;  Location: [facility];  Service: Endoscopy;  Laterality: N/A;    COLONOSCOPY N/A 06/30/2021    Procedure: COLONOSCOPY;  Surgeon: Memo Merida MD;  Location: [facility];  Service: Endoscopy;  Laterality: N/A;    CORONARY STENT PLACEMENT N/A 04/01/2024    Procedure: INSERTION, STENT, CORONARY ARTERY;  Surgeon: Shree Espinoza MD;  Location: [facility] CATH LAB;  Service: Cardiology;  Laterality: N/A;    ESOPHAGOGASTRODUODENOSCOPY N/A 11/29/2019    Procedure: ESOPHAGOGASTRODUODENOSCOPY (EGD);  Surgeon: Shawn Rogers III, MD;  Location: [facility];  Service: Endoscopy;  Laterality: N/A;    ESOPHAGOGASTRODUODENOSCOPY N/A 05/05/2021    Procedure: EGD (ESOPHAGOGASTRODUODENOSCOPY);  Surgeon: hSawn Rogers III, MD;  Location: [facility] ENDO;  Service: Endoscopy;  Laterality: N/A;    EYE SURGERY      IVUS, CORONARY  04/02/2024    Procedure: IVUS, Coronary;  Surgeon: Shree Espinoza MD;  Location: [facility] CATH LAB;  Service: Cardiology;;    PERCUTANEOUS CORONARY INTERVENTION, ARTERY N/A  04/01/2024    Procedure: Percutaneous coronary intervention;  Surgeon: Shree Espinoza MD;  Location: [facility] CATH LAB;  Service: Cardiology;  Laterality: N/A;    PTCA, SINGLE VESSEL  04/01/2024    Procedure: PTCA, Single Vessel;  Surgeon: Shree Espinoza MD;  Location: [facility] CATH LAB;  Service: Cardiology;;    SMALL INTESTINE SURGERY  2004    Exploratory stomach large and small intestines    TONSILLECTOMY      TUBAL LIGATION         Past Medical History:   Diagnosis Date    Arthritis     Cataract     Coronary artery disease     Diabetes mellitus 2008     am 01/15/2018 Insulin x 1 year    Diabetes mellitus, type 2     DM (diabetes mellitus) 2008     am 02/14/2020 Insulin x 4 years    Encounter for blood transfusion     Glaucoma     Hypertension     Insomnia     Macular degeneration     Old MI (myocardial infarction) 2/12/2024    Vaginal yeast infection        Review of Systems   Constitutional:  Positive for fatigue. Negative for activity change, appetite change, chills, fever and unexpected weight change.   HENT:  Negative for congestion, dental problem, mouth sores and nosebleeds.    Eyes:  Negative for visual disturbance.   Respiratory:  Negative for cough, choking and chest tightness.    Cardiovascular:  Negative for chest pain, palpitations and leg swelling.   Gastrointestinal:  Negative for abdominal distention, abdominal pain, anal bleeding, blood in stool, constipation, diarrhea, nausea and vomiting.   Endocrine: Negative.    Genitourinary:  Negative for dysuria, frequency, hematuria and urgency.   Musculoskeletal:  Negative for arthralgias, back pain, gait problem, joint swelling and myalgias.   Skin:  Negative for wound.   Allergic/Immunologic: Negative for immunocompromised state.   Neurological:  Negative for dizziness, light-headedness, numbness and headaches.   Hematological:  Negative for adenopathy. Does not bruise/bleed easily.   Psychiatric/Behavioral:  Negative for  sleep disturbance. The patient is not nervous/anxious.        Medication List with Changes/Refills   Current Medications    APIXABAN (ELIQUIS) 5 MG TAB    Take 1 tablet (5 mg total) by mouth 2 (two) times daily.    AZELASTINE (ASTELIN) 137 MCG (0.1 %) NASAL SPRAY    use 2 sprays (274 mcg total) by Nasal route 2 (two) times daily.    BEPREVE 1.5 % DROP    Place 1 drop into both eyes 2 (two) times daily.    BLOOD SUGAR DIAGNOSTIC STRP    To check blood sugar 1 times daily    BLOOD-GLUCOSE METER (ACCU-CHEK GUIDE GLUCOSE METER) MISC    use daily to test blood sugar    CARVEDILOL (COREG) 25 MG TABLET    Take 2 tablets (50 mg total) by mouth 2 (two) times daily.    CLONIDINE (CATAPRES) 0.1 MG TABLET    Take 1 tablet (0.1 mg total) by mouth daily as needed (BP >160/90).    CLONIDINE 0.3 MG/24 HR TD PTWK (CATAPRES) 0.3 MG/24 HR    Place 1 patch onto the skin every 7 days.    CLOPIDOGREL (PLAVIX) 75 MG TABLET    Take 1 tablet (75 mg total) by mouth once daily.    CLOTRIMAZOLE-BETAMETHASONE (LOTRISONE) LOTION    Apply topically to the affected area 2 (two) times daily.    COENZYME Q10 200 MG CAPSULE    Take 200 mg by mouth once daily.    CYCLOSPORINE (RESTASIS) 0.05 % OPHTHALMIC EMULSION    Place 1 drop into both eyes 2 (two) times daily.    DULOXETINE (CYMBALTA) 60 MG CAPSULE    Take 1 capsule (60 mg total) by mouth once daily.    EMGALITY SYRINGE 120 MG/ML SYRG    INJECT THE CONTENTS OF 1 SYRINGE SUBCUTANEOUSLY EVERY 28 DAYS    EZETIMIBE (ZETIA) 10 MG TABLET    TAKE 1 TABLET BY MOUTH EVERY  EVENING    FAMOTIDINE (PEPCID) 20 MG TABLET    TAKE 1 TABLET BY MOUTH TWICE  DAILY    FLUTICASONE PROPIONATE (FLONASE) 50 MCG/ACTUATION NASAL SPRAY    2 sprays (100 mcg total) by Each Nostril route once daily.    FUROSEMIDE (LASIX) 20 MG TABLET    TAKE 1 TABLET BY MOUTH DAILY AS  NEEDED FOR SWELLING    GABAPENTIN (NEURONTIN) 300 MG CAPSULE    Take 1 capsule (300 mg total) by mouth daily as needed (when needed).    HYDROCORTISONE 2.5 %  "CREAM    Apply topically 2 (two) times daily.    INSULIN (LANTUS SOLOSTAR U-100 INSULIN) GLARGINE 100 UNITS/ML SUBQ PEN    Inject 72 Units into the skin every evening.    INSULIN LISPRO (HUMALOG KWIKPEN INSULIN) 100 UNIT/ML PEN    Inject 5 Units into the skin as needed (if glucose before each meal which is three times a day is above 200 take 5 units of this insulin).    ISOSORBIDE MONONITRATE (IMDUR) 30 MG 24 HR TABLET    TAKE 1 TABLET BY MOUTH ONCE  DAILY    LANCETS MISC    To check BG 1 times daily    LINACLOTIDE (LINZESS) 72 MCG CAP CAPSULE    Take 1 capsule (72 mcg total) by mouth before breakfast.    MECLIZINE (ANTIVERT) 25 MG TABLET    Take 1 tablet (25 mg total) by mouth 3 (three) times daily as needed.    NITROGLYCERIN (NITROSTAT) 0.4 MG SL TABLET    Place 1 tablet (0.4 mg total) under the tongue every 5 (five) minutes as needed for Chest pain.    PANTOPRAZOLE (PROTONIX) 40 MG TABLET    Take 1 tablet (40 mg total) by mouth once daily.    PEN NEEDLE, DIABETIC (BD ULTRA-FINE SHORT PEN NEEDLE) 31 GAUGE X 5/16" NDLE    AS DIRECTED TWICE DAILY    PRAVASTATIN (PRAVACHOL) 20 MG TABLET    Take 1 tablet (20 mg total) by mouth every evening.    RIMEGEPANT 75 MG ODT    Take 1 tablet (75 mg total) by mouth as needed for Migraine (do not exceed 2-3 doses within 1 week). Place ODT tablet on the tongue; alternatively the ODT tablet may be placed under the tongue    SACUBITRIL-VALSARTAN (ENTRESTO)  MG PER TABLET    Take 1 tablet by mouth 2 (two) times daily.    SITAGLIPTIN PHOSPHATE (JANUVIA) 100 MG TAB    Take 1 tablet (100 mg total) by mouth once daily.    TEMAZEPAM (RESTORIL) 30 MG CAPSULE    Take 1 capsule (30 mg total) by mouth every evening.        Objective:     There were no vitals filed for this visit.      Physical Exam  Vitals reviewed: virtual visit.   Constitutional:       Appearance: Normal appearance.   Neurological:      Mental Status: She is alert.   Psychiatric:         Mood and Affect: Mood normal.  "        Behavior: Behavior normal.         Thought Content: Thought content normal.         Judgment: Judgment normal.       Lab Results   Component Value Date    WBC 7.15 12/20/2024    HGB 10.4 (L) 12/20/2024    HCT 32.4 (L) 12/20/2024    MCV 95 12/20/2024     12/20/2024       Lab Results   Component Value Date     12/20/2024    K 4.6 12/20/2024     12/20/2024    CO2 24 12/20/2024    BUN 14 12/20/2024    CREATININE 0.9 12/20/2024    CALCIUM 9.6 12/20/2024    ANIONGAP 11 12/20/2024    ESTGFRAFRICA >60 07/27/2022    EGFRNONAA >60 07/27/2022     Lab Results   Component Value Date    ALT 8 (L) 12/20/2024    AST 11 12/20/2024    ALKPHOS 42 12/20/2024    BILITOT 0.5 12/20/2024       Assessment/Plan:     Problem List Items Addressed This Visit       Iron deficiency anemia due to chronic blood loss     Lab Results   Component Value Date    IRON 96 12/20/2024    TRANSFERRIN 237 12/20/2024    TIBC 351 12/20/2024    FESATURATED 27 12/20/2024     Lab Results   Component Value Date    FERRITIN 57 12/20/2024     Iron indices remain WNL  No indication for IV iron therapy at this time  Pt cannot tolerate oral iron supplementation d/t GI upset  Encouraged incorporation of iron rich foods in diet            Anemia - Primary     Lab Results   Component Value Date    HGB 10.4 (L) 12/20/2024   Stable,chronic mild anemia  --Multifactorial r/t co morbidities    In depth discussion in regard to anemia of chronic disease  Iron indices and B12, folate WNL  Advised pt that with worsening will plan for BMBx  Extensive workups up to this point unrevealing  She notes in process of doing colonoscopy but has been having issues tolerating prep--pt states she has reached out to GI             Relevant Orders    HGB ELECTROPHERESIS - Hemoglobin Electrophoresis,Hgb A2 [place].    Vitamin D    Thalasseima and Hemoglobinopathy Eval    Alpha-Globin Gene Analysis     Other Visit Diagnoses       Vitamin D deficiency, unspecified         Relevant Orders    Vitamin D                Med Onc Chart Routing      Follow up with physician    Follow up with MARIANO 4 months. with labs prior VV okay   Infusion scheduling note    Injection scheduling note    Labs CBC, CMP, magnesium, TSH and free T4   Scheduling:  Preferred lab:  Lab interval:     Imaging    Pharmacy appointment    Other referrals                   MILIND Cedeño, FNP-C  Hematology/Oncology

## 2025-01-08 NOTE — ASSESSMENT & PLAN NOTE
Lab Results   Component Value Date    IRON 96 12/20/2024    TRANSFERRIN 237 12/20/2024    TIBC 351 12/20/2024    FESATURATED 27 12/20/2024     Lab Results   Component Value Date    FERRITIN 57 12/20/2024     Iron indices remain WNL  No indication for IV iron therapy at this time  Pt cannot tolerate oral iron supplementation d/t GI upset  Encouraged incorporation of iron rich foods in diet

## 2025-01-09 ENCOUNTER — PATIENT MESSAGE (OUTPATIENT)
Dept: INTERNAL MEDICINE | Facility: CLINIC | Age: 75
End: 2025-01-09
Payer: MEDICARE

## 2025-01-09 ENCOUNTER — PATIENT MESSAGE (OUTPATIENT)
Dept: HEMATOLOGY/ONCOLOGY | Facility: CLINIC | Age: 75
End: 2025-01-09
Payer: MEDICARE

## 2025-01-14 ENCOUNTER — PATIENT OUTREACH (OUTPATIENT)
Dept: ADMINISTRATIVE | Facility: HOSPITAL | Age: 75
End: 2025-01-14
Payer: MEDICARE

## 2025-01-14 DIAGNOSIS — H10.13 ALLERGIC CONJUNCTIVITIS, BILATERAL: ICD-10-CM

## 2025-01-15 RX ORDER — BEPOTASTINE BESILATE 15 MG/ML
1 SOLUTION/ DROPS OPHTHALMIC 2 TIMES DAILY
Qty: 10 ML | Refills: 4 | Status: SHIPPED | OUTPATIENT
Start: 2025-01-15

## 2025-01-21 NOTE — PROGRESS NOTES
Individual Psychotherapy Follow-up Visit Progress Note (PhD/LCSW)     Outpatient Psychotherapy - 45 minutes with patient (38-52 minutes) - [id number]    Date: 1/27/2025    Visit Type: Telehealth    Due to the nature of this visit type, a virtual visit with synchronous audio and video, each patient to whom this provider administers behavioral health services by telemedicine is: (1) informed of the relationship between the provider and patient and the respective role of any other health care provider with respect to management of the patient; and (2) notified that he or she may decline to receive services by telemedicine and may withdraw from such care at any time. If technological issues occur, at the professional discretion of the clinical provider, synchronous audio only services may be utilized after unsuccessful attempt(s) to connect via audiovisual services; similarly, if audio only visit occurs, patient's verbal consent will be obtained prior to receipt of service. Prevailing clinical standards of care are upheld despite service methodology; having said this, if the clinical provider is unable to meet the prevailing standards of care, the patient will be rescheduled for the provider's soonest availability - as clinically appropriate.     The patient was informed of the following:     Provider's contact info:  [facility]  [address], [address], [address]  [place] [address]  (Phone) [phone]    If technology issues occur, call office phone: Ph: [phone]  If crisis: Dial 911 or go to nearest Emergency Room (ER)  If questions related to privacy practices: contact [facility] Department: [phone]    For security purposes, the pt identified that they were at [address] [place] [address]  [address] [place] [id number] during today's session and contact number is [phone].    The pt's emergency contact(s) is Extended Emergency Contact  "Information  Primary Emergency Contact: Anamaria Martin  Address: [address]            [facility] [place] [address] [place]  Home Phone: [phone]  Work Phone: [phone]  Mobile Phone: [phone]  Relation: Healthcare Power of   Secondary Emergency Contact: Margy Cooley  Address: [address] [place] [address] [place]  Mobile Phone: [phone]  Relation: Daughter.    Crisis Disclaimer: Patient was informed that due to the virtual nature of the visit, that if a crisis develops, protocols will be implemented to ensure patient safety, including but not limited to: 1) Initiating a welfare check with local law enforcement and/or 2) Calling 911    1/27/2025  MRN: [id number]  Primary Care Provider: Jose Szymanski MD Connsocorro Jo is a 74 y.o. female who presents today for follow-up of depression and anxiety. Met with patient.      Preferred Name: Christine     Subjective:     Last encounter (with this provider): 12/3/2024     Content of Current Session: Pt reported, "I'm ok" upon entry to this session. LCSW utilized active listening/reflection to engage with pt and review experiences since their last session with this provider. Pt reported "I've been doing ok since we last met." Pt reported "I've been feeling less anxious and depressed lately - I just find myself feeling a little lonely since I can't really drive myself around." Pt reported "I really only get out and talk to my neighbors - unless I'm with my kids, I don't really go anywhere." LCSW utilized cognitive processing and supportive therapy. LCSW utilized introspective therapy to support pt's self-reflection. LCSW utilized reality orientation to support pt's objectivity, reframed perspectives, and thought challenging processes. LCSW utilized reality orientation and introspective therapy to support pt's objective reflection of their aforementioned events. LCSW utilized " psychoeducation to educate the pt on the impact of socialization on mental health outlook. LCSW utilized psychoeducation and interpersonal therapy to support pt's understanding of surrounding community resources and their application in improvement of her socialization and relationship building. LCSW discussed the pt's utilization of her local [employer] as a resource with transportation support. LCSW utilized compassion focused therapy to support pt's self-acceptance. LCSW utilized person centered and strengths based perspectives to further empower and edify pt. LCSW and pt practiced aforementioned skills in session. LCSW utilized the remainder of the session to continue rapport building with pt. Pt responded appropriately to aforementioned interventions. Pt denied SI/HI/AVH.     Therapeutic Interventions Utilized During Current Session: Cognitive Processing Therapy, Cognitive Behavioral Therapy, Compassion-Focused Therapy, Interpersonal Psychotherapy, Introspective Therapy, Person-Centered Therapy, Psychoeducation, Reality Therapy, Strength-Based Therapy, Supportive Therapy        1/24/2025     9:27 AM 11/26/2024     9:14 AM   GAD7   1. Feeling nervous, anxious, or on edge? 0  1    2. Not being able to stop or control worrying? 0  1    3. Worrying too much about different things? 0  1    4. Trouble relaxing? 0  1    5. Being so restless that it is hard to sit still? 0  0    6. Becoming easily annoyed or irritable? 0  0    7. Feeling afraid as if something awful might happen? 0  1    8. If you checked off any problems, how difficult have these problems made it for you to do your work, take care of things at home, or get along with other people? 0  1    DALLAS-7 Score 0  5        Patient-reported      0-4 = Minimal anxiety  5-9 = Mild anxiety  10-14 = Moderate anxiety  15-21 = Severe anxiety         1/27/2025     3:00 PM 11/27/2024    10:25 PM 7/8/2024    10:22 AM 6/29/2024     9:38 AM 6/21/2024     1:26 PM  2/5/2024    11:24 AM 12/8/2023    10:40 AM   PHQ-9 Depression Patient Health Questionnaire   Patient agreed to terms: Yes  Yes  Yes  Yes  Yes  Yes  Yes    Little interest or pleasure in doing things 1  1  1  1  1  1  1    Feeling down, depressed, or hopeless 0  1  1  1  1  1  1    Trouble falling or staying asleep, or sleeping too much 0  1  1  1  1  1  1    Feeling tired or having little energy 1  1  1  1  1  1  1    Poor appetite or overeating 1  1  1  1  1  1  1    Feeling bad about yourself - or that you are a failure or have let yourself or your family down 0  1  1  1  1  1  1    Trouble concentrating on things, such as reading the newspaper or watching television 1  0  1  1  0  1  1    Moving or speaking so slowly that other people could have noticed. Or the opposite - being so fidgety or restless that you have been moving around a lot more than usual 0  0  1  0  0  2  0    Thoughts that you would be better off dead, or of hurting yourself in some way 0  0  0  0  0  0  0    PHQ-9 Total Score 4  6  8 7 6 9 7   If you checked off any problems, how difficult have these problems made it for you to do your work, take care of things at home, or get along with other people? Not difficult at all  Somewhat difficult  Very difficult  Somewhat difficult  Somewhat difficult  Somewhat difficult  Somewhat difficult    Interpretation Minimal or None  Mild  Mild Mild Mild Mild Mild       Patient-reported     0-4 = No intervention  5 to 9 = Mild  10 to 14 = Moderate  15 to 19 = Moderately severe  >=20 = Severe      Objective:       Mental Status Evaluation  Appearance: unremarkable, age appropriate  Behavior: normal, cooperative  Speech: normal tone, normal rate, normal pitch, normal volume  Mood: euthymic  Affect: congruent and appropriate  Thought Process: normal and logical  Thought Content: normal, no suicidality, no homicidality, delusions, or paranoia  Sensorium: grossly intact  Cognition: grossly intact  Insight:  intact  Judgment: adequate to circumstances    Risk parameters:  Patient reports no suicidal ideation  Patient reports no homicidal ideation  Patient reports no self-injurious behavior  Patient reports no violent behavior      Assessment & Plan:     The patient's response to the interventions is accepting    The patient's progress toward treatment goals is fair     Homework assigned: none     Treatment plan:   A. Target symptoms: Depression and Anxiety   B. Therapeutic modalities: insight oriented psychotherapy, behavior modifying psychotherapy, supportive psychotherapy  C. Why chosen therapy is appropriate versus another modality: relevant to diagnosis, patient responds to this modality, evidence based practice   D. Outcome monitoring methods: self report, observation, rating scales, feedback from clinical staff      Visit Diagnosis:   1. Mild episode of recurrent major depressive disorder    2. Generalized anxiety disorder        Follow-up: individual psychotherapy    Return to Clinic: 2 weeks  Pt Reported to Schedule Self via Epic EMR MyChart Application and/or Department Support Staff          Ana Portillo LCSW  1/27/2025  3:00 PM

## 2025-01-24 ENCOUNTER — PATIENT MESSAGE (OUTPATIENT)
Dept: DIABETES | Facility: CLINIC | Age: 75
End: 2025-01-24
Payer: MEDICARE

## 2025-01-27 ENCOUNTER — OFFICE VISIT (OUTPATIENT)
Dept: PSYCHIATRY | Facility: CLINIC | Age: 75
End: 2025-01-27
Payer: MEDICARE

## 2025-01-27 DIAGNOSIS — F41.1 GENERALIZED ANXIETY DISORDER: ICD-10-CM

## 2025-01-27 DIAGNOSIS — F33.0 MILD EPISODE OF RECURRENT MAJOR DEPRESSIVE DISORDER: Primary | ICD-10-CM

## 2025-01-27 PROCEDURE — 90834 PSYTX W PT 45 MINUTES: CPT | Mod: 95,,, | Performed by: SOCIAL WORKER

## 2025-01-27 PROCEDURE — 3072F LOW RISK FOR RETINOPATHY: CPT | Mod: CPTII,95,, | Performed by: SOCIAL WORKER

## 2025-01-28 ENCOUNTER — PATIENT OUTREACH (OUTPATIENT)
Dept: ADMINISTRATIVE | Facility: HOSPITAL | Age: 75
End: 2025-01-28
Payer: MEDICARE

## 2025-01-29 ENCOUNTER — PATIENT MESSAGE (OUTPATIENT)
Dept: INTERNAL MEDICINE | Facility: CLINIC | Age: 75
End: 2025-01-29
Payer: MEDICARE

## 2025-02-04 ENCOUNTER — PATIENT OUTREACH (OUTPATIENT)
Dept: ADMINISTRATIVE | Facility: HOSPITAL | Age: 75
End: 2025-02-04
Payer: MEDICARE

## 2025-02-05 ENCOUNTER — TELEPHONE (OUTPATIENT)
Dept: CARDIOLOGY | Facility: CLINIC | Age: 75
End: 2025-02-05
Payer: MEDICARE

## 2025-02-05 ENCOUNTER — TELEPHONE (OUTPATIENT)
Dept: PSYCHIATRY | Facility: CLINIC | Age: 75
End: 2025-02-05
Payer: MEDICARE

## 2025-02-05 ENCOUNTER — TELEPHONE (OUTPATIENT)
Dept: INTERNAL MEDICINE | Facility: CLINIC | Age: 75
End: 2025-02-05
Payer: MEDICARE

## 2025-02-05 ENCOUNTER — PATIENT MESSAGE (OUTPATIENT)
Dept: INTERNAL MEDICINE | Facility: CLINIC | Age: 75
End: 2025-02-05
Payer: MEDICARE

## 2025-02-05 ENCOUNTER — TELEPHONE (OUTPATIENT)
Dept: PULMONOLOGY | Facility: CLINIC | Age: 75
End: 2025-02-05
Payer: MEDICARE

## 2025-02-05 ENCOUNTER — PATIENT MESSAGE (OUTPATIENT)
Dept: CARDIOLOGY | Facility: CLINIC | Age: 75
End: 2025-02-05
Payer: MEDICARE

## 2025-02-05 NOTE — TELEPHONE ENCOUNTER
LVM for pt to call back regarding scheduling lab work .                      ----- Message from Noemi sent at 2/5/2025 10:43 AM CST -----  Name of Who is Calling: Pt         What is the request in detail:Pt would like a call back to [facility] labs. Please advise thank you         Can the clinic reply by [facility]:no        What Number to Call Back if not in [employer]:Telephone Information:  Mobile          [phone]

## 2025-02-05 NOTE — TELEPHONE ENCOUNTER
----- Message from Tia sent at 2/5/2025 11:17 AM CST -----  Who Called: Pt    What is the request in detail: Requesting call back to discuss adding lab orders and linking them to her appts Monday 02/10. Please advise    Can the clinic reply by [facility]? No    Best Call Back Number: [phone]      Additional Information:

## 2025-02-05 NOTE — TELEPHONE ENCOUNTER
Returned patients call back. Patient stated she was trying to reach the hematology department. Staff scheduled patients lab work----- Message from Noemi sent at 2/5/2025 10:40 AM CST -----  Name of Who is Calling: Pt         What is the request in detail:Pt would like a call back to [facility] labs. Please advise thank you         Can the clinic reply by [facility]:no        What Number to Call Back if not in [employer]:Telephone Information:  Mobile          [phone]

## 2025-02-05 NOTE — TELEPHONE ENCOUNTER
Talked to pt her A1c  order is linked to the lab appt on the 02/10/25 at [facility]

## 2025-02-05 NOTE — TELEPHONE ENCOUNTER
----- Message from [employer] sent at 2/5/2025  2:48 PM CST -----  Contact: self  .Type: Orders Request    What orders/ testing are being requested? UTI/ bladder infection     Is there a future appointment scheduled for the patient with PCP? No     When?    Would you prefer a response via [employer]? Call back, .[phone] (home)     Comments: pt states she spoke with nurse earlier on today and forgot to mention symptoms she is having and would like to be tested for UTI/bladder infection.

## 2025-02-05 NOTE — TELEPHONE ENCOUNTER
----- Message from Noemi sent at 2/5/2025 10:41 AM CST -----  Name of Who is Calling: Pt         What is the request in detail:Pt would like a call back to [facility] labs. Please advise thank you         Can the clinic reply by [facility]:no        What Number to Call Back [facility]:Telephone Information:  Mobile          [phone]

## 2025-02-06 DIAGNOSIS — R30.0 DYSURIA: Primary | ICD-10-CM

## 2025-02-08 DIAGNOSIS — Z86.69 HISTORY OF OBSTRUCTIVE SLEEP APNEA: ICD-10-CM

## 2025-02-08 DIAGNOSIS — E11.42 CONTROLLED TYPE 2 DIABETES MELLITUS WITH DIABETIC POLYNEUROPATHY, WITH LONG-TERM CURRENT USE OF INSULIN: ICD-10-CM

## 2025-02-08 DIAGNOSIS — Z79.4 CONTROLLED TYPE 2 DIABETES MELLITUS WITH DIABETIC POLYNEUROPATHY, WITH LONG-TERM CURRENT USE OF INSULIN: ICD-10-CM

## 2025-02-09 NOTE — TELEPHONE ENCOUNTER
Care Due:                  Date            Visit Type   Department     Provider  --------------------------------------------------------------------------------                                EP -                              PRIMARY      [facility] INTERNAL  Last Visit: 10-      CARE ([facility])   CHEMA Szymanski                              EP -                              PRIMARY      [facility] INTERNAL  Next Visit: 02-      CARE ([facility])   CHEMA Szymanski                                                            Last  Test          Frequency    Reason                     Performed    Due Date  --------------------------------------------------------------------------------    HBA1C.......  6 months...  SITagliptin, insulin.....  06- 12-    [facility] Embedded Care Due Messages. Reference number: [id number].   2/08/2025 9:25:57 PM CST

## 2025-02-10 ENCOUNTER — LAB VISIT (OUTPATIENT)
Dept: LAB | Facility: HOSPITAL | Age: 75
End: 2025-02-10
Payer: MEDICARE

## 2025-02-10 DIAGNOSIS — E55.9 VITAMIN D DEFICIENCY, UNSPECIFIED: ICD-10-CM

## 2025-02-10 DIAGNOSIS — R79.89 ELEVATED BRAIN NATRIURETIC PEPTIDE (BNP) LEVEL: ICD-10-CM

## 2025-02-10 DIAGNOSIS — R30.0 DYSURIA: ICD-10-CM

## 2025-02-10 DIAGNOSIS — E11.9 TYPE 2 DIABETES MELLITUS WITHOUT COMPLICATION: ICD-10-CM

## 2025-02-10 DIAGNOSIS — D64.9 ANEMIA, UNSPECIFIED TYPE: ICD-10-CM

## 2025-02-10 LAB
25(OH)D3+25(OH)D2 SERPL-MCNC: 28 NG/ML (ref 30–96)
BACTERIA #/AREA URNS HPF: ABNORMAL /HPF
BILIRUB UR QL STRIP: ABNORMAL
BNP SERPL-MCNC: 58 PG/ML (ref 0–99)
CLARITY UR: CLEAR
COLOR UR: YELLOW
ESTIMATED AVG GLUCOSE: 128 MG/DL (ref 68–131)
GLUCOSE UR QL STRIP: NEGATIVE
HBA1C MFR BLD: 6.1 % (ref 4–5.6)
HGB UR QL STRIP: ABNORMAL
HYALINE CASTS #/AREA URNS LPF: 0 /LPF
KETONES UR QL STRIP: NEGATIVE
LEUKOCYTE ESTERASE UR QL STRIP: ABNORMAL
MICROSCOPIC COMMENT: ABNORMAL
NITRITE UR QL STRIP: NEGATIVE
PH UR STRIP: 6 [PH] (ref 5–8)
PROT UR QL STRIP: ABNORMAL
RBC #/AREA URNS HPF: 4 /HPF (ref 0–4)
SP GR UR STRIP: >=1.03 (ref 1–1.03)
URN SPEC COLLECT METH UR: ABNORMAL
WBC #/AREA URNS HPF: 7 /HPF (ref 0–5)

## 2025-02-10 PROCEDURE — 82728 ASSAY OF FERRITIN: CPT

## 2025-02-10 PROCEDURE — 83021 HEMOGLOBIN CHROMOTOGRAPHY: CPT

## 2025-02-10 PROCEDURE — 81269 HBA1/HBA2 GENE DUP/DEL VRNTS: CPT

## 2025-02-10 PROCEDURE — 83880 ASSAY OF NATRIURETIC PEPTIDE: CPT | Performed by: INTERNAL MEDICINE

## 2025-02-10 PROCEDURE — 82306 VITAMIN D 25 HYDROXY: CPT

## 2025-02-10 PROCEDURE — 81000 URINALYSIS NONAUTO W/SCOPE: CPT | Performed by: FAMILY MEDICINE

## 2025-02-10 PROCEDURE — 36415 COLL VENOUS BLD VENIPUNCTURE: CPT

## 2025-02-10 PROCEDURE — 83036 HEMOGLOBIN GLYCOSYLATED A1C: CPT | Performed by: FAMILY MEDICINE

## 2025-02-10 RX ORDER — SITAGLIPTIN 100 MG/1
100 TABLET, FILM COATED ORAL
Qty: 90 TABLET | Refills: 0 | Status: SHIPPED | OUTPATIENT
Start: 2025-02-10

## 2025-02-10 RX ORDER — CLOPIDOGREL BISULFATE 75 MG/1
75 TABLET ORAL
Qty: 90 TABLET | Refills: 3 | Status: SHIPPED | OUTPATIENT
Start: 2025-02-10

## 2025-02-10 NOTE — TELEPHONE ENCOUNTER
Refill Routing Note   Medication(s) are not appropriate for processing by [facility] for the following reason(s):        Required vitals abnormal  Required labs abnormal    [facility] action(s):  Defer     Requires labs : Yes             Appointments  past 12m or future 3m with PCP    Date Provider   Last Visit   10/17/2024 Jose Szymanski MD   Next Visit   2/17/2025 Jose Szymanski MD   ED visits in past 90 days: 0        Note composed:8:27 PM 02/09/2025

## 2025-02-11 ENCOUNTER — PATIENT MESSAGE (OUTPATIENT)
Dept: CARDIOLOGY | Facility: CLINIC | Age: 75
End: 2025-02-11
Payer: MEDICARE

## 2025-02-11 ENCOUNTER — PATIENT MESSAGE (OUTPATIENT)
Dept: HEMATOLOGY/ONCOLOGY | Facility: CLINIC | Age: 75
End: 2025-02-11
Payer: MEDICARE

## 2025-02-11 RX ORDER — APIXABAN 5 MG/1
5 TABLET, FILM COATED ORAL 2 TIMES DAILY
Qty: 180 TABLET | Refills: 3 | Status: SHIPPED | OUTPATIENT
Start: 2025-02-11

## 2025-02-12 LAB
HGB A2 MFR BLD HPLC: 2.4 % (ref 2.2–3.2)
HGB FRACT BLD ELPH-IMP: NORMAL
HGB FRACT BLD ELPH-IMP: NORMAL

## 2025-02-13 LAB
FERRITIN SERPL-MCNC: 46 MCG/L (ref 11–328)
HGB A MFR BLD ELPH: 97.7 % (ref 95.8–98)
HGB A2 MFR BLD: 2.3 % (ref 2–3.3)
HGB A2+XXX MFR BLD ELPH: NORMAL %
HGB F MFR BLD: 0 % (ref 0–0.9)
HGB XXX MFR BLD ELPH: NORMAL %
HPLC HB VARIANT: NORMAL
PATH REV BLD -IMP: NORMAL
PROVIDER SIGNING NAME: NORMAL

## 2025-02-17 ENCOUNTER — PATIENT MESSAGE (OUTPATIENT)
Dept: INTERNAL MEDICINE | Facility: CLINIC | Age: 75
End: 2025-02-17
Payer: MEDICARE

## 2025-02-17 ENCOUNTER — TELEPHONE (OUTPATIENT)
Dept: INTERNAL MEDICINE | Facility: CLINIC | Age: 75
End: 2025-02-17
Payer: MEDICARE

## 2025-02-17 NOTE — TELEPHONE ENCOUNTER
----- Message from Lee sent at 2/17/2025 12:50 PM CST -----  Contact: Patient  Type: Patient CallWho Called: Patient Does the patient know what this is regarding? Pt Is requesting a call back to schedule an appt. Please advise Does the patient rather a call back or a response via [facility]? [employer] Call Back Number: [phone] Additional Information:

## 2025-02-18 ENCOUNTER — TELEPHONE (OUTPATIENT)
Dept: PSYCHIATRY | Facility: CLINIC | Age: 75
End: 2025-02-18
Payer: MEDICARE

## 2025-02-18 DIAGNOSIS — Z12.31 ENCOUNTER FOR SCREENING MAMMOGRAM FOR MALIGNANT NEOPLASM OF BREAST: ICD-10-CM

## 2025-02-18 DIAGNOSIS — Z12.39 BREAST SCREENING: Primary | ICD-10-CM

## 2025-02-18 NOTE — TELEPHONE ENCOUNTER
Rtc to cx f/u appointment with Ana Portillo. On Tuesday 2/18/25 at 3:00 pm. Due to not feeling well.

## 2025-02-24 DIAGNOSIS — Z12.11 COLON CANCER SCREENING: Primary | ICD-10-CM

## 2025-02-25 ENCOUNTER — HOSPITAL ENCOUNTER (EMERGENCY)
Facility: HOSPITAL | Age: 75
Discharge: HOME | End: 2025-02-25
Attending: EMERGENCY MEDICINE
Payer: MEDICARE

## 2025-02-25 VITALS
SYSTOLIC BLOOD PRESSURE: 138 MMHG | RESPIRATION RATE: 18 BRPM | BODY MASS INDEX: 42.07 KG/M2 | DIASTOLIC BLOOD PRESSURE: 65 MMHG | TEMPERATURE: 99 F | HEART RATE: 81 BPM | OXYGEN SATURATION: 95 % | WEIGHT: 230 LBS

## 2025-02-25 DIAGNOSIS — M25.512 ACUTE PAIN OF LEFT SHOULDER: Primary | ICD-10-CM

## 2025-02-25 DIAGNOSIS — M25.512 LEFT SHOULDER PAIN, UNSPECIFIED CHRONICITY: Primary | ICD-10-CM

## 2025-02-25 PROCEDURE — 99283 EMERGENCY DEPT VISIT LOW MDM: CPT | Mod: 25

## 2025-02-25 RX ORDER — HYDROCODONE BITARTRATE AND ACETAMINOPHEN 5; 325 MG/1; MG/1
1 TABLET ORAL EVERY 6 HOURS PRN
Qty: 8 TABLET | Refills: 0 | Status: SHIPPED | OUTPATIENT
Start: 2025-02-25

## 2025-02-26 ENCOUNTER — TELEPHONE (OUTPATIENT)
Dept: PSYCHIATRY | Facility: CLINIC | Age: 75
End: 2025-02-26
Payer: MEDICARE

## 2025-02-26 ENCOUNTER — TELEPHONE (OUTPATIENT)
Dept: INTERNAL MEDICINE | Facility: CLINIC | Age: 75
End: 2025-02-26
Payer: MEDICARE

## 2025-02-26 NOTE — TELEPHONE ENCOUNTER
Left message on her voice mail of referrals       Pt slipped down off th bed yesterday could not get up daughter took  her to ER did xrays no fx put sling on and said to see orthopedic need referral has [facility] and she is asking for Home Health service also         ----- Message from "[employer] sent at 2/26/2025  8:29 AM CST -----  Contact: JUSTIN GOLD [[id number]]  .Type:  Patient Requesting CallWho Called:JUSTIN GOLD [[id number]]Does the patient know what this is regarding?:pt fell 2/25 , pt states she can barely walk Would the patient rather a call back or a response via [facility]? [employer] Call Back Number:.[phone] (home) Additional Information:

## 2025-02-26 NOTE — TELEPHONE ENCOUNTER
----- Message from [employer] sent at 2/26/2025  9:56 AM CST -----  Contact: JUSTIN GOLD [[id number]]  .Type:  Patient Requesting CallWho Called:JUSTIN GOLD [[id number]]Does the patient know what this is regarding?:RESCHEDULE 2/26 APPT Would the patient rather a call back or a response via [facility]? [employer] Call Back Number:.[phone] (home) Additional Information:

## 2025-02-26 NOTE — ED PROVIDER NOTES
Encounter Date: 2/25/2025       History     Chief Complaint   Patient presents with    Shoulder Pain     Pt with history of arthritis c/o left shoulder pain present since yesterday afternoon.  Pt does not know the cause.     74-year-old female presents the emergency department for left shoulder pain.  Patient reports pain began several days ago.  Patient reports pain worsened yesterday.  Patient reports pain worsens with range of motion.  Patient believes that she over exerted her shoulder doing exercises several days ago.  Patient denies any fever, chills, chest pain, shortness of breath, back pain, abdominal pain, nausea, vomiting, and all other concerns at this time.    The history is provided by the patient. No  was used.     Review of patient's allergies indicates:   Allergen Reactions    Repatha pushtronex [evolocumab]      headache    Aspirin Palpitations     Past Medical History:   Diagnosis Date    Arthritis     Cataract     Coronary artery disease     Diabetes mellitus 2008     am 01/15/2018 Insulin x 1 year    Diabetes mellitus, type 2     DM (diabetes mellitus) 2008     am 02/14/2020 Insulin x 4 years    Encounter for blood transfusion     Glaucoma     Hypertension     Insomnia     Macular degeneration     Old MI (myocardial infarction) 2/12/2024    Vaginal yeast infection      Past Surgical History:   Procedure Laterality Date    abdominal laparoscopy       ADENOIDECTOMY  1965    Tonsils removed also    ANGIOGRAM, CORONARY, WITH LEFT HEART CATHETERIZATION N/A 04/02/2024    Procedure: Angiogram, Coronary, with Left Heart Cath;  Surgeon: Shree Espinoza MD;  Location: [facility] CATH LAB;  Service: Cardiology;  Laterality: N/A;    ANGIOGRAM, CORONARY, WITH LEFT HEART CATHETERIZATION N/A 04/01/2024    Procedure: Angiogram, Coronary, with Left Heart Cath;  Surgeon: Shree Espinoza MD;  Location: [facility] CATH LAB;  Service: Cardiology;  Laterality: N/A;    BREAST BIOPSY Left 1998     benign    BREAST SURGERY  1998 biopsy lt breast    CATARACT EXTRACTION Bilateral [id number]    [facility] in [place]    Cataract Surgery Bilateral     Laser (YAG)    COLON SURGERY  2004    COLONOSCOPY N/A 05/05/2021    Procedure: COLONOSCOPY;  Surgeon: Shawn Rogers III, MD;  Location: [facility] ENDO;  Service: Endoscopy;  Laterality: N/A;    COLONOSCOPY N/A 06/30/2021    Procedure: COLONOSCOPY;  Surgeon: Memo Merida MD;  Location: [facility] ENDO;  Service: Endoscopy;  Laterality: N/A;    CORONARY STENT PLACEMENT N/A 04/01/2024    Procedure: INSERTION, STENT, CORONARY ARTERY;  Surgeon: Shree Espinoza MD;  Location: [facility] CATH LAB;  Service: Cardiology;  Laterality: N/A;    ESOPHAGOGASTRODUODENOSCOPY N/A 11/29/2019    Procedure: ESOPHAGOGASTRODUODENOSCOPY (EGD);  Surgeon: Shawn Rogers III, MD;  Location: [facility];  Service: Endoscopy;  Laterality: N/A;    ESOPHAGOGASTRODUODENOSCOPY N/A 05/05/2021    Procedure: EGD (ESOPHAGOGASTRODUODENOSCOPY);  Surgeon: Shawn Rogers III, MD;  Location: [facility];  Service: Endoscopy;  Laterality: N/A;    EYE SURGERY      IVUS, CORONARY  04/02/2024    Procedure: IVUS, Coronary;  Surgeon: Shree Espinoza MD;  Location: [facility] CATH LAB;  Service: Cardiology;;    PERCUTANEOUS CORONARY INTERVENTION, ARTERY N/A 04/01/2024    Procedure: Percutaneous coronary intervention;  Surgeon: Shree Espinoza MD;  Location: [facility] CATH LAB;  Service: Cardiology;  Laterality: N/A;    PTCA, SINGLE VESSEL  04/01/2024    Procedure: PTCA, Single Vessel;  Surgeon: Shree Espinoza MD;  Location: [facility] CATH LAB;  Service: Cardiology;;    SMALL INTESTINE SURGERY  2004    Exploratory stomach large and small intestines    TONSILLECTOMY      TUBAL LIGATION       Family History   Problem Relation Name Age of Onset    Heart disease Mother Jillian Jo         Open heart surgery    Cataracts Mother Jillian Jo     Macular degeneration Mother Jillian Jo     Glaucoma Mother Jillian  Sandro     Arthritis Mother Jillian Jo     Hearing loss Mother Jillian Jo     Hypertension Mother Jillian Jo     Kidney disease Mother Jillian Jo     Stroke Mother Jillian Jo     Vision loss Mother Jillian Jo     Diabetes Sister La Jo     Heart disease Sister La Jo         CAD    Cataracts Sister La Jo     Depression Sister La Jo         Depression    Hypertension Sister La Jo     Diabetes Sister Ericaja Kruegerouge     Hearing loss Sister Erica Lerouge     Hypertension Sister Erica Lerouge     Diabetes Sister      Cancer Son Andrea Trascher III         testicular     Arthritis Son Andrea Trascher III         Because of chemotherapy at 19 he has neuropathy and arthritis    Hearing loss Son Andrea Trascher III         Because of chemotherapy    Cancer Maternal Aunt Etta Marquez         Tumor in chest wall    Heart disease Maternal Grandfather Leonides Santos         Pacemaker    Arthritis Maternal Grandfather Leonides Sanots     Hearing loss Maternal Grandfather Leonides Santos     Hypertension Maternal Grandfather Leonides Santos     Hypertension Maternal Grandmother Helene Santos     Kidney disease Maternal Grandmother Helene Santos     Stroke Maternal Grandmother Helene Santos     Alcohol abuse Daughter Kaye Trascher     Asthma Daughter Kaye Trascher     Depression Daughter Kaye Trascher         Psysophrenia    Drug abuse Daughter Kaye Trascher         Not now but earlier alcohol and drug abuse    Hypertension Daughter Kaye Trascher     Mental illness Daughter Kaye Trascher         Psysophrenia    Cancer Son Andrea Trascher III will         Testicular cancer    Depression Son Andrea Trascher III will         Major depressive disorder like me    Cancer Maternal Aunt Carmella michelet Enrique         Lung cancer    Depression Sister Maribell Hull         May be bipolar    Diabetes Sister Maribell Hull     Heart disease Sister  Maribell Hull         Open heart surgery    Hypertension Sister Maribell Hull     Hypertension Brother Riky Jo      Social History[1]  Review of Systems   Constitutional:  Negative for fever.   HENT:  Negative for sore throat.    Respiratory:  Negative for shortness of breath.    Cardiovascular:  Negative for chest pain.   Gastrointestinal:  Negative for abdominal pain, nausea and vomiting.   Genitourinary:  Negative for dysuria.   Musculoskeletal:  Positive for arthralgias. Negative for back pain.   Skin:  Negative for rash.   Neurological:  Negative for weakness.   Hematological:  Does not bruise/bleed easily.       Physical Exam     Initial Vitals [02/25/25 1229]   BP Pulse Resp Temp SpO2   139/63 81 18 98.6 °F (37 °C) 95 %      MAP       --         Physical Exam    Nursing note and vitals reviewed.  Constitutional: She appears well-developed and well-nourished. She is not diaphoretic. No distress.   HENT:   Head: Normocephalic and atraumatic.   Eyes: Right eye exhibits no discharge. Left eye exhibits no discharge.   Neck: Neck supple.   Normal range of motion.  Cardiovascular:  Normal rate.           Pulmonary/Chest: No respiratory distress.   Abdominal: She exhibits no distension.   Musculoskeletal:      Cervical back: Normal range of motion and neck supple.      Comments: Decreased range of motion of the left shoulder secondary to pain.  No obvious deformity.  Distal pulses 2+.  Neurovascularly intact.     Neurological: She is alert and oriented to person, place, and time. She has normal strength.   Skin: Skin is warm and dry.   Psychiatric: She has a normal mood and affect. Her behavior is normal. Thought content normal.         ED Course   Procedures  Labs Reviewed - No data to display       Imaging Results              X-Ray Shoulder 2 or More Views Left (Final result)  Result time 02/25/25 13:24:14      Final result by Bronson Lamb MD (02/25/25 13:24:14)                   Impression:       1.  Negative for acute process.    2.  Stable findings as noted above.      Electronically signed by: Bronson Lamb MD  Date:    02/25/2025  Time:    13:24               Narrative:    EXAMINATION:  XR SHOULDER COMPLETE 2 OR MORE VIEWS LEFT    CLINICAL HISTORY:  left shoulder pain;    TECHNIQUE:  Three views of the left shoulder were performed.    COMPARISON  December 21, 2022    FINDINGS:  The glenohumeral joint and acromioclavicular joint are well aligned.  Coracoclavicular distance is normal.  Cystic degenerative changes of the greater tuberosity again seen, with mild degenerative changes of the acromioclavicular joint and spurring of the inferior glenoid rim again seen.    Negative for fracture or dislocation.    Visualized portions of the chest remain clear.  Tortuous aorta and marginal spondylosis again seen.                                       Medications - No data to display  Medical Decision Making  Differential diagnosis  Fracture, dislocation, strain, radiculopathy    Amount and/or Complexity of Data Reviewed  Radiology: ordered.  Discussion of management or test interpretation with external provider(s): Sling applied to left shoulder by nursing staff.  Patient to follow up with ortho and return to the ER for any worsening or concerns    Risk  Prescription drug management.                                      Clinical Impression:  Final diagnoses:  [M25.512] Acute pain of left shoulder (Primary)          ED Disposition Condition    Discharge Stable          ED Prescriptions       Medication Sig Dispense Start Date End Date Auth. Provider    HYDROcodone-acetaminophen (NORCO) 5-325 mg per tablet Take 1 tablet by mouth every 6 (six) hours as needed for Pain. 8 tablet 2/25/2025 -- Sebastian Lal, NP          Follow-up Information       Follow up With Specialties Details Why Contact [facility]    Clinic, [facility]   As needed [id number]  [place]  [address]  [phone]      [facility] Emergency Dept. Emergency Medicine  If symptoms worsen [address] [id number] [phone]               [1]   Social History  Tobacco Use    Smoking status: Former     Current packs/day: 0.00     Average packs/day: 1 pack/day for 36.5 years (36.5 ttl pk-yrs)     Types: Cigarettes     Start date:      Quit date: 2003     Years since quittin.6     Passive exposure: Past    Smokeless tobacco: Never    Tobacco comments:     Been quit 19 years. I smoked 35 years approx a pack a day   Substance Use Topics    Alcohol use: No    Drug use: Never        Sebastian Lal, EDGAR  [id number]

## 2025-02-26 NOTE — TELEPHONE ENCOUNTER
The patient was scheduled for a virtual appointment.  When she did not connect, I called her.  She indicates that she is notably fatigued from being in the emergency department yesterday and asks if the appointment can be scheduled.  The staff will reschedule with the patient.

## 2025-02-27 ENCOUNTER — TELEPHONE (OUTPATIENT)
Dept: PSYCHIATRY | Facility: CLINIC | Age: 75
End: 2025-02-27
Payer: MEDICARE

## 2025-02-27 ENCOUNTER — TELEPHONE (OUTPATIENT)
Dept: SPORTS MEDICINE | Facility: CLINIC | Age: 75
End: 2025-02-27
Payer: MEDICARE

## 2025-02-27 NOTE — TELEPHONE ENCOUNTER
----- Message from Irena sent at 2/27/2025 10:21 AM CST -----  Contact: Christine  .Patient is calling to speak with the nurse regarding orders  . Reports pt hurt left shoulder and needing help at home. Pt states would like home health orders and the x rays from the ER is in the pts [facility]. Please give patient a call back at .[phone]

## 2025-02-27 NOTE — TELEPHONE ENCOUNTER
----- Message from Summer sent at 2/27/2025  8:35 AM CST -----  Contact: Christine  Type:  Sooner Apoointment RequestCaller is requesting a sooner appointment.  Caller declined first available appointment listed below.  Caller will not accept being placed on the waitlist and is requesting a message be sent to doctor.Name of Caller: Christine When is the first available appointment? N/ASymptoms: Reschedule her Virtual visit Would the patient rather a call back or a response via [facility]? Call back Best Call Back Number:[phone] Information: N [id number]/ Sourav Alvarenga

## 2025-02-27 NOTE — TELEPHONE ENCOUNTER
I spoke with Mrs. King, she states that she needs help in her home, she is requesting a referral for home health. I suggested that patient call her primary care physician for this referral. Patient DENISE

## 2025-03-02 ENCOUNTER — HOSPITAL ENCOUNTER (EMERGENCY)
Facility: HOSPITAL | Age: 75
Discharge: HOME | End: 2025-03-02
Attending: FAMILY MEDICINE
Payer: MEDICARE

## 2025-03-02 ENCOUNTER — NURSE TRIAGE (OUTPATIENT)
Dept: ADMINISTRATIVE | Facility: CLINIC | Age: 75
End: 2025-03-02
Payer: MEDICARE

## 2025-03-02 VITALS
RESPIRATION RATE: 21 BRPM | HEART RATE: 70 BPM | OXYGEN SATURATION: 99 % | TEMPERATURE: 99 F | SYSTOLIC BLOOD PRESSURE: 187 MMHG | BODY MASS INDEX: 44.19 KG/M2 | DIASTOLIC BLOOD PRESSURE: 82 MMHG | WEIGHT: 241.63 LBS

## 2025-03-02 DIAGNOSIS — F41.9 ANXIETY: Primary | ICD-10-CM

## 2025-03-02 LAB
ALBUMIN SERPL BCP-MCNC: 2.9 G/DL (ref 3.5–5.2)
ALP SERPL-CCNC: 47 U/L (ref 40–150)
ALT SERPL W/O P-5'-P-CCNC: 21 U/L (ref 10–44)
ANION GAP SERPL CALC-SCNC: 9 MMOL/L (ref 8–16)
AST SERPL-CCNC: 17 U/L (ref 10–40)
BASOPHILS # BLD AUTO: 0.04 K/UL (ref 0–0.2)
BASOPHILS NFR BLD: 0.5 % (ref 0–1.9)
BILIRUB SERPL-MCNC: 0.6 MG/DL (ref 0.1–1)
BUN SERPL-MCNC: 18 MG/DL (ref 8–23)
CALCIUM SERPL-MCNC: 9.6 MG/DL (ref 8.7–10.5)
CHLORIDE SERPL-SCNC: 103 MMOL/L (ref 95–110)
CO2 SERPL-SCNC: 24 MMOL/L (ref 23–29)
CREAT SERPL-MCNC: 0.8 MG/DL (ref 0.5–1.4)
DIFFERENTIAL METHOD BLD: ABNORMAL
EOSINOPHIL # BLD AUTO: 0 K/UL (ref 0–0.5)
EOSINOPHIL NFR BLD: 0.5 % (ref 0–8)
ERYTHROCYTE [DISTWIDTH] IN BLOOD BY AUTOMATED COUNT: 12 % (ref 11.5–14.5)
EST. GFR  (NO RACE VARIABLE): >60 ML/MIN/1.73 M^2
GLUCOSE SERPL-MCNC: 127 MG/DL (ref 70–110)
HCT VFR BLD AUTO: 30.9 % (ref 37–48.5)
HGB BLD-MCNC: 10.1 G/DL (ref 12–16)
IMM GRANULOCYTES # BLD AUTO: 0.03 K/UL (ref 0–0.04)
IMM GRANULOCYTES NFR BLD AUTO: 0.4 % (ref 0–0.5)
LYMPHOCYTES # BLD AUTO: 1 K/UL (ref 1–4.8)
LYMPHOCYTES NFR BLD: 12 % (ref 18–48)
MCH RBC QN AUTO: 30.6 PG (ref 27–31)
MCHC RBC AUTO-ENTMCNC: 32.7 G/DL (ref 32–36)
MCV RBC AUTO: 94 FL (ref 82–98)
MONOCYTES # BLD AUTO: 1.1 K/UL (ref 0.3–1)
MONOCYTES NFR BLD: 13.2 % (ref 4–15)
NEUTROPHILS # BLD AUTO: 6.2 K/UL (ref 1.8–7.7)
NEUTROPHILS NFR BLD: 73.4 % (ref 38–73)
NRBC BLD-RTO: 0 /100 WBC
PLATELET # BLD AUTO: 261 K/UL (ref 150–450)
PMV BLD AUTO: 10.9 FL (ref 9.2–12.9)
POTASSIUM SERPL-SCNC: 4.1 MMOL/L (ref 3.5–5.1)
PROT SERPL-MCNC: 6.6 G/DL (ref 6–8.4)
RBC # BLD AUTO: 3.3 M/UL (ref 4–5.4)
SODIUM SERPL-SCNC: 136 MMOL/L (ref 136–145)
WBC # BLD AUTO: 8.42 K/UL (ref 3.9–12.7)

## 2025-03-02 PROCEDURE — 93005 ELECTROCARDIOGRAM TRACING: CPT

## 2025-03-02 PROCEDURE — 93010 ELECTROCARDIOGRAM REPORT: CPT | Mod: ,,, | Performed by: INTERNAL MEDICINE

## 2025-03-02 PROCEDURE — 85025 COMPLETE CBC W/AUTO DIFF WBC: CPT | Performed by: FAMILY MEDICINE

## 2025-03-02 PROCEDURE — 80053 COMPREHEN METABOLIC PANEL: CPT | Performed by: FAMILY MEDICINE

## 2025-03-02 PROCEDURE — 99284 EMERGENCY DEPT VISIT MOD MDM: CPT | Mod: 25

## 2025-03-02 NOTE — TELEPHONE ENCOUNTER
Spoke with pt who reports that she is having weakness, and barely able to walk. States that she became really weak around dinner time, and went to bed shortly after. States that she began having hallucinations, and noted that she was foaming at the mouth. She states that right now she feels like she has brain fog. Did take hydrocodone due to left arm pain last night. Advised to call 911. She verbalized understanding.    Reason for Disposition   [1] SEVERE weakness (i.e., unable to walk or barely able to walk, requires support) AND [2] new-onset or getting worse    Additional Information   Negative: SEVERE difficulty breathing (e.g., struggling for each breath, speaks in single words)   Negative: Shock suspected (e.g., cold/pale/clammy skin, too weak to stand, low BP, rapid pulse)   Negative: Difficult to awaken or acting confused (e.g., disoriented, slurred speech)   Negative: [1] Fainted > 15 minutes ago AND [2] still feels too weak or dizzy to stand    Protocols used: Weakness (Generalized) and Fatigue-A-AH

## 2025-03-02 NOTE — ED PROVIDER NOTES
"SCRIBE #1 NOTE: I, Jose Ramon Thapa, am scribing for, and in the presence of, Yojana Quiroz MD. I have scribed the entire note.       History     Chief Complaint   Patient presents with    Anxiety     Recent ER visit for L Shoulder Pain. Taking Oxycodone, prescribed at that visit. Pt states that she "feels funny" and having anxiety about being alone and insomnia. No pain reported at this time.      Review of patient's allergies indicates:   Allergen Reactions    Repatha pushtronex [evolocumab]      headache    Aspirin Palpitations         History of Present Illness     HPI    3/2/2025, 11:05 AM  History obtained from the patient and medical records      History of Present Illness: Christine Jo is a 74 y.o. female patient with a PMHx of glaucoma, HTN, arthritis, cataract, DM Type 2, MI, anxiety, GERD, and CAD who presents to the Emergency Department for evaluation of worsening anxiety which began the past few days. Pt states she had a recent ER visit for left shoulder pain and was prescribed hydrocodone. Pt states that she "feels funny" and having anxiety about being alone. Pt also states she is having "crazy dreams" after taking the hydrocodone. Pt also states she has been having visual hallucinations. Pt states she has a psychiatrist but missed her most recent appointment. Symptoms are constant and moderate in severity. No mitigating or exacerbating factors reported. No associated sxs reported. Patient denies any SI. No prior Tx specified.  No further complaints or concerns at this time.       Arrival mode: Ambulance Service    PCP: Jose Szymanski MD        Past Medical History:  Past Medical History:   Diagnosis Date    Arthritis     Cataract     Coronary artery disease     Diabetes mellitus 2008     am 01/15/2018 Insulin x 1 year    Diabetes mellitus, type 2     DM (diabetes mellitus) 2008     am 02/14/2020 Insulin x 4 years    Encounter for blood transfusion     Glaucoma     " Hypertension     Insomnia     Macular degeneration     Old MI (myocardial infarction) 2/12/2024    Vaginal yeast infection        Past Surgical History:  Past Surgical History:   Procedure Laterality Date    abdominal laparoscopy       ADENOIDECTOMY  1965    Tonsils removed also    ANGIOGRAM, CORONARY, WITH LEFT HEART CATHETERIZATION N/A 04/02/2024    Procedure: Angiogram, Coronary, with Left Heart Cath;  Surgeon: Shree Espinoza MD;  Location: [facility] CATH LAB;  Service: Cardiology;  Laterality: N/A;    ANGIOGRAM, CORONARY, WITH LEFT HEART CATHETERIZATION N/A 04/01/2024    Procedure: Angiogram, Coronary, with Left Heart Cath;  Surgeon: Shree Espinoza MD;  Location: [facility] CATH LAB;  Service: Cardiology;  Laterality: N/A;    BREAST BIOPSY Left 1998    benign    BREAST SURGERY  1998 biopsy lt breast    CATARACT EXTRACTION Bilateral [id number]    [facility] [place]    Cataract Surgery Bilateral     Laser (YAG)    COLON SURGERY  2004    COLONOSCOPY N/A 05/05/2021    Procedure: COLONOSCOPY;  Surgeon: Shawn Rogers III, MD;  Location: [facility];  Service: Endoscopy;  Laterality: N/A;    COLONOSCOPY N/A 06/30/2021    Procedure: COLONOSCOPY;  Surgeon: Memo Merida MD;  Location: [facility];  Service: Endoscopy;  Laterality: N/A;    CORONARY STENT PLACEMENT N/A 04/01/2024    Procedure: INSERTION, STENT, CORONARY ARTERY;  Surgeon: Shree Espinoza MD;  Location: [facility] CATH LAB;  Service: Cardiology;  Laterality: N/A;    ESOPHAGOGASTRODUODENOSCOPY N/A 11/29/2019    Procedure: ESOPHAGOGASTRODUODENOSCOPY (EGD);  Surgeon: Shawn Rogers III, MD;  Location: [facility];  Service: Endoscopy;  Laterality: N/A;    ESOPHAGOGASTRODUODENOSCOPY N/A 05/05/2021    Procedure: EGD (ESOPHAGOGASTRODUODENOSCOPY);  Surgeon: Shawn Rogers III, MD;  Location: [facility];  Service: Endoscopy;  Laterality: N/A;    EYE SURGERY      IVUS, CORONARY  04/02/2024    Procedure: IVUS, Coronary;  Surgeon: Olga  MD Shree;  Location: [facility] CATH LAB;  Service: Cardiology;;    PERCUTANEOUS CORONARY INTERVENTION, ARTERY N/A 04/01/2024    Procedure: Percutaneous coronary intervention;  Surgeon: Shree Espinoza MD;  Location: [facility] CATH LAB;  Service: Cardiology;  Laterality: N/A;    PTCA, SINGLE VESSEL  04/01/2024    Procedure: PTCA, Single Vessel;  Surgeon: Shree Espinoza MD;  Location: [facility] CATH LAB;  Service: Cardiology;;    SMALL INTESTINE SURGERY  2004    Exploratory stomach large and small intestines    TONSILLECTOMY      TUBAL LIGATION           Family History:  Family History   Problem Relation Name Age of Onset    Heart disease Mother Jillian Jo         Open heart surgery    Cataracts Mother Jillian Jo     Macular degeneration Mother Jillian Jo     Glaucoma Mother Jillian Jo     Arthritis Mother Jillian Jo     Hearing loss Mother Jillian Jo     Hypertension Mother Jillian Jo     Kidney disease Mother Jillian Jo     Stroke Mother Jillian Jo     Vision loss Mother Jillian Jo     Diabetes Sister La Jo     Heart disease Sister La Jo         CAD    Cataracts Sister La Jo     Depression Sister La Jo         Depression    Hypertension Sister La Jo     Diabetes Sister Erica Lerouge     Hearing loss Sister Erica Lerouge     Hypertension Sister Erica Lerouge     Diabetes Sister      Cancer Son Andrea Trascher III         testicular     Arthritis Son Andrea Trascher III         Because of chemotherapy at 19 he has neuropathy and arthritis    Hearing loss Son Andrea Trascher III         Because of chemotherapy    Cancer Maternal Aunt Etta Marquez         Tumor in chest wall    Heart disease Maternal Grandfather Leonides Santos         Pacemaker    Arthritis Maternal Grandfather Leonides Santos     Hearing loss Maternal Grandfather Leonides Tom     Hypertension Maternal Grandfather [place] Tom     Hypertension  Maternal Grandmother Helene Santos     Kidney disease Maternal Grandmother Helene Santos     Stroke Maternal Grandmother Helene Santos     Alcohol abuse Daughter Kaye Martin     Asthma Daughter Kaye Martin     Depression Daughter Kaye Martin         Psysophrenia    Drug abuse Daughter Kaye Martin         Not now but earlier alcohol and drug abuse    Hypertension Daughter Kaye Martin     Mental illness Daughter Kaye Martin         Psysophrenia    Cancer Son Andrea Trascher III will         Testicular cancer    Depression Son Andrea Trascher III will         Major depressive disorder like me    Cancer Maternal Aunt Carmella Enrique         Lung cancer    Depression Sister Maribell Hull         May be bipolar    Diabetes Sister Maribell Hull     Heart disease Sister Maribell Hull         Open heart surgery    Hypertension Sister Maribell Hull     Hypertension Brother Riky Jo        Social History:  Social History     Tobacco Use    Smoking status: Former     Current packs/day: 0.00     Average packs/day: 1 pack/day for 36.5 years (36.5 ttl pk-yrs)     Types: Cigarettes     Start date:      Quit date: 2003     Years since quittin.7     Passive exposure: Past    Smokeless tobacco: Never    Tobacco comments:     Been quit 19 years. I smoked 35 years approx a pack a day   Substance and Sexual Activity    Alcohol use: No    Drug use: Never    Sexual activity: Never        Review of Systems     Review of Systems   Constitutional:  Negative for fever.   HENT:  Negative for sore throat.    Respiratory:  Negative for shortness of breath.    Cardiovascular:  Negative for chest pain.   Gastrointestinal:  Negative for nausea.   Genitourinary:  Negative for dysuria.   Musculoskeletal:  Negative for back pain.   Skin:  Negative for rash.   Neurological:  Negative for weakness.   Hematological:  Does not bruise/bleed easily.    Psychiatric/Behavioral:  Positive for hallucinations (VH) and sleep disturbance. Negative for suicidal ideas. The patient is nervous/anxious.    All other systems reviewed and are negative.     Physical Exam     Initial Vitals [03/02/25 1029]   BP Pulse Resp Temp SpO2   113/82 72 16 98.6 °F (37 °C) 95 %      MAP       --          Physical Exam  Nursing Notes and Vital Signs Reviewed.  Constitutional: Patient is in no acute distress. Well-developed and well-nourished.  Head: Atraumatic. Normocephalic.  Eyes: PERRL. EOM intact. Conjunctivae are not pale. No scleral icterus.  ENT: Mucous membranes are moist. Oropharynx is clear and symmetric.    Neck: Supple. Full ROM. No lymphadenopathy.  Cardiovascular: Regular rate. Regular rhythm. No murmurs, rubs, or gallops. Distal pulses are 2+ and symmetric.  Pulmonary/Chest: No respiratory distress. Clear to auscultation bilaterally. No wheezing or rales.  Abdominal: Soft and non-distended.  There is no tenderness.  No rebound, guarding, or rigidity. Good bowel sounds.  Genitourinary: No CVA tenderness  Musculoskeletal: Moves all extremities. No obvious deformities. No edema. No calf tenderness.  Skin: Warm and dry.  Neurological:  Alert, awake, and appropriate.  Normal speech.  No acute focal neurological deficits are appreciated.  Psychiatric: Anxious. Good eye contact. Appropriate in content.     ED Course   Procedures  ED Vital Signs:  Vitals:    03/02/25 1029 03/02/25 1115 03/02/25 1116 03/02/25 1130   BP: 113/82 (!) 151/81  (!) 174/77   Pulse: 72 66  65   Resp: 16 20  20   Temp: 98.6 °F (37 °C)      TempSrc: Oral      SpO2: 95% 98%  99%   Weight:   109.6 kg (241 lb 9.6 oz)     03/02/25 1200 03/02/25 1230 03/02/25 1300 03/02/25 1315   BP: (!) 157/71  (!) 184/84 (!) 184/79   Pulse: 63  65 65   Resp: 19 16 18 20   Temp:       TempSrc:       SpO2: 99% 97% 98% 98%   Weight:        03/02/25 1356   BP: (!) 187/82   Pulse: 70   Resp: (!) 21   Temp:    TempSrc:    SpO2: 99%    Weight:        Abnormal Lab Results:  Labs Reviewed   CBC W/ AUTO DIFFERENTIAL - Abnormal       Result Value    WBC 8.42      RBC 3.30 (*)     Hemoglobin 10.1 (*)     Hematocrit 30.9 (*)     MCV 94      MCH 30.6      MCHC 32.7      RDW 12.0      Platelets 261      MPV 10.9      Immature Granulocytes 0.4      Gran # (ANC) 6.2      Immature Grans (Abs) 0.03      Lymph # 1.0      Mono # 1.1 (*)     Eos # 0.0      Baso # 0.04      nRBC 0      Gran % 73.4 (*)     Lymph % 12.0 (*)     Mono % 13.2      Eosinophil % 0.5      Basophil % 0.5      Differential Method Automated     COMPREHENSIVE METABOLIC PANEL - Abnormal    Sodium 136      Potassium 4.1      Chloride 103      CO2 24      Glucose 127 (*)     BUN 18      Creatinine 0.8      Calcium 9.6      Total Protein 6.6      Albumin 2.9 (*)     Total Bilirubin 0.6      Alkaline Phosphatase 47      AST 17      ALT 21      eGFR >60      Anion Gap 9          All Lab Results:  Results for orders placed or performed during the hospital encounter of 03/02/25   CBC auto differential    Collection Time: 03/02/25 11:13 AM   Result Value Ref Range    WBC 8.42 3.90 - 12.70 K/uL    RBC 3.30 (L) 4.00 - 5.40 M/uL    Hemoglobin 10.1 (L) 12.0 - 16.0 g/dL    Hematocrit 30.9 (L) 37.0 - 48.5 %    MCV 94 82 - 98 fL    MCH 30.6 27.0 - 31.0 pg    MCHC 32.7 32.0 - 36.0 g/dL    RDW 12.0 11.5 - 14.5 %    Platelets 261 150 - 450 K/uL    MPV 10.9 9.2 - 12.9 fL    Immature Granulocytes 0.4 0.0 - 0.5 %    Gran # (ANC) 6.2 1.8 - 7.7 K/uL    Immature Grans (Abs) 0.03 0.00 - 0.04 K/uL    Lymph # 1.0 1.0 - 4.8 K/uL    Mono # 1.1 (H) 0.3 - 1.0 K/uL    Eos # 0.0 0.0 - 0.5 K/uL    Baso # 0.04 0.00 - 0.20 K/uL    nRBC 0 0 /100 WBC    Gran % 73.4 (H) 38.0 - 73.0 %    Lymph % 12.0 (L) 18.0 - 48.0 %    Mono % 13.2 4.0 - 15.0 %    Eosinophil % 0.5 0.0 - 8.0 %    Basophil % 0.5 0.0 - 1.9 %    Differential Method Automated    Comprehensive metabolic panel    Collection Time: 03/02/25 11:13 AM   Result Value Ref  Range    Sodium 136 136 - 145 mmol/L    Potassium 4.1 3.5 - 5.1 mmol/L    Chloride 103 95 - 110 mmol/L    CO2 24 23 - 29 mmol/L    Glucose 127 (H) 70 - 110 mg/dL    BUN 18 8 - 23 mg/dL    Creatinine 0.8 0.5 - 1.4 mg/dL    Calcium 9.6 8.7 - 10.5 mg/dL    Total Protein 6.6 6.0 - 8.4 g/dL    Albumin 2.9 (L) 3.5 - 5.2 g/dL    Total Bilirubin 0.6 0.1 - 1.0 mg/dL    Alkaline Phosphatase 47 40 - 150 U/L    AST 17 10 - 40 U/L    ALT 21 10 - 44 U/L    eGFR >60 >60 mL/min/1.73 m^2    Anion Gap 9 8 - 16 mmol/L   EKG 12-lead    Collection Time: 03/02/25 11:22 AM   Result Value Ref Range    QRS Duration 106 ms    [facility] QTC Calculation 417 ms     *Note: Due to a large number of results and/or encounters for the requested time period, some results have not been displayed. A complete set of results can be found in Results Review.       Imaging Results:  Imaging Results    None          The EKG was ordered, reviewed, and independently interpreted by the ED provider.  Interpretation time: 11:22  Rate: 66 BPM  Rhythm: normal sinus rhythm  Interpretation: Left anterior fascicular block. Minimal voltage criteria for LVH, may be normal variant ([place] product). Septal infarct, age undetermined. No STEMI.           The Emergency Provider reviewed the vital signs and test results, which are outlined above.     ED Discussion     1:31 PM:  consulted. Discussed pt's case with Shannan Johnson (/Case Management) who discussed with pt about home health.    1:32 PM: Reassessed pt at this time. Discussed with patient and/or family/caretaker all pertinent ED information and results. Discussed pt dx and plan of tx. Gave the patient all f/u and return to the ED instructions. All questions and concerns were addressed at this time. Patient and/or family/caretaker expresses understanding of information and instructions, and is comfortable with plan to discharge. Pt is stable for discharge.     I discussed with patient and/or  family/caretaker that evaluation in the ED does not suggest any emergent or life threatening medical conditions requiring immediate intervention beyond what was provided in the ED, and I believe patient is safe for discharge.  Regardless, an unremarkable evaluation in the ED does not preclude the development or presence of a serious of life threatening condition. As such, I instructed that the patient is to return immediately for any worsening or change in current symptoms.       Medical Decision Making  73 yo female c/o anxiety at home that has worsened over past several weeks after starting norco for a shoulder injury.  She reports disturbing dreams and auditory and visual hallucinations x 2 episodes after taking norco.  She is tearful and says she lives alone, but does have a daughter who lives nearby but she works and is busy most of the time.  She says she falls a lot at home and that's how she hurt her shoulder.  She is fearful of being alone.  She denies any SI/HI.  She has a psychiatrist that follows her but she didn't participate in her last appointment, because she was fatigued from recent ED visit. She has been on cymbalta for a long time per patient.  Labs are unremarkable.   consulted.  Will arrange home health.    Amount and/or Complexity of Data Reviewed  External Data Reviewed: labs, radiology, ECG and notes.  Labs: ordered. Decision-making details documented in ED Course.  ECG/medicine tests: ordered and independent interpretation performed. Decision-making details documented in ED Course.    Risk  OTC drugs.  Prescription drug management.    Critical Care  Total time providing critical care: 35 minutes              ED Medication(s):  Medications - No data to display    Discharge Medication List as of 3/2/2025  1:29 PM           Follow-up Information       Schedule an appointment as soon as possible for a visit  with Jose Szymanski MD.    Specialty: Family Medicine  Contact  information:  [id number] [facility] [address]  [phone]                                 Scribe Attestation:   Scribe #1: I performed the above scribed service and the documentation accurately describes the services I performed. I attest to the accuracy of the note.     Attending:   Physician Attestation Statement for Scribe #1: I, Yojana Quiroz MD, personally performed the services described in this documentation, as scribed by Jose Ramon Thapa, in my presence, and it is both accurate and complete.           Clinical Impression       ICD-10-CM ICD-9-CM   1. Anxiety  F41.9 300.00       Disposition:   Disposition: Discharged  Condition: Stable       Yojana Quiroz MD  03/04/25 1037

## 2025-03-02 NOTE — PLAN OF CARE
"   03/02/25 6471   Post-Acute Status   Post-Acute Authorization Home Health   Home Health Status Referrals Sent     Consult for home health noted. Met with patient to discuss needs.  Patient reports that she lives in a [address] apartment complex but is lonely and would like to go to an assisted living.  CM explained that assisted living in not covered by insurance and family would need to arrange.  Patient stated she could not afford to pay and asked if she could go to an "psych hospital for a couple of months for my anxiety."  CM explained that inpt psych treatment is short term, not several months and that per MD she did not meet criteria for admission to inpt psych.  Verbalized understanding.  Patient reports that she is lonely and doesn't leave the house.  Discussed  on Aging.  Patient reports "my daughter signed me up with them last year but I never go."  Discussed home health, patient in agreement with referral to [facility].   Discussed with MD, will add SW consult to referral.  "

## 2025-03-02 NOTE — ED NOTES
Hypertensive, but okay for DC with current BP per Dr. Quiroz. Pt. States that she normally takes carvedilol 50mg every morning and hasn't taken it yet - will take when she gets home.

## 2025-03-02 NOTE — ED NOTES
DC indicated per MD. DC instructions explained to pt., who verbalized understanding. Pt. Helped to adorn underwear, pants, and shoes. NAD, VSS, resp. E/u. AAOx4. Escorted to lobby via  without incident to await ride home. Pt. Has all personal belongings with her, including cell phone, , and purse. Copy of DC instructions provided to patient with checkout. Security made aware that pt's ride on the way and verbalizes intentions to help pt. To car upon arrival of ride.

## 2025-03-03 ENCOUNTER — TELEPHONE (OUTPATIENT)
Dept: PSYCHIATRY | Facility: CLINIC | Age: 75
End: 2025-03-03
Payer: MEDICARE

## 2025-03-03 ENCOUNTER — PATIENT MESSAGE (OUTPATIENT)
Dept: PSYCHIATRY | Facility: CLINIC | Age: 75
End: 2025-03-03
Payer: MEDICARE

## 2025-03-03 LAB
OHS QRS DURATION: 106 MS
OHS QTC CALCULATION: 417 MS

## 2025-03-03 NOTE — TELEPHONE ENCOUNTER
----- Message from Med Assistant Ashanti sent at 3/3/2025  8:47 AM CST -----  Regarding: FW: appointment access  Contact: Christine  Good morning, She can be scheduled for the next available f/u.  ----- Message -----  From: Cheryle Mccarthy MA  Sent: 2/27/2025   6:07 PM CST  To: Ashanti Fitzgerald MA  Subject: FW: appointment access                           There are no openings until 5/9.  Do you want to check with Dr MON to see if he wants to use one of his 7day change spots?  ----- Message -----  From: Lashawn Farias  Sent: 2/27/2025   4:33 PM CST  To: Lyle Benjamin Staff  Subject: appointment access                               Type:  Sooner Apoointment RequestCaller is requesting a sooner appointment.  Caller declined first available appointment listed below.  Caller will not accept being placed on the waitlist and is requesting a message be sent to doctor.Name of Caller: Stephanie is the first available appointment?Symptoms:Would the patient rather a call back or a response via [facility]? Call Best Call Back Number: [phone] Information:  Christine had to cancel her virtual visit on 02/26/2025 due to illness and want to be contacted to be rescheduled with Dr. Lyle gaming. She would like a virtual visit soon

## 2025-03-04 ENCOUNTER — PATIENT MESSAGE (OUTPATIENT)
Dept: HEMATOLOGY/ONCOLOGY | Facility: CLINIC | Age: 75
End: 2025-03-04
Payer: MEDICARE
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
## 2025-03-05 DIAGNOSIS — K64.8 INTERNAL HEMORRHOID, BLEEDING: ICD-10-CM

## 2025-03-05 RX ORDER — HYDROCORTISONE 25 MG/G
CREAM TOPICAL 2 TIMES DAILY
Qty: 28 G | Refills: 2 | Status: SHIPPED | OUTPATIENT
Start: 2025-03-05

## 2025-03-05 NOTE — PROGRESS NOTES
The patient location is: Patient's home . Patient reported  that his/her location at the time of this visit was in the [facility].    Visit type: Virtual visit with synchronous audio and video     Each patient to whom he or she provides medical services by telemedicine is: (1) informed of the relationship between the physician and patient and the respective role of any other health care provider with respect to management of the patient; and (2) notified that he or she may decline to receive medical services by telemedicine and may withdraw from such care at any time.    Patient was informed that I am a physician who is licensed in the [facility]:  Sourav Alvarenga MD:  Employed by [facility]     Patient was instructed that If technology issues arise, he/she may  call our office phone at: [phone].    Pt informed that if he/she is ever in crisis (or has acute concerns), dial 911 or go to nearest Emergency Room (ER).    Pt informed that if questions related to privacy practices arise, contact [facility] Department: [phone].    Understanding Expressed. No questions.        Christine Jo   1950   03/06/2025        CURRENT PRESENTATION  (psychiatric biopsychosocial evaluation; plan for treatment):   Last visit 12/20/2024 documentation includes:   ...the patient reports feeling relatively improved she feels that the medications are helpful and feels that seeing the individual  will also be helpful.       Less depressed, less anxious, sleeping well.  No yanick, hypomania, psychosis, suicidal ideation, thoughts of self-harm, homicidal ideation, thoughts of harm towards others, feelings of aggression.  Including with specific questioning, no side effects of Cymbalta or temazepam; she requests that there be no medication changes because of past in tolerability and current feelings of improvement.  Encounter Diagnoses   Name Primary?    Depression, major,  recurrent, moderate Yes    Anxiety disorder, unspecified type      Insomnia, unspecified type     PLAN:   Follow up in 2 months.    Psychiatry Medication:  Continue Cymbalta 60 mg daily and temazepam 30 mg nightly.     indicates the patient continues on gabapentin and that the last 90 day refill of temazepam 30 mg was on December 23, with timely refills prior to that.    In the current session, the patient reports that she has been depressed and feels anxious when she is relatively more depressed.  No other anxiety.  Sleep is poor some nights but largely intact.  Appetite is intact.  Never with suicidal ideation or thoughts of self-harm.  No elevated moods, irritable moods, manic signs or symptoms, psychosis, thoughts of harm towards others, or feelings of aggression.  Including with extensive and specific questioning, the patient denies any side effects of medications.    Interim history:  Living situation/supports: The patient speaks positively of her son and daughter and their families and of her daughter in the group home.  Last visit-  She complains about her son and daughter being hard on her, not wanting to talk with her about her problems, and her feelings that they expect too much of her; however, she indicates that in some ways they are making more efforts and that she has learned that she should accept the positives in the relationships and take advantage of opportunities to interact with them more.  She appreciates all that her daughter Margy does for her, and she comments that her son Andrea Will sometimes takes her to [place] [employer] with his family and will be taking her to [place] with them for [place].  She says that the son's 10-year-old daughter is a  at mass and that seeing this means a lot to her.  She continues to speak with her daughter with schizoaffective disorder, in a group home in [place], on a daily basis and generally several times a day; she says that the importance of  "her to her daughter brings meaning to her life.  She worries about her schizoaffective daughter's 29-year-old son being in senior care and having about 1 year left of incarceration.  She says that she has 1 brother, who lives in [place] and suffers with alcoholism and depression; she reports that he has been reaching out more to her and seems to want more of a relationship.  She has 2 sisters in [place] and 1 sister in [place]; she feels close to them but comments how harsh her sister in [place] can be.  Previously-  The patient reports that her son sets limits with her.  She says that she is trying to improve the relationship with her daughter.  She says that her daughter does a lot for her but no longer asks her to go with her on outings; she admits that the daughter told her that she stopped asking because the patient consistently said no.  She says that when she is recovered from the fall, she will tell her daughter that she will begin accepting invitations.  The patient also says that she will keep her social work appointment next month but in October will take advantage of transportation to [place] on [address] and to [facility].  ...The patient reports that her daughter and her daughter's family who live about 1 mi away spend more time with her (the patient's) siblings in [place] in [place] than they do with the patient.  However, the patient admits that the daughter often extends invitations that the patient declines because of what she sees as her own limits with regards to physical conditions.  She does state that her daughter is always willing to take her on errands.  She describes her daughter as hard hearted."  The patient's goal is to accept more invitations from her daughter.  Historically  The patient lives alone in a subsidized apartment in [place].  She  an alcoholic  and then was  after 5 years of marriage, with her  dying in a motorcycle accident; both " were verbally abusive.  She has 3 children and 5 grandchildren.  She is close to her daughter with schizoaffective disorder, who lives in a group home in [place], and they talk daily.  She is also close to her daughter who lives 1 mi away, her oldest child, and the daughter's family.  She is close to her son and his family, who also live in [place].  She says that the daughter who lives near her helps her with a number of things and her son handles her finances.  She is close to her 4 half-siblings who live in [place] and [place].  She indicates that the half-siblings frequently invited her but she declined consistently such that they stopped and inviting her, but contact continues.  She says that her daughter and son have try to include her and sometimes she participates.  She says that she has declined her daughter's offers to drive her to visit siblings.  (I encouraged her to do more activities with her children, grandchildren, and siblings.)  Medical issues:  Follow-ups for anemia  Nonpsychotropic Medication:  Includes, per Epic, apixaban, carvedilol, clonidine, Plavix, Zetia, famotidine, Flonase, furosemide, gabapentin, Emgality, insulin, Imdur, Linzess, meclizine, nitroglycerin PRN pantoprazole, pravastatin, rimegepant, Entresto, Januvia  Allergies:   Review of patient's allergies indicates:   Allergen Reactions    Repatha pushtronex [evolocumab]      headache    Aspirin Palpitations     Alcohol use:  None  Other substance use:  None    Mental Status Exam:   Appearance:  Appropriately groomed  Orientation:  X4  Attitude:  Cooperative, engaged   Eye Contact:  Appropriate  Behavior:  Calm, appropriate  Speech:     Rate - WNL    Volume - WNL    Quantity - WNL    Tone - appropriately variable  Pressure - no  Thought Processes:  Goal-directed  Mood:  Depressed  Affect:  Appropriately variable, including ability to brighten at appropriate times  SI:  No, and no thoughts of self-harm  HI:  No, and no  thoughts of harm towards others  Paranoia:  No  Delusions:  No  Hallucinations:  No  Attention:  Intact over the course of the session  Cognition:  No deficits noted over the course of the session  Insight:  Intact   Judgment:  Intact  Impulse Control:  Intact       ASSESSMENT:   Encounter Diagnoses   Name Primary?    Depression, major, recurrent, moderate Yes    Anxiety disorder, unspecified type     Insomnia, unspecified type      PLAN:   Follow up in 6 weeks.    Psychiatry Medication:  With long discussion, the patient considers risk/benefit and requests a trial once again of Abilify 2 mg daily.  Continue Cymbalta 60 mg daily and temazepam 30 mg nightly.    Reviewed with patient:  Report side effects, other problems, or questions to the psychiatrist by way of the [facility] portal, [facility], or by calling [facility] at [phone].  Messages are checked during clinic hours only.  For urgent issues outside of clinic hours, call [phone] or go to an emergency department.  Follow up with primary care/MD specialist for continued monitoring of general health and wellness and any medical conditions.  Call [facility] at [phone] or use the [facility] portal if necessary for scheduling or rescheduling.  It is the responsibility of the patient to reschedule an appointment if an appointment has been canceled or missed.  Understanding was expressed; and no further concerns or questions were raised at this time.     [id number]  Prescription drug management and 2 or more stable chronic illnesses (moderate)        Large portions of this note were completed by way of voice recognition dictation software, and transcription errors are possible, such that specific information in the note should be considered in the context of the entire report.

## 2025-03-06 ENCOUNTER — PATIENT MESSAGE (OUTPATIENT)
Dept: INTERNAL MEDICINE | Facility: CLINIC | Age: 75
End: 2025-03-06
Payer: MEDICARE

## 2025-03-06 ENCOUNTER — OFFICE VISIT (OUTPATIENT)
Dept: PSYCHIATRY | Facility: CLINIC | Age: 75
End: 2025-03-06
Payer: MEDICARE

## 2025-03-06 DIAGNOSIS — Z79.4 CONTROLLED TYPE 2 DIABETES MELLITUS WITH DIABETIC POLYNEUROPATHY, WITH LONG-TERM CURRENT USE OF INSULIN: ICD-10-CM

## 2025-03-06 DIAGNOSIS — F33.41 RECURRENT MAJOR DEPRESSIVE DISORDER, IN PARTIAL REMISSION: Primary | ICD-10-CM

## 2025-03-06 DIAGNOSIS — R29.6 FALLS: ICD-10-CM

## 2025-03-06 DIAGNOSIS — G47.00 INSOMNIA, UNSPECIFIED TYPE: ICD-10-CM

## 2025-03-06 DIAGNOSIS — F41.9 ANXIETY DISORDER, UNSPECIFIED TYPE: ICD-10-CM

## 2025-03-06 DIAGNOSIS — K64.8 INTERNAL HEMORRHOID, BLEEDING: ICD-10-CM

## 2025-03-06 DIAGNOSIS — F33.1 DEPRESSION, MAJOR, RECURRENT, MODERATE: Primary | ICD-10-CM

## 2025-03-06 DIAGNOSIS — E11.42 CONTROLLED TYPE 2 DIABETES MELLITUS WITH DIABETIC POLYNEUROPATHY, WITH LONG-TERM CURRENT USE OF INSULIN: ICD-10-CM

## 2025-03-06 DIAGNOSIS — I48.0 PAROXYSMAL ATRIAL FIBRILLATION: ICD-10-CM

## 2025-03-06 PROCEDURE — 1160F RVW MEDS BY RX/DR IN RCRD: CPT | Mod: CPTII,95,, | Performed by: PSYCHIATRY & NEUROLOGY

## 2025-03-06 PROCEDURE — 3072F LOW RISK FOR RETINOPATHY: CPT | Mod: CPTII,95,, | Performed by: PSYCHIATRY & NEUROLOGY

## 2025-03-06 PROCEDURE — 1159F MED LIST DOCD IN RCRD: CPT | Mod: CPTII,95,, | Performed by: PSYCHIATRY & NEUROLOGY

## 2025-03-06 PROCEDURE — 98006 SYNCH AUDIO-VIDEO EST MOD 30: CPT | Mod: 95,,, | Performed by: PSYCHIATRY & NEUROLOGY

## 2025-03-06 PROCEDURE — 3044F HG A1C LEVEL LT 7.0%: CPT | Mod: CPTII,95,, | Performed by: PSYCHIATRY & NEUROLOGY

## 2025-03-06 PROCEDURE — G2211 COMPLEX E/M VISIT ADD ON: HCPCS | Mod: 95,,, | Performed by: PSYCHIATRY & NEUROLOGY

## 2025-03-06 RX ORDER — ARIPIPRAZOLE 2 MG/1
2 TABLET ORAL DAILY
Qty: 90 TABLET | Refills: 0 | Status: SHIPPED | OUTPATIENT
Start: 2025-03-06

## 2025-03-06 NOTE — TELEPHONE ENCOUNTER
Returned call left vm      ----- Message from  sent at 3/6/2025  2:11 PM CST -----  Type:  Needs Medical AdviceWho [facility]: [facility]please be specific[facility] na How long has patient had these symptoms:  naPharmacy name and phone #:  naWould the patient rather a call back or a response via [facility]? [facility] Call Back Number: [phone] Information: calling to let provider know that referral was received and accepted by facility but requires an authorization from insurance. Once auth is received they will contact patient to schedule.

## 2025-03-07 NOTE — TELEPHONE ENCOUNTER
No care due was identified.  [facility] Embedded Care Due Messages. Reference number: [id number].   3/07/2025 7:45:25 AM CST

## 2025-03-10 RX ORDER — HYDROCORTISONE 25 MG/G
CREAM TOPICAL 2 TIMES DAILY
Qty: 28 G | Refills: 2 | Status: SHIPPED | OUTPATIENT
Start: 2025-03-10

## 2025-03-11 ENCOUNTER — PATIENT MESSAGE (OUTPATIENT)
Dept: HEMATOLOGY/ONCOLOGY | Facility: CLINIC | Age: 75
End: 2025-03-11
Payer: MEDICARE

## 2025-03-13 ENCOUNTER — PATIENT MESSAGE (OUTPATIENT)
Dept: CARDIOLOGY | Facility: CLINIC | Age: 75
End: 2025-03-13
Payer: MEDICARE

## 2025-03-13 DIAGNOSIS — I50.42 CHRONIC COMBINED SYSTOLIC AND DIASTOLIC CONGESTIVE HEART FAILURE: ICD-10-CM

## 2025-03-13 RX ORDER — SACUBITRIL AND VALSARTAN 97; 103 MG/1; MG/1
1 TABLET, FILM COATED ORAL 2 TIMES DAILY
Qty: 180 TABLET | Refills: 3 | Status: SHIPPED | OUTPATIENT
Start: 2025-03-13

## 2025-03-14 DIAGNOSIS — I50.42 CHRONIC COMBINED SYSTOLIC AND DIASTOLIC CONGESTIVE HEART FAILURE: ICD-10-CM

## 2025-03-17 ENCOUNTER — OFFICE VISIT (OUTPATIENT)
Dept: PSYCHIATRY | Facility: CLINIC | Age: 75
End: 2025-03-17
Payer: MEDICARE

## 2025-03-17 ENCOUNTER — TELEPHONE (OUTPATIENT)
Dept: INTERNAL MEDICINE | Facility: CLINIC | Age: 75
End: 2025-03-17
Payer: MEDICARE

## 2025-03-17 DIAGNOSIS — F33.1 DEPRESSION, MAJOR, RECURRENT, MODERATE: Primary | ICD-10-CM

## 2025-03-17 DIAGNOSIS — F41.1 GENERALIZED ANXIETY DISORDER: ICD-10-CM

## 2025-03-17 PROCEDURE — 90834 PSYTX W PT 45 MINUTES: CPT | Mod: 95,,, | Performed by: SOCIAL WORKER

## 2025-03-17 PROCEDURE — 3072F LOW RISK FOR RETINOPATHY: CPT | Mod: CPTII,95,, | Performed by: SOCIAL WORKER

## 2025-03-17 PROCEDURE — 3044F HG A1C LEVEL LT 7.0%: CPT | Mod: CPTII,95,, | Performed by: SOCIAL WORKER

## 2025-03-17 PROCEDURE — 4010F ACE/ARB THERAPY RXD/TAKEN: CPT | Mod: CPTII,95,, | Performed by: SOCIAL WORKER

## 2025-03-17 RX ORDER — SACUBITRIL AND VALSARTAN 97; 103 MG/1; MG/1
1 TABLET, FILM COATED ORAL 2 TIMES DAILY
Qty: 180 TABLET | Refills: 3 | Status: SHIPPED | OUTPATIENT
Start: 2025-03-17

## 2025-03-17 NOTE — TELEPHONE ENCOUNTER
Talked to pt appt changed    ----- Message from Carmella sent at 3/17/2025  1:54 PM CDT -----  Type: Appointment RequestName of Caller:.Christine Jo When is the first available appointment?03/18/2025Symptoms:4 mth follow upBest Call Back Number:.[phone] Additional Information: Patient would like change the office visit to a virtual appointment. Call the patient back to confirm. Thx. EL

## 2025-03-17 NOTE — TELEPHONE ENCOUNTER
Talked to pt appt changed    ----- Message from Noemi sent at 3/17/2025 11:30 AM CDT -----  Name of Who is Calling: Pt   What is the request in detail:Pt would like a call back to [facility] 3/18/25 appt to virtual. Please advise thank you   Can the clinic reply by [facility]:no  What Number to Call Back [facility]:Telephone Information:Mobile          [phone]

## 2025-03-17 NOTE — PROGRESS NOTES
Individual Psychotherapy Follow-up Visit Progress Note (PhD/LCSW)     Outpatient Psychotherapy - 45 minutes with patient (38-52 minutes) - [id number]    Date: 3/17/2025    Visit Type: Telehealth    Due to the nature of this visit type, a virtual visit with synchronous audio and video, each patient to whom this provider administers behavioral health services by telemedicine is: (1) informed of the relationship between the provider and patient and the respective role of any other health care provider with respect to management of the patient; and (2) notified that he or she may decline to receive services by telemedicine and may withdraw from such care at any time. If technological issues occur, at the professional discretion of the clinical provider, synchronous audio only services may be utilized after unsuccessful attempt(s) to connect via audiovisual services; similarly, if audio only visit occurs, patient's verbal consent will be obtained prior to receipt of service. Prevailing clinical standards of care are upheld despite service methodology; having said this, if the clinical provider is unable to meet the prevailing standards of care, the patient will be rescheduled for the provider's soonest availability - as clinically appropriate.     The patient was informed of the following:     Provider's contact info:  [facility]  [address], [address]  [place] [address]  (Phone) [phone]    If technology issues occur, call office phone: Ph: [phone]  If crisis: Dial 911 or go to nearest Emergency Room (ER)  If questions related to privacy practices: contact [facility] Information Department: [phone]    For security purposes, the pt identified that they were at [address]  [place] [address] during today's session and contact number is [phone].    The pt's emergency contact(s) is Extended Emergency Contact  "Information  Primary Emergency Contact: Anamaria Martin  Address: [address]            [place] [address] [place]  Home Phone: [phone]  Work Phone: [phone]  Mobile Phone: [phone]  Relation: Healthcare Power of   Secondary Emergency Contact: Margy Cooley  Address: [address] [place] [address] [place]  Mobile Phone: [phone]  Relation: Daughter.    Crisis Disclaimer: Patient was informed that due to the virtual nature of the visit, that if a crisis develops, protocols will be implemented to ensure patient safety, including but not limited to: 1) Initiating a welfare check with local law enforcement and/or 2) Calling 911    3/17/2025  MRN: [id number]  Primary Care Provider: Jose Szymanski MD Connsocorro Jo is a 74 y.o. female who presents today for follow-up of depression and anxiety. Met with patient.      Preferred Name: Christine     Subjective:     Last encounter (with this provider): 1/27/2025     Content of Current Session: Pt reported, "I'm ok" upon entry to this session. LCSW utilized active listening/reflection to engage with pt and review experiences since their last session with this provider. Pt reported "I've been doing ok - I've just been having a few days where I feel melancholy." Pt reported "I only feel that way sometimes, like when I think about the past and how I could have done things differently." Pt reported "I've started praying when I find that I'm feeling melancholy and it does really help." Pt reported "the biggest thing I'm getting stuck on is feeling like I can't do the things I used to do and I'm worried that people are going to think I'm too dependent on them." LCSW utilized cognitive processing and supportive therapy. LCSW utilized introspective therapy to support pt's self-reflection. LCSW utilized reality orientation to support pt's objectivity, reframed perspectives, and thought " challenging processes. LCSW utilized reality orientation and introspective therapy to support pt's objective reflection of their aforementioned events.  LCSW utilized reality orientation and DBT to support pt's objective identification and refutation of cognitive distortions. LCSW utilized CBT to support pt's understanding of personal locus of control and its role in cognitive behavioral change. LCSW utilized interpersonal therapy to reinforce pt's understanding of their communication skills and discussed its application within their relationships. LCSW utilized solution focused therapy to support pt's reflection of relative personal strengths and historical experiences. LCSW utilized CBT and solution focused therapy to support pt's application of their critical thinking skills in implementation of their relative historical experiences/personal strengths in present and future cognitive behavioral change. LCSW utilized psychoeducation to review pt's historical coping skills and discussed their application for further mental health progress. LCSW utilized compassion focused therapy to support pt's self-acceptance. LCSW utilized person centered and strengths based perspectives to further empower and edify pt. LCSW and pt practiced aforementioned skills in session. Pt responded appropriately to aforementioned interventions. Pt denied SI/HI/AVH.     Therapeutic Interventions Utilized During Current Session: Cognitive Processing Therapy, Cognitive Behavioral Therapy, Compassion-Focused Therapy, Dialectical Behavior Therapy, Interpersonal Psychotherapy, Introspective Therapy, Person-Centered Therapy, Psychoeducation, Reality Therapy, Strength-Based Therapy, Solution Focused Therapy, Supportive Therapy        3/13/2025    10:41 AM 1/24/2025     9:27 AM 11/26/2024     9:14 AM   GAD7   1. Feeling nervous, anxious, or on edge? 0 0 1   2. Not being able to stop or control worrying? 0 0 1   3. Worrying too much about different  things? 0 0 1   4. Trouble relaxing? 0 0 1   5. Being so restless that it is hard to sit still? 0 0 0   6. Becoming easily annoyed or irritable? 0 0 0   7. Feeling afraid as if something awful might happen? 0 0 1   8. If you checked off any problems, how difficult have these problems made it for you to do your work, take care of things at home, or get along with other people? 0 0 1   DALLAS-7 Score 0  0  5        Patient-reported      0-4 = Minimal anxiety  5-9 = Mild anxiety  10-14 = Moderate anxiety  15-21 = Severe anxiety         3/13/2025    10:45 AM 1/27/2025     3:00 PM 11/27/2024    10:25 PM 7/8/2024    10:22 AM 6/29/2024     9:38 AM 6/21/2024     1:26 PM 2/5/2024    11:24 AM   PHQ-9 Depression Patient Health Questionnaire   Patient agreed to terms: Yes Yes Yes Yes Yes Yes Yes   Little interest or pleasure in doing things 2 1 1 1 1 1 1   Feeling down, depressed, or hopeless 0 0 1 1 1 1 1   Trouble falling or staying asleep, or sleeping too much 1 0 1 1 1 1 1   Feeling tired or having little energy 1 1 1 1 1 1 1   Poor appetite or overeating 1 1 1 1 1 1 1   Feeling bad about yourself - or that you are a failure or have let yourself or your family down 0 0 1 1 1 1 1   Trouble concentrating on things, such as reading the newspaper or watching television 1 1 0 1 1 0 1   Moving or speaking so slowly that other people could have noticed. Or the opposite - being so fidgety or restless that you have been moving around a lot more than usual 0 0 0 1 0 0 2   Thoughts that you would be better off dead, or of hurting yourself in some way 0 0 0 0 0 0 0   PHQ-9 Total Score 6  4  6  8 7 6 9   If you checked off any problems, how difficult have these problems made it for you to do your work, take care of things at home, or get along with other people? Somewhat difficult Not difficult at all Somewhat difficult Very difficult Somewhat difficult Somewhat difficult Somewhat difficult   Interpretation Mild  Minimal or None  Mild   Mild Mild Mild Mild       Patient-reported     0-4 = No intervention  5 to 9 = Mild  10 to 14 = Moderate  15 to 19 = Moderately severe  >=20 = Severe      Objective:       Mental Status Evaluation  Appearance: unremarkable, age appropriate  Behavior: normal, cooperative  Speech: normal tone, normal rate, normal pitch, normal volume  Mood: euthymic  Affect: congruent and appropriate  Thought Process: normal and logical  Thought Content: normal, no suicidality, no homicidality, delusions, or paranoia  Sensorium: grossly intact  Cognition: grossly intact  Insight: intact  Judgment: adequate to circumstances    Risk parameters:  Patient reports no suicidal ideation  Patient reports no homicidal ideation  Patient reports no self-injurious behavior  Patient reports no violent behavior      Assessment & Plan:     The patient's response to the interventions is accepting    The patient's progress toward treatment goals is fair     Homework assigned: none     Treatment plan:   A. Target symptoms: Depression and Anxiety   B. Therapeutic modalities: insight oriented psychotherapy, behavior modifying psychotherapy, supportive psychotherapy  C. Why chosen therapy is appropriate versus another modality: relevant to diagnosis, patient responds to this modality, evidence based practice   D. Outcome monitoring methods: self report, observation, rating scales, feedback from clinical staff      Visit Diagnosis:   1. Depression, major, recurrent, moderate    2. Generalized anxiety disorder        Follow-up: individual psychotherapy    Return to Clinic: 2 weeks  Pt Reported to Schedule Self via Epic EMR MyChart Application and/or Department Support Staff          Ana Portillo LCSW  3/17/2025  3:00 PM

## 2025-03-18 ENCOUNTER — OFFICE VISIT (OUTPATIENT)
Dept: INTERNAL MEDICINE | Facility: CLINIC | Age: 75
End: 2025-03-18
Payer: MEDICARE

## 2025-03-18 VITALS — SYSTOLIC BLOOD PRESSURE: 113 MMHG | DIASTOLIC BLOOD PRESSURE: 82 MMHG

## 2025-03-18 DIAGNOSIS — I48.0 PAROXYSMAL ATRIAL FIBRILLATION: ICD-10-CM

## 2025-03-18 DIAGNOSIS — R29.6 FALLS FREQUENTLY: ICD-10-CM

## 2025-03-18 DIAGNOSIS — E66.01 MORBID OBESITY WITH BMI OF 40.0-44.9, ADULT: ICD-10-CM

## 2025-03-18 DIAGNOSIS — I50.42 CHRONIC COMBINED SYSTOLIC AND DIASTOLIC CONGESTIVE HEART FAILURE: Primary | ICD-10-CM

## 2025-03-18 DIAGNOSIS — R42 VERTIGO: ICD-10-CM

## 2025-03-18 DIAGNOSIS — F32.A DEPRESSION, UNSPECIFIED DEPRESSION TYPE: ICD-10-CM

## 2025-03-18 DIAGNOSIS — E11.42 CONTROLLED TYPE 2 DIABETES MELLITUS WITH DIABETIC POLYNEUROPATHY, WITH LONG-TERM CURRENT USE OF INSULIN: ICD-10-CM

## 2025-03-18 DIAGNOSIS — F41.1 GENERALIZED ANXIETY DISORDER: ICD-10-CM

## 2025-03-18 DIAGNOSIS — Z79.4 CONTROLLED TYPE 2 DIABETES MELLITUS WITH DIABETIC POLYNEUROPATHY, WITH LONG-TERM CURRENT USE OF INSULIN: ICD-10-CM

## 2025-03-18 NOTE — PROGRESS NOTES
Subjective:       Patient ID: Christine Jo is a 74 y.o. female.    Chief Complaint: No chief complaint on file.    Diabetes  She has type 2 diabetes mellitus. No MedicAlert identification noted. The initial diagnosis of diabetes was made 15 years ago. Hypoglycemia symptoms include confusion, dizziness, headaches, hunger, nervousness/anxiousness, sleepiness, sweats and tremors. Pertinent negatives for hypoglycemia include no mood changes, pallor, seizures or speech difficulty. Associated symptoms include blurred vision, chest pain, fatigue, foot paresthesias, polyuria, visual change, weakness and weight loss. Pertinent negatives for diabetes include no foot ulcerations, no polydipsia and no polyphagia. Hypoglycemia complications include nocturnal hypoglycemia. Pertinent negatives for hypoglycemia complications include no blackouts, no hospitalization, no required assistance and no required glucagon injection. Symptoms are stable. Diabetic complications include autonomic neuropathy, heart disease, peripheral neuropathy and PVD. Pertinent negatives for diabetic complications include no CVA, nephropathy or retinopathy. Risk factors for coronary artery disease include dyslipidemia, family history, hypertension, obesity, post-menopausal, sedentary lifestyle, stress and diabetes mellitus. Current diabetic treatment includes diet, insulin injections and oral agent (monotherapy). She is compliant with treatment all of the time. She is currently taking insulin at bedtime. Insulin injections are given by patient. Rotation sites for injection include the abdominal wall. Her weight is stable. She is following a diabetic diet. Meal planning includes avoidance of concentrated sweets. She has had a previous visit with a dietitian. She participates in exercise intermittently. She monitors blood glucose at home 5+ x per day. She monitors urine at home <1 x per month. Blood glucose monitoring compliance is good. Her home blood  glucose trend is fluctuating minimally. She sees a podiatrist.Eye exam is current.     Review of Systems   Constitutional:  Positive for fatigue and weight loss.   Eyes:  Positive for blurred vision.   Cardiovascular:  Positive for chest pain.   Endocrine: Positive for polyuria. Negative for polydipsia and polyphagia.   Skin:  Negative for pallor.   Neurological:  Positive for dizziness, tremors, weakness and headaches. Negative for seizures and speech difficulty.   Psychiatric/Behavioral:  Positive for confusion. The patient is nervous/anxious.        Objective:      Physical Exam  Constitutional:       General: She is not in acute distress.     Appearance: She is not diaphoretic.   HENT:      Nose: No congestion.   Pulmonary:      Effort: Pulmonary effort is normal. No respiratory distress.   Skin:     Coloration: Skin is not pale.   Neurological:      Mental Status: She is alert and oriented to person, place, and time.   Psychiatric:         Mood and Affect: Mood normal.         Behavior: Behavior normal.         Admission on 03/02/2025, Discharged on 03/02/2025   Component Date Value Ref Range Status    WBC 03/02/2025 8.42  3.90 - 12.70 K/uL Final    RBC 03/02/2025 3.30 (L)  4.00 - 5.40 M/uL Final    Hemoglobin 03/02/2025 10.1 (L)  12.0 - 16.0 g/dL Final    Hematocrit 03/02/2025 30.9 (L)  37.0 - 48.5 % Final    MCV 03/02/2025 94  82 - 98 fL Final    MCH 03/02/2025 30.6  27.0 - 31.0 pg Final    MCHC 03/02/2025 32.7  32.0 - 36.0 g/dL Final    RDW 03/02/2025 12.0  11.5 - 14.5 % Final    Platelets 03/02/2025 261  150 - 450 K/uL Final    MPV 03/02/2025 10.9  9.2 - 12.9 fL Final    Immature Granulocytes 03/02/2025 0.4  0.0 - 0.5 % Final    Gran # (ANC) 03/02/2025 6.2  1.8 - 7.7 K/uL Final    Immature Grans (Abs) 03/02/2025 0.03  0.00 - 0.04 K/uL Final    Lymph # 03/02/2025 1.0  1.0 - 4.8 K/uL Final    Mono # 03/02/2025 1.1 (H)  0.3 - 1.0 K/uL Final    Eos # 03/02/2025 0.0  0.0 - 0.5 K/uL Final    Baso # 03/02/2025  0.04  0.00 - 0.20 K/uL Final    nRBC 03/02/2025 0  0 /100 WBC Final    Gran % 03/02/2025 73.4 (H)  38.0 - 73.0 % Final    Lymph % 03/02/2025 12.0 (L)  18.0 - 48.0 % Final    Mono % 03/02/2025 13.2  4.0 - 15.0 % Final    Eosinophil % 03/02/2025 0.5  0.0 - 8.0 % Final    Basophil % 03/02/2025 0.5  0.0 - 1.9 % Final    Differential Method 03/02/2025 Automated   Final    Sodium 03/02/2025 136  136 - 145 mmol/L Final    Potassium 03/02/2025 4.1  3.5 - 5.1 mmol/L Final    Chloride 03/02/2025 103  95 - 110 mmol/L Final    CO2 03/02/2025 24  23 - 29 mmol/L Final    Glucose 03/02/2025 127 (H)  70 - 110 mg/dL Final    BUN 03/02/2025 18  8 - 23 mg/dL Final    Creatinine 03/02/2025 0.8  0.5 - 1.4 mg/dL Final    Calcium 03/02/2025 9.6  8.7 - 10.5 mg/dL Final    Total Protein 03/02/2025 6.6  6.0 - 8.4 g/dL Final    Albumin 03/02/2025 2.9 (L)  3.5 - 5.2 g/dL Final    Total Bilirubin 03/02/2025 0.6  0.1 - 1.0 mg/dL Final    Alkaline Phosphatase 03/02/2025 47  40 - 150 U/L Final    AST 03/02/2025 17  10 - 40 U/L Final    ALT 03/02/2025 21  10 - 44 U/L Final    eGFR 03/02/2025 >60  >60 mL/min/1.73 m^2 Final    Anion Gap 03/02/2025 9  8 - 16 mmol/L Final    QRS Duration 03/02/2025 106  ms Final    [facility] QTC Calculation 03/02/2025 417  ms Final     Assessment:       No diagnosis found.    Plan:   The patient location is:  Home  The chief complaint leading to consultation is:  Routine follow-up    Visit type: audiovisual    Face to Face time with patient: 12   minutes of total time spent on the encounter, which includes face to face time and non-face to face time preparing to see the patient (eg, review of tests), Obtaining and/or reviewing separately obtained history, Documenting clinical information in the electronic or other health record, Independently interpreting results (not separately reported) and communicating results to the patient/family/caregiver, or Care coordination (not separately reported).         Each patient to whom he  or she provides medical services by telemedicine is:  (1) informed of the relationship between the physician and patient and the respective role of any other health care provider with respect to management of the patient; and (2) notified that he or she may decline to receive medical services by telemedicine and may withdraw from such care at any time.    Notes:   She reports anxiety has been improved on Abilify prescribed by her psychiatrist.  She is receiving home health home physical therapy for falls and leg fatigue.  She reports she has degenerative arthritis which is aggravating her problem.  She has dizziness vertigo followed by ENT.  She is currently insulin 62 units a day with fasting blood sugars 120.  Previous concerns of hypoglycemia have resolved.  Up-to-date lab was reviewed.  Medications reviewed.  In 3-4 months.      There are no diagnoses linked to this encounter.

## 2025-03-19 ENCOUNTER — PATIENT MESSAGE (OUTPATIENT)
Dept: HEMATOLOGY/ONCOLOGY | Facility: CLINIC | Age: 75
End: 2025-03-19
Payer: MEDICARE

## 2025-03-19 DIAGNOSIS — D64.9 ANEMIA, UNSPECIFIED TYPE: Primary | ICD-10-CM

## 2025-03-20 DIAGNOSIS — G47.00 INSOMNIA, UNSPECIFIED TYPE: ICD-10-CM

## 2025-03-20 RX ORDER — TEMAZEPAM 30 MG/1
30 CAPSULE ORAL NIGHTLY
Qty: 90 CAPSULE | Refills: 1 | Status: SHIPPED | OUTPATIENT
Start: 2025-03-20

## 2025-03-22 ENCOUNTER — PATIENT MESSAGE (OUTPATIENT)
Dept: SPORTS MEDICINE | Facility: CLINIC | Age: 75
End: 2025-03-22
Payer: MEDICARE

## 2025-03-22 ENCOUNTER — PATIENT MESSAGE (OUTPATIENT)
Dept: INTERNAL MEDICINE | Facility: CLINIC | Age: 75
End: 2025-03-22
Payer: MEDICARE

## 2025-03-24 ENCOUNTER — PATIENT MESSAGE (OUTPATIENT)
Dept: HEMATOLOGY/ONCOLOGY | Facility: CLINIC | Age: 75
End: 2025-03-24
Payer: MEDICARE

## 2025-03-24 DIAGNOSIS — M79.642 LEFT HAND PAIN: Primary | ICD-10-CM

## 2025-03-25 ENCOUNTER — LAB REQUISITION (OUTPATIENT)
Dept: LAB | Facility: HOSPITAL | Age: 75
End: 2025-03-25
Payer: MEDICARE

## 2025-03-25 DIAGNOSIS — D64.9 ANEMIA, UNSPECIFIED: ICD-10-CM

## 2025-03-25 DIAGNOSIS — I10 ESSENTIAL (PRIMARY) HYPERTENSION: ICD-10-CM

## 2025-03-25 DIAGNOSIS — E11.9 TYPE 2 DIABETES MELLITUS WITHOUT COMPLICATIONS: ICD-10-CM

## 2025-03-25 PROCEDURE — 82728 ASSAY OF FERRITIN: CPT | Mod: PO

## 2025-03-25 PROCEDURE — 85025 COMPLETE CBC W/AUTO DIFF WBC: CPT | Mod: PO

## 2025-03-25 PROCEDURE — 85045 AUTOMATED RETICULOCYTE COUNT: CPT | Mod: PO

## 2025-03-25 PROCEDURE — 86225 DNA ANTIBODY NATIVE: CPT | Mod: PO

## 2025-03-25 PROCEDURE — 86235 NUCLEAR ANTIGEN ANTIBODY: CPT | Mod: PO

## 2025-03-25 PROCEDURE — 83540 ASSAY OF IRON: CPT | Mod: PO

## 2025-03-25 PROCEDURE — 83010 ASSAY OF HAPTOGLOBIN QUANT: CPT | Mod: PO

## 2025-03-25 PROCEDURE — 80053 COMPREHEN METABOLIC PANEL: CPT | Mod: PO

## 2025-03-25 PROCEDURE — 86235 NUCLEAR ANTIGEN ANTIBODY: CPT | Mod: 59,PO

## 2025-03-25 PROCEDURE — 86039 ANTINUCLEAR ANTIBODIES (ANA): CPT | Mod: PO

## 2025-03-26 ENCOUNTER — TELEPHONE (OUTPATIENT)
Dept: GASTROENTEROLOGY | Facility: CLINIC | Age: 75
End: 2025-03-26
Payer: MEDICARE

## 2025-03-26 LAB
ABSOLUTE EOSINOPHIL (OHS): 0.15 K/UL
ABSOLUTE MONOCYTE (OHS): 0.54 K/UL (ref 0.3–1)
ABSOLUTE NEUTROPHIL COUNT (OHS): 3.07 K/UL (ref 1.8–7.7)
ALBUMIN SERPL BCP-MCNC: 3.7 G/DL (ref 3.5–5.2)
ALP SERPL-CCNC: 50 UNIT/L (ref 40–150)
ALT SERPL W/O P-5'-P-CCNC: 8 UNIT/L (ref 10–44)
ANION GAP (OHS): 13 MMOL/L (ref 8–16)
AST SERPL-CCNC: 10 UNIT/L (ref 11–45)
BASOPHILS # BLD AUTO: 0.04 K/UL
BASOPHILS NFR BLD AUTO: 0.8 %
BILIRUB SERPL-MCNC: 0.4 MG/DL (ref 0.1–1)
BUN SERPL-MCNC: 17 MG/DL (ref 8–23)
CALCIUM SERPL-MCNC: 9.1 MG/DL (ref 8.7–10.5)
CHLORIDE SERPL-SCNC: 108 MMOL/L (ref 95–110)
CO2 SERPL-SCNC: 22 MMOL/L (ref 23–29)
CREAT SERPL-MCNC: 0.7 MG/DL (ref 0.5–1.4)
ERYTHROCYTE [DISTWIDTH] IN BLOOD BY AUTOMATED COUNT: 12.9 % (ref 11.5–14.5)
FERRITIN SERPL-MCNC: 48 NG/ML (ref 20–300)
GFR SERPLBLD CREATININE-BSD FMLA CKD-EPI: >60 ML/MIN/1.73/M2
GLUCOSE SERPL-MCNC: 125 MG/DL (ref 70–110)
HAPTOGLOB SERPL-MCNC: 184 MG/DL (ref 30–250)
HCT VFR BLD AUTO: 32.9 % (ref 37–48.5)
HGB BLD-MCNC: 10.6 GM/DL (ref 12–16)
IMM GRANULOCYTES # BLD AUTO: 0.01 K/UL (ref 0–0.04)
IMM GRANULOCYTES NFR BLD AUTO: 0.2 % (ref 0–0.5)
IRON SATN MFR SERPL: 16 % (ref 20–50)
IRON SERPL-MCNC: 63 UG/DL (ref 30–160)
LYMPHOCYTES # BLD AUTO: 1.14 K/UL (ref 1–4.8)
MCH RBC QN AUTO: 31 PG (ref 27–50)
MCHC RBC AUTO-ENTMCNC: 32.2 G/DL (ref 32–36)
MCV RBC AUTO: 96 FL (ref 82–98)
NUCLEATED RBC (/100WBC) (OHS): 0 /100 WBC
PLATELET # BLD AUTO: 203 K/UL (ref 150–450)
PMV BLD AUTO: 12.5 FL (ref 9.2–12.9)
POTASSIUM SERPL-SCNC: 4.6 MMOL/L (ref 3.5–5.1)
PROT SERPL-MCNC: 6.3 GM/DL (ref 6–8.4)
RBC # BLD AUTO: 3.42 M/UL (ref 4–5.4)
RELATIVE EOSINOPHIL (OHS): 3 %
RELATIVE LYMPHOCYTE (OHS): 23 % (ref 18–48)
RELATIVE MONOCYTE (OHS): 10.9 % (ref 4–15)
RELATIVE NEUTROPHIL (OHS): 62.1 % (ref 38–73)
RETICS/RBC NFR AUTO: 2.1 % (ref 0.5–2.5)
SODIUM SERPL-SCNC: 143 MMOL/L (ref 136–145)
TIBC SERPL-MCNC: 382 UG/DL (ref 250–450)
TRANSFERRIN SERPL-MCNC: 258 MG/DL (ref 200–375)
WBC # BLD AUTO: 4.95 K/UL (ref 3.9–12.7)

## 2025-03-26 NOTE — TELEPHONE ENCOUNTER
----- Message from Irena sent at 3/26/2025 11:24 AM CDT -----  Contact: Christine  .Patient is calling to speak with the nurse regarding appt  . Reports wanting to see someone new if possible  . Please give patient a call back at .[phone]

## 2025-03-27 ENCOUNTER — TELEPHONE (OUTPATIENT)
Dept: GASTROENTEROLOGY | Facility: CLINIC | Age: 75
End: 2025-03-27
Payer: MEDICARE

## 2025-03-27 NOTE — TELEPHONE ENCOUNTER
Returned call to patient who stated she is nervous about having Endo scopes done and would like to speak with a doctor in clinic to help her decide whether or not she should have them done. Patient requested an appointment in about 3 months. Appointment scheduled with Dr Salazar for 6/25/25. Patient has transportation issues and requests a message be sent to Dr Jose Szymanski's office to see if that appointment can be moved to the same day as the GI appointment. Will send message to staff

## 2025-03-27 NOTE — TELEPHONE ENCOUNTER
----- Message from Kimberli sent at 3/26/2025  4:12 PM CDT -----  Contact: Christine  Type:  Patient Returning CallWho Called:Christine Who Left Message for Patient:nurse Does the patient know what this is regarding?:pt returning missed callWould the patient rather a call back or a response via [facility]? callBe Call Back Number:[phone] Information: Please call for additional information

## 2025-03-28 ENCOUNTER — TELEPHONE (OUTPATIENT)
Dept: INTERNAL MEDICINE | Facility: CLINIC | Age: 75
End: 2025-03-28
Payer: MEDICARE

## 2025-03-28 NOTE — TELEPHONE ENCOUNTER
Called pt to schedule left vm          ----- Message from Nurse Skyler sent at 3/27/2025  4:16 PM CDT -----  Regarding: Appointment change  Hi,This patient has an appointment with you guys on 6/24/25 and an appointment with GI on 6/25/25. She has transportation issues and wants to know if the appointment with you can be moved to 6/25/25. I was not able to schedule it. Please let me know if I can do anything.Thanks so much for your help!Skyler

## 2025-03-31 ENCOUNTER — OFFICE VISIT (OUTPATIENT)
Dept: PSYCHIATRY | Facility: CLINIC | Age: 75
End: 2025-03-31
Payer: MEDICARE

## 2025-03-31 DIAGNOSIS — F33.0 MILD EPISODE OF RECURRENT MAJOR DEPRESSIVE DISORDER: ICD-10-CM

## 2025-03-31 DIAGNOSIS — F41.1 GENERALIZED ANXIETY DISORDER: Primary | ICD-10-CM

## 2025-03-31 PROCEDURE — 3044F HG A1C LEVEL LT 7.0%: CPT | Mod: CPTII,95,, | Performed by: SOCIAL WORKER

## 2025-03-31 PROCEDURE — 90832 PSYTX W PT 30 MINUTES: CPT | Mod: 95,,, | Performed by: SOCIAL WORKER

## 2025-03-31 PROCEDURE — 4010F ACE/ARB THERAPY RXD/TAKEN: CPT | Mod: CPTII,95,, | Performed by: SOCIAL WORKER

## 2025-03-31 PROCEDURE — 3072F LOW RISK FOR RETINOPATHY: CPT | Mod: CPTII,95,, | Performed by: SOCIAL WORKER

## 2025-03-31 NOTE — PROGRESS NOTES
Individual Psychotherapy Follow-up Visit Progress Note (PhD/LCSW)     Outpatient Psychotherapy - 30 minutes with patient (16-37 minutes) - [id number]    Date: 3/31/2025    Visit Type: Telehealth    Due to the nature of this visit type, a virtual visit with synchronous audio and video, each patient to whom this provider administers behavioral health services by telemedicine is: (1) informed of the relationship between the provider and patient and the respective role of any other health care provider with respect to management of the patient; and (2) notified that he or she may decline to receive services by telemedicine and may withdraw from such care at any time. If technological issues occur, at the professional discretion of the clinical provider, synchronous audio only services may be utilized after unsuccessful attempt(s) to connect via audiovisual services; similarly, if audio only visit occurs, patient's verbal consent will be obtained prior to receipt of service. Prevailing clinical standards of care are upheld despite service methodology; having said this, if the clinical provider is unable to meet the prevailing standards of care, the patient will be rescheduled for the provider's soonest availability - as clinically appropriate.     The patient was informed of the following:     Provider's contact info:  [facility]  [address], [address]  [place] [address]  (Phone) [phone]    If technology issues occur, call office phone: Ph: [phone]  If crisis: Dial 911 or go to nearest Emergency Room (ER)  If questions related to privacy practices: contact [facility] Information Department: [phone]    For security purposes, the pt identified that they were at [address]  [place] [address] during today's session and contact number is [phone].    The pt's emergency contact(s) is Extended Emergency Contact  "Information  Primary Emergency Contact: Anamaria Martin  [address] [place] [facility] [place] [address] [place]  Home Phone: [phone]  Work Phone: [phone]  Mobile Phone: [phone]  Relation: Healthcare Power of   Secondary Emergency Contact: Margy Cooley  Address: [address] [place] [address] [place]  Mobile Phone: [phone]  Relation: Daughter.    Crisis Disclaimer: Patient was informed that due to the virtual nature of the visit, that if a crisis develops, protocols will be implemented to ensure patient safety, including but not limited to: 1) Initiating a welfare check with local law enforcement and/or 2) Calling 911    3/31/2025  MRN: [id number]  Primary Care Provider: Jose Szymanski MD Connsocorro Jo is a 75 y.o. female who presents today for follow-up of depression and anxiety. Met with patient.      Preferred Name: Christine     Subjective:     Last encounter (with this provider): 3/17/2025     Content of Current Session: Pt reported, "I'm doing really great" upon entry to this session. LCSW utilized active listening/reflection to engage with pt and review experiences since their last session with this provider. Pt reported a positive improvement in her moods since her last session. Pt attributed her positive outlook to her increased socialization with her family/neighbors and recent medication change (Dr. Sourav Alvarenga). Pt reported "I really don't have a lot to talk about today other than how good I've been feeling lately." LCSW utilized cognitive processing and supportive therapy. LCSW utilized introspective therapy to support pt's self-reflection. LCSW utilized reality orientation to support pt's objectivity, reframed perspectives, and thought challenging processes. LCSW utilized reality orientation and introspective therapy to support pt's objective reflection of their aforementioned events. LCSW " utilized psychoeducation to review pt's historical coping skills and discussed their application for further mental health progress. LCSW utilized compassion focused therapy to support pt's self-acceptance. LCSW utilized person centered and strengths based perspectives to further empower and edify pt. LCSW and pt practiced aforementioned skills in session. Pt responded appropriately to aforementioned interventions. Pt denied SI/HI/AVH.     Therapeutic Interventions Utilized During Current Session: Cognitive Processing Therapy, Cognitive Behavioral Therapy, Compassion-Focused Therapy, Dialectical Behavior Therapy, Introspective Therapy, Person-Centered Therapy, Psychoeducation, Reality Therapy, Strength-Based Therapy, Supportive Therapy        3/31/2025     2:55 PM 3/13/2025    10:41 AM 1/24/2025     9:27 AM 11/26/2024     9:14 AM   GAD7   1. Feeling nervous, anxious, or on edge? 0 0 0 1   2. Not being able to stop or control worrying? 0 0 0 1   3. Worrying too much about different things? 0 0 0 1   4. Trouble relaxing? 0 0 0 1   5. Being so restless that it is hard to sit still? 0 0 0 0   6. Becoming easily annoyed or irritable? 0 0 0 0   7. Feeling afraid as if something awful might happen? 1 0 0 1   8. If you checked off any problems, how difficult have these problems made it for you to do your work, take care of things at home, or get along with other people? 0 0 0 1   DALLAS-7 Score 1  0  0  5        Patient-reported      0-4 = Minimal anxiety  5-9 = Mild anxiety  10-14 = Moderate anxiety  15-21 = Severe anxiety         3/31/2025     2:57 PM 3/13/2025    10:45 AM 1/27/2025     3:00 PM 11/27/2024    10:25 PM 7/8/2024    10:22 AM 6/29/2024     9:38 AM 6/21/2024     1:26 PM   PHQ-9 Depression Patient Health Questionnaire   Patient agreed to terms: Yes Yes Yes Yes Yes Yes Yes   Little interest or pleasure in doing things 1 2 1 1 1 1 1   Feeling down, depressed, or hopeless 0 0 0 1 1 1 1   Trouble falling or staying  asleep, or sleeping too much 1 1 0 1 1 1 1   Feeling tired or having little energy 1 1 1 1 1 1 1   Poor appetite or overeating 1 1 1 1 1 1 1   Feeling bad about yourself - or that you are a failure or have let yourself or your family down 0 0 0 1 1 1 1   Trouble concentrating on things, such as reading the newspaper or watching television 1 1 1 0 1 1 0   Moving or speaking so slowly that other people could have noticed. Or the opposite - being so fidgety or restless that you have been moving around a lot more than usual 0 0 0 0 1 0 0   Thoughts that you would be better off dead, or of hurting yourself in some way 0 0 0 0 0 0 0   PHQ-9 Total Score 5  6  4  6  8 7 6   If you checked off any problems, how difficult have these problems made it for you to do your work, take care of things at home, or get along with other people? Not difficult at all Somewhat difficult Not difficult at all Somewhat difficult Very difficult Somewhat difficult Somewhat difficult   Interpretation Mild  Mild  Minimal or None  Mild  Mild Mild Mild       Patient-reported     0-4 = No intervention  5 to 9 = Mild  10 to 14 = Moderate  15 to 19 = Moderately severe  >=20 = Severe      Objective:       Mental Status Evaluation  Appearance: unremarkable, age appropriate  Behavior: normal, cooperative  Speech: normal tone, normal rate, normal pitch, normal volume  Mood: euthymic  Affect: congruent and appropriate  Thought Process: normal and logical  Thought Content: normal, no suicidality, no homicidality, delusions, or paranoia  Sensorium: grossly intact  Cognition: grossly intact  Insight: intact  Judgment: adequate to circumstances    Risk parameters:  Patient reports no suicidal ideation  Patient reports no homicidal ideation  Patient reports no self-injurious behavior  Patient reports no violent behavior      Assessment & Plan:     The patient's response to the interventions is accepting    The patient's progress toward treatment goals is fair      Homework assigned: none     Treatment plan:   A. Target symptoms: Depression and Anxiety   B. Therapeutic modalities: insight oriented psychotherapy, behavior modifying psychotherapy, supportive psychotherapy  C. Why chosen therapy is appropriate versus another modality: relevant to diagnosis, patient responds to this modality, evidence based practice   D. Outcome monitoring methods: self report, observation, rating scales, feedback from clinical staff      Visit Diagnosis:   1. Generalized anxiety disorder    2. Mild episode of recurrent major depressive disorder        Follow-up: individual psychotherapy    Return to Clinic: as scheduled  Pt Reported to Schedule Self via Epic EMR MyChart Application and/or Department Support Staff          Ana Portillo LCSW  3/31/2025  3:01 PM

## 2025-04-01 ENCOUNTER — OFFICE VISIT (OUTPATIENT)
Dept: HEMATOLOGY/ONCOLOGY | Facility: CLINIC | Age: 75
End: 2025-04-01
Payer: MEDICARE

## 2025-04-01 DIAGNOSIS — D64.9 ANEMIA, UNSPECIFIED TYPE: Primary | ICD-10-CM

## 2025-04-01 DIAGNOSIS — D50.0 IRON DEFICIENCY ANEMIA DUE TO CHRONIC BLOOD LOSS: ICD-10-CM

## 2025-04-01 LAB
ANA (OHS): POSITIVE
ANA PATTERN 1 (OHS): ABNORMAL
ANA TITER 1 (OHS): ABNORMAL

## 2025-04-01 PROCEDURE — 3044F HG A1C LEVEL LT 7.0%: CPT | Mod: CPTII,95,,

## 2025-04-01 PROCEDURE — 4010F ACE/ARB THERAPY RXD/TAKEN: CPT | Mod: CPTII,95,,

## 2025-04-01 PROCEDURE — G2211 COMPLEX E/M VISIT ADD ON: HCPCS | Mod: 95,,,

## 2025-04-01 PROCEDURE — 1160F RVW MEDS BY RX/DR IN RCRD: CPT | Mod: CPTII,95,,

## 2025-04-01 PROCEDURE — 98006 SYNCH AUDIO-VIDEO EST MOD 30: CPT | Mod: 95,,,

## 2025-04-01 PROCEDURE — 1159F MED LIST DOCD IN RCRD: CPT | Mod: CPTII,95,,

## 2025-04-01 PROCEDURE — 3072F LOW RISK FOR RETINOPATHY: CPT | Mod: CPTII,95,,

## 2025-04-02 ENCOUNTER — TELEPHONE (OUTPATIENT)
Dept: HEMATOLOGY/ONCOLOGY | Facility: CLINIC | Age: 75
End: 2025-04-02
Payer: MEDICARE

## 2025-04-02 LAB
DSDNA ANTIBODY (OHS): NORMAL
DSDNA ANTIBODY TITER (OHS): NORMAL

## 2025-04-03 LAB
SM  ANTIBODY (OHS): 0.07 RATIO
SM INTERPRETATION (OHS): NEGATIVE
SM/RNP ANTIBODY (OHS): 0.06 RATIO
SM/RNP INTERPRETATION (OHS): NEGATIVE
SSB  ANTIBODY (OHS): 0.06 RATIO
SSB INTERPRETATION (OHS): NEGATIVE

## 2025-04-04 ENCOUNTER — PATIENT MESSAGE (OUTPATIENT)
Dept: INTERNAL MEDICINE | Facility: CLINIC | Age: 75
End: 2025-04-04
Payer: MEDICARE

## 2025-04-04 DIAGNOSIS — H10.13 ALLERGIC CONJUNCTIVITIS, BILATERAL: ICD-10-CM

## 2025-04-04 LAB
SSA  ANTIBODY (OHS): 0.06 RATIO
SSA INTERPRETATION (OHS): NEGATIVE

## 2025-04-04 RX ORDER — BEPOTASTINE BESILATE 15 MG/ML
1 SOLUTION/ DROPS OPHTHALMIC 2 TIMES DAILY
Qty: 10 ML | Refills: 4 | Status: SHIPPED | OUTPATIENT
Start: 2025-04-04

## 2025-04-06 ENCOUNTER — PATIENT MESSAGE (OUTPATIENT)
Dept: HEMATOLOGY/ONCOLOGY | Facility: CLINIC | Age: 75
End: 2025-04-06
Payer: MEDICARE

## 2025-04-06 ENCOUNTER — PATIENT MESSAGE (OUTPATIENT)
Dept: INTERNAL MEDICINE | Facility: CLINIC | Age: 75
End: 2025-04-06
Payer: MEDICARE

## 2025-04-07 RX ORDER — INSULIN GLARGINE 100 [IU]/ML
64 INJECTION, SOLUTION SUBCUTANEOUS NIGHTLY
Qty: 75 ML | Refills: 3 | Status: SHIPPED | OUTPATIENT
Start: 2025-04-07

## 2025-04-15 ENCOUNTER — PATIENT MESSAGE (OUTPATIENT)
Dept: ORTHOPEDICS | Facility: CLINIC | Age: 75
End: 2025-04-15
Payer: MEDICARE

## 2025-04-15 ENCOUNTER — TELEPHONE (OUTPATIENT)
Dept: SPORTS MEDICINE | Facility: CLINIC | Age: 75
End: 2025-04-15
Payer: MEDICARE

## 2025-04-15 DIAGNOSIS — Z79.4 CONTROLLED TYPE 2 DIABETES MELLITUS WITH DIABETIC POLYNEUROPATHY, WITH LONG-TERM CURRENT USE OF INSULIN: ICD-10-CM

## 2025-04-15 DIAGNOSIS — M79.642 BILATERAL HAND PAIN: Primary | ICD-10-CM

## 2025-04-15 DIAGNOSIS — M79.89 LEG SWELLING: ICD-10-CM

## 2025-04-15 DIAGNOSIS — R06.09 DOE (DYSPNEA ON EXERTION): ICD-10-CM

## 2025-04-15 DIAGNOSIS — M79.641 BILATERAL HAND PAIN: Primary | ICD-10-CM

## 2025-04-15 DIAGNOSIS — E11.42 CONTROLLED TYPE 2 DIABETES MELLITUS WITH DIABETIC POLYNEUROPATHY, WITH LONG-TERM CURRENT USE OF INSULIN: ICD-10-CM

## 2025-04-15 NOTE — TELEPHONE ENCOUNTER
Scheduled xray appt for bilateral hands and changed appt notes for upcoming appt to bilateral hand.  Sent patient a [facility] message to let her know that this has been done.   ----- Message from Ivana sent at 4/15/2025  9:44 AM CDT -----  Contact: self  [phone]  Type: Orders RequestWhat orders/ testing are being requested?xray of right handIs there a future appointment scheduled for the patient with PCP?noneWhen?Would you prefer a response via [facility]?call back if necessaryComments: patient called in this morning requesting an xray of the right and stating it is getting worse so the doctor can look at them both on her up coming visit.  [phone]

## 2025-04-16 ENCOUNTER — PATIENT MESSAGE (OUTPATIENT)
Dept: HEMATOLOGY/ONCOLOGY | Facility: CLINIC | Age: 75
End: 2025-04-16
Payer: MEDICARE

## 2025-04-16 ENCOUNTER — TELEPHONE (OUTPATIENT)
Dept: INTERNAL MEDICINE | Facility: CLINIC | Age: 75
End: 2025-04-16
Payer: MEDICARE

## 2025-04-16 DIAGNOSIS — Z79.4 CONTROLLED TYPE 2 DIABETES MELLITUS WITH DIABETIC POLYNEUROPATHY, WITH LONG-TERM CURRENT USE OF INSULIN: ICD-10-CM

## 2025-04-16 DIAGNOSIS — E11.42 CONTROLLED TYPE 2 DIABETES MELLITUS WITH DIABETIC POLYNEUROPATHY, WITH LONG-TERM CURRENT USE OF INSULIN: ICD-10-CM

## 2025-04-16 RX ORDER — FUROSEMIDE 20 MG/1
20 TABLET ORAL DAILY PRN
Qty: 90 TABLET | Refills: 3 | Status: SHIPPED | OUTPATIENT
Start: 2025-04-16

## 2025-04-16 NOTE — TELEPHONE ENCOUNTER
Talked to pt Dr Szymanski said ok to use Desitin or any butt paste you want to use      ----- Message from Etta sent at 4/16/2025  9:39 AM CDT -----  Type:  Patient Returning CallWho Called:Alejandra Mitchell Message for Patient:Does the patient know what this is regarding?:CallWould the patient rather a call back or a response via [facility]? CallbackBe Call Back Number:[phone] Information: Need a callback asap

## 2025-04-16 NOTE — TELEPHONE ENCOUNTER
Refill Decision Note   Christine Jo  is requesting a refill authorization.    Brief Assessment and Rationale for Refill:   Quick Discontinue       Medication Therapy Plan:   E-Prescribing Status: Receipt confirmed by pharmacy (4/16/2025  9:35 AM CDT)      Comments:     Note composed:10:41 AM 04/16/2025

## 2025-04-16 NOTE — TELEPHONE ENCOUNTER
Refill Routing Note   Medication(s) are not appropriate for processing by [facility] for the following reason(s):        Outside of protocol  Drug-disease interaction    [facility] action(s):  Defer  Route     Requires labs : Yes      Medication Therapy Plan: Lab(s) recommended: Lipid Panel.    Pharmacist review requested: Yes   Extended chart review required: Yes     Appointments  past 12m or future 3m with PCP    Date Provider   Last Visit   3/18/2025 Jose Szymanski MD   Next Visit   4/16/2025 Jose Szymanski MD   ED visits in past 90 days: 2        Note composed:9:14 AM 04/16/2025

## 2025-04-16 NOTE — TELEPHONE ENCOUNTER
No care due was identified.  [facility] Embedded Care Due Messages. Reference number: [id number].   4/16/2025 2:10:25 AM CDT

## 2025-04-16 NOTE — TELEPHONE ENCOUNTER
Care Due:                  Date            Visit Type   Department     Provider  --------------------------------------------------------------------------------                                ESTABLISHED                              PATIENT -    [facility] INTERNAL  Last Visit: 03-      [facility]       Jose Szymanski                              [facility]                              ANNUAL                              CHECKUP/PHY  [facility] INTER[facility]  Next Visit: 06-      [facility]       Jose Szymanski                                                            Last  Test          Frequency    Reason                     Performed    Due Date  --------------------------------------------------------------------------------    Lipid Panel.  12 months..  ezetimibe................  06- 06-    [facility] Embedded Care Due Messages. Reference number: [id number].   4/15/2025 9:09:46 PM CDT

## 2025-04-16 NOTE — TELEPHONE ENCOUNTER
Refill Routing Note   Medication(s) are not appropriate for processing by [facility] for the following reason(s):        Outside of protocol: Furosemide self adjusting directions are outside of [facility] scope   Drug-disease interaction      [facility] action(s):  Defer  Route     Requires labs : Yes    Medication Therapy Plan: Lab(s) recommended: Lipid Panel.    Pharmacist review requested: Yes     Appointments  past 12m or future 3m with PCP    Date Provider   Last Visit   3/18/2025 Jose Szymanski MD   Next Visit   6/24/2025 Jose Szymanski MD   ED visits in past 90 days: 2        Note composed:10:13 PM 04/15/2025

## 2025-04-17 ENCOUNTER — PATIENT MESSAGE (OUTPATIENT)
Dept: PSYCHIATRY | Facility: CLINIC | Age: 75
End: 2025-04-17
Payer: MEDICARE

## 2025-04-18 ENCOUNTER — PATIENT MESSAGE (OUTPATIENT)
Dept: NEUROLOGY | Facility: CLINIC | Age: 75
End: 2025-04-18
Payer: MEDICARE

## 2025-04-21 ENCOUNTER — PATIENT MESSAGE (OUTPATIENT)
Dept: INTERNAL MEDICINE | Facility: CLINIC | Age: 75
End: 2025-04-21
Payer: MEDICARE

## 2025-04-21 NOTE — PROGRESS NOTES
Subjective:      Patient ID: Christine Jo is a 75 y.o. female.    Chief Complaint: No chief complaint on file.    The patient location is: [place]  The chief complaint leading to consultation is: follow-up    Visit type: audiovisual    Face to Face time with patient: 10 minutes  15 minutes of total time spent on the encounter, which includes face to face time and non-face to face time preparing to see the patient (eg, review of tests), Obtaining and/or reviewing separately obtained history, Documenting clinical information in the electronic or other health record, Independently interpreting results (not separately reported) and communicating results to the patient/family/caregiver, or Care coordination (not separately reported).         Each patient to whom he or she provides medical services by telemedicine is:  (1) informed of the relationship between the physician and patient and the respective role of any other health care provider with respect to management of the patient; and (2) notified that he or she may decline to receive medical services by telemedicine and may withdraw from such care at any time.    Notes:       HPI:  Ms. Jo is a pleasant 75-year-old female who presents today for follow-up of iron deficiency anemia. She was last seen in Heme/Onc clinic  08/2020. She has had IV iron in the past 01/2018.   She [facility] which was - negative for contributory causes. EGD 05/2021 found chronic gastritis.  Colonoscopy 06/2021 found 3 polyps, lipoma, and diverticulosis with recommendation to repeat in 3 years. She cannot tolerate oral iron supplementation as it causes GI upset.     Interval History: Pt c/o continued fatigue. She continues with hemorrhoids with intermittent bleeding. She is receiving home health home physical therapy for falls and leg fatigue. She reports she has degenerative arthritis which is aggravating her problem.  She has dizziness and vertigo followed by ENT. Denies n/v/d/c, fever, chills, night sweats, sob, cp, lightheadedness unintentional weight loss.    Social History     Socioeconomic History    Marital status:     Number of children: 3   Occupational History    Occupation: Retired   Tobacco Use    Smoking status: Former     Current packs/day: 0.00     Average packs/day: 1 pack/day for 36.5 years (36.5 ttl pk-yrs)     Types: Cigarettes     Start date:      Quit date: 2003     Years since quittin.8     Passive exposure: Past    Smokeless tobacco: Never    Tobacco comments:     Been quit 19 years. I smoked 35 years approx a pack a day   Substance and Sexual Activity    Alcohol use: No    Drug use: Never    Sexual activity: Never     Social Drivers of Health     Financial Resource Strain: Low Risk  (2024)    Overall Financial Resource Strain (CARDIA)     Difficulty of Paying Living Expenses: Not very hard   Food Insecurity: No Food Insecurity (2024)    Hunger Vital Sign     Worried About Running Out of Food in the Last Year: Never true     Ran Out of Food in the Last Year: Never true   Transportation Needs: High Risk (2024)    Received from [facility] SDOH Screening     Has lack of transportation kept you from medical appointments, meetings, work or from getting things needed for daily living? choose all that apply.: Yes, it has kept me from medical appointments or from getting my medications     Has lack of transportation kept you from medical appointments, meetings, work or from getting things needed for daily living? choose all that apply.: Yes, it has kept me from non-medical meetings, appointments, work or from getting things that i need   Physical Activity: Inactive (2024)    Exercise Vital Sign     Days of Exercise per Week: 0 days     Minutes of Exercise per Session: 0 min   Stress: No Stress Concern Present (2024)    [employer] - Occupational Stress Questionnaire     Feeling of Stress : Only a little   Housing Stability: Unknown (12/13/2024)    Housing Stability Vital Sign     Unable to Pay for Housing in the Last Year: No       Family History   Problem Relation Name Age of Onset    Heart disease Mother Jillian Jo         Open heart surgery    Cataracts Mother Jillian Jo     Macular degeneration Mother Jillian Jo     Glaucoma Mother Jillian Jo     Arthritis Mother Jillian Jo     Hearing loss Mother Jillian Jo     Hypertension Mother Jillian Jo     Kidney disease Mother Jillian Jo     Stroke Mother Jillian Jo     Vision loss Mother Jillian Jo     Diabetes Sister La Jo     Heart disease Sister La Jo         CAD    Cataracts Sister La Jo     Depression Sister La Jo         Depression    Hypertension Sister La Jo     Diabetes Sister Erica Lerouge     Hearing loss Sister Erica Lerouge     Hypertension Sister Erica Lerouge     Diabetes Sister      Cancer Son Andrea Trascher III         testicular     Arthritis Son Andrea Trascher III         Because of chemotherapy at 19 he has neuropathy and arthritis    Hearing loss Son Andrea Trascher III         Because of chemotherapy    Cancer Maternal Aunt Etta Marquez         Tumor in chest wall    Heart disease Maternal Grandfather Leonides Santos         Pacemaker    Arthritis Maternal Grandfather Leonides Santos     Hearing loss Maternal Grandfather Houston Tom     Hypertension Maternal Grandfather Leonides Tom     Hypertension Maternal Grandmother Helene Santos     Kidney disease Maternal Grandmother Helene Santos     Stroke Maternal Grandmother Helene Santos     Alcohol abuse Daughter Kaye Trascher     Asthma Daughter Kaye Trascher     Depression Daughter Kaye Trascher         Psysophrenia    Drug abuse Daughter Kaye Trascher         Not now  but earlier alcohol and drug abuse    Hypertension Daughter Kaye Martin     Mental illness Daughter Kaye Matrin         Psysophrenia    Cancer Son Andrea Greyer III will         Testicular cancer    Depression Son Andrea Greyer III will         Major depressive disorder like me    Cancer Maternal Aunt Carmella Enrique         Lung cancer    Depression Sister Maribell Hull         May be bipolar    Diabetes Sister Maribell Hull     Heart disease Sister Maribell Hull         Open heart surgery    Hypertension Sister Maribell Hull     Hypertension Brother iRky Jo        Past Surgical History:   Procedure Laterality Date    abdominal laparoscopy       ADENOIDECTOMY  1965    Tonsils removed also    ANGIOGRAM, CORONARY, WITH LEFT HEART CATHETERIZATION N/A 04/02/2024    Procedure: Angiogram, Coronary, with Left Heart Cath;  Surgeon: Shree Espinoza MD;  Location: [facility] CATH LAB;  Service: Cardiology;  Laterality: N/A;    ANGIOGRAM, CORONARY, WITH LEFT HEART CATHETERIZATION N/A 04/01/2024    Procedure: Angiogram, Coronary, with Left Heart Cath;  Surgeon: Shree Espinoza MD;  Location: [facility] CATH LAB;  Service: Cardiology;  Laterality: N/A;    BREAST BIOPSY Left 1998    benign    BREAST SURGERY  1998 biopsy lt breast    CATARACT EXTRACTION Bilateral [id number]    [facility] in [place]    Cataract Surgery Bilateral     Laser (YAG)    COLON SURGERY  2004    COLONOSCOPY N/A 05/05/2021    Procedure: COLONOSCOPY;  Surgeon: Shawn Rogers III, MD;  Location: [facility] ENDO;  Service: Endoscopy;  Laterality: N/A;    COLONOSCOPY N/A 06/30/2021    Procedure: COLONOSCOPY;  Surgeon: Memo Merida MD;  Location: [facility] ENDO;  Service: Endoscopy;  Laterality: N/A;    CORONARY STENT PLACEMENT N/A 04/01/2024    Procedure: INSERTION, STENT, CORONARY ARTERY;  Surgeon: Shree Espinoza MD;  Location: [facility] CATH LAB;  Service: Cardiology;  Laterality: N/A;     ESOPHAGOGASTRODUODENOSCOPY N/A 11/29/2019    Procedure: ESOPHAGOGASTRODUODENOSCOPY (EGD);  Surgeon: Shawn Rogers III, MD;  Location: [facility] ENDO;  Service: Endoscopy;  Laterality: N/A;    ESOPHAGOGASTRODUODENOSCOPY N/A 05/05/2021    Procedure: EGD (ESOPHAGOGASTRODUODENOSCOPY);  Surgeon: Shawn Rogers III, MD;  Location: [facility] ENDO;  Service: Endoscopy;  Laterality: N/A;    EYE SURGERY      IVUS, CORONARY  04/02/2024    Procedure: IVUS, Coronary;  Surgeon: Shree Espinoza MD;  Location: [facility] CATH LAB;  Service: Cardiology;;    PERCUTANEOUS CORONARY INTERVENTION, ARTERY N/A 04/01/2024    Procedure: Percutaneous coronary intervention;  Surgeon: Shree Espinoza MD;  Location: [facility] CATH LAB;  Service: Cardiology;  Laterality: N/A;    PTCA, SINGLE VESSEL  04/01/2024    Procedure: PTCA, Single Vessel;  Surgeon: Shree Espinoza MD;  Location: [facility] CATH LAB;  Service: Cardiology;;    SMALL INTESTINE SURGERY  2004    Exploratory stomach large and small intestines    TONSILLECTOMY      TUBAL LIGATION         Past Medical History:   Diagnosis Date    Arthritis     Cataract     Coronary artery disease     Diabetes mellitus 2008     am 01/15/2018 Insulin x 1 year    Diabetes mellitus, type 2     DM (diabetes mellitus) 2008     am 02/14/2020 Insulin x 4 years    Encounter for blood transfusion     Glaucoma     Hypertension     Insomnia     Macular degeneration     Old MI (myocardial infarction) 2/12/2024    Vaginal yeast infection        Review of Systems   Constitutional:  Positive for fatigue. Negative for activity change, appetite change, chills, fever and unexpected weight change.   HENT:  Negative for congestion, dental problem, mouth sores and nosebleeds.    Eyes:  Negative for visual disturbance.   Respiratory:  Negative for cough, choking and chest tightness.    Cardiovascular:  Negative for chest pain, palpitations and leg swelling.   Gastrointestinal:  Negative for abdominal  distention, abdominal pain, anal bleeding, blood in stool, constipation, diarrhea, nausea and vomiting.   Endocrine: Negative.    Genitourinary:  Negative for dysuria, frequency, hematuria and urgency.   Musculoskeletal:  Negative for arthralgias, back pain, gait problem, joint swelling and myalgias.   Skin:  Negative for wound.   Allergic/Immunologic: Negative for immunocompromised state.   Neurological:  Negative for dizziness, light-headedness, numbness and headaches.   Hematological:  Negative for adenopathy. Does not bruise/bleed easily.   Psychiatric/Behavioral:  Negative for sleep disturbance. The patient is not nervous/anxious.        Medication List with Changes/Refills   Current Medications    ARIPIPRAZOLE (ABILIFY) 2 MG TAB    Take 1 tablet (2 mg total) by mouth once daily.    AZELASTINE (ASTELIN) 137 MCG (0.1 %) NASAL SPRAY    use 2 sprays (274 mcg total) by Nasal route 2 (two) times daily.    BLOOD SUGAR DIAGNOSTIC STRP    To check blood sugar 1 times daily    BLOOD-GLUCOSE METER (ACCU-CHEK GUIDE GLUCOSE METER) MISC    use daily to test blood sugar    CARVEDILOL (COREG) 25 MG TABLET    Take 2 tablets (50 mg total) by mouth 2 (two) times daily.    CLONIDINE (CATAPRES) 0.1 MG TABLET    Take 1 tablet (0.1 mg total) by mouth daily as needed (BP >160/90).    CLONIDINE 0.3 MG/24 HR TD PTWK (CATAPRES) 0.3 MG/24 HR    Place 1 patch onto the skin every 7 days.    CLOPIDOGREL (PLAVIX) 75 MG TABLET    TAKE 1 TABLET BY MOUTH ONCE  DAILY    CLOTRIMAZOLE-BETAMETHASONE (LOTRISONE) LOTION    Apply topically to the affected area 2 (two) times daily.    COENZYME Q10 200 MG CAPSULE    Take 200 mg by mouth once daily.    CYCLOSPORINE (RESTASIS) 0.05 % OPHTHALMIC EMULSION    Place 1 drop into both eyes 2 (two) times daily.    DULOXETINE (CYMBALTA) 60 MG CAPSULE    Take 1 capsule (60 mg total) by mouth once daily.    ELIQUIS 5 MG TAB    TAKE 1 TABLET BY MOUTH TWICE  DAILY    EMGALITY SYRINGE 120 MG/ML SYRG    INJECT THE  "CONTENTS OF 1 SYRINGE SUBCUTANEOUSLY EVERY 28 DAYS    EZETIMIBE (ZETIA) 10 MG TABLET    TAKE 1 TABLET BY MOUTH EVERY  EVENING    FAMOTIDINE (PEPCID) 20 MG TABLET    TAKE 1 TABLET BY MOUTH TWICE  DAILY    FLUTICASONE PROPIONATE (FLONASE) 50 MCG/ACTUATION NASAL SPRAY    2 sprays (100 mcg total) by Each Nostril route once daily.    GABAPENTIN (NEURONTIN) 300 MG CAPSULE    Take 1 capsule (300 mg total) by mouth daily as needed (when needed).    HYDROCORTISONE 2.5 % CREAM    Apply topically 2 (two) times daily.    INSULIN LISPRO (HUMALOG KWIKPEN INSULIN) 100 UNIT/ML PEN    Inject 5 Units into the skin as needed (if glucose before each meal which is three times a day is above 200 take 5 units of this insulin).    ISOSORBIDE MONONITRATE (IMDUR) 30 MG 24 HR TABLET    TAKE 1 TABLET BY MOUTH ONCE  DAILY    LANCETS MISC    To check BG 1 times daily    MECLIZINE (ANTIVERT) 25 MG TABLET    Take 1 tablet (25 mg total) by mouth 3 (three) times daily as needed.    NITROGLYCERIN (NITROSTAT) 0.4 MG SL TABLET    Place 1 tablet (0.4 mg total) under the tongue every 5 (five) minutes as needed for Chest pain.    PANTOPRAZOLE (PROTONIX) 40 MG TABLET    Take 1 tablet (40 mg total) by mouth once daily.    PEN NEEDLE, DIABETIC (BD ULTRA-FINE SHORT PEN NEEDLE) 31 GAUGE X 5/16" NDLE    AS DIRECTED TWICE DAILY    PRAVASTATIN (PRAVACHOL) 20 MG TABLET    Take 1 tablet (20 mg total) by mouth every evening.    RIMEGEPANT 75 MG ODT    Take 1 tablet (75 mg total) by mouth as needed for Migraine (do not exceed 2-3 doses within 1 week). Place ODT tablet on the tongue; alternatively the ODT tablet may be placed under the tongue    SACUBITRIL-VALSARTAN (ENTRESTO)  MG PER TABLET    Take 1 tablet by mouth 2 (two) times daily.    TEMAZEPAM (RESTORIL) 30 MG CAPSULE    Take 1 capsule (30 mg total) by mouth every evening.   Changed and/or Refilled Medications    Modified Medication Previous Medication    BEPREVE 1.5 % DROP BEPREVE 1.5 % Drop       Place " 1 drop into both eyes 2 (two) times daily.    Place 1 drop into both eyes 2 (two) times daily.    FUROSEMIDE (LASIX) 20 MG TABLET furosemide (LASIX) 20 MG tablet       Take 1 tablet (20 mg total) by mouth daily as needed (swelling).    TAKE 1 TABLET BY MOUTH DAILY AS  NEEDED FOR SWELLING    INSULIN GLARGINE U-100, LANTUS, (LANTUS SOLOSTAR U-100 INSULIN) 100 UNIT/ML (3 ML) INPN PEN insulin (LANTUS SOLOSTAR U-100 INSULIN) glargine 100 units/mL SubQ pen       Inject 64 Units into the skin every evening.    Inject 72 Units into the skin every evening.    SITAGLIPTIN PHOSPHATE (JANUVIA) 100 MG TAB JANUVIA 100 mg Tab       Take 1 tablet (100 mg total) by mouth once daily.    TAKE 1 TABLET BY MOUTH ONCE  DAILY        Objective:     There were no vitals filed for this visit.      Physical Exam  Vitals reviewed: virtual visit.   Constitutional:       Appearance: Normal appearance.   Neurological:      Mental Status: She is alert.   Psychiatric:         Behavior: Behavior normal.       Lab Results   Component Value Date    WBC 4.95 03/25/2025    HGB 10.6 (L) 03/25/2025    HCT 32.9 (L) 03/25/2025    MCV 96 03/25/2025     03/25/2025       Lab Results   Component Value Date     03/25/2025    K 4.6 03/25/2025     03/25/2025    CO2 22 (L) 03/25/2025    BUN 17 03/25/2025    CREATININE 0.7 03/25/2025    CALCIUM 9.1 03/25/2025    ANIONGAP 13 03/25/2025    ESTGFRAFRICA >60 07/27/2022    EGFRNONAA >60 07/27/2022     Lab Results   Component Value Date    ALT 8 (L) 03/25/2025    AST 10 (L) 03/25/2025    ALKPHOS 50 03/25/2025    BILITOT 0.4 03/25/2025       Assessment/Plan:     Problem List Items Addressed This Visit       Iron deficiency anemia due to chronic blood loss    Lab Results   Component Value Date    IRON 63 03/25/2025    TRANSFERRIN 258 03/25/2025    TIBC 382 03/25/2025    LABIRON 16 (L) 03/25/2025    FESATURATED 27 12/20/2024     Lab Results   Component Value Date    FERRITIN 48.0 03/25/2025     Iron  indices remain stable  Will hold off on IV iron therapy at this time  Pt cannot tolerate oral iron supplementation d/t GI upset  Encouraged incorporation of iron rich foods in diet     Plan for ocntinued close f/u in 3 months with labs prior VV please           Anemia - Primary    Lab Results   Component Value Date    HGB 10.6 (L) 03/25/2025   Stable,chronic mild anemia  --Multifactorial r/t co morbidities    In depth discussion in regard to anemia of chronic disease  Iron indices and B12, folate WNL  Advised pt that with worsening will plan for BMBx  Extensive workups up to this point unrevealing  She notes in process of doing colonoscopy but has been having issues tolerating prep--pt states she has reached out to GI                      Med Onc Chart Routing      Follow up with physician    Follow up with MARIANO . Scheduled   Infusion scheduling note    Injection scheduling note    Labs    Imaging    Pharmacy appointment    Other referrals                   BRENDEN CarboneP-C  Hematology/Oncology

## 2025-04-21 NOTE — ASSESSMENT & PLAN NOTE
Lab Results   Component Value Date    HGB 10.6 (L) 03/25/2025   Stable,chronic mild anemia  --Multifactorial r/t co morbidities    In depth discussion in regard to anemia of chronic disease  Iron indices and B12, folate WNL  Advised pt that with worsening will plan for BMBx  Extensive workups up to this point unrevealing  She notes in process of doing colonoscopy but has been having issues tolerating prep--pt states she has reached out to GI

## 2025-04-21 NOTE — ASSESSMENT & PLAN NOTE
Lab Results   Component Value Date    IRON 63 03/25/2025    TRANSFERRIN 258 03/25/2025    TIBC 382 03/25/2025    LABIRON 16 (L) 03/25/2025    FESATURATED 27 12/20/2024     Lab Results   Component Value Date    FERRITIN 48.0 03/25/2025     Iron indices remain stable  Will hold off on IV iron therapy at this time  Pt cannot tolerate oral iron supplementation d/t GI upset  Encouraged incorporation of iron rich foods in diet     Plan for ocntinued close f/u in 3 months with labs prior VV please

## 2025-04-24 ENCOUNTER — LAB REQUISITION (OUTPATIENT)
Dept: LAB | Facility: HOSPITAL | Age: 75
End: 2025-04-24
Payer: MEDICARE

## 2025-04-24 DIAGNOSIS — N39.0 URINARY TRACT INFECTION, SITE NOT SPECIFIED: ICD-10-CM

## 2025-04-24 PROCEDURE — 81001 URINALYSIS AUTO W/SCOPE: CPT | Performed by: FAMILY MEDICINE

## 2025-04-24 PROCEDURE — 87086 URINE CULTURE/COLONY COUNT: CPT | Performed by: FAMILY MEDICINE

## 2025-04-25 ENCOUNTER — TELEPHONE (OUTPATIENT)
Dept: INTERNAL MEDICINE | Facility: CLINIC | Age: 75
End: 2025-04-25
Payer: MEDICARE

## 2025-04-25 LAB
BACTERIA #/AREA URNS AUTO: NORMAL /HPF
MICROSCOPIC COMMENT: NORMAL
RBC #/AREA URNS AUTO: 1 /HPF (ref 0–4)
SQUAMOUS #/AREA URNS AUTO: 2 /HPF
WBC #/AREA URNS AUTO: 3 /HPF (ref 0–5)

## 2025-04-25 NOTE — TELEPHONE ENCOUNTER
Talked to pt cx still in progress      ----- Message from Nurse Javier sent at 4/25/2025 10:29 AM CDT -----  Contact: self    ----- Message -----  From: Dylon Rust  Sent: 4/25/2025  10:25 AM CDT  To: Stephon CARLSON Staff; Amilcar Chan Staff    .Type:  Needs Medical AdviceWho Called: .Christine McmullenmanSymptoms (please be specific):  How long has patient had these symptoms:  Pharmacy name and phone #:  [id number] - [place], [place] - [address] [facility] [id number] [facility] [address]Ygweh: [phone] Fax: [phone] the patient rather a call back or a response via [facility]? Call [facility] Call Back Number: .[phone] Information: Pt had a urinalysis done and is wanting a call back with results / something called in for her pain.

## 2025-04-25 NOTE — TELEPHONE ENCOUNTER
Still in progress  ----- Message from Nurse Javier sent at 4/25/2025 10:29 AM CDT -----  Contact: self    ----- Message -----  From: Dylon Rust  Sent: 4/25/2025  10:25 AM CDT  To: Stephon CARLSON Staff; Amilcar Chan Staff    .Type:  Needs Medical AdviceWho Called: .Christine McmullenmanSymptoms (please be specific):  How long has patient had these symptoms:  Pharmacy name and phone #:  .[id number] - [place], [place] - [address] [facility] [id number] [address]Qwjky: [phone] Fax: [phone] the patient rather a call back or a response via [facility]? Call [facility] Call Back Number: .[phone] Information: Pt had a urinalysis done and is wanting a call back with results / something called in for her pain.

## 2025-04-26 LAB — BACTERIA UR CULT: NORMAL

## 2025-04-27 ENCOUNTER — PATIENT MESSAGE (OUTPATIENT)
Dept: PSYCHIATRY | Facility: CLINIC | Age: 75
End: 2025-04-27
Payer: MEDICARE

## 2025-04-28 ENCOUNTER — TELEPHONE (OUTPATIENT)
Dept: INTERNAL MEDICINE | Facility: CLINIC | Age: 75
End: 2025-04-28
Payer: MEDICARE

## 2025-04-28 NOTE — TELEPHONE ENCOUNTER
[facility] did urine on the 04/24  she continues to c/o burning and pain please look at [facility] report asking for meds to be called in        ----- Message from Ap sent at 4/28/2025  1:21 PM CDT -----  Contact: Joselito  Type:  Needs Medical AdviceWho Called: Danette with [facility] HealthWould the patient rather a call back or a response via [facility]? Call [facility] Call Back Number: [phone] Information: She was calling to report the patient lab results that are on her [facility] from her urinalysis.the patient is complaining of burning and lower flank pain.

## 2025-04-29 ENCOUNTER — TELEPHONE (OUTPATIENT)
Dept: INTERNAL MEDICINE | Facility: CLINIC | Age: 75
End: 2025-04-29
Payer: MEDICARE

## 2025-04-29 ENCOUNTER — TELEPHONE (OUTPATIENT)
Dept: UROLOGY | Facility: CLINIC | Age: 75
End: 2025-04-29
Payer: MEDICARE

## 2025-04-29 DIAGNOSIS — R30.0 DYSURIA: Primary | ICD-10-CM

## 2025-04-29 NOTE — TELEPHONE ENCOUNTER
Talked to pt will make appt with urology    ----- Message from Carissa sent at 4/29/2025  8:46 AM CDT -----  Type:  Needs Medical AdviceWho Called: JUSTIN GOLD [[id number]]Symptoms (please be specific):  How long has patient had these symptoms:  Pharmacy name and phone #:  Would the patient rather a call back or a response via [facility]? [facility] Call Back Number:  [phone] Information: Patient called in regards to urine test results

## 2025-04-29 NOTE — TELEPHONE ENCOUNTER
Returned call to patient. There was no answer.      ----- Message from Elsy sent at 4/29/2025 10:25 AM CDT -----  Type: Patient callWho called: Patient Does the patient know what this is regarding? Requesting a call back in regards to needing to schedule appt from active referral ; would like to have appt schedule on the same day as another appt due to transportation from insurance ; have to notify insurance 3 days ahead of time as well; please advise Would the patient rather a call back or response via [facility]? [facility] call back number: [phone] information:

## 2025-04-30 ENCOUNTER — TELEPHONE (OUTPATIENT)
Dept: UROLOGY | Facility: CLINIC | Age: 75
End: 2025-04-30
Payer: MEDICARE

## 2025-04-30 NOTE — TELEPHONE ENCOUNTER
Sent a message to Rosa to see if she had any sooner appointments than the ones I have in October.        ----- Message from Etta sent at 4/30/2025  4:02 PM CDT -----  Type:  Patient Returning CallManolo Called:Alejandra Mitchell Message for Patient:NurseDoes the patient know what this is regarding?:referral apptWould the patient rather a call back or a response via [facility]? [facility] Call Back Number:[phone] Information: Missed Call

## 2025-05-01 DIAGNOSIS — H16.223 KERATITIS SICCA, BILATERAL: ICD-10-CM

## 2025-05-02 RX ORDER — CYCLOSPORINE 0.5 MG/ML
1 EMULSION OPHTHALMIC 2 TIMES DAILY
Qty: 60 EACH | Refills: 11 | Status: SHIPPED | OUTPATIENT
Start: 2025-05-02

## 2025-05-07 ENCOUNTER — TELEPHONE (OUTPATIENT)
Dept: OPHTHALMOLOGY | Facility: CLINIC | Age: 75
End: 2025-05-07
Payer: MEDICARE

## 2025-05-07 NOTE — TELEPHONE ENCOUNTER
Spoke with patient, waiting on her to send a picture of her eye to see if she wants an appt to get it checked out.

## 2025-05-08 ENCOUNTER — PATIENT MESSAGE (OUTPATIENT)
Dept: OPHTHALMOLOGY | Facility: CLINIC | Age: 75
End: 2025-05-08
Payer: MEDICARE

## 2025-05-14 ENCOUNTER — DOCUMENT SCAN (OUTPATIENT)
Dept: HOME HEALTH SERVICES | Facility: HOSPITAL | Age: 75
End: 2025-05-14
Payer: MEDICARE

## 2025-05-15 ENCOUNTER — EXTERNAL HOME HEALTH (OUTPATIENT)
Dept: HOME HEALTH SERVICES | Facility: HOSPITAL | Age: 75
End: 2025-05-15
Payer: MEDICARE

## 2025-06-02 ENCOUNTER — PATIENT MESSAGE (OUTPATIENT)
Dept: CARDIOLOGY | Facility: CLINIC | Age: 75
End: 2025-06-02
Payer: MEDICARE

## 2025-06-03 ENCOUNTER — PATIENT MESSAGE (OUTPATIENT)
Dept: UROLOGY | Facility: CLINIC | Age: 75
End: 2025-06-03
Payer: MEDICARE

## 2025-06-03 ENCOUNTER — PATIENT MESSAGE (OUTPATIENT)
Dept: CARDIOLOGY | Facility: CLINIC | Age: 75
End: 2025-06-03
Payer: MEDICARE

## 2025-06-03 ENCOUNTER — TELEPHONE (OUTPATIENT)
Dept: CARDIOLOGY | Facility: CLINIC | Age: 75
End: 2025-06-03
Payer: MEDICARE

## 2025-06-03 ENCOUNTER — TELEPHONE (OUTPATIENT)
Dept: INTERNAL MEDICINE | Facility: CLINIC | Age: 75
End: 2025-06-03
Payer: MEDICARE

## 2025-06-03 ENCOUNTER — TELEPHONE (OUTPATIENT)
Dept: UROLOGY | Facility: CLINIC | Age: 75
End: 2025-06-03
Payer: MEDICARE

## 2025-06-04 ENCOUNTER — TELEPHONE (OUTPATIENT)
Dept: HEMATOLOGY/ONCOLOGY | Facility: CLINIC | Age: 75
End: 2025-06-04
Payer: MEDICARE

## 2025-06-05 ENCOUNTER — PATIENT MESSAGE (OUTPATIENT)
Dept: PSYCHIATRY | Facility: CLINIC | Age: 75
End: 2025-06-05
Payer: MEDICARE

## 2025-06-07 DIAGNOSIS — I10 PRIMARY HYPERTENSION: ICD-10-CM

## 2025-06-09 ENCOUNTER — PATIENT MESSAGE (OUTPATIENT)
Dept: CARDIOLOGY | Facility: CLINIC | Age: 75
End: 2025-06-09
Payer: MEDICARE

## 2025-06-09 RX ORDER — CARVEDILOL 25 MG/1
50 TABLET ORAL 2 TIMES DAILY
Qty: 120 TABLET | Refills: 11 | Status: SHIPPED | OUTPATIENT
Start: 2025-06-09

## 2025-06-10 ENCOUNTER — PATIENT MESSAGE (OUTPATIENT)
Dept: INTERNAL MEDICINE | Facility: CLINIC | Age: 75
End: 2025-06-10
Payer: MEDICARE

## 2025-06-10 NOTE — TELEPHONE ENCOUNTER
Refill Routing Note   Medication(s) are not appropriate for processing by [facility] for the following reason(s):        Outside of protocol    [facility] action(s):  Route               Appointments  past 12m or future 3m with PCP    Date Provider   Last Visit   3/18/2025 Jose Szymanski MD   Next Visit   6/24/2025 Jose Szymanski MD   ED visits in past 90 days: 0        Note composed:9:06 AM 06/10/2025

## 2025-06-12 RX ORDER — CLOTRIMAZOLE AND BETAMETHASONE DIPROPIONATE 10; .5 MG/ML; MG/ML
LOTION TOPICAL 2 TIMES DAILY
Qty: 30 ML | Refills: 2 | Status: SHIPPED | OUTPATIENT
Start: 2025-06-12

## 2025-06-14 ENCOUNTER — PATIENT MESSAGE (OUTPATIENT)
Dept: INTERNAL MEDICINE | Facility: CLINIC | Age: 75
End: 2025-06-14
Payer: MEDICARE

## 2025-06-16 ENCOUNTER — PATIENT MESSAGE (OUTPATIENT)
Dept: INTERNAL MEDICINE | Facility: CLINIC | Age: 75
End: 2025-06-16
Payer: MEDICARE

## 2025-06-16 DIAGNOSIS — R29.90 MULTIPLE NEUROLOGICAL SYMPTOMS: Primary | ICD-10-CM

## 2025-06-16 DIAGNOSIS — F09 MILD COGNITIVE DISORDER: ICD-10-CM

## 2025-06-16 DIAGNOSIS — R41.9 COGNITIVE COMPLAINTS: ICD-10-CM

## 2025-06-18 NOTE — TELEPHONE ENCOUNTER
No care due was identified.  [facility] Embedded Care Due Messages. Reference number: [id number].   6/18/2025 6:57:30 PM CDT

## 2025-06-18 NOTE — TELEPHONE ENCOUNTER
Care Due:                  Date            Visit Type   Department     Provider  --------------------------------------------------------------------------------                                ESTABLISHED                              PATIENT -    [facility] INTERNAL  Last Visit: 03-      [facility]      CHEMA Szymanski                              EP -                              PRIMARY      [facility] INTERNAL  Next Visit: 06-      CARE ([facility])   MEDICINE       Jose Szymanski                                                            Last  Test          Frequency    Reason                     Performed    Due Date  --------------------------------------------------------------------------------    HBA1C.......  6 months...  insulin..................  02-   08-    Lipid Panel.  12 months..  ezetimibe................  06- 06-    [employer] Embedded Care Due Messages. Reference number: [id number].   6/18/2025 6:56:33 PM CDT

## 2025-06-19 ENCOUNTER — OFFICE VISIT (OUTPATIENT)
Dept: UROLOGY | Facility: CLINIC | Age: 75
End: 2025-06-19
Payer: MEDICARE

## 2025-06-19 VITALS — SYSTOLIC BLOOD PRESSURE: 157 MMHG | HEART RATE: 72 BPM | DIASTOLIC BLOOD PRESSURE: 77 MMHG

## 2025-06-19 DIAGNOSIS — N39.41 URGE INCONTINENCE: Primary | ICD-10-CM

## 2025-06-19 DIAGNOSIS — N39.3 STRESS INCONTINENCE: ICD-10-CM

## 2025-06-19 LAB
BILIRUBIN, UA POC OHS: NEGATIVE
BLOOD, UA POC OHS: NEGATIVE
CLARITY, UA POC OHS: CLEAR
COLOR, UA POC OHS: YELLOW
GLUCOSE, UA POC OHS: NEGATIVE
KETONES, UA POC OHS: NEGATIVE
LEUKOCYTES, UA POC OHS: NEGATIVE
NITRITE, UA POC OHS: NEGATIVE
PH, UA POC OHS: 5.5
POC RESIDUAL URINE VOLUME: 5 ML (ref 0–100)
PROTEIN, UA POC OHS: ABNORMAL
SPECIFIC GRAVITY, UA POC OHS: >=1.03
UROBILINOGEN, UA POC OHS: 0.2

## 2025-06-19 PROCEDURE — 1125F AMNT PAIN NOTED PAIN PRSNT: CPT | Mod: CPTII,S$GLB,, | Performed by: UROLOGY

## 2025-06-19 PROCEDURE — 51798 US URINE CAPACITY MEASURE: CPT | Mod: S$GLB,,, | Performed by: UROLOGY

## 2025-06-19 PROCEDURE — 3072F LOW RISK FOR RETINOPATHY: CPT | Mod: CPTII,S$GLB,, | Performed by: UROLOGY

## 2025-06-19 PROCEDURE — 99214 OFFICE O/P EST MOD 30 MIN: CPT | Mod: S$GLB,,, | Performed by: UROLOGY

## 2025-06-19 PROCEDURE — 4010F ACE/ARB THERAPY RXD/TAKEN: CPT | Mod: CPTII,S$GLB,, | Performed by: UROLOGY

## 2025-06-19 PROCEDURE — 1101F PT FALLS ASSESS-DOCD LE1/YR: CPT | Mod: CPTII,S$GLB,, | Performed by: UROLOGY

## 2025-06-19 PROCEDURE — 81003 URINALYSIS AUTO W/O SCOPE: CPT | Mod: QW,S$GLB,, | Performed by: UROLOGY

## 2025-06-19 PROCEDURE — 1159F MED LIST DOCD IN RCRD: CPT | Mod: CPTII,S$GLB,, | Performed by: UROLOGY

## 2025-06-19 PROCEDURE — 99999 PR PBB SHADOW E&M-EST. PATIENT-LVL IV: CPT | Mod: PBBFAC,,, | Performed by: UROLOGY

## 2025-06-19 PROCEDURE — 3044F HG A1C LEVEL LT 7.0%: CPT | Mod: CPTII,S$GLB,, | Performed by: UROLOGY

## 2025-06-19 PROCEDURE — 3288F FALL RISK ASSESSMENT DOCD: CPT | Mod: CPTII,S$GLB,, | Performed by: UROLOGY

## 2025-06-19 PROCEDURE — 3077F SYST BP >= 140 MM HG: CPT | Mod: CPTII,S$GLB,, | Performed by: UROLOGY

## 2025-06-19 PROCEDURE — 3078F DIAST BP <80 MM HG: CPT | Mod: CPTII,S$GLB,, | Performed by: UROLOGY

## 2025-06-19 RX ORDER — FAMOTIDINE 20 MG/1
20 TABLET, FILM COATED ORAL 2 TIMES DAILY
Qty: 60 TABLET | Refills: 1 | Status: SHIPPED | OUTPATIENT
Start: 2025-06-19

## 2025-06-19 RX ORDER — INSULIN LISPRO 100 [IU]/ML
5 INJECTION, SOLUTION INTRAVENOUS; SUBCUTANEOUS
Qty: 3 ML | Refills: 0 | Status: SHIPPED | OUTPATIENT
Start: 2025-06-19 | End: 2026-06-19

## 2025-06-19 RX ORDER — MECLIZINE HYDROCHLORIDE 25 MG/1
25 TABLET ORAL 3 TIMES DAILY PRN
Qty: 30 TABLET | Refills: 2 | Status: SHIPPED | OUTPATIENT
Start: 2025-06-19

## 2025-06-19 RX ORDER — VIBEGRON 75 MG/1
75 TABLET, FILM COATED ORAL DAILY
Qty: 30 TABLET | Refills: 5 | Status: SHIPPED | OUTPATIENT
Start: 2025-06-19

## 2025-06-19 NOTE — PROGRESS NOTES
Chief Complaint:   Encounter Diagnoses   Name Primary?    Urge incontinence Yes    Stress incontinence        HPI:  HPI Christine Jo xavi 75 y.o. female who presents with continued problems with urgency and urge incontinence.  She was evaluated by my partner and recommended to start medication however she never started it.  Her main complaints are nocturia and leaking during the day.  She is unable to make it to the bathroom.  She does have baseline stress incontinence.  She was previously counseled on surgical options and declined.  She has struggled with a yeast infection and skin folds as well as the vaginal area.    History:  Social History[1]  Past Medical History:   Diagnosis Date    Arthritis     Cataract     Coronary artery disease     Diabetes mellitus 2008     am 01/15/2018 Insulin x 1 year    Diabetes mellitus, type 2     DM (diabetes mellitus) 2008     am 02/14/2020 Insulin x 4 years    Encounter for blood transfusion     Glaucoma     Hypertension     Insomnia     Macular degeneration     Old MI (myocardial infarction) 2/12/2024    Vaginal yeast infection      Past Surgical History:   Procedure Laterality Date    abdominal laparoscopy       ADENOIDECTOMY  1965    Tonsils removed also    ANGIOGRAM, CORONARY, WITH LEFT HEART CATHETERIZATION N/A 04/02/2024    Procedure: Angiogram, Coronary, with Left Heart Cath;  Surgeon: Shree Espinoza MD;  Location: [facility];  Service: Cardiology;  Laterality: N/A;    ANGIOGRAM, CORONARY, WITH LEFT HEART CATHETERIZATION N/A 04/01/2024    Procedure: Angiogram, Coronary, with Left Heart Cath;  Surgeon: Shree Espinoza MD;  Location: [facility];  Service: Cardiology;  Laterality: N/A;    BREAST BIOPSY Left 1998    benign    BREAST SURGERY  1998 biopsy lt breast    CATARACT EXTRACTION Bilateral [id number]    [facility]qui in [place]    Cataract Surgery Bilateral     Laser (YAG)    COLON SURGERY  2004    COLONOSCOPY N/A 05/05/2021    Procedure:  COLONOSCOPY;  Surgeon: Shawn Rogers III, MD;  Location: [facility];  Service: Endoscopy;  Laterality: N/A;    COLONOSCOPY N/A 06/30/2021    Procedure: COLONOSCOPY;  Surgeon: Memo Merida MD;  Location: [facility];  Service: Endoscopy;  Laterality: N/A;    CORONARY STENT PLACEMENT N/A 04/01/2024    Procedure: INSERTION, STENT, CORONARY ARTERY;  Surgeon: Shree Espinoza MD;  Location: [facility]ATH [facility];  Service: Cardiology;  Laterality: N/A;    ESOPHAGOGASTRODUODENOSCOPY N/A 11/29/2019    Procedure: ESOPHAGOGASTRODUODENOSCOPY (EGD);  Surgeon: Shawn Rogers III, MD;  Location: [facility];  Service: Endoscopy;  Laterality: N/A;    ESOPHAGOGASTRODUODENOSCOPY N/A 05/05/2021    Procedure: EGD (ESOPHAGOGASTRODUODENOSCOPY);  Surgeon: Shawn Rogers III, MD;  Location: [facility];  Service: Endoscopy;  Laterality: N/A;    EYE SURGERY      IVUS, CORONARY  04/02/2024    Procedure: IVUS, Coronary;  Surgeon: Shree Espinoza MD;  Location: [facility] CATH [facility];  Service: Cardiology;;    PERCUTANEOUS CORONARY INTERVENTION, ARTERY N/A 04/01/2024    Procedure: Percutaneous coronary intervention;  Surgeon: Shree Espinoza MD;  Location: [facility]ATH [facility];  Service: Cardiology;  Laterality: N/A;    PTCA, SINGLE VESSEL  04/01/2024    Procedure: PTCA, Single Vessel;  Surgeon: Shree Espinoza MD;  Location: [facility] CATH [facility];  Service: Cardiology;;    SMALL INTESTINE SURGERY  2004    Exploratory stomach large and small intestines    TONSILLECTOMY      TUBAL LIGATION       Family History   Problem Relation Name Age of Onset    Heart disease Mother Jillian Jo         Open heart surgery    Cataracts Mother Jillian Jo     Macular degeneration Mother Jillian Jo     Glaucoma Mother Jillian Jo     Arthritis Mother Jillian Jo     Hearing loss Mother Jillian Jo     Hypertension Mother Jillian Jo     Kidney disease Mother Jillian Jo     Stroke Mother Jillian Jo     Vision loss Mother Jillian Jo      Diabetes Sister La Jo     Heart disease Sister La Jo         CAD    Cataracts Sister La Jo     Depression Sister La Jo         Depression    Hypertension Sister La Jo     Diabetes Sister Erica Lerouge     Hearing loss Sister Erica Lerouge     Hypertension Sister Erica Lerouge     Diabetes Sister      Cancer Son Andrea Trascher III         testicular     Arthritis Son Andrea Trascher III         Because of chemotherapy at 19 he has neuropathy and arthritis    Hearing loss Son Andrea Trascher III         Because of chemotherapy    Cancer Maternal Aunt Etta Marquez         Tumor in chest wall    Heart disease Maternal Grandfather Leonides Tom         Pacemaker    Arthritis Maternal Grandfather [place] Tom     Hearing loss Maternal Grandfather Leonides Tom     Hypertension Maternal Grandfather [place] Tom     Hypertension Maternal Grandmother Helene Santos     Kidney disease Maternal Grandmother Helenepepe Santos     Stroke Maternal Grandmother Helene Santos     Alcohol abuse Daughter Kaye Trascher     Asthma Daughter Kaye Trascher     Depression Daughter Kaye Trascher         Psysophrenia    Drug abuse Daughter Kaye Camargoscher         Not now but earlier alcohol and drug abuse    Hypertension Daughter Kaye Trascher     Mental illness Daughter Kaye Trascher         Psysophrenia    Cancer Son Andrea Trascher III will         Testicular cancer    Depression Son Andrea Trascher III will         Major depressive disorder like me    Cancer Maternal Aunt Carmella Enrique         Lung cancer    Depression Sister Maribell Hull         May be bipolar    Diabetes Sister Maribell Hull     Heart disease Sister Maribell Hull         Open heart surgery    Hypertension Sister Maribell Kurtis     Hypertension Brother Riky Jo        Medications Ordered Prior to Encounter[2]     Objective:     Vitals:    06/19/25 1120   BP: (!) 157/77    Pulse: 72      BMI Readings from Last 1 Encounters:   25 44.19 kg/m²          Physical Exam  No acute distress alert and oriented   Respirations even unlabored   Abdomen is obese    Lab Results   Component Value Date    CREATININE 0.7 2025      Assessment:       1. Urge incontinence    2. Stress incontinence        Plan:     1. Urge incontinence    2. Stress incontinence       Orders Placed This Encounter    POCT Urinalysis(Instrument)    POCT Bladder Scan    vibegron (GEMTESA) 75 mg Tab     Discussed treatment with Gemtesa for overactive bladder to see if we can improve her quality of life.  We will re-evaluate her in a few months for repeat evaluation.       [1]   Social History  Tobacco Use    Smoking status: Former     Current packs/day: 0.00     Average packs/day: 1 pack/day for 36.5 years (36.5 ttl pk-yrs)     Types: Cigarettes     Start date:      Quit date: 2003     Years since quittin.0     Passive exposure: Past    Smokeless tobacco: Never    Tobacco comments:     Been quit 19 years. I smoked 35 years approx a pack a day   Substance Use Topics    Alcohol use: No    Drug use: Never   [2]   Current Outpatient Medications on File Prior to Visit   Medication Sig Dispense Refill    ARIPiprazole (ABILIFY) 2 MG Tab Take 1 tablet (2 mg total) by mouth once daily. 90 tablet 0    BEPREVE 1.5 % Drop Place 1 drop into both eyes 2 (two) times daily. 10 mL 4    blood sugar diagnostic Strp To check blood sugar 1 times daily 100 each 3    blood-glucose meter (ACCU-CHEK GUIDE GLUCOSE METER) Misc use daily to test blood sugar 1 each 0    carvediloL (COREG) 25 MG tablet TAKE 2 TABLETS BY MOUTH TWICE  DAILY 120 tablet 11    cloNIDine (CATAPRES) 0.1 MG tablet Take 1 tablet (0.1 mg total) by mouth daily as needed (BP >160/90). 30 tablet 11    cloNIDine 0.3 mg/24 hr td ptwk (CATAPRES) 0.3 mg/24 hr Place 1 patch onto the skin every 7 days. 4 patch 11    clopidogreL (PLAVIX) 75 mg tablet TAKE 1 TABLET  BY MOUTH ONCE  DAILY 90 tablet 3    clotrimazole-betamethasone (LOTRISONE) lotion Apply topically to the affected area 2 (two) times daily. 30 mL 2    coenzyme Q10 200 mg capsule Take 200 mg by mouth once daily. 30 capsule 11    DULoxetine (CYMBALTA) 60 MG capsule Take 1 capsule (60 mg total) by mouth once daily. 90 capsule 1    ELIQUIS 5 mg Tab TAKE 1 TABLET BY MOUTH TWICE  DAILY 180 tablet 3    EMGALITY SYRINGE 120 mg/mL Syrg INJECT THE CONTENTS OF 1 SYRINGE SUBCUTANEOUSLY EVERY 28 DAYS 3 mL 3    ezetimibe (ZETIA) 10 mg tablet TAKE 1 TABLET BY MOUTH EVERY  EVENING 90 tablet 3    fluticasone propionate (FLONASE) 50 mcg/actuation nasal spray 2 sprays (100 mcg total) by Each Nostril route once daily. 48 g 3    furosemide (LASIX) 20 MG tablet Take 1 tablet (20 mg total) by mouth daily as needed (swelling). 90 tablet 3    gabapentin (NEURONTIN) 300 MG capsule Take 1 capsule (300 mg total) by mouth daily as needed (when needed). 90 capsule 3    hydrocortisone 2.5 % cream Apply topically 2 (two) times daily. 28 g 2    insulin glargine U-100, Lantus, (LANTUS SOLOSTAR U-100 INSULIN) 100 unit/mL (3 mL) InPn pen Inject 64 Units into the skin every evening. 75 mL 3    insulin lispro (HUMALOG KWIKPEN INSULIN) 100 unit/mL pen Inject 5 Units into the skin as needed (if glucose before each meal which is three times a day is above 200 take 5 units of this insulin). 3 mL 0    isosorbide mononitrate (IMDUR) 30 MG 24 hr tablet TAKE 1 TABLET BY MOUTH ONCE  DAILY 90 tablet 3    lancets Misc To check BG 1 times daily 100 each 3    meclizine (ANTIVERT) 25 mg tablet Take 1 tablet (25 mg total) by mouth 3 (three) times daily as needed. 30 tablet 2    nitroGLYCERIN (NITROSTAT) 0.4 MG SL tablet Place 1 tablet (0.4 mg total) under the tongue every 5 (five) minutes as needed for Chest pain. 25 tablet 0    pantoprazole (PROTONIX) 40 MG tablet Take 1 tablet (40 mg total) by mouth once daily. 90 tablet 3    pen needle, diabetic (BD ULTRA-FINE  "SHORT PEN NEEDLE) 31 gauge x 5/16" Ndle AS DIRECTED TWICE DAILY 200 each 3    pravastatin (PRAVACHOL) 20 MG tablet Take 1 tablet (20 mg total) by mouth every evening. 30 tablet 11    RESTASIS 0.05 % ophthalmic emulsion INSTILL 1 DROP INTO BOTH EYES  TWICE DAILY 60 each 11    rimegepant 75 mg odt Take 1 tablet (75 mg total) by mouth as needed for Migraine (do not exceed 2-3 doses within 1 week). Place ODT tablet on the tongue; alternatively the ODT tablet may be placed under the tongue 8 tablet 5    sacubitriL-valsartan (ENTRESTO)  mg per tablet Take 1 tablet by mouth 2 (two) times daily. 180 tablet 3    SITagliptin phosphate (JANUVIA) 100 MG Tab Take 1 tablet (100 mg total) by mouth once daily. 90 tablet 1    temazepam (RESTORIL) 30 mg capsule Take 1 capsule (30 mg total) by mouth every evening. 90 capsule 1    azelastine (ASTELIN) 137 mcg (0.1 %) nasal spray use 2 sprays (274 mcg total) by Nasal route 2 (two) times daily. 30 mL 1    famotidine (PEPCID) 20 MG tablet Take 1 tablet (20 mg total) by mouth 2 (two) times daily. 60 tablet 1     No current facility-administered medications on file prior to visit.     "
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
## 2025-06-23 ENCOUNTER — TELEPHONE (OUTPATIENT)
Dept: INTERNAL MEDICINE | Facility: CLINIC | Age: 75
End: 2025-06-23
Payer: MEDICARE

## 2025-06-23 DIAGNOSIS — F33.1 DEPRESSION, MAJOR, RECURRENT, MODERATE: ICD-10-CM

## 2025-06-23 DIAGNOSIS — F41.9 ANXIETY DISORDER, UNSPECIFIED TYPE: ICD-10-CM

## 2025-06-23 RX ORDER — DULOXETIN HYDROCHLORIDE 60 MG/1
60 CAPSULE, DELAYED RELEASE ORAL
Qty: 90 CAPSULE | Refills: 3 | Status: SHIPPED | OUTPATIENT
Start: 2025-06-23

## 2025-06-23 NOTE — TELEPHONE ENCOUNTER
Copied from CRM #[id number]. Topic: General Inquiry - Patient Advice  >> Jun 23, 2025  4:51 PM Kimberli wrote:  Type:  Patient Requesting a call back     Who Called:Christine   What is the call back request regarding?:pt would like to let nurse Liseth that she is not feeling well and will be rescheduling her appts for tomorrow   Would the patient rather a call back or a response via [facility]?call  Best Call Back Number:[phone]      Additional Information:

## 2025-06-25 ENCOUNTER — HOSPITAL ENCOUNTER (OUTPATIENT)
Dept: RADIOLOGY | Facility: HOSPITAL | Age: 75
Discharge: HOME | End: 2025-06-25
Attending: STUDENT IN AN ORGANIZED HEALTH CARE EDUCATION/TRAINING PROGRAM
Payer: MEDICARE

## 2025-06-25 ENCOUNTER — OFFICE VISIT (OUTPATIENT)
Dept: PSYCHIATRY | Facility: CLINIC | Age: 75
End: 2025-06-25
Payer: MEDICARE

## 2025-06-25 ENCOUNTER — OFFICE VISIT (OUTPATIENT)
Dept: GASTROENTEROLOGY | Facility: CLINIC | Age: 75
End: 2025-06-25
Payer: MEDICARE

## 2025-06-25 ENCOUNTER — PATIENT MESSAGE (OUTPATIENT)
Dept: GASTROENTEROLOGY | Facility: CLINIC | Age: 75
End: 2025-06-25

## 2025-06-25 ENCOUNTER — PATIENT MESSAGE (OUTPATIENT)
Dept: PSYCHIATRY | Facility: CLINIC | Age: 75
End: 2025-06-25
Payer: MEDICARE

## 2025-06-25 VITALS
OXYGEN SATURATION: 98 % | SYSTOLIC BLOOD PRESSURE: 148 MMHG | HEART RATE: 67 BPM | HEIGHT: 62 IN | WEIGHT: 242.63 LBS | BODY MASS INDEX: 44.65 KG/M2 | DIASTOLIC BLOOD PRESSURE: 65 MMHG

## 2025-06-25 DIAGNOSIS — E11.9 TYPE 2 DIABETES MELLITUS WITHOUT COMPLICATION: ICD-10-CM

## 2025-06-25 DIAGNOSIS — K59.04 CHRONIC IDIOPATHIC CONSTIPATION: Primary | ICD-10-CM

## 2025-06-25 DIAGNOSIS — F41.9 ANXIETY DISORDER, UNSPECIFIED TYPE: ICD-10-CM

## 2025-06-25 DIAGNOSIS — E66.01 MORBID OBESITY WITH BMI OF 40.0-44.9, ADULT: ICD-10-CM

## 2025-06-25 DIAGNOSIS — F33.1 DEPRESSION, MAJOR, RECURRENT, MODERATE: Primary | ICD-10-CM

## 2025-06-25 DIAGNOSIS — K76.0 METABOLIC DYSFUNCTION-ASSOCIATED STEATOTIC LIVER DISEASE (MASLD): ICD-10-CM

## 2025-06-25 DIAGNOSIS — G47.00 INSOMNIA, UNSPECIFIED TYPE: ICD-10-CM

## 2025-06-25 DIAGNOSIS — M79.642 BILATERAL HAND PAIN: ICD-10-CM

## 2025-06-25 DIAGNOSIS — Z86.0100 HISTORY OF COLON POLYPS: ICD-10-CM

## 2025-06-25 DIAGNOSIS — M79.641 BILATERAL HAND PAIN: ICD-10-CM

## 2025-06-25 PROCEDURE — 73130 X-RAY EXAM OF HAND: CPT | Mod: TC,50

## 2025-06-25 PROCEDURE — 3077F SYST BP >= 140 MM HG: CPT | Mod: CPTII,S$GLB,, | Performed by: INTERNAL MEDICINE

## 2025-06-25 PROCEDURE — 1160F RVW MEDS BY RX/DR IN RCRD: CPT | Mod: CPTII,95,, | Performed by: PSYCHIATRY & NEUROLOGY

## 2025-06-25 PROCEDURE — G2211 COMPLEX E/M VISIT ADD ON: HCPCS | Mod: 95,,, | Performed by: PSYCHIATRY & NEUROLOGY

## 2025-06-25 PROCEDURE — 3288F FALL RISK ASSESSMENT DOCD: CPT | Mod: CPTII,S$GLB,, | Performed by: INTERNAL MEDICINE

## 2025-06-25 PROCEDURE — 3072F LOW RISK FOR RETINOPATHY: CPT | Mod: CPTII,S$GLB,, | Performed by: INTERNAL MEDICINE

## 2025-06-25 PROCEDURE — 98006 SYNCH AUDIO-VIDEO EST MOD 30: CPT | Mod: 95,,, | Performed by: PSYCHIATRY & NEUROLOGY

## 2025-06-25 PROCEDURE — 1159F MED LIST DOCD IN RCRD: CPT | Mod: CPTII,S$GLB,, | Performed by: INTERNAL MEDICINE

## 2025-06-25 PROCEDURE — 3044F HG A1C LEVEL LT 7.0%: CPT | Mod: CPTII,S$GLB,, | Performed by: INTERNAL MEDICINE

## 2025-06-25 PROCEDURE — 3072F LOW RISK FOR RETINOPATHY: CPT | Mod: CPTII,95,, | Performed by: PSYCHIATRY & NEUROLOGY

## 2025-06-25 PROCEDURE — 99214 OFFICE O/P EST MOD 30 MIN: CPT | Mod: S$GLB,,, | Performed by: INTERNAL MEDICINE

## 2025-06-25 PROCEDURE — 1100F PTFALLS ASSESS-DOCD GE2>/YR: CPT | Mod: CPTII,S$GLB,, | Performed by: INTERNAL MEDICINE

## 2025-06-25 PROCEDURE — 4010F ACE/ARB THERAPY RXD/TAKEN: CPT | Mod: CPTII,S$GLB,, | Performed by: INTERNAL MEDICINE

## 2025-06-25 PROCEDURE — 73130 X-RAY EXAM OF HAND: CPT | Mod: 26,50,, | Performed by: STUDENT IN AN ORGANIZED HEALTH CARE EDUCATION/TRAINING PROGRAM

## 2025-06-25 PROCEDURE — 1126F AMNT PAIN NOTED NONE PRSNT: CPT | Mod: CPTII,S$GLB,, | Performed by: INTERNAL MEDICINE

## 2025-06-25 PROCEDURE — 3078F DIAST BP <80 MM HG: CPT | Mod: CPTII,S$GLB,, | Performed by: INTERNAL MEDICINE

## 2025-06-25 PROCEDURE — 3008F BODY MASS INDEX DOCD: CPT | Mod: CPTII,S$GLB,, | Performed by: INTERNAL MEDICINE

## 2025-06-25 PROCEDURE — 1160F RVW MEDS BY RX/DR IN RCRD: CPT | Mod: CPTII,S$GLB,, | Performed by: INTERNAL MEDICINE

## 2025-06-25 PROCEDURE — 1159F MED LIST DOCD IN RCRD: CPT | Mod: CPTII,95,, | Performed by: PSYCHIATRY & NEUROLOGY

## 2025-06-25 PROCEDURE — 3044F HG A1C LEVEL LT 7.0%: CPT | Mod: CPTII,95,, | Performed by: PSYCHIATRY & NEUROLOGY

## 2025-06-25 PROCEDURE — 99999 PR PBB SHADOW E&M-EST. PATIENT-LVL V: CPT | Mod: PBBFAC,,, | Performed by: INTERNAL MEDICINE

## 2025-06-25 PROCEDURE — 4010F ACE/ARB THERAPY RXD/TAKEN: CPT | Mod: CPTII,95,, | Performed by: PSYCHIATRY & NEUROLOGY

## 2025-06-25 NOTE — H&P (VIEW-ONLY)
[facility]  Gastroenterology    Patient evaluated at the request of Jose Szymanski Md  [address] [place] [address]     PCP: Jose Szymanski MD    Chief Complaint    Follow-up           History of present illness/subjective    Constipation: Patient complains of constipation.  Stool pattern has been 1 firm and pellet like stool(s) per day. Onset was several years ago. Defecation has been difficult and incomplete. Co-Morbid conditions:irritable bowel syndrome, metabolic disease, and obesity. Symptoms have been symptoms have progressed to a point and plateaued. Current Health Habits: Eating fiber? Yes.  Exercise? No. Water intake? Not enough. Current OTC/RX therapy which has been somewhat effective. Her Last Colonoscopy completed on 6/30/2021 found small polyps and hemorrhoids. A recent fibroscan revealed severe steatosis and minimal fibrosis.       Past Medical History:   Diagnosis Date    Arthritis     Cataract     Coronary artery disease     Diabetes mellitus 2008     am 01/15/2018 Insulin x 1 year    Diabetes mellitus, type 2     DM (diabetes mellitus) 2008     am 02/14/2020 Insulin x 4 years    Encounter for blood transfusion     Glaucoma     Hypertension     Insomnia     Macular degeneration     Old MI (myocardial infarction) 2/12/2024    Vaginal yeast infection        Past Surgical History:   Procedure Laterality Date    abdominal laparoscopy       ADENOIDECTOMY  1965    Tonsils removed also    ANGIOGRAM, CORONARY, WITH LEFT HEART CATHETERIZATION N/A 04/02/2024    Procedure: Angiogram, Coronary, with Left Heart Cath;  Surgeon: Shree Espinoza MD;  Location: [facility] CATH LAB;  Service: Cardiology;  Laterality: N/A;    ANGIOGRAM, CORONARY, WITH LEFT HEART CATHETERIZATION N/A 04/01/2024    Procedure: Angiogram, Coronary, with Left Heart Cath;  Surgeon: Shree Espinoza MD;  Location: [facility] CATH LAB;  Service: Cardiology;  Laterality: N/A;    BREAST BIOPSY Left 1998     benign    BREAST SURGERY  1998 biopsy lt breast    CATARACT EXTRACTION Bilateral [id number]    [facility] [place]    Cataract Surgery Bilateral     Laser (YAG)    COLON SURGERY  2004    COLONOSCOPY N/A 05/05/2021    Procedure: COLONOSCOPY;  Surgeon: Shawn Rogers III, MD;  Location: [facility];  Service: Endoscopy;  Laterality: N/A;    COLONOSCOPY N/A 06/30/2021    Procedure: COLONOSCOPY;  Surgeon: Memo Merida MD;  Location: [facility] ENDO;  Service: Endoscopy;  Laterality: N/A;    CORONARY STENT PLACEMENT N/A 04/01/2024    Procedure: INSERTION, STENT, CORONARY ARTERY;  Surgeon: Srhee Espinoza MD;  Location: [facility] CATH LAB;  Service: Cardiology;  Laterality: N/A;    ESOPHAGOGASTRODUODENOSCOPY N/A 11/29/2019    Procedure: ESOPHAGOGASTRODUODENOSCOPY (EGD);  Surgeon: Shawn Rogers III, MD;  Location: [facility];  Service: Endoscopy;  Laterality: N/A;    ESOPHAGOGASTRODUODENOSCOPY N/A 05/05/2021    Procedure: EGD (ESOPHAGOGASTRODUODENOSCOPY);  Surgeon: Shawn Rogers III, MD;  Location: [facility];  Service: Endoscopy;  Laterality: N/A;    EYE SURGERY      IVUS, CORONARY  04/02/2024    Procedure: IVUS, Coronary;  Surgeon: Shree Espinoza MD;  Location: [facility] CATH LAB;  Service: Cardiology;;    PERCUTANEOUS CORONARY INTERVENTION, ARTERY N/A 04/01/2024    Procedure: Percutaneous coronary intervention;  Surgeon: Shree Espinoza MD;  Location: [facility] CATH LAB;  Service: Cardiology;  Laterality: N/A;    PTCA, SINGLE VESSEL  04/01/2024    Procedure: PTCA, Single Vessel;  Surgeon: Shree Espinoza MD;  Location: [facility] CATH LAB;  Service: Cardiology;;    SMALL INTESTINE SURGERY  2004    Exploratory stomach large and small intestines    TONSILLECTOMY      TUBAL LIGATION         Medications Ordered Prior to Encounter[1]    Review of patient's allergies indicates:   Allergen Reactions    Repatha pushtronex [evolocumab]      headache    Aspirin Palpitations       Social History[2]    Family History    Problem Relation Name Age of Onset    Heart disease Mother Jillian Jo         Open heart surgery    Cataracts Mother Jillian Jo     Macular degeneration Mother Jillian Jo     Glaucoma Mother Jillian Jo     Arthritis Mother Jillian Jo     Hearing loss Mother Jillian Jo     Hypertension Mother Jillian Jo     Kidney disease Mother Jillian Jo     Stroke Mother Jillian Jo     Vision loss Mother Jillian Jo     Diabetes Sister La Jo     Heart disease Sister La Jo         CAD    Cataracts Sister La Jo     Depression Sister La Jo         Depression    Hypertension Sister La Jo     Diabetes Sister Erica Lerouge     Hearing loss Sister Erica Lerouge     Hypertension Sister Erica Lerouge     Diabetes Sister      Cancer Son Andrea Trascher III         testicular     Arthritis Son Andrea Trascher III         Because of chemotherapy at 19 he has neuropathy and arthritis    Hearing loss Son Andrea Trascher III         Because of chemotherapy    Cancer Maternal Aunt Etta Marquez         Tumor in chest wall    Heart disease Maternal Grandfather Leonides Santos         Pacemaker    Arthritis Maternal Grandfather Leonides Santos     Hearing loss Maternal Grandfather Leonides Santos     Hypertension Maternal Grandfather [place]     Hypertension Maternal Grandmother Helene Santos     Kidney disease Maternal Grandmother Helene Santos     Stroke Maternal Grandmother Helene Santos     Alcohol abuse Daughter Kaye Trascher     Asthma Daughter Kaye Trascher     Depression Daughter Kaye Trascher         Psysophrenia    Drug abuse Daughter Kaye Trascher         Not now but earlier alcohol and drug abuse    Hypertension Daughter Kaye Trascher     Mental illness Daughter Kaye Trascher         Psysophrenia    Cancer Son Andrea Trascher III will         Testicular cancer    Depression Son Andrea Trascher III will         Major depressive  "disorder like me    Cancer Maternal Aunt Carmella Enrique         Lung cancer    Depression Sister Maribell Hull         May be bipolar    Diabetes Sister Maribell Hull     Heart disease Sister Maribell Hull         Open heart surgery    Hypertension Sister Maribell Hull     Hypertension Brother Riky Jo        Vitals:    06/25/25 1045   BP: (!) 148/65   Patient Position: Sitting   Pulse: 67   SpO2: 98%   Weight: 110.1 kg (242 lb 9.9 oz)   Height: 5' 2" (1.575 m)     Body mass index is 44.38 kg/m².    Physical Exam  Constitutional:       Appearance: She is well-developed.   HENT:      Head: Normocephalic and atraumatic.   Eyes:      Pupils: Pupils are equal, round, and reactive to light.   Neck:      Thyroid: No thyromegaly.   Cardiovascular:      Rate and Rhythm: Normal rate and regular rhythm.      Heart sounds: Normal heart sounds. No murmur heard.  Pulmonary:      Effort: Pulmonary effort is normal. No respiratory distress.      Breath sounds: Normal breath sounds. No wheezing or rales.   Abdominal:      General: Bowel sounds are normal. There is no distension.      Palpations: Abdomen is soft. There is no mass.      Tenderness: There is no abdominal tenderness.   Musculoskeletal:         General: No tenderness. Normal range of motion.      Cervical back: Normal range of motion and neck supple.   Lymphadenopathy:      Cervical: No cervical adenopathy.   Skin:     General: Skin is warm and dry.      Findings: No erythema.   Neurological:      Mental Status: She is alert and oriented to person, place, and time.      Cranial Nerves: No cranial nerve deficit.      Deep Tendon Reflexes: Reflexes are normal and symmetric.   Psychiatric:         Behavior: Behavior normal.         Lab Results   Component Value Date    WBC 4.95 03/25/2025    HGB 10.6 (L) 03/25/2025    HCT 32.9 (L) 03/25/2025    MCV 96 03/25/2025     03/25/2025         BMP  Lab Results   Component Value Date     " 03/25/2025    K 4.6 03/25/2025     03/25/2025    CO2 22 (L) 03/25/2025    BUN 17 03/25/2025    CREATININE 0.7 03/25/2025    CALCIUM 9.1 03/25/2025    ANIONGAP 13 03/25/2025    EGFRNORACEVR >60 03/25/2025       Lab Results   Component Value Date    ALT 8 (L) 03/25/2025    AST 10 (L) 03/25/2025    ALKPHOS 50 03/25/2025    BILITOT 0.4 03/25/2025       Lab Results   Component Value Date    LIPASE 33 02/26/2021       ASSESSMENT AND RECOMMENDATIONS     1. Chronic idiopathic constipation    2. Fatty liver    3. Morbid obesity with BMI of 40.0-44.9, adult    4. History of colon polyps  Overview:  2021 Colonoscopy: lipoma ascending colon, three transverse TA, sig tics    Orders:  -     Ambulatory referral/consult to Endo Procedure ; Future; Expected date: 06/26/2025      Management of patients with MASLD includes optimization of blood glucose control in patients with diabetes and treatment of hyperlipidemia. Statin therapy has been shown to be safe in patients with MASLD. In general, we do not suggest using pharmacologic agents, like Vitamin E or Pioglitazone, solely for the treatment of MASLD. However, we do suggest vitamin E at a dose of 400 international units/day for the subset of patients with advanced fibrosis on biopsy who do not have diabetes or coronary artery disease.    The management of MASLD consists of:  -- Gradual Weight loss. A 20% weight reduction translates to improved symptoms.  -- Exercise 4 times a week for 30 minutes will mobilize some of the fat away from your liver and make your insulin more effective.  -- A low carb high protein diet, rich in vegetables and increased water consumption, staying away from carbonated, sugary drinks.   -- Avoid processed foods.   -- Optimal Glycemic control  These are the only effective measures to manage liver steatosis. If after a year of adhering to these measures and effectively reducing your Body Mass Index plus decreasing the insulin resistance, you  continue to have symptoms, we may need to start medications to treat MASLD. These medications are used to treat diabetes and work well for insulin resistance. Unfortunately, these medications can have side effects.     To support optimal gut health and maintain regular bowel movements, we recommend incorporating the following into your daily routine:    Psyllium Husk - A soluble fiber that aids digestion, promotes stool regularity, and supports overall gut health.  Magnesium Glycinate - Helps with muscle relaxation, including the intestinal muscles, to ease bowel movements and reduce cramping.  Ground Flaxseed - A rich source of fiber and omega-3 fatty acids that supports digestion and reduces inflammation.  Probiotics (Florastor or Align) - Supports a healthy gut microbiome by replenishing beneficial bacteria and promoting digestive balance.    Patients may adjust the frequency and dosage of these supplements based on their individual tolerance and response. If you experience loose stools or discomfort, you may reduce the dosage or take them less frequently. If you need additional digestive support, gradual increases may be beneficial.    It is essential to drink plenty of water throughout the day to ensure these supplements work effectively and to prevent constipation.     Additionally, lifestyle modifications can significantly improve symptoms of acid reflux. These include:  Maintaining a healthy weight  Elevating the head of the bed to reduce nighttime reflux  Avoiding trigger foods such as spicy, acidic, or fatty foods  Refraining from eating late meals and staying upright for at least 1.5 hours after eating    Please follow the recommended guidelines, listen to your body, and consult your healthcare provider if you experience any concerns or changes in your digestive health.    Body mass index is 44.38 kg/m². Morbid obesity complicates all aspects of disease management from diagnostic modalities to treatment.  Weight loss encouraged and health benefits explained to patient.     General weight loss/lifestyle modification strategies discussed (elicit support from others; identify saboteurs; non-food rewards, etc).  Informal exercise measures discussed, e.g. taking stairs instead of elevator.  Regular aerobic exercise program discussed.       Risks, benefits and alternative options were discussed with the patient. Risks including but not limited to infection, bleeding, heart or respiratory problems and perforation that may require surgery were all explained to the patient. The patient had a chance for questions if there were doubts or concerns about the test. The referring provider will be notified that our consultation is complete and available through the patient's records.    Thanks for letting us participate in the care of this nice patient,          Minh ROBYN Salazar               [1]   Current Outpatient Medications on File Prior to Visit   Medication Sig Dispense Refill    azelastine (ASTELIN) 137 mcg (0.1 %) nasal spray use 2 sprays (274 mcg total) by Nasal route 2 (two) times daily. 30 mL 1    BEPREVE 1.5 % Drop Place 1 drop into both eyes 2 (two) times daily. 10 mL 4    blood sugar diagnostic Strp To check blood sugar 1 times daily 100 each 3    blood-glucose meter (ACCU-CHEK GUIDE GLUCOSE METER) Misc use daily to test blood sugar 1 each 0    carvediloL (COREG) 25 MG tablet TAKE 2 TABLETS BY MOUTH TWICE  DAILY 120 tablet 11    cloNIDine (CATAPRES) 0.1 MG tablet Take 1 tablet (0.1 mg total) by mouth daily as needed (BP >160/90). 30 tablet 11    cloNIDine 0.3 mg/24 hr td ptwk (CATAPRES) 0.3 mg/24 hr Place 1 patch onto the skin every 7 days. 4 patch 11    clopidogreL (PLAVIX) 75 mg tablet TAKE 1 TABLET BY MOUTH ONCE  DAILY 90 tablet 3    coenzyme Q10 200 mg capsule Take 200 mg by mouth once daily. 30 capsule 11    DULoxetine (CYMBALTA) 60 MG capsule TAKE 1 CAPSULE BY MOUTH ONCE  DAILY 90 capsule 3    ELIQUIS 5 mg Tab TAKE  "1 TABLET BY MOUTH TWICE  DAILY 180 tablet 3    EMGALITY SYRINGE 120 mg/mL Syrg INJECT THE CONTENTS OF 1 SYRINGE SUBCUTANEOUSLY EVERY 28 DAYS 3 mL 3    ezetimibe (ZETIA) 10 mg tablet TAKE 1 TABLET BY MOUTH EVERY  EVENING 90 tablet 3    famotidine (PEPCID) 20 MG tablet Take 1 tablet (20 mg total) by mouth 2 (two) times daily. 60 tablet 1    fluticasone propionate (FLONASE) 50 mcg/actuation nasal spray 2 sprays (100 mcg total) by Each Nostril route once daily. 48 g 3    gabapentin (NEURONTIN) 300 MG capsule Take 1 capsule (300 mg total) by mouth daily as needed (when needed). 90 capsule 3    hydrocortisone 2.5 % cream Apply topically 2 (two) times daily. 28 g 2    insulin glargine U-100, Lantus, (LANTUS SOLOSTAR U-100 INSULIN) 100 unit/mL (3 mL) InPn pen Inject 64 Units into the skin every evening. 75 mL 3    insulin lispro (HUMALOG KWIKPEN INSULIN) 100 unit/mL pen Inject 5 Units into the skin as needed (if glucose before each meal which is three times a day is above 200 take 5 units of this insulin). 3 mL 0    isosorbide mononitrate (IMDUR) 30 MG 24 hr tablet TAKE 1 TABLET BY MOUTH ONCE  DAILY 90 tablet 3    lancets Misc To check BG 1 times daily 100 each 3    meclizine (ANTIVERT) 25 mg tablet Take 1 tablet (25 mg total) by mouth 3 (three) times daily as needed. 30 tablet 2    nitroGLYCERIN (NITROSTAT) 0.4 MG SL tablet Place 1 tablet (0.4 mg total) under the tongue every 5 (five) minutes as needed for Chest pain. 25 tablet 0    pantoprazole (PROTONIX) 40 MG tablet Take 1 tablet (40 mg total) by mouth once daily. 90 tablet 3    pen needle, diabetic (BD ULTRA-FINE SHORT PEN NEEDLE) 31 gauge x 5/16" Ndle AS DIRECTED TWICE DAILY 200 each 3    pravastatin (PRAVACHOL) 20 MG tablet Take 1 tablet (20 mg total) by mouth every evening. 30 tablet 11    RESTASIS 0.05 % ophthalmic emulsion INSTILL 1 DROP INTO BOTH EYES  TWICE DAILY 60 each 11    rimegepant 75 mg odt Take 1 tablet (75 mg total) by mouth as needed for Migraine (do " not exceed 2-3 doses within 1 week). Place ODT tablet on the tongue; alternatively the ODT tablet may be placed under the tongue 8 tablet 5    sacubitriL-valsartan (ENTRESTO)  mg per tablet Take 1 tablet by mouth 2 (two) times daily. 180 tablet 3    SITagliptin phosphate (JANUVIA) 100 MG Tab Take 1 tablet (100 mg total) by mouth once daily. 90 tablet 1    temazepam (RESTORIL) 30 mg capsule Take 1 capsule (30 mg total) by mouth every evening. 90 capsule 1    vibegron (GEMTESA) 75 mg Tab Take 1 tablet (75 mg total) by mouth once daily. 30 tablet 5    [DISCONTINUED] ARIPiprazole (ABILIFY) 2 MG Tab Take 1 tablet (2 mg total) by mouth once daily. (Patient not taking: Reported on 2025) 90 tablet 0    [DISCONTINUED] clotrimazole-betamethasone (LOTRISONE) lotion Apply topically to the affected area 2 (two) times daily. (Patient not taking: Reported on 2025) 30 mL 2    [DISCONTINUED] furosemide (LASIX) 20 MG tablet Take 1 tablet (20 mg total) by mouth daily as needed (swelling). (Patient not taking: Reported on 2025) 90 tablet 3     No current facility-administered medications on file prior to visit.   [2]   Social History  Socioeconomic History    Marital status:     Number of children: 3   Occupational History    Occupation: Retired   Tobacco Use    Smoking status: Former     Current packs/day: 0.00     Average packs/day: 1 pack/day for 36.5 years (36.5 ttl pk-yrs)     Types: Cigarettes     Start date:      Quit date: 2003     Years since quittin.0     Passive exposure: Past    Smokeless tobacco: Never    Tobacco comments:     Been quit 19 years. I smoked 35 years approx a pack a day   Substance and Sexual Activity    Alcohol use: No    Drug use: Never    Sexual activity: Never     Social Drivers of Health     Financial Resource Strain: High Risk (2025)    Received from [facility] SDOH Screening     In the past year, have you been unable to get any of the  following when you really needed them? choose all that apply.: Internet   Food Insecurity: No Food Insecurity (12/13/2024)    Hunger Vital Sign     Worried About Running Out of Food in the Last Year: Never true     Ran Out of Food in the Last Year: Never true   Transportation Needs: High Risk (2/21/2025)    Received from [facility] SDOH Screening     Has lack of transportation kept you from medical appointments, meetings, work or from getting things needed for daily living? choose all that apply.: Yes, it has kept me from non-medical meetings, appointments, work or from getting things that i need     Has lack of transportation kept you from medical appointments, meetings, work or from getting things needed for daily living? choose all that apply.: Yes, it has kept me from medical appointments or from getting my medications   Physical Activity: Inactive (12/13/2024)    Exercise Vital Sign     Days of Exercise per Week: 0 days     Minutes of Exercise per Session: 0 min   Stress: No Stress Concern Present (12/13/2024)    [employer] - Occupational Stress Questionnaire     Feeling of Stress : Only a little   Housing Stability: High Risk (2/21/2025)    Received from [facility] SDOH Screening     In the past year, have you been unable to get any of the following when you really needed them? choose all that apply.: Utilities (electric, gas, and water)

## 2025-06-25 NOTE — PATIENT INSTRUCTIONS
06/25/2025    Dear Christine Jo,    We are writing to express our heartfelt gratitude for choosing us as your healthcare provider and entrusting us with your well-being. Your health and satisfaction are our top priorities, and we are truly honored to have the opportunity to serve you.    At [facility], we strive to provide the best possible care and support for our patients. My assessment and recommendations are as follows:    Chronic idiopathic constipation    Fatty liver    Morbid obesity with BMI of 40.0-44.9, adult    History of colon polyps  -     Ambulatory referral/consult to Endo Procedure ; Future; Expected date: 06/26/2025      To support optimal gut health and maintain regular bowel movements, we recommend incorporating the following into your daily routine:    Psyllium Husk - A soluble fiber that aids digestion, promotes stool regularity, and supports overall gut health.  Magnesium Glycinate - Helps with muscle relaxation, including the intestinal muscles, to ease bowel movements and reduce cramping.  Ground Flaxseed - A rich source of fiber and omega-3 fatty acids that supports digestion and reduces inflammation.  Probiotics (Florastor or Align) - Supports a healthy gut microbiome by replenishing beneficial bacteria and promoting digestive balance.    Patients may adjust the frequency and dosage of these supplements based on their individual tolerance and response. If you experience loose stools or discomfort, you may reduce the dosage or take them less frequently. If you need additional digestive support, gradual increases may be beneficial.    It is essential to drink plenty of water throughout the day to ensure these supplements work effectively and to prevent constipation.     Additionally, lifestyle modifications can significantly improve symptoms of acid reflux. These include:  Maintaining a healthy weight  Elevating the head of the bed to reduce nighttime reflux  Avoiding trigger  "foods such as spicy, acidic, or fatty foods  Refraining from eating late meals and staying upright for at least 1.5 hours after eating    We genuinely value your feedback and believe that it plays a crucial role in our pursuit of excellence. We kindly request you to take a few minutes of your time to share your experience with us by posting a Google review. Your honest thoughts and opinions can make a significant difference for others seeking healthcare services and will help us better understand how we can further enhance our patient care.    Your kind words and positive reviews will not only motivate our dedicated team but will also inspire confidence in potential patients who are looking for exceptional healthcare services.    We genuinely value your feedback and believe that it plays a crucial role in our pursuit of excellence. We kindly request you to take a few minutes of your time to share your experience with us by posting a Google review. Your honest thoughts and opinions can make a significant difference for others seeking healthcare services and will help us better understand how we can further enhance our patient care.    Thank you for trusting us with your care,        Minh Salazar M.D.  click on the link for contact information.       PS: At [facility] we offer virtual visits. Please use your [facility] to schedule a virtual visit.     To rate your experience with Dr. Salazar, click on this link    To post a review, simply follow these quick steps:  1. Visit [employer] or search for my name on Google.  2. Click on the "Write a review" button on the left-hand side of the page.  3. Rate your experience by choosing the number of stars that reflect your satisfaction.  4. Share your thoughts and insights about your visit - we'd love to hear your feedback!    "

## 2025-06-25 NOTE — PROGRESS NOTES
[facility]  Gastroenterology    Patient evaluated at the request of Jose Szymanski Md  [address] [place] [address]     PCP: Jose Szymanski MD    Chief Complaint    Follow-up           History of present illness/subjective    Constipation: Patient complains of constipation.  Stool pattern has been 1 firm and pellet like stool(s) per day. Onset was several years ago. Defecation has been difficult and incomplete. Co-Morbid conditions:irritable bowel syndrome, metabolic disease, and obesity. Symptoms have been symptoms have progressed to a point and plateaued. Current Health Habits: Eating fiber? Yes.  Exercise? No. Water intake? Not enough. Current OTC/RX therapy which has been somewhat effective. Her Last Colonoscopy completed on 6/30/2021 found small polyps and hemorrhoids. A recent fibroscan revealed severe steatosis and minimal fibrosis.       Past Medical History:   Diagnosis Date    Arthritis     Cataract     Coronary artery disease     Diabetes mellitus 2008     am 01/15/2018 Insulin x 1 year    Diabetes mellitus, type 2     DM (diabetes mellitus) 2008     am 02/14/2020 Insulin x 4 years    Encounter for blood transfusion     Glaucoma     Hypertension     Insomnia     Macular degeneration     Old MI (myocardial infarction) 2/12/2024    Vaginal yeast infection        Past Surgical History:   Procedure Laterality Date    abdominal laparoscopy       ADENOIDECTOMY  1965    Tonsils removed also    ANGIOGRAM, CORONARY, WITH LEFT HEART CATHETERIZATION N/A 04/02/2024    Procedure: Angiogram, Coronary, with Left Heart Cath;  Surgeon: Shree Espinoza MD;  Location: [facility] CATH LAB;  Service: Cardiology;  Laterality: N/A;    ANGIOGRAM, CORONARY, WITH LEFT HEART CATHETERIZATION N/A 04/01/2024    Procedure: Angiogram, Coronary, with Left Heart Cath;  Surgeon: Shree Espinoza MD;  Location: [facility] CATH LAB;  Service: Cardiology;  Laterality: N/A;    BREAST BIOPSY Left 1998     benign    BREAST SURGERY  1998 biopsy lt breast    CATARACT EXTRACTION Bilateral [id number]    [facility] in [place]    Cataract Surgery Bilateral     Laser (YAG)    COLON SURGERY  2004    COLONOSCOPY N/A 05/05/2021    Procedure: COLONOSCOPY;  Surgeon: Shawn Rogers III, MD;  Location: [facility];  Service: Endoscopy;  Laterality: N/A;    COLONOSCOPY N/A 06/30/2021    Procedure: COLONOSCOPY;  Surgeon: Memo Merida MD;  Location: [facility] ENDO;  Service: Endoscopy;  Laterality: N/A;    CORONARY STENT PLACEMENT N/A 04/01/2024    Procedure: INSERTION, STENT, CORONARY ARTERY;  Surgeon: Shree Espinoza MD;  Location: [facility] CATH LAB;  Service: Cardiology;  Laterality: N/A;    ESOPHAGOGASTRODUODENOSCOPY N/A 11/29/2019    Procedure: ESOPHAGOGASTRODUODENOSCOPY (EGD);  Surgeon: Shawn Rogers III, MD;  Location: [facility];  Service: Endoscopy;  Laterality: N/A;    ESOPHAGOGASTRODUODENOSCOPY N/A 05/05/2021    Procedure: EGD (ESOPHAGOGASTRODUODENOSCOPY);  Surgeon: Shawn Rogers III, MD;  Location: [facility];  Service: Endoscopy;  Laterality: N/A;    EYE SURGERY      IVUS, CORONARY  04/02/2024    Procedure: IVUS, Coronary;  Surgeon: Shree Espinoza MD;  Location: [facility] CATH LAB;  Service: Cardiology;;    PERCUTANEOUS CORONARY INTERVENTION, ARTERY N/A 04/01/2024    Procedure: Percutaneous coronary intervention;  Surgeon: Shree Espinoza MD;  Location: [facility] CATH LAB;  Service: Cardiology;  Laterality: N/A;    PTCA, SINGLE VESSEL  04/01/2024    Procedure: PTCA, Single Vessel;  Surgeon: Shree Espinoza MD;  Location: [facility] CATH LAB;  Service: Cardiology;;    SMALL INTESTINE SURGERY  2004    Exploratory stomach large and small intestines    TONSILLECTOMY      TUBAL LIGATION         Medications Ordered Prior to Encounter[1]    Review of patient's allergies indicates:   Allergen Reactions    Repatha pushtronex [evolocumab]      headache    Aspirin Palpitations       Social History[2]    Family History    Problem Relation Name Age of Onset    Heart disease Mother Jillian Jo         Open heart surgery    Cataracts Mother Jillian Jo     Macular degeneration Mother Jillian Jo     Glaucoma Mother Jillian Jo     Arthritis Mother Jillian Jo     Hearing loss Mother Jillian Jo     Hypertension Mother Jillian Jo     Kidney disease Mother Jillian Jo     Stroke Mother Jillian Jo     Vision loss Mother Jillian Jo     Diabetes Sister La Jo     Heart disease Sister La Jo         CAD    Cataracts Sister La Jo     Depression Sister La Jo         Depression    Hypertension Sister La Jo     Diabetes Sister Erica Lerouge     Hearing loss Sister Erica Lerouge     Hypertension Sister Erica Lerouge     Diabetes Sister      Cancer Son Andrea Trascher III         testicular     Arthritis Son Andrea Trascher III         Because of chemotherapy at 19 he has neuropathy and arthritis    Hearing loss Son Andrea Trascher III         Because of chemotherapy    Cancer Maternal Aunt Etta Marquez         Tumor in chest wall    Heart disease Maternal Grandfather Leonides Santos         Pacemaker    Arthritis Maternal Grandfather Leonides Santos     Hearing loss Maternal Grandfather Leonides Santos     Hypertension Maternal Grandfather [place] Tom     Hypertension Maternal Grandmother Helene Santos     Kidney disease Maternal Grandmother Helene Santos     Stroke Maternal Grandmother Helene Santos     Alcohol abuse Daughter Kaye Trascher     Asthma Daughter Kaye Trascher     Depression Daughter Kaye Trascher         Psysophrenia    Drug abuse Daughter Kaye Trascher         Not now but earlier alcohol and drug abuse    Hypertension Daughter Kaye Trascher     Mental illness Daughter Kaye Trascher         Psysophrenia    Cancer Son Andrea Trascher III will         Testicular cancer    Depression Son Andrea Trascher III will         Major depressive  "disorder like me    Cancer Maternal Aunt Carmella Enrique         Lung cancer    Depression Sister Maribell Hull         May be bipolar    Diabetes Sister Maribell Hull     Heart disease Sister Maribell Hull         Open heart surgery    Hypertension Sister Maribell Hull     Hypertension Brother Riky Jo        Vitals:    06/25/25 1045   BP: (!) 148/65   Patient Position: Sitting   Pulse: 67   SpO2: 98%   Weight: 110.1 kg (242 lb 9.9 oz)   Height: 5' 2" (1.575 m)     Body mass index is 44.38 kg/m².    Physical Exam  Constitutional:       Appearance: She is well-developed.   HENT:      Head: Normocephalic and atraumatic.   Eyes:      Pupils: Pupils are equal, round, and reactive to light.   Neck:      Thyroid: No thyromegaly.   Cardiovascular:      Rate and Rhythm: Normal rate and regular rhythm.      Heart sounds: Normal heart sounds. No murmur heard.  Pulmonary:      Effort: Pulmonary effort is normal. No respiratory distress.      Breath sounds: Normal breath sounds. No wheezing or rales.   Abdominal:      General: Bowel sounds are normal. There is no distension.      Palpations: Abdomen is soft. There is no mass.      Tenderness: There is no abdominal tenderness.   Musculoskeletal:         General: No tenderness. Normal range of motion.      Cervical back: Normal range of motion and neck supple.   Lymphadenopathy:      Cervical: No cervical adenopathy.   Skin:     General: Skin is warm and dry.      Findings: No erythema.   Neurological:      Mental Status: She is alert and oriented to person, place, and time.      Cranial Nerves: No cranial nerve deficit.      Deep Tendon Reflexes: Reflexes are normal and symmetric.   Psychiatric:         Behavior: Behavior normal.         Lab Results   Component Value Date    WBC 4.95 03/25/2025    HGB 10.6 (L) 03/25/2025    HCT 32.9 (L) 03/25/2025    MCV 96 03/25/2025     03/25/2025         BMP  Lab Results   Component Value Date     " 03/25/2025    K 4.6 03/25/2025     03/25/2025    CO2 22 (L) 03/25/2025    BUN 17 03/25/2025    CREATININE 0.7 03/25/2025    CALCIUM 9.1 03/25/2025    ANIONGAP 13 03/25/2025    EGFRNORACEVR >60 03/25/2025       Lab Results   Component Value Date    ALT 8 (L) 03/25/2025    AST 10 (L) 03/25/2025    ALKPHOS 50 03/25/2025    BILITOT 0.4 03/25/2025       Lab Results   Component Value Date    LIPASE 33 02/26/2021       ASSESSMENT AND RECOMMENDATIONS     1. Chronic idiopathic constipation    2. Fatty liver    3. Morbid obesity with BMI of 40.0-44.9, adult    4. History of colon polyps  Overview:  2021 Colonoscopy: lipoma ascending colon, three transverse TA, sig tics    Orders:  -     Ambulatory referral/consult to Endo Procedure ; Future; Expected date: 06/26/2025      Management of patients with MASLD includes optimization of blood glucose control in patients with diabetes and treatment of hyperlipidemia. Statin therapy has been shown to be safe in patients with MASLD. In general, we do not suggest using pharmacologic agents, like Vitamin E or Pioglitazone, solely for the treatment of MASLD. However, we do suggest vitamin E at a dose of 400 international units/day for the subset of patients with advanced fibrosis on biopsy who do not have diabetes or coronary artery disease.    The management of MASLD consists of:  -- Gradual Weight loss. A 20% weight reduction translates to improved symptoms.  -- Exercise 4 times a week for 30 minutes will mobilize some of the fat away from your liver and make your insulin more effective.  -- A low carb high protein diet, rich in vegetables and increased water consumption, staying away from carbonated, sugary drinks.   -- Avoid processed foods.   -- Optimal Glycemic control  These are the only effective measures to manage liver steatosis. If after a year of adhering to these measures and effectively reducing your Body Mass Index plus decreasing the insulin resistance, you  continue to have symptoms, we may need to start medications to treat MASLD. These medications are used to treat diabetes and work well for insulin resistance. Unfortunately, these medications can have side effects.     To support optimal gut health and maintain regular bowel movements, we recommend incorporating the following into your daily routine:    Psyllium Husk - A soluble fiber that aids digestion, promotes stool regularity, and supports overall gut health.  Magnesium Glycinate - Helps with muscle relaxation, including the intestinal muscles, to ease bowel movements and reduce cramping.  Ground Flaxseed - A rich source of fiber and omega-3 fatty acids that supports digestion and reduces inflammation.  Probiotics (Florastor or Align) - Supports a healthy gut microbiome by replenishing beneficial bacteria and promoting digestive balance.    Patients may adjust the frequency and dosage of these supplements based on their individual tolerance and response. If you experience loose stools or discomfort, you may reduce the dosage or take them less frequently. If you need additional digestive support, gradual increases may be beneficial.    It is essential to drink plenty of water throughout the day to ensure these supplements work effectively and to prevent constipation.     Additionally, lifestyle modifications can significantly improve symptoms of acid reflux. These include:  Maintaining a healthy weight  Elevating the head of the bed to reduce nighttime reflux  Avoiding trigger foods such as spicy, acidic, or fatty foods  Refraining from eating late meals and staying upright for at least 1.5 hours after eating    Please follow the recommended guidelines, listen to your body, and consult your healthcare provider if you experience any concerns or changes in your digestive health.    Body mass index is 44.38 kg/m². Morbid obesity complicates all aspects of disease management from diagnostic modalities to treatment.  Weight loss encouraged and health benefits explained to patient.     General weight loss/lifestyle modification strategies discussed (elicit support from others; identify saboteurs; non-food rewards, etc).  Informal exercise measures discussed, e.g. taking stairs instead of elevator.  Regular aerobic exercise program discussed.       Risks, benefits and alternative options were discussed with the patient. Risks including but not limited to infection, bleeding, heart or respiratory problems and perforation that may require surgery were all explained to the patient. The patient had a chance for questions if there were doubts or concerns about the test. The referring provider will be notified that our consultation is complete and available through the patient's records.    Thanks for letting us participate in the care of this nice patient,          Minh ROBYN Salazar               [1]   Current Outpatient Medications on File Prior to Visit   Medication Sig Dispense Refill    azelastine (ASTELIN) 137 mcg (0.1 %) nasal spray use 2 sprays (274 mcg total) by Nasal route 2 (two) times daily. 30 mL 1    BEPREVE 1.5 % Drop Place 1 drop into both eyes 2 (two) times daily. 10 mL 4    blood sugar diagnostic Strp To check blood sugar 1 times daily 100 each 3    blood-glucose meter (ACCU-CHEK GUIDE GLUCOSE METER) Misc use daily to test blood sugar 1 each 0    carvediloL (COREG) 25 MG tablet TAKE 2 TABLETS BY MOUTH TWICE  DAILY 120 tablet 11    cloNIDine (CATAPRES) 0.1 MG tablet Take 1 tablet (0.1 mg total) by mouth daily as needed (BP >160/90). 30 tablet 11    cloNIDine 0.3 mg/24 hr td ptwk (CATAPRES) 0.3 mg/24 hr Place 1 patch onto the skin every 7 days. 4 patch 11    clopidogreL (PLAVIX) 75 mg tablet TAKE 1 TABLET BY MOUTH ONCE  DAILY 90 tablet 3    coenzyme Q10 200 mg capsule Take 200 mg by mouth once daily. 30 capsule 11    DULoxetine (CYMBALTA) 60 MG capsule TAKE 1 CAPSULE BY MOUTH ONCE  DAILY 90 capsule 3    ELIQUIS 5 mg Tab TAKE  "1 TABLET BY MOUTH TWICE  DAILY 180 tablet 3    EMGALITY SYRINGE 120 mg/mL Syrg INJECT THE CONTENTS OF 1 SYRINGE SUBCUTANEOUSLY EVERY 28 DAYS 3 mL 3    ezetimibe (ZETIA) 10 mg tablet TAKE 1 TABLET BY MOUTH EVERY  EVENING 90 tablet 3    famotidine (PEPCID) 20 MG tablet Take 1 tablet (20 mg total) by mouth 2 (two) times daily. 60 tablet 1    fluticasone propionate (FLONASE) 50 mcg/actuation nasal spray 2 sprays (100 mcg total) by Each Nostril route once daily. 48 g 3    gabapentin (NEURONTIN) 300 MG capsule Take 1 capsule (300 mg total) by mouth daily as needed (when needed). 90 capsule 3    hydrocortisone 2.5 % cream Apply topically 2 (two) times daily. 28 g 2    insulin glargine U-100, Lantus, (LANTUS SOLOSTAR U-100 INSULIN) 100 unit/mL (3 mL) InPn pen Inject 64 Units into the skin every evening. 75 mL 3    insulin lispro (HUMALOG KWIKPEN INSULIN) 100 unit/mL pen Inject 5 Units into the skin as needed (if glucose before each meal which is three times a day is above 200 take 5 units of this insulin). 3 mL 0    isosorbide mononitrate (IMDUR) 30 MG 24 hr tablet TAKE 1 TABLET BY MOUTH ONCE  DAILY 90 tablet 3    lancets Misc To check BG 1 times daily 100 each 3    meclizine (ANTIVERT) 25 mg tablet Take 1 tablet (25 mg total) by mouth 3 (three) times daily as needed. 30 tablet 2    nitroGLYCERIN (NITROSTAT) 0.4 MG SL tablet Place 1 tablet (0.4 mg total) under the tongue every 5 (five) minutes as needed for Chest pain. 25 tablet 0    pantoprazole (PROTONIX) 40 MG tablet Take 1 tablet (40 mg total) by mouth once daily. 90 tablet 3    pen needle, diabetic (BD ULTRA-FINE SHORT PEN NEEDLE) 31 gauge x 5/16" Ndle AS DIRECTED TWICE DAILY 200 each 3    pravastatin (PRAVACHOL) 20 MG tablet Take 1 tablet (20 mg total) by mouth every evening. 30 tablet 11    RESTASIS 0.05 % ophthalmic emulsion INSTILL 1 DROP INTO BOTH EYES  TWICE DAILY 60 each 11    rimegepant 75 mg odt Take 1 tablet (75 mg total) by mouth as needed for Migraine (do " not exceed 2-3 doses within 1 week). Place ODT tablet on the tongue; alternatively the ODT tablet may be placed under the tongue 8 tablet 5    sacubitriL-valsartan (ENTRESTO)  mg per tablet Take 1 tablet by mouth 2 (two) times daily. 180 tablet 3    SITagliptin phosphate (JANUVIA) 100 MG Tab Take 1 tablet (100 mg total) by mouth once daily. 90 tablet 1    temazepam (RESTORIL) 30 mg capsule Take 1 capsule (30 mg total) by mouth every evening. 90 capsule 1    vibegron (GEMTESA) 75 mg Tab Take 1 tablet (75 mg total) by mouth once daily. 30 tablet 5    [DISCONTINUED] ARIPiprazole (ABILIFY) 2 MG Tab Take 1 tablet (2 mg total) by mouth once daily. (Patient not taking: Reported on 2025) 90 tablet 0    [DISCONTINUED] clotrimazole-betamethasone (LOTRISONE) lotion Apply topically to the affected area 2 (two) times daily. (Patient not taking: Reported on 2025) 30 mL 2    [DISCONTINUED] furosemide (LASIX) 20 MG tablet Take 1 tablet (20 mg total) by mouth daily as needed (swelling). (Patient not taking: Reported on 2025) 90 tablet 3     No current facility-administered medications on file prior to visit.   [2]   Social History  Socioeconomic History    Marital status:     Number of children: 3   Occupational History    Occupation: Retired   Tobacco Use    Smoking status: Former     Current packs/day: 0.00     Average packs/day: 1 pack/day for 36.5 years (36.5 ttl pk-yrs)     Types: Cigarettes     Start date:      Quit date: 2003     Years since quittin.0     Passive exposure: Past    Smokeless tobacco: Never    Tobacco comments:     Been quit 19 years. I smoked 35 years approx a pack a day   Substance and Sexual Activity    Alcohol use: No    Drug use: Never    Sexual activity: Never     Social Drivers of Health     Financial Resource Strain: High Risk (2025)    Received from [facility] SDOH Screening     In the past year, have you been unable to get any of the  following when you really needed them? choose all that apply.: Internet   Food Insecurity: No Food Insecurity (12/13/2024)    Hunger Vital Sign     Worried About Running Out of Food in the Last Year: Never true     Ran Out of Food in the Last Year: Never true   Transportation Needs: High Risk (2/21/2025)    Received from [facility] SDOH Screening     Has lack of transportation kept you from medical appointments, meetings, work or from getting things needed for daily living? choose all that apply.: Yes, it has kept me from non-medical meetings, appointments, work or from getting things that i need     Has lack of transportation kept you from medical appointments, meetings, work or from getting things needed for daily living? choose all that apply.: Yes, it has kept me from medical appointments or from getting my medications   Physical Activity: Inactive (12/13/2024)    Exercise Vital Sign     Days of Exercise per Week: 0 days     Minutes of Exercise per Session: 0 min   Stress: No Stress Concern Present (12/13/2024)    [employer] [place] [employer] - Occupational Stress Questionnaire     Feeling of Stress : Only a little   Housing Stability: High Risk (2/21/2025)    Received from [facility] SDOH Screening     In the past year, have you been unable to get any of the following when you really needed them? choose all that apply.: Utilities (electric, gas, and water)

## 2025-06-26 ENCOUNTER — HOSPITAL ENCOUNTER (OUTPATIENT)
Dept: PREADMISSION TESTING | Facility: HOSPITAL | Age: 75
Discharge: HOME | End: 2025-06-26
Attending: INTERNAL MEDICINE
Payer: MEDICARE

## 2025-06-26 ENCOUNTER — E-CONSULT (OUTPATIENT)
Dept: CARDIOLOGY | Facility: CLINIC | Age: 75
End: 2025-06-26
Payer: MEDICARE

## 2025-06-26 DIAGNOSIS — Z86.0100 HISTORY OF COLON POLYPS: Primary | ICD-10-CM

## 2025-06-26 DIAGNOSIS — Z01.810 PREOP CARDIOVASCULAR EXAM: Primary | ICD-10-CM

## 2025-06-26 RX ORDER — SODIUM, POTASSIUM,MAG SULFATES 17.5-3.13G
1 SOLUTION, RECONSTITUTED, ORAL ORAL DAILY
Qty: 1 KIT | Refills: 0 | Status: SHIPPED | OUTPATIENT
Start: 2025-06-26 | End: 2025-06-28

## 2025-06-27 NOTE — CONSULTS
[facility] [address]  Response for E-Consult     Patient Name: Christine Jo  MRN: [id number]  Primary Care Provider: Jose Szymanski MD   Requesting Provider: Cami Abbasi NP  E-Consult to General Cardiology  Consult performed by: Tobias Ying MD  Consult ordered by: Cami Abbasi NP          E consult for preop clearance of colonoscopy  The chart reviewed.  PMH PAF CAD   EKG NSR LVH  Cr 0.9    Plan  Elevated periop risk of CV events for non-high risk procedure.  Ok to proceed the scheduled procedure without further cardiac study.  OK to hold Eliqjuis 2 days and Plavix 5 days before the procedure and resume postop once hemodynamically stable      Total time of Consultation: 10 minute    I did not speak to the requesting provider verbally about this.     *This eConsult is based on the clinical data available to me and is furnished without benefit of a physical examination. The eConsult will need to be interpreted in light of any clinical issues or changes in patient status not available to me at the time of filing this eConsults. Significant changes in patient condition or level of acuity should result in immediate formal consultation and reevaluation. Please alert me if you have further questions.    Thank you for this eConsult referral.     Tobias Ying MD  [facility] [address]

## 2025-06-30 ENCOUNTER — PATIENT MESSAGE (OUTPATIENT)
Dept: INTERNAL MEDICINE | Facility: CLINIC | Age: 75
End: 2025-06-30

## 2025-06-30 ENCOUNTER — OFFICE VISIT (OUTPATIENT)
Dept: INTERNAL MEDICINE | Facility: CLINIC | Age: 75
End: 2025-06-30
Payer: MEDICARE

## 2025-06-30 DIAGNOSIS — R53.1 WEAKNESS: ICD-10-CM

## 2025-06-30 DIAGNOSIS — E11.65 UNCONTROLLED TYPE 2 DIABETES MELLITUS WITH HYPERGLYCEMIA: Primary | ICD-10-CM

## 2025-06-30 DIAGNOSIS — Z76.89 ENCOUNTER FOR WHEELCHAIR ASSESSMENT: ICD-10-CM

## 2025-06-30 PROCEDURE — 3044F HG A1C LEVEL LT 7.0%: CPT | Mod: CPTII,95,, | Performed by: FAMILY MEDICINE

## 2025-06-30 PROCEDURE — 3072F LOW RISK FOR RETINOPATHY: CPT | Mod: CPTII,95,, | Performed by: FAMILY MEDICINE

## 2025-06-30 PROCEDURE — 4010F ACE/ARB THERAPY RXD/TAKEN: CPT | Mod: CPTII,95,, | Performed by: FAMILY MEDICINE

## 2025-06-30 PROCEDURE — 98004 SYNCH AUDIO-VIDEO EST SF 10: CPT | Mod: 95,,, | Performed by: FAMILY MEDICINE

## 2025-06-30 RX ORDER — BLOOD-GLUCOSE SENSOR
1 EACH MISCELLANEOUS CONTINUOUS
Qty: 3 EACH | Refills: 1 | Status: SHIPPED | OUTPATIENT
Start: 2025-06-30 | End: 2026-06-30

## 2025-06-30 NOTE — PROGRESS NOTES
The patient location is:  [place]  The chief complaint leading to consultation is:  Generalized weakness physical therapy for wheelchair evaluation Osvaldo 3 system prescription    Visit type: audiovisual    Face to Face time with patient: 12   minutes of total time spent on the encounter, which includes face to face time and non-face to face time preparing to see the patient (eg, review of tests), Obtaining and/or reviewing separately obtained history, Documenting clinical information in the electronic or other health record, Independently interpreting results (not separately reported) and communicating results to the patient/family/caregiver, or Care coordination (not separately reported).         Each patient to whom he or she provides medical services by telemedicine is:  (1) informed of the relationship between the physician and patient and the respective role of any other health care provider with respect to management of the patient; and (2) notified that he or she may decline to receive medical services by telemedicine and may withdraw from such care at any time.    Notes:                           Answers submitted by the patient for this visit:  Back Pain Questionnaire (Submitted on 6/28/2025)  Chief Complaint: Back pain  Chronicity: chronic  Onset: more than 1 month ago  Frequency: constantly  Progression since onset: waxing and waning  Pain location: gluteal, sacro-iliac  Pain quality: stabbing  Radiates to: left foot, left knee, left thigh, right foot, right knee, right thigh  Pain - numeric: 8/10  Pain is: the same all the time  Stiffness is present: in the morning  abdominal pain: Yes  bladder incontinence: Yes  bowel incontinence: No  chest pain: No  dysuria: No  fever: No  headaches: Yes  leg pain: Yes  paresis: No  paresthesias: No  pelvic pain: Yes  tingling: Yes  weakness: Yes  weight loss: No  genital pain: Yes  hematuria: No

## 2025-07-01 ENCOUNTER — TELEPHONE (OUTPATIENT)
Dept: INTERNAL MEDICINE | Facility: CLINIC | Age: 75
End: 2025-07-01
Payer: MEDICARE

## 2025-07-01 NOTE — TELEPHONE ENCOUNTER
Copied from CRM #[id number]. Topic: Medications - New Medication Request  >> Jun 30, 2025  4:02 PM Peterson wrote:  Pt states that medicine blood-glucose sensor (FREESTYLE CATHERINE 3 SENSOR) Tamar was sent in to the wrong pharmacy it was supposed to be sent to [facility] medical [facility]  ..[phone]

## 2025-07-02 ENCOUNTER — TELEPHONE (OUTPATIENT)
Dept: INTERNAL MEDICINE | Facility: CLINIC | Age: 75
End: 2025-07-02
Payer: MEDICARE

## 2025-07-02 NOTE — TELEPHONE ENCOUNTER
Copied from CRM #[id number]. Topic: General Inquiry - Return Call  >> Jul 2, 2025 12:04 PM Jim wrote:  .Type:  Patient Returning Call    Who Called: Christine   Who Left Message for Patient: no message   Does the patient know what this is regarding?: not sure   Would the patient rather a call back or a response via [facility]?  Call back   Best Call Back Number: [phone]  Additional Information:

## 2025-07-02 NOTE — TELEPHONE ENCOUNTER
Copied from CRM #[id number]. Topic: Medications - Medication Refill  >> Jul 2, 2025 11:23 AM Summer wrote:  Type:  Patient Call Back Request     Who Called: Christine   Message for Patient: Nurse   What this is regarding?: blood-glucose sensor (FREESTYLE CATHERINE 3 SENSOR) Tamar   Would the patient rather a call back or a response via [employer] [facility]? Call back   Best Call Back Number: [phone]  Additional Information: Her medication was sent to the wrong location, please resend to [facility].

## 2025-07-07 ENCOUNTER — PATIENT MESSAGE (OUTPATIENT)
Dept: GASTROENTEROLOGY | Facility: CLINIC | Age: 75
End: 2025-07-07
Payer: MEDICARE

## 2025-07-07 ENCOUNTER — HOSPITAL ENCOUNTER (OUTPATIENT)
Dept: PREADMISSION TESTING | Facility: HOSPITAL | Age: 75
Discharge: HOME | End: 2025-07-07
Attending: FAMILY MEDICINE
Payer: MEDICARE

## 2025-07-07 DIAGNOSIS — K63.5 POLYP OF COLON, UNSPECIFIED PART OF COLON, UNSPECIFIED TYPE: Primary | ICD-10-CM

## 2025-07-07 DIAGNOSIS — Z12.11 COLON CANCER SCREENING: ICD-10-CM

## 2025-07-07 RX ORDER — SOD SULF/POT CHLORIDE/MAG SULF 1.479 G
12 TABLET ORAL DAILY
Qty: 24 TABLET | Refills: 0 | Status: SHIPPED | OUTPATIENT
Start: 2025-07-07

## 2025-07-11 ENCOUNTER — PATIENT MESSAGE (OUTPATIENT)
Dept: INTERNAL MEDICINE | Facility: CLINIC | Age: 75
End: 2025-07-11
Payer: MEDICARE

## 2025-07-11 ENCOUNTER — NURSE TRIAGE (OUTPATIENT)
Dept: ADMINISTRATIVE | Facility: CLINIC | Age: 75
End: 2025-07-11
Payer: MEDICARE

## 2025-07-11 NOTE — TELEPHONE ENCOUNTER
Patient's call transferred as a Priority Call to the [facility] on Call [facility]. Patient states c/o having fluid in her ankles, swelling in legs and ankles, severe trouble walking due to back pain and incontinence. Patient states she stopped taking her fluid pills approximately 6 (six) months ago but did not notify her Cardiologist, Dr. Shree Espinoza. Patient states she has a recent 10 lb weight gain and that her right foot becomes discolored or purple at the end of the day.     Case Disposition advised for patient to GO TO ED/UCC NOW (OR TO OFFICE WITH PCP APPROVAL). On Call Provider, Dr. Tobias Ying paged x two (2) for care advice. Awaiting reply. Secure Chat message sent to Dr. Shree Espinoza and he replied to instruct patient to resume her Lasix and to be seen in the Cardiology Clinic. Dr. Espinoza also advised for patient to Go to ED if symptomatic and added Lay Purvis MA to Secure Chat.     Patient advised to resume her prescription for Lasix, that she needs to be seen in the Cardiology Clinic and to Go to ED if symptomatic. Patient advised that she will be contacted for assistance with scheduling of appointment. Patient also advised to contact the [facility] for any additional questions or worsening symptoms. Patient states understanding of care advice.     Reason for Disposition   Can't walk or can barely stand (new-onset)    Additional Information   Negative: Sounds like a life-threatening emergency to the triager   Negative: Chest pain   Negative: Followed an insect bite and has localized swelling (e.g., small area of puffy or swollen skin)   Negative: Followed a knee injury   Negative: Ankle injury   Negative: Pregnant with leg swelling or edema   Negative: Difficulty breathing at rest   Negative: Entire foot is cool or blue in comparison to other side   Negative: Cast on leg or ankle and has increasing pain    Protocols used: Leg Swelling and Edema-A-OH

## 2025-07-15 ENCOUNTER — PATIENT MESSAGE (OUTPATIENT)
Dept: INTERNAL MEDICINE | Facility: CLINIC | Age: 75
End: 2025-07-15
Payer: MEDICARE

## 2025-07-15 ENCOUNTER — TELEPHONE (OUTPATIENT)
Dept: INTERNAL MEDICINE | Facility: CLINIC | Age: 75
End: 2025-07-15
Payer: MEDICARE

## 2025-07-15 NOTE — TELEPHONE ENCOUNTER
Talked to pt has not heard from Home Health re eval for wheelchair . Sent referral to Home Health again

## 2025-07-16 ENCOUNTER — OFFICE VISIT (OUTPATIENT)
Dept: HEMATOLOGY/ONCOLOGY | Facility: CLINIC | Age: 75
End: 2025-07-16
Payer: MEDICARE

## 2025-07-16 DIAGNOSIS — D64.9 ANEMIA, UNSPECIFIED TYPE: Primary | ICD-10-CM

## 2025-07-16 DIAGNOSIS — D50.0 IRON DEFICIENCY ANEMIA DUE TO CHRONIC BLOOD LOSS: ICD-10-CM

## 2025-07-16 PROCEDURE — G0180 MD CERTIFICATION HHA PATIENT: HCPCS | Mod: ,,, | Performed by: FAMILY MEDICINE

## 2025-07-16 NOTE — PROGRESS NOTES
Subjective:      Patient ID: Christine Jo is a 75 y.o. female.    Chief Complaint: No chief complaint on file.    Audio Only Telehealth Visit     The patient location is: [place]  The chief complaint leading to consultation is: follow-up  Visit type: Virtual visit with audio only (telephone)  Total time spent in medical discussion with patient: 15 minutes  Total time spent on date of the encounter:30 minutes       The reason for the audio only service rather than synchronous audio and video virtual visit was related to technical difficulties or patient preference/necessity.       Each patient to whom I provide medical services by telemedicine is:  (1) informed of the relationship between the physician and patient and the respective role of any other health care provider with respect to management of the patient; and (2) notified that they may decline to receive medical services by telemedicine and may withdraw from such care at any time. Patient verbally consented to receive this service via voice-only telephone call.     This service was not originating from a related E/M service provided within the previous 7 days nor will  to an E/M service or procedure within the next 24 hours or my soonest available appointment.  Prevailing standard of care was able to be met in this audio-only visit.           HPI:  Ms. Jo is a pleasant 75-year-old female who presents today for follow-up of iron deficiency anemia. She was last seen in Heme/Onc clinic  08/2020. She has had IV iron in the past 01/2018.   She [facility] Patel which was - negative for contributory causes. EGD 05/2021 found chronic gastritis.  Colonoscopy 06/2021 found 3 polyps, lipoma, and diverticulosis with recommendation to repeat in 3 years. She cannot tolerate oral iron supplementation as it causes GI upset.     Interval History: Pt c/o continued fatigue and difficulty walking. She  is about to restart home health home physical therapy for lower back pain. Denies n/v/d/c, fever, chills, night sweats, sob, cp, lightheadedness unintentional weight loss.    Social History     Socioeconomic History    Marital status:     Number of children: 3   Occupational History    Occupation: Retired   Tobacco Use    Smoking status: Former     Current packs/day: 0.00     Average packs/day: 1 pack/day for 36.5 years (36.5 ttl pk-yrs)     Types: Cigarettes     Start date:      Quit date: 2003     Years since quittin.0     Passive exposure: Past    Smokeless tobacco: Never    Tobacco comments:     Been quit 19 years. I smoked 35 years approx a pack a day   Substance and Sexual Activity    Alcohol use: No    Drug use: Never    Sexual activity: Never     Social Drivers of Health     Financial Resource Strain: High Risk (2025)    Received from [facility] SDOH Screening     In the past year, have you been unable to get any of the following when you really needed them? choose all that apply.: Internet   Food Insecurity: No Food Insecurity (2024)    Hunger Vital Sign     Worried About Running Out of Food in the Last Year: Never true     Ran Out of Food in the Last Year: Never true   Transportation Needs: High Risk (2025)    Received from [facility] SDOH Screening     Has lack of transportation kept you from medical appointments, meetings, work or from getting things needed for daily living? choose all that apply.: Yes, it has kept me from non-medical meetings, appointments, work or from getting things that i need     Has lack of transportation kept you from medical appointments, meetings, work or from getting things needed for daily living? choose all that apply.: Yes, it has kept me from medical appointments or from getting my medications   Physical Activity: Inactive (2024)    Exercise Vital Sign     Days of Exercise per Week: 0 days      Minutes of Exercise per Session: 0 min   Stress: No Stress Concern Present (12/13/2024)    [employer] [place] [employer] - Occupational Stress Questionnaire     Feeling of Stress : Only a little   Housing Stability: High Risk (2/21/2025)    Received from [facility] SDOH Screening     In the past year, have you been unable to get any of the following when you really needed them? choose all that apply.: Utilities (electric, gas, and water)       Family History   Problem Relation Name Age of Onset    Heart disease Mother Jillian Jo         Open heart surgery    Cataracts Mother Jillian Jo     Macular degeneration Mother Jillian Jo     Glaucoma Mother Jillian Jo     Arthritis Mother Jillian Jo     Hearing loss Mother Jillian Jo     Hypertension Mother Jillian Jo     Kidney disease Mother Jillian Jo     Stroke Mother Jillian Jo     Vision loss Mother Jillian Jo     Diabetes Sister La Jo     Heart disease Sister La Jo         CAD    Cataracts Sister La Jo     Depression Sister La Jo         Depression    Hypertension Sister La Jo     Diabetes Sister Erica Lerouge     Hearing loss Sister Erica Lerouge     Hypertension Sister Erica Lerouge     Diabetes Sister      Cancer Son Andrea Trascher III         testicular     Arthritis Son Andrea Trascher III         Because of chemotherapy at 19 he has neuropathy and arthritis    Hearing loss Son Andrea Trascher III         Because of chemotherapy    Cancer Maternal Aunt Etta Marquez         Tumor in chest wall    Heart disease Maternal Grandfather Leonides Santos         Pacemaker    Arthritis Maternal Grandfather Leonides Santos     Hearing loss Maternal Grandfather Leonides Santos     Hypertension Maternal Grandfather Leonides Santos     Hypertension Maternal Grandmother Helene Santos     Kidney disease Maternal Grandmother Helene Santos      Stroke Maternal Grandmother Helene Santos     Alcohol abuse Daughter Kaye Martin     Asthma Daughter Kaye Martin     Depression Daughter Kaye Martin         Psysophrenia    Drug abuse Daughter Kaye Martin         Not now but earlier alcohol and drug abuse    Hypertension Daughter Kaye Martin     Mental illness Daughter Kaye Martin         Psysophrenia    Cancer Son Andrea Trascher III will         Testicular cancer    Depression Son Andrea Trascher III will         Major depressive disorder like me    Cancer Maternal Aunt Carmella Enrique         Lung cancer    Depression Sister Maribell Hull         May be bipolar    Diabetes Sister Maribell Hull     Heart disease Sister Maribell Hull         Open heart surgery    Hypertension Sister Maribell Hull     Hypertension Brother Riky Jo        Past Surgical History:   Procedure Laterality Date    abdominal laparoscopy       ADENOIDECTOMY  1965    Tonsils removed also    ANGIOGRAM, CORONARY, WITH LEFT HEART CATHETERIZATION N/A 04/02/2024    Procedure: Angiogram, Coronary, with Left Heart Cath;  Surgeon: Shree Espinoza MD;  Location: [facility] CATH LAB;  Service: Cardiology;  Laterality: N/A;    ANGIOGRAM, CORONARY, WITH LEFT HEART CATHETERIZATION N/A 04/01/2024    Procedure: Angiogram, Coronary, with Left Heart Cath;  Surgeon: Shree Espinoza MD;  Location: [facility] CATH LAB;  Service: Cardiology;  Laterality: N/A;    BREAST BIOPSY Left 1998    benign    BREAST SURGERY  1998 biopsy lt breast    CATARACT EXTRACTION Bilateral [id number]    [facility] in [place]    Cataract Surgery Bilateral     Laser (YAG)    COLON SURGERY  2004    COLONOSCOPY N/A 05/05/2021    Procedure: COLONOSCOPY;  Surgeon: Shawn Rogers III, MD;  Location: [facility] ENDO;  Service: Endoscopy;  Laterality: N/A;    COLONOSCOPY N/A 06/30/2021    Procedure: COLONOSCOPY;  Surgeon: Memo Merida MD;  Location:  [facility] ENDO;  Service: Endoscopy;  Laterality: N/A;    CORONARY STENT PLACEMENT N/A 04/01/2024    Procedure: INSERTION, STENT, CORONARY ARTERY;  Surgeon: Shree Espinoza MD;  Location: [facility] CATH LAB;  Service: Cardiology;  Laterality: N/A;    ESOPHAGOGASTRODUODENOSCOPY N/A 11/29/2019    Procedure: ESOPHAGOGASTRODUODENOSCOPY (EGD);  Surgeon: Shawn Rogers III, MD;  Location: [facility];  Service: Endoscopy;  Laterality: N/A;    ESOPHAGOGASTRODUODENOSCOPY N/A 05/05/2021    Procedure: EGD (ESOPHAGOGASTRODUODENOSCOPY);  Surgeon: Shawn Rogers III, MD;  Location: [facility];  Service: Endoscopy;  Laterality: N/A;    EYE SURGERY      IVUS, CORONARY  04/02/2024    Procedure: IVUS, Coronary;  Surgeon: Shree Espinoza MD;  Location: [facility] CATH LAB;  Service: Cardiology;;    PERCUTANEOUS CORONARY INTERVENTION, ARTERY N/A 04/01/2024    Procedure: Percutaneous coronary intervention;  Surgeon: Shree Espinoza MD;  Location: [facility] CATH LAB;  Service: Cardiology;  Laterality: N/A;    PTCA, SINGLE VESSEL  04/01/2024    Procedure: PTCA, Single Vessel;  Surgeon: Shree Espinoza MD;  Location: [facility] CATH LAB;  Service: Cardiology;;    SMALL INTESTINE SURGERY  2004    Exploratory stomach large and small intestines    TONSILLECTOMY      TUBAL LIGATION         Past Medical History:   Diagnosis Date    Arthritis     Cataract     Coronary artery disease     Diabetes mellitus 2008     am 01/15/2018 Insulin x 1 year    Diabetes mellitus, type 2     DM (diabetes mellitus) 2008     am 02/14/2020 Insulin x 4 years    Encounter for blood transfusion     Glaucoma     Hypertension     Insomnia     Macular degeneration     Old MI (myocardial infarction) 2/12/2024    Vaginal yeast infection        Review of Systems   Constitutional:  Positive for fatigue. Negative for activity change, appetite change, chills, fever and unexpected weight change.   HENT:  Negative for congestion, dental problem, mouth  sores and nosebleeds.    Eyes:  Negative for visual disturbance.   Respiratory:  Negative for cough, choking and chest tightness.    Cardiovascular:  Negative for chest pain, palpitations and leg swelling.   Gastrointestinal:  Negative for abdominal distention, abdominal pain, anal bleeding, blood in stool, constipation, diarrhea, nausea and vomiting.   Endocrine: Negative.    Genitourinary:  Negative for dysuria, frequency, hematuria and urgency.   Musculoskeletal:  Negative for arthralgias, back pain, gait problem, joint swelling and myalgias.   Skin:  Negative for wound.   Allergic/Immunologic: Negative for immunocompromised state.   Neurological:  Negative for dizziness, light-headedness, numbness and headaches.   Hematological:  Negative for adenopathy. Does not bruise/bleed easily.   Psychiatric/Behavioral:  Negative for sleep disturbance. The patient is not nervous/anxious.        Medication List with Changes/Refills   Current Medications    AZELASTINE (ASTELIN) 137 MCG (0.1 %) NASAL SPRAY    use 2 sprays (274 mcg total) by Nasal route 2 (two) times daily.    BEPREVE 1.5 % DROP    Place 1 drop into both eyes 2 (two) times daily.    BLOOD SUGAR DIAGNOSTIC STRP    To check blood sugar 1 times daily    BLOOD-GLUCOSE METER (ACCU-CHEK GUIDE GLUCOSE METER) MISC    use daily to test blood sugar    BLOOD-GLUCOSE SENSOR (FREESTYLE CATHERINE 3 SENSOR) DOROTA    1 each by Misc.(Non-Drug; Combo Route) route continuous.    CARVEDILOL (COREG) 25 MG TABLET    TAKE 2 TABLETS BY MOUTH TWICE  DAILY    CLONIDINE (CATAPRES) 0.1 MG TABLET    Take 1 tablet (0.1 mg total) by mouth daily as needed (BP >160/90).    CLONIDINE 0.3 MG/24 HR TD PTWK (CATAPRES) 0.3 MG/24 HR    Place 1 patch onto the skin every 7 days.    CLOPIDOGREL (PLAVIX) 75 MG TABLET    TAKE 1 TABLET BY MOUTH ONCE  DAILY    COENZYME Q10 200 MG CAPSULE    Take 200 mg by mouth once daily.    DULOXETINE (CYMBALTA) 60 MG CAPSULE    TAKE 1 CAPSULE BY MOUTH ONCE  DAILY    ELIQUIS  "5 MG TAB    TAKE 1 TABLET BY MOUTH TWICE  DAILY    EMGALITY SYRINGE 120 MG/ML SYRG    INJECT THE CONTENTS OF 1 SYRINGE SUBCUTANEOUSLY EVERY 28 DAYS    EZETIMIBE (ZETIA) 10 MG TABLET    TAKE 1 TABLET BY MOUTH EVERY  EVENING    FAMOTIDINE (PEPCID) 20 MG TABLET    Take 1 tablet (20 mg total) by mouth 2 (two) times daily.    FLUTICASONE PROPIONATE (FLONASE) 50 MCG/ACTUATION NASAL SPRAY    2 sprays (100 mcg total) by Each Nostril route once daily.    GABAPENTIN (NEURONTIN) 300 MG CAPSULE    Take 1 capsule (300 mg total) by mouth daily as needed (when needed).    HYDROCORTISONE 2.5 % CREAM    Apply topically 2 (two) times daily.    INSULIN GLARGINE U-100, LANTUS, (LANTUS SOLOSTAR U-100 INSULIN) 100 UNIT/ML (3 ML) INPN PEN    Inject 64 Units into the skin every evening.    INSULIN LISPRO (HUMALOG KWIKPEN INSULIN) 100 UNIT/ML PEN    Inject 5 Units into the skin as needed (if glucose before each meal which is three times a day is above 200 take 5 units of this insulin).    ISOSORBIDE MONONITRATE (IMDUR) 30 MG 24 HR TABLET    TAKE 1 TABLET BY MOUTH ONCE  DAILY    LANCETS MISC    To check BG 1 times daily    MECLIZINE (ANTIVERT) 25 MG TABLET    Take 1 tablet (25 mg total) by mouth 3 (three) times daily as needed.    NITROGLYCERIN (NITROSTAT) 0.4 MG SL TABLET    Place 1 tablet (0.4 mg total) under the tongue every 5 (five) minutes as needed for Chest pain.    PANTOPRAZOLE (PROTONIX) 40 MG TABLET    Take 1 tablet (40 mg total) by mouth once daily.    PEN NEEDLE, DIABETIC (BD ULTRA-FINE SHORT PEN NEEDLE) 31 GAUGE X 5/16" NDLE    AS DIRECTED TWICE DAILY    PRAVASTATIN (PRAVACHOL) 20 MG TABLET    Take 1 tablet (20 mg total) by mouth every evening.    RESTASIS 0.05 % OPHTHALMIC EMULSION    INSTILL 1 DROP INTO BOTH EYES  TWICE DAILY    RIMEGEPANT 75 MG ODT    Take 1 tablet (75 mg total) by mouth as needed for Migraine (do not exceed 2-3 doses within 1 week). Place ODT tablet on the tongue; alternatively the ODT tablet may be placed " under the tongue    SACUBITRIL-VALSARTAN (ENTRESTO)  MG PER TABLET    Take 1 tablet by mouth 2 (two) times daily.    SITAGLIPTIN PHOSPHATE (JANUVIA) 100 MG TAB    Take 1 tablet (100 mg total) by mouth once daily.    SOD SULF-POT CHLORIDE-MAG SULF (SUTAB) 1.479-0.188- 0.225 GRAM TABLET    Take 12 tablets by mouth once daily. Take according to package instructions with indicated amount of water.    TEMAZEPAM (RESTORIL) 30 MG CAPSULE    Take 1 capsule (30 mg total) by mouth every evening.    VIBEGRON (GEMTESA) 75 MG TAB    Take 1 tablet (75 mg total) by mouth once daily.        Objective:     There were no vitals filed for this visit.      Physical Exam  Vitals reviewed: virtual visit.   Constitutional:       Appearance: Normal appearance.   Neurological:      Mental Status: She is alert.   Psychiatric:         Behavior: Behavior normal.       Lab Results   Component Value Date    WBC 6.95 06/25/2025    HGB 9.8 (L) 06/25/2025    HCT 30.7 (L) 06/25/2025    MCV 94 06/25/2025     06/25/2025       Lab Results   Component Value Date     06/25/2025    K 4.8 06/25/2025     06/25/2025    CO2 27 06/25/2025    BUN 21 06/25/2025    CREATININE 0.9 06/25/2025    CALCIUM 9.3 06/25/2025    ANIONGAP 6 (L) 06/25/2025    ESTGFRAFRICA >60 07/27/2022    EGFRNONAA >60 07/27/2022     Lab Results   Component Value Date    ALT 6 (L) 06/25/2025    AST 11 06/25/2025    ALKPHOS 49 06/25/2025    BILITOT 0.5 06/25/2025       Assessment/Plan:     Problem List Items Addressed This Visit       Iron deficiency anemia due to chronic blood loss    No recent iron studies available for review    Pt cannot tolerate oral iron supplementation d/t GI upset  Encouraged incorporation of iron rich foods in diet     Will schedule repeat labs with next appt and determine need for IV iron therapy           Anemia - Primary    Lab Results   Component Value Date    HGB 9.8 (L) 06/25/2025   chronic mild anemia  Down from previous    --Multifactorial r/t co morbidities                          Med Onc Chart Routing      Follow up with physician    Follow up with MARIANO 3 months. with labs prior VV please   Infusion scheduling note    Injection scheduling note    Labs    Imaging    Pharmacy appointment    Other referrals                   BRENDEN CarboneP-C  Hematology/Oncology

## 2025-07-16 NOTE — ASSESSMENT & PLAN NOTE
Lab Results   Component Value Date    HGB 9.8 (L) 06/25/2025   chronic mild anemia  Down from previous   --Multifactorial r/t co morbidities

## 2025-07-17 DIAGNOSIS — I25.118 CORONARY ARTERY DISEASE OF NATIVE ARTERY OF NATIVE HEART WITH STABLE ANGINA PECTORIS: ICD-10-CM

## 2025-07-18 NOTE — TELEPHONE ENCOUNTER
No care due was identified.  [facility] Embedded Care Due Messages. Reference number: [id number].   7/17/2025 9:12:16 PM CDT

## 2025-07-18 NOTE — ASSESSMENT & PLAN NOTE
No recent iron studies available for review    Pt cannot tolerate oral iron supplementation d/t GI upset  Encouraged incorporation of iron rich foods in diet     Will schedule repeat labs with next appt and determine need for IV iron therapy

## 2025-07-18 NOTE — TELEPHONE ENCOUNTER
Refill Routing Note   Medication(s) are not appropriate for processing by [facility] for the following reason(s):        Required labs outdated    [facility] action(s):  Defer               Appointments  past 12m or future 3m with PCP    Date Provider   Last Visit   6/30/2025 Jose Szymanski MD   Next Visit   8/4/2025 Jose Szymanski MD   ED visits in past 90 days: 0        Note composed:7:01 AM 07/18/2025

## 2025-07-20 ENCOUNTER — PATIENT MESSAGE (OUTPATIENT)
Dept: CARDIOLOGY | Facility: CLINIC | Age: 75
End: 2025-07-20
Payer: MEDICARE

## 2025-07-20 RX ORDER — EZETIMIBE 10 MG/1
10 TABLET ORAL NIGHTLY
Qty: 90 TABLET | Refills: 3 | Status: SHIPPED | OUTPATIENT
Start: 2025-07-20

## 2025-07-23 ENCOUNTER — ANESTHESIA EVENT (OUTPATIENT)
Dept: ENDOSCOPY | Facility: HOSPITAL | Age: 75
End: 2025-07-23
Payer: MEDICARE

## 2025-07-23 ENCOUNTER — PATIENT MESSAGE (OUTPATIENT)
Dept: GASTROENTEROLOGY | Facility: CLINIC | Age: 75
End: 2025-07-23
Payer: MEDICARE

## 2025-07-23 ENCOUNTER — HOSPITAL ENCOUNTER (OUTPATIENT)
Dept: ENDOSCOPY | Facility: HOSPITAL | Age: 75
Discharge: HOME | End: 2025-07-23
Attending: INTERNAL MEDICINE | Admitting: INTERNAL MEDICINE
Payer: MEDICARE

## 2025-07-23 ENCOUNTER — ANESTHESIA (OUTPATIENT)
Dept: ENDOSCOPY | Facility: HOSPITAL | Age: 75
End: 2025-07-23
Payer: MEDICARE

## 2025-07-23 DIAGNOSIS — M79.605 PAIN IN BOTH LOWER EXTREMITIES: ICD-10-CM

## 2025-07-23 DIAGNOSIS — M79.604 PAIN IN BOTH LOWER EXTREMITIES: ICD-10-CM

## 2025-07-23 DIAGNOSIS — K63.5 POLYP OF CECUM: ICD-10-CM

## 2025-07-23 DIAGNOSIS — K57.30 DIVERTICULOSIS OF LARGE INTESTINE WITHOUT HEMORRHAGE: Primary | ICD-10-CM

## 2025-07-23 DIAGNOSIS — Z86.0100 HISTORY OF COLON POLYPS: ICD-10-CM

## 2025-07-23 LAB — POCT GLUCOSE: 182 MG/DL (ref 70–110)

## 2025-07-23 PROCEDURE — 63600175 PHARM REV CODE 636 W HCPCS: Performed by: NURSE ANESTHETIST, CERTIFIED REGISTERED

## 2025-07-23 PROCEDURE — 88305 TISSUE EXAM BY PATHOLOGIST: CPT | Mod: TC | Performed by: INTERNAL MEDICINE

## 2025-07-23 PROCEDURE — 27201012 HC FORCEPS, HOT/COLD, DISP: Performed by: INTERNAL MEDICINE

## 2025-07-23 PROCEDURE — 37000009 HC ANESTHESIA EA ADD 15 MINS

## 2025-07-23 PROCEDURE — 37000008 HC ANESTHESIA 1ST 15 MINUTES

## 2025-07-23 RX ORDER — PROPOFOL 10 MG/ML
VIAL (ML) INTRAVENOUS
Status: DISCONTINUED | OUTPATIENT
Start: 2025-07-23 | End: 2025-07-23

## 2025-07-23 RX ADMIN — PROPOFOL 40 MG: 10 INJECTION, EMULSION INTRAVENOUS at 09:07

## 2025-07-23 RX ADMIN — PROPOFOL 60 MG: 10 INJECTION, EMULSION INTRAVENOUS at 09:07

## 2025-07-23 RX ADMIN — PROPOFOL 20 MG: 10 INJECTION, EMULSION INTRAVENOUS at 10:07

## 2025-07-23 RX ADMIN — PROPOFOL 50 MG: 10 INJECTION, EMULSION INTRAVENOUS at 10:07

## 2025-07-23 RX ADMIN — PROPOFOL 50 MG: 10 INJECTION, EMULSION INTRAVENOUS at 09:07

## 2025-07-23 NOTE — ANESTHESIA PREPROCEDURE EVALUATION
07/23/2025  Christine Jo is a 75 y.o., female.  Problem List[1]  Past Surgical History:   Procedure Laterality Date    abdominal laparoscopy       ADENOIDECTOMY  1965    Tonsils removed also    ANGIOGRAM, CORONARY, WITH LEFT HEART CATHETERIZATION N/A 04/02/2024    Procedure: Angiogram, Coronary, with Left Heart Cath;  Surgeon: Shree Espinoza MD;  Location: [facility] CATH LAB;  Service: Cardiology;  Laterality: N/A;    ANGIOGRAM, CORONARY, WITH LEFT HEART CATHETERIZATION N/A 04/01/2024    Procedure: Angiogram, Coronary, with Left Heart Cath;  Surgeon: Shree Espinoza MD;  Location: [facility] CATH LAB;  Service: Cardiology;  Laterality: N/A;    BREAST BIOPSY Left 1998    benign    BREAST SURGERY  1998 biopsy lt breast    CATARACT EXTRACTION Bilateral [id number]    [facility] in [place]    Cataract Surgery Bilateral     Laser (YAG)    COLON SURGERY  2004    COLONOSCOPY N/A 05/05/2021    Procedure: COLONOSCOPY;  Surgeon: Shawn Rogers III, MD;  Location: [facility];  Service: Endoscopy;  Laterality: N/A;    COLONOSCOPY N/A 06/30/2021    Procedure: COLONOSCOPY;  Surgeon: Memo Merida MD;  Location: [facility];  Service: Endoscopy;  Laterality: N/A;    CORONARY STENT PLACEMENT N/A 04/01/2024    Procedure: INSERTION, STENT, CORONARY ARTERY;  Surgeon: Shree Espinoza MD;  Location: [facility] CATH LAB;  Service: Cardiology;  Laterality: N/A;    ESOPHAGOGASTRODUODENOSCOPY N/A 11/29/2019    Procedure: ESOPHAGOGASTRODUODENOSCOPY (EGD);  Surgeon: Shawn Rogers III, MD;  Location: [facility];  Service: Endoscopy;  Laterality: N/A;    ESOPHAGOGASTRODUODENOSCOPY N/A 05/05/2021    Procedure: EGD (ESOPHAGOGASTRODUODENOSCOPY);  Surgeon: Shawn Rogers III, MD;  Location: [facility];  Service: Endoscopy;  Laterality: N/A;    EYE SURGERY      IVUS, CORONARY  04/02/2024    Procedure: IVUS, Coronary;  Surgeon:  Shree Espinoza MD;  Location: [facility] CATH LAB;  Service: Cardiology;;    PERCUTANEOUS CORONARY INTERVENTION, ARTERY N/A 04/01/2024    Procedure: Percutaneous coronary intervention;  Surgeon: Shree Espinoza MD;  Location: [facility] CATH LAB;  Service: Cardiology;  Laterality: N/A;    PTCA, SINGLE VESSEL  04/01/2024    Procedure: PTCA, Single Vessel;  Surgeon: Shree Espinoza MD;  Location: [facility] CATH LAB;  Service: Cardiology;;    SMALL INTESTINE SURGERY  2004    Exploratory stomach large and small intestines    TONSILLECTOMY      TUBAL LIGATION       Results for orders placed during the hospital encounter of 03/31/24    Echo    Interpretation Summary    Left Ventricle: The left ventricle is normal in size. Normal wall thickness. There is concentric remodeling. Normal wall motion. There is mildly reduced systolic function with a visually estimated ejection fraction of 40 - 45%. Ejection fraction by visual approximation is 45%. There is indeterminate diastolic function.    Right Ventricle: Normal right ventricular cavity size. Wall thickness is normal. Right ventricle wall motion  is normal. Systolic function is normal.    Aortic Valve: There is mild aortic regurgitation.    Tricuspid Valve: The tricuspid valve is structurally normal. There is mild regurgitation.    Pulmonary Artery: The estimated pulmonary artery systolic pressure is 42 mmHg.    IVC/SVC: Normal venous pressure at 3 mmHg.      Pre-op Assessment    I have reviewed the Patient Summary Reports.     I have reviewed the Nursing Notes. I have reviewed the NPO Status.   I have reviewed the Medications.     Review of Systems  Anesthesia Hx:             Denies Family Hx of Anesthesia complications.    Denies Personal Hx of Anesthesia complications.                    Hematology/Oncology:       -- Anemia:                                  Cardiovascular:     Hypertension  Past MI CAD      Angina CHF    hyperlipidemia GILL       Cardiovascular Symptoms: Angina    Shortness of Breath Dyspnea On Extertion   Coronary Artery Disease:          Hx of Myocardial Infarction     Congestive Heart Failure (CHF)                Hypertension     Atrial Fibrillation     Pulmonary:      Shortness of breath                  Hepatic/GI:  Bowel Prep.   GERD         Gerd          Musculoskeletal:  Arthritis        Arthritis          Neurological:    Neuromuscular Disease,  Headaches      Dx of Headaches   Arthritis                         Neuromuscular Disease   Endocrine:  Diabetes    Diabetes                      Psych:  Psychiatric History anxiety depression                Physical Exam  General: Well nourished, Cooperative, Alert and Oriented    Airway:  Mallampati: II / II  Mouth Opening: Normal  TM Distance: Normal  Tongue: Normal  Neck ROM: Normal ROM    Dental:  Intact    Chest/Lungs:  Normal Respiratory Rate    Heart:  Rate: Normal  Rhythm: Regular Rhythm        Anesthesia Plan  Type of Anesthesia, risks & benefits discussed:    Anesthesia Type: MAC  Intra-op Monitoring Plan: Standard ASA Monitors  Post Op Pain Control Plan: multimodal analgesia  Induction:  IV  Informed Consent: Informed consent signed with the Patient and all parties understand the risks and agree with anesthesia plan.  All questions answered.   ASA Score: 3  Day of Surgery Review of History & Physical: H&P Update referred to the surgeon/provider.    Ready For Surgery From Anesthesia Perspective.     .           [1]   Patient Active Problem List  Diagnosis    Controlled type 2 diabetes mellitus with diabetic polyneuropathy, with long-term current use of insulin    Class 3 severe obesity due to excess calories with serious comorbidity and body mass index (BMI) of 45.0 to 49.9 in adult    Paroxysmal atrial fibrillation    Insomnia    Gastroesophageal reflux disease without esophagitis    Recurrent major depressive disorder    Hypertensive emergency    Age-related osteoporosis without current pathological fracture     Iron deficiency anemia due to chronic blood loss    History of obstructive sleep apnea    Recurrent major depressive disorder, in full remission    Cervical neuropathy    Neck pain    Chronic bilateral low back pain with bilateral sciatica    Spondylosis of cervical region without myelopathy or radiculopathy    Morbid obesity with BMI of 40.0-44.9, adult    Dysphagia    Immunization deficiency    Urinary tract infection without hematuria    Uncontrolled type 2 diabetes mellitus with hyperglycemia    Clavicular cyst    Chronic allergic rhinitis    Hoarseness    Hearing loss    Depression    Breast screening    Anemia    Vertigo    Depression, major, recurrent, moderate    Anxiety disorder    Coronary artery disease of native artery of native heart with stable angina pectoris    GILL (dyspnea on exertion)    Palpitations    Abnormal ECG    Abdominal aortic aneurysm (AAA) without rupture    Hearing loss of left ear    Rectal bleeding    Pain in both lower extremities    Tinnitus    Unsteadiness on feet     Altered mental status    Confusion    Disequilibrium    Memory loss    Vestibular migraine    Aorto-iliac atherosclerosis    Carotid stenosis    Dizziness and giddiness    Fatigue    Chronic idiopathic constipation    Multiple neurological symptoms    Migraine with aura and with status migrainosus, not intractable    Urge incontinence    Stress incontinence    Calculus of gallbladder    Old MI (myocardial infarction)    SOB (shortness of breath)    Mixed hyperlipidemia    Genu varum    Primary hypertension    Primary osteoarthritis of right knee    B12 deficiency    Chronic right shoulder pain    Osteoporosis screening    Asymptomatic menopausal state    Fall    Hematoma    Bilateral carpal tunnel syndrome    Polyp of colon    Need for vaccination    History of colon polyps    Chronic combined systolic and diastolic congestive heart failure    Falls frequently    Preop cardiovascular exam

## 2025-07-23 NOTE — BRIEF OP NOTE
Endoscopy Discharge Summary      Admit Date: 7/23/2025    Discharge Date and Time:  7/23/2025 10:15 AM    Attending Physician: Minh Salazar     Discharge Physician: Minh Salazar      Principal Admitting Diagnoses: History of colon polyps         Discharge Diagnosis: The primary encounter diagnosis was Diverticulosis of large intestine without hemorrhage. Diagnoses of History of colon polyps and Polyp of cecum were also pertinent to this visit.     Discharged Condition: Good    Indication for Admission: History of colon polyps     Hospital Course: Patient was admitted for an inpatient procedure and tolerated the procedure well with no complications.    Significant Diagnostic Studies:  COLON    Pathology (if any):  Specimen (24h ago, onward)       Start     Ordered    07/23/25 1007  Specimen to Pathology Gastrointestinal tract  RELEASE UPON ORDERING        References:    Click here for ordering Quick Tip   Question:  Release to patient  Answer:  Immediate    07/23/25 1007                    Disposition: Home.    Bleeding: None    No Implants         Follow Up/Patient Instructions:   Patient's Medications   New Prescriptions    No medications on file   Previous Medications    AZELASTINE (ASTELIN) 137 MCG (0.1 %) NASAL SPRAY    use 2 sprays (274 mcg total) by Nasal route 2 (two) times daily.    BEPREVE 1.5 % DROP    Place 1 drop into both eyes 2 (two) times daily.    BLOOD SUGAR DIAGNOSTIC STRP    To check blood sugar 1 times daily    BLOOD-GLUCOSE METER (ACCU-CHEK GUIDE GLUCOSE METER) MISC    use daily to test blood sugar    BLOOD-GLUCOSE SENSOR (FREESTYLE CATHERINE 3 SENSOR) DOROTA    1 each by Misc.(Non-Drug; Combo Route) route continuous.    CARVEDILOL (COREG) 25 MG TABLET    TAKE 2 TABLETS BY MOUTH TWICE  DAILY    CLONIDINE (CATAPRES) 0.1 MG TABLET    Take 1 tablet (0.1 mg total) by mouth daily as needed (BP >160/90).    CLONIDINE 0.3 MG/24 HR TD PTWK (CATAPRES) 0.3 MG/24 HR    Place 1 patch onto the skin every 7 days.  "   CLOPIDOGREL (PLAVIX) 75 MG TABLET    TAKE 1 TABLET BY MOUTH ONCE  DAILY    COENZYME Q10 200 MG CAPSULE    Take 200 mg by mouth once daily.    DULOXETINE (CYMBALTA) 60 MG CAPSULE    TAKE 1 CAPSULE BY MOUTH ONCE  DAILY    ELIQUIS 5 MG TAB    TAKE 1 TABLET BY MOUTH TWICE  DAILY    EMGALITY SYRINGE 120 MG/ML SYRG    INJECT THE CONTENTS OF 1 SYRINGE SUBCUTANEOUSLY EVERY 28 DAYS    EZETIMIBE (ZETIA) 10 MG TABLET    TAKE 1 TABLET BY MOUTH IN THE  EVENING    FAMOTIDINE (PEPCID) 20 MG TABLET    Take 1 tablet (20 mg total) by mouth 2 (two) times daily.    FLUTICASONE PROPIONATE (FLONASE) 50 MCG/ACTUATION NASAL SPRAY    2 sprays (100 mcg total) by Each Nostril route once daily.    GABAPENTIN (NEURONTIN) 300 MG CAPSULE    Take 1 capsule (300 mg total) by mouth daily as needed (when needed).    HYDROCORTISONE 2.5 % CREAM    Apply topically 2 (two) times daily.    INSULIN GLARGINE U-100, LANTUS, (LANTUS SOLOSTAR U-100 INSULIN) 100 UNIT/ML (3 ML) INPN PEN    Inject 64 Units into the skin every evening.    INSULIN LISPRO (HUMALOG KWIKPEN INSULIN) 100 UNIT/ML PEN    Inject 5 Units into the skin as needed (if glucose before each meal which is three times a day is above 200 take 5 units of this insulin).    ISOSORBIDE MONONITRATE (IMDUR) 30 MG 24 HR TABLET    TAKE 1 TABLET BY MOUTH ONCE  DAILY    LANCETS MISC    To check BG 1 times daily    MECLIZINE (ANTIVERT) 25 MG TABLET    Take 1 tablet (25 mg total) by mouth 3 (three) times daily as needed.    NITROGLYCERIN (NITROSTAT) 0.4 MG SL TABLET    Place 1 tablet (0.4 mg total) under the tongue every 5 (five) minutes as needed for Chest pain.    PANTOPRAZOLE (PROTONIX) 40 MG TABLET    Take 1 tablet (40 mg total) by mouth once daily.    PEN NEEDLE, DIABETIC (BD ULTRA-FINE SHORT PEN NEEDLE) 31 GAUGE X 5/16" NDLE    AS DIRECTED TWICE DAILY    PRAVASTATIN (PRAVACHOL) 20 MG TABLET    Take 1 tablet (20 mg total) by mouth every evening.    RESTASIS 0.05 % OPHTHALMIC EMULSION    INSTILL 1 DROP " INTO BOTH EYES  TWICE DAILY    RIMEGEPANT 75 MG ODT    Take 1 tablet (75 mg total) by mouth as needed for Migraine (do not exceed 2-3 doses within 1 week). Place ODT tablet on the tongue; alternatively the ODT tablet may be placed under the tongue    SACUBITRIL-VALSARTAN (ENTRESTO)  MG PER TABLET    Take 1 tablet by mouth 2 (two) times daily.    SITAGLIPTIN PHOSPHATE (JANUVIA) 100 MG TAB    Take 1 tablet (100 mg total) by mouth once daily.    TEMAZEPAM (RESTORIL) 30 MG CAPSULE    Take 1 capsule (30 mg total) by mouth every evening.   Modified Medications    No medications on file   Discontinued Medications    SOD SULF-POT CHLORIDE-MAG SULF (SUTAB) 1.479-0.188- 0.225 GRAM TABLET    Take 12 tablets by mouth once daily. Take according to package instructions with indicated amount of water.    VIBEGRON (GEMTESA) 75 MG TAB    Take 1 tablet (75 mg total) by mouth once daily.       Discharge Procedure Orders   Diet general     No dressing needed     Call MD for:  temperature >100.4     Call MD for:  persistent nausea and vomiting     Call MD for:  severe uncontrolled pain     Call MD for:  difficulty breathing, headache or visual disturbances     Call MD for:  redness, tenderness, or signs of infection (pain, swelling, redness, odor or green/yellow discharge around incision site)     Call MD for:  hives     Call MD for:  persistent dizziness or light-headedness        Follow-up Information       Jose Szymanski MD Follow up.    Specialty: Family Medicine  Why: As needed  Contact information:  [id number] [facility] [address] [phone]

## 2025-07-23 NOTE — TRANSFER OF CARE
"Anesthesia Transfer of Care Note    Patient: Christine Jo    Procedure(s) Performed: * No procedures listed *    Patient location: GI    Anesthesia Type: MAC    Transport from OR: Transported from OR on room air with adequate spontaneous ventilation    Post pain: adequate analgesia    Post assessment: no apparent anesthetic complications    Post vital signs: stable    Level of consciousness: sedated    Nausea/Vomiting: no nausea/vomiting    Complications: none    Transfer of care protocol was followed      Last vitals: Visit Vitals  BP (!) 161/86   Pulse (!) 56   Temp 36.5 °C (97.7 °F)   Resp 18   Ht 5' 2" (1.575 m)   Wt 108.9 kg (240 lb)   SpO2 98%   Breastfeeding No   BMI 43.90 kg/m²     "

## 2025-07-23 NOTE — INTERVAL H&P NOTE
The patient has been examined and the H&P has been reviewed:    I concur with the findings and no changes have occurred since H&P was written.        Active Hospital Problems    Diagnosis  POA    *History of colon polyps [Z86.0100]  Not Applicable     Priority: 2      2021 Colonoscopy: lipoma ascending colon, three transverse TA, sig tics        Resolved Hospital Problems   No resolved problems to display.

## 2025-07-23 NOTE — ANESTHESIA POSTPROCEDURE EVALUATION
Anesthesia Post Evaluation    Patient: Christine Jo    Procedure(s) Performed: * No procedures listed *    Final Anesthesia Type: MAC      Patient location during evaluation: GI PACU  Patient participation: Yes- Able to Participate  Level of consciousness: awake and alert and oriented  Post-procedure vital signs: reviewed and stable  Pain management: adequate  Airway patency: patent  MATIAS mitigation strategies: Multimodal analgesia and Extubation and recovery carried out in lateral, semiupright, or other nonsupine position  PONV status at discharge: No PONV  Anesthetic complications: no      Cardiovascular status: blood pressure returned to baseline and hemodynamically stable  Respiratory status: unassisted and spontaneous ventilation  Hydration status: euvolemic  Follow-up not needed.  Comments: Report given to PACU RN. Hand Off Tool Used. RN given opportunity to ask questions or clarify concerns. No Concerns verbalized. Pt. Left in stable condition. SV. Vital Signs Return to Near Baseline. No s/s of distress noted.               Vitals Value Taken Time   /54 07/23/25 10:39   Temp 36.4 °C (97.5 °F) 07/23/25 10:17   Pulse 68 07/23/25 10:39   Resp 16 07/23/25 10:39   SpO2 98 % 07/23/25 10:39         Event Time   Out of Recovery 10:40:10         Pain/Jose Armando Score: Jose Armando Score: 10 (7/23/2025 10:40 AM)

## 2025-07-23 NOTE — DISCHARGE INSTRUCTIONS
07/23/2025    Dear Christine Jo,     Below are important post-procedure care instructions to help ensure a smooth recovery.    General Post-Procedure Care    ? Discharge: You have been discharged home in stable condition.  ? Supervision: A responsible adult should stay with you for the next 12-24 hours.  ? Activity Restrictions:    You may feel drowsy due to sedation; avoid driving, operating heavy machinery, making important decisions, or drinking alcohol for the rest of the day.  Resume normal activities tomorrow unless otherwise directed by your doctor.  Diet & Hydration    ? After Upper Endoscopy (EGD):    Start with clear liquids and soft foods. You may gradually return to your normal diet as tolerated.  Avoid hot, spicy, or acidic foods for the rest of the day to prevent throat irritation.    ? After Colonoscopy:    Begin with a light meal and plenty of fluids.  Avoid heavy, greasy, or spicy foods for the first 24 hours to minimize stomach upset.  Resume fiber intake gradually to avoid bloating or discomfort.  Common Post-Procedure Symptoms    It is normal to experience:  ? Mild sore throat or hoarseness (should improve within 24 hours).  ? Bloating, gas, or mild cramping due to air introduced during the colonoscopy.  ? Some fatigue or grogginess from sedation.  ? A small amount of rectal bleeding (especially if polyps were removed).    Warning Signs - When to Call    Please seek immediate medical attention or call [insert emergency contact number] if you experience:  ?? Severe or persistent chest or abdominal pain.  ?? Difficulty breathing or swallowing.  ?? Vomiting blood or passing large amounts of blood in stool.  ?? Fever over 101°F or signs of infection.  ?? Severe dizziness or fainting.  ?? Persistent nausea or vomiting.    Follow-Up & Results    ?? If biopsies were taken or polyps removed, we will contact you with results via [email or web address] or via phone call.  ?? If you have any questions or  "concerns, please contact our office at [phone].    We recommend the following:  Psyllium husk daily.  Magnesium Glycinate daily.  Ground Flaxseed daily.  Probiotics (Florastor or align) daily.     We genuinely value your feedback and believe that it plays a crucial role in our pursuit of excellence. We kindly request you to take a few minutes of your time to share your experience with us by posting a [employer] review. Your honest thoughts and opinions can make a significant difference for others seeking healthcare services and will help us better understand how we can further enhance our patient care.    Thank you for trusting us with your care,          Minh Salazar M.D.  click on the link for contact information.           At [facility] we offer virtual visits. Please use your [facility] to schedule a virtual visit.     To rate your experience with Dr. Salazar, click on this link    To post a review, simply follow these quick steps:  1. Visit [employer] or search for my name on Google.  2. Click on the "Write a review" button on the left-hand side of the page.  3. Rate your experience by choosing the number of stars that reflect your satisfaction.  4. Share your thoughts and insights about your visit - we'd love to hear your feedback!        "

## 2025-07-23 NOTE — LETTER
Christine Jo  [address]  [facility] [address]     SUTAB Instructions for Colonoscopy     Date of procedure:7/23/2025   Your arrival time will be given to you prior to the day of your procedure.  Your procedure will be performed at [facility] ([facility]). The hospital is located at [address] [address]          As soon as possible:     your prep from pharmacy and over the counter DULCOLAX LAXATIVE TABLETS, GAS-X, AND MAGNESIUM CITRATE.     Three (3) days before colonoscopy: Avoid high fiber foods such as whole wheat or whole grain breads or pasta, raw vegetables or fruit with peels, corn, beans, peas, lentils, nuts, seeds, and popcorn.     On the day before your procedure      What You CAN do:      You may have CLEAR LIQUIDS ONLY (Drink at least 16 glasses (8oz glass)-   see below for list.   Liquids That Are OK to Drink:    Water    Sports drinks (Gatorade, Power-Aid)    Coffee or tea (no cream or nondairy creamer)    Clear juices without pulp (apple, white grape)    Gelatin desserts (no fruit or toppings)    Clear soda (sprite, coke, ginger ale)    Chicken broth (until 12 midnight the night before procedure)       What You CANNOT do:       Do not EAT solid food, drink milk or anything colored red or purple.    Do not drink alcohol.    Do not take oral medications within 1 hour of starting each dose of prep.    No tobacco products, gum chewing, or candy morning of procedure      PLEASE DISREGARD THE INSERT INSTRUCTIONS FROM THE PHARMACY.  How to take prep  DOSE 1-Day Before Colonoscopy  6:00 pm Take four (4) Dulcolax (Bisacodyl) Laxative tablets and 1 simethicone (GAS-X) 125 mg capsule with at least 8 ounces or more of clear liquids.     7:00 pm Open 1 bottle of Sutab (12 tablets) and fill the provided container with 16 oz. of clear liquid up to the fill line and swallow 1 tablet with a sip of water, every 2 minutes.  Drink the entire 16 oz. of liquid. Finish by 7:30 PM.     8:30pm and 9:30 pm Drink another 16 oz. of clear liquid (to the fill line) in 30 minutes     If you have not had a bowel movement by 10:00 pm, drink one bottle of Magnesium Citrate Oral Saline Laxative Solution 10 oz.     After midnight, nothing to drink or eat except for the bowel prep.      DOSE 2-Day of the Colonoscopy      [place] the time you were instructed:2:00 AM    or     5:00AM     For this dose, repeat the steps shown above using the second bottle of Sutab (12 Tablets). Open 1 bottle of Sutab (12 tablets) and fill the provided container with 16 oz. of clear liquid up to the fill line and swallow 1 tablet with a sip of water, every 2 minutes. Drink the entire 16 oz. of liquid.      A total of 24 tablets are required for complete preparation for colonoscopy.     After finishing prep, do not drink or eat anything until after the colonoscopy.   You may have a small sip of water with your morning medications. If your stool is brown, orange, or cloudy, your colon is not clean, please call endoscopy staff at [phone].     Endoscopy Scheduling Department at [phone].  Endoscopy Department at [phone].   On-Call Nurse line at [phone].  Financial Services at [phone].  Frequently Asked Questions- Colonoscopy  What are some tips for preparing for a colonoscopy?  Stay near a toilet after taking the prep, you will have diarrhea and may have cramping with your bowel movements.  For skin irritation or flaring of hemorrhoids from frequent bowel movements and wiping, ease with over-the-counter products like baby wipes, Vaseline, or Tucks pads.  If experiencing nausea from the prep, take a 30-minute break, rinse your mouth, and continue drinking the prep. Dramamine (Meclizine) is available over the counter, take ½ of a tablet (12.5 mg) every 6 hours as needed for nausea. Do not take more than 50 mg in 24 hours.   If a long drive or  need a change to the prep direction times, please call the endoscopy department to talk with our staff at [phone].  Females between the ages of 10-55 may be asked for a urine sample (pregnancy test) after you check in for your procedure. Please let staff know before going to the restroom.  Coumadin (WARFARIN), Plavix (CLOPIDOGREL), and Effient (PRASUGREL) MUST be stopped 5 days prior to exam unless discussed with the doctor performing the test. Eliquis (APIXABAN), Savaysa (EDOXABAN), Arixtra (FONDAPARINUX), and Xarelto (RIVAROXABAN) MUST be stopped 2 days prior to exam unless discussed with the doctor performing the test.  Glucagon-like peptide-1 receptor agonists (GLP-1 Von) medications (semaglutide -OZEMPIC, RYBELSUS, WEGOVY; Dulaglutide- TRULICITY; Liraglutide- SAXENDA; tirzepatide- MOUNJARO) for weight loss or diabetes, MUST be stopped 7 days prior to exam unless discussed with the doctor performing the test.  Weight loss medications such as Phentermine (Adipex/Lomaira) and Diethylpropion (Amfepraone/Tepanil/Tenuate) should be stopped 7 days prior to exam.  You must have a licensed  to bring you home. If using public transportation, you must have an adult come with you.  Do not take any diabetic medications (including insulin) the morning of the exam. If your blood sugar goes below 70, you may drink 2 ounces of clear juice. Wait 15 minutes, then recheck your blood sugar. If it isn't going up, you may drink another 2 ounces of clear juice and contact the On-Call Nurse line at [phone].   Continue to take blood pressure, heart, thyroid, lung, seizure, and psychological medications the morning of procedure.     Do I have to drink all the bowel prep and water?             Yes, in addition to drinking plenty of clear liquids. Drinking plenty of clear liquids helps the prep to work and keep you hydrated. Any clear liquid that isn't red or purple. Drink at least 12 (8 oz) glasses of clear liquids or  "three 32 oz Gatorades by 5PM the day before your procedure. Drink   at least 1 (8 oz) glass of liquid every hour while you're awake. After the first dose of prep, drink an additional four (8 oz) glasses of clear liquids before midnight.  Clear liquids include: Coffee (no cream/milk)  Water  Tea  Clear carbonated drinks (ginger ale, Sprite, or sparkling water)  Gelatin/Jello  Apple, White grape, White Cranberry Juice  Beef/chicken broth/bouillon  Gatorade/Powerade  Lemonade/limeade  Snowballs/slushes  Popsicles  No "Energy" beverages or alcohol. No juices with pulp.  Do not drink red or purple liquids because the dye will stain the walls of your colon and may appear to be a bleed/abnormality.        The first dose of the prep starts to clean out the stool from your colon. The second dose of the prep helps to fully clean out the colon before the procedure.     What are some ways to improve the taste of the prep?  Put the prep solution in the refrigerator to chill before beginning the prep, tastes best cold.  Drinking the prep with a straw.     How will I know that the bowel prep is working? Why is it important to have a good prep or a clean colon before the procedure?  bowel movements are clear or clear yellow.   Colon should be clean as possible so the doctor can see the walls of the colon and find tiny polyps or colon cancer.  If there is stool in the colon, the doctor cannot move the endoscopy scope through the entire colon and the procedure will be canceled.  If a history of poor bowel prep, constipation, opiate medication use, diabetes, or kidney disease, please let us know; you may require an extended bowel prep to clean your colon.  If brown (including dark orange and cloudy) bowel movements on the morning of your procedure, please call the endoscopy department at [phone] (M-F from 6AM-3PM).       What should I bring to my appointment?  -List of your current medications                                     "          -Clothing that is easy to put back on after the procedure        -Method of payment        -Your ID         - Insurance card     Leave jewelry and other valuables at home.   Please plan to be at the hospital for 3 - 4 hours.     Why doesn't my appointment time show up in [facility] or [facility]?  [facility] and [facility] are not set up to show surgical times. You will be called the day before the procedure and told your arrival time.     Where is the Endoscopy department located?  [facility] ([facility]) \[facility] [place] [facility]\"" is located at [address], off [address] ([place]). Once on [address], [facility] is the second building (Entrance [address]2) on the left. Check in for your procedure on the 1st floor at Registration.   [facility], is the 2-story surgery center on the left.  [facility] [address] [place] [address]. Many GPS systems are NOT providing accurate instructions, take [address], from either direction, to [address] and proceed to the eastbound service road, turning between the [facility] and [facility]. Once at the [facility], look for signs directing you to Hospital/Surgery. Check in for your procedure at  for Hospital/Surgery.

## 2025-07-24 VITALS
DIASTOLIC BLOOD PRESSURE: 54 MMHG | HEART RATE: 68 BPM | RESPIRATION RATE: 16 BRPM | OXYGEN SATURATION: 98 % | WEIGHT: 240 LBS | HEIGHT: 62 IN | TEMPERATURE: 98 F | SYSTOLIC BLOOD PRESSURE: 138 MMHG | BODY MASS INDEX: 44.16 KG/M2

## 2025-07-24 RX ORDER — PRAVASTATIN SODIUM 20 MG/1
20 TABLET ORAL NIGHTLY
Qty: 30 TABLET | Refills: 11 | Status: SHIPPED | OUTPATIENT
Start: 2025-07-24

## 2025-07-25 LAB
ESTROGEN SERPL-MCNC: NORMAL PG/ML
INSULIN SERPL-ACNC: NORMAL U[IU]/ML
LAB AP CLINICAL INFORMATION: NORMAL
LAB AP GROSS DESCRIPTION: NORMAL
LAB AP PERFORMING LOCATION(S): NORMAL
LAB AP REPORT FOOTNOTES: NORMAL
T3RU NFR SERPL: NORMAL %

## 2025-07-28 ENCOUNTER — PATIENT MESSAGE (OUTPATIENT)
Dept: INTERNAL MEDICINE | Facility: CLINIC | Age: 75
End: 2025-07-28
Payer: MEDICARE

## 2025-07-28 DIAGNOSIS — R29.90 MULTIPLE NEUROLOGICAL SYMPTOMS: Primary | ICD-10-CM

## 2025-07-28 DIAGNOSIS — R26.81 UNSTEADINESS ON FEET: ICD-10-CM

## 2025-07-28 DIAGNOSIS — R29.6 FALLS FREQUENTLY: ICD-10-CM

## 2025-07-28 DIAGNOSIS — M54.41 CHRONIC BILATERAL LOW BACK PAIN WITH BILATERAL SCIATICA: ICD-10-CM

## 2025-07-28 DIAGNOSIS — M54.42 CHRONIC BILATERAL LOW BACK PAIN WITH BILATERAL SCIATICA: ICD-10-CM

## 2025-07-28 DIAGNOSIS — G89.29 CHRONIC BILATERAL LOW BACK PAIN WITH BILATERAL SCIATICA: ICD-10-CM

## 2025-07-29 ENCOUNTER — TELEPHONE (OUTPATIENT)
Dept: INTERNAL MEDICINE | Facility: CLINIC | Age: 75
End: 2025-07-29
Payer: MEDICARE

## 2025-07-29 ENCOUNTER — OFFICE VISIT (OUTPATIENT)
Dept: CARDIOLOGY | Facility: CLINIC | Age: 75
End: 2025-07-29
Payer: MEDICARE

## 2025-07-29 ENCOUNTER — LAB VISIT (OUTPATIENT)
Dept: LAB | Facility: HOSPITAL | Age: 75
End: 2025-07-29
Payer: MEDICARE

## 2025-07-29 VITALS
SYSTOLIC BLOOD PRESSURE: 113 MMHG | BODY MASS INDEX: 44.25 KG/M2 | OXYGEN SATURATION: 99 % | HEIGHT: 62 IN | DIASTOLIC BLOOD PRESSURE: 71 MMHG | WEIGHT: 240.44 LBS | RESPIRATION RATE: 16 BRPM | HEART RATE: 57 BPM

## 2025-07-29 DIAGNOSIS — D64.9 ANEMIA, UNSPECIFIED TYPE: ICD-10-CM

## 2025-07-29 DIAGNOSIS — Z86.69 HISTORY OF OBSTRUCTIVE SLEEP APNEA: ICD-10-CM

## 2025-07-29 DIAGNOSIS — E78.2 MIXED HYPERLIPIDEMIA: ICD-10-CM

## 2025-07-29 DIAGNOSIS — E11.65 UNCONTROLLED TYPE 2 DIABETES MELLITUS WITH HYPERGLYCEMIA: ICD-10-CM

## 2025-07-29 DIAGNOSIS — Z95.5 S/P CORONARY ARTERY STENT PLACEMENT: ICD-10-CM

## 2025-07-29 DIAGNOSIS — I65.23 BILATERAL CAROTID ARTERY STENOSIS: ICD-10-CM

## 2025-07-29 DIAGNOSIS — I48.0 PAROXYSMAL ATRIAL FIBRILLATION: ICD-10-CM

## 2025-07-29 DIAGNOSIS — I50.42 CHRONIC COMBINED SYSTOLIC AND DIASTOLIC CONGESTIVE HEART FAILURE: ICD-10-CM

## 2025-07-29 DIAGNOSIS — I10 PRIMARY HYPERTENSION: Primary | ICD-10-CM

## 2025-07-29 DIAGNOSIS — Z79.4 CONTROLLED TYPE 2 DIABETES MELLITUS WITH DIABETIC POLYNEUROPATHY, WITH LONG-TERM CURRENT USE OF INSULIN: ICD-10-CM

## 2025-07-29 DIAGNOSIS — E11.42 CONTROLLED TYPE 2 DIABETES MELLITUS WITH DIABETIC POLYNEUROPATHY, WITH LONG-TERM CURRENT USE OF INSULIN: ICD-10-CM

## 2025-07-29 LAB
ABSOLUTE EOSINOPHIL (OHS): 0.13 K/UL
ABSOLUTE MONOCYTE (OHS): 0.68 K/UL (ref 0.3–1)
ABSOLUTE NEUTROPHIL COUNT (OHS): 4.62 K/UL (ref 1.8–7.7)
BASOPHILS # BLD AUTO: 0.05 K/UL
BASOPHILS NFR BLD AUTO: 0.7 %
ERYTHROCYTE [DISTWIDTH] IN BLOOD BY AUTOMATED COUNT: 12.8 % (ref 11.5–14.5)
FERRITIN SERPL-MCNC: 33 NG/ML (ref 20–300)
HCT VFR BLD AUTO: 30.4 % (ref 37–48.5)
HGB BLD-MCNC: 10.1 GM/DL (ref 12–16)
IMM GRANULOCYTES # BLD AUTO: 0.03 K/UL (ref 0–0.04)
IMM GRANULOCYTES NFR BLD AUTO: 0.4 % (ref 0–0.5)
IRON SATN MFR SERPL: 23 % (ref 20–50)
IRON SERPL-MCNC: 86 UG/DL (ref 30–160)
LYMPHOCYTES # BLD AUTO: 1.33 K/UL (ref 1–4.8)
MCH RBC QN AUTO: 30.8 PG (ref 27–31)
MCHC RBC AUTO-ENTMCNC: 33.2 G/DL (ref 32–36)
MCV RBC AUTO: 93 FL (ref 82–98)
NUCLEATED RBC (/100WBC) (OHS): 0 /100 WBC
PLATELET # BLD AUTO: 213 K/UL (ref 150–450)
PMV BLD AUTO: 10.5 FL (ref 9.2–12.9)
RBC # BLD AUTO: 3.28 M/UL (ref 4–5.4)
RELATIVE EOSINOPHIL (OHS): 1.9 %
RELATIVE LYMPHOCYTE (OHS): 19.4 % (ref 18–48)
RELATIVE MONOCYTE (OHS): 9.9 % (ref 4–15)
RELATIVE NEUTROPHIL (OHS): 67.7 % (ref 38–73)
TIBC SERPL-MCNC: 382 UG/DL (ref 250–450)
TRANSFERRIN SERPL-MCNC: 258 MG/DL (ref 200–375)
WBC # BLD AUTO: 6.84 K/UL (ref 3.9–12.7)

## 2025-07-29 PROCEDURE — 84466 ASSAY OF TRANSFERRIN: CPT

## 2025-07-29 PROCEDURE — 3072F LOW RISK FOR RETINOPATHY: CPT | Mod: CPTII,S$GLB,, | Performed by: INTERNAL MEDICINE

## 2025-07-29 PROCEDURE — 1159F MED LIST DOCD IN RCRD: CPT | Mod: CPTII,S$GLB,, | Performed by: INTERNAL MEDICINE

## 2025-07-29 PROCEDURE — 3078F DIAST BP <80 MM HG: CPT | Mod: CPTII,S$GLB,, | Performed by: INTERNAL MEDICINE

## 2025-07-29 PROCEDURE — 99999 PR PBB SHADOW E&M-EST. PATIENT-LVL V: CPT | Mod: PBBFAC,,, | Performed by: INTERNAL MEDICINE

## 2025-07-29 PROCEDURE — 85025 COMPLETE CBC W/AUTO DIFF WBC: CPT

## 2025-07-29 PROCEDURE — 1101F PT FALLS ASSESS-DOCD LE1/YR: CPT | Mod: CPTII,S$GLB,, | Performed by: INTERNAL MEDICINE

## 2025-07-29 PROCEDURE — 36415 COLL VENOUS BLD VENIPUNCTURE: CPT

## 2025-07-29 PROCEDURE — 3288F FALL RISK ASSESSMENT DOCD: CPT | Mod: CPTII,S$GLB,, | Performed by: INTERNAL MEDICINE

## 2025-07-29 PROCEDURE — 4010F ACE/ARB THERAPY RXD/TAKEN: CPT | Mod: CPTII,S$GLB,, | Performed by: INTERNAL MEDICINE

## 2025-07-29 PROCEDURE — 3008F BODY MASS INDEX DOCD: CPT | Mod: CPTII,S$GLB,, | Performed by: INTERNAL MEDICINE

## 2025-07-29 PROCEDURE — 99214 OFFICE O/P EST MOD 30 MIN: CPT | Mod: S$GLB,,, | Performed by: INTERNAL MEDICINE

## 2025-07-29 PROCEDURE — 82728 ASSAY OF FERRITIN: CPT

## 2025-07-29 PROCEDURE — 3074F SYST BP LT 130 MM HG: CPT | Mod: CPTII,S$GLB,, | Performed by: INTERNAL MEDICINE

## 2025-07-29 PROCEDURE — 3044F HG A1C LEVEL LT 7.0%: CPT | Mod: CPTII,S$GLB,, | Performed by: INTERNAL MEDICINE

## 2025-07-29 RX ORDER — BLOOD-GLUCOSE SENSOR
1 EACH MISCELLANEOUS CONTINUOUS
Qty: 3 EACH | Refills: 1 | Status: SHIPPED | OUTPATIENT
Start: 2025-07-29 | End: 2026-07-29

## 2025-07-29 RX ORDER — BLOOD-GLUCOSE,RECEIVER,CONT
1 EACH MISCELLANEOUS CONTINUOUS
Qty: 1 EACH | Refills: 2 | Status: SHIPPED | OUTPATIENT
Start: 2025-07-29 | End: 2026-07-29

## 2025-07-29 NOTE — TELEPHONE ENCOUNTER
Copied from CRM #[id number]. Topic: General Inquiry - Provider Information  >> Jul 29, 2025 11:12 AM Ada wrote:  Type : Patient Advise    Who Called : Patient       Does the patient know what this is regarding?: Pt is calling to inform provider that her provider Toshiaulisses will be attempting to reach out again about her Pa or approval for her to receive her Freestyle Libere sensor., They have already attempted to fax it over once and failed . Please Advise.       Would the patient rather a call back or a response via [facility]? Call      Best Call Back Number: [phone]

## 2025-07-29 NOTE — PROGRESS NOTES
"  Subjective:   Patient ID:  07/29/2025      Christine Jo is a 75 y.o. female who presents for evaluation of No chief complaint on file.      History of Present Illness    CHIEF COMPLAINT:  Patient presents today for follow up    HYPERTENSION:  BP has been well-controlled on transdermal clonidine patches with "really good" readings. She is considering additional BP monitoring through a pharmacy program.    DIABETES:  Her diabetes management has improved with A1C reduced from 7.4 to 6.1 five months ago. She takes Lantus insulin 48 units nightly and Januvia. She experiences nocturnal hypoglycemic episodes with glucose dropping to 53 while sleeping. Morning glucose is 115. She notes glucose fluctuations after meals, rising to 150-159 but quickly returning to baseline. She is considering potential insulin dose adjustment pending guidance from her primary care physician.    HEART FAILURE:  She reports bilateral feet swelling, describing them as "poofing up like little baby elephants." She acknowledges taking her diuretic irregularly due to frequent urination.    MOBILITY AND PAIN:  She has significant mobility difficulties, requiring "furniture walking" at home and experiencing challenges with ambulation. She has hip pain which she attributes to possible arthritis and manages with Tylenol. She also has neuropathy contributing to leg discomfort, describing a sensation of "feet on fire" when medication is not taken. A previous circulation study confirmed normal LLE circulation. She is aware that gabapentin may contribute to leg swelling.    RESPIRATORY:  She reports a dry, non-productive cough that occurs more frequently than usual, particularly triggered by drinking hot beverages.    RECENT PROCEDURES:  Recent colonoscopy revealed one polyp. She appropriately held anticoagulation prior to the procedure as recommended.    CURRENT MEDICATIONS:  Eliquis, clonidine patch, isosorbide, ezetimibe, Plavix, carvedilol 25 mg " twice daily, Entresto, and Januvia.      ROS:  ROS findings as noted in HPI.         HPI    Past Medical History:   Diagnosis Date    Arthritis     Cataract     Coronary artery disease     Diabetes mellitus 2008     am 01/15/2018 Insulin x 1 year    Diabetes mellitus, type 2     DM (diabetes mellitus) 2008     am 02/14/2020 Insulin x 4 years    Encounter for blood transfusion     Glaucoma     Hypertension     Insomnia     Macular degeneration     Old MI (myocardial infarction) 2/12/2024    Vaginal yeast infection        Past Surgical History:   Procedure Laterality Date    abdominal laparoscopy       ADENOIDECTOMY  1965    Tonsils removed also    ANGIOGRAM, CORONARY, WITH LEFT HEART CATHETERIZATION N/A 04/02/2024    Procedure: Angiogram, Coronary, with Left Heart Cath;  Surgeon: Shree Espinoza MD;  Location: [facility] CATH LAB;  Service: Cardiology;  Laterality: N/A;    ANGIOGRAM, CORONARY, WITH LEFT HEART CATHETERIZATION N/A 04/01/2024    Procedure: Angiogram, Coronary, with Left Heart Cath;  Surgeon: Shree Espinoza MD;  Location: [facility] CATH LAB;  Service: Cardiology;  Laterality: N/A;    BREAST BIOPSY Left 1998    benign    BREAST SURGERY  1998 biopsy lt breast    CATARACT EXTRACTION Bilateral [id number]    [facility] in [place]    Cataract Surgery Bilateral     Laser (YAG)    COLON SURGERY  2004    COLONOSCOPY N/A 05/05/2021    Procedure: COLONOSCOPY;  Surgeon: Shawn Rogers III, MD;  Location: [facility];  Service: Endoscopy;  Laterality: N/A;    COLONOSCOPY N/A 06/30/2021    Procedure: COLONOSCOPY;  Surgeon: Memo Merida MD;  Location: [facility];  Service: Endoscopy;  Laterality: N/A;    CORONARY STENT PLACEMENT N/A 04/01/2024    Procedure: INSERTION, STENT, CORONARY ARTERY;  Surgeon: Shree Espinoza MD;  Location: [facility] CATH LAB;  Service: Cardiology;  Laterality: N/A;    ESOPHAGOGASTRODUODENOSCOPY N/A 11/29/2019    Procedure: ESOPHAGOGASTRODUODENOSCOPY (EGD);  Surgeon: Shawn MCCLOUD  Ken HARRIS MD;  Location: [facility] ENDO;  Service: Endoscopy;  Laterality: N/A;    ESOPHAGOGASTRODUODENOSCOPY N/A 05/05/2021    Procedure: EGD (ESOPHAGOGASTRODUODENOSCOPY);  Surgeon: Shawn Rogers III, MD;  Location: [facility] ENDO;  Service: Endoscopy;  Laterality: N/A;    EYE SURGERY      IVUS, CORONARY  04/02/2024    Procedure: IVUS, Coronary;  Surgeon: Shree Espinoza MD;  Location: [facility] CATH LAB;  Service: Cardiology;;    PERCUTANEOUS CORONARY INTERVENTION, ARTERY N/A 04/01/2024    Procedure: Percutaneous coronary intervention;  Surgeon: Shree Espinoza MD;  Location: [facility] CATH LAB;  Service: Cardiology;  Laterality: N/A;    PTCA, SINGLE VESSEL  04/01/2024    Procedure: PTCA, Single Vessel;  Surgeon: Shree Espinoza MD;  Location: [facility] CATH LAB;  Service: Cardiology;;    SMALL INTESTINE SURGERY  2004    Exploratory stomach large and small intestines    TONSILLECTOMY      TUBAL LIGATION         Social History[1]    Family History   Problem Relation Name Age of Onset    Heart disease Mother Jillian Jo         Open heart surgery    Cataracts Mother Jillian Jo     Macular degeneration Mother Jillian Jo     Glaucoma Mother Jillian Jo     Arthritis Mother Jillian Jo     Hearing loss Mother Jillian Jo     Hypertension Mother Jillian Jo     Kidney disease Mother Jillian Jo     Stroke Mother Jillian Jo     Vision loss Mother Jillian Jo     Diabetes Sister La Jo     Heart disease Sister La Jo         CAD    Cataracts Sister La Jo     Depression Sister La Jo         Depression    Hypertension Sister La Jo     Diabetes Sister Erica Lerouge     Hearing loss Sister Erica Lerouge     Hypertension Sister Erica Lerouge     Diabetes Sister      Cancer Son Andrea Trascher III         testicular     Arthritis Son Andrea Trascher III         Because of chemotherapy at 19 he has neuropathy and arthritis    Hearing loss Son Andrea Camargoscher  III         Because of chemotherapy    Cancer Maternal Aunt Etta Marquez         Tumor in chest wall    Heart disease Maternal Grandfather Leonides Santos         Pacemaker    Arthritis Maternal Grandfather Leonides Santos     Hearing loss Maternal Grandfather Leonides Santos     Hypertension Maternal Grandfather Leonides Santos     Hypertension Maternal Grandmother Helene Santos     Kidney disease Maternal Grandmother Helene Santos     Stroke Maternal Grandmother Helene Santos     Alcohol abuse Daughter Kaye Camargoscher     Asthma Daughter Kaye Trascher     Depression Daughter Kaye aCmargoscher         Psysophrenia    Drug abuse Daughter Kaye Trascher         Not now but earlier alcohol and drug abuse    Hypertension Daughter Kaye Trascher     Mental illness Daughter Kaye Camargoscher         Psysophrenia    Cancer Son Andrea Trascher III will         Testicular cancer    Depression Son Andrea Trascher III will         Major depressive disorder like me    Cancer Maternal Aunt Carmella Enrique         Lung cancer    Depression Sister Maribell Hull         May be bipolar    Diabetes Sister Maribell Hull     Heart disease Sister Maribell Hull         Open heart surgery    Hypertension Sister Maribell Hull     Hypertension Brother Riky Jo          Review of Systems   Cardiovascular:  Negative for chest pain, dyspnea on exertion, palpitations and syncope.   Neurological:  Negative for focal weakness.       Current Outpatient Medications on File Prior to Visit   Medication Sig    BEPREVE 1.5 % Drop Place 1 drop into both eyes 2 (two) times daily.    blood sugar diagnostic Strp To check blood sugar 1 times daily    blood-glucose meter (ACCU-CHEK GUIDE GLUCOSE METER) Misc use daily to test blood sugar    carvediloL (COREG) 25 MG tablet TAKE 2 TABLETS BY MOUTH TWICE  DAILY    cloNIDine (CATAPRES) 0.1 MG tablet Take 1 tablet (0.1 mg total) by mouth daily as needed (BP >160/90).    cloNIDine 0.3  "mg/24 hr td ptwk (CATAPRES) 0.3 mg/24 hr Place 1 patch onto the skin every 7 days.    clopidogreL (PLAVIX) 75 mg tablet TAKE 1 TABLET BY MOUTH ONCE  DAILY    coenzyme Q10 200 mg capsule Take 200 mg by mouth once daily.    DULoxetine (CYMBALTA) 60 MG capsule TAKE 1 CAPSULE BY MOUTH ONCE  DAILY    ELIQUIS 5 mg Tab TAKE 1 TABLET BY MOUTH TWICE  DAILY    EMGALITY SYRINGE 120 mg/mL Syrg INJECT THE CONTENTS OF 1 SYRINGE SUBCUTANEOUSLY EVERY 28 DAYS    ezetimibe (ZETIA) 10 mg tablet TAKE 1 TABLET BY MOUTH IN THE  EVENING    famotidine (PEPCID) 20 MG tablet Take 1 tablet (20 mg total) by mouth 2 (two) times daily.    fluticasone propionate (FLONASE) 50 mcg/actuation nasal spray 2 sprays (100 mcg total) by Each Nostril route once daily.    gabapentin (NEURONTIN) 300 MG capsule Take 1 capsule (300 mg total) by mouth daily as needed (when needed).    hydrocortisone 2.5 % cream Apply topically 2 (two) times daily.    insulin glargine U-100, Lantus, (LANTUS SOLOSTAR U-100 INSULIN) 100 unit/mL (3 mL) InPn pen Inject 64 Units into the skin every evening.    insulin lispro (HUMALOG KWIKPEN INSULIN) 100 unit/mL pen Inject 5 Units into the skin as needed (if glucose before each meal which is three times a day is above 200 take 5 units of this insulin).    isosorbide mononitrate (IMDUR) 30 MG 24 hr tablet TAKE 1 TABLET BY MOUTH ONCE  DAILY    lancets Misc To check BG 1 times daily    meclizine (ANTIVERT) 25 mg tablet Take 1 tablet (25 mg total) by mouth 3 (three) times daily as needed.    nitroGLYCERIN (NITROSTAT) 0.4 MG SL tablet Place 1 tablet (0.4 mg total) under the tongue every 5 (five) minutes as needed for Chest pain.    pantoprazole (PROTONIX) 40 MG tablet Take 1 tablet (40 mg total) by mouth once daily.    pen needle, diabetic (BD ULTRA-FINE SHORT PEN NEEDLE) 31 gauge x 5/16" Ndle AS DIRECTED TWICE DAILY    pravastatin (PRAVACHOL) 20 MG tablet TAKE 1 TABLET BY MOUTH IN THE  EVENING    RESTASIS 0.05 % ophthalmic emulsion " INSTILL 1 DROP INTO BOTH EYES  TWICE DAILY    rimegepant 75 mg odt Take 1 tablet (75 mg total) by mouth as needed for Migraine (do not exceed 2-3 doses within 1 week). Place ODT tablet on the tongue; alternatively the ODT tablet may be placed under the tongue    sacubitriL-valsartan (ENTRESTO)  mg per tablet Take 1 tablet by mouth 2 (two) times daily.    SITagliptin phosphate (JANUVIA) 100 MG Tab Take 1 tablet (100 mg total) by mouth once daily.    temazepam (RESTORIL) 30 mg capsule Take 1 capsule (30 mg total) by mouth every evening.    azelastine (ASTELIN) 137 mcg (0.1 %) nasal spray use 2 sprays (274 mcg total) by Nasal route 2 (two) times daily.    [DISCONTINUED] blood-glucose sensor (FREESTYLE CATHERINE 3 SENSOR) Tamar 1 each by Misc.(Non-Drug; Combo Route) route continuous.     No current facility-administered medications on file prior to visit.       Objective:   Objective:  Wt Readings from Last 3 Encounters:   07/29/25 109.1 kg (240 lb 6.6 oz)   07/23/25 108.9 kg (240 lb)   06/25/25 110.1 kg (242 lb 9.9 oz)     Temp Readings from Last 3 Encounters:   07/23/25 97.5 °F (36.4 °C) (Temporal)   03/02/25 98.6 °F (37 °C) (Oral)   02/25/25 98.6 °F (37 °C) (Oral)     BP Readings from Last 3 Encounters:   07/29/25 113/71   07/23/25 (!) 138/54   06/25/25 (!) 148/65     Pulse Readings from Last 3 Encounters:   07/29/25 (!) 57   07/23/25 68   06/25/25 67       Physical Exam    Vitals: Weight: 240 lbs.  General: No acute distress. Well-developed. Well-nourished.  Eyes: EOMI. Sclerae anicteric.  HENT: Normocephalic. Atraumatic. Nares patent. Moist oral mucosa.  Ears: Bilateral TMs clear. Bilateral EACs clear.  Cardiovascular: Regular rate. Regular rhythm. No murmurs. No rubs. No gallops. Normal S1, S2.  Respiratory: Normal respiratory effort. Clear to auscultation bilaterally. No rales. No rhonchi. No wheezing.  Abdomen: Soft. Non-tender. Non-distended. Normoactive bowel sounds.  Musculoskeletal: No  obvious  deformity.  Extremities: No lower extremity edema.  Neurological: Alert & oriented x3. No slurred speech. Normal gait.  Psychiatric: Normal mood. Normal affect. Good insight. Good judgment.  Skin: Warm. Dry. No rash.         Physical Exam  Vitals reviewed.   Constitutional:       Appearance: She is well-developed.   Neck:      Vascular: No carotid bruit.   Cardiovascular:      Rate and Rhythm: Normal rate and regular rhythm.      Pulses: Intact distal pulses.      Heart sounds: Normal heart sounds. No murmur heard.  Pulmonary:      Breath sounds: Normal breath sounds.   Neurological:      Mental Status: She is oriented to person, place, and time.           Lab Results   Component Value Date    CHOL 189 06/19/2024    CHOL 195 09/28/2023    CHOL 164 10/04/2022     Lab Results   Component Value Date    LDLCALC 102.0 06/19/2024    LDLCALC 103.2 09/28/2023    LDLCALC 93.0 10/04/2022         Chemistry        Component Value Date/Time     06/25/2025 1118     03/02/2025 1113    K 4.8 06/25/2025 1118    K 4.1 03/02/2025 1113     06/25/2025 1118     03/02/2025 1113    CO2 27 06/25/2025 1118    CO2 24 03/02/2025 1113    BUN 21 06/25/2025 1118    CREATININE 0.9 06/25/2025 1118     (H) 06/25/2025 1118     (H) 03/02/2025 1113        Component Value Date/Time    CALCIUM 9.3 06/25/2025 1118    CALCIUM 9.6 03/02/2025 1113    ALKPHOS 49 06/25/2025 1118    ALKPHOS 47 03/02/2025 1113    AST 11 06/25/2025 1118    AST 17 03/02/2025 1113    ALT 6 (L) 06/25/2025 1118    ALT 21 03/02/2025 1113    BILITOT 0.5 06/25/2025 1118    BILITOT 0.6 03/02/2025 1113    ESTGFRAFRICA >60 07/27/2022 1237    EGFRNONAA >60 07/27/2022 1237          Lab Results   Component Value Date    TSH 1.210 06/25/2025       Lab Results   Component Value Date    WBC 6.84 07/29/2025    HGB 10.1 (L) 07/29/2025    HCT 30.4 (L) 07/29/2025    MCV 93 07/29/2025     07/29/2025       @LABRCNTIP(BNP,BNPTRIAGEBLO)@        Imaging:  ======    No results found for this or any previous visit.    No results found for this or any previous visit.    Results for orders placed during the hospital encounter of 01/12/24    X-Ray Chest PA And Lateral    Narrative  EXAMINATION:  XR CHEST PA AND LATERAL    CLINICAL HISTORY:  Calculus of gallbladder without cholecystitis without obstruction    TECHNIQUE:  PA and lateral views of the chest were performed.    COMPARISON:  10/04/2021    FINDINGS:  The cardiac and mediastinal silhouettes appear within normal limits.   The lungs are clear bilaterally.  No acute osseous findings demonstrated.    Impression  No acute findings.      Electronically signed by: Fredrick Pace MD  Date:    01/12/2024  Time:    09:07      No results found for this or any previous visit.      No valid procedures specified.        No results found for this or any previous visit.      Results for orders placed during the hospital encounter of 03/31/24    Echo    Interpretation Summary    Left Ventricle: The left ventricle is normal in size. Normal wall thickness. There is concentric remodeling. Normal wall motion. There is mildly reduced systolic function with a visually estimated ejection fraction of 40 - 45%. Ejection fraction by visual approximation is 45%. There is indeterminate diastolic function.    Right Ventricle: Normal right ventricular cavity size. Wall thickness is normal. Right ventricle wall motion  is normal. Systolic function is normal.    Aortic Valve: There is mild aortic regurgitation.    Tricuspid Valve: The tricuspid valve is structurally normal. There is mild regurgitation.    Pulmonary Artery: The estimated pulmonary artery systolic pressure is 42 mmHg.    IVC/SVC: Normal venous pressure at 3 mmHg.      Results for orders placed during the hospital encounter of 03/31/24    Cardiac catheterization    Conclusion    The 1st Diag lesion was 60% stenosed, jailed very small vessel , D2 50% also very small  vessel jailed    The estimated blood loss was none.    IVUS showed well expanded stent, well apposed, no thrombus    The procedure log was documented by Documenter: Haley Yusuf RN and verified by Shree Espinoza MD.    Date: 4/2/2024  Time: 4:36 PM      Lab Results   Component Value Date    HGBA1C 6.1 (H) 02/10/2025         The ASCVD Risk score (Yoselyn MARTINES, et al., 2019) failed to calculate for the following reasons:    Risk score cannot be calculated because patient has a medical history suggesting prior/existing ASCVD    Diagnostic Results:  ECG: Reviewed    Assessment and Plan:   Primary hypertension    History of obstructive sleep apnea    Chronic combined systolic and diastolic congestive heart failure    Paroxysmal atrial fibrillation    Controlled type 2 diabetes mellitus with diabetic polyneuropathy, with long-term current use of insulin    S/P coronary artery stent placement    Bilateral carotid artery stenosis    Mixed hyperlipidemia        Assessment & Plan    I50.42 Chronic combined systolic and diastolic congestive heart failure  I48.0 Paroxysmal atrial fibrillation  I10 Primary hypertension  Z86.69 History of obstructive sleep apnea  E11.42, Z79.4 Controlled type 2 diabetes mellitus with diabetic polyneuropathy, with long-term current use of insulin  Z95.5 S/P coronary artery stent placement  I65.23 Bilateral carotid artery stenosis  E78.2 Mixed hyperlipidemia    BP management effective with current regimen including clonidine patch and other medications.  Diabetes management improved, with last A1C of 6.1 5 months ago, down from 7.4 previously.  Clarified that previous circulation studies showed normal blood flow in lower legs, ruling out peripheral artery disease.  Discussed that diabetes can cause peripheral neuropathy affecting various levels, including sensory and motor functions.  Recommend taking Lantus injection in the morning instead of evening to address potential nighttime  hypoglycemia.  Continued current medication regimen.    I50.42 CHRONIC COMBINED SYSTOLIC AND DIASTOLIC CONGESTIVE HEART FAILURE:  - Continued current medication regimen, including: Isosorbide, Carvedilol 25 mg twice daily, Entresto (full dose) Follow up in 6 months    I48.0 PAROXYSMAL ATRIAL FIBRILLATION:  - Continued current medication regimen, including: Eliquis Follow up in 6 months    I10 PRIMARY HYPERTENSION:  - Continued current medication regimen, including: Clonidine patch, Carvedilol 25 mg twice daily Follow up in 6 months    E11.42, Z79.4 CONTROLLED TYPE 2 DIABETES MELLITUS WITH DIABETIC POLYNEUROPATHY, WITH LONG-TERM CURRENT USE OF INSULIN:  - Patient to monitor blood sugar, particularly for nighttime lows Advised discussing diabetes management, including insulin dosing and timing, with Dr. Delgado Continued Lantus 48 units at night and Januvia Explained that gabapentin can cause leg swelling in 20% of patients due to vasodilation, which is a benign side effect Continued current medication regimen, including: Gabapentin Discuss findings and treatment plan with Dr. Delgado, including diabetes management and leg weakness Follow up in 6 months    Z95.5 S/P CORONARY ARTERY STENT PLACEMENT:  - Continued current medication regimen, including: Plavix Follow up in 6 months    E78.2 MIXED HYPERLIPIDEMIA:  - Continued current medication regimen, including: Ezetimibe Follow up in 6 months             Healthy weight goals were discussed in clinic  Reviewed all tests and above medical conditions with patient in detail and formulated treatment plan.             [1]   Social History  Tobacco Use    Smoking status: Former     Current packs/day: 0.00     Average packs/day: 1 pack/day for 36.5 years (36.5 ttl pk-yrs)     Types: Cigarettes     Start date:      Quit date: 2003     Years since quittin.1     Passive exposure: Past    Smokeless tobacco: Never    Tobacco comments:     Been quit 19 years. I smoked 35  years approx a pack a day   Substance Use Topics    Alcohol use: No    Drug use: Never

## 2025-07-30 ENCOUNTER — TELEPHONE (OUTPATIENT)
Dept: INTERNAL MEDICINE | Facility: CLINIC | Age: 75
End: 2025-07-30
Payer: MEDICARE

## 2025-07-31 ENCOUNTER — PATIENT OUTREACH (OUTPATIENT)
Dept: ADMINISTRATIVE | Facility: HOSPITAL | Age: 75
End: 2025-07-31
Payer: MEDICARE

## 2025-07-31 DIAGNOSIS — I10 PRIMARY HYPERTENSION: ICD-10-CM

## 2025-07-31 DIAGNOSIS — E11.42 CONTROLLED TYPE 2 DIABETES MELLITUS WITH DIABETIC POLYNEUROPATHY, WITH LONG-TERM CURRENT USE OF INSULIN: Primary | ICD-10-CM

## 2025-07-31 DIAGNOSIS — Z79.4 CONTROLLED TYPE 2 DIABETES MELLITUS WITH DIABETIC POLYNEUROPATHY, WITH LONG-TERM CURRENT USE OF INSULIN: Primary | ICD-10-CM

## 2025-07-31 NOTE — PROGRESS NOTES
Working Diabetic Lab Report.   Pt has lab appt scheduled, 8/04/25.  A1C and Micro Albumin urine ordered and linked to appt.

## 2025-08-01 ENCOUNTER — TELEPHONE (OUTPATIENT)
Dept: INTERNAL MEDICINE | Facility: CLINIC | Age: 75
End: 2025-08-01
Payer: MEDICARE

## 2025-08-01 RX ORDER — CLONIDINE 0.3 MG/24H
PATCH, EXTENDED RELEASE TRANSDERMAL
Qty: 4 PATCH | Refills: 11 | Status: SHIPPED | OUTPATIENT
Start: 2025-08-01

## 2025-08-01 NOTE — TELEPHONE ENCOUNTER
Copied from CRM #[id number]. Topic: General Inquiry - Patient Advice  >> Aug 1, 2025 11:36 AM Christine wrote:  Pt is calling in regards to checking the status on the order for wheelchair to [facility].please call pot back at .[phone]

## 2025-08-01 NOTE — TELEPHONE ENCOUNTER
Returned pt call, informed pf fax number was incorrect and faxed to right number [phone] pt stated understanding

## 2025-08-04 ENCOUNTER — TELEPHONE (OUTPATIENT)
Dept: INTERNAL MEDICINE | Facility: CLINIC | Age: 75
End: 2025-08-04
Payer: MEDICARE

## 2025-08-04 NOTE — TELEPHONE ENCOUNTER
Copied from CRM #[id number]. Topic: Appointments - Appointment Access  >> Aug 4, 2025 10:49 AM Taylor wrote:  .Type:  Patient Requesting Call    Who Called:Christine Jo  Does the patient know what this is regarding?:Patient is requesting a call back in regards to needing to reschedule her visit to Thursday 8/7/25 due to transportation  Would the patient rather a call back or a response via [facility]? Call back  Best Call Back Number:.[phone] ([place])   Additional Information:

## 2025-08-06 ENCOUNTER — PATIENT MESSAGE (OUTPATIENT)
Dept: HEMATOLOGY/ONCOLOGY | Facility: CLINIC | Age: 75
End: 2025-08-06
Payer: MEDICARE

## 2025-08-07 DIAGNOSIS — E66.813 CLASS 3 SEVERE OBESITY DUE TO EXCESS CALORIES WITH SERIOUS COMORBIDITY AND BODY MASS INDEX (BMI) OF 45.0 TO 49.9 IN ADULT: ICD-10-CM

## 2025-08-07 RX ORDER — PANTOPRAZOLE SODIUM 40 MG/1
40 TABLET, DELAYED RELEASE ORAL
Qty: 90 TABLET | Refills: 3 | Status: SHIPPED | OUTPATIENT
Start: 2025-08-07

## 2025-08-08 NOTE — TELEPHONE ENCOUNTER
Refill Decision Note   Christine Jo  is requesting a refill authorization.  Brief Assessment and Rationale for Refill:  Approve     Medication Therapy Plan:  Pt has been on pantoprazole and plavix for over a year      Alert overridden per protocol: Yes   Comments:     Note composed:9:55 PM 08/07/2025             Appointments     Last Visit   6/30/2025 Jose Szymanski MD   Next Visit   8/11/2025 Jose Szymanski MD

## 2025-08-11 ENCOUNTER — OFFICE VISIT (OUTPATIENT)
Dept: INTERNAL MEDICINE | Facility: CLINIC | Age: 75
End: 2025-08-11
Payer: MEDICARE

## 2025-08-11 ENCOUNTER — LAB VISIT (OUTPATIENT)
Dept: LAB | Facility: HOSPITAL | Age: 75
End: 2025-08-11
Attending: FAMILY MEDICINE
Payer: MEDICARE

## 2025-08-11 VITALS
HEIGHT: 62 IN | WEIGHT: 242.31 LBS | TEMPERATURE: 97 F | SYSTOLIC BLOOD PRESSURE: 124 MMHG | OXYGEN SATURATION: 97 % | DIASTOLIC BLOOD PRESSURE: 70 MMHG | BODY MASS INDEX: 44.59 KG/M2 | HEART RATE: 75 BPM

## 2025-08-11 DIAGNOSIS — K21.9 GASTROESOPHAGEAL REFLUX DISEASE WITHOUT ESOPHAGITIS: ICD-10-CM

## 2025-08-11 DIAGNOSIS — E66.813 CLASS 3 SEVERE OBESITY DUE TO EXCESS CALORIES WITH SERIOUS COMORBIDITY AND BODY MASS INDEX (BMI) OF 45.0 TO 49.9 IN ADULT: ICD-10-CM

## 2025-08-11 DIAGNOSIS — G89.29 CHRONIC BILATERAL LOW BACK PAIN WITH BILATERAL SCIATICA: ICD-10-CM

## 2025-08-11 DIAGNOSIS — Z79.4 CONTROLLED TYPE 2 DIABETES MELLITUS WITH DIABETIC POLYNEUROPATHY, WITH LONG-TERM CURRENT USE OF INSULIN: ICD-10-CM

## 2025-08-11 DIAGNOSIS — M54.9 DORSALGIA, UNSPECIFIED: ICD-10-CM

## 2025-08-11 DIAGNOSIS — E11.42 CONTROLLED TYPE 2 DIABETES MELLITUS WITH DIABETIC POLYNEUROPATHY, WITH LONG-TERM CURRENT USE OF INSULIN: ICD-10-CM

## 2025-08-11 DIAGNOSIS — Z79.4 CONTROLLED TYPE 2 DIABETES MELLITUS WITH DIABETIC POLYNEUROPATHY, WITH LONG-TERM CURRENT USE OF INSULIN: Primary | ICD-10-CM

## 2025-08-11 DIAGNOSIS — M54.41 CHRONIC BILATERAL LOW BACK PAIN WITH BILATERAL SCIATICA: ICD-10-CM

## 2025-08-11 DIAGNOSIS — E11.42 CONTROLLED TYPE 2 DIABETES MELLITUS WITH DIABETIC POLYNEUROPATHY, WITH LONG-TERM CURRENT USE OF INSULIN: Primary | ICD-10-CM

## 2025-08-11 DIAGNOSIS — I10 PRIMARY HYPERTENSION: ICD-10-CM

## 2025-08-11 DIAGNOSIS — I25.118 CORONARY ARTERY DISEASE OF NATIVE ARTERY OF NATIVE HEART WITH STABLE ANGINA PECTORIS: ICD-10-CM

## 2025-08-11 DIAGNOSIS — M54.42 CHRONIC BILATERAL LOW BACK PAIN WITH BILATERAL SCIATICA: ICD-10-CM

## 2025-08-11 LAB
EAG (OHS): 126 MG/DL (ref 68–131)
HBA1C MFR BLD: 6 % (ref 4–5.6)

## 2025-08-11 PROCEDURE — 4010F ACE/ARB THERAPY RXD/TAKEN: CPT | Mod: CPTII,S$GLB,, | Performed by: FAMILY MEDICINE

## 2025-08-11 PROCEDURE — 99999 PR PBB SHADOW E&M-EST. PATIENT-LVL V: CPT | Mod: PBBFAC,,, | Performed by: FAMILY MEDICINE

## 2025-08-11 PROCEDURE — 1159F MED LIST DOCD IN RCRD: CPT | Mod: CPTII,S$GLB,, | Performed by: FAMILY MEDICINE

## 2025-08-11 PROCEDURE — 3072F LOW RISK FOR RETINOPATHY: CPT | Mod: CPTII,S$GLB,, | Performed by: FAMILY MEDICINE

## 2025-08-11 PROCEDURE — 1101F PT FALLS ASSESS-DOCD LE1/YR: CPT | Mod: CPTII,S$GLB,, | Performed by: FAMILY MEDICINE

## 2025-08-11 PROCEDURE — 99214 OFFICE O/P EST MOD 30 MIN: CPT | Mod: S$GLB,,, | Performed by: FAMILY MEDICINE

## 2025-08-11 PROCEDURE — 3008F BODY MASS INDEX DOCD: CPT | Mod: CPTII,S$GLB,, | Performed by: FAMILY MEDICINE

## 2025-08-11 PROCEDURE — 1125F AMNT PAIN NOTED PAIN PRSNT: CPT | Mod: CPTII,S$GLB,, | Performed by: FAMILY MEDICINE

## 2025-08-11 PROCEDURE — 83036 HEMOGLOBIN GLYCOSYLATED A1C: CPT

## 2025-08-11 PROCEDURE — 36415 COLL VENOUS BLD VENIPUNCTURE: CPT

## 2025-08-11 PROCEDURE — 3044F HG A1C LEVEL LT 7.0%: CPT | Mod: CPTII,S$GLB,, | Performed by: FAMILY MEDICINE

## 2025-08-11 PROCEDURE — 3078F DIAST BP <80 MM HG: CPT | Mod: CPTII,S$GLB,, | Performed by: FAMILY MEDICINE

## 2025-08-11 PROCEDURE — 3288F FALL RISK ASSESSMENT DOCD: CPT | Mod: CPTII,S$GLB,, | Performed by: FAMILY MEDICINE

## 2025-08-11 PROCEDURE — 3074F SYST BP LT 130 MM HG: CPT | Mod: CPTII,S$GLB,, | Performed by: FAMILY MEDICINE

## 2025-08-11 RX ORDER — INSULIN GLARGINE 100 [IU]/ML
48 INJECTION, SOLUTION SUBCUTANEOUS NIGHTLY
Start: 2025-08-11

## 2025-08-11 RX ORDER — PANTOPRAZOLE SODIUM 40 MG/1
40 TABLET, DELAYED RELEASE ORAL 2 TIMES DAILY
Qty: 180 TABLET | Refills: 1 | Status: SHIPPED | OUTPATIENT
Start: 2025-08-11

## 2025-08-11 RX ORDER — FUROSEMIDE 20 MG/1
20 TABLET ORAL
COMMUNITY
Start: 2025-08-06

## 2025-08-15 ENCOUNTER — PATIENT MESSAGE (OUTPATIENT)
Dept: INTERNAL MEDICINE | Facility: CLINIC | Age: 75
End: 2025-08-15
Payer: MEDICARE

## 2025-08-17 DIAGNOSIS — E11.42 CONTROLLED TYPE 2 DIABETES MELLITUS WITH DIABETIC POLYNEUROPATHY, WITH LONG-TERM CURRENT USE OF INSULIN: ICD-10-CM

## 2025-08-17 DIAGNOSIS — Z79.4 CONTROLLED TYPE 2 DIABETES MELLITUS WITH DIABETIC POLYNEUROPATHY, WITH LONG-TERM CURRENT USE OF INSULIN: ICD-10-CM

## 2025-08-17 RX ORDER — PEN NEEDLE, DIABETIC 30 GX3/16"
NEEDLE, DISPOSABLE MISCELLANEOUS
Qty: 200 EACH | Refills: 3 | Status: SHIPPED | OUTPATIENT
Start: 2025-08-17

## 2025-08-19 ENCOUNTER — TELEPHONE (OUTPATIENT)
Dept: INTERNAL MEDICINE | Facility: CLINIC | Age: 75
End: 2025-08-19
Payer: MEDICARE

## 2025-08-19 ENCOUNTER — PATIENT MESSAGE (OUTPATIENT)
Dept: INTERNAL MEDICINE | Facility: CLINIC | Age: 75
End: 2025-08-19
Payer: MEDICARE

## 2025-08-24 ENCOUNTER — PATIENT MESSAGE (OUTPATIENT)
Dept: INTERNAL MEDICINE | Facility: CLINIC | Age: 75
End: 2025-08-24
Payer: MEDICARE

## 2025-08-25 ENCOUNTER — TELEPHONE (OUTPATIENT)
Dept: INTERNAL MEDICINE | Facility: CLINIC | Age: 75
End: 2025-08-25
Payer: MEDICARE

## 2025-08-27 ENCOUNTER — EXTERNAL HOME HEALTH (OUTPATIENT)
Dept: HOME HEALTH SERVICES | Facility: HOSPITAL | Age: 75
End: 2025-08-27
Payer: MEDICARE

## 2025-08-27 ENCOUNTER — TELEPHONE (OUTPATIENT)
Dept: CARDIOLOGY | Facility: CLINIC | Age: 75
End: 2025-08-27
Payer: MEDICARE

## 2025-08-28 ENCOUNTER — TELEPHONE (OUTPATIENT)
Dept: INTERNAL MEDICINE | Facility: CLINIC | Age: 75
End: 2025-08-28
Payer: MEDICARE

## 2025-09-04 ENCOUNTER — TELEPHONE (OUTPATIENT)
Dept: CARDIOLOGY | Facility: CLINIC | Age: 75
End: 2025-09-04
Payer: MEDICARE

## 2025-09-05 ENCOUNTER — TELEPHONE (OUTPATIENT)
Dept: INTERNAL MEDICINE | Facility: CLINIC | Age: 75
End: 2025-09-05
Payer: MEDICARE

## 2025-09-05 DIAGNOSIS — M25.532 PAIN IN BOTH WRISTS: Primary | ICD-10-CM

## 2025-09-05 DIAGNOSIS — M25.531 PAIN IN BOTH WRISTS: Primary | ICD-10-CM

## (undated) DEVICE — CATH NC EMERGE MR 3.5X12MM

## (undated) DEVICE — CATH INFINITI MULTIPAK JR4 5FR

## (undated) DEVICE — CATH EMERGE MR 15 X 2.50

## (undated) DEVICE — PACK HEART CATH BR

## (undated) DEVICE — DRAPE ANGIO BRACH 38X44IN

## (undated) DEVICE — KIT GLIDESHEATH SLEND 6FR 10CM

## (undated) DEVICE — ANGIOTOUCH KIT

## (undated) DEVICE — CATH PIG145 INFINITI 5X110CM

## (undated) DEVICE — GUIDEWIRE WHOLEY HI TORQ 175CM

## (undated) DEVICE — BAND TR COMP DEVICE REG 24CM

## (undated) DEVICE — GUIDEWIRE EMERALD .035IN 260CM

## (undated) DEVICE — WIRE BMW 014X190

## (undated) DEVICE — GUIDE LAUNCHER 6FR EBU 3.5

## (undated) DEVICE — CATH JL3.5 5FR

## (undated) DEVICE — PAD DEFIB CADENCE ADULT R2

## (undated) DEVICE — NDL PERCUTANEOUS 21G 7CM VASC

## (undated) DEVICE — GUIDEWIRE RUNTHROUGH NS 180CM

## (undated) DEVICE — WIRE GUIDE TEFLON 3CM .035 145

## (undated) DEVICE — CATH INFINITI MP JL3.5 JR4 5FR

## (undated) DEVICE — CATH EAGLE EYE PLATINUM

## (undated) DEVICE — KIT MANIFOLD LOW PRESS TUBING

## (undated) DEVICE — OMNIPAQUE 300MG 150ML VIAL

## (undated) DEVICE — CATH JR4 5FR

## (undated) DEVICE — KIT SYR REUSABLE